# Patient Record
Sex: MALE | Race: WHITE | NOT HISPANIC OR LATINO | Employment: OTHER | URBAN - METROPOLITAN AREA
[De-identification: names, ages, dates, MRNs, and addresses within clinical notes are randomized per-mention and may not be internally consistent; named-entity substitution may affect disease eponyms.]

---

## 2017-01-06 DIAGNOSIS — R97.20 ELEVATED PROSTATE SPECIFIC ANTIGEN (PSA): ICD-10-CM

## 2017-01-06 LAB — PSA SERPL-ACNC: 4.01 UG/L (ref 0–4)

## 2017-01-06 PROCEDURE — 84153 ASSAY OF PSA TOTAL: CPT | Performed by: FAMILY MEDICINE

## 2017-01-17 ENCOUNTER — OFFICE VISIT (OUTPATIENT)
Dept: OPHTHALMOLOGY | Facility: CLINIC | Age: 72
End: 2017-01-17
Payer: COMMERCIAL

## 2017-01-17 DIAGNOSIS — H25.11 NUCLEAR SCLEROSIS OF RIGHT EYE: ICD-10-CM

## 2017-01-17 DIAGNOSIS — H40.053 BORDERLINE GLAUCOMA WITH OCULAR HYPERTENSION, BILATERAL: Primary | ICD-10-CM

## 2017-01-17 DIAGNOSIS — H43.813 PVD (POSTERIOR VITREOUS DETACHMENT), BILATERAL: ICD-10-CM

## 2017-01-17 DIAGNOSIS — H35.373 EPIRETINAL MEMBRANE, BILATERAL: ICD-10-CM

## 2017-01-17 DIAGNOSIS — Z86.69 H/O RD (RETINAL DETACHMENT): ICD-10-CM

## 2017-01-17 DIAGNOSIS — Z98.890 S/P BLEPHAROPLASTY: ICD-10-CM

## 2017-01-17 DIAGNOSIS — H04.123 DRY EYES, BILATERAL: ICD-10-CM

## 2017-01-17 PROCEDURE — 92133 CPTRZD OPH DX IMG PST SGM ON: CPT | Performed by: STUDENT IN AN ORGANIZED HEALTH CARE EDUCATION/TRAINING PROGRAM

## 2017-01-17 PROCEDURE — 92012 INTRM OPH EXAM EST PATIENT: CPT | Performed by: STUDENT IN AN ORGANIZED HEALTH CARE EDUCATION/TRAINING PROGRAM

## 2017-01-17 RX ORDER — DEXAMETHASONE ACETATE, MICRO 100 %
1 POWDER (GRAM) MISCELLANEOUS 4 TIMES DAILY
Qty: 1 BOTTLE | Refills: 0 | Status: ON HOLD | OUTPATIENT
Start: 2017-01-17 | End: 2017-02-20

## 2017-01-17 RX ORDER — MONOCHLOROACETIC ACID
1 CRYSTALS MISCELLANEOUS 4 TIMES DAILY
Qty: 1 BOTTLE | Refills: 0 | Status: SHIPPED | OUTPATIENT
Start: 2017-01-17 | End: 2017-09-27

## 2017-01-17 RX ORDER — BROMFENAC SODIUM 100 %
1 POWDER (GRAM) MISCELLANEOUS 4 TIMES DAILY
Qty: 1 BOTTLE | Refills: 0 | Status: ON HOLD | OUTPATIENT
Start: 2017-01-17 | End: 2017-02-20

## 2017-01-17 ASSESSMENT — VISUAL ACUITY
OS_CC: 20/20
OD_PH_CC: 20/70+2
OD_CC: 20/200
METHOD: SNELLEN - LINEAR

## 2017-01-17 ASSESSMENT — CUP TO DISC RATIO
OS_RATIO: 0.35 UD
OD_RATIO: 0.25 UD

## 2017-01-17 ASSESSMENT — TONOMETRY
IOP_METHOD: APPLANATION
OS_IOP_MMHG: 18
OD_IOP_MMHG: 22

## 2017-01-17 ASSESSMENT — SLIT LAMP EXAM - LIDS
COMMENTS: NORMAL
COMMENTS: NORMAL

## 2017-01-17 ASSESSMENT — EXTERNAL EXAM - LEFT EYE: OS_EXAM: NORMAL

## 2017-01-17 ASSESSMENT — EXTERNAL EXAM - RIGHT EYE: OD_EXAM: NORMAL

## 2017-01-17 NOTE — MR AVS SNAPSHOT
After Visit Summary   1/17/2017    Rakan Nolasco    MRN: [id number]           Patient Information     Date Of Birth          1945        Visit Information        Provider Department      1/17/2017 9:15 AM Mateo Gray MD [facility]        Today's Diagnoses     Borderline glaucoma with ocular hypertension, bilateral    -  1     H/O RD (retinal detachment) s/p repair with PPV (AB)         Nuclear sclerosis of right eye         Epiretinal membrane, bilateral         Dry eyes, bilateral         PVD (posterior vitreous detachment), bilateral         S/P blepharoplasty           Care Instructions    ContinueTimolol right eye each morning and Latanoprost every evening both eyes  Continue care with Dr. Brown  Visually significant cataract that is interfering with daily activities of living. Plan for cataract extraction and intraocular lens implant right eye.  Risks, benefits, complications, and alternatives discussed with patient including possibility of limitations from coexistent eye disease and loss of vision. Target refraction and lens options discussed.  Patient understands and wishes to proceed with surgery.      Mateo Gray MD  [phone]              Follow-ups after your visit        Follow-up notes from your care team     Return for as scheduled.      Who to contact     If you have questions or need follow up information about today's clinic visit or your schedule please contact [facility] directly at [phone].  Normal or non-critical lab and imaging results will be communicated to you by MyChart, letter or phone within 4 business days after the clinic has received the results. If you do not hear from us within 7 days, please contact the clinic through MyChart or phone. If you have a critical or abnormal lab result, we will notify you by phone as soon as possible.  Submit refill requests through [employer] or call your pharmacy and they will  "forward the refill request to us. Please allow 3 business days for your refill to be completed.          Additional Information About Your Visit        [employer] Information     [employer] lets you send messages to your doctor, view your test results, renew your prescriptions, schedule appointments and more. To sign up, go to [email or web address] . Click on \"Log in\" on the left side of the screen, which will take you to the Welcome page. Then click on \"Sign up Now\" on the right side of the page.     You will be asked to enter the access code listed below, as well as some personal information. Please follow the directions to create your username and password.     Your access code is: [email or web address]  Expires: 2017 10:15 AM     Your access code will  in 90 days. If you need help or a new code, please call your [place] clinic or [phone].        Care EveryWhere ID     This is your Care EveryWhere ID. This could be used by other organizations to access your [place] medical records  [id number]         Blood Pressure from Last 3 Encounters:   10/03/16 138/66   16 148/81   16 136/80    Weight from Last 3 Encounters:   10/03/16 99.338 kg (219 lb)   16 98.431 kg (217 lb)   16 101.152 kg (223 lb)              We Performed the Following     HC COMPUTERIZED OPHTHALMIC IMAGING OPTIC NERVE          Today's Medication Changes          These changes are accurate as of: 17 10:15 AM.  If you have any questions, ask your nurse or doctor.               These medicines have changed or have updated prescriptions.        Dose/Directions    pantoprazole 20 MG EC tablet   Commonly known as:  PROTONIX   This may have changed:  additional instructions   Used for:  Gastroesophageal reflux disease without esophagitis        Take by mouth 30-60 minutes before a meal.   Quantity:  90 tablet   Refills:  3                Primary Care Provider Office Phone # Fax #    Telly Lucio [phone] " [phone]       [facility] [address]  [place] [address]        Thank you!     Thank you for choosing [facility]  for your care. Our goal is always to provide you with excellent care. Hearing back from our patients is one way we can continue to improve our services. Please take a few minutes to complete the written survey that you may receive in the mail after your visit with us. Thank you!             Your Updated Medication List - Protect others around you: Learn how to safely use, store and throw away your medicines at www.disposemymeds.org.          This list is accurate as of: 1/17/17 10:15 AM.  Always use your most recent med list.                   Brand Name Dispense Instructions for use    aspirin 81 MG tablet      Take 1 tablet by mouth daily.       atorvastatin 20 MG tablet    LIPITOR    90 tablet    TAKE ONE TABLET BY MOUTH ONE TIME DAILY       CALCIUM 600/VITAMIN D PO      Take  by mouth. Take 1/2 tablet in the morning, 1/2 at night daily       clonazePAM 0.5 MG tablet    klonoPIN     Take 0.5 mg by mouth. Take 1 tablet by mouth daily at bedtime       latanoprost 0.005 % ophthalmic solution    XALATAN    7.5 mL    Place 1 drop into both eyes At Bedtime       lisinopril 10 MG tablet    PRINIVIL/ZESTRIL    90 tablet    TAKE ONE TABLET BY MOUTH ONE TIME DAILY       MULTI-DAY PO      daily       pantoprazole 20 MG EC tablet    PROTONIX    90 tablet    Take by mouth 30-60 minutes before a meal.       timolol 0.5 % ophthalmic solution    TIMOPTIC    1 Bottle    Place 1 drop into both eyes daily

## 2017-01-17 NOTE — PATIENT INSTRUCTIONS
ContinueTimolol right eye each morning and Latanoprost every evening both eyes  Continue care with Dr. Brown  Visually significant cataract that is interfering with daily activities of living. Plan for cataract extraction and intraocular lens implant right eye.  Risks, benefits, complications, and alternatives discussed with patient including possibility of limitations from coexistent eye disease and loss of vision. Target refraction and lens options discussed.  Patient understands and wishes to proceed with surgery.      Mateo Gray MD  [phone]

## 2017-01-17 NOTE — PROGRESS NOTES
Current Eye Medications Latanoprost every evening both eyes (10pm) and timolol right eye each morning ( 6am)     Subjective:  4 month borderline glaucoma  with oct.  Glare more bothersome right eye . Patient saw Doctor Glenny about 3 months ago has return appointment in 3 months.    Objective:  See Ophthalmology Exam.      Assessment:  Rakan Nolasco is a 71 year old male who presents with:     Borderline glaucoma with ocular hypertension, bilateral Intraocular pressure improved with addition of T5 to right eye.   OCT stable both eyes.      H/O RD (retinal detachment) s/p repair with PPV (AB)      Nuclear sclerosis of right eye Visually significant right eye. Was cleared for cataract surgery in right eye in October by Dr. Brown.  Discussed CA3 - will think about it.     Epiretinal membrane, bilateral      Dry eyes, bilateral      PVD (posterior vitreous detachment), bilateral      S/P blepharoplasty        Plan:  ContinueTimolol right eye each morning and Latanoprost every evening both eyes  Continue care with Dr. Brown  Visually significant cataract that is interfering with daily activities of living. Plan for cataract extraction and intraocular lens implant right eye.  Risks, benefits, complications, and alternatives discussed with patient including possibility of limitations from coexistent eye disease and loss of vision. Target refraction and lens options discussed.  Patient understands and wishes to proceed with surgery.      Mateo Gray MD  [phone]    Visually significant cataract, Right  eye. Risks, benefits, alternatives of cataract surgery discussed; patient wishes to proceed with surgery.    Eye: Right   Pupil: 8  Pseudoexfoliation: No  Flomax: No  Diabetes mellitus: No  Glaucoma: No  Guttae: No  S/p refractive surgery or other eye surgery: No  Refractive target:  emmetropia - discuss  Anticoagulation: aspirin  Anesthesia: MAC/topical  Able to lay flat:  Yes  Additional concerns: s/p  vitrectomy right eye, dense brunescent lens  Difficulty: 4-5

## 2017-01-25 ENCOUNTER — TRANSFERRED RECORDS (OUTPATIENT)
Dept: HEALTH INFORMATION MANAGEMENT | Facility: CLINIC | Age: 72
End: 2017-01-25

## 2017-02-06 ENCOUNTER — OFFICE VISIT (OUTPATIENT)
Dept: OPHTHALMOLOGY | Facility: CLINIC | Age: 72
End: 2017-02-06
Payer: COMMERCIAL

## 2017-02-06 ENCOUNTER — OFFICE VISIT (OUTPATIENT)
Dept: FAMILY MEDICINE | Facility: CLINIC | Age: 72
End: 2017-02-06
Payer: COMMERCIAL

## 2017-02-06 VITALS
HEIGHT: 70 IN | HEART RATE: 74 BPM | SYSTOLIC BLOOD PRESSURE: 124 MMHG | DIASTOLIC BLOOD PRESSURE: 76 MMHG | BODY MASS INDEX: 33.64 KG/M2 | TEMPERATURE: 97.4 F | WEIGHT: 235 LBS | OXYGEN SATURATION: 96 %

## 2017-02-06 DIAGNOSIS — Z86.69 H/O RD (RETINAL DETACHMENT): ICD-10-CM

## 2017-02-06 DIAGNOSIS — H26.9 CATARACT: ICD-10-CM

## 2017-02-06 DIAGNOSIS — Z01.818 PREOP GENERAL PHYSICAL EXAM: Primary | ICD-10-CM

## 2017-02-06 DIAGNOSIS — Z98.890 S/P BLEPHAROPLASTY: ICD-10-CM

## 2017-02-06 DIAGNOSIS — H25.11 NUCLEAR SCLEROSIS OF RIGHT EYE: Primary | ICD-10-CM

## 2017-02-06 DIAGNOSIS — H04.123 DRY EYES, BILATERAL: ICD-10-CM

## 2017-02-06 DIAGNOSIS — H35.373 EPIRETINAL MEMBRANE, BILATERAL: ICD-10-CM

## 2017-02-06 DIAGNOSIS — H43.813 PVD (POSTERIOR VITREOUS DETACHMENT), BILATERAL: ICD-10-CM

## 2017-02-06 DIAGNOSIS — H40.053 BORDERLINE GLAUCOMA WITH OCULAR HYPERTENSION, BILATERAL: ICD-10-CM

## 2017-02-06 PROCEDURE — 92136 OPHTHALMIC BIOMETRY: CPT | Mod: 26 | Performed by: STUDENT IN AN ORGANIZED HEALTH CARE EDUCATION/TRAINING PROGRAM

## 2017-02-06 PROCEDURE — 99214 OFFICE O/P EST MOD 30 MIN: CPT | Performed by: FAMILY MEDICINE

## 2017-02-06 PROCEDURE — 92014 COMPRE OPH EXAM EST PT 1/>: CPT | Performed by: STUDENT IN AN ORGANIZED HEALTH CARE EDUCATION/TRAINING PROGRAM

## 2017-02-06 RX ORDER — PREDNISOLONE ACETATE 10 MG/ML
1 SUSPENSION/ DROPS OPHTHALMIC 4 TIMES DAILY
Qty: 10 ML | Refills: 0 | Status: CANCELLED | OUTPATIENT
Start: 2017-02-21

## 2017-02-06 RX ORDER — MOXIFLOXACIN 5 MG/ML
1 SOLUTION/ DROPS OPHTHALMIC 4 TIMES DAILY
Qty: 1 BOTTLE | Refills: 0 | Status: CANCELLED | OUTPATIENT
Start: 2017-02-19

## 2017-02-06 RX ORDER — DICLOFENAC SODIUM 1 MG/ML
1 SOLUTION/ DROPS OPHTHALMIC 4 TIMES DAILY
Qty: 1 BOTTLE | Refills: 0 | Status: CANCELLED | OUTPATIENT
Start: 2017-02-19

## 2017-02-06 ASSESSMENT — VISUAL ACUITY
OS_CC: 20/20
METHOD: SNELLEN - LINEAR
OD_CC: 20/200

## 2017-02-06 ASSESSMENT — EXTERNAL EXAM - RIGHT EYE: OD_EXAM: NORMAL

## 2017-02-06 ASSESSMENT — EXTERNAL EXAM - LEFT EYE: OS_EXAM: NORMAL

## 2017-02-06 ASSESSMENT — CUP TO DISC RATIO
OD_RATIO: 0.25 UD
OS_RATIO: 0.35 UD

## 2017-02-06 ASSESSMENT — SLIT LAMP EXAM - LIDS
COMMENTS: NORMAL
COMMENTS: NORMAL

## 2017-02-06 ASSESSMENT — PAIN SCALES - GENERAL: PAINLEVEL: NO PAIN (0)

## 2017-02-06 NOTE — PROGRESS NOTES
Current Eye Medications:  T5 qday both eyes, Latanoprost every evening both eyes      Subjective:  Preop Kelman Phacoemulsification Intraocular lens right eye.  Patient wants to proceed with surgery.     Objective:  See Ophthalmology Exam.      Assessment:  Rakan Nolasco is a 71 year old male who presents with:     Nuclear sclerosis of right eye preop right eye. CA3. Target mild myopia/emm.     H/O RD (retinal detachment) s/p repair with PPV (AB)      Dry eyes, bilateral      PVD (posterior vitreous detachment), bilateral      S/P blepharoplasty     Borderline glaucoma with ocular hypertension, bilateral      Epiretinal membrane, bilateral        PRE-OP CATARACT INSTRUCTIONS    *Use the following drop in the right eye 4 times on the day before surgery and once the morning of surgery:                                CatarActive3 four times a day in the right eye until the bottle runs out.  *Please bring all your eyedrop to the surgery center.  *If taking more than one drop, wait five minutes between drops.  *No solid food after midnight.  *Clear liquids: coffee (no cream), tea, water, and jello are OK before 7 A.M.  You may take your regular pills with this.  *If you are taking glaucoma drops, continue as usual.    Mateo Gray MD  [phone]

## 2017-02-06 NOTE — MR AVS SNAPSHOT
After Visit Summary   2/6/2017    Rakan Nolasco    MRN: [id number]           Patient Information     Date Of Birth          1945        Visit Information        Provider Department      2/6/2017 8:45 AM Mateo Gray MD [facility] [place]        Today's Diagnoses     Nuclear sclerosis of right eye         H/O RD (retinal detachment) s/p repair with PPV (AB)         Dry eyes, bilateral         PVD (posterior vitreous detachment), bilateral         S/P blepharoplasty         Epiretinal membrane, bilateral           Care Instructions    PRE-OP CATARACT INSTRUCTIONS    *Use the following drop in the right eye 4 times on the day before surgery and once the morning of surgery:                                  CatarActive3 four times a day in the right eye until the bottle runs out.    *Please bring all your eyedrop to the surgery center.    *If taking more than one drop, wait five minutes between drops.    *No solid food after midnight.    *Clear liquids: coffee (no cream), tea, water, and jello are OK before 7 A.M.  You may take your regular pills with this.    *If you are taking glaucoma drops, continue as usual.    Mateo Gray MD  [phone]        Follow-ups after your visit        Follow-up notes from your care team     Return for as scheduled.      Your next 10 appointments already scheduled     Feb 20, 2017   [facility]    [address]  [facility] [address]   [phone]            Feb 21, 2017  1:45 PM   [facility] [place] ([facility]    [address]  [place] [address]   [phone]            Feb 28, 2017 10:45 AM   Return Visit with MD Ariadne [facility] [facility]    [address]  [place] [address]   [phone]            Mar 28, 2017  9:15 AM   Return Visit  "with Mateo Gray MD   [facility] ([facility])    [address]  [place] [address] [phone]              Who to contact     If you have questions or need follow up information about today's clinic visit or your schedule please contact [facility] directly at [phone].  Normal or non-critical lab and imaging results will be communicated to you by MyChart, letter or phone within 4 business days after the clinic has received the results. If you do not hear from us within 7 days, please contact the clinic through MyChart or phone. If you have a critical or abnormal lab result, we will notify you by phone as soon as possible.  Submit refill requests through [employer] or call your pharmacy and they will forward the refill request to us. Please allow 3 business days for your refill to be completed.          Additional Information About Your Visit        [employer] Information     [employer] lets you send messages to your doctor, view your test results, renew your prescriptions, schedule appointments and more. To sign up, go to [email or web address] . Click on \"Log in\" on the left side of the screen, which will take you to the Welcome page. Then click on \"Sign up Now\" on the right side of the page.     You will be asked to enter the access code listed below, as well as some personal information. Please follow the directions to create your username and password.     Your access code is: [email or web address]  Expires: 2017 10:15 AM     Your access code will  in 90 days. If you need help or a new code, please call your [place] clinic or [phone].        Care EveryWhere ID     This is your Care EveryWhere ID. This could be used by other organizations to access your [place] medical records  [id number]         Blood Pressure from Last 3 Encounters:   10/03/16 138/66   16 148/81   16 136/80    Weight from Last 3 Encounters:   10/03/16 99.338 kg " (219 lb)   05/06/16 98.431 kg (217 lb)   04/26/16 101.152 kg (223 lb)              We Performed the Following     IOL MASTER (1st eye)     IOL MASTER (both eyes)          Today's Medication Changes          These changes are accurate as of: 2/6/17  9:52 AM.  If you have any questions, ask your nurse or doctor.               These medicines have changed or have updated prescriptions.        Dose/Directions    pantoprazole 20 MG EC tablet   Commonly known as:  PROTONIX   This may have changed:  additional instructions   Used for:  Gastroesophageal reflux disease without esophagitis        Take by mouth 30-60 minutes before a meal.   Quantity:  90 tablet   Refills:  3                Primary Care Provider Office Phone # Fax #    Telly Lucio [phone]       [facility] [address] [place] [address]        Thank you!     Thank you for choosing [facility]  for your care. Our goal is always to provide you with excellent care. Hearing back from our patients is one way we can continue to improve our services. Please take a few minutes to complete the written survey that you may receive in the mail after your visit with us. Thank you!             Your Updated Medication List - Protect others around you: Learn how to safely use, store and throw away your medicines at www.disposemymeds.org.          This list is accurate as of: 2/6/17  9:52 AM.  Always use your most recent med list.                   Brand Name Dispense Instructions for use    aspirin 81 MG tablet      Take 1 tablet by mouth daily.       atorvastatin 20 MG tablet    LIPITOR    90 tablet    TAKE ONE TABLET BY MOUTH ONE TIME DAILY       Bromfenac Sodium Powd     1 Bottle    1 Bottle 4 times daily 1 drop four times a day into the operative eye starting 1 day before surgery. Use until bottle runs out.       CALCIUM 600/VITAMIN D PO      Take  by mouth. Take 1/2 tablet in the morning, 1/2 at night daily        clonazePAM 0.5 MG tablet    klonoPIN     Take 0.5 mg by mouth. Take 1 tablet by mouth daily at bedtime       Dexamethasone Acetate Powd     1 Bottle    1 Bottle 4 times daily 1 drop four times a day into the operative eye starting 1 day before surgery. Use until bottle runs out.       latanoprost 0.005 % ophthalmic solution    XALATAN    7.5 mL    Place 1 drop into both eyes At Bedtime       lisinopril 10 MG tablet    PRINIVIL/ZESTRIL    90 tablet    TAKE ONE TABLET BY MOUTH ONE TIME DAILY       Moxifloxacin HCl Powd     1 Bottle    1 Bottle 4 times daily 1 drop four times a day into the operative eye starting 1 day before surgery. Use until bottle runs out.       MULTI-DAY PO      daily       pantoprazole 20 MG EC tablet    PROTONIX    90 tablet    Take by mouth 30-60 minutes before a meal.       timolol 0.5 % ophthalmic solution    TIMOPTIC    1 Bottle    Place 1 drop into both eyes daily

## 2017-02-06 NOTE — MR AVS SNAPSHOT
After Visit Summary   2/6/2017    Rakan Nolasco    MRN: [id number]           Patient Information     Date Of Birth          1945        Visit Information        Provider Department      2/6/2017 9:40 AM Telly Lucio MD [facility]        Today's Diagnoses     Preop general physical exam    -  1       Care Instructions      Before Your Surgery      Call your surgeon if there is any change in your health. This includes signs of a cold or flu (such as a sore throat, runny nose, cough, rash or fever).    Do not smoke, drink alcohol or take over the counter medicine (unless your surgeon or primary care doctor tells you to) for the 24 hours before and after surgery.    If you take prescribed drugs: Follow your doctor s orders about which medicines to take and which to stop until after surgery.    Eating and drinking prior to surgery: follow the instructions from your surgeon    Take a shower or bath the night before surgery. Use the soap your surgeon gave you to gently clean your skin. If you do not have soap from your surgeon, use your regular soap. Do not shave or scrub the surgery site.  Wear clean pajamas and have clean sheets on your bed.         Follow-ups after your visit        Your next 10 appointments already scheduled     Feb 20, 2017   Procedure with Mateo Gray MD   [facility] (--)    [address]  [facility] [address]   [phone]            Feb 21, 2017  1:45 PM   Return Visit with Mateo Gray MD   [facility] [place] ([facility])    [address]  [place] [address]   [phone]            Feb 28, 2017 10:45 AM   Return Visit with MD Ariadne Greeneview [facility] Hyatt ([facility])    [address] [facility] [address] [place] [address]   [phone]            Mar 28, 2017  9:15 AM   [facility]   [facility]  [facility] [place] [address]  [place] [address] [phone]              Who to contact     If you have questions or need follow up information about today's clinic visit or your schedule please contact [facility] directly at [phone].  Normal or non-critical lab and imaging results will be communicated to you by MyChart, letter or phone within 4 business days after the clinic has received the results. If you do not hear from us within 7 days, please contact the clinic through MyChart or phone. If you have a critical or abnormal lab result, we will notify you by phone as soon as possible.  Submit refill requests through [employer] or call your pharmacy and they will forward the refill request to us. Please allow 3 business days for your refill to be completed.          Additional Information About Your Visit        [employer] Information     [employer] lets you send messages to your doctor, view your test results, renew your prescriptions, schedule appointments and more. To sign up, go to [email or web address] . Click on \"Log in\" on the left side of the screen, which will take you to the Welcome page. Then click on \"Sign up Now\" on the right side of the page.     You will be asked to enter the access code listed below, as well as some personal information. Please follow the directions to create your username and password.     Your access code is: [email or web address]  Expires: 2017 10:15 AM     Your access code will  in 90 days. If you need help or a new code, please call your [facility] or [phone].        Care EveryWhere ID     This is your Care EveryWhere ID. This could be used by other organizations to access your [place] medical records  [id number]        Your Vitals Were     Pulse Temperature Height BMI (Body Mass Index) Pulse Oximetry       74 97.4  F (36.3  C) (Oral) 5' 10\" (1.778 m) 33.72 kg/m2 96%        Blood Pressure from Last 3 Encounters:   17 124/76 "   10/03/16 138/66   05/06/16 148/81    Weight from Last 3 Encounters:   02/06/17 235 lb (106.595 kg)   10/03/16 219 lb (99.338 kg)   05/06/16 217 lb (98.431 kg)              Today, you had the following     No orders found for display         Today's Medication Changes          These changes are accurate as of: 2/6/17 10:34 AM.  If you have any questions, ask your nurse or doctor.               These medicines have changed or have updated prescriptions.        Dose/Directions    pantoprazole 20 MG EC tablet   Commonly known as:  PROTONIX   This may have changed:  additional instructions   Used for:  Gastroesophageal reflux disease without esophagitis        Take by mouth 30-60 minutes before a meal.   Quantity:  90 tablet   Refills:  3                Primary Care Provider Office Phone # Fax #    Telly Lucio [phone]       [address] [address]        Thank you!     Thank you for choosing [facility]  for your care. Our goal is always to provide you with excellent care. Hearing back from our patients is one way we can continue to improve our services. Please take a few minutes to complete the written survey that you may receive in the mail after your visit with us. Thank you!             Your Updated Medication List - Protect others around you: Learn how to safely use, store and throw away your medicines at www.disposemymeds.org.          This list is accurate as of: 2/6/17 10:34 AM.  Always use your most recent med list.                   Brand Name Dispense Instructions for use    aspirin 81 MG tablet      Take 1 tablet by mouth daily.       atorvastatin 20 MG tablet    LIPITOR    90 tablet    TAKE ONE TABLET BY MOUTH ONE TIME DAILY       Bromfenac Sodium Powd     1 Bottle    1 Bottle 4 times daily 1 drop four times a day into the operative eye starting 1 day before surgery. Use until bottle runs out.       CALCIUM 600/VITAMIN D PO       Take  by mouth. Take 1/2 tablet in the morning, 1/2 at night daily       clonazePAM 0.5 MG tablet    klonoPIN     Take 0.5 mg by mouth. Take 1 tablet by mouth daily at bedtime       Dexamethasone Acetate Powd     1 Bottle    1 Bottle 4 times daily 1 drop four times a day into the operative eye starting 1 day before surgery. Use until bottle runs out.       latanoprost 0.005 % ophthalmic solution    XALATAN    7.5 mL    Place 1 drop into both eyes At Bedtime       lisinopril 10 MG tablet    PRINIVIL/ZESTRIL    90 tablet    TAKE ONE TABLET BY MOUTH ONE TIME DAILY       Moxifloxacin HCl Powd     1 Bottle    1 Bottle 4 times daily 1 drop four times a day into the operative eye starting 1 day before surgery. Use until bottle runs out.       MULTI-DAY PO      daily       pantoprazole 20 MG EC tablet    PROTONIX    90 tablet    Take by mouth 30-60 minutes before a meal.       timolol 0.5 % ophthalmic solution    TIMOPTIC    1 Bottle    Place 1 drop into both eyes daily

## 2017-02-06 NOTE — PROGRESS NOTES
[facility]  [address] [place] [address]  [place] [address]  [phone]  Dept: [phone]    PRE-OP EVALUATION:  Today's date: 2017    Rakan Nolasco (: 1945) presents for pre-operative evaluation assessment as requested by Dr. Gray.  He requires evaluation and anesthesia risk assessment prior to undergoing surgery/procedure for treatment of cataract .  Proposed procedure: Surgical repair of cataract    Date of Surgery/ Procedure: 2017  Time of Surgery/ Procedure: 1:00pm  Hospital/Surgical Facility: [facility]  Primary Physician: Telly Lucio  Type of Anesthesia Anticipated: Local with MAC    Patient has a Health Care Directive or Living Will:  NO    1. NO - Do you have a history of heart attack, stroke, stent, bypass or surgery on an artery in the head, neck, heart or legs?  2. YES - DO YOU EVER HAVE ANY PAIN OR DISCOMFORT IN YOUR CHEST? Not heart related  3. NO - Do you have a history of  Heart Failure?  4. NO - Are you troubled by shortness of breath when: walking on the level, up a slight hill or at night?  5. NO - Do you currently have a cold, bronchitis or other respiratory infection?  6. NO - Do you have a cough, shortness of breath or wheezing?  7. NO - Do you sometimes get pains in the calves of your legs when you walk?  8. NO - Do you or anyone in your family have previous history of blood clots?  9. NO - Do you or does anyone in your family have a serious bleeding problem such as prolonged bleeding following surgeries or cuts?  10. NO - Have you ever had problems with anemia or been told to take iron pills?  11. NO - Have you had any abnormal blood loss such as black, tarry or bloody stools, or abnormal vaginal bleeding?  12. NO - Have you ever had a blood transfusion?  13. NO - Have you or any of your relatives ever had problems with anesthesia?  14. YES - DO YOU HAVE SLEEP APNEA, EXCESSIVE SNORING OR DAYTIME DROWSINESS? Sleep apnea  15. NO - Do you  have any prosthetic heart valves?  16. NO - Do you have prosthetic joints?  17. NO - Is there any chance that you may be pregnant?      HPI:                                                      Brief HPI related to upcoming procedure: Cataract Rt eye      HYPERTENSION - Patient has longstanding history of mod-severe HTN , currently denies any symptoms referable to elevated blood pressure. Specifically denies chest pain, palpitations, dyspnea, orthopnea, PND or peripheral edema. Blood pressure readings have been in normal range. Current medication regimen is as listed below. Patient denies any side effects of medication.                                                                                                                                                                                          .  HYPERLIPIDEMIA - Patient has a long history of significant Hyperlipidemia requiring medication for treatment with recent good control. Patient reports no problems or side effects with the medication.                                                                                                                                                       .    MEDICAL HISTORY:                                                      Patient Active Problem List    Diagnosis Date Noted     Nuclear sclerosis of right eye 05/26/2016     Priority: Medium     H/O RD (retinal detachment) s/p repair with PPV (AB) 05/26/2016     Priority: Medium     Epiretinal membrane, bilateral 05/26/2016     Priority: Medium     PVD (posterior vitreous detachment) OU 06/17/2014     Priority: Medium     Dry eyes 01/21/2014     Priority: Medium     Prediabetes      Priority: Medium     Arthritis      Priority: Medium     Health Care Home 07/20/2012     Priority: Medium     [facility]  Jurgen Messina RN, C-BC    [phone]     DX V65.8 REPLACED WITH [id number] CARE [facility] (04/08/2013)       Hypertension goal BP (blood pressure)  < 140/90 07/19/2012     Priority: Medium     bmi 31 07/14/2012     Priority: Medium     Trigeminal neuralgia pain 01/04/2012     Priority: Medium     Advanced directives, counseling/discussion 08/24/2011     Priority: Medium     Advance Directive Problem List Overview:   Name Relationship Phone    Primary Health Care Agent            Alternative Health Care Agent          Discussed advance care planning with patient; information given to patient to review. 8/24/2011          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     Priority: Medium     Target IOP: 21  Peak IOP: 31  GDX: abnormal both eyes  OCT: nl  HVF: nl  Pachymetry:  C/D:  0.2/0.35  SLT:           S/P blepharoplasty 12/06/2010     Priority: Medium     HYPERLIPIDEMIA LDL GOAL <130 10/31/2010     Priority: Medium     Snoring 10/27/2010     Priority: Medium     Fatigue 10/27/2010     Priority: Medium     GERD (gastroesophageal reflux disease)      Priority: Medium     Diverticulosis      Priority: Medium      Past Medical History   Diagnosis Date     GERD (gastroesophageal reflux disease)      Glaucoma (increased eye pressure)      Diverticulosis      Colonoscopy 8/2008     Irritable bowel      Hyperlipidemia LDL goal < 130      age, htn, fhx     ROSIE (obstructive sleep apnea) 1/2011     using CPAP;     Restless leg syndrome      Trigeminal neuralgia pain 1/4/2012     Fatty liver      see US  5/2012     BMI 31.0-31.9,adult      HTN (hypertension)      Arthritis           Prediabetes      Past Surgical History   Procedure Laterality Date     Tonsillectomy  as child     Surgical history of -   8/2009     Both Eyelids-.     Colonoscopy       Esophagoscopy, gastroscopy, duodenoscopy (egd), combined  1/15/2014     Procedure: COMBINED ESOPHAGOSCOPY, GASTROSCOPY, DUODENOSCOPY (EGD), BIOPSY SINGLE OR MULTIPLE;;  Surgeon: Winston Nixon MD;  Location: [facility] OR     Current Outpatient Prescriptions   Medication Sig Dispense  Refill     lisinopril (PRINIVIL/ZESTRIL) 10 MG tablet TAKE ONE TABLET BY MOUTH ONE TIME DAILY 90 tablet 3     atorvastatin (LIPITOR) 20 MG tablet TAKE ONE TABLET BY MOUTH ONE TIME DAILY 90 tablet 2     timolol (TIMOPTIC) 0.5 % ophthalmic solution Place 1 drop into both eyes daily 1 Bottle 11     latanoprost (XALATAN) 0.005 % ophthalmic solution Place 1 drop into both eyes At Bedtime 7.5 mL 4     pantoprazole (PROTONIX) 20 MG tablet Take by mouth 30-60 minutes before a meal. (Patient taking differently: Take by mouth 30-60 minutes before a meal. prn) 90 tablet 3     aspirin 81 MG tablet Take 1 tablet by mouth daily.       ClonAZEPAM (KLONOPIN) 0.5 MG tablet Take 0.5 mg by mouth. Take 1 tablet by mouth daily at bedtime       Calcium Carbonate-Vitamin D (CALCIUM 600/VITAMIN D PO) Take  by mouth. Take 1/2 tablet in the morning, 1/2 at night daily       MULTI-DAY OR daily       Bromfenac Sodium POWD 1 Bottle 4 times daily 1 drop four times a day into the operative eye starting 1 day before surgery. Use until bottle runs out. 1 Bottle 0     Dexamethasone Acetate POWD 1 Bottle 4 times daily 1 drop four times a day into the operative eye starting 1 day before surgery. Use until bottle runs out. 1 Bottle 0     Moxifloxacin HCl POWD 1 Bottle 4 times daily 1 drop four times a day into the operative eye starting 1 day before surgery. Use until bottle runs out. 1 Bottle 0     OTC products: None, except as noted above    Allergies   Allergen Reactions     Ropinirole Hcl GI Disturbance     Weakness;? syncope      Latex Allergy: NO    Social History   Substance Use Topics     Smoking status: Former Smoker -- 15 years     Types: Cigarettes     Quit date: 01/01/1983     Smokeless tobacco: Former User      Comment: smoke free household.     Alcohol Use: No      Comment: quit in 1986     History   Drug Use No       REVIEW OF SYSTEMS:                                                    C: NEGATIVE for fever, chills, change in  "weight  E/M: NEGATIVE for ear, mouth and throat problems  R: NEGATIVE for significant cough or SOB  CV: NEGATIVE for chest pain, palpitations or peripheral edema    EXAM:                                                    /76 mmHg  Pulse 74  Temp(Src) 97.4  F (36.3  C) (Oral)  Ht 5' 10\" (1.778 m)  Wt 235 lb (106.595 kg)  BMI 33.72 kg/m2  SpO2 96%  GENERAL APPEARANCE: healthy, alert and no distress  HENT: ear canals and TM's normal and nose and mouth without ulcers or lesions  RESP: lungs clear to auscultation - no rales, rhonchi or wheezes  CV: regular rate and rhythm, normal S1 S2, no S3 or S4 and no murmur, click or rub   ABDOMEN: soft, nontender, no HSM or masses and bowel sounds normal  NEURO: Normal strength and tone, sensory exam grossly normal, mentation intact and speech normal    DIAGNOSTICS:                                                    No labs or EKG required for low risk surgery (cataract, skin procedure, breast biopsy, etc)    Recent Labs   Lab Test  10/03/16   0855  08/01/16   0705   03/28/16   1008  09/30/15   0748   02/05/14   0925  05/06/13   1101   HGB   --    --    --    --    --    --   15.6  15.2   PLT   --    --    --    --    --    --   258  263   NA   --    --    --   140  140   < >  139  138   POTASSIUM   --    --    --   4.0  4.2   < >  4.6  4.2   CR   --    --    --   0.84  1.10   < >  0.91  0.94   A1C  6.0  5.7   < >   --   6.4*   --    --    --     < > = values in this interval not displayed.      IMPRESSION:                                                    Reason for surgery/procedure: Cataract/Catract Surgery  Diagnosis/reason for consult: Cataract/Pre OP    The proposed surgical procedure is considered LOW risk.    REVISED CARDIAC RISK INDEX  The patient has the following serious cardiovascular risks for perioperative complications such as (MI, PE, VFib and 3  AV Block):  No serious cardiac risks  INTERPRETATION: 0 risks: Class I (very low risk - 0.4% complication " rate)    The patient has the following additional risks for perioperative complications:  No identified additional risks      ICD-10-CM    1. Preop general physical exam Z01.818    2. Cataract H26.9        RECOMMENDATIONS:                                                      --Patient is to take all scheduled medications on the day of surgery EXCEPT for modifications listed below.     NONE:    APPROVAL GIVEN to proceed with proposed procedure, without further diagnostic evaluation     Signed Electronically by: Telly Lucio MD    Copy of this evaluation report is provided to requesting physician.    [facility] Preop Guidelines

## 2017-02-06 NOTE — NURSING NOTE
"Chief Complaint   Patient presents with     Pre-Op Exam       Initial /76 mmHg  Pulse 74  Temp(Src) 97.4  F (36.3  C) (Oral)  Ht 5' 10\" (1.778 m)  Wt 235 lb (106.595 kg)  BMI 33.72 kg/m2  SpO2 96% Estimated body mass index is 33.72 kg/(m^2) as calculated from the following:    Height as of this encounter: 5' 10\" (1.778 m).    Weight as of this encounter: 235 lb (106.595 kg).  Medication Reconciliation: complete     Poly Sheffield CMA   "

## 2017-02-06 NOTE — PATIENT INSTRUCTIONS
PRE-OP CATARACT INSTRUCTIONS    *Use the following drop in the right eye 4 times on the day before surgery and once the morning of surgery:                                  CatarActive3 four times a day in the right eye until the bottle runs out.    *Please bring all your eyedrop to the surgery center.    *If taking more than one drop, wait five minutes between drops.    *No solid food after midnight.    *Clear liquids: coffee (no cream), tea, water, and jello are OK before 7 A.M.  You may take your regular pills with this.    *If you are taking glaucoma drops, continue as usual.    Mateo Gray MD  [phone]

## 2017-02-17 NOTE — H&P (VIEW-ONLY)
[facility]  [address] [place] [address]  [place] [address]  [phone]  Dept: [phone]    PRE-OP EVALUATION:  Today's date: 2017    Rakan Nolasco (: 1945) presents for pre-operative evaluation assessment as requested by Dr. Gray.  He requires evaluation and anesthesia risk assessment prior to undergoing surgery/procedure for treatment of cataract .  Proposed procedure: Surgical repair of cataract    Date of Surgery/ Procedure: 2017  Time of Surgery/ Procedure: 1:00pm  Hospital/Surgical Facility: [facility]  Primary Physician: Telly Lucio  Type of Anesthesia Anticipated: Local with MAC    Patient has a Health Care Directive or Living Will:  NO    1. NO - Do you have a history of heart attack, stroke, stent, bypass or surgery on an artery in the head, neck, heart or legs?  2. YES - DO YOU EVER HAVE ANY PAIN OR DISCOMFORT IN YOUR CHEST? Not heart related  3. NO - Do you have a history of  Heart Failure?  4. NO - Are you troubled by shortness of breath when: walking on the level, up a slight hill or at night?  5. NO - Do you currently have a cold, bronchitis or other respiratory infection?  6. NO - Do you have a cough, shortness of breath or wheezing?  7. NO - Do you sometimes get pains in the calves of your legs when you walk?  8. NO - Do you or anyone in your family have previous history of blood clots?  9. NO - Do you or does anyone in your family have a serious bleeding problem such as prolonged bleeding following surgeries or cuts?  10. NO - Have you ever had problems with anemia or been told to take iron pills?  11. NO - Have you had any abnormal blood loss such as black, tarry or bloody stools, or abnormal vaginal bleeding?  12. NO - Have you ever had a blood transfusion?  13. NO - Have you or any of your relatives ever had problems with anesthesia?  14. YES - DO YOU HAVE SLEEP APNEA, EXCESSIVE SNORING OR DAYTIME DROWSINESS? Sleep apnea  15. NO - Do you  have any prosthetic heart valves?  16. NO - Do you have prosthetic joints?  17. NO - Is there any chance that you may be pregnant?      HPI:                                                      Brief HPI related to upcoming procedure: Cataract Rt eye      HYPERTENSION - Patient has longstanding history of mod-severe HTN , currently denies any symptoms referable to elevated blood pressure. Specifically denies chest pain, palpitations, dyspnea, orthopnea, PND or peripheral edema. Blood pressure readings have been in normal range. Current medication regimen is as listed below. Patient denies any side effects of medication.                                                                                                                                                                                          .  HYPERLIPIDEMIA - Patient has a long history of significant Hyperlipidemia requiring medication for treatment with recent good control. Patient reports no problems or side effects with the medication.                                                                                                                                                       .    MEDICAL HISTORY:                                                      Patient Active Problem List    Diagnosis Date Noted     Nuclear sclerosis of right eye 05/26/2016     Priority: Medium     H/O RD (retinal detachment) s/p repair with PPV (AB) 05/26/2016     Priority: Medium     Epiretinal membrane, bilateral 05/26/2016     Priority: Medium     PVD (posterior vitreous detachment) OU 06/17/2014     Priority: Medium     Dry eyes 01/21/2014     Priority: Medium     Prediabetes      Priority: Medium     Arthritis      Priority: Medium     Health Care Home 07/20/2012     Priority: Medium     [facility]  Jurgen Messina RN, C-BC    [phone]     DX V65.8 REPLACED WITH [id number] (04/08/2013)       Hypertension goal BP (blood pressure)  < 140/90 07/19/2012     Priority: Medium     bmi 31 07/14/2012     Priority: Medium     Trigeminal neuralgia pain 01/04/2012     Priority: Medium     Advanced directives, counseling/discussion 08/24/2011     Priority: Medium     Advance Directive Problem List Overview:   Name Relationship Phone    Primary Health Care Agent            Alternative Health Care Agent          Discussed advance care planning with patient; information given to patient to review. 8/24/2011          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     Priority: Medium     Target IOP: 21  Peak IOP: 31  GDX: abnormal both eyes  OCT: nl  HVF: nl  Pachymetry:  C/D:  0.2/0.35  SLT:           S/P blepharoplasty 12/06/2010     Priority: Medium     HYPERLIPIDEMIA LDL GOAL <130 10/31/2010     Priority: Medium     Snoring 10/27/2010     Priority: Medium     Fatigue 10/27/2010     Priority: Medium     GERD (gastroesophageal reflux disease)      Priority: Medium     Diverticulosis      Priority: Medium      Past Medical History   Diagnosis Date     GERD (gastroesophageal reflux disease)      Glaucoma (increased eye pressure)      Diverticulosis      Colonoscopy 8/2008     Irritable bowel      Hyperlipidemia LDL goal < 130      age, htn, fhx     ROSIE (obstructive sleep apnea) 1/2011     using CPAP;     Restless leg syndrome      Trigeminal neuralgia pain 1/4/2012     Fatty liver      see US  5/2012     BMI 31.0-31.9,adult      HTN (hypertension)      Arthritis           Prediabetes      Past Surgical History   Procedure Laterality Date     Tonsillectomy  as child     Surgical history of -   8/2009     Both Eyelids-.     Colonoscopy       Esophagoscopy, gastroscopy, duodenoscopy (egd), combined  1/15/2014     Procedure: COMBINED ESOPHAGOSCOPY, GASTROSCOPY, DUODENOSCOPY (EGD), BIOPSY SINGLE OR MULTIPLE;;  Surgeon: Winston Nixon MD;  Location: [facility] OR     Current Outpatient Prescriptions   Medication Sig Dispense  Refill     lisinopril (PRINIVIL/ZESTRIL) 10 MG tablet TAKE ONE TABLET BY MOUTH ONE TIME DAILY 90 tablet 3     atorvastatin (LIPITOR) 20 MG tablet TAKE ONE TABLET BY MOUTH ONE TIME DAILY 90 tablet 2     timolol (TIMOPTIC) 0.5 % ophthalmic solution Place 1 drop into both eyes daily 1 Bottle 11     latanoprost (XALATAN) 0.005 % ophthalmic solution Place 1 drop into both eyes At Bedtime 7.5 mL 4     pantoprazole (PROTONIX) 20 MG tablet Take by mouth 30-60 minutes before a meal. (Patient taking differently: Take by mouth 30-60 minutes before a meal. prn) 90 tablet 3     aspirin 81 MG tablet Take 1 tablet by mouth daily.       ClonAZEPAM (KLONOPIN) 0.5 MG tablet Take 0.5 mg by mouth. Take 1 tablet by mouth daily at bedtime       Calcium Carbonate-Vitamin D (CALCIUM 600/VITAMIN D PO) Take  by mouth. Take 1/2 tablet in the morning, 1/2 at night daily       MULTI-DAY OR daily       Bromfenac Sodium POWD 1 Bottle 4 times daily 1 drop four times a day into the operative eye starting 1 day before surgery. Use until bottle runs out. 1 Bottle 0     Dexamethasone Acetate POWD 1 Bottle 4 times daily 1 drop four times a day into the operative eye starting 1 day before surgery. Use until bottle runs out. 1 Bottle 0     Moxifloxacin HCl POWD 1 Bottle 4 times daily 1 drop four times a day into the operative eye starting 1 day before surgery. Use until bottle runs out. 1 Bottle 0     OTC products: None, except as noted above    Allergies   Allergen Reactions     Ropinirole Hcl GI Disturbance     Weakness;? syncope      Latex Allergy: NO    Social History   Substance Use Topics     Smoking status: Former Smoker -- 15 years     Types: Cigarettes     Quit date: 01/01/1983     Smokeless tobacco: Former User      Comment: smoke free household.     Alcohol Use: No      Comment: quit in 1986     History   Drug Use No       REVIEW OF SYSTEMS:                                                    C: NEGATIVE for fever, chills, change in  "weight  E/M: NEGATIVE for ear, mouth and throat problems  R: NEGATIVE for significant cough or SOB  CV: NEGATIVE for chest pain, palpitations or peripheral edema    EXAM:                                                    /76 mmHg  Pulse 74  Temp(Src) 97.4  F (36.3  C) (Oral)  Ht 5' 10\" (1.778 m)  Wt 235 lb (106.595 kg)  BMI 33.72 kg/m2  SpO2 96%  GENERAL APPEARANCE: healthy, alert and no distress  HENT: ear canals and TM's normal and nose and mouth without ulcers or lesions  RESP: lungs clear to auscultation - no rales, rhonchi or wheezes  CV: regular rate and rhythm, normal S1 S2, no S3 or S4 and no murmur, click or rub   ABDOMEN: soft, nontender, no HSM or masses and bowel sounds normal  NEURO: Normal strength and tone, sensory exam grossly normal, mentation intact and speech normal    DIAGNOSTICS:                                                    No labs or EKG required for low risk surgery (cataract, skin procedure, breast biopsy, etc)    Recent Labs   Lab Test  10/03/16   0855  08/01/16   0705   03/28/16   1008  09/30/15   0748   02/05/14   0925  05/06/13   1101   HGB   --    --    --    --    --    --   15.6  15.2   PLT   --    --    --    --    --    --   258  263   NA   --    --    --   140  140   < >  139  138   POTASSIUM   --    --    --   4.0  4.2   < >  4.6  4.2   CR   --    --    --   0.84  1.10   < >  0.91  0.94   A1C  6.0  5.7   < >   --   6.4*   --    --    --     < > = values in this interval not displayed.      IMPRESSION:                                                    Reason for surgery/procedure: Cataract/Catract Surgery  Diagnosis/reason for consult: Cataract/Pre OP    The proposed surgical procedure is considered LOW risk.    REVISED CARDIAC RISK INDEX  The patient has the following serious cardiovascular risks for perioperative complications such as (MI, PE, VFib and 3  AV Block):  No serious cardiac risks  INTERPRETATION: 0 risks: Class I (very low risk - 0.4% complication " rate)    The patient has the following additional risks for perioperative complications:  No identified additional risks      ICD-10-CM    1. Preop general physical exam Z01.818    2. Cataract H26.9        RECOMMENDATIONS:                                                      --Patient is to take all scheduled medications on the day of surgery EXCEPT for modifications listed below.     NONE:    APPROVAL GIVEN to proceed with proposed procedure, without further diagnostic evaluation     Signed Electronically by: Telly Lucio MD    Copy of this evaluation report is provided to requesting physician.    [facility] Preop Guidelines

## 2017-02-20 ENCOUNTER — HOSPITAL ENCOUNTER (OUTPATIENT)
Facility: CLINIC | Age: 72
Discharge: HOME | End: 2017-02-20
Attending: STUDENT IN AN ORGANIZED HEALTH CARE EDUCATION/TRAINING PROGRAM | Admitting: STUDENT IN AN ORGANIZED HEALTH CARE EDUCATION/TRAINING PROGRAM
Payer: COMMERCIAL

## 2017-02-20 ENCOUNTER — ANESTHESIA EVENT (OUTPATIENT)
Dept: SURGERY | Facility: CLINIC | Age: 72
End: 2017-02-20
Payer: COMMERCIAL

## 2017-02-20 ENCOUNTER — ANESTHESIA (OUTPATIENT)
Dept: SURGERY | Facility: CLINIC | Age: 72
End: 2017-02-20
Payer: COMMERCIAL

## 2017-02-20 VITALS
TEMPERATURE: 97.6 F | RESPIRATION RATE: 16 BRPM | HEART RATE: 58 BPM | OXYGEN SATURATION: 95 % | DIASTOLIC BLOOD PRESSURE: 70 MMHG | SYSTOLIC BLOOD PRESSURE: 113 MMHG

## 2017-02-20 PROCEDURE — 25000132 ZZH RX MED GY IP 250 OP 250 PS 637: Performed by: STUDENT IN AN ORGANIZED HEALTH CARE EDUCATION/TRAINING PROGRAM

## 2017-02-20 PROCEDURE — 25000125 ZZHC RX 250: Performed by: STUDENT IN AN ORGANIZED HEALTH CARE EDUCATION/TRAINING PROGRAM

## 2017-02-20 PROCEDURE — 25800025 ZZH RX 258: Performed by: ANESTHESIOLOGY

## 2017-02-20 PROCEDURE — 25000128 H RX IP 250 OP 636: Performed by: NURSE ANESTHETIST, CERTIFIED REGISTERED

## 2017-02-20 PROCEDURE — 25000125 ZZHC RX 250: Performed by: NURSE ANESTHETIST, CERTIFIED REGISTERED

## 2017-02-20 PROCEDURE — 37000009 ZZH ANESTHESIA TECHNICAL FEE, EACH ADDTL 15 MIN: Performed by: STUDENT IN AN ORGANIZED HEALTH CARE EDUCATION/TRAINING PROGRAM

## 2017-02-20 PROCEDURE — 36000101 ZZH EYE SURGERY LEVEL 3 1ST 30 MIN: Performed by: STUDENT IN AN ORGANIZED HEALTH CARE EDUCATION/TRAINING PROGRAM

## 2017-02-20 PROCEDURE — 36000102 ZZH EYE SURGERY LEVEL 3 EA 15 ADDTL MIN: Performed by: STUDENT IN AN ORGANIZED HEALTH CARE EDUCATION/TRAINING PROGRAM

## 2017-02-20 PROCEDURE — 25000125 ZZHC RX 250: Performed by: ANESTHESIOLOGY

## 2017-02-20 PROCEDURE — 40000170 ZZH STATISTIC PRE-PROCEDURE ASSESSMENT II: Performed by: STUDENT IN AN ORGANIZED HEALTH CARE EDUCATION/TRAINING PROGRAM

## 2017-02-20 PROCEDURE — V2632 POST CHMBR INTRAOCULAR LENS: HCPCS | Performed by: STUDENT IN AN ORGANIZED HEALTH CARE EDUCATION/TRAINING PROGRAM

## 2017-02-20 PROCEDURE — 66984 XCAPSL CTRC RMVL W/O ECP: CPT | Mod: RT | Performed by: STUDENT IN AN ORGANIZED HEALTH CARE EDUCATION/TRAINING PROGRAM

## 2017-02-20 PROCEDURE — 27210794 ZZH OR GENERAL SUPPLY STERILE: Performed by: STUDENT IN AN ORGANIZED HEALTH CARE EDUCATION/TRAINING PROGRAM

## 2017-02-20 PROCEDURE — 25000128 H RX IP 250 OP 636: Performed by: STUDENT IN AN ORGANIZED HEALTH CARE EDUCATION/TRAINING PROGRAM

## 2017-02-20 PROCEDURE — 37000008 ZZH ANESTHESIA TECHNICAL FEE, 1ST 30 MIN: Performed by: STUDENT IN AN ORGANIZED HEALTH CARE EDUCATION/TRAINING PROGRAM

## 2017-02-20 PROCEDURE — 71000028 ZZH EYE RECOVERY PHASE 2 EACH 15 MINS: Performed by: STUDENT IN AN ORGANIZED HEALTH CARE EDUCATION/TRAINING PROGRAM

## 2017-02-20 DEVICE — EYE IMP IOL AMO PCL TECNIS ZCB00 18.0: Type: IMPLANTABLE DEVICE | Site: EYE | Status: FUNCTIONAL

## 2017-02-20 RX ORDER — BALANCED SALT SOLUTION 6.4; .75; .48; .3; 3.9; 1.7 MG/ML; MG/ML; MG/ML; MG/ML; MG/ML; MG/ML
SOLUTION OPHTHALMIC PRN
Status: DISCONTINUED | OUTPATIENT
Start: 2017-02-20 | End: 2017-02-20 | Stop reason: HOSPADM

## 2017-02-20 RX ORDER — FLURBIPROFEN SODIUM 0.3 MG/ML
1 SOLUTION/ DROPS OPHTHALMIC
Status: DISCONTINUED | OUTPATIENT
Start: 2017-02-20 | End: 2017-02-20 | Stop reason: HOSPADM

## 2017-02-20 RX ORDER — PROPARACAINE HYDROCHLORIDE 5 MG/ML
SOLUTION/ DROPS OPHTHALMIC PRN
Status: DISCONTINUED | OUTPATIENT
Start: 2017-02-20 | End: 2017-02-20 | Stop reason: HOSPADM

## 2017-02-20 RX ORDER — SODIUM CHLORIDE, SODIUM LACTATE, POTASSIUM CHLORIDE, CALCIUM CHLORIDE 600; 310; 30; 20 MG/100ML; MG/100ML; MG/100ML; MG/100ML
500 INJECTION, SOLUTION INTRAVENOUS CONTINUOUS
Status: DISCONTINUED | OUTPATIENT
Start: 2017-02-20 | End: 2017-02-20 | Stop reason: HOSPADM

## 2017-02-20 RX ORDER — PHENYLEPHRINE HYDROCHLORIDE 25 MG/ML
1 SOLUTION/ DROPS OPHTHALMIC
Status: COMPLETED | OUTPATIENT
Start: 2017-02-20 | End: 2017-02-20

## 2017-02-20 RX ORDER — PROPARACAINE HYDROCHLORIDE 5 MG/ML
1 SOLUTION/ DROPS OPHTHALMIC ONCE
Status: COMPLETED | OUTPATIENT
Start: 2017-02-20 | End: 2017-02-20

## 2017-02-20 RX ORDER — ONDANSETRON 2 MG/ML
INJECTION INTRAMUSCULAR; INTRAVENOUS PRN
Status: DISCONTINUED | OUTPATIENT
Start: 2017-02-20 | End: 2017-02-20

## 2017-02-20 RX ORDER — CYCLOPENTOLATE HYDROCHLORIDE 10 MG/ML
1 SOLUTION/ DROPS OPHTHALMIC
Status: COMPLETED | OUTPATIENT
Start: 2017-02-20 | End: 2017-02-20

## 2017-02-20 RX ORDER — TROPICAMIDE 10 MG/ML
1 SOLUTION/ DROPS OPHTHALMIC
Status: COMPLETED | OUTPATIENT
Start: 2017-02-20 | End: 2017-02-20

## 2017-02-20 RX ADMIN — MIDAZOLAM HYDROCHLORIDE 1 MG: 1 INJECTION, SOLUTION INTRAMUSCULAR; INTRAVENOUS at 12:47

## 2017-02-20 RX ADMIN — TROPICAMIDE 1 DROP: 10 SOLUTION/ DROPS OPHTHALMIC at 11:39

## 2017-02-20 RX ADMIN — CYCLOPENTOLATE HYDROCHLORIDE 1 DROP: 10 SOLUTION/ DROPS OPHTHALMIC at 11:45

## 2017-02-20 RX ADMIN — PHENYLEPHRINE HYDROCHLORIDE 1 DROP: 2.5 SOLUTION/ DROPS OPHTHALMIC at 11:47

## 2017-02-20 RX ADMIN — TROPICAMIDE 1 DROP: 10 SOLUTION/ DROPS OPHTHALMIC at 11:47

## 2017-02-20 RX ADMIN — PHENYLEPHRINE HYDROCHLORIDE 1 DROP: 2.5 SOLUTION/ DROPS OPHTHALMIC at 11:39

## 2017-02-20 RX ADMIN — CYCLOPENTOLATE HYDROCHLORIDE 1 DROP: 10 SOLUTION/ DROPS OPHTHALMIC at 11:39

## 2017-02-20 RX ADMIN — LIDOCAINE HYDROCHLORIDE 1 ML: 10 INJECTION, SOLUTION EPIDURAL; INFILTRATION; INTRACAUDAL; PERINEURAL at 11:46

## 2017-02-20 RX ADMIN — PROPARACAINE HYDROCHLORIDE 1 DROP: 5 SOLUTION/ DROPS OPHTHALMIC at 11:39

## 2017-02-20 RX ADMIN — DEXMEDETOMIDINE 4 MCG: 100 INJECTION, SOLUTION, CONCENTRATE INTRAVENOUS at 12:48

## 2017-02-20 RX ADMIN — PHENYLEPHRINE HYDROCHLORIDE 1 DROP: 2.5 SOLUTION/ DROPS OPHTHALMIC at 11:44

## 2017-02-20 RX ADMIN — SODIUM CHLORIDE, POTASSIUM CHLORIDE, SODIUM LACTATE AND CALCIUM CHLORIDE: 600; 310; 30; 20 INJECTION, SOLUTION INTRAVENOUS at 11:46

## 2017-02-20 RX ADMIN — DEXMEDETOMIDINE 8 MCG: 100 INJECTION, SOLUTION, CONCENTRATE INTRAVENOUS at 12:53

## 2017-02-20 RX ADMIN — FLURBIPROFEN SODIUM 1 DROP: 0.3 SOLUTION/ DROPS OPHTHALMIC at 11:41

## 2017-02-20 RX ADMIN — MIDAZOLAM HYDROCHLORIDE 1 MG: 1 INJECTION, SOLUTION INTRAMUSCULAR; INTRAVENOUS at 12:44

## 2017-02-20 RX ADMIN — TROPICAMIDE 1 DROP: 10 SOLUTION/ DROPS OPHTHALMIC at 11:45

## 2017-02-20 RX ADMIN — CYCLOPENTOLATE HYDROCHLORIDE 1 DROP: 10 SOLUTION/ DROPS OPHTHALMIC at 11:47

## 2017-02-20 RX ADMIN — FLURBIPROFEN SODIUM 1 DROP: 0.3 SOLUTION/ DROPS OPHTHALMIC at 11:47

## 2017-02-20 RX ADMIN — ONDANSETRON 4 MG: 2 INJECTION INTRAMUSCULAR; INTRAVENOUS at 12:51

## 2017-02-20 RX ADMIN — FLURBIPROFEN SODIUM 1 DROP: 0.3 SOLUTION/ DROPS OPHTHALMIC at 11:44

## 2017-02-20 ASSESSMENT — LIFESTYLE VARIABLES: TOBACCO_USE: 1

## 2017-02-20 NOTE — IP AVS SNAPSHOT
[facility]    [address]    [place] [address]    Phone:  [phone]    Fax:  [phone]                                       After Visit Summary   2/20/2017    Rakan Nolasco    MRN: [id number]           After Visit Summary Signature Page     I have received my discharge instructions, and my questions have been answered. I have discussed any challenges I see with this plan with the nurse or doctor.    ..........................................................................................................................................  Patient/Patient Representative Signature      ..........................................................................................................................................  Patient Representative Print Name and Relationship to Patient    ..................................................               ................................................  Date                                            Time    ..........................................................................................................................................  Reviewed by Signature/Title    ...................................................              ..............................................  Date                                                            Time

## 2017-02-20 NOTE — ANESTHESIA PREPROCEDURE EVALUATION
Anesthesia Evaluation     . Pt has had prior anesthetic.     No history of anesthetic complications     ROS/MED HX    ENT/Pulmonary:     (+)sleep apnea, tobacco use, Past use uses CPAP , . .    Neurologic:      (-) CVA and TIA   Cardiovascular:     (+) Dyslipidemia, hypertension----. : . . . :. .       METS/Exercise Tolerance:     Hematologic:         Musculoskeletal:   (+) arthritis, , , -       GI/Hepatic:     (+) GERD Asymptomatic on medication,      (-) liver disease   Renal/Genitourinary:      (-) renal disease   Endo:     (+) Obesity, .   (-) Type II DM, thyroid disease and chronic steroid usage   Psychiatric:         Infectious Disease:        (-) Recent Fever   Malignancy:         Other:               Physical Exam  Normal systems: cardiovascular, pulmonary and dental    Airway   Mallampati: I  TM distance: >3 FB  Neck ROM: full    Dental     Cardiovascular       Pulmonary                     Anesthesia Plan      History & Physical Review  History and physical reviewed and following examination; no interval change.    ASA Status:  3 .    NPO Status:  > 8 hours    Plan for MAC Reason for MAC:  Procedure to face, neck, head or breast         Postoperative Care  Postoperative pain management:  Multi-modal analgesia.      Consents  Anesthetic plan, risks, benefits and alternatives discussed with:  Patient..                          .

## 2017-02-20 NOTE — IP AVS SNAPSHOT
MRN:[id number]                      After Visit Summary   2/20/2017    Rakan Nolasco    MRN: [id number]           Thank you!     Thank you for choosing [facility] for your care. Our goal is always to provide you with excellent care. Hearing back from our patients is one way we can continue to improve our services. Please take a few minutes to complete the written survey that you may receive in the mail after you visit with us. Thank you!        Patient Information     Date Of Birth          1945        About your hospital stay     You were admitted on:  February 20, 2017 You last received care in the:  [facility]    You were discharged on:  February 20, 2017       Who to Call     For medical emergencies, please call 911.  For non-urgent questions about your medical care, please call your primary care provider or clinic, [phone]  For questions related to your surgery, please call your surgery clinic        Attending Provider     Provider Specialty    Mateo Gray MD Ophthalmology       Primary Care Provider Office Phone # Fax #    Telly Lucio [phone]       [address] [address]        Your next 10 appointments already scheduled     Feb 21, 2017  1:45 PM CST   Return Visit with Mateo Gray MD   [facility] ([facility]    [address] [address]   [phone]            Feb 28, 2017 10:45 AM CST   Return Visit with Mateo Gray MD   [facility] ([facility]    [address] [address]   [phone]            Mar 28, 2017  9:15 AM CDT   Return Visit with Mateo Gray MD   [facility] ([facility]    [address] [address] [phone]              Further instructions from your care team       CATARACT SURGERY POST-OP INSTRUCTIONS    Mateo Gray  [phone]      *Continue using CatarActive3 four times a day in the operative eye.  *You should get 3 doses in today and 4 doses daily starting tomorrow.  *Wait 5 minutes between drops if putting in glaucoma drops around the same time.      Keep the eye shield taped in place unless putting drops in. We will remove it for you in the office tomorrow.      Light sensitivity may be noticed. Sunglasses may be worn for comfort.      Do not rub the operated eye.      Keep the operated eye dry. You may wash your hair, bathe or shower, but keep the operated eye closed while doing so.       No swimming, hot tub, or sauna for 2 weeks.      No make up around eye for 5 days.      No bending at the waist or lifting more than 10 pounds for one week.      May take Tylenol (per directions on bottle) for mild pain.      Call Dr. Gray's cell at [phone]  if any of the following should occur:    o Any sudden vision changes  o Nausea or severe headache  o Increase in pain not controlled  Or signs of infection (pus, increasing redness or tenderness)      [facility] Anesthesia Eye Care Center Discharge  Instructions  Anesthesia (Eye Care Center)   Adult Discharge Instructions    For 24 hours after surgery    1. Get plenty of rest.  Make arrangements to have a responsible adult stay with you for at least 6 hours after you leave the hospital.  2. Do not drive or use heavy equipment for 24 hours.    3. Do not drink alcohol for 24 hours.  4. Do not sign legal documents or make important decisions for 24 hours.  5. Avoid strenuous or risky activities. You may feel lightheaded.  If so, sit for a few minutes before standing.  Have someone help you get up.   6. Conscious sedation patients may resume a regular diet..  7. Any questions of medical nature, call your physician.      Pending Results     No orders found from 2/18/2017 to 2/21/2017.            Admission Information     Date & Time Provider Department  "Dept. Phone    2017 Mateo Gray MD [facility] [phone]      Your Vitals Were     Blood Pressure Pulse Temperature Respirations Pulse Oximetry       117/70 62 97.6  F (36.4  C) (Temporal) 16 94%       MyChart Information     [employer] lets you send messages to your doctor, view your test results, renew your prescriptions, schedule appointments and more. To sign up, go to [email or web address] . Click on \"Log in\" on the left side of the screen, which will take you to the Welcome page. Then click on \"Sign up Now\" on the right side of the page.     You will be asked to enter the access code listed below, as well as some personal information. Please follow the directions to create your username and password.     Your access code is: [email or web address]  Expires: 2017 10:15 AM     Your access code will  in 90 days. If you need help or a new code, please call your [place] clinic or [phone].        Care EveryWhere ID     This is your Care EveryWhere ID. This could be used by other organizations to access your [place] medical records  [id number]           Review of your medicines      CONTINUE these medicines which may have CHANGED, or have new prescriptions. If we are uncertain of the size of tablets/capsules you have at home, strength may be listed as something that might have changed.        Dose / Directions    pantoprazole 20 MG EC tablet   Commonly known as:  PROTONIX   This may have changed:  additional instructions   Used for:  Gastroesophageal reflux disease without esophagitis        Take by mouth 30-60 minutes before a meal.   Quantity:  90 tablet   Refills:  3         CONTINUE these medicines which have NOT CHANGED        Dose / Directions    aspirin 81 MG tablet        Dose:  1 tablet   Take 1 tablet by mouth daily.   Refills:  0       atorvastatin 20 MG tablet   Commonly known as:  LIPITOR   Used for:  Hyperlipidemia LDL goal <130        TAKE ONE TABLET BY MOUTH " ONE TIME DAILY   Quantity:  90 tablet   Refills:  2       CALCIUM 600/VITAMIN D PO        Take  by mouth. Take 1/2 tablet in the morning, 1/2 at night daily   Refills:  0       clonazePAM 0.5 MG tablet   Commonly known as:  klonoPIN        Dose:  0.5 mg   Take 0.5 mg by mouth. Take 1 tablet by mouth daily at bedtime   Refills:  0       latanoprost 0.005 % ophthalmic solution   Commonly known as:  XALATAN   Used for:  Borderline glaucoma with ocular hypertension, bilateral        Dose:  1 drop   Place 1 drop into both eyes At Bedtime   Quantity:  7.5 mL   Refills:  4       lisinopril 10 MG tablet   Commonly known as:  PRINIVIL/ZESTRIL   Used for:  Hypertension goal BP (blood pressure) < 140/90        TAKE ONE TABLET BY MOUTH ONE TIME DAILY   Quantity:  90 tablet   Refills:  3       Moxifloxacin HCl Powd   Used for:  Nuclear sclerosis of right eye        Dose:  1 Bottle   1 Bottle 4 times daily 1 drop four times a day into the operative eye starting 1 day before surgery. Use until bottle runs out.   Quantity:  1 Bottle   Refills:  0       MULTI-DAY PO        daily   Refills:  0       timolol 0.5 % ophthalmic solution   Commonly known as:  TIMOPTIC   Used for:  Borderline glaucoma with ocular hypertension, bilateral        Dose:  1 drop   Place 1 drop into both eyes daily   Quantity:  1 Bottle   Refills:  11                Protect others around you: Learn how to safely use, store and throw away your medicines at www.disposemymeds.org.             Medication List: This is a list of all your medications and when to take them. Check marks below indicate your daily home schedule. Keep this list as a reference.      Medications           Morning Afternoon Evening Bedtime As Needed    aspirin 81 MG tablet   Take 1 tablet by mouth daily.                                atorvastatin 20 MG tablet   Commonly known as:  LIPITOR   TAKE ONE TABLET BY MOUTH ONE TIME DAILY                                CALCIUM 600/VITAMIN D PO   Take   by mouth. Take 1/2 tablet in the morning, 1/2 at night daily                                clonazePAM 0.5 MG tablet   Commonly known as:  klonoPIN   Take 0.5 mg by mouth. Take 1 tablet by mouth daily at bedtime                                latanoprost 0.005 % ophthalmic solution   Commonly known as:  XALATAN   Place 1 drop into both eyes At Bedtime                                lisinopril 10 MG tablet   Commonly known as:  PRINIVIL/ZESTRIL   TAKE ONE TABLET BY MOUTH ONE TIME DAILY                                Moxifloxacin HCl Powd   1 Bottle 4 times daily 1 drop four times a day into the operative eye starting 1 day before surgery. Use until bottle runs out.                                MULTI-DAY PO   daily                                pantoprazole 20 MG EC tablet   Commonly known as:  PROTONIX   Take by mouth 30-60 minutes before a meal.                                timolol 0.5 % ophthalmic solution   Commonly known as:  TIMOPTIC   Place 1 drop into both eyes daily

## 2017-02-20 NOTE — OP NOTE
PreOp Diagnosis: Visually significant nuclear sclerotic cataract right eye  PostOp Diagnosis: Same  Surgeon: Mateo Gray MD  Implant: Technis [id number] 18.0D    Procedures:   1. Review of intraocular lens calculations, both eyes   2. Phacoemulsification and extraction of lens  right eye   3. Intraocular lens implantation right eye  Anesthesia: MAC/topical  Complications: None  EBL: <1cc    Rakan Nolasco suffers from a visually significant cataract of the right eye. This has caused problems with distance and reading vision, including glare. After discussing the risks, benefits, and alternatives, the patient wishes to proceed with cataract surgery.    The patient was identified in the pre-op area where the right eye was marked. The patient was then brought to the operating room where a time out was called, identifying the patient, the procedure, and the correct site. Tetracaine drops were applied to both eyes. The operative eye was then prepped and draped in the usual sterile ophthalmic fashion. An eyelid speculum was placed into the operative eye. Additional tetracaine drops were applied. A paracentesis was made superior with a supersharp blade. . Due to poor red reflex, trypan blue was irrigated into the anterior chamber to enhance capsular visualization.  This was irrigated from the anterior chamber after 30 seconds with 1% preservative-free lidocaine and 1.5% preservative-free phenyephrine was injected into the anterior chamber.   Viscoat was injected to deepen the anterior chamber. A 2.4mm clear corneal wound was created with a keratome blade temporally.  A continuous curvilinear capsulorrhexis was started with a bent cystitome and completed with Utrada forceps. Hydrodissection of the lens nucleus was performed with BSS on a cannula. The lens nucleus was rotated. Phacoemulsification of the lens nucleus was accomplished in a phacoemulsification stop and chop technique. Remaining cortex was removed with irrigation  and aspiration. The lens capsule was noted to be intact. Provisc was used to inflate the capsular bag.  The lens was injected into the capsular bag. Remaining viscoelastic was removed with irrigation and aspiration. The wounds were checked and found to be watertight after hydration. Due to significant chemosis, a small incision through the conjunctiva was made temporally to help drain the fluid. The eyelid speculum was removed and CatarActive3 drops were placed into the operative eye. The patient tolerated the procedure well and was in stable condition on the way to the recovery area.     Mateo Gray MD

## 2017-02-20 NOTE — ANESTHESIA POSTPROCEDURE EVALUATION
Patient: Rakan Nolasco    Procedure(s):  RIGHT EYE PHACOEMULSIFICATION CLEAR CORNEA WITH STANDARD INTRAOCULAR LENS IMPLANT - Wound Class: I-Clean    Diagnosis:RIGHT EYE CATARACT  Diagnosis Additional Information: No value filed.    Anesthesia Type:  MAC    Note:  Anesthesia Post Evaluation    Patient location during evaluation: PACU  Patient participation: Able to fully participate in evaluation  Level of consciousness: awake  Pain management: adequate  Airway patency: patent  Cardiovascular status: acceptable  Respiratory status: acceptable  Hydration status: acceptable  PONV: none     Anesthetic complications: None          Last vitals:  Vitals:    02/20/17 1143 02/20/17 1300 02/20/17 1348   BP: 139/86 117/70 113/70   Pulse:  62 58   Resp: 16 16 16   Temp: 36.4  C (97.6  F)     SpO2: 98% 94% 95%         Electronically Signed By: GISELLE FERGUSON MD  February 20, 2017  2:25 PM

## 2017-02-20 NOTE — DISCHARGE INSTRUCTIONS
CATARACT SURGERY POST-OP INSTRUCTIONS  Dr. Mateo Gray  [phone]      *Continue using CatarActive3 four times a day in the operative eye.  *You should get 3 doses in today and 4 doses daily starting tomorrow.  *Wait 5 minutes between drops if putting in glaucoma drops around the same time.      Keep the eye shield taped in place unless putting drops in. We will remove it for you in the office tomorrow.      Light sensitivity may be noticed. Sunglasses may be worn for comfort.      Do not rub the operated eye.      Keep the operated eye dry. You may wash your hair, bathe or shower, but keep the operated eye closed while doing so.       No swimming, hot tub, or sauna for 2 weeks.      No make up around eye for 5 days.      No bending at the waist or lifting more than 10 pounds for one week.      May take Tylenol (per directions on bottle) for mild pain.      Call Dr. Gray's cell at [phone]  if any of the following should occur:    o Any sudden vision changes  o Nausea or severe headache  o Increase in pain not controlled  Or signs of infection (pus, increasing redness or tenderness)      [facility] Anesthesia Eye Care Center Discharge  Instructions  Anesthesia (Eye Care Center)   Adult Discharge Instructions    For 24 hours after surgery    1. Get plenty of rest.  Make arrangements to have a responsible adult stay with you for at least 6 hours after you leave the hospital.  2. Do not drive or use heavy equipment for 24 hours.    3. Do not drink alcohol for 24 hours.  4. Do not sign legal documents or make important decisions for 24 hours.  5. Avoid strenuous or risky activities. You may feel lightheaded.  If so, sit for a few minutes before standing.  Have someone help you get up.   6. Conscious sedation patients may resume a regular diet..  7. Any questions of medical nature, call your physician.

## 2017-02-20 NOTE — ANESTHESIA CARE TRANSFER NOTE
Patient: Rakan Nolasco    Procedure(s):  RIGHT EYE PHACOEMULSIFICATION CLEAR CORNEA WITH STANDARD INTRAOCULAR LENS IMPLANT - Wound Class: I-Clean    Diagnosis: RIGHT EYE CATARACT  Diagnosis Additional Information: No value filed.    Anesthesia Type:   MAC     Note:  Airway :Room Air  Patient transferred to:PACU  Comments: Transferred to Eye Center recovery room in recliner with armrests up, spontaneous respirations, O2 saturation >92% on room air. All monitors and alarms on and functioning, clinically stable vital signs. Report given to recovery RN and questions answered. Patient alert and following verbal directions.      Vitals: (Last set prior to Anesthesia Care Transfer)    CRNA VITALS  2/20/2017 1247 - 2/20/2017 1320      2/20/2017             Pulse: 57    SpO2: 99 %    Resp Rate (set): 10                Electronically Signed By: JACK Sandoval CRNA  February 20, 2017  1:20 PM

## 2017-02-21 ENCOUNTER — OFFICE VISIT (OUTPATIENT)
Dept: OPHTHALMOLOGY | Facility: CLINIC | Age: 72
End: 2017-02-21
Payer: COMMERCIAL

## 2017-02-21 DIAGNOSIS — Z96.1 PSEUDOPHAKIA OF RIGHT EYE: Primary | ICD-10-CM

## 2017-02-21 DIAGNOSIS — H40.053 BORDERLINE GLAUCOMA WITH OCULAR HYPERTENSION, BILATERAL: ICD-10-CM

## 2017-02-21 DIAGNOSIS — H35.373 EPIRETINAL MEMBRANE, BILATERAL: ICD-10-CM

## 2017-02-21 DIAGNOSIS — Z98.890 S/P BLEPHAROPLASTY: ICD-10-CM

## 2017-02-21 DIAGNOSIS — H43.813 PVD (POSTERIOR VITREOUS DETACHMENT), BILATERAL: ICD-10-CM

## 2017-02-21 DIAGNOSIS — Z86.69 H/O RD (RETINAL DETACHMENT): ICD-10-CM

## 2017-02-21 DIAGNOSIS — H04.123 DRY EYES, BILATERAL: ICD-10-CM

## 2017-02-21 PROCEDURE — 99024 POSTOP FOLLOW-UP VISIT: CPT | Performed by: STUDENT IN AN ORGANIZED HEALTH CARE EDUCATION/TRAINING PROGRAM

## 2017-02-21 ASSESSMENT — VISUAL ACUITY
METHOD: SNELLEN - LINEAR
OD_SC: 20/100
OD_SC+: +1
OD_PH_SC: 20/50-1

## 2017-02-21 ASSESSMENT — SLIT LAMP EXAM - LIDS: COMMENTS: NORMAL

## 2017-02-21 ASSESSMENT — TONOMETRY: OD_IOP_MMHG: 22

## 2017-02-21 NOTE — PATIENT INSTRUCTIONS
POST-OP CATARACT INSTRUCTIONS    *   Use the following drop(s) in the RIGHT EYE four times a day:        CatarActive 3    *   If you are taking glaucoma drops, continue as usual.    *   Wear eye shield when sleeping for one week.    *   No eye rubbing or lifting more than 10 pounds for one week.    *   Keep water out of eye for two weeks.    *   OK to resume aspirin and/or other blood thinners.    *   Return as scheduled in about one week.    *   If your vision worsens, eye becomes increasingly red, or becomes painful, call [phone].     Mateo Gray M.D.

## 2017-02-21 NOTE — MR AVS SNAPSHOT
After Visit Summary   2/21/2017    Rakan Nolasco    MRN: [id number]           Patient Information     Date Of Birth          1945        Visit Information        Provider Department      2/21/2017 1:45 PM Mateo Gray MD [facility]        Today's Diagnoses     H/O RD (retinal detachment) s/p repair with PPV (AB)    -  1    Dry eyes, bilateral        PVD (posterior vitreous detachment), bilateral        S/P blepharoplasty        Epiretinal membrane, bilateral        Borderline glaucoma with ocular hypertension, bilateral          Care Instructions    POST-OP CATARACT INSTRUCTIONS    *   Use the following drop(s) in the RIGHT EYE four times a day:        CatarActive 3    *   If you are taking glaucoma drops, continue as usual.    *   Wear eye shield when sleeping for one week.    *   No eye rubbing or lifting more than 10 pounds for one week.    *   Keep water out of eye for two weeks.    *   OK to resume aspirin and/or other blood thinners.    *   Return as scheduled in about one week.    *   If your vision worsens, eye becomes increasingly red, or becomes painful, call [phone].     Mateo Gray M.D.        Follow-ups after your visit        Your next 10 appointments already scheduled     Feb 28, 2017 10:45 AM CST   [facility] ([address] [address] [phone]            Mar 28, 2017  9:15 AM CDT   [facility] ([address] [address] [phone]              Who to contact     If you have questions or need follow up information about today's clinic visit or your schedule please contact [facility] directly at [phone].  Normal or non-critical lab and imaging results will be communicated to you by MyChart, letter or phone within 4  "business days after the clinic has received the results. If you do not hear from us within 7 days, please contact the clinic through [employer] or phone. If you have a critical or abnormal lab result, we will notify you by phone as soon as possible.  Submit refill requests through [employer] or call your pharmacy and they will forward the refill request to us. Please allow 3 business days for your refill to be completed.          Additional Information About Your Visit        [employer] Information     [employer] lets you send messages to your doctor, view your test results, renew your prescriptions, schedule appointments and more. To sign up, go to [email or web address] . Click on \"Log in\" on the left side of the screen, which will take you to the Welcome page. Then click on \"Sign up Now\" on the right side of the page.     You will be asked to enter the access code listed below, as well as some personal information. Please follow the directions to create your username and password.     Your access code is: [email or web address]  Expires: 2017 10:15 AM     Your access code will  in 90 days. If you need help or a new code, please call your [place] clinic or [phone].        Care EveryWhere ID     This is your Care EveryWhere ID. This could be used by other organizations to access your [place] medical records  [id number]         Blood Pressure from Last 3 Encounters:   17 113/70   17 124/76   10/03/16 138/66    Weight from Last 3 Encounters:   17 106.6 kg (235 lb)   10/03/16 99.3 kg (219 lb)   16 98.4 kg (217 lb)              Today, you had the following     No orders found for display         Today's Medication Changes          These changes are accurate as of: 17  2:38 PM.  If you have any questions, ask your nurse or doctor.               These medicines have changed or have updated prescriptions.        Dose/Directions    pantoprazole 20 MG EC tablet   Commonly known as:  PROTONIX "   This may have changed:  additional instructions   Used for:  Gastroesophageal reflux disease without esophagitis        Take by mouth 30-60 minutes before a meal.   Quantity:  90 tablet   Refills:  3                Primary Care Provider Office Phone # Fax #    Telly Lucio [phone]       [facility] [address] [place] [address]  [place] [address]        Thank you!     Thank you for choosing [facility]  for your care. Our goal is always to provide you with excellent care. Hearing back from our patients is one way we can continue to improve our services. Please take a few minutes to complete the written survey that you may receive in the mail after your visit with us. Thank you!             Your Updated Medication List - Protect others around you: Learn how to safely use, store and throw away your medicines at www.disposemymeds.org.          This list is accurate as of: 2/21/17  2:38 PM.  Always use your most recent med list.                   Brand Name Dispense Instructions for use    aspirin 81 MG tablet      Take 1 tablet by mouth daily.       atorvastatin 20 MG tablet    LIPITOR    90 tablet    TAKE ONE TABLET BY MOUTH ONE TIME DAILY       CALCIUM 600/VITAMIN D PO      Take  by mouth. Take 1/2 tablet in the morning, 1/2 at night daily       clonazePAM 0.5 MG tablet    klonoPIN     Take 0.5 mg by mouth. Take 1 tablet by mouth daily at bedtime       latanoprost 0.005 % ophthalmic solution    XALATAN    7.5 mL    Place 1 drop into both eyes At Bedtime       lisinopril 10 MG tablet    PRINIVIL/ZESTRIL    90 tablet    TAKE ONE TABLET BY MOUTH ONE TIME DAILY       Moxifloxacin HCl Powd     1 Bottle    1 Bottle 4 times daily 1 drop four times a day into the operative eye starting 1 day before surgery. Use until bottle runs out.       MULTI-DAY PO      daily       pantoprazole 20 MG EC tablet    PROTONIX    90 tablet    Take by mouth 30-60 minutes before a meal.       timolol  0.5 % ophthalmic solution    TIMOPTIC    1 Bottle    Place 1 drop into both eyes daily

## 2017-02-21 NOTE — PROGRESS NOTES
Current Eye Medications: CatarActiv3 QID right eye,  Latanoprost qhs both eyes and Timolol qam both eyes, took this am and last night.     Subjective:  Here for PO1 right eye.  No pain.  Slept well.      Objective:  See Ophthalmology Exam.      Assessment:  Rakan Nolasco is a 71 year old male who presents with:     H/O RD (retinal detachment) s/p repair with PPV (AB)      Dry eyes, bilateral      PVD (posterior vitreous detachment), bilateral      S/P blepharoplasty      Epiretinal membrane, bilateral      Borderline glaucoma with ocular hypertension, bilateral      Pseudophakia of right eye Po1, doing well       POST-OP CATARACT INSTRUCTIONS    *   Use the following drop(s) in the RIGHT EYE four times a day:        CatarActive 3  *   If you are taking glaucoma drops, continue as usual.  *   Wear eye shield when sleeping for one week.  *   No eye rubbing or lifting more than 10 pounds for one week.  *   Keep water out of eye for two weeks.  *   OK to resume aspirin and/or other blood thinners.  *   Return as scheduled in about one week.  *   If your vision worsens, eye becomes increasingly red, or becomes painful, call [phone].     Mateo Gray M.D.

## 2017-02-28 ENCOUNTER — OFFICE VISIT (OUTPATIENT)
Dept: OPHTHALMOLOGY | Facility: CLINIC | Age: 72
End: 2017-02-28
Payer: COMMERCIAL

## 2017-02-28 DIAGNOSIS — H43.813 PVD (POSTERIOR VITREOUS DETACHMENT), BILATERAL: ICD-10-CM

## 2017-02-28 DIAGNOSIS — Z98.890 S/P BLEPHAROPLASTY: ICD-10-CM

## 2017-02-28 DIAGNOSIS — H40.053 BORDERLINE GLAUCOMA WITH OCULAR HYPERTENSION, BILATERAL: ICD-10-CM

## 2017-02-28 DIAGNOSIS — Z96.1 PSEUDOPHAKIA OF RIGHT EYE: Primary | ICD-10-CM

## 2017-02-28 DIAGNOSIS — Z86.69 H/O RD (RETINAL DETACHMENT): ICD-10-CM

## 2017-02-28 DIAGNOSIS — H04.123 DRY EYES, BILATERAL: ICD-10-CM

## 2017-02-28 DIAGNOSIS — H35.373 EPIRETINAL MEMBRANE, BILATERAL: ICD-10-CM

## 2017-02-28 PROCEDURE — 99024 POSTOP FOLLOW-UP VISIT: CPT | Performed by: STUDENT IN AN ORGANIZED HEALTH CARE EDUCATION/TRAINING PROGRAM

## 2017-02-28 ASSESSMENT — REFRACTION_MANIFEST
OD_SPHERE: -2.00
OD_AXIS: 133
OD_CYLINDER: +1.50

## 2017-02-28 ASSESSMENT — VISUAL ACUITY
OD_PH_SC: 20/40-1
METHOD: SNELLEN - LINEAR
OD_SC: 20/100

## 2017-02-28 ASSESSMENT — SLIT LAMP EXAM - LIDS: COMMENTS: NORMAL

## 2017-02-28 ASSESSMENT — TONOMETRY
OD_IOP_MMHG: 30
IOP_METHOD: APPLANATION

## 2017-02-28 NOTE — MR AVS SNAPSHOT
After Visit Summary   2/28/2017    Rakan Nolasco    MRN: [id number]           Patient Information     Date Of Birth          1945        Visit Information        Provider Department      2/28/2017 10:45 AM Mateo Gray MD [facility]        Today's Diagnoses     Pseudophakia of right eye    -  1    H/O RD (retinal detachment) s/p repair with PPV (AB)        Dry eyes, bilateral        PVD (posterior vitreous detachment), bilateral        S/P blepharoplasty        Epiretinal membrane, bilateral        Borderline glaucoma with ocular hypertension, bilateral          Care Instructions    *   For the right eye:   Use CatarActive 3, four times a day, until gone.      *   Stop wearing eye shield.    *   No eye rubbing. May resume heavy lifting.    *   Continue Latanoprost at bedtime both eyes and increase Timolol to twice a day right eye      continue just every morning left eye     *   Return one month for final exam with glasses check.    *   If your vision worsens, eye becomes increasingly red, or becomes painful, call [phone].    Mateo Gray M.D.        Follow-ups after your visit        Follow-up notes from your care team     Return for as scheduled.      Your next 10 appointments already scheduled     Mar 28, 2017  9:15 AM CDT   [facility]   [facility]    [address] [place] [id number] [phone]              Who to contact     If you have questions or need follow up information about today's clinic visit or your schedule please contact [facility] directly at [phone].  Normal or non-critical lab and imaging results will be communicated to you by MyChart, letter or phone within 4 business days after the clinic has received the results. If you do not hear from us within 7 days, please contact the clinic through MyChart or phone. If you have a critical or abnormal  "lab result, we will notify you by phone as soon as possible.  Submit refill requests through [employer] or call your pharmacy and they will forward the refill request to us. Please allow 3 business days for your refill to be completed.          Additional Information About Your Visit        [employer] Information     [employer] lets you send messages to your doctor, view your test results, renew your prescriptions, schedule appointments and more. To sign up, go to [email or web address] . Click on \"Log in\" on the left side of the screen, which will take you to the Welcome page. Then click on \"Sign up Now\" on the right side of the page.     You will be asked to enter the access code listed below, as well as some personal information. Please follow the directions to create your username and password.     Your access code is: [email or web address]  Expires: 2017 10:15 AM     Your access code will  in 90 days. If you need help or a new code, please call your [place] clinic or [phone].        Care EveryWhere ID     This is your Care EveryWhere ID. This could be used by other organizations to access your [place] medical records  [id number]         Blood Pressure from Last 3 Encounters:   17 113/70   17 124/76   10/03/16 138/66    Weight from Last 3 Encounters:   17 106.6 kg (235 lb)   10/03/16 99.3 kg (219 lb)   16 98.4 kg (217 lb)              Today, you had the following     No orders found for display         Today's Medication Changes          These changes are accurate as of: 17 11:24 AM.  If you have any questions, ask your nurse or doctor.               These medicines have changed or have updated prescriptions.        Dose/Directions    pantoprazole 20 MG EC tablet   Commonly known as:  PROTONIX   This may have changed:  additional instructions   Used for:  Gastroesophageal reflux disease without esophagitis        Take by mouth 30-60 minutes before a meal.   Quantity:  90 tablet "   Refills:  3                Primary Care Provider Office Phone # Fax #    Telly Lucio [phone]       [address] [address]        Thank you!     Thank you for choosing [facility]  for your care. Our goal is always to provide you with excellent care. Hearing back from our patients is one way we can continue to improve our services. Please take a few minutes to complete the written survey that you may receive in the mail after your visit with us. Thank you!             Your Updated Medication List - Protect others around you: Learn how to safely use, store and throw away your medicines at www.disposemymeds.org.          This list is accurate as of: 2/28/17 11:24 AM.  Always use your most recent med list.                   Brand Name Dispense Instructions for use    aspirin 81 MG tablet      Take 1 tablet by mouth daily.       atorvastatin 20 MG tablet    LIPITOR    90 tablet    TAKE ONE TABLET BY MOUTH ONE TIME DAILY       CALCIUM 600/VITAMIN D PO      Take  by mouth. Take 1/2 tablet in the morning, 1/2 at night daily       clonazePAM 0.5 MG tablet    klonoPIN     Take 0.5 mg by mouth. Take 1 tablet by mouth daily at bedtime       latanoprost 0.005 % ophthalmic solution    XALATAN    7.5 mL    Place 1 drop into both eyes At Bedtime       lisinopril 10 MG tablet    PRINIVIL/ZESTRIL    90 tablet    TAKE ONE TABLET BY MOUTH ONE TIME DAILY       Moxifloxacin HCl Powd     1 Bottle    1 Bottle 4 times daily 1 drop four times a day into the operative eye starting 1 day before surgery. Use until bottle runs out.       MULTI-DAY PO      daily       pantoprazole 20 MG EC tablet    PROTONIX    90 tablet    Take by mouth 30-60 minutes before a meal.       timolol 0.5 % ophthalmic solution    TIMOPTIC    1 Bottle    Place 1 drop into both eyes daily

## 2017-02-28 NOTE — PROGRESS NOTES
Current Eye Medications:  catarActive 3 right eye four times a day right eye, timolol both eyes each morning, Latanoprost both eyes every evening.  He is unsure if he used timolol this morning.       Subjective:  Status/Post Kelman Phacoemulsification with Posterior Chamber Lens, right eye:  2-20-17.  Vision is improving slightly.  Occasional discomfort, but overall feeling comfortable.       Objective:  See Ophthalmology Exam.      Assessment:  Rakan Nolasco is a 71 year old male who presents with:     Pseudophakia of right eye Po2, Intraocular pressure 30. Unsure if he took his timolol this morning. Continue latanoprost and increase timolol to twice a day for now.     H/O RD (retinal detachment) s/p repair with PPV (AB)      Dry eyes, bilateral      PVD (posterior vitreous detachment), bilateral      S/P blepharoplasty      Epiretinal membrane, bilateral      Borderline glaucoma with ocular hypertension, bilateral        Plan:  *   For the right eye:   Use CatarActive 3, four times a day, until gone.    *   Stop wearing eye shield.  *   No eye rubbing. May resume heavy lifting.  *   Continue Latanoprost at bedtime both eyes and increase Timolol to twice a day right      Eye,  continue just every morning left eye   *   Return one month for final exam with glasses check.  *   If your vision worsens, eye becomes increasingly red, or becomes painful, call [phone].    Mateo Gray M.D.

## 2017-02-28 NOTE — PATIENT INSTRUCTIONS
*   For the right eye:   Use CatarActive 3, four times a day, until gone.      *   Stop wearing eye shield.    *   No eye rubbing. May resume heavy lifting.    *   Continue Latanoprost at bedtime both eyes and increase Timolol to twice a day right eye      continue just every morning left eye     *   Return one month for final exam with glasses check.    *   If your vision worsens, eye becomes increasingly red, or becomes painful, call [phone].    Mateo Gray M.D.

## 2017-03-08 ENCOUNTER — TELEPHONE (OUTPATIENT)
Dept: FAMILY MEDICINE | Facility: CLINIC | Age: 72
End: 2017-03-08

## 2017-03-08 NOTE — TELEPHONE ENCOUNTER
Called to let him know that I checked the paper file for the colonoscopy and there were no concerns then; thus repeat was recommended in 10 years (that is around 8/25/2018)

## 2017-03-17 DIAGNOSIS — H40.053 BORDERLINE GLAUCOMA WITH OCULAR HYPERTENSION, BILATERAL: ICD-10-CM

## 2017-03-17 RX ORDER — LATANOPROST 50 UG/ML
1 SOLUTION/ DROPS OPHTHALMIC AT BEDTIME
Qty: 7.5 ML | Refills: 4 | Status: SHIPPED | OUTPATIENT
Start: 2017-03-17 | End: 2018-11-13

## 2017-03-28 ENCOUNTER — OFFICE VISIT (OUTPATIENT)
Dept: OPHTHALMOLOGY | Facility: CLINIC | Age: 72
End: 2017-03-28
Payer: COMMERCIAL

## 2017-03-28 DIAGNOSIS — H35.373 EPIRETINAL MEMBRANE, BILATERAL: ICD-10-CM

## 2017-03-28 DIAGNOSIS — H04.123 DRY EYES, BILATERAL: ICD-10-CM

## 2017-03-28 DIAGNOSIS — H43.813 PVD (POSTERIOR VITREOUS DETACHMENT), BILATERAL: ICD-10-CM

## 2017-03-28 DIAGNOSIS — H40.053 BORDERLINE GLAUCOMA WITH OCULAR HYPERTENSION, BILATERAL: Primary | ICD-10-CM

## 2017-03-28 DIAGNOSIS — Z98.890 S/P BLEPHAROPLASTY: ICD-10-CM

## 2017-03-28 DIAGNOSIS — Z96.1 PSEUDOPHAKIA OF RIGHT EYE: ICD-10-CM

## 2017-03-28 DIAGNOSIS — Z86.69 H/O RD (RETINAL DETACHMENT): ICD-10-CM

## 2017-03-28 PROCEDURE — 99024 POSTOP FOLLOW-UP VISIT: CPT | Performed by: STUDENT IN AN ORGANIZED HEALTH CARE EDUCATION/TRAINING PROGRAM

## 2017-03-28 ASSESSMENT — CUP TO DISC RATIO: OD_RATIO: 0.25

## 2017-03-28 ASSESSMENT — REFRACTION_MANIFEST
OD_SPHERE: -2.50
OD_AXIS: 133
OS_SPHERE: -2.00
OD_ADD: +2.75
OS_ADD: +2.75
OS_AXIS: 015
OS_CYLINDER: +1.75
OD_CYLINDER: +2.00

## 2017-03-28 ASSESSMENT — REFRACTION_WEARINGRX
OS_CYLINDER: +1.75
OD_SPHERE: -2.25
OD_ADD: +2.75
OD_CYLINDER: +1.00
OS_ADD: +2.75
OD_AXIS: 125
OS_AXIS: 006
OS_SPHERE: -2.00
SPECS_TYPE: PAL

## 2017-03-28 ASSESSMENT — TONOMETRY
OD_IOP_MMHG: 22
IOP_METHOD: APPLANATION
OS_IOP_MMHG: 18

## 2017-03-28 ASSESSMENT — VISUAL ACUITY
METHOD: SNELLEN - LINEAR
OD_SC: 20/80
OS_CC: 20/20
OD_PH_SC: 20/30

## 2017-03-28 ASSESSMENT — EXTERNAL EXAM - LEFT EYE: OS_EXAM: NORMAL

## 2017-03-28 ASSESSMENT — SLIT LAMP EXAM - LIDS
COMMENTS: NORMAL
COMMENTS: NORMAL

## 2017-03-28 ASSESSMENT — EXTERNAL EXAM - RIGHT EYE: OD_EXAM: NORMAL

## 2017-03-28 NOTE — MR AVS SNAPSHOT
After Visit Summary   3/28/2017    Rakan Nolasco    MRN: [id number]           Patient Information     Date Of Birth          1945        Visit Information        Provider Department      3/28/2017 9:15 AM Mateo Gray MD [facility]        Today's Diagnoses     Borderline glaucoma with ocular hypertension, bilateral    -  1    Pseudophakia of right eye        H/O RD (retinal detachment) s/p repair with PPV (AB)        PVD (posterior vitreous detachment), bilateral        Dry eyes, bilateral        Epiretinal membrane, bilateral        S/P blepharoplasty          Care Instructions    *  Discontinue all drops, unless used prior to cataract surgery.    *  Continue Timolol daily right eye and Latanoprost at bedtime both eyes     *  Fill prescription for new glasses or drugstore readers.    Mateo Gray M.D.  [phone]        Follow-ups after your visit        Follow-up notes from your care team     Return in about 6 months (around 9/28/2017) for IOP check, HVF.      Who to contact     If you have questions or need follow up information about today's clinic visit or your schedule please contact [facility] directly at [phone].  Normal or non-critical lab and imaging results will be communicated to you by MyChart, letter or phone within 4 business days after the clinic has received the results. If you do not hear from us within 7 days, please contact the clinic through MyChart or phone. If you have a critical or abnormal lab result, we will notify you by phone as soon as possible.  Submit refill requests through [employer] or call your pharmacy and they will forward the refill request to us. Please allow 3 business days for your refill to be completed.          Additional Information About Your Visit        MyChart Information     [employer] lets you send messages to your doctor, view your test results, renew your prescriptions, schedule appointments and more. To  "sign up, go to [email or web address] . Click on \"Log in\" on the left side of the screen, which will take you to the Welcome page. Then click on \"Sign up Now\" on the right side of the page.     You will be asked to enter the access code listed below, as well as some personal information. Please follow the directions to create your username and password.     Your access code is: [email or web address]  Expires: 2017 11:15 AM     Your access code will  in 90 days. If you need help or a new code, please call your [place] clinic or [phone].        Care EveryWhere ID     This is your Care EveryWhere ID. This could be used by other organizations to access your [place] medical records  [id number]         Blood Pressure from Last 3 Encounters:   17 113/70   17 124/76   10/03/16 138/66    Weight from Last 3 Encounters:   17 106.6 kg (235 lb)   10/03/16 99.3 kg (219 lb)   16 98.4 kg (217 lb)              Today, you had the following     No orders found for display         Today's Medication Changes          These changes are accurate as of: 3/28/17 10:24 AM.  If you have any questions, ask your nurse or doctor.               These medicines have changed or have updated prescriptions.        Dose/Directions    pantoprazole 20 MG EC tablet   Commonly known as:  PROTONIX   This may have changed:  additional instructions   Used for:  Gastroesophageal reflux disease without esophagitis        Take by mouth 30-60 minutes before a meal.   Quantity:  90 tablet   Refills:  3                Primary Care Provider Office Phone # Fax #    Telly Lucio [phone]       [facility] [address] [facility] [address]        Thank you!     Thank you for choosing [facility]  for your care. Our goal is always to provide you with excellent care. Hearing back from our patients is one way we can continue to improve our services. Please take a few minutes to " complete the written survey that you may receive in the mail after your visit with us. Thank you!             Your Updated Medication List - Protect others around you: Learn how to safely use, store and throw away your medicines at www.disposemymeds.org.          This list is accurate as of: 3/28/17 10:24 AM.  Always use your most recent med list.                   Brand Name Dispense Instructions for use    aspirin 81 MG tablet      Take 1 tablet by mouth daily.       atorvastatin 20 MG tablet    LIPITOR    90 tablet    TAKE ONE TABLET BY MOUTH ONE TIME DAILY       CALCIUM 600/VITAMIN D PO      Take  by mouth. Take 1/2 tablet in the morning, 1/2 at night daily       clonazePAM 0.5 MG tablet    klonoPIN     Take 0.5 mg by mouth. Take 1 tablet by mouth daily at bedtime       latanoprost 0.005 % ophthalmic solution    XALATAN    7.5 mL    Place 1 drop into both eyes At Bedtime       lisinopril 10 MG tablet    PRINIVIL/ZESTRIL    90 tablet    TAKE ONE TABLET BY MOUTH ONE TIME DAILY       Moxifloxacin HCl Powd     1 Bottle    1 Bottle 4 times daily 1 drop four times a day into the operative eye starting 1 day before surgery. Use until bottle runs out.       MULTI-DAY PO      daily       pantoprazole 20 MG EC tablet    PROTONIX    90 tablet    Take by mouth 30-60 minutes before a meal.       timolol 0.5 % ophthalmic solution    TIMOPTIC    1 Bottle    Place 1 drop into both eyes daily

## 2017-03-28 NOTE — PROGRESS NOTES
" Current Eye Medications: 0.5% Timolol ,6am, bid both eyes and Latanoprost nightly,10pm, both eyes        Subjective:  Final mr right eye   Pt reports his vision seems to be getting a little bit better in right eye  Pt reports this eye feel ok, however the \"blood spot\" on white of eye seems worse.   Objective:  See Ophthalmology Exam.      Assessment:  Rakan Nolasco is a 71 year old male who presents with:     Borderline glaucoma with ocular hypertension, bilateral Continue timolol at twice a day (increased from daily arthur-operatively).        Pseudophakia of right eye Final mr, doing well.      H/O RD (retinal detachment) s/p repair with PPV (AB)      PVD (posterior vitreous detachment), bilateral      Dry eyes, bilateral      Epiretinal membrane, bilateral      S/P blepharoplasty        Plan:  *  Discontinue all drops, unless used prior to cataract surgery.  *  Continue Timolol daily right eye and Latanoprost at bedtime both eyes   *  Fill prescription for new glasses or drugstore readers.    Mateo Gray M.D.  [phone]         "

## 2017-03-28 NOTE — PATIENT INSTRUCTIONS
*  Discontinue all drops, unless used prior to cataract surgery.    *  Continue Timolol daily right eye and Latanoprost at bedtime both eyes     *  Fill prescription for new glasses or drugstore readers.    Mateo Gray M.D.  [phone]

## 2017-04-14 ENCOUNTER — TRANSFERRED RECORDS (OUTPATIENT)
Dept: HEALTH INFORMATION MANAGEMENT | Facility: CLINIC | Age: 72
End: 2017-04-14

## 2017-07-24 ENCOUNTER — OFFICE VISIT (OUTPATIENT)
Dept: OPHTHALMOLOGY | Facility: CLINIC | Age: 72
End: 2017-07-24
Payer: COMMERCIAL

## 2017-07-24 DIAGNOSIS — H53.2 DIPLOPIA: ICD-10-CM

## 2017-07-24 DIAGNOSIS — R51.9 HEADACHE, UNSPECIFIED HEADACHE TYPE: Primary | ICD-10-CM

## 2017-07-24 DIAGNOSIS — Z96.1 PSEUDOPHAKIA OF RIGHT EYE: ICD-10-CM

## 2017-07-24 DIAGNOSIS — H43.813 PVD (POSTERIOR VITREOUS DETACHMENT), BILATERAL: ICD-10-CM

## 2017-07-24 DIAGNOSIS — H04.123 DRY EYES: ICD-10-CM

## 2017-07-24 DIAGNOSIS — H40.053 BORDERLINE GLAUCOMA WITH OCULAR HYPERTENSION, BILATERAL: ICD-10-CM

## 2017-07-24 DIAGNOSIS — H35.373 EPIRETINAL MEMBRANE, BILATERAL: ICD-10-CM

## 2017-07-24 DIAGNOSIS — Z86.69 H/O RD (RETINAL DETACHMENT): ICD-10-CM

## 2017-07-24 LAB
CRP SERPL-MCNC: 8.3 MG/L (ref 0–8)
ERYTHROCYTE [DISTWIDTH] IN BLOOD BY AUTOMATED COUNT: 12.7 % (ref 10–15)
ERYTHROCYTE [SEDIMENTATION RATE] IN BLOOD BY WESTERGREN METHOD: 37 MM/H (ref 0–20)
HCT VFR BLD AUTO: 45.3 % (ref 40–53)
HGB BLD-MCNC: 15.8 G/DL (ref 13.3–17.7)
MCH RBC QN AUTO: 31.9 PG (ref 26.5–33)
MCHC RBC AUTO-ENTMCNC: 34.9 G/DL (ref 31.5–36.5)
MCV RBC AUTO: 91 FL (ref 78–100)
PLATELET # BLD AUTO: 282 10E9/L (ref 150–450)
RBC # BLD AUTO: 4.96 10E12/L (ref 4.4–5.9)
WBC # BLD AUTO: 7.8 10E9/L (ref 4–11)

## 2017-07-24 PROCEDURE — 86255 FLUORESCENT ANTIBODY SCREEN: CPT | Mod: 90 | Performed by: STUDENT IN AN ORGANIZED HEALTH CARE EDUCATION/TRAINING PROGRAM

## 2017-07-24 PROCEDURE — 83516 IMMUNOASSAY NONANTIBODY: CPT | Mod: 90 | Performed by: STUDENT IN AN ORGANIZED HEALTH CARE EDUCATION/TRAINING PROGRAM

## 2017-07-24 PROCEDURE — 92012 INTRM OPH EXAM EST PATIENT: CPT | Performed by: STUDENT IN AN ORGANIZED HEALTH CARE EDUCATION/TRAINING PROGRAM

## 2017-07-24 PROCEDURE — 83519 RIA NONANTIBODY: CPT | Mod: 90 | Performed by: STUDENT IN AN ORGANIZED HEALTH CARE EDUCATION/TRAINING PROGRAM

## 2017-07-24 PROCEDURE — 86140 C-REACTIVE PROTEIN: CPT | Performed by: STUDENT IN AN ORGANIZED HEALTH CARE EDUCATION/TRAINING PROGRAM

## 2017-07-24 PROCEDURE — 85652 RBC SED RATE AUTOMATED: CPT | Performed by: STUDENT IN AN ORGANIZED HEALTH CARE EDUCATION/TRAINING PROGRAM

## 2017-07-24 PROCEDURE — 99000 SPECIMEN HANDLING OFFICE-LAB: CPT | Performed by: STUDENT IN AN ORGANIZED HEALTH CARE EDUCATION/TRAINING PROGRAM

## 2017-07-24 PROCEDURE — 36415 COLL VENOUS BLD VENIPUNCTURE: CPT | Performed by: STUDENT IN AN ORGANIZED HEALTH CARE EDUCATION/TRAINING PROGRAM

## 2017-07-24 PROCEDURE — 85027 COMPLETE CBC AUTOMATED: CPT | Performed by: STUDENT IN AN ORGANIZED HEALTH CARE EDUCATION/TRAINING PROGRAM

## 2017-07-24 ASSESSMENT — CUP TO DISC RATIO
OS_RATIO: 0.35 UD
OD_RATIO: 0.25 UD

## 2017-07-24 ASSESSMENT — TONOMETRY
OS_IOP_MMHG: 18
IOP_METHOD: APPLANATION
OD_IOP_MMHG: 19

## 2017-07-24 ASSESSMENT — VISUAL ACUITY
METHOD: SNELLEN - LINEAR
CORRECTION_TYPE: GLASSES
OS_CC+: -1
OD_CC: 20/20
OD_CC+: -1
OS_CC: 20/20

## 2017-07-24 ASSESSMENT — SLIT LAMP EXAM - LIDS: COMMENTS: NORMAL

## 2017-07-24 ASSESSMENT — REFRACTION_WEARINGRX
OS_ADD: +2.75
OD_SPHERE: -2.25
OS_CYLINDER: +1.75
OD_CYLINDER: +1.00
OD_ADD: +2.75
SPECS_TYPE: PAL
OD_AXIS: 125
OS_SPHERE: -2.00
OS_AXIS: 006

## 2017-07-24 ASSESSMENT — EXTERNAL EXAM - LEFT EYE: OS_EXAM: NORMAL

## 2017-07-24 ASSESSMENT — EXTERNAL EXAM - RIGHT EYE: OD_EXAM: NORMAL

## 2017-07-24 NOTE — Clinical Note
Seymour Lucio,  This is the patient about which I left a note. Please see my addendum to my note today for further details, and thank you for your help with Mr. Nolasco. I spoke with him just now, so he is aware of the plan.   Thanks again, Daniela Gray

## 2017-07-24 NOTE — MR AVS SNAPSHOT
After Visit Summary   7/24/2017    Rakan Nolasco    MRN: [id number]           Patient Information     Date Of Birth          1945        Visit Information        Provider Department      7/24/2017 1:45 PM Mateo Gray MD [facility]        Today's Diagnoses     Borderline glaucoma with ocular hypertension, bilateral    -  1    Dry eyes        PVD (posterior vitreous detachment), bilateral        Epiretinal membrane, bilateral        Pseudophakia of right eye        Headache, unspecified headache type        Diplopia          Care Instructions    Continue Timolol twice a day right eye and Xalatan at bedtime both eyes  Lab work ordered  Dr. Gray will call you with results    Mateo Gray MD  [phone]              Follow-ups after your visit        Your next 10 appointments already scheduled     Sep 26, 2017  9:15 AM CDT   Return Visit with Mateo Gray MD   [facility] ([facility])    [address] [place] [address] [phone]              Who to contact     If you have questions or need follow up information about today's clinic visit or your schedule please contact [facility] directly at [phone].  Normal or non-critical lab and imaging results will be communicated to you by MyChart, letter or phone within 4 business days after the clinic has received the results. If you do not hear from us within 7 days, please contact the clinic through MyChart or phone. If you have a critical or abnormal lab result, we will notify you by phone as soon as possible.  Submit refill requests through [employer] or call your pharmacy and they will forward the refill request to us. Please allow 3 business days for your refill to be completed.          Additional Information About Your Visit        [employer] Information     [employer] lets you send messages to your doctor, view your test results, renew your prescriptions,  "schedule appointments and more. To sign up, go to [email or web address] . Click on \"Log in\" on the left side of the screen, which will take you to the Welcome page. Then click on \"Sign up Now\" on the right side of the page.     You will be asked to enter the access code listed below, as well as some personal information. Please follow the directions to create your username and password.     Your access code is: [email or web address]  Expires: 10/22/2017  3:04 PM     Your access code will  in 90 days. If you need help or a new code, please call your [place] clinic or [phone].        Care EveryWhere ID     This is your Care EveryWhere ID. This could be used by other organizations to access your [place] medical records  [id number]         Blood Pressure from Last 3 Encounters:   17 113/70   17 124/76   10/03/16 138/66    Weight from Last 3 Encounters:   17 106.6 kg (235 lb)   10/03/16 99.3 kg (219 lb)   16 98.4 kg (217 lb)              We Performed the Following     ACETYLCHOLINE MODULATING ANTIBODY     ACETYLCHOLINE RECEPTOR BINDING     ACETYLCHOLINE RECEPTOR BLOCKING VINNY     CBC with platelets     CRP inflammation     Erythrocyte sedimentation rate auto     STRIATED MUSCLE ANTIBODY IGG        Primary Care Provider Office Phone # Fax #    Telly Lucio [phone]       [address] [address]        Equal Access to Services     SARAH YEE AH: Hadii aad ku hadasho Soomaali, waaxda luqadaha, qaybta kaalmada adeegyada, waxay silviain josey blount. So [facility] [phone].    ATENCIÓN: Si habla español, tiene a coates disposición servicios gratuitos de asistencia lingüística. Llame al [phone].    We comply with applicable federal civil rights laws and [place] laws. We do not discriminate on the basis of race, color, national origin, age, disability sex, sexual orientation or gender identity.            Thank you!  "    Thank you for choosing [facility]  for your care. Our goal is always to provide you with excellent care. Hearing back from our patients is one way we can continue to improve our services. Please take a few minutes to complete the written survey that you may receive in the mail after your visit with us. Thank you!             Your Updated Medication List - Protect others around you: Learn how to safely use, store and throw away your medicines at www.disposemymeds.org.          This list is accurate as of: 7/24/17  3:04 PM.  Always use your most recent med list.                   Brand Name Dispense Instructions for use Diagnosis    aspirin 81 MG tablet      Take 1 tablet by mouth daily.        atorvastatin 20 MG tablet    LIPITOR    90 tablet    TAKE ONE TABLET BY MOUTH ONE TIME DAILY    Hyperlipidemia LDL goal <130       CALCIUM 600/VITAMIN D PO      Take  by mouth. Take 1/2 tablet in the morning, 1/2 at night daily        clonazePAM 0.5 MG tablet    klonoPIN     Take 0.5 mg by mouth. Take 1 tablet by mouth daily at bedtime        latanoprost 0.005 % ophthalmic solution    XALATAN    7.5 mL    Place 1 drop into both eyes At Bedtime    Borderline glaucoma with ocular hypertension, bilateral       lisinopril 10 MG tablet    PRINIVIL/ZESTRIL    90 tablet    TAKE ONE TABLET BY MOUTH ONE TIME DAILY    Hypertension goal BP (blood pressure) < 140/90       Moxifloxacin HCl Powd     1 Bottle    1 Bottle 4 times daily 1 drop four times a day into the operative eye starting 1 day before surgery. Use until bottle runs out.    Nuclear sclerosis of right eye       MULTI-DAY PO      daily        pantoprazole 20 MG EC tablet    PROTONIX    90 tablet    TAKE ONE TABLET BY MOUTH ONE TIME DAILY 30-60 MINUTES BEFORE A MEAL    Gastroesophageal reflux disease without esophagitis       timolol 0.5 % ophthalmic solution    TIMOPTIC    1 Bottle    Place 1 drop into both eyes daily    Borderline glaucoma with ocular  hypertension, bilateral

## 2017-07-24 NOTE — PROGRESS NOTES
Current Eye Medications:  Timolol BID right eye Xalatan QHS both eyes     Subjective:  Vision comes and goes both eyes, started last week. noticed some double vision for just at few seconds. Stabbing pain left eye last a few seconds, every 3 weeks over last 2 years. Jaw pain for last month, has about once a year for last 6-7 years. Few floaters once in a while both eyes for very long time, unchanged. No flashes both eyes. No fever or chills, no unintended wt loss. When asked where the headache hurts, he points to both temples.    Also reports droopy right upper lid that is worse towards the end of the day. Denies improvement with rest. Says he has intermittent diplopia that resolves if he refocuses on an object at a different depth. Has only happened a few times and resolves if he covers one eye.     Objective:  See Ophthalmology Exam.      Assessment:  Rakan Nolasco is a 72 year old male who presents with:   Encounter Diagnoses   Name Primary?     Headache, unspecified headache type Given location of headache, jaw pain, diplopia and intermittent blurred vision, will obtain ESR, CRP, and platelets to r/o Giant cell arteritis.        Diplopia Right upper lid variable ptosis with episodes of diplopia -- will also obtain MG labs.      Borderline glaucoma with ocular hypertension, bilateral      H/O RD (retinal detachment) s/p repair with PPV (AB)      PVD (posterior vitreous detachment), bilateral      Epiretinal membrane, bilateral      Pseudophakia of right eye      Dry eyes        Plan:  Continue Timolol twice a day right eye and Xalatan at bedtime both eyes  Lab work ordered  Dr. Gray will call you with results    Mateo Gray MD  [phone]      Addendum: ESR 37 (elevated), CRP 8.3 (elevated), platelets 282 (normal). Spoke with patient 7/25/17 about starting prednisone and obtaining temporal artery biopsy within the next 7-10 days (with general surgery). Requested Dr. Lucio's help with  ordering/coordinating biopsy, rheumatology follow up, and primary care follow up as necessary. Called prednisone 60mg daily to [facility] in [place] [address].     Addendum 8/21/17: Bilateral temporal artery biopsy negative (0.8cm and 1.0cm in length). Myasthenia gravis labs came back positive. Spoke with patient today, still taking prednisone 60mg daily and his symptoms are much better. Recommended he decreased prednisone to 40mg and will make neurology referral for elevated MG labs.     Mateo Gray MD  [phone]

## 2017-07-24 NOTE — PATIENT INSTRUCTIONS
Continue Timolol twice a day right eye and Xalatan at bedtime both eyes  Lab work ordered  Dr. Gray will call you with results    Mateo Gray MD  [phone]

## 2017-07-25 DIAGNOSIS — M31.6 TEMPORAL ARTERITIS (H): Primary | ICD-10-CM

## 2017-07-25 DIAGNOSIS — R51.9 HEADACHE, UNSPECIFIED HEADACHE TYPE: Primary | ICD-10-CM

## 2017-07-25 RX ORDER — PREDNISONE 20 MG/1
60 TABLET ORAL DAILY
Qty: 60 TABLET | Refills: 1 | Status: SHIPPED | OUTPATIENT
Start: 2017-07-25 | End: 2017-08-18

## 2017-07-25 NOTE — PROGRESS NOTES
Seen by opthalmology and thought to have Temporal Arteritis and suggested temporal artery biopsy and referral to rheumatology.  Referrals put in please let patient know.    Rheumatology referral.  Your provider [place] [facility] [place] [facility] [place] [phone]   [email or web address]    Please be aware that coverage of these services is subject to the terms and limitations of your health insurance plan.  Call member services at your health plan with any benefit or coverage questions.      Please bring the following with you to your appointment:    (1) Any X-Rays, CTs or MRIs which have been performed.  Contact the facility where they were done to arrange for  prior to your scheduled appointment.    (2) List of current medications   (3) This referral request   (4) Any documents/labs given to you for this referral    General surgery Referral:  Your provider has referred you to[place] [facility] [place]: [facility] [place] [phone]   [email or web address]    Reason for referral:  Temporal artery biopsy.    Please be aware that coverage of these services is subject to the terms and limitations of your health insurance plan.  Call member services at your health plan with any benefit or coverage questions.      Please bring the following with you to your appointment:    (1) Any X-Rays, CTs or MRIs which have been performed.  Contact the facility where they were done to arrange for  prior to your scheduled appointment.   (2) List of current medications   (3) This referral request   (4) Any documents/labs given to you for this referral

## 2017-07-28 ENCOUNTER — OFFICE VISIT (OUTPATIENT)
Dept: SURGERY | Facility: CLINIC | Age: 72
End: 2017-07-28
Payer: COMMERCIAL

## 2017-07-28 VITALS
HEIGHT: 70 IN | BODY MASS INDEX: 33.93 KG/M2 | WEIGHT: 237 LBS | OXYGEN SATURATION: 96 % | TEMPERATURE: 97.1 F | SYSTOLIC BLOOD PRESSURE: 146 MMHG | HEART RATE: 75 BPM | DIASTOLIC BLOOD PRESSURE: 86 MMHG

## 2017-07-28 DIAGNOSIS — M31.6 TEMPORAL ARTERITIS (H): Primary | ICD-10-CM

## 2017-07-28 LAB
ACHR BLOCK AB/ACHR TOTAL SFR SER: 70 %
STRIA MUS IGG SER QL IF: ABNORMAL
STRIA MUS IGG TITR SER IF: ABNORMAL {TITER}

## 2017-07-28 PROCEDURE — 99203 OFFICE O/P NEW LOW 30 MIN: CPT | Performed by: SURGERY

## 2017-07-28 ASSESSMENT — PAIN SCALES - GENERAL: PAINLEVEL: NO PAIN (0)

## 2017-07-28 NOTE — PROGRESS NOTES
Patient seen in consultation for temporal artery biopsy by Dr Mayo and Dr Gray    HPI:  Patient is a 72 year old male  with complaints of some double vision that would come and go  The patient noticed the symptoms about 2 weeks ago.  Right now vision seems normal  Sometimes gets some slight headaches- usually frontal. Nothing at the time of these vision changes  Did get started on prednisone 2 days ago  He also has noticed the right eyelid drooping over the past week    Review Of Systems    Skin: negative  Ears/Nose/Throat: negative  Respiratory: No shortness of breath, dyspnea on exertion, cough, or hemoptysis  Cardiovascular: negative, no afib  Gastrointestinal: negative  Genitourinary: negative  Musculoskeletal: negative  Neurologic: negative  Hematologic/Lymphatic/Immunologic: negative  Endocrine: negative      Past Medical History:   Diagnosis Date     Arthritis          BMI 31.0-31.9,adult      Diverticulosis     Colonoscopy 8/2008     Fatty liver     see US  5/2012     GERD (gastroesophageal reflux disease)      Glaucoma (increased eye pressure)      HTN (hypertension)      Hyperlipidemia LDL goal < 130     age, htn, fhx     Irritable bowel      Nonsenile cataract      ROSIE (obstructive sleep apnea) 1/2011    using CPAP;     Prediabetes      Restless leg syndrome      Retinal detachment      Trigeminal neuralgia pain 1/4/2012       Past Surgical History:   Procedure Laterality Date     CATARACT IOL, RT/LT       COLONOSCOPY       ESOPHAGOSCOPY, GASTROSCOPY, DUODENOSCOPY (EGD), COMBINED  1/15/2014    Procedure: COMBINED ESOPHAGOSCOPY, GASTROSCOPY, DUODENOSCOPY (EGD), BIOPSY SINGLE OR MULTIPLE;;  Surgeon: Winston Nixon MD;  Location: [facility] OR     PHACOEMULSIFICATION CLEAR CORNEA WITH STANDARD INTRAOCULAR LENS IMPLANT Right 2/20/2017    Procedure: PHACOEMULSIFICATION CLEAR CORNEA WITH STANDARD INTRAOCULAR LENS IMPLANT;  Surgeon: Mateo Gray MD;  Location: [facility]     RETINAL  REATTACHMENT       SURGICAL HISTORY OF -   8/2009    Both Eyelids-.     TONSILLECTOMY  as child       Social History     Social History     Marital status: Single     Spouse name: N/A     Number of children: 0     Years of education: 12     Occupational History     [employer]     Social History Main Topics     Smoking status: Former Smoker     Years: 15.00     Types: Cigarettes     Quit date: 1/1/1983     Smokeless tobacco: Former User      Comment: smoke free household.     Alcohol use No      Comment: quit in 1986     Drug use: No     Sexual activity: Not Currently     Partners: Female      Comment: 4/2010  No girlfriend at this time.     Other Topics Concern     Not on file     Social History Narrative       Current Outpatient Prescriptions   Medication Sig Dispense Refill     predniSONE (DELTASONE) 20 MG tablet Take 3 tablets (60 mg) by mouth daily 60 tablet 1     pantoprazole (PROTONIX) 20 MG EC tablet TAKE ONE TABLET BY MOUTH ONE TIME DAILY 30-60 MINUTES BEFORE A MEAL 90 tablet 0     latanoprost (XALATAN) 0.005 % ophthalmic solution Place 1 drop into both eyes At Bedtime 7.5 mL 4     Moxifloxacin HCl POWD 1 Bottle 4 times daily 1 drop four times a day into the operative eye starting 1 day before surgery. Use until bottle runs out. 1 Bottle 0     lisinopril (PRINIVIL/ZESTRIL) 10 MG tablet TAKE ONE TABLET BY MOUTH ONE TIME DAILY 90 tablet 3     atorvastatin (LIPITOR) 20 MG tablet TAKE ONE TABLET BY MOUTH ONE TIME DAILY 90 tablet 2     timolol (TIMOPTIC) 0.5 % ophthalmic solution Place 1 drop into both eyes daily 1 Bottle 11     aspirin 81 MG tablet Take 1 tablet by mouth daily.       ClonAZEPAM (KLONOPIN) 0.5 MG tablet Take 0.5 mg by mouth. Take 1 tablet by mouth daily at bedtime       Calcium Carbonate-Vitamin D (CALCIUM 600/VITAMIN D PO) Take  by mouth. Take 1/2 tablet in the morning, 1/2 at night daily       MULTI-DAY OR daily         Medications and history  "reviewed    Physical exam:  Vitals: /86  Pulse 75  Temp 97.1  F (36.2  C) (Oral)  Ht 1.778 m (5' 10\")  Wt 107.5 kg (237 lb)  SpO2 96%  BMI 34.01 kg/m2  BMI= Body mass index is 34.01 kg/(m^2).    Constitutional: healthy, alert and no distress  Head: Normocephalic. No masses, lesions, tenderness or abnormalities. No tenderness with palpation over temples. Right eyelid does droop compared to left.  Cardiovascular: negative, PMI normal. No lifts, heaves, or thrills. RRR. No murmurs, clicks gallops or rub  Respiratory: negative, Percussion normal. Good diaphragmatic excursion. Lungs clear  Gastrointestinal: Abdomen soft, non-tender. BS normal. No masses, organomegaly, positive findings: obese  : Deferred  Musculoskeletal: extremities normal- no gross deformities noted, gait normal and normal muscle tone  Skin: no suspicious lesions or rashes  Psychiatric: mentation appears normal and affect normal/bright  Patient able to get up on table without difficulty.    Labs show:  Results for JR FRANCOIS (MRN [id number]) as of 7/28/2017 10:48   Ref. Range 7/24/2017 15:09   CRP Inflammation Latest Ref Range: 0.0 - 8.0 mg/L 8.3 (H)   WBC Latest Ref Range: 4.0 - 11.0 10e9/L 7.8   Hemoglobin Latest Ref Range: 13.3 - 17.7 g/dL 15.8   Hematocrit Latest Ref Range: 40.0 - 53.0 % 45.3   Platelet Count Latest Ref Range: 150 - 450 10e9/L 282   RBC Count Latest Ref Range: 4.4 - 5.9 10e12/L 4.96   MCV Latest Ref Range: 78 - 100 fl 91   MCH Latest Ref Range: 26.5 - 33.0 pg 31.9   MCHC Latest Ref Range: 31.5 - 36.5 g/dL 34.9   RDW Latest Ref Range: 10.0 - 15.0 % 12.7   Sed Rate Latest Ref Range: 0 - 20 mm/h 37 (H)         Assessment:     ICD-10-CM    1. Temporal arteritis (H) M31.6      Plan: Will plan for bilateral biopsies as he did not have much for localizing symptoms. Having started the prednisone treatment now we want to get done as soon as possible so looking at the beginning of next week. Will have him hold aspirin " now.    Jj Tello MD

## 2017-07-28 NOTE — MR AVS SNAPSHOT
"              After Visit Summary   7/28/2017    Rakan Nolasco    MRN: [id number]           Patient Information     Date Of Birth          1945        Visit Information        Provider Department      7/28/2017 10:30 AM Jj Tello MD [facility]        Today's Diagnoses     Temporal arteritis (H)    -  1       Follow-ups after your visit        Your next 10 appointments already scheduled     Sep 26, 2017  9:15 AM CDT   Return Visit with Mateo Gray MD   [facility] ([facility])    [address] [place] [id number] [phone]              Who to contact     If you have questions or need follow up information about today's clinic visit or your schedule please contact [facility] directly at [phone].  Normal or non-critical lab and imaging results will be communicated to you by MyChart, letter or phone within 4 business days after the clinic has received the results. If you do not hear from us within 7 days, please contact the clinic through MyChart or phone. If you have a critical or abnormal lab result, we will notify you by phone as soon as possible.  Submit refill requests through [employer] or call your pharmacy and they will forward the refill request to us. Please allow 3 business days for your refill to be completed.          Additional Information About Your Visit        MyChart Information     [employer] lets you send messages to your doctor, view your test results, renew your prescriptions, schedule appointments and more. To sign up, go to [email or web address] . Click on \"Log in\" on the left side of the screen, which will take you to the Welcome page. Then click on \"Sign up Now\" on the right side of the page.     You will be asked to enter the access code listed below, as well as some personal information. Please follow the directions to create your username and password.     Your access code is: " [email or web address]  Expires: 10/22/2017  3:04 PM     Your access code will  in 90 days. If you need help or a new code, please call your [place] clinic or [phone].        Care EveryWhere ID     This is your Care EveryWhere ID. This could be used by other organizations to access your [place] medical records  [id number]        Your Vitals Were     Pulse Temperature Height Pulse Oximetry BMI (Body Mass Index)       75 97.1  F (36.2  C) (Oral) 1.778 m (5' 10\") 96% 34.01 kg/m2        Blood Pressure from Last 3 Encounters:   17 146/86   17 113/70   17 124/76    Weight from Last 3 Encounters:   17 107.5 kg (237 lb)   17 106.6 kg (235 lb)   10/03/16 99.3 kg (219 lb)              Today, you had the following     No orders found for display       Primary Care Provider Office Phone # Fax #    Telly Lucio [phone]       [address] [address]        Equal Access to Services     SARAH YEE : Hadii aad ku hadasho Soomaali, waaxda luqadaha, qaybta kaalmada adeegyada, gino lion . So [facility] [phone].    ATENCIÓN: Si habla español, tiene a coates disposición servicios gratuitos de asistencia lingüística. Llame al [phone].    We comply with applicable federal civil rights laws and [place] laws. We do not discriminate on the basis of race, color, national origin, age, disability sex, sexual orientation or gender identity.            Thank you!     Thank you for choosing [facility]  for your care. Our goal is always to provide you with excellent care. Hearing back from our patients is one way we can continue to improve our services. Please take a few minutes to complete the written survey that you may receive in the mail after your visit with us. Thank you!             Your Updated Medication List - Protect others around you: Learn how to safely use, store and throw away your " medicines at www.disposemymeds.org.          This list is accurate as of: 7/28/17 11:22 AM.  Always use your most recent med list.                   Brand Name Dispense Instructions for use Diagnosis    aspirin 81 MG tablet      Take 1 tablet by mouth daily.        atorvastatin 20 MG tablet    LIPITOR    90 tablet    TAKE ONE TABLET BY MOUTH ONE TIME DAILY    Hyperlipidemia LDL goal <130       CALCIUM 600/VITAMIN D PO      Take  by mouth. Take 1/2 tablet in the morning, 1/2 at night daily        clonazePAM 0.5 MG tablet    klonoPIN     Take 0.5 mg by mouth. Take 1 tablet by mouth daily at bedtime        latanoprost 0.005 % ophthalmic solution    XALATAN    7.5 mL    Place 1 drop into both eyes At Bedtime    Borderline glaucoma with ocular hypertension, bilateral       lisinopril 10 MG tablet    PRINIVIL/ZESTRIL    90 tablet    TAKE ONE TABLET BY MOUTH ONE TIME DAILY    Hypertension goal BP (blood pressure) < 140/90       Moxifloxacin HCl Powd     1 Bottle    1 Bottle 4 times daily 1 drop four times a day into the operative eye starting 1 day before surgery. Use until bottle runs out.    Nuclear sclerosis of right eye       MULTI-DAY PO      daily        pantoprazole 20 MG EC tablet    PROTONIX    90 tablet    TAKE ONE TABLET BY MOUTH ONE TIME DAILY 30-60 MINUTES BEFORE A MEAL    Gastroesophageal reflux disease without esophagitis       predniSONE 20 MG tablet    DELTASONE    60 tablet    Take 3 tablets (60 mg) by mouth daily    Headache, unspecified headache type       timolol 0.5 % ophthalmic solution    TIMOPTIC    1 Bottle    Place 1 drop into both eyes daily    Borderline glaucoma with ocular hypertension, bilateral

## 2017-07-28 NOTE — NURSING NOTE
"Chief Complaint   Patient presents with     Consult     Temporal arteritis. Patient just started noticing vision issues about 2 weeks ago.        Initial /86  Pulse 75  Temp 97.1  F (36.2  C) (Oral)  Ht 1.778 m (5' 10\")  Wt 107.5 kg (237 lb)  SpO2 96%  BMI 34.01 kg/m2 Estimated body mass index is 34.01 kg/(m^2) as calculated from the following:    Height as of this encounter: 1.778 m (5' 10\").    Weight as of this encounter: 107.5 kg (237 lb).  Medication Reconciliation: complete   Poly Owen Certified Medical Assistant    "

## 2017-07-29 LAB
ACHR BIND AB SER-SCNC: 10.3 NMOL/L
ACHR MOD AB/ACHR TOTAL SFR SER: 65 %

## 2017-07-30 DIAGNOSIS — E78.5 HYPERLIPIDEMIA LDL GOAL <130: ICD-10-CM

## 2017-07-31 RX ORDER — ATORVASTATIN CALCIUM 20 MG/1
TABLET, FILM COATED ORAL
Qty: 30 TABLET | Refills: 0 | Status: SHIPPED | OUTPATIENT
Start: 2017-07-31 | End: 2017-08-26

## 2017-07-31 NOTE — TELEPHONE ENCOUNTER
Prescription approved per [facility] Refill Protocol.  Patient due for labs for further refills.  Isatu Ricci RN - BC

## 2017-07-31 NOTE — TELEPHONE ENCOUNTER
atorvastatin (LIPITOR) 20 MG tablet     Last Written Prescription Date: 11/1/2016  Last Fill Quantity: 90, # refills: 2  Last Office Visit with [facility], [facility] or [facility] prescribing provider: 2/6/2017  Next 5 appointments (look out 90 days)     Sep 26, 2017  9:15 AM CDT   Return Visit with Mateo Gray MD   [facility] ([address] [address]   [phone]                   Lab Results   Component Value Date    CHOL 130 08/01/2016     Lab Results   Component Value Date    HDL 45 08/01/2016     Lab Results   Component Value Date    LDL 70 08/01/2016     Lab Results   Component Value Date    TRIG 75 08/01/2016     Lab Results   Component Value Date    CHOLHDLRATIO 3.4 09/30/2015

## 2017-08-01 ENCOUNTER — HOSPITAL ENCOUNTER (OUTPATIENT)
Facility: AMBULATORY SURGERY CENTER | Age: 72
Discharge: HOME | End: 2017-08-01
Attending: SURGERY | Admitting: SURGERY
Payer: COMMERCIAL

## 2017-08-01 ENCOUNTER — SURGERY (OUTPATIENT)
Age: 72
End: 2017-08-01

## 2017-08-01 VITALS
TEMPERATURE: 97.1 F | RESPIRATION RATE: 18 BRPM | SYSTOLIC BLOOD PRESSURE: 155 MMHG | DIASTOLIC BLOOD PRESSURE: 80 MMHG | OXYGEN SATURATION: 94 %

## 2017-08-01 PROCEDURE — 88313 SPECIAL STAINS GROUP 2: CPT | Performed by: SURGERY

## 2017-08-01 PROCEDURE — 37609 LIGATION/BX TEMPORAL ARTERY: CPT | Mod: LT | Performed by: SURGERY

## 2017-08-01 PROCEDURE — 2894A VOIDCORRECT: CPT | Mod: RT | Performed by: SURGERY

## 2017-08-01 PROCEDURE — 88305 TISSUE EXAM BY PATHOLOGIST: CPT | Performed by: SURGERY

## 2017-08-01 PROCEDURE — 37609 LIGATION/BX TEMPORAL ARTERY: CPT | Mod: LT

## 2017-08-01 PROCEDURE — G8907 PT DOC NO EVENTS ON DISCHARG: HCPCS

## 2017-08-01 PROCEDURE — G8918 PT W/O PREOP ORDER IV AB PRO: HCPCS

## 2017-08-01 RX ORDER — IBUPROFEN 600 MG/1
600 TABLET, FILM COATED ORAL
Status: CANCELLED | OUTPATIENT
Start: 2017-08-01

## 2017-08-01 RX ORDER — BUPIVACAINE HYDROCHLORIDE AND EPINEPHRINE 2.5; 5 MG/ML; UG/ML
INJECTION, SOLUTION INFILTRATION; PERINEURAL PRN
Status: DISCONTINUED | OUTPATIENT
Start: 2017-08-01 | End: 2017-08-01 | Stop reason: HOSPADM

## 2017-08-01 RX ADMIN — BUPIVACAINE HYDROCHLORIDE AND EPINEPHRINE 2 ML: 2.5; 5 INJECTION, SOLUTION INFILTRATION; PERINEURAL at 08:31

## 2017-08-01 RX ADMIN — BUPIVACAINE HYDROCHLORIDE AND EPINEPHRINE 1 ML: 2.5; 5 INJECTION, SOLUTION INFILTRATION; PERINEURAL at 07:28

## 2017-08-01 NOTE — OP NOTE
Date of Service: 8/1/2017     STAFF SURGEON:  Jj Tello MD     ASSISTANT:  None.     PREOPERATIVE DIAGNOSIS:  temporal arteritis     POSTOPERATIVE DIAGNOSIS:  Same     NAME OF PROCEDURE(S):  bilateral temporal artery biopsy     INDICATIONS FOR PROCEDURE:  The patient is a  71yo male with some headache and vision changes who was suspected to have temporal arteritis with elevated ERS and CRP. He was started on prednisone and sent to me for temporal artery biopsies.      EBL: 20 cc    ANESTHESIA:  Local    COMPLICATIONS: None     DRAINS:  None.     SPECIMENS:  right and left temporal arteries     IMPLANTS:none     OPERATIVE FINDINGS:  right temporal artery appeared normal and small. The left also was small but it was more stuck and some of the nearby tissues were a bit woody.     PROCEDURE DETAIL:  Following consent, the patient was brought from the preoperative holding area to the operating suite and laid in supine position. The temporal areas were prepped and draped. Time out performed. I started on the right using dopplar to trace the course of the temporal artery. I then injected 1/4% marcaine with epinephrine 1:200,000 and made a 3 cm incision transveresly to try and keep the scar more cosmetic. I dissected down until the artery was visible then used tenotomy scissors to free this up superiorly and inferiorly as much as possible. The ends were tied off with 3-0 silk and a piece of the artery removed. This was sent off for pathology. I then moved over to the left and siilarly traced the artery with dopplar and injected local. I made a matching incision on this side. When the artery was encountered and I began to dissect with scissors this seemed to be more difficult with the surrounding tissues more woody and the artery more stuck to them. It did not appear acutely inflamed however or the artery itself thickened. Once I had freed up a length the ends were tied off and that portion removed for pathology. There  was some bleeding on this side from a scalp skin vessel which was controlled with cautery. Bother wounds were closed with running subcuticular 4-0 vicryl and covered with dermabond. Patient tolerated the procedure well.           Jj Tello MD

## 2017-08-01 NOTE — LETTER
[address] [place] [address]           Tel [phone]  Rakan Nolasco  [address]  [facility] [address]      August 4, 2017    Dear Rakan,  This letter is to inform you of the results of your pathology report on your artery biopsies.    Your pathology report was:  FINAL DIAGNOSIS:   A. Temporal artery, right, biopsy:   1. No evidence of giant cell arteritis.   2. Moderate arteriosclerosis.     B. Temporal artery, right, biopsy:   1. No evidence of giant cell arteritis.   2. Moderate arteriosclerosis.  The suspected artery inflammation was not seen. You did have some improvement in your vision symptoms with the prednisone so I would ask Dr Lucio if you should complete the course of the medicine or not.  If you do have further questions please don t hesitate to call my assistant at [phone].  We do not have someone answering the phone all the time at my assistants number so if leave a message may take a day or so to get back to you.  So if more urgent then call the below number.    To make an appointment call .   Sincerely,     Jj Tello M.D.

## 2017-08-01 NOTE — IP AVS SNAPSHOT
[facility] [place]    [address] [place] [address]    Phone:  [phone]                                       After Visit Summary   8/1/2017    Rakan Nolasco    MRN: [id number]           After Visit Summary Signature Page     I have received my discharge instructions, and my questions have been answered. I have discussed any challenges I see with this plan with the nurse or doctor.    ..........................................................................................................................................  Patient/Patient Representative Signature      ..........................................................................................................................................  Patient Representative Print Name and Relationship to Patient    ..................................................               ................................................  Date                                            Time    ..........................................................................................................................................  Reviewed by Signature/Title    ...................................................              ..............................................  Date                                                            Time

## 2017-08-01 NOTE — IP AVS SNAPSHOT
MRN:[id number]                      After Visit Summary   8/1/2017    Rakan Nolasco    MRN: [id number]           Thank you!     Thank you for choosing [facility] for your care. Our goal is always to provide you with excellent care. Hearing back from our patients is one way we can continue to improve our services. Please take a few minutes to complete the written survey that you may receive in the mail after you visit with us. Thank you!        Patient Information     Date Of Birth          1945        About your hospital stay     You were admitted on:  August 1, 2017 You last received care in the:  [facility]    You were discharged on:  August 1, 2017       Who to Call     For medical emergencies, please call 911.  For non-urgent questions about your medical care, please call your primary care provider or clinic, [phone]  For questions related to your surgery, please call your surgery clinic        Attending Provider     Provider Jj Mckee MD Surgery       Primary Care Provider Office Phone # Fax #    Telly Lucio [phone]      After Care Instructions     Diet Instructions       Resume pre-procedure diet            Discharge Instructions       Discharge Instructions    DISCHARGE INSTRUCTIONS  DR. JJ GARCIA  1. You may resume your regular diet when you feel you are ready to. DO NOT drink alcoholic beverages for  24 hours of while you are taking prescription medication.  2. Limit your activities for the first 48 hours. Gradually, increase them as tolerated. You may use stairs.  I encourage you to walk as tolerated.  3. You will have some discomfort at the incision sites. This is expected. This should improve over the next  2-3 days. Ice and pain medication will help with this pain. Use prescribed pain medication as instructed.  4. Bruising and mild swelling is normal after surgery. The area below and around the  incision(s) will be hard and  elevated. This is part of the normal healing process. This will resolve slowly over the next several months.  If you feel the pain is increasing and cannot explain it by increasing activity please call us at [phone]  5. Your wounds may be covered with glue. The glue is water tight and so you can shower the day following surgery. Wait at least 48 hours to allow the skin beneath to seal before submerging in a bath tub or pool. If glue was not used wait 48 hours before bathing.  6. Avoid Aspirin for the first 72 hours after the procedure. This medication may increase the tendency to bleed.  7. Use the following medications (in addition to your normal meds) as shown:  Name of Medicine Dose Frequency Reason  a. Percocet 5 mg 1-2 every 6 hours as needed for pain. This contains 325 mg of Tylenol (acetaminophen)  per tablet. For example, you may take 1 Percocet and 1 Tylenol, or 2 Percocet and no Tylenol,  or 2 Tylenol and no Percocet every 6 hours.  b. Tylenol (acetaminophen) 500 mg every 6 hours as needed for pain. Do not take more than 1000 mg  every 6 hours. (see above)  c. Motrin (ibuprophen) 200-800 mg every 6 hours as needed for pain. Take with food.  8. Notify [facility] at [phone] if:     Your discomfort is not relieved by your pain medication     You have signs of infection such as temperature above 100.5 degrees orally, chills, or  increasing daily discomfort.     Incision site is becoming more red and/or there is purulent drainage.     You have questions or concerns  9. Please call [phone] to schedule a follow up appointment in 2 weeks(s)  10. When taking narcotics (pain medication more than Tylenol (acetaminophen) and Motrin (ibuprophen) it is  important to keep your stools soft to avoid constipation and pain with straining. This is best done by drinking  fluids (nonalcoholic and non-caffeinated) and taking a stool softener i.e. Metamucil or milk of  "magnesia.  You may be able to use non-narcotics for pain relief especially by the 3rd post- operative day. [id number] mg  every 6 hours and/or Motrin (ibuprophen) 200-800 mg every 6 hours. Please do not take more than 4 grams  of Tylenol (acetaminophen) per day. Remember your Percocet does have Tylenol (acetaminophen) already  in it. If you have a history of stomach ulcers or stomach problems than do not take the Motrin (ibuprophen).  Please take Motrin (ibuprophen) with food to help protect the stomach.  11. Do not drive or operate heavy machinery for 24 hours after surgery or when taking narcotics. You may  resume driving when feel that you can safely avoid an accident and are not taking narcotics. This is usually  5 to 7 days after surgery. You should not be alone for 24 hours after surgery.  Discharge Owner: Dr Tello  Date of Origin: 11/06  Revised/Reviewed:8/15            Ice to affected area       Ice to operative site PRN                  Your next 10 appointments already scheduled     Sep 26, 2017  9:15 AM CDT   Return Visit with Mateo Gray MD   [facility] ([facility]    [address] [place] [address]   [phone]              Pending Results     No orders found from 7/30/2017 to 8/2/2017.            Admission Information     Date & Time Provider Department Dept. Phone    8/1/2017 Jj Tello MD [facility] [phone]      Your Vitals Were     Blood Pressure Temperature Respirations Pulse Oximetry          152/86 97.4  F (36.3  C) (Temporal) 18 97%        MyChart Information     [employer] lets you send messages to your doctor, view your test results, renew your prescriptions, schedule appointments and more. To sign up, go to [email or web address] . Click on \"Log in\" on the left side of the screen, which will take you to the Welcome page. Then click on \"Sign up Now\" on the right side of the page.     You will be asked to " enter the access code listed below, as well as some personal information. Please follow the directions to create your username and password.     Your access code is: [email or web address]  Expires: 10/22/2017  3:04 PM     Your access code will  in 90 days. If you need help or a new code, please call your [facility] clinic or [phone].        Care EveryWhere ID     This is your Care EveryWhere ID. This could be used by other organizations to access your [facility] medical records  [id number]        Equal Access to Services     [facility]: Hadii estefania michael hadaideo Soderrick, waaxda luqadaha, qaybelif kaalmatuan yeager, gino lion . So [facility] [phone].    ATENCIÓN: Si habla español, tiene a coates disposición servicios gratuitos de asistencia lingüística. Llame al [phone].    We comply with applicable federal civil rights laws and [place] laws. We do not discriminate on the basis of race, color, national origin, age, disability sex, sexual orientation or gender identity.               Review of your medicines      CONTINUE these medicines which have NOT CHANGED        Dose / Directions    aspirin 81 MG tablet        Dose:  1 tablet   Take 1 tablet by mouth daily.   Refills:  0       atorvastatin 20 MG tablet   Commonly known as:  LIPITOR   Used for:  Hyperlipidemia LDL goal <130        TAKE ONE TABLET BY MOUTH ONE TIME DAILY   Quantity:  30 tablet   Refills:  0       CALCIUM 600/VITAMIN D PO        Take  by mouth. Take 1/2 tablet in the morning, 1/2 at night daily   Refills:  0       clonazePAM 0.5 MG tablet   Commonly known as:  klonoPIN        Dose:  0.5 mg   Take 0.5 mg by mouth. Take 1 tablet by mouth daily at bedtime   Refills:  0       latanoprost 0.005 % ophthalmic solution   Commonly known as:  XALATAN   Used for:  Borderline glaucoma with ocular hypertension, bilateral        Dose:  1 drop   Place 1 drop into both eyes At Bedtime   Quantity:  7.5 mL   Refills:  4       lisinopril  10 MG tablet   Commonly known as:  PRINIVIL/ZESTRIL   Used for:  Hypertension goal BP (blood pressure) < 140/90        TAKE ONE TABLET BY MOUTH ONE TIME DAILY   Quantity:  90 tablet   Refills:  3       Moxifloxacin HCl Powd   Used for:  Nuclear sclerosis of right eye        Dose:  1 Bottle   1 Bottle 4 times daily 1 drop four times a day into the operative eye starting 1 day before surgery. Use until bottle runs out.   Quantity:  1 Bottle   Refills:  0       MULTI-DAY PO        daily   Refills:  0       pantoprazole 20 MG EC tablet   Commonly known as:  PROTONIX   Used for:  Gastroesophageal reflux disease without esophagitis        TAKE ONE TABLET BY MOUTH ONE TIME DAILY 30-60 MINUTES BEFORE A MEAL   Quantity:  90 tablet   Refills:  0       predniSONE 20 MG tablet   Commonly known as:  DELTASONE   Used for:  Headache, unspecified headache type        Dose:  60 mg   Take 3 tablets (60 mg) by mouth daily   Quantity:  60 tablet   Refills:  1       timolol 0.5 % ophthalmic solution   Commonly known as:  TIMOPTIC   Used for:  Borderline glaucoma with ocular hypertension, bilateral        Dose:  1 drop   Place 1 drop into both eyes daily   Quantity:  1 Bottle   Refills:  11                Protect others around you: Learn how to safely use, store and throw away your medicines at www.disposemymeds.org.             Medication List: This is a list of all your medications and when to take them. Check marks below indicate your daily home schedule. Keep this list as a reference.      Medications           Morning Afternoon Evening Bedtime As Needed    aspirin 81 MG tablet   Take 1 tablet by mouth daily.                                atorvastatin 20 MG tablet   Commonly known as:  LIPITOR   TAKE ONE TABLET BY MOUTH ONE TIME DAILY                                CALCIUM 600/VITAMIN D PO   Take  by mouth. Take 1/2 tablet in the morning, 1/2 at night daily                                clonazePAM 0.5 MG tablet   Commonly known as:   klonoPIN   Take 0.5 mg by mouth. Take 1 tablet by mouth daily at bedtime                                latanoprost 0.005 % ophthalmic solution   Commonly known as:  XALATAN   Place 1 drop into both eyes At Bedtime                                lisinopril 10 MG tablet   Commonly known as:  PRINIVIL/ZESTRIL   TAKE ONE TABLET BY MOUTH ONE TIME DAILY                                Moxifloxacin HCl Powd   1 Bottle 4 times daily 1 drop four times a day into the operative eye starting 1 day before surgery. Use until bottle runs out.                                MULTI-DAY PO   daily                                pantoprazole 20 MG EC tablet   Commonly known as:  PROTONIX   TAKE ONE TABLET BY MOUTH ONE TIME DAILY 30-60 MINUTES BEFORE A MEAL                                predniSONE 20 MG tablet   Commonly known as:  DELTASONE   Take 3 tablets (60 mg) by mouth daily                                timolol 0.5 % ophthalmic solution   Commonly known as:  TIMOPTIC   Place 1 drop into both eyes daily

## 2017-08-03 LAB — COPATH REPORT: NORMAL

## 2017-08-12 DIAGNOSIS — K21.9 GASTROESOPHAGEAL REFLUX DISEASE WITHOUT ESOPHAGITIS: ICD-10-CM

## 2017-08-14 RX ORDER — PANTOPRAZOLE SODIUM 20 MG/1
TABLET, DELAYED RELEASE ORAL
Qty: 90 TABLET | Refills: 0 | Status: SHIPPED | OUTPATIENT
Start: 2017-08-14 | End: 2017-10-20

## 2017-08-14 NOTE — TELEPHONE ENCOUNTER
Protonix       Last Written Prescription Date: 05/08/2017  Last Fill Quantity: 90,  # refills: 0   Last Office Visit with G, P or [facility] prescribing provider: 02/06/2017                                         Next 5 appointments (look out 90 days)     Sep 26, 2017  9:15 AM CDT   Return Visit with Mateo Gray MD   [facility] ([facility])    [address]  [place] [address] [phone]

## 2017-08-14 NOTE — TELEPHONE ENCOUNTER
Prescription approved per [facility] Refill Protocol.  Chelsey Rose RN

## 2017-08-18 DIAGNOSIS — R51.9 HEADACHE, UNSPECIFIED HEADACHE TYPE: ICD-10-CM

## 2017-08-18 RX ORDER — PREDNISONE 20 MG/1
60 TABLET ORAL DAILY
Qty: 60 TABLET | Refills: 0 | Status: SHIPPED | OUTPATIENT
Start: 2017-08-18 | End: 2017-09-06

## 2017-08-21 ENCOUNTER — TELEPHONE (OUTPATIENT)
Dept: OPHTHALMOLOGY | Facility: CLINIC | Age: 72
End: 2017-08-21

## 2017-08-21 NOTE — TELEPHONE ENCOUNTER
8/21/17    Rakan called to see if he should be taking the prednisone at the dosage prescribed or if you were going to lower the dosage, and he has broke out on his face and it has moved to his chest wants to know if that is from the medication and if you received the results of his biopsy yet.  Also if there are any follow up labs he needs to do.  The prednisone seems to be working since all his symptoms are gone.  Please give him a call and discuss.    Hali Wayne  Clinical

## 2017-08-26 DIAGNOSIS — E78.5 HYPERLIPIDEMIA LDL GOAL <130: ICD-10-CM

## 2017-08-28 RX ORDER — ATORVASTATIN CALCIUM 20 MG/1
TABLET, FILM COATED ORAL
Qty: 30 TABLET | Refills: 0 | Status: SHIPPED | OUTPATIENT
Start: 2017-08-28 | End: 2017-09-25

## 2017-08-28 NOTE — TELEPHONE ENCOUNTER
atorvastatin (LIPITOR) 20 MG tablet   Last Written Prescription Date: 07/31/2017  Last Fill Quantity: 30, # refills: 0  Last Office Visit with [facility], [facility] or [facility] prescribing provider: 02/06/2017  Next 5 appointments (look out 90 days)     Sep 26, 2017  9:15 AM CDT   Return Visit with Mateo Gray MD   [facility] ([address] [address]   [phone]                   Lab Results   Component Value Date    CHOL 130 08/01/2016     Lab Results   Component Value Date    HDL 45 08/01/2016     Lab Results   Component Value Date    LDL 70 08/01/2016     Lab Results   Component Value Date    TRIG 75 08/01/2016     Lab Results   Component Value Date    CHOLHDLRATIO 3.4 09/30/2015     Marlene Bustamante MA

## 2017-08-28 NOTE — TELEPHONE ENCOUNTER
Routing refill request to provider for review/approval because:  Marian given x1 and patient did not follow up, please advise  Labs not current:  MOUNA Ricci RN - [facility]

## 2017-09-01 NOTE — TELEPHONE ENCOUNTER
Patient called back and this writer scheduled a Lab only visit for 09/06/2017 at 7:45am. Marlene Bustamante MA

## 2017-09-06 ENCOUNTER — TELEPHONE (OUTPATIENT)
Dept: OPHTHALMOLOGY | Facility: CLINIC | Age: 72
End: 2017-09-06

## 2017-09-06 DIAGNOSIS — R51.9 HEADACHE, UNSPECIFIED HEADACHE TYPE: ICD-10-CM

## 2017-09-06 DIAGNOSIS — E78.5 HYPERLIPIDEMIA LDL GOAL <130: ICD-10-CM

## 2017-09-06 LAB
CHOLEST SERPL-MCNC: 155 MG/DL
HDLC SERPL-MCNC: 62 MG/DL
LDLC SERPL CALC-MCNC: 71 MG/DL
NONHDLC SERPL-MCNC: 93 MG/DL
TRIGL SERPL-MCNC: 111 MG/DL

## 2017-09-06 PROCEDURE — 36415 COLL VENOUS BLD VENIPUNCTURE: CPT | Performed by: FAMILY MEDICINE

## 2017-09-06 PROCEDURE — 80061 LIPID PANEL: CPT | Performed by: FAMILY MEDICINE

## 2017-09-06 RX ORDER — PREDNISONE 20 MG/1
40 TABLET ORAL DAILY
Qty: 40 TABLET | Refills: 3 | Status: SHIPPED | OUTPATIENT
Start: 2017-09-06 | End: 2017-10-18 | Stop reason: DRUGHIGH

## 2017-09-06 NOTE — TELEPHONE ENCOUNTER
Continued: I called pt back and told him Dr. Claire want him to continue the 40 mg prednisone a day, if vision or headache symptoms get worse should call us back. Pt has appt with a neurologist next week and he has appt to see us in about 2 wks.

## 2017-09-06 NOTE — TELEPHONE ENCOUNTER
9/6/17    Rakan called to say that things are changing and wanted to know if he should change anything with the pills you gave him.  Please give him a call and speak with him in regards to this.    Hali Wayne  Clinical

## 2017-09-06 NOTE — TELEPHONE ENCOUNTER
Spoke with pt states has slight decrease vision and slight intermittent headaches x 1 wk, told pt would talk with  And call

## 2017-09-19 NOTE — PROGRESS NOTES
SUBJECTIVE:   Rakan Nolasco is a 72 year old male who presents to clinic today for the following health issues:   Concerns  These started after starting prednisone for temporal arteritis and seem to be getting  better with reduction of dose now at 20 mg daily.    1. Profusely sweaty; in the morning when get up and after eating  2. Numbness of fingers  3. Cramps in the hands: could have been due to dehydration and improved with hydration.  4. Easily winded x since starting prednisone, no CP or sweatiness  5. Insomnia: wakes up a lot at night and not sleeping very well.  6. Gained a lot of weight.    Headaches      Duration: 2 weeks    Description  Location: frontal   Character: dull pain  Frequency:  Off and on  Duration:  Couple hours    Intensity:  mild    Accompanying signs and symptoms:    Precipitating or Alleviating factors:  Nausea/vomiting: no  Dizziness: no           Visual changes: none. Vision; had double vision at onset but this has resolved.  Fever: no   Sinus or URI symptoms shortness of breath    History  Head trauma: no  Family history of migraines: no  Previous tests for headaches: no  Neurologist evaluations: no  Able to do daily activities when headache present: YES  Wake with headaches: no  Daily pain medication use: YES- ibuprofen   Any changes in: none    Precipitating or Alleviating factors (light/sound/sleep/caffeine): none    Therapies tried and outcome: Ibuprofen (Advil, Motrin)    Outcome - effective  Frequent/daily pain medication use: YES- when needed    Patient thinks headaches are coming from prednisone x 1 month.  60:40:20: 10:5      Hyperlipidemia Follow-Up      Rate your low fat/cholesterol diet?: good    Taking statin?  Yes, no muscle aches from statin    Other lipid medications/supplements?:  none      PROBLEMS TO ADD ON...    Problem list and histories reviewed & adjusted, as indicated.  Additional history: as documented    Patient Active Problem List   Diagnosis     GERD  (gastroesophageal reflux disease)     Diverticulosis     Snoring     Fatigue     HYPERLIPIDEMIA LDL GOAL <130     Borderline glaucoma with ocular hypertension     S/P blepharoplasty     Advanced directives, counseling/discussion     Trigeminal neuralgia pain     bmi 31     Hypertension goal BP (blood pressure) < 140/90     Health Care Home     Arthritis     Prediabetes     Dry eyes     PVD (posterior vitreous detachment) OU     H/O RD (retinal detachment) s/p repair with PPV (AB)     Epiretinal membrane, bilateral     Pseudophakia of right eye     Past Surgical History:   Procedure Laterality Date     BIOPSY ARTERY TEMPORAL Bilateral 8/1/2017    Procedure: BIOPSY ARTERY TEMPORAL;  Bilateral temporal artery Biopsy;  Surgeon: Jj Tello MD;  Location: [facility] OR     CATARACT IOL, RT/LT       COLONOSCOPY       ESOPHAGOSCOPY, GASTROSCOPY, DUODENOSCOPY (EGD), COMBINED  1/15/2014    Procedure: COMBINED ESOPHAGOSCOPY, GASTROSCOPY, DUODENOSCOPY (EGD), BIOPSY SINGLE OR MULTIPLE;;  Surgeon: Winston Nixon MD;  Location: [facility] OR     PHACOEMULSIFICATION CLEAR CORNEA WITH STANDARD INTRAOCULAR LENS IMPLANT Right 2/20/2017    Procedure: PHACOEMULSIFICATION CLEAR CORNEA WITH STANDARD INTRAOCULAR LENS IMPLANT;  Surgeon: Mateo Gray MD;  Location:  [facility]     RETINAL REATTACHMENT       SURGICAL HISTORY OF -   8/2009    Both Eyelids-.     TONSILLECTOMY  as child       Social History   Substance Use Topics     Smoking status: Former Smoker     Years: 15.00     Types: Cigarettes     Quit date: 1/1/1983     Smokeless tobacco: Former User      Comment: smoke free household.     Alcohol use No      Comment: quit in 1986     Family History   Problem Relation Age of Onset     Respiratory Mother      copd     Allergies Brother      CANCER Maternal Grandmother      HEART DISEASE Paternal Grandmother      CEREBROVASCULAR DISEASE Father          Reviewed and updated as needed this visit by clinical staff          ROS:  Constitutional, HEENT, cardiovascular, pulmonary, gi and gu systems are negative, except as otherwise noted.      OBJECTIVE:   /72  Pulse 86  Temp 97.7  F (36.5  C) (Oral)  Wt 243 lb 8 oz (110.5 kg)  SpO2 94%  BMI 34.94 kg/m2  Body mass index is 34.94 kg/(m^2).  GENERAL: healthy, alert and no distress  NECK: no adenopathy and thyroid normal to palpation  RESP: lungs clear to auscultation - no rales, rhonchi or wheezes  CV: regular rate and rhythm, no murmur, click or rub, no peripheral edema  ABDOMEN: soft, obese, nontender, no masses and bowel sounds normal  MS: no gross musculoskeletal defects noted, no edema    Diagnostic Test Results:  Results for orders placed or performed in visit on 09/06/17   Lipid panel reflex to direct LDL   Result Value Ref Range    Cholesterol 155 <200 mg/dL    Triglycerides 111 <150 mg/dL    HDL Cholesterol 62 >39 mg/dL    LDL Cholesterol Calculated 71 <100 mg/dL    Non HDL Cholesterol 93 <130 mg/dL         ASSESSMENT/PLAN:     (R53.83) Fatigue, unspecified type  (primary encounter diagnosis)  Comment: EKG in unremarkable thus CAD unlikely in this setting. Symptoms appear to have started with Prednisone and seems to be improving as the dose is being tapered down. Will watch the symptoms as comes down on prednisone.  Plan: EKG 12-lead complete w/read - Clinics.     (R51) Nonintractable headache, unspecified chronicity pattern, unspecified headache type  Comment: No neurological deficits noted at this time. URI and medications are contributors  Plan: Tylenol as needed and stay well hydrated. If worsens should return or call clinic right away.    (T38.0X5D) Steroid side effects, subsequent encounter  Comment: Most of the symptoms he is experiencing are related to steroid use and should improve as comes off the prednisone  Plan: Expectant management    (E78.5) Hyperlipidemia LDL goal <130  Comment: He is taking Lipitor. Cholesterol levels in acceptable range  Plan:  Continue Atorvastatin.    (Z23) Need for prophylactic vaccination and inoculation against influenza  Plan: FLU VACCINE, INCREASED ANTIGEN, PRESV FREE, AGE        65+ [24785]          Follow up in 1 month or sooner with concerns    Telly Lucio MD  [facility]

## 2017-09-25 DIAGNOSIS — E78.5 HYPERLIPIDEMIA LDL GOAL <130: ICD-10-CM

## 2017-09-25 NOTE — TELEPHONE ENCOUNTER
Atorvastatin     Last Written Prescription Date: 8/28/2017  Last Fill Quantity: 30, # refills: 0  Last Office Visit with [facility], P or [facility] prescribing provider: 2/6/2017  Next 5 appointments (look out 90 days)     Sep 26, 2017  9:15 AM CDT   Return Visit with Mateo Gray MD   [facility]    [address] [address]   [phone]            Sep 26, 2017 11:20 AM CDT   Office Visit with Telly Lucio MD   [facility] ([facility])    [address] [address]   [phone]                   Lab Results   Component Value Date    CHOL 155 09/06/2017     Lab Results   Component Value Date    HDL 62 09/06/2017     Lab Results   Component Value Date    LDL 71 09/06/2017     Lab Results   Component Value Date    TRIG 111 09/06/2017     Lab Results   Component Value Date    CHOLHDLRATIO 3.4 09/30/2015

## 2017-09-26 ENCOUNTER — OFFICE VISIT (OUTPATIENT)
Dept: OPHTHALMOLOGY | Facility: CLINIC | Age: 72
End: 2017-09-26
Payer: COMMERCIAL

## 2017-09-26 ENCOUNTER — OFFICE VISIT (OUTPATIENT)
Dept: FAMILY MEDICINE | Facility: CLINIC | Age: 72
End: 2017-09-26
Payer: COMMERCIAL

## 2017-09-26 VITALS
DIASTOLIC BLOOD PRESSURE: 70 MMHG | TEMPERATURE: 97.7 F | HEART RATE: 86 BPM | WEIGHT: 243.5 LBS | OXYGEN SATURATION: 94 % | SYSTOLIC BLOOD PRESSURE: 135 MMHG | BODY MASS INDEX: 34.94 KG/M2

## 2017-09-26 DIAGNOSIS — H35.373 EPIRETINAL MEMBRANE, BILATERAL: ICD-10-CM

## 2017-09-26 DIAGNOSIS — H43.813 PVD (POSTERIOR VITREOUS DETACHMENT), BILATERAL: ICD-10-CM

## 2017-09-26 DIAGNOSIS — Z96.1 PSEUDOPHAKIA OF RIGHT EYE: ICD-10-CM

## 2017-09-26 DIAGNOSIS — H04.123 DRY EYES, BILATERAL: ICD-10-CM

## 2017-09-26 DIAGNOSIS — Z23 NEED FOR PROPHYLACTIC VACCINATION AND INOCULATION AGAINST INFLUENZA: ICD-10-CM

## 2017-09-26 DIAGNOSIS — H25.11 NUCLEAR SCLEROSIS OF RIGHT EYE: ICD-10-CM

## 2017-09-26 DIAGNOSIS — R51.9 NONINTRACTABLE HEADACHE, UNSPECIFIED CHRONICITY PATTERN, UNSPECIFIED HEADACHE TYPE: ICD-10-CM

## 2017-09-26 DIAGNOSIS — R51.9 HEADACHE, UNSPECIFIED HEADACHE TYPE: ICD-10-CM

## 2017-09-26 DIAGNOSIS — R53.83 FATIGUE, UNSPECIFIED TYPE: Primary | ICD-10-CM

## 2017-09-26 DIAGNOSIS — E78.5 HYPERLIPIDEMIA LDL GOAL <130: ICD-10-CM

## 2017-09-26 DIAGNOSIS — Z86.69 H/O RD (RETINAL DETACHMENT): ICD-10-CM

## 2017-09-26 DIAGNOSIS — T38.0X5D STEROID SIDE EFFECTS, SUBSEQUENT ENCOUNTER: ICD-10-CM

## 2017-09-26 DIAGNOSIS — Z98.890 S/P BLEPHAROPLASTY: ICD-10-CM

## 2017-09-26 DIAGNOSIS — H40.053 BORDERLINE GLAUCOMA WITH OCULAR HYPERTENSION, BILATERAL: Primary | ICD-10-CM

## 2017-09-26 PROCEDURE — 99214 OFFICE O/P EST MOD 30 MIN: CPT | Mod: 25 | Performed by: FAMILY MEDICINE

## 2017-09-26 PROCEDURE — 90662 IIV NO PRSV INCREASED AG IM: CPT | Performed by: FAMILY MEDICINE

## 2017-09-26 PROCEDURE — 92012 INTRM OPH EXAM EST PATIENT: CPT | Performed by: STUDENT IN AN ORGANIZED HEALTH CARE EDUCATION/TRAINING PROGRAM

## 2017-09-26 PROCEDURE — 93000 ELECTROCARDIOGRAM COMPLETE: CPT | Performed by: FAMILY MEDICINE

## 2017-09-26 PROCEDURE — G0008 ADMIN INFLUENZA VIRUS VAC: HCPCS | Performed by: FAMILY MEDICINE

## 2017-09-26 RX ORDER — ATORVASTATIN CALCIUM 20 MG/1
TABLET, FILM COATED ORAL
Qty: 90 TABLET | Refills: 1 | Status: SHIPPED | OUTPATIENT
Start: 2017-09-26 | End: 2018-03-17

## 2017-09-26 ASSESSMENT — TONOMETRY
OD_IOP_MMHG: 20
OS_IOP_MMHG: 21
IOP_METHOD: APPLANATION

## 2017-09-26 ASSESSMENT — CUP TO DISC RATIO
OD_RATIO: 0.25 UD
OS_RATIO: 0.35 UD

## 2017-09-26 ASSESSMENT — SLIT LAMP EXAM - LIDS
COMMENTS: TRACE BLEPHARITIS
COMMENTS: TRACE BLEPHARITIS

## 2017-09-26 ASSESSMENT — VISUAL ACUITY
OD_CC+: -1
CORRECTION_TYPE: GLASSES
OS_CC+: -2
OD_CC: 20/30
OS_CC: 20/20
METHOD: SNELLEN - LINEAR

## 2017-09-26 ASSESSMENT — EXTERNAL EXAM - LEFT EYE: OS_EXAM: NORMAL

## 2017-09-26 ASSESSMENT — EXTERNAL EXAM - RIGHT EYE: OD_EXAM: NORMAL

## 2017-09-26 NOTE — PROGRESS NOTES
Current Eye Medications:  Timolol BID right eye last took 6AM, Latanoprost QHS both eyes, last took 10PM.     Subjective:  6 month follow up for IOP check. Vision improved after starting prednisone, now down vision is down little 1 week after starting 40mg prednisone from 60mg. Zero pain both eyes, but sometimes eye sockets feel tight. Also little swollen under both eyes for last 1-2 weeks.  Side effects patient has noticed from prednisone- Sweating, cramps in hands, headaches, shortness of breath, sometimes trouble sleeping.     Objective:  See Ophthalmology Exam.      Assessment:  Rakan Nolasco is a 72 year old male who presents with:   Encounter Diagnoses   Name Primary?     Headache, unspecified headache type See 7/24/17 note with addendums.   Seeing Dr. Layne tomorrow. Defer prednisone taper to him if he feels it should be different.      Borderline glaucoma with ocular hypertension, bilateral      H/O RD (retinal detachment) s/p repair with PPV (AB)      Epiretinal membrane, bilateral      PVD (posterior vitreous detachment), bilateral      Pseudophakia of right eye      Nuclear sclerosis of right eye      Dry eyes, bilateral      S/P blepharoplasty        Plan:  Continue Timolol twice a day right eye and Latanoprost at bedtime both eyes  Decrease Prednisone to 20 mg for one week, then 10 mg for one week, then 5 mg for one week then stop.    Mateo Gray MD  [phone]

## 2017-09-26 NOTE — PATIENT INSTRUCTIONS
[facility]    If you have any questions regarding to your visit please contact your care team:       Team Purple:   Clinic Hours Telephone Number   Dr. Capri Steward     7am-7pm  Monday - Thursday   7am-5pm  Fridays  [phone]  (Appointment scheduling available 24/7)    Questions about your Visit?   Team Line:  [phone]   Urgent Care - [place] [facility] - 11am-9pm Monday-Friday Saturday-Sunday- 9am-5pm   [place] - 5pm-9pm Monday-Friday Saturday-Sunday- 9am-5pm  [phone] - [facility]  [phone] - [place]       What options do I have for visits at the clinic other than the traditional office visit?  To expand how we care for you, many of our providers are utilizing electronic visits (e-visits) and telephone visits, when medically appropriate, for interactions with their patients rather than a visit in the clinic.   We also offer nurse visits for many medical concerns. Just like any other service, we will bill your insurance company for this type of visit based on time spent on the phone with your provider. Not all insurance companies cover these visits. Please check with your medical insurance if this type of visit is covered. You will be responsible for any charges that are not paid by your insurance.      E-visits via [employer]:  generally incur a $35.00 fee.  Telephone visits:  Time spent on the phone: *charged based on time that is spent on the phone in increments of 10 minutes. Estimated cost:   5-10 mins $30.00   11-20 mins. $59.00   21-30 mins. $85.00     Use [employer] (secure email communication and access to your chart) to send your primary care provider a message or make an appointment. Ask someone on your Team how to sign up for [employer].  For a Price Quote for your services, please call our Consumer Price Line at [phone].  As always, Thank you for trusting us with your health care needs!    Tess Cooper MA

## 2017-09-26 NOTE — TELEPHONE ENCOUNTER
Prescription approved per [facility] [place] Refill Protocol.  Loan Mejia RN

## 2017-09-26 NOTE — PROGRESS NOTES
Injectable Influenza Immunization Documentation    1.  Is the person to be vaccinated sick today?   No    2. Does the person to be vaccinated have an allergy to a component   of the vaccine?   No    3. Has the person to be vaccinated ever had a serious reaction   to influenza vaccine in the past?   No    4. Has the person to be vaccinated ever had Guillain-Barré syndrome?   No    Form completed by Tess Cooper MA

## 2017-09-26 NOTE — MR AVS SNAPSHOT
After Visit Summary   9/26/2017    Rakan Nolasco    MRN: [id number]           Patient Information     Date Of Birth          1945        Visit Information        Provider Department      9/26/2017 11:20 AM Telly Lucio MD [facility]        Today's Diagnoses     Fatigue, unspecified type    -  1    Nonintractable headache, unspecified chronicity pattern, unspecified headache type        Need for prophylactic vaccination and inoculation against influenza          Care Instructions    [facility]    If you have any questions regarding to your visit please contact your care team:       Team Purple:   Clinic Hours Telephone Number   Dr. Capri Steward     7am-7pm  Monday - Thursday   7am-5pm  Fridays  [phone]  (Appointment scheduling available 24/7)    Questions about your Visit?   Team Line:  [phone]   [facility] [place] [facility] 11am-9pm Monday-Friday Saturday-Sunday- 9am-5pm   [place] - 5pm-9pm Monday-Friday Saturday-Sunday- 9am-5pm  [phone] - [facility]  [phone] - [place]       What options do I have for visits at the clinic other than the traditional office visit?  To expand how we care for you, many of our providers are utilizing electronic visits (e-visits) and telephone visits, when medically appropriate, for interactions with their patients rather than a visit in the clinic.   We also offer nurse visits for many medical concerns. Just like any other service, we will bill your insurance company for this type of visit based on time spent on the phone with your provider. Not all insurance companies cover these visits. Please check with your medical insurance if this type of visit is covered. You will be responsible for any charges that are not paid by your insurance.      E-visits via [employer]:  generally incur a $35.00 fee.  Telephone visits:  Time spent on the phone:  "*charged based on time that is spent on the phone in increments of 10 minutes. Estimated cost:   5-10 mins $30.00   11-20 mins. $59.00   21-30 mins. $85.00     Use [employer] (secure email communication and access to your chart) to send your primary care provider a message or make an appointment. Ask someone on your Team how to sign up for [employer].  For a Price Quote for your services, please call our [employer] Price Line at [phone].  As always, Thank you for trusting us with your health care needs!    Tess Cooper MA            Follow-ups after your visit        Your next 10 appointments already scheduled     Sep 27, 2017  8:00 AM CDT   [facility]   [facility]    [address] [place] [id number] [phone]              Who to contact     If you have questions or need follow up information about today's clinic visit or your schedule please contact [facility] directly at [phone].  Normal or non-critical lab and imaging results will be communicated to you by [employer], letter or phone within 4 business days after the clinic has received the results. If you do not hear from us within 7 days, please contact the clinic through [employer] or phone. If you have a critical or abnormal lab result, we will notify you by phone as soon as possible.  Submit refill requests through [employer] or call your pharmacy and they will forward the refill request to us. Please allow 3 business days for your refill to be completed.          Additional Information About Your Visit        [employer] Information     [employer] lets you send messages to your doctor, view your test results, renew your prescriptions, schedule appointments and more. To sign up, go to [email or web address] . Click on \"Log in\" on the left side of the screen, which will take you to the Welcome page. Then click on \"Sign up Now\" on the right side of the page.     You will be " asked to enter the access code listed below, as well as some personal information. Please follow the directions to create your username and password.     Your access code is: [email or web address]  Expires: 10/22/2017  3:04 PM     Your access code will  in 90 days. If you need help or a new code, please call your [facility] clinic or [phone].        Care EveryWhere ID     This is your Care EveryWhere ID. This could be used by other organizations to access your [facility] medical records  [id number]        Your Vitals Were     Pulse Temperature Pulse Oximetry BMI (Body Mass Index)          86 97.7  F (36.5  C) (Oral) 94% 34.94 kg/m2         Blood Pressure from Last 3 Encounters:   17 135/70   17 155/80   17 146/86    Weight from Last 3 Encounters:   17 243 lb 8 oz (110.5 kg)   17 237 lb (107.5 kg)   17 235 lb (106.6 kg)              We Performed the Following     EKG 12-lead complete w/read - Clinics     FLU VACCINE, INCREASED ANTIGEN, PRESV FREE, AGE 65+ [73248]        Primary Care Provider Office Phone # Fax #    Telly Lucio [phone] [address] [facility] [address]        Equal Access to Services     [facility] : Hadii estefania blankenshipo Soderrick, waaxda luqadaha, qaybta kaalmatuan yeager, gino lion . So [facility] [place] [phone].    ATENCIÓN: Si habla español, tiene a coates disposición servicios gratuitos de asistencia lingüística. Llame al [phone].    We comply with applicable federal civil rights laws and [place] laws. We do not discriminate on the basis of race, color, national origin, age, disability sex, sexual orientation or gender identity.            Thank you!     Thank you for choosing [facility]  for your care. Our goal is always to provide you with excellent care. Hearing back from our patients is one way we can continue to improve our services. Please take a few minutes to complete  the written survey that you may receive in the mail after your visit with us. Thank you!             Your Updated Medication List - Protect others around you: Learn how to safely use, store and throw away your medicines at www.disposemymeds.org.          This list is accurate as of: 9/26/17 12:11 PM.  Always use your most recent med list.                   Brand Name Dispense Instructions for use Diagnosis    aspirin 81 MG tablet      Take 1 tablet by mouth daily.        atorvastatin 20 MG tablet    LIPITOR    90 tablet    TAKE ONE TABLET BY MOUTH ONE TIME DAILY    Hyperlipidemia LDL goal <130       CALCIUM 600/VITAMIN D PO      Take  by mouth. Take 1/2 tablet in the morning, 1/2 at night daily        clonazePAM 0.5 MG tablet    klonoPIN     Take 0.5 mg by mouth. Take 1 tablet by mouth daily at bedtime        latanoprost 0.005 % ophthalmic solution    XALATAN    7.5 mL    Place 1 drop into both eyes At Bedtime    Borderline glaucoma with ocular hypertension, bilateral       lisinopril 10 MG tablet    PRINIVIL/ZESTRIL    90 tablet    TAKE ONE TABLET BY MOUTH ONE TIME DAILY    Hypertension goal BP (blood pressure) < 140/90       Moxifloxacin HCl Powd     1 Bottle    1 Bottle 4 times daily 1 drop four times a day into the operative eye starting 1 day before surgery. Use until bottle runs out.    Nuclear sclerosis of right eye       MULTI-DAY PO      daily        pantoprazole 20 MG EC tablet    PROTONIX    90 tablet    TAKE ONE TABLET BY MOUTH ONE TIME DAILY 30-60 MINUTES BEFORE A MEAL    Gastroesophageal reflux disease without esophagitis       predniSONE 20 MG tablet    DELTASONE    40 tablet    Take 2 tablets (40 mg) by mouth daily    Headache, unspecified headache type       timolol 0.5 % ophthalmic solution    TIMOPTIC    1 Bottle    Place 1 drop into both eyes daily    Borderline glaucoma with ocular hypertension, bilateral

## 2017-09-26 NOTE — MR AVS SNAPSHOT
After Visit Summary   9/26/2017    Rakan Nolasco    MRN: [id number]           Patient Information     Date Of Birth          1945        Visit Information        Provider Department      9/26/2017 9:15 AM Mateo Gray MD [facility]        Today's Diagnoses     Borderline glaucoma with ocular hypertension, bilateral    -  1    H/O RD (retinal detachment) s/p repair with PPV (AB)        PVD (posterior vitreous detachment), bilateral        Epiretinal membrane, bilateral        Pseudophakia of right eye        Dry eyes, bilateral        S/P blepharoplasty        Nuclear sclerosis of right eye        Headache, unspecified headache type          Care Instructions    Continue Timolol twice a day right eye and Latanoprost at bedtime both eyes  Decrease Prednisone to 20 mg for one week, then 10 mg for one week, then 5 mg for one week then stop.    Mateo Gray MD  [phone]            Follow-ups after your visit        Follow-up notes from your care team     Return in about 6 months (around 3/26/2018) for IOP check, HVF, OCT optic nerve.      Your next 10 appointments already scheduled     Sep 26, 2017 11:20 AM CDT   Office Visit with Telly Lucio MD   [facility] ([facility])    [address] [facility] [address] [phone]           Bring a current list of meds and any records pertaining to this visit. For Physicals, please bring immunization records and any forms needing to be filled out. Please arrive 10 minutes early to complete paperwork.            Sep 27, 2017  8:00 AM CDT   New Visit with Grant Layne MD   [facility] ([facility])    [address] [facility] [id number] [phone]              Who to contact     If you have questions or need follow up information about today's clinic visit or your schedule please contact [facility] directly at  "[phone].  Normal or non-critical lab and imaging results will be communicated to you by MyChart, letter or phone within 4 business days after the clinic has received the results. If you do not hear from us within 7 days, please contact the clinic through [employer] or phone. If you have a critical or abnormal lab result, we will notify you by phone as soon as possible.  Submit refill requests through [employer] or call your pharmacy and they will forward the refill request to us. Please allow 3 business days for your refill to be completed.          Additional Information About Your Visit        [employer] Information     [employer] lets you send messages to your doctor, view your test results, renew your prescriptions, schedule appointments and more. To sign up, go to [email or web address] . Click on \"Log in\" on the left side of the screen, which will take you to the Welcome page. Then click on \"Sign up Now\" on the right side of the page.     You will be asked to enter the access code listed below, as well as some personal information. Please follow the directions to create your username and password.     Your access code is: [email or web address]  Expires: 10/22/2017  3:04 PM     Your access code will  in 90 days. If you need help or a new code, please call your [facility] clinic or [phone].        Care EveryWhere ID     This is your Care EveryWhere ID. This could be used by other organizations to access your [facility] medical records  [id number]         Blood Pressure from Last 3 Encounters:   17 155/80   17 146/86   17 113/70    Weight from Last 3 Encounters:   17 107.5 kg (237 lb)   17 106.6 kg (235 lb)   10/03/16 99.3 kg (219 lb)              Today, you had the following     No orders found for display       Primary Care Provider Office Phone # Fax #    Telly Lucio [phone]       [address] [place] [address] EFREN BILLS [address]        Equal Access to Services     " CRIS YEE : Hadii aad ku kelsea Lara, waaxda luqadaha, qaybta kaalmada ademanjeet, gino silviain hayaamoo hernandez west ayad . So [facility] [phone].    ATENCIÓN: Si misaella isac, tiene a coates disposición servicios gratuitos de asistencia lingüística. Llame al [phone].    We comply with applicable federal civil rights laws and [place] laws. We do not discriminate on the basis of race, color, national origin, age, disability sex, sexual orientation or gender identity.            Thank you!     Thank you for choosing [facility]  for your care. Our goal is always to provide you with excellent care. Hearing back from our patients is one way we can continue to improve our services. Please take a few minutes to complete the written survey that you may receive in the mail after your visit with us. Thank you!             Your Updated Medication List - Protect others around you: Learn how to safely use, store and throw away your medicines at www.disposemymeds.org.          This list is accurate as of: 9/26/17 10:16 AM.  Always use your most recent med list.                   Brand Name Dispense Instructions for use Diagnosis    aspirin 81 MG tablet      Take 1 tablet by mouth daily.        atorvastatin 20 MG tablet    LIPITOR    30 tablet    TAKE ONE TABLET BY MOUTH ONE TIME DAILY    Hyperlipidemia LDL goal <130       CALCIUM 600/VITAMIN D PO      Take  by mouth. Take 1/2 tablet in the morning, 1/2 at night daily        clonazePAM 0.5 MG tablet    klonoPIN     Take 0.5 mg by mouth. Take 1 tablet by mouth daily at bedtime        latanoprost 0.005 % ophthalmic solution    XALATAN    7.5 mL    Place 1 drop into both eyes At Bedtime    Borderline glaucoma with ocular hypertension, bilateral       lisinopril 10 MG tablet    PRINIVIL/ZESTRIL    90 tablet    TAKE ONE TABLET BY MOUTH ONE TIME DAILY    Hypertension goal BP (blood pressure) < 140/90       Moxifloxacin HCl Powd     1 Bottle    1 Bottle 4 times  daily 1 drop four times a day into the operative eye starting 1 day before surgery. Use until bottle runs out.    Nuclear sclerosis of right eye       MULTI-DAY PO      daily        pantoprazole 20 MG EC tablet    PROTONIX    90 tablet    TAKE ONE TABLET BY MOUTH ONE TIME DAILY 30-60 MINUTES BEFORE A MEAL    Gastroesophageal reflux disease without esophagitis       predniSONE 20 MG tablet    DELTASONE    40 tablet    Take 2 tablets (40 mg) by mouth daily    Headache, unspecified headache type       timolol 0.5 % ophthalmic solution    TIMOPTIC    1 Bottle    Place 1 drop into both eyes daily    Borderline glaucoma with ocular hypertension, bilateral

## 2017-09-26 NOTE — PATIENT INSTRUCTIONS
Continue Timolol twice a day right eye and Latanoprost at bedtime both eyes  Decrease Prednisone to 20 mg for one week, then 10 mg for one week, then 5 mg for one week then stop.    Mateo Gray MD  [phone]

## 2017-09-26 NOTE — NURSING NOTE
"Chief Complaint   Patient presents with     Headache     Patient Request     having cramps and feelings winded more easly     Health Maintenance     FALL RISK       Initial /72  Pulse 86  Temp 97.7  F (36.5  C) (Oral)  Wt 243 lb 8 oz (110.5 kg)  SpO2 94%  BMI 34.94 kg/m2 Estimated body mass index is 34.94 kg/(m^2) as calculated from the following:    Height as of 7/28/17: 5' 10\" (1.778 m).    Weight as of this encounter: 243 lb 8 oz (110.5 kg).  Medication Reconciliation: complete     Marlene Bustamante MA    "

## 2017-09-27 ENCOUNTER — OFFICE VISIT (OUTPATIENT)
Dept: NEUROLOGY | Facility: CLINIC | Age: 72
End: 2017-09-27
Payer: COMMERCIAL

## 2017-09-27 VITALS
HEART RATE: 70 BPM | SYSTOLIC BLOOD PRESSURE: 117 MMHG | BODY MASS INDEX: 35.45 KG/M2 | HEIGHT: 70 IN | WEIGHT: 247.6 LBS | DIASTOLIC BLOOD PRESSURE: 72 MMHG

## 2017-09-27 DIAGNOSIS — G70.00 MYASTHENIA GRAVIS (H): Primary | ICD-10-CM

## 2017-09-27 PROCEDURE — 36415 COLL VENOUS BLD VENIPUNCTURE: CPT | Performed by: PSYCHIATRY & NEUROLOGY

## 2017-09-27 PROCEDURE — 86255 FLUORESCENT ANTIBODY SCREEN: CPT | Mod: 90 | Performed by: PSYCHIATRY & NEUROLOGY

## 2017-09-27 PROCEDURE — 83519 RIA NONANTIBODY: CPT | Mod: 90 | Performed by: PSYCHIATRY & NEUROLOGY

## 2017-09-27 PROCEDURE — 99205 OFFICE O/P NEW HI 60 MIN: CPT | Performed by: PSYCHIATRY & NEUROLOGY

## 2017-09-27 PROCEDURE — 99000 SPECIMEN HANDLING OFFICE-LAB: CPT | Performed by: PSYCHIATRY & NEUROLOGY

## 2017-09-27 PROCEDURE — 83519 RIA NONANTIBODY: CPT | Mod: 59 | Performed by: PSYCHIATRY & NEUROLOGY

## 2017-09-27 NOTE — MR AVS SNAPSHOT
After Visit Summary   9/27/2017    Rakan Nolasco    MRN: [id number]           Patient Information     Date Of Birth          1945        Visit Information        Provider Department      9/27/2017 8:00 AM Grant Layne MD [facility]        Today's Diagnoses     Myasthenia gravis (H)    -  1      Care Instructions    AFTER VISIT SUMMARY (AVS)  Orders Placed This Encounter   Procedures     CT Chest w/o Contrast     ACETYLCHOLINE RECEPTOR BINDING     STRIATED MUSCLE ANTIBODY IGG     ACETYLCHOLINE MODULATING ANTIBODY     ACETYLCHOLINE RECEPTOR BLOCKING VINNY     MuSK Antibody     Diagnostic possibilities reviewed and reasons for work-up explained    Preventive Neurology: Encouraged to keep physically and mentally active with particular emphasis on daily stretching exercises, walking, and healthy eating.    Additional  recommendations after the work-up    To call us for follow-up appointment in next 2 week(s) or earlier if needed.                  Follow-ups after your visit        Follow-up notes from your care team     Return in about 2 weeks (around 10/11/2017) for Follow-up.      Future tests that were ordered for you today     Open Future Orders        Priority Expected Expires Ordered    CT Chest w/o Contrast Routine  9/27/2018 9/27/2017            Who to contact     If you have questions or need follow up information about today's clinic visit or your schedule please contact [facility] directly at [phone].  Normal or non-critical lab and imaging results will be communicated to you by MyChart, letter or phone within 4 business days after the clinic has received the results. If you do not hear from us within 7 days, please contact the clinic through [employer] or phone. If you have a critical or abnormal lab result, we will notify you by phone as soon as possible.  Submit refill requests through [employer] or call your pharmacy and they will forward the refill request  "to us. Please allow 3 business days for your refill to be completed.          Additional Information About Your Visit        [employer] Information     Pop[employer] lets you send messages to your doctor, view your test results, renew your prescriptions, schedule appointments and more. To sign up, go to [email or web address] . Click on \"Log in\" on the left side of the screen, which will take you to the Welcome page. Then click on \"Sign up Now\" on the right side of the page.     You will be asked to enter the access code listed below, as well as some personal information. Please follow the directions to create your username and password.     Your access code is: [email or web address]  Expires: 10/22/2017  3:04 PM     Your access code will  in 90 days. If you need help or a new code, please call your [facility] clinic or [phone].        Care EveryWhere ID     This is your Care EveryWhere ID. This could be used by other organizations to access your [facility] medical records  [id number]        Your Vitals Were     Pulse Height BMI (Body Mass Index)             70 1.778 m (5' 10\") 35.53 kg/m2          Blood Pressure from Last 3 Encounters:   17 117/72   17 135/70   17 155/80    Weight from Last 3 Encounters:   17 112.3 kg (247 lb 9.6 oz)   17 110.5 kg (243 lb 8 oz)   17 107.5 kg (237 lb)              We Performed the Following     ACETYLCHOLINE MODULATING ANTIBODY     ACETYLCHOLINE RECEPTOR BINDING     ACETYLCHOLINE RECEPTOR BLOCKING VINNY     MuSK Antibody     STRIATED MUSCLE ANTIBODY IGG          Today's Medication Changes          These changes are accurate as of: 17  9:29 AM.  If you have any questions, ask your nurse or doctor.               These medicines have changed or have updated prescriptions.        Dose/Directions    predniSONE 20 MG tablet   Commonly known as:  DELTASONE   This may have changed:  how much to take   Used for:  Headache, unspecified headache type        Dose: "  40 mg   Take 2 tablets (40 mg) by mouth daily   Quantity:  40 tablet   Refills:  3                Primary Care Provider Office Phone # Fax #    Telly Lucio [phone] [address] [facility]  [place] [place] [address]        Equal Access to Services     [facility] NAKIA : Hadii aad ku hadasho Soomaali, waaxda luqadaha, qaybta kaalmada adeegyada, waxay silviain lakeshian adebabita timatravis blount. So [facility] [phone].    ATENCIÓN: Si habla español, tiene a coates disposición servicios gratuitos de asistencia lingüística. [facility] [phone].    We comply with applicable federal civil rights laws and [place] laws. We do not discriminate on the basis of race, color, national origin, age, disability sex, sexual orientation or gender identity.            Thank you!     Thank you for choosing [facility]  for your care. Our goal is always to provide you with excellent care. Hearing back from our patients is one way we can continue to improve our services. Please take a few minutes to complete the written survey that you may receive in the mail after your visit with us. Thank you!             Your Updated Medication List - Protect others around you: Learn how to safely use, store and throw away your medicines at www.disposemymeds.org.          This list is accurate as of: 9/27/17  9:29 AM.  Always use your most recent med list.                   Brand Name Dispense Instructions for use Diagnosis    aspirin 81 MG tablet      Take 1 tablet by mouth daily.        atorvastatin 20 MG tablet    LIPITOR    90 tablet    TAKE ONE TABLET BY MOUTH ONE TIME DAILY    Hyperlipidemia LDL goal <130       CALCIUM 600/VITAMIN D PO      Take  by mouth. Take 1/2 tablet in the morning, 1/2 at night daily        clonazePAM 0.5 MG tablet    klonoPIN     Take 0.5 mg by mouth. Take 1 tablet by mouth daily at bedtime        latanoprost 0.005 % ophthalmic solution    XALATAN    7.5 mL    Place 1 drop into both eyes At  Bedtime    Borderline glaucoma with ocular hypertension, bilateral       lisinopril 10 MG tablet    PRINIVIL/ZESTRIL    90 tablet    TAKE ONE TABLET BY MOUTH ONE TIME DAILY    Hypertension goal BP (blood pressure) < 140/90       MULTI-DAY PO      daily        pantoprazole 20 MG EC tablet    PROTONIX    90 tablet    TAKE ONE TABLET BY MOUTH ONE TIME DAILY 30-60 MINUTES BEFORE A MEAL    Gastroesophageal reflux disease without esophagitis       predniSONE 20 MG tablet    DELTASONE    40 tablet    Take 2 tablets (40 mg) by mouth daily    Headache, unspecified headache type       timolol 0.5 % ophthalmic solution    TIMOPTIC    1 Bottle    Place 1 drop into both eyes daily    Borderline glaucoma with ocular hypertension, bilateral

## 2017-09-27 NOTE — NURSING NOTE
"Chief Complaint   Patient presents with     Neurologic Problem     Having double vision and couldnt see well, seen at the eye doctor and was told it was temporia, possible headache       Initial /72 (BP Location: Right arm, Patient Position: Chair, Cuff Size: Adult Large)  Pulse 70  Ht 1.778 m (5' 10\")  Wt 112.3 kg (247 lb 9.6 oz)  BMI 35.53 kg/m2 Estimated body mass index is 35.53 kg/(m^2) as calculated from the following:    Height as of this encounter: 1.778 m (5' 10\").    Weight as of this encounter: 112.3 kg (247 lb 9.6 oz).  Medication Reconciliation: complete   Velia Lepe MA      "

## 2017-09-27 NOTE — PROGRESS NOTES
" INITIAL NEUROLOGY CONSULTATION    LOCATION: [facility]  DATE OF VISIT: 2017  MRN: [id number]  NAME: Mr. Rakan Nolasco  :  1945 (72 year old)    PRIMARY/REFERRING PROVIDER: Telly Lucio MD    REASON FOR CONSULTATION: Headache and Positive myasthenia panel antibodies    HISTORY OF PRESENT ILLNESS ([place]):      72-year-old man with status post temporal artery biopsy () and bilateral blepharoplasty () presented to ophthalmologist with symptoms of right droopy eyelid, double vision and headache in 2017.  He relates that he noticed droopy right eyelid in few days before he saw ophthalmologist. He describes that it appeared to him that his eye is almost closed but on detailed questioning he relates that it was more than half of the closure of the right . He does not think that towards the end of the day his right eye was droopy. On direct questioning denies any difficulty chewing food, swallowing solids or liquids or lifting head from the pillow. No difficulty opening door knobs and or family history of myasthenia gravis. He denies any [facility] ancestry because of concern for any possibility of Oculo-pharyngeal muscular dystrophy (OPMD).  Double vision starting around same time in 2017. It was for both near and distance vision. It cleared with introduction of steroids prednisone  60 mg by his ophthalmologist. He has multiple eye conditions as noted by his ophthalmologist in her detailed note.   He had temporal artery biopsy also and it was negative. In relation to the headache he relates that it was infrequent. He did not think that headache was a problem. It was located over the both temples. He relates that headache was more of a \"nuisance\" than anything else. His temples have not been tender to touch.    He relates that starting prednisone 60 mg daily. His droopy eyelid cleared within few days. His prednisone has been gradually reduced and currently he is taking 20 mg a " day from today. His ophthalmologist Dr. Macedo indicated that her neurologist could adjust his prednisone dosage.  His ESR and CRP were elevated but not significantly.       Previous Relevant Diagnostic Studies:    Imaging:   MR BRAIN W/O & W CONTRAST  2/10/2014 9:15 AM      HISTORY:  Headache, blurred vision.     TECHNIQUE: Multiplanar, multisequence MRI of the brain without and  with 10 mL IV contrast material.     COMPARISON: MR scan dated 1/9/2012.     FINDINGS:  The brain parenchyma, ventricles and subarachnoid spaces  appear normal for age.  There is no evidence of hemorrhage, mass,  acute infarct, or anomaly. There are no gadolinium enhancing lesions.   There is T2 hyperintense material within the left mastoid. Compatible  with fluid or inflammatory debris. The patient had similar findings on  the study of 1/9/2012.  The arteries at the base of the brain and the  dural venous sinuses appear patent.          IMPRESSION  IMPRESSION:   1. The brain parenchyma appears normal. No bleed or mass.  2. T2 hyperintense material is seen within the left mastoid. This  would be consistent with fluid or inflammatory debris. The patient had  similar findings on the study from 2012.      TOBY THOMAS MD   Blood tests:   Copath Report 08/01/2017  7:46 AM 88   Patient Name: JR FRANCOIS   MR#: [id number]   Specimen #: [id number]   Collected: 8/1/2017   Received: 8/1/2017   Reported: 8/3/2017 12:59   Ordering Phy(s): ZO GARCIA     For improved result formatting, select 'View Enhanced Report Format'   under Linked Documents section.     SPECIMEN(S):   A: Temporal artery biopsy, right   B: Temporal artery biopsy, left     FINAL DIAGNOSIS:   A. Temporal artery, right, biopsy:   1. No evidence of giant cell arteritis.   2. Moderate arteriosclerosis.     B. Temporal artery, right, biopsy:   1. No evidence of giant cell arteritis.   2. Moderate arteriosclerosis.     I have personally reviewed all specimens and or slides,  including the   listed special stains, and used them with my medical judgement to   determine the final diagnosis.     Electronically signed out by:     Brittney Lau M.D., [facility]        Component      Latest Ref Rng & Units 7/24/2017 9/6/2017   WBC      4.0 - 11.0 10e9/L 7.8    RBC Count      4.4 - 5.9 10e12/L 4.96    Hemoglobin      13.3 - 17.7 g/dL 15.8    Hematocrit      40.0 - 53.0 % 45.3    MCV      78 - 100 fl 91    MCH      26.5 - 33.0 pg 31.9    MCHC      31.5 - 36.5 g/dL 34.9    RDW      10.0 - 15.0 % 12.7    Platelet Count      150 - 450 10e9/L 282    Cholesterol      <200 mg/dL  155   Triglycerides      <150 mg/dL  111   HDL Cholesterol      >39 mg/dL  62   LDL Cholesterol Calculated      <100 mg/dL  71   Non HDL Cholesterol      <130 mg/dL  93   CRP Inflammation      0.0 - 8.0 mg/L 8.3 (H)    Sed Rate      0 - 20 mm/h 37 (H)    AcetChol Binding Leanne       10.3 (H)    Striated Muscle Antibody IgG       Detected (A) . . .    AcetChol Modul Leanne       65 (H)    AcetChol Block Leanne       70 (H)    Striated Muscle Antibody IgG Titer       1:160 (A) . . .        Review of Systems: All negative except as noted in the [place] and Identifying information.    Current Outpatient Prescriptions   Medication Sig     predniSONE (DELTASONE) 20 MG tablet Take 2 tablets (40 mg) by mouth daily (Patient taking differently: Take 20 mg by mouth daily )     pantoprazole (PROTONIX) 20 MG EC tablet TAKE ONE TABLET BY MOUTH ONE TIME DAILY 30-60 MINUTES BEFORE A MEAL     latanoprost (XALATAN) 0.005 % ophthalmic solution Place 1 drop into both eyes At Bedtime     lisinopril (PRINIVIL/ZESTRIL) 10 MG tablet TAKE ONE TABLET BY MOUTH ONE TIME DAILY     timolol (TIMOPTIC) 0.5 % ophthalmic solution Place 1 drop into both eyes daily     aspirin 81 MG tablet Take 1 tablet by mouth daily.     ClonAZEPAM (KLONOPIN) 0.5 MG tablet Take 0.5 mg by mouth. Take 1 tablet by mouth daily at bedtime     Calcium Carbonate-Vitamin D (CALCIUM  600/VITAMIN D PO) Take  by mouth. Take 1/2 tablet in the morning, 1/2 at night daily     MULTI-DAY OR daily     atorvastatin (LIPITOR) 20 MG tablet TAKE ONE TABLET BY MOUTH ONE TIME DAILY     No current facility-administered medications for this visit.      Allergies   Allergen Reactions     Ropinirole Hcl GI Disturbance     Weakness;? syncope     Past Medical History:   Diagnosis Date     Arthritis          BMI 31.0-31.9,adult      Diverticulosis     Colonoscopy 8/2008     Fatty liver     see US  5/2012     GERD (gastroesophageal reflux disease)      Glaucoma (increased eye pressure)      HTN (hypertension)      Hyperlipidemia LDL goal < 130     age, htn, fhx     Irritable bowel      Nonsenile cataract      ROSIE (obstructive sleep apnea) 01/2011    Using CPAP;      Prediabetes      Restless leg syndrome      Trigeminal neuralgia pain 1/4/2012     Past Surgical History:   Procedure Laterality Date     BIOPSY ARTERY TEMPORAL Bilateral 8/1/2017    Procedure: BIOPSY ARTERY TEMPORAL;  Bilateral temporal artery Biopsy;  Surgeon: Jj Tello MD;  Location: [facility] OR     CATARACT IOL, RT/LT Right      COLONOSCOPY       ESOPHAGOSCOPY, GASTROSCOPY, DUODENOSCOPY (EGD), COMBINED  1/15/2014    Procedure: COMBINED ESOPHAGOSCOPY, GASTROSCOPY, DUODENOSCOPY (EGD), BIOPSY SINGLE OR MULTIPLE;;  Surgeon: Winston Nixon MD;  Location: [facility] OR     PHACOEMULSIFICATION CLEAR CORNEA WITH STANDARD INTRAOCULAR LENS IMPLANT Right 2/20/2017    Procedure: PHACOEMULSIFICATION CLEAR CORNEA WITH STANDARD INTRAOCULAR LENS IMPLANT;  Surgeon: Mateo Gray MD;  Location:  [facility]     RETINAL REATTACHMENT Right      SURGICAL HISTORY OF -   8/2009    Both Eyelids-.     TONSILLECTOMY  as child       Current Outpatient Prescriptions on File Prior to Visit:  predniSONE (DELTASONE) 20 MG tablet Take 2 tablets (40 mg) by mouth daily (Patient taking differently: Take 20 mg by mouth daily )   pantoprazole (PROTONIX) 20  "MG EC tablet TAKE ONE TABLET BY MOUTH ONE TIME DAILY 30-60 MINUTES BEFORE A MEAL   latanoprost (XALATAN) 0.005 % ophthalmic solution Place 1 drop into both eyes At Bedtime   lisinopril (PRINIVIL/ZESTRIL) 10 MG tablet TAKE ONE TABLET BY MOUTH ONE TIME DAILY   timolol (TIMOPTIC) 0.5 % ophthalmic solution Place 1 drop into both eyes daily   aspirin 81 MG tablet Take 1 tablet by mouth daily.   ClonAZEPAM (KLONOPIN) 0.5 MG tablet Take 0.5 mg by mouth. Take 1 tablet by mouth daily at bedtime   Calcium Carbonate-Vitamin D (CALCIUM 600/VITAMIN D PO) Take  by mouth. Take 1/2 tablet in the morning, 1/2 at night daily   MULTI-DAY OR daily   atorvastatin (LIPITOR) 20 MG tablet TAKE ONE TABLET BY MOUTH ONE TIME DAILY     No current facility-administered medications on file prior to visit.   Social History   Substance Use Topics     Smoking status: Former Smoker     Years: 15.00     Types: Cigarettes     Quit date: 1/1/1983     Smokeless tobacco: Former User      Comment: smoke free household.     Alcohol use No      Comment: Quit in 1986       GENERAL EXAMINATION:    General appearance: Pleasant male sitting comfortably in a chair  /72 (BP Location: Right arm, Patient Position: Chair, Cuff Size: Adult Large)  Pulse 70  Ht 1.778 m (5' 10\")  Wt 112.3 kg (247 lb 9.6 oz)  BMI 35.53 kg/m2    NEUROLOGICAL EXAMINATION:    Head & Neck:  Neck supple  No carotid bruit  Mental Status:    Alert and oriented to time, place and person    Recent and remote memory intact    Attention span and concentration normal    Adequate fund of knowledge  Speech: Normal  Cranial Nerves:  Cranial Nerve 2:    Visual acuity normal to finger counting    Pupils equal and reacting to light    No field defect by confrontation    Fundus reveals normal disc margins  Cranial Nerves 3, 4 and 6:    Eye movements normal in all directions of gaze  Cranial Nerve 5:     Normal facial sensory and motor functions  Cranial Nerve 7:     Symmetrical face without " motor weakness   Cranial Nerve 8:    Normal hearing to whispered sounds  Cranial Nerves 9, 10:    Normal palate and uvula movements  Cranial Nerve 11:    Shoulder shrug symmetrical  Cranial Nerve 12:    Tongue midline with normal movements  Motor:    Tone and bulk: Normal in both upper and lower limbs. No weakness of neck muscles.     Power: No drift of the outstretched arms          Normal strength in all muscle groups of both upper and lower limbs   Coordination:    Finger nose test and rapid alternate movements normal bilaterally    Heel-shin test normal bilaterally  Deep Tendon Reflexes:    Upper limbs: Equal and symmetrical    Lower limbs: Equal and symmetrical with trace ankle jerks and down going plantars  Sensations:    Touch/Pin prick: Normal at both and upper and lower limbs    Vibration (128 Hz): Diminished at both ankles    Position sense: Normal at both big toes  Gait:    Walks with a normal stride length and a normal arm swing    Can stand on heels and toes    Can walk on heels and toes    Normal tandem walking  Romberg Sign: Negative    IMPRESSION:     ICD-10-CM    1. Myasthenia gravis (H) G70.00 CT Chest w/o Contrast     ACETYLCHOLINE RECEPTOR BINDING     STRIATED MUSCLE ANTIBODY IGG     ACETYLCHOLINE MODULATING ANTIBODY     ACETYLCHOLINE RECEPTOR BLOCKING VINNY     MuSK Antibody      COMMENTS: Agree with his ophthalmologists that it is better to treat his suspected temporal arteritis even if it is not certain. It do not think she has temporal arteritis. 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 Arranged additional work-up for myasthenia gravis. The consideration will be given for further evaluation by introducing Mestinon and or having further evaluation by neuromuscular specialist.   PLANS:  Patient Instructions     AFTER VISIT SUMMARY (AVS)  Orders Placed This Encounter   Procedures     CT Chest w/o Contrast     ACETYLCHOLINE RECEPTOR  BINDING     STRIATED MUSCLE ANTIBODY IGG     ACETYLCHOLINE MODULATING ANTIBODY     ACETYLCHOLINE RECEPTOR BLOCKING VINNY     MuSK Antibody     Diagnostic possibilities reviewed and reasons for work-up explained    Preventive Neurology: Encouraged to keep physically and mentally active with particular emphasis on daily stretching exercises, walking, and healthy eating.    Additional  recommendations after the work-up    To call us for follow-up appointment in next 2 week(s) or earlier if needed.      Thanks to Telly Lucio MD for allowing me to participate in Mr. Nolasco's care. Please feel free to call me with any questions or concerns.     Time with patient 60 minutes, greater than 50% of which was counseling and coordination of care.    *Chart documentation was completed in part with Dragon voice-recognition software. Even though reviewed, some grammatical, spelling, and word errors may remain.       Grant Layne MD, MRCPI  Neurologist    cc: Telly Lucio MD

## 2017-09-27 NOTE — LETTER
Rakan ASHLEY Nolasco  [address] [facility] [address]    October 13, 2017    Dear Mr. Nolasco,    The blood tests indicate presence of antibodies for myasthenia gravis. Will review the results in detail on your visit early next week.     Results for orders placed or performed in visit on 09/27/17   ACETYLCHOLINE RECEPTOR BINDING   Result Value Ref Range    AcetChol Binding Vinny 2.9 (H) 0.0 - 0.4 nmol/L   STRIATED MUSCLE ANTIBODY IGG   Result Value Ref Range    Striated Muscle Antibody IgG <1:40 <1:40   ACETYLCHOLINE RECEPTOR BLOCKING VINNY   Result Value Ref Range    AcetChol Block Vinny 43 (H) 0 - 26 %   MuSK Antibody   Result Value Ref Range    MuSK Antibody 0.00 0.00 - 0.02 nmol/L       Best wishes.    Thanks.    Sincerely,    Grant Layne MD, [facility]  Neurologist

## 2017-09-27 NOTE — LETTER
Rakan Nolasco  [address] [facility] [address]    October 4, 2017    Dear Mr. Nolasco,    Your following blood tests are normal except for one antibody as indicated below.  Please schedule a follow-up appointment ([phone])  to discuss the results further or have any other questions/concerns.     Results for orders placed or performed in visit on 09/27/17   ACETYLCHOLINE RECEPTOR BINDING   Result Value Ref Range    AcetChol Binding Leanne 2.9 (H) 0.0 - 0.4 nmol/L   STRIATED MUSCLE ANTIBODY IGG   Result Value Ref Range    Striated Muscle Antibody IgG <1:40 <1:40   MuSK Antibody   Result Value Ref Range    MuSK Antibody 0.00 0.00 - 0.02 nmol/L       Best wishes.    Thanks.    Sincerely,    Grant Layne MD, [facility]  Neurologist

## 2017-09-27 NOTE — PATIENT INSTRUCTIONS
AFTER VISIT SUMMARY (AVS)  Orders Placed This Encounter   Procedures     CT Chest w/o Contrast     ACETYLCHOLINE RECEPTOR BINDING     STRIATED MUSCLE ANTIBODY IGG     ACETYLCHOLINE MODULATING ANTIBODY     ACETYLCHOLINE RECEPTOR BLOCKING VINNY     MuSK Antibody     Diagnostic possibilities reviewed and reasons for work-up explained    Preventive Neurology: Encouraged to keep physically and mentally active with particular emphasis on daily stretching exercises, walking, and healthy eating.    Additional  recommendations after the work-up    To call us for follow-up appointment in next 2 week(s) or earlier if needed.

## 2017-09-29 LAB — STRIA MUS IGG SER QL IF: NORMAL

## 2017-10-01 LAB — ACHR BIND AB SER-SCNC: 2.9 NMOL/L (ref 0–0.4)

## 2017-10-02 ENCOUNTER — RADIANT APPOINTMENT (OUTPATIENT)
Dept: CT IMAGING | Facility: CLINIC | Age: 72
End: 2017-10-02
Attending: PSYCHIATRY & NEUROLOGY
Payer: COMMERCIAL

## 2017-10-02 DIAGNOSIS — G70.00 MYASTHENIA GRAVIS (H): ICD-10-CM

## 2017-10-02 LAB — MUSK AB SER-SCNC: 0 NMOL/L (ref 0–0.02)

## 2017-10-02 PROCEDURE — 71250 CT THORAX DX C-: CPT | Mod: TC

## 2017-10-02 NOTE — LETTER
Rakan Nolasco  [address] [facility] [address] [place] [address]    October 2, 2017      Dear Mr. Nolasco,    Your CT Chest results are normal. Please schedule a follow-up appointment ([phone])  to discuss the results further and or have any other questions/concerns.     Results for orders placed or performed in visit on 10/02/17   CT Chest w/o Contrast    Narrative    CT CHEST WITHOUT CONTRAST  10/2/2017 9:44 AM    HISTORY: Myasthenia gravis without (acute) exacerbation.     COMPARISON: None.    TECHNIQUE: Routine transverse CT imaging of the chest was performed  without contrast. Radiation dose for this scan was reduced using  automated exposure control, adjustment of the mA and/or kV according  to patient size, or iterative reconstruction technique.    FINDINGS: The heart size is normal. No enlarged lymph node or other  abnormal mediastinal mass is seen. There is mild calcification within  the arch of the thoracic aorta. The vascular structures are otherwise  unremarkable, allowing for the absence of contrast. The lungs are  clear. No pneumothorax is demonstrated. No pleural effusion is  identified. There are degenerative changes in the spine. No other  osseous abnormality is seen. No chest wall pathology is seen. Within  the visualized upper abdomen, there appear to be a few small  gallstones.      Impression    IMPRESSION:   1. Unremarkable unenhanced CT of the chest.   2. Cholelithiasis.    JESENIA BARTH MD         Thanks.    Sincerely,    Grant Layne MD, [facility]  Neurologist

## 2017-10-05 ENCOUNTER — TRANSFERRED RECORDS (OUTPATIENT)
Dept: HEALTH INFORMATION MANAGEMENT | Facility: CLINIC | Age: 72
End: 2017-10-05

## 2017-10-05 DIAGNOSIS — H40.053 BORDERLINE GLAUCOMA WITH OCULAR HYPERTENSION, BILATERAL: ICD-10-CM

## 2017-10-05 RX ORDER — TIMOLOL MALEATE 5 MG/ML
1 SOLUTION/ DROPS OPHTHALMIC DAILY
Qty: 1 BOTTLE | Refills: 11 | Status: SHIPPED | OUTPATIENT
Start: 2017-10-05 | End: 2018-11-13

## 2017-10-11 NOTE — PATIENT INSTRUCTIONS
Preventive Health Recommendations:       Male Ages 65 and over    Yearly exam:             See your health care provider every year in order to  o   Review health changes.   o   Discuss preventive care.    o   Review your medicines if your doctor has prescribed any.    Talk with your health care provider about whether you should have a test to screen for prostate cancer (PSA).    Every 3 years, have a diabetes test (fasting glucose). If you are at risk for diabetes, you should have this test more often.    Every 5 years, have a cholesterol test. Have this test more often if you are at risk for high cholesterol or heart disease.     Every 10 years, have a colonoscopy. Or, have a yearly FIT test (stool test). These exams will check for colon cancer.    Talk to with your health care provider about screening for Abdominal Aortic Aneurysm if you have a family history of AAA or have a history of smoking.  Shots:     Get a flu shot each year.     Get a tetanus shot every 10 years.     Talk to your doctor about your pneumonia vaccines. There are now two you should receive - Pneumovax (PPSV 23) and Prevnar (PCV 13).    Talk to your doctor about a shingles vaccine.     Talk to your doctor about the hepatitis B vaccine.  Nutrition:     Eat at least 5 servings of fruits and vegetables each day.     Eat whole-grain bread, whole-wheat pasta and brown rice instead of white grains and rice.     Talk to your doctor about Calcium and Vitamin D.   Lifestyle    Exercise for at least 150 minutes a week (30 minutes a day, 5 days a week). This will help you control your weight and prevent disease.     Limit alcohol to one drink per day.     No smoking.     Wear sunscreen to prevent skin cancer.     See your dentist every six months for an exam and cleaning.     See your eye doctor every 1 to 2 years to screen for conditions such as glaucoma, macular degeneration and cataracts.    [facility] (formerly [facility])    If you have any questions  regarding to your visit please contact your care team:       Team Purple:   Clinic Hours Telephone Number   Dr. Capri Porter   7am-7pm  Monday - Thursday   7am-5pm  Fridays  [phone]  (Appointment scheduling available 24/7)    Questions about your Visit?   Team Line:  [phone]   [facility] [place] [facility] 11[facility]-9pm Monday-Friday Saturday-Sunday- 9am-5pm   [place] - 5pm-9pm Monday-Friday Saturday-Sunday- 9am-5pm  [phone] - [place]   [phone] - [place]       What options do I have for visits at the clinic other than the traditional office visit?  To expand how we care for you, many of our providers are utilizing electronic visits (e-visits) and telephone visits, when medically appropriate, for interactions with their patients rather than a visit in the clinic.   We also offer nurse visits for many medical concerns. Just like any other service, we will bill your insurance company for this type of visit based on time spent on the phone with your provider. Not all insurance companies cover these visits. Please check with your medical insurance if this type of visit is covered. You will be responsible for any charges that are not paid by your insurance.      E-visits via mmCHANNEL:  generally incur a $35.00 fee.  Telephone visits:  Time spent on the phone: *charged based on time that is spent on the phone in increments of 10 minutes. Estimated cost:   5-10 mins $30.00   11-20 mins. $59.00   21-30 mins. $85.00     Use [employer] (secure email communication and access to your chart) to send your primary care provider a message or make an appointment. Ask someone on your Team how to sign up for mmCHANNEL.  For a Price Quote for your services, please call our Consumer Price Line at [phone].  As always, Thank you for trusting us with your health care needs!    Discharge by SOLOMON FENTON

## 2017-10-12 LAB — ACHR BLOCK AB/ACHR TOTAL SFR SER: 43 % (ref 0–26)

## 2017-10-18 ENCOUNTER — TELEPHONE (OUTPATIENT)
Dept: NEUROLOGY | Facility: CLINIC | Age: 72
End: 2017-10-18

## 2017-10-18 ENCOUNTER — OFFICE VISIT (OUTPATIENT)
Dept: NEUROLOGY | Facility: CLINIC | Age: 72
End: 2017-10-18
Payer: COMMERCIAL

## 2017-10-18 VITALS
HEIGHT: 70 IN | SYSTOLIC BLOOD PRESSURE: 119 MMHG | WEIGHT: 249.6 LBS | HEART RATE: 84 BPM | DIASTOLIC BLOOD PRESSURE: 77 MMHG | BODY MASS INDEX: 35.73 KG/M2

## 2017-10-18 DIAGNOSIS — G70.00 OCULAR MYASTHENIA (H): Primary | ICD-10-CM

## 2017-10-18 LAB
ANION GAP SERPL CALCULATED.3IONS-SCNC: 9 MMOL/L (ref 3–14)
BUN SERPL-MCNC: 17 MG/DL (ref 7–30)
CALCIUM SERPL-MCNC: 10.2 MG/DL (ref 8.5–10.1)
CHLORIDE SERPL-SCNC: 100 MMOL/L (ref 94–109)
CO2 SERPL-SCNC: 29 MMOL/L (ref 20–32)
CREAT SERPL-MCNC: 1.07 MG/DL (ref 0.66–1.25)
GFR SERPL CREATININE-BSD FRML MDRD: 68 ML/MIN/1.7M2
GLUCOSE SERPL-MCNC: 182 MG/DL (ref 70–99)
POTASSIUM SERPL-SCNC: 4.4 MMOL/L (ref 3.4–5.3)
SODIUM SERPL-SCNC: 138 MMOL/L (ref 133–144)

## 2017-10-18 PROCEDURE — 36415 COLL VENOUS BLD VENIPUNCTURE: CPT | Performed by: PSYCHIATRY & NEUROLOGY

## 2017-10-18 PROCEDURE — 99214 OFFICE O/P EST MOD 30 MIN: CPT | Performed by: PSYCHIATRY & NEUROLOGY

## 2017-10-18 PROCEDURE — 80048 BASIC METABOLIC PNL TOTAL CA: CPT | Performed by: PSYCHIATRY & NEUROLOGY

## 2017-10-18 RX ORDER — PREDNISONE 10 MG/1
TABLET ORAL
Qty: 63 TABLET | Refills: 0 | Status: SHIPPED | OUTPATIENT
Start: 2017-10-18 | End: 2018-05-11

## 2017-10-18 RX ORDER — PYRIDOSTIGMINE BROMIDE 60 MG/1
30 TABLET ORAL 3 TIMES DAILY
Qty: 90 TABLET | Refills: 1 | Status: SHIPPED | OUTPATIENT
Start: 2017-10-18 | End: 2017-11-27

## 2017-10-18 NOTE — NURSING NOTE
JO mailed to patient. I also called and explained neurology referral information. I also explained the direction for his prescription that was sent to pharmacy  Velia Lepe MA

## 2017-10-18 NOTE — MR AVS SNAPSHOT
After Visit Summary   10/18/2017    Rakan Nolasco    MRN: [id number]           Patient Information     Date Of Birth          1945        Visit Information        Provider Department      10/18/2017 11:40 AM [facility]        Today's Diagnoses     Ocular myasthenia (H)    -  1      Care Instructions    AFTER VISIT SUMMARY (AVS)  Orders Placed This Encounter   Procedures     Basic metabolic panel  (Ca, Cl, CO2, Creat, Gluc, K, Na, BUN)     NEUROLOGY ADULT REFERRAL       Signed Prescriptions:                        Disp   Refills    predniSONE (DELTASONE) 10 MG tablet        63 tab*0        Sig: Week one: 30 mg the morning, week 2 and afterwards:           20 mg the morning  Authorizing Provider: GRANT LAYNE    pyridostigmine (MESTINON) 60 MG tablet     90 tab*1        Sig: Take 0.5 tablets (30 mg) by mouth 3 times daily  Authorizing Provider: GRANT LAYNE    Diagnostic possibilities reviewed     Explained to him that for acute emergency he needed to go to [facility] ER    To call us for follow-up appointment in next 4 week(s) or earlier if needed.                    Follow-ups after your visit        Additional Services     NEUROLOGY ADULT REFERRAL       REASON: Myasthenia gravis with positive antibodies. Started him on prednisone and pyridostigmine.      Your provider has referred you for the following: Neuromuscular  [facility]: Neurology Clinic [facility] [phone]   [email or web address]  Neuromuscular    Please be aware that coverage of these services is subject to the terms and limitations of your health insurance plan.  Call member services at your health plan with any benefit or coverage questions.      Please bring the following with you to your appointment:    (1) Any X-Rays, CTs or MRIs which have been performed.  Contact the facility where they were done to arrange for  prior to  "your scheduled appointment.    (2) List of current medications  (3) This referral request   (4) Any documents/labs given to you for this referral                  Follow-up notes from your care team     Return in about 4 weeks (around 11/15/2017) for Follow-up.      Your next 10 appointments already scheduled     Oct 20, 2017  7:40 AM CDT   PHYSICAL with Telly Lucio MD   [facility]    [address] [id number] [phone]            Nov 08, 2017 10:40 AM CST   Return Visit with Grant Layne MD   [facility]    [address] [id number] [phone]              Who to contact     If you have questions or need follow up information about today's clinic visit or your schedule please contact [facility] directly at [phone].  Normal or non-critical lab and imaging results will be communicated to you by [employer], letter or phone within 4 business days after the clinic has received the results. If you do not hear from us within 7 days, please contact the clinic through MyCaret or phone. If you have a critical or abnormal lab result, we will notify you by phone as soon as possible.  Submit refill requests through [employer] or call your pharmacy and they will forward the refill request to us. Please allow 3 business days for your refill to be completed.          Additional Information About Your Visit        [employer] Information     [employer] lets you send messages to your doctor, view your test results, renew your prescriptions, schedule appointments and more. To sign up, go to [email or web address] . Click on \"Log in\" on the left side of the screen, which will take you to the Welcome page. Then click on \"Sign up Now\" on the right side of the page.     You will be asked to enter the access code listed below, as well as some personal information. Please follow the directions " "to create your username and password.     Your access code is: [email or web address]  Expires: 10/22/2017  3:04 PM     Your access code will  in 90 days. If you need help or a new code, please call your [place] clinic or [phone].        Care EveryWhere ID     This is your Care EveryWhere ID. This could be used by other organizations to access your [place] medical records  [id number]        Your Vitals Were     Pulse Height BMI (Body Mass Index)             84 1.778 m (5' 10\") 35.81 kg/m2          Blood Pressure from Last 3 Encounters:   10/18/17 119/77   17 117/72   17 135/70    Weight from Last 3 Encounters:   10/18/17 113.2 kg (249 lb 9.6 oz)   17 112.3 kg (247 lb 9.6 oz)   17 110.5 kg (243 lb 8 oz)              We Performed the Following     Basic metabolic panel  (Ca, Cl, CO2, Creat, Gluc, K, Na, BUN)     NEUROLOGY ADULT REFERRAL          Today's Medication Changes          These changes are accurate as of: 10/18/17  5:18 PM.  If you have any questions, ask your nurse or doctor.               Start taking these medicines.        Dose/Directions    pyridostigmine 60 MG tablet   Commonly known as:  MESTINON   Used for:  Ocular myasthenia (H)   Started by:  Grant Layne MD        Dose:  30 mg   Take 0.5 tablets (30 mg) by mouth 3 times daily   Quantity:  90 tablet   Refills:  1         These medicines have changed or have updated prescriptions.        Dose/Directions    predniSONE 10 MG tablet   Commonly known as:  DELTASONE   This may have changed:    - medication strength  - how much to take  - how to take this  - when to take this  - additional instructions   Used for:  Ocular myasthenia (H)   Changed by:  Grant Layne MD        Week one: 30 mg the morning, week 2 and afterwards: 20 mg the morning   Quantity:  63 tablet   Refills:  0            Where to get your medicines      These medications were sent to [place] [address] [place] [facility] [address]  [address], [place] [address]     Phone:  [phone]     predniSONE 10 MG tablet    pyridostigmine 60 MG tablet                Primary Care Provider Office Phone # Fax #    Telly Lucio [phone] [address] [facility] [place] [address]        Equal Access to Services     [facility]: Hadii aad ku hadasho Soomaali, waaxda luqadaha, qaybta kaalmada adeegyada, waxay idiin hayaan adeeg khnelliesh laDavidaan . So [facility] [phone].    ATENCIÓN: Si habla español, tiene a coates disposición servicios gratuitos de asistencia lingüística. [facility] [phone].    We comply with applicable federal civil rights laws and [place] laws. We do not discriminate on the basis of race, color, national origin, age, disability, sex, sexual orientation, or gender identity.            Thank you!     Thank you for choosing [facility]  for your care. Our goal is always to provide you with excellent care. Hearing back from our patients is one way we can continue to improve our services. Please take a few minutes to complete the written survey that you may receive in the mail after your visit with us. Thank you!             Your Updated Medication List - Protect others around you: Learn how to safely use, store and throw away your medicines at www.disposemymeds.org.          This list is accurate as of: 10/18/17  5:18 PM.  Always use your most recent med list.                   Brand Name Dispense Instructions for use Diagnosis    aspirin 81 MG tablet      Take 1 tablet by mouth daily.        atorvastatin 20 MG tablet    LIPITOR    90 tablet    TAKE ONE TABLET BY MOUTH ONE TIME DAILY    Hyperlipidemia LDL goal <130       CALCIUM 600/VITAMIN D PO      Take  by mouth. Take 1/2 tablet in the morning, 1/2 at night daily        clonazePAM 0.5 MG tablet    klonoPIN     Take 0.5 mg by mouth. Take 1 tablet by mouth daily at bedtime        latanoprost 0.005 % ophthalmic solution    XALATAN    7.5 mL    Place 1 drop  into both eyes At Bedtime    Borderline glaucoma with ocular hypertension, bilateral       lisinopril 10 MG tablet    PRINIVIL/ZESTRIL    90 tablet    TAKE ONE TABLET BY MOUTH ONE TIME DAILY    Hypertension goal BP (blood pressure) < 140/90       MULTI-DAY PO      daily        pantoprazole 20 MG EC tablet    PROTONIX    90 tablet    TAKE ONE TABLET BY MOUTH ONE TIME DAILY 30-60 MINUTES BEFORE A MEAL    Gastroesophageal reflux disease without esophagitis       predniSONE 10 MG tablet    DELTASONE    63 tablet    Week one: 30 mg the morning, week 2 and afterwards: 20 mg the morning    Ocular myasthenia (H)       pyridostigmine 60 MG tablet    MESTINON    90 tablet    Take 0.5 tablets (30 mg) by mouth 3 times daily    Ocular myasthenia (H)       timolol 0.5 % ophthalmic solution    TIMOPTIC    1 Bottle    Place 1 drop into both eyes daily    Borderline glaucoma with ocular hypertension, bilateral

## 2017-10-18 NOTE — PROGRESS NOTES
ESTABLISHED PATIENT NEUROLOGY NOTE    LOCATION: [facility]  DATE OF VISIT: 10/18/2017    NAME: Mr.Gus ASHLEY Nolasco  : 1945 (72 year old)  MR #: [id number]    PRIMARY/REFERRING PROVIDER: Telly Lucio MD    REASON FOR VISIT: Test results and plan of management    HISTORY OF PRESENT ILLNESS: 72-year-old man with the previous concern for temporal arteritis along with a headache.  He had symptoms of double vision also.  He was treated with prednisone because of the understandable concern for temporal arteritis by the ophthalmologist.  Currently he is on reduced dose of prednisone and he relates he is having eyestrain, right eyelid is droopy at times.  He is getting out of breath with no history of a small COPD.  Denies any double vision.  Previous history of taking Protonix 20 mg on infrequent basis.  CT chest did not show any thymus enlargement.  There was incidental finding of gallstones.  Blood tests showed positive myasthenia gravis panel antibodies with absent striation antibodies and musk antibodies.    FAMILY HISTORY: No family h/o myasthenia gravis  Recent Diagnostic Studies:   Imaging: CT CHEST WITHOUT CONTRAST  10/2/2017 9:44 AM     HISTORY: Myasthenia gravis without (acute) exacerbation.      COMPARISON: None.     TECHNIQUE: Routine transverse CT imaging of the chest was performed  without contrast. Radiation dose for this scan was reduced using  automated exposure control, adjustment of the mA and/or kV according  to patient size, or iterative reconstruction technique.     FINDINGS: The heart size is normal. No enlarged lymph node or other  abnormal mediastinal mass is seen. There is mild calcification within  the arch of the thoracic aorta. The vascular structures are otherwise  unremarkable, allowing for the absence of contrast. The lungs are  clear. No pneumothorax is demonstrated. No pleural effusion is  identified. There are degenerative changes in the  spine. No other  osseous abnormality is seen. No chest wall pathology is seen. Within  the visualized upper abdomen, there appear to be a few small  gallstones.         IMPRESSION:   1. Unremarkable unenhanced CT of the chest.   2. Cholelithiasis.     Blood tests:  Component      Latest Ref Rng & Units 9/27/2017   AcetChol Binding Leanne      0.0 - 0.4 nmol/L 2.9 (H)   Striated Muscle Antibody IgG      <1:40 <1:40   AcetChol Block Leanne      0 - 26 % 43 (H)   MuSK Antibody      0.00 - 0.02 nmol/L 0.00      Medications reviewed with him.    Allergies   Allergen Reactions     Ropinirole Hcl GI Disturbance     Weakness;? syncope     Current Outpatient Prescriptions   Medication Sig     predniSONE (DELTASONE) 10 MG tablet Week one: 30 mg the morning, week 2 and afterwards: 20 mg the morning     pyridostigmine (MESTINON) 60 MG tablet Take 0.5 tablets (30 mg) by mouth 3 times daily     pantoprazole (PROTONIX) 20 MG EC tablet TAKE ONE TABLET BY MOUTH ONE TIME DAILY 30-60 MINUTES BEFORE A MEAL     lisinopril (PRINIVIL/ZESTRIL) 10 MG tablet Take 1 tablet (10 mg) by mouth daily     timolol (TIMOPTIC) 0.5 % ophthalmic solution Place 1 drop into both eyes daily     atorvastatin (LIPITOR) 20 MG tablet TAKE ONE TABLET BY MOUTH ONE TIME DAILY     latanoprost (XALATAN) 0.005 % ophthalmic solution Place 1 drop into both eyes At Bedtime     aspirin 81 MG tablet Take 1 tablet by mouth daily.     ClonAZEPAM (KLONOPIN) 0.5 MG tablet Take 0.5 mg by mouth. Take 1 tablet by mouth daily at bedtime     Calcium Carbonate-Vitamin D (CALCIUM 600/VITAMIN D PO) Take  by mouth. Take 1/2 tablet in the morning, 1/2 at night daily     MULTI-DAY OR daily     No current facility-administered medications for this visit.        Current Outpatient Prescriptions on File Prior to Visit:  timolol (TIMOPTIC) 0.5 % ophthalmic solution Place 1 drop into both eyes daily   atorvastatin (LIPITOR) 20 MG tablet TAKE ONE TABLET BY MOUTH ONE TIME DAILY   latanoprost  (XALATAN) 0.005 % ophthalmic solution Place 1 drop into both eyes At Bedtime   aspirin 81 MG tablet Take 1 tablet by mouth daily.   ClonAZEPAM (KLONOPIN) 0.5 MG tablet Take 0.5 mg by mouth. Take 1 tablet by mouth daily at bedtime   Calcium Carbonate-Vitamin D (CALCIUM 600/VITAMIN D PO) Take  by mouth. Take 1/2 tablet in the morning, 1/2 at night daily   MULTI-DAY OR daily     No current facility-administered medications on file prior to visit.   Past Medical History:   Diagnosis Date     Arthritis          BMI 31.0-31.9,adult      Diverticulosis     Colonoscopy 8/2008     Fatty liver     see US  5/2012     GERD (gastroesophageal reflux disease)      Glaucoma (increased eye pressure)      HTN (hypertension)      Hyperlipidemia LDL goal < 130     age, htn, fhx     Irritable bowel      Nonsenile cataract      ROSIE (obstructive sleep apnea) 01/2011    Using CPAP;      Prediabetes      Restless leg syndrome      Trigeminal neuralgia pain 1/4/2012     Past Surgical History:   Procedure Laterality Date     BIOPSY ARTERY TEMPORAL Bilateral 8/1/2017    Procedure: BIOPSY ARTERY TEMPORAL;  Bilateral temporal artery Biopsy;  Surgeon: Jj Tello MD;  Location: [facility] OR     CATARACT IOL, RT/LT Right      COLONOSCOPY       ESOPHAGOSCOPY, GASTROSCOPY, DUODENOSCOPY (EGD), COMBINED  1/15/2014    Procedure: COMBINED ESOPHAGOSCOPY, GASTROSCOPY, DUODENOSCOPY (EGD), BIOPSY SINGLE OR MULTIPLE;;  Surgeon: Winston Nixon MD;  Location: [facility] OR     PHACOEMULSIFICATION CLEAR CORNEA WITH STANDARD INTRAOCULAR LENS IMPLANT Right 2/20/2017    Procedure: PHACOEMULSIFICATION CLEAR CORNEA WITH STANDARD INTRAOCULAR LENS IMPLANT;  Surgeon: Mateo Gray MD;  Location:  [facility]     RETINAL REATTACHMENT Right      SURGICAL HISTORY OF -   8/2009    Both Eyelids-.     TONSILLECTOMY  as child     PERSONAL & SOCIAL HISTORY Reviewed and documented in [facility]    GENERAL EXAMINATION: /77 (BP Location: Right arm,  "Patient Position: Chair, Cuff Size: Adult Large)  Pulse 84  Ht 1.778 m (5' 10\")  Wt 113.2 kg (249 lb 9.6 oz)  BMI 35.81 kg/m2      LIMITED NEUROLOGICAL EXAMINATION:  No diplopia  Right eye ptosis noted with 2 minutes. Upward and sustained gaze    IMPRESSION:   Encounter Diagnoses   Name Primary?     Ocular myasthenia (H) Yes     COMMENTS: Increased his prednisone and started him on pyridostigmine because of increasing symptoms of ocular myasthenia.  He understands that there are potential side effects and risks with starting medications for myasthenia especially in view of multiple co-morbidities as outlined in PMH including his previous eye conditions like glaucoma. Caution would be needed with use of both Steroids and Mestinon. He would let us know if there is any problem with these medications. Arranged for him to see in a neuromuscular specialist at [facility].  Glucose and electrolytes checked as a baseline before increasing prednisone.  He is reminded to take Protonix while taking prednisone.  He will discuss gallstones finding seen in CT chest with Telly Lucio MD.    PLANS:   Patient Instructions     AFTER VISIT SUMMARY (AVS)  Orders Placed This Encounter   Procedures     Basic metabolic panel  (Ca, Cl, CO2, Creat, Gluc, K, Na, BUN)     NEUROLOGY ADULT REFERRAL   Signed Prescriptions:                        Disp   Refills    predniSONE (DELTASONE) 10 MG tablet        63 tab*0        Sig: Week one: 30 mg the morning, week 2 and afterwards:           20 mg the morning  Authorizing Provider: NAIN LOZA    pyridostigmine (MESTINON) 60 MG tablet     90 tab*1        Sig: Take 0.5 tablets (30 mg) by mouth 3 times daily  Authorizing Provider: NAIN LOZA    Diagnostic possibilities reviewed     Explained to him that for acute emergency he needed to go to [facility] ER    To call us for follow-up appointment in next 4 week(s) or earlier if needed.      Thanks to " Telly Lucio MD for allowing me to participate in Mr. Nolasco's care. Please feel free to call me with any questions or concerns.       Time with patient 25 minutes, greater than 50% of which was counseling and coordination of care.    *Chart documentation was completed in part with Dragon voice-recognition software. Even though reviewed, some grammatical, spelling, and word errors may remain.     Addendum: It would be helpful if he could have regular follow-up with his ophthalmologist Dr. Flores while he is on pyridostigmine with previous history of borderline glaucoma.  our staff LAVERNE Lepe MA would remind him to do that.      Grant Layne MD, [facility]  Neurologist    Cc: MD Mateo Gaitan MD

## 2017-10-18 NOTE — PATIENT INSTRUCTIONS
AFTER VISIT SUMMARY (AVS)  Orders Placed This Encounter   Procedures     Basic metabolic panel  (Ca, Cl, CO2, Creat, Gluc, K, Na, BUN)     NEUROLOGY ADULT REFERRAL       Signed Prescriptions:                        Disp   Refills    predniSONE (DELTASONE) 10 MG tablet        63 tab*0        Sig: Week one: 30 mg the morning, week 2 and afterwards:           20 mg the morning  Authorizing Provider: NAIN LOZA    pyridostigmine (MESTINON) 60 MG tablet     90 tab*1        Sig: Take 0.5 tablets (30 mg) by mouth 3 times daily  Authorizing Provider: NAIN LOZA    Diagnostic possibilities reviewed     Explained to him that for acute emergency he needed to go to [facility] ER    To call us for follow-up appointment in next 4 week(s) or earlier if needed.

## 2017-10-18 NOTE — TELEPHONE ENCOUNTER
Spoke with patient and explained once provider is done with his chart, I will call him about his medication if there is any change  Velia Lepe MA

## 2017-10-18 NOTE — Clinical Note
Would request you to send your office to send him follow-up appointment to monitor his borderline glaucoma as I have started him on Pyridostigmine for ocular myasthenia.  Thanks for your help.

## 2017-10-18 NOTE — Clinical Note
Please call him to have eye follow-up regarding monitoring his glaucoma with Dr. Flores while on medications for myasthenia gravis

## 2017-10-18 NOTE — NURSING NOTE
"No chief complaint on file.      Initial /77 (BP Location: Right arm, Patient Position: Chair, Cuff Size: Adult Large)  Pulse 84  Ht 1.778 m (5' 10\")  Wt 113.2 kg (249 lb 9.6 oz)  BMI 35.81 kg/m2 Estimated body mass index is 35.81 kg/(m^2) as calculated from the following:    Height as of this encounter: 1.778 m (5' 10\").    Weight as of this encounter: 113.2 kg (249 lb 9.6 oz).  Medication Reconciliation: complete   Velia Lepe MA      "

## 2017-10-18 NOTE — TELEPHONE ENCOUNTER
Reason for Call:  Other call back    Detailed comments: Patient has question regarding his medication Dr. Layne noted  30 mg for prednisone but his after visit torri is noting 40 please call and verify. Thank you.    Phone Number Patient can be reached at: Home number on file [phone] (home)    Best Time: any    Can we leave a detailed message on this number? YES    Call taken on 10/18/2017 at 12:59 PM by Millie Rose

## 2017-10-20 ENCOUNTER — OFFICE VISIT (OUTPATIENT)
Dept: FAMILY MEDICINE | Facility: CLINIC | Age: 72
End: 2017-10-20
Payer: COMMERCIAL

## 2017-10-20 VITALS
HEIGHT: 69 IN | DIASTOLIC BLOOD PRESSURE: 80 MMHG | BODY MASS INDEX: 36.36 KG/M2 | OXYGEN SATURATION: 96 % | WEIGHT: 245.5 LBS | SYSTOLIC BLOOD PRESSURE: 136 MMHG | TEMPERATURE: 97.2 F | HEART RATE: 77 BPM

## 2017-10-20 DIAGNOSIS — R35.0 URINARY FREQUENCY: ICD-10-CM

## 2017-10-20 DIAGNOSIS — R97.20 ELEVATED PROSTATE SPECIFIC ANTIGEN (PSA): ICD-10-CM

## 2017-10-20 DIAGNOSIS — K21.9 GASTROESOPHAGEAL REFLUX DISEASE WITHOUT ESOPHAGITIS: ICD-10-CM

## 2017-10-20 DIAGNOSIS — Z00.00 WELLNESS EXAMINATION: Primary | ICD-10-CM

## 2017-10-20 DIAGNOSIS — E11.65 TYPE 2 DIABETES MELLITUS WITH HYPERGLYCEMIA, WITHOUT LONG-TERM CURRENT USE OF INSULIN (H): ICD-10-CM

## 2017-10-20 DIAGNOSIS — R73.03 PREDIABETES: ICD-10-CM

## 2017-10-20 DIAGNOSIS — I10 HYPERTENSION GOAL BP (BLOOD PRESSURE) < 140/90: ICD-10-CM

## 2017-10-20 DIAGNOSIS — Z87.891 FORMER SMOKER: ICD-10-CM

## 2017-10-20 DIAGNOSIS — K80.20 CALCULUS OF GALLBLADDER WITHOUT CHOLECYSTITIS WITHOUT OBSTRUCTION: ICD-10-CM

## 2017-10-20 LAB
ALBUMIN UR-MCNC: NEGATIVE MG/DL
APPEARANCE UR: CLEAR
BILIRUB UR QL STRIP: NEGATIVE
COLOR UR AUTO: YELLOW
GLUCOSE UR STRIP-MCNC: NEGATIVE MG/DL
HBA1C MFR BLD: 7.5 % (ref 4.3–6)
HGB UR QL STRIP: ABNORMAL
KETONES UR STRIP-MCNC: NEGATIVE MG/DL
LEUKOCYTE ESTERASE UR QL STRIP: NEGATIVE
MUCOUS THREADS #/AREA URNS LPF: PRESENT /LPF
NITRATE UR QL: NEGATIVE
NON-SQ EPI CELLS #/AREA URNS LPF: ABNORMAL /LPF
PH UR STRIP: 6 PH (ref 5–7)
PSA SERPL-ACNC: 8.05 UG/L (ref 0–4)
RBC #/AREA URNS AUTO: ABNORMAL /HPF
SOURCE: ABNORMAL
SP GR UR STRIP: 1.02 (ref 1–1.03)
UROBILINOGEN UR STRIP-ACNC: 0.2 EU/DL (ref 0.2–1)
WBC #/AREA URNS AUTO: ABNORMAL /HPF

## 2017-10-20 PROCEDURE — 81001 URINALYSIS AUTO W/SCOPE: CPT | Performed by: FAMILY MEDICINE

## 2017-10-20 PROCEDURE — 99397 PER PM REEVAL EST PAT 65+ YR: CPT | Performed by: FAMILY MEDICINE

## 2017-10-20 PROCEDURE — G0103 PSA SCREENING: HCPCS | Performed by: FAMILY MEDICINE

## 2017-10-20 PROCEDURE — 99207 C PAF COMPLETED  NO CHARGE: CPT | Mod: 25 | Performed by: FAMILY MEDICINE

## 2017-10-20 PROCEDURE — 36415 COLL VENOUS BLD VENIPUNCTURE: CPT | Performed by: FAMILY MEDICINE

## 2017-10-20 PROCEDURE — 83036 HEMOGLOBIN GLYCOSYLATED A1C: CPT | Performed by: FAMILY MEDICINE

## 2017-10-20 RX ORDER — PANTOPRAZOLE SODIUM 20 MG/1
TABLET, DELAYED RELEASE ORAL
Qty: 90 TABLET | Refills: 1 | Status: SHIPPED | OUTPATIENT
Start: 2017-10-20 | End: 2018-06-04

## 2017-10-20 RX ORDER — LISINOPRIL 10 MG/1
10 TABLET ORAL DAILY
Qty: 90 TABLET | Refills: 3 | Status: SHIPPED | OUTPATIENT
Start: 2017-10-20 | End: 2018-06-15

## 2017-10-20 ASSESSMENT — PAIN SCALES - GENERAL: PAINLEVEL: NO PAIN (0)

## 2017-10-20 NOTE — PROGRESS NOTES
SUBJECTIVE:   Rakan Nolasco is a 72 year old male who presents for Preventive Visit.  Are you in the first 12 months of your Medicare coverage?  No    Physical   Annual:     Getting at least 3 servings of Calcium per day::  Yes    Bi-annual eye exam::  Yes    Dental care twice a year::  Yes    Sleep apnea or symptoms of sleep apnea::  Sleep apnea    Diet::  Regular (no restrictions)    Frequency of exercise::  6-7 days/week    Duration of exercise::  15-30 minutes    Taking medications regularly::  Yes    Medication side effects::  Significant flushing      COGNITIVE SCREEN  1) Repeat 3 items (Banana, Sunrise, Chair)    2) Clock draw: NORMAL  3) 3 item recall: Recalls 3 objects  Results: 3 items recalled: COGNITIVE IMPAIRMENT LESS LIKELY    Mini-CogTM Copyright S Barry. Licensed by the author for use in [place] Echo it; reprinted with permission ([email or web address]@[email or web address]). All rights reserved.      Concerns:  1. Frequent urination: during day mainly. Goes small quantities. Wants PSA.  2. Cholelithiasis: Incidental on CT scan. Has no symptoms  3. Former smoker: quit in 1985; started smoking in 1965    Reviewed and updated as needed this visit by clinical staffTobacco  Allergies  Meds         Reviewed and updated as needed this visit by Provider        Social History   Substance Use Topics     Smoking status: Former Smoker     Years: 15.00     Types: Cigarettes     Quit date: 1/1/1983     Smokeless tobacco: Former User      Comment: smoke free household.     Alcohol use No      Comment: Quit in 1986       The patient does not drink >3 drinks per day nor >7 drinks per week.    Healthcare Directive: Brought in a copy today    PROBLEMS TO ADD ON...  Hypertension Follow-up      Outpatient blood pressures are not being checked.    Low Salt Diet: low salt  Prediabetes:  Elevated PSA  Frequency:  Cholelithiasis:   Former Smoker        Today's PHQ-2 Score: PHQ-2 ( 1999 Pfizer) 10/20/2017   Q1: Little interest or pleasure in  doing things 0   Q2: Feeling down, depressed or hopeless 0   PHQ-2 Score 0   Q1: Little interest or pleasure in doing things Not at all   Q2: Feeling down, depressed or hopeless Not at all   PHQ-2 Score 0       Do you feel safe in your environment - Yes    Do you have a Health Care Directive?: Yes: Patient states has Advance Directive and will bring in a copy to clinic.    Current providers sharing in care for this patient include:   Patient Care Team:  Telly Lucio MD as PCP - General (Family Practice)      Hearing impairment: No    Ability to successfully perform activities of daily living: Yes, no assistance needed     Fall risk:  Fallen 2 or more times in the past year?: No  Any fall with injury in the past year?: No      Home safety:  lack of grab bars in the bathroom    The following health maintenance items are reviewed in Epic and correct as of today:  Health Maintenance   Topic Date Due     AORTIC ANEURYSM SCREENING (SYSTEM ASSIGNED)  07/24/2012     ADVANCE DIRECTIVE PLANNING Q5 YRS  08/24/2016     EYE EXAM Q1 YEAR  03/28/2018     COLON CANCER SCREEN (SYSTEM ASSIGNED)  08/25/2018     LIPID MONITORING Q1 YEAR  09/06/2018     FALL RISK ASSESSMENT  09/26/2018     TETANUS IMMUNIZATION (SYSTEM ASSIGNED)  05/06/2023     INFLUENZA VACCINE (SYSTEM ASSIGNED)  Completed     PNEUMOCOCCAL  Completed     HEPATITIS C SCREENING  Completed     Patient Active Problem List   Diagnosis     GERD (gastroesophageal reflux disease)     Diverticulosis     Snoring     Fatigue     HYPERLIPIDEMIA LDL GOAL <130     Borderline glaucoma with ocular hypertension     S/P blepharoplasty     Advanced directives, counseling/discussion     Trigeminal neuralgia pain     bmi 31     Hypertension goal BP (blood pressure) < 140/90     Health Care Home     Arthritis     Prediabetes     Dry eyes     PVD (posterior vitreous detachment) OU     H/O RD (retinal detachment) s/p repair with PPV (AB)     Epiretinal membrane, bilateral      "Pseudophakia of right eye     Myasthenia gravis, AChR antibody positive (H)     Past Surgical History:   Procedure Laterality Date     BIOPSY ARTERY TEMPORAL Bilateral 8/1/2017    Procedure: BIOPSY ARTERY TEMPORAL;  Bilateral temporal artery Biopsy;  Surgeon: Jj Tello MD;  Location: [facility] OR     CATARACT IOL, RT/LT Right      COLONOSCOPY       ESOPHAGOSCOPY, GASTROSCOPY, DUODENOSCOPY (EGD), COMBINED  1/15/2014    Procedure: COMBINED ESOPHAGOSCOPY, GASTROSCOPY, DUODENOSCOPY (EGD), BIOPSY SINGLE OR MULTIPLE;;  Surgeon: Winston Nixon MD;  Location: [facility] OR     PHACOEMULSIFICATION CLEAR CORNEA WITH STANDARD INTRAOCULAR LENS IMPLANT Right 2/20/2017    Procedure: PHACOEMULSIFICATION CLEAR CORNEA WITH STANDARD INTRAOCULAR LENS IMPLANT;  Surgeon: Mateo Gray MD;  Location:  [facility]     RETINAL REATTACHMENT Right      SURGICAL HISTORY OF -   8/2009    Both Eyelids-.     TONSILLECTOMY  as child       Social History   Substance Use Topics     Smoking status: Former Smoker     Years: 15.00     Types: Cigarettes     Quit date: 1/1/1983     Smokeless tobacco: Former User      Comment: smoke free household.     Alcohol use No      Comment: Quit in 1986     Family History   Problem Relation Age of Onset     Respiratory Mother      copd     Allergies Brother      CANCER Maternal Grandmother      HEART DISEASE Paternal Grandmother      CEREBROVASCULAR DISEASE Father          Pneumonia Vaccine:Adults age 65+ who received Pneumovax (PPSV23) at 65 years or older: Should be given PCV13 > 1 year after their most recent PPSV23  Review of Systems  Constitutional, HEENT, cardiovascular, pulmonary and gi systems are negative, except as otherwise noted.      OBJECTIVE:   /80  Pulse 77  Temp 97.2  F (36.2  C) (Oral)  Ht 5' 8.98\" (1.752 m)  Wt 245 lb 8 oz (111.4 kg)  SpO2 96%  BMI 36.28 kg/m2 Estimated body mass index is 36.28 kg/(m^2) as calculated from the following:    Height as of this encounter: " "5' 8.98\" (1.752 m).    Weight as of this encounter: 245 lb 8 oz (111.4 kg).  Physical Exam  GENERAL: healthy, alert and no distress  EYES: Eyes grossly normal to inspection, PERRL and conjunctivae and sclerae normal  HENT: ear canals and TM's normal, nose and mouth without ulcers or lesions  NECK: no adenopathy, no asymmetry, masses, or scars and thyroid normal to palpation  RESP: lungs clear to auscultation - no rales, rhonchi or wheezes  CV: regular rate and rhythm, normal S1 S2, no S3 or S4, no murmur, click or rub  ABDOMEN: soft, nontender, no masses and bowel sounds normal  MS: no gross musculoskeletal defects noted, no edema  SKIN: no suspicious lesions or rashes  NEURO: Normal strength and tone, mentation intact and speech normal  PSYCH: mentation appears normal, affect normal/bright  Results for orders placed or performed in visit on 10/20/17   Prostate spec antigen screen   Result Value Ref Range    PSA 8.05 (H) 0 - 4 ug/L   Hemoglobin A1c   Result Value Ref Range    Hemoglobin A1C 7.5 (H) 4.3 - 6.0 %   UA reflex to Microscopic and Culture   Result Value Ref Range    Color Urine Yellow     Appearance Urine Clear     Glucose Urine Negative NEG^Negative mg/dL    Bilirubin Urine Negative NEG^Negative    Ketones Urine Negative NEG^Negative mg/dL    Specific Gravity Urine 1.025 1.003 - 1.035    Blood Urine Small (A) NEG^Negative    pH Urine 6.0 5.0 - 7.0 pH    Protein Albumin Urine Negative NEG^Negative mg/dL    Urobilinogen Urine 0.2 0.2 - 1.0 EU/dL    Nitrite Urine Negative NEG^Negative    Leukocyte Esterase Urine Negative NEG^Negative    Source Midstream Urine    Urine Microscopic   Result Value Ref Range    WBC Urine O - 2 OTO2^O - 2 /HPF    RBC Urine O - 2 OTO2^O - 2 /HPF    Squamous Epithelial /LPF Urine Few FEW^Few /LPF    Mucous Urine Present (A) NEG^Negative /LPF       ASSESSMENT / PLAN:   Rakan was seen today for physical.    Diagnoses and all orders for this visit:    Wellness " "examination    Hypertension goal BP (blood pressure) < 140/90       BP stable. Refill medication.  -     lisinopril (PRINIVIL/ZESTRIL) 10 MG tablet; Take 1 tablet (10 mg) by mouth daily    Elevated prostate specific antigen (PSA)          Has bumped up; will refer to urology for evaluation.  -     Prostate spec antigen screen    Prediabetes        A1c in the diabetes range. Taking Prednisone, could be cause, given was prediabetic Metformin recommended.  -     Hemoglobin A1c    Gastroesophageal reflux disease without esophagitis       Symptoms controlled, need refill.  -     pantoprazole (PROTONIX) 20 MG [facility] tablet; TAKE ONE TABLET BY MOUTH ONE TIME DAILY 30-60 MINUTES BEFORE A MEAL    Calculus of gallbladder without cholecystitis without obstruction; On CT       -    Incidental finding on CT. Discussed benign nature of gallstones but can cause obstruction or precipitate infection with severe pain in the RUQ. Should watch for any pain in the RUQ.    BMI 36.0-36.9,adult          -    Counseled to make better food choices, exercise as tolerated, and lose weight.     Urinary frequency          Etiology UTI, Glycosuria or BPH.  -     UA reflex to Microscopic and Culture    Former smoker  -     US abdominal aorta limited; Future    Other orders  -     PAF COMPLETED    End of Life Planning:  Patient currently has an advanced directive: Yes.  Practitioner is supportive of decision.    COUNSELING:  Reviewed preventive health counseling, as reflected in patient instructions       Consider AAA screening for ages 65-75 and smoking history       Regular exercise       Healthy diet/nutrition       Prostate cancer screening        Estimated body mass index is 36.28 kg/(m^2) as calculated from the following:    Height as of this encounter: 5' 8.98\" (1.752 m).    Weight as of this encounter: 245 lb 8 oz (111.4 kg).  Weight management plan: Discussed healthy diet and exercise guidelines and patient will follow up in 12 months in " clinic to re-evaluate.   reports that he quit smoking about 34 years ago. His smoking use included Cigarettes. He quit after 15.00 years of use. He has quit using smokeless tobacco.    Appropriate preventive services were discussed with this patient, including applicable screening as appropriate for cardiovascular disease, diabetes, osteopenia/osteoporosis, and glaucoma.  As appropriate for age/gender, discussed screening for colorectal cancer, prostate cancer, breast cancer, and cervical cancer. Checklist reviewing preventive services available has been given to the patient.    Reviewed patients plan of care and provided an AVS. The Basic Care Plan (routine screening as documented in Health Maintenance) for Rakan meets the Care Plan requirement. This Care Plan has been established and reviewed with the Patient.    Counseling Resources:  ATP IV Guidelines  Pooled Cohorts Equation Calculator  Breast Cancer Risk Calculator  FRAX Risk Assessment  ICSI Preventive Guidelines  Dietary Guidelines for Americans, 2010  USDA's MyPlate  ASA Prophylaxis  Lung CA Screening    Telly Lucio MD  [facility]

## 2017-10-20 NOTE — MR AVS SNAPSHOT
After Visit Summary   10/20/2017    Rakan Nolasco    MRN: [id number]           Patient Information     Date Of Birth          1945        Visit Information        Provider Department      10/20/2017 7:40 AM Telly Lucio MD [facility]        Today's Diagnoses     Wellness examination    -  1    Hypertension goal BP (blood pressure) < 140/90        Elevated prostate specific antigen (PSA)        Prediabetes        Gastroesophageal reflux disease without esophagitis        Calculus of gallbladder without cholecystitis without obstruction; On CT        BMI 36.0-36.9,adult        Urinary frequency        Former smoker          Care Instructions      Preventive Health Recommendations:       Male Ages 65 and over    Yearly exam:             See your health care provider every year in order to  o   Review health changes.   o   Discuss preventive care.    o   Review your medicines if your doctor has prescribed any.    Talk with your health care provider about whether you should have a test to screen for prostate cancer (PSA).    Every 3 years, have a diabetes test (fasting glucose). If you are at risk for diabetes, you should have this test more often.    Every 5 years, have a cholesterol test. Have this test more often if you are at risk for high cholesterol or heart disease.     Every 10 years, have a colonoscopy. Or, have a yearly FIT test (stool test). These exams will check for colon cancer.    Talk to with your health care provider about screening for Abdominal Aortic Aneurysm if you have a family history of AAA or have a history of smoking.  Shots:     Get a flu shot each year.     Get a tetanus shot every 10 years.     Talk to your doctor about your pneumonia vaccines. There are now two you should receive - Pneumovax (PPSV 23) and Prevnar (PCV 13).    Talk to your doctor about a shingles vaccine.     Talk to your doctor about the hepatitis B vaccine.  Nutrition:     Eat at  least 5 servings of fruits and vegetables each day.     Eat whole-grain bread, whole-wheat pasta and brown rice instead of white grains and rice.     Talk to your doctor about Calcium and Vitamin D.   Lifestyle    Exercise for at least 150 minutes a week (30 minutes a day, 5 days a week). This will help you control your weight and prevent disease.     Limit alcohol to one drink per day.     No smoking.     Wear sunscreen to prevent skin cancer.     See your dentist every six months for an exam and cleaning.     See your eye doctor every 1 to 2 years to screen for conditions such as glaucoma, macular degeneration and cataracts.    [facility] (formerly [facility])    If you have any questions regarding to your visit please contact your care team:       Team Purple:   Clinic Hours Telephone Number   Dr. Capri Porter   7am-7pm  Monday - Thursday   7am-5pm  Fridays  [phone]  (Appointment scheduling available 24/7)    Questions about your Visit?   Team Line:  [phone]   Urgent Care - [place] and [place] [facility] - 11am-9pm Monday-Friday Saturday-Sunday- 9am-5pm   [place] - 5pm-9pm Monday-Friday Saturday-Sunday- 9am-5pm  [phone] - [place]   [phone] [facility]       What options do I have for visits at the clinic other than the traditional office visit?  To expand how we care for you, many of our providers are utilizing electronic visits (e-visits) and telephone visits, when medically appropriate, for interactions with their patients rather than a visit in the clinic.   We also offer nurse visits for many medical concerns. Just like any other service, we will bill your insurance company for this type of visit based on time spent on the phone with your provider. Not all insurance companies cover these visits. Please check with your medical insurance if this type of visit is covered. You will be responsible for any charges that are not  paid by your insurance.      E-visits via ACTV8me:  generally incur a $35.00 fee.  Telephone visits:  Time spent on the phone: *charged based on time that is spent on the phone in increments of 10 minutes. Estimated cost:   5-10 mins $30.00   11-20 mins. $59.00   21-30 mins. $85.00     Use TwoChopt (secure email communication and access to your chart) to send your primary care provider a message or make an appointment. Ask someone on your Team how to sign up for ACTV8me.  For a Price Quote for your services, please call our TrendPo Line at [phone].  As always, Thank you for trusting us with your health care needs!    Discharge by SOLOMON FENTON             Follow-ups after your visit        Your next 10 appointments already scheduled     Nov 08, 2017 10:40 AM CST   Return Visit with Grant Layne MD   [facility]    [address] [place] [id number]   [phone]              Future tests that were ordered for you today     Open Future Orders        Priority Expected Expires Ordered    US abdominal aorta limited Routine  10/20/2018 10/20/2017            Who to contact     If you have questions or need follow up information about today's clinic visit or your schedule please contact [facility] directly at [phone].  Normal or non-critical lab and imaging results will be communicated to you by Advanced LEDshart, letter or phone within 4 business days after the clinic has received the results. If you do not hear from us within 7 days, please contact the clinic through MyChart or phone. If you have a critical or abnormal lab result, we will notify you by phone as soon as possible.  Submit refill requests through ACTV8me or call your pharmacy and they will forward the refill request to us. Please allow 3 business days for your refill to be completed.          Additional Information About Your Visit        TwoChopt Information     ACTV8me gives you secure  "access to your electronic health record. If you see a primary care provider, you can also send messages to your care team and make appointments. If you have questions, please call your primary care clinic.  If you do not have a primary care provider, please call [phone] and they will assist you.        Care EveryWhere ID     This is your Care EveryWhere ID. This could be used by other organizations to access your [place] medical records  [id number]        Your Vitals Were     Pulse Temperature Height Pulse Oximetry BMI (Body Mass Index)       77 97.2  F (36.2  C) (Oral) 5' 8.98\" (1.752 m) 96% 36.28 kg/m2        Blood Pressure from Last 3 Encounters:   10/20/17 136/80   10/18/17 119/77   09/27/17 117/72    Weight from Last 3 Encounters:   10/20/17 245 lb 8 oz (111.4 kg)   10/18/17 249 lb 9.6 oz (113.2 kg)   09/27/17 247 lb 9.6 oz (112.3 kg)              We Performed the Following     Hemoglobin A1c     PAF COMPLETED     Prostate spec antigen screen     UA reflex to Microscopic and Culture          Today's Medication Changes          These changes are accurate as of: 10/20/17  8:20 AM.  If you have any questions, ask your nurse or doctor.               These medicines have changed or have updated prescriptions.        Dose/Directions    lisinopril 10 MG tablet   Commonly known as:  PRINIVIL/ZESTRIL   This may have changed:  See the new instructions.   Used for:  Hypertension goal BP (blood pressure) < 140/90   Changed by:  Telly Lucio MD        Dose:  10 mg   Take 1 tablet (10 mg) by mouth daily   Quantity:  90 tablet   Refills:  3       pantoprazole 20 MG EC tablet   Commonly known as:  PROTONIX   This may have changed:  See the new instructions.   Used for:  Gastroesophageal reflux disease without esophagitis   Changed by:  Telly Lucio MD        TAKE ONE TABLET BY MOUTH ONE TIME DAILY 30-60 MINUTES BEFORE A MEAL   Quantity:  90 tablet   Refills:  1            Where to get your medicines "      These medications were sent to [facility] [address] [facility] - [place], [place] - [address]  [address], [place] [address]     Phone:  [phone]     lisinopril 10 MG tablet    pantoprazole 20 MG EC tablet                Primary Care Provider Office Phone # Fax #    Telly Lucio [phone] [address] [place] [address] [address]  [place] [address]        Equal Access to Services     [facility]: Hadii aad ku hadasho Soomaali, waaxda luqadaha, qaybta kaalmada adeegyada, waxay idiin hayaan adeeg kharash la'tavares . So [facility] [phone].    ATENCIÓN: Si habla español, tiene a coates disposición servicios gratuitos de asistencia lingüística. [facility] [phone].    We comply with applicable federal civil rights laws and [place] laws. We do not discriminate on the basis of race, color, national origin, age, disability, sex, sexual orientation, or gender identity.            Thank you!     Thank you for choosing [facility]  for your care. Our goal is always to provide you with excellent care. Hearing back from our patients is one way we can continue to improve our services. Please take a few minutes to complete the written survey that you may receive in the mail after your visit with us. Thank you!             Your Updated Medication List - Protect others around you: Learn how to safely use, store and throw away your medicines at www.disposemymeds.org.          This list is accurate as of: 10/20/17  8:20 AM.  Always use your most recent med list.                   Brand Name Dispense Instructions for use Diagnosis    aspirin 81 MG tablet      Take 1 tablet by mouth daily.        atorvastatin 20 MG tablet    LIPITOR    90 tablet    TAKE ONE TABLET BY MOUTH ONE TIME DAILY    Hyperlipidemia LDL goal <130       CALCIUM 600/VITAMIN D PO      Take  by mouth. Take 1/2 tablet in the morning, 1/2 at night daily        clonazePAM 0.5 MG tablet    klonoPIN     Take 0.5 mg by mouth. Take 1 tablet by  mouth daily at bedtime        latanoprost 0.005 % ophthalmic solution    XALATAN    7.5 mL    Place 1 drop into both eyes At Bedtime    Borderline glaucoma with ocular hypertension, bilateral       lisinopril 10 MG tablet    PRINIVIL/ZESTRIL    90 tablet    Take 1 tablet (10 mg) by mouth daily    Hypertension goal BP (blood pressure) < 140/90       MULTI-DAY PO      daily        pantoprazole 20 MG EC tablet    PROTONIX    90 tablet    TAKE ONE TABLET BY MOUTH ONE TIME DAILY 30-60 MINUTES BEFORE A MEAL    Gastroesophageal reflux disease without esophagitis       predniSONE 10 MG tablet    DELTASONE    63 tablet    Week one: 30 mg the morning, week 2 and afterwards: 20 mg the morning    Ocular myasthenia (H)       pyridostigmine 60 MG tablet    MESTINON    90 tablet    Take 0.5 tablets (30 mg) by mouth 3 times daily    Ocular myasthenia (H)       timolol 0.5 % ophthalmic solution    TIMOPTIC    1 Bottle    Place 1 drop into both eyes daily    Borderline glaucoma with ocular hypertension, bilateral

## 2017-10-20 NOTE — PROGRESS NOTES
Leidytyrese Mr. Nolasco,     Your blood results are good or accetable except for random blood glucose (sugar) being high. It need to be monitored while on Prednisone or even otherwise. I note that it has been minimally high even one year ago. It is best to see your Telly Lucio MD for monitoring it.     * Hope you are doing better on new treatment started a few days ago.   Follow-up as suggested during recent office visit.      Thanks,       Grant Layne MD, [facility]  Neurologist

## 2017-10-20 NOTE — NURSING NOTE
"Chief Complaint   Patient presents with     Physical       Initial /80  Pulse 77  Temp 97.2  F (36.2  C) (Oral)  Ht 5' 8.98\" (1.752 m)  Wt 245 lb 8 oz (111.4 kg)  SpO2 96%  BMI 36.28 kg/m2 Estimated body mass index is 36.28 kg/(m^2) as calculated from the following:    Height as of this encounter: 5' 8.98\" (1.752 m).    Weight as of this encounter: 245 lb 8 oz (111.4 kg).  Medication Reconciliation: complete    "

## 2017-10-23 PROBLEM — E11.65 TYPE 2 DIABETES MELLITUS WITH HYPERGLYCEMIA, WITHOUT LONG-TERM CURRENT USE OF INSULIN (H): Status: ACTIVE | Noted: 2017-10-23

## 2017-10-23 LAB — ACHR MOD AB/ACHR TOTAL SFR SER: NORMAL %

## 2017-10-23 RX ORDER — METFORMIN HCL 500 MG
500 TABLET, EXTENDED RELEASE 24 HR ORAL 2 TIMES DAILY WITH MEALS
Qty: 60 TABLET | Refills: 2 | Status: SHIPPED | OUTPATIENT
Start: 2017-10-23 | End: 2017-12-26

## 2017-10-24 NOTE — PROGRESS NOTES
Pranay Mr. Nolasco,     Pending result of one of the myasthenia antibodies is also positive. Hopefully her doing better with introduction of prednisone and Mestinon. Follow the instructions as discussed during recent office visit. Follow-up in next few weeks as suggested recently.   Reminded to follow up with the eye specialist also because of your history of borderline glaucoma.    Thanks,       Grant Layne MD, [facility]  Neurologist

## 2017-10-24 NOTE — PROGRESS NOTES
Called and spoke with patient and explained information to follow up with eye doctor so, glaucoma is monitored while on medication for Myasthenias Gravis. Patient says he will call the eye doctor and see if he can get an appointment soon  Velia Lepe MA

## 2017-10-30 ENCOUNTER — RADIANT APPOINTMENT (OUTPATIENT)
Dept: ULTRASOUND IMAGING | Facility: CLINIC | Age: 72
End: 2017-10-30
Attending: FAMILY MEDICINE
Payer: COMMERCIAL

## 2017-10-30 DIAGNOSIS — Z87.891 FORMER SMOKER: ICD-10-CM

## 2017-10-30 PROCEDURE — 76775 US EXAM ABDO BACK WALL LIM: CPT

## 2017-11-13 ENCOUNTER — OFFICE VISIT (OUTPATIENT)
Dept: UROLOGY | Facility: CLINIC | Age: 72
End: 2017-11-13
Payer: COMMERCIAL

## 2017-11-13 VITALS — SYSTOLIC BLOOD PRESSURE: 126 MMHG | RESPIRATION RATE: 14 BRPM | DIASTOLIC BLOOD PRESSURE: 84 MMHG | HEART RATE: 82 BPM

## 2017-11-13 DIAGNOSIS — R97.20 ELEVATED PROSTATE SPECIFIC ANTIGEN (PSA): ICD-10-CM

## 2017-11-13 DIAGNOSIS — R31.29 MICROSCOPIC HEMATURIA: ICD-10-CM

## 2017-11-13 DIAGNOSIS — N40.1 BENIGN PROSTATIC HYPERPLASIA WITH LOWER URINARY TRACT SYMPTOMS, SYMPTOM DETAILS UNSPECIFIED: Primary | ICD-10-CM

## 2017-11-13 LAB
ALBUMIN UR-MCNC: NEGATIVE MG/DL
APPEARANCE UR: CLEAR
BILIRUB UR QL STRIP: NEGATIVE
COLOR UR AUTO: YELLOW
GLUCOSE UR STRIP-MCNC: NEGATIVE MG/DL
HGB UR QL STRIP: ABNORMAL
KETONES UR STRIP-MCNC: NEGATIVE MG/DL
LEUKOCYTE ESTERASE UR QL STRIP: NEGATIVE
NITRATE UR QL: NEGATIVE
PH UR STRIP: 6.5 PH (ref 5–7)
RBC #/AREA URNS AUTO: ABNORMAL /HPF
SOURCE: ABNORMAL
SP GR UR STRIP: 1.01 (ref 1–1.03)
UROBILINOGEN UR STRIP-ACNC: 0.2 EU/DL (ref 0.2–1)
WBC #/AREA URNS AUTO: ABNORMAL /HPF

## 2017-11-13 PROCEDURE — 87077 CULTURE AEROBIC IDENTIFY: CPT | Performed by: UROLOGY

## 2017-11-13 PROCEDURE — 87186 SC STD MICRODIL/AGAR DIL: CPT | Performed by: UROLOGY

## 2017-11-13 PROCEDURE — 81001 URINALYSIS AUTO W/SCOPE: CPT | Performed by: UROLOGY

## 2017-11-13 PROCEDURE — 87070 CULTURE OTHR SPECIMN AEROBIC: CPT | Performed by: UROLOGY

## 2017-11-13 PROCEDURE — 88112 CYTOPATH CELL ENHANCE TECH: CPT | Performed by: UROLOGY

## 2017-11-13 PROCEDURE — 99204 OFFICE O/P NEW MOD 45 MIN: CPT | Mod: 25 | Performed by: UROLOGY

## 2017-11-13 PROCEDURE — 51798 US URINE CAPACITY MEASURE: CPT | Performed by: UROLOGY

## 2017-11-13 NOTE — NURSING NOTE
"Chief Complaint   Patient presents with     Elevated PSA       Initial /84 (BP Location: Right arm, Patient Position: Chair, Cuff Size: Adult Regular)  Pulse 82  Resp 14 Estimated body mass index is 36.28 kg/(m^2) as calculated from the following:    Height as of 10/20/17: 1.752 m (5' 8.98\").    Weight as of 10/20/17: 111.4 kg (245 lb 8 oz).  Medication Reconciliation: complete   Citlalli Flores RN       "

## 2017-11-13 NOTE — MR AVS SNAPSHOT
After Visit Summary   11/13/2017    Rakan Nolasco    MRN: [id number]           Patient Information     Date Of Birth          1945        Visit Information        Provider Department      11/13/2017 9:00 AM Josh Emery MD [facility]        Today's Diagnoses     Benign prostatic hyperplasia with lower urinary tract symptoms, symptom details unspecified    -  1    Elevated prostate specific antigen (PSA)        Microscopic hematuria          Care Instructions    Please call [facility] to schedule your CT scan at [facility]. [phone].  They are located near the intersection of [facility] and [address] [place] [address].      Your cystoscopy with Dr. Emery has been scheduled for 12/22/2017 at 0845.    If you have any questions or need to reschedule please call       Cystoscopy    Cystoscopy is a procedure that lets your doctor look directly inside your urethra and bladder. It can be used to:    Help diagnose a problem with your urethra, bladder, or kidneys.    Take a sample (biopsy) of bladder or urethral tissue.    Treat certain problems (such as removing kidney stones).    Place a stent to bypass an obstruction.    Take special X-rays of the kidneys.  Based on the findings, your doctor may recommend other tests or treatments.  What is a cystoscope?  A cystoscope is a telescope-like instrument that contains lenses and fiberoptics (small glass wires that make bright light). The cystoscope may be straight and rigid, or flexible to bend around curves in the urethra. The doctor may look directly into the cystoscope, or project the image onto a monitor.  Getting ready    Ask your doctor if you should stop taking any medicines before the procedure.    Ask whether you should avoid eating or drinking anything after midnight before the procedure.    Follow any other instructions your doctor gives you.  Tell your doctor before the exam if you:    Take any medicines, such as  aspirin or blood thinners    Have allergies to any medicines    Are pregnant   The procedure  Cystoscopy is done in the doctor s office, surgery center, or hospital. The doctor and a nurse are present during the procedure. It takes only a few minutes, longer if a biopsy, X-ray, or treatment needs to be done.  During the procedure:    You lie on an exam table on your back, knees bent and legs apart. You are covered with a drape.    Your urethra and the area around it are washed. Anesthetic jelly may be applied to numb the urethra. Other pain medicine is usually not needed. In some cases, you may be offered a mild sedative to help you relax. If a more extensive procedure is to be done, such as a biopsy or kidney stone removal, general anesthesia may be needed.    The cystoscope is inserted. A sterile fluid is put into the bladder to expand it. You may feel pressure from this fluid.    When the procedure is done, the cystoscope is removed.  After the procedure  If you had a sedative, general anesthesia, or spinal anesthesia, you must have someone drive you home. Once you re home:    Drink plenty of fluids.    You may have burning or light bleeding when you urinate--this is normal.    Medicines may be prescribed to ease any discomfort or prevent infection. Take these as directed.    Call your doctor if you have heavy bleeding or blood clots, burning that lasts more than a day, a fever over 100 F  (38  C), or trouble urinating.  Date Last Reviewed: 1/1/2017 2000-2017 [employer]. [address] [place] [address]. All rights reserved. This information is not intended as a substitute for professional medical care. Always follow your healthcare professional's instructions.        Prostate Needle Biopsy    Prostate needle biopsy is a test to look for prostate cancer. During the test, a thin, hollow needle is used to take small samples of tissue from the prostate. The samples are then tested in a  lab.  Getting ready for the procedure  Prepare as you have been told. In addition to the following:    Tell your healthcare provider about all medicines you take. This includes herbs and other supplements. It also includes any blood thinners, such as warfarin, clopidogrel, or daily aspirin. You may need to stop taking some or all of them before the procedure.    You may be told to use a laxative, enemas, or both before the biopsy. This is to empty the colon and rectum of stool. Follow the instructions you are given.    Your healthcare provider may prescribe antibiotics before the procedure. If so, take these as directed.  Risks and possible complications   All procedures have risks. The risks of this procedure include:    Discomfort in the prostate area    Infection in the urinary tract or prostate    Infection in the bloodstream    Rectal or urinary bleeding   The day of the procedure  The procedure is done in a healthcare provider s office or a hospital. It takes about 45 minutes. You will be able to go home the same day. Transrectal ultrasound is often used during the procedure. This test uses sound waves to make images on a computer screen. The images help the healthcare provider insert the needle in the correct place. During the biopsy:    If ultrasound will be used, you may be asked to drink water to fill your bladder.      You may lie on your side on an exam table.     The ultrasound transducer, which is about the size of a finger, is lubricated. It is then inserted into the rectum. This will feel like a prostate exam. The transducer is moved until the prostate can be seen in the ultrasound images.      To numb the biopsy area, local anesthetics may be injected. You might also be given medicine to make you sleepy.    Using the ultrasound images as a guide, the biopsy needle is inserted. It may be inserted through the rectum or through the skin between the scrotum and the anus (perineum).    The needle is used  to take tissue samples from the prostate. About 12 samples are taken from different areas of the prostate. These samples are sent to a lab to be tested for cancer.  After the biopsy  At first you may feel a little lightheaded, especially if you had medicine to make you sleepy. You can lie on the table until you feel able to stand. You can go home once you are feeling better. You can go back to your normal activities. You may have some blood in your urine or stool that day. This is normal. You may also notice blood in your semen for weeks after the biopsy. This is normal and not dangerous. Your healthcare provider can tell you more about what to expect.  Follow-up care  You will see your healthcare provider for a follow-up visit. Depending on the biopsy results, you may be scheduled for more tests. If signs of cancer are found, you and your healthcare provider can discuss options for further testing.  When to call your healthcare provider  Call your healthcare provider if you have any of the following:    Blood clots in your urine    Bloody diarrhea    Blood in the urine or stool that doesn t go away after 48 hours    Chest pain or trouble breathing (call 911)    Chills    Feeling of weakness    Fever of 100.4 F (38 C) or higher, or as directed by your healthcare provider    Inability to urinate   Date Last Reviewed: 5/1/2017 2000-2017 [employer]. [address] [place] [address]. All rights reserved. This information is not intended as a substitute for professional medical care. Always follow your healthcare professional's instructions.                Follow-ups after your visit        Your next 10 appointments already scheduled     Nov 27, 2017  8:50 AM CST   (Arrive by 8:35 AM)   New Myasthenia Gravis with Arpan Harrison MD   [facility] ([facility])    [address]  [facility] [address] [phone]            Nov  28, 2017  8:45 AM CST   Return Visit with Mateo Gray MD   [facility] ([facility])    [address] [facility] [address]   [phone]            Nov 29, 2017  8:00 AM CST   Return Visit with [place] Layne MD   [facility] ([facility])    [address] [place] [address] [phone]            Dec 05, 2017  8:30 AM CST   US PROSTATE WITH BIOPSY with FKUS1   [facility] ([facility])    [address] [place] [address] [phone]           Please bring a list of your medicines (including vitamins, minerals and over-the-counter drugs). Also, tell your doctor about any allergies you may have. Wear comfortable clothes and leave your valuables at home.  Take a Fleet enema two hours before your exam. Buy this at the drug store. Follow the instructions on the box.  Please bring or send the results of your most recent PSA test, along with the written report from your last rectal or prostate exam.  Please call the Imaging Department at your exam site with any questions.            Dec 05, 2017  8:45 AM CST   Return Visit with Josh Emery MD   [facility] ([facility])    [address] [place] [address]   [phone]            Dec 12, 2017  8:00 AM CST   Return Visit with Josh Emery MD   [facility] ([facility])    [address] [place] [place] [address] [address]   [phone]            Dec 22, 2017  8:45 AM CST   Return Visit with Josh Emery MD, FRISOPHIA CYSTO PROC ROOM   [facility] ([facility])    [address] [place] [address]   [phone]              Future tests that were ordered for you today     Open Future Orders        Priority Expected Expires Ordered    CT Abdomen Pelvis Hematuria w/wo IV Contrast Routine 11/14/2017 1/12/2018 11/13/2017            Who  to contact     If you have questions or need follow up information about today's clinic visit or your schedule please contact [facility] directly at [phone].  Normal or non-critical lab and imaging results will be communicated to you by MyChart, letter or phone within 4 business days after the clinic has received the results. If you do not hear from us within 7 days, please contact the clinic through [employer] or phone. If you have a critical or abnormal lab result, we will notify you by phone as soon as possible.  Submit refill requests through [employer] or call your pharmacy and they will forward the refill request to us. Please allow 3 business days for your refill to be completed.          Additional Information About Your Visit        Tellja Information     [employer] gives you secure access to your electronic health record. If you see a primary care provider, you can also send messages to your care team and make appointments. If you have questions, please call your primary care clinic.  If you do not have a primary care provider, please call [phone] and they will assist you.        Care EveryWhere ID     This is your Care EveryWhere ID. This could be used by other organizations to access your [place] medical records  [id number]        Your Vitals Were     Pulse Respirations                82 14           Blood Pressure from Last 3 Encounters:   11/13/17 126/84   10/20/17 136/80   10/18/17 119/77    Weight from Last 3 Encounters:   10/20/17 111.4 kg (245 lb 8 oz)   10/18/17 113.2 kg (249 lb 9.6 oz)   09/27/17 112.3 kg (247 lb 9.6 oz)              We Performed the Following     MEASURE POST-VOID RESIDUAL URINE/BLADDER CAPACITY, US NON-IMAGING ([address])     UA reflex to Microscopic and Culture     Urine Microscopic        Primary Care Provider Office Phone # Fax #    Telly Lucio [phone] [address] [place] [address]        Equal Access to Services      CRIS YEE : Hadii aad ku kelsea Lara, waaxda luqadaha, qaybta kaalmada ademanjeet, gino silviain hayaamoo hernandez timatravis lion . So [facility] [phone].    ATENCIÓN: Si habla isac, tiene a coates disposición servicios gratuitos de asistencia lingüística. Llame al [phone].    We comply with applicable federal civil rights laws and [place] laws. We do not discriminate on the basis of race, color, national origin, age, disability, sex, sexual orientation, or gender identity.            Thank you!     Thank you for choosing [facility]  for your care. Our goal is always to provide you with excellent care. Hearing back from our patients is one way we can continue to improve our services. Please take a few minutes to complete the written survey that you may receive in the mail after your visit with us. Thank you!             Your Updated Medication List - Protect others around you: Learn how to safely use, store and throw away your medicines at www.disposemymeds.org.          This list is accurate as of: 11/13/17  9:42 AM.  Always use your most recent med list.                   Brand Name Dispense Instructions for use Diagnosis    aspirin 81 MG tablet      Take 1 tablet by mouth daily.        atorvastatin 20 MG tablet    LIPITOR    90 tablet    TAKE ONE TABLET BY MOUTH ONE TIME DAILY    Hyperlipidemia LDL goal <130       CALCIUM 600/VITAMIN D PO      Take  by mouth. Take 1/2 tablet in the morning, 1/2 at night daily        clonazePAM 0.5 MG tablet    klonoPIN     Take 0.5 mg by mouth. Take 1 tablet by mouth daily at bedtime        latanoprost 0.005 % ophthalmic solution    XALATAN    7.5 mL    Place 1 drop into both eyes At Bedtime    Borderline glaucoma with ocular hypertension, bilateral       lisinopril 10 MG tablet    PRINIVIL/ZESTRIL    90 tablet    Take 1 tablet (10 mg) by mouth daily    Hypertension goal BP (blood pressure) < 140/90       metFORMIN 500 MG 24 hr tablet    GLUCOPHAGE-XR     60 tablet    Take 1 tablet (500 mg) by mouth 2 times daily (with meals)    Prediabetes       MULTI-DAY PO      daily        pantoprazole 20 MG EC tablet    PROTONIX    90 tablet    TAKE ONE TABLET BY MOUTH ONE TIME DAILY 30-60 MINUTES BEFORE A MEAL    Gastroesophageal reflux disease without esophagitis       predniSONE 10 MG tablet    DELTASONE    63 tablet    Week one: 30 mg the morning, week 2 and afterwards: 20 mg the morning    Ocular myasthenia (H)       pyridostigmine 60 MG tablet    MESTINON    90 tablet    Take 0.5 tablets (30 mg) by mouth 3 times daily    Ocular myasthenia (H)       timolol 0.5 % ophthalmic solution    TIMOPTIC    1 Bottle    Place 1 drop into both eyes daily    Borderline glaucoma with ocular hypertension, bilateral

## 2017-11-13 NOTE — PATIENT INSTRUCTIONS
Please call [facility] to schedule your CT scan at [facility]. [phone].  They are located near the intersection of [facility] and [address] [place] [address].      Your cystoscopy with Dr. Emery has been scheduled for 12/22/2017 at 0845.    If you have any questions or need to reschedule please call       Cystoscopy    Cystoscopy is a procedure that lets your doctor look directly inside your urethra and bladder. It can be used to:    Help diagnose a problem with your urethra, bladder, or kidneys.    Take a sample (biopsy) of bladder or urethral tissue.    Treat certain problems (such as removing kidney stones).    Place a stent to bypass an obstruction.    Take special X-rays of the kidneys.  Based on the findings, your doctor may recommend other tests or treatments.  What is a cystoscope?  A cystoscope is a telescope-like instrument that contains lenses and fiberoptics (small glass wires that make bright light). The cystoscope may be straight and rigid, or flexible to bend around curves in the urethra. The doctor may look directly into the cystoscope, or project the image onto a monitor.  Getting ready    Ask your doctor if you should stop taking any medicines before the procedure.    Ask whether you should avoid eating or drinking anything after midnight before the procedure.    Follow any other instructions your doctor gives you.  Tell your doctor before the exam if you:    Take any medicines, such as aspirin or blood thinners    Have allergies to any medicines    Are pregnant   The procedure  Cystoscopy is done in the doctor s office, surgery center, or hospital. The doctor and a nurse are present during the procedure. It takes only a few minutes, longer if a biopsy, X-ray, or treatment needs to be done.  During the procedure:    You lie on an exam table on your back, knees bent and legs apart. You are covered with a drape.    Your urethra and the area around it are washed. Anesthetic jelly may be  applied to numb the urethra. Other pain medicine is usually not needed. In some cases, you may be offered a mild sedative to help you relax. If a more extensive procedure is to be done, such as a biopsy or kidney stone removal, general anesthesia may be needed.    The cystoscope is inserted. A sterile fluid is put into the bladder to expand it. You may feel pressure from this fluid.    When the procedure is done, the cystoscope is removed.  After the procedure  If you had a sedative, general anesthesia, or spinal anesthesia, you must have someone drive you home. Once you re home:    Drink plenty of fluids.    You may have burning or light bleeding when you urinate--this is normal.    Medicines may be prescribed to ease any discomfort or prevent infection. Take these as directed.    Call your doctor if you have heavy bleeding or blood clots, burning that lasts more than a day, a fever over 100 F  (38  C), or trouble urinating.  Date Last Reviewed: 1/1/2017 2000-2017 [employer]. [address] [place] [address]. All rights reserved. This information is not intended as a substitute for professional medical care. Always follow your healthcare professional's instructions.        Prostate Needle Biopsy    Prostate needle biopsy is a test to look for prostate cancer. During the test, a thin, hollow needle is used to take small samples of tissue from the prostate. The samples are then tested in a lab.  Getting ready for the procedure  Prepare as you have been told. In addition to the following:    Tell your healthcare provider about all medicines you take. This includes herbs and other supplements. It also includes any blood thinners, such as warfarin, clopidogrel, or daily aspirin. You may need to stop taking some or all of them before the procedure.    You may be told to use a laxative, enemas, or both before the biopsy. This is to empty the colon and rectum of stool. Follow the instructions you  are given.    Your healthcare provider may prescribe antibiotics before the procedure. If so, take these as directed.  Risks and possible complications   All procedures have risks. The risks of this procedure include:    Discomfort in the prostate area    Infection in the urinary tract or prostate    Infection in the bloodstream    Rectal or urinary bleeding   The day of the procedure  The procedure is done in a healthcare provider s office or a hospital. It takes about 45 minutes. You will be able to go home the same day. Transrectal ultrasound is often used during the procedure. This test uses sound waves to make images on a computer screen. The images help the healthcare provider insert the needle in the correct place. During the biopsy:    If ultrasound will be used, you may be asked to drink water to fill your bladder.      You may lie on your side on an exam table.     The ultrasound transducer, which is about the size of a finger, is lubricated. It is then inserted into the rectum. This will feel like a prostate exam. The transducer is moved until the prostate can be seen in the ultrasound images.      To numb the biopsy area, local anesthetics may be injected. You might also be given medicine to make you sleepy.    Using the ultrasound images as a guide, the biopsy needle is inserted. It may be inserted through the rectum or through the skin between the scrotum and the anus (perineum).    The needle is used to take tissue samples from the prostate. About 12 samples are taken from different areas of the prostate. These samples are sent to a lab to be tested for cancer.  After the biopsy  At first you may feel a little lightheaded, especially if you had medicine to make you sleepy. You can lie on the table until you feel able to stand. You can go home once you are feeling better. You can go back to your normal activities. You may have some blood in your urine or stool that day. This is normal. You may also  notice blood in your semen for weeks after the biopsy. This is normal and not dangerous. Your healthcare provider can tell you more about what to expect.  Follow-up care  You will see your healthcare provider for a follow-up visit. Depending on the biopsy results, you may be scheduled for more tests. If signs of cancer are found, you and your healthcare provider can discuss options for further testing.  When to call your healthcare provider  Call your healthcare provider if you have any of the following:    Blood clots in your urine    Bloody diarrhea    Blood in the urine or stool that doesn t go away after 48 hours    Chest pain or trouble breathing (call 911)    Chills    Feeling of weakness    Fever of 100.4 F (38 C) or higher, or as directed by your healthcare provider    Inability to urinate   Date Last Reviewed: 5/1/2017 2000-2017 [employer]. [address] [place] [address], [place] [address]. All rights reserved. This information is not intended as a substitute for professional medical care. Always follow your healthcare professional's instructions.

## 2017-11-13 NOTE — PROGRESS NOTES
S: Rakan Nolasco is a pleasant  72 year old male who was requested to be seen by  Telly Lucio for a consult with regard to patient's urinary complaints and elevated psa.  Patient complains of Frequency and slower urinary stream.  He has history of elevated PSA.  Symptoms have been on going for   several years(s).  Seems to be worsened over time.  His recent PSA was found to be   PSA   Date Value Ref Range Status   10/20/2017 8.05 (H) 0 - 4 ug/L Final     Comment:     Assay Method:  Chemiluminescence using Siemens Vista analyzer   .  His AUA Symptom Score:  18.  His QOL score:  3.  He denies any dysuria or hematuria.    Current Outpatient Prescriptions   Medication Sig Dispense Refill     metFORMIN (GLUCOPHAGE-XR) 500 MG 24 hr tablet Take 1 tablet (500 mg) by mouth 2 times daily (with meals) 60 tablet 2     pantoprazole (PROTONIX) 20 MG EC tablet TAKE ONE TABLET BY MOUTH ONE TIME DAILY 30-60 MINUTES BEFORE A MEAL 90 tablet 1     lisinopril (PRINIVIL/ZESTRIL) 10 MG tablet Take 1 tablet (10 mg) by mouth daily 90 tablet 3     predniSONE (DELTASONE) 10 MG tablet Week one: 30 mg the morning, week 2 and afterwards: 20 mg the morning 63 tablet 0     pyridostigmine (MESTINON) 60 MG tablet Take 0.5 tablets (30 mg) by mouth 3 times daily 90 tablet 1     timolol (TIMOPTIC) 0.5 % ophthalmic solution Place 1 drop into both eyes daily 1 Bottle 11     atorvastatin (LIPITOR) 20 MG tablet TAKE ONE TABLET BY MOUTH ONE TIME DAILY 90 tablet 1     latanoprost (XALATAN) 0.005 % ophthalmic solution Place 1 drop into both eyes At Bedtime 7.5 mL 4     aspirin 81 MG tablet Take 1 tablet by mouth daily.       ClonAZEPAM (KLONOPIN) 0.5 MG tablet Take 0.5 mg by mouth. Take 1 tablet by mouth daily at bedtime       Calcium Carbonate-Vitamin D (CALCIUM 600/VITAMIN D PO) Take  by mouth. Take 1/2 tablet in the morning, 1/2 at night daily       MULTI-DAY OR daily       Allergies   Allergen Reactions     Ropinirole Hcl GI Disturbance      Weakness;? syncope     Past Medical History:   Diagnosis Date     Arthritis          BMI 31.0-31.9,adult      Diverticulosis     Colonoscopy 8/2008     Fatty liver     see US  5/2012     GERD (gastroesophageal reflux disease)      Glaucoma (increased eye pressure)      HTN (hypertension)      Hyperlipidemia LDL goal < 130     age, htn, fhx     Irritable bowel      Nonsenile cataract      ROSIE (obstructive sleep apnea) 01/2011    Using CPAP;      Prediabetes      Restless leg syndrome      Trigeminal neuralgia pain 1/4/2012     Past Surgical History:   Procedure Laterality Date     BIOPSY ARTERY TEMPORAL Bilateral 8/1/2017    Procedure: BIOPSY ARTERY TEMPORAL;  Bilateral temporal artery Biopsy;  Surgeon: Jj Tello MD;  Location: [facility] OR     CATARACT IOL, RT/LT Right      COLONOSCOPY       ESOPHAGOSCOPY, GASTROSCOPY, DUODENOSCOPY (EGD), COMBINED  1/15/2014    Procedure: COMBINED ESOPHAGOSCOPY, GASTROSCOPY, DUODENOSCOPY (EGD), BIOPSY SINGLE OR MULTIPLE;;  Surgeon: Winston Nixon MD;  Location: [facility] OR     PHACOEMULSIFICATION CLEAR CORNEA WITH STANDARD INTRAOCULAR LENS IMPLANT Right 2/20/2017    Procedure: PHACOEMULSIFICATION CLEAR CORNEA WITH STANDARD INTRAOCULAR LENS IMPLANT;  Surgeon: Mateo Gray MD;  Location:  [facility]     RETINAL REATTACHMENT Right      SURGICAL HISTORY OF -   8/2009    Both Eyelids-.     TONSILLECTOMY  as child      Family History   Problem Relation Age of Onset     Respiratory Mother      copd     Allergies Brother      CANCER Maternal Grandmother      HEART DISEASE Paternal Grandmother      CEREBROVASCULAR DISEASE Father      He does not have a family history of prostate cancer.  Social History     Social History     Marital status: Single     Spouse name: N/A     Number of children: 0     Years of education: 12     Occupational History     Retired 3/2012      [employer] ([employer])     Social History Main Topics     Smoking status:  Former Smoker     Years: 15.00     Types: Cigarettes     Quit date: 1/1/1983     Smokeless tobacco: Former User      Comment: smoke free household.     Alcohol use No      Comment: Quit in 1986     Drug use: No     Sexual activity: Not Currently     Partners: Female      Comment: 4/2010  No girlfriend at this time.     Other Topics Concern     None     Social History Narrative        REVIEW OF SYSTEMS  =================  C: NEGATIVE for fever, chills, change in weight  I: NEGATIVE for worrisome rashes, moles or lesions  E/M: NEGATIVE for ear, mouth and throat problems  R: NEGATIVE for significant cough or SHORTNESS OF BREATH  CV:  NEGATIVE for chest pain, palpitations or peripheral edema  GI: NEGATIVE for nausea, abdominal pain, heartburn, or change in bowel habits  NEURO: NEGATIVE numbness/weakness  : see HPI  PSYCH: NEGATIVE depression/anxiety  LYmph: no new enlarged lymph nodes  Ortho: no new trauma/movements           O: Exam:  Constitutional: healthy, alert and no distress  Head: Normocephalic. No masses, lesions, tenderness or abnormalities  ENT: ENT exam normal, no neck nodes or sinus tenderness  Cardiovascular: negative, PMI normal.   Respiratory: negative, no evidence of respiratory distress  Gastrointestinal: Abdomen soft, non-tender. BS normal. No masses, organomegaly  : penis no d/c. Testis no masses.  Prostate 40 gm smooth no nodule.  Musculoskeletal: extremities normal- no gross deformities noted, gait normal and normal muscle tone  Skin: no suspicious lesions or rashes  Neurologic: Alert and oriented  Psychiatric: mentation appears normal. and affect normal/bright  Hematologic/Lymphatic/Immunologic: normal ant/post cervical, axillary, supraclavicular and inguinal nodes    Assessment/Plan:   (N40.1) Benign prostatic hyperplasia with lower urinary tract symptoms, symptom details unspecified  (primary encounter diagnosis)  Comment: bladder scan 15 ml  Plan: discussed tx options           He does not  want anything done at this time.    (R97.20) Elevated prostate specific antigen (PSA)  Comment:  Discussed psa elevation/prostate cancer  Plan: schedule for trus and biopsy           Risks of bleeding/infection discussed.    (R31.29) Microscopic hematuria  Comment: UA showed 2-5 rbc/hpf.  Former smoker.  Plan: CT urogram/cysto next.

## 2017-11-14 ENCOUNTER — RADIANT APPOINTMENT (OUTPATIENT)
Dept: CT IMAGING | Facility: CLINIC | Age: 72
End: 2017-11-14
Attending: UROLOGY
Payer: COMMERCIAL

## 2017-11-14 DIAGNOSIS — R97.20 ELEVATED PROSTATE SPECIFIC ANTIGEN (PSA): ICD-10-CM

## 2017-11-14 DIAGNOSIS — N40.1 BENIGN PROSTATIC HYPERPLASIA WITH LOWER URINARY TRACT SYMPTOMS, SYMPTOM DETAILS UNSPECIFIED: ICD-10-CM

## 2017-11-14 DIAGNOSIS — R31.29 MICROSCOPIC HEMATURIA: ICD-10-CM

## 2017-11-14 PROCEDURE — 74178 CT ABD&PLV WO CNTR FLWD CNTR: CPT | Mod: TC

## 2017-11-14 RX ORDER — IOPAMIDOL 755 MG/ML
95 INJECTION, SOLUTION INTRAVASCULAR ONCE
Status: COMPLETED | OUTPATIENT
Start: 2017-11-14 | End: 2017-11-14

## 2017-11-14 RX ADMIN — IOPAMIDOL 95 ML: 755 INJECTION, SOLUTION INTRAVASCULAR at 09:46

## 2017-11-15 LAB — COPATH REPORT: NORMAL

## 2017-11-17 DIAGNOSIS — R97.20 ELEVATED PROSTATE SPECIFIC ANTIGEN (PSA): Primary | ICD-10-CM

## 2017-11-17 RX ORDER — CIPROFLOXACIN 500 MG/1
500 TABLET, FILM COATED ORAL 2 TIMES DAILY
Qty: 6 TABLET | Refills: 0 | Status: SHIPPED | OUTPATIENT
Start: 2017-11-17 | End: 2017-11-20

## 2017-11-17 RX ORDER — CEPHALEXIN 500 MG/1
500 CAPSULE ORAL 3 TIMES DAILY
Qty: 9 CAPSULE | Refills: 0 | Status: SHIPPED | OUTPATIENT
Start: 2017-11-17 | End: 2017-11-20

## 2017-11-18 LAB
BACTERIA SPEC CULT: ABNORMAL
Lab: ABNORMAL
SPECIMEN SOURCE: ABNORMAL

## 2017-11-20 NOTE — TELEPHONE ENCOUNTER
APPT INFO    Date /Time: 11/27/17, 8:50am    Reason for Appt: Myasthenia gravis    Ref Provider/Clinic: [facility]   Are there internal records? If yes, list: FKNEU   Patient Contact (Y/N) & Call Details: No, referred    Action:      OUTSIDE RECORDS CHECKLIST     CLINIC NAME COMMENTS REC (x) IMG (x)

## 2017-11-22 ASSESSMENT — ENCOUNTER SYMPTOMS
WEIGHT LOSS: 0
HEMATURIA: 0
HALLUCINATIONS: 0
SINUS PAIN: 0
DYSPNEA ON EXERTION: 0
TASTE DISTURBANCE: 0
DOUBLE VISION: 0
NECK MASS: 0
POLYDIPSIA: 0
EYE PAIN: 0
SMELL DISTURBANCE: 0
WHEEZING: 1
NIGHT SWEATS: 0
HEMOPTYSIS: 0
EYE WATERING: 0
SPUTUM PRODUCTION: 0
DIFFICULTY URINATING: 0
FLANK PAIN: 0
HOARSE VOICE: 0
FEVER: 0
POSTURAL DYSPNEA: 0
EYE REDNESS: 0
SHORTNESS OF BREATH: 1
DECREASED APPETITE: 0
FATIGUE: 1
POLYPHAGIA: 1
TROUBLE SWALLOWING: 1
COUGH: 0
WEIGHT GAIN: 1
EYE IRRITATION: 0
ALTERED TEMPERATURE REGULATION: 1
SINUS CONGESTION: 0
SORE THROAT: 0
SNORES LOUDLY: 0
COUGH DISTURBING SLEEP: 0
CHILLS: 0
INCREASED ENERGY: 0
DYSURIA: 0

## 2017-11-23 ENCOUNTER — TRANSFERRED RECORDS (OUTPATIENT)
Dept: HEALTH INFORMATION MANAGEMENT | Facility: CLINIC | Age: 72
End: 2017-11-23

## 2017-11-24 ENCOUNTER — OFFICE VISIT (OUTPATIENT)
Dept: FAMILY MEDICINE | Facility: CLINIC | Age: 72
End: 2017-11-24
Payer: COMMERCIAL

## 2017-11-24 VITALS
BODY MASS INDEX: 36.21 KG/M2 | OXYGEN SATURATION: 98 % | DIASTOLIC BLOOD PRESSURE: 74 MMHG | RESPIRATION RATE: 15 BRPM | SYSTOLIC BLOOD PRESSURE: 122 MMHG | TEMPERATURE: 97.6 F | HEART RATE: 77 BPM | WEIGHT: 245 LBS

## 2017-11-24 DIAGNOSIS — K80.20 MULTIPLE GALLSTONES: Primary | ICD-10-CM

## 2017-11-24 PROCEDURE — 99213 OFFICE O/P EST LOW 20 MIN: CPT | Performed by: FAMILY MEDICINE

## 2017-11-24 NOTE — PROGRESS NOTES
SUBJECTIVE:   Rakan Nolasco is a 72 year old male who presents to clinic today for the following health issues:      ED/UC Followup:    Facility:  [facility]  Date of visit: 11/22/17  Reason for visit: abdominal pain  Current Status: gland stone     Patient presents for ED follow-up visit.  Patient went to the ED for RUQ pain, CT was done which showed gallstones w/o cholecystitis  Pain is well controlled with NSAIDS and low fat diet, patient has already been referred to gen surg.  Warning signs discussed with patient.    Problem list and histories reviewed & adjusted, as indicated.  Additional history: as documented    BP Readings from Last 3 Encounters:   11/24/17 122/74   11/13/17 126/84   10/20/17 136/80    Wt Readings from Last 3 Encounters:   11/24/17 245 lb (111.1 kg)   10/20/17 245 lb 8 oz (111.4 kg)   10/18/17 249 lb 9.6 oz (113.2 kg)         Reviewed and updated as needed this visit by clinical staffAllergies  Meds  Problems       Reviewed and updated as needed this visit by Provider  Allergies  Meds  Problems         ROS:  Constitutional, HEENT, cardiovascular, pulmonary, gi and gu systems are negative, except as otherwise noted.      OBJECTIVE:   /74  Pulse 77  Temp 97.6  F (36.4  C)  Resp 15  Wt 245 lb (111.1 kg)  SpO2 98%  BMI 36.21 kg/m2  Body mass index is 36.21 kg/(m^2).  GENERAL: healthy, alert and no distress  NECK: no adenopathy, no asymmetry, masses, or scars and thyroid normal to palpation  RESP: lungs clear to auscultation - no rales, rhonchi or wheezes  CV: regular rate and rhythm, normal S1 S2, no S3 or S4, no murmur, click or rub, no peripheral edema and peripheral pulses strong  ABDOMEN: soft, mild RUQ tenderness, no hepatosplenomegaly, no masses and bowel sounds normal  SKIN: no suspicious lesions or rashes  NEURO: Normal strength and tone, mentation intact and speech normal  PSYCH: mentation appears normal, affect normal/bright    ASSESSMENT/PLAN:   1. Multiple  gallstones  Discussed necessary diet changes to minimize symptoms, patient will follow-up with general surgery  for definitive management.    Follow-up as needed    Josh Porter MD  [facility]

## 2017-11-24 NOTE — PATIENT INSTRUCTIONS
Gallstones with Biliary Colic    You have abdominal pain due to irritation and spasm of the gallbladder. This is called biliary colic. The gallbladder is a small sac under the liver, which stores and releases a fluid that aids in the digestion of fat. A collection of crystals may form stones inside the gallbladder (gallstones). Gallstones can cause the gallbladder to spasm. If they block the duct out of the gallbladder, they can cause pain and even an infection.   A number of factors increase the risk for having gallstones:    Being female    Being severely overweight (obese)    Older age    Losing or gaining weight quickly    Eating a high-calorie diet    Being pregnant    Taking hormone therapy    Having diabetes  Home care    Rest in bed.    Drink only clear liquids until you feel better.    You may have been prescribed medicine for pain or nausea. Take these as directed.    Fat in your diet makes the gallbladder contract and may cause increased pain. Avoid foods that are high in fat (such as full-fat dairy, fried foods, and fatty meats) for at least two days.    If you are overweight, talk to your healthcare provider about losing weight.  Follow-up care  Follow up with your healthcare provider or as advised. There is a chance that you will have another episode of pain from your gallstones at some point. Removal of the gallbladder is an option to prevent this. Talk with your healthcare provider about your treatment options.  When to seek medical advice  Call your healthcare provider if any of the following occur:    Worsening pain or pain lasting for longer than 6 hours    Pain moving to the right lower abdomen    Repeated vomiting    Swollen abdomen    Fever of 100.4 F (38 C) or higher, or as directed by your healthcare provider    Very dark urine, light colored stools, or yellow color of the skin or eyes    Chest, arm, back, neck or jaw pain  Date Last Reviewed: 6/18/2015 2000-2017 [employer],  [employer]. [address] [place] [address]. All rights reserved. This information is not intended as a substitute for professional medical care. Always follow your healthcare professional's instructions.        Treating Gallstones    The gallbladder is an organ that stores bile. This is a substance that helps with digestion. Deposits in bile can clump together, creating hard, pebble-like stones. These gallstones may not cause any symptoms. In most cases, gallstones have no symptoms. In some cases, though, they irritate the walls of the gallbladder. Or they can block flow of bile out of the gallbladder. If they fall into the common bile duct, stones can block the flow of bile into the small bowel. This can lead to jaundice, pain, or serious infection. Stones are treated only if you have symptoms. If treatment is needed, your healthcare provider will discuss your choices with you. The most common treatments are listed below.  Medicine  Medicines to dissolve gallstones don't really work.   ERCP  ERCP (endoscopic retrograde cholangiopancreatography) is an outpatient procedure that needs heavy sedation to remove stones. It uses a thin tube with video and X-rays to locate stones blocking the flow of bile. The stones are then removed. ERCP may be done alone. Or it may be used before surgery is done to remove the gallbladder.  Surgery  A cholecystectomy is an operation to remove the gallbladder and its contents (gallstones). Today, most cholecystectomies are done laparoscopically. This means using several very small abdominal incisions. Cholecystectomy may also be done with the traditional single, larger incision.   Prevent future symptoms  After treatment, you should follow a low-fat diet. This diet includes lean meats, lean poultry, and fish. Avoid full-fat dairy products. And limit vegetable oils. Read food labels to be sure they re low in fat.   Date Last Reviewed: 5/1/2016 2000-2017 [employer].  [address] [place] [address]. All rights reserved. This information is not intended as a substitute for professional medical care. Always follow your healthcare professional's instructions.      [facility] (formerly [facility])    If you have any questions regarding to your visit please contact your care team:       Team Purple:   Clinic Hours Telephone Number   Dr. Capri Porter   7am-7pm  Monday - Thursday   7am-5pm  Fridays  [phone]  (Appointment scheduling available 24/7)    Questions about your Visit?   Team Line:  [phone]   Urgent Care - [place] - 11am-9pm Monday-Friday Saturday-Sunday- 9am-5pm   [place] - 5pm-9pm Monday-Friday Saturday-Sunday- 9am-5pm  [phone] - [facility]  [phone] - [place]       What options do I have for visits at the clinic other than the traditional office visit?  To expand how we care for you, many of our providers are utilizing electronic visits (e-visits) and telephone visits, when medically appropriate, for interactions with their patients rather than a visit in the clinic.   We also offer nurse visits for many medical concerns. Just like any other service, we will bill your insurance company for this type of visit based on time spent on the phone with your provider. Not all insurance companies cover these visits. Please check with your medical insurance if this type of visit is covered. You will be responsible for any charges that are not paid by your insurance.      E-visits via [employer]:  generally incur a $35.00 fee.  Telephone visits:  Time spent on the phone: *charged based on time that is spent on the phone in increments of 10 minutes. Estimated cost:   5-10 mins $30.00   11-20 mins. $59.00   21-30 mins. $85.00     Use [employer] (secure email communication and access to your chart) to send your primary care provider a message or make an appointment. Ask  someone on your Team how to sign up for [employer].  For a Price Quote for your services, please call our Consumer Price Line at [phone].  As always, Thank you for trusting us with your health care needs!

## 2017-11-24 NOTE — MR AVS SNAPSHOT
After Visit Summary   11/24/2017    Rakan Nolasco    MRN: [id number]           Patient Information     Date Of Birth          1945        Visit Information        Provider Department      11/24/2017 3:40 PM Josh Diaz MD [facility]        Today's Diagnoses     Screening for diabetic peripheral neuropathy    -  1      Care Instructions      Gallstones with Biliary Colic    You have abdominal pain due to irritation and spasm of the gallbladder. This is called biliary colic. The gallbladder is a small sac under the liver, which stores and releases a fluid that aids in the digestion of fat. A collection of crystals may form stones inside the gallbladder (gallstones). Gallstones can cause the gallbladder to spasm. If they block the duct out of the gallbladder, they can cause pain and even an infection.   A number of factors increase the risk for having gallstones:    Being female    Being severely overweight (obese)    Older age    Losing or gaining weight quickly    Eating a high-calorie diet    Being pregnant    Taking hormone therapy    Having diabetes  Home care    Rest in bed.    Drink only clear liquids until you feel better.    You may have been prescribed medicine for pain or nausea. Take these as directed.    Fat in your diet makes the gallbladder contract and may cause increased pain. Avoid foods that are high in fat (such as full-fat dairy, fried foods, and fatty meats) for at least two days.    If you are overweight, talk to your healthcare provider about losing weight.  Follow-up care  Follow up with your healthcare provider or as advised. There is a chance that you will have another episode of pain from your gallstones at some point. Removal of the gallbladder is an option to prevent this. Talk with your healthcare provider about your treatment options.  When to seek medical advice  Call your healthcare provider if any of the following  occur:    Worsening pain or pain lasting for longer than 6 hours    Pain moving to the right lower abdomen    Repeated vomiting    Swollen abdomen    Fever of 100.4 F (38 C) or higher, or as directed by your healthcare provider    Very dark urine, light colored stools, or yellow color of the skin or eyes    Chest, arm, back, neck or jaw pain  Date Last Reviewed: 6/18/2015 2000-2017 [employer]. [address] [place] [address], [place] [address]. All rights reserved. This information is not intended as a substitute for professional medical care. Always follow your healthcare professional's instructions.        Treating Gallstones    The gallbladder is an organ that stores bile. This is a substance that helps with digestion. Deposits in bile can clump together, creating hard, pebble-like stones. These gallstones may not cause any symptoms. In most cases, gallstones have no symptoms. In some cases, though, they irritate the walls of the gallbladder. Or they can block flow of bile out of the gallbladder. If they fall into the common bile duct, stones can block the flow of bile into the small bowel. This can lead to jaundice, pain, or serious infection. Stones are treated only if you have symptoms. If treatment is needed, your healthcare provider will discuss your choices with you. The most common treatments are listed below.  Medicine  Medicines to dissolve gallstones don't really work.   ERCP  ERCP (endoscopic retrograde cholangiopancreatography) is an outpatient procedure that needs heavy sedation to remove stones. It uses a thin tube with video and X-rays to locate stones blocking the flow of bile. The stones are then removed. ERCP may be done alone. Or it may be used before surgery is done to remove the gallbladder.  Surgery  A cholecystectomy is an operation to remove the gallbladder and its contents (gallstones). Today, most cholecystectomies are done laparoscopically. This means using several very small  abdominal incisions. Cholecystectomy may also be done with the traditional single, larger incision.   Prevent future symptoms  After treatment, you should follow a low-fat diet. This diet includes lean meats, lean poultry, and fish. Avoid full-fat dairy products. And limit vegetable oils. Read food labels to be sure they re low in fat.   Date Last Reviewed: 5/1/2016 2000-2017 [employer]. [address] [place] [address]. All rights reserved. This information is not intended as a substitute for professional medical care. Always follow your healthcare professional's instructions.      [facility]    If you have any questions regarding to your visit please contact your care team:       Team Purple:   Clinic Hours Telephone Number   Dr. Capri Porter   7am-7pm  Monday - Thursday   7am-5pm  Fridays  [phone]  (Appointment scheduling available 24/7)    Questions about your Visit?   Team Line:  [phone]   Ur[facility] - [place] [facility] - 11am-9pm Monday-Friday Saturday-Sunday- 9am-5pm   [place] - 5pm-9pm Monday-Friday Saturday-Sunday- 9am-5pm  [phone] - [place]   [phone] [facility]       What options do I have for visits at the clinic other than the traditional office visit?  To expand how we care for you, many of our providers are utilizing electronic visits (e-visits) and telephone visits, when medically appropriate, for interactions with their patients rather than a visit in the clinic.   We also offer nurse visits for many medical concerns. Just like any other service, we will bill your insurance company for this type of visit based on time spent on the phone with your provider. Not all insurance companies cover these visits. Please check with your medical insurance if this type of visit is covered. You will be responsible for any charges that are not paid by your insurance.       E-visits via Instablog[employer]art:  generally incur a $35.00 fee.  Telephone visits:  Time spent on the phone: *charged based on time that is spent on the phone in increments of 10 minutes. Estimated cost:   5-10 mins $30.00   11-20 mins. $59.00   21-30 mins. $85.00     Use Instablogshart (secure email communication and access to your chart) to send your primary care provider a message or make an appointment. Ask someone on your Team how to sign up for So[employer].  For a Price Quote for your services, please call our Consumer Price Line at [phone].  As always, Thank you for trusting us with your health care needs!              Follow-ups after your visit        Follow-up notes from your care team     Return if symptoms worsen or fail to improve.      Your next 10 appointments already scheduled     Nov 27, 2017  8:50 AM CST   (Arrive by 8:35 AM)   New Myasthenia Gravis with Arpan Harrison MD   [facility] ([facility])    [address] [place] [address] [phone]            Nov 28, 2017  8:45 AM CST   [facility]    [address] [facility] [address]   [phone]            Nov 29, 2017  8:00 AM CST   Return Visit with Grant Layne MD   [facility]    [address] [place] [address]   [phone]            Dec 01, 2017  7:30 AM CST   New Visit with Jj Tello MD   [facility] [place] ([facility])    [address] [place] [address]   [phone]            Dec 05, 2017  8:30 AM CST   US PROSTATE WITH BIOPSY with FKUS1   [facility] ([facility])    [address] [place] [address]   [phone]           Please bring a list of your medicines (including vitamins, minerals and over-the-counter drugs). Also, tell your  doctor about any allergies you may have. Wear comfortable clothes and leave your valuables at home.  Take a Fleet enema two hours before your exam. Buy this at the drug store. Follow the instructions on the box.  Please bring or send the results of your most recent PSA test, along with the written report from your last rectal or prostate exam.  Please call the Imaging Department at your exam site with any questions.            Dec 05, 2017  8:45 AM CST   Return Visit with Josh Emery MD   [facility] ([facility])    [address] [place] [address]   [phone]            Dec 12, 2017  8:00 AM CST   Return Visit with Josh Emery MD   [facility] ([facility])    [address] [place] [place] [address] [address]   [phone]            Dec 22, 2017  8:45 AM CST   Return Visit with Josh Emery MD, [place] CYSTO PROC ROOM   [facility] ([facility])    [address] [place] [address]   [phone]              Who to contact     If you have questions or need follow up information about today's clinic visit or your schedule please contact [facility] directly at [phone].  Normal or non-critical lab and imaging results will be communicated to you by MyChart, letter or phone within 4 business days after the clinic has received the results. If you do not hear from us within 7 days, please contact the clinic through MyChart or phone. If you have a critical or abnormal lab result, we will notify you by phone as soon as possible.  Submit refill requests through Home[employer] or call your pharmacy and they will forward the refill request to us. Please allow 3 business days for your refill to be completed.          Additional Information About Your Visit        HomeViva Information     Home[employer]iva gives you secure access to your electronic health record. If you see a primary care provider, you can  also send messages to your care team and make appointments. If you have questions, please call your primary care clinic.  If you do not have a primary care provider, please call [phone] and they will assist you.        Care EveryWhere ID     This is your Care EveryWhere ID. This could be used by other organizations to access your [facility] medical records  [id number]        Your Vitals Were     Pulse Temperature Respirations Pulse Oximetry BMI (Body Mass Index)       77 97.6  F (36.4  C) 15 98% 36.21 kg/m2        Blood Pressure from Last 3 Encounters:   11/24/17 122/74   11/13/17 126/84   10/20/17 136/80    Weight from Last 3 Encounters:   11/24/17 245 lb (111.1 kg)   10/20/17 245 lb 8 oz (111.4 kg)   10/18/17 249 lb 9.6 oz (113.2 kg)              Today, you had the following     No orders found for display       Primary Care Provider Office Phone # Fax #    Telly Lucio [phone]       [address] [facility] [address]        Equal Access to Services     [facility]: Hadii aad ku hadasho Soomaali, waaxda luqadaha, qaybta kaalmada adeegyada, gino lion . So [facility] [phone].    ATENCIÓN: Si habla español, tiene a coates disposición servicios gratuitos de asistencia lingüística. [facility] [phone].    We comply with applicable federal civil rights laws and [place] laws. We do not discriminate on the basis of race, color, national origin, age, disability, sex, sexual orientation, or gender identity.            Thank you!     Thank you for choosing [facility]  for your care. Our goal is always to provide you with excellent care. Hearing back from our patients is one way we can continue to improve our services. Please take a few minutes to complete the written survey that you may receive in the mail after your visit with us. Thank you!             Your Updated Medication List - Protect others around you: Learn how to safely use,  store and throw away your medicines at www.disposemymeds.org.          This list is accurate as of: 11/24/17  4:31 PM.  Always use your most recent med list.                   Brand Name Dispense Instructions for use Diagnosis    aspirin 81 MG tablet      Take 1 tablet by mouth daily.        atorvastatin 20 MG tablet    LIPITOR    90 tablet    TAKE ONE TABLET BY MOUTH ONE TIME DAILY    Hyperlipidemia LDL goal <130       CALCIUM 600/VITAMIN D PO      Take  by mouth. Take 1/2 tablet in the morning, 1/2 at night daily        clonazePAM 0.5 MG tablet    klonoPIN     Take 0.5 mg by mouth. Take 1 tablet by mouth daily at bedtime        latanoprost 0.005 % ophthalmic solution    XALATAN    7.5 mL    Place 1 drop into both eyes At Bedtime    Borderline glaucoma with ocular hypertension, bilateral       lisinopril 10 MG tablet    PRINIVIL/ZESTRIL    90 tablet    Take 1 tablet (10 mg) by mouth daily    Hypertension goal BP (blood pressure) < 140/90       metFORMIN 500 MG 24 hr tablet    GLUCOPHAGE-XR    60 tablet    Take 1 tablet (500 mg) by mouth 2 times daily (with meals)    Prediabetes       MULTI-DAY PO      daily        pantoprazole 20 MG EC tablet    PROTONIX    90 tablet    TAKE ONE TABLET BY MOUTH ONE TIME DAILY 30-60 MINUTES BEFORE A MEAL    Gastroesophageal reflux disease without esophagitis       predniSONE 10 MG tablet    DELTASONE    63 tablet    Week one: 30 mg the morning, week 2 and afterwards: 20 mg the morning    Ocular myasthenia (H)       pyridostigmine 60 MG tablet    MESTINON    90 tablet    Take 0.5 tablets (30 mg) by mouth 3 times daily    Ocular myasthenia (H)       timolol 0.5 % ophthalmic solution    TIMOPTIC    1 Bottle    Place 1 drop into both eyes daily    Borderline glaucoma with ocular hypertension, bilateral

## 2017-11-27 ENCOUNTER — OFFICE VISIT (OUTPATIENT)
Dept: NEUROLOGY | Facility: CLINIC | Age: 72
End: 2017-11-27

## 2017-11-27 ENCOUNTER — OFFICE VISIT (OUTPATIENT)
Dept: FAMILY MEDICINE | Facility: CLINIC | Age: 72
End: 2017-11-27
Payer: COMMERCIAL

## 2017-11-27 ENCOUNTER — PRE VISIT (OUTPATIENT)
Dept: NEUROLOGY | Facility: CLINIC | Age: 72
End: 2017-11-27

## 2017-11-27 VITALS
SYSTOLIC BLOOD PRESSURE: 118 MMHG | OXYGEN SATURATION: 99 % | HEART RATE: 88 BPM | DIASTOLIC BLOOD PRESSURE: 68 MMHG | TEMPERATURE: 99.1 F | BODY MASS INDEX: 35.7 KG/M2 | WEIGHT: 241 LBS | HEIGHT: 69 IN

## 2017-11-27 VITALS
HEART RATE: 78 BPM | RESPIRATION RATE: 18 BRPM | HEIGHT: 69 IN | OXYGEN SATURATION: 94 % | WEIGHT: 238.3 LBS | DIASTOLIC BLOOD PRESSURE: 72 MMHG | BODY MASS INDEX: 35.29 KG/M2 | SYSTOLIC BLOOD PRESSURE: 124 MMHG

## 2017-11-27 DIAGNOSIS — B02.9 HERPES ZOSTER WITHOUT COMPLICATION: Primary | ICD-10-CM

## 2017-11-27 DIAGNOSIS — G70.00 MYASTHENIA GRAVIS, ACHR ANTIBODY POSITIVE (H): ICD-10-CM

## 2017-11-27 DIAGNOSIS — G70.00 OCULAR MYASTHENIA (H): ICD-10-CM

## 2017-11-27 DIAGNOSIS — Z79.60 LONG-TERM USE OF IMMUNOSUPPRESSANT MEDICATION: ICD-10-CM

## 2017-11-27 DIAGNOSIS — Z79.60 LONG-TERM USE OF IMMUNOSUPPRESSANT MEDICATION: Primary | ICD-10-CM

## 2017-11-27 LAB
ALT SERPL W P-5'-P-CCNC: 35 U/L (ref 0–70)
AST SERPL W P-5'-P-CCNC: 20 U/L (ref 0–45)
BASOPHILS # BLD AUTO: 0 10E9/L (ref 0–0.2)
BASOPHILS NFR BLD AUTO: 0.3 %
DIFFERENTIAL METHOD BLD: ABNORMAL
EOSINOPHIL # BLD AUTO: 0.1 10E9/L (ref 0–0.7)
EOSINOPHIL NFR BLD AUTO: 0.4 %
ERYTHROCYTE [DISTWIDTH] IN BLOOD BY AUTOMATED COUNT: 12.7 % (ref 10–15)
HCT VFR BLD AUTO: 46.8 % (ref 40–53)
HGB BLD-MCNC: 16 G/DL (ref 13.3–17.7)
IMM GRANULOCYTES # BLD: 0.1 10E9/L (ref 0–0.4)
IMM GRANULOCYTES NFR BLD: 0.7 %
LYMPHOCYTES # BLD AUTO: 1.2 10E9/L (ref 0.8–5.3)
LYMPHOCYTES NFR BLD AUTO: 8.2 %
MCH RBC QN AUTO: 32.3 PG (ref 26.5–33)
MCHC RBC AUTO-ENTMCNC: 34.2 G/DL (ref 31.5–36.5)
MCV RBC AUTO: 95 FL (ref 78–100)
MONOCYTES # BLD AUTO: 0.7 10E9/L (ref 0–1.3)
MONOCYTES NFR BLD AUTO: 5.1 %
NEUTROPHILS # BLD AUTO: 12 10E9/L (ref 1.6–8.3)
NEUTROPHILS NFR BLD AUTO: 85.3 %
NRBC # BLD AUTO: 0 10*3/UL
NRBC BLD AUTO-RTO: 0 /100
PLATELET # BLD AUTO: 260 10E9/L (ref 150–450)
RBC # BLD AUTO: 4.95 10E12/L (ref 4.4–5.9)
WBC # BLD AUTO: 14.1 10E9/L (ref 4–11)

## 2017-11-27 PROCEDURE — 99213 OFFICE O/P EST LOW 20 MIN: CPT | Performed by: FAMILY MEDICINE

## 2017-11-27 RX ORDER — PYRIDOSTIGMINE BROMIDE 60 MG/1
TABLET ORAL
Qty: 120 TABLET | Refills: 1 | Status: SHIPPED | OUTPATIENT
Start: 2017-11-27 | End: 2018-01-23

## 2017-11-27 ASSESSMENT — PAIN SCALES - GENERAL: PAINLEVEL: MILD PAIN (3)

## 2017-11-27 NOTE — MR AVS SNAPSHOT
After Visit Summary   11/27/2017    Rakan Nolasco    MRN: [id number]           Patient Information     Date Of Birth          1945        Visit Information        Provider Department      11/27/2017 11:30 AM Capri Pedroza MD [facility]        Today's Diagnoses     Herpes zoster without complication    -  1      Care Instructions    [facility]    If you have any questions regarding to your visit please contact your care team:       Team Purple:   Clinic Hours Telephone Number   Dr. Capri Porter   7am-7pm  Monday - Thursday   7am-5pm  Fridays  [phone]  (Appointment scheduling available 24/7)    Questions about your Visit?   Team Line:  [phone]   [facility] [place] [facility] - 11am-9pm Monday-Friday Saturday-Sunday- 9am-5pm   [place] - 5pm-9pm Monday-Friday Saturday-Sunday- 9am-5pm  [phone] - [facility]  [phone] - [place]       What options do I have for visits at the clinic other than the traditional office visit?  To expand how we care for you, many of our providers are utilizing electronic visits (e-visits) and telephone visits, when medically appropriate, for interactions with their patients rather than a visit in the clinic.   We also offer nurse visits for many medical concerns. Just like any other service, we will bill your insurance company for this type of visit based on time spent on the phone with your provider. Not all insurance companies cover these visits. Please check with your medical insurance if this type of visit is covered. You will be responsible for any charges that are not paid by your insurance.      E-visits via [employer]:  generally incur a $35.00 fee.  Telephone visits:  Time spent on the phone: *charged based on time that is spent on the phone in increments of 10 minutes. Estimated cost:   5-10 mins $30.00   11-20 mins. $59.00    21-30 mins. $85.00     Use [email or web address] (secure email communication and access to your chart) to send your primary care provider a message or make an appointment. Ask someone on your Team how to sign up for Ujogo.  For a Price Quote for your services, please call our Consumer Price Line at [phone].  As always, Thank you for trusting us with your health care needs!              Follow-ups after your visit        Your next 10 appointments already scheduled     Nov 28, 2017  8:45 AM CST   Return Visit with Mateo Gray MD   [facility]    [address] [facility] [address]   [phone]            Dec 01, 2017  7:30 AM CST   New Visit with Jj Tello MD   [facility] [place] ([facility])    [address] [place] [address]   [phone]            Dec 05, 2017  8:30 AM CST   US PROSTATE WITH BIOPSY with FKUS1   [facility] [place] ([facility])    [address] [place] [address]   [phone]           Please bring a list of your medicines (including vitamins, minerals and over-the-counter drugs). Also, tell your doctor about any allergies you may have. Wear comfortable clothes and leave your valuables at home.  Take a Fleet enema two hours before your exam. Buy this at the drug store. Follow the instructions on the box.  Please bring or send the results of your most recent PSA test, along with the written report from your last rectal or prostate exam.  Please call the Imaging Department at your exam site with any questions.            Dec 05, 2017  8:45 AM CST   Return Visit with Josh Emery MD   [facility] [place] ([facility])    [address] [place] [place] [address] [address]   [phone]            Dec 12, 2017  8:00 AM CST   Return Visit with Josh Emery MD   [facility] [place] ([facility])    [address] [place] [address]  [place]  "[place] [address]   [phone]            Dec 22, 2017  8:45 AM CST   Return Visit with Josh Emery MD, [place] CYSTO PROC ROOM   [facility] ([address]  [place] [address]   [phone]            Feb 05, 2018 11:20 AM CST   (Arrive by 11:05 AM)   Return Myasthenia Gravis with Arpan Harrison MD   [facility] ([facility] [place])    [address] [place] [address] [phone]              Who to contact     If you have questions or need follow up information about today's clinic visit or your schedule please contact [facility] directly at [phone].  Normal or non-critical lab and imaging results will be communicated to you by MyChart, letter or phone within 4 business days after the clinic has received the results. If you do not hear from us within 7 days, please contact the clinic through MyChart or phone. If you have a critical or abnormal lab result, we will notify you by phone as soon as possible.  Submit refill requests through [employer] or call your pharmacy and they will forward the refill request to us. Please allow 3 business days for your refill to be completed.          Additional Information About Your Visit        MyChart Information     [employer] gives you secure access to your electronic health record. If you see a primary care provider, you can also send messages to your care team and make appointments. If you have questions, please call your primary care clinic.  If you do not have a primary care provider, please call [phone] and they will assist you.        Care EveryWhere ID     This is your Care EveryWhere ID. This could be used by other organizations to access your [facility] medical records  [id number]        Your Vitals Were     Pulse Temperature Height Pulse Oximetry BMI (Body Mass Index)       88 99.1  F (37.3  C) 5' 8.5\" (1.74 m) 99% 36.11 " kg/m2        Blood Pressure from Last 3 Encounters:   11/27/17 118/68   11/27/17 124/72   11/24/17 122/74    Weight from Last 3 Encounters:   11/27/17 241 lb (109.3 kg)   11/27/17 238 lb 4.8 oz (108.1 kg)   11/24/17 245 lb (111.1 kg)              Today, you had the following     No orders found for display         Today's Medication Changes          These changes are accurate as of: 11/27/17 11:56 AM.  If you have any questions, ask your nurse or doctor.               These medicines have changed or have updated prescriptions.        Dose/Directions    pyridostigmine 60 MG tablet   Commonly known as:  MESTINON   This may have changed:    - how much to take  - how to take this  - when to take this  - additional instructions   Used for:  Ocular myasthenia (H), Long-term use of immunosuppressant medication   Changed by:  Arpan Harrison MD        Take 1 tablet three times a day for 1 week, then 1 tablet four times a day   Quantity:  120 tablet   Refills:  1            Where to get your medicines      These medications were sent to [place] [address] [place] [facility] [place] [id number]  [address] [place] [address]     Phone:  [phone]     pyridostigmine 60 MG tablet                Primary Care Provider Office Phone # Fax #    Telly Lucio [phone]       [address] [place] [address] [address]VE [address]  [place] [address]        Equal Access to Services     [facility]: Hadii estefania michael hadasho Soderrick, waaxda luqadaha, qaybta kaalmada toy, waxay fredy dowling adebabita lion . So [facility] [phone].    ATENCIÓN: Si habla español, tiene a coates disposición servicios gratuitos de asistencia lingüística. [facility] al [phone].    We comply with applicable federal civil rights laws and [place] laws. We do not discriminate on the basis of race, color, national origin, age, disability, sex, sexual orientation, or gender identity.            Thank you!     Thank you for choosing [facility]  [place]  for your care. Our goal is always to provide you with excellent care. Hearing back from our patients is one way we can continue to improve our services. Please take a few minutes to complete the written survey that you may receive in the mail after your visit with us. Thank you!             Your Updated Medication List - Protect others around you: Learn how to safely use, store and throw away your medicines at www.disposemymeds.org.          This list is accurate as of: 11/27/17 11:56 AM.  Always use your most recent med list.                   Brand Name Dispense Instructions for use Diagnosis    aspirin 81 MG tablet      Take 1 tablet by mouth daily.        atorvastatin 20 MG tablet    LIPITOR    90 tablet    TAKE ONE TABLET BY MOUTH ONE TIME DAILY    Hyperlipidemia LDL goal <130       CALCIUM 600/VITAMIN D PO      Take  by mouth. Take 1/2 tablet in the morning, 1/2 at night daily        clonazePAM 0.5 MG tablet    klonoPIN     Take 0.5 mg by mouth. Take 1 tablet by mouth daily at bedtime        latanoprost 0.005 % ophthalmic solution    XALATAN    7.5 mL    Place 1 drop into both eyes At Bedtime    Borderline glaucoma with ocular hypertension, bilateral       lisinopril 10 MG tablet    PRINIVIL/ZESTRIL    90 tablet    Take 1 tablet (10 mg) by mouth daily    Hypertension goal BP (blood pressure) < 140/90       metFORMIN 500 MG 24 hr tablet    GLUCOPHAGE-XR    60 tablet    Take 1 tablet (500 mg) by mouth 2 times daily (with meals)    Prediabetes       MULTI-DAY PO      daily        pantoprazole 20 MG EC tablet    PROTONIX    90 tablet    TAKE ONE TABLET BY MOUTH ONE TIME DAILY 30-60 MINUTES BEFORE A MEAL    Gastroesophageal reflux disease without esophagitis       predniSONE 10 MG tablet    DELTASONE    63 tablet    Week one: 30 mg the morning, week 2 and afterwards: 20 mg the morning    Ocular myasthenia (H)       pyridostigmine 60 MG tablet    MESTINON    120 tablet    Take 1 tablet three times a day for  1 week, then 1 tablet four times a day    Ocular myasthenia (H), Long-term use of immunosuppressant medication       timolol 0.5 % ophthalmic solution    TIMOPTIC    1 Bottle    Place 1 drop into both eyes daily    Borderline glaucoma with ocular hypertension, bilateral

## 2017-11-27 NOTE — NURSING NOTE
"Chief Complaint   Patient presents with     Derm Problem       Initial /68 (BP Location: Right arm, Cuff Size: Adult Large)  Pulse 88  Temp 99.1  F (37.3  C)  Ht 5' 8.5\" (1.74 m)  Wt 241 lb (109.3 kg)  SpO2 99%  BMI 36.11 kg/m2 Estimated body mass index is 36.11 kg/(m^2) as calculated from the following:    Height as of this encounter: 5' 8.5\" (1.74 m).    Weight as of this encounter: 241 lb (109.3 kg).  Medication Reconciliation: complete   Maritza Gresham MA      "

## 2017-11-27 NOTE — NURSING NOTE
Chief Complaint   Patient presents with     Consult     [facility] NEW - MYASTHENIA GRAVIS     Stacey Thomas MA

## 2017-11-27 NOTE — PATIENT INSTRUCTIONS
[facility]    If you have any questions regarding to your visit please contact your care team:       Team Purple:   Clinic Hours Telephone Number   Dr. Capri Porter   7am-7pm  Monday - Thursday   7am-5pm  Fridays  [phone]  (Appointment scheduling available 24/7)    Questions about your Visit?   Team Line:  [phone]   Urgent Care - [place] and [facility] - 11am-9pm Monday-Friday Saturday-Sunday- 9am-5pm   [place] - 5pm-9pm Monday-Friday Saturday-Sunday- 9am-5pm  [phone] - [facility]  [phone] - [place]       What options do I have for visits at the clinic other than the traditional office visit?  To expand how we care for you, many of our providers are utilizing electronic visits (e-visits) and telephone visits, when medically appropriate, for interactions with their patients rather than a visit in the clinic.   We also offer nurse visits for many medical concerns. Just like any other service, we will bill your insurance company for this type of visit based on time spent on the phone with your provider. Not all insurance companies cover these visits. Please check with your medical insurance if this type of visit is covered. You will be responsible for any charges that are not paid by your insurance.      E-visits via Phobious:  generally incur a $35.00 fee.  Telephone visits:  Time spent on the phone: *charged based on time that is spent on the phone in increments of 10 minutes. Estimated cost:   5-10 mins $30.00   11-20 mins. $59.00   21-30 mins. $85.00     Use Webcomhart (secure email communication and access to your chart) to send your primary care provider a message or make an appointment. Ask someone on your Team how to sign up for Phobious.  For a Price Quote for your services, please call our Consumer Price Line at [phone].  As always, Thank you for trusting us with your health care needs!

## 2017-11-27 NOTE — MR AVS SNAPSHOT
After Visit Summary   11/27/2017    Rakan Nolasco    MRN: [id number]           Patient Information     Date Of Birth          1945        Visit Information        Provider Department      11/27/2017 8:50 AM Arpan Harrison MD [facility]        Today's Diagnoses     Long-term use of immunosuppressant medication    -  1    Ocular myasthenia (H)          Care Instructions    You should increase your Mestinon to 60 mg (one full pill) three times a day now, and if you don't have enough relief of your symptoms, increase to 60 mg four times a day next week. Increasing the Mestinon (pyridostigmine) may lead to more stomach cramps, diarrhea, sweating, or leg cramps.  Please see your primary care doctor ASAP- if you rash is indeed shingles it must be treated the soonest.  Please stay on 20 mg of prednisone daily for now.   Once the rash has been treated (at least 2 weeks from now), I think we should start a drug called Imuran. This can help stabilize your myasthenia and improve your symptoms, and allow us to gradually reduce your prednisone dose without recurrence. However, it works tremendously slowly, and it can take 6-9 months to go into effect.  Imuran has several side effects, including risk of infection (that's why we shouldn't start it until at least 2-3 weeks after the shingles is treated), risk of liver damage, low blood counts, bleeding, etc.   You should have some blood tests today before starting Imuran. If they are normal, we will call you in 2-3 weeks and give you instructions on how to take it.   Please return to Clinic in 2 months. Call us immediately if you develop any reaction to the new drug, shortness of breath, major swallowing problems, drooling, head drop, difficulties with your voice or speech.          Follow-ups after your visit        Follow-up notes from your care team     Return in about 2 months (around 1/27/2018).      Your next 10 appointments already  scheduled     Nov 27, 2017 10:00 AM CST   LAB with  LAB   [facility] ([facility])    [address] [address] [phone]           Please do not eat 10-12 hours before your appointment if you are coming in fasting for labs on lipids, cholesterol, or glucose (sugar). This does not apply to pregnant women. Water, hot tea and black coffee (with nothing added) are okay. Do not drink other fluids, diet soda or chew gum.            Nov 28, 2017  8:45 AM CST   Return Visit with Mateo Gray MD   [facility] [place] ([facility])    [address] [facility] [address] [facility] [address]   [phone]            Nov 29, 2017  8:00 AM CST   Return Visit with Grant Layne MD   [facility] ([facility])    [address] [place] [address]   [phone]            Dec 01, 2017  7:30 AM CST   New Visit with Jj Tello MD   [facility] ([facility])    [address] [place] [address]   [phone]            Dec 05, 2017  8:30 AM CST   US PROSTATE WITH BIOPSY with FKUS1   [facility] [place] ([facility])    [address] [place] [address]   [phone]           Please bring a list of your medicines (including vitamins, minerals and over-the-counter drugs). Also, tell your doctor about any allergies you may have. Wear comfortable clothes and leave your valuables at home.  Take a Fleet enema two hours before your exam. Buy this at the drug store. Follow the instructions on the box.  Please bring or send the results of your most recent PSA test, along with the written report from your last rectal or prostate exam.  Please call the Imaging Department at your exam site with any questions.            Dec 05, 2017  8:45 AM CST   Return Visit with Josh Emery MD   [facility] ([facility])     [address] [facility]  [place] [address]   [phone]            Dec 12, 2017  8:00 AM CST   [facility] MD   [facility]    [address] [facility]  [place] [address]   [phone]            Dec 22, 2017  8:45 AM CST   Return Visit with Josh Emery MD, FRISOPHIA CYSTO PROC ROOM   [facility]    [address] [facility]  [place] [address]   [phone]            Feb 05, 2018 11:20 AM CST   (Arrive by 11:05 AM)   Return Myasthenia Gravis with Arpan Harrison MD   [facility] ([facility])    [address] [place] [address] [phone]              Future tests that were ordered for you today     Open Future Orders        Priority Expected Expires Ordered    CBC with platelets differential Routine  11/27/2018 11/27/2017    AST Routine  11/27/2018 11/27/2017    ALT Routine  11/27/2018 11/27/2017            Who to contact     Please call your clinic at [phone] to:    Ask questions about your health    Make or cancel appointments    Discuss your medicines    Learn about your test results    Speak to your doctor   If you have compliments or concerns about an experience at your clinic, or if you wish to file a complaint, please contact [facility] Patient Relations at [phone] or email us at [email or web address]         Additional Information About Your Visit        StreetÂ LibraryÂ Networkhart Information     [employer] gives you secure access to your electronic health record. If you see a primary care provider, you can also send messages to your care team and make appointments. If you have questions, please call your primary care clinic.  If you do not have a primary care provider, please call [phone] and they will assist you.      [employer] is an electronic gateway that provides easy, online access to  "your medical records. With [employer], you can request a clinic appointment, read your test results, renew a prescription or communicate with your care team.     To access your existing account, please contact your [facility] or call [phone] for assistance.        Care EveryWhere ID     This is your Care EveryWhere ID. This could be used by other organizations to access your [place] medical records  [id number]        Your Vitals Were     Pulse Respirations Height Pulse Oximetry BMI (Body Mass Index)       78 18 1.752 m (5' 8.98\") 94% 35.21 kg/m2        Blood Pressure from Last 3 Encounters:   11/27/17 124/72   11/24/17 122/74   11/13/17 126/84    Weight from Last 3 Encounters:   11/27/17 108.1 kg (238 lb 4.8 oz)   11/24/17 111.1 kg (245 lb)   10/20/17 111.4 kg (245 lb 8 oz)              We Performed the Following     Thiopurine Methyltransferase ([facility])          Today's Medication Changes          These changes are accurate as of: 11/27/17  9:27 AM.  If you have any questions, ask your nurse or doctor.               These medicines have changed or have updated prescriptions.        Dose/Directions    pyridostigmine 60 MG tablet   Commonly known as:  MESTINON   This may have changed:    - how much to take  - how to take this  - when to take this  - additional instructions   Used for:  Ocular myasthenia (H), Long-term use of immunosuppressant medication   Changed by:  Arpan Harrison MD        Take 1 tablet three times a day for 1 week, then 1 tablet four times a day   Quantity:  120 tablet   Refills:  1            Where to get your medicines      These medications were sent to [place] [address] [facility] [place] - [address]  [address] [place] [address]     Phone:  [phone]     pyridostigmine 60 MG tablet                Primary Care Provider Office Phone # Fax #    Telly Lucio [phone] [address] [facility]  [place] " [place] [address]        Equal Access to Services     [facility] : Hadii estefania michael kelsea Lara, waabdulazizda luqadaha, qaybta kaalmada oscarjuanitotuan, waxericka fredy orosconellietravis blount. So [facility] [phone].    ATENCIÓN: Si habla español, tiene a coates disposición servicios gratuitos de asistencia lingüística. Lindsayame al [phone].    We comply with applicable federal civil rights laws and [place] laws. We do not discriminate on the basis of race, color, national origin, age, disability, sex, sexual orientation, or gender identity.            Thank you!     Thank you for choosing [facility]  for your care. Our goal is always to provide you with excellent care. Hearing back from our patients is one way we can continue to improve our services. Please take a few minutes to complete the written survey that you may receive in the mail after your visit with us. Thank you!             Your Updated Medication List - Protect others around you: Learn how to safely use, store and throw away your medicines at www.disposemymeds.org.          This list is accurate as of: 11/27/17  9:27 AM.  Always use your most recent med list.                   Brand Name Dispense Instructions for use Diagnosis    aspirin 81 MG tablet      Take 1 tablet by mouth daily.        atorvastatin 20 MG tablet    LIPITOR    90 tablet    TAKE ONE TABLET BY MOUTH ONE TIME DAILY    Hyperlipidemia LDL goal <130       CALCIUM 600/VITAMIN D PO      Take  by mouth. Take 1/2 tablet in the morning, 1/2 at night daily        clonazePAM 0.5 MG tablet    klonoPIN     Take 0.5 mg by mouth. Take 1 tablet by mouth daily at bedtime        latanoprost 0.005 % ophthalmic solution    XALATAN    7.5 mL    Place 1 drop into both eyes At Bedtime    Borderline glaucoma with ocular hypertension, bilateral       lisinopril 10 MG tablet    PRINIVIL/ZESTRIL    90 tablet    Take 1 tablet (10 mg) by mouth daily    Hypertension goal BP (blood pressure) < 140/90       metFORMIN 500 MG  24 hr tablet    GLUCOPHAGE-XR    60 tablet    Take 1 tablet (500 mg) by mouth 2 times daily (with meals)    Prediabetes       MULTI-DAY PO      daily        pantoprazole 20 MG EC tablet    PROTONIX    90 tablet    TAKE ONE TABLET BY MOUTH ONE TIME DAILY 30-60 MINUTES BEFORE A MEAL    Gastroesophageal reflux disease without esophagitis       predniSONE 10 MG tablet    DELTASONE    63 tablet    Week one: 30 mg the morning, week 2 and afterwards: 20 mg the morning    Ocular myasthenia (H)       pyridostigmine 60 MG tablet    MESTINON    120 tablet    Take 1 tablet three times a day for 1 week, then 1 tablet four times a day    Ocular myasthenia (H), Long-term use of immunosuppressant medication       timolol 0.5 % ophthalmic solution    TIMOPTIC    1 Bottle    Place 1 drop into both eyes daily    Borderline glaucoma with ocular hypertension, bilateral

## 2017-11-27 NOTE — PROGRESS NOTES
SUBJECTIVE:   Rakan Nolasco is a 72 year old male who presents to clinic today for the following health issues:      Rash  Onset: 3 days ago    Description:   Location: right side  Character: blotchy, raised, painful  Itching (Pruritis): no     Progression of Symptoms:  worsening    Accompanying Signs & Symptoms:  Fever: no   Body aches or joint pain: YES- normal  Sore throat symptoms: no   Recent cold symptoms: no     History:   Previous similar rash: YES- years ago    Precipitating factors:   Exposure to similar rash: no   New exposures: None   Recent travel: no     Alleviating factors:  no    Therapies Tried and outcome: no           Problem list and histories reviewed & adjusted, as indicated.  Additional history: as documented    Patient Active Problem List   Diagnosis     GERD (gastroesophageal reflux disease)     Diverticulosis     Snoring     Fatigue     Hyperlipidemia LDL goal <130     Borderline glaucoma with ocular hypertension     S/P blepharoplasty     Advanced directives, counseling/discussion     Trigeminal neuralgia pain     bmi 31     Hypertension goal BP (blood pressure) < 140/90     Health Care Home     Arthritis     Prediabetes     Dry eyes     PVD (posterior vitreous detachment) OU     H/O RD (retinal detachment) s/p repair with PPV (AB)     Epiretinal membrane, bilateral     Pseudophakia of right eye     Myasthenia gravis, AChR antibody positive (H)     Type 2 diabetes mellitus with hyperglycemia, without long-term current use of insulin (H)     Past Surgical History:   Procedure Laterality Date     BIOPSY ARTERY TEMPORAL Bilateral 8/1/2017    Procedure: BIOPSY ARTERY TEMPORAL;  Bilateral temporal artery Biopsy;  Surgeon: Jj Tello MD;  Location: [facility] OR     CATARACT IOL, RT/LT Right      COLONOSCOPY       ESOPHAGOSCOPY, GASTROSCOPY, DUODENOSCOPY (EGD), COMBINED  1/15/2014    Procedure: COMBINED ESOPHAGOSCOPY, GASTROSCOPY, DUODENOSCOPY (EGD), BIOPSY SINGLE OR MULTIPLE;;  Surgeon:  Winston Nixon MD;  Location: [facility] OR     PHACOEMULSIFICATION CLEAR CORNEA WITH STANDARD INTRAOCULAR LENS IMPLANT Right 2/20/2017    Procedure: PHACOEMULSIFICATION CLEAR CORNEA WITH STANDARD INTRAOCULAR LENS IMPLANT;  Surgeon: Mateo Gray MD;  Location:  [facility]     RETINAL REATTACHMENT Right      SURGICAL HISTORY OF -   8/2009    Both Eyelids-.     TONSILLECTOMY  as child       Social History   Substance Use Topics     Smoking status: Former Smoker     Years: 15.00     Types: Cigarettes     Quit date: 1/1/1983     Smokeless tobacco: Former User      Comment: smoke free household.     Alcohol use No      Comment: Quit in 1986     Family History   Problem Relation Age of Onset     Respiratory Mother      copd     Allergies Brother      CANCER Maternal Grandmother      HEART DISEASE Paternal Grandmother      CEREBROVASCULAR DISEASE Father          Current Outpatient Prescriptions   Medication Sig Dispense Refill     pyridostigmine (MESTINON) 60 MG tablet Take 1 tablet three times a day for 1 week, then 1 tablet four times a day 120 tablet 1     metFORMIN (GLUCOPHAGE-XR) 500 MG 24 hr tablet Take 1 tablet (500 mg) by mouth 2 times daily (with meals) 60 tablet 2     pantoprazole (PROTONIX) 20 MG EC tablet TAKE ONE TABLET BY MOUTH ONE TIME DAILY 30-60 MINUTES BEFORE A MEAL 90 tablet 1     lisinopril (PRINIVIL/ZESTRIL) 10 MG tablet Take 1 tablet (10 mg) by mouth daily 90 tablet 3     predniSONE (DELTASONE) 10 MG tablet Week one: 30 mg the morning, week 2 and afterwards: 20 mg the morning 63 tablet 0     timolol (TIMOPTIC) 0.5 % ophthalmic solution Place 1 drop into both eyes daily 1 Bottle 11     atorvastatin (LIPITOR) 20 MG tablet TAKE ONE TABLET BY MOUTH ONE TIME DAILY 90 tablet 1     latanoprost (XALATAN) 0.005 % ophthalmic solution Place 1 drop into both eyes At Bedtime 7.5 mL 4     aspirin 81 MG tablet Take 1 tablet by mouth daily.       ClonAZEPAM (KLONOPIN) 0.5 MG tablet Take 0.5 mg by mouth.  "Take 1 tablet by mouth daily at bedtime       Calcium Carbonate-Vitamin D (CALCIUM 600/VITAMIN D PO) Take  by mouth. Take 1/2 tablet in the morning, 1/2 at night daily       MULTI-DAY OR daily       [DISCONTINUED] pyridostigmine (MESTINON) 60 MG tablet Take 0.5 tablets (30 mg) by mouth 3 times daily 90 tablet 1     Allergies   Allergen Reactions     Ropinirole Hcl GI Disturbance     Weakness;? syncope     BP Readings from Last 3 Encounters:   11/27/17 118/68   11/27/17 124/72   11/24/17 122/74    Wt Readings from Last 3 Encounters:   11/27/17 241 lb (109.3 kg)   11/27/17 238 lb 4.8 oz (108.1 kg)   11/24/17 245 lb (111.1 kg)                  Labs reviewed in EPIC        Reviewed and updated as needed this visit by clinical staffTobacco  Allergies  Meds  Med Hx  Surg Hx  Fam Hx  Soc Hx      Reviewed and updated as needed this visit by Provider         ROS:  This 72 year old male is here today because he developed severe pain in his right upper abdomen area on 11/22/17 and went to [place] [facility]. Ultrasound revealed that he had gallstones. He was seen here 11/24/17 and referred to general surgery. However, on 11/25/17 he developed a rash in that area which has now spread from his mid back to his mid abdomen and his pain is almost gone. He saw his neurologist today for his myasthenia gravis, who has him on prednisone. He would like to put him on other immune compromising meds as well. Neurologist believes that patient has shingles and sent him to the office to be seen. Patient feels that the pain is much improved. He was able to sleep last night. All other review of systems are negative  Personal, family, and social history reviewed with patient and revised.         OBJECTIVE:     /68 (BP Location: Right arm, Cuff Size: Adult Large)  Pulse 88  Temp 99.1  F (37.3  C)  Ht 5' 8.5\" (1.74 m)  Wt 241 lb (109.3 kg)  SpO2 99%  BMI 36.11 kg/m2  Body mass index is 36.11 kg/(m^2).  Patient has the classic tiny " vesicular rash of shingles from mid back to mid abdomen just under his ribs. No signs of any infection   Well hydrated  Well nourished  Well groomed      Diagnostic Test Results:  none     ASSESSMENT/PLAN:              1. Herpes zoster without complication  As above, discussed that valtrex won't be of much help since he has had shingles for at least 4 days now. He needs to just let it run its course. Once the rash resolves, he can used zostrix cream over the counter topically for any pain. If pain becomes severe again, he can call and get prescription for neurontin    2. Myasthenia gravis, AChR antibody positive (H)  As above       Return to clinic if no improvement     GREG CANNON MD  [facility]

## 2017-11-27 NOTE — LETTER
"2017       RE: Rakan Nolasco  [address] [place] [address]     Dear Colleague,    Thank you for referring your patient, Rakan Nolasco, to the [facility] at [facility]. Please see a copy of my visit note below.    2017        RE: Rakan Nolasco     MRN: [id number]     : 1945           Dear Lorenza Layne and Naveed:        I had the pleasure to see Mr. Nolasco at the [facility].  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  As you know, he is a pleasant 72-year-old man who in 2017 experienced double vision.  He is not sure if it was binocular or monocular as he never tried to cover 1 eye.  He saw an ophthalmologist, Dr Gray.  Initially temporal arteritis was suspected but bilateral temporal artery biopsy was negative.  When you saw him, you immediately suspected myasthenia, as the double vision was fluctuating and worse in the evening.  He noticed some tightness or heaviness around his eyes although no clearcut ptosis. The double vision was oblique, vertical, worse at far.  You then started him on treatment with 60 mg of prednisone daily, which helped and made the double vision go away. He stayed on that dose for 1 month, and then was tapered rather rapidly from 60 mg down to 10.  He got 40 mg for about a couple of weeks, then 20, then 10 mg.  He relapsed on 10 mg with blurry vision appearing again, although not double this time.  The blurry vision is also worse in the evening. Otherwise, he does not report symptoms suggestive of generalized myasthenia with the possible exception of \"once in a while\" difficulty swallowing, which happens with liquids more than solids.  This has been observed over the past 6 months and appears totally unpredictable - usually no more than a couple times a week though and resolves on its own.  He has not noticed any change in his voice or speech, " difficulty chewing,drooling, head drop, dyspnea or orthopnea.      He has had pain at the right angle of the jaw, which was previously diagnosed in the chart as trigeminal neuralgia, although it appears mostly when chewing, but not when touching the angle of the jaw or upon a blow of cold air.  This does not happen anymore.  It used to be a problem years ago and in fact his dentist gave him a TMJ device that helped.  He denies weakness in his arms or legs, numbness or tingling, incontinence of bowel or bladder, although he does describe bladder urgency in the past few months, and the past 2 nights he has had quite intense leg cramps.   He has developed a rash in his right lower and mid abdomen along the a\T9-T11 dermatomes.  This has the appearance of shingles.  He noticed this yesterday and has not notified his primary care doctor about this.  It is tender to touch, somewhat.  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  He is currently on 20 mg of prednisone daily and Mestinon 30 mg 3 times a day.  He has not taken a larger Mestinon dose ever.     His past medical history, allergies, family history, social history, review of symptoms, and medications are as outlined in Epic record.  He had positive acetylcholine receptor binding and modulating antibodies, mildly elevated striated antibodies, 1: 160, and a CT scan of the chest in 10/2017 that showed no thymoma.      His physical examination shows a blood pressure of 124/72, pulse 78 and regular, respiratory rate 18, O2 sat 94% on room air, weight 108.1 kg, height is 175 and he endorses mild pain 3 out of 10.  On neuro exam, he is awake, alert, oriented x3, able to provide a coherent history.  Cranial nerve examination shows a very mild right eyelid ptosis that is not  definitely fatigable.  I did not see it getting worse after 1 minute of sustained upward gaze.  There is no weakness of orbicularis oculi.  He does not have diplopia in any cardinal gaze position. Ductions and versions are for the most normal, although the adduction of the right eye was done a little slower when testing saccades, and he may have a very mild right eye esotropia on cover test Pupil reactions or equal to light and accommodation.  Visual fields are full to confrontation bilaterally.  He has no weakness of orbicularis oris, uvula, jaw, palate or tongue.  There is no dysphonia or dysarthria.  Tongue shows no atrophy or fasciculations.  Facial sensation is intact in all distributions of trigeminal nerve. Neck flexion and extension and shoulder shrug strength are normal.  Strength is 5/5 in upper and lower extremities proximally and distally.  Upper extremity reflexes are 1+ and symmetric.  Knee reflexes are 2 and ankle reflexes are right 1, left absent.  Plantar responses are bilaterally neutral or flexor.  Vibration is reduced in the toes at about 4-5 seconds.  Joint position sensation is normal.  Finger-to-nose is done without dysmetria.  There is no tremor.  He is able to rise from a chair with arms crossed on the chest without difficulty.  Casual and tandem gait are probably normal.  Romberg is negative.      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  It is not clear yet whether his intermittent difficulty swallowing, that he reports, represents a sign of bulbar/generalized myasthenia, but this will be followed.      He is not a candidate for thymectomy due to age. His treatment is problematic for a few reasons.  It appears that he may need more than 10 mg of prednisone daily to keep his symptoms into remission,  because the blurry vision reemerged when he was tapered down to 10; however, with 20 mg of prednisone daily, he has numerous side effects including weight gain, diabetes, glaucoma, and  he has now developed shingles.  I explained to him that prednisone is the cornerstone of treatment of predominantly ocular myasthenia and the medication with the highest likelihood of producing remission; however, in this situation we must carefully balance the risks versus the benefits and especially the risk of infection.  I told him to go to his primary care doctor today if possible, and get his rash treated, because if it is shingles, early treatment can reduce the risk of post-herpetic neuralgia, and the patient is immunocompromised so I am concerned about dissemination.  I then proposed for him to receive a steroid-sparing drug test such as azathioprine.  Because this also increases the risk of infection, it should not be introduced right now, but I would give him at least 2-3 weeks' time to treat the rash and start it later.  The purpose of azathioprine is to function as a steroid-sparing drug and allow us to gradually go down the prednisone without relapse.  The patient understands that azathioprine takes a very long time to work up to 6-9 months, and his symptoms may not be very well controlled for some time until this medication works.    I do not want to drop the prednisone dose right now.  He will get baseline CBC with diff, AST, ALT and TPMT activity before starting Imuran.  If everything is normal, I will notify him and start the medication at least 3 weeks from now and provided that the rash is healing.  I want him to increase the Mestinon because he is on a relatively low dose.  He should try 60 mg 3 times a day for 1 week and then 60 mg 4 times a day and we may go even higher depending on his response and tolerance. He understands that increasing the Mestinon will produce more cholinergic side effects including stomach cramps, leg cramps, diarrhea, excessive sweating or sometimes drooling.  He will return to follow up in our clinic in 2 months or earlier if necessary.  He understands that he needs  to exercise regularly, attempt to lose weight, control his diabetes, follow with his eye doctor regularly for the development of cataracts or glaucoma, take calcium and vitamin D and monitor his bone density.   TT spent for patient care 45 minutes; more than half was counseling.        ARPAN HDEZ MD             D: 2017 09:31   T: 2017 11:01   MT: tb      Name:     JR FRANCOIS   MRN:      [id number]        Account:      [id number]   :      1945           Service Date: 2017      Document: [id number]        Again, thank you for allowing me to participate in the care of your patient.      Sincerely,    Arpan Hdez MD    CC:  [place] [address]      [place] [address]

## 2017-11-27 NOTE — PATIENT INSTRUCTIONS
You should increase your Mestinon to 60 mg (one full pill) three times a day now, and if you don't have enough relief of your symptoms, increase to 60 mg four times a day next week. Increasing the Mestinon (pyridostigmine) may lead to more stomach cramps, diarrhea, sweating, or leg cramps.  Please see your primary care doctor ASAP- if you rash is indeed shingles it must be treated the soonest.  Please stay on 20 mg of prednisone daily for now.   Once the rash has been treated (at least 2 weeks from now), I think we should start a drug called Imuran. This can help stabilize your myasthenia and improve your symptoms, and allow us to gradually reduce your prednisone dose without recurrence. However, it works tremendously slowly, and it can take 6-9 months to go into effect.  Imuran has several side effects, including risk of infection (that's why we shouldn't start it until at least 2-3 weeks after the shingles is treated), risk of liver damage, low blood counts, bleeding, etc.   You should have some blood tests today before starting Imuran. If they are normal, we will call you in 2-3 weeks and give you instructions on how to take it.   Please return to Clinic in 2 months. Call us immediately if you develop any reaction to the new drug, shortness of breath, major swallowing problems, drooling, head drop, difficulties with your voice or speech.

## 2017-11-27 NOTE — PROGRESS NOTES
"2017        Grant Layne MD   [facility]    [address] [place] [address]      Telly Lucio MD   [address] [address]      RE: Rakan Nolasco     MRN: [id number]     : 1945           Dear Lorenza Layne and Naveed:        I had the pleasure to see Mr. Nolasco at the [facility].  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  As you know, he is a pleasant 72-year-old man who in 2017 experienced double vision.  He is not sure if it was binocular or monocular as he never tried to cover 1 eye.  He saw an ophthalmologist, Dr Gray.  Initially temporal arteritis was suspected but bilateral temporal artery biopsy was negative.  When you saw him, you immediately suspected myasthenia, as the double vision was fluctuating and worse in the evening.  He noticed some tightness or heaviness around his eyes although no clearcut ptosis. The double vision was oblique, vertical, worse at far.  You then started him on treatment with 60 mg of prednisone daily, which helped and made the double vision go away. He stayed on that dose for 1 month, and then was tapered rather rapidly from 60 mg down to 10.  He got 40 mg for about a couple of weeks, then 20, then 10 mg.  He relapsed on 10 mg with blurry vision appearing again, although not double this time.  The blurry vision is also worse in the evening. Otherwise, he does not report symptoms suggestive of generalized myasthenia with the possible exception of \"once in a while\" difficulty swallowing, which happens with liquids more than solids.  This has been observed over the past 6 months and appears totally unpredictable - usually no more than a couple times a week though and resolves on its own.  He has not noticed any change in his voice or speech, difficulty chewing,drooling, head drop, dyspnea or orthopnea.  "     He has had pain at the right angle of the jaw, which was previously diagnosed in the chart as trigeminal neuralgia, although it appears mostly when chewing, but not when touching the angle of the jaw or upon a blow of cold air.  This does not happen anymore.  It used to be a problem years ago and in fact his dentist gave him a TMJ device that helped.  He denies weakness in his arms or legs, numbness or tingling, incontinence of bowel or bladder, although he does describe bladder urgency in the past few months, and the past 2 nights he has had quite intense leg cramps.   He has developed a rash in his right lower and mid abdomen along the a\T9-T11 dermatomes.  This has the appearance of shingles.  He noticed this yesterday and has not notified his primary care doctor about this.  It is tender to touch, somewhat.  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  He is currently on 20 mg of prednisone daily and Mestinon 30 mg 3 times a day.  He has not taken a larger Mestinon dose ever.     His past medical history, allergies, family history, social history, review of symptoms, and medications are as outlined in Epic record.  He had positive acetylcholine receptor binding and modulating antibodies, mildly elevated striated antibodies, 1: 160, and a CT scan of the chest in 10/2017 that showed no thymoma.      His physical examination shows a blood pressure of 124/72, pulse 78 and regular, respiratory rate 18, O2 sat 94% on room air, weight 108.1 kg, height is 175 and he endorses mild pain 3 out of 10.  On neuro exam, he is awake, alert, oriented x3, able to provide a coherent history.  Cranial nerve examination shows a very mild right eyelid ptosis that is not definitely fatigable.  I did not see it getting worse after 1  minute of sustained upward gaze.  There is no weakness of orbicularis oculi.  He does not have diplopia in any cardinal gaze position. Ductions and versions are for the most normal, although the adduction of the right eye was done a little slower when testing saccades, and he may have a very mild right eye esotropia on cover test Pupil reactions or equal to light and accommodation.  Visual fields are full to confrontation bilaterally.  He has no weakness of orbicularis oris, uvula, jaw, palate or tongue.  There is no dysphonia or dysarthria.  Tongue shows no atrophy or fasciculations.  Facial sensation is intact in all distributions of trigeminal nerve. Neck flexion and extension and shoulder shrug strength are normal.  Strength is 5/5 in upper and lower extremities proximally and distally.  Upper extremity reflexes are 1+ and symmetric.  Knee reflexes are 2 and ankle reflexes are right 1, left absent.  Plantar responses are bilaterally neutral or flexor.  Vibration is reduced in the toes at about 4-5 seconds.  Joint position sensation is normal.  Finger-to-nose is done without dysmetria.  There is no tremor.  He is able to rise from a chair with arms crossed on the chest without difficulty.  Casual and tandem gait are probably normal.  Romberg is negative.      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  It is not clear yet whether his intermittent difficulty swallowing, that he reports, represents a sign of bulbar/generalized myasthenia, but this will be followed.      He is not a candidate for thymectomy due to age. His treatment is problematic for a few reasons.  It appears that he may need more than 10 mg of prednisone daily to keep his symptoms into remission,  because the blurry vision reemerged when he was tapered down to 10; however, with 20 mg of prednisone daily, he has numerous side effects including weight gain, diabetes, glaucoma, and he has now developed shingles.  I explained to him that  prednisone is the cornerstone of treatment of predominantly ocular myasthenia and the medication with the highest likelihood of producing remission; however, in this situation we must carefully balance the risks versus the benefits and especially the risk of infection.  I told him to go to his primary care doctor today if possible, and get his rash treated, because if it is shingles, early treatment can reduce the risk of post-herpetic neuralgia, and the patient is immunocompromised so I am concerned about dissemination.  I then proposed for him to receive a steroid-sparing drug test such as azathioprine.  Because this also increases the risk of infection, it should not be introduced right now, but I would give him at least 2-3 weeks' time to treat the rash and start it later.  The purpose of azathioprine is to function as a steroid-sparing drug and allow us to gradually go down the prednisone without relapse.  The patient understands that azathioprine takes a very long time to work up to 6-9 months, and his symptoms may not be very well controlled for some time until this medication works.    I do not want to drop the prednisone dose right now.  He will get baseline CBC with diff, AST, ALT and TPMT activity before starting Imuran.  If everything is normal, I will notify him and start the medication at least 3 weeks from now and provided that the rash is healing.  I want him to increase the Mestinon because he is on a relatively low dose.  He should try 60 mg 3 times a day for 1 week and then 60 mg 4 times a day and we may go even higher depending on his response and tolerance. He understands that increasing the Mestinon will produce more cholinergic side effects including stomach cramps, leg cramps, diarrhea, excessive sweating or sometimes drooling.  He will return to follow up in our clinic in 2 months or earlier if necessary.  He understands that he needs to exercise regularly, attempt to lose weight, control  his diabetes, follow with his eye doctor regularly for the development of cataracts or glaucoma, take calcium and vitamin D and monitor his bone density.   TT spent for patient care 45 minutes; more than half was counseling.        Sincerely,      MD BAMBI Zhou MD             D: 2017 09:31   T: 2017 11:01   MT: tb      Name:     JR FRANCOIS   MRN:      [id number]        Account:      [id number]   :      1945           Service Date: 2017      Document: [id number]      Answers for HPI/ROS submitted by the patient on 2017   General Symptoms: Yes  Skin Symptoms: No  HENT Symptoms: Yes  EYE SYMPTOMS: Yes  HEART SYMPTOMS: No  LUNG SYMPTOMS: Yes  INTESTINAL SYMPTOMS: No  URINARY SYMPTOMS: Yes  REPRODUCTIVE SYMPTOMS: Yes  SKELETAL SYMPTOMS: No  BLOOD SYMPTOMS: No  NERVOUS SYSTEM SYMPTOMS: No  MENTAL HEALTH SYMPTOMS: No  Fever: No  Loss of appetite: No  Weight loss: No  Weight gain: Yes  Fatigue: Yes  Night sweats: No  Chills: No  Increased stress: No  Excessive hunger: Yes  Excessive thirst: No  Feeling hot or cold when others believe the temperature is normal: Yes  Loss of height: No  Post-operative complications: No  Surgical site pain: No  Hallucinations: No  Change in or Loss of Energy: No  Hyperactivity: No  Confusion: No  Ear pain: No  Ear discharge: No  Hearing loss: No  Tinnitus: No  Nosebleeds: No  Congestion: No  Sinus pain: No  Trouble swallowing: Yes   Voice hoarseness: No  Mouth sores: No  Sore throat: No  Tooth pain: No  Gum tenderness: No  Bleeding gums: No  Change in taste: No  Change in sense of smell: No  Dry mouth: No  Hearing aid used: No  Neck lump: No  Eye pain: No  Vision loss: Yes  Dry eyes: No  Watery eyes: No  Eye bulging: No  Double vision: No  Spots: Yes  Floaters: Yes  Redness: No  Crossed eyes: No  Tunnel Vision: No  Yellowing of eyes: No  Eye irritation: No  Cough: No  Sputum or phlegm: No  Coughing up blood:  No  Difficulty breating or shortness of breath: Yes  Snoring: No  Wheezing: Yes  Difficulty breathing on exertion: No  Nighttime Cough: No  Difficulty breathing when lying flat: No  Trouble holding urine or incontinence: Yes  Pain or burning: No  Trouble starting or stopping: No  Increased frequency of urination: Yes  Blood in urine: No  Decreased frequency of urination: No  Frequent nighttime urination: No  Flank pain: No  Difficulty emptying bladder: No  Scrotal pain or swelling: No  Erectile dysfunction: Yes  Penile discharge: No  Genital ulcers: No  Reduced libido: Yes

## 2017-11-28 ENCOUNTER — OFFICE VISIT (OUTPATIENT)
Dept: OPHTHALMOLOGY | Facility: CLINIC | Age: 72
End: 2017-11-28
Payer: COMMERCIAL

## 2017-11-28 DIAGNOSIS — G70.00 MYASTHENIA GRAVIS, ACHR ANTIBODY POSITIVE (H): ICD-10-CM

## 2017-11-28 DIAGNOSIS — H04.123 DRY EYES: ICD-10-CM

## 2017-11-28 DIAGNOSIS — Z96.1 PSEUDOPHAKIA OF RIGHT EYE: ICD-10-CM

## 2017-11-28 DIAGNOSIS — H35.373 EPIRETINAL MEMBRANE, BILATERAL: ICD-10-CM

## 2017-11-28 DIAGNOSIS — H40.053 BORDERLINE GLAUCOMA OF BOTH EYES WITH OCULAR HYPERTENSION: Primary | ICD-10-CM

## 2017-11-28 DIAGNOSIS — H43.813 POSTERIOR VITREOUS DETACHMENT OF BOTH EYES: ICD-10-CM

## 2017-11-28 DIAGNOSIS — Z86.69 H/O RD (RETINAL DETACHMENT): ICD-10-CM

## 2017-11-28 PROCEDURE — 92083 EXTENDED VISUAL FIELD XM: CPT | Performed by: STUDENT IN AN ORGANIZED HEALTH CARE EDUCATION/TRAINING PROGRAM

## 2017-11-28 PROCEDURE — 92014 COMPRE OPH EXAM EST PT 1/>: CPT | Performed by: STUDENT IN AN ORGANIZED HEALTH CARE EDUCATION/TRAINING PROGRAM

## 2017-11-28 ASSESSMENT — TONOMETRY
OS_IOP_MMHG: 20
IOP_METHOD: APPLANATION
OD_IOP_MMHG: 18

## 2017-11-28 ASSESSMENT — VISUAL ACUITY
CORRECTION_TYPE: GLASSES
METHOD: SNELLEN - LINEAR
OD_CC: 20/30
OS_CC: 20/20

## 2017-11-28 ASSESSMENT — CUP TO DISC RATIO
OD_RATIO: 0.25 UD
OS_RATIO: 0.35 UD

## 2017-11-28 ASSESSMENT — EXTERNAL EXAM - LEFT EYE: OS_EXAM: NORMAL

## 2017-11-28 ASSESSMENT — EXTERNAL EXAM - RIGHT EYE: OD_EXAM: NORMAL

## 2017-11-28 ASSESSMENT — SLIT LAMP EXAM - LIDS
COMMENTS: TRACE BLEPHARITIS
COMMENTS: TRACE BLEPHARITIS

## 2017-11-28 NOTE — MR AVS SNAPSHOT
After Visit Summary   11/28/2017    Rakan Nolasco    MRN: [id number]           Patient Information     Date Of Birth          1945        Visit Information        Provider Department      11/28/2017 8:45 AM Mateo Gray MD [facility] [place]        Care Instructions    Continue same medications            Follow-ups after your visit        Follow-up notes from your care team     Return in about 6 months (around 5/28/2018) for Complete Exam.      Your next 10 appointments already scheduled     Dec 01, 2017  7:30 AM CST   New Visit with Jj Tello MD   [facility] [place] ([facility]    [address] [place] [address]   [phone]            Dec 05, 2017  8:30 AM CST   US PROSTATE WITH BIOPSY with FKUS1   [facility] ([facility])    [address] [place] [address]   [phone]           Please bring a list of your medicines (including vitamins, minerals and over-the-counter drugs). Also, tell your doctor about any allergies you may have. Wear comfortable clothes and leave your valuables at home.  Take a Fleet enema two hours before your exam. Buy this at the drug store. Follow the instructions on the box.  Please bring or send the results of your most recent PSA test, along with the written report from your last rectal or prostate exam.  Please call the Imaging Department at your exam site with any questions.            Dec 05, 2017  8:45 AM CST   Return Visit with MD [facility] ([facility]    [address] [place] [address]  [place] [address]   [phone]            Dec 12, 2017  8:00 AM CST   Return Visit with MD [facility] ([facility]    [address] [place] [place] [address] [address]   [phone]            Dec 22, 2017  8:45 AM CST   Return Visit with Josh Emery MD, [place] CYSTO PROC ROOM    [facility]    [address] [facility]  [place] [address]   [phone]            Feb 05, 2018 11:20 AM CST   (Arrive by 11:05 AM)   Return Myasthenia Gravis with Arpan Harrison MD   [facility] ([facility])    [address]  3rd Floor  [facility] [address] [phone]              Who to contact     If you have questions or need follow up information about today's clinic visit or your schedule please contact [facility] directly at [phone].  Normal or non-critical lab and imaging results will be communicated to you by MyChart, letter or phone within 4 business days after the clinic has received the results. If you do not hear from us within 7 days, please contact the clinic through Liquidations Enchere [employer] or phone. If you have a critical or abnormal lab result, we will notify you by phone as soon as possible.  Submit refill requests through SantoSolve or call your pharmacy and they will forward the refill request to us. Please allow 3 business days for your refill to be completed.          Additional Information About Your Visit        Liqu[employer]Review[employer]AP Information     [employer] gives you secure access to your electronic health record. If you see a primary care provider, you can also send messages to your care team and make appointments. If you have questions, please call your primary care clinic.  If you do not have a primary care provider, please call [phone] and they will assist you.        Care EveryWhere ID     This is your Care EveryWhere ID. This could be used by other organizations to access your [facility] medical records  [id number]         Blood Pressure from Last 3 Encounters:   11/27/17 118/68   11/27/17 124/72   11/24/17 122/74    Weight from Last 3 Encounters:   11/27/17 109.3 kg (241 lb)   11/27/17 108.1 kg (238 lb 4.8 oz)   11/24/17 111.1 kg (245 lb)              Today, you had the following     No  orders found for display       Primary Care Provider Office Phone # Fax #    Telly Lucio [phone] [address] [facility] [address]        Equal Access to Services     CRIS YEE : Norm estefania ku krzysztofo Somatthewali, waaxda luqadaha, qaybta kaalmada ademanjeet, gino dodson laDavidtavares blount. So [facility] [phone].    ATENCIÓN: Si habla español, tiene a coates disposición servicios gratuitos de asistencia lingüística. Llame al [phone].    We comply with applicable federal civil rights laws and [place] laws. We do not discriminate on the basis of race, color, national origin, age, disability, sex, sexual orientation, or gender identity.            Thank you!     Thank you for choosing [facility]  for your care. Our goal is always to provide you with excellent care. Hearing back from our patients is one way we can continue to improve our services. Please take a few minutes to complete the written survey that you may receive in the mail after your visit with us. Thank you!             Your Updated Medication List - Protect others around you: Learn how to safely use, store and throw away your medicines at www.disposemymeds.org.          This list is accurate as of: 11/28/17  9:51 AM.  Always use your most recent med list.                   Brand Name Dispense Instructions for use Diagnosis    aspirin 81 MG tablet      Take 1 tablet by mouth daily.        atorvastatin 20 MG tablet    LIPITOR    90 tablet    TAKE ONE TABLET BY MOUTH ONE TIME DAILY    Hyperlipidemia LDL goal <130       CALCIUM 600/VITAMIN D PO      Take  by mouth. Take 1/2 tablet in the morning, 1/2 at night daily        clonazePAM 0.5 MG tablet    klonoPIN     Take 0.5 mg by mouth. Take 1 tablet by mouth daily at bedtime        latanoprost 0.005 % ophthalmic solution    XALATAN    7.5 mL    Place 1 drop into both eyes At Bedtime    Borderline glaucoma with ocular hypertension, bilateral        lisinopril 10 MG tablet    PRINIVIL/ZESTRIL    90 tablet    Take 1 tablet (10 mg) by mouth daily    Hypertension goal BP (blood pressure) < 140/90       metFORMIN 500 MG 24 hr tablet    GLUCOPHAGE-XR    60 tablet    Take 1 tablet (500 mg) by mouth 2 times daily (with meals)    Prediabetes       MULTI-DAY PO      daily        pantoprazole 20 MG EC tablet    PROTONIX    90 tablet    TAKE ONE TABLET BY MOUTH ONE TIME DAILY 30-60 MINUTES BEFORE A MEAL    Gastroesophageal reflux disease without esophagitis       predniSONE 10 MG tablet    DELTASONE    63 tablet    Week one: 30 mg the morning, week 2 and afterwards: 20 mg the morning    Ocular myasthenia (H)       pyridostigmine 60 MG tablet    MESTINON    120 tablet    Take 1 tablet three times a day for 1 week, then 1 tablet four times a day    Ocular myasthenia (H), Long-term use of immunosuppressant medication       timolol 0.5 % ophthalmic solution    TIMOPTIC    1 Bottle    Place 1 drop into both eyes daily    Borderline glaucoma with ocular hypertension, bilateral

## 2017-11-28 NOTE — PROGRESS NOTES
Current Eye Medications:  Timolol bid right eye( 7am)  and Latanoprost nightly(9:30pm), 2nd timolol  at 3 pm. Pt is taking 20mg prednisone daily and 60 mg Pyridostigmine  tid      Subjective:  IOP with oct and hvf  Pt reports his vision seems to be getting worse, vision will fluctuate. Pt has recently been dxed with Myasthenia Gravis - saw neurologist yesterday who is adjusting doses of prednisolone/mestinon. Also developed shingles on the abdomen this week.      Objective:  See Ophthalmology Exam.      Assessment:  Rakan Nolasco is a 72 year old male who presents with:   Encounter Diagnoses   Name Primary?     Borderline glaucoma of both eyes with ocular hypertension Sifuentes visual field (HVF) within normal limits both eyes.   OCT: stable both eyes with better quality in right eye (no charge - ordered in error).        Myasthenia gravis, AChR antibody positive (H) Neurology is adjusting doses of prednisolone/mestinon to achieve asymptomatic lowest dose. (Considering azathroprine).      Pseudophakia of right eye      Posterior vitreous detachment of both eyes      Epiretinal membrane, bilateral      Dry eyes      H/O RD (retinal detachment) s/p repair with PPV (AB)        Plan:  Continue same medications    Mateo Gray MD  [phone]

## 2017-12-01 ENCOUNTER — OFFICE VISIT (OUTPATIENT)
Dept: SURGERY | Facility: CLINIC | Age: 72
End: 2017-12-01
Payer: COMMERCIAL

## 2017-12-01 VITALS
WEIGHT: 241.2 LBS | DIASTOLIC BLOOD PRESSURE: 75 MMHG | SYSTOLIC BLOOD PRESSURE: 148 MMHG | HEART RATE: 67 BPM | BODY MASS INDEX: 35.73 KG/M2 | HEIGHT: 69 IN

## 2017-12-01 DIAGNOSIS — B02.9 HERPES ZOSTER WITHOUT COMPLICATION: Primary | ICD-10-CM

## 2017-12-01 DIAGNOSIS — R10.11 RUQ ABDOMINAL PAIN: ICD-10-CM

## 2017-12-01 DIAGNOSIS — K80.20 CALCULUS OF GALLBLADDER WITHOUT CHOLECYSTITIS WITHOUT OBSTRUCTION: ICD-10-CM

## 2017-12-01 LAB — TPMT BLD-CCNC: 33.7 U/ML (ref 24–44)

## 2017-12-01 PROCEDURE — 99213 OFFICE O/P EST LOW 20 MIN: CPT | Performed by: SURGERY

## 2017-12-01 NOTE — NURSING NOTE
"Chief Complaint   Patient presents with     Consult     Pt states he had went to the ER on 11/22/17 and was Dx with shingles so that may have been the cause to the abdominal pain but may have stones as well       Initial /75  Pulse 67  Ht 1.74 m (5' 8.5\")  Wt 109.4 kg (241 lb 3.2 oz)  BMI 36.14 kg/m2 Estimated body mass index is 36.14 kg/(m^2) as calculated from the following:    Height as of this encounter: 1.74 m (5' 8.5\").    Weight as of this encounter: 109.4 kg (241 lb 3.2 oz).  Medication Reconciliation: complete   Silvia Watkins CMA 12/1/2017 7:30 AM      "

## 2017-12-01 NOTE — MR AVS SNAPSHOT
After Visit Summary   12/1/2017    Rakan Nolasco    MRN: [id number]           Patient Information     Date Of Birth          1945        Visit Information        Provider Department      12/1/2017 7:30 AM Jj Tello MD [facility]        Today's Diagnoses     Herpes zoster without complication    -  1    RUQ abdominal pain        Calculus of gallbladder without cholecystitis without obstruction           Follow-ups after your visit        Your next 10 appointments already scheduled     Dec 05, 2017  8:30 AM CST   US PROSTATE WITH BIOPSY with FKUS1   [facility] [place] ([facility])    [address] [place] [address]   [phone]           Please bring a list of your medicines (including vitamins, minerals and over-the-counter drugs). Also, tell your doctor about any allergies you may have. Wear comfortable clothes and leave your valuables at home.  Take a Fleet enema two hours before your exam. Buy this at the drug store. Follow the instructions on the box.  Please bring or send the results of your most recent PSA test, along with the written report from your last rectal or prostate exam.  Please call the Imaging Department at your exam site with any questions.            Dec 05, 2017  8:45 AM CST   Return Visit with Josh Emery MD   [facility] [place] [facility]    [address] [place] [address]  [place] [address]   [phone]            Dec 12, 2017  8:00 AM CST   Return Visit with Josh Emery MD   [facility] [place] [facility]    [address] [place] [address]  [place] [address]   [phone]            Dec 22, 2017  8:45 AM CST   Return Visit with Josh Emery MD, [place] CYST[place] PROC ROOM   [facility] [place] ([facility])    [address] [place] [place] [address] [address]   [phone]            Feb 05, 2018 11:20 AM CST   (Arrive by 11:05 AM)   Return  "Myasthenia Gravis with Arpan Harrison MD   [facility] ([facility])    [address]  3rd Floor  [facility] [id number] [phone]            May 29, 2018  8:30 AM CDT   New Visit with Mateo Gray MD   [facility] ([facility])    [address] [place] [address] [phone]              Who to contact     If you have questions or need follow up information about today's clinic visit or your schedule please contact [facility] directly at [phone].  Normal or non-critical lab and imaging results will be communicated to you by MyChart, letter or phone within 4 business days after the clinic has received the results. If you do not hear from us within 7 days, please contact the clinic through Litographshart or phone. If you have a critical or abnormal lab result, we will notify you by phone as soon as possible.  Submit refill requests through StemPath or call your pharmacy and they will forward the refill request to us. Please allow 3 business days for your refill to be completed.          Additional Information About Your Visit        LitographsharConvercent Information     Stem[employer] gives you secure access to your electronic health record. If you see a primary care provider, you can also send messages to your care team and make appointments. If you have questions, please call your primary care clinic.  If you do not have a primary care provider, please call [phone] and they will assist you.        Care EveryWhere ID     This is your Care EveryWhere ID. This could be used by other organizations to access your [facility] medical records  [id number]        Your Vitals Were     Pulse Height BMI (Body Mass Index)             67 1.74 m (5' 8.5\") 36.14 kg/m2          Blood Pressure from Last 3 Encounters:   12/01/17 148/75   11/27/17 118/68   11/27/17 124/72    Weight from Last 3 Encounters:   12/01/17 109.4 kg (241 lb " 3.2 oz)   11/27/17 109.3 kg (241 lb)   11/27/17 108.1 kg (238 lb 4.8 oz)              Today, you had the following     No orders found for display       Primary Care Provider Office Phone # Fax #    Telly Lucio [phone] [address] [facility] [address]        Equal Access to Services     [facility] Hospital[facility]ISHA : Hadii aad ku hadasho Soomaali, waaxda luqadaha, qaybta kaalmada adeegyada, waxay idiin hayaan adeeg west laDavidcadenn . So [facility] [phone].    ATENCIÓN: Si habla espbill, tiene a coates disposición servicios gratuitos de asistencia lingüística. Llame al [phone].    We comply with applicable federal civil rights laws and [place] laws. We do not discriminate on the basis of race, color, national origin, age, disability, sex, sexual orientation, or gender identity.            Thank you!     Thank you for choosing [facility]  for your care. Our goal is always to provide you with excellent care. Hearing back from our patients is one way we can continue to improve our services. Please take a few minutes to complete the written survey that you may receive in the mail after your visit with us. Thank you!             Your Updated Medication List - Protect others around you: Learn how to safely use, store and throw away your medicines at www.disposemymeds.org.          This list is accurate as of: 12/1/17  7:52 AM.  Always use your most recent med list.                   Brand Name Dispense Instructions for use Diagnosis    aspirin 81 MG tablet      Take 1 tablet by mouth daily.        atorvastatin 20 MG tablet    LIPITOR    90 tablet    TAKE ONE TABLET BY MOUTH ONE TIME DAILY    Hyperlipidemia LDL goal <130       CALCIUM 600/VITAMIN D PO      Take  by mouth. Take 1/2 tablet in the morning, 1/2 at night daily        clonazePAM 0.5 MG tablet    klonoPIN     Take 0.5 mg by mouth. Take 1 tablet by mouth daily at bedtime        latanoprost 0.005 % ophthalmic solution     XALATAN    7.5 mL    Place 1 drop into both eyes At Bedtime    Borderline glaucoma with ocular hypertension, bilateral       lisinopril 10 MG tablet    PRINIVIL/ZESTRIL    90 tablet    Take 1 tablet (10 mg) by mouth daily    Hypertension goal BP (blood pressure) < 140/90       metFORMIN 500 MG 24 hr tablet    GLUCOPHAGE-XR    60 tablet    Take 1 tablet (500 mg) by mouth 2 times daily (with meals)    Prediabetes       MULTI-DAY PO      daily        pantoprazole 20 MG EC tablet    PROTONIX    90 tablet    TAKE ONE TABLET BY MOUTH ONE TIME DAILY 30-60 MINUTES BEFORE A MEAL    Gastroesophageal reflux disease without esophagitis       predniSONE 10 MG tablet    DELTASONE    63 tablet    Week one: 30 mg the morning, week 2 and afterwards: 20 mg the morning    Ocular myasthenia (H)       pyridostigmine 60 MG tablet    MESTINON    120 tablet    Take 1 tablet three times a day for 1 week, then 1 tablet four times a day    Ocular myasthenia (H), Long-term use of immunosuppressant medication       timolol 0.5 % ophthalmic solution    TIMOPTIC    1 Bottle    Place 1 drop into both eyes daily    Borderline glaucoma with ocular hypertension, bilateral

## 2017-12-04 DIAGNOSIS — G70.00 OCULAR MYASTHENIA (H): ICD-10-CM

## 2017-12-04 NOTE — TELEPHONE ENCOUNTER
predniSONE (DELTASONE) 10 MG tablet      Last Written Prescription Date:  10/18/17  Last Fill Quantity: 63,   # refills: 0  Last Office Visit: 11/27/17  Future Office visit:    Next 5 appointments (look out 90 days)     Dec 05, 2017  8:45 AM CST   [facility] with [facility] Emery MD   [facility]    [address] [place] [address]  [place] [address]   [phone]            Dec 12, 2017  8:00 AM CST   Return Visit with Josh Emery MD   [facility]    [address] [place] [address]  [place] [address]   [phone]            Dec 22, 2017  8:45 AM CST   Return Visit with Josh Emery MD, [facility] CYSTO PROC ROOM   [facility] ([facility])    [address] [place] [address]  [place] [address]   [phone]                   Routing refill request to provider for review/approval because:  Drug not on the [facility], P or [facility] refill protocol or controlled substance

## 2017-12-05 ENCOUNTER — RADIANT APPOINTMENT (OUTPATIENT)
Dept: ULTRASOUND IMAGING | Facility: CLINIC | Age: 72
End: 2017-12-05
Payer: COMMERCIAL

## 2017-12-05 ENCOUNTER — OFFICE VISIT (OUTPATIENT)
Dept: UROLOGY | Facility: CLINIC | Age: 72
End: 2017-12-05
Payer: COMMERCIAL

## 2017-12-05 DIAGNOSIS — R31.29 MICROSCOPIC HEMATURIA: ICD-10-CM

## 2017-12-05 DIAGNOSIS — R97.20 ELEVATED PROSTATE SPECIFIC ANTIGEN (PSA): ICD-10-CM

## 2017-12-05 DIAGNOSIS — N40.1 BENIGN PROSTATIC HYPERPLASIA WITH LOWER URINARY TRACT SYMPTOMS, SYMPTOM DETAILS UNSPECIFIED: ICD-10-CM

## 2017-12-05 DIAGNOSIS — R97.20 ELEVATED PROSTATE SPECIFIC ANTIGEN (PSA): Primary | ICD-10-CM

## 2017-12-05 PROCEDURE — 88305 TISSUE EXAM BY PATHOLOGIST: CPT | Performed by: UROLOGY

## 2017-12-05 PROCEDURE — 88344 IMHCHEM/IMCYTCHM EA MLT ANTB: CPT | Mod: 59 | Performed by: UROLOGY

## 2017-12-05 PROCEDURE — 99207 ZZC DROP WITH A PROCEDURE: CPT | Mod: 25 | Performed by: UROLOGY

## 2017-12-05 PROCEDURE — 55700 HC BIOPSY PROSTATE NEEDLE/PUNCH: CPT | Performed by: UROLOGY

## 2017-12-05 PROCEDURE — 76942 ECHO GUIDE FOR BIOPSY: CPT | Performed by: UROLOGY

## 2017-12-05 PROCEDURE — 99000 SPECIMEN HANDLING OFFICE-LAB: CPT | Performed by: UROLOGY

## 2017-12-05 NOTE — MR AVS SNAPSHOT
After Visit Summary   12/5/2017    Rakan Nolasco    MRN: [id number]           Patient Information     Date Of Birth          1945        Visit Information        Provider Department      12/5/2017 8:45 AM Josh Emery MD [facility] [place]        Today's Diagnoses     Elevated prostate specific antigen (PSA)    -  1       Follow-ups after your visit        Your next 10 appointments already scheduled     Dec 12, 2017  8:00 AM CST   [facility] MD   [facility]    [address] [place] [address]   [phone]            Dec 22, 2017  8:45 AM CST   Return Visit with Josh Emery MD, [place] CYSTO PROC ROOM   [facility] [place] [facility]    [address] [place] [address]   [phone]            Feb 05, 2018 11:20 AM CST   (Arrive by 11:05 AM)   Return Myasthenia Gravis with Arpan Harrison MD   [facility] ([facility])    [address] [place] [id number] [phone]            May 29, 2018  8:30 AM CDT   New Visit with Mateo Gray MD   [facility]    [address] [place] [address]   [phone]              Who to contact     If you have questions or need follow up information about today's clinic visit or your schedule please contact [facility] directly at [phone].  Normal or non-critical lab and imaging results will be communicated to you by MyChart, letter or phone within 4 business days after the clinic has received the results. If you do not hear from us within 7 days, please contact the clinic through MyChart or phone. If you have a critical or abnormal lab result, we will notify you by phone as soon as possible.  Submit refill requests through PositiveID or call your pharmacy and they will forward the refill  request to us. Please allow 3 business days for your refill to be completed.          Additional Information About Your Visit        [employer] [place] [employer]art Information     [place] [employer] gives you secure access to your electronic health record. If you see a primary care provider, you can also send messages to your care team and make appointments. If you have questions, please call your primary care clinic.  If you do not have a primary care provider, please call [phone] and they will assist you.        Care EveryWhere ID     This is your Care EveryWhere ID. This could be used by other organizations to access your [facility] medical records  [id number]         Blood Pressure from Last 3 Encounters:   12/01/17 148/75   11/27/17 118/68   11/27/17 124/72    Weight from Last 3 Encounters:   12/01/17 109.4 kg (241 lb 3.2 oz)   11/27/17 109.3 kg (241 lb)   11/27/17 108.1 kg (238 lb 4.8 oz)              We Performed the Following     BIOPSY PROSTATE NEEDLE/PUNCH     CL SPECIMEN HANDLING,DR OFF->LAB     Surgical pathology exam        Primary Care Provider Office Phone # Fax #    Telly Lucio [phone]       [address] [facility] [address]        Equal Access to Services     CRIS YEE : Hadii estefania ku hadasho Soomaali, waaxda luqadaha, qaybta kaalmada adeegyada, gino blount. So [facility] [phone].    ATENCIÓN: Si habla español, tiene a coates disposición servicios gratuitos de asistencia lingüística. Llame al [phone].    We comply with applicable federal civil rights laws and [place] laws. We do not discriminate on the basis of race, color, national origin, age, disability, sex, sexual orientation, or gender identity.            Thank you!     Thank you for choosing [facility]  for your care. Our goal is always to provide you with excellent care. Hearing back from our patients is one way we can continue to improve our services. Please take a few minutes to  complete the written survey that you may receive in the mail after your visit with us. Thank you!             Your Updated Medication List - Protect others around you: Learn how to safely use, store and throw away your medicines at www.disposemymeds.org.          This list is accurate as of: 12/5/17  9:55 AM.  Always use your most recent med list.                   Brand Name Dispense Instructions for use Diagnosis    aspirin 81 MG tablet      Take 1 tablet by mouth daily.        atorvastatin 20 MG tablet    LIPITOR    90 tablet    TAKE ONE TABLET BY MOUTH ONE TIME DAILY    Hyperlipidemia LDL goal <130       CALCIUM 600/VITAMIN D PO      Take  by mouth. Take 1/2 tablet in the morning, 1/2 at night daily        clonazePAM 0.5 MG tablet    klonoPIN     Take 0.5 mg by mouth. Take 1 tablet by mouth daily at bedtime        latanoprost 0.005 % ophthalmic solution    XALATAN    7.5 mL    Place 1 drop into both eyes At Bedtime    Borderline glaucoma with ocular hypertension, bilateral       lisinopril 10 MG tablet    PRINIVIL/ZESTRIL    90 tablet    Take 1 tablet (10 mg) by mouth daily    Hypertension goal BP (blood pressure) < 140/90       metFORMIN 500 MG 24 hr tablet    GLUCOPHAGE-XR    60 tablet    Take 1 tablet (500 mg) by mouth 2 times daily (with meals)    Prediabetes       MULTI-DAY PO      daily        pantoprazole 20 MG EC tablet    PROTONIX    90 tablet    TAKE ONE TABLET BY MOUTH ONE TIME DAILY 30-60 MINUTES BEFORE A MEAL    Gastroesophageal reflux disease without esophagitis       predniSONE 10 MG tablet    DELTASONE    63 tablet    Week one: 30 mg the morning, week 2 and afterwards: 20 mg the morning    Ocular myasthenia (H)       pyridostigmine 60 MG tablet    MESTINON    120 tablet    Take 1 tablet three times a day for 1 week, then 1 tablet four times a day    Ocular myasthenia (H), Long-term use of immunosuppressant medication       timolol 0.5 % ophthalmic solution    TIMOPTIC    1 Bottle    Place 1 drop  into both eyes daily    Borderline glaucoma with ocular hypertension, bilateral

## 2017-12-05 NOTE — PROGRESS NOTES
Patient is here for prostate biopsy.  He was placed in the dorsal lithotomy position.  Prostate ultrasound placed transrectally.  The neurovascular bundle was anesthetized using 1% lidocaine.  Prostate measurements obtained.  Prostate size is 31 cc.  12 biopsies obtained under guidance of prostate ultrasound using biopsy needle.  Patient tolerated procedure.  Return to clinic in one week for biopsy result.

## 2017-12-06 LAB — COPATH REPORT: NORMAL

## 2017-12-06 RX ORDER — PREDNISONE 10 MG/1
TABLET ORAL
Qty: 63 TABLET | Refills: 0 | OUTPATIENT
Start: 2017-12-06

## 2017-12-06 NOTE — TELEPHONE ENCOUNTER
Please call him to seek Dr. Harrison's office regarding his Prednisone issues as very nicely reviewed by him and understandable concern because of complexity of the case. Moreover I am leaving [facility] and is on vacation/out of office. It is best if he is monitored by him and care coordinated with the help of Telly Lucio MD as before.

## 2017-12-06 NOTE — TELEPHONE ENCOUNTER
Routing refill request to provider for review/approval because:  Drug not on the [facility] refill protocol   Needs OV for further refills.     Christine Bales RN

## 2017-12-07 NOTE — TELEPHONE ENCOUNTER
Called and spoke to patient and gave him information below.   Verbalized understanding and will contact Dr. Harrison' office for further refills.     Christine Bales RN

## 2017-12-11 RX ORDER — AZATHIOPRINE 50 MG/1
TABLET ORAL
Qty: 120 TABLET | Refills: 1 | Status: SHIPPED | OUTPATIENT
Start: 2017-12-11 | End: 2018-02-05 | Stop reason: SINTOL

## 2017-12-12 ENCOUNTER — TELEPHONE (OUTPATIENT)
Dept: NEUROLOGY | Facility: CLINIC | Age: 72
End: 2017-12-12

## 2017-12-12 ENCOUNTER — OFFICE VISIT (OUTPATIENT)
Dept: UROLOGY | Facility: CLINIC | Age: 72
End: 2017-12-12
Payer: COMMERCIAL

## 2017-12-12 VITALS — DIASTOLIC BLOOD PRESSURE: 76 MMHG | SYSTOLIC BLOOD PRESSURE: 138 MMHG | HEART RATE: 72 BPM | RESPIRATION RATE: 14 BRPM

## 2017-12-12 DIAGNOSIS — C61 MALIGNANT NEOPLASM OF PROSTATE (H): Primary | ICD-10-CM

## 2017-12-12 PROCEDURE — 99214 OFFICE O/P EST MOD 30 MIN: CPT | Performed by: UROLOGY

## 2017-12-12 NOTE — PROGRESS NOTES
Chief Complaint   Patient presents with     RECHECK     biopsy results       Rakan Nolasco is a 72 year old male who presents today for follow up of   Chief Complaint   Patient presents with     RECHECK     biopsy results    f/u after recent biopsy for elevated psa.  He is without any complaints.    Current Outpatient Prescriptions   Medication Sig Dispense Refill     azaTHIOprine (IMURAN) 50 MG tablet Take 1 tablet twice daily for 2 weeks, then 1 morning, 2 evening for 2 weeks, then 2 tablets twice daily for 2 weeks, then 2 morning, 3 evening 120 tablet 1     pyridostigmine (MESTINON) 60 MG tablet Take 1 tablet three times a day for 1 week, then 1 tablet four times a day 120 tablet 1     metFORMIN (GLUCOPHAGE-XR) 500 MG 24 hr tablet Take 1 tablet (500 mg) by mouth 2 times daily (with meals) 60 tablet 2     pantoprazole (PROTONIX) 20 MG EC tablet TAKE ONE TABLET BY MOUTH ONE TIME DAILY 30-60 MINUTES BEFORE A MEAL 90 tablet 1     lisinopril (PRINIVIL/ZESTRIL) 10 MG tablet Take 1 tablet (10 mg) by mouth daily 90 tablet 3     predniSONE (DELTASONE) 10 MG tablet Week one: 30 mg the morning, week 2 and afterwards: 20 mg the morning 63 tablet 0     timolol (TIMOPTIC) 0.5 % ophthalmic solution Place 1 drop into both eyes daily 1 Bottle 11     atorvastatin (LIPITOR) 20 MG tablet TAKE ONE TABLET BY MOUTH ONE TIME DAILY 90 tablet 1     latanoprost (XALATAN) 0.005 % ophthalmic solution Place 1 drop into both eyes At Bedtime 7.5 mL 4     aspirin 81 MG tablet Take 1 tablet by mouth daily.       ClonAZEPAM (KLONOPIN) 0.5 MG tablet Take 0.5 mg by mouth. Take 1 tablet by mouth daily at bedtime       Calcium Carbonate-Vitamin D (CALCIUM 600/VITAMIN D PO) Take  by mouth. Take 1/2 tablet in the morning, 1/2 at night daily       MULTI-DAY OR daily       Allergies   Allergen Reactions     Ciprofloxacin Muscle Pain (Myalgia)     Ropinirole Hcl GI Disturbance     Weakness;? syncope      Past Medical History:   Diagnosis Date     Arthritis           BMI 31.0-31.9,adult      Diverticulosis     Colonoscopy 8/2008     Fatty liver     see US  5/2012     GERD (gastroesophageal reflux disease)      Glaucoma (increased eye pressure)      HTN (hypertension)      Hyperlipidemia LDL goal < 130     age, htn, fhx     Irritable bowel      Nonsenile cataract      ROSIE (obstructive sleep apnea) 01/2011    Using CPAP;      Prediabetes      Restless leg syndrome      Trigeminal neuralgia pain 1/4/2012     Past Surgical History:   Procedure Laterality Date     BIOPSY ARTERY TEMPORAL Bilateral 8/1/2017    Procedure: BIOPSY ARTERY TEMPORAL;  Bilateral temporal artery Biopsy;  Surgeon: Jj Tello MD;  Location: [facility]     CATARACT IOL, RT/LT Right      COLONOSCOPY       ESOPHAGOSCOPY, GASTROSCOPY, DUODENOSCOPY (EGD), COMBINED  1/15/2014    Procedure: COMBINED ESOPHAGOSCOPY, GASTROSCOPY, DUODENOSCOPY (EGD), BIOPSY SINGLE OR MULTIPLE;;  Surgeon: Winston Nixon MD;  Location: [facility]     PHACOEMULSIFICATION CLEAR CORNEA WITH STANDARD INTRAOCULAR LENS IMPLANT Right 2/20/2017    Procedure: PHACOEMULSIFICATION CLEAR CORNEA WITH STANDARD INTRAOCULAR LENS IMPLANT;  Surgeon: Mateo Gray MD;  [facility]     RETINAL REATTACHMENT Right      SURGICAL HISTORY OF -   8/2009    Both Eyelids-.     TONSILLECTOMY  as child     Family History   Problem Relation Age of Onset     Respiratory Mother      copd     Allergies Brother      CANCER Maternal Grandmother      HEART DISEASE Paternal Grandmother      CEREBROVASCULAR DISEASE Father      Social History     Social History     Marital status: Single     Spouse name: N/A     Number of children: 0     Years of education: 12     Occupational History     Retired 3/2012      [employer]     Social History Main Topics     Smoking status: Former Smoker     Years: 15.00     Types: Cigarettes     Quit date: 1/1/1983     Smokeless tobacco: Former User      Comment: smoke  free household.     Alcohol use No      Comment: Quit in 1986     Drug use: No     Sexual activity: Not Currently     Partners: Female      Comment: 4/2010  No girlfriend at this time.     Other Topics Concern     None     Social History Narrative       REVIEW OF SYSTEMS  =================  C: NEGATIVE for fever, chills, change in weight  I: NEGATIVE for worrisome rashes, moles or lesions  E/M: NEGATIVE for ear, mouth and throat problems  R: NEGATIVE for significant cough or SHORTNESS OF BREATH,   CV: NEGATIVE for chest pain, palpitations or peripheral edema  GI: NEGATIVE for nausea, abdominal pain, heartburn, or change in bowel habits  NEURO: NEGATIVE any motor/sensory changes  PSYCH: NEGATIVE for recent mood disorder    Physical Exam:  /76 (BP Location: Right arm, Patient Position: Chair, Cuff Size: Adult Regular)  Pulse 72  Resp 14   Patient is pleasant, in no acute distress, good general condition.  Lung: no evidence of respiratory distress    Abdomen: Soft, nondistended, non tender. No masses. No rebound or guarding.   Exam: no cva tenderness  Skin: Warm and dry.  No redness.  Psych: normal mood and affect  Neuro: alert and oriented  Patient Name: JR FRANCOIS   MR#: [id number]   Specimen #: [id number]   Collected: 12/5/2017   Received: 12/5/2017   Reported: 12/6/2017 14:56   Ordering Phy(s): SHARAN THURMAN     For improved result formatting, select 'View Enhanced Report Format'   under Linked Documents section.     SPECIMEN(S):   A: Prostate biopsy, left   B: Prostate biopsy, right     FINAL DIAGNOSIS:   A. Prostate, left, ultrasound-guided needle core biopsy:   - Prostatic adenocarcinoma        - Kasandra score: 6 (3+3); Grade group 1        - Number of cores involved: four of six        - Total surface area involved: 10%        - Intraductal carcinoma: Not identified.        - Perineural invasion: Not identified        - Angiolymphatic invasion: Not identified.     B. Prostate, right,  ultrasound-guided needle core biopsy:   - Prostatic adenocarcinoma        - Ivoryton score: 6 (3+3); Grade group 1        - Number of cores involved: four of six        - Total surface area involved: 15%        - Intraductal carcinoma: Not identified.        - Perineural invasion: Not identified        - Angiolymphatic invasion: Not identified.     Electronically signed out by:     Shin Campbell M.D., PhD     CLINICAL HISTORY:   72 year old male.  PSA 8.05 5g/L.   Assessment/Plan:   (C61) Malignant neoplasm of prostate (H)  (primary encounter diagnosis)  Comment: group 1 stage T1c prostate cancer.  4/6 positive core bilaterally.  High volume disease  Plan: discussed natural h/o prostate cancer           Longevity runs in his family           Discussed observation/surgery/XRT/cryotherapy and            Hormonal therapy.             He has MG and is seeing his neurologist next month.           He is seeing me next week for cystoscopy for microscopic hematuria also.   25 min spent face to face consultation about treatments for prostate cancer.

## 2017-12-12 NOTE — NURSING NOTE
"Chief Complaint   Patient presents with     RECHECK     biopsy results       Initial /76 (BP Location: Right arm, Patient Position: Chair, Cuff Size: Adult Regular)  Pulse 72  Resp 14 Estimated body mass index is 36.14 kg/(m^2) as calculated from the following:    Height as of 12/1/17: 1.74 m (5' 8.5\").    Weight as of 12/1/17: 109.4 kg (241 lb 3.2 oz).  Medication Reconciliation: complete   Josefa Winter, [place]      "

## 2017-12-12 NOTE — TELEPHONE ENCOUNTER
Called patient and discussed the below. He will not take medication unless further directed. Patient states that his urologist (Dr. Emery with ) told him today that there would be no treatment plan until his myasthenia was under control. Patient says MD didn't mention anything about an oncologist. I told him he should be referred to one however and we will work to coordinate a treatment plan.

## 2017-12-12 NOTE — TELEPHONE ENCOUNTER
If he has newly diagnosed prostate cancer he should not take the azathioprine until a treatment plan for the cancer is determined. I would like him to: 1) Send me ALL the notes from his urologist and oncologist from here on, 2) Have them contact us directly to discuss treatment plan. Please NO azathioprine right now-make it clear to patient. Thanks

## 2017-12-12 NOTE — TELEPHONE ENCOUNTER
Called patient to review Rx for azathioprine and that Rx will be sent to his home. After discussing this, patient informed me that he found out today that he has prostate cancer. I told him I will update Dr. Harrison and will notify him of any treatment changes.     Kristin Parham, RN Care Coordinator

## 2017-12-12 NOTE — MR AVS SNAPSHOT
After Visit Summary   12/12/2017    Rakan Nolasco    MRN: [id number]           Patient Information     Date Of Birth          1945        Visit Information        Provider Department      12/12/2017 8:00 AM Josh Emery MD [facility]        Today's Diagnoses     Malignant neoplasm of prostate (H)    -  1       Follow-ups after your visit        Your next 10 appointments already scheduled     Dec 22, 2017  8:45 AM CST   Return Visit with Josh Emery MD, [facility] CYSTO PROC ROOM   [facility] ([facility])    [address] [place] [id number]   [phone]            Feb 05, 2018 11:20 AM CST   (Arrive by 11:05 AM)   Return Myasthenia Gravis with Arpan Harrison MD   [facility] ([facility])    [address] [place] [id number] [phone]            May 29, 2018  8:30 AM CDT   New Visit with Mateo Gray MD   [facility] ([facility])    [address] [place] [id number]   [phone]              Who to contact     If you have questions or need follow up information about today's clinic visit or your schedule please contact [facility] directly at [phone].  Normal or non-critical lab and imaging results will be communicated to you by MyChart, letter or phone within 4 business days after the clinic has received the results. If you do not hear from us within 7 days, please contact the clinic through MyChart or phone. If you have a critical or abnormal lab result, we will notify you by phone as soon as possible.  Submit refill requests through [employer] or call your pharmacy and they will forward the refill request to us. Please allow 3 business days for your refill to be completed.          Additional Information About Your Visit        [facility] [employer] Information     [employer] gives you secure access to your  electronic health record. If you see a primary care provider, you can also send messages to your care team and make appointments. If you have questions, please call your primary care clinic.  If you do not have a primary care provider, please call [phone] and they will assist you.        Care EveryWhere ID     This is your Care EveryWhere ID. This could be used by other organizations to access your [facility] medical records  [id number]        Your Vitals Were     Pulse Respirations                72 14           Blood Pressure from Last 3 Encounters:   12/12/17 138/76   12/01/17 148/75   11/27/17 118/68    Weight from Last 3 Encounters:   12/01/17 109.4 kg (241 lb 3.2 oz)   11/27/17 109.3 kg (241 lb)   11/27/17 108.1 kg (238 lb 4.8 oz)              Today, you had the following     No orders found for display       Primary Care Provider Office Phone # Fax #    Telly Lucio [phone]       [address] [facility] [id number]        Equal Access to Services     [facility]: Hadii aad ku hadasho Soomaali, waaxda luqadaha, qaybta kaalmada ademanjeet, gino lion . So [facility] [phone].    ATENCIÓN: Si habla español, tiene a coates disposición servicios gratuitos de asistencia lingüística. [facility] [phone].    We comply with applicable federal civil rights laws and [place] laws. We do not discriminate on the basis of race, color, national origin, age, disability, sex, sexual orientation, or gender identity.            Thank you!     Thank you for choosing [facility]  for your care. Our goal is always to provide you with excellent care. Hearing back from our patients is one way we can continue to improve our services. Please take a few minutes to complete the written survey that you may receive in the mail after your visit with us. Thank you!             Your Updated Medication List - Protect others around you: Learn how to safely use,  store and throw away your medicines at www.disposemymeds.org.          This list is accurate as of: 12/12/17  8:51 AM.  Always use your most recent med list.                   Brand Name Dispense Instructions for use Diagnosis    aspirin 81 MG tablet      Take 1 tablet by mouth daily.        atorvastatin 20 MG tablet    LIPITOR    90 tablet    TAKE ONE TABLET BY MOUTH ONE TIME DAILY    Hyperlipidemia LDL goal <130       azaTHIOprine 50 MG tablet    IMURAN    120 tablet    Take 1 tablet twice daily for 2 weeks, then 1 morning, 2 evening for 2 weeks, then 2 tablets twice daily for 2 weeks, then 2 morning, 3 evening    Ocular myasthenia (H)       CALCIUM 600/VITAMIN D PO      Take  by mouth. Take 1/2 tablet in the morning, 1/2 at night daily        clonazePAM 0.5 MG tablet    klonoPIN     Take 0.5 mg by mouth. Take 1 tablet by mouth daily at bedtime        latanoprost 0.005 % ophthalmic solution    XALATAN    7.5 mL    Place 1 drop into both eyes At Bedtime    Borderline glaucoma with ocular hypertension, bilateral       lisinopril 10 MG tablet    PRINIVIL/ZESTRIL    90 tablet    Take 1 tablet (10 mg) by mouth daily    Hypertension goal BP (blood pressure) < 140/90       metFORMIN 500 MG 24 hr tablet    GLUCOPHAGE-XR    60 tablet    Take 1 tablet (500 mg) by mouth 2 times daily (with meals)    Prediabetes       MULTI-DAY PO      daily        pantoprazole 20 MG EC tablet    PROTONIX    90 tablet    TAKE ONE TABLET BY MOUTH ONE TIME DAILY 30-60 MINUTES BEFORE A MEAL    Gastroesophageal reflux disease without esophagitis       predniSONE 10 MG tablet    DELTASONE    63 tablet    Week one: 30 mg the morning, week 2 and afterwards: 20 mg the morning    Ocular myasthenia (H)       pyridostigmine 60 MG tablet    MESTINON    120 tablet    Take 1 tablet three times a day for 1 week, then 1 tablet four times a day    Ocular myasthenia (H), Long-term use of immunosuppressant medication       timolol 0.5 % ophthalmic solution     TIMOPTIC    1 Bottle    Place 1 drop into both eyes daily    Borderline glaucoma with ocular hypertension, bilateral

## 2017-12-18 NOTE — PROGRESS NOTES
SUBJECTIVE:   Rakan Nolasco is a 72 year old male who presents to clinic today for the following health issues:      Diabetes Follow-up    Taking prednisone at 20 mg.      Patient is checking blood sugars: not at all    Diabetic concerns: None     Symptoms of hypoglycemia (low blood sugar): dizzy, weak     Paresthesias (numbness or burning in feet) or sores: No     Date of last diabetic eye exam: Recently diagnosed    Hyperlipidemia Follow-Up      Rate your low fat/cholesterol diet?: fair    Taking statin?  Yes, no muscle aches from statin    Other lipid medications/supplements?:  none    Hypertension Follow-up      Outpatient blood pressures are not being checked.    Low Salt Diet: not monitoring salt    BP Readings from Last 2 Encounters:   12/12/17 138/76   12/01/17 148/75     Hemoglobin A1C (%)   Date Value   10/20/2017 7.5 (H)   10/03/2016 6.0     LDL Cholesterol Calculated (mg/dL)   Date Value   09/06/2017 71   08/01/2016 70     Prostate Cancer:   Malignant neoplasm of prostate (H)  (primary encounter diagnosis)  Comment: group 1 stage T1c prostate cancer.  4/6 positive core bilaterally.  High volume disease  Plan: discussed natural h/o prostate cancer           Longevity runs in his family           Discussed observation/surgery/XRT/cryotherapy and            Hormonal therapy.             He has MG and is seeing his neurologist next month.             He is seeing me next week for cystoscopy for microscopic hematuria also.        Amount of exercise or physical activity: None with recent diagnosis of shingles but prior was 7 days for 30 minutes    Problems taking medications regularly: No    Medication side effects: none    Diet: regular (no restrictions).     Myasthenia Gravis:    On pyridostigmine.    Problem list and histories reviewed & adjusted, as indicated.  Additional history: as documented    Patient Active Problem List   Diagnosis     GERD (gastroesophageal reflux disease)     Diverticulosis      Snoring     Fatigue     Hyperlipidemia LDL goal <130     Borderline glaucoma with ocular hypertension     S/P blepharoplasty     Advanced directives, counseling/discussion     Trigeminal neuralgia pain     bmi 31     Hypertension goal BP (blood pressure) < 140/90     Health Care Home     Arthritis     Prediabetes     Dry eyes     PVD (posterior vitreous detachment) OU     H/O RD (retinal detachment) s/p repair with PPV (AB)     Epiretinal membrane, bilateral     Pseudophakia of right eye     Myasthenia gravis, AChR antibody positive (H)     Type 2 diabetes mellitus with hyperglycemia, without long-term current use of insulin (H)     Past Surgical History:   Procedure Laterality Date     BIOPSY ARTERY TEMPORAL Bilateral 8/1/2017    Procedure: BIOPSY ARTERY TEMPORAL;  Bilateral temporal artery Biopsy;  Surgeon: Jj Tello MD;  Location: [facility]     CATARACT IOL, RT/LT Right      COLONOSCOPY       ESOPHAGOSCOPY, GASTROSCOPY, DUODENOSCOPY (EGD), COMBINED  1/15/2014    Procedure: COMBINED ESOPHAGOSCOPY, GASTROSCOPY, DUODENOSCOPY (EGD), BIOPSY SINGLE OR MULTIPLE;;  Surgeon: Winston Nixon MD;  Location: [facility]     PHACOEMULSIFICATION CLEAR CORNEA WITH STANDARD INTRAOCULAR LENS IMPLANT Right 2/20/2017    Procedure: PHACOEMULSIFICATION CLEAR CORNEA WITH STANDARD INTRAOCULAR LENS IMPLANT;  Surgeon: Mateo Gray MD;  Location:  [facility]     RETINAL REATTACHMENT Right      SURGICAL HISTORY OF -   8/2009    Both Eyelids-.     TONSILLECTOMY  as child       Social History   Substance Use Topics     Smoking status: Former Smoker     Years: 15.00     Types: Cigarettes     Quit date: 1/1/1983     Smokeless tobacco: Former User      Comment: smoke free household.     Alcohol use No      Comment: Quit in 1986     Family History   Problem Relation Age of Onset     Respiratory Mother      copd     Allergies Brother      CANCER Maternal Grandmother      HEART DISEASE Paternal Grandmother      CEREBROVASCULAR  DISEASE Father              Reviewed and updated as needed this visit by clinical staff     Reviewed and updated as needed this visit by Provider       ROS:  Constitutional, HEENT, cardiovascular, pulmonary and gi systems are negative, except as otherwise noted.      OBJECTIVE:   /66  Pulse 70  Temp 96.6  F (35.9  C) (Oral)  Wt 240 lb (108.9 kg)  SpO2 98%  BMI 35.96 kg/m2  Body mass index is 35.96 kg/(m^2).  GENERAL: healthy, alert and no distress  NECK: no adenopathy and thyroid normal to palpation  RESP: lungs clear to auscultation - no rales, rhonchi or wheezes  CV: regular rate and rhythm, normal S1 S2, no S3 or S4, no murmur, click or rub, no peripheral edema   ABDOMEN: soft, nontender, no masses and bowel sounds normal  MS: no gross musculoskeletal defects noted, no edema  NEURO: Normal strength and tone, mentation intact and speech normal  PSYCH: mentation appears normal, affect normal/bright  Diabetic foot exam: Reduced DP and PT pulses, no trophic changes or ulcerative lesions and patchy sensory loss.    Diagnostic Test Results:  none     ASSESSMENT/PLAN:   (E11.65) Type 2 diabetes mellitus with hyperglycemia, without long-term current use of insulin (H)  (primary encounter diagnosis)  Comment: A1c at goal  Plan: Albumin Random Urine Quantitative with Creat         Ratio, Hemoglobin A1c    (I10) Hypertension goal BP (blood pressure) < 140/90  Comment: BP at goal  Plan: Continue current treatment plan.    (E78.5) Hyperlipidemia LDL goal <130  Comment: Check TSh  Plan: TSH WITH FREE T4 REFLEX    (G70.00) MG (myasthenia gravis) (H)  Comment: Following with neurology  Plan: Pyridostigmine    (C61) Malignant neoplasm of prostate (H)  Comment: Recently diagnosed, treatment options being discussed with urology  Plan: Continue follow up with urology    (Z68.35) BMI 35.0-35.9,adult  Comment: Counseled to make better food choices, exercise as tolerated, and lose weight.  Plan weight recheck.     (Z13.89)  Screening for diabetic peripheral neuropathy  Plan: FOOT EXAM  NO CHARGE [73105.114]    Follow up in 2 months or sooner with concerns    Telly Lucio MD  [facility]

## 2017-12-19 NOTE — TELEPHONE ENCOUNTER
Per Dr. Harrison, I spoke to Rakan's urologist yesterday. He is fine with us starting Imuran. He does not believe this will be a problem for patient's prostate cancer.   The prescription is already sent to his pharmacy electronically, and we have provided him a written lab order for blood tests to be done at 2, 4, 8, and 12 weeks after starting the drug. Please ask patient to start it and remember to get the labs done as we requested.    Called in Rx for Imuran to [facility]. Called Rakan and went through the above. He is agreeable to this plan. He will have labs drasn at [facility], orders are in. He will call if there are any issues.     Kristin Parham, RN Care Coordinator

## 2017-12-20 ENCOUNTER — OFFICE VISIT (OUTPATIENT)
Dept: FAMILY MEDICINE | Facility: CLINIC | Age: 72
End: 2017-12-20
Payer: COMMERCIAL

## 2017-12-20 VITALS
TEMPERATURE: 96.6 F | SYSTOLIC BLOOD PRESSURE: 120 MMHG | OXYGEN SATURATION: 98 % | WEIGHT: 240 LBS | HEART RATE: 70 BPM | BODY MASS INDEX: 35.96 KG/M2 | DIASTOLIC BLOOD PRESSURE: 66 MMHG

## 2017-12-20 DIAGNOSIS — E11.65 TYPE 2 DIABETES MELLITUS WITH HYPERGLYCEMIA, WITHOUT LONG-TERM CURRENT USE OF INSULIN (H): Primary | ICD-10-CM

## 2017-12-20 DIAGNOSIS — G70.00 MG (MYASTHENIA GRAVIS) (H): ICD-10-CM

## 2017-12-20 DIAGNOSIS — E78.5 HYPERLIPIDEMIA LDL GOAL <130: ICD-10-CM

## 2017-12-20 DIAGNOSIS — K57.90 DIVERTICULOSIS OF INTESTINE WITHOUT BLEEDING, UNSPECIFIED INTESTINAL TRACT LOCATION: ICD-10-CM

## 2017-12-20 DIAGNOSIS — C61 MALIGNANT NEOPLASM OF PROSTATE (H): ICD-10-CM

## 2017-12-20 DIAGNOSIS — Z13.89 SCREENING FOR DIABETIC PERIPHERAL NEUROPATHY: ICD-10-CM

## 2017-12-20 DIAGNOSIS — I10 HYPERTENSION GOAL BP (BLOOD PRESSURE) < 140/90: ICD-10-CM

## 2017-12-20 LAB
CREAT UR-MCNC: 27 MG/DL
HBA1C MFR BLD: 6.7 % (ref 4.3–6)
MICROALBUMIN UR-MCNC: <5 MG/L
MICROALBUMIN/CREAT UR: NORMAL MG/G CR (ref 0–17)
TSH SERPL DL<=0.005 MIU/L-ACNC: 1.57 MU/L (ref 0.4–4)

## 2017-12-20 PROCEDURE — 99214 OFFICE O/P EST MOD 30 MIN: CPT | Performed by: FAMILY MEDICINE

## 2017-12-20 PROCEDURE — 83036 HEMOGLOBIN GLYCOSYLATED A1C: CPT | Performed by: FAMILY MEDICINE

## 2017-12-20 PROCEDURE — 99207 C FOOT EXAM  NO CHARGE: CPT | Performed by: FAMILY MEDICINE

## 2017-12-20 PROCEDURE — 84443 ASSAY THYROID STIM HORMONE: CPT | Performed by: FAMILY MEDICINE

## 2017-12-20 PROCEDURE — 82043 UR ALBUMIN QUANTITATIVE: CPT | Performed by: FAMILY MEDICINE

## 2017-12-20 PROCEDURE — 36415 COLL VENOUS BLD VENIPUNCTURE: CPT | Performed by: FAMILY MEDICINE

## 2017-12-20 NOTE — PATIENT INSTRUCTIONS
[facility], [place]    If you have any questions regarding to your visit please contact your care team:       Team Purple:   Clinic Hours Telephone Number   Dr. Capri Porter   7am-7pm  Monday - Thursday   7am-5pm  Fridays  [phone]  (Appointment scheduling available 24/7)    Questions about your Visit?   Team Line:  [phone]   Urgent Care - [facility] - 11am-9pm Monday-Friday Saturday-Sunday- 9am-5pm   [place] - 5pm-9pm Monday-Friday Saturday-Sunday- 9am-5pm  [phone] - [facility]  [phone] - [place]       What options do I have for visits at the clinic other than the traditional office visit?  To expand how we care for you, many of our providers are utilizing electronic visits (e-visits) and telephone visits, when medically appropriate, for interactions with their patients rather than a visit in the clinic.   We also offer nurse visits for many medical concerns. Just like any other service, we will bill your insurance company for this type of visit based on time spent on the phone with your provider. Not all insurance companies cover these visits. Please check with your medical insurance if this type of visit is covered. You will be responsible for any charges that are not paid by your insurance.      E-visits via Tylr Mobile:  generally incur a $35.00 fee.  Telephone visits:  Time spent on the phone: *charged based on time that is spent on the phone in increments of 10 minutes. Estimated cost:   5-10 mins $30.00   11-20 mins. $59.00   21-30 mins. $85.00     Use CELtrakhart (secure email communication and access to your chart) to send your primary care provider a message or make an appointment. Ask someone on your Team how to sign up for Tylr Mobile.  For a Price Quote for your services, please call our Consumer Price Line at [phone].  As always, Thank you for trusting us with your health care  needs!    Discharged By: An

## 2017-12-20 NOTE — NURSING NOTE
"Chief Complaint   Patient presents with     Diabetes     Hypertension     Lipids       Initial /66  Pulse 70  Temp 96.6  F (35.9  C) (Oral)  Wt 240 lb (108.9 kg)  SpO2 98%  BMI 35.96 kg/m2 Estimated body mass index is 35.96 kg/(m^2) as calculated from the following:    Height as of 12/1/17: 5' 8.5\" (1.74 m).    Weight as of this encounter: 240 lb (108.9 kg).  Medication Reconciliation: complete     Marlene Bustamante MA    "

## 2017-12-20 NOTE — MR AVS SNAPSHOT
After Visit Summary   12/20/2017    Rakan Nolasco    MRN: [id number]           Patient Information     Date Of Birth          1945        Visit Information        Provider Department      12/20/2017 8:40 AM Telly Lucio MD [facility]        Today's Diagnoses     Type 2 diabetes mellitus with hyperglycemia, without long-term current use of insulin (H)    -  1    Hypertension goal BP (blood pressure) < 140/90        Hyperlipidemia LDL goal <130        Diverticulosis of intestine without bleeding, unspecified intestinal tract location        MG (myasthenia gravis) (H)        Malignant neoplasm of prostate (H)        Screening for diabetic peripheral neuropathy          Care Instructions    [facility]    If you have any questions regarding to your visit please contact your care team:       Team Purple:   Clinic Hours Telephone Number   Dr. Capri Porter   7am-7pm  Monday - Thursday   7am-5pm  Fridays  [phone]  (Appointment scheduling available 24/7)    Questions about your Visit?   Team Line:  [phone]   Urgent Care - [facility] [facility] - 11am-9pm Monday-Friday Saturday-Sunday- 9am-5pm   [place] - 5pm-9pm Monday-Friday Saturday-Sunday- 9am-5pm  [phone] - [place]   [phone] - [place]       What options do I have for visits at the clinic other than the traditional office visit?  To expand how we care for you, many of our providers are utilizing electronic visits (e-visits) and telephone visits, when medically appropriate, for interactions with their patients rather than a visit in the clinic.   We also offer nurse visits for many medical concerns. Just like any other service, we will bill your insurance company for this type of visit based on time spent on the phone with your provider. Not all insurance companies cover these visits. Please check with your  medical insurance if this type of visit is covered. You will be responsible for any charges that are not paid by your insurance.      E-visits via Nanosyshart:  generally incur a $35.00 fee.  Telephone visits:  Time spent on the phone: *charged based on time that is spent on the phone in increments of 10 minutes. Estimated cost:   5-10 mins $30.00   11-20 mins. $59.00   21-30 mins. $85.00     Use Stereotaxist (secure email communication and access to your chart) to send your primary care provider a message or make an appointment. Ask someone on your Team how to sign up for Stereotaxist.  For a Price Quote for your services, please call our doo Line at [phone].  As always, Thank you for trusting us with your health care needs!    Discharged By: An            Follow-ups after your visit        Follow-up notes from your care team     Return in about 2 months (around 2/20/2018).      Your next 10 appointments already scheduled     Dec 22, 2017  8:45 AM CST   Return Visit with Josh Emery MD, [facility]ST[facility] PROC ROOM   [facility] ([facility])    [address] [place] [address]   [phone]            Feb 05, 2018 11:20 AM CST   (Arrive by 11:05 AM)   Return Myasthenia Gravis with Arpan Harrison MD   [facility] ([facility] [place])    [address] [place] [id number] [phone]            May 29, 2018  8:30 AM CDT   New Visit with MD [facility] [facility] ([facility])    [address] [facility] [address]   [phone]              Who to contact     If you have questions or need follow up information about today's clinic visit or your schedule please contact [facility] directly at [phone].  Normal or non-critical lab and imaging results will be communicated to you by MyChart, letter or phone within 4 business days after the clinic has  received the results. If you do not hear from us within 7 days, please contact the clinic through Aniboom or phone. If you have a critical or abnormal lab result, we will notify you by phone as soon as possible.  Submit refill requests through Aniboom or call your pharmacy and they will forward the refill request to us. Please allow 3 business days for your refill to be completed.          Additional Information About Your Visit        ScriptRxharWeeWorld Information     Aniboom gives you secure access to your electronic health record. If you see a primary care provider, you can also send messages to your care team and make appointments. If you have questions, please call your primary care clinic.  If you do not have a primary care provider, please call [phone] and they will assist you.        Care EveryWhere ID     This is your Care EveryWhere ID. This could be used by other organizations to access your [place] medical records  [id number]        Your Vitals Were     Pulse Temperature Pulse Oximetry BMI (Body Mass Index)          70 96.6  F (35.9  C) (Oral) 98% 35.96 kg/m2         Blood Pressure from Last 3 Encounters:   12/20/17 120/66   12/12/17 138/76   12/01/17 148/75    Weight from Last 3 Encounters:   12/20/17 240 lb (108.9 kg)   12/01/17 241 lb 3.2 oz (109.4 kg)   11/27/17 241 lb (109.3 kg)              We Performed the Following     Albumin Random Urine Quantitative with Creat Ratio     FOOT EXAM  NO CHARGE [[id number]]     Hemoglobin A1c     TSH WITH FREE T4 REFLEX        Primary Care Provider Office Phone # Fax #    Telly Lucio [phone]       [address]  [place] [address]        Equal Access to Services     [facility]: Hadii estefania Lara, waaxda luqadaha, qaybta kaalmada toy, gino lion . So [facility] [phone].    ATENCIÓN: Si habla español, tiene a coates disposición servicios gratuitos de asistencia lingüística. Llame al  [phone].    We comply with applicable federal civil rights laws and [place] laws. We do not discriminate on the basis of race, color, national origin, age, disability, sex, sexual orientation, or gender identity.            Thank you!     Thank you for choosing [facility]  for your care. Our goal is always to provide you with excellent care. Hearing back from our patients is one way we can continue to improve our services. Please take a few minutes to complete the written survey that you may receive in the mail after your visit with us. Thank you!             Your Updated Medication List - Protect others around you: Learn how to safely use, store and throw away your medicines at www.disposemymeds.org.          This list is accurate as of: 12/20/17  9:33 AM.  Always use your most recent med list.                   Brand Name Dispense Instructions for use Diagnosis    aspirin 81 MG tablet      Take 1 tablet by mouth daily.        atorvastatin 20 MG tablet    LIPITOR    90 tablet    TAKE ONE TABLET BY MOUTH ONE TIME DAILY    Hyperlipidemia LDL goal <130       azaTHIOprine 50 MG tablet    IMURAN    120 tablet    Take 1 tablet twice daily for 2 weeks, then 1 morning, 2 evening for 2 weeks, then 2 tablets twice daily for 2 weeks, then 2 morning, 3 evening    Ocular myasthenia (H)       CALCIUM 600/VITAMIN D PO      Take  by mouth. Take 1/2 tablet in the morning, 1/2 at night daily        clonazePAM 0.5 MG tablet    klonoPIN     Take 0.5 mg by mouth. Take 1 tablet by mouth daily at bedtime        latanoprost 0.005 % ophthalmic solution    XALATAN    7.5 mL    Place 1 drop into both eyes At Bedtime    Borderline glaucoma with ocular hypertension, bilateral       lisinopril 10 MG tablet    PRINIVIL/ZESTRIL    90 tablet    Take 1 tablet (10 mg) by mouth daily    Hypertension goal BP (blood pressure) < 140/90       metFORMIN 500 MG 24 hr tablet    GLUCOPHAGE-XR    60 tablet    Take 1 tablet (500 mg) by  mouth 2 times daily (with meals)    Prediabetes       MULTI-DAY PO      daily        pantoprazole 20 MG EC tablet    PROTONIX    90 tablet    TAKE ONE TABLET BY MOUTH ONE TIME DAILY 30-60 MINUTES BEFORE A MEAL    Gastroesophageal reflux disease without esophagitis       predniSONE 10 MG tablet    DELTASONE    63 tablet    Week one: 30 mg the morning, week 2 and afterwards: 20 mg the morning    Ocular myasthenia (H)       pyridostigmine 60 MG tablet    MESTINON    120 tablet    Take 1 tablet three times a day for 1 week, then 1 tablet four times a day    Ocular myasthenia (H), Long-term use of immunosuppressant medication       timolol 0.5 % ophthalmic solution    TIMOPTIC    1 Bottle    Place 1 drop into both eyes daily    Borderline glaucoma with ocular hypertension, bilateral

## 2017-12-22 ENCOUNTER — OFFICE VISIT (OUTPATIENT)
Dept: UROLOGY | Facility: CLINIC | Age: 72
End: 2017-12-22
Payer: COMMERCIAL

## 2017-12-22 DIAGNOSIS — C61 MALIGNANT NEOPLASM OF PROSTATE (H): ICD-10-CM

## 2017-12-22 DIAGNOSIS — R31.29 MICROSCOPIC HEMATURIA: Primary | ICD-10-CM

## 2017-12-22 PROCEDURE — 99213 OFFICE O/P EST LOW 20 MIN: CPT | Mod: 25 | Performed by: UROLOGY

## 2017-12-22 PROCEDURE — 52000 CYSTOURETHROSCOPY: CPT | Performed by: UROLOGY

## 2017-12-22 NOTE — MR AVS SNAPSHOT
After Visit Summary   12/22/2017    Rakan Nolasco    MRN: [id number]           Patient Information     Date Of Birth          1945        Visit Information        Provider Department      12/22/2017 8:45 AM Josh Emery MD; [facility] PROC [facility]        Care Instructions    Please call our office if you have questions or need to report any information or to schedule your surgery.   [phone].            Follow-ups after your visit        Your next 10 appointments already scheduled     Feb 05, 2018 11:20 AM CST   (Arrive by 11:05 AM)   Return Myasthenia Gravis with Arpan Harrison MD   [facility] ([facility] [place])    [address]  3rd Floor  [facility] [address] [phone]            Feb 20, 2018  9:00 AM CST   Office Visit with Telly Lucio MD   [facility] ([facility])    [address] [place] [address] [phone]           Bring a current list of meds and any records pertaining to this visit. For Physicals, please bring immunization records and any forms needing to be filled out. Please arrive 10 minutes early to complete paperwork.            May 29, 2018  8:30 AM CDT   New Visit with Mateo Gray MD   [facility] ([facility])    [address] [place] [place] [address] [address] [phone]              Who to contact     If you have questions or need follow up information about today's clinic visit or your schedule please contact [facility] directly at [phone].  Normal or non-critical lab and imaging results will be communicated to you by MyChart, letter or phone within 4 business days after the clinic has received the results. If you do not hear from us within 7 days, please contact the clinic through MyChart or phone. If you have a critical or abnormal lab result, we will notify you by phone as  soon as possible.  Submit refill requests through Surma Enterprise or call your pharmacy and they will forward the refill request to us. Please allow 3 business days for your refill to be completed.          Additional Information About Your Visit        Figaro Systemshart Information     Surma Enterprise gives you secure access to your electronic health record. If you see a primary care provider, you can also send messages to your care team and make appointments. If you have questions, please call your primary care clinic.  If you do not have a primary care provider, please call [phone] and they will assist you.        Care EveryWhere ID     This is your Care EveryWhere ID. This could be used by other organizations to access your [place] medical records  [id number]         Blood Pressure from Last 3 Encounters:   12/20/17 120/66   12/12/17 138/76   12/01/17 148/75    Weight from Last 3 Encounters:   12/20/17 108.9 kg (240 lb)   12/01/17 109.4 kg (241 lb 3.2 oz)   11/27/17 109.3 kg (241 lb)              Today, you had the following     No orders found for display       Primary Care Provider Office Phone # Fax #    Telly Lucio [phone]       [address] [place] [address]        Equal Access to Services     SARAH YEE : Hadii aad ku hadasho Soomaali, waaxda luqadaha, qaybta kaalmada adeegyada, waxay fredy haycadenn mary blount. So [facility] [phone].    ATENCIÓN: Si habla español, tiene a coates disposición servicios gratuitos de asistencia lingüística. Llame al [phone].    We comply with applicable federal civil rights laws and [place] laws. We do not discriminate on the basis of race, color, national origin, age, disability, sex, sexual orientation, or gender identity.            Thank you!     Thank you for choosing [facility]  for your care. Our goal is always to provide you with excellent care. Hearing back from our patients is one way we can continue to improve our  services. Please take a few minutes to complete the written survey that you may receive in the mail after your visit with us. Thank you!             Your Updated Medication List - Protect others around you: Learn how to safely use, store and throw away your medicines at www.disposemymeds.org.          This list is accurate as of: 12/22/17  9:24 AM.  Always use your most recent med list.                   Brand Name Dispense Instructions for use Diagnosis    aspirin 81 MG tablet      Take 1 tablet by mouth daily.        atorvastatin 20 MG tablet    LIPITOR    90 tablet    TAKE ONE TABLET BY MOUTH ONE TIME DAILY    Hyperlipidemia LDL goal <130       azaTHIOprine 50 MG tablet    IMURAN    120 tablet    Take 1 tablet twice daily for 2 weeks, then 1 morning, 2 evening for 2 weeks, then 2 tablets twice daily for 2 weeks, then 2 morning, 3 evening    Ocular myasthenia (H)       CALCIUM 600/VITAMIN D PO      Take  by mouth. Take 1/2 tablet in the morning, 1/2 at night daily        clonazePAM 0.5 MG tablet    klonoPIN     Take 0.5 mg by mouth. Take 1 tablet by mouth daily at bedtime        latanoprost 0.005 % ophthalmic solution    XALATAN    7.5 mL    Place 1 drop into both eyes At Bedtime    Borderline glaucoma with ocular hypertension, bilateral       lisinopril 10 MG tablet    PRINIVIL/ZESTRIL    90 tablet    Take 1 tablet (10 mg) by mouth daily    Hypertension goal BP (blood pressure) < 140/90       metFORMIN 500 MG 24 hr tablet    GLUCOPHAGE-XR    60 tablet    Take 1 tablet (500 mg) by mouth 2 times daily (with meals)    Prediabetes       MULTI-DAY PO      daily        pantoprazole 20 MG EC tablet    PROTONIX    90 tablet    TAKE ONE TABLET BY MOUTH ONE TIME DAILY 30-60 MINUTES BEFORE A MEAL    Gastroesophageal reflux disease without esophagitis       predniSONE 10 MG tablet    DELTASONE    63 tablet    Week one: 30 mg the morning, week 2 and afterwards: 20 mg the morning    Ocular myasthenia (H)       pyridostigmine  60 MG tablet    MESTINON    120 tablet    Take 1 tablet three times a day for 1 week, then 1 tablet four times a day    Ocular myasthenia (H), Long-term use of immunosuppressant medication       timolol 0.5 % ophthalmic solution    TIMOPTIC    1 Bottle    Place 1 drop into both eyes daily    Borderline glaucoma with ocular hypertension, bilateral

## 2017-12-22 NOTE — PROGRESS NOTES
S: Rakan Nolasco is a 72 year old male returns for hematuria.    Patient is draped and prepped.  Flexible cystoscopy placed under direct vision.      The anterior urethra is normal   The prostatic urethra showed bilateral lobe enlargement.     The length is 2cm,  the coaptation is 2 cm.     In the bladder there is trabeculation grade 1.    Assessment/Plan:  (R31.29) Microscopic hematuria  (primary encounter diagnosis)  Comment:    Plan: neg urological evaluation    (C61) Malignant neoplasm of prostate (H)  Comment:    Plan: natural progression of prostate cancer discussed            Treatment options discussed at length           He is interested in cryotherapy           Info on cryo given          Risks of ED/incontinence/rectal fistula discussed           Patient to call           15 min spent face to face consultation about tx options.

## 2017-12-22 NOTE — PATIENT INSTRUCTIONS
Please call our office if you have questions or need to report any information or to schedule your surgery.   [phone].

## 2017-12-26 DIAGNOSIS — R73.03 PREDIABETES: ICD-10-CM

## 2017-12-27 RX ORDER — METFORMIN HCL 500 MG
500 TABLET, EXTENDED RELEASE 24 HR ORAL 2 TIMES DAILY WITH MEALS
Qty: 180 TABLET | Refills: 1 | Status: SHIPPED | OUTPATIENT
Start: 2017-12-27 | End: 2018-07-01

## 2018-01-05 DIAGNOSIS — Z79.60 LONG-TERM USE OF IMMUNOSUPPRESSANT MEDICATION: ICD-10-CM

## 2018-01-05 DIAGNOSIS — G70.00 OCULAR MYASTHENIA (H): ICD-10-CM

## 2018-01-05 LAB
ALT SERPL W P-5'-P-CCNC: 38 U/L (ref 0–70)
AST SERPL W P-5'-P-CCNC: 20 U/L (ref 0–45)
BASOPHILS # BLD AUTO: 0 10E9/L (ref 0–0.2)
BASOPHILS NFR BLD AUTO: 0.3 %
DIFFERENTIAL METHOD BLD: ABNORMAL
EOSINOPHIL # BLD AUTO: 0.2 10E9/L (ref 0–0.7)
EOSINOPHIL NFR BLD AUTO: 1.5 %
ERYTHROCYTE [DISTWIDTH] IN BLOOD BY AUTOMATED COUNT: 13 % (ref 10–15)
HCT VFR BLD AUTO: 45.2 % (ref 40–53)
HGB BLD-MCNC: 15.2 G/DL (ref 13.3–17.7)
LYMPHOCYTES # BLD AUTO: 2.2 10E9/L (ref 0.8–5.3)
LYMPHOCYTES NFR BLD AUTO: 18.8 %
MCH RBC QN AUTO: 32.8 PG (ref 26.5–33)
MCHC RBC AUTO-ENTMCNC: 33.6 G/DL (ref 31.5–36.5)
MCV RBC AUTO: 97 FL (ref 78–100)
MONOCYTES # BLD AUTO: 1 10E9/L (ref 0–1.3)
MONOCYTES NFR BLD AUTO: 8.6 %
NEUTROPHILS # BLD AUTO: 8.4 10E9/L (ref 1.6–8.3)
NEUTROPHILS NFR BLD AUTO: 70.8 %
PLATELET # BLD AUTO: 270 10E9/L (ref 150–450)
RBC # BLD AUTO: 4.64 10E12/L (ref 4.4–5.9)
WBC # BLD AUTO: 11.8 10E9/L (ref 4–11)

## 2018-01-05 PROCEDURE — 84450 TRANSFERASE (AST) (SGOT): CPT | Performed by: PSYCHIATRY & NEUROLOGY

## 2018-01-05 PROCEDURE — 36415 COLL VENOUS BLD VENIPUNCTURE: CPT | Performed by: PSYCHIATRY & NEUROLOGY

## 2018-01-05 PROCEDURE — 85025 COMPLETE CBC W/AUTO DIFF WBC: CPT | Performed by: PSYCHIATRY & NEUROLOGY

## 2018-01-05 PROCEDURE — 84460 ALANINE AMINO (ALT) (SGPT): CPT | Performed by: PSYCHIATRY & NEUROLOGY

## 2018-01-12 ENCOUNTER — TRANSFERRED RECORDS (OUTPATIENT)
Dept: HEALTH INFORMATION MANAGEMENT | Facility: CLINIC | Age: 73
End: 2018-01-12

## 2018-01-12 LAB — EJECTION FRACTION: 63

## 2018-01-16 ENCOUNTER — TRANSFERRED RECORDS (OUTPATIENT)
Dept: HEALTH INFORMATION MANAGEMENT | Facility: CLINIC | Age: 73
End: 2018-01-16

## 2018-01-16 LAB
HBA1C MFR BLD: 6.7 % (ref 0–5.7)
TSH SERPL-ACNC: 3.18 UIU/ML (ref 0.35–4.94)

## 2018-01-17 LAB
CREAT SERPL-MCNC: 1.07 MG/DL (ref 0.72–1.25)
GFR SERPL CREATININE-BSD FRML MDRD: >60 ML/MIN/1.73M2
GLUCOSE SERPL-MCNC: 133 MG/DL (ref 65–100)
POTASSIUM SERPL-SCNC: 4 MMOL/L (ref 3.5–5)

## 2018-01-19 ENCOUNTER — TELEPHONE (OUTPATIENT)
Dept: CARE COORDINATION | Facility: CLINIC | Age: 73
End: 2018-01-19

## 2018-01-19 DIAGNOSIS — R50.2 DRUG INDUCED FEVER: Primary | ICD-10-CM

## 2018-01-19 NOTE — TELEPHONE ENCOUNTER
DC'd from [place] on 1/18/18 to Home self care   Primary Problem: Drug induced fever  LACE: 65 High

## 2018-01-22 ENCOUNTER — OFFICE VISIT (OUTPATIENT)
Dept: FAMILY MEDICINE | Facility: CLINIC | Age: 73
End: 2018-01-22
Payer: COMMERCIAL

## 2018-01-22 VITALS
OXYGEN SATURATION: 95 % | WEIGHT: 232 LBS | RESPIRATION RATE: 16 BRPM | TEMPERATURE: 97.4 F | HEART RATE: 84 BPM | SYSTOLIC BLOOD PRESSURE: 126 MMHG | HEIGHT: 69 IN | DIASTOLIC BLOOD PRESSURE: 70 MMHG | BODY MASS INDEX: 34.36 KG/M2

## 2018-01-22 DIAGNOSIS — I10 HYPERTENSION GOAL BP (BLOOD PRESSURE) < 140/90: ICD-10-CM

## 2018-01-22 DIAGNOSIS — R50.2 DRUG INDUCED FEVER: ICD-10-CM

## 2018-01-22 DIAGNOSIS — E11.65 TYPE 2 DIABETES MELLITUS WITH HYPERGLYCEMIA, WITHOUT LONG-TERM CURRENT USE OF INSULIN (H): ICD-10-CM

## 2018-01-22 DIAGNOSIS — Z09 HOSPITAL DISCHARGE FOLLOW-UP: Primary | ICD-10-CM

## 2018-01-22 DIAGNOSIS — G70.00 MYASTHENIA GRAVIS, ACHR ANTIBODY POSITIVE (H): ICD-10-CM

## 2018-01-22 PROCEDURE — 99495 TRANSJ CARE MGMT MOD F2F 14D: CPT | Performed by: FAMILY MEDICINE

## 2018-01-22 ASSESSMENT — ENCOUNTER SYMPTOMS
COUGH DISTURBING SLEEP: 0
SINUS PAIN: 0
SNORES LOUDLY: 0
DOUBLE VISION: 0
SHORTNESS OF BREATH: 1
BLOATING: 0
WHEEZING: 1
POLYPHAGIA: 0
ALTERED TEMPERATURE REGULATION: 0
MYALGIAS: 0
STIFFNESS: 0
EYE REDNESS: 0
VOMITING: 0
INCREASED ENERGY: 1
ARTHRALGIAS: 0
SPUTUM PRODUCTION: 0
CHILLS: 0
BACK PAIN: 0
DIFFICULTY URINATING: 0
CONSTIPATION: 0
DECREASED APPETITE: 0
JOINT SWELLING: 0
NECK PAIN: 0
ABDOMINAL PAIN: 0
BLOOD IN STOOL: 0
FLANK PAIN: 0
SINUS CONGESTION: 0
BOWEL INCONTINENCE: 0
COUGH: 0
EYE IRRITATION: 0
EYE WATERING: 1
FEVER: 0
WEIGHT LOSS: 0
MUSCLE CRAMPS: 1
HEMATURIA: 0
NECK MASS: 0
POLYDIPSIA: 0
HALLUCINATIONS: 0
TROUBLE SWALLOWING: 1
SMELL DISTURBANCE: 0
FATIGUE: 1
DIARRHEA: 0
EYE PAIN: 0
SORE THROAT: 0
TASTE DISTURBANCE: 0
DYSPNEA ON EXERTION: 0
HOARSE VOICE: 0
RECTAL PAIN: 0
NAUSEA: 0
NIGHT SWEATS: 0
POSTURAL DYSPNEA: 0
JAUNDICE: 0
HEARTBURN: 1
MUSCLE WEAKNESS: 0
HEMOPTYSIS: 0
WEIGHT GAIN: 0
DYSURIA: 0

## 2018-01-22 ASSESSMENT — PAIN SCALES - GENERAL: PAINLEVEL: NO PAIN (0)

## 2018-01-22 NOTE — PATIENT INSTRUCTIONS
Plan:   1. Will monitor blood sugar at home; goal keeping morning blood sugars in the 120 range     Recheck in 1 month    [facility]    If you have any questions regarding to your visit please contact your care team:       Team Purple:   Clinic Hours Telephone Number   Dr. Capri Porter   7am-7pm  Monday - Thursday   7am-5pm  Fridays  [phone]  (Appointment scheduling available 24/7)    Questions about your Visit?   Team Line:  [phone]   Urgent Care - [place] - 11am-9pm Monday-Friday Saturday-Sunday- 9am-5pm   [place] - 5pm-9pm Monday-Friday Saturday-Sunday- 9am-5pm  [phone] - [facility]  [phone] - [place]       What options do I have for visits at the clinic other than the traditional office visit?  To expand how we care for you, many of our providers are utilizing electronic visits (e-visits) and telephone visits, when medically appropriate, for interactions with their patients rather than a visit in the clinic.   We also offer nurse visits for many medical concerns. Just like any other service, we will bill your insurance company for this type of visit based on time spent on the phone with your provider. Not all insurance companies cover these visits. Please check with your medical insurance if this type of visit is covered. You will be responsible for any charges that are not paid by your insurance.      E-visits via Lifetable:  generally incur a $35.00 fee.  Telephone visits:  Time spent on the phone: *charged based on time that is spent on the phone in increments of 10 minutes. Estimated cost:   5-10 mins $30.00   11-20 mins. $59.00   21-30 mins. $85.00     Use Ravel Lawt (secure email communication and access to your chart) to send your primary care provider a message or make an appointment. Ask someone on your Team how to sign up for Lifetable.  For a Price Quote for your services,  please call our Consumer Price Line at [phone].  As always, Thank you for trusting us with your health care needs!    Discharged By: An

## 2018-01-22 NOTE — PROGRESS NOTES
SUBJECTIVE:   Rakan Nolasco is a 72 year old male who presents to clinic today for the following health issues:      Fatigue, side effects of medications;    Hospital Follow-up Visit:    Hospital/Nursing Home/IP Rehab Facility: [facility]  Date of Admission: 1/12/201/ and 1/16/2018  Date of Discharge: 1/18/2018  Reason(s) for Admission: Drug Fever            Problems taking medications regularly:  None       Medication changes since discharge: None       Problems adhering to non-medication therapy:  None    Summary of hospitalization:  [facility] discharge summary reviewed  CareEverywhere information obtained and reviewed       PRINCIPAL DISCHARGE DIAGNOSIS: Drug induced fever    BRIEF HOSPITAL COURSE: This 72 y.o. male with a history of myasthenia gravis who with Dr. Harrison at the [facility] and had recently been started on azathioprine as a steroid-sparing agent. He was initially hospitalized her from 1/12 until 1/15 with fever. There was some thought that the fever may be due to azathioprine at that time, but he was ultimately discharged back on this medication as well as cefdinir as his fevers had resolved after receiving this in the hospital.  Overnight 1/15, he again developed fevers with rigors. He returned to the ER. He was admitted. His cefdinir and azathioprine were held here and the patient was afebrile. Multiple studies including blood cultures have been negative.    Diagnostic Tests/Treatments reviewed.      BLood work was negative for ID    Follow up needed: Yes      Other Healthcare Providers Involved in Patient s Care:         Specialist appointment - Neurology  Update since discharge: improved.     Post Discharge Medication Reconciliation: discharge medications reconciled, continue medications without change.  Plan of care communicated with patient and spouse     Coding guidelines for this visit:  Type of Medical   Decision Making Face-to-Face Visit       within 7 Days of discharge Face-to-Face  Visit        within 14 days of discharge   Moderate Complexity 29018 36875   High Complexity 19130 48685          Problem list and histories reviewed & adjusted, as indicated.  Additional history: as documented    Patient Active Problem List   Diagnosis     GERD (gastroesophageal reflux disease)     Diverticulosis     Snoring     Fatigue     Hyperlipidemia LDL goal <130     Borderline glaucoma with ocular hypertension     S/P blepharoplasty     Advanced directives, counseling/discussion     Trigeminal neuralgia pain     bmi 31     Hypertension goal BP (blood pressure) < 140/90     Health Care Home     Arthritis     Prediabetes     Dry eyes     PVD (posterior vitreous detachment) OU     H/O RD (retinal detachment) s/p repair with PPV (AB)     Epiretinal membrane, bilateral     Pseudophakia of right eye     Myasthenia gravis, AChR antibody positive (H)     Type 2 diabetes mellitus with hyperglycemia, without long-term current use of insulin (H)     Past Surgical History:   Procedure Laterality Date     BIOPSY ARTERY TEMPORAL Bilateral 8/1/2017    Procedure: BIOPSY ARTERY TEMPORAL;  Bilateral temporal artery Biopsy;  Surgeon: Jj Tello MD;  Location: [facility] OR     CATARACT IOL, RT/LT Right      COLONOSCOPY       ESOPHAGOSCOPY, GASTROSCOPY, DUODENOSCOPY (EGD), COMBINED  1/15/2014    Procedure: COMBINED ESOPHAGOSCOPY, GASTROSCOPY, DUODENOSCOPY (EGD), BIOPSY SINGLE OR MULTIPLE;;  Surgeon: Winston Nixon MD;  Location: [facility] OR     PHACOEMULSIFICATION CLEAR CORNEA WITH STANDARD INTRAOCULAR LENS IMPLANT Right 2/20/2017    Procedure: PHACOEMULSIFICATION CLEAR CORNEA WITH STANDARD INTRAOCULAR LENS IMPLANT;  Surgeon: Mateo Gray MD;  Location:  [facility]     RETINAL REATTACHMENT Right      SURGICAL HISTORY OF -   8/2009    Both Eyelids-.     TONSILLECTOMY  as child       Social History   Substance Use Topics     Smoking status: Former Smoker     Years: 15.00     Types: Cigarettes     Quit date:  "1/1/1983     Smokeless tobacco: Former User      Comment: smoke free household.     Alcohol use No      Comment: Quit in 1986     Family History   Problem Relation Age of Onset     Respiratory Mother      copd     Allergies Brother      CANCER Maternal Grandmother      HEART DISEASE Paternal Grandmother      CEREBROVASCULAR DISEASE Father          Reviewed and updated as needed this visit by Provider    ROS:  Constitutional, HEENT, cardiovascular, pulmonary, gi and gu systems are negative, except as otherwise noted.      OBJECTIVE:   /70  Pulse 84  Temp 97.4  F (36.3  C) (Oral)  Resp 16  Ht 5' 9\" (1.753 m)  Wt 232 lb (105.2 kg)  SpO2 95%  BMI 34.26 kg/m2  Body mass index is 34.26 kg/(m^2).  GENERAL: healthy, alert and no distress  NECK: no adenopathy and thyroid normal to palpation  RESP: lungs clear to auscultation - no rales, rhonchi or wheezes  CV: regular rate and rhythm, normal S1 S2, no S3 or S4, no murmur, click or rub, no peripheral edema   ABDOMEN: soft, nontender,  no masses and bowel sounds normal  MS: no gross musculoskeletal defects noted, no edema  NEURO: Normal strength and tone, mentation intact and speech normal  PSYCH: mentation appears normal, affect normal/bright    Diagnostic Test Results:  none     ASSESSMENT/PLAN:     (Z09) Hospital discharge follow-up  (primary encounter diagnosis)  Comment: Admitted for fevers which were thought to be from drugs; Azathioprine, medication was stopped and fever resolved, thus hel  Plan: Follow up with neiurology    (R50.2) Drug induced fever  Comment: Azathuioprine  Plan: Medication discontinued    (G70.00) Myasthenia gravis, AChR antibody positive (H)  Comment: Following with neurology, on prednisone and pyridostigmine. Azathioprine discontinued.  Plan: Follow up with neurology    (E11.65) Type 2 diabetes mellitus with hyperglycemia, without long-term current use of insulin (H)  Comment: Well controlled, due to prednisone use discussed checking " blood sugars regularly.   Plan: blood glucose monitoring (NO BRAND SPECIFIED)         meter device kit, blood glucose monitoring (NO         BRAND SPECIFIED) test strip, blood glucose (NO         BRAND SPECIFIED) lancets standard    Follow up in 1 month or sooner with concerns    Telly Lucio MD  [facility]

## 2018-01-22 NOTE — NURSING NOTE
"Chief Complaint   Patient presents with     Hospital F/U       Initial /70  Pulse 84  Temp 97.4  F (36.3  C) (Oral)  Resp 16  Ht 5' 9\" (1.753 m)  Wt 232 lb (105.2 kg)  SpO2 95%  BMI 34.26 kg/m2 Estimated body mass index is 34.26 kg/(m^2) as calculated from the following:    Height as of this encounter: 5' 9\" (1.753 m).    Weight as of this encounter: 232 lb (105.2 kg).  Medication Reconciliation: complete     An Leung MA       "

## 2018-01-22 NOTE — MR AVS SNAPSHOT
After Visit Summary   1/22/2018    Rakan Nolasco    MRN: [id number]           Patient Information     Date Of Birth          1945        Visit Information        Provider Department      1/22/2018 10:40 AM Telly Lucio MD [facility]        Today's Diagnoses     Hospital discharge follow-up    -  1    Drug induced fever        Myasthenia gravis, AChR antibody positive (H)        Type 2 diabetes mellitus with hyperglycemia, without long-term current use of insulin (H)        Hypertension goal BP (blood pressure) < 140/90          Care Instructions    Plan:   1. Will monitor blood sugar at home; goal keeping morning blood sugars in the 120 range     Recheck in 1 month    [facility]    If you have any questions regarding to your visit please contact your care team:       Team Purple:   Clinic Hours Telephone Number   Dr. Capri Porter   7am-7pm  Monday - Thursday   7am-5pm  Fridays  [phone]  (Appointment scheduling available 24/7)    Questions about your Visit?   Team Line:  [phone]   Urgent Care - [place] and [facility] - 11am-9pm Monday-Friday Saturday-Sunday- 9am-5pm   [place] - 5pm-9pm Monday-Friday Saturday-Sunday- 9am-5pm  [phone] - [place]   [phone] - [place]       What options do I have for visits at the clinic other than the traditional office visit?  To expand how we care for you, many of our providers are utilizing electronic visits (e-visits) and telephone visits, when medically appropriate, for interactions with their patients rather than a visit in the clinic.   We also offer nurse visits for many medical concerns. Just like any other service, we will bill your insurance company for this type of visit based on time spent on the phone with your provider. Not all insurance companies cover these visits. Please check with your medical insurance if this  type of visit is covered. You will be responsible for any charges that are not paid by your insurance.      E-visits via Merchant Exchangehart:  generally incur a $35.00 fee.  Telephone visits:  Time spent on the phone: *charged based on time that is spent on the phone in increments of 10 minutes. Estimated cost:   5-10 mins $30.00   11-20 mins. $59.00   21-30 mins. $85.00     Use FreeMarketst (secure email communication and access to your chart) to send your primary care provider a message or make an appointment. Ask someone on your Team how to sign up for My True Fit.  For a Price Quote for your services, please call our [employer] Line at [phone].  As always, Thank you for trusting us with your health care needs!    Discharged By: An            Follow-ups after your visit        Your next 10 appointments already scheduled     Feb 05, 2018 11:20 AM CST   (Arrive by 11:05 AM)   Return Myasthenia Gravis with Arpan Harrison MD   [facility] ([facility] [place])    [address] [place] [id number] [phone]            Feb 20, 2018  9:00 AM CST   Office Visit with Telly Lucio MD   [facility] ([facility])    [address] [place] [id number] [phone]           Bring a current list of meds and any records pertaining to this visit. For Physicals, please bring immunization records and any forms needing to be filled out. Please arrive 10 minutes early to complete paperwork.            May 29, 2018  8:30 AM CDT   New Visit with MD Ariadne Greeneview Avinash Hyatt ([facility])    [address] [place] [phone]              Who to contact     If you have questions or need follow up information about today's clinic visit or your schedule please contact [facility] directly at [phone].  Normal or non-critical lab and imaging results will be  "communicated to you by Baihehart, letter or phone within 4 business days after the clinic has received the results. If you do not hear from us within 7 days, please contact the clinic through Wrike or phone. If you have a critical or abnormal lab result, we will notify you by phone as soon as possible.  Submit refill requests through Wrike or call your pharmacy and they will forward the refill request to us. Please allow 3 business days for your refill to be completed.          Additional Information About Your Visit        Wrike Information     Wrike gives you secure access to your electronic health record. If you see a primary care provider, you can also send messages to your care team and make appointments. If you have questions, please call your primary care clinic.  If you do not have a primary care provider, please call [phone] and they will assist you.        Care EveryWhere ID     This is your Care EveryWhere ID. This could be used by other organizations to access your [place] medical records  [id number]        Your Vitals Were     Pulse Temperature Respirations Height Pulse Oximetry BMI (Body Mass Index)    84 97.4  F (36.3  C) (Oral) 16 5' 9\" (1.753 m) 95% 34.26 kg/m2       Blood Pressure from Last 3 Encounters:   01/22/18 126/70   12/20/17 120/66   12/12/17 138/76    Weight from Last 3 Encounters:   01/22/18 232 lb (105.2 kg)   12/20/17 240 lb (108.9 kg)   12/01/17 241 lb 3.2 oz (109.4 kg)              Today, you had the following     No orders found for display         Today's Medication Changes          These changes are accurate as of: 1/22/18 11:29 AM.  If you have any questions, ask your nurse or doctor.               Start taking these medicines.        Dose/Directions    blood glucose lancets standard   Commonly known as:  no brand specified   Used for:  Type 2 diabetes mellitus with hyperglycemia, without long-term current use of insulin (H)   Started by:  Telly Lucio MD "        Check blood sugar every morning   Quantity:  50 each   Refills:  1       blood glucose monitoring meter device kit   Commonly known as:  no brand specified   Used for:  Type 2 diabetes mellitus with hyperglycemia, without long-term current use of insulin (H)   Started by:  Telly Lucio MD        Use to test blood sugar 1 times daily or as directed.   Quantity:  1 kit   Refills:  0       blood glucose monitoring test strip   Commonly known as:  no brand specified   Used for:  Type 2 diabetes mellitus with hyperglycemia, without long-term current use of insulin (H)   Started by:  Telly Lucio MD        laneUse to test blood sugars 1 times daily or as directed   Quantity:  50 strip   Refills:  0            Where to get your medicines      These medications were sent to [place] [address] [place] [facility] [place] - [address]  [address] [place] [address]     Phone:  [phone]     blood glucose lancets standard    blood glucose monitoring meter device kit    blood glucose monitoring test strip                Primary Care Provider Office Phone # Fax #    Telly Lucio [phone]       [address] [place] [address] [address]VE [address]  [place] [address]        Equal Access to Services     [facility] AH: Hadii aad ku hadasho Soomaali, waaxda luqadaha, qaybta kaalmada adeegyada, gino daniel haycadenn mary lion . So [facility] [phone].    ATENCIÓN: Si habla español, tiene a coates disposición servicios gratuitos de asistencia lingüística. Llame al [phone].    We comply with applicable federal civil rights laws and [place] laws. We do not discriminate on the basis of race, color, national origin, age, disability, sex, sexual orientation, or gender identity.            Thank you!     Thank you for choosing [facility]  for your care. Our goal is always to provide you with excellent care. Hearing back from our patients is one way we can continue to improve our services. Please  take a few minutes to complete the written survey that you may receive in the mail after your visit with us. Thank you!             Your Updated Medication List - Protect others around you: Learn how to safely use, store and throw away your medicines at www.disposemymeds.org.          This list is accurate as of: 1/22/18 11:29 AM.  Always use your most recent med list.                   Brand Name Dispense Instructions for use Diagnosis    aspirin 81 MG tablet      Take 1 tablet by mouth daily.        atorvastatin 20 MG tablet    LIPITOR    90 tablet    TAKE ONE TABLET BY MOUTH ONE TIME DAILY    Hyperlipidemia LDL goal <130       azaTHIOprine 50 MG tablet    IMURAN    120 tablet    Take 1 tablet twice daily for 2 weeks, then 1 morning, 2 evening for 2 weeks, then 2 tablets twice daily for 2 weeks, then 2 morning, 3 evening    Ocular myasthenia (H)       blood glucose lancets standard    no brand specified    50 each    Check blood sugar every morning    Type 2 diabetes mellitus with hyperglycemia, without long-term current use of insulin (H)       blood glucose monitoring meter device kit    no brand specified    1 kit    Use to test blood sugar 1 times daily or as directed.    Type 2 diabetes mellitus with hyperglycemia, without long-term current use of insulin (H)       blood glucose monitoring test strip    no brand specified    50 strip    laneUse to test blood sugars 1 times daily or as directed    Type 2 diabetes mellitus with hyperglycemia, without long-term current use of insulin (H)       CALCIUM 600/VITAMIN D PO      Take  by mouth. Take 1/2 tablet in the morning, 1/2 at night daily        clonazePAM 0.5 MG tablet    klonoPIN     Take 0.5 mg by mouth. Take 1 tablet by mouth daily at bedtime        latanoprost 0.005 % ophthalmic solution    XALATAN    7.5 mL    Place 1 drop into both eyes At Bedtime    Borderline glaucoma with ocular hypertension, bilateral       lisinopril 10 MG tablet    PRINIVIL/ZESTRIL     90 tablet    Take 1 tablet (10 mg) by mouth daily    Hypertension goal BP (blood pressure) < 140/90       metFORMIN 500 MG 24 hr tablet    GLUCOPHAGE-XR    180 tablet    Take 1 tablet (500 mg) by mouth 2 times daily (with meals)    Prediabetes       MULTI-DAY PO      daily        pantoprazole 20 MG [facility] tablet    PROTONIX    90 tablet    TAKE ONE TABLET BY MOUTH ONE TIME DAILY 30-60 MINUTES BEFORE A MEAL    Gastroesophageal reflux disease without esophagitis       predniSONE 10 MG tablet    DELTASONE    63 tablet    Week one: 30 mg the morning, week 2 and afterwards: 20 mg the morning    Ocular myasthenia (H)       pyridostigmine 60 MG tablet    MESTINON    120 tablet    Take 1 tablet three times a day for 1 week, then 1 tablet four times a day    Ocular myasthenia (H), Long-term use of immunosuppressant medication       timolol 0.5 % ophthalmic solution    TIMOPTIC    1 Bottle    Place 1 drop into both eyes daily    Borderline glaucoma with ocular hypertension, bilateral

## 2018-01-23 ENCOUNTER — TRANSFERRED RECORDS (OUTPATIENT)
Dept: HEALTH INFORMATION MANAGEMENT | Facility: CLINIC | Age: 73
End: 2018-01-23

## 2018-01-23 DIAGNOSIS — Z79.60 LONG-TERM USE OF IMMUNOSUPPRESSANT MEDICATION: ICD-10-CM

## 2018-01-23 DIAGNOSIS — G70.00 OCULAR MYASTHENIA (H): ICD-10-CM

## 2018-01-23 RX ORDER — PYRIDOSTIGMINE BROMIDE 60 MG/1
60 TABLET ORAL 4 TIMES DAILY
Qty: 120 TABLET | Refills: 1 | Status: SHIPPED | OUTPATIENT
Start: 2018-01-23 | End: 2018-03-13

## 2018-02-05 ENCOUNTER — OFFICE VISIT (OUTPATIENT)
Dept: NEUROLOGY | Facility: CLINIC | Age: 73
End: 2018-02-05

## 2018-02-05 VITALS
BODY MASS INDEX: 36.26 KG/M2 | HEIGHT: 69 IN | DIASTOLIC BLOOD PRESSURE: 79 MMHG | WEIGHT: 244.8 LBS | SYSTOLIC BLOOD PRESSURE: 146 MMHG | HEART RATE: 65 BPM

## 2018-02-05 DIAGNOSIS — G70.00 MG, OCULAR (MYASTHENIA GRAVIS) (H): Primary | ICD-10-CM

## 2018-02-05 RX ORDER — PANTOPRAZOLE SODIUM 20 MG/1
TABLET, DELAYED RELEASE ORAL
COMMUNITY
End: 2018-02-05

## 2018-02-05 RX ORDER — DIPHENOXYLATE HYDROCHLORIDE AND ATROPINE SULFATE 2.5; .025 MG/1; MG/1
TABLET ORAL
COMMUNITY
End: 2018-02-05

## 2018-02-05 RX ORDER — PREDNISONE 20 MG/1
TABLET ORAL
COMMUNITY
End: 2018-05-17

## 2018-02-05 RX ORDER — PYRIDOSTIGMINE BROMIDE 60 MG/1
TABLET ORAL
COMMUNITY
End: 2018-02-05

## 2018-02-05 RX ORDER — LATANOPROST 50 UG/ML
SOLUTION/ DROPS OPHTHALMIC
COMMUNITY
End: 2018-02-05

## 2018-02-05 RX ORDER — TIMOLOL MALEATE 5 MG/ML
SOLUTION/ DROPS OPHTHALMIC
COMMUNITY
End: 2018-02-05

## 2018-02-05 ASSESSMENT — PAIN SCALES - GENERAL: PAINLEVEL: NO PAIN (0)

## 2018-02-05 NOTE — LETTER
2018       RE: Rakan Nolasco  [address] [place] [address]     Dear Colleague,    Thank you for referring your patient, Rakan Nolasco, to the [facility] NEUROLOGY at [facility]. Please see a copy of my visit note below.    Service Date: 2018      RE: Rakan Nolasco   MRN: [id number]   :       Dear Dr. Lucio:      I had the pleasure to see Mr. 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  He is a 72-year-old man whom I first saw in 2017.  He was previously treated by Dr. Layne.  His chief symptom was initially double vision and later on he had some mild ptosis.  He is also complaining of occasional difficulty chewing or swallowing.  The latter happens with liquids or solids and he feels he has a lump in his throat.  He does not choke.  This usually happens in the evening.  He has no respiratory symptoms, weakness of arms or legs, dysphonia, dysarthria or head drop.  The blurry vision responded very well to higher prednisone dose initially.  He was put on 60 mg daily, but then tapered rather rapidly down to 10 mg by the day he first saw me in 2017 in our clinic.  During the taper his blurry vision recurred.  He still calls it blurry or funny and not double, and does not think this is worse in the evening compared to the morning.  He does not have any eye pain, but his eyes get watery at times.  He describes excessive leg cramps in the evening that are worse in the past 2 months and also a feeling of tremulousness hen he stands, which happens shortly after he takes his Mestinon dose.  I increased his Mestinon dose from 30 mg 3 times a day to 60 mg 3 times a day and ultimately 4.  He does not have any stomach cramps or diarrhea.  He had numerous side effects on prednisone even on the 20 mg dose, including shingles, weight gain, and  "diabetes, with A1c in the 7.5%.  Because of this, I wanted to put him on a steroid-sparing drug and I prescribed him Imuran.  Sadly the first week he took Imuran, he developed a high fever which is an uncommon but severe reaction to that drug and it had to be discontinued.  He feels that his symptoms are about the same since.     Medications were reviewed and are as per Epic record.      PHYSICAL EXAMINATION:  His blood pressure is 146/79, pulse 65 and regular.  He weighs 111 kg.  Height is 175.  He endorses no pain.  BMI 36.15.   NEUROLOGIC EXAMINATION:  He is awake, alert and oriented x3 and able to provide a coherent history.  On cranial nerve examination, I do not see a definite ptosis even with 30 minutes of sustained upward gaze.  There is practically no weakness of orbicularis oculi or oris.  He does get diplopia with gaze to the left, but this seems horizontal.  He does not have diplopia in other gaze positions.  Ductions and versions look more or less normal, but his horizontal alignment of the eyes is abnormal.  I am not sure if the right eye is esotropic or the left exotropic.  I could not tell reliably with cover test again.  Again, he has no weakness of lip seal, cheek puff, uvula, jaw, palate or tongue.  He has no dysphonia or dysarthria.  His neck extension and flexion strength are normal.  He has 5/5 strength for deltoids, biceps, triceps, , hip flexion, knee extension, knee flexion, foot dorsiflexion bilaterally.      In summary, Mr. 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  His treatment is truly challenging for several reasons, the biggest one being intolerance/side effects from drugs.  I do not know if his persistent blurry vision is purely due to myasthenia.  Certainly the lack of reported diplopia or fluctuations during the day raises some questions.  On the other hand, if his blurry- \"funny\" vision that he is " describing was much better on the higher dose of prednisone, then it is almost certainly myasthenia related.  I would like some input from Neuro-Ophthalmology with regard to this question (is there any other reason his vision is blurry?) and also whether prisms would have a role in his management.  I know that prisms are challenging in myasthenia due to the variable nature of the diplopia.  However, if those are not an option, I have no choice but to further escalate his immunosuppression, meaning more prednisone and trying another oral immunosuppressant like CellCept.  Given the degree of side effects and reactions he had to those drugs so far, I am frankly not excited to choose that pathway, but I understand that this will be necessary in the absence of another option.  IVIG is a consideration, which I usually reserve as the last resort.  I will see him in followup in 4 months. TT spent for patient care 25 minutes; more than half was counseling.       Sincerely,         ARPAN HDEZ MD             D: 2018   T: 2018   MT: ANGELA      Name:     JR FRANCOIS   MRN:      [id number]        Account:      [id number]   :      1945           Service Date: 2018      Document: [id number]        Again, thank you for allowing me to participate in the care of your patient.      Sincerely,    Arpan Hdez MD    CC:  [place] [address]

## 2018-02-05 NOTE — MR AVS SNAPSHOT
After Visit Summary   2/5/2018    Rakan Nolasco    MRN: [id number]           Patient Information     Date Of Birth          1945        Visit Information        Provider Department      2/5/2018 11:20 AM Arpan Harrison MD [facility] Neurology        Today's Diagnoses     MG, ocular (myasthenia gravis) (H)    -  1       Follow-ups after your visit        Additional Services     OPHTHALMOLOGY ADULT REFERRAL       Your provider has referred you to: [facility]: Eye Clinic [facility] [phone]   [email or web address]  Randolph Garrett or [place] only.  Please see my progress note for specific questions.    Mild generalized myasthenia. Chief ocular symptom is blurry vision right now. Not double per patient. Not fluctuating during the day per patient. Blurry vision was much better on 50-60 mg prednisone daily, relapsed when tapered to 20.  While on 20 mg of prednisone daily, has diabetes, weight gain, and had shingles a few months ago.  Did not tolerate Imuran- high fever the first week of administration.    Question: 1) Is blurry vision ONLY due to myasthenia? 2) If so, is there any role for prism? Because if not, I will have to increase his prednisone or put him on another immunosuppressant, which will inevitably lead to significant side effects in this case.      Please be aware that coverage of these services is subject to the terms and limitations of your health insurance plan.  Call member services at your health plan with any benefit or coverage questions.      Please bring the following with you to your appointment:    (1) Any X-Rays, CTs or MRIs which have been performed.  Contact the facility where they were done to arrange for  prior to your scheduled appointment.    (2) List of current medications  (3) This referral request   (4) Any documents/labs given to you for this referral                  Your next 10 appointments already scheduled     Feb 20,  2018  9:00 AM CST   [facility]    [address] [address]   [phone]           Bring a current list of meds and any records pertaining to this visit. For Physicals, please bring immunization records and any forms needing to be filled out. Please arrive 10 minutes early to complete paperwork.            May 29, 2018  8:30 AM CDT   New Visit with Mateo Gray MD   [facility]    [address] [address]   [phone]            Jun 04, 2018  9:50 AM CDT   (Arrive by 9:35 AM)   Return Myasthenia Gravis with Arpan Harrison MD   [facility] ([facility])    [address] [place] [address] [phone]              Who to contact     Please call your clinic at [phone] to:    Ask questions about your health    Make or cancel appointments    Discuss your medicines    Learn about your test results    Speak to your doctor   If you have compliments or concerns about an experience at your clinic, or if you wish to file a complaint, please contact [facility] Patient Relations at [phone] or email us at [email or web address]         Additional Information About Your Visit        [employer] Information     [employer] gives you secure access to your electronic health record. If you see a primary care provider, you can also send messages to your care team and make appointments. If you have questions, please call your primary care clinic.  If you do not have a primary care provider, please call [phone] and they will assist you.      [employer] is an electronic gateway that provides easy, online access to your medical records. With [employer], you can request a clinic appointment, read your test results, renew a prescription or communicate with  "your care team.     To access your existing account, please contact your [facility] or call [phone] for assistance.        Care EveryWhere ID     This is your Care EveryWhere ID. This could be used by other organizations to access your [place] medical records  [id number]        Your Vitals Were     Pulse Height BMI (Body Mass Index)             65 1.753 m (5' 9\") 36.15 kg/m2          Blood Pressure from Last 3 Encounters:   02/05/18 146/79   01/22/18 126/70   12/20/17 120/66    Weight from Last 3 Encounters:   02/05/18 111 kg (244 lb 12.8 oz)   01/22/18 105.2 kg (232 lb)   12/20/17 108.9 kg (240 lb)              We Performed the Following     OPHTHALMOLOGY ADULT REFERRAL          Today's Medication Changes          These changes are accurate as of 2/5/18 11:30 AM.  If you have any questions, ask your nurse or doctor.               Stop taking these medicines if you haven't already. Please contact your care team if you have questions.     azaTHIOprine 50 MG tablet   Commonly known as:  IMURAN   Stopped by:  Arpan Harrison MD                    Primary Care Provider Office Phone # Fax #    Telly Lucio [phone] [address] [address]        Equal Access to Services     [facility] AH: Hadii estefania ku hadasho Soomaali, waaxda luqadaha, qaybta kaalmada adejuanitoda, gino blount. So [facility] [phone].    ATENCIÓN: Si habla español, tiene a coates disposición servicios gratuitos de asistencia lingüística. Boni al [phone].    We comply with applicable federal civil rights laws and [place] laws. We do not discriminate on the basis of race, color, national origin, age, disability, sex, sexual orientation, or gender identity.            Thank you!     Thank you for choosing [facility]  for your care. Our goal is always to provide you with excellent care. Hearing back from our patients " is one way we can continue to improve our services. Please take a few minutes to complete the written survey that you may receive in the mail after your visit with us. Thank you!             Your Updated Medication List - Protect others around you: Learn how to safely use, store and throw away your medicines at www.disposemymeds.org.          This list is accurate as of 2/5/18 11:30 AM.  Always use your most recent med list.                   Brand Name Dispense Instructions for use Diagnosis    aspirin 81 MG tablet      Take 1 tablet by mouth daily.        atorvastatin 20 MG tablet    LIPITOR    90 tablet    TAKE ONE TABLET BY MOUTH ONE TIME DAILY    Hyperlipidemia LDL goal <130       blood glucose lancets standard    no brand specified    50 each    Check blood sugar every morning    Type 2 diabetes mellitus with hyperglycemia, without long-term current use of insulin (H)       blood glucose monitoring meter device kit    no brand specified    1 kit    Use to test blood sugar 1 times daily or as directed.    Type 2 diabetes mellitus with hyperglycemia, without long-term current use of insulin (H)       blood glucose monitoring test strip    no brand specified    50 strip    laneUse to test blood sugars 1 times daily or as directed    Type 2 diabetes mellitus with hyperglycemia, without long-term current use of insulin (H)       CALCIUM 600/VITAMIN D PO      Take  by mouth. Take 1/2 tablet in the morning, 1/2 at night daily        clonazePAM 0.5 MG tablet    klonoPIN     Take 0.5 mg by mouth. Take 1 tablet by mouth daily at bedtime        latanoprost 0.005 % ophthalmic solution    XALATAN    7.5 mL    Place 1 drop into both eyes At Bedtime    Borderline glaucoma with ocular hypertension, bilateral       lisinopril 10 MG tablet    PRINIVIL/ZESTRIL    90 tablet    Take 1 tablet (10 mg) by mouth daily    Hypertension goal BP (blood pressure) < 140/90       metFORMIN 500 MG 24 hr tablet    GLUCOPHAGE-XR    180 tablet     Take 1 tablet (500 mg) by mouth 2 times daily (with meals)    Prediabetes       MULTI-DAY PO      daily        pantoprazole 20 MG EC tablet    PROTONIX    90 tablet    TAKE ONE TABLET BY MOUTH ONE TIME DAILY 30-60 MINUTES BEFORE A MEAL    Gastroesophageal reflux disease without esophagitis       * predniSONE 20 MG tablet    DELTASONE          * predniSONE 10 MG tablet    DELTASONE    63 tablet    Week one: 30 mg the morning, week 2 and afterwards: 20 mg the morning    Ocular myasthenia (H)       pyridostigmine 60 MG tablet    MESTINON    120 tablet    Take 1 tablet (60 mg) by mouth 4 times daily    Ocular myasthenia (H), Long-term use of immunosuppressant medication       timolol 0.5 % ophthalmic solution    TIMOPTIC    1 Bottle    Place 1 drop into both eyes daily    Borderline glaucoma with ocular hypertension, bilateral       * Notice:  This list has 2 medication(s) that are the same as other medications prescribed for you. Read the directions carefully, and ask your doctor or other care provider to review them with you.

## 2018-02-05 NOTE — PROGRESS NOTES
Service Date: 2018      [place] [address]      RE: Rakan Nolasco   MRN: [id number]   :       Dear Dr. Lucio:      I had the pleasure to see Mr. Nolasco in followup at the [facility] for his mild generalized myasthenia with predominantly ocular symptoms.  He is a 72-year-old man whom I first saw in 2017.  He was previously treated by Dr. Layne.  His chief symptom was initially double vision and later on he had some mild ptosis.  He is also complaining of occasional difficulty chewing or swallowing.  The latter happens with liquids or solids and he feels he has a lump in his throat.  He does not choke.  This usually happens in the evening.  He has no respiratory symptoms, weakness of arms or legs, dysphonia, dysarthria or head drop.  The blurry vision responded very well to higher prednisone dose initially.  He was put on 60 mg daily, but then tapered rather rapidly down to 10 mg by the day he first saw me in 2017 in our clinic.  During the taper his blurry vision recurred.  He still calls it blurry or funny and not double, and does not think this is worse in the evening compared to the morning.  He does not have any eye pain, but his eyes get watery at times.  He describes excessive leg cramps in the evening that are worse in the past 2 months and also a feeling of tremulousness hen he stands, which happens shortly after he takes his Mestinon dose.  I increased his Mestinon dose from 30 mg 3 times a day to 60 mg 3 times a day and ultimately 4.  He does not have any stomach cramps or diarrhea.  He had numerous side effects on prednisone even on the 20 mg dose, including shingles, weight gain, and diabetes, with A1c in the 7.5%.  Because of this, I wanted to put him on a steroid-sparing drug and I prescribed him Imuran.  Sadly the first week he took Imuran,  "he developed a high fever which is an uncommon but severe reaction to that drug and it had to be discontinued.  He feels that his symptoms are about the same since.     Medications were reviewed and are as per Epic record.      PHYSICAL EXAMINATION:  His blood pressure is 146/79, pulse 65 and regular.  He weighs 111 kg.  Height is 175.  He endorses no pain.  BMI 36.15.   NEUROLOGIC EXAMINATION:  He is awake, alert and oriented x3 and able to provide a coherent history.  On cranial nerve examination, I do not see a definite ptosis even with 30 minutes of sustained upward gaze.  There is practically no weakness of orbicularis oculi or oris.  He does get diplopia with gaze to the left, but this seems horizontal.  He does not have diplopia in other gaze positions.  Ductions and versions look more or less normal, but his horizontal alignment of the eyes is abnormal.  I am not sure if the right eye is esotropic or the left exotropic.  I could not tell reliably with cover test again.  Again, he has no weakness of lip seal, cheek puff, uvula, jaw, palate or tongue.  He has no dysphonia or dysarthria.  His neck extension and flexion strength are normal.  He has 5/5 strength for deltoids, biceps, triceps, , hip flexion, knee extension, knee flexion, foot dorsiflexion bilaterally.      In summary, Mr. 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  His treatment is truly challenging for several reasons, the biggest one being intolerance/side effects from drugs.  I do not know if his persistent blurry vision is purely due to myasthenia.  Certainly the lack of reported diplopia or fluctuations during the day raises some questions.  On the other hand, if his blurry- \"funny\" vision that he is describing was much better on the higher dose of prednisone, then it is almost certainly myasthenia related.  I would like some input from Neuro-Ophthalmology " with regard to this question (is there any other reason his vision is blurry?) and also whether prisms would have a role in his management.  I know that prisms are challenging in myasthenia due to the variable nature of the diplopia.  However, if those are not an option, I have no choice but to further escalate his immunosuppression, meaning more prednisone and trying another oral immunosuppressant like CellCept.  Given the degree of side effects and reactions he had to those drugs so far, I am frankly not excited to choose that pathway, but I understand that this will be necessary in the absence of another option.  IVIG is a consideration, which I usually reserve as the last resort.  I will see him in followup in 4 months. TT spent for patient care 25 minutes; more than half was counseling.       Sincerely,         BAMBI HDEZ MD             D: 2018   T: 2018   MT: ANGELA      Name:     JR FRANCOIS   MRN:      [id number]        Account:      [id number]   :      1945           Service Date: 2018      Document: [id number]      Answers for HPI/ROS submitted by the patient on 2018   General Symptoms: Yes  Skin Symptoms: No  HENT Symptoms: Yes  EYE SYMPTOMS: Yes  HEART SYMPTOMS: No  LUNG SYMPTOMS: Yes  INTESTINAL SYMPTOMS: Yes  URINARY SYMPTOMS: Yes  REPRODUCTIVE SYMPTOMS: No  SKELETAL SYMPTOMS: Yes  BLOOD SYMPTOMS: No  NERVOUS SYSTEM SYMPTOMS: No  MENTAL HEALTH SYMPTOMS: No  Fever: No  Loss of appetite: No  Weight loss: No  Weight gain: No  Fatigue: Yes  Night sweats: No  Chills: No  Increased stress: No  Excessive hunger: No  Excessive thirst: No  Feeling hot or cold when others believe the temperature is normal: No  Loss of height: No  Post-operative complications: No  Surgical site pain: No  Hallucinations: No  Change in or Loss of Energy: Yes  Hyperactivity: No  Confusion: No  Ear pain: No  Ear discharge: No  Hearing loss: No  Tinnitus: No  Nosebleeds: No  Congestion:  No  Sinus pain: No  Trouble swallowing: Yes   Voice hoarseness: No  Mouth sores: No  Sore throat: No  Tooth pain: No  Gum tenderness: No  Bleeding gums: No  Change in taste: No  Change in sense of smell: No  Dry mouth: No  Hearing aid used: No  Neck lump: No  Eye pain: No  Vision loss: No  Dry eyes: No  Watery eyes: Yes  Eye bulging: No  Double vision: No  Flashing of lights: No  Spots: Yes  Floaters: Yes  Redness: No  Crossed eyes: No  Tunnel Vision: No  Yellowing of eyes: No  Eye irritation: No  Cough: No  Sputum or phlegm: No  Coughing up blood: No  Difficulty breating or shortness of breath: Yes  Snoring: No  Wheezing: Yes  Difficulty breathing on exertion: No  Nighttime Cough: No  Difficulty breathing when lying flat: No  Heart burn or indigestion: Yes  Nausea: No  Vomiting: No  Abdominal pain: No  Bloating: No  Constipation: No  Diarrhea: No  Blood in stool: No  Black stools: No  Rectal or Anal pain: No  Fecal incontinence: No  Yellowing of skin or eyes: No  Vomit with blood: No  Change in stools: No  Trouble holding urine or incontinence: Yes  Pain or burning: No  Trouble starting or stopping: No  Increased frequency of urination: Yes  Blood in urine: No  Decreased frequency of urination: No  Frequent nighttime urination: No  Flank pain: No  Difficulty emptying bladder: No  Back pain: No  Muscle aches: No  Neck pain: No  Swollen joints: No  Joint pain: No  Bone pain: No  Muscle cramps: Yes  Muscle weakness: No  Joint stiffness: No  Bone fracture: No

## 2018-02-07 ENCOUNTER — TELEPHONE (OUTPATIENT)
Dept: UROLOGY | Facility: CLINIC | Age: 73
End: 2018-02-07

## 2018-02-07 NOTE — TELEPHONE ENCOUNTER
Patient left office VM stating he wanted to get scheduled for cryo of prostate. Will route to MD to confirm.  Josefa Winter, CMA

## 2018-02-07 NOTE — TELEPHONE ENCOUNTER
Patient scheduled for surgery on 3/26/18 at 1:14 pm at [facility]. Surgery form and special cryo bowel prep mailed to patient. precert sent to managed care. Form prepped for faxing to [facility]. Patient advised and  Instructed.  Josefa Winter CMA

## 2018-02-12 ENCOUNTER — TELEPHONE (OUTPATIENT)
Dept: NEUROLOGY | Facility: CLINIC | Age: 73
End: 2018-02-12

## 2018-02-12 DIAGNOSIS — Z51.81 ENCOUNTER FOR THERAPEUTIC DRUG MONITORING: ICD-10-CM

## 2018-02-12 DIAGNOSIS — G50.0 TRIGEMINAL NEURALGIA: Primary | ICD-10-CM

## 2018-02-12 RX ORDER — OXCARBAZEPINE 150 MG/1
TABLET, FILM COATED ORAL
Qty: 60 TABLET | Refills: 1 | Status: SHIPPED | OUTPATIENT
Start: 2018-02-12 | End: 2018-03-30

## 2018-02-12 NOTE — TELEPHONE ENCOUNTER
"Talked to patient. His symptoms sound like trigeminal neuralgia- pain at the right periorbital region but also upper lip and cheek, triggered by brushing teeth, cold air, chewing, talking, etc. The pain is brief and lancinating, lasting seconds, and recurs. I saw that he had brain MRI in 2012 and 2014 under the diagnosis \"trigeminal neuralgia\" and he tells me that this symptom has been going on for several years, with waxing/waning periods. He tried TMJ treatment with a jaw orthosis a few years ago, but it did nothing.  I actually think this is TN, and not TMJ problem. CRP recently normal, so unlikely temporal arteritis. Would recommend a trial of Trileptal (150 mg bid for 2 weeks, then 150 morning, 300 evening for 2 weeks). If at 4 weeks patient has no relief he should call us. Trileptal can cause dizziness, sedation (drowsiness), some incoordination and unsteady walking, and, in rare cases it can cause more serious problems, like bone marrow suppression, liver damage, or hyponatremia (low sodium in blood). Because of those uncommon reactions, patients should have labs drawn 1 month after starting the drug, and those lab tests may need to be done periodically if patient decides to stay on the drug. Those labs are CBC, liver panel, and sodium levels.   I have ordered the labs and the drug to his pharmacy.  As for the droopy left eye this is likely MG related. I want him to talk to Dr Hennessy on 2/22/2018 before deciding on next steps on how to treat this.     Thank you      "

## 2018-02-12 NOTE — TELEPHONE ENCOUNTER
Called and spoke with Rakan re: Dr. Harrison' recommendations below. Patient is agreeable to starting Tripleptal and will have labs done in 1 month at a [facility] location.   He will call us if he has any questions or concerns otherwise he will update us in 1 month with how the medication trial went.

## 2018-02-12 NOTE — TELEPHONE ENCOUNTER
Per call center: Pt calling with worsening symptoms of left eye lid droop that has been bad in the morning. Flashes of pain right above the right eye that spider webs over his head that is causing the flashes of pain it is happening everyday, multiple times a day, per pt it can happen when he touches his head or chews food, Pt was asked the degree of pain when the flashes happen and pt stated it was a 10 on a scale from 1-10.  Pt noticed he eyes watering more. Pt vision is worsening in the evening. Pt is not sure if there is a medication change that can happen that will help with these symptoms. Pt is asking for a call back.   Pt can be reached at [phone]     Dr. Harrison, what do you recommend? I can call the patient if you need more information.     Thanks,  Kristin Parham, RN Care Coordinator

## 2018-02-16 NOTE — PROGRESS NOTES
SUBJECTIVE:   Rakan Nolasco is a 72 year old male who presents to clinic today for the following health issues:    Has other issues going on Myasthenia Gravis, Eye issues/Trigeminal Neuralgia.  Dysphagia: on and off and could be from Myasthenia Gravis.  Prostate: Having freezing on 3/26/18    Diabetes Follow-up    Average 112  Patient is checking blood sugars: once daily.  Results are as follows:         am -     Diabetic concerns: None     Symptoms of hypoglycemia (low blood sugar): none     Paresthesias (numbness or burning in feet) or sores: No     Date of last diabetic eye exam: 11/28/2017    BP Readings from Last 2 Encounters:   02/20/18 122/84   02/05/18 146/79     Hemoglobin A1C (%)   Date Value   01/16/2018 6.7 (H)   12/20/2017 6.7 (H)     LDL Cholesterol Calculated (mg/dL)   Date Value   09/06/2017 71   08/01/2016 70     Hypertension Follow-up      Outpatient blood pressures are not being checked.    Low Salt Diet: not monitoring salt    Obesity:    Weight bumped up a little.  Wt Readings from Last 4 Encounters:   02/20/18 246 lb (111.6 kg)   02/05/18 244 lb 12.8 oz (111 kg)   01/22/18 232 lb (105.2 kg)   12/20/17 240 lb (108.9 kg)     Other medical condition being treated for:  Myasthenia Gravis: Neurorology  Trigeminal Neuralgia; initially thought to be temperol arteritis  Prostate Cancer:  Following with by urology      Amount of exercise or physical activity: None    Problems taking medications regularly: No    Medication side effects: weakness with standing, weight gain    Diet: regular (no restrictions)    Problem list and histories reviewed & adjusted, as indicated.  Additional history: as documented    Patient Active Problem List   Diagnosis     GERD (gastroesophageal reflux disease)     Diverticulosis     Snoring     Fatigue     Hyperlipidemia LDL goal <130     Borderline glaucoma with ocular hypertension     S/P blepharoplasty     Advanced directives, counseling/discussion     Trigeminal  neuralgia pain     bmi 31     Hypertension goal BP (blood pressure) < 140/90     Health Care Home     Arthritis     Prediabetes     Dry eyes     PVD (posterior vitreous detachment) OU     H/O RD (retinal detachment) s/p repair with PPV (AB)     Epiretinal membrane, bilateral     Pseudophakia of right eye     Myasthenia gravis, AChR antibody positive (H)     Type 2 diabetes mellitus with hyperglycemia, without long-term current use of insulin (H)     Morbid obesity (H)     Past Surgical History:   Procedure Laterality Date     BIOPSY ARTERY TEMPORAL Bilateral 8/1/2017    Procedure: BIOPSY ARTERY TEMPORAL;  Bilateral temporal artery Biopsy;  Surgeon: Jj Tello MD;  Location: [facility] OR     CATARACT IOL, RT/LT Right      COLONOSCOPY       ESOPHAGOSCOPY, GASTROSCOPY, DUODENOSCOPY (EGD), COMBINED  1/15/2014    Procedure: COMBINED ESOPHAGOSCOPY, GASTROSCOPY, DUODENOSCOPY (EGD), BIOPSY SINGLE OR MULTIPLE;;  Surgeon: Winston Nixon MD;  Location: [facility] OR     PHACOEMULSIFICATION CLEAR CORNEA WITH STANDARD INTRAOCULAR LENS IMPLANT Right 2/20/2017    Procedure: PHACOEMULSIFICATION CLEAR CORNEA WITH STANDARD INTRAOCULAR LENS IMPLANT;  Surgeon: Mateo Gray MD;  Location:  [facility]     RETINAL REATTACHMENT Right      SURGICAL HISTORY OF -   8/2009    Both Eyelids-.     TONSILLECTOMY  as child       Social History   Substance Use Topics     Smoking status: Former Smoker     Years: 15.00     Types: Cigarettes     Quit date: 1/1/1983     Smokeless tobacco: Former User      Comment: smoke free household.     Alcohol use No      Comment: Quit in 1986     Family History   Problem Relation Age of Onset     Respiratory Mother      copd     Allergies Brother      CANCER Maternal Grandmother      HEART DISEASE Paternal Grandmother      CEREBROVASCULAR DISEASE Father          Reviewed and updated as needed this visit by clinical staff    ROS:  Constitutional, HEENT, cardiovascular, pulmonary, gi and gu systems are  negative, except as otherwise noted.    OBJECTIVE:     /84  Pulse 59  Wt 246 lb (111.6 kg)  SpO2 98%  BMI 36.33 kg/m2  Body mass index is 36.33 kg/(m^2).  GENERAL: healthy, alert and no distress  SKIN: Patch of linear dry red purpuric rash in Rt leg  NECK: no adenopathy and thyroid normal to palpation  RESP: lungs clear to auscultation - no rales, rhonchi or wheezes  CV: regular rate and rhythm, normal S1 S2, no S3 or S4, no murmur, click or rub, no peripheral edema   ABDOMEN: soft, obese, nontender, no masses and bowel sounds normal  MS: no gross musculoskeletal defects noted, no edema    Diagnostic Test Results:  none     ASSESSMENT/PLAN:     (E11.65) Type 2 diabetes mellitus with hyperglycemia, without long-term current use of insulin (H)  (primary encounter diagnosis)  Comment: A1c 1 month ago from [facility] 6.7 and blood sugars good. Continue monitoring due to chronic Prednisone use.  Plan: blood glucose monitoring (NO BRAND SPECIFIED)         test strip    (M31.6) Temporal arteritis (H)  Comment: Later evaluation was thought to be Trigeminal neuralgia     (I10) Hypertension goal BP (blood pressure) < 140/90  Comment: Well controlled    (C61) Malignant neoplasm of prostate (H)  Comment: Following with urology    (L30.9) Dermatitis  Comment: Non specific  Plan: triamcinolone (KENALOG) 0.1 % ointment    Follow up in 3 months or sooner with concerns.    Telly Lucio MD  [facility]

## 2018-02-18 DIAGNOSIS — H53.2 DOUBLE VISION: Primary | ICD-10-CM

## 2018-02-20 ENCOUNTER — OFFICE VISIT (OUTPATIENT)
Dept: FAMILY MEDICINE | Facility: CLINIC | Age: 73
End: 2018-02-20
Payer: COMMERCIAL

## 2018-02-20 VITALS
WEIGHT: 246 LBS | OXYGEN SATURATION: 98 % | DIASTOLIC BLOOD PRESSURE: 84 MMHG | HEART RATE: 59 BPM | BODY MASS INDEX: 36.33 KG/M2 | SYSTOLIC BLOOD PRESSURE: 122 MMHG

## 2018-02-20 DIAGNOSIS — I10 HYPERTENSION GOAL BP (BLOOD PRESSURE) < 140/90: ICD-10-CM

## 2018-02-20 DIAGNOSIS — E11.65 TYPE 2 DIABETES MELLITUS WITH HYPERGLYCEMIA, WITHOUT LONG-TERM CURRENT USE OF INSULIN (H): Primary | ICD-10-CM

## 2018-02-20 DIAGNOSIS — L30.9 DERMATITIS: ICD-10-CM

## 2018-02-20 DIAGNOSIS — M31.6 TEMPORAL ARTERITIS (H): ICD-10-CM

## 2018-02-20 DIAGNOSIS — C61 MALIGNANT NEOPLASM OF PROSTATE (H): ICD-10-CM

## 2018-02-20 PROBLEM — E66.01 MORBID OBESITY (H): Status: ACTIVE | Noted: 2018-02-20

## 2018-02-20 PROCEDURE — 99214 OFFICE O/P EST MOD 30 MIN: CPT | Performed by: FAMILY MEDICINE

## 2018-02-20 RX ORDER — TRIAMCINOLONE ACETONIDE 1 MG/G
OINTMENT TOPICAL
Qty: 15 G | Refills: 0 | Status: SHIPPED | OUTPATIENT
Start: 2018-02-20 | End: 2018-06-04

## 2018-02-20 NOTE — MR AVS SNAPSHOT
After Visit Summary   2/20/2018    Rakan Nolasco    MRN: [id number]           Patient Information     Date Of Birth          1945        Visit Information        Provider Department      2/20/2018 9:00 AM Telly Lucio MD [facility]        Today's Diagnoses     Type 2 diabetes mellitus with hyperglycemia, without long-term current use of insulin (H)    -  1    Temporal arteritis (H)        Hypertension goal BP (blood pressure) < 140/90        Malignant neoplasm of prostate (H)        Dermatitis          Care Instructions    [facility]    If you have any questions regarding to your visit please contact your care team:       Team Purple:   Clinic Hours Telephone Number   Dr. Capri MOONEY 7am-7pm  Monday - Thursday   7am-5pm  Fridays  [phone]    /RN  [phone]   Urgent Care - [place] and [place] Monday-Friday  [place] - 11am-9pm  [place] - 5pm-9pm   Saturday-Sunday  Both sites - 9am-5pm  [phone] - [facility]  [phone] - [place]       What options do I have for visits at the clinic other than the traditional office visit?  To expand how we care for you, many of our providers are utilizing electronic visits (e-visits) and telephone visits, when medically appropriate, for interactions with their patients rather than a visit in the clinic.   We also offer nurse visits for many medical concerns. Just like any other service, we will bill your insurance company for this type of visit based on time spent on the phone with your provider. Not all insurance companies cover these visits. Please check with your medical insurance if this type of visit is covered. You will be responsible for any charges that are not paid by your insurance.      E-visits via [employer]:  generally incur a $35.00 fee.  Telephone visits:  Time spent on the phone: *charged based on time that is spent on the phone in  increments of 10 minutes. Estimated cost:   5-10 mins $30.00   11-20 mins. $59.00   21-30 mins. $85.00     Use [employer] (secure email communication and access to your chart) to send your primary care provider a message or make an appointment. Ask someone on your Team how to sign up for [employer].  As always, Thank you for trusting us with your health care needs!      Discharge SOLOMON Montemayor  [facility]            Follow-ups after your visit        Your next 10 appointments already scheduled     Feb 22, 2018  9:00 AM CST   NEW NEURO with Dnaiele Hennessy MD   Eye Clinic ([facility])    [address]  [address] [address]  [facility] [id number] [phone]            May 29, 2018  8:30 AM CDT   New Visit with Mateo Gray MD   [facility] ([facility])    [address] [place] [id number] [phone]            Jun 04, 2018  9:50 AM CDT   (Arrive by 9:35 AM)   Return Myasthenia Gravis with Arpan Harrison MD   [facility] ([facility])    [address]  [address] [facility] [address] [phone]              Who to contact     If you have questions or need follow up information about today's clinic visit or your schedule please contact [facility] directly at [phone].  Normal or non-critical lab and imaging results will be communicated to you by MyChart, letter or phone within 4 business days after the clinic has received the results. If you do not hear from us within 7 days, please contact the clinic through MyChart or phone. If you have a critical or abnormal lab result, we will notify you by phone as soon as possible.  Submit refill requests through [employer] or call your pharmacy and they will forward the refill request to us. Please allow 3 business days for your refill to be completed.          Additional Information About Your Visit         Click With Me Now Information     [employer] Me Now gives you secure access to your electronic health record. If you see a primary care provider, you can also send messages to your care team and make appointments. If you have questions, please call your primary care clinic.  If you do not have a primary care provider, please call [phone] and they will assist you.        Care EveryWhere ID     This is your Care EveryWhere ID. This could be used by other organizations to access your [place] medical records  [id number]        Your Vitals Were     Pulse Pulse Oximetry BMI (Body Mass Index)             59 98% 36.33 kg/m2          Blood Pressure from Last 3 Encounters:   02/20/18 122/84   02/05/18 146/79   01/22/18 126/70    Weight from Last 3 Encounters:   02/20/18 246 lb (111.6 kg)   02/05/18 244 lb 12.8 oz (111 kg)   01/22/18 232 lb (105.2 kg)              Today, you had the following     No orders found for display         Today's Medication Changes          These changes are accurate as of 2/20/18  9:28 AM.  If you have any questions, ask your nurse or doctor.               Start taking these medicines.        Dose/Directions    triamcinolone 0.1 % ointment   Commonly known as:  KENALOG   Used for:  Dermatitis   Started by:  Telly Lucio MD        Apply sparingly to affected area three times daily for 14 days.   Quantity:  15 g   Refills:  0            Where to get your medicines      These medications were sent to [place] [address] [place] [facility] [place] - [address]  [address] [place] [address]     Phone:  [phone]     blood glucose monitoring test strip    triamcinolone 0.1 % ointment                Primary Care Provider Office Phone # Fax #    Telly Lucio [phone]       [address] [place] [address]  [place] [address]        Equal Access to Services     CRIS YEE AH: Norm blankenshipo Soomaali, waaxda luqadaha, qaybta kaalgino rolle  ah. So [facility] [phone].    ATENCIÓN: Si katlyn chau, tiene a coates disposición servicios gratuitos de asistencia lingüística. Boni al [phone].    We comply with applicable federal civil rights laws and [place] laws. We do not discriminate on the basis of race, color, national origin, age, disability, sex, sexual orientation, or gender identity.            Thank you!     Thank you for choosing [facility]  for your care. Our goal is always to provide you with excellent care. Hearing back from our patients is one way we can continue to improve our services. Please take a few minutes to complete the written survey that you may receive in the mail after your visit with us. Thank you!             Your Updated Medication List - Protect others around you: Learn how to safely use, store and throw away your medicines at www.disposemymeds.org.          This list is accurate as of 2/20/18  9:28 AM.  Always use your most recent med list.                   Brand Name Dispense Instructions for use Diagnosis    aspirin 81 MG tablet      Take 1 tablet by mouth daily.        atorvastatin 20 MG tablet    LIPITOR    90 tablet    TAKE ONE TABLET BY MOUTH ONE TIME DAILY    Hyperlipidemia LDL goal <130       blood glucose lancets standard    no brand specified    50 each    Check blood sugar every morning    Type 2 diabetes mellitus with hyperglycemia, without long-term current use of insulin (H)       blood glucose monitoring meter device kit    no brand specified    1 kit    Use to test blood sugar 1 times daily or as directed.    Type 2 diabetes mellitus with hyperglycemia, without long-term current use of insulin (H)       blood glucose monitoring test strip    no brand specified    50 strip    laneUse to test blood sugars 1 times daily or as directed    Type 2 diabetes mellitus with hyperglycemia, without long-term current use of insulin (H)       CALCIUM 600/VITAMIN D PO      Take  by mouth. Take 1/2 tablet in  the morning, 1/2 at night daily        clonazePAM 0.5 MG tablet    klonoPIN     Take 0.5 mg by mouth. Take 1 tablet by mouth daily at bedtime        latanoprost 0.005 % ophthalmic solution    XALATAN    7.5 mL    Place 1 drop into both eyes At Bedtime    Borderline glaucoma with ocular hypertension, bilateral       lisinopril 10 MG tablet    PRINIVIL/ZESTRIL    90 tablet    Take 1 tablet (10 mg) by mouth daily    Hypertension goal BP (blood pressure) < 140/90       metFORMIN 500 MG 24 hr tablet    GLUCOPHAGE-XR    180 tablet    Take 1 tablet (500 mg) by mouth 2 times daily (with meals)    Prediabetes       MULTI-DAY PO      daily        OXcarbazepine 150 MG tablet    TRILEPTAL    60 tablet    Take 1 tablet twice daily for 2 weeks. If pain persists, take 1 in the morning and 2 in the evening for 2 weeks, then call us.    Trigeminal neuralgia       pantoprazole 20 MG [facility] tablet    PROTONIX    90 tablet    TAKE ONE TABLET BY MOUTH ONE TIME DAILY 30-60 MINUTES BEFORE A MEAL    Gastroesophageal reflux disease without esophagitis       * predniSONE 20 MG tablet    DELTASONE          * predniSONE 10 MG tablet    DELTASONE    63 tablet    Week one: 30 mg the morning, week 2 and afterwards: 20 mg the morning    Ocular myasthenia (H)       pyridostigmine 60 MG tablet    MESTINON    120 tablet    Take 1 tablet (60 mg) by mouth 4 times daily    Ocular myasthenia (H), Long-term use of immunosuppressant medication       timolol 0.5 % ophthalmic solution    TIMOPTIC    1 Bottle    Place 1 drop into both eyes daily    Borderline glaucoma with ocular hypertension, bilateral       triamcinolone 0.1 % ointment    KENALOG    15 g    Apply sparingly to affected area three times daily for 14 days.    Dermatitis       * Notice:  This list has 2 medication(s) that are the same as other medications prescribed for you. Read the directions carefully, and ask your doctor or other care provider to review them with you.

## 2018-02-20 NOTE — PATIENT INSTRUCTIONS
[facility]    If you have any questions regarding to your visit please contact your care team:       Team Purple:   Clinic Hours Telephone Number   Dr. Capri MOONEY 7am-7pm  Monday - Thursday   7am-5pm  Fridays  [phone]    /RN  [phone]   Urgent Care - [place] and [place] Monday-Friday  [place] - 11am-9pm  [place] - 5pm-9pm   Saturday-Sunday  Both sites - 9am-5pm  [phone] - [facility]  [phone] - [place]       What options do I have for visits at the clinic other than the traditional office visit?  To expand how we care for you, many of our providers are utilizing electronic visits (e-visits) and telephone visits, when medically appropriate, for interactions with their patients rather than a visit in the clinic.   We also offer nurse visits for many medical concerns. Just like any other service, we will bill your insurance company for this type of visit based on time spent on the phone with your provider. Not all insurance companies cover these visits. Please check with your medical insurance if this type of visit is covered. You will be responsible for any charges that are not paid by your insurance.      E-visits via [employer]:  generally incur a $35.00 fee.  Telephone visits:  Time spent on the phone: *charged based on time that is spent on the phone in increments of 10 minutes. Estimated cost:   5-10 mins $30.00   11-20 mins. $59.00   21-30 mins. $85.00     Use "[employer]t (secure email communication and access to your chart) to send your primary care provider a message or make an appointment. Ask someone on your Team how to sign up for [employer].  As always, Thank you for trusting us with your health care needs!      Discharge SOLOMON Montemayor CMA

## 2018-02-22 ENCOUNTER — OFFICE VISIT (OUTPATIENT)
Dept: OPHTHALMOLOGY | Facility: CLINIC | Age: 73
End: 2018-02-22
Attending: PSYCHIATRY & NEUROLOGY
Payer: COMMERCIAL

## 2018-02-22 DIAGNOSIS — H26.491 RIGHT POSTERIOR CAPSULAR OPACIFICATION: Primary | ICD-10-CM

## 2018-02-22 DIAGNOSIS — H53.2 DOUBLE VISION: ICD-10-CM

## 2018-02-22 PROCEDURE — 92015 DETERMINE REFRACTIVE STATE: CPT | Mod: ZF | Performed by: TECHNICIAN/TECHNOLOGIST

## 2018-02-22 PROCEDURE — G0463 HOSPITAL OUTPT CLINIC VISIT: HCPCS | Mod: 25,ZF | Performed by: TECHNICIAN/TECHNOLOGIST

## 2018-02-22 ASSESSMENT — REFRACTION_MANIFEST
OS_CYLINDER: +1.75
OS_SPHERE: -2.00
OD_CYLINDER: +1.00
OD_AXIS: 115
OS_AXIS: 015
OD_SPHERE: -1.75

## 2018-02-22 ASSESSMENT — VISUAL ACUITY
METHOD: SNELLEN - LINEAR
OS_CC: 20/20
OD_PH_CC: 20/30
CORRECTION_TYPE: GLASSES
OD_CC+: -2
OD_CC: 20/40

## 2018-02-22 ASSESSMENT — REFRACTION_WEARINGRX
SPECS_TYPE: BIFOCAL
OD_AXIS: 129
OS_AXIS: 012
OS_SPHERE: -2.00
OD_CYLINDER: +2.25
OD_ADD: +2.75
OS_CYLINDER: +1.75
OD_SPHERE: -2.75
OS_ADD: +2.75

## 2018-02-22 ASSESSMENT — TONOMETRY
OD_IOP_MMHG: 18
OS_IOP_MMHG: 18
IOP_METHOD: ICARE

## 2018-02-22 ASSESSMENT — CUP TO DISC RATIO
OS_RATIO: 0.2
OD_RATIO: 0.1

## 2018-02-22 ASSESSMENT — EXTERNAL EXAM - RIGHT EYE: OD_EXAM: NORMAL

## 2018-02-22 ASSESSMENT — CONF VISUAL FIELD
METHOD: COUNTING FINGERS
OS_NORMAL: 1
OD_NORMAL: 1

## 2018-02-22 ASSESSMENT — EXTERNAL EXAM - LEFT EYE: OS_EXAM: NORMAL

## 2018-02-22 NOTE — MR AVS SNAPSHOT
After Visit Summary   2/22/2018    Rakan Nolasco    MRN: [id number]           Patient Information     Date Of Birth          1945        Visit Information        Provider Department      2/22/2018 9:00 AM Daniele [facility]y [facility]        Today's Diagnoses     Right posterior capsular opacification    -  1    Double vision           Follow-ups after your visit        Your next 10 appointments already scheduled     May 29, 2018  8:30 AM CDT   New Visit with Mateo Gray MD   [facility] ([facility]    [address] [facility] [address] [facility] [address]   [phone]            Jun 04, 2018  9:50 AM CDT   (Arrive by 9:35 AM)   Return Myasthenia Gravis with Arpan Harrison MD   [facility] ([facility])    [address] [place] [address] [phone]            Aug 14, 2018  9:20 AM CDT   Office Visit with Telly Lucio MD   [facility] ([facility])    [address] [facility] [address]   [phone]           Bring a current list of meds and any records pertaining to this visit. For Physicals, please bring immunization records and any forms needing to be filled out. Please arrive 10 minutes early to complete paperwork.              Who to contact     Please call your clinic at [phone] to:    Ask questions about your health    Make or cancel appointments    Discuss your medicines    Learn about your test results    Speak to your doctor            Additional Information About Your Visit        [employer] Information     [employer] gives you secure access to your electronic health record. If you see a primary care provider, you can also send messages to your care team and make appointments. If you have questions, please call your primary care clinic.  If you do not have a primary care provider, please call [phone] and they will assist  you.      "[employer]" is an electronic gateway that provides easy, online access to your medical records. With "[employer]", you can request a clinic appointment, read your test results, renew a prescription or communicate with your care team.     To access your existing account, please contact your [facility] or call [phone] for assistance.        Care EveryWhere ID     This is your Care EveryWhere ID. This could be used by other organizations to access your [place] medical records  [id number]         Blood Pressure from Last 3 Encounters:   03/14/18 124/64   02/20/18 122/84   02/05/18 146/79    Weight from Last 3 Encounters:   03/14/18 111.1 kg (245 lb)   02/20/18 111.6 kg (246 lb)   02/05/18 111 kg (244 lb 12.8 oz)              We Performed the Following     IOP Measurement        Primary Care Provider Office Phone # Fax #    Telly Lucio [phone] [address] [facility] [address]        Equal Access to Services     [facility]: Hadii aad ku hadasho Soomaali, waaxda luqadaha, qaybta kaalmada adeegyada, waxay silviain haytavares lion . So [facility] [phone].    ATENCIÓN: Si habla español, tiene a coates disposición servicios gratuitos de asistencia lingüística. Llame al [phone].    We comply with applicable federal civil rights laws and [place] laws. We do not discriminate on the basis of race, color, national origin, age, disability, sex, sexual orientation, or gender identity.            Thank you!     Thank you for choosing [facility]  for your care. Our goal is always to provide you with excellent care. Hearing back from our patients is one way we can continue to improve our services. Please take a few minutes to complete the written survey that you may receive in the mail after your visit with us. Thank you!             Your Updated Medication List - Protect others around you: Learn how to safely use, store and throw away  your medicines at www.disposemymeds.org.          This list is accurate as of 2/22/18 11:59 PM.  Always use your most recent med list.                   Brand Name Dispense Instructions for use Diagnosis    aspirin 81 MG tablet      Take 1 tablet by mouth daily.        blood glucose lancets standard    no brand specified    50 each    Check blood sugar every morning    Type 2 diabetes mellitus with hyperglycemia, without long-term current use of insulin (H)       blood glucose monitoring meter device kit    no brand specified    1 kit    Use to test blood sugar 1 times daily or as directed.    Type 2 diabetes mellitus with hyperglycemia, without long-term current use of insulin (H)       blood glucose monitoring test strip    no brand specified    50 strip    laneUse to test blood sugars 1 times daily or as directed    Type 2 diabetes mellitus with hyperglycemia, without long-term current use of insulin (H)       CALCIUM 600/VITAMIN D PO      Take  by mouth. Take 1/2 tablet in the morning, 1/2 at night daily        clonazePAM 0.5 MG tablet    klonoPIN     Take 0.5 mg by mouth. Take 1 tablet by mouth daily at bedtime        latanoprost 0.005 % ophthalmic solution    XALATAN    7.5 mL    Place 1 drop into both eyes At Bedtime    Borderline glaucoma with ocular hypertension, bilateral       lisinopril 10 MG tablet    PRINIVIL/ZESTRIL    90 tablet    Take 1 tablet (10 mg) by mouth daily    Hypertension goal BP (blood pressure) < 140/90       metFORMIN 500 MG 24 hr tablet    GLUCOPHAGE-XR    180 tablet    Take 1 tablet (500 mg) by mouth 2 times daily (with meals)    Prediabetes       MULTI-DAY PO      daily        OXcarbazepine 150 MG tablet    TRILEPTAL    60 tablet    Take 1 tablet twice daily for 2 weeks. If pain persists, take 1 in the morning and 2 in the evening for 2 weeks, then call us.    Trigeminal neuralgia       pantoprazole 20 MG EC tablet    PROTONIX    90 tablet    TAKE ONE TABLET BY MOUTH ONE TIME DAILY  30-60 MINUTES BEFORE A MEAL    Gastroesophageal reflux disease without esophagitis       * predniSONE 20 MG tablet    DELTASONE          * predniSONE 10 MG tablet    DELTASONE    63 tablet    Week one: 30 mg the morning, week 2 and afterwards: 20 mg the morning    Ocular myasthenia (H)       timolol 0.5 % ophthalmic solution    TIMOPTIC    1 Bottle    Place 1 drop into both eyes daily    Borderline glaucoma with ocular hypertension, bilateral       triamcinolone 0.1 % ointment    KENALOG    15 g    Apply sparingly to affected area three times daily for 14 days.    Dermatitis       * Notice:  This list has 2 medication(s) that are the same as other medications prescribed for you. Read the directions carefully, and ask your doctor or other care provider to review them with you.

## 2018-02-22 NOTE — LETTER
"2018    RE: Rakan Nolasco  : 1945  MRN: [id number]    Dear Dr. Harrison,    Thank you for referring your patient, Rakan Nolasco, to my neuro-ophthalmology clinic recently.  After a thorough neuro-ophthalmic history and examination, I came to the following conclusions:       1. 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 Intermittent monocular diplopia by history- may be post cataract surgery posterior capsular opacity related in the right eye and potentially early cataract if occurring in the left eye.    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  He also hast history of cataract extraction in the right eye following retinal detachment repair (in late ) and still has his native lens present in the left eye. His initial myasthenic symptoms were double vision and mild right ptosis. He was started on prednisone 60 mg, tapered quickly and then symptoms seemed to recur. At present he is on Mestinon 60 mg QID for MG and Prednisone 20 mg daily and Trileptal for trigeminal neuralgia.  Patient had Azathioprine related fever which required him to stop this.  He has had intermittent blurry vision the last two months: occurs on awakening, worse in the evenings when watching TV, lasts 60-90 minutes.  Patient has diabetes mellitus but denies that blurred vision correlates to blood sugars which have been decently controlled with last hgbA1c of 6.7 (18).    He describes blurring and \"ghosting\" around the image, \"like there's water in my eye\" but denies two completely  images.  Symptoms are worse when resting/sitting and don't occur when out and about. Does not note correlation with overall tiredness and blurry vision. There is no orbital pain but he does have right sided facial pain due to trigeminal neuralgia. The eyes " "sometimes feel tight.     Exam was notable for 20/40 best corrected visual acuity in the right eye and 20/20 in the left eye.  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  Prolonged up-gaze testing revealed no fatigue of superior rectus or levator palpebrae superioris bilaterally.  Orbicularis oculi testing showed possible mild orbicularis oculi weakness in the right eye more than left though this could have also been physiologic. Neck flexion and extension were normal for me.  Slit lamp exam was significant for a dense posterior capsular opacity in the right eye ( status post cataract extraction) that appeared to be visually significant. The left lens showed moderate nuclear sclerotic cataract that appeared borderline visually significant (and since he corrects to 20/20 in the left eye is likely not worthy of intervention).      In conclusion, I did not see convincing evidence by history or exam that his current complaints of ghosting or mild intermittent blurring are myasthenia gravis related.  His myasthenia gravis appears to be adequately controlled at this time.  I believe he may be having monocular diplopia and his posterior capsular opacity is a likely culprit.  I suggest he undergo YAG laser capsulotomy for this soon as this is a low risk / high reward procedure.    Addendum:  The patient called back after our office visit to inform us (upon testing at home) that his diplopia / subjective visual disturbance is monocular diplopia and not binocular diplopia.  This is further evidence to support the notion that his myasthenia gravis is NOT the cause of his current subjective visual disturbance.     I did not make a follow-up appointment, but I would be happy to see the patient back in the future should any new neuro-ophthalmic concern arise or should he discover " binocular diplopia.  Also if Dr. Harrison thinks a repeat neuro-ophthalmologic exam in the future would be worthwhile I'd always be happy to re-evaluate.      Again, thank you for trusting me with the care of your patient.  For further exam details, please feel free to contact our office for additional records.  If you wish to contact me regarding this patient please email me at [email or web address] or give my clinic a call to arrange a phone conversation.    Sincerely,    Daniele Hennessy MD  , Neuro-Ophthalmology and Adult Strabismus  Department of Ophthalmology and Visual Neurosciences  [facility]    DX: monocular diplopia, myasthenia gravis, posterior capsular opacity

## 2018-02-22 NOTE — NURSING NOTE
Chief Complaints and History of Present Illnesses   Patient presents with     Follow Up For     initial visit with Dr. Hennessy for diagnosis of MG     HPI    Symptoms:              Comments:  Rakan Nolasco is a 72 year old male, initial visit with Dr. Hennessy referred by Arpan Harrison MD for:    1. Myasthenia Gravis  Diagnosed 3-4 months ago. Symptoms started in July 2017:  -Double and poor vision in July.   -Droopy RIGHT upper eye lid. Was put on prednisone and everything resolved. As he got tapered off of prednisone, blurry vision occurred again, no double vision. Denies ptosis after tapered off of prednisone.   Patient states LEFT eye is starting to droop recently, about a week now.   Patient states detached retina RIGHT eye and had cataract surgery in RIGHT eye (Late 2015-early 2016). Vision has not been great since per patient.   Upper Right side trigeminal neuralgia , upper forehead on left side intermittently per patient.   Currently on Mestinon and prednisone.     Lab Results       Component                Value               Date                       A1C                      6.7                 01/16/2018                 A1C                      6.7                 12/20/2017                 A1C                      7.5                 10/20/2017                 A1C                      6.0                 10/03/2016                 A1C                      5.7                 08/01/2016              [place] 2/22/2018 9:15 AM

## 2018-02-22 NOTE — PROGRESS NOTES
"     1. 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 Intermittent monocular diplopia by history- may be post cataract surgery posterior capsular opacity related in the right eye and potentially early cataract if occurring in the left eye.    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  He also hast history of cataract extraction in the right eye following retinal detachment repair (in late 2015) and still has his native lens present in the left eye. His initial myasthenic symptoms were double vision and mild right ptosis. He was started on prednisone 60 mg, tapered quickly and then symptoms seemed to recur. At present he is on Mestinon 60 mg QID for MG and Prednisone 20 mg daily and Trileptal for trigeminal neuralgia.  Patient had Azathioprine related fever which required him to stop this.  He has had intermittent blurry vision the last two months: occurs on awakening, worse in the evenings when watching TV, lasts 60-90 minutes.  Patient has diabetes mellitus but denies that blurred vision correlates to blood sugars which have been decently controlled with last hgbA1c of 6.7 (1/16/18).    He describes blurring and \"ghosting\" around the image, \"like there's water in my eye\" but denies two completely  images.  Symptoms are worse when resting/sitting and don't occur when out and about. Does not note correlation with overall tiredness and blurry vision. There is no orbital pain but he does have right sided facial pain due to trigeminal neuralgia. The eyes sometimes feel tight.     Exam was notable for 20/40 best corrected visual acuity in the right eye and 20/20 in the left eye.  He had a 4 mm fixed surgical pupil in the right eye though there was no afferent pupillary defect when checked for \"by reverse.\"  Alternate cover testing in all gaze " positions showed orthophoria.  Prolonged up-gaze testing revealed no fatigue of superior rectus or levator palpebrae superioris bilaterally.  Orbicularis oculi testing showed possible mild orbicularis oculi weakness in the right eye more than left though this could have also been physiologic. Neck flexion and extension were normal for me.  Slit lamp exam was significant for a dense posterior capsular opacity in the right eye ( status post cataract extraction) that appeared to be visually significant. The left lens showed moderate nuclear sclerotic cataract that appeared borderline visually significant (and since he corrects to 20/20 in the left eye is likely not worthy of intervention).      In conclusion, I did not see convincing evidence by history or exam that his current complaints of ghosting or mild intermittent blurring are myasthenia gravis related.  His myasthenia gravis appears to be adequately controlled at this time.  I believe he may be having monocular diplopia and his posterior capsular opacity is a likely culprit.  I suggest he undergo YAG laser capsulotomy for this soon as this is a low risk / high reward procedure.    Addendum:  The patient called back after our office visit to inform us (upon testing at home) that his diplopia / subjective visual disturbance is monocular diplopia and not binocular diplopia.  This is further evidence to support the notion that his myasthenia gravis is NOT the cause of his current subjective visual disturbance.     I did not make a follow-up appointment, but I would be happy to see the patient back in the future should any new neuro-ophthalmic concern arise or should he discover binocular diplopia.  Also if Dr. Harrison thinks a repeat neuro-ophthalmologic exam in the future would be worthwhile I'd always be happy to re-evaluate.       Complete documentation of historical and exam elements from today's encounter can be found in the full encounter summary report  (not reduplicated in this progress note).  I personally obtained the chief complaint(s) and history of present illness.  I confirmed and edited as necessary the review of systems, past medical/surgical history, family history, social history, and examination findings as documented by others; and I examined the patient myself.  I personally reviewed the relevant tests, images, and reports as documented above.  I formulated and edited as necessary the assessment and plan and discussed the findings and management plan with the patient and family.  I personally reviewed the ophthalmic test(s) associated with this encounter, agree with the interpretation(s) as documented by the resident/fellow, and have edited the corresponding report(s) as necessary.     Daniele Hennessy MD

## 2018-03-09 NOTE — PROGRESS NOTES
[facility]  [address] [place] [id number]  [phone]  Dept: [phone]    PRE-OP EVALUATION:  Today's date: 3/14/2018    Rakan Nolasco (: 1945) presents for pre-operative evaluation assessment as requested by Dr. Josh Godinez.  He requires evaluation and anesthesia risk assessment prior to undergoing surgery/procedure for treatment of Malignant Neoplasm of Prostate .    Facility or Hospital Where Procedure/ Surgery will be performed: [facility]  Fax number for surgical facility:   Time of Surgery/ Procedue: 11:00 AM  Primary Physician: Telly Lucio  Type of Anesthesia Anticipated: to be determined    Patient has a Health Care Directive or Living Will:  NO    Preop Questions 3/14/2018   Who is doing your surgery? dr godinez   What are you having done? prostate freezing   Date of Surgery/Procedure: 18   1.  Do you have a history of Heart attack, stroke, stent, coronary bypass surgery, or other heart surgery? No   2.  Do you ever have any pain or discomfort in your chest? No   3.  Do you have a history of  Heart Failure? No   4.   Are you troubled by shortness of breath when:  walking on a level surface, or up a slight hill, or at night? YES - when exercises   5.  Do you currently have a cold, bronchitis or other respiratory infection? No   6.  Do you have a cough, shortness of breath, or wheezing? No   7.  Do you sometimes get pains in the calves of your legs when you walk? No   8. Do you or anyone in your family have previous history of blood clots? No   9.  Do you or does anyone in your family have a serious bleeding problem such as prolonged bleeding following surgeries or cuts? No   10. Have you ever had problems with anemia or been told to take iron pills? No   11. Have you had any abnormal blood loss such as black, tarry or bloody stools? No   12. Have you ever had a blood transfusion? No   13. Have you or any of your relatives ever had problems  with anesthesia? No   14. Do you have sleep apnea, excessive snoring or daytime drowsiness? YES - Sleep apnea on CPAP   15. Do you have any prosthetic heart valves? No   16. Do you have prosthetic joints? No     HPI:     HPI related to upcoming procedure: Treatment for Malignant Neoplasm of Prostate.    See problem list for active medical problems.  Problems all longstanding and stable, except as noted/documented.  See ROS for pertinent symptoms related to these conditions.                                                                         Well controlled Type 2 Diabetes on oral medications. Recent A1c 6.7     ROSIE: on CPAP  Myasthenia Gravis: On pyridostigmine                        .  MEDICAL HISTORY:     Patient Active Problem List    Diagnosis Date Noted     Morbid obesity (H) 02/20/2018     Priority: Medium     Type 2 diabetes mellitus with hyperglycemia, without long-term current use of insulin (H) 10/23/2017     Priority: Medium     Exacerbated by Prednisone       Myasthenia gravis, AChR antibody positive (H) 10/16/2017     Priority: Medium     Pseudophakia of right eye 02/21/2017     Priority: Medium     H/O RD (retinal detachment) s/p repair with PPV (AB) 05/26/2016     Priority: Medium     Epiretinal membrane, bilateral 05/26/2016     Priority: Medium     PVD (posterior vitreous detachment) OU 06/17/2014     Priority: Medium     Dry eyes 01/21/2014     Priority: Medium     Prediabetes      Priority: Medium     Arthritis      Priority: Medium     Health Care Home 07/20/2012     Priority: Medium     FPA [employer] for .  Jurgen Messina RN, C-BC    [phone]     DX V65.8 REPLACED WITH [id number] HEALTH CARE HOME (04/08/2013)       Hypertension goal BP (blood pressure) < 140/90 07/19/2012     Priority: Medium     bmi 31 07/14/2012     Priority: Medium     Trigeminal neuralgia pain 01/04/2012     Priority: Medium     Advanced directives, counseling/discussion 08/24/2011     Priority: Medium      Advance Directive Problem List Overview:   Name Relationship Phone    Primary Health Care Agent            Alternative Health Care Agent          Discussed advance care planning with patient; information given to patient to review. 8/24/2011          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     Priority: Medium     Target IOP: 21  Peak IOP: 31  GDX: abnormal both eyes  OCT: nl  HVF: nl  Pachymetry:  C/D:  0.2/0.35  SLT:           S/P blepharoplasty 12/06/2010     Priority: Medium     Hyperlipidemia LDL goal <130 10/31/2010     Priority: Medium     Snoring 10/27/2010     Priority: Medium     Fatigue 10/27/2010     Priority: Medium     GERD (gastroesophageal reflux disease)      Priority: Medium     Diverticulosis      Priority: Medium      Past Medical History:   Diagnosis Date     Arthritis          BMI 31.0-31.9,adult      Diverticulosis     Colonoscopy 8/2008     Fatty liver     see US  5/2012     GERD (gastroesophageal reflux disease)      Glaucoma (increased eye pressure)      HTN (hypertension)      Hyperlipidemia LDL goal < 130     age, htn, fhx     Irritable bowel      Nonsenile cataract      ROSIE (obstructive sleep apnea) 01/2011    Using CPAP;      Prediabetes      Restless leg syndrome      Trigeminal neuralgia pain 1/4/2012     Past Surgical History:   Procedure Laterality Date     BIOPSY ARTERY TEMPORAL Bilateral 8/1/2017    Procedure: BIOPSY ARTERY TEMPORAL;  Bilateral temporal artery Biopsy;  Surgeon: Jj Tello MD;  Location: [facility] OR     CATARACT IOL, RT/LT Right      COLONOSCOPY       ESOPHAGOSCOPY, GASTROSCOPY, DUODENOSCOPY (EGD), COMBINED  1/15/2014    Procedure: COMBINED ESOPHAGOSCOPY, GASTROSCOPY, DUODENOSCOPY (EGD), BIOPSY SINGLE OR MULTIPLE;;  Surgeon: Winston Nixon MD;  Location: [facility] OR     PHACOEMULSIFICATION CLEAR CORNEA WITH STANDARD INTRAOCULAR LENS IMPLANT Right 2/20/2017    Procedure: PHACOEMULSIFICATION CLEAR CORNEA WITH STANDARD  INTRAOCULAR LENS IMPLANT;  Surgeon: Mateo Gray MD;  Location:  [facility]     RETINAL REATTACHMENT Right      SURGICAL HISTORY OF -   8/2009    Both Eyelids-.     TONSILLECTOMY  as child     Current Outpatient Prescriptions   Medication Sig Dispense Refill     pyridostigmine (MESTINON) 60 MG tablet Take 1 tablet (60 mg) by mouth 4 times daily 120 tablet 2     blood glucose monitoring (NO BRAND SPECIFIED) test strip laneUse to test blood sugars 1 times daily or as directed 50 strip 5     triamcinolone (KENALOG) 0.1 % ointment Apply sparingly to affected area three times daily for 14 days. 15 g 0     OXcarbazepine (TRILEPTAL) 150 MG tablet Take 1 tablet twice daily for 2 weeks. If pain persists, take 1 in the morning and 2 in the evening for 2 weeks, then call us. 60 tablet 1     predniSONE (DELTASONE) 20 MG tablet        blood glucose monitoring (NO BRAND SPECIFIED) meter device kit Use to test blood sugar 1 times daily or as directed. 1 kit 0     blood glucose (NO BRAND SPECIFIED) lancets standard Check blood sugar every morning 50 each 1     metFORMIN (GLUCOPHAGE-XR) 500 MG 24 hr tablet Take 1 tablet (500 mg) by mouth 2 times daily (with meals) 180 tablet 1     pantoprazole (PROTONIX) 20 MG [facility] tablet TAKE ONE TABLET BY MOUTH ONE TIME DAILY 30-60 MINUTES BEFORE A MEAL 90 tablet 1     lisinopril (PRINIVIL/ZESTRIL) 10 MG tablet Take 1 tablet (10 mg) by mouth daily 90 tablet 3     timolol (TIMOPTIC) 0.5 % ophthalmic solution Place 1 drop into both eyes daily 1 Bottle 11     atorvastatin (LIPITOR) 20 MG tablet TAKE ONE TABLET BY MOUTH ONE TIME DAILY 90 tablet 1     latanoprost (XALATAN) 0.005 % ophthalmic solution Place 1 drop into both eyes At Bedtime 7.5 mL 4     aspirin 81 MG tablet Take 1 tablet by mouth daily.       ClonAZEPAM (KLONOPIN) 0.5 MG tablet Take 0.5 mg by mouth. Take 1 tablet by mouth daily at bedtime       Calcium Carbonate-Vitamin D (CALCIUM 600/VITAMIN D PO) Take  by mouth. Take 1/2 tablet  in the morning, 1/2 at night daily       MULTI-DAY OR daily       predniSONE (DELTASONE) 10 MG tablet Week one: 30 mg the morning, week 2 and afterwards: 20 mg the morning (Patient not taking: Reported on 3/14/2018) 63 tablet 0     OTC products: None, except as noted above    Allergies   Allergen Reactions     Ciprofloxacin Muscle Pain (Myalgia)     Other reaction(s): Myalgia  Other reaction(s): Myalgia     Ropinirole      Other reaction(s): Leg Pain     Ropinirole Hcl GI Disturbance     Weakness;? syncope      Latex Allergy: NO    Social History   Substance Use Topics     Smoking status: Former Smoker     Years: 15.00     Types: Cigarettes     Quit date: 1/1/1983     Smokeless tobacco: Former User      Comment: smoke free household.     Alcohol use No      Comment: Quit in 1986     History   Drug Use No       REVIEW OF SYSTEMS:   Constitutional, HEENT, cardiovascular, pulmonary, gi and gu systems are negative, except as otherwise noted.    EXAM:   Pulse 64  Temp 96.2  F (35.7  C) (Oral)  Resp 16  Wt 245 lb (111.1 kg)  SpO2 98%  BMI 36.18 kg/m2    GENERAL APPEARANCE: healthy, alert and no distress     EYES: EOMI,  PERRL     HENT: ear canals and TM's normal and nose and mouth without ulcers or lesions     NECK: no adenopathy and thyroid normal to palpation     RESP: lungs clear to auscultation - no rales, rhonchi or wheezes     CV: regular rates and rhythm and no murmur, click or rub     ABDOMEN:  soft, obese, nontender or masses and bowel sounds normal     SKIN: no suspicious lesions or rashes     NEURO: Normal strength and tone, mentation intact and speech normal     PSYCH: mentation appears normal. and affect normal/bright     LYMPHATICS: No cervical adenopathy    DIAGNOSTICS:     ECHO: 01/12/2018 ([facility])  Final Conclusion   Technically difficult study - contrast was used to enhance endocardial definition due to   suboptimal image quality.   Normal LV size and systolic function with estimated ejection  fraction of 60-65%.   Mild concentric LVH.   No significant valve abnormalities noted.   Trivial pericardial effusion.   Estimated EF: 60-65%   FINDINGS   Left Ventricle Mildly increased wall thickness. Normal left ventricular size and ejection   fraction. No obvious regional wall   motion/thickening abnormalities.   Diastolic Function Normal left ventricular diastolic filling pattern for age.   Right Ventricle The right ventricle is normal in size and function.   Left Atrium Left atrial size at the upper limits of normal.   Right Atrium The right atrium is normal in size.   Aortic Valve Aortic valve morphology not well visualized. No aortic stenosis. No significant   aortic regurgitation.   Mitral Valve Structurally normal mitral valve without significant stenosis or prolapse.  Trace   mitral regurgitation.   Tricuspid Valve Tricuspid valve not well visualized. No tricuspid stenosis. Trace tricuspid   regurgitation. Unable to estimate the   right ventricular systolic pressure.   Pulmonic Valve Pulmonic valve not well visualized. No pulmonic stenosis. Pulmonary artery   acceleration time of 130 ms suggests   normal PA pressure.   Pericardium trivial pericardial effusion. Prominent epicardial fat.   Aorta Aortic root diameter is at the upper limits of normal.   Inferior Vena Cava Normal inferior vena cava (IVC) size.   MEASUREMENTS  (Male / Female) Normal Values   2D MEASUREMENTS AND LV FUNCTION   IVS Diastolic Thickness           1.14 cm               < 1.1 cm / < 1.0 cm   LV Diastolic Diameter PLAX        3.93 cm               4.2 - 5.9 / 3.9 - 5.3 cm   LVPW Diastolic Thickness          1.14 cm               < 1.1 cm / < 1.0 cm   LV Systolic Diameter PLAX         2.75 cm   LVOT Diameter                     2.08 cm   LVOT Stroke Volume                60.1 ml   LA Systolic Diameter LX           3.38 cm               3.0 - 4.0 / 2.7 - 3.8 cm   Sinuses of Valsalva Diameter(d)   3.47 cm     DIASTOLOGY   Mitral E Point  Velocity           0.791 m/sec           0.70 - 1.02 m/sec   Mitral A Point Velocity           0.988 m/sec           0.06 - 1.06 m/sec   Mitral E to A Ratio               0.801                 1.1 - 2.1   MV Deceleration Time              171 msec              167 - 231 msec   LV E' Lateral Velocity            0.126 m/sec   Mitral E to LV E' Lateral Ratio   6.28   LV E' Septal Velocity             0.0984 m/sec   Mitral E to LV E' Septal Ratio    8.04     AORTIC VALVE   AV Peak Velocity                  1.49 m/sec            < 2.0 m/sec   AV Peak Gradient                  8.88 mmHg   AV Mean Gradient                  5.08 mmHg   AV Velocity Time Integral         27.3 cm   LVOT Peak Velocity                0.972 m/sec   LVOT Velocity Time Integral       17.7 cm   AV Area Cont Eq vti               2.2 cm    AV Area Cont Eq pk                2.22 cm    AV Dimensionless Index            0.648  Recent Labs   Lab Test 01/17/18 01/16/18 01/05/18   0740  12/20/17   0900  11/27/17   0953   10/18/17   1228   03/28/16   1008   HGB   --    --   15.2   --   16.0   --    --    < >   --    PLT   --    --   270   --   260   --    --    < >   --    NA   --    --    --    --    --    --   138   --   140   POTASSIUM  4.0   --    --    --    --    --   4.4   --   4.0   CR  1.07   --    --    --    --    --   1.07   --   0.84   A1C   --   6.7*   --   6.7*   --    < >   --    < >   --     < > = values in this interval not displayed.     IMPRESSION:   Reason for surgery/procedure: Malignant Neoplasm of Prostate/Cryotherapy  Diagnosis/reason for consult: Prostate Cancer/Pre OP    The proposed surgical procedure is considered LOW risk.    REVISED CARDIAC RISK INDEX  The patient has the following serious cardiovascular risks for perioperative complications such as (MI, PE, VFib and 3  AV Block):  No serious cardiac risks  INTERPRETATION: 0 risks: Class I (very low risk - 0.4% complication rate)    The patient has the following additional  risks for perioperative complications:  No identified additional risks      ICD-10-CM    1. Preop general physical exam Z01.818        RECOMMENDATIONS:     --Patient is to take all scheduled medications on the day of surgery EXCEPT for modifications listed below.    Diabetes Medication Use    -----Hold usual oral and non-insulin diabetic meds (e.g. Metformin, Actos, Glipizide) while NPO.     Anticoagulant or Antiplatelet Medication Use  ASPIRIN: Discontinue ASA 7-10 days prior to procedure to reduce bleeding risk.  It should be resumed post-operatively.      ACE Inhibitor or Angiotensin Receptor Blocker (ARB) Use (Lisinopril)  Ace inhibitor or Angiotensin Receptor Blocker (ARB) and should HOLD this medication for the 24 hours prior to surgery.      APPROVAL GIVEN to proceed with proposed procedure, without further diagnostic evaluation       Signed Electronically by: Telly Lucio MD    Copy of this evaluation report is provided to requesting physician.    [facility] Preop Guidelines

## 2018-03-09 NOTE — PATIENT INSTRUCTIONS
Before Your Surgery      Call your surgeon if there is any change in your health. This includes signs of a cold or flu (such as a sore throat, runny nose, cough, rash or fever).    Do not smoke, drink alcohol or take over the counter medicine (unless your surgeon or primary care doctor tells you to) for the 24 hours before and after surgery.    If you take prescribed drugs: Follow your doctor s orders about which medicines to take and which to stop until after surgery.    Eating and drinking prior to surgery: follow the instructions from your surgeon    Take a shower or bath the night before surgery. Use the soap your surgeon gave you to gently clean your skin. If you do not have soap from your surgeon, use your regular soap. Do not shave or scrub the surgery site.  Wear clean pajamas and have clean sheets on your bed.       --Patient is to take all scheduled medications on the day of surgery EXCEPT for modifications listed below.    Diabetes Medication Use    -----Hold usual oral and non-insulin diabetic meds (e.g. Metformin, Actos, Glipizide) while NPO.       Anticoagulant or Antiplatelet Medication Use  ASPIRIN: Discontinue ASA 7-10 days prior to procedure to reduce bleeding risk.  It should be resumed post-operatively.        ACE Inhibitor or Angiotensin Receptor Blocker (ARB) Use (Lisinopril)  Ace inhibitor or Angiotensin Receptor Blocker (ARB) and should HOLD this medication for the 24 hours prior to surgery.      APPROVAL GIVEN to proceed with proposed procedure, without further diagnostic evaluation       Signed Electronically by: Telly Lucio MD    [facility]    If you have any questions regarding to your visit please contact your care team:       Team Purple:   Clinic Hours Telephone Number   Dr. Capri Porter   7am-7pm  Monday - Thursday   7am-5pm  Fridays  [phone]  (Appointment scheduling available 24/7)    Questions  about your Visit?   Team Line:  [phone]   Urgent Care - [place] - 11am-9pm Monday-Friday Saturday-Sunday- 9am-5pm   [place] - 5pm-9pm Monday-Friday Saturday-Sunday- 9am-5pm  [phone] - [place]   [phone] - [place]       What options do I have for visits at the clinic other than the traditional office visit?  To expand how we care for you, many of our providers are utilizing electronic visits (e-visits) and telephone visits, when medically appropriate, for interactions with their patients rather than a visit in the clinic.   We also offer nurse visits for many medical concerns. Just like any other service, we will bill your insurance company for this type of visit based on time spent on the phone with your provider. Not all insurance companies cover these visits. Please check with your medical insurance if this type of visit is covered. You will be responsible for any charges that are not paid by your insurance.      E-visits via [employer]:  generally incur a $35.00 fee.  Telephone visits:  Time spent on the phone: *charged based on time that is spent on the phone in increments of 10 minutes. Estimated cost:   5-10 mins $30.00   11-20 mins. $59.00   21-30 mins. $85.00     Use [employer] (secure email communication and access to your chart) to send your primary care provider a message or make an appointment. Ask someone on your Team how to sign up for [employer].  For a Price Quote for your services, please call our Consumer Price Line at [phone].  As always, Thank you for trusting us with your health care needs!    Tess Cooper MA

## 2018-03-13 DIAGNOSIS — G70.00 OCULAR MYASTHENIA (H): ICD-10-CM

## 2018-03-13 DIAGNOSIS — Z79.60 LONG-TERM USE OF IMMUNOSUPPRESSANT MEDICATION: ICD-10-CM

## 2018-03-13 RX ORDER — PYRIDOSTIGMINE BROMIDE 60 MG/1
60 TABLET ORAL 4 TIMES DAILY
Qty: 120 TABLET | Refills: 2 | Status: SHIPPED | OUTPATIENT
Start: 2018-03-13 | End: 2018-04-16

## 2018-03-14 ENCOUNTER — OFFICE VISIT (OUTPATIENT)
Dept: FAMILY MEDICINE | Facility: CLINIC | Age: 73
End: 2018-03-14
Payer: COMMERCIAL

## 2018-03-14 ENCOUNTER — TELEPHONE (OUTPATIENT)
Dept: OPHTHALMOLOGY | Facility: CLINIC | Age: 73
End: 2018-03-14

## 2018-03-14 VITALS
DIASTOLIC BLOOD PRESSURE: 64 MMHG | OXYGEN SATURATION: 98 % | BODY MASS INDEX: 36.18 KG/M2 | TEMPERATURE: 96.2 F | SYSTOLIC BLOOD PRESSURE: 124 MMHG | WEIGHT: 245 LBS | RESPIRATION RATE: 16 BRPM | HEART RATE: 64 BPM

## 2018-03-14 DIAGNOSIS — G70.00 MYASTHENIA GRAVIS, ACHR ANTIBODY POSITIVE (H): ICD-10-CM

## 2018-03-14 DIAGNOSIS — C61 MALIGNANT NEOPLASM OF PROSTATE (H): ICD-10-CM

## 2018-03-14 DIAGNOSIS — I10 HYPERTENSION GOAL BP (BLOOD PRESSURE) < 140/90: ICD-10-CM

## 2018-03-14 DIAGNOSIS — Z51.81 ENCOUNTER FOR THERAPEUTIC DRUG MONITORING: ICD-10-CM

## 2018-03-14 DIAGNOSIS — Z01.818 PREOP GENERAL PHYSICAL EXAM: Primary | ICD-10-CM

## 2018-03-14 LAB — SODIUM SERPL-SCNC: 138 MMOL/L (ref 133–144)

## 2018-03-14 PROCEDURE — 36415 COLL VENOUS BLD VENIPUNCTURE: CPT | Performed by: PSYCHIATRY & NEUROLOGY

## 2018-03-14 PROCEDURE — 99214 OFFICE O/P EST MOD 30 MIN: CPT | Performed by: FAMILY MEDICINE

## 2018-03-14 PROCEDURE — 84295 ASSAY OF SERUM SODIUM: CPT | Performed by: PSYCHIATRY & NEUROLOGY

## 2018-03-14 ASSESSMENT — PAIN SCALES - GENERAL: PAINLEVEL: NO PAIN (0)

## 2018-03-14 NOTE — TELEPHONE ENCOUNTER
----- Message from Dg Alvarado sent at 3/5/2018  1:32 PM CST -----  Regarding: Pt still seeing 'ghosting' when closing eyes  Contact: [phone]  Pt stated Dr. Hennessy wanted pt to report to him about the ghosting he is seeing in both eyes. Pt wasn't sure at the appt if closing one eye or the other would help it improve, but called to say it does not help. Please give this message to Dr. Hennessy. If needed, call pt at [phone].    Thank you,    Dg Alvarado  Call Center    Please DO NOT send this message and/or reply back to sender. Call Center Representatives DO NOT respond to messages.

## 2018-03-23 ENCOUNTER — TRANSFERRED RECORDS (OUTPATIENT)
Dept: HEALTH INFORMATION MANAGEMENT | Facility: CLINIC | Age: 73
End: 2018-03-23

## 2018-03-28 ENCOUNTER — TRANSFERRED RECORDS (OUTPATIENT)
Dept: HEALTH INFORMATION MANAGEMENT | Facility: CLINIC | Age: 73
End: 2018-03-28

## 2018-03-30 DIAGNOSIS — G50.0 TRIGEMINAL NEURALGIA: ICD-10-CM

## 2018-03-30 RX ORDER — OXCARBAZEPINE 150 MG/1
TABLET, FILM COATED ORAL
Qty: 60 TABLET | Refills: 1 | Status: SHIPPED | OUTPATIENT
Start: 2018-03-30 | End: 2018-04-17

## 2018-03-30 NOTE — TELEPHONE ENCOUNTER
Not refilling until patient calls us (as he was instructed to do) and tells us if this really has helped his facial pain or not, and how much he is currently taking. Thank you

## 2018-04-05 ENCOUNTER — TRANSFERRED RECORDS (OUTPATIENT)
Dept: HEALTH INFORMATION MANAGEMENT | Facility: CLINIC | Age: 73
End: 2018-04-05

## 2018-04-06 ENCOUNTER — OFFICE VISIT (OUTPATIENT)
Dept: UROLOGY | Facility: CLINIC | Age: 73
End: 2018-04-06
Payer: COMMERCIAL

## 2018-04-06 VITALS — SYSTOLIC BLOOD PRESSURE: 135 MMHG | OXYGEN SATURATION: 99 % | DIASTOLIC BLOOD PRESSURE: 74 MMHG

## 2018-04-06 DIAGNOSIS — C61 MALIGNANT NEOPLASM OF PROSTATE (H): Primary | ICD-10-CM

## 2018-04-06 PROCEDURE — 51700 IRRIGATION OF BLADDER: CPT | Mod: 58 | Performed by: UROLOGY

## 2018-04-06 PROCEDURE — 99024 POSTOP FOLLOW-UP VISIT: CPT | Performed by: UROLOGY

## 2018-04-06 NOTE — PROGRESS NOTES
Chief Complaint   Patient presents with     RECHECK     Trial void       Rakan Nolasco is a 72 year old male who presents today for follow up of   Chief Complaint   Patient presents with     RECHECK     Trial void    f/u post cryotherapy for prostate cancer.    Current Outpatient Prescriptions   Medication Sig Dispense Refill     OXcarbazepine (TRILEPTAL) 150 MG tablet TAKE 1 TABLET BY MOUTH TWICE A DAY FOR 2WKS.CAN TAKE 1 IN AM AND 2 IN PM FOR 2WKS IF NEEDED. 60 tablet 1     atorvastatin (LIPITOR) 20 MG tablet TAKE ONE TABLET BY MOUTH ONE TIME DAILY 90 tablet 1     pyridostigmine (MESTINON) 60 MG tablet Take 1 tablet (60 mg) by mouth 4 times daily 120 tablet 2     blood glucose monitoring (NO BRAND SPECIFIED) test strip laneUse to test blood sugars 1 times daily or as directed 50 strip 5     triamcinolone (KENALOG) 0.1 % ointment Apply sparingly to affected area three times daily for 14 days. 15 g 0     predniSONE (DELTASONE) 20 MG tablet        blood glucose monitoring (NO BRAND SPECIFIED) meter device kit Use to test blood sugar 1 times daily or as directed. 1 kit 0     blood glucose (NO BRAND SPECIFIED) lancets standard Check blood sugar every morning 50 each 1     metFORMIN (GLUCOPHAGE-XR) 500 MG 24 hr tablet Take 1 tablet (500 mg) by mouth 2 times daily (with meals) 180 tablet 1     pantoprazole (PROTONIX) 20 MG EC tablet TAKE ONE TABLET BY MOUTH ONE TIME DAILY 30-60 MINUTES BEFORE A MEAL 90 tablet 1     lisinopril (PRINIVIL/ZESTRIL) 10 MG tablet Take 1 tablet (10 mg) by mouth daily 90 tablet 3     predniSONE (DELTASONE) 10 MG tablet Week one: 30 mg the morning, week 2 and afterwards: 20 mg the morning 63 tablet 0     timolol (TIMOPTIC) 0.5 % ophthalmic solution Place 1 drop into both eyes daily 1 Bottle 11     latanoprost (XALATAN) 0.005 % ophthalmic solution Place 1 drop into both eyes At Bedtime 7.5 mL 4     aspirin 81 MG tablet Take 1 tablet by mouth daily.       ClonAZEPAM (KLONOPIN) 0.5 MG tablet Take  0.5 mg by mouth. Take 1 tablet by mouth daily at bedtime       Calcium Carbonate-Vitamin D (CALCIUM 600/VITAMIN D PO) Take  by mouth. Take 1/2 tablet in the morning, 1/2 at night daily       MULTI-DAY OR daily       Allergies   Allergen Reactions     Ciprofloxacin Muscle Pain (Myalgia)     Other reaction(s): Myalgia  Other reaction(s): Myalgia     Ropinirole      Other reaction(s): Leg Pain     Ropinirole Hcl GI Disturbance     Weakness;? syncope      Past Medical History:   Diagnosis Date     Arthritis          BMI 31.0-31.9,adult      Diverticulosis     Colonoscopy 8/2008     Fatty liver     see US  5/2012     GERD (gastroesophageal reflux disease)      Glaucoma (increased eye pressure)      HTN (hypertension)      Hyperlipidemia LDL goal < 130     age, htn, fhx     Irritable bowel      Nonsenile cataract      ROSIE (obstructive sleep apnea) 01/2011    Using CPAP;      Prediabetes      Restless leg syndrome      Trigeminal neuralgia pain 1/4/2012     Past Surgical History:   Procedure Laterality Date     BIOPSY ARTERY TEMPORAL Bilateral 8/1/2017    Procedure: BIOPSY ARTERY TEMPORAL;  Bilateral temporal artery Biopsy;  Surgeon: Jj Tello MD;  Location: [facility] OR     CATARACT IOL, RT/LT Right      COLONOSCOPY       ESOPHAGOSCOPY, GASTROSCOPY, DUODENOSCOPY (EGD), COMBINED  1/15/2014    Procedure: COMBINED ESOPHAGOSCOPY, GASTROSCOPY, DUODENOSCOPY (EGD), BIOPSY SINGLE OR MULTIPLE;;  Surgeon: Winston Nixon MD;  Location: [facility] OR     PHACOEMULSIFICATION CLEAR CORNEA WITH STANDARD INTRAOCULAR LENS IMPLANT Right 2/20/2017    Procedure: PHACOEMULSIFICATION CLEAR CORNEA WITH STANDARD INTRAOCULAR LENS IMPLANT;  Surgeon: Mateo Gray MD;  Location:  [facility]     RETINAL REATTACHMENT Right      SURGICAL HISTORY OF -   8/2009    Both Eyelids-.     TONSILLECTOMY  as child     Family History   Problem Relation Age of Onset     Respiratory Mother      copd     Allergies Brother      CANCER  Maternal Grandmother      HEART DISEASE Paternal Grandmother      CEREBROVASCULAR DISEASE Father      Social History     Social History     Marital status: Single     Spouse name: N/A     Number of children: 0     Years of education: 12     Occupational History     Retired 3/2012      [employer], [place], operated by [employer] ([employer])     Social History Main Topics     Smoking status: Former Smoker     Years: 15.00     Types: Cigarettes     Quit date: 1/1/1983     Smokeless tobacco: Former User      Comment: smoke free household.     Alcohol use No      Comment: Quit in 1986     Drug use: No     Sexual activity: Not Currently     Partners: Female      Comment: 4/2010  No girlfriend at this time.     Other Topics Concern     None     Social History Narrative       REVIEW OF SYSTEMS  =================  C: NEGATIVE for fever, chills, change in weight  I: NEGATIVE for worrisome rashes, moles or lesions  E/M: NEGATIVE for ear, mouth and throat problems  R: NEGATIVE for significant cough or SHORTNESS OF BREATH,   CV: NEGATIVE for chest pain, palpitations or peripheral edema  GI: NEGATIVE for nausea, abdominal pain, heartburn, or change in bowel habits  NEURO: NEGATIVE any motor/sensory changes  PSYCH: NEGATIVE for recent mood disorder    Physical Exam:  /74 (BP Location: Right arm, Patient Position: Chair, Cuff Size: Adult Regular)  SpO2 99%   Patient is pleasant, in no acute distress, good general condition.  Lung: no evidence of respiratory distress    Abdomen: Soft, nondistended, non tender. No masses. No rebound or guarding.   Exam: trial of void done today.  350 ml in and 350 ml voided.  Skin: Warm and dry.  No redness.  Psych: normal mood and affect  Neuro: alert and oriented    Assessment/Plan:   (C61) Malignant neoplasm of prostate (H)  (primary encounter diagnosis)  Comment: doing well post cryotherapy   Plan: see in 3 months with psa.

## 2018-04-06 NOTE — MR AVS SNAPSHOT
After Visit Summary   4/6/2018    Rakan Nolasco    MRN: [id number]           Patient Information     Date Of Birth          1945        Visit Information        Provider Department      4/6/2018 8:00 AM Josh Emery MD [facility] [place]        Today's Diagnoses     Malignant neoplasm of prostate (H)    -  1       Follow-ups after your visit        Your next 10 appointments already scheduled     May 22, 2018  8:30 AM CDT   New Visit with Mateo Gray MD   [facility] [place] ([facility])    [address] [address]   [phone]            Jun 04, 2018  9:50 AM CDT   (Arrive by 9:35 AM)   Return Myasthenia Gravis with Arpan Harrison MD   [facility] ([facility])    [address] [place] [id number]   [phone]            Jul 06, 2018  8:00 AM CDT   Return Visit with Josh Eemry MD   [facility] [place] ([facility])    [address] [address]   [phone]            Aug 14, 2018  9:20 AM CDT   Office Visit with Telly Lucio MD   [facility] [place] ([facility])    [address] [address]   [phone]           Bring a current list of meds and any records pertaining to this visit. For Physicals, please bring immunization records and any forms needing to be filled out. Please arrive 10 minutes early to complete paperwork.              Future tests that were ordered for you today     Open Future Orders        Priority Expected Expires Ordered    PSA tumor marker Routine 7/6/2018 4/7/2019 4/6/2018            Who to contact     If you have questions or need follow up information about today's clinic visit or your schedule please contact [facility] directly at [phone].  Normal or non-critical lab and imaging results will be communicated to you by Br[employer]  letter or phone within 4 business days after the clinic has received the results. If you do not hear from us within 7 days, please contact the clinic through [employer] or phone. If you have a critical or abnormal lab result, we will notify you by phone as soon as possible.  Submit refill requests through [employer] or call your pharmacy and they will forward the refill request to us. Please allow 3 business days for your refill to be completed.          Additional Information About Your Visit        [employer] Information     [employer] gives you secure access to your electronic health record. If you see a primary care provider, you can also send messages to your care team and make appointments. If you have questions, please call your primary care clinic.  If you do not have a primary care provider, please call [phone] and they will assist you.        Care EveryWhere ID     This is your Care EveryWhere ID. This could be used by other organizations to access your [facility] medical records  [id number]        Your Vitals Were     Pulse Oximetry                   99%            Blood Pressure from Last 3 Encounters:   04/06/18 135/74   03/14/18 124/64   02/20/18 122/84    Weight from Last 3 Encounters:   03/14/18 245 lb (111.1 kg)   02/20/18 246 lb (111.6 kg)   02/05/18 244 lb 12.8 oz (111 kg)              We Performed the Following     IRRIGATION BLADDER SIMPLE LAVAGE/INSTILLATION        Primary Care Provider Office Phone # Fax #    Telly Lucio [phone] [address] [facility] [address]        Equal Access to Services     [facility]: Hadii aad ku hadasho Soomaali, waaxda luqadaha, qaybta kaalmada adeegyada, gino bloutn. So [facility] [phone].    ATENCIÓN: Si habla español, tiene a coates disposición servicios gratuitos de asistencia lingüística. Llame al [phone].    We comply with applicable federal civil rights laws and [place] laws. We do not  discriminate on the basis of race, color, national origin, age, disability, sex, sexual orientation, or gender identity.            Thank you!     Thank you for choosing [facility]  for your care. Our goal is always to provide you with excellent care. Hearing back from our patients is one way we can continue to improve our services. Please take a few minutes to complete the written survey that you may receive in the mail after your visit with us. Thank you!             Your Updated Medication List - Protect others around you: Learn how to safely use, store and throw away your medicines at www.disposemymeds.org.          This list is accurate as of 4/6/18  8:15 AM.  Always use your most recent med list.                   Brand Name Dispense Instructions for use Diagnosis    aspirin 81 MG tablet      Take 1 tablet by mouth daily.        atorvastatin 20 MG tablet    LIPITOR    90 tablet    TAKE ONE TABLET BY MOUTH ONE TIME DAILY    Hyperlipidemia LDL goal <130       blood glucose lancets standard    no brand specified    50 each    Check blood sugar every morning    Type 2 diabetes mellitus with hyperglycemia, without long-term current use of insulin (H)       blood glucose monitoring meter device kit    no brand specified    1 kit    Use to test blood sugar 1 times daily or as directed.    Type 2 diabetes mellitus with hyperglycemia, without long-term current use of insulin (H)       blood glucose monitoring test strip    no brand specified    50 strip    laneUse to test blood sugars 1 times daily or as directed    Type 2 diabetes mellitus with hyperglycemia, without long-term current use of insulin (H)       CALCIUM 600/VITAMIN D PO      Take  by mouth. Take 1/2 tablet in the morning, 1/2 at night daily        clonazePAM 0.5 MG tablet    klonoPIN     Take 0.5 mg by mouth. Take 1 tablet by mouth daily at bedtime        latanoprost 0.005 % ophthalmic solution    XALATAN    7.5 mL    Place 1 drop into both  eyes At Bedtime    Borderline glaucoma with ocular hypertension, bilateral       lisinopril 10 MG tablet    PRINIVIL/ZESTRIL    90 tablet    Take 1 tablet (10 mg) by mouth daily    Hypertension goal BP (blood pressure) < 140/90       metFORMIN 500 MG 24 hr tablet    GLUCOPHAGE-XR    180 tablet    Take 1 tablet (500 mg) by mouth 2 times daily (with meals)    Prediabetes       MULTI-DAY PO      daily        OXcarbazepine 150 MG tablet    TRILEPTAL    60 tablet    TAKE 1 TABLET BY MOUTH TWICE A DAY FOR 2WKS.CAN TAKE 1 IN AM AND 2 IN PM FOR 2WKS IF NEEDED.    Trigeminal neuralgia       pantoprazole 20 MG EC tablet    PROTONIX    90 tablet    TAKE ONE TABLET BY MOUTH ONE TIME DAILY 30-60 MINUTES BEFORE A MEAL    Gastroesophageal reflux disease without esophagitis       * predniSONE 20 MG tablet    DELTASONE          * predniSONE 10 MG tablet    DELTASONE    63 tablet    Week one: 30 mg the morning, week 2 and afterwards: 20 mg the morning    Ocular myasthenia (H)       pyridostigmine 60 MG tablet    MESTINON    120 tablet    Take 1 tablet (60 mg) by mouth 4 times daily    Ocular myasthenia (H), Long-term use of immunosuppressant medication       timolol 0.5 % ophthalmic solution    TIMOPTIC    1 Bottle    Place 1 drop into both eyes daily    Borderline glaucoma with ocular hypertension, bilateral       triamcinolone 0.1 % ointment    KENALOG    15 g    Apply sparingly to affected area three times daily for 14 days.    Dermatitis       * Notice:  This list has 2 medication(s) that are the same as other medications prescribed for you. Read the directions carefully, and ask your doctor or other care provider to review them with you.

## 2018-04-06 NOTE — PROGRESS NOTES
Catheter removal documentation on 4/6/2018:    Rakan Nolasco presents to the clinic for catheter removal.  Reason for removal: scheduled removal  350 cc of sterile water instilled into the bladder.   350 Urinated out.   Catheter successfully removed at 8:09 AM without immediate complication.  Urethral meatus is free of secretions and encrustation.  The patient is afebrile.  The patient tolerated the procedure and was instructed to monitor for pain or discomfort and if unable to urinate return to clinic.     Citlalli Flores

## 2018-04-09 ENCOUNTER — OFFICE VISIT (OUTPATIENT)
Dept: OPHTHALMOLOGY | Facility: CLINIC | Age: 73
End: 2018-04-09
Payer: COMMERCIAL

## 2018-04-09 DIAGNOSIS — Z98.890 S/P BLEPHAROPLASTY: ICD-10-CM

## 2018-04-09 DIAGNOSIS — G70.00 MYASTHENIA GRAVIS, ACHR ANTIBODY POSITIVE (H): ICD-10-CM

## 2018-04-09 DIAGNOSIS — Z96.1 PSEUDOPHAKIA OF RIGHT EYE: ICD-10-CM

## 2018-04-09 DIAGNOSIS — H26.491 RIGHT POSTERIOR CAPSULAR OPACIFICATION: Primary | ICD-10-CM

## 2018-04-09 DIAGNOSIS — H04.123 DRY EYES: ICD-10-CM

## 2018-04-09 DIAGNOSIS — H40.053 BORDERLINE GLAUCOMA OF BOTH EYES WITH OCULAR HYPERTENSION: ICD-10-CM

## 2018-04-09 DIAGNOSIS — H35.373 EPIRETINAL MEMBRANE, BILATERAL: ICD-10-CM

## 2018-04-09 DIAGNOSIS — Z86.69 H/O RD (RETINAL DETACHMENT): ICD-10-CM

## 2018-04-09 PROCEDURE — 66821 AFTER CATARACT LASER SURGERY: CPT | Mod: RT | Performed by: STUDENT IN AN ORGANIZED HEALTH CARE EDUCATION/TRAINING PROGRAM

## 2018-04-09 ASSESSMENT — VISUAL ACUITY
OS_CC: 20/20
CORRECTION_TYPE: GLASSES
OD_CC: 20/60
OD_PH_CC: 20/50
METHOD: SNELLEN - LINEAR
OS_CC+: -1
OD_CC+: -1

## 2018-04-09 ASSESSMENT — TONOMETRY
IOP_METHOD: APPLANATION
OD_IOP_MMHG: 23
OS_IOP_MMHG: 19
OD_IOP_MMHG: 19
IOP_METHOD: APPLANATION

## 2018-04-09 ASSESSMENT — EXTERNAL EXAM - RIGHT EYE: OD_EXAM: NORMAL

## 2018-04-09 ASSESSMENT — EXTERNAL EXAM - LEFT EYE: OS_EXAM: NORMAL

## 2018-04-09 ASSESSMENT — CUP TO DISC RATIO
OD_RATIO: 0.1
OS_RATIO: 0.2

## 2018-04-09 NOTE — MR AVS SNAPSHOT
After Visit Summary   4/9/2018    Rakan Nolasco    MRN: [id number]           Patient Information     Date Of Birth          1945        Visit Information        Provider Department      4/9/2018 2:15 PM Mateo Gray MD [facility]        Today's Diagnoses     Right posterior capsular opacification    -  1    Myasthenia gravis, AChR antibody positive (H)        Pseudophakia of right eye        Epiretinal membrane, bilateral        Dry eyes        Borderline glaucoma of both eyes with ocular hypertension        S/P blepharoplasty          Care Instructions    Continue same medications  YAG Capsulotomy  Right eye .Risks, benefits, complications, and alternatives discussed.  Patient complains of glare and/or decreased visual acuity.  All questions answered, patient understands.  Patient wishes to proceed with surgery.      You may notice more floaters for the next few days.  Also, your eye may be slightly red, sore, and blurry for a few days.  Return in 1 week for Intraocular pressure check and refraction.    Mateo Gray MD  [phone]             Follow-ups after your visit        Your next 10 appointments already scheduled     Apr 16, 2018  2:45 PM CDT   Return Visit with MD [facility] ([facility] [place])    [address] [address]   [phone]            May 22, 2018  8:30 AM CDT   New Visit with MD [facility] ([facility])    [address] [address]   [phone]            Jun 04, 2018  9:50 AM CDT   (Arrive by 9:35 AM)   Return Myasthenia Gravis with Arpan Harrison MD   [facility] ([facility])    [address] [place] [address]  3rd Floor  [facility] [address] [phone]            Jun 29, 2018  8:00 AM CDT   LAB with  LAB   [facility] ([facility])    [address]  [place] [address]   [phone]           Please do not eat 10-12 hours before your appointment if you are coming in fasting for labs on lipids, cholesterol, or glucose (sugar). This does not apply to pregnant women. Water, hot tea and black coffee (with nothing added) are okay. Do not drink other fluids, diet soda or chew gum.            Jul 06, 2018  8:00 AM CDT   Return Visit with Josh Emery MD   [facility]    [address]  [place] [address]   [phone]            Aug 14, 2018  9:20 AM CDT   Office Visit with Telly Lucio MD   [facility] ([facility])    [address]  [place] [address]   [phone]           Bring a current list of meds and any records pertaining to this visit. For Physicals, please bring immunization records and any forms needing to be filled out. Please arrive 10 minutes early to complete paperwork.              Who to contact     If you have questions or need follow up information about today's clinic visit or your schedule please contact [facility] directly at [phone].  Normal or non-critical lab and imaging results will be communicated to you by [employer], letter or phone within 4 business days after the clinic has received the results. If you do not hear from us within 7 days, please contact the clinic through [employer] or phone. If you have a critical or abnormal lab result, we will notify you by phone as soon as possible.  Submit refill requests through [employer] or call your pharmacy and they will forward the refill request to us. Please allow 3 business days for your refill to be completed.          Additional Information About Your Visit        [employer] Information     [employer] gives you secure access to your electronic health record. If you see a primary care provider, you can also send messages to your care team and make appointments.  If you have questions, please call your primary care clinic.  If you do not have a primary care provider, please call [phone] and they will assist you.        Care EveryWhere ID     This is your Care EveryWhere ID. This could be used by other organizations to access your [facility] medical records  [id number]         Blood Pressure from Last 3 Encounters:   04/06/18 135/74   03/14/18 124/64   02/20/18 122/84    Weight from Last 3 Encounters:   03/14/18 111.1 kg (245 lb)   02/20/18 111.6 kg (246 lb)   02/05/18 111 kg (244 lb 12.8 oz)              Today, you had the following     No orders found for display       Primary Care Provider Office Phone # Fax #    Telly Lucio [phone] [address] [address]        Equal Access to Services     [facility]: Hadii aad ku hadasho Soomaali, waaxda luqadaha, qaybta kaalmada adeegyada, waxay silviain haycadenn mary garcian . So [facility] [phone].    ATENCIÓN: Si habla español, tiene a coates disposición servicios gratuitos de asistencia lingüística. Boni al [phone].    We comply with applicable federal civil rights laws and [place] laws. We do not discriminate on the basis of race, color, national origin, age, disability, sex, sexual orientation, or gender identity.            Thank you!     Thank you for choosing [facility]  for your care. Our goal is always to provide you with excellent care. Hearing back from our patients is one way we can continue to improve our services. Please take a few minutes to complete the written survey that you may receive in the mail after your visit with us. Thank you!             Your Updated Medication List - Protect others around you: Learn how to safely use, store and throw away your medicines at www.disposemymeds.org.          This list is accurate as of 4/9/18  3:24 PM.  Always use your most recent med list.                   Brand Name Dispense Instructions for  use Diagnosis    aspirin 81 MG tablet      Take 1 tablet by mouth daily.        atorvastatin 20 MG tablet    LIPITOR    90 tablet    TAKE ONE TABLET BY MOUTH ONE TIME DAILY    Hyperlipidemia LDL goal <130       blood glucose lancets standard    no brand specified    50 each    Check blood sugar every morning    Type 2 diabetes mellitus with hyperglycemia, without long-term current use of insulin (H)       blood glucose monitoring meter device kit    no brand specified    1 kit    Use to test blood sugar 1 times daily or as directed.    Type 2 diabetes mellitus with hyperglycemia, without long-term current use of insulin (H)       blood glucose monitoring test strip    no brand specified    50 strip    laneUse to test blood sugars 1 times daily or as directed    Type 2 diabetes mellitus with hyperglycemia, without long-term current use of insulin (H)       CALCIUM 600/VITAMIN D PO      Take  by mouth. Take 1/2 tablet in the morning, 1/2 at night daily        clonazePAM 0.5 MG tablet    klonoPIN     Take 0.5 mg by mouth. Take 1 tablet by mouth daily at bedtime        latanoprost 0.005 % ophthalmic solution    XALATAN    7.5 mL    Place 1 drop into both eyes At Bedtime    Borderline glaucoma with ocular hypertension, bilateral       lisinopril 10 MG tablet    PRINIVIL/ZESTRIL    90 tablet    Take 1 tablet (10 mg) by mouth daily    Hypertension goal BP (blood pressure) < 140/90       metFORMIN 500 MG 24 hr tablet    GLUCOPHAGE-XR    180 tablet    Take 1 tablet (500 mg) by mouth 2 times daily (with meals)    Prediabetes       MULTI-DAY PO      daily        OXcarbazepine 150 MG tablet    TRILEPTAL    60 tablet    TAKE 1 TABLET BY MOUTH TWICE A DAY FOR 2WKS.CAN TAKE 1 IN AM AND 2 IN PM FOR 2WKS IF NEEDED.    Trigeminal neuralgia       pantoprazole 20 MG EC tablet    PROTONIX    90 tablet    TAKE ONE TABLET BY MOUTH ONE TIME DAILY 30-60 MINUTES BEFORE A MEAL    Gastroesophageal reflux disease without esophagitis       *  predniSONE 20 MG tablet    DELTASONE          * predniSONE 10 MG tablet    DELTASONE    63 tablet    Week one: 30 mg the morning, week 2 and afterwards: 20 mg the morning    Ocular myasthenia (H)       pyridostigmine 60 MG tablet    MESTINON    120 tablet    Take 1 tablet (60 mg) by mouth 4 times daily    Ocular myasthenia (H), Long-term use of immunosuppressant medication       timolol 0.5 % ophthalmic solution    TIMOPTIC    1 Bottle    Place 1 drop into both eyes daily    Borderline glaucoma with ocular hypertension, bilateral       triamcinolone 0.1 % ointment    KENALOG    15 g    Apply sparingly to affected area three times daily for 14 days.    Dermatitis       * Notice:  This list has 2 medication(s) that are the same as other medications prescribed for you. Read the directions carefully, and ask your doctor or other care provider to review them with you.

## 2018-04-09 NOTE — PROGRESS NOTES
Current Eye Medications:  Timolol right eye twice a day, Latanoprost both eyes every evening.       Subjective:  Dr. Brown recommended he return to see Dr. Gray for a Yag Capsulotomy evaluation, right eye.  Patient complains of blurry vision in his right eye, along with intermittent vision changes in each eye, secondary to Myasthenia.      Assessment:  Rakan Nolasco is a 72 year old male who presents with:   Encounter Diagnoses   Name Primary?     Right posterior capsular opacification YAG cap right eye performed without complication today.      H/O RD (retinal detachment) OD s/p repair with PPV (AB)      Myasthenia gravis, AChR antibody positive (H)      Pseudophakia of right eye      Borderline glaucoma of both eyes with ocular hypertension      Epiretinal membrane, bilateral      S/P blepharoplasty      Dry eyes        Plan:  Continue same medications  YAG Capsulotomy  Right eye .Risks, benefits, complications, and alternatives discussed.  Patient complains of glare and/or decreased visual acuity.  All questions answered, patient understands.  Patient wishes to proceed with surgery.      You may notice more floaters for the next few days.  Also, your eye may be slightly red, sore, and blurry for a few days.  Return in 1 week for Intraocular pressure check and refraction.    Mateo Gray MD  [phone]

## 2018-04-09 NOTE — PATIENT INSTRUCTIONS
Continue same medications  YAG Capsulotomy  Right eye .Risks, benefits, complications, and alternatives discussed.  Patient complains of glare and/or decreased visual acuity.  All questions answered, patient understands.  Patient wishes to proceed with surgery.      You may notice more floaters for the next few days.  Also, your eye may be slightly red, sore, and blurry for a few days.  Return in 1 week for Intraocular pressure check and refraction.    Mateo Gray MD  [phone]

## 2018-04-09 NOTE — LETTER
4/9/2018         RE: Rakan Nolasco  [address] [address]        Dear Colleague,    Thank you for referring your patient, Rakan Nolasco, to the [facility]. Please see a copy of my visit note below.     Current Eye Medications:  Timolol right eye twice a day, Latanoprost both eyes every evening.       Subjective:  Dr. Brown recommended he return to see Dr. Gray for a Yag Capsulotomy evaluation, right eye.  Patient complains of blurry vision in his right eye, along with intermittent vision changes in each eye, secondary to Myasthenia.      Assessment:  Rakan Nolasco is a 72 year old male who presents with:     Right posterior capsular opacification Visually significant right eye      Myasthenia gravis, AChR antibody positive (H)      Pseudophakia of right eye      Epiretinal membrane, bilateral      Dry eyes      Borderline glaucoma of both eyes with ocular hypertension      S/P blepharoplasty        Plan:  Continue same medications  YAG Capsulotomy  Right eye .Risks, benefits, complications, and alternatives discussed.  Patient complains of glare and/or decreased visual acuity.  All questions answered, patient understands.  Patient wishes to proceed with surgery.      You may notice more floaters for the next few days.  Also, your eye may be slightly red, sore, and blurry for a few days.  Return in 1 week for Intraocular pressure check and refraction.    Mateo Gray MD  [phone]              Again, thank you for allowing me to participate in the care of your patient.        Sincerely,        Mateo Gray MD

## 2018-04-16 ENCOUNTER — OFFICE VISIT (OUTPATIENT)
Dept: OPHTHALMOLOGY | Facility: CLINIC | Age: 73
End: 2018-04-16
Payer: COMMERCIAL

## 2018-04-16 DIAGNOSIS — G50.0 TRIGEMINAL NEURALGIA: ICD-10-CM

## 2018-04-16 DIAGNOSIS — Z79.60 LONG-TERM USE OF IMMUNOSUPPRESSANT MEDICATION: ICD-10-CM

## 2018-04-16 DIAGNOSIS — Z96.1 PSEUDOPHAKIA OF RIGHT EYE: Primary | ICD-10-CM

## 2018-04-16 DIAGNOSIS — G70.00 OCULAR MYASTHENIA (H): ICD-10-CM

## 2018-04-16 PROCEDURE — 99024 POSTOP FOLLOW-UP VISIT: CPT | Performed by: STUDENT IN AN ORGANIZED HEALTH CARE EDUCATION/TRAINING PROGRAM

## 2018-04-16 ASSESSMENT — VISUAL ACUITY
OS_CC+: -1
METHOD: SNELLEN - LINEAR
OD_CC: 20/20
CORRECTION_TYPE: GLASSES
OS_CC: 20/20

## 2018-04-16 ASSESSMENT — TONOMETRY
OS_IOP_MMHG: 20
OD_IOP_MMHG: 23
IOP_METHOD: APPLANATION

## 2018-04-16 ASSESSMENT — REFRACTION_WEARINGRX
OS_SPHERE: -2.00
OD_SPHERE: -2.75
OD_ADD: +2.75
OS_AXIS: 012
OD_CYLINDER: +2.25
OS_ADD: +2.75
SPECS_TYPE: BIFOCAL
OS_CYLINDER: +1.75
OD_AXIS: 129

## 2018-04-16 ASSESSMENT — EXTERNAL EXAM - RIGHT EYE: OD_EXAM: NORMAL

## 2018-04-16 ASSESSMENT — REFRACTION_MANIFEST
OD_SPHERE: -2.75
OD_AXIS: 125
OD_CYLINDER: +1.75
OD_ADD: +2.75

## 2018-04-16 ASSESSMENT — EXTERNAL EXAM - LEFT EYE: OS_EXAM: NORMAL

## 2018-04-16 NOTE — MR AVS SNAPSHOT
After Visit Summary   4/16/2018    Rakan Nolasco    MRN: [id number]           Patient Information     Date Of Birth          1945        Visit Information        Provider Department      4/16/2018 2:45 PM Mateo Gray MD [facility]        Care Instructions    Continue same medications    Mateo RAGSDALE. Marina RUIZ  [phone]            Follow-ups after your visit        Follow-up notes from your care team     Return in about 7 months (around 11/16/2018) for Complete Exam.      Your next 10 appointments already scheduled     May 22, 2018  8:30 AM CDT   New Visit with Mateo Gray MD   [facility] ([facility])    [address] [address] [phone]            Jun 04, 2018  9:50 AM CDT   (Arrive by 9:35 AM)   Return Myasthenia Gravis with Arpan Harrison MD   [facility] ([facility])    [address] [place] [address] [phone]            Jun 29, 2018  8:00 AM CDT   LAB with [facility]   [facility] ([facility])    [address] [address] [phone]           Please do not eat 10-12 hours before your appointment if you are coming in fasting for labs on lipids, cholesterol, or glucose (sugar). This does not apply to pregnant women. Water, hot tea and black coffee (with nothing added) are okay. Do not drink other fluids, diet soda or chew gum.            Jul 06, 2018  8:00 AM CDT   Return Visit with Josh Emery MD   [facility] ([facility])    [address] [address] [phone]            Aug 14, 2018  9:20 AM CDT   Office Visit with Telly Lucio MD   [facility] ([facility])    [address] [address] [phone]           Bring a current list of meds and any records pertaining to  this visit. For Physicals, please bring immunization records and any forms needing to be filled out. Please arrive 10 minutes early to complete paperwork.              Who to contact     If you have questions or need follow up information about today's clinic visit or your schedule please contact [facility] directly at [phone].  Normal or non-critical lab and imaging results will be communicated to you by MyChart, letter or phone within 4 business days after the clinic has received the results. If you do not hear from us within 7 days, please contact the clinic through [employer] or phone. If you have a critical or abnormal lab result, we will notify you by phone as soon as possible.  Submit refill requests through [email or web address] or call your pharmacy and they will forward the refill request to us. Please allow 3 business days for your refill to be completed.          Additional Information About Your Visit        MyChart Information     [email or web address] gives you secure access to your electronic health record. If you see a primary care provider, you can also send messages to your care team and make appointments. If you have questions, please call your primary care clinic.  If you do not have a primary care provider, please call [phone] and they will assist you.        Care EveryWhere ID     This is your Care EveryWhere ID. This could be used by other organizations to access your [facility] medical records  [id number]         Blood Pressure from Last 3 Encounters:   04/06/18 135/74   03/14/18 124/64   02/20/18 122/84    Weight from Last 3 Encounters:   03/14/18 111.1 kg (245 lb)   02/20/18 111.6 kg (246 lb)   02/05/18 111 kg (244 lb 12.8 oz)              Today, you had the following     No orders found for display         Today's Medication Changes          These changes are accurate as of 4/16/18  3:18 PM.  If you have any questions, ask your nurse or doctor.               These medicines have changed or have  updated prescriptions.        Dose/Directions    * pantoprazole 20 MG EC tablet   Commonly known as:  PROTONIX   This may have changed:  Another medication with the same name was added. Make sure you understand how and when to take each.   Used for:  Gastroesophageal reflux disease without esophagitis   Changed by:  Telly Lucio MD        TAKE ONE TABLET BY MOUTH ONE TIME DAILY 30-60 MINUTES BEFORE A MEAL   Quantity:  90 tablet   Refills:  1       * pantoprazole 20 MG EC tablet   Commonly known as:  PROTONIX   This may have changed:  You were already taking a medication with the same name, and this prescription was added. Make sure you understand how and when to take each.   Used for:  Gastroesophageal reflux disease without esophagitis   Changed by:  Telly Lucio MD        TAKE ONE TABLET BY MOUTH ONE TIME DAILY 30-60 MINUTES BEFORE A MEAL   Quantity:  90 tablet   Refills:  2       * Notice:  This list has 2 medication(s) that are the same as other medications prescribed for you. Read the directions carefully, and ask your doctor or other care provider to review them with you.         Where to get your medicines      These medications were sent to [place] [address] [facility] [address] [facility] [address]     Phone:  [phone]     pantoprazole 20 MG EC tablet                Primary Care Provider Office Phone # Fax #    Telly Lucio [phone]       [address] [place] [address] AVE [facility] [address]        Equal Access to Services     [facility] AH: Hadii estefania michael hadasho Somatthewali, waaxda luqadaha, qaybta kaalmada adeegjuanda, gino blount. So [facility] [phone].    ATENCIÓN: Si habla español, tiene a coates disposición servicios gratuitos de asistencia lingüística. Llame al [phone].    We comply with applicable federal civil rights laws and [place] laws. We do not discriminate on the basis of race, color, national origin,  age, disability, sex, sexual orientation, or gender identity.            Thank you!     Thank you for choosing [facility]  for your care. Our goal is always to provide you with excellent care. Hearing back from our patients is one way we can continue to improve our services. Please take a few minutes to complete the written survey that you may receive in the mail after your visit with us. Thank you!             Your Updated Medication List - Protect others around you: Learn how to safely use, store and throw away your medicines at www.disposemymeds.org.          This list is accurate as of 4/16/18  3:18 PM.  Always use your most recent med list.                   Brand Name Dispense Instructions for use Diagnosis    aspirin 81 MG tablet      Take 1 tablet by mouth daily.        atorvastatin 20 MG tablet    LIPITOR    90 tablet    TAKE ONE TABLET BY MOUTH ONE TIME DAILY    Hyperlipidemia LDL goal <130       blood glucose lancets standard    no brand specified    50 each    Check blood sugar every morning    Type 2 diabetes mellitus with hyperglycemia, without long-term current use of insulin (H)       blood glucose monitoring meter device kit    no brand specified    1 kit    Use to test blood sugar 1 times daily or as directed.    Type 2 diabetes mellitus with hyperglycemia, without long-term current use of insulin (H)       blood glucose monitoring test strip    no brand specified    50 strip    laneUse to test blood sugars 1 times daily or as directed    Type 2 diabetes mellitus with hyperglycemia, without long-term current use of insulin (H)       CALCIUM 600/VITAMIN D PO      Take  by mouth. Take 1/2 tablet in the morning, 1/2 at night daily        clonazePAM 0.5 MG tablet    klonoPIN     Take 0.5 mg by mouth. Take 1 tablet by mouth daily at bedtime        latanoprost 0.005 % ophthalmic solution    XALATAN    7.5 mL    Place 1 drop into both eyes At Bedtime    Borderline glaucoma with ocular  hypertension, bilateral       lisinopril 10 MG tablet    PRINIVIL/ZESTRIL    90 tablet    Take 1 tablet (10 mg) by mouth daily    Hypertension goal BP (blood pressure) < 140/90       metFORMIN 500 MG 24 hr tablet    GLUCOPHAGE-XR    180 tablet    Take 1 tablet (500 mg) by mouth 2 times daily (with meals)    Prediabetes       MULTI-DAY PO      daily        OXcarbazepine 150 MG tablet    TRILEPTAL    60 tablet    TAKE 1 TABLET BY MOUTH TWICE A DAY FOR 2WKS.CAN TAKE 1 IN AM AND 2 IN PM FOR 2WKS IF NEEDED.    Trigeminal neuralgia       * pantoprazole 20 MG EC tablet    PROTONIX    90 tablet    TAKE ONE TABLET BY MOUTH ONE TIME DAILY 30-60 MINUTES BEFORE A MEAL    Gastroesophageal reflux disease without esophagitis       * pantoprazole 20 MG EC tablet    PROTONIX    90 tablet    TAKE ONE TABLET BY MOUTH ONE TIME DAILY 30-60 MINUTES BEFORE A MEAL    Gastroesophageal reflux disease without esophagitis       * predniSONE 20 MG tablet    DELTASONE          * predniSONE 10 MG tablet    DELTASONE    63 tablet    Week one: 30 mg the morning, week 2 and afterwards: 20 mg the morning    Ocular myasthenia (H)       pyridostigmine 60 MG tablet    MESTINON    120 tablet    Take 1 tablet (60 mg) by mouth 4 times daily    Ocular myasthenia (H), Long-term use of immunosuppressant medication       timolol 0.5 % ophthalmic solution    TIMOPTIC    1 Bottle    Place 1 drop into both eyes daily    Borderline glaucoma with ocular hypertension, bilateral       triamcinolone 0.1 % ointment    KENALOG    15 g    Apply sparingly to affected area three times daily for 14 days.    Dermatitis       * Notice:  This list has 4 medication(s) that are the same as other medications prescribed for you. Read the directions carefully, and ask your doctor or other care provider to review them with you.

## 2018-04-16 NOTE — PROGRESS NOTES
Current Eye Medications:  Timolol twice a day right eye, last took at 7am both eyes. Latanoprost at bedtime both eyes, last took at 10pm.     Subjective:  Post Yag cap right eye 4/9/18. Vision is much improved right eye. No eye pain or discomfort in either eye.      Objective:  See Ophthalmology Exam.       Assessment:  Rakan Nolasco is a 72 year old male who presents with:   Encounter Diagnosis   Name Primary?     Pseudophakia of right eye s/p YAG OD 1 week post-YAG cap right eye, doing great.       Plan:  Continue same medications    Mateo Gray MD  [phone]

## 2018-04-16 NOTE — LETTER
4/16/2018         RE: Rakan Nolasco  [address] [place] [address]        Dear Colleague,    Thank you for referring your patient, Rakan Nolasco, to the [facility]. His eye exam is stable.  Please see a copy of my visit note below.     Current Eye Medications:  Timolol twice a day right eye, last took at 7am both eyes. Latanoprost at bedtime both eyes, last took at 10pm.     Subjective:  Post Yag cap right eye 4/9/18. Vision is much improved right eye. No eye pain or discomfort in either eye.      Objective:  See Ophthalmology Exam.       Assessment:  Rakan Nolasco is a 72 year old male who presents with:   Encounter Diagnosis   Name Primary?     Pseudophakia of right eye s/p YAG OD 1 week post-YAG cap right eye, doing great.       Plan:  Continue same medications    Mateo Gray MD  [phone]        Again, thank you for allowing me to participate in the care of your patient.        Sincerely,        Mateo Gray MD

## 2018-04-17 RX ORDER — OXCARBAZEPINE 150 MG/1
TABLET, FILM COATED ORAL
Qty: 120 TABLET | Refills: 1 | Status: SHIPPED | OUTPATIENT
Start: 2018-04-17 | End: 2018-06-04

## 2018-04-17 RX ORDER — PYRIDOSTIGMINE BROMIDE 60 MG/1
TABLET ORAL
Qty: 120 TABLET | Refills: 0 | Status: SHIPPED | OUTPATIENT
Start: 2018-04-17 | End: 2018-07-03

## 2018-04-17 NOTE — TELEPHONE ENCOUNTER
Health Call Center    Phone Message    May a detailed message be left on voicemail: yes    Reason for Call: Medication Refill Request    Has the patient contacted the pharmacy for the refill? Yes   Name of medication being requested: OXcarbazepine (TRILEPTAL) 150 MG tablet  Provider who prescribed the medication: Dr Harrison  Pharmacy: [facility] [employer], [address], [place] [address], Ph # [phone]  Date medication is needed: ASAP, Pt is out - Pt said Dr Harrison changed his dosage from 1 in AM and 1 in PM, to 2 in AM and 2 in PM, so he ran out of medication, Pharmacy said cannot refill, Needs new Rx with new dosage sent over to them in order to fill, too soon to fill old Rx, and that one is wrong now anyway since dosage changed. Per Pt. Wants expedited. He already spoke with Pharmacy.    Action Taken: Message routed to:  [facility] ([facility] Neurology

## 2018-05-09 DIAGNOSIS — Z51.81 ENCOUNTER FOR THERAPEUTIC DRUG MONITORING: ICD-10-CM

## 2018-05-09 LAB
ALT SERPL W P-5'-P-CCNC: 26 U/L (ref 0–70)
AST SERPL W P-5'-P-CCNC: 16 U/L (ref 0–45)
SODIUM SERPL-SCNC: 137 MMOL/L (ref 133–144)

## 2018-05-09 PROCEDURE — 36415 COLL VENOUS BLD VENIPUNCTURE: CPT | Performed by: PSYCHIATRY & NEUROLOGY

## 2018-05-09 PROCEDURE — 84450 TRANSFERASE (AST) (SGOT): CPT | Performed by: PSYCHIATRY & NEUROLOGY

## 2018-05-09 PROCEDURE — 84295 ASSAY OF SERUM SODIUM: CPT | Performed by: PSYCHIATRY & NEUROLOGY

## 2018-05-09 PROCEDURE — 84460 ALANINE AMINO (ALT) (SGPT): CPT | Performed by: PSYCHIATRY & NEUROLOGY

## 2018-05-11 ENCOUNTER — OFFICE VISIT (OUTPATIENT)
Dept: FAMILY MEDICINE | Facility: CLINIC | Age: 73
End: 2018-05-11
Payer: COMMERCIAL

## 2018-05-11 VITALS
OXYGEN SATURATION: 99 % | TEMPERATURE: 97.5 F | WEIGHT: 246.5 LBS | RESPIRATION RATE: 17 BRPM | BODY MASS INDEX: 36.51 KG/M2 | HEIGHT: 69 IN | SYSTOLIC BLOOD PRESSURE: 128 MMHG | DIASTOLIC BLOOD PRESSURE: 72 MMHG | HEART RATE: 70 BPM

## 2018-05-11 DIAGNOSIS — M79.651 PAIN OF RIGHT THIGH: Primary | ICD-10-CM

## 2018-05-11 PROCEDURE — 99213 OFFICE O/P EST LOW 20 MIN: CPT | Performed by: FAMILY MEDICINE

## 2018-05-11 NOTE — PROGRESS NOTES
SUBJECTIVE:   Rakan Nolasco is a 72 year old male who presents to clinic today for the following health issues:    Thigh Pain    Onset: Patient states about a week ago     Description:   Location: Right thigh muscle, buttocks   Character: Dull ache    Intensity: severe    Progression of Symptoms: worse walking.    Accompanying Signs & Symptoms:  Other symptoms: none    History:   Previous similar pain: no       Precipitating factors:   Trauma or overuse: no.    Alleviating factors:  Improved by: standing     Therapies Tried and outcome: Ibuprofen     Patient reports a pain in the back of the thigh second not the muscle    Problem list and histories reviewed & adjusted, as indicated.  Additional history: as documented    Patient Active Problem List   Diagnosis     GERD (gastroesophageal reflux disease)     Diverticulosis     Snoring     Fatigue     Hyperlipidemia LDL goal <130     Borderline glaucoma with ocular hypertension     S/P blepharoplasty     Advanced directives, counseling/discussion     Trigeminal neuralgia pain     bmi 31     Hypertension goal BP (blood pressure) < 140/90     Health Care Home     Arthritis     Prediabetes     Dry eyes     PVD (posterior vitreous detachment) OU     H/O RD (retinal detachment) s/p repair with PPV (AB)     Epiretinal membrane, bilateral     Pseudophakia of right eye     Myasthenia gravis, AChR antibody positive (H)     Type 2 diabetes mellitus with hyperglycemia, without long-term current use of insulin (H)     Morbid obesity (H)     Right posterior capsular opacification     Past Surgical History:   Procedure Laterality Date     BIOPSY ARTERY TEMPORAL Bilateral 8/1/2017    Procedure: BIOPSY ARTERY TEMPORAL;  Bilateral temporal artery Biopsy;  Surgeon: Jj Tello MD;  Location: [facility] OR     CATARACT IOL, RT/LT Right      COLONOSCOPY       ESOPHAGOSCOPY, GASTROSCOPY, DUODENOSCOPY (EGD), COMBINED  1/15/2014    Procedure: COMBINED ESOPHAGOSCOPY, GASTROSCOPY,  "DUODENOSCOPY (EGD), BIOPSY SINGLE OR MULTIPLE;;  Surgeon: Winston Nixon MD;  Location: [facility] OR     LASER YAG CAPSULOTOMY Right 04/09/2018    right eye     PHACOEMULSIFICATION CLEAR CORNEA WITH STANDARD INTRAOCULAR LENS IMPLANT Right 2/20/2017    Procedure: PHACOEMULSIFICATION CLEAR CORNEA WITH STANDARD INTRAOCULAR LENS IMPLANT;  Surgeon: Mateo Gray MD;  Location:  [facility]     RETINAL REATTACHMENT Right      SURGICAL HISTORY OF -   8/2009    Both Eyelids-.     TONSILLECTOMY  as child       Social History   Substance Use Topics     Smoking status: Former Smoker     Years: 15.00     Types: Cigarettes     Quit date: 1/1/1983     Smokeless tobacco: Former User      Comment: smoke free household.     Alcohol use No      Comment: Quit in 1986     Family History   Problem Relation Age of Onset     Respiratory Mother      copd     Allergies Brother      CANCER Maternal Grandmother      HEART DISEASE Paternal Grandmother      CEREBROVASCULAR DISEASE Father            Reviewed and updated as needed this visit by clinical staff       Reviewed and updated as needed this visit by Provider         ROS:  Constitutional, HEENT, cardiovascular, pulmonary, gi and gu systems are negative, except as otherwise noted.    OBJECTIVE:     /72  Pulse 70  Temp 97.5  F (36.4  C) (Oral)  Resp 17  Ht 5' 9\" (1.753 m)  Wt 246 lb 8 oz (111.8 kg)  SpO2 99%  BMI 36.4 kg/m2  Body mass index is 36.4 kg/(m^2).  GENERAL: healthy, alert and no distress  NECK: no adenopathy and thyroid normal to palpation  RESP: lungs clear to auscultation - no rales, rhonchi or wheezes  CV: regular rate and rhythm, normal S1 S2, no S3 or S4, no murmur, click or rub.  ABDOMEN: soft, obese, nontender, no masses and bowel sounds normal  THIGH:   Vague tenderness deep upper thigh.     Diagnostic Test Results:  none     ASSESSMENT/PLAN:     (M79.579) Pain of right thigh: thigh  (primary encounter diagnosis)  Comment: Patient appears to be " muscular in origin discussed doing an anti-inflammatory patch and recheck in 1 week.  Plan: diclofenac (FLECTOR) 1.3 % Patch    (Z68.36) BMI 36.0-36.9,adult  Comment: Counseled to make better food choices, exercise as tolerated, and lose weight. Patient staying active    Call or return to clinic prn if these symptoms worsen or fail to improve as anticipated 1 week.    Telly Lucio MD  [facility]

## 2018-05-11 NOTE — MR AVS SNAPSHOT
After Visit Summary   5/11/2018    Rakan Nolasco    MRN: [id number]           Patient Information     Date Of Birth          1945        Visit Information        Provider Department      5/11/2018 8:40 AM Telly Lucio MD [facility]        Today's Diagnoses     Pain of right thigh: thigh    -  1      Care Instructions    [facility]    If you have any questions regarding to your visit please contact your care team:       Team Purple:   Clinic Hours Telephone Number   Dr. Capri Porter   7am-7pm  Monday - Thursday   7am-5pm  Fridays  [phone]  (Appointment scheduling available 24/7)    Questions about your recent visit?   Team Line:  [phone]   Urgent Care - [place] and [facility] - 11am-9pm Monday-Friday Saturday-Sunday- 9am-5pm   [place] - 5pm-9pm Monday-Friday Saturday-Sunday- 9am-5pm  [phone] - [place]  [phone] - [place]       What options do I have for a visit other than an office visit? We offer electronic visits (e-visits) and telephone visits, when medically appropriate.  Please check with your medical insurance to see if these types of visits are covered, as you will be responsible for any charges that are not paid by your insurance.      You can use [employer] (secure electronic communication) to access to your chart, send your primary care provider a message, or make an appointment. Ask a team member how to get started.     For a price quote for your services, please call our Consumer Price Line at [phone] or our Imaging Cost estimation line at [phone] (for imaging tests).    Winston Jamison            Follow-ups after your visit        Your next 10 appointments already scheduled     Jun 04, 2018  9:50 AM CDT   (Arrive by 9:35 AM)   Return Myasthenia Gravis with Arpan Harrison MD   [facility] Neurology ([facility] [place])     [address] [place] [address]   [phone]            Jun 29, 2018  8:00 AM CDT   LAB with [facility]   [facility] ([facility])    [address]  [place] [address]   [phone]           Please do not eat 10-12 hours before your appointment if you are coming in fasting for labs on lipids, cholesterol, or glucose (sugar). This does not apply to pregnant women. Water, hot tea and black coffee (with nothing added) are okay. Do not drink other fluids, diet soda or chew gum.            Jul 06, 2018  8:00 AM CDT   Return Visit with Josh Emery MD   [facility] ([facility])    [address] [facility] [address]   [phone]            Aug 14, 2018  9:20 AM CDT   Office Visit with Telly Lucio MD   [facility] ([facility])    [address] [facility] [address]   [phone]           Bring a current list of meds and any records pertaining to this visit. For Physicals, please bring immunization records and any forms needing to be filled out. Please arrive 10 minutes early to complete paperwork.            Nov 13, 2018  8:00 AM CST   New Visit with Mateo Gray MD   [facility] [place] ([facility])    [address] [facility] [address]   [phone]              Who to contact     If you have questions or need follow up information about today's clinic visit or your schedule please contact [facility] directly at [phone].  Normal or non-critical lab and imaging results will be communicated to you by MyChart, letter or phone within 4 business days after the clinic has received the results. If you do not hear from us within 7 days, please contact the clinic through MyChart or phone. If you have a critical or abnormal lab result, we will notify you by phone as soon as possible.  Submit refill requests through  "MyChart or call your pharmacy and they will forward the refill request to us. Please allow 3 business days for your refill to be completed.          Additional Information About Your Visit        MyChart Information     [employer] gives you secure access to your electronic health record. If you see a primary care provider, you can also send messages to your care team and make appointments. If you have questions, please call your primary care clinic.  If you do not have a primary care provider, please call [phone] and they will assist you.        Care EveryWhere ID     This is your Care EveryWhere ID. This could be used by other organizations to access your [facility] medical records  [id number]        Your Vitals Were     Pulse Temperature Respirations Height Pulse Oximetry BMI (Body Mass Index)    70 97.5  F (36.4  C) (Oral) 17 5' 9\" (1.753 m) 99% 36.4 kg/m2       Blood Pressure from Last 3 Encounters:   05/11/18 128/72   04/06/18 135/74   03/14/18 124/64    Weight from Last 3 Encounters:   05/11/18 246 lb 8 oz (111.8 kg)   03/14/18 245 lb (111.1 kg)   02/20/18 246 lb (111.6 kg)              Today, you had the following     No orders found for display         Today's Medication Changes          These changes are accurate as of 5/11/18  9:09 AM.  If you have any questions, ask your nurse or doctor.               Start taking these medicines.        Dose/Directions    diclofenac 1.3 % Patch   Commonly known as:  FLECTOR   Used for:  Pain of right thigh   Started by:  Telly Lucio MD        Dose:  1 patch   Place 1 patch onto the skin 2 times daily   Quantity:  30 patch   Refills:  0            Where to get your medicines      These medications were sent to [place] [address] [facility] - [address]  [address] [place] [address]     Phone:  [phone]     diclofenac 1.3 % Patch                Primary Care Provider Office Phone # Fax #    Telly Lucio [phone]    "    [address] [facility] [address]        Equal Access to Services     CARMENSARAH NAKIA : Hadii estefania michael hadaidealexander Somatthewali, waaxda luqadaha, qaybta kaalmada maryjuantuan, gino daniel lakeshiamoo orosconellietravis blount. So [facility] [phone].    ATENCIÓN: Si habla español, tiene a coates disposición servicios gratuitos de asistencia lingüística. Llame al [phone].    We comply with applicable federal civil rights laws and [place] laws. We do not discriminate on the basis of race, color, national origin, age, disability, sex, sexual orientation, or gender identity.            Thank you!     Thank you for choosing [facility]  for your care. Our goal is always to provide you with excellent care. Hearing back from our patients is one way we can continue to improve our services. Please take a few minutes to complete the written survey that you may receive in the mail after your visit with us. Thank you!             Your Updated Medication List - Protect others around you: Learn how to safely use, store and throw away your medicines at www.disposemymeds.org.          This list is accurate as of 5/11/18  9:09 AM.  Always use your most recent med list.                   Brand Name Dispense Instructions for use Diagnosis    aspirin 81 MG tablet      Take 1 tablet by mouth daily.        atorvastatin 20 MG tablet    LIPITOR    90 tablet    TAKE ONE TABLET BY MOUTH ONE TIME DAILY    Hyperlipidemia LDL goal <130       blood glucose lancets standard    no brand specified    50 each    Check blood sugar every morning    Type 2 diabetes mellitus with hyperglycemia, without long-term current use of insulin (H)       blood glucose monitoring meter device kit    no brand specified    1 kit    Use to test blood sugar 1 times daily or as directed.    Type 2 diabetes mellitus with hyperglycemia, without long-term current use of insulin (H)       blood glucose monitoring test strip    no brand specified    50 strip    laneUse to  test blood sugars 1 times daily or as directed    Type 2 diabetes mellitus with hyperglycemia, without long-term current use of insulin (H)       CALCIUM 600/VITAMIN D PO      Take  by mouth. Take 1/2 tablet in the morning, 1/2 at night daily        clonazePAM 0.5 MG tablet    klonoPIN     Take 0.5 mg by mouth. Take 1 tablet by mouth daily at bedtime        diclofenac 1.3 % Patch    FLECTOR    30 patch    Place 1 patch onto the skin 2 times daily    Pain of right thigh       latanoprost 0.005 % ophthalmic solution    XALATAN    7.5 mL    Place 1 drop into both eyes At Bedtime    Borderline glaucoma with ocular hypertension, bilateral       lisinopril 10 MG tablet    PRINIVIL/ZESTRIL    90 tablet    Take 1 tablet (10 mg) by mouth daily    Hypertension goal BP (blood pressure) < 140/90       metFORMIN 500 MG 24 hr tablet    GLUCOPHAGE-XR    180 tablet    Take 1 tablet (500 mg) by mouth 2 times daily (with meals)    Prediabetes       MULTI-DAY PO      daily        OXcarbazepine 150 MG tablet    TRILEPTAL    120 tablet    TAKE 2 tabs IN AM AND 2 IN PM FOR 2WKS IF NEEDED.    Trigeminal neuralgia       * pantoprazole 20 MG EC tablet    PROTONIX    90 tablet    TAKE ONE TABLET BY MOUTH ONE TIME DAILY 30-60 MINUTES BEFORE A MEAL    Gastroesophageal reflux disease without esophagitis       * pantoprazole 20 MG EC tablet    PROTONIX    90 tablet    TAKE ONE TABLET BY MOUTH ONE TIME DAILY 30-60 MINUTES BEFORE A MEAL    Gastroesophageal reflux disease without esophagitis       predniSONE 20 MG tablet    DELTASONE          pyridostigmine 60 MG tablet    MESTINON    120 tablet    TAKE 1 TABLET (60 MG) BY MOUTH 4 TIMES DAILY    Ocular myasthenia (H), Long-term use of immunosuppressant medication       timolol 0.5 % ophthalmic solution    TIMOPTIC    1 Bottle    Place 1 drop into both eyes daily    Borderline glaucoma with ocular hypertension, bilateral       triamcinolone 0.1 % ointment    KENALOG    15 g    Apply sparingly to  affected area three times daily for 14 days.    Dermatitis       * Notice:  This list has 2 medication(s) that are the same as other medications prescribed for you. Read the directions carefully, and ask your doctor or other care provider to review them with you.

## 2018-05-11 NOTE — PATIENT INSTRUCTIONS
[facility]    If you have any questions regarding to your visit please contact your care team:       Team Purple:   Clinic Hours Telephone Number   Dr. Capri Porter   7am-7pm  Monday - Thursday   7am-5pm  Fridays  [phone]  (Appointment scheduling available 24/7)    Questions about your recent visit?   Team Line:  [phone]   Urgent Care - [place] and [facility] - 11am-9pm Monday-Friday Saturday-Sunday- 9am-5pm   [place] - 5pm-9pm Monday-Friday Saturday-Sunday- 9am-5pm  [phone] - [place]  [phone] - [place]       What options do I have for a visit other than an office visit? We offer electronic visits (e-visits) and telephone visits, when medically appropriate.  Please check with your medical insurance to see if these types of visits are covered, as you will be responsible for any charges that are not paid by your insurance.      You can use [employer] (secure electronic communication) to access to your chart, send your primary care provider a message, or make an appointment. Ask a team member how to get started.     For a price quote for your services, please call our Consumer Price Line at [phone] or our Imaging Cost estimation line at [phone] (for imaging tests).    Winston Jamison

## 2018-05-14 ENCOUNTER — TELEPHONE (OUTPATIENT)
Dept: FAMILY MEDICINE | Facility: CLINIC | Age: 73
End: 2018-05-14

## 2018-05-14 DIAGNOSIS — M79.651 PAIN OF RIGHT THIGH: Primary | ICD-10-CM

## 2018-05-14 NOTE — TELEPHONE ENCOUNTER
Received fax informing insurance is not going to cover diclofenac (FLECTOR) 1.3 % Patch. Would you like to start PA? Please advise. Marlene Bustaamnte MA

## 2018-05-15 DIAGNOSIS — G70.00 MYASTHENIA GRAVIS, ACHR ANTIBODY POSITIVE (H): Primary | ICD-10-CM

## 2018-05-15 NOTE — TELEPHONE ENCOUNTER
Called and spoke to patient. Informed patient of message from Dr. Lucio. Patient stated that he will try Tylenol #3 but patient is wondering if he needs to see a specialist or come back in to recheck because the pain have become worse since Friday.    Please advise,    Mary Jane Street MA

## 2018-05-15 NOTE — TELEPHONE ENCOUNTER
Reason for Call:  Other call back    Detailed comments: Patient would like to know next steps in handling the pain.    Phone Number Patient can be reached at: Home number on file [phone] (home)    Best Time: ASAP    Can we leave a detailed message on this number? YES    Call taken on 5/15/2018 at 8:02 AM by Jody Madera

## 2018-05-15 NOTE — TELEPHONE ENCOUNTER
Faxed prescription to pharmacy. Please call patient and inform of message below.  Raisa Olivo  Team Valentina,

## 2018-05-16 ENCOUNTER — TELEPHONE (OUTPATIENT)
Dept: NEUROLOGY | Facility: CLINIC | Age: 73
End: 2018-05-16

## 2018-05-16 RX ORDER — PREDNISONE 20 MG/1
20 TABLET ORAL 2 TIMES DAILY
Qty: 40 TABLET | Refills: 3 | OUTPATIENT
Start: 2018-05-16

## 2018-05-16 NOTE — TELEPHONE ENCOUNTER
Dr. Harrison, please see the message below. Dr. Gray is advising that you fill his prednisone going forward. Are you OK with this? If so, please send an Rx [facility] [place].

## 2018-05-16 NOTE — TELEPHONE ENCOUNTER
Spoke to patient and informed him of below message. Patient stated he will give it a few days and see how it goes.  Josefa GALLOWAY CMA ([facility])

## 2018-05-16 NOTE — TELEPHONE ENCOUNTER
Health Call Center    Phone Message    May a detailed message be left on voicemail: yes    Reason for Call: Medication Refill Request    Has the patient contacted the pharmacy for the refill? Yes   Name of medication being requested: predniSONE (DELTASONE) 20 MG tablet  Provider who prescribed the medication: Pts eye provider. Dr Gray. She suggests that she now has Dr Harrison prescribe the medication now.  Pharmacy: [facility] in [place] [address]  Date medication is needed: As soon as possible. Pt is out of medication. Please call back to discuss or inform when sent         Action Taken: Message routed to:  [facility] Neuro

## 2018-05-16 NOTE — TELEPHONE ENCOUNTER
Patient called our clinic requesting oral prednisone refill, however this should be at the discretion of his neurologist.

## 2018-05-16 NOTE — TELEPHONE ENCOUNTER
Patient called again to see the status of the refill. I relayed Dr. Gray's message below and he will be contacting his neurologist for this refill. Closing encounter.

## 2018-05-17 RX ORDER — PREDNISONE 20 MG/1
40 TABLET ORAL DAILY
Qty: 30 TABLET | Refills: 0 | Status: SHIPPED | OUTPATIENT
Start: 2018-05-17 | End: 2018-06-04

## 2018-05-17 NOTE — TELEPHONE ENCOUNTER
Dr. Harrison, see message below. Patient is still looking for a refill of his prednisone. Please send Rx to [facility] in [place].

## 2018-05-17 NOTE — TELEPHONE ENCOUNTER
[facility] Call Center    Phone Message    May a detailed message be left on voicemail: yes    Reason for Call: Other: Pt calling back looking for an update. He is currently out of the medication.      Action Taken: Message routed to:  [facility] Neurology

## 2018-06-04 ENCOUNTER — OFFICE VISIT (OUTPATIENT)
Dept: NEUROLOGY | Facility: CLINIC | Age: 73
End: 2018-06-04
Payer: COMMERCIAL

## 2018-06-04 ENCOUNTER — OFFICE VISIT (OUTPATIENT)
Dept: PHYSICAL MEDICINE AND REHAB | Facility: CLINIC | Age: 73
End: 2018-06-04
Payer: COMMERCIAL

## 2018-06-04 ENCOUNTER — RADIANT APPOINTMENT (OUTPATIENT)
Dept: GENERAL RADIOLOGY | Facility: CLINIC | Age: 73
End: 2018-06-04
Attending: PHYSICAL MEDICINE & REHABILITATION
Payer: COMMERCIAL

## 2018-06-04 VITALS
BODY MASS INDEX: 37.09 KG/M2 | RESPIRATION RATE: 25 BRPM | HEIGHT: 69 IN | DIASTOLIC BLOOD PRESSURE: 80 MMHG | SYSTOLIC BLOOD PRESSURE: 171 MMHG | TEMPERATURE: 97.8 F | HEART RATE: 69 BPM | WEIGHT: 250.44 LBS | OXYGEN SATURATION: 98 %

## 2018-06-04 VITALS
SYSTOLIC BLOOD PRESSURE: 172 MMHG | HEART RATE: 69 BPM | RESPIRATION RATE: 25 BRPM | DIASTOLIC BLOOD PRESSURE: 69 MMHG | BODY MASS INDEX: 37.1 KG/M2 | WEIGHT: 250.5 LBS | OXYGEN SATURATION: 98 % | HEIGHT: 69 IN | TEMPERATURE: 97.8 F

## 2018-06-04 DIAGNOSIS — Z79.52 CURRENT CHRONIC USE OF SYSTEMIC STEROIDS: Primary | ICD-10-CM

## 2018-06-04 DIAGNOSIS — G50.0 TRIGEMINAL NEURALGIA: ICD-10-CM

## 2018-06-04 DIAGNOSIS — M79.651 ACUTE THIGH PAIN, RIGHT: ICD-10-CM

## 2018-06-04 DIAGNOSIS — Z79.52 CURRENT CHRONIC USE OF SYSTEMIC STEROIDS: ICD-10-CM

## 2018-06-04 DIAGNOSIS — G70.00 MYASTHENIA GRAVIS, ACHR ANTIBODY POSITIVE (H): ICD-10-CM

## 2018-06-04 DIAGNOSIS — G70.00 MYASTHENIA GRAVIS IN REMISSION (H): ICD-10-CM

## 2018-06-04 DIAGNOSIS — M79.18 RIGHT BUTTOCK PAIN: ICD-10-CM

## 2018-06-04 DIAGNOSIS — M79.651 ACUTE THIGH PAIN, RIGHT: Primary | ICD-10-CM

## 2018-06-04 PROBLEM — G47.33 OBSTRUCTIVE SLEEP APNEA SYNDROME: Status: ACTIVE | Noted: 2017-01-25

## 2018-06-04 PROBLEM — I24.89 DEMAND ISCHEMIA (H): Status: ACTIVE | Noted: 2018-01-12

## 2018-06-04 PROBLEM — E11.9 TYPE 2 DIABETES MELLITUS (H): Status: ACTIVE | Noted: 2017-10-23

## 2018-06-04 PROBLEM — A41.9 SEPSIS (H): Status: ACTIVE | Noted: 2018-01-12

## 2018-06-04 PROBLEM — K52.9 GASTROENTERITIS: Status: ACTIVE | Noted: 2018-03-23

## 2018-06-04 PROBLEM — G47.30 SLEEP APNEA: Status: ACTIVE | Noted: 2018-03-23

## 2018-06-04 PROBLEM — R50.2 DRUG INDUCED FEVER: Status: ACTIVE | Noted: 2018-01-18

## 2018-06-04 PROBLEM — R50.9 FEVER: Status: ACTIVE | Noted: 2018-01-13

## 2018-06-04 LAB — HBA1C MFR BLD: 7.4 % (ref 0–5.6)

## 2018-06-04 RX ORDER — PREDNISONE 20 MG/1
20 TABLET ORAL SEE ADMIN INSTRUCTIONS
Qty: 45 TABLET | Refills: 1 | Status: SHIPPED | OUTPATIENT
Start: 2018-06-04 | End: 2018-09-11

## 2018-06-04 ASSESSMENT — ENCOUNTER SYMPTOMS
SINUS PAIN: 0
NUMBNESS: 0
DIZZINESS: 0
POSTURAL DYSPNEA: 0
SPEECH CHANGE: 0
TASTE DISTURBANCE: 0
SHORTNESS OF BREATH: 1
DISTURBANCES IN COORDINATION: 0
HEMOPTYSIS: 0
SINUS CONGESTION: 0
TREMORS: 1
WHEEZING: 1
WEAKNESS: 1
FATIGUE: 1
MEMORY LOSS: 0
LOSS OF CONSCIOUSNESS: 0
COUGH DISTURBING SLEEP: 0
TINGLING: 0
NECK MASS: 0
SNORES LOUDLY: 0
HEADACHES: 0
WEIGHT GAIN: 1
PARALYSIS: 0
SMELL DISTURBANCE: 0
SEIZURES: 0
DYSPNEA ON EXERTION: 1
HOARSE VOICE: 0
SORE THROAT: 0
SPUTUM PRODUCTION: 0
COUGH: 0
TROUBLE SWALLOWING: 1

## 2018-06-04 ASSESSMENT — PAIN SCALES - GENERAL
PAINLEVEL: NO PAIN (0)
PAINLEVEL: MODERATE PAIN (4)

## 2018-06-04 NOTE — LETTER
"Service Date: 2018      [place] [address]      Daniele Hennessy MD   [email or web address]    [address] [place] [id number]      [place] [address]      RE: Rakan Nolasco   MRN: [id number]   : 1945       Dear Doctors:      I had the pleasure to see Mr. 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  I am following him after Dr. Layne's [facility] and I last saw him in 2018.  At that time, he had no complaints of ptosis; he had some blurry vision but not true diplopia.  I referred him to Ophthalmology and he was found to have a right posterior capsular opacification for which he underwent YAG laser capsulotomy.  His visual symptoms are stable.  He continues to have no ptosis or double vision.  He has an occasional difficulty swallowing later in the evening affecting liquids or solids and occasional difficulty chewing but those are infrequent, happening once a week or less.  There is no dysarthria, sialorrhea, head drop, respiratory symptoms or focal weakness of his shoulder muscles.  He has a generalized feeling of muscle weakness which does not vary during the day, and he gets winded with exercise but has no orthopnea.    He has a couple of other problems for which I am following him.  One is head pain.  This feels like a \"spider web\", as well as \"dull, achy pain\" on the right forehead, predominantly the V1 distribution on the scalp.  However, a few months ago the patient told me he had sharp pains affecting the same region, which were triggered by cold air or touching the face. I then felt he could have trigeminal neuralgia.  A review of the medical record indicated that he had complained of " "similar pains in the past and Dr. Layne had ordered brain MRI and so did Dr. Lucio in 2012 and 2014 respectively.  The brain parenchyma was normal without bleed or mass and there was some hyperintense material in the left mastoid, which of course cannot explain this problem.  There was no evidence of aneurysm or brainstem tumor.  I prescribed him Trileptal empirically and he feels this has helped.  Despite the \"dull\" or \"spider web\" discomfort on the right forehead, the sharp/shooting pains have largely dissipated.  He is on 600 mg of Trileptal in the morning and 900 in the evening.  He has a little insomnia on the drug, but no other side effects.  Recently checked sodium, AST and ALT as of 05/09/2018 were normal.  CBC checked in 01/2018 was normal too.     A third issue is a new (started 4 weeks ago) pain at the right buttock which sometimes radiates to the posterior thigh.  The pain occurs only when sitting and makes sitting very uncomfortable.  It never occurs with standing or supine position.  There is very rare to no radiation of the pain below the knee and no paresthesias at the foot.  It is not triggered by cough or sneezing.  I had ordered a lumbar spine MRI for him, but he did not believe it is coming from his back so he did not do it.        CURRENT MEDICATIONS:   Reviewed and are as per Epic record.      PHYSICAL EXAMINATION:     VITAL SIGNS:   His blood pressure is 117/69.  Pulse 69 and regular.  Respiratory rate 25.  Temperature 97.8 Fahrenheit.  O2 sat 98% on room air.  Weight 113.6 kilos.  Height is 175.  He endorsed no pain.     NEUROLOGIC:   He is awake, alert and oriented x3.  Cranial nerve examination shows no ptosis even after 1 minute of sustained upward gaze.  There is no weakness of orbicularis oculi.  There is no diplopia in any cardinal gaze position at rest.  After sustained upward gaze for about 25-30 seconds he develops very mild diplopia.  Ductions and versions are normal.  There is " "no weakness of lip seal, uvula, jaw, palate or tongue.  There is no dysphonia or dysarthria.  Cough reflex is strong.  Neck flexion and extension are 5.  Strength is 5/5 for deltoid, biceps, triceps, hip flexors, knee extensors, knee flexors, hip abductors/adductors and foot dorsiflexors bilaterally.  Tone is normal.  Ankle reflexes are 2+ and symmetric.  There is no distinct sensory deficit in the S1 distribution at the right versus the left.  He is able to rise from a chair 2 times with arms crossed on the chest without difficulties.  He has a negative straight leg raising sign and negative Bragard sign.  He has a negative DESTINEE maneuver.  He is a little tender over the posterior buttock.      IMPRESSION:   1.  Myasthenia gravis in remission.   2.  Possible right trigeminal neuralgia.   3.  Right buttock pain, etiology uncertain, possible piriformis syndrome versus sacroiliac joint dysfunction, less likely S1 radiculopathy.        I spent about 25 minutes with Mr. Nolasco, of which more than half was counseling.  His myasthenia is in good control. I think he should taper the prednisone.  He should start by taking 20 mg alternating with 10 mg every day for 1 month, then 20 mg alternating with 5 for 1 month, then 20 mg every other day for 1 month, then 15 mg every other day.  Side effects were explained.  He should have a bone density scan ordered by his primary care doctor, who should act on the results.  He should continue taking calcium and vitamin D and have an annual ophthalmologic evaluation for surveillance for cataracts and glaucoma.      He was complaining of some dizziness when standing.  This could have to do with low blood pressure.  This needs to be discussed with his PCP.    His facial pain is now predominantly in the V1 distribution and feels like a \"dull or spider web\" sensation; however, a few months ago the pain was sharp, shooting, episodic and triggered by touch, so the story he gave me at least " initially was consistent with trigeminal neuralgia and the sharp component of the pain has dissipated after taking Trileptal. Therefore, I will continue this treatment at the same dose.     I am not sure of the etiology of his right buttock pain.  It is aggravated by sitting.  This could be piriformis syndrome or SI joint.  With pain not radiating below the knee and negative straight leg raising sign, I doubt this is S1 radiculopathy.  Therefore, I will put the lumbar spine MRI on hold.  I would like an opinion from PM&R on the most likely diagnosis and treatment of this.  I will see Mr. Nolasco in followup in clinic in 4 months.      Sincerely,       MD BAMBI Amin MD             D: 2018   T: 2018   MT: AKA      Name:     JR NOLASCO   MRN:      [id number]        Account:      [id number]   :      1945           Service Date: 2018      Document: [id number]      Answers for HPI/ROS submitted by the patient on 2018   General Symptoms: Yes  Skin Symptoms: No  HENT Symptoms: Yes  EYE SYMPTOMS: No  HEART SYMPTOMS: No  LUNG SYMPTOMS: Yes  INTESTINAL SYMPTOMS: No  URINARY SYMPTOMS: Yes  REPRODUCTIVE SYMPTOMS: No  SKELETAL SYMPTOMS: No  BLOOD SYMPTOMS: No  NERVOUS SYSTEM SYMPTOMS: Yes  MENTAL HEALTH SYMPTOMS: No  Weight gain: Yes  Fatigue: Yes  Ear pain: No  Ear discharge: No  Hearing loss: Yes  Tinnitus: Yes  Nosebleeds: No  Congestion: No  Sinus pain: No  Trouble swallowing: Yes   Voice hoarseness: No  Mouth sores: No  Sore throat: No  Tooth pain: No  Gum tenderness: No  Bleeding gums: No  Change in taste: No  Change in sense of smell: No  Dry mouth: No  Hearing aid used: No  Neck lump: No  Cough: No  Sputum or phlegm: No  Coughing up blood: No  Difficulty breating or shortness of breath: Yes  Snoring: No  Wheezing: Yes  Difficulty breathing on exertion: Yes  Nighttime Cough: No  Difficulty breathing when lying flat: No  Trouble with coordination:  No  Dizziness or trouble with balance: No  Fainting or black-out spells: No  Memory loss: No  Headache: No  Seizures: No  Speech problems: No  Tingling: No  Tremor: Yes  Weakness: Yes  Difficulty walking: No  Paralysis: No  Numbness: No      Again, thank you for allowing me to participate in the care of your patient.      Sincerely,    Arpan Harrison MD

## 2018-06-04 NOTE — PATIENT INSTRUCTIONS
Labs today (A1c)  Please ask your primary care doctor to do a bone density scan on you. He should react on the test results.    Please reduce your prednisone as follows:    20 mg on one day, alternating with 10 mg on the other day for 1 month,  Then 20 mg on one day, alternating with 5 mg on the other day, for 1 month,  Then 20 mg every other day, for 1 month,  Then 15 mg every other day.    Follow up in Clinic in 4 months.  Continue the Trileptal for the trigeminal neuralgia    I will ask our Physical Medicine & Rehabilitation Colleagues to see you for your pain at the right buttock.

## 2018-06-04 NOTE — MR AVS SNAPSHOT
After Visit Summary   6/4/2018    Rakan Nolasco    MRN: [id number]           Patient Information     Date Of Birth          1945        Visit Information        Provider Department      6/4/2018 9:50 AM Arpan Harrison MD [facility]        Today's Diagnoses     Current chronic use of systemic steroids    -  1    Myasthenia gravis in remission (H)        Right buttock pain          Care Instructions    Labs today (A1c)  Please ask your primary care doctor to do a bone density scan on you. He should react on the test results.    Please reduce your prednisone as follows:    20 mg on one day, alternating with 10 mg on the other day for 1 month,  Then 20 mg on one day, alternating with 5 mg on the other day, for 1 month,  Then 20 mg every other day, for 1 month,  Then 15 mg every other day.    Follow up in Clinic in 4 months.  Continue the Trileptal for the trigeminal neuralgia    I will ask our Physical Medicine & Rehabilitation Colleagues to see you for your pain at the right buttock.                 Follow-ups after your visit        Additional Services     PHYSIATRY REFERRAL       Your provider has referred you to: [facility]: Physical Medicine and Rehabilitation Clinic [facility] [phone]   [email or web address]     Please note these locations do not prescribe narcotics or manage chronic pain.    Please be aware that coverage of these services is subject to the terms and limitations of your health insurance plan.  Call member services at your health plan with any benefit or coverage questions.      Please bring the following to your appointment:  >>   Any x-rays, CTs or MRIs which have been performed.  Contact the facility where they were done to arrange for  prior to your scheduled appointment.    >>   List of current medications   >>   This referral request   >>   Any documents/labs given to you for this referral                  Follow-up notes from your care  team     Return in about 4 months (around 10/4/2018).      Your next 10 appointments already scheduled     Jun 08, 2018  7:00 AM CDT   MR LUMBAR SPINE W/O & W CONTRAST with [id number]   [facility] ([facility])    [address] [id number] [phone]           Take your medicines as usual, unless your doctor tells you not to. Bring a list of your current medicines to your exam (including vitamins, minerals and over-the-counter drugs).  You may or may not receive intravenous (IV) contrast for this exam pending the discretion of the Radiologist.  You do not need to do anything special to prepare.  The MRI machine uses a strong magnet. Please wear clothes without metal (snaps, zippers). A sweatsuit works well, or we may give you a hospital gown.  Please remove any body piercings and hair extensions before you arrive. You will also remove watches, jewelry, hairpins, wallets, dentures, partial dental plates and hearing aids. You may wear contact lenses, and you may be able to wear your rings. We have a safe place to keep your personal items, but it is safer to leave them at home.  **IMPORTANT** THE INSTRUCTIONS BELOW ARE ONLY FOR THOSE PATIENTS WHO HAVE BEEN PRESCRIBED SEDATION OR GENERAL ANESTHESIA DURING THEIR MRI PROCEDURE:  IF YOUR DOCTOR PRESCRIBED ORAL SEDATION (take medicine to help you relax during your exam):   You must get the medicine from your doctor (oral medication) before you arrive. Bring the medicine to the exam. Do not take it at home. You ll be told when to take it upon arriving for your exam.   Arrive one hour early. Bring someone who can take you home after the test. Your medicine will make you sleepy. After the exam, you may not drive, take a bus or take a taxi by yourself.  IF YOUR DOCTOR PRESCRIBED IV SEDATION:   Arrive one hour early. Bring someone who can take you home after the test. Your medicine will make you sleepy.  After the exam, you may not drive, take a bus or take a taxi by yourself.   No eating 6 hours before your exam. You may have clear liquids up until 4 hours before your exam. (Clear liquids include water, clear tea, black coffee and fruit juice without pulp.)  IF YOUR DOCTOR PRESCRIBED ANESTHESIA (be asleep for your exam):   Arrive 1 1/2 hours early. Bring someone who can take you home after the test. You may not drive, take a bus or take a taxi by yourself.   No eating 8 hours before your exam. You may have clear liquids up until 4 hours before your exam. (Clear liquids include water, clear tea, black coffee and fruit juice without pulp.)   You will spend four to five hours in the recovery room.  Please call the Imaging Department at your exam site with any questions.            Jun 29, 2018  8:00 AM CDT   LAB with  LAB   [facility] ([facility])    [address] [address]   [phone]           Please do not eat 10-12 hours before your appointment if you are coming in fasting for labs on lipids, cholesterol, or glucose (sugar). This does not apply to pregnant women. Water, hot tea and black coffee (with nothing added) are okay. Do not drink other fluids, diet soda or chew gum.            Jul 06, 2018  8:00 AM CDT   Return Visit with Josh Emery MD   [facility] ([facility])    [address] [address]   [phone]            Aug 14, 2018  9:20 AM CDT   Office Visit with Telly Lucio MD   [facility] ([facility])    [address] [address]   [phone]           Bring a current list of meds and any records pertaining to this visit. For Physicals, please bring immunization records and any forms needing to be filled out. Please arrive 10 minutes early to complete paperwork.            Oct 01, 2018  9:50 AM CDT   (Arrive by 9:35 AM)   Return Myasthenia  "Gravis with Arpan Harrison MD   [facility] ([facility])    [address]  3rd [address]  [facility] [address] [phone]            Nov 13, 2018  8:00 AM CST   New Visit with Mateo Gray MD   [facility] ([facility])    [address] [address] [phone]              Who to contact     Please call your clinic at [phone] to:    Ask questions about your health    Make or cancel appointments    Discuss your medicines    Learn about your test results    Speak to your doctor            Additional Information About Your Visit        [employer] Information     [employer] gives you secure access to your electronic health record. If you see a primary care provider, you can also send messages to your care team and make appointments. If you have questions, please call your primary care clinic.  If you do not have a primary care provider, please call [phone] and they will assist you.      [employer] is an electronic gateway that provides easy, online access to your medical records. With [employer], you can request a clinic appointment, read your test results, renew a prescription or communicate with your care team.     To access your existing account, please contact your [facility] or call [phone] for assistance.        Care EveryWhere ID     This is your Care EveryWhere ID. This could be used by other organizations to access your [facility] medical records  [id number]        Your Vitals Were     Pulse Temperature Respirations Height Pulse Oximetry BMI (Body Mass Index)    69 97.8  F (36.6  C) 25 1.753 m (5' 9\") 98% 36.99 kg/m2       Blood Pressure from Last 3 Encounters:   06/04/18 171/80   06/04/18 172/69   05/11/18 128/72    Weight from Last 3 Encounters:   06/04/18 113.6 kg (250 lb 7.1 oz)   06/04/18 113.6 kg (250 lb 8 oz)   05/11/18 111.8 kg (246 lb 8 oz)              We " Performed the Following     PHYSIATRY REFERRAL        Primary Care Provider Office Phone # Fax #    Telly Lucio [phone] [address]  [facility] [address]        Equal Access to Services     CRIS YEE : Hadii aad ku hadaideo Soomaali, waaxda luqadaha, qaybta kaalmada adejuanitoda, gino ashern mary dodson laradhamoo blount. So [facility] [phone].    ATENCIÓN: Si habla español, tiene a coates disposición servicios gratuitos de asistencia lingüística. Llame al [phone].    We comply with applicable federal civil rights laws and [place] laws. We do not discriminate on the basis of race, color, national origin, age, disability, sex, sexual orientation, or gender identity.            Thank you!     Thank you for choosing [facility]  for your care. Our goal is always to provide you with excellent care. Hearing back from our patients is one way we can continue to improve our services. Please take a few minutes to complete the written survey that you may receive in the mail after your visit with us. Thank you!             Your Updated Medication List - Protect others around you: Learn how to safely use, store and throw away your medicines at www.disposemymeds.org.          This list is accurate as of 6/4/18 11:33 AM.  Always use your most recent med list.                   Brand Name Dispense Instructions for use Diagnosis    aspirin 81 MG tablet      Take 1 tablet by mouth daily.        atorvastatin 20 MG tablet    LIPITOR    90 tablet    TAKE ONE TABLET BY MOUTH ONE TIME DAILY    Hyperlipidemia LDL goal <130       blood glucose lancets standard    no brand specified    50 each    Check blood sugar every morning    Type 2 diabetes mellitus with hyperglycemia, without long-term current use of insulin (H)       blood glucose monitoring meter device kit    no brand specified    1 kit    Use to test blood sugar 1 times daily or as directed.    Type 2 diabetes mellitus with  hyperglycemia, without long-term current use of insulin (H)       blood glucose monitoring test strip    no brand specified    50 strip    laneUse to test blood sugars 1 times daily or as directed    Type 2 diabetes mellitus with hyperglycemia, without long-term current use of insulin (H)       clonazePAM 0.5 MG tablet    klonoPIN     Take 0.5 mg by mouth. Take 1 tablet by mouth daily at bedtime        latanoprost 0.005 % ophthalmic solution    XALATAN    7.5 mL    Place 1 drop into both eyes At Bedtime    Borderline glaucoma with ocular hypertension, bilateral       lisinopril 10 MG tablet    PRINIVIL/ZESTRIL    90 tablet    Take 1 tablet (10 mg) by mouth daily    Hypertension goal BP (blood pressure) < 140/90       metFORMIN 500 MG 24 hr tablet    GLUCOPHAGE-XR    180 tablet    Take 1 tablet (500 mg) by mouth 2 times daily (with meals)    Prediabetes       OXcarbazepine 150 MG tablet    TRILEPTAL    120 tablet    TAKE 2 tabs IN AM AND 2 IN PM FOR 2WKS IF NEEDED.    Trigeminal neuralgia       pantoprazole 20 MG EC tablet    PROTONIX    90 tablet    TAKE ONE TABLET BY MOUTH ONE TIME DAILY 30-60 MINUTES BEFORE A MEAL    Gastroesophageal reflux disease without esophagitis       predniSONE 20 MG tablet    DELTASONE    30 tablet    Take 2 tablets (40 mg) by mouth daily    Myasthenia gravis, AChR antibody positive (H)       pyridostigmine 60 MG tablet    MESTINON    120 tablet    TAKE 1 TABLET (60 MG) BY MOUTH 4 TIMES DAILY    Ocular myasthenia (H), Long-term use of immunosuppressant medication       timolol 0.5 % ophthalmic solution    TIMOPTIC    1 Bottle    Place 1 drop into both eyes daily    Borderline glaucoma with ocular hypertension, bilateral

## 2018-06-04 NOTE — NURSING NOTE
Chief Complaint   Patient presents with     Consult     UMP- PAIN IN RIGHT BUTTOX     VITALS TRANSFERRED FORM PREVIOUS VISIT TODAY (LESS THAN 2-3 HOURS AGO)     Favian Wright, CMA

## 2018-06-04 NOTE — PROGRESS NOTES
"[facility] Physical Medicine & Rehabilitation Clinic Note  Clinic Visit s Jun 4, 2018    Subjective:  Rakan Nolasco is a 72 year old male who is seen in consultation at the request of Dr. Harrison for evaluation of posterior right hip/thigh pain.    Symptoms began 1 month ago.  Reports insidious onset without acute precipitating event.  Reports right hip/thigh pain that is located posterior with radiation present (stops at the right posterior knee region).  Symptoms are generally worse with sitting activities.  Treatment has consisted of stretching activities.  Denies any numbness/tingling/weakness of the right lower extremity.  Denies any bruising or swelling of the right posterior thigh.  Denies any redness or warmth of the right posterior thigh.  Denies any back pain.  Denies any previous right hip/low back injuries/surgeries.    Patient's past medical, surgical, social, and family histories were reviewed today.  There are no significant contributory medical issues  Past Medical History:   Diagnosis Date     Arthritis          BMI 31.0-31.9,adult      Diverticulosis     Colonoscopy 8/2008     Fatty liver     see US  5/2012     GERD (gastroesophageal reflux disease)      Glaucoma (increased eye pressure)      HTN (hypertension)      Hyperlipidemia LDL goal < 130     age, htn, fhx     Irritable bowel      Nonsenile cataract      ROSIE (obstructive sleep apnea) 01/2011    Using CPAP;      Prediabetes      Restless leg syndrome      Trigeminal neuralgia pain 1/4/2012       Review of Systems:  Constitutional: NEGATIVE for fever, chills, or change in weight  Skin: POSITIVE for skin rash of the left leg  Neuro: NEGATIVE for weakness of the right lower extremity  MSK: see HPI    Objective:  /80  Pulse 69  Temp 97.8  F (36.6  C) (Oral)  Resp 25  Ht 1.753 m (5' 9\")  Wt 113.6 kg (250 lb 7.1 oz)  SpO2 98%  BMI 36.98 kg/m2  General: healthy, alert, obese, and in no distress  Skin: no " suspicious lesions or rashes  Psych: mentation appears normal and affect normal/bright  HEENT: no scleral icterus  CV: no pedal edema  Resp: normal respiratory effort without conversational dyspnea   Neuro: sensory exam is within normal limits.  Motor strength as noted below  Lymph: no palpable lymphadenopathy    MSK:    RIGHT HIP  Inspection:    No swelling, bruising, discoloration, or obvious deformity or asymmetry  Palpation:    Crepitus is absent  Passive Range of Motion:    Flexion within normal limits / eIR within normal limits / ER within normal limits  Strength:    Flexion 5/5 / extension 5/5 with pain / abduction 5/5  Special Tests:    Positive: None    Negative: Logroll, DESTINEE, anterior impingement (FADIR), and Stinchfield's    THORACIC/LUMBAR SPINE  Inspection:    No redness, swelling, overlying skin change, or gross deformity/asymmetry  Palpation:    Tender about the right SI joint and right sciatic notch    No tenderness over the lumbar spinous processes, left para lumbar muscles, right para lumbar muscles, left SI joint, or left sciatic notch  Range of Motion:     Lumbar flexion within normal limits    Lumbar extension within normal limits  Strength:    5/5 - quadriceps, hamstrings, tibialis anterior, gastrocsoleus, and extensor hallicus longus  Special Tests:    Positive: None    Negative: Straight leg raise (right), SI joint compression (bilateral), DESTINEE (right), and Gaenslen's test (right)    Imaging:  None    ASSESSMENT:  1.  Right posterior hip/thigh pain    PLAN:  1.  Plain radiographs of the right femur ordered to evaluate for mass/tumor.  2.  Activity modification as discussed, including limitation of activities that cause pain/discomfort.  3.  Acetaminophen/ice/heat as needed for improved pain control.  4.  Cancel MRI of the lumbar spine without contrast as ordered as current symptom state is unlikely to related to the lumbar spine presently.  5.  MyChart 1-2 business days after completion of  diagnostic testing for discussion of results/further medical care.  Consider a CT/MRI of the right femur with and without contrast, formal physical therapy referral, diagnostic musculoskeletal ultrasound of the right posterior hip/thigh region, trigger point injections, etc. as deemed appropriate moving forward.    I spent a total of 15 minutes face-to-face with the patient during today's office visit.  Over 50% of the time was spent counseling the patient and/or coordinating care.  See office note for details.    Spencer Garrett DO, CAQSM    Department of Rehabilitation Medicine

## 2018-06-04 NOTE — LETTER
6/4/2018     RE: Rakan Nolasco  [address] [place] [address]     Dear Colleague,    Thank you for referring your patient, Rakan Nolasco, to the [facility] PHYSICAL MEDICINE AND REHABILITATION at [facility]. Please see a copy of my visit note below.    [facility] Physical Medicine & Rehabilitation Clinic Note  Clinic Visit s Jun 4, 2018    Subjective:  Rakan Nolasco is a 72 year old male who is seen in consultation at the request of Dr. Harrison for evaluation of posterior right hip/thigh pain.    Symptoms began 1 month ago.  Reports insidious onset without acute precipitating event.  Reports right hip/thigh pain that is located posterior with radiation present (stops at the right posterior knee region).  Symptoms are generally worse with sitting activities.  Treatment has consisted of stretching activities.  Denies any numbness/tingling/weakness of the right lower extremity.  Denies any bruising or swelling of the right posterior thigh.  Denies any redness or warmth of the right posterior thigh.  Denies any back pain.  Denies any previous right hip/low back injuries/surgeries.    Patient's past medical, surgical, social, and family histories were reviewed today.  There are no significant contributory medical issues  Past Medical History:   Diagnosis Date     Arthritis          BMI 31.0-31.9,adult      Diverticulosis     Colonoscopy 8/2008     Fatty liver     see US  5/2012     GERD (gastroesophageal reflux disease)      Glaucoma (increased eye pressure)      HTN (hypertension)      Hyperlipidemia LDL goal < 130     age, htn, fhx     Irritable bowel      Nonsenile cataract      ROSIE (obstructive sleep apnea) 01/2011    Using CPAP;      Prediabetes      Restless leg syndrome      Trigeminal neuralgia pain 1/4/2012       Review of Systems:  Constitutional: NEGATIVE for fever, chills, or change in weight  Skin: POSITIVE for skin rash of the left  "leg  Neuro: NEGATIVE for weakness of the right lower extremity  MSK: see HPI    Objective:  /80  Pulse 69  Temp 97.8  F (36.6  C) (Oral)  Resp 25  Ht 1.753 m (5' 9\")  Wt 113.6 kg (250 lb 7.1 oz)  SpO2 98%  BMI 36.98 kg/m2  General: healthy, alert, obese, and in no distress  Skin: no suspicious lesions or rashes  Psych: mentation appears normal and affect normal/bright  HEENT: no scleral icterus  CV: no pedal edema  Resp: normal respiratory effort without conversational dyspnea   Neuro: sensory exam is within normal limits.  Motor strength as noted below  Lymph: no palpable lymphadenopathy    MSK:    RIGHT HIP  Inspection:    No swelling, bruising, discoloration, or obvious deformity or asymmetry  Palpation:    Crepitus is absent  Passive Range of Motion:    Flexion within normal limits / eIR within normal limits / ER within normal limits  Strength:    Flexion 5/5 / extension 5/5 with pain / abduction 5/5  Special Tests:    Positive: None    Negative: Logroll, DESTINEE, anterior impingement (FADIR), and Stinchfield's    THORACIC/LUMBAR SPINE  Inspection:    No redness, swelling, overlying skin change, or gross deformity/asymmetry  Palpation:    Tender about the right SI joint and right sciatic notch    No tenderness over the lumbar spinous processes, left para lumbar muscles, right para lumbar muscles, left SI joint, or left sciatic notch  Range of Motion:     Lumbar flexion within normal limits    Lumbar extension within normal limits  Strength:    5/5 - quadriceps, hamstrings, tibialis anterior, gastrocsoleus, and extensor hallicus longus  Special Tests:    Positive: None    Negative: Straight leg raise (right), SI joint compression (bilateral), DESTINEE (right), and Gaenslen's test (right)    Imaging:  None    ASSESSMENT:  1.  Right posterior hip/thigh pain    PLAN:  1.  Plain radiographs of the right femur ordered to evaluate for mass/tumor.  2.  Activity modification as discussed, including limitation of " activities that cause pain/discomfort.  3.  Acetaminophen/ice/heat as needed for improved pain control.  4.  Cancel MRI of the lumbar spine without contrast as ordered as current symptom state is unlikely to related to the lumbar spine presently.  5.  MyChart 1-2 business days after completion of diagnostic testing for discussion of results/further medical care.  Consider a CT/MRI of the right femur with and without contrast, formal physical therapy referral, diagnostic musculoskeletal ultrasound of the right posterior hip/thigh region, trigger point injections, etc. as deemed appropriate moving forward.    I spent a total of 15 minutes face-to-face with the patient during today's office visit.  Over 50% of the time was spent counseling the patient and/or coordinating care.  See office note for details.    Spencer Garrett DO, NATALYAM    Department of Rehabilitation Medicine

## 2018-06-04 NOTE — MR AVS SNAPSHOT
After Visit Summary   6/4/2018    Rakan Nolasco    MRN: [id number]           Patient Information     Date Of Birth          1945        Visit Information        Provider Department      6/4/2018 11:30 AM Spencer Garrett [facility] [address] Physical Medicine and Rehabilitation        Today's Diagnoses     Acute thigh pain, right    -  1      Care Instructions    We addressed the following today:    1. Right posterior thigh pain    Activity modification as discussed  Topical Treatments: Ice or Heat  Over the counter medication: Acetaminophen (Tylenol) 1000mg every 6 hours with food (Maximum of 3000mg/day)  Plain radiographs of the right femur in radiology  MyChart 1-2 business days after completion of diagnostic testing for discussion of results/further medical care (sooner if needed; call direct clinic number [[phone]] at any time with questions/concerns)            Follow-ups after your visit        Your next 10 appointments already scheduled     Jun 08, 2018  7:00 AM CDT   MR LUMBAR SPINE W/O & W CONTRAST with [id number]   [facility] ([facility])    [address] [place] [address] [phone]           Take your medicines as usual, unless your doctor tells you not to. Bring a list of your current medicines to your exam (including vitamins, minerals and over-the-counter drugs).  You may or may not receive intravenous (IV) contrast for this exam pending the discretion of the Radiologist.  You do not need to do anything special to prepare.  The MRI machine uses a strong magnet. Please wear clothes without metal (snaps, zippers). A sweatsuit works well, or we may give you a hospital gown.  Please remove any body piercings and hair extensions before you arrive. You will also remove watches, jewelry, hairpins, wallets, dentures, partial dental plates and hearing aids. You may wear contact lenses, and you may be able to wear your rings.  We have a safe place to keep your personal items, but it is safer to leave them at home.  **IMPORTANT** THE INSTRUCTIONS BELOW ARE ONLY FOR THOSE PATIENTS WHO HAVE BEEN PRESCRIBED SEDATION OR GENERAL ANESTHESIA DURING THEIR MRI PROCEDURE:  IF YOUR DOCTOR PRESCRIBED ORAL SEDATION (take medicine to help you relax during your exam):   You must get the medicine from your doctor (oral medication) before you arrive. Bring the medicine to the exam. Do not take it at home. You ll be told when to take it upon arriving for your exam.   Arrive one hour early. Bring someone who can take you home after the test. Your medicine will make you sleepy. After the exam, you may not drive, take a bus or take a taxi by yourself.  IF YOUR DOCTOR PRESCRIBED IV SEDATION:   Arrive one hour early. Bring someone who can take you home after the test. Your medicine will make you sleepy. After the exam, you may not drive, take a bus or take a taxi by yourself.   No eating 6 hours before your exam. You may have clear liquids up until 4 hours before your exam. (Clear liquids include water, clear tea, black coffee and fruit juice without pulp.)  IF YOUR DOCTOR PRESCRIBED ANESTHESIA (be asleep for your exam):   Arrive 1 1/2 hours early. Bring someone who can take you home after the test. You may not drive, take a bus or take a taxi by yourself.   No eating 8 hours before your exam. You may have clear liquids up until 4 hours before your exam. (Clear liquids include water, clear tea, black coffee and fruit juice without pulp.)   You will spend four to five hours in the recovery room.  Please call the Imaging Department at your exam site with any questions.            Jun 29, 2018  8:00 AM CDT   LAB with FK LAB   [facility])    [address] [facility] [facility] [id number] [phone]           Please do not eat 10-12 hours before your appointment if you are coming in fasting for labs on lipids, cholesterol, or  glucose (sugar). This does not apply to pregnant women. Water, hot tea and black coffee (with nothing added) are okay. Do not drink other fluids, diet soda or chew gum.            Jul 06, 2018  8:00 AM CDT   [facility] Visit with Josh Emery MD   [facility]    [address] [address]   [phone]            Aug 14, 2018  9:20 AM CDT   Office Visit with Telly Lucio MD   [facility]    [address] [address]   [phone]           Bring a current list of meds and any records pertaining to this visit. For Physicals, please bring immunization records and any forms needing to be filled out. Please arrive 10 minutes early to complete paperwork.            Oct 01, 2018  9:50 AM CDT   (Arrive by 9:35 AM)   Return Myasthenia Gravis with Arpan Harrison MD   [facility] ([facility])    [address] [place] [address] [phone]            Nov 13, 2018  8:00 AM CST   New Visit with Mateo Gray MD   [facility]    [address] [address]   [phone]              Future tests that were ordered for you today     Open Future Orders        Priority Expected Expires Ordered    XR Femur Right 2 Views Routine 6/4/2018 6/4/2019 6/4/2018            Who to contact     Please call your clinic at [phone] to:    Ask questions about your health    Make or cancel appointments    Discuss your medicines    Learn about your test results    Speak to your doctor            Additional Information About Your Visit        [employer] Information     [email or web address] gives you secure access to your electronic health record. If you see a primary care provider, you can also send messages to your care team and make appointments. If you have questions, please call your primary  "care clinic.  If you do not have a primary care provider, please call [phone] and they will assist you.      [employer] is an electronic gateway that provides easy, online access to your medical records. With [employer], you can request a clinic appointment, read your test results, renew a prescription or communicate with your care team.     To access your existing account, please contact your [facility] or call [phone] for assistance.        Care EveryWhere ID     This is your Care EveryWhere ID. This could be used by other organizations to access your [facility] medical records  [id number]        Your Vitals Were     Pulse Temperature Respirations Height Pulse Oximetry BMI (Body Mass Index)    69 97.8  F (36.6  C) (Oral) 25 1.753 m (5' 9\") 98% 36.98 kg/m2       Blood Pressure from Last 3 Encounters:   06/04/18 171/80   06/04/18 172/69   05/11/18 128/72    Weight from Last 3 Encounters:   06/04/18 113.6 kg (250 lb 7.1 oz)   06/04/18 113.6 kg (250 lb 8 oz)   05/11/18 111.8 kg (246 lb 8 oz)               Primary Care Provider Office Phone # Fax #    Telly Lucio [phone] [address] [facility] [address]        Equal Access to Services     CRIS YEE AH: Hadii estefania enamorado Soderrick, waaxda luqadaha, qaybta kaalgino rolle . So [facility] [phone].    ATENCIÓN: Si habla español, tiene a coates disposición servicios gratuitos de asistencia lingüística. Llame al [phone].    We comply with applicable federal civil rights laws and [place] laws. We do not discriminate on the basis of race, color, national origin, age, disability, sex, sexual orientation, or gender identity.            Thank you!     Thank you for choosing [facility] PHYSICAL [facility]ICINE AND REHABILITATION  for your care. Our goal is always to provide you with excellent care. Hearing back from our patients is one way we can continue to " improve our services. Please take a few minutes to complete the written survey that you may receive in the mail after your visit with us. Thank you!             Your Updated Medication List - Protect others around you: Learn how to safely use, store and throw away your medicines at www.disposemymeds.org.          This list is accurate as of 6/4/18 12:13 PM.  Always use your most recent med list.                   Brand Name Dispense Instructions for use Diagnosis    aspirin 81 MG tablet      Take 1 tablet by mouth daily.        atorvastatin 20 MG tablet    LIPITOR    90 tablet    TAKE ONE TABLET BY MOUTH ONE TIME DAILY    Hyperlipidemia LDL goal <130       blood glucose lancets standard    no brand specified    50 each    Check blood sugar every morning    Type 2 diabetes mellitus with hyperglycemia, without long-term current use of insulin (H)       blood glucose monitoring meter device kit    no brand specified    1 kit    Use to test blood sugar 1 times daily or as directed.    Type 2 diabetes mellitus with hyperglycemia, without long-term current use of insulin (H)       blood glucose monitoring test strip    no brand specified    50 strip    laneUse to test blood sugars 1 times daily or as directed    Type 2 diabetes mellitus with hyperglycemia, without long-term current use of insulin (H)       clonazePAM 0.5 MG tablet    klonoPIN     Take 0.5 mg by mouth. Take 1 tablet by mouth daily at bedtime        latanoprost 0.005 % ophthalmic solution    XALATAN    7.5 mL    Place 1 drop into both eyes At Bedtime    Borderline glaucoma with ocular hypertension, bilateral       lisinopril 10 MG tablet    PRINIVIL/ZESTRIL    90 tablet    Take 1 tablet (10 mg) by mouth daily    Hypertension goal BP (blood pressure) < 140/90       metFORMIN 500 MG 24 hr tablet    GLUCOPHAGE-XR    180 tablet    Take 1 tablet (500 mg) by mouth 2 times daily (with meals)    Prediabetes       OXcarbazepine 150 MG tablet    TRILEPTAL    120  tablet    TAKE 2 tabs IN AM AND 2 IN PM FOR 2WKS IF NEEDED.    Trigeminal neuralgia       pantoprazole 20 MG EC tablet    PROTONIX    90 tablet    TAKE ONE TABLET BY MOUTH ONE TIME DAILY 30-60 MINUTES BEFORE A MEAL    Gastroesophageal reflux disease without esophagitis       predniSONE 20 MG tablet    DELTASONE    30 tablet    Take 2 tablets (40 mg) by mouth daily    Myasthenia gravis, AChR antibody positive (H)       pyridostigmine 60 MG tablet    MESTINON    120 tablet    TAKE 1 TABLET (60 MG) BY MOUTH 4 TIMES DAILY    Ocular myasthenia (H), Long-term use of immunosuppressant medication       timolol 0.5 % ophthalmic solution    TIMOPTIC    1 Bottle    Place 1 drop into both eyes daily    Borderline glaucoma with ocular hypertension, bilateral

## 2018-06-04 NOTE — PROGRESS NOTES
"Service Date: 2018      [place] [address]      Daniele Hennessy MD   [facility]    [address] [place] [address]      [place] [address]      RE: Rakan Nolasco   MRN: [id number]   : 1945       Dear Doctors:      I had the pleasure to see Mr. 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  I am following him after Dr. Layne's alf and I last saw him in 2018.  At that time, he had no complaints of ptosis; he had some blurry vision but not true diplopia.  I referred him to Ophthalmology and he was found to have a right posterior capsular opacification for which he underwent YAG laser capsulotomy.  His visual symptoms are stable.  He continues to have no ptosis or double vision.  He has an occasional difficulty swallowing later in the evening affecting liquids or solids and occasional difficulty chewing but those are infrequent, happening once a week or less.  There is no dysarthria, sialorrhea, head drop, respiratory symptoms or focal weakness of his shoulder muscles.  He has a generalized feeling of muscle weakness which does not vary during the day, and he gets winded with exercise but has no orthopnea.    He has a couple of other problems for which I am following him.  One is head pain.  This feels like a \"spider web\", as well as \"dull, achy pain\" on the right forehead, predominantly the V1 distribution on the scalp.  However, a few months ago the patient told me he had sharp pains affecting the same region, which were triggered by cold air or touching the face. I then felt he could have trigeminal neuralgia.  A review of the medical record indicated that he had complained of " "similar pains in the past and Dr. Layne had ordered brain MRI and so did Dr. Lucio in 2012 and 2014 respectively.  The brain parenchyma was normal without bleed or mass and there was some hyperintense material in the left mastoid, which of course cannot explain this problem.  There was no evidence of aneurysm or brainstem tumor.  I prescribed him Trileptal empirically and he feels this has helped.  Despite the \"dull\" or \"spider web\" discomfort on the right forehead, the sharp/shooting pains have largely dissipated.  He is on 600 mg of Trileptal in the morning and 900 in the evening.  He has a little insomnia on the drug, but no other side effects.  Recently checked sodium, AST and ALT as of 05/09/2018 were normal.  CBC checked in 01/2018 was normal too.     A third issue is a new (started 4 weeks ago) pain at the right buttock which sometimes radiates to the posterior thigh.  The pain occurs only when sitting and makes sitting very uncomfortable.  It never occurs with standing or supine position.  There is very rare to no radiation of the pain below the knee and no paresthesias at the foot.  It is not triggered by cough or sneezing.  I had ordered a lumbar spine MRI for him, but he did not believe it is coming from his back so he did not do it.        CURRENT MEDICATIONS:   Reviewed and are as per Epic record.      PHYSICAL EXAMINATION:     VITAL SIGNS:   His blood pressure is 117/69.  Pulse 69 and regular.  Respiratory rate 25.  Temperature 97.8 Fahrenheit.  O2 sat 98% on room air.  Weight 113.6 kilos.  Height is 175.  He endorsed no pain.     NEUROLOGIC:   He is awake, alert and oriented x3.  Cranial nerve examination shows no ptosis even after 1 minute of sustained upward gaze.  There is no weakness of orbicularis oculi.  There is no diplopia in any cardinal gaze position at rest.  After sustained upward gaze for about 25-30 seconds he develops very mild diplopia.  Ductions and versions are normal.  There is " "no weakness of lip seal, uvula, jaw, palate or tongue.  There is no dysphonia or dysarthria.  Cough reflex is strong.  Neck flexion and extension are 5.  Strength is 5/5 for deltoid, biceps, triceps, hip flexors, knee extensors, knee flexors, hip abductors/adductors and foot dorsiflexors bilaterally.  Tone is normal.  Ankle reflexes are 2+ and symmetric.  There is no distinct sensory deficit in the S1 distribution at the right versus the left.  He is able to rise from a chair 2 times with arms crossed on the chest without difficulties.  He has a negative straight leg raising sign and negative Bragard sign.  He has a negative DESTINEE maneuver.  He is a little tender over the posterior buttock.      IMPRESSION:   1.  Myasthenia gravis in remission.   2.  Possible right trigeminal neuralgia.   3.  Right buttock pain, etiology uncertain, possible piriformis syndrome versus sacroiliac joint dysfunction, less likely S1 radiculopathy.        I spent about 25 minutes with Mr. Nolasco, of which more than half was counseling.  His myasthenia is in good control. I think he should taper the prednisone.  He should start by taking 20 mg alternating with 10 mg every day for 1 month, then 20 mg alternating with 5 for 1 month, then 20 mg every other day for 1 month, then 15 mg every other day.  Side effects were explained.  He should have a bone density scan ordered by his primary care doctor, who should act on the results.  He should continue taking calcium and vitamin D and have an annual ophthalmologic evaluation for surveillance for cataracts and glaucoma.      He was complaining of some dizziness when standing.  This could have to do with low blood pressure.  This needs to be discussed with his PCP.    His facial pain is now predominantly in the V1 distribution and feels like a \"dull or spider web\" sensation; however, a few months ago the pain was sharp, shooting, episodic and triggered by touch, so the story he gave me at least " initially was consistent with trigeminal neuralgia and the sharp component of the pain has dissipated after taking Trileptal. Therefore, I will continue this treatment at the same dose.     I am not sure of the etiology of his right buttock pain.  It is aggravated by sitting.  This could be piriformis syndrome or SI joint.  With pain not radiating below the knee and negative straight leg raising sign, I doubt this is S1 radiculopathy.  Therefore, I will put the lumbar spine MRI on hold.  I would like an opinion from PM&R on the most likely diagnosis and treatment of this.  I will see Mr. Nolasco in followup in clinic in 4 months.      Sincerely,       MD BAMBI Amin MD             D: 2018   T: 2018   MT: AKA      Name:     JR NOLASCO   MRN:      [id number]        Account:      [id number]   :      1945           Service Date: 2018      Document: [id number]      Answers for HPI/ROS submitted by the patient on 2018   General Symptoms: Yes  Skin Symptoms: No  HENT Symptoms: Yes  EYE SYMPTOMS: No  HEART SYMPTOMS: No  LUNG SYMPTOMS: Yes  INTESTINAL SYMPTOMS: No  URINARY SYMPTOMS: Yes  REPRODUCTIVE SYMPTOMS: No  SKELETAL SYMPTOMS: No  BLOOD SYMPTOMS: No  NERVOUS SYSTEM SYMPTOMS: Yes  MENTAL HEALTH SYMPTOMS: No  Weight gain: Yes  Fatigue: Yes  Ear pain: No  Ear discharge: No  Hearing loss: Yes  Tinnitus: Yes  Nosebleeds: No  Congestion: No  Sinus pain: No  Trouble swallowing: Yes   Voice hoarseness: No  Mouth sores: No  Sore throat: No  Tooth pain: No  Gum tenderness: No  Bleeding gums: No  Change in taste: No  Change in sense of smell: No  Dry mouth: No  Hearing aid used: No  Neck lump: No  Cough: No  Sputum or phlegm: No  Coughing up blood: No  Difficulty breating or shortness of breath: Yes  Snoring: No  Wheezing: Yes  Difficulty breathing on exertion: Yes  Nighttime Cough: No  Difficulty breathing when lying flat: No  Trouble with coordination:  No  Dizziness or trouble with balance: No  Fainting or black-out spells: No  Memory loss: No  Headache: No  Seizures: No  Speech problems: No  Tingling: No  Tremor: Yes  Weakness: Yes  Difficulty walking: No  Paralysis: No  Numbness: No

## 2018-06-04 NOTE — TELEPHONE ENCOUNTER
Patient was seen in clinic today and per Dr. Harrison, should taper his prednisone as follows:    Please reduce your prednisone as follows:     20 mg on one day, alternating with 10 mg on the other day for 1 month,  Then 20 mg on one day, alternating with 5 mg on the other day, for 1 month,  Then 20 mg every other day, for 1 month,  Then 15 mg every other day.

## 2018-06-04 NOTE — NURSING NOTE
Chief Complaint   Patient presents with     RECHECK     [facility]- MYASTHENIA GRAVIS F/U     Favian Wright, CMA

## 2018-06-04 NOTE — PATIENT INSTRUCTIONS
We addressed the following today:    1. Right posterior thigh pain    Activity modification as discussed  Topical Treatments: Ice or Heat  Over the counter medication: Acetaminophen (Tylenol) 1000mg every 6 hours with food (Maximum of 3000mg/day)  Plain radiographs of the right femur in radiology  [facility] completion of diagnostic testing for discussion of results/further medical care (sooner if needed; call direct clinic number [[phone]] at any time with questions/concerns)

## 2018-06-05 RX ORDER — OXCARBAZEPINE 150 MG/1
TABLET, FILM COATED ORAL
Qty: 120 TABLET | Refills: 1 | Status: SHIPPED | OUTPATIENT
Start: 2018-06-05 | End: 2018-07-27

## 2018-06-05 NOTE — TELEPHONE ENCOUNTER
Please verify correct dose with the patient. I believe he is taking 600 mg in the morning and 900 mg in the evening; if so, we should write a new prescription for 300 mg tablets rather than 150 mg tablets. Thanks

## 2018-06-11 ENCOUNTER — TRANSFERRED RECORDS (OUTPATIENT)
Dept: HEALTH INFORMATION MANAGEMENT | Facility: CLINIC | Age: 73
End: 2018-06-11

## 2018-06-13 NOTE — PROGRESS NOTES
"  SUBJECTIVE:   Rakan Nolasco is a 72 year old male who presents to clinic today for the following health issues:    ED/UC Followup:  Facility:  [facility] [address]   Date of visit: 06/11/2018  Reason for visit: intermitted chest pain, and S.O.B  Current Status: Patient feels fine now, patient stated that hes had S.O.B after starting a new script      Has been having SOB in the last 6-8 months; but had chest pain with SOB.  Feels better though tired a lot.    Rakan presented to the ER with concerns of intermittent chest pain and intermittent shortness of breath over the past year. He'was concerned that his chest pain could possibly be his heart so he wanted to be \"checked out\". Patient has myasthenia gravis and is on chronic steroids for this and has had shortness of breath since they started medications for the myasthenia gravis. On exam, patient was not hypoxic or tachycardic. He was afebrile. EKG showed no signs of ischemic changes or fatal arrhythmias and troponin was negative making ACS unlikely given the fact that his chest pain has been intermittent for the past year. D-dimer was also negative making PE unlikely.     Hypertension Follow-up      Outpatient blood pressures are being checked at home.  Results are borderline high.    Low Salt Diet: no added salt    Hearing loss:   Worse in the left ear    Problem list and histories reviewed & adjusted, as indicated.  Additional history: as documented    Patient Active Problem List   Diagnosis     GERD (gastroesophageal reflux disease)     Diverticulosis     Snoring     Fatigue     HLD (hyperlipidemia)     Borderline glaucoma with ocular hypertension     S/P blepharoplasty     Advanced directives, counseling/discussion     Trigeminal neuralgia pain     bmi 31     HTN (hypertension)     Health Care Home     Arthritis     Prediabetes     Dry eyes     PVD (posterior vitreous detachment) OU     H/O RD (retinal detachment) s/p repair with PPV (AB)     Epiretinal membrane, " bilateral     Pseudophakia of right eye     Myasthenia gravis (H)     Type 2 diabetes mellitus (H)     Morbid obesity (H)     Right posterior capsular opacification     Gastroenteritis     Demand ischemia (H)     Drug induced fever     Fever     Obstructive sleep apnea     Obstructive sleep apnea syndrome     Prostate cancer (H)     Restless legs syndrome     Sepsis (H)     Sleep apnea     Past Surgical History:   Procedure Laterality Date     BIOPSY ARTERY TEMPORAL Bilateral 8/1/2017    Procedure: BIOPSY ARTERY TEMPORAL;  Bilateral temporal artery Biopsy;  Surgeon: Jj Tello MD;  Location: [facility] OR     CATARACT IOL, RT/LT Right      COLONOSCOPY       ESOPHAGOSCOPY, GASTROSCOPY, DUODENOSCOPY (EGD), COMBINED  1/15/2014    Procedure: COMBINED ESOPHAGOSCOPY, GASTROSCOPY, DUODENOSCOPY (EGD), BIOPSY SINGLE OR MULTIPLE;;  Surgeon: Winston Nixon MD;  Location: [facility] OR     LASER YAG CAPSULOTOMY Right 04/09/2018    right eye     PHACOEMULSIFICATION CLEAR CORNEA WITH STANDARD INTRAOCULAR LENS IMPLANT Right 2/20/2017    Procedure: PHACOEMULSIFICATION CLEAR CORNEA WITH STANDARD INTRAOCULAR LENS IMPLANT;  Surgeon: Mateo Gray MD;  Location:  [facility]     RETINAL REATTACHMENT Right      SURGICAL HISTORY OF -   8/2009    Both Eyelids-.     TONSILLECTOMY  as child       Social History   Substance Use Topics     Smoking status: Former Smoker     Packs/day: 2.00     Years: 15.00     Types: Cigarettes     Start date: 1/1/1968     Quit date: 1/1/1983     Smokeless tobacco: Former User      Comment: smoke free household.     Alcohol use No      Comment: Quit in 1986     Family History   Problem Relation Age of Onset     Respiratory Mother      copd     LUNG DISEASE Mother      Allergies Brother      CANCER Maternal Grandmother      HEART DISEASE Paternal Grandmother      CEREBROVASCULAR DISEASE Father      CANCER Father      Other Cancer Father          Reviewed and updated as needed this visit by clinical  "staff  ROS:  Constitutional, HEENT, cardiovascular, pulmonary, gi and gu systems are negative, except as otherwise noted.    OBJECTIVE:     /86 (BP Location: Left arm, Patient Position: Chair, Cuff Size: Adult Regular)  Pulse 70  Temp 96.7  F (35.9  C) (Oral)  Resp 13  Ht 5' 9\" (1.753 m)  Wt 245 lb (111.1 kg)  SpO2 100%  BMI 36.18 kg/m2  Body mass index is 36.18 kg/(m^2).  GENERAL: healthy, alert and no distress  NECK: no adenopathy and thyroid normal to palpation  RESP: lungs clear to auscultation - no rales, rhonchi or wheezes  CV: regular rate and rhythm, normal S1 S2, no S3 or S4, no murmur, click or rub, no peripheral edema  ABDOMEN: soft, nontender, no masses and bowel sounds normal  MS: no gross musculoskeletal defects noted, no edema  NEURO: Normal strength and tone, mentation intact and speech normal    Diagnostic Test Results:  none     ASSESSMENT/PLAN:     (Z09) Follow-up exam after treatment  (primary encounter diagnosis)  Comment: Seen for SOB and chest pain cardiac work up was negative. Reviewed findings from the ER. Symptoms possibly from deconditioning especially SOB. Has past history of smoking though quit long ago. Might consider a stress test if symptoms worsen.    (R07.9) Intermittent chest pain  (R06.02) SOB (shortness of breath)  Comment: Non Specific  Plan: Consider Stress test    (I10) Hypertension goal BP (blood pressure) < 140/90  Comment: BP borderline, increase dose of Lisinopril to 20 mg  Plan: lisinopril (PRINIVIL/ZESTRIL) 10 MG tablet    (H61.23) Bilateral impacted cerumen  Comment: Wax in ear canals. Cleaning  Plan: REMOVE IMPACTED CERUMEN    (Z79.899) High risk medication use  Comment: In view of chronic steroid use will order a dexa scan  Plan: DX Hip/Pelvis/Spine    Follow up in 1 month or sooner with concerns    Telly Lucio MD  [facility]  "

## 2018-06-15 ENCOUNTER — OFFICE VISIT (OUTPATIENT)
Dept: FAMILY MEDICINE | Facility: CLINIC | Age: 73
End: 2018-06-15
Payer: COMMERCIAL

## 2018-06-15 VITALS
OXYGEN SATURATION: 100 % | BODY MASS INDEX: 36.29 KG/M2 | TEMPERATURE: 96.7 F | WEIGHT: 245 LBS | SYSTOLIC BLOOD PRESSURE: 136 MMHG | HEART RATE: 70 BPM | HEIGHT: 69 IN | DIASTOLIC BLOOD PRESSURE: 86 MMHG | RESPIRATION RATE: 13 BRPM

## 2018-06-15 DIAGNOSIS — Z79.899 HIGH RISK MEDICATION USE: ICD-10-CM

## 2018-06-15 DIAGNOSIS — I10 HYPERTENSION GOAL BP (BLOOD PRESSURE) < 140/90: ICD-10-CM

## 2018-06-15 DIAGNOSIS — H61.23 BILATERAL IMPACTED CERUMEN: ICD-10-CM

## 2018-06-15 DIAGNOSIS — R06.02 SOB (SHORTNESS OF BREATH): ICD-10-CM

## 2018-06-15 DIAGNOSIS — Z09 FOLLOW-UP EXAM AFTER TREATMENT: Primary | ICD-10-CM

## 2018-06-15 DIAGNOSIS — R07.9 INTERMITTENT CHEST PAIN: ICD-10-CM

## 2018-06-15 DIAGNOSIS — I10 HTN, GOAL BELOW 130/80: ICD-10-CM

## 2018-06-15 PROCEDURE — 99214 OFFICE O/P EST MOD 30 MIN: CPT | Mod: 25 | Performed by: FAMILY MEDICINE

## 2018-06-15 PROCEDURE — 69209 REMOVE IMPACTED EAR WAX UNI: CPT | Performed by: FAMILY MEDICINE

## 2018-06-15 RX ORDER — LISINOPRIL 10 MG/1
20 TABLET ORAL DAILY
Qty: 90 TABLET | Refills: 0
Start: 2018-06-15 | End: 2018-09-25

## 2018-06-15 NOTE — NURSING NOTE
"Chief Complaint   Patient presents with     Hospital F/U     SOB, Chest Pain      Spot on Ear     Left Side, and also the ear feels plugged and is having difficulties hearing     Health Maintenance     ADP      Initial /86 (BP Location: Left arm, Patient Position: Chair, Cuff Size: Adult Regular)  Pulse 70  Temp 96.7  F (35.9  C) (Oral)  Resp 13  Ht 5' 9\" (1.753 m)  Wt 245 lb (111.1 kg)  SpO2 100%  BMI 36.18 kg/m2 Estimated body mass index is 36.18 kg/(m^2) as calculated from the following:    Height as of this encounter: 5' 9\" (1.753 m).    Weight as of this encounter: 245 lb (111.1 kg).  BP completed using cuff size: katia Jamison  "

## 2018-06-15 NOTE — MR AVS SNAPSHOT
After Visit Summary   6/15/2018    Rakan Nolasco    MRN: [id number]           Patient Information     Date Of Birth          1945        Visit Information        Provider Department      6/15/2018 11:00 AM Telly Lucio MD [facility]        Today's Diagnoses     Follow-up exam after treatment    -  1    Intermittent chest pain        SOB (shortness of breath)        HTN, goal below 130/80        Hypertension goal BP (blood pressure) < 140/90        Bilateral impacted cerumen        High risk medication use          Care Instructions    [facility]    If you have any questions regarding to your visit please contact your care team:       Team Purple:   Clinic Hours Telephone Number   Dr. Capri Porter   7am-7pm  Monday - Thursday   7am-5pm  Fridays  [phone]  (Appointment scheduling available 24/7)    Questions about your recent visit?   Team Line:  [phone]   Urgent Care - [place] and [facility] - 11am-9pm Monday-Friday Saturday-Sunday- 9am-5pm   [place] - 5pm-9pm Monday-Friday Saturday-Sunday- 9am-5pm  [phone] - [place]  [phone] [facility]       What options do I have for a visit other than an office visit? We offer electronic visits (e-visits) and telephone visits, when medically appropriate.  Please check with your medical insurance to see if these types of visits are covered, as you will be responsible for any charges that are not paid by your insurance.      You can use Intransa (secure electronic communication) to access to your chart, send your primary care provider a message, or make an appointment. Ask a team member how to get started.     For a price quote for your services, please call our Consumer Price Line at [phone] or our Imaging Cost estimation line at [phone] (for imaging tests).    Ruth Daniel CMA               Follow-ups after your visit         Your next 10 appointments already scheduled     Jun 29, 2018  8:00 AM CDT   LAB with [facility]   [facility] [place] ([facility])    [address]  [place] [address]   [phone]           Please do not eat 10-12 hours before your appointment if you are coming in fasting for labs on lipids, cholesterol, or glucose (sugar). This does not apply to pregnant women. Water, hot tea and black coffee (with nothing added) are okay. Do not drink other fluids, diet soda or chew gum.            Jul 09, 2018  8:15 AM CDT   Return Visit with Josh Emery MD   [facility] ([facility])    [address]  [place] [address]   [phone]            Aug 14, 2018  9:20 AM CDT   Office Visit with Telly Lucio MD   [facility] [place] ([facility])    [address]  [place] [address]   [phone]           Bring a current list of meds and any records pertaining to this visit. For Physicals, please bring immunization records and any forms needing to be filled out. Please arrive 10 minutes early to complete paperwork.            Oct 01, 2018  9:50 AM CDT   (Arrive by 9:35 AM)   Return Myasthenia Gravis with Arpan Harrison MD   [facility] ([facility])    [address] [place] [address]   [phone]            Nov 13, 2018  8:00 AM CST   New Visit with Mateo Gray MD   [facility] [place] ([facility])    [address]  [place] [address]   [phone]              Future tests that were ordered for you today     Open Future Orders        Priority Expected Expires Ordered    DX Hip/Pelvis/Spine Routine  6/15/2019 6/15/2018            Who to contact     If you have questions or need follow up information about today's clinic visit or your schedule please contact [facility] directly at  "[phone].  Normal or non-critical lab and imaging results will be communicated to you by uTrack TVhart, letter or phone within 4 business days after the clinic has received the results. If you do not hear from us within 7 days, please contact the clinic through Nurien Softwaret or phone. If you have a critical or abnormal lab result, we will notify you by phone as soon as possible.  Submit refill requests through Photofy or call your pharmacy and they will forward the refill request to us. Please allow 3 business days for your refill to be completed.          Additional Information About Your Visit        uTrack TVharAscender Software Information     Photofy gives you secure access to your electronic health record. If you see a primary care provider, you can also send messages to your care team and make appointments. If you have questions, please call your primary care clinic.  If you do not have a primary care provider, please call [phone] and they will assist you.        Care EveryWhere ID     This is your Care EveryWhere ID. This could be used by other organizations to access your [place] medical records  [id number]        Your Vitals Were     Pulse Temperature Respirations Height Pulse Oximetry BMI (Body Mass Index)    70 96.7  F (35.9  C) (Oral) 13 5' 9\" (1.753 m) 100% 36.18 kg/m2       Blood Pressure from Last 3 Encounters:   06/15/18 136/86   06/04/18 171/80   06/04/18 172/69    Weight from Last 3 Encounters:   06/15/18 245 lb (111.1 kg)   06/04/18 250 lb 7.1 oz (113.6 kg)   06/04/18 250 lb 8 oz (113.6 kg)              We Performed the Following     PAF COMPLETED     REMOVE IMPACTED CERUMEN          Today's Medication Changes          These changes are accurate as of 6/15/18 11:36 AM.  If you have any questions, ask your nurse or doctor.               These medicines have changed or have updated prescriptions.        Dose/Directions    lisinopril 10 MG tablet   Commonly known as:  PRINIVIL/ZESTRIL   This may have changed:  how much " to take   Used for:  Hypertension goal BP (blood pressure) < 140/90   Changed by:  Telly Lucio MD        Dose:  20 mg   Take 2 tablets (20 mg) by mouth daily   Quantity:  90 tablet   Refills:  0            Where to get your medicines      Some of these will need a paper prescription and others can be bought over the counter.  Ask your nurse if you have questions.     You don't need a prescription for these medications     lisinopril 10 MG tablet                Primary Care Provider Office Phone # Fax #    Telly Lucio [phone] [address] [address]        Equal Access to Services     [facility] AH: Hadii estefania michael hadasho Soderrick, waaxda luqadaha, qaybta kaalmada toy, gino lion . So [facility] [phone].    ATENCIÓN: Si habla español, tiene a coates disposición servicios gratuitos de asistencia lingüística. [facility] [phone].    We comply with applicable federal civil rights laws and [place] laws. We do not discriminate on the basis of race, color, national origin, age, disability, sex, sexual orientation, or gender identity.            Thank you!     Thank you for choosing [facility]  for your care. Our goal is always to provide you with excellent care. Hearing back from our patients is one way we can continue to improve our services. Please take a few minutes to complete the written survey that you may receive in the mail after your visit with us. Thank you!             Your Updated Medication List - Protect others around you: Learn how to safely use, store and throw away your medicines at www.disposemymeds.org.          This list is accurate as of 6/15/18 11:36 AM.  Always use your most recent med list.                   Brand Name Dispense Instructions for use Diagnosis    aspirin 81 MG tablet      Take 1 tablet by mouth daily.        atorvastatin 20 MG tablet    LIPITOR    90 tablet    TAKE ONE  TABLET BY MOUTH ONE TIME DAILY    Hyperlipidemia LDL goal <130       blood glucose lancets standard    no brand specified    50 each    Check blood sugar every morning    Type 2 diabetes mellitus with hyperglycemia, without long-term current use of insulin (H)       blood glucose monitoring meter device kit    no brand specified    1 kit    Use to test blood sugar 1 times daily or as directed.    Type 2 diabetes mellitus with hyperglycemia, without long-term current use of insulin (H)       blood glucose monitoring test strip    no brand specified    50 strip    laneUse to test blood sugars 1 times daily or as directed    Type 2 diabetes mellitus with hyperglycemia, without long-term current use of insulin (H)       clonazePAM 0.5 MG tablet    klonoPIN     Take 0.5 mg by mouth. Take 1 tablet by mouth daily at bedtime        latanoprost 0.005 % ophthalmic solution    XALATAN    7.5 mL    Place 1 drop into both eyes At Bedtime    Borderline glaucoma with ocular hypertension, bilateral       lisinopril 10 MG tablet    PRINIVIL/ZESTRIL    90 tablet    Take 2 tablets (20 mg) by mouth daily    Hypertension goal BP (blood pressure) < 140/90       metFORMIN 500 MG 24 hr tablet    GLUCOPHAGE-XR    180 tablet    Take 1 tablet (500 mg) by mouth 2 times daily (with meals)    Prediabetes       OXcarbazepine 150 MG tablet    TRILEPTAL    120 tablet    TAKE 2 TABS BY MOUTH IN AM AND 2 TABS IN PM FOR 2WEEKS IF NEEDED.    Trigeminal neuralgia       pantoprazole 20 MG [facility] tablet    PROTONIX    90 tablet    TAKE ONE TABLET BY MOUTH ONE TIME DAILY 30-60 MINUTES BEFORE A MEAL    Gastroesophageal reflux disease without esophagitis       predniSONE 20 MG tablet    DELTASONE    45 tablet    Take 1 tablet (20 mg) by mouth See Admin Instructions    Myasthenia gravis, AChR antibody positive (H)       pyridostigmine 60 MG tablet    MESTINON    120 tablet    TAKE 1 TABLET (60 MG) BY MOUTH 4 TIMES DAILY    Ocular myasthenia (H), Long-term use of  immunosuppressant medication       timolol 0.5 % ophthalmic solution    TIMOPTIC    1 Bottle    Place 1 drop into both eyes daily    Borderline glaucoma with ocular hypertension, bilateral

## 2018-06-15 NOTE — PATIENT INSTRUCTIONS
[facility]    If you have any questions regarding to your visit please contact your care team:       Team Purple:   Clinic Hours Telephone Number   Dr. Capri Porter   7am-7pm  Monday - Thursday   7am-5pm  Fridays  [phone]  (Appointment scheduling available 24/7)    Questions about your recent visit?   Team Line:  [phone]   Urgent Care - [place] and [facility] - 11am-9pm Monday-Friday Saturday-Sunday- 9am-5pm   [place] - 5pm-9pm Monday-Friday Saturday-Sunday- 9am-5pm  [phone] - [place]  [phone] - [place]       What options do I have for a visit other than an office visit? We offer electronic visits (e-visits) and telephone visits, when medically appropriate.  Please check with your medical insurance to see if these types of visits are covered, as you will be responsible for any charges that are not paid by your insurance.      You can use Dragon Tail (secure electronic communication) to access to your chart, send your primary care provider a message, or make an appointment. Ask a team member how to get started.     For a price quote for your services, please call our Consumer Price Line at [phone] or our Imaging Cost estimation line at [phone] (for imaging tests).    Ruth Daniel CMA

## 2018-06-25 ENCOUNTER — RADIANT APPOINTMENT (OUTPATIENT)
Dept: BONE DENSITY | Facility: CLINIC | Age: 73
End: 2018-06-25
Attending: FAMILY MEDICINE
Payer: COMMERCIAL

## 2018-06-25 DIAGNOSIS — Z79.899 HIGH RISK MEDICATION USE: ICD-10-CM

## 2018-06-25 PROBLEM — R50.9 FEVER: Status: RESOLVED | Noted: 2018-01-13 | Resolved: 2018-06-25

## 2018-06-25 PROBLEM — K52.9 GASTROENTERITIS: Status: RESOLVED | Noted: 2018-03-23 | Resolved: 2018-06-25

## 2018-06-25 PROBLEM — R50.2 DRUG INDUCED FEVER: Status: RESOLVED | Noted: 2018-01-18 | Resolved: 2018-06-25

## 2018-06-25 PROBLEM — A41.9 SEPSIS (H): Status: RESOLVED | Noted: 2018-01-12 | Resolved: 2018-06-25

## 2018-06-25 PROCEDURE — 77080 DXA BONE DENSITY AXIAL: CPT | Performed by: INTERNAL MEDICINE

## 2018-06-29 DIAGNOSIS — C61 MALIGNANT NEOPLASM OF PROSTATE (H): ICD-10-CM

## 2018-06-29 LAB — PSA SERPL-MCNC: 0.34 UG/L (ref 0–4)

## 2018-06-29 PROCEDURE — 36415 COLL VENOUS BLD VENIPUNCTURE: CPT | Performed by: UROLOGY

## 2018-06-29 PROCEDURE — 84153 ASSAY OF PSA TOTAL: CPT | Performed by: UROLOGY

## 2018-07-03 ENCOUNTER — TELEPHONE (OUTPATIENT)
Dept: NEUROLOGY | Facility: CLINIC | Age: 73
End: 2018-07-03

## 2018-07-03 DIAGNOSIS — Z79.60 LONG-TERM USE OF IMMUNOSUPPRESSANT MEDICATION: ICD-10-CM

## 2018-07-03 DIAGNOSIS — G70.00 OCULAR MYASTHENIA (H): ICD-10-CM

## 2018-07-03 DIAGNOSIS — G70.00 MYASTHENIA GRAVIS, ACHR ANTIBODY POSITIVE (H): Primary | ICD-10-CM

## 2018-07-03 RX ORDER — PYRIDOSTIGMINE BROMIDE 60 MG/1
TABLET ORAL
Qty: 120 TABLET | Refills: 0 | OUTPATIENT
Start: 2018-07-03

## 2018-07-03 RX ORDER — PREDNISONE 5 MG/1
TABLET ORAL
Qty: 30 TABLET | Refills: 1 | Status: SHIPPED | OUTPATIENT
Start: 2018-07-03 | End: 2018-09-11

## 2018-07-03 RX ORDER — PYRIDOSTIGMINE BROMIDE 60 MG/1
TABLET ORAL
Qty: 120 TABLET | Refills: 0 | Status: SHIPPED | OUTPATIENT
Start: 2018-07-03 | End: 2018-08-04

## 2018-07-03 NOTE — TELEPHONE ENCOUNTER
Received request for prednisone 5 mg tabs from [facility]. Per Dr. Harrison' note on 6/4: He should start by taking 20 mg alternating with 10 mg every day for 1 month, then 20 mg alternating with 5 for 1 month, then 20 mg every other day for 1 month, then 15 mg every other day.

## 2018-07-09 ENCOUNTER — OFFICE VISIT (OUTPATIENT)
Dept: UROLOGY | Facility: CLINIC | Age: 73
End: 2018-07-09
Payer: COMMERCIAL

## 2018-07-09 VITALS — RESPIRATION RATE: 14 BRPM | HEART RATE: 72 BPM | DIASTOLIC BLOOD PRESSURE: 84 MMHG | SYSTOLIC BLOOD PRESSURE: 138 MMHG

## 2018-07-09 DIAGNOSIS — Z79.60 LONG-TERM USE OF IMMUNOSUPPRESSANT MEDICATION: ICD-10-CM

## 2018-07-09 DIAGNOSIS — G70.00 OCULAR MYASTHENIA (H): ICD-10-CM

## 2018-07-09 DIAGNOSIS — N39.41 URGENCY INCONTINENCE: ICD-10-CM

## 2018-07-09 DIAGNOSIS — C61 MALIGNANT NEOPLASM OF PROSTATE (H): Primary | ICD-10-CM

## 2018-07-09 LAB
ALBUMIN UR-MCNC: NEGATIVE MG/DL
APPEARANCE UR: CLEAR
BILIRUB UR QL STRIP: NEGATIVE
COLOR UR AUTO: YELLOW
GLUCOSE UR STRIP-MCNC: NEGATIVE MG/DL
HGB UR QL STRIP: ABNORMAL
KETONES UR STRIP-MCNC: NEGATIVE MG/DL
LEUKOCYTE ESTERASE UR QL STRIP: NEGATIVE
NITRATE UR QL: NEGATIVE
PH UR STRIP: 7 PH (ref 5–7)
RBC #/AREA URNS AUTO: ABNORMAL /HPF
SOURCE: ABNORMAL
SP GR UR STRIP: 1.01 (ref 1–1.03)
UROBILINOGEN UR STRIP-ACNC: 0.2 EU/DL (ref 0.2–1)
WBC #/AREA URNS AUTO: ABNORMAL /HPF

## 2018-07-09 PROCEDURE — 81001 URINALYSIS AUTO W/SCOPE: CPT | Performed by: UROLOGY

## 2018-07-09 PROCEDURE — 99213 OFFICE O/P EST LOW 20 MIN: CPT | Mod: 25 | Performed by: UROLOGY

## 2018-07-09 PROCEDURE — 51798 US URINE CAPACITY MEASURE: CPT | Performed by: UROLOGY

## 2018-07-09 RX ORDER — OXYBUTYNIN CHLORIDE 5 MG/1
5 TABLET, EXTENDED RELEASE ORAL DAILY
Qty: 30 TABLET | Refills: 1 | Status: SHIPPED | OUTPATIENT
Start: 2018-07-09 | End: 2018-08-31

## 2018-07-09 RX ORDER — PYRIDOSTIGMINE BROMIDE 60 MG/1
TABLET ORAL
Qty: 120 TABLET | Refills: 0 | Status: SHIPPED | OUTPATIENT
Start: 2018-07-09 | End: 2018-09-25

## 2018-07-09 NOTE — MR AVS SNAPSHOT
After Visit Summary   7/9/2018    Rakan Nolasco    MRN: [id number]           Patient Information     Date Of Birth          1945        Visit Information        Provider Department      7/9/2018 8:15 AM Josh Emery MD [facility]        Today's Diagnoses     Malignant neoplasm of prostate (H)    -  1    Urgency incontinence          Care Instructions    Kegel Exercises  What are Kegel exercises?  Kegels are exercises that tighten and release the pelvic muscles. These muscles wrap around both the anus and urethra (the tube that carries urine out of the body).  To find these muscles, try to stop and start the flow of urine while using the toilet.  Kegel exercises may:    Give you greater bladder control (stop or prevent urine from leaking).    Give you greater bowel control.    Increase pleasure during sex.    Ease childbirth for pregnant women.    Help men regain bladder control after prostate cancer treatment.  How can I test my muscle strength?  As you urinate (pass water), try to stop your stream of urine: Tighten the muscle around your urethra. If you can stop the stream, then you have good muscle control.  To maintain good control, you need to exercise these muscles regularly.  If you cannot stop your stream, Kegels can help you improve your muscle control.  How do I do Kegel exercises?  1. Squeeze and lift the muscles around the urethra. (You should feel the muscles lift near the urethra or tighten in your rectum.)  2. Hold them tight as you count to 5 or 10.  3. Then, slowly relax these muscles as you count to 5 or 10.  4. Repeat five times.  Do these exercises three times a day: Five times in the morning, five times in the afternoon and five times at night.  Where to exercise  You may do the exercises anywhere and anytime. For instance:    At red traffic lights.    During TV commercials.    During coffee breaks.    While waiting for the bus.    At the grocery  store.    When brushing your teeth.    During sex (for women).  Common mistakes  Don't use the muscles in your stomach, legs or buttocks. Your leg and buttock muscles should not move during these exercises.  To check your stomach muscles, put your hand on your stomach when you do your Kegels. If you feel your stomach move, then you are using the wrong muscles.  Can these exercises hurt me?  No, these exercises cannot harm you in any way. You should find them relaxing and easy.  Back or stomach pain may mean you are using your stomach muscles.  If you get headaches and your chest muscles are tense, you are likely holding your breath. Try to breathe normally during your Kegels.  When will I notice a change?  If you have weak bladder control, you will notice a change after four to six weeks of daily exercise. After three months, you will see an even bigger difference.  Make these exercises a habit: Tighten the muscles when you walk, before you cough, as you stand up and on the way to the bathroom.  For informational purposes only. Not to replace the advice of your health care provider. Copyright   1981, 2009 [facility]. All rights reserved. [employer] 623702 - REV 06/16.            Follow-ups after your visit        Your next 10 appointments already scheduled     Aug 14, 2018  9:20 AM CDT   Office Visit with Telly Lucio MD   [facility]    [address] [place] [id number]   [phone]           Bring a current list of meds and any records pertaining to this visit. For Physicals, please bring immunization records and any forms needing to be filled out. Please arrive 10 minutes early to complete paperwork.            Oct 01, 2018  9:50 AM CDT   (Arrive by 9:35 AM)   Return Myasthenia Gravis with Arpan Harrison MD   [facility] ([facility])    [address]  [address]   [phone]            Nov 13, 2018  8:00 AM CST   New Visit with Mateo Gray MD   [facility] ([facility])    [address]  [place] [id number] [phone]              Future tests that were ordered for you today     Open Future Orders        Priority Expected Expires Ordered    PSA tumor marker Routine 1/8/2019 7/10/2019 7/9/2018            Who to contact     If you have questions or need follow up information about today's clinic visit or your schedule please contact [facility] directly at [phone].  Normal or non-critical lab and imaging results will be communicated to you by MyChart, letter or phone within 4 business days after the clinic has received the results. If you do not hear from us within 7 days, please contact the clinic through [employer] or phone. If you have a critical or abnormal lab result, we will notify you by phone as soon as possible.  Submit refill requests through [employer] or call your pharmacy and they will forward the refill request to us. Please allow 3 business days for your refill to be completed.          Additional Information About Your Visit        MyChart Information     [employer] gives you secure access to your electronic health record. If you see a primary care provider, you can also send messages to your care team and make appointments. If you have questions, please call your primary care clinic.  If you do not have a primary care provider, please call [phone] and they will assist you.        Care EveryWhere ID     This is your Care EveryWhere ID. This could be used by other organizations to access your [place] medical records  [id number]        Your Vitals Were     Pulse Respirations                72 14           Blood Pressure from Last 3 Encounters:   07/09/18 138/84   06/15/18 136/86   06/04/18 171/80    Weight from Last 3 Encounters:   06/15/18 111.1 kg (245 lb)   06/04/18 113.6 kg (250 lb  7.1 oz)   06/04/18 113.6 kg (250 lb 8 oz)              We Performed the Following     MEASURE POST-VOID RESIDUAL URINE/BLADDER CAPACITY, US NON-IMAGING (60227)     UA reflex to Microscopic and Culture     Urine Microscopic          Today's Medication Changes          These changes are accurate as of 7/9/18  8:47 AM.  If you have any questions, ask your nurse or doctor.               Start taking these medicines.        Dose/Directions    oxybutynin 5 MG 24 hr tablet   Commonly known as:  DITROPAN-XL   Used for:  Urgency incontinence   Started by:  Josh Emery MD        Dose:  5 mg   Take 1 tablet (5 mg) by mouth daily   Quantity:  30 tablet   Refills:  1            Where to get your medicines      These medications were sent to [place] [address] [facility] - [address]  [address] [place] [address]     Phone:  [phone]     oxybutynin 5 MG 24 hr tablet                Primary Care Provider Office Phone # Fax #    Telly Lucio [phone]       [address] [place] [address] [address]  [place] [address]        Equal Access to Services     [facility]: Hadii estefania ku hadasho Soomaali, waaxda luqadaha, qaybta kaalmada adeegyada, gino lion . So [facility] [phone].    ATENCIÓN: Si habla español, tiene a coates disposición servicios gratuitos de asistencia lingüística. Llame al [phone].    We comply with applicable federal civil rights laws and [place] laws. We do not discriminate on the basis of race, color, national origin, age, disability, sex, sexual orientation, or gender identity.            Thank you!     Thank you for choosing [facility]  for your care. Our goal is always to provide you with excellent care. Hearing back from our patients is one way we can continue to improve our services. Please take a few minutes to complete the written survey that you may receive in the mail after your visit with us. Thank you!             Your  Updated Medication List - Protect others around you: Learn how to safely use, store and throw away your medicines at www.disposemymeds.org.          This list is accurate as of 7/9/18  8:47 AM.  Always use your most recent med list.                   Brand Name Dispense Instructions for use Diagnosis    aspirin 81 MG tablet      Take 1 tablet by mouth daily.        atorvastatin 20 MG tablet    LIPITOR    90 tablet    TAKE ONE TABLET BY MOUTH ONE TIME DAILY    Hyperlipidemia LDL goal <130       blood glucose lancets standard    no brand specified    50 each    Check blood sugar every morning    Type 2 diabetes mellitus with hyperglycemia, without long-term current use of insulin (H)       blood glucose monitoring meter device kit    no brand specified    1 kit    Use to test blood sugar 1 times daily or as directed.    Type 2 diabetes mellitus with hyperglycemia, without long-term current use of insulin (H)       blood glucose monitoring test strip    no brand specified    50 strip    laneUse to test blood sugars 1 times daily or as directed    Type 2 diabetes mellitus with hyperglycemia, without long-term current use of insulin (H)       clonazePAM 0.5 MG tablet    klonoPIN     Take 0.5 mg by mouth. Take 1 tablet by mouth daily at bedtime        latanoprost 0.005 % ophthalmic solution    XALATAN    7.5 mL    Place 1 drop into both eyes At Bedtime    Borderline glaucoma with ocular hypertension, bilateral       lisinopril 10 MG tablet    PRINIVIL/ZESTRIL    90 tablet    Take 2 tablets (20 mg) by mouth daily    Hypertension goal BP (blood pressure) < 140/90       metFORMIN 500 MG 24 hr tablet    GLUCOPHAGE-XR    180 tablet    TAKE 1 TABLET BY MOUTH 2 TIMES DAILY WITH MEALS.    Prediabetes       OXcarbazepine 150 MG tablet    TRILEPTAL    120 tablet    TAKE 2 TABS BY MOUTH IN AM AND 2 TABS IN PM FOR 2WEEKS IF NEEDED.    Trigeminal neuralgia       oxybutynin 5 MG 24 hr tablet    DITROPAN-XL    30 tablet    Take 1 tablet  (5 mg) by mouth daily    Urgency incontinence       pantoprazole 20 MG EC tablet    PROTONIX    90 tablet    TAKE ONE TABLET BY MOUTH ONE TIME DAILY 30-60 MINUTES BEFORE A MEAL    Gastroesophageal reflux disease without esophagitis       * predniSONE 20 MG tablet    DELTASONE    45 tablet    Take 1 tablet (20 mg) by mouth See Admin Instructions    Myasthenia gravis, AChR antibody positive (H)       * predniSONE 5 MG tablet    DELTASONE    30 tablet    Take as directed as part of tapering schedule outlined in June 4 clinic visit.    Myasthenia gravis, AChR antibody positive (H)       pyridostigmine 60 MG tablet    MESTINON    120 tablet    TAKE 1 TABLET (60 MG) BY MOUTH 4 TIMES DAILY    Ocular myasthenia (H), Long-term use of immunosuppressant medication       timolol 0.5 % ophthalmic solution    TIMOPTIC    1 Bottle    Place 1 drop into both eyes daily    Borderline glaucoma with ocular hypertension, bilateral       * Notice:  This list has 2 medication(s) that are the same as other medications prescribed for you. Read the directions carefully, and ask your doctor or other care provider to review them with you.

## 2018-07-09 NOTE — PATIENT INSTRUCTIONS
Kegel Exercises  What are Kegel exercises?  Kegels are exercises that tighten and release the pelvic muscles. These muscles wrap around both the anus and urethra (the tube that carries urine out of the body).  To find these muscles, try to stop and start the flow of urine while using the toilet.  Kegel exercises may:    Give you greater bladder control (stop or prevent urine from leaking).    Give you greater bowel control.    Increase pleasure during sex.    Ease childbirth for pregnant women.    Help men regain bladder control after prostate cancer treatment.  How can I test my muscle strength?  As you urinate (pass water), try to stop your stream of urine: Tighten the muscle around your urethra. If you can stop the stream, then you have good muscle control.  To maintain good control, you need to exercise these muscles regularly.  If you cannot stop your stream, Kegels can help you improve your muscle control.  How do I do Kegel exercises?  1. Squeeze and lift the muscles around the urethra. (You should feel the muscles lift near the urethra or tighten in your rectum.)  2. Hold them tight as you count to 5 or 10.  3. Then, slowly relax these muscles as you count to 5 or 10.  4. Repeat five times.  Do these exercises three times a day: Five times in the morning, five times in the afternoon and five times at night.  Where to exercise  You may do the exercises anywhere and anytime. For instance:    At red traffic lights.    During TV commercials.    During coffee breaks.    While waiting for the bus.    At the grocery store.    When brushing your teeth.    During sex (for women).  Common mistakes  Don't use the muscles in your stomach, legs or buttocks. Your leg and buttock muscles should not move during these exercises.  To check your stomach muscles, put your hand on your stomach when you do your Kegels. If you feel your stomach move, then you are using the wrong muscles.  Can these exercises hurt me?  No, these  exercises cannot harm you in any way. You should find them relaxing and easy.  Back or stomach pain may mean you are using your stomach muscles.  If you get headaches and your chest muscles are tense, you are likely holding your breath. Try to breathe normally during your Kegels.  When will I notice a change?  If you have weak bladder control, you will notice a change after four to six weeks of daily exercise. After three months, you will see an even bigger difference.  Make these exercises a habit: Tighten the muscles when you walk, before you cough, as you stand up and on the way to the bathroom.  For informational purposes only. Not to replace the advice of your health care provider. Copyright   1981, 2009 [facility]. All rights reserved. [employer] 780437 - REV 06/16.

## 2018-07-09 NOTE — PROGRESS NOTES
Chief Complaint   Patient presents with     RECHECK       Rakan ASHLEY Nolasco is a 72 year old male who presents today for follow up of   Chief Complaint   Patient presents with     RECHECK    f/u for prostate cancer s/p cryotherapy several months ago.  His psa has gone down from 8.05 to 0.34.  He has some urinary urgency with incontinence.  His urinary flow is good.    Current Outpatient Prescriptions   Medication Sig Dispense Refill     aspirin 81 MG tablet Take 1 tablet by mouth daily.       atorvastatin (LIPITOR) 20 MG tablet TAKE ONE TABLET BY MOUTH ONE TIME DAILY 90 tablet 1     blood glucose (NO BRAND SPECIFIED) lancets standard Check blood sugar every morning 50 each 1     blood glucose monitoring (NO BRAND SPECIFIED) meter device kit Use to test blood sugar 1 times daily or as directed. 1 kit 0     blood glucose monitoring (NO BRAND SPECIFIED) test strip laneUse to test blood sugars 1 times daily or as directed 50 strip 5     ClonAZEPAM (KLONOPIN) 0.5 MG tablet Take 0.5 mg by mouth. Take 1 tablet by mouth daily at bedtime       latanoprost (XALATAN) 0.005 % ophthalmic solution Place 1 drop into both eyes At Bedtime 7.5 mL 4     lisinopril (PRINIVIL/ZESTRIL) 10 MG tablet Take 2 tablets (20 mg) by mouth daily 90 tablet 0     metFORMIN (GLUCOPHAGE-XR) 500 MG 24 hr tablet TAKE 1 TABLET BY MOUTH 2 TIMES DAILY WITH MEALS. 180 tablet 0     OXcarbazepine (TRILEPTAL) 150 MG tablet TAKE 2 TABS BY MOUTH IN AM AND 2 TABS IN PM FOR 2WEEKS IF NEEDED. 120 tablet 1     oxybutynin (DITROPAN-XL) 5 MG 24 hr tablet Take 1 tablet (5 mg) by mouth daily 30 tablet 1     pantoprazole (PROTONIX) 20 MG EC tablet TAKE ONE TABLET BY MOUTH ONE TIME DAILY 30-60 MINUTES BEFORE A MEAL 90 tablet 2     predniSONE (DELTASONE) 20 MG tablet Take 1 tablet (20 mg) by mouth See Admin Instructions 45 tablet 1     predniSONE (DELTASONE) 5 MG tablet Take as directed as part of tapering schedule outlined in June 4 clinic visit. 30 tablet 1      pyridostigmine (MESTINON) 60 MG tablet TAKE 1 TABLET (60 MG) BY MOUTH 4 TIMES DAILY 120 tablet 0     timolol (TIMOPTIC) 0.5 % ophthalmic solution Place 1 drop into both eyes daily 1 Bottle 11     Allergies   Allergen Reactions     Azathioprine Shortness Of Breath     Was hospitalized from it. Also had high fever, chills, and shortness of breath.     Ciprofloxacin Muscle Pain (Myalgia)     Muscle pain  Other reaction(s): Myalgia  Other reaction(s): Myalgia     Ropinirole      Leg pan  GI  Weakness; ? sycope  Other reaction(s): Leg Pain     Ropinirole Hcl GI Disturbance     Weakness;? syncope      Past Medical History:   Diagnosis Date     Arthritis          BMI 31.0-31.9,adult      Cancer (H) 01/10/18    Prostate     Diabetes (H) 01/10/18     Diverticulosis     Colonoscopy 8/2008     Fatty liver     see US  5/2012     GERD (gastroesophageal reflux disease)      Glaucoma (increased eye pressure)      HTN (hypertension)      Hyperlipidemia LDL goal < 130     age, htn, fhx     Irritable bowel      Nonsenile cataract      ROSIE (obstructive sleep apnea) 01/2011    Using CPAP;      Prediabetes      Restless leg syndrome      Trigeminal neuralgia pain 1/4/2012     Past Surgical History:   Procedure Laterality Date     BIOPSY ARTERY TEMPORAL Bilateral 8/1/2017    Procedure: BIOPSY ARTERY TEMPORAL;  Bilateral temporal artery Biopsy;  Surgeon: Jj Tello MD;  Location: [facility] OR     CATARACT IOL, RT/LT Right      COLONOSCOPY       ESOPHAGOSCOPY, GASTROSCOPY, DUODENOSCOPY (EGD), COMBINED  1/15/2014    Procedure: COMBINED ESOPHAGOSCOPY, GASTROSCOPY, DUODENOSCOPY (EGD), BIOPSY SINGLE OR MULTIPLE;;  Surgeon: Winston Nixon MD;  Location: [facility] OR     LASER YAG CAPSULOTOMY Right 04/09/2018    right eye     PHACOEMULSIFICATION CLEAR CORNEA WITH STANDARD INTRAOCULAR LENS IMPLANT Right 2/20/2017    Procedure: PHACOEMULSIFICATION CLEAR CORNEA WITH STANDARD INTRAOCULAR LENS IMPLANT;  Surgeon: Marina  Mateo RAGSDALE MD;  Location:  [facility]     RETINAL REATTACHMENT Right      SURGICAL HISTORY OF -   8/2009    Both Eyelids-.     TONSILLECTOMY  as child     Family History   Problem Relation Age of Onset     Respiratory Mother      copd     LUNG DISEASE Mother      Allergies Brother      Cancer Maternal Grandmother      HEART DISEASE Paternal Grandmother      Cerebrovascular Disease Father      Cancer Father      Other Cancer Father      Social History     Social History     Marital status: Single     Spouse name: N/A     Number of children: 0     Years of education: 12     Occupational History     Retired 3/2012      [employer]     Social History Main Topics     Smoking status: Former Smoker     Packs/day: 2.00     Years: 15.00     Types: Cigarettes     Start date: 1/1/1968     Quit date: 1/1/1983     Smokeless tobacco: Former User      Comment: smoke free household.     Alcohol use No      Comment: Quit in 1986     Drug use: No     Sexual activity: Not Currently     Partners: Female      Comment: 4/2010  No girlfriend at this time.     Other Topics Concern     Parent/Sibling W/ Cabg, Mi Or Angioplasty Before 65f 55m? No     Social History Narrative       REVIEW OF SYSTEMS  =================  C: NEGATIVE for fever, chills, change in weight  I: NEGATIVE for worrisome rashes, moles or lesions  E/M: NEGATIVE for ear, mouth and throat problems  R: NEGATIVE for significant cough or SHORTNESS OF BREATH,   CV: NEGATIVE for chest pain, palpitations or peripheral edema  GI: NEGATIVE for nausea, abdominal pain, heartburn, or change in bowel habits  NEURO: NEGATIVE any motor/sensory changes  PSYCH: NEGATIVE for recent mood disorder    Physical Exam:  /84 (BP Location: Right arm, Patient Position: Chair, Cuff Size: Adult Regular)  Pulse 72  Resp 14   Patient is pleasant, in no acute distress, good general condition.  Lung: no evidence of respiratory distress    Abdomen: Soft, nondistended, non  tender. No masses. No rebound or guarding.   Exam: bladder scan < 50 ml.  No cva tenderness  Skin: Warm and dry.  No redness.  Psych: normal mood and affect  Neuro: alert and oriented    Assessment/Plan:   (C61) Malignant neoplasm of prostate (H)  (primary encounter diagnosis)  Comment: s/p cryotherapy.  Doing well.   Plan: recheck psa in six months    (N39.41) Urgency incontinence  Comment:    Plan: bladder scan < 50 ml           Trial of ditropan - side effects discussed            kegel exercises

## 2018-07-27 DIAGNOSIS — G50.0 TRIGEMINAL NEURALGIA: ICD-10-CM

## 2018-07-27 RX ORDER — OXCARBAZEPINE 150 MG/1
300 TABLET, FILM COATED ORAL SEE ADMIN INSTRUCTIONS
Qty: 120 TABLET | Refills: 1 | Status: SHIPPED | OUTPATIENT
Start: 2018-07-27 | End: 2018-09-11

## 2018-08-04 DIAGNOSIS — Z79.60 LONG-TERM USE OF IMMUNOSUPPRESSANT MEDICATION: ICD-10-CM

## 2018-08-04 DIAGNOSIS — G70.00 OCULAR MYASTHENIA (H): ICD-10-CM

## 2018-08-06 RX ORDER — PYRIDOSTIGMINE BROMIDE 60 MG/1
TABLET ORAL
Qty: 120 TABLET | Refills: 1 | Status: SHIPPED | OUTPATIENT
Start: 2018-08-06 | End: 2018-10-23

## 2018-08-14 ENCOUNTER — OFFICE VISIT (OUTPATIENT)
Dept: FAMILY MEDICINE | Facility: CLINIC | Age: 73
End: 2018-08-14
Payer: COMMERCIAL

## 2018-08-14 DIAGNOSIS — I10 HYPERTENSION GOAL BP (BLOOD PRESSURE) < 140/90: ICD-10-CM

## 2018-08-14 DIAGNOSIS — R60.9 EDEMA, UNSPECIFIED TYPE: ICD-10-CM

## 2018-08-14 DIAGNOSIS — E11.9 TYPE 2 DIABETES MELLITUS WITHOUT COMPLICATION, WITHOUT LONG-TERM CURRENT USE OF INSULIN (H): Primary | ICD-10-CM

## 2018-08-14 DIAGNOSIS — G70.00 MYASTHENIA GRAVIS (H): ICD-10-CM

## 2018-08-14 DIAGNOSIS — R20.9 BILATERAL COLD FEET: ICD-10-CM

## 2018-08-14 DIAGNOSIS — E78.5 HYPERLIPIDEMIA, UNSPECIFIED HYPERLIPIDEMIA TYPE: ICD-10-CM

## 2018-08-14 DIAGNOSIS — Z12.11 COLON CANCER SCREENING: ICD-10-CM

## 2018-08-14 LAB
ALBUMIN SERPL-MCNC: 3.6 G/DL (ref 3.4–5)
ALP SERPL-CCNC: 46 U/L (ref 40–150)
ALT SERPL W P-5'-P-CCNC: 29 U/L (ref 0–70)
ANION GAP SERPL CALCULATED.3IONS-SCNC: 7 MMOL/L (ref 3–14)
AST SERPL W P-5'-P-CCNC: 17 U/L (ref 0–45)
BILIRUB SERPL-MCNC: 0.4 MG/DL (ref 0.2–1.3)
BUN SERPL-MCNC: 14 MG/DL (ref 7–30)
CALCIUM SERPL-MCNC: 8.8 MG/DL (ref 8.5–10.1)
CHLORIDE SERPL-SCNC: 98 MMOL/L (ref 94–109)
CHOLEST SERPL-MCNC: 115 MG/DL
CO2 SERPL-SCNC: 30 MMOL/L (ref 20–32)
CREAT SERPL-MCNC: 1.03 MG/DL (ref 0.66–1.25)
GFR SERPL CREATININE-BSD FRML MDRD: 71 ML/MIN/1.7M2
GLUCOSE SERPL-MCNC: 103 MG/DL (ref 70–99)
HBA1C MFR BLD: 6.8 % (ref 0–5.6)
HDLC SERPL-MCNC: 48 MG/DL
LDLC SERPL CALC-MCNC: 44 MG/DL
NONHDLC SERPL-MCNC: 67 MG/DL
POTASSIUM SERPL-SCNC: 3.9 MMOL/L (ref 3.4–5.3)
PROT SERPL-MCNC: 6.8 G/DL (ref 6.8–8.8)
SODIUM SERPL-SCNC: 135 MMOL/L (ref 133–144)
TRIGL SERPL-MCNC: 113 MG/DL

## 2018-08-14 PROCEDURE — 36415 COLL VENOUS BLD VENIPUNCTURE: CPT | Performed by: FAMILY MEDICINE

## 2018-08-14 PROCEDURE — 83036 HEMOGLOBIN GLYCOSYLATED A1C: CPT | Performed by: FAMILY MEDICINE

## 2018-08-14 PROCEDURE — 99214 OFFICE O/P EST MOD 30 MIN: CPT | Performed by: FAMILY MEDICINE

## 2018-08-14 PROCEDURE — 80053 COMPREHEN METABOLIC PANEL: CPT | Performed by: FAMILY MEDICINE

## 2018-08-14 PROCEDURE — 80061 LIPID PANEL: CPT | Performed by: FAMILY MEDICINE

## 2018-08-14 NOTE — PROGRESS NOTES
SUBJECTIVE:   Rakan Nolasco is a 73 year old male who presents to clinic today for the following health issues:    Diabetes Follow-up    Patient is checking blood sugars: rarely.  Results range from 90 to 125    Diabetic concerns: None     Symptoms of hypoglycemia (low blood sugar): none     Paresthesias (numbness or burning in feet) or sores: No     Date of last diabetic eye exam: Within the last year   Tapering prednisone.1    BP Readings from Last 2 Encounters:   08/14/18 140/72   07/09/18 138/84     Hemoglobin A1C (%)   Date Value   08/14/2018 6.8 (H)   06/04/2018 7.4 (H)     LDL Cholesterol Calculated (mg/dL)   Date Value   08/14/2018 44   09/06/2017 71   Diabetes Management Resources    Hyperlipidemia Follow-Up    Rate your low fat/cholesterol diet?: fair    Taking statin?  Yes, no muscle aches from statin    Other lipid medications/supplements?:  none    Hypertension Follow-up    Outpatient blood pressures are being checked at home.  Results are 150-120/80-65.    Low Salt Diet: low salt    Colonoscopy:    Due 8/25/2018    Mysthenia Gravis:   Stable on 2 medications; Pyridostigmine   Find out about Shingrix.in this setting      Amount of exercise or physical activity: None    Problems taking medications regularly: No    Medication side effects: none    Diet: regular (no restrictions), low salt, low fat/cholesterol and diabetic    Problem list and histories reviewed & adjusted, as indicated.  Additional history: as documented    Patient Active Problem List   Diagnosis     GERD (gastroesophageal reflux disease)     Snoring     Fatigue     HLD (hyperlipidemia)     Borderline glaucoma with ocular hypertension     Advanced directives, counseling/discussion     Trigeminal neuralgia pain     bmi 31     HTN (hypertension)     Health Care Home     Arthritis     Prediabetes     Dry eyes     PVD (posterior vitreous detachment) OU     H/O RD (retinal detachment) s/p repair with PPV (AB)     Epiretinal membrane,  bilateral     Pseudophakia of right eye     Myasthenia gravis (H)     Type 2 diabetes mellitus (H)     Morbid obesity (H)     Right posterior capsular opacification     Demand ischemia (H)     Obstructive sleep apnea syndrome     Prostate cancer (H)     Restless legs syndrome     Sleep apnea     Past Surgical History:   Procedure Laterality Date     BIOPSY ARTERY TEMPORAL Bilateral 8/1/2017    Procedure: BIOPSY ARTERY TEMPORAL;  Bilateral temporal artery Biopsy;  Surgeon: Jj Tello MD;  Location: [facility] OR     CATARACT IOL, RT/LT Right      COLONOSCOPY       ESOPHAGOSCOPY, GASTROSCOPY, DUODENOSCOPY (EGD), COMBINED  1/15/2014    Procedure: COMBINED ESOPHAGOSCOPY, GASTROSCOPY, DUODENOSCOPY (EGD), BIOPSY SINGLE OR MULTIPLE;;  Surgeon: Winston Nixon MD;  Location: [facility] OR     LASER YAG CAPSULOTOMY Right 04/09/2018    right eye     PHACOEMULSIFICATION CLEAR CORNEA WITH STANDARD INTRAOCULAR LENS IMPLANT Right 2/20/2017    Procedure: PHACOEMULSIFICATION CLEAR CORNEA WITH STANDARD INTRAOCULAR LENS IMPLANT;  Surgeon: Mateo Gray MD;  Location:  [facility]     RETINAL REATTACHMENT Right      SURGICAL HISTORY OF -   8/2009    Both Eyelids-.     TONSILLECTOMY  as child       Social History   Substance Use Topics     Smoking status: Former Smoker     Packs/day: 2.00     Years: 15.00     Types: Cigarettes     Start date: 1/1/1968     Quit date: 1/1/1983     Smokeless tobacco: Former User      Comment: smoke free household.     Alcohol use No      Comment: Quit in 1986     Family History   Problem Relation Age of Onset     Respiratory Mother      copd     LUNG DISEASE Mother      Allergies Brother      Cancer Maternal Grandmother      HEART DISEASE Paternal Grandmother      Cerebrovascular Disease Father      Cancer Father      Other Cancer Father          Reviewed and updated as needed this visit by clinical staff  Tobacco  Allergies  Meds  Med Hx  Surg Hx  Fam Hx  Soc Hx     "  ROS:  Constitutional, HEENT, pulmonary, gi and gu systems are negative, except as otherwise noted.    OBJECTIVE:     /72 (BP Location: Right arm, Patient Position: Chair, Cuff Size: Adult Regular)  Pulse 65  Temp 97.4  F (36.3  C) (Oral)  Resp 12  Ht 5' 9\" (1.753 m)  Wt 251 lb (113.9 kg)  SpO2 100%  BMI 37.07 kg/m2  Body mass index is 37.07 kg/(m^2).  GENERAL: healthy, alert and no distress  NECK: no adenopathy and thyroid normal to palpation  RESP: lungs clear to auscultation - no rales, rhonchi or wheezes  CV: regular rate and rhythm, normal S1 S2, no S3 or S4, no murmur, click or rub, mild peripheral edema  ABDOMEN: soft, obese, nontender, no masses and bowel sounds normal  MS: no gross musculoskeletal defects noted, no edema    Diagnostic Test Results:        ASSESSMENT/PLAN:     (E11.9) Type 2 diabetes mellitus without complication, without long-term current use of insulin (H)  (primary encounter diagnosis)  Comment: A1c is 6.8  Plan: On prednisone taper, continue Metformin.    (I10) Hypertension goal BP (blood pressure) < 140/90  Comment: BP well controlled. Has readings from home and his BP cuff and the readings are comparable.  Plan: Comprehensive metabolic panel,     (E78.5) Hyperlipidemia, unspecified hyperlipidemia type  Comment: ChecK LDL. Reviewed healthy lifestyle as an intervention as well as statin  Plan: Comprehensive metabolic panel, Lipid panel         reflex to direct LDL Fasting    (R20.9) Bilateral cold feet  Comment: Pulses feel adequate, skin hair sparse in the lower feet. Has follow up with cardiology and will discuss, MARCO A may be required    (G70.00) Myasthenia gravis (H)  Comment: Following with neurlogy  Plan: Pyridostigmine and prednisone    (R60.9) Edema, unspecified type  Comment: Will check, liver and renal function  Plan: Comprehensive metabolic panel    (Z12.11) Colon cancer screening  Plan: GASTROENTEROLOGY ADULT REF PROCEDURE ONLY         Other; [facility] [phone] " [phone]    Follow up in 3 months or sooner with concerns    Telly Lucio MD  [facility]

## 2018-08-14 NOTE — NURSING NOTE
"Chief Complaint   Patient presents with     Recheck Medication     Hypertension     Diabetes     Lipids     Anxiety     Initial Pulse 65  Temp 97.4  F (36.3  C) (Oral)  Resp 12  Ht 5' 9\" (1.753 m)  Wt 251 lb (113.9 kg)  SpO2 100%  BMI 37.07 kg/m2 Estimated body mass index is 37.07 kg/(m^2) as calculated from the following:    Height as of this encounter: 5' 9\" (1.753 m).    Weight as of this encounter: 251 lb (113.9 kg).  BP completed using cuff size: small katia Jamison  "

## 2018-08-14 NOTE — MR AVS SNAPSHOT
After Visit Summary   8/14/2018    Rakan Nolasco    MRN: [id number]           Patient Information     Date Of Birth          1945        Visit Information        Provider Department      8/14/2018 9:20 AM Telly Lucio MD [facility]        Today's Diagnoses     Type 2 diabetes mellitus without complication, without long-term current use of insulin (H)    -  1    Hypertension goal BP (blood pressure) < 140/90        Hyperlipidemia, unspecified hyperlipidemia type        Bilateral cold feet        Myasthenia gravis (H)        Edema, unspecified type        Colon cancer screening          Care Instructions    [facility]    If you have any questions regarding to your visit please contact your care team:       Team Purple:   Clinic Hours Telephone Number   Dr. Capri Porter   7am-7pm  Monday - Thursday   7am-5pm  Fridays  [phone]  (Appointment scheduling available 24/7)    Questions about your recent visit?   Team Line:  [phone]   Urgent Care - [place] and [facility] - 11am-9pm Monday-Friday Saturday-Sunday- 9am-5pm   [place] - 5pm-9pm Monday-Friday Saturday-Sunday- 9am-5pm  [phone] - [place]  [phone] - [place]       What options do I have for a visit other than an office visit? We offer electronic visits (e-visits) and telephone visits, when medically appropriate.  Please check with your medical insurance to see if these types of visits are covered, as you will be responsible for any charges that are not paid by your insurance.      You can use [employer] (secure electronic communication) to access to your chart, send your primary care provider a message, or make an appointment. Ask a team member how to get started.     For a price quote for your services, please call our Consumer Price Line at [phone] or our Imaging Cost estimation line at [phone] (for imaging  tests).    Winston Jamison            Follow-ups after your visit        Additional Services     GASTROENTEROLOGY ADULT REF PROCEDURE ONLY Other; [place] [facility] [phone]       Last Lab Result: Creatinine (mg/dL)       Date                     Value                 01/17/2018               1.07             ----------  Body mass index is 37.07 kg/(m^2).      Patient will be contacted to schedule procedure.     Please be aware that coverage of these services is subject to the terms and limitations of your health insurance plan.  Call member services at your health plan with any benefit or coverage questions.  Any procedures must be performed at a [facility] facility OR coordinated by your clinic's referral office.    Please bring the following with you to your appointment:    (1) Any X-Rays, CTs or MRIs which have been performed.  Contact the facility where they were done to arrange for  prior to your scheduled appointment.    (2) List of current medications   (3) This referral request   (4) Any documents/labs given to you for this referral                  Your next 10 appointments already scheduled     Oct 01, 2018  9:50 AM CDT   (Arrive by 9:35 AM)   Return Myasthenia Gravis with Arpan Harrison MD   [facility] ([facility])    [address] [place] [address] [phone]            Nov 13, 2018  8:00 AM CST   [facility]    [address] [address]   [phone]            Dec 31, 2018  8:00 AM CST   LAB with [facility]   [facility]    [address] [facility] [address]   [phone]           Please do not eat 10-12 hours before your appointment if you are coming in fasting for labs on lipids, cholesterol, or glucose (sugar). This does not apply to pregnant women. Water, hot tea and black  "coffee (with nothing added) are okay. Do not drink other fluids, diet soda or chew gum.            Jan 07, 2019  8:15 AM CST   Return Visit with Josh Emery MD   [facility] [place] ([facility] [place]  [place] [address] [phone]              Who to contact     If you have questions or need follow up information about today's clinic visit or your schedule please contact [facility] [place] directly at [phone].  Normal or non-critical lab and imaging results will be communicated to you by [employer].tvhart, letter or phone within 4 business days after the clinic has received the results. If you do not hear from us within 7 days, please contact the clinic through [employer] or phone. If you have a critical or abnormal lab result, we will notify you by phone as soon as possible.  Submit refill requests through [employer] or call your pharmacy and they will forward the refill request to us. Please allow 3 business days for your refill to be completed.          Additional Information About Your Visit        Place[employer]ine Information     [employer] gives you secure access to your electronic health record. If you see a primary care provider, you can also send messages to your care team and make appointments. If you have questions, please call your primary care clinic.  If you do not have a primary care provider, please call [phone] and they will assist you.        Care EveryWhere ID     This is your Care EveryWhere ID. This could be used by other organizations to access your [facility] medical records  [id number]        Your Vitals Were     Pulse Temperature Respirations Height Pulse Oximetry BMI (Body Mass Index)    65 97.4  F (36.3  C) (Oral) 12 5' 9\" (1.753 m) 100% 37.07 kg/m2       Blood Pressure from Last 3 Encounters:   08/14/18 140/72   07/09/18 138/84   06/15/18 136/86    Weight from Last 3 Encounters:   08/14/18 251 lb (113.9 kg)   06/15/18 245 lb (111.1 kg)   06/04/18 " 250 lb 7.1 oz (113.6 kg)              We Performed the Following     Comprehensive metabolic panel     GASTROENTEROLOGY ADULT REF PROCEDURE ONLY Other; [facility] [phone]     Hemoglobin A1c     Lipid panel reflex to direct LDL Fasting        Primary Care Provider Office Phone # Fax #    Telly Lucio [phone] [address] [facility] [address]  [place] [address]        Equal Access to Services     [facility]: Hadii aad ku hadasho Soomaali, waaxda luqadaha, qaybta kaalmada adeegyada, waxay idiin hayaan adeeg kharash la'aan . So [facility] [phone].    ATENCIÓN: Si habla español, tiene a coates disposición servicios gratuitos de asistencia lingüística. Llame al [phone].    We comply with applicable federal civil rights laws and [place] laws. We do not discriminate on the basis of race, color, national origin, age, disability, sex, sexual orientation, or gender identity.            Thank you!     Thank you for choosing [facility]  for your care. Our goal is always to provide you with excellent care. Hearing back from our patients is one way we can continue to improve our services. Please take a few minutes to complete the written survey that you may receive in the mail after your visit with us. Thank you!             Your Updated Medication List - Protect others around you: Learn how to safely use, store and throw away your medicines at www.disposemymeds.org.          This list is accurate as of 8/14/18  9:59 AM.  Always use your most recent med list.                   Brand Name Dispense Instructions for use Diagnosis    aspirin 81 MG tablet      Take 1 tablet by mouth daily.        atorvastatin 20 MG tablet    LIPITOR    90 tablet    TAKE ONE TABLET BY MOUTH ONE TIME DAILY    Hyperlipidemia LDL goal <130       blood glucose lancets standard    no brand specified    50 each    Check blood sugar every morning    Type 2 diabetes mellitus with hyperglycemia, without long-term  current use of insulin (H)       blood glucose monitoring meter device kit    no brand specified    1 kit    Use to test blood sugar 1 times daily or as directed.    Type 2 diabetes mellitus with hyperglycemia, without long-term current use of insulin (H)       blood glucose monitoring test strip    no brand specified    50 strip    laneUse to test blood sugars 1 times daily or as directed    Type 2 diabetes mellitus with hyperglycemia, without long-term current use of insulin (H)       clonazePAM 0.5 MG tablet    klonoPIN     Take 0.5 mg by mouth. Take 1 tablet by mouth daily at bedtime        latanoprost 0.005 % ophthalmic solution    XALATAN    7.5 mL    Place 1 drop into both eyes At Bedtime    Borderline glaucoma with ocular hypertension, bilateral       lisinopril 10 MG tablet    PRINIVIL/ZESTRIL    90 tablet    Take 2 tablets (20 mg) by mouth daily    Hypertension goal BP (blood pressure) < 140/90       metFORMIN 500 MG 24 hr tablet    GLUCOPHAGE-XR    180 tablet    TAKE 1 TABLET BY MOUTH 2 TIMES DAILY WITH MEALS.    Prediabetes       OXcarbazepine 150 MG tablet    TRILEPTAL    120 tablet    Take 2 tablets (300 mg) by mouth See Admin Instructions    Trigeminal neuralgia       oxybutynin 5 MG 24 hr tablet    DITROPAN-XL    30 tablet    Take 1 tablet (5 mg) by mouth daily    Urgency incontinence       pantoprazole 20 MG [facility] tablet    PROTONIX    90 tablet    TAKE ONE TABLET BY MOUTH ONE TIME DAILY 30-60 MINUTES BEFORE A MEAL    Gastroesophageal reflux disease without esophagitis       * predniSONE 20 MG tablet    DELTASONE    45 tablet    Take 1 tablet (20 mg) by mouth See Admin Instructions    Myasthenia gravis, AChR antibody positive (H)       * predniSONE 5 MG tablet    DELTASONE    30 tablet    Take as directed as part of tapering schedule outlined in June 4 clinic visit.    Myasthenia gravis, AChR antibody positive (H)       * pyridostigmine 60 MG tablet    MESTINON    120 tablet    TAKE 1 TABLET (60 MG) BY  MOUTH 4 TIMES DAILY    Ocular myasthenia (H), Long-term use of immunosuppressant medication       * pyridostigmine 60 MG tablet    MESTINON    120 tablet    TAKE 1 TABLET BY MOUTH 4 TIMES DAILY    Ocular myasthenia (H), Long-term use of immunosuppressant medication       timolol 0.5 % ophthalmic solution    TIMOPTIC    1 Bottle    Place 1 drop into both eyes daily    Borderline glaucoma with ocular hypertension, bilateral       * Notice:  This list has 4 medication(s) that are the same as other medications prescribed for you. Read the directions carefully, and ask your doctor or other care provider to review them with you.

## 2018-08-14 NOTE — PATIENT INSTRUCTIONS
[facility]    If you have any questions regarding to your visit please contact your care team:       Team Purple:   Clinic Hours Telephone Number   Dr. Capri Porter   7am-7pm  Monday - Thursday   7am-5pm  Fridays  [phone]  (Appointment scheduling available 24/7)    Questions about your recent visit?   Team Line:  [phone]   Urgent Care - [place] and [facility] - 11am-9pm Monday-Friday Saturday-Sunday- 9am-5pm   [place] - 5pm-9pm Monday-Friday Saturday-Sunday- 9am-5pm  [phone] - [place]  [phone] - [place]       What options do I have for a visit other than an office visit? We offer electronic visits (e-visits) and telephone visits, when medically appropriate.  Please check with your medical insurance to see if these types of visits are covered, as you will be responsible for any charges that are not paid by your insurance.      You can use [employer] (secure electronic communication) to access to your chart, send your primary care provider a message, or make an appointment. Ask a team member how to get started.     For a price quote for your services, please call our Consumer Price Line at [phone] or our Imaging Cost estimation line at [phone] (for imaging tests).    Winston Jamison

## 2018-08-15 ENCOUNTER — TRANSFERRED RECORDS (OUTPATIENT)
Dept: HEALTH INFORMATION MANAGEMENT | Facility: CLINIC | Age: 73
End: 2018-08-15

## 2018-08-16 VITALS
RESPIRATION RATE: 12 BRPM | DIASTOLIC BLOOD PRESSURE: 74 MMHG | SYSTOLIC BLOOD PRESSURE: 136 MMHG | OXYGEN SATURATION: 100 % | HEIGHT: 69 IN | WEIGHT: 251 LBS | BODY MASS INDEX: 37.18 KG/M2 | HEART RATE: 65 BPM | TEMPERATURE: 97.4 F

## 2018-08-22 DIAGNOSIS — E11.65 TYPE 2 DIABETES MELLITUS WITH HYPERGLYCEMIA, WITHOUT LONG-TERM CURRENT USE OF INSULIN (H): ICD-10-CM

## 2018-08-28 DIAGNOSIS — I10 HYPERTENSION GOAL BP (BLOOD PRESSURE) < 140/90: ICD-10-CM

## 2018-08-29 RX ORDER — LISINOPRIL 10 MG/1
TABLET ORAL
Qty: 90 TABLET | Refills: 1 | Status: SHIPPED | OUTPATIENT
Start: 2018-08-29 | End: 2018-09-25 | Stop reason: DRUGHIGH

## 2018-08-31 DIAGNOSIS — N39.41 URGENCY INCONTINENCE: ICD-10-CM

## 2018-08-31 RX ORDER — OXYBUTYNIN CHLORIDE 5 MG/1
5 TABLET, EXTENDED RELEASE ORAL DAILY
Qty: 90 TABLET | Refills: 1 | Status: SHIPPED | OUTPATIENT
Start: 2018-08-31 | End: 2019-01-07

## 2018-08-31 NOTE — TELEPHONE ENCOUNTER
Signed Prescriptions:                        Disp   Refills    oxybutynin (DITROPAN-XL) 5 MG 24 hr tablet 90 tab*1        Sig: Take 1 tablet (5 mg) by mouth daily  Authorizing Provider: SHARAN THURMAN  Ordering User: JAEL CHAUHAN RN

## 2018-09-08 DIAGNOSIS — E78.5 HYPERLIPIDEMIA LDL GOAL <130: ICD-10-CM

## 2018-09-10 RX ORDER — ATORVASTATIN CALCIUM 20 MG/1
TABLET, FILM COATED ORAL
Qty: 90 TABLET | Refills: 3 | Status: SHIPPED | OUTPATIENT
Start: 2018-09-10 | End: 2019-08-29

## 2018-09-10 NOTE — TELEPHONE ENCOUNTER
Signed Prescriptions:                        Disp   Refills    atorvastatin (LIPITOR) 20 MG tablet        90 tab*3        Sig: TAKE ONE TABLET BY MOUTH ONE TIME DAILY  Authorizing Provider: JILLIAN KNOTT  Ordering User: SILVIA ACOSTA RN refilled medication per [facility] Refill Protocol.     Silvia Acosta RN

## 2018-09-11 DIAGNOSIS — G50.0 TRIGEMINAL NEURALGIA: ICD-10-CM

## 2018-09-11 DIAGNOSIS — G70.00 MYASTHENIA GRAVIS, ACHR ANTIBODY POSITIVE (H): ICD-10-CM

## 2018-09-11 RX ORDER — PREDNISONE 20 MG/1
20 TABLET ORAL SEE ADMIN INSTRUCTIONS
Qty: 45 TABLET | Refills: 1 | Status: SHIPPED | OUTPATIENT
Start: 2018-09-11 | End: 2020-02-21

## 2018-09-11 RX ORDER — OXCARBAZEPINE 150 MG/1
TABLET, FILM COATED ORAL
Qty: 120 TABLET | Refills: 2 | Status: SHIPPED | OUTPATIENT
Start: 2018-09-11 | End: 2018-10-01

## 2018-09-11 RX ORDER — PREDNISONE 5 MG/1
TABLET ORAL
Qty: 30 TABLET | Refills: 1 | Status: SHIPPED | OUTPATIENT
Start: 2018-09-11 | End: 2018-10-12

## 2018-09-11 NOTE — TELEPHONE ENCOUNTER
[facility] Call Center    Phone Message    May a detailed message be left on voicemail: yes    Reason for Call: Medication Refill Request    Has the patient contacted the pharmacy for the refill? Yes   Name of medication being requested: predniSONE (DELTASONE) 20 MG tablet  Provider who prescribed the medication: Dr. Harrison  Pharmacy: [facility] [address] [place] [address]  Date medication is needed: As soon as possible    Action Taken: Message routed to:  [facility] ([facility] Neurology

## 2018-09-18 ENCOUNTER — TRANSFERRED RECORDS (OUTPATIENT)
Dept: HEALTH INFORMATION MANAGEMENT | Facility: CLINIC | Age: 73
End: 2018-09-18

## 2018-09-21 DIAGNOSIS — I10 HYPERTENSION GOAL BP (BLOOD PRESSURE) < 140/90: ICD-10-CM

## 2018-09-21 RX ORDER — LISINOPRIL 10 MG/1
20 TABLET ORAL DAILY
Qty: 90 TABLET | Refills: 0 | Status: CANCELLED | OUTPATIENT
Start: 2018-09-21

## 2018-09-21 NOTE — TELEPHONE ENCOUNTER
Requested Prescriptions   Pending Prescriptions Disp Refills     lisinopril (PRINIVIL/ZESTRIL) 10 MG tablet  Last Written Prescription Date:  8/29/18  Last Fill Quantity: 90,  # refills: 1   Last office visit: 8/14/2018 with prescribing provider:  Naveed   Future Office Visit:   Next 5 appointments (look out 90 days)     Oct 24, 2018  7:00 AM CDT   Pre-Op physical with Telly Lucio MD   [facility]    [address] [address]   [phone]                  90 tablet 0     Sig: Take 2 tablets (20 mg) by mouth daily    There is no refill protocol information for this order

## 2018-09-24 DIAGNOSIS — I10 HYPERTENSION GOAL BP (BLOOD PRESSURE) < 140/90: ICD-10-CM

## 2018-09-24 RX ORDER — LISINOPRIL 10 MG/1
20 TABLET ORAL DAILY
Qty: 90 TABLET | Refills: 0 | Status: CANCELLED | OUTPATIENT
Start: 2018-09-24

## 2018-09-24 NOTE — TELEPHONE ENCOUNTER
Patient is calling back to check the status of med refill. He is out and said he could  the RX tomorrow if needed. VM OK  Thank-you,  .Nadja Hyatt

## 2018-09-24 NOTE — TELEPHONE ENCOUNTER
Reason for Call:  Medication or medication refill:    Do you use a [facility]?  Name of the pharmacy and phone number for the current request:  [facility] [id number] [place] [facility], [place] - [address]    Name of the medication requested: lisinopril (PRINIVIL/ZESTRIL) 10 MG tablet    Other request: Patient has enough for tonight. There is some confusion in regards to dosage.     Can we leave a detailed message on this number? YES    Phone number patient can be reached at: Home number on file [phone] (home)    Best Time: ASAP    Call taken on 9/24/2018 at 10:03 AM by Jody Madera

## 2018-09-25 RX ORDER — LISINOPRIL 20 MG/1
20 TABLET ORAL DAILY
Qty: 90 TABLET | Refills: 3 | Status: SHIPPED | OUTPATIENT
Start: 2018-09-25 | End: 2020-02-21 | Stop reason: DRUGHIGH

## 2018-09-25 NOTE — TELEPHONE ENCOUNTER
This was sent 8/29/18 to [facility].  Please check with pt if taking 1 or 2 tabs daily?

## 2018-09-25 NOTE — TELEPHONE ENCOUNTER
Contacted patient informed him of message below patient states the last time he was in to see PCP, PCP told him to start taking 2 tablets daily of the lisinopril 10 MG then he stated that when patient was out of this medication we would refill it as lisinopril 20MG 1 times daily.   So patient has been taking 2 tablets daily that is why he is out, so according to patient he is needing a new RX of lisinopril 20MG 1 time daily as PCP upped his dose.  Thank you  Rachell

## 2018-09-25 NOTE — TELEPHONE ENCOUNTER
Signed Prescriptions:                        Disp   Refills    lisinopril (PRINIVIL/ZESTRIL) 20 MG tablet 90 tab*3        Sig: Take 1 tablet (20 mg) by mouth daily  Authorizing Provider: FRANCO BARBOUR

## 2018-09-26 DIAGNOSIS — R73.03 PREDIABETES: ICD-10-CM

## 2018-09-26 RX ORDER — METFORMIN HCL 500 MG
TABLET, EXTENDED RELEASE 24 HR ORAL
Qty: 180 TABLET | Refills: 0 | Status: SHIPPED | OUTPATIENT
Start: 2018-09-26 | End: 2018-12-19

## 2018-09-29 ASSESSMENT — ENCOUNTER SYMPTOMS
COUGH DISTURBING SLEEP: 0
STIFFNESS: 1
PARALYSIS: 0
EYE WATERING: 0
DIZZINESS: 1
EXERCISE INTOLERANCE: 1
SEIZURES: 0
SNORES LOUDLY: 1
DISTURBANCES IN COORDINATION: 0
PALPITATIONS: 0
TASTE DISTURBANCE: 0
DYSPNEA ON EXERTION: 1
EYE REDNESS: 0
ORTHOPNEA: 0
HEADACHES: 0
TROUBLE SWALLOWING: 1
SMELL DISTURBANCE: 0
SPUTUM PRODUCTION: 0
LIGHT-HEADEDNESS: 1
SYNCOPE: 0
JOINT SWELLING: 0
HYPOTENSION: 0
NECK PAIN: 0
DOUBLE VISION: 0
TREMORS: 1
FLANK PAIN: 0
COUGH: 0
MUSCLE CRAMPS: 1
TINGLING: 1
SORE THROAT: 0
EYE PAIN: 0
WEAKNESS: 1
HEMOPTYSIS: 0
HEMATURIA: 0
MEMORY LOSS: 0
SLEEP DISTURBANCES DUE TO BREATHING: 0
NECK MASS: 0
SINUS CONGESTION: 0
LEG PAIN: 0
DYSURIA: 0
SINUS PAIN: 0
BACK PAIN: 0
NUMBNESS: 0
HYPERTENSION: 1
LOSS OF CONSCIOUSNESS: 0
MUSCLE WEAKNESS: 1
POSTURAL DYSPNEA: 0
SHORTNESS OF BREATH: 1
EYE IRRITATION: 0
DIFFICULTY URINATING: 0
ARTHRALGIAS: 1
HOARSE VOICE: 0
SPEECH CHANGE: 0
MYALGIAS: 1
WHEEZING: 1

## 2018-10-01 ENCOUNTER — RESEARCH ENCOUNTER (OUTPATIENT)
Dept: NEUROLOGY | Facility: CLINIC | Age: 73
End: 2018-10-01

## 2018-10-01 ENCOUNTER — OFFICE VISIT (OUTPATIENT)
Dept: NEUROLOGY | Facility: CLINIC | Age: 73
End: 2018-10-01
Payer: COMMERCIAL

## 2018-10-01 VITALS
OXYGEN SATURATION: 99 % | BODY MASS INDEX: 37.33 KG/M2 | SYSTOLIC BLOOD PRESSURE: 135 MMHG | WEIGHT: 252 LBS | DIASTOLIC BLOOD PRESSURE: 73 MMHG | HEART RATE: 73 BPM | HEIGHT: 69 IN

## 2018-10-01 DIAGNOSIS — G50.0 TRIGEMINAL NEURALGIA: ICD-10-CM

## 2018-10-01 DIAGNOSIS — G70.00 MG, IN REMISSION (MYASTHENIA GRAVIS) (H): Primary | ICD-10-CM

## 2018-10-01 RX ORDER — OXCARBAZEPINE 150 MG/1
300 TABLET, FILM COATED ORAL 2 TIMES DAILY
Qty: 360 TABLET | Refills: 1 | Status: SHIPPED | OUTPATIENT
Start: 2018-10-01 | End: 2020-02-19

## 2018-10-01 ASSESSMENT — PAIN SCALES - GENERAL: PAINLEVEL: NO PAIN (0)

## 2018-10-01 NOTE — PROGRESS NOTES
[facility] Neuromuscular Registry    IRB # [id number]  PI: Kuldip Mei MD, PhD  : Kristi Mason    Patient was approached for possible participation for the above study. The current approved IRB consent form was discussed and explained to the patient.  It was discussed that involvement with the study is voluntary and refusal to participate would not involve penalty or decrease benefits at which the patient is entitled, and the subject may discontinue his/her involvement at any time without penalty or loss in benefits. We also discussed that this study does not have follow up visits or procedures. Patient was informed that an additional contact might occur if data needed was not found in patient s medical record. The patient was given time to review and ask any questions about the consent. Patient was shown contact information for PI and study staff in consent for future questions. Patient verbalized understanding of consent and study by restating the purpose, procedures, duration, risk, confidentiality of PHI, and voluntarily participation. Patient printed, signed and dated the consent and HIPAA form prior to study involvement. A copy was given to the patient for their records.     Subject Consent/HIPAA : SIGNED ON 10.1.2018

## 2018-10-01 NOTE — PATIENT INSTRUCTIONS
Please take prednisone 15 mg every other day for 1 more month, then 10 mg every other day.  Reduce your Mestinon to 60 mg twice a day. You can take a third pill at noon only if necessary. Do not take a late evening pill- you don't need it.    Reduce your Trileptal to 2 pills twice a day.   Will check your blood pressure when standing today.    Return to Clinic in 6 months.

## 2018-10-01 NOTE — LETTER
10/1/2018       RE: Rakan Nolasco  [address] [facility] [address]     Dear Colleague,    Thank you for referring your patient, Rakan Nolasco, to the [facility] NEUROLOGY at [facility]. Please see a copy of my visit note below.    Office note dictated.  Recommendations discussed.    Cruz Mello MD  Neurology resident, PGY-3  (P) [phone]      Answers for HPI/ROS submitted by the patient on 9/29/2018   General Symptoms: No  Skin Symptoms: No  HENT Symptoms: Yes  EYE SYMPTOMS: Yes  HEART SYMPTOMS: Yes  LUNG SYMPTOMS: Yes  INTESTINAL SYMPTOMS: No  URINARY SYMPTOMS: Yes  REPRODUCTIVE SYMPTOMS: Yes  SKELETAL SYMPTOMS: Yes  BLOOD SYMPTOMS: No  NERVOUS SYSTEM SYMPTOMS: Yes  MENTAL HEALTH SYMPTOMS: No  Ear pain: No  Ear discharge: No  Hearing loss: Yes  Tinnitus: Yes  Nosebleeds: No  Congestion: No  Sinus pain: No  Trouble swallowing: Yes   Voice hoarseness: No  Mouth sores: No  Sore throat: No  Tooth pain: Yes  Gum tenderness: No  Bleeding gums: No  Change in taste: No  Change in sense of smell: No  Dry mouth: No  Hearing aid used: No  Neck lump: No  Eye pain: No  Vision loss: No  Dry eyes: No  Watery eyes: No  Eye bulging: No  Double vision: No  Flashing of lights: No  Spots: Yes  Floaters: Yes  Redness: No  Crossed eyes: No  Tunnel Vision: No  Yellowing of eyes: No  Eye irritation: No  Cough: No  Sputum or phlegm: No  Coughing up blood: No  Difficulty breating or shortness of breath: Yes  Snoring: Yes  Wheezing: Yes  Difficulty breathing on exertion: Yes  Nighttime Cough: No  Difficulty breathing when lying flat: No  Chest pain or pressure: No  Fast or irregular heartbeat: No  Pain in legs with walking: No  Trouble breathing while lying down: No  Fingers or toes appear blue: No  High blood pressure: Yes  Low blood pressure: No  Fainting: No  Murmurs: No  Pacemaker: No  Varicose veins: No  Edema or swelling: Yes  Wake up at night with shortness of breath:  No  Light-headedness: Yes  Exercise intolerance: Yes  Trouble holding urine or incontinence: Yes  Pain or burning: No  Trouble starting or stopping: No  Increased frequency of urination: Yes  Blood in urine: No  Decreased frequency of urination: No  Frequent nighttime urination: No  Flank pain: No  Difficulty emptying bladder: No  Back pain: No  Muscle aches: Yes  Neck pain: No  Swollen joints: No  Joint pain: Yes  Bone pain: No  Muscle cramps: Yes  Muscle weakness: Yes  Joint stiffness: Yes  Bone fracture: No  Trouble with coordination: No  Dizziness or trouble with balance: Yes  Fainting or black-out spells: No  Memory loss: No  Headache: No  Seizures: No  Speech problems: No  Tingling: Yes  Tremor: Yes  Weakness: Yes  Difficulty walking: No  Paralysis: No  Numbness: No  Scrotal pain or swelling: No  Erectile dysfunction: Yes  Penile discharge: No  Genital ulcers: No  Reduced libido: Yes      [facility]    Department of Neurology   Neuromuscular Followup     Service Date: 10/01/2018      SUBJECTIVE:  I had the pleasure of seeing Mr. 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  Patient was last seen in the Neurology Clinic on 06/04/2018.  At this time, several issues were addressed including his myasthenia gravis, his trigeminal neuralgia and right hip/buttock pain.  During this visit on 06/04/2018 his myasthenia was noted to be in quite good control on prednisone.  He was instructed to taper his prednisone down to 15 mg every other day.  He is currently on prednisone 15 mg every other day and has been for approximately 1 month.  He notes no significant change in his myasthenia symptoms while on this lower dose of prednisone.  He continues to have many of the same complaints as his last clinic visit.  He does state that his vision is blurry.  This does not seem to  be better or worse with activity, though he does feel like he notices it while he is watching TV.  This does not seem to have changed since reducing his prednisone dose.  He does endorse dysphagia that is intermittent in nature.  Liquids seem to be worse than solids.  This has been an ongoing issue for the last year.  No significant changes were noticed in his dysphagia since tapering the prednisone.  He denies dysarthria.  He denies sialorrhea.  He denies difficulty with head drop.  He does endorse mild respiratory symptoms upon initiation of exercise that does not change with progression of exercise.  He notes that when goes on a walk for exercise he starts to get short of breath almost immediately and is limited to 4-5 blocks of exercise.  When he walks at a slow pace he is not limited by shortness of breath.     He does endorse a generalized weakness associated with lightheadedness upon going from a sitting to standing position.  This happens periodically throughout the day and the week.  There does not seem to be a correlation with the time of the day of these symptoms.  He describes most recently after a 2-hour drive from [place] getting up out of his car.  Immediately upon standing, he felt lightheaded and generally weak throughout his entire body.  He had to stand and hold onto the end of the car for approximately 1-2 minutes before it completely resolved.  This has been an ongoing symptom over the last year.  He states it does seem to correlate with the initiation of several medications that occurred approximately 1 year ago.  He did recently have a change in his lisinopril from 10 mg daily increased to 20 mg daily.  No other changes were noted.      Regarding his trigeminal neuralgia symptoms, he states that they improved significantly upon the increase to 300 mg q.a.m. and 450 mg each day at bedtime.  Then, over the last 3 weeks he has started to notice his trigeminal neuralgia symptoms come back  briefly in the evening.  He states that they mainly occur when he scrubs his face diligently or touches his face above the eyebrow.  He denies any difficulty going outside with wind or with anything else, touching his face.  He states that this pain only lasts for seconds when he is washing his face and is not bothersome to him.      Regarding his right buttock and hip pain, this pain remarkably improved upon presenting to his chiropractor.  It is not bothering him anymore and is not currently an issue.      CURRENT MEDICATIONS:       Reviewed and updated in Epic.      PHYSICAL EXAMINATION:   VITAL SIGNS:  Blood pressure is 135/73, pulse 73, satting at 99% on room air.   GENERAL:  The patient is sitting in a chair in no acute distress.   NEUROLOGIC:   MENTAL STATUS:  The patient is alert and oriented to person, place, time and situation.  He has intact comprehension and repetition.   CRANIAL NERVES:  Pupils are equal, round and reactive to light.  Extraocular motions are intact.  He does not have any evidence of ptosis on greater than 1 minute of sustained upgaze.  He does not have double or blurry vision upon greater than 1 minute of sustained upgaze.  There is no weakness of orbicularis oculi.  There is no double vision on extraocular motion testing.  Orbicularis oris is full strength.  His tongue protrudes at the midline.   MOTOR:  The patient has 5 out of 5 strength throughout bilateral upper and lower extremities.  His tone is normal.   REFLEXES:  The patient has 2+ and symmetric reflex at the brachioradialis, biceps, patella and ankle bilaterally.   SENSORY:  Intact to light touch throughout.   COORDINATION:  Intact finger-nose-finger.   GAIT:  Patient has a normal width stride length and arm swing.      ASSESSMENT AND PLAN:   1. Myasthenia gravis in remission.   2. Likely trigeminal neuralgia.      The patient is a very pleasant 73-year-old gentleman with mild generalized acetylcholine receptor antibody  positive myasthenia with predominantly ocular symptoms.  This myasthenia does appear to be in continued remission with the taper of his prednisone.  We will continue to taper the prednisone very slowly.  The patient should go down to 10 mg every other day in about 1 month.  The patient then should stay on 10 mg every other day until his return to our clinic in 6 months. He is taking his pyridostigmine for myasthenia every 6 hours including 1 dose at 2:30 in the morning which wakes him up from sleep.  He was instructed to reduce his pyridostigmine to t.i.d. p.r.n.  He was instructed on the proper use of this and the need for this medication.  The patient endorses symptoms of shortness of breath which do not change upon extended exertion.  Given that this symptom does not change with extended or prolonged exercise I do believe it is unlikely to be related to his myasthenia gravis.  The patient also endorses feelings of lightheadedness upon standing that seems to be consistent with orthostatic hypotension.  We will complete orthostatic vital signs here in the clinic today and the patient should follow up regarding the symptoms with his primary care physician.  -Reduce prednisone to 10mg every other day in approximately 1 month. Continue this dose until your return to clinic.  -Reduce pyridostigmine from QID to TID PRN    -Return to clinic in 6 months     Regarding Mr. Nolasco's likely trigeminal neuralgia, it has been well controlled on 300 mg of Trileptal in the a.m. and 450 mg in the p.m.  The patient now only has symptoms when he is vigorously scrubbing or washing his face and they last for only seconds.  We would like to reduce his Trileptal to 300 mg b.i.d. and he should continue this until his followup in our clinic in approximately 6 months.  He is instructed to call the clinic if symptoms were to worsen upon the taper of Trileptal.   -Reduce trileptal to 300mg BID       The patient was seen and discussed with  Dr. Arpan Harrison.         Dictated by:  Cruz Mello MD  Neurology Resident, PGY-3     ATTENDING ADDENDUM: Patient seen and examined with resident Dr Mello at the [facility]. Agree with his assessment and recommendations as above. TT spent for patient care 15 minutes; more than half was counseling.      Arpan Harrison MD       D: 10/01/2018   T: 10/03/2018   MT: tb      Name:     JR FRANCOIS   MRN:      [id number]        Account:      [id number]   :      1945           Service Date: 10/01/2018      Document: [id number]

## 2018-10-01 NOTE — PROGRESS NOTES
Office note dictated.  Recommendations discussed.    Cruz Mello MD  Neurology resident, PGY-3  (P) [phone]      Answers for HPI/ROS submitted by the patient on 9/29/2018   General Symptoms: No  Skin Symptoms: No  HENT Symptoms: Yes  EYE SYMPTOMS: Yes  HEART SYMPTOMS: Yes  LUNG SYMPTOMS: Yes  INTESTINAL SYMPTOMS: No  URINARY SYMPTOMS: Yes  REPRODUCTIVE SYMPTOMS: Yes  SKELETAL SYMPTOMS: Yes  BLOOD SYMPTOMS: No  NERVOUS SYSTEM SYMPTOMS: Yes  MENTAL HEALTH SYMPTOMS: No  Ear pain: No  Ear discharge: No  Hearing loss: Yes  Tinnitus: Yes  Nosebleeds: No  Congestion: No  Sinus pain: No  Trouble swallowing: Yes   Voice hoarseness: No  Mouth sores: No  Sore throat: No  Tooth pain: Yes  Gum tenderness: No  Bleeding gums: No  Change in taste: No  Change in sense of smell: No  Dry mouth: No  Hearing aid used: No  Neck lump: No  Eye pain: No  Vision loss: No  Dry eyes: No  Watery eyes: No  Eye bulging: No  Double vision: No  Flashing of lights: No  Spots: Yes  Floaters: Yes  Redness: No  Crossed eyes: No  Tunnel Vision: No  Yellowing of eyes: No  Eye irritation: No  Cough: No  Sputum or phlegm: No  Coughing up blood: No  Difficulty breating or shortness of breath: Yes  Snoring: Yes  Wheezing: Yes  Difficulty breathing on exertion: Yes  Nighttime Cough: No  Difficulty breathing when lying flat: No  Chest pain or pressure: No  Fast or irregular heartbeat: No  Pain in legs with walking: No  Trouble breathing while lying down: No  Fingers or toes appear blue: No  High blood pressure: Yes  Low blood pressure: No  Fainting: No  Murmurs: No  Pacemaker: No  Varicose veins: No  Edema or swelling: Yes  Wake up at night with shortness of breath: No  Light-headedness: Yes  Exercise intolerance: Yes  Trouble holding urine or incontinence: Yes  Pain or burning: No  Trouble starting or stopping: No  Increased frequency of urination: Yes  Blood in urine: No  Decreased frequency of urination: No  Frequent nighttime urination: No  Flank  pain: No  Difficulty emptying bladder: No  Back pain: No  Muscle aches: Yes  Neck pain: No  Swollen joints: No  Joint pain: Yes  Bone pain: No  Muscle cramps: Yes  Muscle weakness: Yes  Joint stiffness: Yes  Bone fracture: No  Trouble with coordination: No  Dizziness or trouble with balance: Yes  Fainting or black-out spells: No  Memory loss: No  Headache: No  Seizures: No  Speech problems: No  Tingling: Yes  Tremor: Yes  Weakness: Yes  Difficulty walking: No  Paralysis: No  Numbness: No  Scrotal pain or swelling: No  Erectile dysfunction: Yes  Penile discharge: No  Genital ulcers: No  Reduced libido: Yes

## 2018-10-01 NOTE — MR AVS SNAPSHOT
After Visit Summary   10/1/2018    Rakan Nolasco    MRN: [id number]           Patient Information     Date Of Birth          1945        Visit Information        Provider Department      10/1/2018 9:50 AM Arpan Harrison MD [facility] Neurology        Care Instructions    Please take prednisone 15 mg every other day for 1 more month, then 10 mg every other day.  Reduce your Mestinon to 60 mg twice a day. You can take a third pill at noon only if necessary. Do not take a late evening pill- you don't need it.    Reduce your Trileptal to 2 pills twice a day.   Will check your blood pressure when standing today.    Return to Clinic in 6 months.            Follow-ups after your visit        Your next 10 appointments already scheduled     Oct 24, 2018  7:00 AM CDT   [facility]    [address] [place] [id number]   [phone]            Nov 13, 2018  8:00 AM CST   [facility] Gray [facility]    [address] [place] [id number]   [phone]            Dec 31, 2018  8:00 AM CST   LAB with [facility]   [facility]    [address] [place] [id number]   [phone]           Please do not eat 10-12 hours before your appointment if you are coming in fasting for labs on lipids, cholesterol, or glucose (sugar). This does not apply to pregnant women. Water, hot tea and black coffee (with nothing added) are okay. Do not drink other fluids, diet soda or chew gum.            Jan 07, 2019  8:15 AM CST   [facility]    [address] [place] [address]   [phone]            Apr 01, 2019 10:20 AM CDT   (Arrive by 10:05 AM)   Return Myasthenia Gravis with Arpan Harrison MD  "  [facility] Neurology ([facility])    [address] [facility]  3rd Floor  [facility] [id number] [phone]              Who to contact     Please call your clinic at [phone] to:    Ask questions about your health    Make or cancel appointments    Discuss your medicines    Learn about your test results    Speak to your doctor            Additional Information About Your Visit        K[employer] Information     [employer] gives you secure access to your electronic health record. If you see a primary care provider, you can also send messages to your care team and make appointments. If you have questions, please call your primary care clinic.  If you do not have a primary care provider, please call [phone] and they will assist you.      [employer] is an electronic gateway that provides easy, online access to your medical records. With [employer], you can request a clinic appointment, read your test results, renew a prescription or communicate with your care team.     To access your existing account, please contact your [facility] or call [phone] for assistance.        Care EveryWhere ID     This is your Care EveryWhere ID. This could be used by other organizations to access your [place] medical records  [id number]        Your Vitals Were     Pulse Height Pulse Oximetry BMI (Body Mass Index)          73 1.753 m (5' 9\") 99% 37.21 kg/m2         Blood Pressure from Last 3 Encounters:   10/01/18 135/73   08/14/18 136/74   07/09/18 138/84    Weight from Last 3 Encounters:   10/01/18 114.3 kg (252 lb)   08/14/18 113.9 kg (251 lb)   06/15/18 111.1 kg (245 lb)              Today, you had the following     No orders found for display       Primary Care Provider Office Phone # Fax #    Telly Lucio [phone] [address] [facility] [place] [address]        Equal Access to Services     CRIS EYE AH: Norm Lara, " shashank eid, qaybta kaayesha yeager, gino silviain hayaan oscarbabita kwesijessica garciamoo ah. So [facility] [phone].    ATENCIÓN: Si misaella isac, tiene a coates disposición servicios gratuitos de asistencia lingüística. Lindsayame al [phone].    We comply with applicable federal civil rights laws and [place] laws. We do not discriminate on the basis of race, color, national origin, age, disability, sex, sexual orientation, or gender identity.            Thank you!     Thank you for choosing [facility] NEUROLOGY  for your care. Our goal is always to provide you with excellent care. Hearing back from our patients is one way we can continue to improve our services. Please take a few minutes to complete the written survey that you may receive in the mail after your visit with us. Thank you!             Your Updated Medication List - Protect others around you: Learn how to safely use, store and throw away your medicines at www.disposemymeds.org.          This list is accurate as of 10/1/18 10:44 AM.  Always use your most recent med list.                   Brand Name Dispense Instructions for use Diagnosis    aspirin 81 MG tablet      Take 1 tablet by mouth daily.        atorvastatin 20 MG tablet    LIPITOR    90 tablet    TAKE ONE TABLET BY MOUTH ONE TIME DAILY    Hyperlipidemia LDL goal <130       blood glucose monitoring lancets     100 each    CHECK BLOOD SUGAR EVERY MORNING    Type 2 diabetes mellitus with hyperglycemia, without long-term current use of insulin (H)       blood glucose monitoring meter device kit    no brand specified    1 kit    Use to test blood sugar 1 times daily or as directed.    Type 2 diabetes mellitus with hyperglycemia, without long-term current use of insulin (H)       blood glucose monitoring test strip    no brand specified    50 strip    laneUse to test blood sugars 1 times daily or as directed    Type 2 diabetes mellitus with hyperglycemia, without long-term current use of insulin (H)        clonazePAM 0.5 MG tablet    klonoPIN     Take 0.5 mg by mouth. Take 1 tablet by mouth daily at bedtime        latanoprost 0.005 % ophthalmic solution    XALATAN    7.5 mL    Place 1 drop into both eyes At Bedtime    Borderline glaucoma with ocular hypertension, bilateral       lisinopril 20 MG tablet    PRINIVIL/ZESTRIL    90 tablet    Take 1 tablet (20 mg) by mouth daily    Hypertension goal BP (blood pressure) < 140/90       metFORMIN 500 MG 24 hr tablet    GLUCOPHAGE-XR    180 tablet    TAKE 1 TABLET BY MOUTH 2 TIMES DAILY WITH MEALS.    Prediabetes       OXcarbazepine 150 MG tablet    TRILEPTAL    120 tablet    TAKE 2 TABLETS BY MOUTH IN THE MORNING AND 3 TABLETS IN THE EVENING    Trigeminal neuralgia       oxybutynin 5 MG 24 hr tablet    DITROPAN-XL    90 tablet    Take 1 tablet (5 mg) by mouth daily    Urgency incontinence       pantoprazole 20 MG EC tablet    PROTONIX    90 tablet    TAKE ONE TABLET BY MOUTH ONE TIME DAILY 30-60 MINUTES BEFORE A MEAL    Gastroesophageal reflux disease without esophagitis       * predniSONE 5 MG tablet    DELTASONE    30 tablet    Take as directed as part of tapering schedule outlined in June 4 clinic visit.    Myasthenia gravis, AChR antibody positive (H)       * predniSONE 20 MG tablet    DELTASONE    45 tablet    Take 1 tablet (20 mg) by mouth See Admin Instructions    Myasthenia gravis, AChR antibody positive (H)       pyridostigmine 60 MG tablet    MESTINON    120 tablet    TAKE 1 TABLET BY MOUTH 4 TIMES DAILY    Ocular myasthenia (H), Long-term use of immunosuppressant medication       timolol 0.5 % ophthalmic solution    TIMOPTIC    1 Bottle    Place 1 drop into both eyes daily    Borderline glaucoma with ocular hypertension, bilateral       * Notice:  This list has 2 medication(s) that are the same as other medications prescribed for you. Read the directions carefully, and ask your doctor or other care provider to review them with you.

## 2018-10-03 NOTE — PROGRESS NOTES
[facility]    Department of Neurology   Neuromuscular Followup     Service Date: 10/01/2018      SUBJECTIVE:  I had the pleasure of seeing Mr. 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  Patient was last seen in the Neurology Clinic on 06/04/2018.  At this time, several issues were addressed including his myasthenia gravis, his trigeminal neuralgia and right hip/buttock pain.  During this visit on 06/04/2018 his myasthenia was noted to be in quite good control on prednisone.  He was instructed to taper his prednisone down to 15 mg every other day.  He is currently on prednisone 15 mg every other day and has been for approximately 1 month.  He notes no significant change in his myasthenia symptoms while on this lower dose of prednisone.  He continues to have many of the same complaints as his last clinic visit.  He does state that his vision is blurry.  This does not seem to be better or worse with activity, though he does feel like he notices it while he is watching TV.  This does not seem to have changed since reducing his prednisone dose.  He does endorse dysphagia that is intermittent in nature.  Liquids seem to be worse than solids.  This has been an ongoing issue for the last year.  No significant changes were noticed in his dysphagia since tapering the prednisone.  He denies dysarthria.  He denies sialorrhea.  He denies difficulty with head drop.  He does endorse mild respiratory symptoms upon initiation of exercise that does not change with progression of exercise.  He notes that when goes on a walk for exercise he starts to get short of breath almost immediately and is limited to 4-5 blocks of exercise.  When he walks at a slow pace he is not limited by shortness of breath.     He does endorse a generalized weakness associated with lightheadedness upon  going from a sitting to standing position.  This happens periodically throughout the day and the week.  There does not seem to be a correlation with the time of the day of these symptoms.  He describes [place].  Immediately upon standing, he felt lightheaded and generally weak throughout his entire body.  He had to stand and hold onto the end of the car for approximately 1-2 minutes before it completely resolved.  This has been an ongoing symptom over the last year.  He states it does seem to correlate with the initiation of several medications that occurred approximately 1 year ago.  He did recently have a change in his lisinopril from 10 mg daily increased to 20 mg daily.  No other changes were noted.      Regarding his trigeminal neuralgia symptoms, he states that they improved significantly upon the increase to 300 mg q.a.m. and 450 mg each day at bedtime.  Then, over the last 3 weeks he has started to notice his trigeminal neuralgia symptoms come back briefly in the evening.  He states that they mainly occur when he scrubs his face diligently or touches his face above the eyebrow.  He denies any difficulty going outside with wind or with anything else, touching his face.  He states that this pain only lasts for seconds when he is washing his face and is not bothersome to him.      Regarding his right buttock and hip pain, this pain remarkably improved upon presenting to his chiropractor.  It is not bothering him anymore and is not currently an issue.      CURRENT MEDICATIONS:       Reviewed and updated in Epic.      PHYSICAL EXAMINATION:   VITAL SIGNS:  Blood pressure is 135/73, pulse 73, satting at 99% on room air.   GENERAL:  The patient is sitting in a chair in no acute distress.   NEUROLOGIC:   MENTAL STATUS:  The patient is alert and oriented to person, place, time and situation.  He has intact comprehension and repetition.   CRANIAL  NERVES:  Pupils are equal, round and reactive to light.  Extraocular motions are intact.  He does not have any evidence of ptosis on greater than 1 minute of sustained upgaze.  He does not have double or blurry vision upon greater than 1 minute of sustained upgaze.  There is no weakness of orbicularis oculi.  There is no double vision on extraocular motion testing.  Orbicularis oris is full strength.  His tongue protrudes at the midline.   MOTOR:  The patient has 5 out of 5 strength throughout bilateral upper and lower extremities.  His tone is normal.   REFLEXES:  The patient has 2+ and symmetric reflex at the brachioradialis, biceps, patella and ankle bilaterally.   SENSORY:  Intact to light touch throughout.   COORDINATION:  Intact finger-nose-finger.   GAIT:  Patient has a normal width stride length and arm swing.      ASSESSMENT AND PLAN:   1. Myasthenia gravis in remission.   2. Likely trigeminal neuralgia.      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  This myasthenia does appear to be in continued remission with the taper of his prednisone.  We will continue to taper the prednisone very slowly.  The patient should go down to 10 mg every other day in about 1 month.  The patient then should stay on 10 mg every other day until his return to our clinic in 6 months. He is taking his pyridostigmine for myasthenia every 6 hours including 1 dose at 2:30 in the morning which wakes him up from sleep.  He was instructed to reduce his pyridostigmine to t.i.d. p.r.n.  He was instructed on the proper use of this and the need for this medication.  The patient endorses symptoms of shortness of breath which do not change upon extended exertion.  Given that this symptom does not change with extended or prolonged exercise I do believe it is unlikely to be related to his myasthenia gravis.  The patient also  endorses feelings of lightheadedness upon standing that seems to be consistent with orthostatic hypotension.  We will complete orthostatic vital signs here in the clinic today and the patient should follow up regarding the symptoms with his primary care physician.  -Reduce prednisone to 10mg every other day in approximately 1 month. Continue this dose until your return to clinic.  -Reduce pyridostigmine from QID to TID PRN    -Return to clinic in 6 months     Regarding Mr. Nolasco's likely trigeminal neuralgia, it has been well controlled on 300 mg of Trileptal in the a.m. and 450 mg in the p.m.  The patient now only has symptoms when he is vigorously scrubbing or washing his face and they last for only seconds.  We would like to reduce his Trileptal to 300 mg b.i.d. and he should continue this until his followup in our clinic in approximately 6 months.  He is instructed to call the clinic if symptoms were to worsen upon the taper of Trileptal.   -Reduce trileptal to 300mg BID       The patient was seen and discussed with Dr. Arpan Harrison.         Dictated by:  Cruz Mello MD  Neurology Resident, PGY-3     ATTENDING ADDENDUM: Patient seen and examined with resident Dr Mello at the [facility]. Agree with his assessment and recommendations as above. TT spent for patient care 15 minutes; more than half was counseling.  Arpan Harrison MD       D: 10/01/2018   T: 10/03/2018   MT: harlan      Name:     JR NOLASCO   MRN:      [id number]        Account:      [id number]   :      1945           Service Date: 10/01/2018      Document: [id number]

## 2018-10-04 ENCOUNTER — TRANSFERRED RECORDS (OUTPATIENT)
Dept: HEALTH INFORMATION MANAGEMENT | Facility: CLINIC | Age: 73
End: 2018-10-04

## 2018-10-12 ENCOUNTER — DOCUMENTATION ONLY (OUTPATIENT)
Dept: OPHTHALMOLOGY | Facility: CLINIC | Age: 73
End: 2018-10-12

## 2018-10-12 DIAGNOSIS — G70.00 MYASTHENIA GRAVIS, ACHR ANTIBODY POSITIVE (H): ICD-10-CM

## 2018-10-12 RX ORDER — PREDNISONE 5 MG/1
TABLET ORAL
Qty: 90 TABLET | Refills: 0 | Status: SHIPPED | OUTPATIENT
Start: 2018-10-12 | End: 2019-05-20

## 2018-10-23 DIAGNOSIS — G70.00 OCULAR MYASTHENIA (H): ICD-10-CM

## 2018-10-23 DIAGNOSIS — Z79.60 LONG-TERM USE OF IMMUNOSUPPRESSANT MEDICATION: ICD-10-CM

## 2018-10-23 RX ORDER — PYRIDOSTIGMINE BROMIDE 60 MG/1
TABLET ORAL
Qty: 120 TABLET | Refills: 3 | Status: SHIPPED | OUTPATIENT
Start: 2018-10-23 | End: 2018-10-25

## 2018-10-23 ASSESSMENT — ENCOUNTER SYMPTOMS
DIARRHEA: 0
CHILLS: 0
DYSURIA: 0
HEADACHES: 0
FEVER: 0
PALPITATIONS: 0
SORE THROAT: 0
FREQUENCY: 1
JOINT SWELLING: 0
HEMATOCHEZIA: 0
SHORTNESS OF BREATH: 1
CONSTIPATION: 0
ABDOMINAL PAIN: 0
WEAKNESS: 1
DIZZINESS: 1
PARESTHESIAS: 0
COUGH: 0
ARTHRALGIAS: 1
HEMATURIA: 0
NERVOUS/ANXIOUS: 0
EYE PAIN: 0
MYALGIAS: 1
NAUSEA: 0
HEARTBURN: 0

## 2018-10-23 ASSESSMENT — ACTIVITIES OF DAILY LIVING (ADL)
I_NEED_ASSISTANCE_FOR_THE_FOLLOWING_DAILY_ACTIVITIES:: NO ASSISTANCE IS NEEDED
CURRENT_FUNCTION: NO ASSISTANCE NEEDED

## 2018-10-24 ENCOUNTER — OFFICE VISIT (OUTPATIENT)
Dept: FAMILY MEDICINE | Facility: CLINIC | Age: 73
End: 2018-10-24
Payer: COMMERCIAL

## 2018-10-24 VITALS
OXYGEN SATURATION: 100 % | SYSTOLIC BLOOD PRESSURE: 122 MMHG | RESPIRATION RATE: 13 BRPM | DIASTOLIC BLOOD PRESSURE: 74 MMHG | HEART RATE: 75 BPM | WEIGHT: 247 LBS | HEIGHT: 69 IN | TEMPERATURE: 97.6 F | BODY MASS INDEX: 36.58 KG/M2

## 2018-10-24 DIAGNOSIS — L98.9 NODULAR LESION ON SURFACE OF SKIN: ICD-10-CM

## 2018-10-24 DIAGNOSIS — Z00.00 MEDICARE ANNUAL WELLNESS VISIT, SUBSEQUENT: Primary | ICD-10-CM

## 2018-10-24 PROCEDURE — G0438 PPPS, INITIAL VISIT: HCPCS | Performed by: FAMILY MEDICINE

## 2018-10-24 ASSESSMENT — ENCOUNTER SYMPTOMS
NAUSEA: 0
PARESTHESIAS: 0
HEADACHES: 0
DIZZINESS: 1
PALPITATIONS: 0
SHORTNESS OF BREATH: 1
HEMATOCHEZIA: 0
MYALGIAS: 1
CONSTIPATION: 0
EYE PAIN: 0
HEARTBURN: 0
WEAKNESS: 1
DYSURIA: 0
COUGH: 0
ARTHRALGIAS: 1
JOINT SWELLING: 0
FEVER: 0
NERVOUS/ANXIOUS: 0
FREQUENCY: 1
ABDOMINAL PAIN: 0
CHILLS: 0
SORE THROAT: 0
DIARRHEA: 0
HEMATURIA: 0

## 2018-10-24 ASSESSMENT — ACTIVITIES OF DAILY LIVING (ADL): CURRENT_FUNCTION: NO ASSISTANCE NEEDED

## 2018-10-24 NOTE — NURSING NOTE
"Chief Complaint   Patient presents with     Physical     Flu Shot     Initial /74 (BP Location: Left arm, Patient Position: Chair, Cuff Size: Adult Regular)  Pulse 75  Temp 97.6  F (36.4  C) (Oral)  Resp 13  Ht 5' 9\" (1.753 m)  Wt 247 lb (112 kg)  SpO2 100%  BMI 36.48 kg/m2 Estimated body mass index is 36.48 kg/(m^2) as calculated from the following:    Height as of this encounter: 5' 9\" (1.753 m).    Weight as of this encounter: 247 lb (112 kg).  BP completed using cuff size: regular    Winston Jamison  "

## 2018-10-24 NOTE — MR AVS SNAPSHOT
After Visit Summary   10/24/2018    Rakan Nolasco    MRN: [id number]           Patient Information     Date Of Birth          1945        Visit Information        Provider Department      10/24/2018 7:00 AM Telly Lucio MD [facility]        Today's Diagnoses     Medicare annual wellness visit, subsequent    -  1    Nodular lesion on surface of skin: Rt leg          Care Instructions    [facility]    If you have any questions regarding to your visit please contact your care team:       Team Purple:   Clinic Hours Telephone Number   Dr. Capri Porter   7am-7pm  Monday - Thursday   7am-5pm  Fridays  [phone]  (Appointment scheduling available 24/7)   Urgent Care - [place] and [facility] - 11am-9pm Monday-Friday Saturday-Sunday- 9am-5pm   [place] - 5pm-9pm Monday-Friday Saturday-Sunday- 9am-5pm  [phone] - [place]  [phone] - [place]       What options do I have for a visit other than an office visit? We offer electronic visits (e-visits) and telephone visits, when medically appropriate.  Please check with your medical insurance to see if these types of visits are covered, as you will be responsible for any charges that are not paid by your insurance.      You can use iMedX (secure electronic communication) to access to your chart, send your primary care provider a message, or make an appointment. Ask a team member how to get started.     For a price quote for your services, please call our Consumer Price Line at [phone] or our Imaging Cost estimation line at [phone] (for imaging tests).    Winston Jamison            Follow-ups after your visit        Additional Services     DERMATOLOGY REFERRAL       Your provider has referred you to: [facility] (2 locations) [phone]   [email or web address]    Please be aware that  coverage of these services is subject to the terms and limitations of your health insurance plan.  Call member services at your health plan with any benefit or coverage questions.      Please bring the following with you to your appointment:    (1) Any X-Rays, CTs or MRIs which have been performed.  Contact the facility where they were done to arrange for  prior to your scheduled appointment.    (2) List of current medications  (3) This referral request   (4) Any documents/labs given to you for this referral                  Your next 10 appointments already scheduled     Nov 13, 2018  8:00 AM CST   New Visit with Mateo Gray MD   [place] [facility] ([facility])    [address] [address]   [phone]            Dec 31, 2018  8:00 AM CST   LAB with [facility]   [facility] ([facility])    [address] [address]   [phone]           Please do not eat 10-12 hours before your appointment if you are coming in fasting for labs on lipids, cholesterol, or glucose (sugar). This does not apply to pregnant women. Water, hot tea and black coffee (with nothing added) are okay. Do not drink other fluids, diet soda or chew gum.            Jan 07, 2019  8:15 AM CST   Return Visit with MD [place] [facility] ([facility])    [address] [address]   [phone]            Apr 01, 2019 10:20 AM CDT   (Arrive by 10:05 AM)   Return Myasthenia Gravis with Arpan Harrison MD   [facility] ([facility])    [address]  3rd Floor  [place] [place] [address] [phone]              Who to contact     If you have questions or need follow up information about today's clinic visit or your schedule please contact [facility] directly at [phone].  Normal or non-critical lab and imaging results will be  "communicated to you by [employer]ethart, letter or phone within 4 business days after the clinic has received the results. If you do not hear from us within 7 days, please contact the clinic through ITao or phone. If you have a critical or abnormal lab result, we will notify you by phone as soon as possible.  Submit refill requests through ITao or call your pharmacy and they will forward the refill request to us. Please allow 3 business days for your refill to be completed.          Additional Information About Your Visit        ITao Information     ITao gives you secure access to your electronic health record. If you see a primary care provider, you can also send messages to your care team and make appointments. If you have questions, please call your primary care clinic.  If you do not have a primary care provider, please call [phone] and they will assist you.        Care EveryWhere ID     This is your Care EveryWhere ID. This could be used by other organizations to access your [place] medical records  [id number]        Your Vitals Were     Pulse Temperature Respirations Height Pulse Oximetry BMI (Body Mass Index)    75 97.6  F (36.4  C) (Oral) 13 5' 9\" (1.753 m) 100% 36.48 kg/m2       Blood Pressure from Last 3 Encounters:   10/24/18 122/74   10/01/18 135/73   08/14/18 136/74    Weight from Last 3 Encounters:   10/24/18 247 lb (112 kg)   10/01/18 252 lb (114.3 kg)   08/14/18 251 lb (113.9 kg)              We Performed the Following     DERMATOLOGY REFERRAL          Today's Medication Changes          These changes are accurate as of 10/24/18  7:50 AM.  If you have any questions, ask your nurse or doctor.               These medicines have changed or have updated prescriptions.        Dose/Directions    * predniSONE 20 MG tablet   Commonly known as:  DELTASONE   This may have changed:  how much to take   Used for:  Myasthenia gravis, AChR antibody positive (H)        Dose:  20 mg   Take 1 tablet (20 mg) " by mouth See Admin Instructions   Quantity:  45 tablet   Refills:  1       * predniSONE 5 MG tablet   Commonly known as:  DELTASONE   This may have changed:  Another medication with the same name was changed. Make sure you understand how and when to take each.   Used for:  Myasthenia gravis, AChR antibody positive (H)        Take 2 tablets (10 mg) every other day.   Quantity:  90 tablet   Refills:  0       * Notice:  This list has 2 medication(s) that are the same as other medications prescribed for you. Read the directions carefully, and ask your doctor or other care provider to review them with you.             Primary Care Provider Office Phone # Fax #    Telly Lucio [phone] [address] [address]        Equal Access to Services     SARAH YEE : Norm blankenshipo Soderrick, waaxda luqadaha, qaybta kaalmada ademanjeet, gino lion . So [facility] [phone].    ATENCIÓN: Si habla español, tiene a coates disposición servicios gratuitos de asistencia lingüística. [facility] [phone].    We comply with applicable federal civil rights laws and [place] laws. We do not discriminate on the basis of race, color, national origin, age, disability, sex, sexual orientation, or gender identity.            Thank you!     Thank you for choosing [facility]  for your care. Our goal is always to provide you with excellent care. Hearing back from our patients is one way we can continue to improve our services. Please take a few minutes to complete the written survey that you may receive in the mail after your visit with us. Thank you!             Your Updated Medication List - Protect others around you: Learn how to safely use, store and throw away your medicines at www.disposemymeds.org.          This list is accurate as of 10/24/18  7:50 AM.  Always use your most recent med list.                   Brand Name Dispense Instructions for  use Diagnosis    aspirin 81 MG tablet      Take 1 tablet by mouth daily.        atorvastatin 20 MG tablet    LIPITOR    90 tablet    TAKE ONE TABLET BY MOUTH ONE TIME DAILY    Hyperlipidemia LDL goal <130       blood glucose monitoring lancets     100 each    CHECK BLOOD SUGAR EVERY MORNING    Type 2 diabetes mellitus with hyperglycemia, without long-term current use of insulin (H)       blood glucose monitoring meter device kit    no brand specified    1 kit    Use to test blood sugar 1 times daily or as directed.    Type 2 diabetes mellitus with hyperglycemia, without long-term current use of insulin (H)       blood glucose monitoring test strip    no brand specified    50 strip    laneUse to test blood sugars 1 times daily or as directed    Type 2 diabetes mellitus with hyperglycemia, without long-term current use of insulin (H)       clonazePAM 0.5 MG tablet    klonoPIN     Take 0.5 mg by mouth. Take 1 tablet by mouth daily at bedtime        latanoprost 0.005 % ophthalmic solution    XALATAN    7.5 mL    Place 1 drop into both eyes At Bedtime    Borderline glaucoma with ocular hypertension, bilateral       lisinopril 20 MG tablet    PRINIVIL/ZESTRIL    90 tablet    Take 1 tablet (20 mg) by mouth daily    Hypertension goal BP (blood pressure) < 140/90       metFORMIN 500 MG 24 hr tablet    GLUCOPHAGE-XR    180 tablet    TAKE 1 TABLET BY MOUTH 2 TIMES DAILY WITH MEALS.    Prediabetes       OXcarbazepine 150 MG tablet    TRILEPTAL    360 tablet    Take 2 tablets (300 mg) by mouth 2 times daily    Trigeminal neuralgia       oxybutynin 5 MG 24 hr tablet    DITROPAN-XL    90 tablet    Take 1 tablet (5 mg) by mouth daily    Urgency incontinence       pantoprazole 20 MG EC tablet    PROTONIX    90 tablet    TAKE ONE TABLET BY MOUTH ONE TIME DAILY 30-60 MINUTES BEFORE A MEAL    Gastroesophageal reflux disease without esophagitis       * predniSONE 20 MG tablet    DELTASONE    45 tablet    Take 1 tablet (20 mg) by mouth  See Admin Instructions    Myasthenia gravis, AChR antibody positive (H)       * predniSONE 5 MG tablet    DELTASONE    90 tablet    Take 2 tablets (10 mg) every other day.    Myasthenia gravis, AChR antibody positive (H)       pyridostigmine 60 MG tablet    MESTINON    120 tablet    TAKE 1 TABLET BY MOUTH 4 TIMES DAILY    Ocular myasthenia (H), Long-term use of immunosuppressant medication       timolol 0.5 % ophthalmic solution    TIMOPTIC    1 Bottle    Place 1 drop into both eyes daily    Borderline glaucoma with ocular hypertension, bilateral       * Notice:  This list has 2 medication(s) that are the same as other medications prescribed for you. Read the directions carefully, and ask your doctor or other care provider to review them with you.

## 2018-10-24 NOTE — PROGRESS NOTES
SUBJECTIVE:   Rakan Nolasco is a 73 year old male who presents for Preventive Visit.  click delete button to remove this line now  click delete button to remove this line now  Are you in the first 12 months of your Medicare coverage?  No    Physical   Annual:     Getting at least 3 servings of Calcium per day:  Yes    Bi-annual eye exam:  Yes    Dental care twice a year:  Yes    Sleep apnea or symptoms of sleep apnea:  Sleep apnea    Diet:  Carbohydrate counting    Frequency of exercise:  None    Taking medications regularly:  Yes    Medication side effects:  Muscle aches and Lightheadedness    Additional concerns today:  YES    Ability to successfully perform activities of daily living: no assistance needed    Home Safety:  No safety concerns identified    Hearing Impairment: difficulty following a conversation in a noisy restaurant or crowded room    Concerns:   1. Swelling in the legs/SOB, bump on lower right leg. Been going on for a while is on and off. SOB when walks or walking upstair, short walks. Stress test was normal No chest pain. No orthopnea. Skin discolorations.     2. Hearing: Feels like has congestion, can pop ears then can hear better.     3. Impotence: Had prostate frozen and not able to get erections.    4. Arthralgia: Joints pain henry knees and hands especially as going off prednisone.    5. Dizziness/Weakness: When gets up gets weak and dizzy.    6. ASA: safety for heart; worried dangers heart    Ability to successfully perform activities of daily living: Yes, no assistance needed  Home safety:  none identified   Hearing impairment: Yes, Congestion in the ears currentl    Fall risk:     click delete button to remove this line now  COGNITIVE SCREEN  1) Repeat 3 items (Leader, Season, Table)    2) Clock draw: NORMAL  3) 3 item recall: Recalls 3 objects  Results: 3 items recalled: COGNITIVE IMPAIRMENT LESS LIKELY    Mini-CogTM Copyright S Barry. Licensed by the author for use in [employer]; reprinted with permission (soob@.[facility]). All rights reserved.       [facility]   [address], [address], [place] [address]   Main: [phone]  [email or web address]                                                  Myocardial Perfusion Report                    Stress/Rest 2 Day Single Isotope Gated SPECT imaging with Ace exercise   stress       JR FRANCOIS ID: [id number] Age: 73 : 1945 Nuclear Tech: RLD   Exam Date: 2018 09:15 Gender: M RN/Ex. Physiologist: ZHOU   Accession #: [id number] Height: 69 in BSA: 2.27 m  Monitoring Provider: LEÓN MOBLEY   Weight: 249 lbs BMI: 36.8 kg/m  Ordering Provider: JESU CARMEN   Site[facility]   Location: Outpatient   Indication: Exertional dyspnea; Chest pain, unspecified type     FINAL CONCLUSIONS   NORMAL exercise stress perfusion imaging study.   The rest and stress perfusion images are normal.   The perfusion images were NORMAL without   evidence of ischemia or infarction.   Normal left ventricular size and systolic function with a calculated LVEF of >75%.   Normal left ventricular wall motion.       PATIENT HISTORY No known CAD   Risk Factors: Hypertension, Diabetes Mellitus, Hyperlipidemia, Obesity   Cardiac History: None   Presenting  Symptoms: Dyspnea on exertion   Cardiac Meds: ASA, Lisinopril, Lipitor   Meds past 24 hrs: NONE     STRESS TEST SUMMARY Exercise   Protocol: Ace Duration (m:s): 06:01 METs: % Predicted Exercise Capacity   Resting HR (bpm): 67 Resting BP(mmHg): 126 / 66 Position: Sitting   Peak HR(bpm): 149 Peak BP(mmHg): 202 / 82 % MPHR: 100 Double Product: 08041   MPHR: 149 Target HR(bpm): 127   Recovery HR(bpm): 76 Recovery BP(mmHg): 142 / 64   BP Response: Normal HR Response: Normal   Stress Termination: Dyspnea, General fatigue   Stress Symptoms: Exercise produced shortness of breath.   Meds Given: None     ECG   Resting ECG:  Sinus rhythm. Right bundle branch block.   Resting Arrhythmia: No clinically significant dysrhythmias identified.   Stress ECG: No diagnostic ECG evidence of ischemia.   Stress Arrhythmia: No clinically significant dysrhythmias identified.     IMAGE PROTOCOL Stress/Rest 2 Day   Radiopharmaceutical Dose (mCi) Route Injection Time Injection Date Inj to Img Time (min)   Administered By   Rest: 99mTc Tetrofosmin 31.6 IV 0850 28-Aug-2018 95 RLD   Stress: 99mTc Sestamibi 32.8 IV 0940 27-Aug-2018 30 ALZ   Post-Injection Exercise: An additional 1.5 minutes of exercise followed the intravenous   injection   Minutes to Injection(m:s): 4:30     Post Stress LV EF: 80 %   EDV: 97 ml   EDVI: 41 ml/m    ESV: 19 ml   ESVI: 8 ml/m      LV Size and Function: Normal left ventricular size and systolic function with a calculated   LVEF of >75%.      ICD-10 Codes: R06.09; R07.9   CC Providers: DUTCH Lucio         Status     Reviewed and updated as needed this visit by clinical staff  Tobacco  Allergies  Meds  Med Hx  Surg Hx  Fam Hx  Soc Hx      Reviewed and updated as needed this visit by Provider    Social History   Substance Use Topics     Smoking status: Former Smoker     Packs/day: 2.00     Years: 15.00     Types: Cigarettes     Start date: 1/1/1968     Quit date: 1/1/1983     Smokeless tobacco: Former User      Comment: smoke free household.     Alcohol use No      Comment: Quit in 1986       Alcohol Use 10/23/2018   If you drink alcohol do you typically have greater than 3 drinks per day OR greater than 7 drinks per week? Not Applicable   No flowsheet data found.    Concerns:   PROBLEMS TO ADD ON...    Today's PHQ-2 Score:   PHQ-2 ( 1999 Pfizer) 10/23/2018   Q1: Little interest or pleasure in doing things 0   Q2: Feeling down, depressed or hopeless 0   PHQ-2 Score 0   Q1: Little interest or pleasure in doing things Not at all   Q2: Feeling down, depressed or hopeless Not at all   PHQ-2 Score 0     Do you feel safe in your  environment - Yes    Do you have a Health Care Directive?: Yes: Advance Directive has been received and scanned.    Current providers sharing in care for this patient include:   Patient Care Team:  Telly Lucio MD as PCP - General (Family Practice)    The following health maintenance items are reviewed in Epic and correct as of today:  Health Maintenance   Topic Date Due     ADVANCE DIRECTIVE PLANNING Q5 YRS  08/24/2016     INFLUENZA VACCINE (1) 09/01/2018     FOOT EXAM Q1 YEAR  12/20/2018     MICROALBUMIN Q1 YEAR  12/20/2018     A1C Q6 MO  02/14/2019     EYE EXAM Q1 YEAR  04/09/2019     FALL RISK ASSESSMENT  06/04/2019     BMP Q1 YR  08/14/2019     LIPID MONITORING Q1 YEAR  08/14/2019     PHQ-2 Q1 YR  08/14/2019     TSH W/ FREE T4 REFLEX Q2 YEAR  01/16/2020     TETANUS IMMUNIZATION (SYSTEM ASSIGNED)  05/06/2023     COLON CANCER SCREEN (SYSTEM ASSIGNED)  09/18/2028     PNEUMOCOCCAL  Completed     AORTIC ANEURYSM SCREENING (SYSTEM ASSIGNED)  Completed     HEPATITIS C SCREENING  Completed     Patient Active Problem List   Diagnosis     GERD (gastroesophageal reflux disease)     Snoring     Fatigue     HLD (hyperlipidemia)     Borderline glaucoma with ocular hypertension     Advanced directives, counseling/discussion     Trigeminal neuralgia pain     bmi 31     HTN (hypertension)     Health Care Home     Arthritis     Prediabetes     Dry eyes     PVD (posterior vitreous detachment) OU     H/O RD (retinal detachment) s/p repair with PPV (AB)     Epiretinal membrane, bilateral     Pseudophakia of right eye     Myasthenia gravis (H)     Type 2 diabetes mellitus (H)     Morbid obesity (H)     Right posterior capsular opacification     Demand ischemia (H)     Obstructive sleep apnea syndrome     Prostate cancer (H)     Restless legs syndrome     Sleep apnea     Past Surgical History:   Procedure Laterality Date     BIOPSY ARTERY TEMPORAL Bilateral 8/1/2017    Procedure: BIOPSY ARTERY TEMPORAL;  Bilateral temporal  artery Biopsy;  Surgeon: Jj Tello MD;  Location: [facility] OR     CATARACT IOL, RT/LT Right      COLONOSCOPY       ESOPHAGOSCOPY, GASTROSCOPY, DUODENOSCOPY (EGD), COMBINED  1/15/2014    Procedure: COMBINED ESOPHAGOSCOPY, GASTROSCOPY, DUODENOSCOPY (EGD), BIOPSY SINGLE OR MULTIPLE;;  Surgeon: Winston Nixon MD;  Location: [facility] OR     LASER YAG CAPSULOTOMY Right 04/09/2018    right eye     PHACOEMULSIFICATION CLEAR CORNEA WITH STANDARD INTRAOCULAR LENS IMPLANT Right 2/20/2017    Procedure: PHACOEMULSIFICATION CLEAR CORNEA WITH STANDARD INTRAOCULAR LENS IMPLANT;  Surgeon: Mateo Gray MD;  Location:  [facility]     RETINAL REATTACHMENT Right      SURGICAL HISTORY OF -   8/2009    Both Eyelids-.     TONSILLECTOMY  as child       Social History   Substance Use Topics     Smoking status: Former Smoker     Packs/day: 2.00     Years: 15.00     Types: Cigarettes     Start date: 1/1/1968     Quit date: 1/1/1983     Smokeless tobacco: Former User      Comment: smoke free household.     Alcohol use No      Comment: Quit in 1986     Family History   Problem Relation Age of Onset     Respiratory Mother      copd     LUNG DISEASE Mother      Allergies Brother      Cancer Maternal Grandmother      HEART DISEASE Paternal Grandmother      Cerebrovascular Disease Father      Cancer Father      Other Cancer Father            Pneumonia Vaccine:Adults age 65+ who received their first dose of Pneumovax (PPSV23) prior to age 65 years: Should be given PCV 13 > 1 year after their most recent PPSV23 AND should be given a another dose of PPSV23 > 5 years after their most recent dose of PPSV23    Review of Systems   Constitutional: Negative for chills and fever.   HENT: Positive for hearing loss. Negative for congestion, ear pain and sore throat.    Eyes: Positive for visual disturbance. Negative for pain.   Respiratory: Positive for shortness of breath. Negative for cough.    Cardiovascular: Positive for peripheral edema.  "Negative for chest pain and palpitations.   Gastrointestinal: Negative for abdominal pain, constipation, diarrhea, heartburn, hematochezia and nausea.   Genitourinary: Positive for frequency, impotence and urgency. Negative for discharge, dysuria, genital sores and hematuria.   Musculoskeletal: Positive for arthralgias and myalgias. Negative for joint swelling.   Skin: Negative for rash.   Neurological: Positive for dizziness and weakness. Negative for headaches and paresthesias.   Psychiatric/Behavioral: Negative for mood changes. The patient is not nervous/anxious.      OBJECTIVE:   /74 (BP Location: Left arm, Patient Position: Chair, Cuff Size: Adult Regular)  Pulse 75  Temp 97.6  F (36.4  C) (Oral)  Resp 13  Ht 5' 9\" (1.753 m)  Wt 247 lb (112 kg)  SpO2 100%  BMI 36.48 kg/m2 Estimated body mass index is 36.48 kg/(m^2) as calculated from the following:    Height as of this encounter: 5' 9\" (1.753 m).    Weight as of this encounter: 247 lb (112 kg).  Physical Exam  GENERAL: healthy, alert and no distress  EYES: Eyes grossly normal to inspection, PERRL and conjunctivae and sclerae normal  HENT: ear canals and TM's normal, nose and mouth without ulcers or lesions  NECK: no adenopathy and thyroid normal to palpation  RESP: lungs clear to auscultation - no rales, rhonchi or wheezes  CV: regular rate and rhythm, normal S1 S2, no S3 or S4, no murmur, click or rub, no peripheral edema and peripheral pulses strong  ABDOMEN: soft, obese, nontender, no masses and bowel sounds normal  MS: no gross musculoskeletal defects noted, no edema  SKIN: no suspicious lesions or rashes  NEURO: Normal strength and tone, mentation intact and speech normal  PSYCH: mentation appears normal, affect normal/bright    ASSESSMENT / PLAN:   Rakan was seen today for physical and flu shot.    Diagnoses and all orders for this visit:    Medicare annual wellness visit, subsequent    Nodular lesion on surface of skin: Rt leg  -     " "DERMATOLOGY REFERRAL      End of Life Planning:  Patient currently has an advanced directive: Yes.  Practitioner is supportive of decision.    COUNSELING:  Reviewed preventive health counseling, as reflected in patient instructions       Regular exercise       Healthy diet/nutrition    BP Readings from Last 1 Encounters:   10/24/18 122/74     Estimated body mass index is 36.48 kg/(m^2) as calculated from the following:    Height as of this encounter: 5' 9\" (1.753 m).    Weight as of this encounter: 247 lb (112 kg).      Weight management plan: Discussed healthy diet and exercise guidelines and patient will follow up in 12 months in clinic to re-evaluate.     reports that he quit smoking about 35 years ago. His smoking use included Cigarettes. He started smoking about 50 years ago. He has a 30.00 pack-year smoking history. He has quit using smokeless tobacco.      Appropriate preventive services were discussed with this patient, including applicable screening as appropriate for cardiovascular disease, diabetes, osteopenia/osteoporosis, and glaucoma.  As appropriate for age/gender, discussed screening for colorectal cancer, prostate cancer, breast cancer, and cervical cancer. Checklist reviewing preventive services available has been given to the patient.    Reviewed patients plan of care and provided an AVS. The Basic Care Plan (routine screening as documented in Health Maintenance) for Rakan meets the Care Plan requirement. This Care Plan has been established and reviewed with the Patient.    Counseling Resources:  ATP IV Guidelines  Pooled Cohorts Equation Calculator  Breast Cancer Risk Calculator  FRAX Risk Assessment  ICSI Preventive Guidelines  Dietary Guidelines for Americans, 2010  USDA's MyPlate  ASA Prophylaxis  Lung CA Screening    Telly Lucio MD  [facility] [place]  Answers for HPI/ROS submitted by the patient on 10/23/2018   PHQ-2 Score: 0    "

## 2018-10-24 NOTE — PATIENT INSTRUCTIONS
[facility]    If you have any questions regarding to your visit please contact your care team:       Team Purple:   Clinic Hours Telephone Number   Dr. Capri Porter   7am-7pm  Monday - Thursday   7am-5pm  Fridays  [phone]  (Appointment scheduling available 24/7)   Urgent Care - [place] and [facility] - 11am-9pm Monday-Friday Saturday-Sunday- 9am-5pm   [place] - 5pm-9pm Monday-Friday Saturday-Sunday- 9am-5pm  [phone] - [place]  [phone] - [place]       What options do I have for a visit other than an office visit? We offer electronic visits (e-visits) and telephone visits, when medically appropriate.  Please check with your medical insurance to see if these types of visits are covered, as you will be responsible for any charges that are not paid by your insurance.      You can use Captify (secure electronic communication) to access to your chart, send your primary care provider a message, or make an appointment. Ask a team member how to get started.     For a price quote for your services, please call our Consumer Price Line at [phone] or our Imaging Cost estimation line at [phone] (for imaging tests).    Winston Jamison

## 2018-10-25 RX ORDER — PYRIDOSTIGMINE BROMIDE 60 MG/1
TABLET ORAL
Qty: 480 TABLET | Refills: 0 | Status: SHIPPED | OUTPATIENT
Start: 2018-10-25 | End: 2019-03-16

## 2018-10-30 ENCOUNTER — TELEPHONE (OUTPATIENT)
Dept: NEUROLOGY | Facility: CLINIC | Age: 73
End: 2018-10-30

## 2018-10-30 NOTE — TELEPHONE ENCOUNTER
[facility] Call Center    Phone Message    May a detailed message be left on voicemail: yes    Reason for Call: Medication Refill Request    Has the patient contacted the pharmacy for the refill? Yes   Name of medication being requested: pyridostigmine (MESTINON) 60 MG tablet  Provider who prescribed the medication: Dr. Harrison   Pharmacy: [facility] [address] [address]  Date medication is needed: 2 weeks    Pt is taking 3 a day.     Action Taken: Message routed to:  [facility] ([facility] Neurology

## 2018-11-12 DIAGNOSIS — K21.9 GASTROESOPHAGEAL REFLUX DISEASE WITHOUT ESOPHAGITIS: ICD-10-CM

## 2018-11-12 DIAGNOSIS — G50.0 TRIGEMINAL NEURALGIA: ICD-10-CM

## 2018-11-12 RX ORDER — PANTOPRAZOLE SODIUM 20 MG/1
20 TABLET, DELAYED RELEASE ORAL DAILY
Qty: 90 TABLET | Refills: 3 | Status: SHIPPED | OUTPATIENT
Start: 2018-11-12 | End: 2019-12-02

## 2018-11-12 NOTE — TELEPHONE ENCOUNTER
Signed Prescriptions:                        Disp   Refills    pantoprazole (PROTONIX) 20 MG [facility] tablet    90 tab*3        Sig: Take 1 tablet (20 mg) by mouth daily  Authorizing Provider: JILLIAN KNOTT  Ordering User: SILVIA ACOSTA RN refilled medication per [facility] Refill Protocol.     Silvia Acosta RN

## 2018-11-12 NOTE — TELEPHONE ENCOUNTER
Requested Prescriptions   Pending Prescriptions Disp Refills     pantoprazole (PROTONIX) 20 MG [facility] tablet 90 tablet 2    There is no refill protocol information for this order        Last Written Prescription Date:  4/16/2018  Last Fill Quantity: 90,  # refills: 2   Last office visit: 10/24/2018 with prescribing provider:  Naveed   Future Office Visit:   Next 5 appointments (look out 90 days)     Jan 07, 2019  8:15 AM CST   Return Visit with [facility]y MD   [facility] ([address]  [place] [address]   [phone]

## 2018-11-13 ENCOUNTER — OFFICE VISIT (OUTPATIENT)
Dept: OPHTHALMOLOGY | Facility: CLINIC | Age: 73
End: 2018-11-13
Payer: COMMERCIAL

## 2018-11-13 DIAGNOSIS — G70.00 MYASTHENIA GRAVIS, ACHR ANTIBODY POSITIVE (H): ICD-10-CM

## 2018-11-13 DIAGNOSIS — H52.13 MYOPIA OF BOTH EYES: ICD-10-CM

## 2018-11-13 DIAGNOSIS — Z96.1 PSEUDOPHAKIA OF RIGHT EYE: ICD-10-CM

## 2018-11-13 DIAGNOSIS — Z01.01 ENCOUNTER FOR EXAMINATION OF EYES AND VISION WITH ABNORMAL FINDINGS: Primary | ICD-10-CM

## 2018-11-13 DIAGNOSIS — E11.9 TYPE 2 DIABETES MELLITUS WITHOUT COMPLICATION, WITHOUT LONG-TERM CURRENT USE OF INSULIN (H): ICD-10-CM

## 2018-11-13 DIAGNOSIS — Z98.890 S/P BLEPHAROPLASTY: ICD-10-CM

## 2018-11-13 DIAGNOSIS — H04.123 DRY EYES: ICD-10-CM

## 2018-11-13 DIAGNOSIS — H52.203 ASTIGMATISM OF BOTH EYES, UNSPECIFIED TYPE: ICD-10-CM

## 2018-11-13 DIAGNOSIS — H40.053 BORDERLINE GLAUCOMA WITH OCULAR HYPERTENSION, BILATERAL: ICD-10-CM

## 2018-11-13 DIAGNOSIS — H52.4 PRESBYOPIA: ICD-10-CM

## 2018-11-13 DIAGNOSIS — Z86.69 H/O RD (RETINAL DETACHMENT): ICD-10-CM

## 2018-11-13 PROCEDURE — 92014 COMPRE OPH EXAM EST PT 1/>: CPT | Performed by: STUDENT IN AN ORGANIZED HEALTH CARE EDUCATION/TRAINING PROGRAM

## 2018-11-13 PROCEDURE — 92015 DETERMINE REFRACTIVE STATE: CPT | Performed by: STUDENT IN AN ORGANIZED HEALTH CARE EDUCATION/TRAINING PROGRAM

## 2018-11-13 RX ORDER — OXCARBAZEPINE 150 MG/1
TABLET, FILM COATED ORAL
Qty: 120 TABLET | Refills: 2 | OUTPATIENT
Start: 2018-11-13

## 2018-11-13 RX ORDER — TIMOLOL MALEATE 5 MG/ML
1 SOLUTION/ DROPS OPHTHALMIC 2 TIMES DAILY
Qty: 3 BOTTLE | Refills: 3 | Status: SHIPPED | OUTPATIENT
Start: 2018-11-13 | End: 2019-11-20

## 2018-11-13 RX ORDER — LATANOPROST 50 UG/ML
1 SOLUTION/ DROPS OPHTHALMIC AT BEDTIME
Qty: 7.5 ML | Refills: 3 | Status: SHIPPED | OUTPATIENT
Start: 2018-11-13 | End: 2019-11-20

## 2018-11-13 ASSESSMENT — TONOMETRY
OS_IOP_MMHG: 19
OD_IOP_MMHG: 20
IOP_METHOD: APPLANATION

## 2018-11-13 ASSESSMENT — REFRACTION_MANIFEST
OD_AXIS: 132
OD_ADD: +2.50
OD_CYLINDER: +1.50
OS_CYLINDER: +1.25
OS_ADD: +2.50
OS_SPHERE: -1.75
OD_SPHERE: -2.25
OS_AXIS: 018

## 2018-11-13 ASSESSMENT — REFRACTION_WEARINGRX
OD_SPHERE: -2.75
OS_SPHERE: -2.00
OS_CYLINDER: +1.75
OD_ADD: +3.25
OD_AXIS: 132
SPECS_TYPE: BIFOCAL
OS_AXIS: 019
OD_CYLINDER: +2.00
OS_ADD: +3.00

## 2018-11-13 ASSESSMENT — VISUAL ACUITY
OS_CC: 20/25
CORRECTION_TYPE: GLASSES
OD_CC: 20/20
OS_CC: 2-
OS_CC+: -1
METHOD: SNELLEN - LINEAR
OD_CC: 1

## 2018-11-13 ASSESSMENT — CUP TO DISC RATIO
OS_RATIO: 0.25
OD_RATIO: 0.1

## 2018-11-13 ASSESSMENT — EXTERNAL EXAM - RIGHT EYE: OD_EXAM: NORMAL

## 2018-11-13 ASSESSMENT — EXTERNAL EXAM - LEFT EYE: OS_EXAM: NORMAL

## 2018-11-13 ASSESSMENT — CONF VISUAL FIELD
OS_NORMAL: 1
OD_NORMAL: 1

## 2018-11-13 NOTE — MR AVS SNAPSHOT
"              After Visit Summary   11/13/2018    Rakan Nolasco    MRN: [id number]           Patient Information     Date Of Birth          1945        Visit Information        Provider Department      11/13/2018 8:00 AM Mateo Gray MD [facility]        Today's Diagnoses     Encounter for examination of eyes and vision with abnormal findings    -  1    Myopia of both eyes        Astigmatism of both eyes, unspecified type        Presbyopia        Type 2 diabetes mellitus without complication, without long-term current use of insulin (H)        Borderline glaucoma with ocular hypertension, bilateral        H/O RD (retinal detachment) OD s/p repair with PPV (AB)        Pseudophakia of right eye s/p YAG OD        Myasthenia gravis, AChR antibody positive (H)        S/P blepharoplasty        Dry eyes          Care Instructions    Continue Latanoprost (teal top) at bedtime both eyes    Continue timolol twice a day both eyes   Use artificial tears up to 4 times per day (Refresh Plus, Systane Balance, or generic artificial tears are ok. Avoid \"get the red out\" drops).  Glasses prescription given - optional     Mateo Gray MD  [phone]    Diabetes weakens the blood vessels all over the body, including the eyes. Damage to the blood vessels in the eyes can cause swelling or bleeding into part of the eye (called the retina). This is called diabetic retinopathy ([facility]-tin--[facility]-thee). If not treated, this disease can cause vision loss or blindness.   Symptoms may include blurred or distorted vision, but many people have no symptoms. It's important to see your eye doctor regularly to check for problems.   Early treatment and good control can help protect your vision. Here are the things you can do to help prevent vision loss:      1. Keep your blood sugar levels under tight control.      2. Bring high blood pressure under control.      3. No smoking.      4. Have yearly dilated eye " exams.            Follow-ups after your visit        Follow-up notes from your care team     Return in about 6 months (around 5/13/2019) for IOP check, HVF, OCT optic nerve.      Your next 10 appointments already scheduled     Dec 31, 2018  8:00 AM CST   LAB with [facility]   [facility] ([facility])    [address] [place] [id number]   [phone]           Please do not eat 10-12 hours before your appointment if you are coming in fasting for labs on lipids, cholesterol, or glucose (sugar). This does not apply to pregnant women. Water, hot tea and black coffee (with nothing added) are okay. Do not drink other fluids, diet soda or chew gum.            Jan 07, 2019  8:15 AM CST   Return Visit with MD Ariadne [place] [facility]vinash Hyatt ([facility])    [address] [place] [address]  [place] [address]   [phone]            Apr 01, 2019 10:20 AM CDT   (Arrive by 10:05 AM)   Return Myasthenia Gravis with Arpan Harrison MD   [facility] ([facility])    [address] [place] [id number] [phone]              Who to contact     If you have questions or need follow up information about today's clinic visit or your schedule please contact [facility] directly at [phone].  Normal or non-critical lab and imaging results will be communicated to you by MyChart, letter or phone within 4 business days after the clinic has received the results. If you do not hear from us within 7 days, please contact the clinic through MyChart or phone. If you have a critical or abnormal lab result, we will notify you by phone as soon as possible.  Submit refill requests through [employer] or call your pharmacy and they will forward the refill request to us. Please allow 3 business days for your refill to be completed.          Additional Information About Your Visit        MyChart Information      [employer] gives you secure access to your electronic health record. If you see a primary care provider, you can also send messages to your care team and make appointments. If you have questions, please call your primary care clinic.  If you do not have a primary care provider, please call [phone] and they will assist you.        Care EveryWhere ID     This is your Care EveryWhere ID. This could be used by other organizations to access your [facility] medical records  [id number]         Blood Pressure from Last 3 Encounters:   10/24/18 122/74   10/01/18 135/73   08/14/18 136/74    Weight from Last 3 Encounters:   10/24/18 112 kg (247 lb)   10/01/18 114.3 kg (252 lb)   08/14/18 113.9 kg (251 lb)              We Performed the Following     EYE EXAM (SIMPLE-NONBILLABLE)     REFRACTIVE STATUS          Today's Medication Changes          These changes are accurate as of 11/13/18  8:48 AM.  If you have any questions, ask your nurse or doctor.               These medicines have changed or have updated prescriptions.        Dose/Directions    * predniSONE 20 MG tablet   Commonly known as:  DELTASONE   This may have changed:  how much to take   Used for:  Myasthenia gravis, AChR antibody positive (H)        Dose:  20 mg   Take 1 tablet (20 mg) by mouth See Admin Instructions   Quantity:  45 tablet   Refills:  1       * predniSONE 5 MG tablet   Commonly known as:  DELTASONE   This may have changed:  Another medication with the same name was changed. Make sure you understand how and when to take each.   Used for:  Myasthenia gravis, AChR antibody positive (H)        Take 2 tablets (10 mg) every other day.   Quantity:  90 tablet   Refills:  0       timolol 0.5 % ophthalmic solution   Commonly known as:  TIMOPTIC   This may have changed:    - how to take this  - when to take this   Used for:  Borderline glaucoma with ocular hypertension, bilateral   Changed by:  Mateo Gray MD        Dose:  1 drop   Place 1 drop into  the right eye 2 times daily   Quantity:  3 Bottle   Refills:  3       * Notice:  This list has 2 medication(s) that are the same as other medications prescribed for you. Read the directions carefully, and ask your doctor or other care provider to review them with you.         Where to get your medicines      These medications were sent to [facility] - [place], [place] - [address]  [address], [place] [address]     Phone:  [phone]     latanoprost 0.005 % ophthalmic solution    timolol 0.5 % ophthalmic solution                Primary Care Provider Office Phone # Fax #    Telly Lucio [phone]       [address]  [place] [address]        Equal Access to Services     [facility] : Hadii estefania blankenshipo Soderrick, waaxda luqadaha, qaybta kaalmada adeegyada, gino lion . So [facility] [phone].    ATENCIÓN: Si habla español, tiene a coates disposición servicios gratuitos de asistencia lingüística. Llame al [phone].    We comply with applicable federal civil rights laws and [place] laws. We do not discriminate on the basis of race, color, national origin, age, disability, sex, sexual orientation, or gender identity.            Thank you!     Thank you for choosing [facility]  for your care. Our goal is always to provide you with excellent care. Hearing back from our patients is one way we can continue to improve our services. Please take a few minutes to complete the written survey that you may receive in the mail after your visit with us. Thank you!             Your Updated Medication List - Protect others around you: Learn how to safely use, store and throw away your medicines at www.disposemymeds.org.          This list is accurate as of 11/13/18  8:48 AM.  Always use your most recent med list.                   Brand Name Dispense Instructions for use Diagnosis    aspirin 81 MG tablet      Take 1 tablet by mouth daily.         atorvastatin 20 MG tablet    LIPITOR    90 tablet    TAKE ONE TABLET BY MOUTH ONE TIME DAILY    Hyperlipidemia LDL goal <130       blood glucose monitoring lancets     100 each    CHECK BLOOD SUGAR EVERY MORNING    Type 2 diabetes mellitus with hyperglycemia, without long-term current use of insulin (H)       blood glucose monitoring meter device kit    no brand specified    1 kit    Use to test blood sugar 1 times daily or as directed.    Type 2 diabetes mellitus with hyperglycemia, without long-term current use of insulin (H)       blood glucose monitoring test strip    no brand specified    50 strip    laneUse to test blood sugars 1 times daily or as directed    Type 2 diabetes mellitus with hyperglycemia, without long-term current use of insulin (H)       clonazePAM 0.5 MG tablet    klonoPIN     Take 0.5 mg by mouth. Take 1 tablet by mouth daily at bedtime        latanoprost 0.005 % ophthalmic solution    XALATAN    7.5 mL    Place 1 drop into both eyes At Bedtime    Borderline glaucoma with ocular hypertension, bilateral       lisinopril 20 MG tablet    PRINIVIL/ZESTRIL    90 tablet    Take 1 tablet (20 mg) by mouth daily    Hypertension goal BP (blood pressure) < 140/90       metFORMIN 500 MG 24 hr tablet    GLUCOPHAGE-XR    180 tablet    TAKE 1 TABLET BY MOUTH 2 TIMES DAILY WITH MEALS.    Prediabetes       OXcarbazepine 150 MG tablet    TRILEPTAL    360 tablet    Take 2 tablets (300 mg) by mouth 2 times daily    Trigeminal neuralgia       oxybutynin 5 MG 24 hr tablet    DITROPAN-XL    90 tablet    Take 1 tablet (5 mg) by mouth daily    Urgency incontinence       pantoprazole 20 MG EC tablet    PROTONIX    90 tablet    Take 1 tablet (20 mg) by mouth daily    Gastroesophageal reflux disease without esophagitis       * predniSONE 20 MG tablet    DELTASONE    45 tablet    Take 1 tablet (20 mg) by mouth See Admin Instructions    Myasthenia gravis, AChR antibody positive (H)       * predniSONE 5 MG tablet     DELTASONE    90 tablet    Take 2 tablets (10 mg) every other day.    Myasthenia gravis, AChR antibody positive (H)       pyridostigmine 60 MG tablet    MESTINON    480 tablet    TAKE 1 TABLET BY MOUTH 4 TIMES DAILY    Ocular myasthenia (H), Long-term use of immunosuppressant medication       timolol 0.5 % ophthalmic solution    TIMOPTIC    3 Bottle    Place 1 drop into the right eye 2 times daily    Borderline glaucoma with ocular hypertension, bilateral       * Notice:  This list has 2 medication(s) that are the same as other medications prescribed for you. Read the directions carefully, and ask your doctor or other care provider to review them with you.

## 2018-11-13 NOTE — PROGRESS NOTES
" Current Eye Medications:  Timolol right eye twice a day, Latanoprost both eyes every evening.   Last drops:  5:15am, 9pm.     Subjective:  Comprehensive Eye Exam.  Patient is here for a Diabetic Eye Exam.  Patient complains of intermittent blurry vision, especially in his left eye when attempting to see something up close.  Occasionally he is unable to see the prices on the shelves, when he is  shopping.  Distance vision appears to be about the same, with correction, in each eye.    Lab Results   Component Value Date    A1C 6.8 08/14/2018    A1C 7.4 06/04/2018    A1C 6.7 01/16/2018    A1C 6.7 12/20/2017    A1C 7.5 10/20/2017      Objective:  See Ophthalmology Exam.       Assessment:  Rakan Nolasco is a 73 year old male who presents with:     Type 2 diabetes mellitus without complication, without long-term current use of insulin (H) Negative diabetic retinopathy      Borderline glaucoma with ocular hypertension, bilateral Intraocular pressure 20/19 today.       H/O RD (retinal detachment) OD s/p repair with PPV (AB) Stable     Pseudophakia of right eye s/p YAG OD      Myasthenia gravis, AChR antibody positive (H)      S/P blepharoplasty      Dry eyes        Plan:  Continue Latanoprost (teal top) at bedtime both eyes    Continue timolol twice a day both eyes   Use artificial tears up to 4 times per day (Refresh Plus, Systane Balance, or generic artificial tears are ok. Avoid \"get the red out\" drops).  Glasses prescription given - optional     Mateo Gray MD  [phone]      "

## 2018-11-13 NOTE — LETTER
"    11/13/2018         RE: Rakan Nolasco  [address] [address]        Dear Colleague,    Thank you for referring your patient, Rakan Nolasco, to the [facility].     Her eye exam is stable.  Please see a copy of my visit note below.     Current Eye Medications:  Timolol right eye twice a day, Latanoprost both eyes every evening.   Last drops:  5:15am, 9pm.     Subjective:  Comprehensive Eye Exam.  Patient is here for a Diabetic Eye Exam.  Patient complains of intermittent blurry vision, especially in his left eye when attempting to see something up close.  Occasionally he is unable to see the prices on the shelves, when he is  shopping.  Distance vision appears to be about the same, with correction, in each eye.    Lab Results   Component Value Date    A1C 6.8 08/14/2018    A1C 7.4 06/04/2018    A1C 6.7 01/16/2018    A1C 6.7 12/20/2017    A1C 7.5 10/20/2017      Objective:  See Ophthalmology Exam.       Assessment:  Rakan Nolasco is a 73 year old male who presents with:     Type 2 diabetes mellitus without complication, without long-term current use of insulin (H) Negative diabetic retinopathy      Borderline glaucoma with ocular hypertension, bilateral Intraocular pressure 20/19 today.       H/O RD (retinal detachment) OD s/p repair with PPV (AB) Stable     Pseudophakia of right eye s/p YAG OD      Myasthenia gravis, AChR antibody positive (H)      S/P blepharoplasty      Dry eyes        Plan:  Continue Latanoprost (teal top) at bedtime both eyes    Continue timolol twice a day both eyes   Use artificial tears up to 4 times per day (Refresh Plus, Systane Balance, or generic artificial tears are ok. Avoid \"get the red out\" drops).  Glasses prescription given - optional     Mateo Gray MD  [phone]        Again, thank you for allowing me to participate in the care of your patient.        Sincerely,        Mateo Gray MD    "

## 2018-12-04 DIAGNOSIS — G50.0 TRIGEMINAL NEURALGIA: ICD-10-CM

## 2018-12-04 RX ORDER — OXCARBAZEPINE 150 MG/1
TABLET, FILM COATED ORAL
Qty: 120 TABLET | Refills: 2 | Status: SHIPPED | OUTPATIENT
Start: 2018-12-04 | End: 2019-02-07

## 2018-12-18 DIAGNOSIS — C61 MALIGNANT NEOPLASM OF PROSTATE (H): ICD-10-CM

## 2018-12-18 DIAGNOSIS — R73.03 PREDIABETES: ICD-10-CM

## 2018-12-18 LAB — PSA SERPL-MCNC: 0.78 UG/L (ref 0–4)

## 2018-12-18 PROCEDURE — 36415 COLL VENOUS BLD VENIPUNCTURE: CPT | Performed by: UROLOGY

## 2018-12-18 PROCEDURE — 84153 ASSAY OF PSA TOTAL: CPT | Performed by: UROLOGY

## 2018-12-19 RX ORDER — METFORMIN HCL 500 MG
500 TABLET, EXTENDED RELEASE 24 HR ORAL 2 TIMES DAILY WITH MEALS
Qty: 180 TABLET | Refills: 0 | Status: SHIPPED | OUTPATIENT
Start: 2018-12-19 | End: 2019-03-20

## 2018-12-19 NOTE — TELEPHONE ENCOUNTER
Signed Prescriptions:                        Disp   Refills    metFORMIN (GLUCOPHAGE-XR) 500 MG 24 hr tab*180 ta*0        Sig: Take 1 tablet (500 mg) by mouth 2 times daily (with           meals)  Authorizing Provider: JILLIAN KNOTT  Ordering User: RAMONA MCDANIEL RN refilled medication per [facility] Refill Protocol.     Ramona Mcdaniel RN

## 2018-12-31 DIAGNOSIS — E11.65 TYPE 2 DIABETES MELLITUS WITH HYPERGLYCEMIA, WITHOUT LONG-TERM CURRENT USE OF INSULIN (H): ICD-10-CM

## 2019-01-07 ENCOUNTER — OFFICE VISIT (OUTPATIENT)
Dept: UROLOGY | Facility: CLINIC | Age: 74
End: 2019-01-07
Payer: COMMERCIAL

## 2019-01-07 VITALS — SYSTOLIC BLOOD PRESSURE: 130 MMHG | RESPIRATION RATE: 14 BRPM | DIASTOLIC BLOOD PRESSURE: 66 MMHG | HEART RATE: 68 BPM

## 2019-01-07 DIAGNOSIS — C61 MALIGNANT NEOPLASM OF PROSTATE (H): Primary | ICD-10-CM

## 2019-01-07 DIAGNOSIS — N39.41 URGENCY INCONTINENCE: ICD-10-CM

## 2019-01-07 PROCEDURE — 99213 OFFICE O/P EST LOW 20 MIN: CPT | Performed by: UROLOGY

## 2019-01-07 RX ORDER — OXYBUTYNIN CHLORIDE 5 MG/1
5 TABLET, EXTENDED RELEASE ORAL DAILY
Qty: 90 TABLET | Refills: 3 | Status: SHIPPED | OUTPATIENT
Start: 2019-01-07 | End: 2020-02-14

## 2019-01-07 NOTE — PROGRESS NOTES
Chief Complaint   Patient presents with     RECHECK     psa       Rakan Nolasco is a 73 year old male who presents today for follow up of   Chief Complaint   Patient presents with     RECHECK     psa    f/u for prostate cancer s/p cryotherapy last year.  His psa has gone down from 8.05 to 0.34 and up to 0.78.  He has some urinary urgency with incontinence.  His urinary flow is good.    Current Outpatient Medications   Medication Sig Dispense Refill     aspirin 81 MG tablet Take 1 tablet by mouth daily.       atorvastatin (LIPITOR) 20 MG tablet TAKE ONE TABLET BY MOUTH ONE TIME DAILY 90 tablet 3     blood glucose (ONETOUCH VERIO IQ) test strip USE TO TEST BLOOD SUGARS 1 TIMES DAILY OR AS DIRECTED 50 strip 5     blood glucose monitoring (NO BRAND SPECIFIED) meter device kit Use to test blood sugar 1 times daily or as directed. 1 kit 0     blood glucose monitoring (ONE TOUCH DELICA) lancets CHECK BLOOD SUGAR EVERY MORNING 100 each 1     ClonAZEPAM (KLONOPIN) 0.5 MG tablet Take 0.5 mg by mouth. Take 1 tablet by mouth daily at bedtime       latanoprost (XALATAN) 0.005 % ophthalmic solution Place 1 drop into both eyes At Bedtime 7.5 mL 3     lisinopril (PRINIVIL/ZESTRIL) 20 MG tablet Take 1 tablet (20 mg) by mouth daily 90 tablet 3     metFORMIN (GLUCOPHAGE-XR) 500 MG 24 hr tablet Take 1 tablet (500 mg) by mouth 2 times daily (with meals) 180 tablet 0     OXcarbazepine (TRILEPTAL) 150 MG tablet TAKE 2 TABLETS BY MOUTH IN THE MORNING AND 3 TABLETS IN THE EVENING 120 tablet 2     OXcarbazepine (TRILEPTAL) 150 MG tablet Take 2 tablets (300 mg) by mouth 2 times daily 360 tablet 1     oxybutynin (DITROPAN-XL) 5 MG 24 hr tablet Take 1 tablet (5 mg) by mouth daily 90 tablet 1     pantoprazole (PROTONIX) 20 MG EC tablet Take 1 tablet (20 mg) by mouth daily 90 tablet 3     predniSONE (DELTASONE) 20 MG tablet Take 1 tablet (20 mg) by mouth See Admin Instructions (Patient taking differently: Take 15 mg by mouth See Admin  Instructions ) 45 tablet 1     predniSONE (DELTASONE) 5 MG tablet Take 2 tablets (10 mg) every other day. 90 tablet 0     pyridostigmine (MESTINON) 60 MG tablet TAKE 1 TABLET BY MOUTH 4 TIMES DAILY 480 tablet 0     timolol (TIMOPTIC) 0.5 % ophthalmic solution Place 1 drop into the right eye 2 times daily 3 Bottle 3     Allergies   Allergen Reactions     Azathioprine Shortness Of Breath     Was hospitalized from it. Also had high fever, chills, and shortness of breath.     Ciprofloxacin Muscle Pain (Myalgia)     Muscle pain  Other reaction(s): Myalgia  Other reaction(s): Myalgia     Ropinirole      Leg pan  GI  Weakness; ? sycope  Other reaction(s): Leg Pain     Ropinirole Hcl GI Disturbance     Weakness;? syncope      Past Medical History:   Diagnosis Date     Arthritis          BMI 31.0-31.9,adult      Cancer (H) 01/10/18    Prostate     Diabetes (H) 01/10/18     Diverticulosis     Colonoscopy 8/2008     Fatty liver     see US  5/2012     GERD (gastroesophageal reflux disease)      Glaucoma (increased eye pressure)      HTN (hypertension)      Hyperlipidemia LDL goal < 130     age, htn, fhx     Irritable bowel      Nonsenile cataract      ROSIE (obstructive sleep apnea) 01/2011    Using CPAP;      Prediabetes      Restless leg syndrome      Trigeminal neuralgia pain 1/4/2012     Past Surgical History:   Procedure Laterality Date     BIOPSY ARTERY TEMPORAL Bilateral 8/1/2017    Procedure: BIOPSY ARTERY TEMPORAL;  Bilateral temporal artery Biopsy;  Surgeon: Jj Tello MD;  Location: [facility] OR     CATARACT IOL, RT/LT Right      COLONOSCOPY       ESOPHAGOSCOPY, GASTROSCOPY, DUODENOSCOPY (EGD), COMBINED  1/15/2014    Procedure: COMBINED ESOPHAGOSCOPY, GASTROSCOPY, DUODENOSCOPY (EGD), BIOPSY SINGLE OR MULTIPLE;;  Surgeon: Winston Nixon MD;  Location: [facility] OR     LASER YAG CAPSULOTOMY Right 04/09/2018    right eye     PHACOEMULSIFICATION CLEAR CORNEA WITH STANDARD INTRAOCULAR LENS IMPLANT  Right 2/20/2017    Procedure: PHACOEMULSIFICATION CLEAR CORNEA WITH STANDARD INTRAOCULAR LENS IMPLANT;  Surgeon: Mateo Gray MD;  Location:  [facility]     RETINAL REATTACHMENT Right      SURGICAL HISTORY OF -   8/2009    Both Eyelids-.     TONSILLECTOMY  as child     Family History   Problem Relation Age of Onset     Respiratory Mother         copd     LUNG DISEASE Mother      Allergies Brother      Cancer Maternal Grandmother      Heart Disease Paternal Grandmother      Cerebrovascular Disease Father      Cancer Father      Other Cancer Father      Social History     Social History     Marital status: Single     Spouse name: N/A     Number of children: 0     Years of education: 12     Occupational History     Retired 3/2012      [employer] ([employer])     Social History Main Topics     Smoking status: Former Smoker     Packs/day: 2.00     Years: 15.00     Types: Cigarettes     Start date: 1/1/1968     Quit date: 1/1/1983     Smokeless tobacco: Former User      Comment: smoke free household.     Alcohol use No      Comment: Quit in 1986     Drug use: No     Sexual activity: Not Currently     Partners: Female      Comment: 4/2010  No girlfriend at this time.     Other Topics Concern     Parent/Sibling W/ Cabg, Mi Or Angioplasty Before 65f 55m? No     Social History Narrative       REVIEW OF SYSTEMS  =================  C: NEGATIVE for fever, chills, change in weight  I: NEGATIVE for worrisome rashes, moles or lesions  E/M: NEGATIVE for ear, mouth and throat problems  R: NEGATIVE for significant cough or SHORTNESS OF BREATH,   CV: NEGATIVE for chest pain, palpitations or peripheral edema  GI: NEGATIVE for nausea, abdominal pain, heartburn, or change in bowel habits  NEURO: NEGATIVE any motor/sensory changes  PSYCH: NEGATIVE for recent mood disorder    Physical Exam:  /66 (BP Location: Right arm, Patient Position: Chair, Cuff Size: Adult Large)   Pulse 68   Resp 14    Patient is pleasant,  in no acute distress, good general condition.  Lung: no evidence of respiratory distress    Abdomen: Soft, nondistended, non tender. No masses. No rebound or guarding.   Exam: empty rectal fosssa  Skin: Warm and dry.  No redness.  Psych: normal mood and affect  Neuro: alert and oriented    Assessment/Plan:   (C61) Malignant neoplasm of prostate (H)  (primary encounter diagnosis)  Comment: s/p cryotherapy.  psa up slightly  Plan: recheck psa in six months    (N39.41) Urgency incontinence  Comment:    Plan: cont ditropan

## 2019-02-06 ENCOUNTER — PATIENT OUTREACH (OUTPATIENT)
Dept: CARE COORDINATION | Facility: CLINIC | Age: 74
End: 2019-02-06

## 2019-02-06 NOTE — PATIENT INSTRUCTIONS
DC'd  from [facility] on 2/5/19 to home self care   Primary Problem: None  LACE Score: 56

## 2019-02-07 DIAGNOSIS — G50.0 TRIGEMINAL NEURALGIA: ICD-10-CM

## 2019-02-07 ASSESSMENT — ACTIVITIES OF DAILY LIVING (ADL): DEPENDENT_IADLS:: INDEPENDENT

## 2019-02-07 NOTE — PROGRESS NOTES
Clinic Care Coordination Contact    Clinic Care Coordination Contact  OUTREACH    Referral Information:  Referral Source: ED Follow-Up    Primary Diagnosis: Injury/Fall  Chief Complaint   Patient presents with     Clinic Care Coordination - Post Hospital     Dc'd from [facility] on 2/5/19 to home self care    [place] Utilization: ED visit on 2/5/19 at [facility] ED  Clinic Utilization  Difficulty keeping appointments:: No  Compliance Concerns: No  No-Show Concerns: No  No PCP office visit in Past Year: No  Utilization    Last refreshed: 2/6/2019  3:50 PM:  Hospital Admissions 0           Last refreshed: 2/6/2019  3:50 PM:  ED Visits 1           Last refreshed: 2/6/2019  3:50 PM:  No Show Count (past year) 0              Current as of: 2/6/2019  3:50 PM          Clinical Concerns:    Current Medical Concerns:  Patient fell on ice, hitting his head and left side of body.    Patient states he does not remember the incident or for at least 15 minutes following.  He states he apparently moved his truck out of the alley, but he does not recall.     Patient denies headache, dizziness, blurred vision or nausea.  He states his head is sore to the touch but otherwise not noticeable. He states his ribs are the most painful, but even that is only when he is active. He denies the need to take anything to relive pain.        Current Behavioral Concerns: NA      Education Provided to patient: Use ice and heat on ribs. If any change in status (headache, nausea, vision changes, dizziness, etc.) patient is to be seen.       Pain  Pain (GOAL):: No    Health Maintenance Reviewed: Not assessed    Clinical Pathway: None    Medication Management:  Manages his own medications     Functional Status:  Dependent ADLs:: Independent  Dependent IADLs:: Independent  Bed or wheelchair confined:: No  Mobility Status: Independent  Fallen 2 or more times in the past year?: No  Any fall with injury in the past year?: Yes    Living  Situation:  Current living arrangement:: I live alone, I live in a private home  Type of residence:: Private home - no stairs    Diet/Exercise/Sleep:  Diet:: Regular  Inadequate nutrition (GOAL):: No  Food Insecurity: No  Tube Feeding: No  Exercise:: Currently not exercising  Inadequate activity/exercise (GOAL):: No  Significant changes in sleep pattern (GOAL): No    Transportation:  Transportation concerns (GOAL):: No  Transportation means:: Accessible car, Regular car     Psychosocial:  Protestant or spiritual beliefs that impact treatment:: No  Mental health DX:: No  Mental health management concern (GOAL):: No  Informal Support system:: Friends     Financial/Insurance:   Financial/Insurance concerns (GOAL):: No    Resources and Interventions:  Current Resources:    ;   Community Resources: None  Supplies used at home:: None  Equipment Currently Used at Home: none    Advance Care Plan/Directive  Advanced Care Plans/Directives on file:: Yes  Type Advanced Care Plans/Directives: Advanced Directive - On File    Referrals Placed: None     Patient/Caregiver understanding: Expresses understanding   Future Appointments              In 1 month Arpan Harrison MD [facility] Neurology, [facility]    In 3 months Mateo Gray MD [facility]    In 4 months [facility]    In 5 months Josh Emery MD [facility]      Plan: Patient agrees to be seen if any changes in status occur.     Clinic care coordination is not indicated at this time.     Lolly Rosario RN, CCM - Care Coordinator     2/7/2019    10:05 AM  [phone]

## 2019-02-08 RX ORDER — OXCARBAZEPINE 150 MG/1
TABLET, FILM COATED ORAL
Qty: 150 TABLET | Refills: 2 | Status: SHIPPED | OUTPATIENT
Start: 2019-02-08 | End: 2019-05-07

## 2019-02-14 DIAGNOSIS — E11.65 TYPE 2 DIABETES MELLITUS WITH HYPERGLYCEMIA, WITHOUT LONG-TERM CURRENT USE OF INSULIN (H): ICD-10-CM

## 2019-02-20 ENCOUNTER — OFFICE VISIT (OUTPATIENT)
Dept: FAMILY MEDICINE | Facility: CLINIC | Age: 74
End: 2019-02-20
Payer: COMMERCIAL

## 2019-02-20 VITALS
OXYGEN SATURATION: 99 % | WEIGHT: 241 LBS | RESPIRATION RATE: 20 BRPM | DIASTOLIC BLOOD PRESSURE: 60 MMHG | HEART RATE: 69 BPM | TEMPERATURE: 96.8 F | BODY MASS INDEX: 35.7 KG/M2 | SYSTOLIC BLOOD PRESSURE: 122 MMHG | HEIGHT: 69 IN

## 2019-02-20 DIAGNOSIS — E78.5 HYPERLIPIDEMIA LDL GOAL <100: ICD-10-CM
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
## 2019-02-20 DIAGNOSIS — M31.6 TEMPORAL ARTERITIS (H): ICD-10-CM

## 2019-02-20 DIAGNOSIS — K21.9 GASTROESOPHAGEAL REFLUX DISEASE, ESOPHAGITIS PRESENCE NOT SPECIFIED: ICD-10-CM

## 2019-02-20 DIAGNOSIS — R42 DIZZINESS ON STANDING: Primary | ICD-10-CM

## 2019-02-20 DIAGNOSIS — G70.00 MYASTHENIA GRAVIS, ACHR ANTIBODY POSITIVE (H): ICD-10-CM

## 2019-02-20 DIAGNOSIS — E66.01 MORBID OBESITY (H): ICD-10-CM

## 2019-02-20 DIAGNOSIS — E11.65 TYPE 2 DIABETES MELLITUS WITH HYPERGLYCEMIA, WITHOUT LONG-TERM CURRENT USE OF INSULIN (H): ICD-10-CM
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
## 2019-02-20 LAB
CREAT UR-MCNC: 98 MG/DL
HBA1C MFR BLD: 6.7 % (ref 0–5.6)
MICROALBUMIN UR-MCNC: 7 MG/L
MICROALBUMIN/CREAT UR: 6.88 MG/G CR (ref 0–17)

## 2019-02-20 PROCEDURE — 99207 C FOOT EXAM  NO CHARGE: CPT | Performed by: FAMILY MEDICINE

## 2019-02-20 PROCEDURE — 82043 UR ALBUMIN QUANTITATIVE: CPT | Performed by: FAMILY MEDICINE

## 2019-02-20 PROCEDURE — 83036 HEMOGLOBIN GLYCOSYLATED A1C: CPT | Performed by: FAMILY MEDICINE

## 2019-02-20 PROCEDURE — 99214 OFFICE O/P EST MOD 30 MIN: CPT | Performed by: FAMILY MEDICINE

## 2019-02-20 PROCEDURE — 36415 COLL VENOUS BLD VENIPUNCTURE: CPT | Performed by: FAMILY MEDICINE

## 2019-02-20 RX ORDER — METFORMIN HCL 500 MG
500 TABLET, EXTENDED RELEASE 24 HR ORAL 2 TIMES DAILY WITH MEALS
Qty: 180 TABLET | Refills: 0 | Status: CANCELLED | OUTPATIENT
Start: 2019-02-20

## 2019-02-20 ASSESSMENT — MIFFLIN-ST. JEOR: SCORE: 1843.16

## 2019-02-20 NOTE — PATIENT INSTRUCTIONS
Orthostatics:   Older Vitals  2/20/19 9:09 AM  2/20/19 9:10 AM  2/20/19 9:12 AM     Orthostatic BP  105/64   121/60   123/74     BP Location  Left arm   Left arm   Left arm     Patient Position  Sitting   Standing   Supine     Cuff Size  Adult Large   Adult Large   Adult Large     Orthostatic Pulse  63   64   72         Problem related to social environment/Housing problems/Problems with primary support

## 2019-02-20 NOTE — PROGRESS NOTES
"  SUBJECTIVE:   Rakan Nolasco is a 73 year old male who presents to clinic today for the following health issues:    Dizziness and Shakiness x started after diagnosis of Myasthenia Gravis   This occurs usually when standing; gets shaky all over the body lasts 1-2 minutes.  Occurs ones   Has a funny feeling tat goes through the whole body; shakiness and dizziness mainly   feels a little light headed, no palpitations or diaphoresis.   Also when looking up gets light headed     Older Vitals  2/20/19 9:09 AM  2/20/19 9:10 AM  2/20/19 9:12 AM     Orthostatic BP  105/64   121/60   123/74     BP Location  Left arm   Left arm   Left arm     Patient Position  Sitting   Standing   Supine     Cuff Size  Adult Large   Adult Large   Adult Large     Orthostatic Pulse  63   64   72          ED/UC Followup:    Facility:  [facility]   Date of visit: 02/05/2019  Reason for visit: Head and ribs injury  Current Status: feels \"ok\" now   Fall: chest wall pain   Doing better except for pain in the left costal area especially at night.      Diabetes Follow-up    Patient is checking blood sugars: once daily.  Results are as follows:         Before Breakfast - 114 to 117    Diabetic concerns: None     Symptoms of hypoglycemia (low blood sugar): none     Paresthesias (numbness or burning in feet) or sores: Yes numbness in feet at night     Date of last diabetic eye exam: 11/13/2018 with Dr. Gary    Diabetes Management Resources    Hyperlipidemia Follow-Up      Rate your low fat/cholesterol diet?: fair    Taking statin?  Yes, possible muscle aches from statin    Other lipid medications/supplements?:  none    Hypertension Follow-up      Outpatient blood pressures are being checked at home.  Results are 132/70 to 143/68.    Low Salt Diet: no added salt    BP Readings from Last 2 Encounters:   02/20/19 122/60   01/07/19 130/66     Hemoglobin A1C (%)   Date Value   08/14/2018 6.8 (H)   06/04/2018 7.4 (H)     LDL Cholesterol Calculated (mg/dL) "   Date Value   08/14/2018 44   09/06/2017 71     GERD:   Patient wants to hold PPI.    Obesity:    Trending down  Wt Readings from Last 4 Encounters:   02/20/19 109.3 kg (241 lb)   10/24/18 112 kg (247 lb)   10/01/18 114.3 kg (252 lb)   08/14/18 113.9 kg (251 lb)         Amount of exercise or physical activity: 7 days/week for an average of 30 minutes    Problems taking medications regularly: No    Medication side effects: none    Diet: low salt    Problem list and histories reviewed & adjusted, as indicated.  Additional history: as documented    Current Outpatient Medications   Medication Sig Dispense Refill     aspirin 81 MG tablet Take 1 tablet by mouth daily.       atorvastatin (LIPITOR) 20 MG tablet TAKE ONE TABLET BY MOUTH ONE TIME DAILY 90 tablet 3     blood glucose (ONETOUCH VERIO IQ) test strip USE TO TEST BLOOD SUGARS 1 TIMES DAILY OR AS DIRECTED 50 strip 5     blood glucose monitoring (NO BRAND SPECIFIED) meter device kit Use to test blood sugar 1 times daily or as directed. 1 kit 0     blood glucose monitoring (ONE TOUCH DELICA) lancets CHECK BLOOD SUGAR EVERY MORNING 100 each 1     ClonAZEPAM (KLONOPIN) 0.5 MG tablet Take 0.5 mg by mouth. Take 1 tablet by mouth daily at bedtime       latanoprost (XALATAN) 0.005 % ophthalmic solution Place 1 drop into both eyes At Bedtime 7.5 mL 3     lisinopril (PRINIVIL/ZESTRIL) 20 MG tablet Take 1 tablet (20 mg) by mouth daily 90 tablet 3     metFORMIN (GLUCOPHAGE-XR) 500 MG 24 hr tablet Take 1 tablet (500 mg) by mouth 2 times daily (with meals) 180 tablet 0     OXcarbazepine (TRILEPTAL) 150 MG tablet Take 2 tablets (300 mg) by mouth 2 times daily 360 tablet 1     oxybutynin ER (DITROPAN-XL) 5 MG 24 hr tablet Take 1 tablet (5 mg) by mouth daily 90 tablet 3     pantoprazole (PROTONIX) 20 MG EC tablet Take 1 tablet (20 mg) by mouth daily 90 tablet 3     predniSONE (DELTASONE) 5 MG tablet Take 2 tablets (10 mg) every other day. (Patient taking differently: Take 10 mg by  "mouth every other day Take 2 tablets (10 mg) every other day..) 90 tablet 0     pyridostigmine (MESTINON) 60 MG tablet TAKE 1 TABLET BY MOUTH 4 TIMES DAILY (Patient taking differently: TAKE 1 TABLET BY MOUTH 3 TIMES DAILY) 480 tablet 0     timolol (TIMOPTIC) 0.5 % ophthalmic solution Place 1 drop into the right eye 2 times daily 3 Bottle 3     OXcarbazepine (TRILEPTAL) 150 MG tablet TAKE 2 TABLETS BY MOUTH IN THE MORNING AND 3 TABLETS IN THE EVENING (Patient not taking: Reported on 2/20/2019) 150 tablet 2     predniSONE (DELTASONE) 20 MG tablet Take 1 tablet (20 mg) by mouth See Admin Instructions (Patient taking differently: Take 15 mg by mouth See Admin Instructions ) 45 tablet 1       Reviewed and updated as needed this visit by clinical staff  Tobacco  Allergies  Meds  Med Hx  Surg Hx  Fam Hx  Soc Hx      Reviewed and updated as needed this visit by Provider      ROS:  Constitutional, HEENT, cardiovascular, pulmonary, gi and gu systems are negative, except as otherwise noted.    OBJECTIVE:     /60   Pulse 69   Temp 96.8  F (36  C) (Oral)   Resp 20   Ht 1.76 m (5' 9.29\")   Wt 109.3 kg (241 lb)   SpO2 99%   BMI 35.29 kg/m     Body mass index is 35.29 kg/m .  GENERAL: healthy, alert and no distress  NECK: no adenopathy and thyroid normal to palpation  RESP: lungs clear to auscultation - no rales, rhonchi or wheezes  CV: regular rate and rhythm, normal S1 S2, no S3 or S4, no murmur, click or rub, no peripheral edema.  ABDOMEN: soft, nontender, no masses and bowel sounds normal  MS: no gross musculoskeletal defects noted, no edema  Diabetic foot exam: reduced DP and PT pulses, no trophic changes or ulcerative lesions and normal sensory exam    ASSESSMENT/PLAN:     (R42) Dizziness on standing  (primary encounter diagnosis)  Comment: Orthostatics normal.   Plan: Follow up with neurology with orthostatic results    (W19.XXXD,  Y92.009) Fall at home, subsequent encounter  Comment: Had head and chest " wall contusion. Getting better, though has some residual chest pains on left side  Plan: Tylenol as needed    (E11.65) Type 2 diabetes mellitus with hyperglycemia, without long-term current use of insulin (H)  Comment: A1c at goal. Normal foot exam. Continue with current treatment plan  Plan: FOOT EXAM  NO CHARGE [00474.114], Albumin         Random Urine Quantitative with Creat Ratio    (E66.01) Morbid obesity (H)  Comment: Making progress on weight loss  Plan: Keep it up    (M31.6) Temporal arteritis (H)  Comment: Symptoms may have been from MG    (G70.00) Myasthenia gravis, AChR antibody positive (H)  Comment: Following with neurology    (K21.9) Gastroesophageal reflux disease, esophagitis presence not specified  Comment: Wants to hold PPI.  Is okay as long has no symptoms    Follow up in 3 months or sooner with concerns    Telly Lucio MD  [facility]

## 2019-02-27 ASSESSMENT — ENCOUNTER SYMPTOMS
SORE THROAT: 0
TINGLING: 1
SLEEP DISTURBANCES DUE TO BREATHING: 0
EYE REDNESS: 0
TREMORS: 1
SINUS PAIN: 0
HYPOTENSION: 0
LEG PAIN: 1
WEAKNESS: 1
DOUBLE VISION: 0
HOARSE VOICE: 0
DISTURBANCES IN COORDINATION: 0
DIZZINESS: 1
EYE WATERING: 0
ORTHOPNEA: 0
SPEECH CHANGE: 0
TASTE DISTURBANCE: 0
BACK PAIN: 0
EYE IRRITATION: 0
HEADACHES: 1
SYNCOPE: 0
SEIZURES: 0
EXERCISE INTOLERANCE: 1
PARALYSIS: 0
STIFFNESS: 1
NECK PAIN: 0
MYALGIAS: 1
SMELL DISTURBANCE: 0
NUMBNESS: 1
MUSCLE CRAMPS: 1
LOSS OF CONSCIOUSNESS: 0
NECK MASS: 0
MEMORY LOSS: 0
MUSCLE WEAKNESS: 1
TROUBLE SWALLOWING: 0
SINUS CONGESTION: 0
ARTHRALGIAS: 1
PALPITATIONS: 0
JOINT SWELLING: 0
LIGHT-HEADEDNESS: 1
HYPERTENSION: 1
EYE PAIN: 1

## 2019-03-04 ENCOUNTER — OFFICE VISIT (OUTPATIENT)
Dept: NEUROLOGY | Facility: CLINIC | Age: 74
End: 2019-03-04
Payer: COMMERCIAL

## 2019-03-04 VITALS
BODY MASS INDEX: 35.7 KG/M2 | OXYGEN SATURATION: 95 % | DIASTOLIC BLOOD PRESSURE: 63 MMHG | HEART RATE: 66 BPM | WEIGHT: 241 LBS | TEMPERATURE: 97.7 F | SYSTOLIC BLOOD PRESSURE: 140 MMHG | HEIGHT: 69 IN

## 2019-03-04 DIAGNOSIS — Z79.60 LONG-TERM USE OF IMMUNOSUPPRESSANT MEDICATION: ICD-10-CM

## 2019-03-04 DIAGNOSIS — G70.00 OCULAR MYASTHENIA (H): ICD-10-CM

## 2019-03-04 DIAGNOSIS — G50.0 TRIGEMINAL NEURALGIA: Primary | ICD-10-CM

## 2019-03-04 DIAGNOSIS — R42 ORTHOSTATIC DIZZINESS: ICD-10-CM

## 2019-03-04 ASSESSMENT — PAIN SCALES - GENERAL: PAINLEVEL: NO PAIN (0)

## 2019-03-04 ASSESSMENT — MIFFLIN-ST. JEOR: SCORE: 1838.55

## 2019-03-04 NOTE — PROGRESS NOTES
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    He says he does not notice any eyelid drooping, double vision, difficulty chewing or swallowing, slurred speech, or muscle weakness. He has been taking the prednisone as prescribed, 10 mg every other day, and thinks he is tolerating this okay. He does report some diminished visual acuity, late in the day, specifically when there is small text scrolling across a TV screen. He says it improves if he squints, but he would not describe it as two images overlaid or double vision.     The facial pain has improved greatly. He has still been taking the oxcarbazepine 300 mg BID.     The feelings of imbalance that were described last visit improved gradually and nearly resolved for a few months, but have recurred somewhat in the last month. He has not had any changes to his blood pressure medications (lisinopril 20 mg daily). He says when he stands from sitting he will have a feeling of all over tingling and mild faintness that occurs within about 30 seconds of standing and resolves if he stands still, braces himself against the chair or table, and waits to walk for about 2 minutes. Once, it did not start until he had gotten out of his truck, walked across the grocery store parking lot, and gotten inside, but again, after bracing on the shopping cart, it resolved after about 2 minutes. He had orthostatic vital signs obtained by his PCP which were negative. He has also tested his BP at home during and after these events and no hypotensive readings have been recorded. He does not report dizziness or imbalance in the midst of walking or exercising. He wonders if perhaps any of his medications are contributing. When he decreased the oxcarbazepine from  "300/450 to 300 BID he did not notice linked improvement.    O:/63 (BP Location: Left arm, Patient Position: Sitting, Cuff Size: Adult Regular)   Pulse 66   Temp 97.7  F (36.5  C) (Oral)   Ht 1.753 m (5' 9\")   Wt 109.3 kg (241 lb)   SpO2 95%   BMI 35.59 kg/m    Mental status: Awake, alert, attentive, oriented to self, time, place, and circumstance. Language is fluent and coherent.  CN: VFF, PERRL, EOMI with gaze evoked horizontal nystagmus, L>R, intact sustained upgaze, face symmetric without ptosis, cheek puff is strong, sensation intact bilateral V1-3, tongue bulk is normal without fasciculations, and strength and lateral movements are intact. Speech is without dysarthria. Shoulder shrug 5/5.  Motor: 5/5 strength bilateral SA, EF, EE, FF, FE, HF, KF, KE, ADF, APF. Normal bulk and tone, no abnormal movements.  Reflexes: 2+ and symmetric biceps, patellae, and achilles  Coordination: FNF and HS intact without ataxia or dysmetria  Gait: able to stand from chair without using arms, normal station and gait with normal step height, length, turn, and arm swing. Romberg-, able to tandem walk.    A/P: Rakan Nolasco is a 73 year old male with well controlled ocular myasthenia gravis and probable right V1+2 trigeminal neuralgia, also well, controlled as well as subjective orthostatic dizziness and imbalance. It is possible that the oxcarbazepine is causing some of these symptoms, but if tapering the med fails to afford him resolution of symptoms, he may require further evaluation for postural hypotension.    -Decrease oxcarb to 150 BID for two weeks then 150 at bedtime for two weeks then stop. If feeling of imbalance does not improve, then he is instructed to call and we will consider tilt table test. If facial pain recurs, call and we can try an alternative med.    -Continue prednisone 10 mg every other day and pyridostigmine 60 mg TID PRN for MG. Last year's bone density scan was excellent and he takes " calcium/vitamin D; would not repeat one this year. Follow up for diabetes with his PCP.     -RTC 1 year    The patient was seen and discussed with Dr. Harrison.    Christine Garcia MD  Neurology PGY-3  [phone]    ATTENDING ADDENDUM: Patient seen and examined with resident Dr Garcia at the [facility] today. Agree with her assessment and plan as above. TT spent for patient care 25 minutes; more than half was counseling. Arpan Harrison MD       Answers for HPI/ROS submitted by the patient on 2/27/2019   General Symptoms: No  Skin Symptoms: No  HENT Symptoms: Yes  EYE SYMPTOMS: Yes  HEART SYMPTOMS: Yes  LUNG SYMPTOMS: No  INTESTINAL SYMPTOMS: No  URINARY SYMPTOMS: No  REPRODUCTIVE SYMPTOMS: No  SKELETAL SYMPTOMS: Yes  BLOOD SYMPTOMS: No  NERVOUS SYSTEM SYMPTOMS: Yes  MENTAL HEALTH SYMPTOMS: No  Ear pain: No  Ear discharge: No  Hearing loss: No  Tinnitus: Yes  Nosebleeds: No  Congestion: No  Sinus pain: No  Trouble swallowing: No   Voice hoarseness: No  Mouth sores: No  Sore throat: No  Tooth pain: No  Gum tenderness: No  Bleeding gums: No  Change in taste: No  Change in sense of smell: No  Dry mouth: No  Hearing aid used: No  Neck lump: No  Eye pain: Yes  Vision loss: No  Dry eyes: No  Watery eyes: No  Eye bulging: No  Double vision: No  Flashing of lights: No  Spots: Yes  Floaters: Yes  Redness: No  Crossed eyes: No  Tunnel Vision: No  Yellowing of eyes: No  Eye irritation: No  Chest pain or pressure: No  Fast or irregular heartbeat: No  Pain in legs with walking: Yes  Trouble breathing while lying down: No  Fingers or toes appear blue: No  High blood pressure: Yes  Low blood pressure: No  Fainting: No  Murmurs: No  Pacemaker: No  Varicose veins: No  Edema or swelling: No  Wake up at night with shortness of breath: No  Light-headedness: Yes  Exercise intolerance: Yes  Back pain: No  Muscle aches: Yes  Neck pain: No  Swollen joints: No  Joint pain: Yes  Bone pain: No  Muscle cramps: Yes  Muscle weakness:  Yes  Joint stiffness: Yes  Bone fracture: No  Trouble with coordination: No  Dizziness or trouble with balance: Yes  Fainting or black-out spells: No  Memory loss: No  Headache: Yes  Seizures: No  Speech problems: No  Tingling: Yes  Tremor: Yes  Weakness: Yes  Difficulty walking: No  Paralysis: No  Numbness: Yes

## 2019-03-04 NOTE — LETTER
3/4/2019       RE: Rakan Nolasco  [address] [id number]     Dear Colleague,    Thank you for referring your patient, Rakan Nolasco, to the [facility] NEUROLOGY at [facility]. Please see a copy of my visit note below.    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    He says he does not notice any eyelid drooping, double vision, difficulty chewing or swallowing, slurred speech, or muscle weakness. He has been taking the prednisone as prescribed, 10 mg every other day, and thinks he is tolerating this okay. He does report some diminished visual acuity, late in the day, specifically when there is small text scrolling across a TV screen. He says it improves if he squints, but he would not describe it as two images overlaid or double vision.     The facial pain has improved greatly. He has still been taking the oxcarbazepine 300 mg BID.     The feelings of imbalance that were described last visit improved gradually and nearly resolved for a few months, but have recurred somewhat in the last month. He has not had any changes to his blood pressure medications (lisinopril 20 mg daily). He says when he stands from sitting he will have a feeling of all over tingling and mild faintness that occurs within about 30 seconds of standing and resolves if he stands still, braces himself against the chair or table, and waits to walk for about 2 minutes. Once, it did not start until he had gotten out of his truck, walked across the grocery store parking lot, and gotten inside, but again, after bracing on the shopping cart, it resolved after about 2 minutes. He had orthostatic vital signs obtained by his PCP which were negative. He has also tested  "his BP at home during and after these events and no hypotensive readings have been recorded. He does not report dizziness or imbalance in the midst of walking or exercising. He wonders if perhaps any of his medications are contributing. When he decreased the oxcarbazepine from 300/450 to 300 BID he did not notice linked improvement.    O:/63 (BP Location: Left arm, Patient Position: Sitting, Cuff Size: Adult Regular)   Pulse 66   Temp 97.7  F (36.5  C) (Oral)   Ht 1.753 m (5' 9\")   Wt 109.3 kg (241 lb)   SpO2 95%   BMI 35.59 kg/m     Mental status: Awake, alert, attentive, oriented to self, time, place, and circumstance. Language is fluent and coherent.  CN: VFF, PERRL, EOMI with gaze evoked horizontal nystagmus, L>R, intact sustained upgaze, face symmetric without ptosis, cheek puff is strong, sensation intact bilateral V1-3, tongue bulk is normal without fasciculations, and strength and lateral movements are intact. Speech is without dysarthria. Shoulder shrug 5/5.  Motor: 5/5 strength bilateral SA, EF, EE, FF, FE, HF, KF, KE, ADF, APF. Normal bulk and tone, no abnormal movements.  Reflexes: 2+ and symmetric biceps, patellae, and achilles  Coordination: FNF and HS intact without ataxia or dysmetria  Gait: able to stand from chair without using arms, normal station and gait with normal step height, length, turn, and arm swing. Romberg-, able to tandem walk.    A/P: Rakan Nolasco is a 73 year old male with well controlled ocular myasthenia gravis and probable right V1+2 trigeminal neuralgia, also well, controlled as well as subjective orthostatic dizziness and imbalance. It is possible that the oxcarbazepine is causing some of these symptoms, but if tapering the med fails to afford him resolution of symptoms, he may require further evaluation for postural hypotension.    -Decrease oxcarb to 150 BID for two weeks then 150 at bedtime for two weeks then stop. If feeling of imbalance does not improve, then he " is instructed to call and we will consider tilt table test. If facial pain recurs, call and we can try an alternative med.    -Continue prednisone 10 mg every other day and pyridostigmine 60 mg TID PRN for MG. Last year's bone density scan was excellent and he takes calcium/vitamin D; would not repeat one this year. Follow up for diabetes with his PCP.     -RTC 1 year    The patient was seen and discussed with Dr. Harrison.    Christine Garcia MD  Neurology PGY-3  [phone]    ATTENDING ADDENDUM: Patient seen and examined with resident Dr Garcia at the [facility] today. Agree with her assessment and plan as above. TT spent for patient care 25 minutes; more than half was counseling.       Again, thank you for allowing me to participate in the care of your patient.      Sincerely,    Arpan Harrison MD

## 2019-03-04 NOTE — NURSING NOTE
Chief Complaint   Patient presents with     RECHECK     [facility] RETURN 4 MONTH F/U       Gaviota Fountain LPN

## 2019-03-16 DIAGNOSIS — G70.00 OCULAR MYASTHENIA (H): ICD-10-CM

## 2019-03-16 DIAGNOSIS — Z79.60 LONG-TERM USE OF IMMUNOSUPPRESSANT MEDICATION: ICD-10-CM

## 2019-03-18 DIAGNOSIS — R73.03 PREDIABETES: ICD-10-CM

## 2019-03-18 NOTE — TELEPHONE ENCOUNTER
Requested Prescriptions   Pending Prescriptions Disp Refills     metFORMIN (GLUCOPHAGE-XR) 500 MG 24 hr tablet 180 tablet 0     Sig: Take 1 tablet (500 mg) by mouth 2 times daily (with meals)    There is no refill protocol information for this order

## 2019-03-20 RX ORDER — PYRIDOSTIGMINE BROMIDE 60 MG/1
TABLET ORAL
Qty: 480 TABLET | Refills: 0 | Status: SHIPPED | OUTPATIENT
Start: 2019-03-20 | End: 2020-01-20

## 2019-03-20 RX ORDER — METFORMIN HCL 500 MG
500 TABLET, EXTENDED RELEASE 24 HR ORAL 2 TIMES DAILY WITH MEALS
Qty: 180 TABLET | Refills: 1 | Status: SHIPPED | OUTPATIENT
Start: 2019-03-20 | End: 2019-10-01

## 2019-03-20 NOTE — TELEPHONE ENCOUNTER
"Prescription approved per [facility] Refill Protocol.    Requested Prescriptions   Signed Prescriptions Disp Refills     metFORMIN (GLUCOPHAGE-XR) 500 MG 24 hr tablet 180 tablet 1     Sig: Take 1 tablet (500 mg) by mouth 2 times daily (with meals)    Biguanide Agents Failed - 3/18/2019  3:21 PM       Failed - Blood pressure less than 140/90 in past 6 months    BP Readings from Last 3 Encounters:   03/04/19 140/63   02/20/19 122/60   01/07/19 130/66                Passed - Patient has documented LDL within the past 12 mos.    Recent Labs   Lab Test 08/14/18  0927   LDL 44            Passed - Patient has had a Microalbumin in the past 15 mos.    Recent Labs   Lab Test 02/20/19  0950   MICROL 7   UMALCR 6.88            Passed - Patient is age 10 or older       Passed - Patient has documented A1c within the specified period of time.    If HgbA1C is 8 or greater, it needs to be on file within the past 3 months.  If less than 8, must be on file within the past 6 months.     Recent Labs   Lab Test 02/20/19  0838   A1C 6.7*            Passed - Patient's CR is NOT>1.4 OR Patient's EGFR is NOT<45 within past 12 mos.    Recent Labs   Lab Test 08/14/18 0927   GFRESTIMATED 71   GFRESTBLACK 86       Recent Labs   Lab Test 08/14/18 0927   CR 1.03            Passed - Patient does NOT have a diagnosis of CHF.       Passed - Medication is active on med list       Passed - Recent (6 mo) or future (30 days) visit within the authorizing provider's specialty    Patient had office visit in the last 6 months or has a visit in the next 30 days with authorizing provider or within the authorizing provider's specialty.  See \"Patient Info\" tab in inbasket, or \"Choose Columns\" in Meds & Orders section of the refill encounter.              Josefa Payne, RN  [facility]    "

## 2019-04-04 ENCOUNTER — TRANSFERRED RECORDS (OUTPATIENT)
Dept: HEALTH INFORMATION MANAGEMENT | Facility: CLINIC | Age: 74
End: 2019-04-04

## 2019-04-11 ENCOUNTER — DOCUMENTATION ONLY (OUTPATIENT)
Dept: OPHTHALMOLOGY | Facility: CLINIC | Age: 74
End: 2019-04-11

## 2019-05-04 ENCOUNTER — TRANSFERRED RECORDS (OUTPATIENT)
Dept: HEALTH INFORMATION MANAGEMENT | Facility: CLINIC | Age: 74
End: 2019-05-04

## 2019-05-07 DIAGNOSIS — G50.0 TRIGEMINAL NEURALGIA: ICD-10-CM

## 2019-05-07 RX ORDER — OXCARBAZEPINE 150 MG/1
TABLET, FILM COATED ORAL
Qty: 150 TABLET | Refills: 3 | Status: SHIPPED | OUTPATIENT
Start: 2019-05-07 | End: 2019-08-04

## 2019-05-14 ENCOUNTER — OFFICE VISIT (OUTPATIENT)
Dept: FAMILY MEDICINE | Facility: CLINIC | Age: 74
End: 2019-05-14
Payer: COMMERCIAL

## 2019-05-14 ENCOUNTER — OFFICE VISIT (OUTPATIENT)
Dept: OPHTHALMOLOGY | Facility: CLINIC | Age: 74
End: 2019-05-14
Payer: COMMERCIAL

## 2019-05-14 VITALS
DIASTOLIC BLOOD PRESSURE: 68 MMHG | BODY MASS INDEX: 35.78 KG/M2 | HEART RATE: 82 BPM | WEIGHT: 241.6 LBS | SYSTOLIC BLOOD PRESSURE: 136 MMHG | RESPIRATION RATE: 14 BRPM | OXYGEN SATURATION: 98 % | HEIGHT: 69 IN | TEMPERATURE: 97.5 F

## 2019-05-14 DIAGNOSIS — I10 HTN, GOAL BELOW 140/90: ICD-10-CM

## 2019-05-14 DIAGNOSIS — G70.00 MYASTHENIA GRAVIS (H): ICD-10-CM

## 2019-05-14 DIAGNOSIS — Z86.69 H/O RD (RETINAL DETACHMENT): ICD-10-CM

## 2019-05-14 DIAGNOSIS — H25.11 NUCLEAR SCLEROSIS OF RIGHT EYE: ICD-10-CM

## 2019-05-14 DIAGNOSIS — E11.65 TYPE 2 DIABETES MELLITUS WITH HYPERGLYCEMIA, WITHOUT LONG-TERM CURRENT USE OF INSULIN (H): Primary | ICD-10-CM

## 2019-05-14 DIAGNOSIS — E78.5 HYPERLIPIDEMIA LDL GOAL <100: ICD-10-CM

## 2019-05-14 DIAGNOSIS — Z96.1 PSEUDOPHAKIA OF RIGHT EYE: ICD-10-CM

## 2019-05-14 DIAGNOSIS — H04.123 DRY EYES, BILATERAL: ICD-10-CM

## 2019-05-14 DIAGNOSIS — G70.00 MYASTHENIA GRAVIS, ACHR ANTIBODY POSITIVE (H): ICD-10-CM

## 2019-05-14 DIAGNOSIS — H40.053 BORDERLINE GLAUCOMA WITH OCULAR HYPERTENSION, BILATERAL: Primary | ICD-10-CM

## 2019-05-14 LAB — HBA1C MFR BLD: 6.4 % (ref 0–5.6)

## 2019-05-14 PROCEDURE — 92012 INTRM OPH EXAM EST PATIENT: CPT | Performed by: STUDENT IN AN ORGANIZED HEALTH CARE EDUCATION/TRAINING PROGRAM

## 2019-05-14 PROCEDURE — 76514 ECHO EXAM OF EYE THICKNESS: CPT | Performed by: STUDENT IN AN ORGANIZED HEALTH CARE EDUCATION/TRAINING PROGRAM

## 2019-05-14 PROCEDURE — 83036 HEMOGLOBIN GLYCOSYLATED A1C: CPT | Performed by: FAMILY MEDICINE

## 2019-05-14 PROCEDURE — 36415 COLL VENOUS BLD VENIPUNCTURE: CPT | Performed by: FAMILY MEDICINE

## 2019-05-14 PROCEDURE — 92083 EXTENDED VISUAL FIELD XM: CPT | Performed by: STUDENT IN AN ORGANIZED HEALTH CARE EDUCATION/TRAINING PROGRAM

## 2019-05-14 PROCEDURE — 99214 OFFICE O/P EST MOD 30 MIN: CPT | Performed by: FAMILY MEDICINE

## 2019-05-14 PROCEDURE — 92133 CPTRZD OPH DX IMG PST SGM ON: CPT | Performed by: STUDENT IN AN ORGANIZED HEALTH CARE EDUCATION/TRAINING PROGRAM

## 2019-05-14 ASSESSMENT — PACHYMETRY
OS_CT(UM): 531
OD_CT(UM): 535

## 2019-05-14 ASSESSMENT — SLIT LAMP EXAM - LIDS
COMMENTS: 1+ DERMATOCHALASIS
COMMENTS: 1+ DERMATOCHALASIS

## 2019-05-14 ASSESSMENT — VISUAL ACUITY
CORRECTION_TYPE: GLASSES
OD_CC: 20/20
OS_CC: 20/25
METHOD: SNELLEN - LINEAR

## 2019-05-14 ASSESSMENT — TONOMETRY
OS_IOP_MMHG: 19
OD_IOP_MMHG: 19
IOP_METHOD: APPLANATION

## 2019-05-14 ASSESSMENT — EXTERNAL EXAM - RIGHT EYE: OD_EXAM: NORMAL

## 2019-05-14 ASSESSMENT — CUP TO DISC RATIO
OD_RATIO: 0.1 UD
OS_RATIO: 0.25 UD

## 2019-05-14 ASSESSMENT — MIFFLIN-ST. JEOR: SCORE: 1841.27

## 2019-05-14 ASSESSMENT — PAIN SCALES - GENERAL: PAINLEVEL: NO PAIN (0)

## 2019-05-14 ASSESSMENT — EXTERNAL EXAM - LEFT EYE: OS_EXAM: NORMAL

## 2019-05-14 NOTE — PATIENT INSTRUCTIONS
Continue Latanoprost (teal top) at bedtime both eyes     Continue timolol twice a day right eye     Mateo Gray MD  [phone]

## 2019-05-14 NOTE — PROGRESS NOTES
SUBJECTIVE:   Rakan Nolasco is a 73 year old male who presents to clinic today for the following   health issues:    Diabetes Follow-up   Taking prednisone now at 10 mg every other day, for Myasthenia Gravis which is under control    Patient is checking blood sugars:  Results range from 120 to 117    Diabetic concerns: None     Symptoms of hypoglycemia (low blood sugar): dizzy     Paresthesias (numbness or burning in feet) or sores: Yes once in a while      Date of last diabetic eye exam: this morning     Diabetes Management Resources    Hyperlipidemia Follow-Up    Rate your low fat/cholesterol diet?: good    Taking statin?  Yes, no muscle aches from statin    Other lipid medications/supplements?:  none    Hypertension Follow-up    BP borderline high    Outpatient blood pressures are being checked at home.  Results are 140/70.    Low Salt Diet: low salt    BP Readings from Last 2 Encounters:   05/14/19 136/68   03/04/19 140/63     Hemoglobin A1C (%)   Date Value   05/14/2019 6.4 (H)   02/20/2019 6.7 (H)     LDL Cholesterol Calculated (mg/dL)   Date Value   08/14/2018 44   09/06/2017 71     Myasthenia Gravis:   Doing prednisone and Mestinon   Stable   Cleared for Shingrix vaccine.      Amount of exercise or physical activity: everyday for 30 mins    Problems taking medications regularly: No    Medication side effects: none    Diet: low salt and low fat/cholesterol    Tobacco  Allergies  Meds  Med Hx  Surg Hx  Fam Hx  Soc Hx      Reviewed and updated as needed this visit by Provider    Patient Active Problem List   Diagnosis     GERD (gastroesophageal reflux disease)     Snoring     Fatigue     HLD (hyperlipidemia)     Borderline glaucoma with ocular hypertension     Advanced directives, counseling/discussion     Trigeminal neuralgia pain     bmi 31     HTN (hypertension)     Health Care Home     Arthritis     Prediabetes     Dry eyes     PVD (posterior vitreous detachment) OU     H/O RD (retinal detachment)  s/p repair with PPV (AB)     Epiretinal membrane, bilateral     Pseudophakia of right eye     Myasthenia gravis (H)     Type 2 diabetes mellitus (H)     Morbid obesity (H)     Right posterior capsular opacification     Demand ischemia (H)     Obstructive sleep apnea syndrome     Prostate cancer (H)     Restless legs syndrome     Sleep apnea     Temporal arteritis (H)     Type 2 diabetes mellitus with hyperglycemia, without long-term current use of insulin (H)     Past Surgical History:   Procedure Laterality Date     BIOPSY ARTERY TEMPORAL Bilateral 2017    Procedure: BIOPSY ARTERY TEMPORAL;  Bilateral temporal artery Biopsy;  Surgeon: Jj Tello MD;  Location: [facility]     CATARACT IOL, RT/LT Right      COLONOSCOPY       ESOPHAGOSCOPY, GASTROSCOPY, DUODENOSCOPY (EGD), COMBINED  1/15/2014    Procedure: COMBINED ESOPHAGOSCOPY, GASTROSCOPY, DUODENOSCOPY (EGD), BIOPSY SINGLE OR MULTIPLE;;  Surgeon: Winston Nixon MD;  Location: [facility] OR     LASER YAG CAPSULOTOMY Right 2018    right eye     PHACOEMULSIFICATION CLEAR CORNEA WITH STANDARD INTRAOCULAR LENS IMPLANT Right 2017    Procedure: PHACOEMULSIFICATION CLEAR CORNEA WITH STANDARD INTRAOCULAR LENS IMPLANT;  Surgeon: Mateo Gray MD;  Location:  [facility]     RETINAL REATTACHMENT Right      SURGICAL HISTORY OF -   2009    Both Eyelids-.     TONSILLECTOMY  as child       Social History     Tobacco Use     Smoking status: Former Smoker     Packs/day: 2.00     Years: 15.00     Pack years: 30.00     Types: Cigarettes     Start date: 1968     Last attempt to quit: 1983     Years since quittin.3     Smokeless tobacco: Former User     Tobacco comment: smoke free household.   Substance Use Topics     Alcohol use: No     Comment: Quit in      Family History   Problem Relation Age of Onset     Respiratory Mother         copd     LUNG DISEASE Mother      Allergies Brother      Cancer Maternal Grandmother      Heart Disease  "Paternal Grandmother      Cerebrovascular Disease Father      Cancer Father      Other Cancer Father          ROS:  Constitutional, HEENT, cardiovascular, pulmonary, GI, , musculoskeletal, neuro, skin, endocrine and psych systems are negative, except as otherwise noted.    OBJECTIVE:     /68   Pulse 82   Temp 97.5  F (36.4  C) (Oral)   Resp 14   Ht 1.753 m (5' 9\")   Wt 109.6 kg (241 lb 9.6 oz)   SpO2 98%   BMI 35.68 kg/m    Body mass index is 35.68 kg/m .  GENERAL: healthy, alert and no distress  EYES: Eyes grossly normal to inspection, PERRL and conjunctivae and sclerae normal  NECK: no adenopathy and thyroid normal to palpation  RESP: lungs clear to auscultation - no rales, rhonchi or wheezes  CV: regular rate and rhythm, no murmur, click or rub.  ABDOMEN: soft, obese, nontender, no masses and bowel sounds normal  MS: no gross musculoskeletal defects noted, no edema  NEURO: Normal strength and tone, mentation intact and speech normal  PSYCH: mentation appears normal, affect normal/bright  Diabetic foot exam: slightly reuced DP and PT pulses, no trophic changes or ulcerative lesions and essentially normal sensory exam    Diagnostic Test Results:  Results for orders placed or performed in visit on 05/14/19   Hemoglobin A1c   Result Value Ref Range    Hemoglobin A1C 6.4 (H) 0 - 5.6 %     ASSESSMENT/PLAN:     (E11.65) Type 2 diabetes mellitus with hyperglycemia, without long-term current use of insulin (H)  (primary encounter diagnosis)  Comment: A1c at goal.   Plan: Hemoglobin A1c    (I10) HTN, goal below 140/90  Comment: BP at goal  Plan: Lisinopril    (E78.5) Hyperlipidemia LDL goal <100  Comment: LDL well controlled.  Plan: Atorvastatin.    (G70.00) Myasthenia gravis (H)  Comment: Symptoms well controlled and following with neurology  Plan: Mestinon and Prednisone.    Follow up in 2 months  or sooner with concerns    Telly Lucio MD  [facility]      "

## 2019-05-14 NOTE — PROGRESS NOTES
Current Eye Medications:  Timolol twice a day right eye, last took at 6:15 am. Latanoprost at bedtime both eyes, last took at 9 pm.       Subjective:  6 months for IOP check, HVF, OCT optic nerve.   Vision is OK right eye, fuzzy left eye for long time. Has transparent spot that moves with left eye sometimes for couple of years. No eye pain or discomfort in either eye.       Objective:  See Ophthalmology Exam.       Assessment:  Rakan Nolasco is a 73 year old male who presents with:   Encounter Diagnoses   Name Primary?     Borderline glaucoma with ocular hypertension, bilateral Sifuentes visual field (HVF): borderline with mild sup scattered loss in both eyes.    OCT optic nerve: stable in both eyes     Intraocular pressure 19/19 with avg central corneal thickness (535/531).       H/O RD (retinal detachment) OD s/p repair with PPV (AB)      Myasthenia gravis, AChR antibody positive (H)      Pseudophakia of right eye s/p YAG OD      Nuclear sclerosis of right eye      Dry eyes, bilateral        Plan:  Continue Latanoprost (teal top) at bedtime both eyes   Continue timolol twice a day right eye     Mateo Gray MD  [phone]

## 2019-05-14 NOTE — LETTER
5/14/2019         RE: Rakan Nolasco  [address] [address]      Dear Dr. Lucio,    Thank you for referring your patient, Rakan Nolsaco, to the [facility].    His eye exam is stable.  Please see a copy of my visit note below.     Current Eye Medications:  Timolol twice a day right eye, last took at 6:15 am. Latanoprost at bedtime both eyes, last took at 9 pm.       Subjective:  6 months for IOP check, HVF, OCT optic nerve.   Vision is OK right eye, fuzzy left eye for long time. Has transparent spot that moves with left eye sometimes for couple of years. No eye pain or discomfort in either eye.       Objective:  See Ophthalmology Exam.       Assessment:  Rakan Nolasco is a 73 year old male who presents with:   Encounter Diagnoses   Name Primary?     Borderline glaucoma with ocular hypertension, bilateral Sifuentes visual field (HVF): borderline with mild sup scattered loss in both eyes.    OCT optic nerve: stable in both eyes     Intraocular pressure 19/19 with avg central corneal thickness (535/531).       H/O RD (retinal detachment) OD s/p repair with PPV (AB)      Myasthenia gravis, AChR antibody positive (H)      Pseudophakia of right eye s/p YAG OD      Nuclear sclerosis of right eye      Dry eyes, bilateral        Plan:  Continue Latanoprost (teal top) at bedtime both eyes   Continue timolol twice a day right eye     aMteo Gray MD  [phone]        Again, thank you for allowing me to participate in the care of your patient.        Sincerely,        Mateo Gray MD

## 2019-05-20 DIAGNOSIS — G70.00 MYASTHENIA GRAVIS, ACHR ANTIBODY POSITIVE (H): ICD-10-CM

## 2019-05-20 RX ORDER — PREDNISONE 5 MG/1
TABLET ORAL
Qty: 90 TABLET | Refills: 0 | Status: SHIPPED | OUTPATIENT
Start: 2019-05-20 | End: 2019-12-04

## 2019-05-20 RX ORDER — PREDNISONE 20 MG/1
TABLET ORAL
Qty: 45 TABLET | Refills: 1 | OUTPATIENT
Start: 2019-05-20

## 2019-07-01 DIAGNOSIS — C61 MALIGNANT NEOPLASM OF PROSTATE (H): ICD-10-CM

## 2019-07-01 LAB — PSA SERPL-MCNC: 0.95 UG/L (ref 0–4)

## 2019-07-01 PROCEDURE — 36415 COLL VENOUS BLD VENIPUNCTURE: CPT | Performed by: UROLOGY

## 2019-07-01 PROCEDURE — 84153 ASSAY OF PSA TOTAL: CPT | Performed by: UROLOGY

## 2019-07-08 ENCOUNTER — OFFICE VISIT (OUTPATIENT)
Dept: UROLOGY | Facility: CLINIC | Age: 74
End: 2019-07-08
Payer: COMMERCIAL

## 2019-07-08 VITALS — DIASTOLIC BLOOD PRESSURE: 70 MMHG | RESPIRATION RATE: 14 BRPM | SYSTOLIC BLOOD PRESSURE: 152 MMHG | HEART RATE: 64 BPM

## 2019-07-08 DIAGNOSIS — C61 MALIGNANT NEOPLASM OF PROSTATE (H): Primary | ICD-10-CM

## 2019-07-08 DIAGNOSIS — I10 HYPERTENSION, UNSPECIFIED TYPE: ICD-10-CM

## 2019-07-08 PROCEDURE — 99213 OFFICE O/P EST LOW 20 MIN: CPT | Performed by: UROLOGY

## 2019-07-08 NOTE — PROGRESS NOTES
Chief Complaint   Patient presents with     RECHECK       Rakan ASHLEY Nolasco is a 73 year old male who presents today for follow up of   Chief Complaint   Patient presents with     RECHECK    f/u for prostate cancer s/p cryotherapy on 3/18.  His psa has gone down from 8.05 to 0.34 and up to 0.78 to 0.95.  He has some urinary urgency with incontinence.  His urinary flow is good.    Current Outpatient Medications   Medication Sig Dispense Refill     aspirin 81 MG tablet Take 1 tablet by mouth daily.       atorvastatin (LIPITOR) 20 MG tablet TAKE ONE TABLET BY MOUTH ONE TIME DAILY 90 tablet 3     blood glucose (ONETOUCH VERIO IQ) test strip USE TO TEST BLOOD SUGARS 1 TIMES DAILY OR AS DIRECTED 50 strip 5     blood glucose monitoring (NO BRAND SPECIFIED) meter device kit Use to test blood sugar 1 times daily or as directed. 1 kit 0     blood glucose monitoring (ONE TOUCH DELICA) lancets CHECK BLOOD SUGAR EVERY MORNING 100 each 1     ClonAZEPAM (KLONOPIN) 0.5 MG tablet Take 0.5 mg by mouth. Take 1 tablet by mouth daily at bedtime       latanoprost (XALATAN) 0.005 % ophthalmic solution Place 1 drop into both eyes At Bedtime 7.5 mL 3     lisinopril (PRINIVIL/ZESTRIL) 20 MG tablet Take 1 tablet (20 mg) by mouth daily 90 tablet 3     metFORMIN (GLUCOPHAGE-XR) 500 MG 24 hr tablet Take 1 tablet (500 mg) by mouth 2 times daily (with meals) 180 tablet 1     OXcarbazepine (TRILEPTAL) 150 MG tablet TAKE 2 TABLETS BY MOUTH IN THE MORNING AND 3 TABLETS IN THE EVENING 150 tablet 3     OXcarbazepine (TRILEPTAL) 150 MG tablet Take 2 tablets (300 mg) by mouth 2 times daily 360 tablet 1     oxybutynin ER (DITROPAN-XL) 5 MG 24 hr tablet Take 1 tablet (5 mg) by mouth daily 90 tablet 3     pantoprazole (PROTONIX) 20 MG [facility] tablet Take 1 tablet (20 mg) by mouth daily 90 tablet 3     predniSONE (DELTASONE) 20 MG tablet Take 1 tablet (20 mg) by mouth See Admin Instructions (Patient taking differently: Take 15 mg by mouth See Admin Instructions ) 45  tablet 1     predniSONE (DELTASONE) 5 MG tablet Take 2 tablets (10 mg) every other day.. 90 tablet 0     pyridostigmine (MESTINON) 60 MG tablet TAKE 1 TABLET BY MOUTH FOUR TIMES A  tablet 0     timolol (TIMOPTIC) 0.5 % ophthalmic solution Place 1 drop into the right eye 2 times daily 3 Bottle 3     Allergies   Allergen Reactions     Azathioprine Shortness Of Breath     Was hospitalized from it. Also had high fever, chills, and shortness of breath.     Ciprofloxacin Muscle Pain (Myalgia)     Muscle pain  Other reaction(s): Myalgia  Other reaction(s): Myalgia     Ropinirole      Leg pan  GI  Weakness; ? sycope  Other reaction(s): Leg Pain     Ropinirole Hcl GI Disturbance     Weakness;? syncope      Past Medical History:   Diagnosis Date     Arthritis          BMI 31.0-31.9,adult      Cancer (H) 01/10/18    Prostate     Demand ischemia (H)      Diabetes (H) 01/10/18     Diverticulosis     Colonoscopy 8/2008     Fatty liver     see US  5/2012     GERD (gastroesophageal reflux disease)      Glaucoma (increased eye pressure)      HTN (hypertension)      Hyperlipidemia LDL goal < 130     age, htn, fhx     Irritable bowel      Nonsenile cataract      ROSIE (obstructive sleep apnea) 01/2011    Using CPAP;      Prediabetes      Restless leg syndrome      Trigeminal neuralgia pain 1/4/2012     Past Surgical History:   Procedure Laterality Date     BIOPSY ARTERY TEMPORAL Bilateral 8/1/2017    Procedure: BIOPSY ARTERY TEMPORAL;  Bilateral temporal artery Biopsy;  Surgeon: Jj Tello MD;  Location: [facility]     CATARACT IOL, RT/LT Right      COLONOSCOPY       ESOPHAGOSCOPY, GASTROSCOPY, DUODENOSCOPY (EGD), COMBINED  1/15/2014    Procedure: COMBINED ESOPHAGOSCOPY, GASTROSCOPY, DUODENOSCOPY (EGD), BIOPSY SINGLE OR MULTIPLE;;  Surgeon: Winston Nixon MD;  Location: [facility]     LASER YAG CAPSULOTOMY Right 04/09/2018    right eye     PHACOEMULSIFICATION CLEAR CORNEA WITH STANDARD INTRAOCULAR  LENS IMPLANT Right 2/20/2017    Procedure: PHACOEMULSIFICATION CLEAR CORNEA WITH STANDARD INTRAOCULAR LENS IMPLANT;  Surgeon: Mateo Gray MD;  Location:  [facility]     RETINAL REATTACHMENT Right      SURGICAL HISTORY OF -   8/2009    Both Eyelids-.     TONSILLECTOMY  as child     Family History   Problem Relation Age of Onset     Respiratory Mother         copd     LUNG DISEASE Mother      Allergies Brother      Cancer Maternal Grandmother      Heart Disease Paternal Grandmother      Cerebrovascular Disease Father      Cancer Father      Other Cancer Father      Social History     Social History     Marital status: Single     Spouse name: N/A     Number of children: 0     Years of education: 12     Occupational History     Retired 3/2012      [employer] ([employer])     Social History Main Topics     Smoking status: Former Smoker     Packs/day: 2.00     Years: 15.00     Types: Cigarettes     Start date: 1/1/1968     Quit date: 1/1/1983     Smokeless tobacco: Former User      Comment: smoke free household.     Alcohol use No      Comment: Quit in 1986     Drug use: No     Sexual activity: Not Currently     Partners: Female      Comment: 4/2010  No girlfriend at this time.     Other Topics Concern     Parent/Sibling W/ Cabg, Mi Or Angioplasty Before 65f 55m? No     Social History Narrative       REVIEW OF SYSTEMS  =================  C: NEGATIVE for fever, chills, change in weight  I: NEGATIVE for worrisome rashes, moles or lesions  E/M: NEGATIVE for ear, mouth and throat problems  R: NEGATIVE for significant cough or SHORTNESS OF BREATH,   CV: NEGATIVE for chest pain, palpitations or peripheral edema  GI: NEGATIVE for nausea, abdominal pain, heartburn, or change in bowel habits  NEURO: NEGATIVE any motor/sensory changes  PSYCH: NEGATIVE for recent mood disorder    Physical Exam:  /70 (BP Location: Right arm, Patient Position: Chair, Cuff Size: Adult Large)   Pulse 64   Resp 14    Patient  is pleasant, in no acute distress, good general condition.  Lung: no evidence of respiratory distress    Abdomen: Soft, nondistended, non tender. No masses. No rebound or guarding.   Exam: empty rectal fosssa  Skin: Warm and dry.  No redness.  Psych: normal mood and affect  Neuro: alert and oriented    Assessment/Plan:   (C61) Malignant neoplasm of prostate (H)  (primary encounter diagnosis)  Comment: s/p cryotherapy.  psa up slightly  Plan: recheck psa in six months    (N39.41) Urgency incontinence  Comment:    Plan: cont ditropan              HTN: Rakan to follow up with Primary Care provider regarding elevated blood pressure.

## 2019-07-19 DIAGNOSIS — E11.65 TYPE 2 DIABETES MELLITUS WITH HYPERGLYCEMIA, WITHOUT LONG-TERM CURRENT USE OF INSULIN (H): ICD-10-CM

## 2019-07-23 ENCOUNTER — DOCUMENTATION ONLY (OUTPATIENT)
Dept: OPHTHALMOLOGY | Facility: CLINIC | Age: 74
End: 2019-07-23

## 2019-08-04 DIAGNOSIS — G50.0 TRIGEMINAL NEURALGIA: ICD-10-CM

## 2019-08-05 RX ORDER — OXCARBAZEPINE 150 MG/1
TABLET, FILM COATED ORAL
Qty: 450 TABLET | Refills: 1 | Status: SHIPPED | OUTPATIENT
Start: 2019-08-05 | End: 2020-02-03

## 2019-08-05 NOTE — TELEPHONE ENCOUNTER
Last Written Prescription Date:  05/17/19  Last Fill Quantity: 150,  # refills: 3   Last office visit: No previous visit found with prescribing provider:     Future Office Visit:  11/20/19

## 2019-08-29 DIAGNOSIS — E78.5 HYPERLIPIDEMIA LDL GOAL <130: ICD-10-CM

## 2019-09-01 RX ORDER — ATORVASTATIN CALCIUM 20 MG/1
TABLET, FILM COATED ORAL
Qty: 90 TABLET | Refills: 0 | Status: SHIPPED | OUTPATIENT
Start: 2019-09-01 | End: 2019-11-29

## 2019-09-02 NOTE — TELEPHONE ENCOUNTER
Medication is being filled for 1 time refill only due to:  Patient needs labs FASTING.   Loan Mejia RN

## 2019-09-07 DIAGNOSIS — Z79.60 LONG-TERM USE OF IMMUNOSUPPRESSANT MEDICATION: ICD-10-CM

## 2019-09-07 DIAGNOSIS — G70.00 OCULAR MYASTHENIA (H): ICD-10-CM

## 2019-09-09 RX ORDER — PYRIDOSTIGMINE BROMIDE 60 MG/1
TABLET ORAL
Qty: 360 TABLET | Refills: 1 | Status: SHIPPED | OUTPATIENT
Start: 2019-09-09 | End: 2020-02-19

## 2019-09-09 NOTE — TELEPHONE ENCOUNTER
Rx Authorization:    Requested Medication/ Dose:60MG    Date last refill ordered: 3/20/19    Quantity ordered: 480 TABS    # refills: 0    Date of last clinic visit with ordering provider: 3/4/19    Date of next clinic visit with ordering provider: 03/02/20    All pertinent protocol data (lab date/result):     Include pertinent information from patients message:

## 2019-10-01 DIAGNOSIS — R73.03 PREDIABETES: ICD-10-CM

## 2019-10-02 RX ORDER — METFORMIN HCL 500 MG
500 TABLET, EXTENDED RELEASE 24 HR ORAL 2 TIMES DAILY WITH MEALS
Qty: 180 TABLET | Refills: 0 | Status: SHIPPED | OUTPATIENT
Start: 2019-10-02 | End: 2020-01-02

## 2019-10-03 ENCOUNTER — TRANSFERRED RECORDS (OUTPATIENT)
Dept: HEALTH INFORMATION MANAGEMENT | Facility: CLINIC | Age: 74
End: 2019-10-03

## 2019-10-03 NOTE — TELEPHONE ENCOUNTER
Medication is being filled for 1 time refill only due to:  Patient needs to be seen because needs recheck.   Loan Mejia RN

## 2019-10-11 ENCOUNTER — MYC MEDICAL ADVICE (OUTPATIENT)
Dept: FAMILY MEDICINE | Facility: CLINIC | Age: 74
End: 2019-10-11

## 2019-10-11 NOTE — TELEPHONE ENCOUNTER
Panel Management Review      Patient has the following on his problem list:     Diabetes    ASA: Passed    Last A1C  Lab Results   Component Value Date    A1C 6.4 05/14/2019    A1C 6.7 02/20/2019    A1C 6.8 08/14/2018    A1C 7.4 06/04/2018    A1C 6.7 01/16/2018     A1C tested: Passed    Last LDL:    Lab Results   Component Value Date    CHOL 115 08/14/2018     Lab Results   Component Value Date    HDL 48 08/14/2018     Lab Results   Component Value Date    LDL 44 08/14/2018     Lab Results   Component Value Date    TRIG 113 08/14/2018     Lab Results   Component Value Date    CHOLHDLRATIO 3.4 09/30/2015     Lab Results   Component Value Date    NHDL 67 08/14/2018       Is the patient on a Statin? YES             Is the patient on Aspirin? YES    Medications     [facility] CoA Reductase Inhibitors     atorvastatin (LIPITOR) 20 MG tablet       Salicylates     aspirin 81 MG tablet             Last three blood pressure readings:  BP Readings from Last 3 Encounters:   07/08/19 152/70   05/14/19 136/68   03/04/19 140/63       Date of last diabetes office visit: 5/14/19     Tobacco History:     History   Smoking Status     Former Smoker     Packs/day: 2.00     Years: 15.00     Types: Cigarettes     Start date: 1/1/1968     Quit date: 1/1/1983   Smokeless Tobacco     Former User     Comment: smoke free household.           Composite cancer screening  Chart review shows that this patient is due/due soon for the following None  Summary:    Patient is due/failing the following:   BP CHECK    Action needed:   Patient needs office visit for ancillary only bp check diabetic follow up visit already scheduled.    Type of outreach:    Sent [employer] message.    Questions for provider review:    None                                                                                                                                    Tess Cooper MA       Chart routed to none .

## 2019-11-18 ENCOUNTER — OFFICE VISIT (OUTPATIENT)
Dept: FAMILY MEDICINE | Facility: CLINIC | Age: 74
End: 2019-11-18
Payer: COMMERCIAL

## 2019-11-18 VITALS
SYSTOLIC BLOOD PRESSURE: 150 MMHG | OXYGEN SATURATION: 100 % | HEART RATE: 86 BPM | WEIGHT: 245.8 LBS | HEIGHT: 69 IN | BODY MASS INDEX: 36.4 KG/M2 | RESPIRATION RATE: 18 BRPM | TEMPERATURE: 96.4 F | DIASTOLIC BLOOD PRESSURE: 68 MMHG

## 2019-11-18 DIAGNOSIS — H61.23 BILATERAL IMPACTED CERUMEN: Primary | ICD-10-CM

## 2019-11-18 PROCEDURE — 69209 REMOVE IMPACTED EAR WAX UNI: CPT | Mod: 50 | Performed by: FAMILY MEDICINE

## 2019-11-18 ASSESSMENT — MIFFLIN-ST. JEOR: SCORE: 1855.32

## 2019-11-18 NOTE — PROGRESS NOTES
"Subjective     Rakan ASHLEY Nolasco is a 74 year old male who presents to clinic today for the following health issues:    HPI   Chief Complaint   Patient presents with     Ear Problem     Plugged ears ; feels like it is full of wax ; declines pain     Patient presents for ear cleaning, feels ears are plugged  He feels that left is worse than right  Patient had URI a few weeks ago - still with congestion and is recovering      BP Readings from Last 3 Encounters:   12/03/19 136/64   11/18/19 (!) 150/68   07/08/19 152/70    Wt Readings from Last 3 Encounters:   12/03/19 107.3 kg (236 lb 8 oz)   11/18/19 111.5 kg (245 lb 12.8 oz)   05/14/19 109.6 kg (241 lb 9.6 oz)        Reviewed and updated as needed this visit by Provider  Tobacco  Allergies  Meds  Problems  Med Hx  Surg Hx  Fam Hx         Review of Systems   ROS COMP: Constitutional, HEENT, cardiovascular, pulmonary, gi and gu systems are negative, except as otherwise noted.      Objective    BP (!) 150/68   Pulse 86   Temp 96.4  F (35.8  C) (Oral)   Resp 18   Ht 1.753 m (5' 9\")   Wt 111.5 kg (245 lb 12.8 oz)   SpO2 100%   BMI 36.30 kg/m    Body mass index is 36.3 kg/m .  Physical Exam   GENERAL: healthy, alert and no distress  EYES: Eyes grossly normal to inspection, PERRL and conjunctivae and sclerae normal  HENT: Bilateral cerumen impcation  NECK: no adenopathy, no asymmetry, masses, or scars and thyroid normal to palpation  SKIN: no suspicious lesions or rashes  PSYCH: mentation appears normal, affect normal/bright          Assessment & Plan       ICD-10-CM    1. Bilateral impacted cerumen H61.23 REMOVE IMPACTED CERUMEN      Cerumen removed with ear flush by MA without complication.      Follow up if symptoms worsen or fail to improve.   Josh Porter MD  [facility]      "

## 2019-11-20 ENCOUNTER — OFFICE VISIT (OUTPATIENT)
Dept: OPHTHALMOLOGY | Facility: CLINIC | Age: 74
End: 2019-11-20
Payer: COMMERCIAL

## 2019-11-20 DIAGNOSIS — Z96.1 PSEUDOPHAKIA OF RIGHT EYE: ICD-10-CM

## 2019-11-20 DIAGNOSIS — Z86.69 H/O RD (RETINAL DETACHMENT): ICD-10-CM

## 2019-11-20 DIAGNOSIS — Z01.01 ENCOUNTER FOR EXAMINATION OF EYES AND VISION WITH ABNORMAL FINDINGS: Primary | ICD-10-CM

## 2019-11-20 DIAGNOSIS — H52.13 MYOPIA OF BOTH EYES: ICD-10-CM

## 2019-11-20 DIAGNOSIS — G70.00 MYASTHENIA GRAVIS, ACHR ANTIBODY POSITIVE (H): ICD-10-CM

## 2019-11-20 DIAGNOSIS — H40.053 BORDERLINE GLAUCOMA WITH OCULAR HYPERTENSION, BILATERAL: ICD-10-CM

## 2019-11-20 DIAGNOSIS — H04.123 DRY EYES, BILATERAL: ICD-10-CM

## 2019-11-20 DIAGNOSIS — E11.9 TYPE 2 DIABETES MELLITUS WITHOUT COMPLICATION, WITHOUT LONG-TERM CURRENT USE OF INSULIN (H): ICD-10-CM

## 2019-11-20 DIAGNOSIS — H52.4 PRESBYOPIA: ICD-10-CM

## 2019-11-20 DIAGNOSIS — H25.11 NUCLEAR SCLEROSIS OF RIGHT EYE: ICD-10-CM

## 2019-11-20 DIAGNOSIS — Z98.890 S/P BLEPHAROPLASTY: ICD-10-CM

## 2019-11-20 DIAGNOSIS — H52.223 REGULAR ASTIGMATISM OF BOTH EYES: ICD-10-CM

## 2019-11-20 PROCEDURE — 92015 DETERMINE REFRACTIVE STATE: CPT | Performed by: STUDENT IN AN ORGANIZED HEALTH CARE EDUCATION/TRAINING PROGRAM

## 2019-11-20 PROCEDURE — 92014 COMPRE OPH EXAM EST PT 1/>: CPT | Performed by: STUDENT IN AN ORGANIZED HEALTH CARE EDUCATION/TRAINING PROGRAM

## 2019-11-20 RX ORDER — TIMOLOL MALEATE 5 MG/ML
1 SOLUTION/ DROPS OPHTHALMIC 2 TIMES DAILY
Qty: 3 BOTTLE | Refills: 3 | Status: SHIPPED | OUTPATIENT
Start: 2019-11-20 | End: 2020-06-25

## 2019-11-20 RX ORDER — LATANOPROST 50 UG/ML
1 SOLUTION/ DROPS OPHTHALMIC AT BEDTIME
Qty: 7.5 ML | Refills: 3 | Status: SHIPPED | OUTPATIENT
Start: 2019-11-20 | End: 2020-06-25

## 2019-11-20 ASSESSMENT — REFRACTION_MANIFEST
OS_AXIS: 010
OD_SPHERE: -2.50
OS_SPHERE: -1.75
OS_CYLINDER: +1.50
OD_ADD: +2.75
OS_ADD: +2.75
OD_CYLINDER: +1.75
OD_AXIS: 131

## 2019-11-20 ASSESSMENT — VISUAL ACUITY
CORRECTION_TYPE: GLASSES
OS_CC+: -2
OS_CC: 20/25
OD_CC: 20/20
METHOD: SNELLEN - LINEAR

## 2019-11-20 ASSESSMENT — TONOMETRY
IOP_METHOD: APPLANATION
OS_IOP_MMHG: 24
OD_IOP_MMHG: 25

## 2019-11-20 ASSESSMENT — REFRACTION_WEARINGRX
OS_SPHERE: -2.00
SPECS_TYPE: BIFOCAL
OD_CYLINDER: +2.00
OS_ADD: +3.00
OS_CYLINDER: +1.75
OD_AXIS: 132
OS_AXIS: 019
OD_ADD: +3.25
OD_SPHERE: -2.75

## 2019-11-20 ASSESSMENT — CONF VISUAL FIELD
OD_NORMAL: 1
METHOD: COUNTING FINGERS
OS_NORMAL: 1

## 2019-11-20 ASSESSMENT — CUP TO DISC RATIO
OS_RATIO: 0.25
OD_RATIO: 0.1

## 2019-11-20 ASSESSMENT — EXTERNAL EXAM - LEFT EYE: OS_EXAM: NORMAL

## 2019-11-20 ASSESSMENT — SLIT LAMP EXAM - LIDS
COMMENTS: 1+ DERMATOCHALASIS
COMMENTS: 1+ DERMATOCHALASIS

## 2019-11-20 ASSESSMENT — EXTERNAL EXAM - RIGHT EYE: OD_EXAM: NORMAL

## 2019-11-20 NOTE — LETTER
11/20/2019         RE: Rakan Nolasco  [address] [facility] [address]        Dear Colleague,    Thank you for referring your patient, Rakan Nolasco, to the [facility]. Please see a copy of my visit note below.     Current Eye Medications:  Timolol twice a day right eye, last took at 6 am. Latanoprost at bedtime both eyes, last took at 9:30 pm.      Subjective:  Complete eye exam. Vision is doing OK in distance both eyes, but dim in left eye. For several years has what looks like a water spot that moves in left eye, like looking through film.  Sometimes has trouble seeing at near and on computer. Vision comes and goes both eyes. Has aching for about 1 minute, could be either eye for several years.   Diabetic for last 2 years. Blood sugar is doing well.   Lab Results   Component Value Date    A1C 6.4 05/14/2019    A1C 6.7 02/20/2019    A1C 6.8 08/14/2018    A1C 7.4 06/04/2018    A1C 6.7 01/16/2018      Objective:  See Ophthalmology Exam.       Assessment:  Rakan Nolasco is a 74 year old male who presents with:   Encounter Diagnoses   Name Primary?     Encounter for examination of eyes and vision with abnormal findings      Myopia of both eyes      Regular astigmatism of both eyes      Presbyopia        Type 2 diabetes mellitus without complication, without long-term current use of insulin (H) Negative diabetic retinopathy      H/O RD (retinal detachment) OD s/p repair with PPV (AB)      Myasthenia gravis, AChR antibody positive (H)      Borderline glaucoma with ocular hypertension, bilateral Intraocular pressure 25/24 today. Increase timolol to twice a day in both eyes.      Nuclear sclerosis of right eye      Pseudophakia of right eye s/p YAG OD      S/P blepharoplasty      Dry eyes, bilateral        Plan:  Continue Latanoprost (teal top) at bedtime both eyes     Continue Timolol (yellow top) twice a day in BOTH EYES    Glasses prescription given - optional to update    Use  "artificial tears up to four times a day (like Refresh Optive, Systane Balance, TheraTears, or generic artificial tears are ok. Avoid \"get the red out\" drops).     Keep blood sugars and blood pressure under good control.    Mateo Gray MD  [phone]          Again, thank you for allowing me to participate in the care of your patient.        Sincerely,        Mateo Gray MD    "

## 2019-11-20 NOTE — PROGRESS NOTES
" Current Eye Medications:  Timolol twice a day right eye, last took at 6 am. Latanoprost at bedtime both eyes, last took at 9:30 pm.      Subjective:  Complete eye exam. Vision is doing OK in distance both eyes, but dim in left eye. For several years has what looks like a water spot that moves in left eye, like looking through film.  Sometimes has trouble seeing at near and on computer. Vision comes and goes both eyes. Has aching for about 1 minute, could be either eye for several years.   Diabetic for last 2 years. Blood sugar is doing well.   Lab Results   Component Value Date    A1C 6.4 05/14/2019    A1C 6.7 02/20/2019    A1C 6.8 08/14/2018    A1C 7.4 06/04/2018    A1C 6.7 01/16/2018      Objective:  See Ophthalmology Exam.       Assessment:  Rakan Nolasco is a 74 year old male who presents with:   Encounter Diagnoses   Name Primary?     Encounter for examination of eyes and vision with abnormal findings      Myopia of both eyes      Regular astigmatism of both eyes      Presbyopia        Type 2 diabetes mellitus without complication, without long-term current use of insulin (H) Negative diabetic retinopathy      H/O RD (retinal detachment) OD s/p repair with PPV (AB)      Myasthenia gravis, AChR antibody positive (H)      Borderline glaucoma with ocular hypertension, bilateral Intraocular pressure 25/24 today. Increase timolol to twice a day in both eyes.      Nuclear sclerosis of right eye      Pseudophakia of right eye s/p YAG OD      S/P blepharoplasty      Dry eyes, bilateral        Plan:  Continue Latanoprost (teal top) at bedtime both eyes     Continue Timolol (yellow top) twice a day in BOTH EYES    Glasses prescription given - optional to update    Use artificial tears up to four times a day (like Refresh Optive, Systane Balance, TheraTears, or generic artificial tears are ok. Avoid \"get the red out\" drops).     Keep blood sugars and blood pressure under good control.    Mateo Gray MD  [phone]

## 2019-11-20 NOTE — PATIENT INSTRUCTIONS
"Continue Latanoprost (teal top) at bedtime both eyes     Continue Timolol (yellow top) twice a day in BOTH EYES    Glasses prescription given - optional to update    Use artificial tears up to four times a day (like Refresh Optive, Systane Balance, TheraTears, or generic artificial tears are ok. Avoid \"get the red out\" drops).     Keep blood sugars and blood pressure under good control.    Mateo Gray MD  [phone]    Patient Education   Diabetes weakens the blood vessels all over the body, including the eyes. Damage to the blood vessels in the eyes can cause swelling or bleeding into part of the eye (called the retina). This is called diabetic retinopathy ([facility]-tin--puh-thee). If not treated, this disease can cause vision loss or blindness.   Symptoms may include blurred or distorted vision, but many people have no symptoms. It's important to see your eye doctor regularly to check for problems.   Early treatment and good control can help protect your vision. Here are the things you can do to help prevent vision loss:      1. Keep your blood sugar levels under tight control.      2. Bring high blood pressure under control.      3. No smoking.      4. Have yearly dilated eye exams.       "

## 2019-11-29 DIAGNOSIS — E78.5 HYPERLIPIDEMIA LDL GOAL <130: ICD-10-CM

## 2019-11-30 RX ORDER — ATORVASTATIN CALCIUM 20 MG/1
TABLET, FILM COATED ORAL
Qty: 30 TABLET | Refills: 0 | Status: SHIPPED | OUTPATIENT
Start: 2019-11-30 | End: 2020-01-29

## 2019-11-30 ASSESSMENT — ENCOUNTER SYMPTOMS
ARTHRALGIAS: 1
HEARTBURN: 0
EYE PAIN: 0
CONSTIPATION: 0
PARESTHESIAS: 0
ABDOMINAL PAIN: 0
DIARRHEA: 0
CHILLS: 0
HEMATOCHEZIA: 0
COUGH: 1
NAUSEA: 0
NERVOUS/ANXIOUS: 0
HEMATURIA: 0
DYSURIA: 0
DIZZINESS: 0
HEADACHES: 0
PALPITATIONS: 0
SORE THROAT: 1
FREQUENCY: 1
SHORTNESS OF BREATH: 1
MYALGIAS: 1
WEAKNESS: 1
JOINT SWELLING: 1
FEVER: 0

## 2019-11-30 ASSESSMENT — ACTIVITIES OF DAILY LIVING (ADL): CURRENT_FUNCTION: NO ASSISTANCE NEEDED

## 2019-12-02 DIAGNOSIS — K21.9 GASTROESOPHAGEAL REFLUX DISEASE WITHOUT ESOPHAGITIS: ICD-10-CM

## 2019-12-03 ENCOUNTER — OFFICE VISIT (OUTPATIENT)
Dept: FAMILY MEDICINE | Facility: CLINIC | Age: 74
End: 2019-12-03
Payer: COMMERCIAL

## 2019-12-03 VITALS
HEIGHT: 69 IN | OXYGEN SATURATION: 99 % | HEART RATE: 81 BPM | BODY MASS INDEX: 35.03 KG/M2 | WEIGHT: 236.5 LBS | SYSTOLIC BLOOD PRESSURE: 136 MMHG | DIASTOLIC BLOOD PRESSURE: 64 MMHG | RESPIRATION RATE: 14 BRPM | TEMPERATURE: 96.4 F

## 2019-12-03 DIAGNOSIS — Z00.00 ENCOUNTER FOR MEDICARE ANNUAL WELLNESS EXAM: Primary | ICD-10-CM

## 2019-12-03 DIAGNOSIS — H91.93 BILATERAL HEARING LOSS, UNSPECIFIED HEARING LOSS TYPE: ICD-10-CM

## 2019-12-03 PROCEDURE — 99397 PER PM REEVAL EST PAT 65+ YR: CPT | Performed by: FAMILY MEDICINE

## 2019-12-03 RX ORDER — LISINOPRIL 20 MG/1
20 TABLET ORAL DAILY
Qty: 90 TABLET | Refills: 3 | Status: CANCELLED | OUTPATIENT
Start: 2019-12-03

## 2019-12-03 RX ORDER — PANTOPRAZOLE SODIUM 20 MG/1
20 TABLET, DELAYED RELEASE ORAL DAILY
Qty: 90 TABLET | Refills: 3 | Status: CANCELLED | OUTPATIENT
Start: 2019-12-03

## 2019-12-03 RX ORDER — METFORMIN HCL 500 MG
500 TABLET, EXTENDED RELEASE 24 HR ORAL 2 TIMES DAILY WITH MEALS
Qty: 180 TABLET | Refills: 0 | Status: CANCELLED | OUTPATIENT
Start: 2019-12-03

## 2019-12-03 RX ORDER — ATORVASTATIN CALCIUM 20 MG/1
20 TABLET, FILM COATED ORAL DAILY
Qty: 30 TABLET | Refills: 0 | Status: CANCELLED | OUTPATIENT
Start: 2019-12-03

## 2019-12-03 ASSESSMENT — ENCOUNTER SYMPTOMS
EYE PAIN: 0
NERVOUS/ANXIOUS: 0
COUGH: 1
HEARTBURN: 0
JOINT SWELLING: 1
SORE THROAT: 1
FREQUENCY: 1
ABDOMINAL PAIN: 0
CHILLS: 0
PARESTHESIAS: 0
DIARRHEA: 0
MYALGIAS: 1
DYSURIA: 0
PALPITATIONS: 0
WEAKNESS: 1
HEADACHES: 0
HEMATOCHEZIA: 0
CONSTIPATION: 0
HEMATURIA: 0
FEVER: 0
SHORTNESS OF BREATH: 1
NAUSEA: 0
ARTHRALGIAS: 1
DIZZINESS: 0

## 2019-12-03 ASSESSMENT — ACTIVITIES OF DAILY LIVING (ADL): CURRENT_FUNCTION: NO ASSISTANCE NEEDED

## 2019-12-03 ASSESSMENT — PAIN SCALES - GENERAL: PAINLEVEL: MILD PAIN (2)

## 2019-12-03 ASSESSMENT — MIFFLIN-ST. JEOR: SCORE: 1807.75

## 2019-12-03 NOTE — PROGRESS NOTES
"SUBJECTIVE:   Rakan Nolasco is a 74 year old male who presents for Preventive Visit.  Are you in the first 12 months of your Medicare coverage?  No    Healthy Habits:     In general, how would you rate your overall health?  Fair    Frequency of exercise:  None    Do you usually eat at least 4 servings of fruit and vegetables a day, include whole grains    & fiber and avoid regularly eating high fat or \"junk\" foods?  No    Taking medications regularly:  Yes    Medication side effects:  Muscle aches and Lightheadedness    Ability to successfully perform activities of daily living:  No assistance needed    Home Safety:  No safety concerns identified    Hearing Impairment:  Difficulty following a conversation in a noisy restaurant or crowded room and need to ask people to speak up or repeat themselves    In the past 6 months, have you been bothered by leaking of urine? Yes    In general, how would you rate your overall mental or emotional health?  Excellent      PHQ-2 Total Score: 0    Additional concerns today:  Yes    Exercise: Limited by pains in the legs; possibly orthotcs.  Hearing loss: Needs hearing test  Do you feel safe in your environment? Yes    Have you ever done Advance Care Planning? (For example, a Health Directive, POLST, or a discussion with a medical provider or your loved ones about your wishes): Yes, advance care planning is on file.    Fall risk: None  Fallen 2 or more times in the past year?: No  Any fall with injury in the past year?: No    Cognitive Screening   1) Repeat 3 items (Leader, Season, Table)    2) Clock draw: NORMAL  3) 3 item recall: Recalls 3 objects  Results: 3 items recalled: COGNITIVE IMPAIRMENT LESS LIKELY    Mini-CogTM Copyright KORY Reddy. Licensed by the author for use in [facility]; reprinted with permission (miroslava@.[facility]). All rights reserved.      Do you have sleep apnea, excessive snoring or daytime drowsiness?: yes using a cpap machine nightly.     Reviewed and " updated as needed this visit by clinical staff  Tobacco  Allergies  Meds         Reviewed and updated as needed this visit by Provider        Social History     Tobacco Use     Smoking status: Former Smoker     Packs/day: 2.00     Years: 15.00     Pack years: 30.00     Types: Cigarettes     Start date: 1968     Last attempt to quit: 1983     Years since quittin.9     Smokeless tobacco: Former User     Tobacco comment: smoke free household.   Substance Use Topics     Alcohol use: No     Comment: Quit in          Alcohol Use 2019   Prescreen: >3 drinks/day or >7 drinks/week? Not Applicable   Prescreen: >3 drinks/day or >7 drinks/week? -           PROBLEMS TO ADD ON...    Current providers sharing in care for this patient include:   Patient Care Team:  Telly Lucio MD as PCP - General (Family Practice)  Telly Lucio MD as Assigned PCP    The following health maintenance items are reviewed in Epic and correct as of today:  Health Maintenance   Topic Date Due     ADVANCE CARE PLANNING  2016     BMP  2019     LIPID  2019     MEDICARE ANNUAL WELLNESS VISIT  10/24/2019     A1C  2019     TSH W/FREE T4 REFLEX  2020     FALL RISK ASSESSMENT  2020     MICROALBUMIN  2020     DIABETIC FOOT EXAM  2020     EYE EXAM  2020     DTAP/TDAP/TD IMMUNIZATION (2 - Td) 2023     COLONOSCOPY  2028     HEPATITIS C SCREENING  Completed     PHQ-2  Completed     INFLUENZA VACCINE  Completed     PNEUMOCOCCAL IMMUNIZATION 65+ LOW/MEDIUM RISK  Completed     ZOSTER IMMUNIZATION  Completed     AORTIC ANEURYSM SCREENING (SYSTEM ASSIGNED)  Completed     IPV IMMUNIZATION  Aged Out     MENINGITIS IMMUNIZATION  Aged Out     Patient Active Problem List   Diagnosis     GERD (gastroesophageal reflux disease)     Snoring     Fatigue     HLD (hyperlipidemia)     Borderline glaucoma with ocular hypertension     Advanced directives, counseling/discussion      Trigeminal neuralgia pain     bmi 31     HTN (hypertension)     Health Care Home     Arthritis     Prediabetes     Dry eyes     PVD (posterior vitreous detachment) OU     H/O RD (retinal detachment) s/p repair with PPV (AB)     Epiretinal membrane, bilateral     Pseudophakia of right eye     Myasthenia gravis (H)     Type 2 diabetes mellitus (H)     Morbid obesity (H)     Right posterior capsular opacification     Demand ischemia (H)     Obstructive sleep apnea syndrome     Prostate cancer (H)     Restless legs syndrome     Sleep apnea     Temporal arteritis (H)     Type 2 diabetes mellitus with hyperglycemia, without long-term current use of insulin (H)     Past Surgical History:   Procedure Laterality Date     BIOPSY ARTERY TEMPORAL Bilateral 2017    Procedure: BIOPSY ARTERY TEMPORAL;  Bilateral temporal artery Biopsy;  Surgeon: Jj Tello MD;  Location: [facility] OR     CATARACT IOL, RT/LT Right      COLONOSCOPY       ESOPHAGOSCOPY, GASTROSCOPY, DUODENOSCOPY (EGD), COMBINED  1/15/2014    Procedure: COMBINED ESOPHAGOSCOPY, GASTROSCOPY, DUODENOSCOPY (EGD), BIOPSY SINGLE OR MULTIPLE;;  Surgeon: Winston Nixon MD;  Location: [facility] OR     LASER YAG CAPSULOTOMY Right 2018    right eye     PHACOEMULSIFICATION CLEAR CORNEA WITH STANDARD INTRAOCULAR LENS IMPLANT Right 2017    Procedure: PHACOEMULSIFICATION CLEAR CORNEA WITH STANDARD INTRAOCULAR LENS IMPLANT;  Surgeon: Mateo Gray MD;  Location:  [facility]     RETINAL REATTACHMENT Right      SURGICAL HISTORY OF -   2009    Both Eyelids-.     TONSILLECTOMY  as child       Social History     Tobacco Use     Smoking status: Former Smoker     Packs/day: 2.00     Years: 15.00     Pack years: 30.00     Types: Cigarettes     Start date: 1968     Last attempt to quit: 1983     Years since quittin.9     Smokeless tobacco: Former User     Tobacco comment: smoke free household.   Substance Use Topics     Alcohol use: No      "Comment: Quit in 1986     Family History   Problem Relation Age of Onset     Respiratory Mother         copd     LUNG DISEASE Mother      Allergies Brother      Cancer Maternal Grandmother      Heart Disease Paternal Grandmother      Cerebrovascular Disease Father      Cancer Father      Other Cancer Father      Glaucoma Maternal Aunt      Macular Degeneration No family hx of          Pneumonia Vaccine:Adults age 65+ who received Pneumovax (PPSV23) at 65 years or older: Should be given PCV13 > 1 year after their most recent PPSV23    Review of Systems   Constitutional: Negative for chills and fever.   HENT: Positive for congestion, hearing loss and sore throat. Negative for ear pain.    Eyes: Negative for pain and visual disturbance.   Respiratory: Positive for cough and shortness of breath.    Cardiovascular: Positive for peripheral edema. Negative for chest pain and palpitations.   Gastrointestinal: Negative for abdominal pain, constipation, diarrhea, heartburn, hematochezia and nausea.   Genitourinary: Positive for frequency, impotence and urgency. Negative for discharge, dysuria, genital sores and hematuria.   Musculoskeletal: Positive for arthralgias, joint swelling and myalgias.   Skin: Negative for rash.   Neurological: Positive for weakness. Negative for dizziness, headaches and paresthesias.   Psychiatric/Behavioral: Negative for mood changes. The patient is not nervous/anxious.        OBJECTIVE:   /64   Pulse 81   Temp 96.4  F (35.8  C) (Oral)   Resp 14   Ht 1.744 m (5' 8.66\")   Wt 107.3 kg (236 lb 8 oz)   SpO2 99%   BMI 35.27 kg/m   Estimated body mass index is 35.27 kg/m  as calculated from the following:    Height as of this encounter: 1.744 m (5' 8.66\").    Weight as of this encounter: 107.3 kg (236 lb 8 oz).  Physical Exam  GENERAL: healthy, alert and no distress  EYES: Eyes grossly normal to inspection, PERRL and conjunctivae and sclerae normal  HENT: ear canals and TM's normal, nasal " "congestion and mouth without ulcers or lesions  NECK: no adenopathy and thyroid normal to palpation  RESP: lungs clear to auscultation - no rales, rhonchi or wheezes  CV: regular rate and rhythm, normal S1 S2, no S3 or S4, no murmur, click or rub, no peripheral edema   ABDOMEN: soft, obese, nontender, no masses and bowel sounds normal  MS: no gross musculoskeletal defects noted, no edema  SKIN: no suspicious lesions or rashes  NEURO: Normal strength and tone, mentation intact and speech normal  PSYCH: mentation appears normal, affect normal/bright    Diagnostic Test Results:  Labs reviewed in Epic    ASSESSMENT / PLAN:   Rakan was seen today for wellness visit.    Diagnoses and all orders for this visit:    Encounter for Medicare annual wellness exam    Bilateral hearing loss, unspecified hearing loss type    -  Will wait until current URI symptoms/congestion resolve before deciding way forward      COUNSELING:  Reviewed preventive health counseling, as reflected in patient instructions       Regular exercise       Healthy diet/nutrition       Hearing screening       Fall risk prevention    Estimated body mass index is 35.27 kg/m  as calculated from the following:    Height as of this encounter: 1.744 m (5' 8.66\").    Weight as of this encounter: 107.3 kg (236 lb 8 oz).    Weight management plan: Discussed healthy diet and exercise guidelines     reports that he quit smoking about 36 years ago. His smoking use included cigarettes. He started smoking about 51 years ago. He has a 30.00 pack-year smoking history. He has quit using smokeless tobacco.      Appropriate preventive services were discussed with this patient, including applicable screening as appropriate for cardiovascular disease, diabetes, osteopenia/osteoporosis, and glaucoma.  As appropriate for age/gender, discussed screening for colorectal cancer, prostate cancer, breast cancer, and cervical cancer. Checklist reviewing preventive services available has " been given to the patient.    Reviewed patients plan of care and provided an AVS. The Basic Care Plan (routine screening as documented in Health Maintenance) for Rakan meets the Care Plan requirement. This Care Plan has been established and reviewed with the Patient.    Counseling Resources:  ATP IV Guidelines  Pooled Cohorts Equation Calculator  Breast Cancer Risk Calculator  FRAX Risk Assessment  ICSI Preventive Guidelines  Dietary Guidelines for Americans, 2010  [employer]'s MyPlate  ASA Prophylaxis  Lung CA Screening    Telly Lucio MD  [facility]    Identified Health Risks:

## 2019-12-04 DIAGNOSIS — G70.00 MYASTHENIA GRAVIS, ACHR ANTIBODY POSITIVE (H): ICD-10-CM

## 2019-12-04 RX ORDER — PREDNISONE 5 MG/1
TABLET ORAL
Qty: 90 TABLET | Refills: 1 | Status: SHIPPED | OUTPATIENT
Start: 2019-12-04 | End: 2020-04-21

## 2019-12-04 RX ORDER — PANTOPRAZOLE SODIUM 20 MG/1
TABLET, DELAYED RELEASE ORAL
Qty: 90 TABLET | Refills: 1 | Status: SHIPPED | OUTPATIENT
Start: 2019-12-04 | End: 2020-05-28

## 2019-12-04 NOTE — TELEPHONE ENCOUNTER
Rx Authorization:    Requested Medication/ Dose predniSONE (DELTASONE) 5 MG tablet    Date last refill ordered: 05/20/19    Quantity ordered: 90    # refills: 0    Date of last clinic visit with ordering provider: 03/04/19    Date of next clinic visit with ordering provider: 03/04/20    All pertinent protocol data (lab date/result):     Include pertinent information from patients message:

## 2019-12-10 ENCOUNTER — TRANSFERRED RECORDS (OUTPATIENT)
Dept: HEALTH INFORMATION MANAGEMENT | Facility: CLINIC | Age: 74
End: 2019-12-10

## 2019-12-10 LAB
ALT SERPL-CCNC: 20 IU/L (ref 8–45)
AST SERPL-CCNC: 24 IU/L (ref 2–40)
CREAT SERPL-MCNC: 0.93 MG/DL (ref 0.72–1.25)
GFR SERPL CREATININE-BSD FRML MDRD: >60 ML/MIN/1.73M2
GLUCOSE SERPL-MCNC: 118 MG/DL (ref 65–100)
POTASSIUM SERPL-SCNC: 4.7 MMOL/L (ref 3.5–5)

## 2019-12-11 DIAGNOSIS — Z76.89 HEALTH CARE HOME: Primary | ICD-10-CM

## 2019-12-11 DIAGNOSIS — E11.65 TYPE 2 DIABETES MELLITUS WITH HYPERGLYCEMIA, WITHOUT LONG-TERM CURRENT USE OF INSULIN (H): ICD-10-CM

## 2019-12-12 ENCOUNTER — PATIENT OUTREACH (OUTPATIENT)
Dept: CARE COORDINATION | Facility: CLINIC | Age: 74
End: 2019-12-12

## 2019-12-12 NOTE — PROGRESS NOTES
The clinic Community Health Worker spoke with the patient today due to ED/Hospital Discharge to discuss possible Clinic Care Coordination enrollment.  The service was described to the patient and immediate needs were discussed.  The patient declined enrollement at this time.  The PCP is encouraged to refer in the future if the patient's needs change.      Reason for Referral: Care Transition: ED to outpatient

## 2019-12-18 NOTE — PROGRESS NOTES
Subjective     Rakan Nolasco is a 74 year old male who presents to clinic today for the following health issues:    HPI   Diabetes Follow-up    How often are you checking your blood sugar? One time daily  What time of day are you checking your blood sugars (select all that apply)?  Before meals  Have you had any blood sugars above 200?  No  Have you had any blood sugars below 70?  No    What symptoms do you notice when your blood sugar is low?  None    What concerns do you have today about your diabetes? None     Do you have any of these symptoms? (Select all that apply)  Numbness in feet     Have you had a diabetic eye exam in the last 12 months? Yes- Date of last eye exam: UTD    Diabetes Management Resources    Hyperlipidemia Follow-Up      Are you regularly taking any medication or supplement to lower your cholesterol?   No    Are you having muscle aches or other side effects that you think could be caused by your cholesterol lowering medication?  No    Hypertension Follow-up      Do you check your blood pressure regularly outside of the clinic? Yes at times    Are you following a low salt diet? Yes    Are your blood pressures ever more than 140 on the top number (systolic) OR more   than 90 on the bottom number (diastolic), for example 140/90? Yes    BP Readings from Last 2 Encounters:   12/20/19 132/74   12/03/19 136/64     Hemoglobin A1C (%)   Date Value   12/20/2019 6.4 (H)   05/14/2019 6.4 (H)     LDL Cholesterol Calculated (mg/dL)   Date Value   08/14/2018 44   09/06/2017 71     Knee Pain:   Leg knee and foot pain bilateral with foot   Was bad a few days ago and legs swelled up, now improved. Had been standing for long.  Once in a while gets calf pain    SOB  Since starting medication for MG  Has follow up with   Worried about COPD.  No history of allergies    HTN:     BP Readings from Last 6 Encounters:   12/20/19 132/74   12/03/19 136/64   11/18/19 (!) 150/68   07/08/19 152/70   05/14/19 136/68    03/04/19 140/63     Anemia   CBC and Differential: HGB 9.6 (Low), HCT 34.1 (Low), MCV 68 (Low), MCH 19.0 (Low), MCHC 28.2 (Low), RDW 19.9 (High) WNL (WBC 8.7, )      How many servings of fruits and vegetables do you eat daily?  0-1    On average, how many sweetened beverages do you drink each day (Examples: soda, juice, sweet tea, etc.  Do NOT count diet or artificially sweetened beverages)?   0    How many days per week do you miss taking your medication? 0    Numbness in the hands and bilateral leg pain    Patient Active Problem List   Diagnosis     GERD (gastroesophageal reflux disease)     Snoring     Fatigue     HLD (hyperlipidemia)     Borderline glaucoma with ocular hypertension     Advanced directives, counseling/discussion     Trigeminal neuralgia pain     bmi 31     HTN (hypertension)     Health Care Home     Arthritis     Prediabetes     Dry eyes     PVD (posterior vitreous detachment) OU     H/O RD (retinal detachment) s/p repair with PPV (AB)     Epiretinal membrane, bilateral     Pseudophakia of right eye     Myasthenia gravis (H)     Type 2 diabetes mellitus (H)     Morbid obesity (H)     Right posterior capsular opacification     Demand ischemia (H)     Obstructive sleep apnea syndrome     Prostate cancer (H)     Restless legs syndrome     Sleep apnea     Temporal arteritis (H)     Type 2 diabetes mellitus with hyperglycemia, without long-term current use of insulin (H)     Past Surgical History:   Procedure Laterality Date     BIOPSY ARTERY TEMPORAL Bilateral 8/1/2017    Procedure: BIOPSY ARTERY TEMPORAL;  Bilateral temporal artery Biopsy;  Surgeon: Jj Tello MD;  Location: [facility] OR     CATARACT IOL, RT/LT Right      COLONOSCOPY       ESOPHAGOSCOPY, GASTROSCOPY, DUODENOSCOPY (EGD), COMBINED  1/15/2014    Procedure: COMBINED ESOPHAGOSCOPY, GASTROSCOPY, DUODENOSCOPY (EGD), BIOPSY SINGLE OR MULTIPLE;;  Surgeon: Winston Nixon MD;  Location: [facility] OR     LASER YAG CAPSULOTOMY  Right 2018    right eye     PHACOEMULSIFICATION CLEAR CORNEA WITH STANDARD INTRAOCULAR LENS IMPLANT Right 2017    Procedure: PHACOEMULSIFICATION CLEAR CORNEA WITH STANDARD INTRAOCULAR LENS IMPLANT;  Surgeon: Mateo Gray MD;  Location: [facility]     RETINAL REATTACHMENT Right      SURGICAL HISTORY OF -   2009    Both Eyelids-.     TONSILLECTOMY  as child       Social History     Tobacco Use     Smoking status: Former Smoker     Packs/day: 2.00     Years: 15.00     Pack years: 30.00     Types: Cigarettes     Start date: 1968     Last attempt to quit: 1983     Years since quittin.9     Smokeless tobacco: Former User     Tobacco comment: smoke free household.   Substance Use Topics     Alcohol use: No     Comment: Quit in      Family History   Problem Relation Age of Onset     Respiratory Mother         copd     LUNG DISEASE Mother      Allergies Brother      Cancer Maternal Grandmother      Heart Disease Paternal Grandmother      Cerebrovascular Disease Father      Cancer Father      Other Cancer Father      Glaucoma Maternal Aunt      Macular Degeneration No family hx of          Reviewed and updated as needed this visit by Provider    Review of Systems    Constitutional, HEENT, cardiovascular, gi and gu systems are negative, except as otherwise noted.      Objective    /74   Pulse 55   Temp 96  F (35.6  C) (Oral)   Wt 108.9 kg (240 lb)   SpO2 99%   BMI 35.79 kg/m    Body mass index is 35.79 kg/m .  Physical Exam   GENERAL: healthy, alert and no distress  NECK: no adenopathy, no asymmetry, masses, or scars and thyroid normal to palpation  RESP: lungs clear to auscultation - no rales, rhonchi or wheezes  CV: regular rate and rhythm, normal S1 S2, no S3 or S4, no murmur, click or rub, no peripheral edema  ABDOMEN: soft, obese, nontender, no masses and bowel sounds normal  MS: no gross musculoskeletal defects noted, no calf swelling or tenderness  NEURO: Normal strength  and tone, mentation intact and speech normal  PSYCH: mentation appears normal, affect normal/bright  Diabetic foot exam: no trophic changes or ulcerative lesions, normal sensory exam and DP reduced bilateral    Diagnostic Test Results:  Labs reviewed in Epic        Assessment & Plan     Rakan was seen today for diabetes.    Diagnoses and all orders for this visit:    Type 2 diabetes mellitus with diabetic neuropathy, without long-term current use of insulin (H)  -     Lipid panel reflex to direct LDL Fasting  -     HEMOGLOBIN A1C  -     TSH WITH FREE T4 REFLEX    SOB (shortness of breath)       -   Recent cardiac work in ER was normal, weight loss recommended. Wondering if medication could be cause; will discuss with neurology at follow up.    Myasthenia gravis (H)     -   On Pyridostigmine and following with neurology    BMI 35.0-35.9,adult     -    Counseled to make better food choices, exercise as tolerated, and lose weight.    Other iron deficiency anemia    Checked in the ER and was low. Colonoscopy 9/2018 recommended repeat in 5 yrs. Will check iron levels might need supplement  -     Iron and iron binding capacity    HTN, goal below 140/90      Has occasional high readings  -     BASIC METABOLIC PANEL    Other orders  -     PAF COMPLETED    Return in about 3 months (around 3/20/2020) for Routine Visit.    Telly Lucio MD  [facility]

## 2019-12-20 ENCOUNTER — OFFICE VISIT (OUTPATIENT)
Dept: FAMILY MEDICINE | Facility: CLINIC | Age: 74
End: 2019-12-20
Payer: COMMERCIAL

## 2019-12-20 VITALS
BODY MASS INDEX: 35.79 KG/M2 | TEMPERATURE: 96 F | DIASTOLIC BLOOD PRESSURE: 74 MMHG | HEART RATE: 55 BPM | WEIGHT: 240 LBS | OXYGEN SATURATION: 99 % | SYSTOLIC BLOOD PRESSURE: 132 MMHG

## 2019-12-20 DIAGNOSIS — R06.02 SOB (SHORTNESS OF BREATH): ICD-10-CM

## 2019-12-20 DIAGNOSIS — D50.8 OTHER IRON DEFICIENCY ANEMIA: ICD-10-CM

## 2019-12-20 DIAGNOSIS — G70.00 MYASTHENIA GRAVIS (H): ICD-10-CM

## 2019-12-20 DIAGNOSIS — E11.40 TYPE 2 DIABETES MELLITUS WITH DIABETIC NEUROPATHY, WITHOUT LONG-TERM CURRENT USE OF INSULIN (H): Primary | ICD-10-CM

## 2019-12-20 DIAGNOSIS — I10 HTN, GOAL BELOW 140/90: ICD-10-CM

## 2019-12-20 LAB
ANION GAP SERPL CALCULATED.3IONS-SCNC: 5 MMOL/L (ref 3–14)
BUN SERPL-MCNC: 15 MG/DL (ref 7–30)
CALCIUM SERPL-MCNC: 9.3 MG/DL (ref 8.5–10.1)
CHLORIDE SERPL-SCNC: 100 MMOL/L (ref 94–109)
CHOLEST SERPL-MCNC: 136 MG/DL
CO2 SERPL-SCNC: 30 MMOL/L (ref 20–32)
CREAT SERPL-MCNC: 0.91 MG/DL (ref 0.66–1.25)
GFR SERPL CREATININE-BSD FRML MDRD: 83 ML/MIN/{1.73_M2}
GLUCOSE SERPL-MCNC: 116 MG/DL (ref 70–99)
HBA1C MFR BLD: 6.4 % (ref 0–5.6)
HDLC SERPL-MCNC: 40 MG/DL
IRON SATN MFR SERPL: 6 % (ref 15–46)
IRON SERPL-MCNC: 22 UG/DL (ref 35–180)
LDLC SERPL CALC-MCNC: 61 MG/DL
NONHDLC SERPL-MCNC: 96 MG/DL
POTASSIUM SERPL-SCNC: 4.5 MMOL/L (ref 3.4–5.3)
SODIUM SERPL-SCNC: 135 MMOL/L (ref 133–144)
TIBC SERPL-MCNC: 386 UG/DL (ref 240–430)
TRIGL SERPL-MCNC: 176 MG/DL
TSH SERPL DL<=0.005 MIU/L-ACNC: 3.93 MU/L (ref 0.4–4)

## 2019-12-20 PROCEDURE — 83036 HEMOGLOBIN GLYCOSYLATED A1C: CPT | Performed by: FAMILY MEDICINE

## 2019-12-20 PROCEDURE — 80061 LIPID PANEL: CPT | Performed by: FAMILY MEDICINE

## 2019-12-20 PROCEDURE — 83550 IRON BINDING TEST: CPT | Performed by: FAMILY MEDICINE

## 2019-12-20 PROCEDURE — 84443 ASSAY THYROID STIM HORMONE: CPT | Performed by: FAMILY MEDICINE

## 2019-12-20 PROCEDURE — 99214 OFFICE O/P EST MOD 30 MIN: CPT | Performed by: FAMILY MEDICINE

## 2019-12-20 PROCEDURE — 80048 BASIC METABOLIC PNL TOTAL CA: CPT | Performed by: FAMILY MEDICINE

## 2019-12-20 PROCEDURE — 99207 C FOOT EXAM  NO CHARGE: CPT | Performed by: FAMILY MEDICINE

## 2019-12-20 PROCEDURE — 36415 COLL VENOUS BLD VENIPUNCTURE: CPT | Performed by: FAMILY MEDICINE

## 2019-12-20 PROCEDURE — 99207 C PAF COMPLETED  NO CHARGE: CPT | Performed by: FAMILY MEDICINE

## 2019-12-20 PROCEDURE — 83540 ASSAY OF IRON: CPT | Performed by: FAMILY MEDICINE

## 2019-12-24 RX ORDER — FERROUS SULFATE 325(65) MG
325 TABLET ORAL 2 TIMES DAILY
Qty: 60 TABLET | Refills: 1 | Status: SHIPPED | OUTPATIENT
Start: 2019-12-24 | End: 2020-02-14

## 2019-12-29 DIAGNOSIS — R73.03 PREDIABETES: ICD-10-CM

## 2020-01-02 RX ORDER — METFORMIN HCL 500 MG
TABLET, EXTENDED RELEASE 24 HR ORAL
Qty: 180 TABLET | Refills: 0 | Status: SHIPPED | OUTPATIENT
Start: 2020-01-02 | End: 2020-03-30

## 2020-01-02 NOTE — TELEPHONE ENCOUNTER
"Prescription approved per [facility] Refill Protocol.    Requested Prescriptions   Signed Prescriptions Disp Refills    metFORMIN (GLUCOPHAGE-XR) 500 MG 24 hr tablet 180 tablet 0     Sig: TAKE 1 TABLET BY MOUTH TWICE A DAY WITH MEALS       Biguanide Agents Passed - 12/30/2019  8:12 AM        Passed - Blood pressure less than 140/90 in past 6 months     BP Readings from Last 3 Encounters:   12/20/19 132/74   12/03/19 136/64   11/18/19 (!) 150/68                 Passed - Patient has documented LDL within the past 12 mos.     Recent Labs   Lab Test 12/20/19  0726   LDL 61             Passed - Patient has had a Microalbumin in the past 15 mos.     Recent Labs   Lab Test 02/20/19  0950   MICROL 7   UMALCR 6.88             Passed - Patient is age 10 or older        Passed - Patient has documented A1c within the specified period of time.     If HgbA1C is 8 or greater, it needs to be on file within the past 3 months.  If less than 8, must be on file within the past 6 months.     Recent Labs   Lab Test 12/20/19  0726   A1C 6.4*             Passed - Patient's CR is NOT>1.4 OR Patient's EGFR is NOT<45 within past 12 mos.     Recent Labs   Lab Test 12/20/19  0726   GFRESTIMATED 83   GFRESTBLACK >90       Recent Labs   Lab Test 12/20/19  0726   CR 0.91             Passed - Patient does NOT have a diagnosis of CHF.        Passed - Medication is active on med list        Passed - Recent (6 mo) or future (30 days) visit within the authorizing provider's specialty     Patient had office visit in the last 6 months or has a visit in the next 30 days with authorizing provider or within the authorizing provider's specialty.  See \"Patient Info\" tab in inbasket, or \"Choose Columns\" in Meds & Orders section of the refill encounter.            Josefa Payne RN  [facility] [place]    "

## 2020-01-03 DIAGNOSIS — C61 MALIGNANT NEOPLASM OF PROSTATE (H): ICD-10-CM

## 2020-01-03 LAB — PSA SERPL-MCNC: 0.91 UG/L (ref 0–4)

## 2020-01-03 PROCEDURE — 36415 COLL VENOUS BLD VENIPUNCTURE: CPT | Performed by: UROLOGY

## 2020-01-03 PROCEDURE — 84153 ASSAY OF PSA TOTAL: CPT | Performed by: UROLOGY

## 2020-01-10 ENCOUNTER — OFFICE VISIT (OUTPATIENT)
Dept: UROLOGY | Facility: CLINIC | Age: 75
End: 2020-01-10
Payer: COMMERCIAL

## 2020-01-10 VITALS — DIASTOLIC BLOOD PRESSURE: 72 MMHG | SYSTOLIC BLOOD PRESSURE: 161 MMHG | OXYGEN SATURATION: 96 % | HEART RATE: 71 BPM

## 2020-01-10 DIAGNOSIS — E11.65 TYPE 2 DIABETES MELLITUS WITH HYPERGLYCEMIA, WITHOUT LONG-TERM CURRENT USE OF INSULIN (H): ICD-10-CM

## 2020-01-10 DIAGNOSIS — C61 MALIGNANT NEOPLASM OF PROSTATE (H): Primary | ICD-10-CM

## 2020-01-10 DIAGNOSIS — I10 HYPERTENSION, UNSPECIFIED TYPE: ICD-10-CM

## 2020-01-10 PROCEDURE — 99213 OFFICE O/P EST LOW 20 MIN: CPT | Performed by: UROLOGY

## 2020-01-10 NOTE — PROGRESS NOTES
Chief Complaint   Patient presents with     RECHECK       Rakan ASHLEY Nolasco is a 74 year old male who presents today for follow up of   Chief Complaint   Patient presents with     RECHECK    f/u for prostate cancer s/p cryotherapy on 3/18.  His psa has gone down from 8.05 to 0.34 and up to 0.78 to 0.95 to 0.91.    His urinary flow is a little weaker but no straining.    Current Outpatient Medications   Medication Sig Dispense Refill     aspirin 81 MG tablet Take 1 tablet by mouth daily.       atorvastatin (LIPITOR) 20 MG tablet TAKE 1 TABLET BY MOUTH EVERY DAY 30 tablet 0     blood glucose (ONETOUCH VERIO IQ) test strip USE TO TEST BLOOD SUGARS 1 TIMES DAILY OR AS DIRECTED 150 strip 1     blood glucose monitoring (NO BRAND SPECIFIED) meter device kit Use to test blood sugar 1 times daily or as directed. 1 kit 0     blood glucose monitoring (ONE TOUCH DELICA) lancets CHECK BLOOD SUGAR EVERY MORNING 100 each 1     ClonAZEPAM (KLONOPIN) 0.5 MG tablet Take 0.5 mg by mouth. Take 1 tablet by mouth daily at bedtime       ferrous sulfate (FEROSUL) 325 (65 Fe) MG tablet Take 1 tablet (325 mg) by mouth 2 times daily 60 tablet 1     latanoprost (XALATAN) 0.005 % ophthalmic solution Place 1 drop into both eyes At Bedtime 7.5 mL 3     lisinopril (PRINIVIL/ZESTRIL) 20 MG tablet Take 1 tablet (20 mg) by mouth daily 90 tablet 3     metFORMIN (GLUCOPHAGE-XR) 500 MG 24 hr tablet TAKE 1 TABLET BY MOUTH TWICE A DAY WITH MEALS 180 tablet 0     OXcarbazepine (TRILEPTAL) 150 MG tablet TAKE 2 TABLETS BY MOUTH IN THE MORNING AND 3 TABLETS IN THE EVENING 450 tablet 1     OXcarbazepine (TRILEPTAL) 150 MG tablet Take 2 tablets (300 mg) by mouth 2 times daily 360 tablet 1     oxybutynin ER (DITROPAN-XL) 5 MG 24 hr tablet Take 1 tablet (5 mg) by mouth daily 90 tablet 3     pantoprazole (PROTONIX) 20 MG EC tablet TAKE 1 TABLET BY MOUTH EVERY DAY 90 tablet 1     predniSONE (DELTASONE) 20 MG tablet Take 1 tablet (20 mg) by mouth See Admin Instructions  (Patient taking differently: Take 15 mg by mouth See Admin Instructions ) 45 tablet 1     predniSONE (DELTASONE) 5 MG tablet TAKE 2 TABLETS (10 MG) EVERY OTHER DAY. 90 tablet 1     pyridostigmine (MESTINON) 60 MG tablet TAKE 1 TABLET BY MOUTH FOUR TIMES A  tablet 1     pyridostigmine (MESTINON) 60 MG tablet TAKE 1 TABLET BY MOUTH FOUR TIMES A  tablet 0     timolol maleate (TIMOPTIC) 0.5 % ophthalmic solution Place 1 drop into the right eye 2 times daily 3 Bottle 3     Allergies   Allergen Reactions     Azathioprine Shortness Of Breath     Was hospitalized from it. Also had high fever, chills, and shortness of breath.     Ciprofloxacin Muscle Pain (Myalgia)     Muscle pain  Other reaction(s): Myalgia  Other reaction(s): Myalgia     Ropinirole      Leg pan  GI  Weakness; ? sycope  Other reaction(s): Leg Pain     Ropinirole Hcl GI Disturbance     Weakness;? syncope      Past Medical History:   Diagnosis Date     Arthritis          BMI 31.0-31.9,adult      Cancer (H) 01/10/18    Prostate     Demand ischemia (H)      Diabetes (H) 01/10/18     Diverticulosis     Colonoscopy 8/2008     Fatty liver     see US  5/2012     GERD (gastroesophageal reflux disease)      Glaucoma (increased eye pressure)      HTN (hypertension)      Hyperlipidemia LDL goal < 130     age, htn, fhx     Irritable bowel      Nonsenile cataract      ROSIE (obstructive sleep apnea) 01/2011    Using CPAP;      Prediabetes      Restless leg syndrome      Trigeminal neuralgia pain 1/4/2012     Past Surgical History:   Procedure Laterality Date     BIOPSY ARTERY TEMPORAL Bilateral 8/1/2017    Procedure: BIOPSY ARTERY TEMPORAL;  Bilateral temporal artery Biopsy;  Surgeon: Jj Tello MD;  Location: [facility] OR     CATARACT IOL, RT/LT Right      COLONOSCOPY       ESOPHAGOSCOPY, GASTROSCOPY, DUODENOSCOPY (EGD), COMBINED  1/15/2014    Procedure: COMBINED ESOPHAGOSCOPY, GASTROSCOPY, DUODENOSCOPY (EGD), BIOPSY SINGLE OR  MULTIPLE;;  Surgeon: Winston Nixon MD;  Location: [facility] OR     LASER YAG CAPSULOTOMY Right 04/09/2018    right eye     PHACOEMULSIFICATION CLEAR CORNEA WITH STANDARD INTRAOCULAR LENS IMPLANT Right 2/20/2017    Procedure: PHACOEMULSIFICATION CLEAR CORNEA WITH STANDARD INTRAOCULAR LENS IMPLANT;  Surgeon: Mateo Gray MD;  [facility]     RETINAL REATTACHMENT Right      SURGICAL HISTORY OF -   8/2009    Both Eyelids-.     TONSILLECTOMY  as child     Family History   Problem Relation Age of Onset     Respiratory Mother         copd     LUNG DISEASE Mother      Allergies Brother      Cancer Maternal Grandmother      Heart Disease Paternal Grandmother      Cerebrovascular Disease Father      Cancer Father      Other Cancer Father      Glaucoma Maternal Aunt      Macular Degeneration No family hx of      Social History     Social History     Marital status: Single     Spouse name: N/A     Number of children: 0     Years of education: 12     Occupational History     Retired 3/2012      [employer] ([employer])     Social History Main Topics     Smoking status: Former Smoker     Packs/day: 2.00     Years: 15.00     Types: Cigarettes     Start date: 1/1/1968     Quit date: 1/1/1983     Smokeless tobacco: Former User      Comment: smoke free household.     Alcohol use No      Comment: Quit in 1986     Drug use: No     Sexual activity: Not Currently     Partners: Female      Comment: 4/2010  No girlfriend at this time.     Other Topics Concern     Parent/Sibling W/ Cabg, Mi Or Angioplasty Before 65f 55m? No     Social History Narrative       REVIEW OF SYSTEMS  =================  C: NEGATIVE for fever, chills, change in weight  I: NEGATIVE for worrisome rashes, moles or lesions  E/M: NEGATIVE for ear, mouth and throat problems  R: NEGATIVE for significant cough or SHORTNESS OF BREATH,   CV: NEGATIVE for chest pain, palpitations or peripheral edema  GI: NEGATIVE for nausea, abdominal pain,  heartburn, or change in bowel habits  NEURO: NEGATIVE any motor/sensory changes  PSYCH: NEGATIVE for recent mood disorder    Physical Exam:  BP (!) 161/72 (BP Location: Right arm, Patient Position: Chair, Cuff Size: Adult Large)   Pulse 71   SpO2 96%    Patient is pleasant, in no acute distress, good general condition.  Lung: no evidence of respiratory distress    Abdomen: Soft, nondistended, non tender. No masses. No rebound or guarding.   Exam: empty rectal fosssa  Skin: Warm and dry.  No redness.  Psych: normal mood and affect  Neuro: alert and oriented    Assessment/Plan:   (C61) Malignant neoplasm of prostate (H)  (primary encounter diagnosis)  Comment: s/p cryotherapy.  psa stable s/p cryotherapy  Plan: recheck psa in six months            HTN: Rakan to follow up with Primary Care provider regarding elevated blood pressure.

## 2020-01-13 RX ORDER — LANCETS 30 GAUGE
EACH MISCELLANEOUS
Qty: 100 EACH | Refills: 1 | Status: SHIPPED | OUTPATIENT
Start: 2020-01-13 | End: 2021-01-18

## 2020-01-16 DIAGNOSIS — R19.5 ABNORMAL FECES: ICD-10-CM

## 2020-01-16 PROCEDURE — 87209 SMEAR COMPLEX STAIN: CPT | Performed by: FAMILY MEDICINE

## 2020-01-16 PROCEDURE — 87177 OVA AND PARASITES SMEARS: CPT | Performed by: FAMILY MEDICINE

## 2020-01-17 LAB
O+P STL MICRO: NORMAL
O+P STL MICRO: NORMAL
SPECIMEN SOURCE: NORMAL

## 2020-01-20 DIAGNOSIS — Z79.60 LONG-TERM USE OF IMMUNOSUPPRESSANT MEDICATION: ICD-10-CM

## 2020-01-20 DIAGNOSIS — G70.00 OCULAR MYASTHENIA (H): ICD-10-CM

## 2020-01-20 RX ORDER — PYRIDOSTIGMINE BROMIDE 60 MG/1
TABLET ORAL
Qty: 360 TABLET | Refills: 1 | Status: SHIPPED | OUTPATIENT
Start: 2020-01-20 | End: 2020-09-03

## 2020-01-20 NOTE — TELEPHONE ENCOUNTER
Rx Authorization:    Requested Medication/ Dose PYRIDOSTIGMINE BR 60 MG TABLET    Date last refill ordered: 3/20/19    Quantity ordered: 480    # refills: 0    Date of last clinic visit with ordering provider: 3/4/19    Date of next clinic visit with ordering provider: 3/2/20    All pertinent protocol data (lab date/result):     Include pertinent information from patients message:

## 2020-01-28 DIAGNOSIS — E78.5 HYPERLIPIDEMIA LDL GOAL <130: ICD-10-CM

## 2020-01-29 RX ORDER — ATORVASTATIN CALCIUM 20 MG/1
TABLET, FILM COATED ORAL
Qty: 90 TABLET | Refills: 1 | Status: SHIPPED | OUTPATIENT
Start: 2020-01-29 | End: 2020-06-10

## 2020-01-29 NOTE — TELEPHONE ENCOUNTER
Prescription approved per [facility] Refill Protocol.  Loan Mejia RN

## 2020-01-30 ENCOUNTER — TRANSFERRED RECORDS (OUTPATIENT)
Dept: HEALTH INFORMATION MANAGEMENT | Facility: CLINIC | Age: 75
End: 2020-01-30

## 2020-02-03 DIAGNOSIS — G50.0 TRIGEMINAL NEURALGIA: ICD-10-CM

## 2020-02-03 RX ORDER — OXCARBAZEPINE 150 MG/1
TABLET, FILM COATED ORAL
Qty: 360 TABLET | Refills: 0 | Status: SHIPPED | OUTPATIENT
Start: 2020-02-03 | End: 2020-04-30

## 2020-02-03 NOTE — TELEPHONE ENCOUNTER
Rx Authorization:    Requested Medication/ Dose: Oxcarbazepine 150mg tablets    Date last refill ordered: 8/5/2019    Quantity ordered: 450    # refills: 1    Date of last clinic visit with ordering provider: 3/4/2019    Date of next clinic visit with ordering provider: 3/2/20    All pertinent protocol data (lab date/result):     Include pertinent information from patients message:

## 2020-02-14 ENCOUNTER — OFFICE VISIT (OUTPATIENT)
Dept: FAMILY MEDICINE | Facility: CLINIC | Age: 75
End: 2020-02-14
Payer: COMMERCIAL

## 2020-02-14 VITALS
WEIGHT: 242 LBS | HEIGHT: 68 IN | HEART RATE: 62 BPM | BODY MASS INDEX: 36.68 KG/M2 | OXYGEN SATURATION: 98 % | SYSTOLIC BLOOD PRESSURE: 140 MMHG | TEMPERATURE: 96.4 F | DIASTOLIC BLOOD PRESSURE: 72 MMHG | RESPIRATION RATE: 15 BRPM

## 2020-02-14 DIAGNOSIS — E66.01 MORBID OBESITY (H): ICD-10-CM

## 2020-02-14 DIAGNOSIS — D50.8 OTHER IRON DEFICIENCY ANEMIA: ICD-10-CM

## 2020-02-14 DIAGNOSIS — G70.00 MYASTHENIA GRAVIS (H): ICD-10-CM

## 2020-02-14 DIAGNOSIS — I10 HYPERTENSION GOAL BP (BLOOD PRESSURE) < 140/90: ICD-10-CM

## 2020-02-14 DIAGNOSIS — M31.6 TEMPORAL ARTERITIS (H): ICD-10-CM

## 2020-02-14 DIAGNOSIS — G50.0 TRIGEMINAL NEURALGIA: ICD-10-CM

## 2020-02-14 DIAGNOSIS — E11.40 TYPE 2 DIABETES MELLITUS WITH DIABETIC NEUROPATHY, WITHOUT LONG-TERM CURRENT USE OF INSULIN (H): Primary | ICD-10-CM

## 2020-02-14 LAB
HBA1C MFR BLD: 5.6 % (ref 0–5.6)
SODIUM SERPL-SCNC: 134 MMOL/L (ref 133–144)

## 2020-02-14 PROCEDURE — 83036 HEMOGLOBIN GLYCOSYLATED A1C: CPT | Performed by: FAMILY MEDICINE

## 2020-02-14 PROCEDURE — 84295 ASSAY OF SERUM SODIUM: CPT | Performed by: FAMILY MEDICINE

## 2020-02-14 PROCEDURE — 99214 OFFICE O/P EST MOD 30 MIN: CPT | Performed by: FAMILY MEDICINE

## 2020-02-14 PROCEDURE — 36415 COLL VENOUS BLD VENIPUNCTURE: CPT | Performed by: FAMILY MEDICINE

## 2020-02-14 RX ORDER — FERROUS SULFATE 325(65) MG
325 TABLET ORAL 2 TIMES DAILY
Qty: 60 TABLET | Refills: 1 | Status: CANCELLED | OUTPATIENT
Start: 2020-02-14

## 2020-02-14 RX ORDER — LISINOPRIL 20 MG/1
20 TABLET ORAL DAILY
Qty: 90 TABLET | Refills: 3 | Status: CANCELLED | OUTPATIENT
Start: 2020-02-14

## 2020-02-14 RX ORDER — FERROUS SULFATE 325(65) MG
325 TABLET ORAL 2 TIMES DAILY
Qty: 60 TABLET | Refills: 1 | OUTPATIENT
Start: 2020-02-14

## 2020-02-14 ASSESSMENT — MIFFLIN-ST. JEOR: SCORE: 1822.2

## 2020-02-14 NOTE — PROGRESS NOTES
Patient declined to set up appointment at this time. He will check his schedule and call to make appointments.  Josefa GALLOWAY CMA ([facility])

## 2020-02-14 NOTE — PROGRESS NOTES
Subjective     Rakan Nolasco is a 74 year old male who presents to clinic today for the following health issues:    HPI   Diabetes Follow-up   Om Metformin 500 mg twice daily  How often are you checking your blood sugar? One time daily  What time of day are you checking your blood sugars (select all that apply)?  Before meals  Have you had any blood sugars above 200?  No  Have you had any blood sugars below 70?  No    What symptoms do you notice when your blood sugar is low?  None    What concerns do you have today about your diabetes? None     Do you have any of these symptoms? (Select all that apply)  Numbness in feet and Burning in feet      BP Readings from Last 2 Encounters:   02/14/20 (!) 140/72   01/10/20 (!) 161/72     Hemoglobin A1C (%)   Date Value   02/14/2020 5.6   12/20/2019 6.4 (H)     LDL Cholesterol Calculated (mg/dL)   Date Value   12/20/2019 61   08/14/2018 44     Hypertension Follow-up  Seen in  2/10/20; after experiencing a prolonged episode of dizzine and BP was 171/82. Slight headaches.   Pt reported his BP readings are still somewhat high on new 5 mg Amlodipine daily since the 01/07/20 start.   Range of BPs: 128-152 over 64-89, HRs are 52-61/min. Pt states most of his systolics are in the 140s.    Last few [id number]    On Lisinopril 20 mg  Started on Amlodipine 5 mg (started on 2/10/20)      Do you check your blood pressure regularly outside of the clinic? Yes     Are you following a low salt diet? Yes    Are your blood pressures ever more than 140 on the top number (systolic) OR more   than 90 on the bottom number (diastolic), for example 140/90? Yes    Anemia:   Taking iron supplement.    Colonoscopy 9/18/18 was normal; save for diverticulosis.    Obesity:    Myasthenia Gravis:   Following with speciality. Pyridostigmine.    Temporal Arteritis/Trigeminal Neuralgia:   On Trileptal   On low dose prednisone 10 mg every other.    Dizziness:   Gets short episodes, goes away in 30  seconds.; started after all these  Can occasionally feel dizzy as gets up.    Patient Active Problem List   Diagnosis     GERD (gastroesophageal reflux disease)     Snoring     Fatigue     HLD (hyperlipidemia)     Borderline glaucoma with ocular hypertension     Advanced directives, counseling/discussion     Trigeminal neuralgia pain     bmi 31     HTN (hypertension)     Health Care Home     Arthritis     Prediabetes     Dry eyes     PVD (posterior vitreous detachment) OU     H/O RD (retinal detachment) s/p repair with PPV (AB)     Epiretinal membrane, bilateral     Pseudophakia of right eye     Myasthenia gravis (H)     Type 2 diabetes mellitus (H)     Morbid obesity (H)     Right posterior capsular opacification     Demand ischemia (H)     Obstructive sleep apnea syndrome     Prostate cancer (H)     Restless legs syndrome     Sleep apnea     Temporal arteritis (H)     Type 2 diabetes mellitus with hyperglycemia, without long-term current use of insulin (H)     Past Surgical History:   Procedure Laterality Date     BIOPSY ARTERY TEMPORAL Bilateral 8/1/2017    Procedure: BIOPSY ARTERY TEMPORAL;  Bilateral temporal artery Biopsy;  Surgeon: Jj Tello MD;  Location: [facility]     CATARACT IOL, RT/LT Right      COLONOSCOPY       ESOPHAGOSCOPY, GASTROSCOPY, DUODENOSCOPY (EGD), COMBINED  1/15/2014    Procedure: COMBINED ESOPHAGOSCOPY, GASTROSCOPY, DUODENOSCOPY (EGD), BIOPSY SINGLE OR MULTIPLE;;  Surgeon: Winston Nixon MD;  Location: [facility] OR     LASER YAG CAPSULOTOMY Right 04/09/2018    right eye     PHACOEMULSIFICATION CLEAR CORNEA WITH STANDARD INTRAOCULAR LENS IMPLANT Right 2/20/2017    Procedure: PHACOEMULSIFICATION CLEAR CORNEA WITH STANDARD INTRAOCULAR LENS IMPLANT;  Surgeon: Mateo Gray MD;  [facility]     RETINAL REATTACHMENT Right      SURGICAL HISTORY OF -   8/2009    Both Eyelids-.     TONSILLECTOMY  as child       Social History     Tobacco Use     Smoking status: Former  "Smoker     Packs/day: 2.00     Years: 15.00     Pack years: 30.00     Types: Cigarettes     Start date: 1968     Last attempt to quit: 1983     Years since quittin.1     Smokeless tobacco: Former User     Tobacco comment: smoke free household.   Substance Use Topics     Alcohol use: No     Comment: Quit in      Family History   Problem Relation Age of Onset     Respiratory Mother         copd     LUNG DISEASE Mother      Allergies Brother      Cancer Maternal Grandmother      Heart Disease Paternal Grandmother      Cerebrovascular Disease Father      Cancer Father      Other Cancer Father      Glaucoma Maternal Aunt      Macular Degeneration No family hx of            Review of Systems    Constitutional, HEENT, cardiovascular, pulmonary, gi and gu systems are negative, except as otherwise noted.      Objective    BP (!) 140/72   Pulse 62   Temp 96.4  F (35.8  C) (Oral)   Resp 15   Ht 1.727 m (5' 8\")   Wt 109.8 kg (242 lb)   SpO2 98%   BMI 36.80 kg/m    Body mass index is 36.8 kg/m .  Physical Exam   GENERAL: healthy, alert and no distress  RESP: lungs clear to auscultation - no rales, rhonchi or wheezes  CV: regular rate and rhythm, normal S1 S2, no S3 or S4, no murmur, click or rub, no peripheral edema   MS: no gross musculoskeletal defects noted, no edema  NEURO: Normal strength and tone, mentation intact and speech normal  PSYCH: mentation appears normal, affect normal/bright    Assessment & Plan     Rakan was seen today for hypertension and diabetes.    Diagnoses and all orders for this visit:    Type 2 diabetes mellitus with diabetic neuropathy, without long-term current use of insulin (H)  A1c at goal  -     Hemoglobin A1c    Hypertension goal BP (blood pressure) < 140/90  Blood pressure is borderline high, discussed increasing dose of lisinopril to 30 mg and continue amlodipine 5 mg, recheck blood pressure in 2 weeks.  We will also check sodium today  -     Sodium    Other iron " deficiency anemia       Hemoglobin improved from 9.6, to above 12 at this time.  However MCH and MCV still slightly low.  We will continue iron follow-up 3 months.    Myasthenia gravis (H)      On pyridostigmine, and prednisone 10 mg every other day    Temporal arteritis (H)/Trigeminal neuralgia      On Trileptal    Morbid obesity (H)      Counseled to make better food choices, exercise as tolerated, and lose weight.    Return in about 2 weeks (around 2/28/2020) for Follow up for BP recheck with ancillary.    Telly Lucio MD  [facility]

## 2020-02-14 NOTE — PATIENT INSTRUCTIONS
HTN:   Lisinopril 30 mg daily plus Amlodipine 5 mg   Recheck BP in 2 weeks with Nurse    Iron Deficiency:   No longer anemic; but low in iron: continue Ferrous sulphate for a tleast 1 month

## 2020-02-14 NOTE — TELEPHONE ENCOUNTER
FERROUS SULFATE 325 MG TABLET      Last Written Prescription Date:  1/22/2020  Last Fill Quantity: 0,   # refills: 0  Last Office Visit: today 2-  Future Office visit:    Next 5 appointments (look out 90 days)    Mar 25, 2020  7:00 AM CDT  Office Visit with Telly Lucio MD  [facility] [address]  [place] [address]  [phone]           Routing refill request to provider for review/approval because:  Drug not active on patient's medication list

## 2020-02-19 RX ORDER — AMLODIPINE BESYLATE 5 MG/1
5 TABLET ORAL
COMMUNITY
Start: 2020-02-11 | End: 2020-05-13 | Stop reason: DRUGHIGH

## 2020-02-19 RX ORDER — FERROUS SULFATE 325(65) MG
TABLET ORAL
COMMUNITY
Start: 2020-01-22 | End: 2020-03-31

## 2020-02-19 NOTE — PROGRESS NOTES
"Subjective     Rakan ASHLEY Nolasco is a 74 year old male who presents to clinic today for the following health issues:    HPI   Diabetes Follow-up      How often are you checking your blood sugar? { :312023}    What concerns do you have today about your diabetes? { :323448::\"None\"}     Do you have any of these symptoms? (Select all that apply)  { :767397}      {Reference  Diabetes Management Resources :988095}    {Reference  Diabetes Log - 7 days :546613}    Hyperlipidemia Follow-Up      Are you regularly taking any medication or supplement to lower your cholesterol?   { :612926}    Are you having muscle aches or other side effects that you think could be caused by your cholesterol lowering medication?  { :691178}    Hypertension Follow-up      Do you check your blood pressure regularly outside of the clinic? { :814717}     Are you following a low salt diet? { :039539}    Are your blood pressures ever more than 140 on the top number (systolic) OR more   than 90 on the bottom number (diastolic), for example 140/90? { :247578}    BP Readings from Last 2 Encounters:   02/14/20 (!) 140/72   01/10/20 (!) 161/72     Hemoglobin A1C (%)   Date Value   02/14/2020 5.6   12/20/2019 6.4 (H)     LDL Cholesterol Calculated (mg/dL)   Date Value   12/20/2019 61   08/14/2018 44         How many servings of fruits and vegetables do you eat daily?  { :162971}    On average, how many sweetened beverages do you drink each day (Examples: soda, juice, sweet tea, etc.  Do NOT count diet or artificially sweetened beverages)?   { 1-11:084541}    How many days per week do you exercise enough to make your heart beat faster? { :233119}    How many minutes a day do you exercise enough to make your heart beat faster? { :397270}    How many days per week do you miss taking your medication? {0-7 :681116}    {additonal problems for provider to add (Optional):296456}    {HIST REVIEW/ LINKS 2 (Optional):295822}    {Additional problems for the provider to " "add (optional):239871}  Reviewed and updated as needed this visit by Provider         Review of Systems   {ROS COMP (Optional):111015}      Objective    There were no vitals taken for this visit.  There is no height or weight on file to calculate BMI.  Physical Exam   {Exam List (Optional):746383}    {Diagnostic Test Results (Optional):067563::\"Diagnostic Test Results:\",\"Labs reviewed in Epic\"}        {PROVIDER CHARTING PREFERENCE:537118}        "

## 2020-02-21 ENCOUNTER — OFFICE VISIT (OUTPATIENT)
Dept: FAMILY MEDICINE | Facility: CLINIC | Age: 75
End: 2020-02-21
Payer: COMMERCIAL

## 2020-02-21 VITALS
SYSTOLIC BLOOD PRESSURE: 136 MMHG | OXYGEN SATURATION: 98 % | TEMPERATURE: 98 F | DIASTOLIC BLOOD PRESSURE: 65 MMHG | WEIGHT: 240 LBS | BODY MASS INDEX: 36.49 KG/M2 | HEART RATE: 68 BPM

## 2020-02-21 DIAGNOSIS — R07.2 RETROSTERNAL CHEST PAIN: ICD-10-CM

## 2020-02-21 DIAGNOSIS — R13.10 DYSPHAGIA, UNSPECIFIED TYPE: ICD-10-CM

## 2020-02-21 DIAGNOSIS — R06.02 SOB (SHORTNESS OF BREATH): ICD-10-CM

## 2020-02-21 DIAGNOSIS — R14.2 BURPING: ICD-10-CM

## 2020-02-21 DIAGNOSIS — R19.5 STOOL DISCOLORATION: ICD-10-CM

## 2020-02-21 DIAGNOSIS — I10 HTN, GOAL BELOW 140/90: Primary | ICD-10-CM

## 2020-02-21 PROCEDURE — 99214 OFFICE O/P EST MOD 30 MIN: CPT | Performed by: FAMILY MEDICINE

## 2020-02-21 RX ORDER — LISINOPRIL 30 MG/1
30 TABLET ORAL DAILY
Qty: 90 TABLET | Refills: 1 | Status: SHIPPED | OUTPATIENT
Start: 2020-02-21 | End: 2020-06-10

## 2020-02-21 RX ORDER — AMLODIPINE BESYLATE 5 MG/1
5 TABLET ORAL DAILY
Qty: 90 TABLET | Refills: 1 | Status: SHIPPED | OUTPATIENT
Start: 2020-02-21 | End: 2020-05-13

## 2020-02-21 NOTE — PROGRESS NOTES
Subjective     Rakan Nolasco is a 74 year old male who presents to clinic today for the following health issues:    HPI   Chief Complaint   Patient presents with     Hypertension     2 week f/u     Abdominal Pain     stomach/esophagous ache, Hard to swallow at times, burping-ongoing     Bowel Problems     hard vs flaky, never normal     1. HTN  2. Stomach issues: has belly pain but get up to the retrosternal area.  When eats something had to go down especially when starts and liquids tend to be harder than liquid. Weaning of Pantoprazole (taken at least 10 yrs) quit for a couple of weeks now.    3. Dysphagia sometimes  4. Stool are different colors and texture (hard, loose and flaky). Taking iron.For last couple of months  5. Lots of burping   6. SOB; better but not as good as should be.  Patient says this started after we will start her medication for myasthenia gravis.        Cardiac evaluation       Echocardiogram:  Date: 1/12/18  Result:   Technically difficult study - contrast was used to enhance endocardial definition due to s`1uboptimal image quality.  Normal LV size and systolic function with estimated ejection fraction of 60-65%.  Mild concentric LVH.  No significant valve abnormalities noted.  Trivial pericardial effusion.  Estimated EF: 60-65%    Stress test:   Date: 8/28/18  Result:   NORMAL exercise stress perfusion imaging study.  The rest and stress perfusion images are normal. The perfusion images were NORMAL without evidence of ischemia or infarction.  Normal left ventricular size and systolic function with a calculated LVEF of >75%.  Normal left ventricular wall motion    Coronary calcium scan:   Date: 5/2019  Result:   1. Calcium score of 8.6 places the patient at a low risk of a cardiovascular event in the next 10 years.  2. Other cardiac findings include, None  3. Non cardiac findings will be reported by the radiologist.   Patient Active Problem List   Diagnosis     GERD (gastroesophageal reflux  disease)     Snoring     Fatigue     HLD (hyperlipidemia)     Borderline glaucoma with ocular hypertension     Advanced directives, counseling/discussion     Trigeminal neuralgia pain     bmi 31     HTN (hypertension)     Health Care Home     Arthritis     Prediabetes     Dry eyes     PVD (posterior vitreous detachment) OU     H/O RD (retinal detachment) s/p repair with PPV (AB)     Epiretinal membrane, bilateral     Pseudophakia of right eye     Myasthenia gravis (H)     Type 2 diabetes mellitus (H)     Morbid obesity (H)     Right posterior capsular opacification     Demand ischemia (H)     Obstructive sleep apnea syndrome     Prostate cancer (H)     Restless legs syndrome     Sleep apnea     Temporal arteritis (H)     Type 2 diabetes mellitus with hyperglycemia, without long-term current use of insulin (H)     Past Surgical History:   Procedure Laterality Date     BIOPSY ARTERY TEMPORAL Bilateral 8/1/2017    Procedure: BIOPSY ARTERY TEMPORAL;  Bilateral temporal artery Biopsy;  Surgeon: Jj Tello MD;  Location: [facility]     CATARACT IOL, RT/LT Right      COLONOSCOPY       ESOPHAGOSCOPY, GASTROSCOPY, DUODENOSCOPY (EGD), COMBINED  1/15/2014    Procedure: COMBINED ESOPHAGOSCOPY, GASTROSCOPY, DUODENOSCOPY (EGD), BIOPSY SINGLE OR MULTIPLE;;  Surgeon: Winston Nixon MD;  Location: [facility] OR     LASER YAG CAPSULOTOMY Right 04/09/2018    right eye     PHACOEMULSIFICATION CLEAR CORNEA WITH STANDARD INTRAOCULAR LENS IMPLANT Right 2/20/2017    Procedure: PHACOEMULSIFICATION CLEAR CORNEA WITH STANDARD INTRAOCULAR LENS IMPLANT;  Surgeon: Mateo Gray MD;  Location:  [facility]     RETINAL REATTACHMENT Right      SURGICAL HISTORY OF -   8/2009    Both Eyelids-.     TONSILLECTOMY  as child       Social History     Tobacco Use     Smoking status: Former Smoker     Packs/day: 2.00     Years: 15.00     Pack years: 30.00     Types: Cigarettes     Start date: 1/1/1968     Last attempt to quit: 1/1/1983      Years since quittin.1     Smokeless tobacco: Former User     Tobacco comment: smoke free household.   Substance Use Topics     Alcohol use: No     Comment: Quit in      Family History   Problem Relation Age of Onset     Respiratory Mother         copd     LUNG DISEASE Mother      Allergies Brother      Cancer Maternal Grandmother      Heart Disease Paternal Grandmother      Cerebrovascular Disease Father      Cancer Father      Other Cancer Father      Glaucoma Maternal Aunt      Macular Degeneration No family hx of          Review of Systems   HEENT, cardiovascular, pulmonary, gi and gu systems are negative, except as otherwise noted.      Objective    /72   Pulse 68   Temp 98  F (36.7  C) (Oral)   Wt 108.9 kg (240 lb)   SpO2 98%   BMI 36.49 kg/m    Body mass index is 36.49 kg/m .  Physical Exam   GENERAL: healthy, alert and no distress  NECK: no adenopathy and thyroid normal to palpation  RESP: lungs clear to auscultation - no rales, rhonchi or wheezes  CV: regular rate and rhythm, normal S1 S2, no S3 or S4, no murmur, click or rub, no peripheral edema   ABDOMEN: soft, nontender, no masses and bowel sounds normal  MS: no gross musculoskeletal defects noted, no edema  NEURO: Normal strength and tone, mentation intact and speech normal    Diagnostic Test Results:  Labs reviewed in Epic        Assessment & Plan     Rakan was seen today for hypertension, abdominal pain and bowel problems.    Diagnoses and all orders for this visit:    HTN, goal below 140/90  -     Cancel: Albumin Random Urine Quantitative with Creat Ratio  -     amLODIPine 5 MG PO tablet; Take 1 tablet (5 mg) by mouth daily  -     lisinopril 30 MG PO tablet; Take 1 tablet (30 mg) by mouth daily  -     Albumin Random Urine Quantitative with Creat Ratio; Future    Retrosternal chest pain  -     GASTROENTEROLOGY ADULT REF PROCEDURE ONLY Other; [facility] [phone]    Dysphagia, unspecified type  -     GASTROENTEROLOGY ADULT REF  PROCEDURE ONLY Other; [facility] [phone]    Burping        -  Cholesterol related may be from acid reflux     Stool discoloration        -     Medications metal effect on bowel movements, will await results from the EGD    SOB (shortness of breath)       -     Plan cardiac evaluation was normal, has questionable COPD.  The patient feels this is medication related because it started after treatment for myasthenia gravis.  We will follow-up with neurology      Return in about 1 month (around 3/21/2020) for Routine Visit.    Telly Lucio MD  [facility]

## 2020-02-28 ENCOUNTER — TRANSFERRED RECORDS (OUTPATIENT)
Dept: HEALTH INFORMATION MANAGEMENT | Facility: CLINIC | Age: 75
End: 2020-02-28

## 2020-02-29 ASSESSMENT — ENCOUNTER SYMPTOMS
NECK PAIN: 0
PARALYSIS: 0
CONSTIPATION: 0
MUSCLE WEAKNESS: 0
HEADACHES: 0
NUMBNESS: 1
BLOOD IN STOOL: 1
JOINT SWELLING: 0
STIFFNESS: 1
DIARRHEA: 1
BLOATING: 0
SNORES LOUDLY: 0
LOSS OF CONSCIOUSNESS: 0
JAUNDICE: 0
BACK PAIN: 0
NECK MASS: 0
TASTE DISTURBANCE: 0
WHEEZING: 1
SINUS CONGESTION: 1
POSTURAL DYSPNEA: 0
TINGLING: 0
DIZZINESS: 1
MEMORY LOSS: 0
BOWEL INCONTINENCE: 0
HEARTBURN: 1
TROUBLE SWALLOWING: 1
SPEECH CHANGE: 0
SMELL DISTURBANCE: 0
TREMORS: 0
HOARSE VOICE: 0
NAUSEA: 1
SORE THROAT: 1
SINUS PAIN: 0
COUGH DISTURBING SLEEP: 0
HEMOPTYSIS: 0
DYSPNEA ON EXERTION: 0
ABDOMINAL PAIN: 1
SHORTNESS OF BREATH: 0
SEIZURES: 0
MYALGIAS: 0
ARTHRALGIAS: 1
COUGH: 1
DISTURBANCES IN COORDINATION: 0
MUSCLE CRAMPS: 0
SPUTUM PRODUCTION: 1
WEAKNESS: 0
VOMITING: 0
RECTAL PAIN: 0

## 2020-03-02 ENCOUNTER — OFFICE VISIT (OUTPATIENT)
Dept: NEUROLOGY | Facility: CLINIC | Age: 75
End: 2020-03-02
Payer: COMMERCIAL

## 2020-03-02 VITALS
OXYGEN SATURATION: 97 % | WEIGHT: 240 LBS | HEART RATE: 74 BPM | SYSTOLIC BLOOD PRESSURE: 159 MMHG | BODY MASS INDEX: 36.49 KG/M2 | DIASTOLIC BLOOD PRESSURE: 79 MMHG

## 2020-03-02 DIAGNOSIS — R20.2 NUMBNESS AND TINGLING OF RIGHT THUMB: ICD-10-CM

## 2020-03-02 DIAGNOSIS — G70.00 MYASTHENIA GRAVIS WITHOUT EXACERBATION (H): Primary | ICD-10-CM

## 2020-03-02 DIAGNOSIS — R42 DIZZINESS ON STANDING: ICD-10-CM

## 2020-03-02 DIAGNOSIS — R20.0 NUMBNESS AND TINGLING OF RIGHT THUMB: ICD-10-CM

## 2020-03-02 DIAGNOSIS — G50.0 TRIGEMINAL NEURALGIA: ICD-10-CM

## 2020-03-02 ASSESSMENT — PAIN SCALES - GENERAL: PAINLEVEL: NO PAIN (0)

## 2020-03-02 NOTE — LETTER
3/2/2020       RE: Rakan Nolasco  [address] [facility] [address]     Dear Colleague,    Thank you for referring your patient, Rakan Nolasco, to the [facility]UROLOGY at [facility]. Please see a copy of my visit note below.    MG Activities of Daily Living (MG-ADL) profile    Grade 0 1 2 3   Talking Normal Intermittent slurring/nasal speech Constant slurring/nasal speech, can be understood Difficult to understand   Chewing Normal Fatigue with solid food Fatigue with soft food G tube   Swallowing Normal Rare choking Frequent choking necessitating diet changes G tube   Breathing Normal SOB with exertion SOB at rest Ventilator dependent   Ability to brush teeth/comb hair Normal Extra effort, no rest periods needed Rest periods needed Cannot do one of these   Ability to rise from chair Normal Mild impairment, uses arms sometimes Moderate impairment, always uses arms Severe impairment, requires assistance   Double vision None Present, not daily Daily, not constant Constant   Ptosis None Present, but not daily Daily, not constant Constant         Service Date: 03/02/2020      HISTORY OF PRESENT ILLNESS:   I had the pleasure to see Rakan Nolasco in followup at the [facility]. 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  I last saw him in 03/2019.  Since then he feels that his symptoms are overall stable.  Ptosis does not bother him, nor does diplopia.  However, he does note a little more dysphagia to liquids than he did the previous years.  This is not a new symptom, but it is slightly worse lately to the point he underwent an upper GI endoscopy a month ago, which was largely unrevealing.  It tends to vary during the day, although he does not get always  predictably worse in the evening. He notes rare choking episodes.  He feels his talking is normal and he denies nasal speech.  He denies sialorrhea. As for chewing, sometimes his jaw feels stiff after the first few bites of food, but then it improves.  He does not experience jaw fatigue with prolonged chewing of solid or soft food. He does not have significant dyspnea on exertion or orthopnea.  He denies difficulty raising arms overhead, brushing teeth or, combing hair.  He sometimes has to use his arms to get up from a chair. Otherwise, no MG symptoms reported.        He is a type 2 diabetic and so we are trying to avoid too high dose of prednisone for him.  He also has atypical facial pain, possible trigeminal neuralgia, which seems well controlled on Trileptal 300 mg b.i.d.  He tried to taper the Trileptal to a lower dose last year per my instruction, but the pain returned and he would rather stay on 4 pills a day. Dr. Lucio recently checked his hemoglobin A1c and it was very satisfactory and at 5.6 on 02/14/2020.  The same day, sodium was normal at 134.  A few days earlier, on 02/10/2020, he had mild hyponatremia with sodium 131.  This returned to normal without specific intervention.  Liver function tests checked on 02/10 were also normal and complete blood count showed a mild anemia, but normal white cell count of 10.6 and normal platelet count of 292.      MEDICATIONS:  Reviewed and are as per Epic record.      PHYSICAL EXAMINATION:     VITAL SIGNS:  Blood pressure 159/79, pulse 74 and regular.  O2 sat 97% on room air in room air.  Weight is 18.9 kg.  He endorses no pain.  MG-ADL score is 2; see additional note for details.     NEUROLOGIC:  He is awake, alert, oriented x3.  He has a very mild right eyelid ptosis at rest which gets aggravated after about 30-40 seconds of sustained upward gaze.  I do not see any ptosis on the left.  There is no obvious weakness of orbicularis oculi.  There is no diplopia in  any cardinal gaze position.  Ductions and versions are normal. He does get gaze-evoked nystagmus bilaterally. There is no weakness of orbicularis oris, cheek puff, uvula, jaw, palate or tongue.  There is no obvious dysphonia or dysarthria.  His cough reflex is very strong.  His neck flexion and extension strength are full.  Strength is 5/5 for deltoids and hip flexors.      IMPRESSION:  In summary, Mr. Nolasco has overall satisfactory control of his myasthenia symptoms except for the dysphagia to liquids.  I think the latter is related to the myasthenia.  While it is not particularly severe, it seems to be bothering him a bit more over the past 12 months.  Therefore, I feel inclined to take some action for it.  I would rather we first increase his pyridostigmine gradually by 1 pill every week until he reaches a dose of 120 mg t.i.d.  I asked the patient to call me back in 3-4 weeks.  If the dysphagia has substantially improved, we will not take any additional action.  If it has not, however, we will have to push his prednisone dose higher to 20 mg every other day for maybe 1-2 weeks and then 30 mg every other day and reassess.  If even that is not enough to resolve dysphagia, he may need nonsteroidal immunosuppression.        At some point this year we need to repeat his bone density scan.  He had one 2 years ago that was satisfactory and his A1c in 2/2020 was very good.        Mr. Nolasco is also complaining of intermittent right thumb numbness that happens at night or when he wakes up.  I think this likely is due to carpal tunnel syndrome.  I told him that he should let me know if the symptom gets any worse.  In the latter case, we should get an EMG and prescribe him a wrist splint to wear at night and when he drives.        Lastly, Mr. Nolasco again brought up his transient dizziness when standing.  We previously attempted orthostatic vitals and there was no obvious orthostatic hypotension.  This may be a side  effect of Trileptal or due to inner ear problem. Again, should it get worse, I will refer patient to Physical Therapy.  Tapering the Trileptal to lower dose is not an option because the facial pain seems to recur.  Followup in 4 months.      TT spent for patient care 25 minutes; more than half was counseling.      BAMBI HDEZ MD       cc:      Telly Lucio MD    [address] [address]      D: 2020   T: 2020   MT: VENTURA      Name:     JR FRANCOIS   MRN:      [id number]        Account:      [id number]   :      1945           Service Date: 2020      Document: [id number]

## 2020-03-02 NOTE — PROGRESS NOTES
Service Date: 03/02/2020      HISTORY OF PRESENT ILLNESS:   I had the pleasure to see Rakan Nolasco in followup at the [facility]. 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  I last saw him in 03/2019.  Since then he feels that his symptoms are overall stable.  Ptosis does not bother him, nor does diplopia.  However, he does note a little more dysphagia to liquids than he did the previous years.  This is not a new symptom, but it is slightly worse lately to the point he underwent an upper GI endoscopy a month ago, which was largely unrevealing.  It tends to vary during the day, although he does not get always predictably worse in the evening. He notes rare choking episodes.  He feels his talking is normal and he denies nasal speech.  He denies sialorrhea. As for chewing, sometimes his jaw feels stiff after the first few bites of food, but then it improves.  He does not experience jaw fatigue with prolonged chewing of solid or soft food. He does not have significant dyspnea on exertion or orthopnea.  He denies difficulty raising arms overhead, brushing teeth or, combing hair.  He sometimes has to use his arms to get up from a chair. Otherwise, no MG symptoms reported.        He is a type 2 diabetic and so we are trying to avoid too high dose of prednisone for him.  He also has atypical facial pain, possible trigeminal neuralgia, which seems well controlled on Trileptal 300 mg b.i.d.  He tried to taper the Trileptal to a lower dose last year per my instruction, but the pain returned and he would rather stay on 4 pills a day. Dr. Lucio recently checked his hemoglobin A1c and it was very satisfactory and at 5.6 on 02/14/2020.  The same day, sodium was normal at 134.  A few days earlier, on 02/10/2020, he had  mild hyponatremia with sodium 131.  This returned to normal without specific intervention.  Liver function tests checked on 02/10 were also normal and complete blood count showed a mild anemia, but normal white cell count of 10.6 and normal platelet count of 292.      MEDICATIONS:  Reviewed and are as per Epic record.      PHYSICAL EXAMINATION:     VITAL SIGNS:  Blood pressure 159/79, pulse 74 and regular.  O2 sat 97% on room air in room air.  Weight is 18.9 kg.  He endorses no pain.  MG-ADL score is 2; see additional note for details.     NEUROLOGIC:  He is awake, alert, oriented x3.  He has a very mild right eyelid ptosis at rest which gets aggravated after about 30-40 seconds of sustained upward gaze.  I do not see any ptosis on the left.  There is no obvious weakness of orbicularis oculi.  There is no diplopia in any cardinal gaze position.  Ductions and versions are normal. He does get gaze-evoked nystagmus bilaterally. There is no weakness of orbicularis oris, cheek puff, uvula, jaw, palate or tongue.  There is no obvious dysphonia or dysarthria.  His cough reflex is very strong.  His neck flexion and extension strength are full.  Strength is 5/5 for deltoids and hip flexors.      IMPRESSION:  In summary, Mr. Nolasco has overall satisfactory control of his myasthenia symptoms except for the dysphagia to liquids.  I think the latter is related to the myasthenia.  While it is not particularly severe, it seems to be bothering him a bit more over the past 12 months.  Therefore, I feel inclined to take some action for it.  I would rather we first increase his pyridostigmine gradually by 1 pill every week until he reaches a dose of 120 mg t.i.d.  I asked the patient to call me back in 3-4 weeks.  If the dysphagia has substantially improved, we will not take any additional action.  If it has not, however, we will have to push his prednisone dose higher to 20 mg every other day for maybe 1-2 weeks and then 30 mg every  other day and reassess.  If even that is not enough to resolve dysphagia, he may need nonsteroidal immunosuppression.        At some point this year we need to repeat his bone density scan.  He had one 2 years ago that was satisfactory and his A1c in 2020 was very good.        Mr. Nolasco is also complaining of intermittent right thumb numbness that happens at night or when he wakes up.  I think this likely is due to carpal tunnel syndrome.  I told him that he should let me know if the symptom gets any worse.  In the latter case, we should get an EMG and prescribe him a wrist splint to wear at night and when he drives.        Lastly, Mr. Nolasco again brought up his transient dizziness when standing.  We previously attempted orthostatic vitals and there was no obvious orthostatic hypotension.  This may be a side effect of Trileptal or due to inner ear problem. Again, should it get worse, I will refer patient to Physical Therapy.  Tapering the Trileptal to lower dose is not an option because the facial pain seems to recur.  Followup in 4 months.      TT spent for patient care 25 minutes; more than half was counseling.      cc:      Telly Lucio MD    [address] [address]         BAMBI HDEZ MD             D: 2020   T: 2020   MT: VENTURA      Name:     JR NOLASCO   MRN:      [id number]        Account:      [id number]   :      1945           Service Date: 2020      Document: [id number]

## 2020-03-02 NOTE — PATIENT INSTRUCTIONS
1. Please increase your Mestinon dose as follows:    2 pills in the morning, 1 noon, 1 evening for 1 week, then  2 pills morning, 2 noon,  Evening for 1 week, then  2 pills three times a day    2. Please call me in 3-4 weeks. If your trouble swallowing liquids is improving, then we won't have to do anything else. If not improving I think we will have to slowly increase your prednisone.    3. Thumb numbness- I think this is due to carpal tunnel syndrome (pinched nerve at the wrist). If this bothers you more let me know- in that case we should do an EMG test and get you a wrist splint to wear at night and when you drive.    4. Dizziness- this may be a side effect of the Trileptal, or may have to do with inner ear problem. Again let me know if this becomes more frequent or worsens; in that case I would send you to physical therapy to train your balance.     Follow up in 4 months please. Thank you!

## 2020-03-02 NOTE — PROGRESS NOTES
MG Activities of Daily Living (MG-ADL) profile    Grade 0 1 2 3   Talking Normal Intermittent slurring/nasal speech Constant slurring/nasal speech, can be understood Difficult to understand   Chewing Normal Fatigue with solid food Fatigue with soft food G tube   Swallowing Normal Rare choking Frequent choking necessitating diet changes G tube   Breathing Normal SOB with exertion SOB at rest Ventilator dependent   Ability to brush teeth/comb hair Normal Extra effort, no rest periods needed Rest periods needed Cannot do one of these   Ability to rise from chair Normal Mild impairment, uses arms sometimes Moderate impairment, always uses arms Severe impairment, requires assistance   Double vision None Present, not daily Daily, not constant Constant   Ptosis None Present, but not daily Daily, not constant Constant

## 2020-03-23 ENCOUNTER — TELEPHONE (OUTPATIENT)
Dept: FAMILY MEDICINE | Facility: CLINIC | Age: 75
End: 2020-03-23

## 2020-03-23 NOTE — TELEPHONE ENCOUNTER
Reason for Call:  Other FYI    Detailed comments: Patient's  Bp is under 140 and diabetes level is between . Patient feels that he doesn't need to come in due to the covid-19.     Phone Number Patient can be reached at: Home number on file [phone] (home)    Best Time: any    Can we leave a detailed message on this number? YES    Call taken on 3/23/2020 at 9:40 AM by Milagro Molina

## 2020-03-23 NOTE — TELEPHONE ENCOUNTER
Sure if he is feeling fine that is okay; it is only that his BP with neurology was high just needed a recheck BP

## 2020-03-29 DIAGNOSIS — R73.03 PREDIABETES: ICD-10-CM

## 2020-03-30 RX ORDER — METFORMIN HCL 500 MG
TABLET, EXTENDED RELEASE 24 HR ORAL
Qty: 180 TABLET | Refills: 0 | Status: SHIPPED | OUTPATIENT
Start: 2020-03-30 | End: 2020-06-10

## 2020-03-30 NOTE — TELEPHONE ENCOUNTER
"Requested Prescriptions   Pending Prescriptions Disp Refills     metFORMIN (GLUCOPHAGE-XR) 500 MG 24 hr tablet [Pharmacy Med Name: METFORMIN HCL  MG TABLET] 180 tablet 0     Sig: TAKE 1 TABLET BY MOUTH TWICE A DAY WITH MEALS       Biguanide Agents Passed - 3/30/2020  7:44 AM        Passed - Patient is age 10 or older        Passed - Patient has documented A1c within the specified period of time.     If HgbA1C is 8 or greater, it needs to be on file within the past 3 months.  If less than 8, must be on file within the past 6 months.     Recent Labs   Lab Test 02/14/20  0748   A1C 5.6             Passed - Patient's CR is NOT>1.4 OR Patient's EGFR is NOT<45 within past 12 mos.     Recent Labs   Lab Test 12/20/19  0726   GFRESTIMATED 83   GFRESTBLACK >90       Recent Labs   Lab Test 12/20/19  0726   CR 0.91             Passed - Patient does NOT have a diagnosis of CHF.        Passed - Medication is active on med list        Passed - Recent (6 mo) or future (30 days) visit within the authorizing provider's specialty     Patient had office visit in the last 6 months or has a visit in the next 30 days with authorizing provider or within the authorizing provider's specialty.  See \"Patient Info\" tab in inbasket, or \"Choose Columns\" in Meds & Orders section of the refill encounter.               LOV: 02-    Elenita Motta RN    "

## 2020-04-21 DIAGNOSIS — G70.00 MYASTHENIA GRAVIS, ACHR ANTIBODY POSITIVE (H): ICD-10-CM

## 2020-04-21 RX ORDER — PREDNISONE 5 MG/1
TABLET ORAL
Qty: 180 TABLET | Refills: 0 | Status: SHIPPED | OUTPATIENT
Start: 2020-04-21 | End: 2020-09-09

## 2020-04-21 NOTE — TELEPHONE ENCOUNTER
Rx Authorization:    Requested Medication/ Dose: Prednisone 5mg     Date last refill ordered: 12/4/2019    Quantity ordered: 90    # refills: 1    Date of last clinic visit with ordering provider: 3/2/20    Date of next clinic visit with ordering provider: 7/6/20    All pertinent protocol data (lab date/result):     Include pertinent information from patients message:

## 2020-04-30 DIAGNOSIS — G50.0 TRIGEMINAL NEURALGIA: ICD-10-CM

## 2020-04-30 RX ORDER — OXCARBAZEPINE 150 MG/1
TABLET, FILM COATED ORAL
Qty: 360 TABLET | Refills: 0 | Status: SHIPPED | OUTPATIENT
Start: 2020-04-30 | End: 2020-07-14 | Stop reason: SINTOL

## 2020-05-13 ENCOUNTER — NURSE TRIAGE (OUTPATIENT)
Dept: FAMILY MEDICINE | Facility: CLINIC | Age: 75
End: 2020-05-13

## 2020-05-13 ENCOUNTER — OFFICE VISIT (OUTPATIENT)
Dept: FAMILY MEDICINE | Facility: CLINIC | Age: 75
End: 2020-05-13
Payer: COMMERCIAL

## 2020-05-13 VITALS
BODY MASS INDEX: 36.04 KG/M2 | SYSTOLIC BLOOD PRESSURE: 170 MMHG | HEART RATE: 87 BPM | HEIGHT: 68 IN | RESPIRATION RATE: 14 BRPM | DIASTOLIC BLOOD PRESSURE: 78 MMHG | WEIGHT: 237.8 LBS | OXYGEN SATURATION: 97 % | TEMPERATURE: 98.7 F

## 2020-05-13 DIAGNOSIS — I10 MALIGNANT ESSENTIAL HYPERTENSION: Primary | ICD-10-CM

## 2020-05-13 DIAGNOSIS — I10 HTN, GOAL BELOW 140/90: ICD-10-CM

## 2020-05-13 PROCEDURE — 99214 OFFICE O/P EST MOD 30 MIN: CPT | Performed by: PHYSICIAN ASSISTANT

## 2020-05-13 RX ORDER — CHLORTHALIDONE 25 MG/1
25 TABLET ORAL DAILY
Qty: 30 TABLET | Refills: 1 | Status: SHIPPED | OUTPATIENT
Start: 2020-05-13 | End: 2020-06-05

## 2020-05-13 RX ORDER — AMLODIPINE BESYLATE 5 MG/1
10 TABLET ORAL DAILY
Qty: 90 TABLET | Refills: 1 | COMMUNITY
Start: 2020-05-13 | End: 2020-06-26

## 2020-05-13 ASSESSMENT — PAIN SCALES - GENERAL: PAINLEVEL: NO PAIN (0)

## 2020-05-13 ASSESSMENT — MIFFLIN-ST. JEOR: SCORE: 1803.15

## 2020-05-13 NOTE — TELEPHONE ENCOUNTER
Reason for call:  Other   Patient called regarding (reason for call): call back  Additional comments:  Patient calling. His blood pressure is increasing. 183/110 this morning.  He took it a little bit ago 174/83.     Phone number to reach patient:  Cell number on file:    Telephone Information:   Mobile [phone]       Best Time:   Any     Can we leave a detailed message on this number?  YES    Travel screening: Not Applicable

## 2020-05-13 NOTE — TELEPHONE ENCOUNTER
Spoke to patient and made appointment for today at 2:00.  Josefa GALLOWAY CMA ([facility])

## 2020-05-13 NOTE — TELEPHONE ENCOUNTER
Patient returning the call with readings.     178/85 8:00 am     181/84  8:20 am     Please call to review.

## 2020-05-13 NOTE — PATIENT INSTRUCTIONS
Patient Education     Discharge Instructions for Malignant Hypertension  Malignant hypertension is a medical emergency. It means you have dangerously high blood pressure. This means a top number usually higher than 180 or a bottom number higher than 120. This elevated blood pressure could result in damage to your heart, kidneys, brain, eyes, blood vessels, and other organs. Here s what you can do to help manage this condition.  Taking your blood pressure    Learn to take your own blood pressure.    Keep a record of your blood pressure results. Ask your doctor which readings mean that you need medical attention.    Have your blood pressure checked by your healthcare provider regularly.    If you have a blood pressure measure at home that is higher than 180/110 wait 2 minutes and take it again. If the second reading shows either number at or above the first reading, OR if you have any symptoms shown at the end of this page, get emergency medical care right away.  Taking medicines    Take your blood pressure medicine exactly as your doctor directed.     Learn the possible side effects of any prescribed medicines.    Tell your doctor about any medicine you are taking. Some medicines can cause malignant hypertension.    Don't take medicines that contain heart stimulants, including over-the-counter medicines. Check for warnings about high blood pressure on the label.    Check with your doctor before taking a decongestant. Some can worsen high blood pressure.  Lifestyle changes    Limit your activity until your blood pressure is controlled.    Cut back on salt.  ? Limit canned, dried, packaged, and fast foods.  ? Don t add salt to your food at the table.  ? Season foods with herbs instead of salt when you cook.  ? Request foods at restaurants with no added salt.    Maintain a healthy weight. Get help to lose any extra pounds.    Begin an exercise program. Ask your doctor how to get started. You can benefit from simple  activities like walking, gardening, swimming, or dancing.    Don t drink more than 1 alcoholic drink a day for women and 2 a day for men.    Limit drinks that contain caffeine (coffee, black or green tea, cola) to 2 per day.    Never take stimulants such as amphetamines or cocaine; these drugs can be deadly for someone with hypertension.    Control your stress. Learn stress-management techniques.  Follow-up care    Make a follow-up appointment with your doctor regularly.    Visit your doctor for blood pressure checks, dietary advice, and medicine adjustment.  Call 911  Call 911 if you have any of these:    Chest pain or shortness of breath    Seizure (with no history of seizure disorder)  When to call your healthcare provider  Call your healthcare provider right away if you have any of the following:    Moderate to severe headache    Weakness in the muscles of your face, arms, or legs    Trouble speaking    Extreme drowsiness or confusion    Restlessness, anxiety    Fainting or dizziness    Pulsating or rushing sound in your ears    Unexplained nosebleed    Weakness, tingling, or numbness of your face, arms, or legs    Change in vision (including blurred vision)    Unusual tiredness    Nausea or vomiting    Decreased urine output    Blood pressure reading measured at home that is higher than 180/110   Date Last Reviewed: 4/27/2016 2000-2019 [employer]. [address] [place] [address], [place] [address]. All rights reserved. This information is not intended as a substitute for professional medical care. Always follow your healthcare professional's instructions.

## 2020-05-13 NOTE — TELEPHONE ENCOUNTER
Please see call below.   Recommend in-person visit or telephone/virtual visit?    Christine Lynn RN

## 2020-05-13 NOTE — PROGRESS NOTES
"Subjective     Rakan ASHLEY Nolasco is a 74 year old male who presents to clinic today for the following health issues:    HPI   Hypertension Follow-up      Do you check your blood pressure regularly outside of the clinic? Yes     Are you following a low salt diet? No    Are your blood pressures ever more than 140 on the top number (systolic) OR more   than 90 on the bottom number (diastolic), for example 140/90? Yes      How many servings of fruits and vegetables do you eat daily?  1-2    On average, how many sweetened beverages do you drink each day (Examples: soda, juice, sweet tea, etc.  Do NOT count diet or artificially sweetened beverages)?   0    How many days per week do you exercise enough to make your heart beat faster? none    How many minutes a day do you exercise enough to make your heart beat faster? none    How many days per week do you miss taking your medication? 0    Patient doing well.  Medication review performed.  Lab review performed. Patient has taken several SBP readings over 180 mmHg the last couple days.     Review of Systems   Constitutional, HEENT, cardiovascular, pulmonary, gi and gu systems are negative, except as otherwise noted.      Objective    BP (!) 170/78   Pulse 87   Temp 98.7  F (37.1  C) (Oral)   Resp 14   Ht 1.727 m (5' 8\")   Wt 107.9 kg (237 lb 12.8 oz)   SpO2 97%   BMI 36.16 kg/m    Body mass index is 36.16 kg/m .  Physical Exam   GENERAL: healthy, alert and no distress  RESP: lungs clear to auscultation - no rales, rhonchi or wheezes  CV: regular rate and rhythm, normal S1 S2, no S3 or S4, no murmur, click or rub, no peripheral edema and peripheral pulses strong  MS: no gross musculoskeletal defects noted, no edema  SKIN: no suspicious lesions or rashes    Diagnostic Test Results:  Labs reviewed in Epic        Assessment & Plan     1. Malignant essential hypertension  -Patient education materials dispensed and reviewed.      2. HTN, goal below 140/90  - amLODIPine " (NORVASC) 5 MG tablet; Take 2 tablets (10 mg) by mouth daily  Dispense: 90 tablet; Refill: 1  - chlorthalidone (HYGROTON) 25 MG tablet; Take 1 tablet (25 mg) by mouth daily  Dispense: 30 tablet; Refill: 1       Return in about 4 weeks (around 6/10/2020) for BP Recheck.    Poonam Rocha PA-C  [facility]

## 2020-05-13 NOTE — TELEPHONE ENCOUNTER
Received red flag triage call from reception for high blood pressure.     Pt states that he takes BP medication  Amlodipine 5 mg in the am  Evening- lisinopril 30 mg.  Pt reports that his medications were adjusted at [facility] in February. He was monitoring, but has not taken BP for a month or more because it has been stable. Last night it was 160 (SP). Pt took BP med about 20 minutes ago before taking his BP medication. Pt drinks de-caf coffee, he has not had any caffeine this AM, no smoking. Advised pt to re-check BP and wait 30-60 min after taking medication. Call if BP >140/90, so that we consult with PCP. Pt verbalized understanding and was in agreement with plan.    Pt declines having any chest pain, SOB, new weakness, vision changes, new numbness or tingling, headache. Advised pt to go to the ER if any of the before mentioned symptoms develop.  BP Readings from Last 3 Encounters:   03/02/20 (!) 159/79   02/21/20 136/65   02/14/20 (!) 140/72     Gaviota Dave RN

## 2020-05-20 ENCOUNTER — OFFICE VISIT (OUTPATIENT)
Dept: OPHTHALMOLOGY | Facility: CLINIC | Age: 75
End: 2020-05-20
Payer: COMMERCIAL

## 2020-05-20 DIAGNOSIS — Z96.1 PSEUDOPHAKIA OF RIGHT EYE: ICD-10-CM

## 2020-05-20 DIAGNOSIS — H25.11 NUCLEAR SCLEROSIS OF RIGHT EYE: ICD-10-CM

## 2020-05-20 DIAGNOSIS — G70.00 MYASTHENIA GRAVIS, ACHR ANTIBODY POSITIVE (H): ICD-10-CM

## 2020-05-20 DIAGNOSIS — H40.053 BORDERLINE GLAUCOMA WITH OCULAR HYPERTENSION, BILATERAL: Primary | ICD-10-CM

## 2020-05-20 DIAGNOSIS — Z86.69 H/O RD (RETINAL DETACHMENT): ICD-10-CM

## 2020-05-20 PROCEDURE — 92133 CPTRZD OPH DX IMG PST SGM ON: CPT | Performed by: STUDENT IN AN ORGANIZED HEALTH CARE EDUCATION/TRAINING PROGRAM

## 2020-05-20 PROCEDURE — 92012 INTRM OPH EXAM EST PATIENT: CPT | Performed by: STUDENT IN AN ORGANIZED HEALTH CARE EDUCATION/TRAINING PROGRAM

## 2020-05-20 PROCEDURE — 92083 EXTENDED VISUAL FIELD XM: CPT | Performed by: STUDENT IN AN ORGANIZED HEALTH CARE EDUCATION/TRAINING PROGRAM

## 2020-05-20 ASSESSMENT — EXTERNAL EXAM - RIGHT EYE: OD_EXAM: NORMAL

## 2020-05-20 ASSESSMENT — VISUAL ACUITY
CORRECTION_TYPE: GLASSES
OD_CC: 1
OD_CC: 20/20
OS_CC: 2+
METHOD: SNELLEN - LINEAR
OS_CC: 20/30
OS_CC+: +1

## 2020-05-20 ASSESSMENT — TONOMETRY
IOP_METHOD: APPLANATION
OD_IOP_MMHG: 17
OS_IOP_MMHG: 18

## 2020-05-20 ASSESSMENT — EXTERNAL EXAM - LEFT EYE: OS_EXAM: NORMAL

## 2020-05-20 ASSESSMENT — SLIT LAMP EXAM - LIDS
COMMENTS: 1+ DERMATOCHALASIS
COMMENTS: 1+ DERMATOCHALASIS

## 2020-05-20 ASSESSMENT — CUP TO DISC RATIO
OS_RATIO: 0.25
OD_RATIO: 0.1

## 2020-05-20 NOTE — LETTER
5/20/2020         RE: Rakan Nolasco  [address] [facility] [address]        Dear Colleague,    [facility] Please see a copy of my visit note below.     Current Eye Medications:  Timolol both eyes twice a day, Latanoprost both eyes every evening.  Last drops  6am.       Subjective:  Follow up borderline glaucoma with Ocular Hypertension.  Patient is here for a pressure check, OCT, and visual field.  Patient complains of decreasing near vision in each eye, especially later in the day.  He had a period of time where his eyes were hurting, but this resolved.       Objective:  See Ophthalmology Exam.       Assessment:  Rakan Nolasco is a 74 year old male who presents with:   Encounter Diagnoses   Name Primary?     Borderline glaucoma with ocular hypertension, bilateral OCT optic nerve and visual field stable today with intraocular pressure improved from last visit (where we increased timolol from once daily to twice a day).     OCT optic nerve: avg retinal nerve fiber layer 85/78, stable in both eyes.    Sifuentes visual field (HVF): stable borderline superior arcuate defect right eye, within normal limits left eye.     Continue same medications.     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        Sincerely,        Mateo Gray MD

## 2020-05-20 NOTE — PATIENT INSTRUCTIONS
Continue Latanoprost (green top) at bedtime both eyes     Continue Timolol (yellow top) twice a day in both eyes     Mateo Gray MD  [phone]

## 2020-05-20 NOTE — PROGRESS NOTES
Current Eye Medications:  Timolol both eyes twice a day, Latanoprost both eyes every evening.  Last drops  6am.       Subjective:  Follow up borderline glaucoma with Ocular Hypertension.  Patient is here for a pressure check, OCT, and visual field.  Patient complains of decreasing near vision in each eye, especially later in the day.  He had a period of time where his eyes were hurting, but this resolved.       Objective:  See Ophthalmology Exam.       Assessment:  Rakan Nolasco is a 74 year old male who presents with:   Encounter Diagnoses   Name Primary?     Borderline glaucoma with ocular hypertension, bilateral OCT optic nerve and visual field stable today with intraocular pressure improved from last visit (where we increased timolol from once daily to twice a day).     OCT optic nerve: avg retinal nerve fiber layer 85/78, stable in both eyes.    Sifuentes visual field (HVF): stable borderline superior arcuate defect right eye, within normal limits left eye.     Continue same medications.     H/O RD (retinal detachment) OD s/p repair with PPV (AB)      Myasthenia gravis, AChR antibody positive (H)      Nuclear sclerosis of right eye      Pseudophakia of right eye s/p YAG OD        Plan:  Continue Latanoprost (green top) at bedtime both eyes     Continue Timolol (yellow top) twice a day in both eyes     Mateo Gray MD  [phone]

## 2020-05-27 DIAGNOSIS — K21.9 GASTROESOPHAGEAL REFLUX DISEASE WITHOUT ESOPHAGITIS: ICD-10-CM

## 2020-05-28 RX ORDER — PANTOPRAZOLE SODIUM 20 MG/1
TABLET, DELAYED RELEASE ORAL
Qty: 90 TABLET | Refills: 2 | Status: SHIPPED | OUTPATIENT
Start: 2020-05-28 | End: 2021-02-23

## 2020-06-04 DIAGNOSIS — I10 HTN, GOAL BELOW 140/90: ICD-10-CM

## 2020-06-05 RX ORDER — CHLORTHALIDONE 25 MG/1
TABLET ORAL
Qty: 90 TABLET | Refills: 0 | Status: SHIPPED | OUTPATIENT
Start: 2020-06-05 | End: 2020-06-10

## 2020-06-05 NOTE — TELEPHONE ENCOUNTER
"Routing refill request to provider for review/approval because:  Labs out of range:  BP  Insurance is requesting 90 day supply    Requested Prescriptions   Pending Prescriptions Disp Refills     chlorthalidone (HYGROTON) 25 MG tablet [Pharmacy Med Name: CHLORTHALIDONE 25 MG TABLET] 30 tablet 1     Sig: TAKE 1 TABLET BY MOUTH EVERY DAY       Diuretics (Including Combos) Protocol Failed - 6/5/2020 12:49 PM        Failed - Blood pressure under 140/90 in past 12 months     BP Readings from Last 3 Encounters:   05/13/20 (!) 170/78   03/02/20 (!) 159/79   02/21/20 136/65                 Passed - Recent (12 mo) or future (30 days) visit within the authorizing provider's specialty     Patient has had an office visit with the authorizing provider or a provider within the authorizing providers department within the previous 12 mos or has a future within next 30 days. See \"Patient Info\" tab in inbasket, or \"Choose Columns\" in Meds & Orders section of the refill encounter.              Passed - Medication is active on med list        Passed - Patient is age 18 or older        Passed - Normal serum creatinine on file in past 12 months     Recent Labs   Lab Test 12/20/19  0726   CR 0.91              Passed - Normal serum potassium on file in past 12 months     Recent Labs   Lab Test 12/20/19  0726   POTASSIUM 4.5                    Passed - Normal serum sodium on file in past 12 months     Recent Labs   Lab Test 02/14/20  0748                    Christine Lynn RN  "

## 2020-06-05 NOTE — TELEPHONE ENCOUNTER
Reason for call:  Other   Patient called regarding (reason for call): prescription  Additional comments:  Patient states that insurance requires a 90 day supply, please call to advise.     Phone number to reach patient:  Cell number on file:    Telephone Information:   Mobile [phone]       Best Time:  Any     Can we leave a detailed message on this number?  YES    Travel screening: Not Applicable

## 2020-06-10 ENCOUNTER — OFFICE VISIT (OUTPATIENT)
Dept: FAMILY MEDICINE | Facility: CLINIC | Age: 75
End: 2020-06-10
Payer: COMMERCIAL

## 2020-06-10 VITALS
HEIGHT: 68 IN | TEMPERATURE: 96.7 F | HEART RATE: 62 BPM | RESPIRATION RATE: 18 BRPM | OXYGEN SATURATION: 97 % | BODY MASS INDEX: 36.59 KG/M2 | SYSTOLIC BLOOD PRESSURE: 120 MMHG | WEIGHT: 241.4 LBS | DIASTOLIC BLOOD PRESSURE: 60 MMHG

## 2020-06-10 DIAGNOSIS — E78.5 HYPERLIPIDEMIA LDL GOAL <130: ICD-10-CM

## 2020-06-10 DIAGNOSIS — R73.03 PREDIABETES: ICD-10-CM

## 2020-06-10 DIAGNOSIS — I10 HTN, GOAL BELOW 140/90: ICD-10-CM

## 2020-06-10 PROCEDURE — 99214 OFFICE O/P EST MOD 30 MIN: CPT | Performed by: PHYSICIAN ASSISTANT

## 2020-06-10 RX ORDER — METFORMIN HCL 500 MG
TABLET, EXTENDED RELEASE 24 HR ORAL
Qty: 180 TABLET | Refills: 0 | Status: SHIPPED | OUTPATIENT
Start: 2020-06-10 | End: 2020-09-01

## 2020-06-10 RX ORDER — CHLORTHALIDONE 25 MG/1
25 TABLET ORAL DAILY
Qty: 90 TABLET | Refills: 0 | Status: SHIPPED | OUTPATIENT
Start: 2020-06-10 | End: 2020-08-25

## 2020-06-10 RX ORDER — LISINOPRIL 30 MG/1
30 TABLET ORAL DAILY
Qty: 90 TABLET | Refills: 1 | Status: SHIPPED | OUTPATIENT
Start: 2020-06-10 | End: 2020-11-06

## 2020-06-10 RX ORDER — ATORVASTATIN CALCIUM 20 MG/1
20 TABLET, FILM COATED ORAL DAILY
Qty: 90 TABLET | Refills: 1 | Status: SHIPPED | OUTPATIENT
Start: 2020-06-10 | End: 2021-01-25

## 2020-06-10 ASSESSMENT — MIFFLIN-ST. JEOR: SCORE: 1819.48

## 2020-06-10 NOTE — PROGRESS NOTES
"Subjective     Rakan ASHLEY Nolasco is a 74 year old male who presents to clinic today for the following health issues:    HPI   Hyperlipidemia Follow-Up      Are you regularly taking any medication or supplement to lower your cholesterol?   Yes- taking daily    Are you having muscle aches or other side effects that you think could be caused by your cholesterol lowering medication?  Yes- but it is not new     Hypertension Follow-up      Do you check your blood pressure regularly outside of the clinic? Yes     Are you following a low salt diet? Yes    Are your blood pressures ever more than 140 on the top number (systolic) OR more   than 90 on the bottom number (diastolic), for example 140/90? Yes      How many servings of fruits and vegetables do you eat daily?  2-3    On average, how many sweetened beverages do you drink each day (Examples: soda, juice, sweet tea, etc.  Do NOT count diet or artificially sweetened beverages)?   0    How many days per week do you exercise enough to make your heart beat faster? none    How many minutes a day do you exercise enough to make your heart beat faster? none    How many days per week do you miss taking your medication? 0    Patient doing well.  Reviewed diet and exercise.  BP readings < 150 mmHg SBP.    Review of Systems   Constitutional, HEENT, cardiovascular, pulmonary, gi and gu systems are negative, except as otherwise noted.      Objective    /60   Pulse 62   Temp 96.7  F (35.9  C) (Temporal)   Resp 18   Ht 1.727 m (5' 8\")   Wt 109.5 kg (241 lb 6.4 oz)   SpO2 97%   BMI 36.70 kg/m    Body mass index is 36.7 kg/m .  Physical Exam   GENERAL: healthy, alert and no distress  NECK: no adenopathy, no asymmetry, masses, or scars and thyroid normal to palpation  RESP: lungs clear to auscultation - no rales, rhonchi or wheezes  CV: regular rate and rhythm, normal S1 S2, no S3 or S4, no murmur, click or rub, no peripheral edema and peripheral pulses strong  MS: no gross " "musculoskeletal defects noted, no edema    Diagnostic Test Results:  Labs reviewed in Epic        Assessment & Plan     1. Prediabetes  - metFORMIN (GLUCOPHAGE-XR) 500 MG 24 hr tablet; TAKE 1 TABLET BY MOUTH TWICE A DAY WITH MEALS  Dispense: 180 tablet; Refill: 0  - NUTRITION REFERRAL  - **A1C FUTURE anytime; Future    2. HTN, goal below 140/90  - Albumin Random Urine Quantitative with Creat Ratio  - lisinopril (ZESTRIL) 30 MG tablet; Take 1 tablet (30 mg) by mouth daily  Dispense: 90 tablet; Refill: 1  - chlorthalidone (HYGROTON) 25 MG tablet; Take 1 tablet (25 mg) by mouth daily  Dispense: 90 tablet; Refill: 0  - NUTRITION REFERRAL  - **Basic metabolic panel FUTURE anytime; Future    3. Hyperlipidemia LDL goal <130  - atorvastatin (LIPITOR) 20 MG tablet; Take 1 tablet (20 mg) by mouth daily  Dispense: 90 tablet; Refill: 1  - NUTRITION REFERRAL  - Lipid panel reflex to direct LDL Fasting; Future     BMI:   Estimated body mass index is 36.7 kg/m  as calculated from the following:    Height as of this encounter: 1.727 m (5' 8\").    Weight as of this encounter: 109.5 kg (241 lb 6.4 oz).   Weight management plan: Patient referred to endocrine and/or weight management specialty        Return in about 3 months (around 9/10/2020) for In-Clinic Visit.   Patient amenable to this follow up plan.     Poonam Rocha PA-C  [facility]      "

## 2020-06-11 DIAGNOSIS — E78.5 HYPERLIPIDEMIA LDL GOAL <130: ICD-10-CM

## 2020-06-11 DIAGNOSIS — R73.03 PREDIABETES: ICD-10-CM

## 2020-06-11 DIAGNOSIS — I10 HTN, GOAL BELOW 140/90: ICD-10-CM

## 2020-06-11 LAB
ANION GAP SERPL CALCULATED.3IONS-SCNC: 6 MMOL/L (ref 3–14)
BUN SERPL-MCNC: 14 MG/DL (ref 7–30)
CALCIUM SERPL-MCNC: 8.8 MG/DL (ref 8.5–10.1)
CHLORIDE SERPL-SCNC: 90 MMOL/L (ref 94–109)
CHOLEST SERPL-MCNC: 108 MG/DL
CO2 SERPL-SCNC: 29 MMOL/L (ref 20–32)
CREAT SERPL-MCNC: 0.78 MG/DL (ref 0.66–1.25)
CREAT UR-MCNC: 145 MG/DL
GFR SERPL CREATININE-BSD FRML MDRD: 90 ML/MIN/{1.73_M2}
GLUCOSE SERPL-MCNC: 98 MG/DL (ref 70–99)
HBA1C MFR BLD: 6.8 % (ref 0–5.6)
HDLC SERPL-MCNC: 54 MG/DL
LDLC SERPL CALC-MCNC: 35 MG/DL
MICROALBUMIN UR-MCNC: 12 MG/L
MICROALBUMIN/CREAT UR: 8.62 MG/G CR (ref 0–17)
NONHDLC SERPL-MCNC: 54 MG/DL
POTASSIUM SERPL-SCNC: 3.9 MMOL/L (ref 3.4–5.3)
SODIUM SERPL-SCNC: 125 MMOL/L (ref 133–144)
TRIGL SERPL-MCNC: 93 MG/DL

## 2020-06-11 PROCEDURE — 82043 UR ALBUMIN QUANTITATIVE: CPT | Performed by: PHYSICIAN ASSISTANT

## 2020-06-11 PROCEDURE — 36415 COLL VENOUS BLD VENIPUNCTURE: CPT | Performed by: PHYSICIAN ASSISTANT

## 2020-06-11 PROCEDURE — 83036 HEMOGLOBIN GLYCOSYLATED A1C: CPT | Performed by: PHYSICIAN ASSISTANT

## 2020-06-11 PROCEDURE — 80061 LIPID PANEL: CPT | Performed by: PHYSICIAN ASSISTANT

## 2020-06-11 PROCEDURE — 80048 BASIC METABOLIC PNL TOTAL CA: CPT | Performed by: PHYSICIAN ASSISTANT

## 2020-06-25 DIAGNOSIS — H40.053 BORDERLINE GLAUCOMA WITH OCULAR HYPERTENSION, BILATERAL: ICD-10-CM

## 2020-06-25 RX ORDER — LATANOPROST 50 UG/ML
1 SOLUTION/ DROPS OPHTHALMIC AT BEDTIME
Qty: 7.5 ML | Refills: 3 | Status: SHIPPED | OUTPATIENT
Start: 2020-06-25 | End: 2021-05-11

## 2020-06-25 RX ORDER — TIMOLOL MALEATE 5 MG/ML
1 SOLUTION/ DROPS OPHTHALMIC 2 TIMES DAILY
Qty: 3 BOTTLE | Refills: 3 | Status: SHIPPED | OUTPATIENT
Start: 2020-06-25 | End: 2020-11-23

## 2020-06-25 NOTE — TELEPHONE ENCOUNTER
Recommend refilling drops per Dr. Gray's last visit note on 5/20/20: Continue Latanoprost (green top) at bedtime both eyes      Continue Timolol (yellow top) twice a day in both eyes

## 2020-06-26 DIAGNOSIS — I10 HTN, GOAL BELOW 140/90: ICD-10-CM

## 2020-06-26 RX ORDER — AMLODIPINE BESYLATE 10 MG/1
10 TABLET ORAL DAILY
Qty: 90 TABLET | Refills: 1 | Status: SHIPPED | OUTPATIENT
Start: 2020-06-26 | End: 2020-09-30

## 2020-06-26 NOTE — TELEPHONE ENCOUNTER
Reason for call:  Medication   If this is a refill request, has the caller requested the refill from the pharmacy already? Yes  Will the patient be using a [facility]? No  Name of the pharmacy and phone number for the current request:   [id number] - [place], [place] - [address] [facility] [phone] (Phone)  [phone] (Fax)         Name of the medication requested:   amLODIPine (NORVASC) 5 MG tablet    Other request: Patient needs two tablets a day. Please call to discuss.    Phone number to reach patient:  Home number on file [phone] (home)    Best Time:  any    Can we leave a detailed message on this number?  YES    Travel screening: Negative

## 2020-06-26 NOTE — TELEPHONE ENCOUNTER
Pharmacy calling patient has prescription for Amlodipine 5mg take 2 tablets daily.  Pharmacy is wondering if this can be changed to 10mg tablet take 1 daily.   Soila Smith RN

## 2020-06-30 DIAGNOSIS — C61 MALIGNANT NEOPLASM OF PROSTATE (H): ICD-10-CM

## 2020-06-30 LAB — PSA SERPL-MCNC: 1.79 UG/L (ref 0–4)

## 2020-06-30 PROCEDURE — 84153 ASSAY OF PSA TOTAL: CPT | Performed by: UROLOGY

## 2020-06-30 PROCEDURE — 36415 COLL VENOUS BLD VENIPUNCTURE: CPT | Performed by: UROLOGY

## 2020-07-02 ASSESSMENT — ENCOUNTER SYMPTOMS
PARALYSIS: 0
BACK PAIN: 0
NECK MASS: 0
SINUS CONGESTION: 0
VOMITING: 0
EYE IRRITATION: 0
SEIZURES: 0
ALTERED TEMPERATURE REGULATION: 0
WEIGHT GAIN: 0
DISTURBANCES IN COORDINATION: 0
HALLUCINATIONS: 0
TINGLING: 0
COUGH DISTURBING SLEEP: 0
TROUBLE SWALLOWING: 1
SPEECH CHANGE: 1
POSTURAL DYSPNEA: 0
BLOATING: 0
DIARRHEA: 0
EYE REDNESS: 0
MUSCLE CRAMPS: 1
HEADACHES: 0
WHEEZING: 0
COUGH: 0
ABDOMINAL PAIN: 0
POLYPHAGIA: 0
FEVER: 0
WEAKNESS: 1
HOARSE VOICE: 0
HEMOPTYSIS: 0
ARTHRALGIAS: 0
WEIGHT LOSS: 0
BLOOD IN STOOL: 0
JOINT SWELLING: 0
DECREASED APPETITE: 0
LOSS OF CONSCIOUSNESS: 0
EYE WATERING: 0
DYSPNEA ON EXERTION: 0
BOWEL INCONTINENCE: 0
FATIGUE: 1
DIZZINESS: 0
SMELL DISTURBANCE: 0
SORE THROAT: 0
NECK PAIN: 1
RECTAL PAIN: 0
SPUTUM PRODUCTION: 0
SINUS PAIN: 0
JAUNDICE: 0
EYE PAIN: 0
NIGHT SWEATS: 0
INCREASED ENERGY: 1
MUSCLE WEAKNESS: 1
DOUBLE VISION: 0
NUMBNESS: 0
SNORES LOUDLY: 0
MEMORY LOSS: 0
MYALGIAS: 0
CONSTIPATION: 0
CHILLS: 0
STIFFNESS: 0
TASTE DISTURBANCE: 0
HEARTBURN: 0
POLYDIPSIA: 0
SHORTNESS OF BREATH: 1
TREMORS: 1
NAUSEA: 1

## 2020-07-06 ENCOUNTER — VIRTUAL VISIT (OUTPATIENT)
Dept: NEUROLOGY | Facility: CLINIC | Age: 75
End: 2020-07-06
Payer: COMMERCIAL

## 2020-07-06 DIAGNOSIS — R26.81 UNSTEADY GAIT: ICD-10-CM

## 2020-07-06 DIAGNOSIS — M79.662 BILATERAL CALF PAIN: ICD-10-CM

## 2020-07-06 DIAGNOSIS — R51.9 HEADACHE, UNSPECIFIED HEADACHE TYPE: ICD-10-CM

## 2020-07-06 DIAGNOSIS — G70.00 MYASTHENIA GRAVIS WITHOUT EXACERBATION (H): ICD-10-CM

## 2020-07-06 DIAGNOSIS — M79.661 BILATERAL CALF PAIN: ICD-10-CM

## 2020-07-06 DIAGNOSIS — E87.1 HYPONATREMIA: Primary | ICD-10-CM

## 2020-07-06 NOTE — PATIENT INSTRUCTIONS
Get your sodium changed as soon as possible!  Please stop your Trileptal NOW.  Please cover one eye in the evening. If the blurry vision goes away it's the MG causing it- and I need to figure out what to do next.  Not sure what's causing your unsteady walking and headaches. I need to examine you ASAP. Can you come Thursday at 1 pm ?    Thank you!

## 2020-07-06 NOTE — LETTER
2020       RE: Rakan Nolasco  [address] [facility] [address]     Dear Colleague,    Thank you for referring your patient, Rakan Nolasco, to the [facility] NEUROLOGY at [facility]. Please see a copy of my visit note below.    Rakan Nolasco is a 74 year old male who is being evaluated via a billable video visit.          Video-Visit Details    Type of service:  Video Visit    Video Start Time: 10.27 am  Video End Time: 10.53 am    Originating Location (pt. Location): Home    Distant Location (provider location):  [facility] NEUROLOGY     Platform used for Video Visit: Daniela Sterling, EMT    x    Service Date: 2020      Telly Lucio MD   [address] [address]      RE: Rakan Nolasco    MRN: [id number]   : 1945       Dear Dr. Lucio:      I had the pleasure to evaluate Mr. Nolasco via a billable video visit today. In-person visit was not initially recommended due to the COVID-19 pandemic and the patient felt to be high risk due to age of 74, diabetes and immunosuppression due to myasthenia, but following the visit, I realized there were a lot of issues that cannot be resolved without an in person examination, therefore I am going to call him in clinic in the next few days (see below).     Mr. 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  I then increased his prednisone dose to 10 mg daily from 10 mg every other day and his Mestinon dose to 120 mg t.i.d. from 60 mg t.i.d.  This did help the dysphagia, although liquids still occasionally give him some trouble, but solid food is not hard to swallow, and there are very rare, if any, choking episodes.  His talking is for the most clear, and chewing is not a problem.  Ptosis  does not seem to bother him.  The diplopia is gone, but he does complain of persistent blurry vision, especially in the evening hours.  He is not sure whether covering one eye makes the blurry vision disappear or not.     In addition, Mr. Nolasco presents numerous other complaints today.  He has a new onset calf pain in the last 2 weeks that affects the bilateral gastrocnemius muscles and occurs only at night.  While he adamantly denies cramps, he does describe it in such terms as deep muscle pain waking him up.  It does not occur when walking or standing; it is only at night.  The second issue is ongoing headaches.  I used to treat him for possible trigeminal neuralgia manifesting with sharp, very short-lasting pain in the periorbital region over the left V1, and this seemed to respond to Trileptal.  However, this new pain that he describes in his head in the last 2 months is different.  It is not a sharp sensation.  It is more dull, and it is not so short lasting.  It lasts 10 minutes at a time and tends to recur several times a day.  There is very mild, if any, nausea. He denies vomiting, photophobia or phonophobia.  It is not associated with eye tearing.  He also complains of unsteady walking.  He has not fallen, but he feels less confident, especially in dark places.  There is no numbness or tingling in his feet.      MEDICATIONS:  Reviewed and are as per Epic record.        Importantly, on 06/11/2020, Dr. Lucio ordered labs showing significant hyponatremia with a sodium value of 125.  The previous sodium value was 131 several months ago, and last year it was 135.  He is taking Trileptal, but also lisinopril and diuretics (chlorthalidone) that can all cause hyponatremia.  This has not been rechecked after 6/11. He does not complain of any lethargy, change in consciousness or seizures.      In summary, Mr. Nolasco has a number of new issues that I do not have a good answer to their causes and which require a  face-to-face examination.  The calf pain could be muscle cramps, and increasing doses of Mestinon can cause the side effects, so I am thinking to request him to cut down on the Mestinon and potentially give him some symptomatic relief.I am not concerned about a more serious etiology of this complaint.  It does not sound like restless legs.    I also do not know the etiology of his headaches, which now seem different than his baseline headaches.  I do not know why he is unsteady when walking.  He could have neuropathy, gait ataxia, or side effects of medication.      The etiology of his blurry vision in the evening needs to be clarified.  I asked him to cover one eye when this happens.  If his vision normalizes with monocular coverage, then this is most likely due to the myasthenia.  If he continues to see blurry with one eye covered, this is definitely not myasthenia, and he should discuss this with his ophthalmologist.      Most important of all, he should recheck sodium and osmolality levels ASAP. I ordered the lab. Until this is done he should stop the Trileptal.    I will ask him to come for an in-person examination in a few days to clarify the above uncertainties.      Thank you for allowing me to participate in his care. Start of video visit was 10:27 a.m. and end 10:53 a.m.  Twenty-six minutes were spent on the video call; more than half was counseling     Sincerely,      Arpan Harrison MD                  D: 2020   T: 2020   MT: marcus      Name:     JR FRANCOIS   MRN:      [id number]        Account:      [id number]   :      1945           Service Date: 2020      Document: [id number]

## 2020-07-06 NOTE — PROGRESS NOTES
"Rakan Nolasco is a 74 year old male who is being evaluated via a billable video visit.      The patient has been notified of following:     \"This video visit will be conducted via a call between you and your physician/provider. We have found that certain health care needs can be provided without the need for an in-person physical exam.  This service lets us provide the care you need with a video conversation.  If a prescription is necessary we can send it directly to your pharmacy.  If lab work is needed we can place an order for that and you can then stop by our lab to have the test done at a later time.    Video visits are billed at different rates depending on your insurance coverage.  Please reach out to your insurance provider with any questions.    If during the course of the call the physician/provider feels a video visit is not appropriate, you will not be charged for this service.\"    Patient has given verbal consent for Video visit? YES  How would you like to obtain your AVS? [employer]  Patient would like the video invitation sent by: Send to e-mail at: [email or web address]  Will anyone else be joining your video visit? No        Video-Visit Details    Type of service:  Video Visit    Video Start Time: 10.27 am  Video End Time: 10.53 am    Originating Location (pt. Location): Home    Distant Location (provider location):  [facility] NEUROLOGY     Platform used for Video Visit: Daniela Sterling, EMT  "

## 2020-07-07 ENCOUNTER — VIRTUAL VISIT (OUTPATIENT)
Dept: UROLOGY | Facility: CLINIC | Age: 75
End: 2020-07-07
Payer: COMMERCIAL

## 2020-07-07 DIAGNOSIS — C61 MALIGNANT NEOPLASM OF PROSTATE (H): Primary | ICD-10-CM

## 2020-07-07 DIAGNOSIS — E87.1 HYPONATREMIA: ICD-10-CM

## 2020-07-07 LAB
OSMOLALITY SERPL: 274 MMOL/KG (ref 280–301)
SODIUM SERPL-SCNC: 125 MMOL/L (ref 133–144)

## 2020-07-07 PROCEDURE — 84295 ASSAY OF SERUM SODIUM: CPT | Performed by: PSYCHIATRY & NEUROLOGY

## 2020-07-07 PROCEDURE — 99213 OFFICE O/P EST LOW 20 MIN: CPT | Mod: 95 | Performed by: UROLOGY

## 2020-07-07 PROCEDURE — 36415 COLL VENOUS BLD VENIPUNCTURE: CPT | Performed by: PSYCHIATRY & NEUROLOGY

## 2020-07-07 PROCEDURE — 83930 ASSAY OF BLOOD OSMOLALITY: CPT | Performed by: PSYCHIATRY & NEUROLOGY

## 2020-07-07 ASSESSMENT — ENCOUNTER SYMPTOMS
EYE REDNESS: 0
MUSCLE CRAMPS: 1
DIZZINESS: 1
WEAKNESS: 1
STIFFNESS: 0
NECK PAIN: 1
NUMBNESS: 0
SEIZURES: 0
PARALYSIS: 0
EYE PAIN: 1
MEMORY LOSS: 0
EYE IRRITATION: 0
HEADACHES: 1
JOINT SWELLING: 0
BACK PAIN: 0
WEIGHT LOSS: 0
DOUBLE VISION: 0
WEIGHT GAIN: 0
POLYDIPSIA: 0
FEVER: 0
MYALGIAS: 1
CHILLS: 0
LOSS OF CONSCIOUSNESS: 0
NIGHT SWEATS: 0
TINGLING: 0
SPEECH CHANGE: 0
POLYPHAGIA: 0
FATIGUE: 1
DISTURBANCES IN COORDINATION: 0
INCREASED ENERGY: 1
ARTHRALGIAS: 0
HALLUCINATIONS: 0
DECREASED APPETITE: 0
TREMORS: 0
MUSCLE WEAKNESS: 0
ALTERED TEMPERATURE REGULATION: 0
EYE WATERING: 0

## 2020-07-07 NOTE — PATIENT INSTRUCTIONS
Please call the [facility] radiology to schedule an MRI of the prostate. [phone].  Please call our office to schedule your follow up once you have the test scheduled, [phone].  Please contact your insurance company to make sure the MRI scan is covered under your insurance plan.

## 2020-07-07 NOTE — PROGRESS NOTES
Service Date: 2020      [place] [address]      RE: Rakan Nolasco    MRN: [id number]   : 1945       Dear Dr. Lucio:      I had the pleasure to evaluate Mr. Nolasco via a billable video visit today. In-person visit was not initially recommended due to the COVID-19 pandemic and the patient felt to be high risk due to age of 74, diabetes and immunosuppression due to myasthenia, but following the visit, I realized there were a lot of issues that cannot be resolved without an in person examination, therefore I am going to call him in clinic in the next few days (see below).     Mr. 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  I then increased his prednisone dose to 10 mg daily from 10 mg every other day and his Mestinon dose to 120 mg t.i.d. from 60 mg t.i.d.  This did help the dysphagia, although liquids still occasionally give him some trouble, but solid food is not hard to swallow, and there are very rare, if any, choking episodes.  His talking is for the most clear, and chewing is not a problem.  Ptosis does not seem to bother him.  The diplopia is gone, but he does complain of persistent blurry vision, especially in the evening hours.  He is not sure whether covering one eye makes the blurry vision disappear or not.     In addition, Mr. Nolasco presents numerous other complaints today.  He has a new onset calf pain in the last 2 weeks that affects the bilateral gastrocnemius muscles and occurs only at night.  While he adamantly denies cramps, he does describe it in such terms as deep muscle pain waking him up.  It does not occur when walking or standing; it is only at night.  The second issue is ongoing headaches.  I used to treat him for possible  trigeminal neuralgia manifesting with sharp, very short-lasting pain in the periorbital region over the left V1, and this seemed to respond to Trileptal.  However, this new pain that he describes in his head in the last 2 months is different.  It is not a sharp sensation.  It is more dull, and it is not so short lasting.  It lasts 10 minutes at a time and tends to recur several times a day.  There is very mild, if any, nausea. He denies vomiting, photophobia or phonophobia.  It is not associated with eye tearing.  He also complains of unsteady walking.  He has not fallen, but he feels less confident, especially in dark places.  There is no numbness or tingling in his feet.      MEDICATIONS:  Reviewed and are as per Epic record.        Importantly, on 06/11/2020, Dr. Lucio ordered labs showing significant hyponatremia with a sodium value of 125.  The previous sodium value was 131 several months ago, and last year it was 135.  He is taking Trileptal, but also lisinopril and diuretics (chlorthalidone) that can all cause hyponatremia.  This has not been rechecked after 6/11. He does not complain of any lethargy, change in consciousness or seizures.      In summary, Mr. Nolasco has a number of new issues that I do not have a good answer to their causes and which require a face-to-face examination.  The calf pain could be muscle cramps, and increasing doses of Mestinon can cause the side effects, so I am thinking to request him to cut down on the Mestinon and potentially give him some symptomatic relief.I am not concerned about a more serious etiology of this complaint.  It does not sound like restless legs.    I also do not know the etiology of his headaches, which now seem different than his baseline headaches.  I do not know why he is unsteady when walking.  He could have neuropathy, gait ataxia, or side effects of medication.      The etiology of his blurry vision in the evening needs to be clarified.  I asked him  to cover one eye when this happens.  If his vision normalizes with monocular coverage, then this is most likely due to the myasthenia.  If he continues to see blurry with one eye covered, this is definitely not myasthenia, and he should discuss this with his ophthalmologist.      Most important of all, he should recheck sodium and osmolality levels ASAP. I ordered the lab. Until this is done he should stop the Trileptal.    I will ask him to come for an in-person examination in a few days to clarify the above uncertainties.      Thank you for allowing me to participate in his care. Start of video visit was 10:27 a.m. and end 10:53 a.m.  Twenty-six minutes were spent on the video call; more than half was counseling     Sincerely,      MD BAMBI Amin MD             D: 2020   T: 2020   MT: marcus      Name:     JR FRANCOIS   MRN:      [id number]        Account:      [id number]   :      1945           Service Date: 2020      Document: [id number]

## 2020-07-07 NOTE — PROGRESS NOTES
Chief Complaint   Patient presents with     Video Visit       Rakan ASHLEY Nolasco is a 74 year old male who presents today for follow up of   Chief Complaint   Patient presents with     Video Visit    f/u for prostate cancer s/p cryotherapy on 3/18.  His psa has gone down from 8.05 to 0.34 and up to 0.78 to 0.95 to 0.91. now it is 1.79.   His urinary flow is a little weaker but no straining.    Current Outpatient Medications   Medication Sig Dispense Refill     amLODIPine (NORVASC) 10 MG tablet Take 1 tablet (10 mg) by mouth daily 90 tablet 1     aspirin 81 MG tablet Take 1 tablet by mouth daily.       atorvastatin (LIPITOR) 20 MG tablet Take 1 tablet (20 mg) by mouth daily 90 tablet 1     blood glucose (ONETOUCH VERIO IQ) test strip USE TO TEST BLOOD SUGARS 1 TIMES DAILY OR AS DIRECTED 150 strip 1     blood glucose monitoring (NO BRAND SPECIFIED) meter device kit Use to test blood sugar 1 times daily or as directed. 1 kit 0     chlorthalidone (HYGROTON) 25 MG tablet Take 1 tablet (25 mg) by mouth daily 90 tablet 0     ClonAZEPAM (KLONOPIN) 0.5 MG tablet Take 0.5 mg by mouth. Take 1 tablet by mouth daily at bedtime       ferrous sulfate (FEROSUL) 325 (65 Fe) MG tablet TAKE 1 TABLET (325 MG) BY MOUTH 2 TIMES DAILY 60 tablet 1     latanoprost (XALATAN) 0.005 % ophthalmic solution Place 1 drop into both eyes At Bedtime 7.5 mL 3     lisinopril (ZESTRIL) 30 MG tablet Take 1 tablet (30 mg) by mouth daily 90 tablet 1     metFORMIN (GLUCOPHAGE-XR) 500 MG 24 hr tablet TAKE 1 TABLET BY MOUTH TWICE A DAY WITH MEALS 180 tablet 0     OneTouch Delica Lancets 30G MISC CHECK BLOOD SUGAR EVERY MORNING 100 each 1     OXcarbazepine (TRILEPTAL) 150 MG tablet Take 2 tablets in the morning and 2 tablets in the evening 360 tablet 0     pantoprazole (PROTONIX) 20 MG [facility] tablet TAKE 1 TABLET BY MOUTH EVERY DAY 90 tablet 2     predniSONE (DELTASONE) 5 MG tablet Take 2 tablets (10 mg) daily. 180 tablet 0     pyridostigmine (MESTINON) 60 MG tablet  TAKE 1 TABLET BY MOUTH FOUR TIMES A  tablet 1     timolol maleate (TIMOPTIC) 0.5 % ophthalmic solution Place 1 drop into both eyes 2 times daily 3 Bottle 3     Allergies   Allergen Reactions     Azathioprine Shortness Of Breath     Was hospitalized from it. Also had high fever, chills, and shortness of breath.     Ciprofloxacin Muscle Pain (Myalgia)     Muscle pain  Other reaction(s): Myalgia  Other reaction(s): Myalgia     Ropinirole      Leg pan  GI  Weakness; ? sycope  Other reaction(s): Leg Pain      Past Medical History:   Diagnosis Date     Arthritis          BMI 31.0-31.9,adult      Cancer (H) 01/10/18    Prostate     Demand ischemia (H)      Diabetes (H) 01/10/18     Diverticulosis     Colonoscopy 8/2008     Fatty liver     see US  5/2012     GERD (gastroesophageal reflux disease)      Glaucoma (increased eye pressure)      HTN (hypertension)      Hyperlipidemia LDL goal < 130     age, htn, fhx     Irritable bowel      Nonsenile cataract      ROSIE (obstructive sleep apnea) 01/2011    Using CPAP;      Prediabetes      Restless leg syndrome      Trigeminal neuralgia pain 1/4/2012     Past Surgical History:   Procedure Laterality Date     BIOPSY ARTERY TEMPORAL Bilateral 8/1/2017    Procedure: BIOPSY ARTERY TEMPORAL;  Bilateral temporal artery Biopsy;  Surgeon: Jj Tello MD;  Location: [facility] OR     CATARACT IOL, RT/LT Right      COLONOSCOPY       ESOPHAGOSCOPY, GASTROSCOPY, DUODENOSCOPY (EGD), COMBINED  1/15/2014    Procedure: COMBINED ESOPHAGOSCOPY, GASTROSCOPY, DUODENOSCOPY (EGD), BIOPSY SINGLE OR MULTIPLE;;  Surgeon: Winston Nixon MD;  Location: [facility] OR     LASER YAG CAPSULOTOMY Right 04/09/2018    right eye     PHACOEMULSIFICATION CLEAR CORNEA WITH STANDARD INTRAOCULAR LENS IMPLANT Right 2/20/2017    Procedure: PHACOEMULSIFICATION CLEAR CORNEA WITH STANDARD INTRAOCULAR LENS IMPLANT;  Surgeon: Mateo Gray MD;  Location:  [facility]     RETINAL REATTACHMENT Right       SURGICAL HISTORY OF -   8/2009    Both Eyelids-.     TONSILLECTOMY  as child     Family History   Problem Relation Age of Onset     Respiratory Mother         copd     LUNG DISEASE Mother      Allergies Brother      Cancer Maternal Grandmother      Heart Disease Paternal Grandmother      Cerebrovascular Disease Father      Cancer Father      Other Cancer Father      Glaucoma Maternal Aunt      Macular Degeneration No family hx of      Social History     Social History     Marital status: Single     Spouse name: N/A     Number of children: 0     Years of education: 12     Occupational History     Retired 3/2012      [employer]     Social History Main Topics     Smoking status: Former Smoker     Packs/day: 2.00     Years: 15.00     Types: Cigarettes     Start date: 1/1/1968     Quit date: 1/1/1983     Smokeless tobacco: Former User      Comment: smoke free household.     Alcohol use No      Comment: Quit in 1986     Drug use: No     Sexual activity: Not Currently     Partners: Female      Comment: 4/2010  No girlfriend at this time.     Other Topics Concern     Parent/Sibling W/ Cabg, Mi Or Angioplasty Before 65f 55m? No     Social History Narrative       REVIEW OF SYSTEMS  =================  C: NEGATIVE for fever, chills, change in weight  I: NEGATIVE for worrisome rashes, moles or lesions  E/M: NEGATIVE for ear, mouth and throat problems  R: NEGATIVE for significant cough or SHORTNESS OF BREATH,   CV: NEGATIVE for chest pain, palpitations or peripheral edema  GI: NEGATIVE for nausea, abdominal pain, heartburn, or change in bowel habits  NEURO: NEGATIVE any motor/sensory changes  PSYCH: NEGATIVE for recent mood disorder    Physical Exam:  GENERAL: Healthy, alert and no distress  EYES: Eyes grossly normal to inspection.  No discharge or erythema, or obvious scleral/conjunctival abnormalities.  RESP: No audible wheeze, cough, or visible cyanosis.  No visible retractions or increased  work of breathing.    SKIN: Visible skin clear. No significant rash, abnormal pigmentation or lesions.  NEURO: Cranial nerves grossly intact.  Mentation and speech appropriate for age.  PSYCH: Mentation appears normal, affect normal/bright, judgement and insight intact, normal speech and appearance well-groomed.  Copath Report  Patient Name: JR FRANCOIS   MR#: [id number]   Specimen #: [id number]   Collected: 12/5/2017   Received: 12/5/2017   Reported: 12/6/2017 14:56   Ordering Phy(s): SHARAN THURMAN     For improved result formatting, select 'View Enhanced Report Format'   under Linked Documents section.     SPECIMEN(S):   A: Prostate biopsy, left   B: Prostate biopsy, right     FINAL DIAGNOSIS:   A. Prostate, left, ultrasound-guided needle core biopsy:   - Prostatic adenocarcinoma        - Dinwiddie score: 6 (3+3); Grade group 1        - Number of cores involved: four of six        - Total surface area involved: 10%        - Intraductal carcinoma: Not identified.        - Perineural invasion: Not identified        - Angiolymphatic invasion: Not identified.     B. Prostate, right, ultrasound-guided needle core biopsy:   - Prostatic adenocarcinoma        - Kasandra score: 6 (3+3); Grade group 1        - Number of cores involved: four of six        - Total surface area involved: 15%        - Intraductal carcinoma: Not identified.        - Perineural invasion: Not identified        - Angiolymphatic invasion: Not identified.     Electronically signed out by:     Shin Campbell M.D., PhD     CLINICAL HISTORY:   72 year old male.  PSA 8.05 5g/L       Assessment/Plan:   (C61) Malignant neoplasm of prostate (H)  (primary encounter diagnosis)  Comment: s/p cryotherapy.  psa is rising  Plan: schedule for prostate MRI next.

## 2020-07-07 NOTE — PROGRESS NOTES
"Rakan Nolasco is a 74 year old male who is being evaluated via a billable video visit.      The patient has been notified of following:     \"This video visit will be conducted via a call between you and your physician/provider. We have found that certain health care needs can be provided without the need for an in-person physical exam.  This service lets us provide the care you need with a video conversation.  If a prescription is necessary we can send it directly to your pharmacy.  If lab work is needed we can place an order for that and you can then stop by our lab to have the test done at a later time.    Video visits are billed at different rates depending on your insurance coverage.  Please reach out to your insurance provider with any questions.    If during the course of the call the physician/provider feels a video visit is not appropriate, you will not be charged for this service.\"    Patient has given verbal consent for Video visit? Yes  How would you like to obtain your AVS? [facility]  Patient would like the video invitation sent by:   [phone]  Will anyone else be joining your video visit? No        Video-Visit Details    Type of service:  Video Visit    Video Start Time: 8:55 AM  Video End Time: 8:59 AM    Originating Location (pt. Location): Home    Distant Location (provider location):  [facility]     Platform used for Video Visit: Daniela Emery MD          "

## 2020-07-09 ENCOUNTER — TRANSFERRED RECORDS (OUTPATIENT)
Dept: HEALTH INFORMATION MANAGEMENT | Facility: CLINIC | Age: 75
End: 2020-07-09

## 2020-07-09 ENCOUNTER — OFFICE VISIT (OUTPATIENT)
Dept: NEUROLOGY | Facility: CLINIC | Age: 75
End: 2020-07-09
Payer: COMMERCIAL

## 2020-07-09 VITALS — DIASTOLIC BLOOD PRESSURE: 74 MMHG | OXYGEN SATURATION: 96 % | SYSTOLIC BLOOD PRESSURE: 132 MMHG | HEART RATE: 76 BPM

## 2020-07-09 DIAGNOSIS — I10 HTN, GOAL BELOW 140/90: ICD-10-CM

## 2020-07-09 DIAGNOSIS — R25.2 CRAMPS, MUSCLE, GENERAL: ICD-10-CM

## 2020-07-09 DIAGNOSIS — R51.9 BILATERAL HEADACHE: ICD-10-CM

## 2020-07-09 DIAGNOSIS — M83.9 ADULT OSTEOMALACIA, UNSPECIFIED: ICD-10-CM

## 2020-07-09 DIAGNOSIS — E87.1 HYPONATREMIA: ICD-10-CM

## 2020-07-09 DIAGNOSIS — G70.00 MYASTHENIA GRAVIS WITHOUT EXACERBATION (H): ICD-10-CM

## 2020-07-09 DIAGNOSIS — G62.9 NEUROPATHY: ICD-10-CM

## 2020-07-09 DIAGNOSIS — R25.2 CRAMPS, MUSCLE, GENERAL: Primary | ICD-10-CM

## 2020-07-09 LAB
ANION GAP SERPL CALCULATED.3IONS-SCNC: 6 MMOL/L (ref 3–14)
BUN SERPL-MCNC: 21 MG/DL (ref 7–30)
CALCIUM SERPL-MCNC: 9.3 MG/DL (ref 8.5–10.1)
CHLORIDE SERPL-SCNC: 98 MMOL/L (ref 94–109)
CK SERPL-CCNC: 235 U/L (ref 30–300)
CO2 SERPL-SCNC: 28 MMOL/L (ref 20–32)
CREAT SERPL-MCNC: 0.9 MG/DL (ref 0.66–1.25)
CREAT UR-MCNC: 158 MG/DL
FOLATE SERPL-MCNC: 27 NG/ML
GFR SERPL CREATININE-BSD FRML MDRD: 84 ML/MIN/{1.73_M2}
GLUCOSE SERPL-MCNC: 169 MG/DL (ref 70–99)
MAGNESIUM SERPL-MCNC: 2.1 MG/DL (ref 1.6–2.3)
MICROALBUMIN UR-MCNC: 17 MG/L
MICROALBUMIN/CREAT UR: 10.63 MG/G CR (ref 0–17)
PHOSPHATE SERPL-MCNC: 3 MG/DL (ref 2.5–4.5)
POTASSIUM SERPL-SCNC: 4.4 MMOL/L (ref 3.4–5.3)
SODIUM SERPL-SCNC: 131 MMOL/L (ref 133–144)
VIT B12 SERPL-MCNC: 738 PG/ML (ref 193–986)

## 2020-07-09 ASSESSMENT — PAIN SCALES - GENERAL: PAINLEVEL: NO PAIN (0)

## 2020-07-09 NOTE — PATIENT INSTRUCTIONS
1. Blurry vision in the evening and occasional swallowing problems: This is from the myasthenia. I think we should leave things as they are right now with your medications. If we increase your steroids we will make your diabetes worse, and if I put him on a new immunosuppressant drug this will make your immune system weaker and increase the risk of of infection including coronavirus.    2. Calf pain- this may be a side effect of the diuretics. Please STOP chlorthalidone now. I will ask you to have a blood draw for electrolytes today.    3. Unsteady walking-bumping: I think this is due to neuropathy in your feet. The most likely explanation is diabetes, but other causes are possible. We will do additional blood tests today.  Please use a cane in uneven/slippery surfaces, and look at your feet when walking to prevent falling.    4. Headaches do not worry me too much. Let's observe for now.    5. If your trigeminal nerve pain returns, please call me. I would like to avoid Trileptal if possible because it can cause low sodium.    6. Your sodium must be checked again in 1-2 weeks. I will again try to reach your primary care doctor.    Thank you!

## 2020-07-09 NOTE — LETTER
2020       RE: Rakan Nolasco  [address] [facility] [address]     Dear Colleague,    Thank you for referring your patient, Rakan Nolasco, to the [facility] NEUROLOGY at [facility]. Please see a copy of my visit note below.    Service Date: 2020       Telly Lucio MD   [address] [address]      RE: Rakan Nolasco   MRN: [id number]    : 1945              Dear Dr. Lucio:        I had to the pleasure to see Mr. Nolasco in person today at the [facility].  Please see my note from 2020 for details.  I asked him to come in Clinic for a neurological for examination.      PHYSICAL EXAMINATION:  Vital signs; /74 (BP Location: Left arm, Patient Position: Sitting, Cuff Size: Adult Large)   Pulse 76   SpO2 96%       He has no ptosis even after 45 seconds to a minute of sustained upward gaze.  There is very mild weakness of orbicularis oculi bilaterally.  Ductions and versions of the eyes are normal.  There is no disconjugate gaze.  He does develop diplopia after about 40 seconds of sustained upward gaze and after about 10 seconds of lateral gaze to the left or right.  He has no weakness of lip seal, cheek puff, uvula, jaw, palate or tongue.  There is no dysphonia or dysarthria.  Neck flexion and extension strength are full.  Cough reflex is strong.  His strength is 5/5 in upper and lower extremities proximally and distally.  Muscle tone is normal in arms and legs.  Reflexes are 2+ in biceps, triceps, brachioradialis, 1+ at the knees and absent at the ankles.  Toes are bilaterally neutral or downgoing.  Vibration is absent to markedly reduced at the toes.  It is reduced to about 6 seconds at the medial malleoli and normal at the index fingers.  Position sensation is intact and pinprick sensation is probably normal.  He has some hesitancy  doing tandem gait, needing a break after about 3-4 steps.  Romberg is probably negative.  He walks on his heels, but somewhat unsteadily.  He has no problems walking on his toes.      IMPRESSION:     1. Myasthenia gravis in partial remission, with continued blurry vision in the evening, and dysphagia.   2. Calf pain could be cramps related to electrolyte abnormalities from diuretics or Mestinon side effects, etc.   3. Blurry vision in the evening, likely due to myasthenia.   4. Headaches, benign in nature.   5. Unsteady gait, likely due to neuropathy.   6. Ongoing hyponatremia.      I spent about 20 minutes with Mr. Nolasco of which more than half was counseling.  The most pressing issue is the hyponatremia.  Sodium was again 125 a couple of days ago.  I made the unilateral decision to stop his chlorthalidone because the new onset calf pain and cramps could be related to the medication and so is the hyponatremia.  Trileptal may be another offender and we already stopped it.  You need to follow on the patient's sodium and I am going to call you again to make sure this is done.  I will check a complete basic metabolic panel today to also rule out hypokalemia and I will check magnesium, phosphorus, vitamin D levels and CK.  I believe that stopping the diuretic will lead to some improvement.  If not, we can prescribe a low dose gabapentin unless another sinister cause of calf pain is discovered.      His myasthenia is not in complete control.  The patient told me that the blurry vision that he experiences during the evening, improves when he covers one eye, so this is binocular blurry vision and could be related to MG.  MG also likely explains his dysphagia.  We mutually agreed not to increase his immunosuppression right now because this approach would have risks including increased risk of infection and the symptoms are not terribly bothersome or altering the patient's daytime routine.     His headaches last 10 minutes  at a time, occur in bilateral forehead, and do not include any alarming features (no vomiting, photophobia, phonophobia, eye tearing, sudden onset, nocturnal awakenings, etc.) They are probably benign in nature or could be related to the hyponatremia.  We will observe them right now and see what happens when the sodium corrects.      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  This is most likely from the diabetes.  I will rule out B12 or folate deficiency and look for monoclonal protein with immunofixation.  If nothing else is found, I would advocate for good glycemic control.  The patient should use a cane on uneven or wet surfaces or wet and he should look at his feet on the ground, which may improve his balance by visual input. Physical therapy would be very helpful.     I will see Mr. Nolasco in followup in our clinic in 3 months or sooner if needed.        Sincerely,        Arpan Harrison MD            D: 2020   T: 2020   MT: harlan      Name:     JR NOLASCO   MRN:      [id number]        Account:      [id number]   :      1945           Service Date: 2020      Document: [id number]

## 2020-07-09 NOTE — PROGRESS NOTES
Service Date: 2020       [place] [address]      RE: Rakan Nolasco   MRN: [id number]    : 1945              Dear Dr. Lucio:        I had to the pleasure to see Mr. Nolasco in person today at the [facility].  Please see my note from 2020 for details.  I asked him to come in Clinic for a neurological for examination.      PHYSICAL EXAMINATION:  Vital signs; /74 (BP Location: Left arm, Patient Position: Sitting, Cuff Size: Adult Large)   Pulse 76   SpO2 96%       He has no ptosis even after 45 seconds to a minute of sustained upward gaze.  There is very mild weakness of orbicularis oculi bilaterally.  Ductions and versions of the eyes are normal.  There is no disconjugate gaze.  He does develop diplopia after about 40 seconds of sustained upward gaze and after about 10 seconds of lateral gaze to the left or right.  He has no weakness of lip seal, cheek puff, uvula, jaw, palate or tongue.  There is no dysphonia or dysarthria.  Neck flexion and extension strength are full.  Cough reflex is strong.  His strength is 5/5 in upper and lower extremities proximally and distally.  Muscle tone is normal in arms and legs.  Reflexes are 2+ in biceps, triceps, brachioradialis, 1+ at the knees and absent at the ankles.  Toes are bilaterally neutral or downgoing.  Vibration is absent to markedly reduced at the toes.  It is reduced to about 6 seconds at the medial malleoli and normal at the index fingers.  Position sensation is intact and pinprick sensation is probably normal.  He has some hesitancy doing tandem gait, needing a break after about 3-4 steps.  Romberg is probably negative.  He walks on his heels, but somewhat unsteadily.  He has no problems walking on his toes.      IMPRESSION:     1. Myasthenia gravis in partial remission, with continued blurry vision in the  evening, and dysphagia.   2. Calf pain could be cramps related to electrolyte abnormalities from diuretics or Mestinon side effects, etc.   3. Blurry vision in the evening, likely due to myasthenia.   4. Headaches, benign in nature.   5. Unsteady gait, likely due to neuropathy.   6. Ongoing hyponatremia.      I spent about 20 minutes with Mr. Nolasco of which more than half was counseling.  The most pressing issue is the hyponatremia.  Sodium was again 125 a couple of days ago.  I made the unilateral decision to stop his chlorthalidone because the new onset calf pain and cramps could be related to the medication and so is the hyponatremia.  Trileptal may be another offender and we already stopped it.  You need to follow on the patient's sodium and I am going to call you again to make sure this is done.  I will check a complete basic metabolic panel today to also rule out hypokalemia and I will check magnesium, phosphorus, vitamin D levels and CK.  I believe that stopping the diuretic will lead to some improvement.  If not, we can prescribe a low dose gabapentin unless another sinister cause of calf pain is discovered.      His myasthenia is not in complete control.  The patient told me that the blurry vision that he experiences during the evening, improves when he covers one eye, so this is binocular blurry vision and could be related to MG.  MG also likely explains his dysphagia.  We mutually agreed not to increase his immunosuppression right now because this approach would have risks including increased risk of infection and the symptoms are not terribly bothersome or altering the patient's daytime routine.     His headaches last 10 minutes at a time, occur in bilateral forehead, and do not include any alarming features (no vomiting, photophobia, phonophobia, eye tearing, sudden onset, nocturnal awakenings, etc.) They are probably benign in nature or could be related to the hyponatremia.  We will observe them  right now and see what happens when the sodium corrects.      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  This is most likely from the diabetes.  I will rule out B12 or folate deficiency and look for monoclonal protein with immunofixation.  If nothing else is found, I would advocate for good glycemic control.  The patient should use a cane on uneven or wet surfaces or wet and he should look at his feet on the ground, which may improve his balance by visual input. Physical therapy would be very helpful.     I will see Mr. Nolasco in followup in our clinic in 3 months or sooner if needed.        Sincerely,        MD BAMBI Zhou MD             D: 2020   T: 2020   MT: harlan      Name:     JR NOLASCO   MRN:      [id number]        Account:      [id number]   :      1945           Service Date: 2020      Document: [id number]

## 2020-07-10 ENCOUNTER — DOCUMENTATION ONLY (OUTPATIENT)
Dept: OPHTHALMOLOGY | Facility: CLINIC | Age: 75
End: 2020-07-10

## 2020-07-10 LAB
DEPRECATED CALCIDIOL+CALCIFEROL SERPL-MC: 35 UG/L (ref 20–75)
IGA SERPL-MCNC: 137 MG/DL (ref 84–499)
IGG SERPL-MCNC: 741 MG/DL (ref 610–1616)
IGM SERPL-MCNC: 328 MG/DL (ref 35–242)
PROT PATTERN SERPL IFE-IMP: ABNORMAL

## 2020-07-15 ENCOUNTER — TELEPHONE (OUTPATIENT)
Dept: FAMILY MEDICINE | Facility: CLINIC | Age: 75
End: 2020-07-15

## 2020-07-15 DIAGNOSIS — E87.1 HYPONATREMIA: Primary | ICD-10-CM

## 2020-07-15 NOTE — TELEPHONE ENCOUNTER
Spoke to patient and made appointment for 7/24/2020.  Josefa GALLOWAY CMA ([facility])

## 2020-07-15 NOTE — TELEPHONE ENCOUNTER
Reason for Call: Request for an order or referral:    Order or referral being requested: labs for sodium     Date needed: as soon as possible    Has the patient been seen by the PCP for this problem? NO    Additional comments: Patient Dr Harrison wants patient to schedule labs for sodium levels and wants primary provider to put in orders.    Phone number Patient can be reached at:  Home number on file [phone] (home)    Best Time:  any    Can we leave a detailed message on this number?  YES    Call taken on 7/15/2020 at 10:06 AM by Milagro Molina

## 2020-07-24 DIAGNOSIS — E87.1 HYPONATREMIA: ICD-10-CM

## 2020-07-24 LAB — SODIUM SERPL-SCNC: 140 MMOL/L (ref 133–144)

## 2020-07-24 PROCEDURE — 84295 ASSAY OF SERUM SODIUM: CPT | Performed by: FAMILY MEDICINE

## 2020-07-24 PROCEDURE — 36415 COLL VENOUS BLD VENIPUNCTURE: CPT | Performed by: FAMILY MEDICINE

## 2020-08-03 DIAGNOSIS — I10 HTN, GOAL BELOW 140/90: ICD-10-CM

## 2020-08-03 RX ORDER — AMLODIPINE BESYLATE 5 MG/1
TABLET ORAL
Qty: 90 TABLET | Refills: 1 | OUTPATIENT
Start: 2020-08-03

## 2020-08-05 ENCOUNTER — ANCILLARY PROCEDURE (OUTPATIENT)
Dept: MRI IMAGING | Facility: CLINIC | Age: 75
End: 2020-08-05
Attending: UROLOGY
Payer: COMMERCIAL

## 2020-08-05 DIAGNOSIS — C61 MALIGNANT NEOPLASM OF PROSTATE (H): ICD-10-CM

## 2020-08-05 RX ORDER — GADOBUTROL 604.72 MG/ML
10 INJECTION INTRAVENOUS ONCE
Status: COMPLETED | OUTPATIENT
Start: 2020-08-05 | End: 2020-08-05

## 2020-08-05 RX ADMIN — GADOBUTROL 10 ML: 604.72 INJECTION INTRAVENOUS at 07:50

## 2020-08-05 NOTE — DISCHARGE INSTRUCTIONS
MRI Contrast Discharge Instructions    The IV contrast you received today will pass out of your body in your  urine. This will happen in the next 24 hours. You will not feel this process.  Your urine will not change color.    Drink at least 4 extra glasses of water or juice today (unless your doctor  has restricted your fluids). This reduces the stress on your kidneys.  You may take your regular medicines.    If you are on dialysis: It is best to have dialysis today.    If you have a reaction: Most reactions happen right away. If you have  any new symptoms after leaving the hospital (such as hives or swelling),  call your hospital at the correct number below. Or call your family doctor.  If you have breathing distress or wheezing, call 911.    Special instructions: ***    I have read and understand the above information.    Signature:______________________________________ Date:___________    Staff:__________________________________________ Date:___________     Time:__________    [facility] Departments:    ___[place]: [phone]  ___[facility]: [phone]  ___[place]: [phone] ___[facility]: [phone]  ___[facility]: [phone]  ___[facility]: [phone]  ___[phone]  ___[facility]: [phone]  ___[phone]

## 2020-08-06 ENCOUNTER — VIRTUAL VISIT (OUTPATIENT)
Dept: UROLOGY | Facility: CLINIC | Age: 75
End: 2020-08-06
Payer: COMMERCIAL

## 2020-08-06 DIAGNOSIS — C61 MALIGNANT NEOPLASM OF PROSTATE (H): Primary | ICD-10-CM

## 2020-08-06 DIAGNOSIS — N39.41 URGENCY INCONTINENCE: ICD-10-CM

## 2020-08-06 PROCEDURE — 99213 OFFICE O/P EST LOW 20 MIN: CPT | Mod: 95 | Performed by: UROLOGY

## 2020-08-06 NOTE — PROGRESS NOTES
"Rakan Nolasco is a 75 year old male who is being evaluated via a billable video visit.      The patient has been notified of following:     \"This video visit will be conducted via a call between you and your physician/provider. We have found that certain health care needs can be provided without the need for an in-person physical exam.  This service lets us provide the care you need with a video conversation.  If a prescription is necessary we can send it directly to your pharmacy.  If lab work is needed we can place an order for that and you can then stop by our lab to have the test done at a later time.    Video visits are billed at different rates depending on your insurance coverage.  Please reach out to your insurance provider with any questions.    If during the course of the call the physician/provider feels a video visit is not appropriate, you will not be charged for this service.\"    Patient has given verbal consent for Video visit? Yes  How would you like to obtain your AVS? MyChart  If you are dropped from the video visit, the video invite should be resent to: Text to cell phone:    Will anyone else be joining your video visit? No        Video-Visit Details    Type of service:  Video Visit    Video Start Time: 8:40 AM  Video End Time: 855    Originating Location (pt. Location): Home    Distant Location (provider location):  [facility]     Platform used for Video Visit: Daniela Emery MD        "

## 2020-08-06 NOTE — PROGRESS NOTES
Chief Complaint   Patient presents with     Video Visit       Rakan ASHLEY Nolasco is a 74 year old male who presents today for follow up of   Chief Complaint   Patient presents with     Video Visit    f/u for prostate cancer s/p cryotherapy on 3/18.  His psa has gone down from 8.05 to 0.34 and up to 0.78 to 0.95 to 0.91. now it is 1.79.   His urinary flow is a little weaker but no straining.  C/o occasional urgency.    Current Outpatient Medications   Medication Sig Dispense Refill     amLODIPine (NORVASC) 10 MG tablet Take 1 tablet (10 mg) by mouth daily 90 tablet 1     aspirin 81 MG tablet Take 1 tablet by mouth daily.       atorvastatin (LIPITOR) 20 MG tablet Take 1 tablet (20 mg) by mouth daily 90 tablet 1     blood glucose (ONETOUCH VERIO IQ) test strip USE TO TEST BLOOD SUGARS 1 TIMES DAILY OR AS DIRECTED 150 strip 1     blood glucose monitoring (NO BRAND SPECIFIED) meter device kit Use to test blood sugar 1 times daily or as directed. 1 kit 0     chlorthalidone (HYGROTON) 25 MG tablet Take 1 tablet (25 mg) by mouth daily 90 tablet 0     ClonAZEPAM (KLONOPIN) 0.5 MG tablet Take 0.5 mg by mouth. Take 1 tablet by mouth daily at bedtime       ferrous sulfate (FEROSUL) 325 (65 Fe) MG tablet TAKE 1 TABLET (325 MG) BY MOUTH 2 TIMES DAILY 60 tablet 1     gabapentin (NEURONTIN) 300 MG capsule 1 capsule twice a day for 1 week, then 1 capsule three times a day 90 capsule 0     latanoprost (XALATAN) 0.005 % ophthalmic solution Place 1 drop into both eyes At Bedtime 7.5 mL 3     lisinopril (ZESTRIL) 30 MG tablet Take 1 tablet (30 mg) by mouth daily 90 tablet 1     metFORMIN (GLUCOPHAGE-XR) 500 MG 24 hr tablet TAKE 1 TABLET BY MOUTH TWICE A DAY WITH MEALS 180 tablet 0     OneTouch Delica Lancets 30G MISC CHECK BLOOD SUGAR EVERY MORNING 100 each 1     pantoprazole (PROTONIX) 20 MG EC tablet TAKE 1 TABLET BY MOUTH EVERY DAY 90 tablet 2     predniSONE (DELTASONE) 5 MG tablet Take 2 tablets (10 mg) daily. 180 tablet 0      pyridostigmine (MESTINON) 60 MG tablet TAKE 1 TABLET BY MOUTH FOUR TIMES A  tablet 1     timolol maleate (TIMOPTIC) 0.5 % ophthalmic solution Place 1 drop into both eyes 2 times daily 3 Bottle 3     Allergies   Allergen Reactions     Azathioprine Shortness Of Breath     Was hospitalized from it. Also had high fever, chills, and shortness of breath.     Ciprofloxacin Muscle Pain (Myalgia)     Muscle pain  Other reaction(s): Myalgia  Other reaction(s): Myalgia     Ropinirole      Leg pan  GI  Weakness; ? sycope  Other reaction(s): Leg Pain      Past Medical History:   Diagnosis Date     Arthritis          BMI 31.0-31.9,adult      Cancer (H) 01/10/18    Prostate     Demand ischemia (H)      Diabetes (H) 01/10/18     Diverticulosis     Colonoscopy 8/2008     Fatty liver     see US  5/2012     GERD (gastroesophageal reflux disease)      Glaucoma (increased eye pressure)      HTN (hypertension)      Hyperlipidemia LDL goal < 130     age, htn, fhx     Irritable bowel      Nonsenile cataract      ROSIE (obstructive sleep apnea) 01/2011    Using CPAP;      Prediabetes      Restless leg syndrome      Trigeminal neuralgia pain 1/4/2012     Past Surgical History:   Procedure Laterality Date     BIOPSY ARTERY TEMPORAL Bilateral 8/1/2017    Procedure: BIOPSY ARTERY TEMPORAL;  Bilateral temporal artery Biopsy;  Surgeon: Jj Tlelo MD;  Location: [facility] OR     CATARACT IOL, RT/LT Right      COLONOSCOPY       ESOPHAGOSCOPY, GASTROSCOPY, DUODENOSCOPY (EGD), COMBINED  1/15/2014    Procedure: COMBINED ESOPHAGOSCOPY, GASTROSCOPY, DUODENOSCOPY (EGD), BIOPSY SINGLE OR MULTIPLE;;  Surgeon: Winston Nixon MD;  Location: [facility] OR     LASER YAG CAPSULOTOMY Right 04/09/2018    right eye     PHACOEMULSIFICATION CLEAR CORNEA WITH STANDARD INTRAOCULAR LENS IMPLANT Right 2/20/2017    Procedure: PHACOEMULSIFICATION CLEAR CORNEA WITH STANDARD INTRAOCULAR LENS IMPLANT;  Surgeon: Mateo Gray MD;  Location:  [facility]     RETINAL REATTACHMENT Right      SURGICAL HISTORY OF -   8/2009    Both Eyelids-.     TONSILLECTOMY  as child     Family History   Problem Relation Age of Onset     Respiratory Mother         copd     LUNG DISEASE Mother      Allergies Brother      Cancer Maternal Grandmother      Heart Disease Paternal Grandmother      Cerebrovascular Disease Father      Cancer Father      Other Cancer Father      Glaucoma Maternal Aunt      Macular Degeneration No family hx of      Social History     Social History     Marital status: Single     Spouse name: N/A     Number of children: 0     Years of education: 12     Occupational History     Retired 3/2012      [employer] ([employer])     Social History Main Topics     Smoking status: Former Smoker     Packs/day: 2.00     Years: 15.00     Types: Cigarettes     Start date: 1/1/1968     Quit date: 1/1/1983     Smokeless tobacco: Former User      Comment: smoke free household.     Alcohol use No      Comment: Quit in 1986     Drug use: No     Sexual activity: Not Currently     Partners: Female      Comment: 4/2010  No girlfriend at this time.     Other Topics Concern     Parent/Sibling W/ Cabg, Mi Or Angioplasty Before 65f 55m? No     Social History Narrative       REVIEW OF SYSTEMS  =================  C: NEGATIVE for fever, chills, change in weight  I: NEGATIVE for worrisome rashes, moles or lesions  E/M: NEGATIVE for ear, mouth and throat problems  R: NEGATIVE for significant cough or SHORTNESS OF BREATH,   CV: NEGATIVE for chest pain, palpitations or peripheral edema  GI: NEGATIVE for nausea, abdominal pain, heartburn, or change in bowel habits  NEURO: NEGATIVE any motor/sensory changes  PSYCH: NEGATIVE for recent mood disorder    Physical Exam:  GENERAL: Healthy, alert and no distress  EYES: Eyes grossly normal to inspection.  No discharge or erythema, or obvious scleral/conjunctival abnormalities.  RESP: No audible wheeze, cough, or visible cyanosis.   No visible retractions or increased work of breathing.    SKIN: Visible skin clear. No significant rash, abnormal pigmentation or lesions.  NEURO: Cranial nerves grossly intact.  Mentation and speech appropriate for age.  PSYCH: Mentation appears normal, affect normal/bright, judgement and insight intact, normal speech and appearance well-groomed.    Copath Report  Patient Name: JR FRANCOIS   MR#: [id number]   Specimen #: [id number]   Collected: 12/5/2017   Received: 12/5/2017   Reported: 12/6/2017 14:56   Ordering Phy(s): SHARAN THURMAN     For improved result formatting, select 'View Enhanced Report Format'   under Linked Documents section.     SPECIMEN(S):   A: Prostate biopsy, left   B: Prostate biopsy, right     FINAL DIAGNOSIS:   A. Prostate, left, ultrasound-guided needle core biopsy:   - Prostatic adenocarcinoma        - [place] score: 6 (3+3); Grade group 1        - Number of cores involved: four of six        - Total surface area involved: 10%        - Intraductal carcinoma: Not identified.        - Perineural invasion: Not identified        - Angiolymphatic invasion: Not identified.     B. Prostate, right, ultrasound-guided needle core biopsy:   - Prostatic adenocarcinoma        - [place] score: 6 (3+3); Grade group 1        - Number of cores involved: four of six        - Total surface area involved: 15%        - Intraductal carcinoma: Not identified.        - Perineural invasion: Not identified        - Angiolymphatic invasion: Not identified.     Electronically signed out by:     Shin Campbell M.D., PhD     CLINICAL HISTORY:   72 year old male.  PSA 8.05 5g/L     MRI PROSTATE: 8/5/2020 9:16 AM     CLINICAL HISTORY: Malignant neoplasm of prostate (H) 74-year-old male  with history of prostate cancer post cryotherapy 3/18. Prostate biopsy  (12/5/2017): Prostate adenocarcinoma Kasandra score 6 (3+3)     Most Recent PSA: 1.79 ug/L     Comparison: CT abdominal and pelvis 11/14/2017.     TECHNIQUE:  The  following sequences were obtained: High-resolution axial  T2-weighted, coronal T2-weighted, 3D volumetric T2-weighted, axial  pre-contrast T1, axial diffusion-weighted, axial apparent diffusion  coefficient and axial dynamic contrast-enhanced T1. Postcontrast  images were evaluated on a separate workstation to evaluate dynamic  contrast enhancement. The technique of this exam is PI-RADS v2.1  compliant. Contrast dose: 10mL Gadavist     FINDINGS:  Size: 3.2 x 3.6 x 3 cm; 18 grams  Hemorrhage: Mild  Peripheral zone: Heterogeneous on T2-weighted images. Regions of  mildly decreased signal on ADC or DWI, likely representing fibrotic  change. Focal area in the right mid gland peripheral zone at 7:00  position (series 7, image 63), measuring 8mm by 4 mm with  hypointensity on ADC images and circumscribed T2 hyperintensity,  likely representing post treatment change.  Transition zone: Nonenlarged. Transition zone nodules which are  circumscribed or mostly encapsulated without diffusion restriction. No  highly suspicious nodules.     Neurovascular bundles: No neurovascular bundle involvement by  malignancy.    Seminal vesicles: No seminal vesicle involvement by malignancy.   Lymph nodes: No lymph node involvement   Bones: No suspicious lesions   Other pelvic organs: Left-sided fat-containing inguinal hernia.  Colonic diverticulosis. Multiple inguinal lymph nodes with fatty  hilum.                                                                         IMPRESSION:     1. In this patient with prostate cancer, post cryotherapy, current  scan shows posttreatment changes in the right mid gland peripheral  zone, with additional areas of fibrosis in the peripheral zone and  benign prostatic hyperplasia nodules in the transitional zone, with no  evidence for residual or recurrent significant malignancy.  2. No suspicious adenopathy or evidence of pelvic metastases.                 I have personally reviewed the examination and  initial interpretation  and I agree with the findings.     MANUELITO LUZ MD    Assessment/Plan:   (C61) Malignant neoplasm of prostate (H)  (primary encounter diagnosis)  Comment: s/p cryotherapy.  psa is rising with MRI showing fibrosis tissues with no suspicious adenopathy.  Plan: discussed observation/cryotherapy/XRT           Patient to call in the Fall to set up repeat cryotherapy.    Urgency:  Occasionally.  Ok to try oxybutynin again.

## 2020-08-07 DIAGNOSIS — G50.0 TRIGEMINAL NEURALGIA: ICD-10-CM

## 2020-08-07 RX ORDER — GABAPENTIN 300 MG/1
CAPSULE ORAL
Qty: 90 CAPSULE | Refills: 0 | Status: SHIPPED | OUTPATIENT
Start: 2020-08-07 | End: 2020-09-03

## 2020-08-10 DIAGNOSIS — I10 HTN, GOAL BELOW 140/90: ICD-10-CM

## 2020-08-10 NOTE — TELEPHONE ENCOUNTER
Reason for call:  Medication   If this is a refill request, has the caller requested the refill from the pharmacy already? Yes  Will the patient be using a [facility]? No  Name of the pharmacy and phone number for the current request: [facility] [phone]  Name of the medication requested: Anlodipine    Other request: Amount of pills per day changed, so he needs a refill sooner than expected.    Phone number to reach patient:  Cell number on file:    Telephone Information:   Mobile [phone]       Best Time:  Anytime    Can we leave a detailed message on this number?  YES    Travel screening: Not Applicable

## 2020-08-11 RX ORDER — AMLODIPINE BESYLATE 10 MG/1
10 TABLET ORAL DAILY
Qty: 90 TABLET | Refills: 1 | Status: CANCELLED | OUTPATIENT
Start: 2020-08-11

## 2020-08-11 NOTE — TELEPHONE ENCOUNTER
Please call patient to clarify request. Appears that he should have a refill available at pharmacy ([facility]).    Per chart review (06/26/2020 refill encounter), pt did have 5 mg tablet and was taking BID and requested new Rx. One was sent 06/26 for 10 mg daily.

## 2020-08-12 NOTE — TELEPHONE ENCOUNTER
Spoke to patient and informed him rx was sent to pharmacy. He stated he might have been taking 20 mg daily. He will  check when he gets home. Please disregard at this time.  Josefa GALLOWAY CMA ([facility])

## 2020-08-17 ENCOUNTER — TELEPHONE (OUTPATIENT)
Dept: UROLOGY | Facility: CLINIC | Age: 75
End: 2020-08-17

## 2020-08-17 DIAGNOSIS — C61 MALIGNANT NEOPLASM OF PROSTATE (H): Primary | ICD-10-CM

## 2020-08-17 NOTE — TELEPHONE ENCOUNTER
Reason for Call:  Other call back    Detailed comments: Patient has been scheduled for surgery 9-2-20. Patient has additional questions regarding surgery. Also will need a post op scheduled, no notes on orders as to when patient needs to follow up. Please call patient to discuss.    Phone Number Patient can be reached at: Home number on file [phone] (home)    Best Time: any    Can we leave a detailed message on this number? YES    Call taken on 8/17/2020 at 4:04 PM by Tina Bundy

## 2020-08-17 NOTE — TELEPHONE ENCOUNTER
Type of surgery: cyrothereapy of prostate  CPT 53557    Location of surgery: [facility]  Date and time of surgery: 9-2-20   12:30pm   Surgeon: Dr Emery  Pre-Op Appt Date: 8-25-20  Post-Op Appt Date: tbd   Packet sent out: Yes  Pre-cert/Authorization completed:  No prior auth needed, per [employer] online list.   Pre Medicare guidelines:  Salvage Cryosurgery of Prostate After Radiation Failure. Salvage cryosurgery of the prostate for recurrent cancer is medically necessary and appropriate only for those patients with localized disease who:  Have failed a trial of radiation therapy as their primary treatment; and  Meet one of the following conditions: Stage T2B or below, [place] score <9, PSA <8 ng/mL.  I sent message to care team to see if patient fits within these guidelines.   Date: 08/17/2020    Thank you,   Daija Holly   Prior Authorization [facility]  [phone]

## 2020-08-17 NOTE — TELEPHONE ENCOUNTER
Patient has decided he wants to go ahead with cryotherapy of prostate? Will route to MD to address.   Josefa Winter, CMA

## 2020-08-21 NOTE — PROGRESS NOTES
[address]  [place] [address]  [phone]  Dept: [phone]    PRE-OP EVALUATION:  Today's date: 2020    Rakan Nolasco (: 1945) presents for pre-operative evaluation assessment as requested by Dr. Emery.  He requires evaluation and anesthesia risk assessment prior to undergoing surgery/procedure for treatment of Prostate .    Proposed Surgery/ Procedure: Prostate ablation  Date of Surgery/ Procedure: 20  Time of Surgery/ Procedure: 1230  Hospital/Surgical Facility:   Surgery Fax Number: Note does not need to be faxed, will be available electronically in Epic.  Primary Physician: Telly Lucio  Type of Anesthesia Anticipated: to be determined    Preoperative Questionnaire:   No - Have you ever had a heart attack or stroke?  No - Have you ever had surgery on your heart or blood vessels, such as a stent, coronary (heart) bypass, or surgery on an artery in the head, neck, heart, or legs?  No - Do you have chest pain when you are physically active?  No - Do you have a history of heart failure?  No - Do you currently have a cold, bronchitis, or symptoms of other respiratory (head and chest) infections?  No - Do you have a cough, shortness of breath, or wheezing?  No - Do you or anyone in your family have a history of blood clots?  No - Do you or anyone in your family have a serious bleeding problem, such as long-lasting bleeding after surgeries or cuts?  No - Have you ever had anemia or been told to take iron pills?  No - Have you had any abnormal blood loss such as black, tarry or bloody stools, or abnormal vaginal bleeding?  No - Have you ever had a blood transfusion?  Yes - Are you willing to have a blood transfusion if it is medically needed before, during, or after your surgery?  No - Have you or anyone in your family ever had problems with anesthesia (sedation for surgery)?  No - Do you have sleep apnea, excessive snoring, or daytime  drowsiness?   No - Do you have any artifical heart valves or other implanted medical devices, such as a pacemaker, defibrillator, or continuous glucose monitor?  No - Do you have any artifical joints?  No - Are you allergic to latex?  No - Is there any chance that you may be pregnant?    Patient has a Health Care Directive or Living Will:  YES    HPI:     HPI related to upcoming procedure: Prostate Cancer     DIABETES - Patient has a longstanding history of DiabetesType Type II . Patient is being treated with oral agents and denies significant side effects. Control has been good. Complicating factors include but are not limited to: hypertension and hyperlipidemia.     HYPERLIPIDEMIA - Patient has a long history of significant Hyperlipidemia requiring medication for treatment with recent good control. Patient reports no problems or side effects with the medication.     HYPERTENSION - Patient has longstanding history of HTN , currently denies any symptoms referable to elevated blood pressure. Specifically denies chest pain, palpitations, dyspnea, orthopnea, PND or peripheral edema. Blood pressure readings have been in normal range. Current medication regimen is as listed below. Patient denies any side effects of medication.     MEDICAL HISTORY:     Patient Active Problem List    Diagnosis Date Noted     Type 2 diabetes mellitus (H) 10/23/2017     Priority: High     Exacerbated by Prednisone       Myasthenia gravis (H) 10/16/2017     Priority: High     HTN (hypertension) 07/19/2012     Priority: High     HLD (hyperlipidemia) 10/31/2010     Priority: High     Temporal arteritis (H) 02/20/2019     Priority: Medium     Type 2 diabetes mellitus with hyperglycemia, without long-term current use of insulin (H) 02/20/2019     Priority: Medium     Right posterior capsular opacification 04/09/2018     Priority: Medium     Sleep apnea 03/23/2018     Priority: Medium     Overview:   per patient (wears cpap at night)       Morbid  obesity (H) 02/20/2018     Priority: Medium     Demand ischemia (H) 01/12/2018     Priority: Medium     Prostate cancer (H) 01/12/2018     Priority: Medium     Pseudophakia of right eye 02/21/2017     Priority: Medium     Obstructive sleep apnea syndrome 01/25/2017     Priority: Medium     Restless legs syndrome 10/12/2016     Priority: Medium     H/O RD (retinal detachment) s/p repair with PPV (AB) 05/26/2016     Priority: Medium     Epiretinal membrane, bilateral 05/26/2016     Priority: Medium     PVD (posterior vitreous detachment) OU 06/17/2014     Priority: Medium     Dry eyes 01/21/2014     Priority: Medium     Prediabetes      Priority: Medium     Arthritis      Priority: Medium     Health Care Home 07/20/2012     Priority: Medium     [facility] for .  Jurgen Messina RN, C-BC    [phone]     DX V65.8 REPLACED WITH [facility] (04/08/2013)       bmi 31 07/14/2012     Priority: Medium     Trigeminal neuralgia pain 01/04/2012     Priority: Medium     Advanced directives, counseling/discussion 08/24/2011     Priority: Medium     Advance Directive Problem List Overview:   Name Relationship Phone    Primary Health Care Agent            Alternative Health Care Agent          Discussed advance care planning with patient; information given to patient to review. 8/24/2011          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     Priority: Medium     Target IOP: 21  Peak IOP: 31  GDX: abnormal both eyes  OCT: nl  HVF: nl  Pachymetry:  C/D:  0.2/0.35  SLT:           Snoring 10/27/2010     Priority: Medium     Fatigue 10/27/2010     Priority: Medium     GERD (gastroesophageal reflux disease)      Priority: Medium      Past Medical History:   Diagnosis Date     Arthritis          BMI 31.0-31.9,adult      Cancer (H) 01/10/18    Prostate     Demand ischemia (H)      Diabetes (H) 01/10/18     Diverticulosis     Colonoscopy 8/2008     Fatty liver     see US  5/2012     GERD  (gastroesophageal reflux disease)      Glaucoma (increased eye pressure)      HTN (hypertension)      Hyperlipidemia LDL goal < 130     age, htn, fhx     Irritable bowel      Nonsenile cataract      ROSIE (obstructive sleep apnea) 01/2011    Using CPAP;      Prediabetes      Restless leg syndrome      Trigeminal neuralgia pain 1/4/2012     Past Surgical History:   Procedure Laterality Date     BIOPSY ARTERY TEMPORAL Bilateral 8/1/2017    Procedure: BIOPSY ARTERY TEMPORAL;  Bilateral temporal artery Biopsy;  Surgeon: Jj Tello MD;  Location: [facility]     CATARACT IOL, RT/LT Right      COLONOSCOPY       ESOPHAGOSCOPY, GASTROSCOPY, DUODENOSCOPY (EGD), COMBINED  1/15/2014    Procedure: COMBINED ESOPHAGOSCOPY, GASTROSCOPY, DUODENOSCOPY (EGD), BIOPSY SINGLE OR MULTIPLE;;  Surgeon: Winston Nixon MD;  Location: [facility] OR     LASER YAG CAPSULOTOMY Right 04/09/2018    right eye     PHACOEMULSIFICATION CLEAR CORNEA WITH STANDARD INTRAOCULAR LENS IMPLANT Right 2/20/2017    Procedure: PHACOEMULSIFICATION CLEAR CORNEA WITH STANDARD INTRAOCULAR LENS IMPLANT;  Surgeon: Mateo Gray MD;  Location:  [facility]     RETINAL REATTACHMENT Right      SURGICAL HISTORY OF -   8/2009    Both Eyelids-.     TONSILLECTOMY  as child     Current Outpatient Medications   Medication Sig Dispense Refill     amLODIPine (NORVASC) 10 MG tablet Take 1 tablet (10 mg) by mouth daily 90 tablet 1     aspirin 81 MG tablet Take 1 tablet by mouth daily.       atorvastatin (LIPITOR) 20 MG tablet Take 1 tablet (20 mg) by mouth daily 90 tablet 1     blood glucose (ONETOUCH VERIO IQ) test strip USE TO TEST BLOOD SUGARS 1 TIMES DAILY OR AS DIRECTED 150 strip 1     blood glucose monitoring (NO BRAND SPECIFIED) meter device kit Use to test blood sugar 1 times daily or as directed. 1 kit 0     ClonAZEPAM (KLONOPIN) 0.5 MG tablet Take 0.5 mg by mouth. Take 1 tablet by mouth daily at bedtime       ferrous sulfate (FEROSUL) 325 (65 Fe) MG  tablet TAKE 1 TABLET (325 MG) BY MOUTH 2 TIMES DAILY 60 tablet 1     gabapentin (NEURONTIN) 300 MG capsule TAKE 1 CAPSULE TWICE A DAY FOR 1 WEEK, THEN 1 CAPSULE THREE TIMES A DAY 90 capsule 0     latanoprost (XALATAN) 0.005 % ophthalmic solution Place 1 drop into both eyes At Bedtime 7.5 mL 3     lisinopril (ZESTRIL) 30 MG tablet Take 1 tablet (30 mg) by mouth daily 90 tablet 1     metFORMIN (GLUCOPHAGE-XR) 500 MG 24 hr tablet TAKE 1 TABLET BY MOUTH TWICE A DAY WITH MEALS 180 tablet 0     OneTouch Delica Lancets 30G MISC CHECK BLOOD SUGAR EVERY MORNING 100 each 1     pantoprazole (PROTONIX) 20 MG [facility] tablet TAKE 1 TABLET BY MOUTH EVERY DAY 90 tablet 2     predniSONE (DELTASONE) 5 MG tablet Take 2 tablets (10 mg) daily. 180 tablet 0     pyridostigmine (MESTINON) 60 MG tablet TAKE 1 TABLET BY MOUTH FOUR TIMES A  tablet 1     timolol maleate (TIMOPTIC) 0.5 % ophthalmic solution Place 1 drop into both eyes 2 times daily 3 Bottle 3     OTC products: None, except as noted above    Allergies   Allergen Reactions     Azathioprine Shortness Of Breath     Was hospitalized from it. Also had high fever, chills, and shortness of breath.     Ciprofloxacin Muscle Pain (Myalgia)     Muscle pain  Other reaction(s): Myalgia  Other reaction(s): Myalgia     Ropinirole      Leg pan  GI  Weakness; ? sycope  Other reaction(s): Leg Pain      Latex Allergy: NO    Social History     Tobacco Use     Smoking status: Former Smoker     Packs/day: 2.00     Years: 15.00     Pack years: 30.00     Types: Cigarettes     Start date: 1968     Last attempt to quit: 1983     Years since quittin.6     Smokeless tobacco: Former User     Tobacco comment: smoke free household.   Substance Use Topics     Alcohol use: No     Comment: Quit in      History   Drug Use No     REVIEW OF SYSTEMS:   Constitutional, neuro, ENT, endocrine, pulmonary, cardiac, gastrointestinal, genitourinary, musculoskeletal, integument and psychiatric systems are  negative, except as otherwise noted.    EXAM:   /66   Pulse 69   Temp 97.9  F (36.6  C) (Oral)   Wt 109.8 kg (242 lb)   SpO2 96%   BMI 36.80 kg/m      GENERAL APPEARANCE: healthy, alert and no distress     EYES: EOMI,  PERRL     HENT: ear canals and TM's normal and nose and mouth without ulcers or lesions     NECK: no adenopathy, no asymmetry, masses, or scars and thyroid normal to palpation     RESP: lungs clear to auscultation - no rales, rhonchi or wheezes     CV: regular rates and rhythm, normal S1 S2, no S3 or S4 and no murmur, click or rub     ABDOMEN:  soft, nontender, no HSM or masses and bowel sounds normal     MS: extremities normal- no gross deformities noted, no evidence of inflammation in joints, FROM in all extremities.     SKIN: no suspicious lesions or rashes     NEURO: Normal strength and tone, sensory exam grossly normal, mentation intact and speech normal     PSYCH: mentation appears normal. and affect normal/bright     LYMPHATICS: No cervical adenopathy    DIAGNOSTICS:   EKG: appears normal, NSR, normal axis, normal intervals, no acute ST/T changes c/w ischemia, no LVH by voltage criteria    Recent Labs   Lab Test 07/24/20  1401 07/09/20  1402  06/11/20  0944 02/14/20  0748  01/05/18  0740  11/27/17  0953   HGB  --   --   --   --   --   --  15.2  --  16.0   PLT  --   --   --   --   --   --  270  --  260    131*   < > 125* 134   < >  --   --   --    POTASSIUM  --  4.4  --  3.9  --    < >  --   --   --    CR  --  0.90  --  0.78  --    < >  --   --   --    A1C  --   --   --  6.8* 5.6   < >  --    < >  --     < > = values in this interval not displayed.        IMPRESSION:   Reason for surgery/procedure: Prostate Cancer/Prostate Ablation  Diagnosis/reason for consult: Prostate Cancer/Pre OP    The proposed surgical procedure is considered INTERMEDIATE risk.    REVISED CARDIAC RISK INDEX  The patient has the following serious cardiovascular risks for perioperative complications such as  (MI, PE, VFib and 3  AV Block):  No serious cardiac risks  INTERPRETATION: 1 risks: Class II (low risk - 0.9% complication rate)    The patient has the following additional risks for perioperative complications:  Myasthenia Gravis      ICD-10-CM    1. Preop general physical exam  Z01.818    2. Prostate cancer (H)  C61    3. Myasthenia gravis without exacerbation (H)  G70.00    4. Type 2 diabetes mellitus with other specified complication, without long-term current use of insulin (H)  E11.69    5. HTN, goal below 140/90  I10    6. Hyperlipidemia, unspecified hyperlipidemia type  E78.5        RECOMMENDATIONS:     --Patient is to take all scheduled medications on the day of surgery EXCEPT for modifications listed below.    Diabetes Medication Use    -----Hold usual oral and non-insulin diabetic meds (e.g. Metformin, Actos, Glipizide) while NPO. Metformin do not take the morning of surgery    Anticoagulant or Antiplatelet Medication Use  ASPIRIN: Discontinue ASA 7-10 days prior to procedure to reduce bleeding risk.  It should be resumed post-operatively.      Chronic Corticosteroid Use   Stress dose steroids are indicated due to chronic steroid use in last 3 months (e.g. >3 weeks of predisone 20 mg or daily prednisone 5 mg)  Moderate procedure:   Hydrocortisone 50-75 mg IV on day of surgery.  Taper to baseline preoperative dose over the subsequent 1-2 days.    ACE Inhibitor or Angiotensin Receptor Blocker (ARB) Use  Ace inhibitor or Angiotensin Receptor Blocker (ARB) and should HOLD this medication for the 24 hours prior to surgery.   Lisinopril: Do not take on the evening before surgery    Note: Myasthenia Gravis  Neuromuscular blocking agents may be necessary for anesthesia and their use delays emergence from anesthesia, recovery of muscle strength, and weaning from mechanical ventilation. These drugs should be judiciously titrated if used.    APPROVAL GIVEN to proceed with proposed procedure, without further  diagnostic evaluation       Signed Electronically by: Telly Lucio MD    Copy of this evaluation report is provided to requesting physician.    [facility] Preop Guidelines    Revised Cardiac Risk Index

## 2020-08-21 NOTE — PATIENT INSTRUCTIONS
"  Preparing for Your Surgery  Getting started  A surgery nurse will call you to review your health history and instructions. They will give you an arrival time based on your scheduled surgery time.  Please be ready to share the following:    Your doctor's clinic name and phone number    Your medical, surgical and anesthesia history    A list of allergies and sensitivities    A list of medicines, including herbal treatments and over-the-counter drugs    Whether the patient has a legal guardian (ask how to send us the papers in advance)  If your child is having surgery, please ask for a copy of Preparing for Your Child's Surgery.    Preparing for surgery    Within 30 days of surgery: Have an exam at your family clinic (primary care clinic), or go to a pre-operative clinic. This exam is called a \"History and Physical,\" or H&P.    At your H&P exam, talk to your care team about all medicines you take. If you need to stop any medicines before surgery, ask when to start taking them again.  ? We do this for your safety. Many medicines can make you bleed too much during surgery. Some change how well surgery (anesthesia) drugs work.    Call your insurance company to see what it will and won't pay for. Ask if they need to pre-approve the surgery. (If no insurance, call [phone].)    Call your surgeon's clinic if there's any change in your health. This includes signs of a cold or flu (sore throat, runny nose, cough, rash, fever). It also includes a scrape or scratch near the surgery site.    If you have questions on the day of surgery, call your surgery center.  Eating and drinking guidelines  For your safety: Unless your surgeon tells you otherwise, follow the guidelines below.    Eat and drink as usual until 8 hours before surgery. After that, no food or milk.    Drink clear liquids until 2 hours before surgery. These are liquids you can see through, like water, Gatorade and Propel Water. You may also have black coffee " and tea (no cream or milk).    Nothing by mouth within 2 hours of surgery. This includes gum, candy and breath mints.    Stop alcohol the midnight before surgery.    If your family clinic tells you to take medicine on the morning of surgery, it's okay to take it with a sip of water.  Preventing infection    Shower or bathe the night before and morning of your surgery. Follow the instructions your clinic gave you. (If no instructions, use regular soap.)    Don't shave or clip hair near your surgery site. This can lead to skin infection.    Don't smoke the morning of surgery. Smoking increases the risk of infection. You may chew nicotine gum up to 2 hours before surgery. A nicotine patch is okay.  ? Note: Some surgeries require you to completely quit smoking and nicotine. Check with your surgeon.    Your care team will make every effort to keep you safe from infection. We will:  ? Clean our hands often with soap and water (or an alcohol-based hand rub).  ? Clean the skin at your surgery site with a special soap that kills germs. We'll also remove hair from the site as needed.  ? Wear special hair covers, masks, gowns and gloves during surgery.  ? Give antibiotic medicine, if prescribed. Not all surgeries need antibiotics.  What to bring on the day of surgery    Photo ID and insurance card    Copy of your health care directive, if you have one    Glasses and hearing aides (bring cases)  ? You can't wear contacts during surgery    Inhaler and eye drops, if you use them (tell us about these when you arrive)    CPAP machine or breathing device, if you use them    A few personal items, if spending the night    If you have . . .  ? A pacemaker or ICD (cardiac defibrillator): Bring the ID card.  ? An implanted stimulator: Bring the remote control.  ? A legal guardian: Bring a copy of the certified (court-stamped) guardianship papers.  Please remove any jewelry, including body piercings. Leave jewelry and other valuables at  "home.  If you're going home the day of surgery  Important: If you don't follow the rules below, we must cancel your surgery.     Arrange for someone to drive you home after surgery. You may not drive, take a taxi or take public transportation by yourself (unless you'll have local anesthesia only).    Arrange for a responsible adult to stay with you overnight. If you don't, we may keep you in the hospital overnight, and you may need to pay the costs yourself.  Questions?   If you have any questions for your care team, list them here: _________________________________________________________________________________________________________________________________________________________________________________________________________________________________________________________________________________________________________________________  For informational purposes only. Not to replace the advice of your health care provider. Copyright   9631-0817 [employer]. All rights reserved. Clinically reviewed by Rachele Correa MD. [employer] [id number] - REV 07/19.    Preparing for Your Surgery  Getting started  A surgery nurse will call you to review your health history and instructions. They will give you an arrival time based on your scheduled surgery time.  Please be ready to share the following:    Your doctor's clinic name and phone number    Your medical, surgical and anesthesia history    A list of allergies and sensitivities    A list of medicines, including herbal treatments and over-the-counter drugs    Whether the patient has a legal guardian (ask how to send us the papers in advance)  If your child is having surgery, please ask for a copy of Preparing for Your Child's Surgery.    Preparing for surgery    Within 30 days of surgery: Have an exam at your family clinic (primary care clinic), or go to a pre-operative clinic. This exam is called a \"History and Physical,\" or H&P.    At your H&P exam, talk to " your care team about all medicines you take. If you need to stop any medicines before surgery, ask when to start taking them again.  ? We do this for your safety. Many medicines can make you bleed too much during surgery. Some change how well surgery (anesthesia) drugs work.    Call your insurance company to see what it will and won't pay for. Ask if they need to pre-approve the surgery. (If no insurance, call [phone].)    Call your surgeon's clinic if there's any change in your health. This includes signs of a cold or flu (sore throat, runny nose, cough, rash, fever). It also includes a scrape or scratch near the surgery site.    If you have questions on the day of surgery, call your surgery center.  Eating and drinking guidelines  For your safety: Unless your surgeon tells you otherwise, follow the guidelines below.    Eat and drink as usual until 8 hours before surgery. After that, no food or milk.    Drink clear liquids until 2 hours before surgery. These are liquids you can see through, like water, Gatorade and Propel Water. You may also have black coffee and tea (no cream or milk).    Nothing by mouth within 2 hours of surgery. This includes gum, candy and breath mints.    Stop alcohol the midnight before surgery.    If your family clinic tells you to take medicine on the morning of surgery, it's okay to take it with a sip of water.  Preventing infection    Shower or bathe the night before and morning of your surgery. Follow the instructions your clinic gave you. (If no instructions, use regular soap.)    Don't shave or clip hair near your surgery site. This can lead to skin infection.    Don't smoke the morning of surgery. Smoking increases the risk of infection. You may chew nicotine gum up to 2 hours before surgery. A nicotine patch is okay.  ? Note: Some surgeries require you to completely quit smoking and nicotine. Check with your surgeon.    Your care team will make every effort to keep you safe  from infection. We will:  ? Clean our hands often with soap and water (or an alcohol-based hand rub).  ? Clean the skin at your surgery site with a special soap that kills germs. We'll also remove hair from the site as needed.  ? Wear special hair covers, masks, gowns and gloves during surgery.  ? Give antibiotic medicine, if prescribed. Not all surgeries need antibiotics.  What to bring on the day of surgery    Photo ID and insurance card    Copy of your health care directive, if you have one    Glasses and hearing aides (bring cases)  ? You can't wear contacts during surgery    Inhaler and eye drops, if you use them (tell us about these when you arrive)    CPAP machine or breathing device, if you use them    A few personal items, if spending the night    If you have . . .  ? A pacemaker or ICD (cardiac defibrillator): Bring the ID card.  ? An implanted stimulator: Bring the remote control.  ? A legal guardian: Bring a copy of the certified (court-stamped) guardianship papers.  Please remove any jewelry, including body piercings. Leave jewelry and other valuables at home.  If you're going home the day of surgery  Important: If you don't follow the rules below, we must cancel your surgery.     Arrange for someone to drive you home after surgery. You may not drive, take a taxi or take public transportation by yourself (unless you'll have local anesthesia only).    Arrange for a responsible adult to stay with you overnight. If you don't, we may keep you in the hospital overnight, and you may need to pay the costs yourself.  Questions?   If you have any questions for your care team, list them here: _________________________________________________________________________________________________________________________________________________________________________________________________________________________________________________________________________________________________________________________  For  "informational purposes only. Not to replace the advice of your health care provider. Copyright   4224-7871 [employer] [facility]. All rights reserved. Clinically reviewed by Rcahele Correa MD. [employer] [id number] - REV 07/19.    Preparing for Your Surgery  Getting started  A surgery nurse will call you to review your health history and instructions. They will give you an arrival time based on your scheduled surgery time.  Please be ready to share the following:    Your doctor's clinic name and phone number    Your medical, surgical and anesthesia history    A list of allergies and sensitivities    A list of medicines, including herbal treatments and over-the-counter drugs    Whether the patient has a legal guardian (ask how to send us the papers in advance)  If your child is having surgery, please ask for a copy of Preparing for Your Child's Surgery.    Preparing for surgery    Within 30 days of surgery: Have an exam at your family clinic (primary care clinic), or go to a pre-operative clinic. This exam is called a \"History and Physical,\" or H&P.    At your H&P exam, talk to your care team about all medicines you take. If you need to stop any medicines before surgery, ask when to start taking them again.  ? We do this for your safety. Many medicines can make you bleed too much during surgery. Some change how well surgery (anesthesia) drugs work.    Call your insurance company to see what it will and won't pay for. Ask if they need to pre-approve the surgery. (If no insurance, call [phone].)    Call your surgeon's clinic if there's any change in your health. This includes signs of a cold or flu (sore throat, runny nose, cough, rash, fever). It also includes a scrape or scratch near the surgery site.    If you have questions on the day of surgery, call your surgery center.  Eating and drinking guidelines  For your safety: Unless your surgeon tells you otherwise, follow the guidelines below.    Eat and drink as " usual until 8 hours before surgery. After that, no food or milk.    Drink clear liquids until 2 hours before surgery. These are liquids you can see through, like water, Gatorade and Propel Water. You may also have black coffee and tea (no cream or milk).    Nothing by mouth within 2 hours of surgery. This includes gum, candy and breath mints.    Stop alcohol the midnight before surgery.    If your family clinic tells you to take medicine on the morning of surgery, it's okay to take it with a sip of water.  Preventing infection    Shower or bathe the night before and morning of your surgery. Follow the instructions your clinic gave you. (If no instructions, use regular soap.)    Don't shave or clip hair near your surgery site. This can lead to skin infection.    Don't smoke the morning of surgery. Smoking increases the risk of infection. You may chew nicotine gum up to 2 hours before surgery. A nicotine patch is okay.  ? Note: Some surgeries require you to completely quit smoking and nicotine. Check with your surgeon.    Your care team will make every effort to keep you safe from infection. We will:  ? Clean our hands often with soap and water (or an alcohol-based hand rub).  ? Clean the skin at your surgery site with a special soap that kills germs. We'll also remove hair from the site as needed.  ? Wear special hair covers, masks, gowns and gloves during surgery.  ? Give antibiotic medicine, if prescribed. Not all surgeries need antibiotics.  What to bring on the day of surgery    Photo ID and insurance card    Copy of your health care directive, if you have one    Glasses and hearing aides (bring cases)  ? You can't wear contacts during surgery    Inhaler and eye drops, if you use them (tell us about these when you arrive)    CPAP machine or breathing device, if you use them    A few personal items, if spending the night    If you have . . .  ? A pacemaker or ICD (cardiac defibrillator): Bring the ID card.  ? An  implanted stimulator: Bring the remote control.  ? A legal guardian: Bring a copy of the certified (court-stamped) guardianship papers.  Please remove any jewelry, including body piercings. Leave jewelry and other valuables at home.  If you're going home the day of surgery  Important: If you don't follow the rules below, we must cancel your surgery.     Arrange for someone to drive you home after surgery. You may not drive, take a taxi or take public transportation by yourself (unless you'll have local anesthesia only).    Arrange for a responsible adult to stay with you overnight. If you don't, we may keep you in the hospital overnight, and you may need to pay the costs yourself.  Questions?   If you have any questions for your care team, list them here: _________________________________________________________________________________________________________________________________________________________________________________________________________________________________________________________________________________________________________________________  For informational purposes only. Not to replace the advice of your health care provider. Copyright   5799-8240 [facility]. All rights reserved. Clinically reviewed by Rachele Correa MD. [employer] [id number] - REV 07/19.    Recommendation:    --Patient is to take all scheduled medications on the day of surgery EXCEPT for modifications listed below.    Diabetes Medication Use    -----Hold usual oral and non-insulin diabetic meds (e.g. Metformin, Actos, Glipizide) while NPO. Metformin do not take the morning of surgery    Anticoagulant or Antiplatelet Medication Use  ASPIRIN: Discontinue ASA 7-10 days prior to procedure to reduce bleeding risk.  It should be resumed post-operatively.      Chronic Corticosteroid Use   Stress dose steroids are indicated due to chronic steroid use in last 3 months (e.g. >3 weeks of predisone 20 mg or daily prednisone 5  mg)  Moderate procedure:   Hydrocortisone 50-75 mg IV on day of surgery.  Taper to baseline preoperative dose over the subsequent 1-2 days.    ACE Inhibitor or Angiotensin Receptor Blocker (ARB) Use  Ace inhibitor or Angiotensin Receptor Blocker (ARB) and should HOLD this medication for the 24 hours prior to surgery.   Lisinopril: Do not take on the evening before surgery    Note: Myasthenia Gravis  Neuromuscular blocking agents may be necessary for anesthesia and their use delays emergence from anesthesia, recovery of muscle strength, and weaning from mechanical ventilation. These drugs should be judiciously titrated if used.    APPROVAL GIVEN to proceed with proposed procedure, without further diagnostic evaluation       Signed Electronically by: Telly Lucio MD    Copy of this evaluation report is provided to requesting physician.

## 2020-08-25 ENCOUNTER — OFFICE VISIT (OUTPATIENT)
Dept: FAMILY MEDICINE | Facility: CLINIC | Age: 75
End: 2020-08-25
Payer: COMMERCIAL

## 2020-08-25 VITALS
BODY MASS INDEX: 36.8 KG/M2 | OXYGEN SATURATION: 96 % | WEIGHT: 242 LBS | SYSTOLIC BLOOD PRESSURE: 138 MMHG | DIASTOLIC BLOOD PRESSURE: 66 MMHG | HEART RATE: 69 BPM | TEMPERATURE: 97.9 F

## 2020-08-25 DIAGNOSIS — E11.69 TYPE 2 DIABETES MELLITUS WITH OTHER SPECIFIED COMPLICATION, WITHOUT LONG-TERM CURRENT USE OF INSULIN (H): ICD-10-CM

## 2020-08-25 DIAGNOSIS — C61 PROSTATE CANCER (H): ICD-10-CM

## 2020-08-25 DIAGNOSIS — G70.00 MYASTHENIA GRAVIS WITHOUT EXACERBATION (H): ICD-10-CM

## 2020-08-25 DIAGNOSIS — I10 HTN, GOAL BELOW 140/90: ICD-10-CM

## 2020-08-25 DIAGNOSIS — E78.5 HYPERLIPIDEMIA, UNSPECIFIED HYPERLIPIDEMIA TYPE: ICD-10-CM

## 2020-08-25 DIAGNOSIS — Z01.818 PREOP GENERAL PHYSICAL EXAM: Primary | ICD-10-CM

## 2020-08-25 LAB
ANION GAP SERPL CALCULATED.3IONS-SCNC: 5 MMOL/L (ref 3–14)
BUN SERPL-MCNC: 14 MG/DL (ref 7–30)
CALCIUM SERPL-MCNC: 9.3 MG/DL (ref 8.5–10.1)
CHLORIDE SERPL-SCNC: 108 MMOL/L (ref 94–109)
CO2 SERPL-SCNC: 27 MMOL/L (ref 20–32)
CREAT SERPL-MCNC: 0.9 MG/DL (ref 0.66–1.25)
GFR SERPL CREATININE-BSD FRML MDRD: 84 ML/MIN/{1.73_M2}
GLUCOSE SERPL-MCNC: 135 MG/DL (ref 70–99)
HGB BLD-MCNC: 14.9 G/DL (ref 13.3–17.7)
POTASSIUM SERPL-SCNC: 4.4 MMOL/L (ref 3.4–5.3)
SODIUM SERPL-SCNC: 140 MMOL/L (ref 133–144)

## 2020-08-25 PROCEDURE — 93000 ELECTROCARDIOGRAM COMPLETE: CPT | Performed by: FAMILY MEDICINE

## 2020-08-25 PROCEDURE — 99215 OFFICE O/P EST HI 40 MIN: CPT | Performed by: FAMILY MEDICINE

## 2020-08-25 PROCEDURE — 85018 HEMOGLOBIN: CPT | Performed by: FAMILY MEDICINE

## 2020-08-25 PROCEDURE — 36415 COLL VENOUS BLD VENIPUNCTURE: CPT | Performed by: FAMILY MEDICINE

## 2020-08-25 PROCEDURE — 80048 BASIC METABOLIC PNL TOTAL CA: CPT | Performed by: FAMILY MEDICINE

## 2020-08-31 DIAGNOSIS — C61 MALIGNANT NEOPLASM OF PROSTATE (H): ICD-10-CM

## 2020-08-31 PROCEDURE — U0003 INFECTIOUS AGENT DETECTION BY NUCLEIC ACID (DNA OR RNA); SEVERE ACUTE RESPIRATORY SYNDROME CORONAVIRUS 2 (SARS-COV-2) (CORONAVIRUS DISEASE [COVID-19]), AMPLIFIED PROBE TECHNIQUE, MAKING USE OF HIGH THROUGHPUT TECHNOLOGIES AS DESCRIBED BY CMS-2020-01-R: HCPCS | Performed by: FAMILY MEDICINE

## 2020-09-01 DIAGNOSIS — R73.03 PREDIABETES: ICD-10-CM

## 2020-09-01 LAB
LABORATORY COMMENT REPORT: NORMAL
SARS-COV-2 RNA SPEC QL NAA+PROBE: NEGATIVE
SARS-COV-2 RNA SPEC QL NAA+PROBE: NORMAL
SPECIMEN SOURCE: NORMAL
SPECIMEN SOURCE: NORMAL

## 2020-09-01 RX ORDER — METFORMIN HCL 500 MG
TABLET, EXTENDED RELEASE 24 HR ORAL
Qty: 180 TABLET | Refills: 0 | Status: SHIPPED | OUTPATIENT
Start: 2020-09-01 | End: 2020-11-04

## 2020-09-02 DIAGNOSIS — Z79.60 LONG-TERM USE OF IMMUNOSUPPRESSANT MEDICATION: ICD-10-CM

## 2020-09-02 DIAGNOSIS — G50.0 TRIGEMINAL NEURALGIA: ICD-10-CM

## 2020-09-02 DIAGNOSIS — G70.00 OCULAR MYASTHENIA (H): ICD-10-CM

## 2020-09-03 RX ORDER — PYRIDOSTIGMINE BROMIDE 60 MG/1
TABLET ORAL
Qty: 540 TABLET | Refills: 1 | Status: SHIPPED | OUTPATIENT
Start: 2020-09-03 | End: 2021-02-03

## 2020-09-03 RX ORDER — GABAPENTIN 300 MG/1
CAPSULE ORAL
Qty: 180 CAPSULE | Refills: 3 | Status: SHIPPED | OUTPATIENT
Start: 2020-09-03 | End: 2020-12-15

## 2020-09-08 ENCOUNTER — OFFICE VISIT (OUTPATIENT)
Dept: UROLOGY | Facility: CLINIC | Age: 75
End: 2020-09-08
Payer: COMMERCIAL

## 2020-09-08 DIAGNOSIS — C61 MALIGNANT NEOPLASM OF PROSTATE (H): Primary | ICD-10-CM

## 2020-09-08 DIAGNOSIS — E11.65 TYPE 2 DIABETES MELLITUS WITH HYPERGLYCEMIA, WITHOUT LONG-TERM CURRENT USE OF INSULIN (H): ICD-10-CM

## 2020-09-08 PROCEDURE — 51700 IRRIGATION OF BLADDER: CPT | Mod: 58

## 2020-09-08 NOTE — PROGRESS NOTES
Catheter removal documentation on 9/8/2020:    Rakan Nolasco presents to the clinic for catheter removal.  Reason for removal: Post-op  450 ml sterile water instilled into bladder.   Catheter successfully removed at 10:22 AM without immediate complication.  Urethral meatus is free of secretions and encrustation.  The patient is afebrile.  Patient able to void 400 ml.   The patient tolerated the procedure and was instructed to return or call for pain, fever, leakage or decreased urine flow    Citlalli Flores RN

## 2020-09-09 DIAGNOSIS — G70.00 MYASTHENIA GRAVIS, ACHR ANTIBODY POSITIVE (H): ICD-10-CM

## 2020-09-09 RX ORDER — PREDNISONE 5 MG/1
TABLET ORAL
Qty: 180 TABLET | Refills: 0 | Status: SHIPPED | OUTPATIENT
Start: 2020-09-09 | End: 2020-11-30

## 2020-09-10 RX ORDER — BLOOD SUGAR DIAGNOSTIC
STRIP MISCELLANEOUS
Qty: 100 STRIP | Refills: 2 | Status: SHIPPED | OUTPATIENT
Start: 2020-09-10 | End: 2021-08-24

## 2020-09-15 ENCOUNTER — OFFICE VISIT (OUTPATIENT)
Dept: UROLOGY | Facility: CLINIC | Age: 75
End: 2020-09-15
Payer: COMMERCIAL

## 2020-09-15 VITALS — DIASTOLIC BLOOD PRESSURE: 84 MMHG | HEART RATE: 65 BPM | SYSTOLIC BLOOD PRESSURE: 131 MMHG | OXYGEN SATURATION: 97 %

## 2020-09-15 DIAGNOSIS — C61 MALIGNANT NEOPLASM OF PROSTATE (H): Primary | ICD-10-CM

## 2020-09-15 DIAGNOSIS — R30.0 DYSURIA: ICD-10-CM

## 2020-09-15 PROCEDURE — 99024 POSTOP FOLLOW-UP VISIT: CPT | Performed by: UROLOGY

## 2020-09-15 PROCEDURE — 51798 US URINE CAPACITY MEASURE: CPT | Performed by: UROLOGY

## 2020-09-15 NOTE — PROGRESS NOTES
Chief Complaint   Patient presents with     RECHECK       Rakan Nolasco is a 75 year old male who presents today for follow up of   Chief Complaint   Patient presents with     RECHECK   Patient is status post repeat cryotherapy for prostate cancer.  He is without any major urinary complaints except for occasional dysuria.    Current Outpatient Medications   Medication Sig Dispense Refill     amLODIPine (NORVASC) 10 MG tablet Take 1 tablet (10 mg) by mouth daily 90 tablet 1     aspirin 81 MG tablet Take 1 tablet by mouth daily.       atorvastatin (LIPITOR) 20 MG tablet Take 1 tablet (20 mg) by mouth daily 90 tablet 1     blood glucose (ONETOUCH VERIO IQ) test strip USE TO TEST BLOOD SUGARS 1 TIMES DAILY OR AS DIRECTED 100 strip 2     blood glucose monitoring (NO BRAND SPECIFIED) meter device kit Use to test blood sugar 1 times daily or as directed. 1 kit 0     ClonAZEPAM (KLONOPIN) 0.5 MG tablet Take 0.5 mg by mouth. Take 1 tablet by mouth daily at bedtime       gabapentin (NEURONTIN) 300 MG capsule TAKE 2 CAPSULES THREE TIMES A  capsule 3     latanoprost (XALATAN) 0.005 % ophthalmic solution Place 1 drop into both eyes At Bedtime 7.5 mL 3     lisinopril (ZESTRIL) 30 MG tablet Take 1 tablet (30 mg) by mouth daily 90 tablet 1     metFORMIN (GLUCOPHAGE-XR) 500 MG 24 hr tablet TAKE 1 TABLET BY MOUTH TWICE A DAY WITH MEALS 180 tablet 0     OneTouch Delica Lancets 30G MISC CHECK BLOOD SUGAR EVERY MORNING 100 each 1     pantoprazole (PROTONIX) 20 MG EC tablet TAKE 1 TABLET BY MOUTH EVERY DAY 90 tablet 2     predniSONE (DELTASONE) 5 MG tablet TAKE 2 TABLETS (10 MG) BY MOUTH DAILY. 180 tablet 0     pyridostigmine (MESTINON) 60 MG tablet Take 2 tablets three times a day. 540 tablet 1     timolol maleate (TIMOPTIC) 0.5 % ophthalmic solution Place 1 drop into both eyes 2 times daily 3 Bottle 3     Allergies   Allergen Reactions     Azathioprine Shortness Of Breath     Was hospitalized from it. Also had high fever,  chills, and shortness of breath.     Ciprofloxacin Muscle Pain (Myalgia)     Muscle pain  Other reaction(s): Myalgia  Other reaction(s): Myalgia     Ropinirole      Leg pan  GI  Weakness; ? sycope  Other reaction(s): Leg Pain      Past Medical History:   Diagnosis Date     Arthritis          BMI 31.0-31.9,adult      Cancer (H) 01/10/18    Prostate     Demand ischemia (H)      Diabetes (H) 01/10/18     Diverticulosis     Colonoscopy 8/2008     Fatty liver     see US  5/2012     GERD (gastroesophageal reflux disease)      Glaucoma (increased eye pressure)      HTN (hypertension)      Hyperlipidemia LDL goal < 130     age, htn, fhx     Irritable bowel      Nonsenile cataract      ROSIE (obstructive sleep apnea) 01/2011    Using CPAP;      Prediabetes      Restless leg syndrome      Trigeminal neuralgia pain 1/4/2012     Past Surgical History:   Procedure Laterality Date     BIOPSY ARTERY TEMPORAL Bilateral 8/1/2017    Procedure: BIOPSY ARTERY TEMPORAL;  Bilateral temporal artery Biopsy;  Surgeon: Jj Tello MD;  Location: [facility]     CATARACT IOL, RT/LT Right      COLONOSCOPY       ESOPHAGOSCOPY, GASTROSCOPY, DUODENOSCOPY (EGD), COMBINED  1/15/2014    Procedure: COMBINED ESOPHAGOSCOPY, GASTROSCOPY, DUODENOSCOPY (EGD), BIOPSY SINGLE OR MULTIPLE;;  Surgeon: Winston Nixon MD;  Location: [facility]     LASER YAG CAPSULOTOMY Right 04/09/2018    right eye     PHACOEMULSIFICATION CLEAR CORNEA WITH STANDARD INTRAOCULAR LENS IMPLANT Right 2/20/2017    Procedure: PHACOEMULSIFICATION CLEAR CORNEA WITH STANDARD INTRAOCULAR LENS IMPLANT;  Surgeon: Mateo Gray MD;  [facility]     RETINAL REATTACHMENT Right      SURGICAL HISTORY OF -   8/2009    Both Eyelids-.     TONSILLECTOMY  as child     Family History   Problem Relation Age of Onset     Respiratory Mother         copd     LUNG DISEASE Mother      Allergies Brother      Cancer Maternal Grandmother      Heart Disease Paternal  Grandmother      Cerebrovascular Disease Father      Cancer Father      Other Cancer Father      Glaucoma Maternal Aunt      Macular Degeneration No family hx of      Social History     Socioeconomic History     Marital status: Single     Spouse name: None     Number of children: 0     Years of education: 12     Highest education level: None   Occupational History     Occupation: Retired 3/2012     Comment: [employer] ([employer])   Social Needs     Financial resource strain: None     Food insecurity     Worry: None     Inability: None     Transportation needs     Medical: None     Non-medical: None   Tobacco Use     Smoking status: Former Smoker     Packs/day: 2.00     Years: 15.00     Pack years: 30.00     Types: Cigarettes     Start date: 1968     Last attempt to quit: 1983     Years since quittin.7     Smokeless tobacco: Former User     Tobacco comment: smoke free household.   Substance and Sexual Activity     Alcohol use: No     Comment: Quit in      Drug use: No     Sexual activity: Not Currently     Partners: Female     Comment: 2010  No girlfriend at this time.   Lifestyle     Physical activity     Days per week: None     Minutes per session: None     Stress: None   Relationships     Social connections     Talks on phone: None     Gets together: None     Attends Amish service: None     Active member of club or organization: None     Attends meetings of clubs or organizations: None     Relationship status: None     Intimate partner violence     Fear of current or ex partner: None     Emotionally abused: None     Physically abused: None     Forced sexual activity: None   Other Topics Concern     Parent/sibling w/ CABG, MI or angioplasty before 65F 55M? No   Social History Narrative     None       REVIEW OF SYSTEMS  =================  C: NEGATIVE for fever, chills, change in weight  I: NEGATIVE for worrisome rashes, moles or lesions  E/M: NEGATIVE for ear, mouth and throat  problems  R: NEGATIVE for significant cough or SHORTNESS OF BREATH  CV:  NEGATIVE for chest pain, palpitations or peripheral edema  GI: NEGATIVE for nausea, abdominal pain, heartburn, or change in bowel habits  NEURO: NEGATIVE numbness/weakness  : see HPI  PSYCH: NEGATIVE depression/anxiety  LYmph: no new enlarged lymph nodes  Ortho: no new trauma/movements    Physical Exam:  /84 (BP Location: Right arm, Patient Position: Chair, Cuff Size: Adult Large)   Pulse 65   SpO2 97%    Patient is pleasant, in no acute distress, good general condition.  Lung: no evidence of respiratory distress    Abdomen: Soft, nondistended, non tender. No masses. No rebound or guarding.   Exam: Bladder scan with residual urine of only 66 mL.  Skin: Warm and dry.  No redness.  Psych: normal mood and affect  Neuro: alert and oriented  Musculaskeletal: moving all extremities    Assessment/Plan:   (C61) Malignant neoplasm of prostate (H)  (primary encounter diagnosis)  Comment:    Plan: MEASURE POST-VOID RESIDUAL URINE/BLADDER         CAPACITY, US NON-IMAGING (16183), PSA tumor         marker        PSA in 3 months.    Dysuria: UA as needed

## 2020-09-16 DIAGNOSIS — G70.00 OCULAR MYASTHENIA (H): ICD-10-CM

## 2020-09-16 RX ORDER — PYRIDOSTIGMINE BROMIDE 60 MG/1
TABLET ORAL
Qty: 360 TABLET | Refills: 1 | OUTPATIENT
Start: 2020-09-16

## 2020-09-17 DIAGNOSIS — R30.0 DYSURIA: ICD-10-CM

## 2020-09-17 DIAGNOSIS — R30.0 DYSURIA: Primary | ICD-10-CM

## 2020-09-17 LAB
ALBUMIN UR-MCNC: NEGATIVE MG/DL
APPEARANCE UR: ABNORMAL
BILIRUB UR QL STRIP: NEGATIVE
COLOR UR AUTO: YELLOW
GLUCOSE UR STRIP-MCNC: NEGATIVE MG/DL
HGB UR QL STRIP: ABNORMAL
KETONES UR STRIP-MCNC: NEGATIVE MG/DL
LEUKOCYTE ESTERASE UR QL STRIP: ABNORMAL
NITRATE UR QL: NEGATIVE
NON-SQ EPI CELLS #/AREA URNS LPF: ABNORMAL /LPF
PH UR STRIP: 6 PH (ref 5–7)
RBC #/AREA URNS AUTO: ABNORMAL /HPF
SOURCE: ABNORMAL
SP GR UR STRIP: 1.01 (ref 1–1.03)
UROBILINOGEN UR STRIP-ACNC: 0.2 EU/DL (ref 0.2–1)
WBC #/AREA URNS AUTO: ABNORMAL /HPF

## 2020-09-17 PROCEDURE — 87186 SC STD MICRODIL/AGAR DIL: CPT | Performed by: UROLOGY

## 2020-09-17 PROCEDURE — 87088 URINE BACTERIA CULTURE: CPT | Performed by: UROLOGY

## 2020-09-17 PROCEDURE — 81001 URINALYSIS AUTO W/SCOPE: CPT | Performed by: UROLOGY

## 2020-09-17 PROCEDURE — 87086 URINE CULTURE/COLONY COUNT: CPT | Performed by: UROLOGY

## 2020-09-19 LAB
BACTERIA SPEC CULT: ABNORMAL
Lab: ABNORMAL
SPECIMEN SOURCE: ABNORMAL

## 2020-09-21 DIAGNOSIS — R30.0 DYSURIA: Primary | ICD-10-CM

## 2020-09-21 RX ORDER — SULFAMETHOXAZOLE/TRIMETHOPRIM 800-160 MG
1 TABLET ORAL 2 TIMES DAILY
Qty: 10 TABLET | Refills: 0 | Status: SHIPPED | OUTPATIENT
Start: 2020-09-21 | End: 2020-10-06

## 2020-09-21 RX ORDER — SULFAMETHOXAZOLE/TRIMETHOPRIM 800-160 MG
1 TABLET ORAL 2 TIMES DAILY
Qty: 10 TABLET | Refills: 0 | Status: SHIPPED | OUTPATIENT
Start: 2020-09-21 | End: 2020-09-21

## 2020-10-01 DIAGNOSIS — E87.1 HYPONATREMIA: Primary | ICD-10-CM

## 2020-10-01 PROBLEM — E11.9 TYPE 2 DIABETES MELLITUS (H): Status: RESOLVED | Noted: 2017-10-23 | Resolved: 2020-10-01

## 2020-10-01 PROBLEM — G47.30 SLEEP APNEA: Status: RESOLVED | Noted: 2018-03-23 | Resolved: 2020-10-01

## 2020-10-01 NOTE — RESULT ENCOUNTER NOTE
Please call patient to schedule follow-up appointment and sodium check. Plan to discuss chlorthalidone medication.    Genoveva Oshea MD

## 2020-10-06 ENCOUNTER — OFFICE VISIT (OUTPATIENT)
Dept: UROLOGY | Facility: CLINIC | Age: 75
End: 2020-10-06
Payer: COMMERCIAL

## 2020-10-06 DIAGNOSIS — N39.41 URGENCY INCONTINENCE: Primary | ICD-10-CM

## 2020-10-06 DIAGNOSIS — R30.0 DYSURIA: ICD-10-CM

## 2020-10-06 DIAGNOSIS — R39.198 SLOWING OF URINARY STREAM: ICD-10-CM

## 2020-10-06 LAB
ALBUMIN UR-MCNC: NEGATIVE MG/DL
APPEARANCE UR: CLEAR
BILIRUB UR QL STRIP: NEGATIVE
COLOR UR AUTO: YELLOW
GLUCOSE UR STRIP-MCNC: NEGATIVE MG/DL
HGB UR QL STRIP: ABNORMAL
KETONES UR STRIP-MCNC: NEGATIVE MG/DL
LEUKOCYTE ESTERASE UR QL STRIP: ABNORMAL
NITRATE UR QL: NEGATIVE
PH UR STRIP: 7.5 PH (ref 5–7)
RBC #/AREA URNS AUTO: ABNORMAL /HPF
SOURCE: ABNORMAL
SP GR UR STRIP: 1.02 (ref 1–1.03)
UROBILINOGEN UR STRIP-ACNC: 0.2 EU/DL (ref 0.2–1)
WBC #/AREA URNS AUTO: ABNORMAL /HPF

## 2020-10-06 PROCEDURE — 99024 POSTOP FOLLOW-UP VISIT: CPT | Performed by: UROLOGY

## 2020-10-06 PROCEDURE — 87088 URINE BACTERIA CULTURE: CPT | Performed by: UROLOGY

## 2020-10-06 PROCEDURE — 87086 URINE CULTURE/COLONY COUNT: CPT | Performed by: UROLOGY

## 2020-10-06 PROCEDURE — 81001 URINALYSIS AUTO W/SCOPE: CPT | Performed by: UROLOGY

## 2020-10-06 PROCEDURE — 87186 SC STD MICRODIL/AGAR DIL: CPT | Performed by: UROLOGY

## 2020-10-06 PROCEDURE — 51798 US URINE CAPACITY MEASURE: CPT | Mod: 58 | Performed by: UROLOGY

## 2020-10-06 RX ORDER — TAMSULOSIN HYDROCHLORIDE 0.4 MG/1
0.4 CAPSULE ORAL AT BEDTIME
Qty: 30 CAPSULE | Refills: 1 | Status: SHIPPED | OUTPATIENT
Start: 2020-10-06 | End: 2020-11-04

## 2020-10-06 NOTE — PROGRESS NOTES
Chief Complaint   Patient presents with     RECHECK       Rakan Nolasco is a 75 year old male who presents today for follow up of   Chief Complaint   Patient presents with     RECHECK    f/u post cryotherapy for prostate cancer.  He c/o narrow urinary stream with urgency incontinence.  He has no dysuria or hematuria.    Current Outpatient Medications   Medication Sig Dispense Refill     amLODIPine (NORVASC) 10 MG tablet TAKE 1 TABLET BY MOUTH EVERY DAY 90 tablet 0     aspirin 81 MG tablet Take 1 tablet by mouth daily.       atorvastatin (LIPITOR) 20 MG tablet Take 1 tablet (20 mg) by mouth daily 90 tablet 1     blood glucose (ONETOUCH VERIO IQ) test strip USE TO TEST BLOOD SUGARS 1 TIMES DAILY OR AS DIRECTED 100 strip 2     blood glucose monitoring (NO BRAND SPECIFIED) meter device kit Use to test blood sugar 1 times daily or as directed. 1 kit 0     ClonAZEPAM (KLONOPIN) 0.5 MG tablet Take 0.5 mg by mouth. Take 1 tablet by mouth daily at bedtime       gabapentin (NEURONTIN) 300 MG capsule TAKE 2 CAPSULES THREE TIMES A  capsule 3     latanoprost (XALATAN) 0.005 % ophthalmic solution Place 1 drop into both eyes At Bedtime 7.5 mL 3     lisinopril (ZESTRIL) 30 MG tablet Take 1 tablet (30 mg) by mouth daily 90 tablet 1     metFORMIN (GLUCOPHAGE-XR) 500 MG 24 hr tablet TAKE 1 TABLET BY MOUTH TWICE A DAY WITH MEALS 180 tablet 0     OneTouch Delica Lancets 30G MISC CHECK BLOOD SUGAR EVERY MORNING 100 each 1     pantoprazole (PROTONIX) 20 MG EC tablet TAKE 1 TABLET BY MOUTH EVERY DAY 90 tablet 2     predniSONE (DELTASONE) 5 MG tablet TAKE 2 TABLETS (10 MG) BY MOUTH DAILY. 180 tablet 0     pyridostigmine (MESTINON) 60 MG tablet Take 2 tablets three times a day. 540 tablet 1     tamsulosin (FLOMAX) 0.4 MG capsule Take 1 capsule (0.4 mg) by mouth At Bedtime 30 capsule 1     timolol maleate (TIMOPTIC) 0.5 % ophthalmic solution Place 1 drop into both eyes 2 times daily 3 Bottle 3     Allergies   Allergen Reactions      Azathioprine Shortness Of Breath     Was hospitalized from it. Also had high fever, chills, and shortness of breath.     Ciprofloxacin Muscle Pain (Myalgia)     Muscle pain  Other reaction(s): Myalgia  Other reaction(s): Myalgia     Ropinirole      Leg pan  GI  Weakness; ? sycope  Other reaction(s): Leg Pain      Past Medical History:   Diagnosis Date     Arthritis          BMI 31.0-31.9,adult      Cancer (H) 01/10/2018    Prostate     Demand ischemia (H)      Diabetes (H) 01/10/2018     Diverticulosis     Colonoscopy 8/2008     Fatty liver     see US  5/2012     GERD (gastroesophageal reflux disease)      Glaucoma (increased eye pressure)      HTN (hypertension)      Hyperlipidemia LDL goal < 130     age, htn, fhx     Irritable bowel      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      Nonsenile cataract      ROSIE (obstructive sleep apnea) 01/2011    Using CPAP;      Prediabetes      Restless leg syndrome      Trigeminal neuralgia pain 01/04/2012     Past Surgical History:   Procedure Laterality Date     BIOPSY ARTERY TEMPORAL Bilateral 8/1/2017    Procedure: BIOPSY ARTERY TEMPORAL;  Bilateral temporal artery Biopsy;  Surgeon: Jj Tello MD;  Location: [facility]     CATARACT IOL, RT/LT Right      COLONOSCOPY       ESOPHAGOSCOPY, GASTROSCOPY, DUODENOSCOPY (EGD), COMBINED  1/15/2014    Procedure: COMBINED ESOPHAGOSCOPY, GASTROSCOPY, DUODENOSCOPY (EGD), BIOPSY SINGLE OR MULTIPLE;;  Surgeon: Winston Nixon MD;  Location: [facility]     LASER YAG CAPSULOTOMY Right 04/09/2018    right eye     PHACOEMULSIFICATION CLEAR CORNEA WITH STANDARD INTRAOCULAR LENS IMPLANT Right 2/20/2017    Procedure: PHACOEMULSIFICATION CLEAR CORNEA WITH STANDARD INTRAOCULAR LENS IMPLANT;  Surgeon: Mateo Gray MD;  [facility]     RETINAL REATTACHMENT Right      SURGICAL HISTORY OF -   8/2009    Both Eyelids-.     TONSILLECTOMY  as child     Family History   Problem Relation Age  of Onset     Respiratory Mother         copd     LUNG DISEASE Mother      Allergies Brother      Cancer Maternal Grandmother      Heart Disease Paternal Grandmother      Cerebrovascular Disease Father      Cancer Father      Other Cancer Father      Glaucoma Maternal Aunt      Macular Degeneration No family hx of      Social History     Socioeconomic History     Marital status: Single     Spouse name: Not on file     Number of children: 0     Years of education: 12     Highest education level: Not on file   Occupational History     Occupation: Retired 3/2012     Comment: [employer] ([employer])   Social Needs     Financial resource strain: Not on file     Food insecurity     Worry: Not on file     Inability: Not on file     Transportation needs     Medical: Not on file     Non-medical: Not on file   Tobacco Use     Smoking status: Former Smoker     Packs/day: 2.00     Years: 15.00     Pack years: 30.00     Types: Cigarettes     Start date: 1968     Quit date: 1983     Years since quittin.7     Smokeless tobacco: Former User     Tobacco comment: smoke free household.   Substance and Sexual Activity     Alcohol use: No     Comment: Quit in      Drug use: No     Sexual activity: Not Currently     Partners: Female     Comment: 2010  No girlfriend at this time.   Lifestyle     Physical activity     Days per week: Not on file     Minutes per session: Not on file     Stress: Not on file   Relationships     Social connections     Talks on phone: Not on file     Gets together: Not on file     Attends Sikh service: Not on file     Active member of club or organization: Not on file     Attends meetings of clubs or organizations: Not on file     Relationship status: Not on file     Intimate partner violence     Fear of current or ex partner: Not on file     Emotionally abused: Not on file     Physically abused: Not on file     Forced sexual activity: Not on file   Other Topics Concern      Parent/sibling w/ CABG, MI or angioplasty before 65F 55M? No   Social History Narrative     Not on file       REVIEW OF SYSTEMS  =================  C: NEGATIVE for fever, chills, change in weight  I: NEGATIVE for worrisome rashes, moles or lesions  E/M: NEGATIVE for ear, mouth and throat problems  R: NEGATIVE for significant cough or SHORTNESS OF BREATH  CV:  NEGATIVE for chest pain, palpitations or peripheral edema  GI: NEGATIVE for nausea, abdominal pain, heartburn, or change in bowel habits  NEURO: NEGATIVE numbness/weakness  : see HPI  PSYCH: NEGATIVE depression/anxiety  LYmph: no new enlarged lymph nodes  Ortho: no new trauma/movements    Physical Exam:  There were no vitals taken for this visit.   Patient is pleasant, in no acute distress, good general condition.  Lung: no evidence of respiratory distress    Abdomen: Soft, nondistended, non tender. No masses. No rebound or guarding.   Exam: normal male  Skin: Warm and dry.  No redness.  Psych: normal mood and affect  Neuro: alert and oriented  Musculaskeletal: moving all extremities    Assessment/Plan:   (N39.41) Urgency incontinence  (primary encounter diagnosis)  Comment:  ua neg  Plan: urine culture             (R30.0) Dysuria  Comment:    Plan:  UA neg    (R39.198) Slowing of urinary stream  Comment:  Bladder scan < 50 ml  Plan: tamsulosin (FLOMAX) 0.4 MG capsule, Urine         Culture Aerobic Bacterial         See in one month for recheck with possible cysto

## 2020-10-07 DIAGNOSIS — R30.0 DYSURIA: ICD-10-CM

## 2020-10-07 RX ORDER — SULFAMETHOXAZOLE/TRIMETHOPRIM 800-160 MG
1 TABLET ORAL 2 TIMES DAILY
Qty: 60 TABLET | Refills: 0 | Status: SHIPPED | OUTPATIENT
Start: 2020-10-07 | End: 2021-01-15

## 2020-10-07 RX ORDER — SULFAMETHOXAZOLE/TRIMETHOPRIM 800-160 MG
1 TABLET ORAL 2 TIMES DAILY
Qty: 60 TABLET | Refills: 0 | Status: SHIPPED | OUTPATIENT
Start: 2020-10-07 | End: 2020-10-07

## 2020-10-08 LAB
BACTERIA SPEC CULT: ABNORMAL
Lab: ABNORMAL
SPECIMEN SOURCE: ABNORMAL

## 2020-10-26 ENCOUNTER — OFFICE VISIT (OUTPATIENT)
Dept: FAMILY MEDICINE | Facility: CLINIC | Age: 75
End: 2020-10-26
Payer: COMMERCIAL

## 2020-10-26 ENCOUNTER — MYC MEDICAL ADVICE (OUTPATIENT)
Dept: UROLOGY | Facility: CLINIC | Age: 75
End: 2020-10-26

## 2020-10-26 VITALS
BODY MASS INDEX: 37.04 KG/M2 | SYSTOLIC BLOOD PRESSURE: 122 MMHG | RESPIRATION RATE: 17 BRPM | HEART RATE: 76 BPM | WEIGHT: 243.6 LBS | OXYGEN SATURATION: 97 % | TEMPERATURE: 98.6 F | DIASTOLIC BLOOD PRESSURE: 76 MMHG

## 2020-10-26 DIAGNOSIS — E87.1 HYPONATREMIA: ICD-10-CM

## 2020-10-26 DIAGNOSIS — E87.6 HYPOKALEMIA: ICD-10-CM

## 2020-10-26 DIAGNOSIS — J44.9 CHRONIC OBSTRUCTIVE PULMONARY DISEASE, UNSPECIFIED COPD TYPE (H): ICD-10-CM

## 2020-10-26 DIAGNOSIS — R05.9 COUGH: Primary | ICD-10-CM

## 2020-10-26 DIAGNOSIS — R06.02 SHORTNESS OF BREATH: ICD-10-CM

## 2020-10-26 DIAGNOSIS — G50.0 LEFT-SIDED TRIGEMINAL NEURALGIA: Primary | ICD-10-CM

## 2020-10-26 DIAGNOSIS — R60.0 LEG EDEMA: ICD-10-CM

## 2020-10-26 PROCEDURE — 85025 COMPLETE CBC W/AUTO DIFF WBC: CPT | Performed by: FAMILY MEDICINE

## 2020-10-26 PROCEDURE — 36415 COLL VENOUS BLD VENIPUNCTURE: CPT | Performed by: FAMILY MEDICINE

## 2020-10-26 PROCEDURE — 83880 ASSAY OF NATRIURETIC PEPTIDE: CPT | Performed by: FAMILY MEDICINE

## 2020-10-26 PROCEDURE — 84295 ASSAY OF SERUM SODIUM: CPT | Performed by: FAMILY MEDICINE

## 2020-10-26 PROCEDURE — 84132 ASSAY OF SERUM POTASSIUM: CPT | Performed by: FAMILY MEDICINE

## 2020-10-26 PROCEDURE — 99214 OFFICE O/P EST MOD 30 MIN: CPT | Performed by: FAMILY MEDICINE

## 2020-10-26 RX ORDER — CEFDINIR 300 MG/1
300 CAPSULE ORAL
COMMUNITY
Start: 2020-10-25 | End: 2020-11-04

## 2020-10-26 RX ORDER — AZITHROMYCIN 250 MG/1
TABLET, FILM COATED ORAL
COMMUNITY
Start: 2020-10-25 | End: 2020-10-30

## 2020-10-26 NOTE — PROGRESS NOTES
Subjective     Rakan Nolasco is a 75 year old male who presents to clinic today for the following health issues:    HPI         ED/UC Followup:    Facility:  [facility]   Date of visit: 10/25/2020  Reason for visit: shortness of breath  Current Status: better today     10/16/20  10/19/20    Rakan Nolasco Jr. was evaluated during a global COVID-19 pandemic, which necessitated consideration that the patient might be at risk for infection with the SARS-CoV-2 virus that causes COVID-19. Applicable protocols for evaluation were followed during the patient's care.   COVID-19 was considered as part of the patient's evaluation. The plan for testing is:  a test was obtained at a previous visit and reviewed & considered today.   Troponin and BNP are also within normal limits. I did obtain a D-dimer given the duration of his symptoms and this was elevated therefore CT scan of the chest was obtained. There is no evidence of pulmonary emboli. There was some mild consolidation and volume loss in the lung bases, he also had some hilar lymphadenopathy which was noted previously. Given patient's ongoing fevers, white count and cough, I will cover him for pneumonia with prescriptions for cefdinir and azithromycin.      ENCOUNTER DIAGNOSES   ICD-10-CM   1. Shortness of breath R06.02 cefdinir (OMNICEF) 300 mg capsule   azithromycin (ZITHROMAX) 250 mg tablet   2. Fatigue, unspecified type R53.83   3. Weakness R53.1     Cough, SOB with exertion  {additonal problems for provider to add (Optional):093670}    Review of Systems   {ROS COMP (Optional):504774}      Objective    /76   Pulse 76   Temp 98.6  F (37  C) (Oral)   Resp 17   Wt 110.5 kg (243 lb 9.6 oz)   SpO2 97%   BMI 37.04 kg/m    Body mass index is 37.04 kg/m .  Physical Exam   {Exam List (Optional):008941}    {Diagnostic Test Results (Optional):714859}        {PROVIDER CHARTING PREFERENCE:860235}

## 2020-10-27 ENCOUNTER — TELEPHONE (OUTPATIENT)
Dept: NEUROSURGERY | Facility: CLINIC | Age: 75
End: 2020-10-27

## 2020-10-27 LAB
DIFFERENTIAL METHOD BLD: ABNORMAL
ERYTHROCYTE [DISTWIDTH] IN BLOOD BY AUTOMATED COUNT: 13.4 % (ref 10–15)
HCT VFR BLD AUTO: 38.5 % (ref 40–53)
HGB BLD-MCNC: 12.6 G/DL (ref 13.3–17.7)
LYMPHOCYTES # BLD AUTO: 0.9 10E9/L (ref 0.8–5.3)
LYMPHOCYTES NFR BLD AUTO: 7 %
MCH RBC QN AUTO: 29.8 PG (ref 26.5–33)
MCHC RBC AUTO-ENTMCNC: 32.7 G/DL (ref 31.5–36.5)
MCV RBC AUTO: 91 FL (ref 78–100)
MONOCYTES # BLD AUTO: 1 10E9/L (ref 0–1.3)
MONOCYTES NFR BLD AUTO: 8 %
NEUTROPHILS # BLD AUTO: 10.3 10E9/L (ref 1.6–8.3)
NEUTROPHILS NFR BLD AUTO: 85 %
NT-PROBNP SERPL-MCNC: 894 PG/ML (ref 0–125)
PLATELET # BLD AUTO: 491 10E9/L (ref 150–450)
PLATELET # BLD EST: ABNORMAL 10*3/UL
POTASSIUM SERPL-SCNC: 5.5 MMOL/L (ref 3.4–5.3)
RBC # BLD AUTO: 4.23 10E12/L (ref 4.4–5.9)
SODIUM SERPL-SCNC: 133 MMOL/L (ref 133–144)
WBC # BLD AUTO: 12.2 10E9/L (ref 4–11)

## 2020-10-28 ENCOUNTER — DOCUMENTATION ONLY (OUTPATIENT)
Dept: CARE COORDINATION | Facility: CLINIC | Age: 75
End: 2020-10-28

## 2020-10-28 PROBLEM — J44.9 COPD (CHRONIC OBSTRUCTIVE PULMONARY DISEASE) (H): Status: ACTIVE | Noted: 2020-10-28

## 2020-10-28 NOTE — PROGRESS NOTES
Subjective     Rakan Nolasco is a 75 year old male who presents to clinic today for the following health issues:    HPI         ED/UC Followup:    Facility:  [facility] Emergency Room   Date of visit: 10/25/2020  Reason for visit: shortness of breath  Current Status: better today     10/16/20  10/19/20    Rakan Nolasco Jr. was evaluated during a global COVID-19 pandemic, which necessitated consideration that the patient might be at risk for infection with the SARS-CoV-2 virus that causes COVID-19. Applicable protocols for evaluation were followed during the patient's care.   COVID-19 was considered as part of the patient's evaluation. The plan for testing is:  a test was obtained at a previous visit and reviewed & considered today.   Troponin and BNP are also within normal limits. I did obtain a D-dimer given the duration of his symptoms and this was elevated therefore CT scan of the chest was obtained. There is no evidence of pulmonary emboli. There was some mild consolidation and volume loss in the lung bases, he also had some hilar lymphadenopathy which was noted previously. Given patient's ongoing fevers, white count and cough, I will cover him for pneumonia with prescriptions for cefdinir and azithromycin.    ENCOUNTER DIAGNOSES   ICD-10-CM   1. Shortness of breath R06.02 cefdinir (OMNICEF) 300 mg capsule   azithromycin (ZITHROMAX) 250 mg tablet   2. Fatigue, unspecified type R53.83   3. Weakness R53.1     HPI:   Patient been treated twice for cough and SOB; and given Omnicef and Azithromycin. He still has a cough and SOB with exertion, denies any fever, chills, orthopnea but has some leg swelling.  His sense of taste and smell are okay.        Review of Systems   HEENT, cardiovascular, gi and gu systems are negative, except as otherwise noted.           Objective       /76   Pulse 76   Temp 98.6  F (37  C) (Oral)   Resp 17   Wt 110.5 kg (243 lb 9.6 oz)   SpO2 97%   BMI 37.04 kg/m     Body mass index is 37.04 kg/m .  Physical Exam   GENERAL: healthy, alert and no distress  NECK: no adenopathy and thyroid normal to palpation  RESP: lungs clear to auscultation - no rales, rhonchi or wheezes  CV: regular rate and rhythm, no murmur, click or rub, no peripheral edema   MS: no gross musculoskeletal defects noted, mild edema  PSYCH: mentation appears normal, affect normal/bright            Results for orders placed or performed in visit on 10/26/20   CBC with platelets differential     Status: Abnormal   Result Value Ref Range     WBC 12.2 (H) 4.0 - 11.0 10e9/L     RBC Count 4.23 (L) 4.4 - 5.9 10e12/L     Hemoglobin 12.6 (L) 13.3 - 17.7 g/dL     Hematocrit 38.5 (L) 40.0 - 53.0 %     MCV 91 78 - 100 fl     MCH 29.8 26.5 - 33.0 pg     MCHC 32.7 31.5 - 36.5 g/dL     RDW 13.4 10.0 - 15.0 %     Platelet Count 491 (H) 150 - 450 10e9/L     % Neutrophils 85.0 %     % Lymphocytes 7.0 %     % Monocytes 8.0 %     Absolute Neutrophil 10.3 (H) 1.6 - 8.3 10e9/L     Absolute Lymphocytes 0.9 0.8 - 5.3 10e9/L     Absolute Monocytes 1.0 0.0 - 1.3 10e9/L     Platelet Estimate           Automated count confirmed.  Platelet morphology is normal.     Diff Method Manual Differential     BNP-N terminal pro     Status: Abnormal   Result Value Ref Range     N-Terminal Pro Bnp 894 (H) 0 - 125 pg/mL   Sodium     Status: None   Result Value Ref Range     Sodium 133 133 - 144 mmol/L   Potassium     Status: Abnormal   Result Value Ref Range     Potassium 5.5 (H) 3.4 - 5.3 mmol/L         Assessment & Plan      Rakan was seen today for hospital f/u.     Diagnoses and all orders for this visit:     Cough     -   Could be viral, already on antibiotic.     Shortness of breath     Differentials: COPD exacerbation, viral URI, PNA. Seen recently and on double antibiotics. Labs not yielding, will observe on current treatment  -     CBC with platelets differential  -     BNP-N terminal pro     Hyponatremia      Had diuretic stopped due to  severe hyponatremia; will still avoid diuretic   -     Sodium     Hypokalemia    Discussed cutting down banana intake, recheck in 1 month  -     Potassium     Leg edema    Potassium above goal    Differentials: acute CHF, Anemia, nephrosis; labs not yielding.       -    Elevation and exercise    Chronic obstructive pulmonary disease, unspecified COPD type (H)      -   Stable at this time     Return in about 1 month (around 11/27/2020) for follow up with results.     Telly Lucio MD  [facility]

## 2020-11-02 ENCOUNTER — MYC REFILL (OUTPATIENT)
Dept: FAMILY MEDICINE | Facility: CLINIC | Age: 75
End: 2020-11-02

## 2020-11-02 ENCOUNTER — PRE VISIT (OUTPATIENT)
Dept: NEUROSURGERY | Facility: CLINIC | Age: 75
End: 2020-11-02

## 2020-11-02 DIAGNOSIS — R73.03 PREDIABETES: ICD-10-CM

## 2020-11-02 RX ORDER — METFORMIN HCL 500 MG
TABLET, EXTENDED RELEASE 24 HR ORAL
Qty: 180 TABLET | Refills: 0 | Status: CANCELLED | OUTPATIENT
Start: 2020-11-02

## 2020-11-02 NOTE — TELEPHONE ENCOUNTER
FUTURE VISIT INFORMATION      FUTURE VISIT INFORMATION:    Date: 11/4/2020    Time: 220pm    [place] [facility] [place] INFORMATION:    Referring provider:  Dr. Harrison    Referring providers clinic:  [facility] Neuromuscular     Reason for visit/diagnosis  Left Side Trigeminal Neuralgia    RECORDS REQUESTED FROM:       Clinic name Comments Records Status Imaging Status   Internal Dr. Harrison-7/9/2020, 7/6/2020, 3/2/2020    MR Brain 11/4/2020 [facility] PACS          [facility] CT Head 2/5/2019 Care Everywhere Requested to PACS                       11/4/2020-[facility] Images now in PACS-MR @ 526am

## 2020-11-04 ENCOUNTER — VIRTUAL VISIT (OUTPATIENT)
Dept: NEUROSURGERY | Facility: CLINIC | Age: 75
End: 2020-11-04
Payer: COMMERCIAL

## 2020-11-04 ENCOUNTER — ANCILLARY PROCEDURE (OUTPATIENT)
Dept: MRI IMAGING | Facility: CLINIC | Age: 75
End: 2020-11-04
Attending: PSYCHIATRY & NEUROLOGY
Payer: COMMERCIAL

## 2020-11-04 DIAGNOSIS — G50.0 TRIGEMINAL NEURALGIA PAIN: Primary | ICD-10-CM

## 2020-11-04 DIAGNOSIS — R73.03 PREDIABETES: ICD-10-CM

## 2020-11-04 DIAGNOSIS — G50.0 LEFT-SIDED TRIGEMINAL NEURALGIA: ICD-10-CM

## 2020-11-04 DIAGNOSIS — R39.198 SLOWING OF URINARY STREAM: ICD-10-CM

## 2020-11-04 LAB
CREAT BLD-MCNC: 1.2 MG/DL (ref 0.5–1.2)
GFR SERPL CREATININE-BSD FRML MDRD: 60 ML/MIN/{1.73_M2}
GFRB: 59

## 2020-11-04 PROCEDURE — 99204 OFFICE O/P NEW MOD 45 MIN: CPT | Mod: 95 | Performed by: NEUROLOGICAL SURGERY

## 2020-11-04 PROCEDURE — 70553 MRI BRAIN STEM W/O & W/DYE: CPT | Mod: TC

## 2020-11-04 PROCEDURE — A9585 GADOBUTROL INJECTION: HCPCS | Performed by: PSYCHIATRY & NEUROLOGY

## 2020-11-04 RX ORDER — TAMSULOSIN HYDROCHLORIDE 0.4 MG/1
0.4 CAPSULE ORAL AT BEDTIME
Qty: 90 CAPSULE | Refills: 0 | Status: SHIPPED | OUTPATIENT
Start: 2020-11-04 | End: 2021-01-15

## 2020-11-04 RX ORDER — GADOBUTROL 604.72 MG/ML
10 INJECTION INTRAVENOUS ONCE
Status: COMPLETED | OUTPATIENT
Start: 2020-11-04 | End: 2020-11-04

## 2020-11-04 RX ORDER — METFORMIN HCL 500 MG
TABLET, EXTENDED RELEASE 24 HR ORAL
Qty: 180 TABLET | Refills: 0 | Status: SHIPPED | OUTPATIENT
Start: 2020-11-04 | End: 2021-03-09

## 2020-11-04 RX ADMIN — GADOBUTROL 10 ML: 604.72 INJECTION INTRAVENOUS at 10:19

## 2020-11-04 NOTE — LETTER
RE: Rakan Nolasco  [address] [facility] [address]     Dear Colleague,    Thank you for referring your patient, Rakan Nolasco, to the [facility] NEUROSURGERY CLINIC [place] at [facility]. Please see a copy of my visit note below.    Service Date: 2020      Telly Lucio MD   [address]  [address]      RE: Rakan Nolasco   MRN: [id number]   : 1945      Dear Dr. Lucio:      I had the pleasure to meet Rakan today in my Neurosurgical Clinic for evaluation of I believe right-sided facial pain which he describes and what I would consider the V2 distribution.      Briefly, Rakan is a 75-year-old gentleman who has a longstanding history of trigeminal neuralgia that has been medicated over the years with Trileptal.  This has been extremely effective in mitigating his pain.  Unfortunately, more recently, he had a very low sodium and was taken off the Trileptal and switched over to gabapentin.  Gabapentin has not been nearly as effective in treating his pain.  At this point in time, he is referred for evaluation and potential treatment with surgical options.      I do agree that he has got trigeminal neuralgia.  I did review his MRI and do see compression of the trigeminal nerve on the right.      I talked to him about different treatment options including trial of Trileptal again but paying very close attention to his sodium, which I do think is reasonable.  He had also been on other medications at the time and now is off of those and so there is a chance that he could be treated with Trileptal and not have as severe hyponatremia.  Alternatively, I talked to him about surgical treatments including destructive and nondestructive.  This included microvascular decompression, radiosurgery, percutaneous balloon rhizotomy and percutaneous radiofrequency rhizotomy.  I went over the risks and  "benefits, pros and cons of all of these.  I also spent time directing him to the [facility] website where he can read about trigeminal neuralgia and also the different treatment options.  I would note that the way the procedures are described on the [facility] website is exactly how I do the microvascular decompression and percutaneous radiofrequency rhizotomy.  At this point in time, I think he is leaning more towards another trial of Trileptal but is going to think things over and then either discuss with myself or Dr. Harrison.        Sincerely,      Osman Luque MD      Overall, I spent approximately 45 minutes on the phone with Rakan with the majority of this time spent in consultation and developing a treatment plan.      cc:   Arpan Harrison MD   [facility]  [address]      D: 2020   T: 2020   MT: juan      Name:     RAKAN NOLASCO   MRN:      [id number]        Account:      [id number]   :      1945           Service Date: 2020      Document: [id number]        Rakan Nolasco is a 75 year old male who is being evaluated via a billable telephone visit.      The patient has been notified of following:     \"This telephone visit will be conducted via a call between you and your physician/provider. We have found that certain health care needs can be provided without the need for a physical exam.  This service lets us provide the care you need with a short phone conversation.  If a prescription is necessary we can send it directly to your pharmacy.  If lab work is needed we can place an order for that and you can then stop by our lab to have the test done at a later time.    Telephone visits are billed at different rates depending on your insurance coverage. During this emergency period, for some insurers they may be billed the same as an in-person visit.  Please reach out to your insurance provider with any questions.    If " "during the course of the call the physician/provider feels a telephone visit is not appropriate, you will not be charged for this service.\"    Patient has given verbal consent for Telephone visit?  Yes    What phone number would you like to be contacted at?     How would you like to obtain your AVS? MyChart        "

## 2020-11-04 NOTE — PROGRESS NOTES
Service Date: 2020      Telly Lucio MD   [address] [place]  [address]      RE: Rakan Nolasco   MRN: [id number]   : 1945      Dear Dr. Lucio:      I had the pleasure to meet Rakan today in my Neurosurgical Clinic for evaluation of I believe right-sided facial pain which he describes and what I would consider the V2 distribution.      Briefly, Rakan is a 75-year-old gentleman who has a longstanding history of trigeminal neuralgia that has been medicated over the years with Trileptal.  This has been extremely effective in mitigating his pain.  Unfortunately, more recently, he had a very low sodium and was taken off the Trileptal and switched over to gabapentin.  Gabapentin has not been nearly as effective in treating his pain.  At this point in time, he is referred for evaluation and potential treatment with surgical options.      I do agree that he has got trigeminal neuralgia.  I did review his MRI and do see compression of the trigeminal nerve on the right.      I talked to him about different treatment options including trial of Trileptal again but paying very close attention to his sodium, which I do think is reasonable.  He had also been on other medications at the time and now is off of those and so there is a chance that he could be treated with Trileptal and not have as severe hyponatremia.  Alternatively, I talked to him about surgical treatments including destructive and nondestructive.  This included microvascular decompression, radiosurgery, percutaneous balloon rhizotomy and percutaneous radiofrequency rhizotomy.  I went over the risks and benefits, pros and cons of all of these.  I also spent time directing him to the [facility] website where he can read about trigeminal neuralgia and also the different treatment options.  I would note that the way the procedures are described on the [facility] website is exactly how I do the  microvascular decompression and percutaneous radiofrequency rhizotomy.  At this point in time, I think he is leaning more towards another trial of Trileptal but is going to think things over and then either discuss with myself or Dr. Harrison.        Sincerely,      Marv Hernandez MD      Overall, I spent approximately 45 minutes on the phone with Rakan with the majority of this time spent in consultation and developing a treatment plan.      cc:   Arpan Harrison MD   [facility]   [address] [facility], [address]  [address]         MARV HERNANDEZ MD             D: 2020   T: 2020   MT: juan      Name:     RAKAN FRANCOIS   MRN:      [id number]        Account:      [id number]   :      1945           Service Date: 2020      Document: [id number]

## 2020-11-04 NOTE — TELEPHONE ENCOUNTER
Signed Prescriptions:                        Disp   Refills    tamsulosin (FLOMAX) 0.4 MG capsule         90 cap*0        Sig: Take 1 capsule (0.4 mg) by mouth At Bedtime  Authorizing Provider: SHARAN THURMAN  Ordering User: JAEL CHAUHAN RN

## 2020-11-05 NOTE — PATIENT INSTRUCTIONS
Follow up with Dr Luque as needed for Trigeminal Neuralgia.    Call Francoise RN for questions/concerns     Thank you for using [facility]

## 2020-11-06 ENCOUNTER — TELEPHONE (OUTPATIENT)
Dept: FAMILY MEDICINE | Facility: CLINIC | Age: 75
End: 2020-11-06

## 2020-11-06 DIAGNOSIS — I10 HTN, GOAL BELOW 140/90: ICD-10-CM

## 2020-11-06 RX ORDER — AMLODIPINE BESYLATE 5 MG/1
TABLET ORAL
Qty: 90 TABLET | Refills: 1 | OUTPATIENT
Start: 2020-11-06

## 2020-11-06 RX ORDER — LISINOPRIL 30 MG/1
TABLET ORAL
Qty: 90 TABLET | Refills: 1 | Status: SHIPPED | OUTPATIENT
Start: 2020-11-06 | End: 2021-06-01

## 2020-11-06 NOTE — TELEPHONE ENCOUNTER
Denial sent for Amlodipine.  Alternate dose.     Routing refill request to provider for review/approval because:  Labs out of range:    Potassium   Date Value Ref Range Status   10/26/2020 5.5 (H) 3.4 - 5.3 mmol/L Final     Eilna Louis RN

## 2020-11-09 NOTE — TELEPHONE ENCOUNTER
Did pt fill the Amlodipine 10mg from 9/30/20? If so, should not be out of medication yet.     Josefa Payne RN  [facility]

## 2020-11-09 NOTE — TELEPHONE ENCOUNTER
Refill request was for amlodipine 5 mg daily but we have 10 mg daily listed    Please verify with patient that he is taking amlodipine 10 mg which was last sent to pharmacy on 9/30/2020 for 3 month supply and should not be out yet    Chelsey Tai RN

## 2020-11-13 ENCOUNTER — OFFICE VISIT (OUTPATIENT)
Dept: UROLOGY | Facility: CLINIC | Age: 75
End: 2020-11-13
Payer: COMMERCIAL

## 2020-11-13 DIAGNOSIS — N32.81 OAB (OVERACTIVE BLADDER): Primary | ICD-10-CM

## 2020-11-13 DIAGNOSIS — R39.198 SLOWING OF URINARY STREAM: ICD-10-CM

## 2020-11-13 DIAGNOSIS — N99.114 POSTPROCEDURAL STRICTURE OF ANTERIOR URETHRA: ICD-10-CM

## 2020-11-13 PROCEDURE — 52281 CYSTOSCOPY AND TREATMENT: CPT | Mod: 79 | Performed by: UROLOGY

## 2020-11-13 RX ORDER — MIRABEGRON 50 MG/1
50 TABLET, EXTENDED RELEASE ORAL DAILY
Qty: 30 TABLET | Refills: 1 | Status: SHIPPED | OUTPATIENT
Start: 2020-11-13 | End: 2021-01-15

## 2020-11-13 NOTE — PROGRESS NOTES
S: Rakan Nolasco is a 75 year old male returns for weak stream.    Patient is draped and prepped.  Flexible cystoscopy placed under direct vision.      The anterior urethra showed stricture at anterior urethra (14 F) and was dilated using sounds up to 26 F without any difficulty.   The prostatic urethra is normal.     The length is 1cm,  the coaptation is 0 cm.     In the bladder there is normal mucosa.    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

## 2020-11-16 ENCOUNTER — MYC MEDICAL ADVICE (OUTPATIENT)
Dept: UROLOGY | Facility: CLINIC | Age: 75
End: 2020-11-16

## 2020-11-16 DIAGNOSIS — E87.5 HYPERKALEMIA: ICD-10-CM

## 2020-11-16 DIAGNOSIS — E87.1 HYPONATREMIA: ICD-10-CM

## 2020-11-16 LAB
POTASSIUM SERPL-SCNC: 4 MMOL/L (ref 3.4–5.3)
SODIUM SERPL-SCNC: 139 MMOL/L (ref 133–144)

## 2020-11-16 PROCEDURE — 84132 ASSAY OF SERUM POTASSIUM: CPT | Performed by: FAMILY MEDICINE

## 2020-11-16 PROCEDURE — 84295 ASSAY OF SERUM SODIUM: CPT | Performed by: FAMILY MEDICINE

## 2020-11-16 NOTE — TELEPHONE ENCOUNTER
Prior Authorization Retail Medication Request    Medication/Dose: Myrbetriq 50 mg  ICD code (if different than what is on RX):  NA  Previously Tried and Failed:  Oxybutynin, Flomax   Rationale:  Post cryotherapy of prostate, nocturia x4    Insurance Name:  [facility]   Insurance ID:  [id number]      Pharmacy Information (if different than what is on RX)  Name:  [facility]  Phone:  j[facility]tt

## 2020-11-23 ENCOUNTER — OFFICE VISIT (OUTPATIENT)
Dept: OPHTHALMOLOGY | Facility: CLINIC | Age: 75
End: 2020-11-23
Payer: COMMERCIAL

## 2020-11-23 DIAGNOSIS — H52.4 MYOPIA OF BOTH EYES WITH REGULAR ASTIGMATISM AND PRESBYOPIA: ICD-10-CM

## 2020-11-23 DIAGNOSIS — G70.00 MYASTHENIA GRAVIS, ACHR ANTIBODY POSITIVE (H): ICD-10-CM

## 2020-11-23 DIAGNOSIS — H25.12 NUCLEAR SCLEROSIS OF LEFT EYE: ICD-10-CM

## 2020-11-23 DIAGNOSIS — H52.13 MYOPIA OF BOTH EYES WITH REGULAR ASTIGMATISM AND PRESBYOPIA: ICD-10-CM

## 2020-11-23 DIAGNOSIS — H40.053 BORDERLINE GLAUCOMA WITH OCULAR HYPERTENSION, BILATERAL: ICD-10-CM

## 2020-11-23 DIAGNOSIS — H52.223 MYOPIA OF BOTH EYES WITH REGULAR ASTIGMATISM AND PRESBYOPIA: ICD-10-CM

## 2020-11-23 DIAGNOSIS — Z86.69 H/O RD (RETINAL DETACHMENT): ICD-10-CM

## 2020-11-23 DIAGNOSIS — Z98.890 S/P BLEPHAROPLASTY: ICD-10-CM

## 2020-11-23 DIAGNOSIS — Z96.1 PSEUDOPHAKIA OF RIGHT EYE: ICD-10-CM

## 2020-11-23 DIAGNOSIS — Z01.00 EXAMINATION OF EYES AND VISION: Primary | ICD-10-CM

## 2020-11-23 DIAGNOSIS — E11.9 TYPE 2 DIABETES MELLITUS WITHOUT COMPLICATION, WITHOUT LONG-TERM CURRENT USE OF INSULIN (H): ICD-10-CM

## 2020-11-23 DIAGNOSIS — H04.123 DRY EYES, BILATERAL: ICD-10-CM

## 2020-11-23 PROCEDURE — 92015 DETERMINE REFRACTIVE STATE: CPT | Performed by: STUDENT IN AN ORGANIZED HEALTH CARE EDUCATION/TRAINING PROGRAM

## 2020-11-23 PROCEDURE — 92014 COMPRE OPH EXAM EST PT 1/>: CPT | Performed by: STUDENT IN AN ORGANIZED HEALTH CARE EDUCATION/TRAINING PROGRAM

## 2020-11-23 RX ORDER — TIMOLOL MALEATE 5 MG/ML
1 SOLUTION/ DROPS OPHTHALMIC 2 TIMES DAILY
Qty: 5 ML | Refills: 11 | Status: SHIPPED | OUTPATIENT
Start: 2020-11-23 | End: 2021-11-30

## 2020-11-23 ASSESSMENT — REFRACTION_WEARINGRX
OD_AXIS: 133
OD_CYLINDER: +2.25
OD_SPHERE: -2.75
OD_ADD: +3.25
SPECS_TYPE: BIFOCAL
OS_ADD: +3.00
OS_SPHERE: -2.00
OS_AXIS: 017
OS_CYLINDER: +1.75

## 2020-11-23 ASSESSMENT — CONF VISUAL FIELD
OD_NORMAL: 1
OS_NORMAL: 1

## 2020-11-23 ASSESSMENT — SLIT LAMP EXAM - LIDS
COMMENTS: 1+ DERMATOCHALASIS
COMMENTS: 1+ DERMATOCHALASIS

## 2020-11-23 ASSESSMENT — CUP TO DISC RATIO
OD_RATIO: 0.1
OS_RATIO: 0.25

## 2020-11-23 ASSESSMENT — VISUAL ACUITY
METHOD: SNELLEN - LINEAR
CORRECTION_TYPE: GLASSES
OD_CC: 20/20
OS_CC+: -2
OS_CC: 20/25

## 2020-11-23 ASSESSMENT — TONOMETRY
OD_IOP_MMHG: 19
IOP_METHOD: APPLANATION
OS_IOP_MMHG: 18

## 2020-11-23 ASSESSMENT — EXTERNAL EXAM - LEFT EYE: OS_EXAM: NORMAL

## 2020-11-23 ASSESSMENT — REFRACTION_MANIFEST
OS_SPHERE: -2.25
OD_CYLINDER: +1.75
OS_CYLINDER: +2.00
OS_AXIS: 014
OD_SPHERE: -2.50
OS_ADD: +2.75
OD_ADD: +2.75
OD_AXIS: 135

## 2020-11-23 ASSESSMENT — EXTERNAL EXAM - RIGHT EYE: OD_EXAM: NORMAL

## 2020-11-23 NOTE — PATIENT INSTRUCTIONS
"Continue Latanoprost (green top) at bedtime both eyes     Continue Timolol (yellow top) twice a day in both eyes     Use artificial tears up to four times a day (like Refresh Optive, Systane Balance, TheraTears, or generic artificial tears are ok. Avoid \"get the red out\" drops).     Glasses prescription given - optional to update    Keep blood sugars and blood pressure under good control.     Mateo Gray MD  [phone]    Patient Education   Diabetes weakens the blood vessels all over the body, including the eyes. Damage to the blood vessels in the eyes can cause swelling or bleeding into part of the eye (called the retina). This is called diabetic retinopathy ([facility]-tin--puh-thee). If not treated, this disease can cause vision loss or blindness.   Symptoms may include blurred or distorted vision, but many people have no symptoms. It's important to see your eye doctor regularly to check for problems.   Early treatment and good control can help protect your vision. Here are the things you can do to help prevent vision loss:      1. Keep your blood sugar levels under tight control.      2. Bring high blood pressure under control.      3. No smoking.      4. Have yearly dilated eye exams.        "

## 2020-11-23 NOTE — PROGRESS NOTES
" Current Eye Medications:  Timolol both eyes twice a day, Latanoprost both eyes every evening.  Last drops:  1pm.       Subjective:  Patient is here for a Diabetic Eye Exam.  Patient complains of decreased vision in each eye for about one month while he was sick (in October), but has now improved slightly.  Also, when watching TV in the evenings, his vision is blurry in each eye.    Lab Results   Component Value Date    A1C 6.8 06/11/2020    A1C 5.6 02/14/2020    A1C 6.4 12/20/2019    A1C 6.4 05/14/2019    A1C 6.7 02/20/2019      Objective:  See Ophthalmology Exam.      Assessment:  Rakan Nolasco is a 75 year old male who presents with:   Encounter Diagnoses   Name Primary?     Examination of eyes and vision      Myopia of both eyes with regular astigmatism and presbyopia        Type 2 diabetes mellitus without complication, without long-term current use of insulin (H) Negative diabetic retinopathy      Nuclear sclerosis of left eye Not visually significant      Pseudophakia of right eye s/p YAG OD      Myasthenia gravis, AChR antibody positive (H)      H/O RD (retinal detachment) OD s/p repair with PPV (AB)      Borderline glaucoma with ocular hypertension, bilateral Intraocular pressure 19/18 today. Discs stable both eyes.      S/P blepharoplasty      Dry eyes, bilateral      Stable eye exam.    Plan:  Continue Latanoprost (green top) at bedtime both eyes     Continue Timolol (yellow top) twice a day in both eyes     Use artificial tears up to four times a day (like Refresh Optive, Systane Balance, TheraTears, or generic artificial tears are ok. Avoid \"get the red out\" drops).     Glasses prescription given - optional to update    Keep blood sugars and blood pressure under good control.     Mateo Gray MD  [phone]    Patient Education   Diabetes weakens the blood vessels all over the body, including the eyes. Damage to the blood vessels in the eyes can cause swelling or bleeding into part of the eye " (called the retina). This is called diabetic retinopathy (KARY-tin-AH-puh-thee). If not treated, this disease can cause vision loss or blindness.   Symptoms may include blurred or distorted vision, but many people have no symptoms. It's important to see your eye doctor regularly to check for problems.   Early treatment and good control can help protect your vision. Here are the things you can do to help prevent vision loss:      1. Keep your blood sugar levels under tight control.      2. Bring high blood pressure under control.      3. No smoking.      4. Have yearly dilated eye exams.

## 2020-11-23 NOTE — LETTER
"    11/23/2020         RE: Rakan Nolasco  [address] [facility] [address]        Dear Colleague,    Thank you for referring your patient, Rakan Nolasco, to the [facility]". Please see a copy of my visit note below.     Current Eye Medications:  Timolol both eyes twice a day, Latanoprost both eyes every evening.  Last drops:  1pm.       Subjective:  Patient is here for a Diabetic Eye Exam.  Patient complains of decreased vision in each eye for about one month while he was sick (in October), but has now improved slightly.  Also, when watching TV in the evenings, his vision is blurry in each eye.    Lab Results   Component Value Date    A1C 6.8 06/11/2020    A1C 5.6 02/14/2020    A1C 6.4 12/20/2019    A1C 6.4 05/14/2019    A1C 6.7 02/20/2019      Objective:  See Ophthalmology Exam.      Assessment:  Rakan Nolasco is a 75 year old male who presents with:   Encounter Diagnoses   Name Primary?     Examination of eyes and vision      Myopia of both eyes with regular astigmatism and presbyopia        Type 2 diabetes mellitus without complication, without long-term current use of insulin (H) Negative diabetic retinopathy      Nuclear sclerosis of left eye Not visually significant      Pseudophakia of right eye s/p YAG OD      Myasthenia gravis, AChR antibody positive (H)      H/O RD (retinal detachment) OD s/p repair with PPV (AB)      Borderline glaucoma with ocular hypertension, bilateral Intraocular pressure 19/18 today. Discs stable both eyes.      S/P blepharoplasty      Dry eyes, bilateral      Stable eye exam.    Plan:  Continue Latanoprost (green top) at bedtime both eyes     Continue Timolol (yellow top) twice a day in both eyes     Use artificial tears up to four times a day (like Refresh Optive, Systane Balance, TheraTears, or generic artificial tears are ok. Avoid \"get the red out\" drops).     Glasses prescription given - optional to update    Keep blood sugars and blood " pressure under good control.     Mateo Gray MD  [phone]    Patient Education   Diabetes weakens the blood vessels all over the body, including the eyes. Damage to the blood vessels in the eyes can cause swelling or bleeding into part of the eye (called the retina). This is called diabetic retinopathy (KARY-tin-AH-puh-thee). If not treated, this disease can cause vision loss or blindness.   Symptoms may include blurred or distorted vision, but many people have no symptoms. It's important to see your eye doctor regularly to check for problems.   Early treatment and good control can help protect your vision. Here are the things you can do to help prevent vision loss:      1. Keep your blood sugar levels under tight control.      2. Bring high blood pressure under control.      3. No smoking.      4. Have yearly dilated eye exams.              Again, thank you for allowing me to participate in the care of your patient.        Sincerely,        Mateo Gray MD

## 2020-11-30 DIAGNOSIS — G70.00 MYASTHENIA GRAVIS, ACHR ANTIBODY POSITIVE (H): ICD-10-CM

## 2020-11-30 RX ORDER — PREDNISONE 5 MG/1
TABLET ORAL
Qty: 180 TABLET | Refills: 0 | Status: SHIPPED | OUTPATIENT
Start: 2020-11-30 | End: 2021-03-03

## 2020-12-09 ENCOUNTER — TRANSFERRED RECORDS (OUTPATIENT)
Dept: HEALTH INFORMATION MANAGEMENT | Facility: CLINIC | Age: 75
End: 2020-12-09

## 2020-12-11 ENCOUNTER — MYC MEDICAL ADVICE (OUTPATIENT)
Dept: FAMILY MEDICINE | Facility: CLINIC | Age: 75
End: 2020-12-11

## 2020-12-11 DIAGNOSIS — Z20.828 CONTACT WITH OR EXPOSURE TO VIRAL DISEASE: Primary | ICD-10-CM

## 2020-12-12 ENCOUNTER — HEALTH MAINTENANCE LETTER (OUTPATIENT)
Age: 75
End: 2020-12-12

## 2020-12-14 DIAGNOSIS — Z20.828 CONTACT WITH OR EXPOSURE TO VIRAL DISEASE: ICD-10-CM

## 2020-12-14 PROCEDURE — 86769 SARS-COV-2 COVID-19 ANTIBODY: CPT | Performed by: FAMILY MEDICINE

## 2020-12-14 PROCEDURE — 36415 COLL VENOUS BLD VENIPUNCTURE: CPT | Performed by: FAMILY MEDICINE

## 2020-12-15 DIAGNOSIS — G50.0 TRIGEMINAL NEURALGIA: ICD-10-CM

## 2020-12-15 LAB
COVID-19 SPIKE RBD ABY TITER: NORMAL
COVID-19 SPIKE RBD ABY: NEGATIVE

## 2020-12-15 RX ORDER — GABAPENTIN 300 MG/1
CAPSULE ORAL
Qty: 180 CAPSULE | Refills: 0 | Status: SHIPPED | OUTPATIENT
Start: 2020-12-15 | End: 2021-01-14

## 2020-12-15 NOTE — PROGRESS NOTES
"Rakan Nolasco is a 75 year old male who is being evaluated via a billable telephone visit.      The patient has been notified of following:     \"This telephone visit will be conducted via a call between you and your physician/provider. We have found that certain health care needs can be provided without the need for a physical exam.  This service lets us provide the care you need with a short phone conversation.  If a prescription is necessary we can send it directly to your pharmacy.  If lab work is needed we can place an order for that and you can then stop by our lab to have the test done at a later time.    Telephone visits are billed at different rates depending on your insurance coverage. During this emergency period, for some insurers they may be billed the same as an in-person visit.  Please reach out to your insurance provider with any questions.    If during the course of the call the physician/provider feels a telephone visit is not appropriate, you will not be charged for this service.\"    Patient has given verbal consent for Telephone visit?  Yes    What phone number would you like to be contacted at?     How would you like to obtain your AVS? MyChart      "

## 2020-12-16 NOTE — RESULT ENCOUNTER NOTE
You have tested NEGATIVE for COVID-19 antibodies. This suggests you have not had or been exposed to COVID-19. But it does not mean that for sure.     The test finds antibodies in most people 10 days after they get sick. For some people, it takes longer than 10 days for antibodies to show up. Others may never show antibodies against COVID-19, especially if they have weak immune systems.    If you have COVID-19 symptoms now, please stay home and away from others.    What is antibody testing?     This is a kind of blood test. We take a small sample of your blood, and then test it for something called  antibodies.  Your body makes antibodies to fight infection. If your blood has antibodies for a certain germ, it means you ve been infected with that germ in the past. Sometimes, antibodies stay in your body for years after you ve had the infection. They can be there even if the germ didn t make you sick. They are a sign that your body fought off the infection.     Will this test find antibodies in everyone who s had COVID-19?     No. The test finds antibodies in most people 10 days after they get sick. For some people, it takes longer than 10 days for antibodies to show up. Others may never show antibodies against COVID-19, especially if they have weak immune systems.     What does it mean if the test finds COVID-19 antibodies?     If we find these antibodies, it means:     This person has had the virus.     Their body s immune system fought the virus.     We don t know if this will help protect someone from getting COVID-19 again. Scientists are still learning about this.     Where can I get more information?     To learn the [facility] guidelines for staying home, please visit the [facility] Department of Health website at [email or web address]     To learn more about COVID-19 and how to care for yourself at home, please visit the [facility] website at  https://www.cdc.gov/coronavirus/2019-ncov/about/steps-when-sick.html     For more options for care at [facility], please visit our website at [email or web address]     [place] Department of [facility] ([facility]) COVID-19 Hotline: [phone]

## 2020-12-23 ASSESSMENT — ENCOUNTER SYMPTOMS
SORE THROAT: 0
DYSURIA: 0
EYE PAIN: 0
MYALGIAS: 0
DIARRHEA: 0
JOINT SWELLING: 0
ARTHRALGIAS: 0
HEMATOCHEZIA: 0
COUGH: 0
PARESTHESIAS: 0
HEARTBURN: 0
HEMATURIA: 0
PALPITATIONS: 0
CHILLS: 0
CONSTIPATION: 0
NAUSEA: 0
FEVER: 0
DIZZINESS: 1
NERVOUS/ANXIOUS: 0
FREQUENCY: 1
ABDOMINAL PAIN: 0
SHORTNESS OF BREATH: 1

## 2020-12-23 ASSESSMENT — ACTIVITIES OF DAILY LIVING (ADL): CURRENT_FUNCTION: NO ASSISTANCE NEEDED

## 2020-12-30 ENCOUNTER — OFFICE VISIT (OUTPATIENT)
Dept: FAMILY MEDICINE | Facility: CLINIC | Age: 75
End: 2020-12-30
Payer: COMMERCIAL

## 2020-12-30 VITALS
DIASTOLIC BLOOD PRESSURE: 75 MMHG | HEART RATE: 64 BPM | HEIGHT: 69 IN | WEIGHT: 248.6 LBS | TEMPERATURE: 97.9 F | SYSTOLIC BLOOD PRESSURE: 135 MMHG | BODY MASS INDEX: 36.82 KG/M2 | OXYGEN SATURATION: 97 %

## 2020-12-30 DIAGNOSIS — Z00.00 ENCOUNTER FOR ANNUAL WELLNESS EXAM IN MEDICARE PATIENT: Primary | ICD-10-CM

## 2020-12-30 LAB — HBA1C MFR BLD: 7.5 % (ref 0–5.6)

## 2020-12-30 PROCEDURE — 83036 HEMOGLOBIN GLYCOSYLATED A1C: CPT | Performed by: FAMILY MEDICINE

## 2020-12-30 PROCEDURE — 36415 COLL VENOUS BLD VENIPUNCTURE: CPT | Performed by: FAMILY MEDICINE

## 2020-12-30 PROCEDURE — 99397 PER PM REEVAL EST PAT 65+ YR: CPT | Performed by: FAMILY MEDICINE

## 2020-12-30 RX ORDER — ALBUTEROL SULFATE 90 UG/1
AEROSOL, METERED RESPIRATORY (INHALATION)
COMMUNITY
Start: 2020-10-16 | End: 2021-02-25

## 2020-12-30 ASSESSMENT — ENCOUNTER SYMPTOMS
MYALGIAS: 0
PARESTHESIAS: 0
NERVOUS/ANXIOUS: 0
FREQUENCY: 1
PALPITATIONS: 0
EYE PAIN: 0
DIARRHEA: 0
JOINT SWELLING: 0
HEMATURIA: 0
DIZZINESS: 1
NAUSEA: 0
SHORTNESS OF BREATH: 1
HEMATOCHEZIA: 0
CHILLS: 0
FEVER: 0
HEARTBURN: 0
COUGH: 0
ARTHRALGIAS: 0
CONSTIPATION: 0
SORE THROAT: 0
ABDOMINAL PAIN: 0
DYSURIA: 0

## 2020-12-30 ASSESSMENT — MIFFLIN-ST. JEOR: SCORE: 1857.63

## 2020-12-30 ASSESSMENT — ACTIVITIES OF DAILY LIVING (ADL): CURRENT_FUNCTION: NO ASSISTANCE NEEDED

## 2020-12-30 ASSESSMENT — PAIN SCALES - GENERAL: PAINLEVEL: NO PAIN (0)

## 2021-01-07 ENCOUNTER — TRANSFERRED RECORDS (OUTPATIENT)
Dept: HEALTH INFORMATION MANAGEMENT | Facility: CLINIC | Age: 76
End: 2021-01-07

## 2021-01-08 DIAGNOSIS — C61 MALIGNANT NEOPLASM OF PROSTATE (H): ICD-10-CM

## 2021-01-08 LAB — PSA SERPL-MCNC: 0.26 UG/L (ref 0–4)

## 2021-01-08 PROCEDURE — 36415 COLL VENOUS BLD VENIPUNCTURE: CPT | Performed by: UROLOGY

## 2021-01-08 PROCEDURE — 84153 ASSAY OF PSA TOTAL: CPT | Performed by: UROLOGY

## 2021-01-14 ENCOUNTER — DOCUMENTATION ONLY (OUTPATIENT)
Dept: OPHTHALMOLOGY | Facility: CLINIC | Age: 76
End: 2021-01-14

## 2021-01-14 DIAGNOSIS — G50.0 TRIGEMINAL NEURALGIA: ICD-10-CM

## 2021-01-14 RX ORDER — GABAPENTIN 300 MG/1
CAPSULE ORAL
Qty: 180 CAPSULE | Refills: 1 | Status: SHIPPED | OUTPATIENT
Start: 2021-01-14 | End: 2021-03-03

## 2021-01-15 ENCOUNTER — OFFICE VISIT (OUTPATIENT)
Dept: UROLOGY | Facility: CLINIC | Age: 76
End: 2021-01-15
Payer: COMMERCIAL

## 2021-01-15 VITALS — HEART RATE: 60 BPM | DIASTOLIC BLOOD PRESSURE: 71 MMHG | SYSTOLIC BLOOD PRESSURE: 131 MMHG | OXYGEN SATURATION: 97 %

## 2021-01-15 DIAGNOSIS — N32.81 OAB (OVERACTIVE BLADDER): Primary | ICD-10-CM

## 2021-01-15 DIAGNOSIS — N99.114 POSTPROCEDURAL STRICTURE OF ANTERIOR URETHRA: ICD-10-CM

## 2021-01-15 DIAGNOSIS — C61 MALIGNANT NEOPLASM OF PROSTATE (H): ICD-10-CM

## 2021-01-15 DIAGNOSIS — E66.01 MORBID OBESITY (H): ICD-10-CM

## 2021-01-15 PROCEDURE — 99214 OFFICE O/P EST MOD 30 MIN: CPT | Performed by: UROLOGY

## 2021-01-15 ASSESSMENT — PAIN SCALES - GENERAL: PAINLEVEL: NO PAIN (0)

## 2021-01-15 NOTE — PROGRESS NOTES
Chief Complaint   Patient presents with     RECHECK     follow up       Rakan Nolasco is a 75 year old male who presents today for follow up of   Chief Complaint   Patient presents with     RECHECK     follow up   Patient is a pleasant 75-year-old male with history of prostate cancer status post cryotherapy x2.  His recent PSA was 0.26 which is down from 1.79.  He has mild urethral stricture status post dilation recently which has improved his urinary flow.  His main complaints are urinary frequency in the daytime.  However his recent glycosylated hemoglobin was 7.5.  He has morbid obesity also.  Current Outpatient Medications   Medication Sig Dispense Refill     albuterol (PROAIR HFA/PROVENTIL HFA/VENTOLIN HFA) 108 (90 Base) MCG/ACT inhaler 2 PUFFS INHALATION ROUTE EVERY 4 HOURS FOR FOR SHORTNESS OF BREATH, COUGH, OR WITH EXERCISE       amLODIPine (NORVASC) 10 MG tablet TAKE 1 TABLET BY MOUTH EVERY DAY 90 tablet 0     aspirin 81 MG tablet Take 1 tablet by mouth daily.       atorvastatin (LIPITOR) 20 MG tablet Take 1 tablet (20 mg) by mouth daily 90 tablet 1     blood glucose (ONETOUCH VERIO IQ) test strip USE TO TEST BLOOD SUGARS 1 TIMES DAILY OR AS DIRECTED 100 strip 2     blood glucose monitoring (NO BRAND SPECIFIED) meter device kit Use to test blood sugar 1 times daily or as directed. 1 kit 0     ClonAZEPAM (KLONOPIN) 0.5 MG tablet Take 0.5 mg by mouth. Take 1 tablet by mouth daily at bedtime       gabapentin (NEURONTIN) 300 MG capsule TAKE 2 CAPSULES BY MOUTH THREE TIMES A  capsule 1     latanoprost (XALATAN) 0.005 % ophthalmic solution Place 1 drop into both eyes At Bedtime 7.5 mL 3     lisinopril (ZESTRIL) 30 MG tablet TAKE 1 TABLET BY MOUTH EVERY DAY 90 tablet 1     metFORMIN (GLUCOPHAGE-XR) 500 MG 24 hr tablet TAKE 1 TABLET BY MOUTH TWICE A DAY WITH MEALS 180 tablet 0     OneTouch Delica Lancets 30G MISC CHECK BLOOD SUGAR EVERY MORNING 100 each 1     pantoprazole (PROTONIX) 20 MG [facility] tablet TAKE 1  TABLET BY MOUTH EVERY DAY 90 tablet 2     predniSONE (DELTASONE) 5 MG tablet TAKE 2 TABLETS (10 MG) BY MOUTH DAILY. 180 tablet 0     pyridostigmine (MESTINON) 60 MG tablet Take 2 tablets three times a day. 540 tablet 1     timolol maleate (TIMOPTIC) 0.5 % ophthalmic solution Place 1 drop into both eyes 2 times daily 5 mL 11     Allergies   Allergen Reactions     Azathioprine Shortness Of Breath     Was hospitalized from it. Also had high fever, chills, and shortness of breath.     Ciprofloxacin Muscle Pain (Myalgia)     Muscle pain  Other reaction(s): Myalgia  Other reaction(s): Myalgia     Ropinirole      Leg pan  GI  Weakness; ? sycope  Other reaction(s): Leg Pain      Past Medical History:   Diagnosis Date     Arthritis          BMI 31.0-31.9,adult      Cancer (H) 01/10/2018    Prostate     COPD (chronic obstructive pulmonary disease) (H) 10/28/2020     Demand ischemia (H)      Diabetes (H) 01/10/2018     Diverticulosis     Colonoscopy 8/2008     Fatty liver     see US  5/2012     GERD (gastroesophageal reflux disease)      Glaucoma (increased eye pressure)      HTN (hypertension)      Hyperlipidemia LDL goal < 130     age, htn, fhx     Irritable bowel      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      Nonsenile cataract      ROSIE (obstructive sleep apnea) 01/2011    Using CPAP;      Prediabetes      Restless leg syndrome      Trigeminal neuralgia pain 01/04/2012     Past Surgical History:   Procedure Laterality Date     BIOPSY ARTERY TEMPORAL Bilateral 8/1/2017    Procedure: BIOPSY ARTERY TEMPORAL;  Bilateral temporal artery Biopsy;  Surgeon: Jj Tello MD;  Location: [facility]     CATARACT IOL, RT/LT Right      COLONOSCOPY       ESOPHAGOSCOPY, GASTROSCOPY, DUODENOSCOPY (EGD), COMBINED  1/15/2014    Procedure: COMBINED ESOPHAGOSCOPY, GASTROSCOPY, DUODENOSCOPY (EGD), BIOPSY SINGLE OR MULTIPLE;;  Surgeon: Winston Nixon MD;  Location: [facility] OR     LASER YAG  CAPSULOTOMY Right 2018    right eye     PHACOEMULSIFICATION CLEAR CORNEA WITH STANDARD INTRAOCULAR LENS IMPLANT Right 2017    Procedure: PHACOEMULSIFICATION CLEAR CORNEA WITH STANDARD INTRAOCULAR LENS IMPLANT;  Surgeon: Mateo Gray MD;  Location:  [facility]     RETINAL REATTACHMENT Right      SURGICAL HISTORY OF -   2009    Both Eyelids-.     TONSILLECTOMY  as child     Family History   Problem Relation Age of Onset     Respiratory Mother         copd     LUNG DISEASE Mother      Allergies Brother      Cancer Maternal Grandmother      Heart Disease Paternal Grandmother      Cerebrovascular Disease Father      Cancer Father      Other Cancer Father      Glaucoma Maternal Aunt      Macular Degeneration No family hx of      Social History     Socioeconomic History     Marital status: Single     Spouse name: None     Number of children: 0     Years of education: 12     Highest education level: None   Occupational History     Occupation: Retired 3/2012     Comment: [employer]   Social Needs     Financial resource strain: None     Food insecurity     Worry: None     Inability: None     Transportation needs     Medical: None     Non-medical: None   Tobacco Use     Smoking status: Former Smoker     Packs/day: 2.00     Years: 15.00     Pack years: 30.00     Types: Cigarettes     Start date: 1968     Quit date: 1983     Years since quittin.0     Smokeless tobacco: Former User     Quit date: 1970     Tobacco comment: smoke free household.   Substance and Sexual Activity     Alcohol use: Not Currently     Comment: Quit in      Drug use: No     Sexual activity: Not Currently     Partners: Female     Comment: 2010  No girlfriend at this time.   Lifestyle     Physical activity     Days per week: None     Minutes per session: None     Stress: None   Relationships     Social connections     Talks on phone: None     Gets together: None     Attends Taoism  service: None     Active member of club or organization: None     Attends meetings of clubs or organizations: None     Relationship status: None     Intimate partner violence     Fear of current or ex partner: None     Emotionally abused: None     Physically abused: None     Forced sexual activity: None   Other Topics Concern     Parent/sibling w/ CABG, MI or angioplasty before 65F 55M? No   Social History Narrative     None       REVIEW OF SYSTEMS  =================  C: NEGATIVE for fever, chills, change in weight  I: NEGATIVE for worrisome rashes, moles or lesions  E/M: NEGATIVE for ear, mouth and throat problems  R: NEGATIVE for significant cough or SHORTNESS OF BREATH  CV:  NEGATIVE for chest pain, palpitations or peripheral edema  GI: NEGATIVE for nausea, abdominal pain, heartburn, or change in bowel habits  NEURO: NEGATIVE numbness/weakness  : see HPI  PSYCH: NEGATIVE depression/anxiety  LYmph: no new enlarged lymph nodes  Ortho: no new trauma/movements    Physical Exam:  /71 (BP Location: Right arm, Patient Position: Sitting, Cuff Size: Adult Large)   Pulse 60   SpO2 97%    Patient is pleasant, in no acute distress, good general condition.  Lung: no evidence of respiratory distress    Abdomen: Soft, nondistended, non tender. No masses. No rebound or guarding.   Exam: No CVA tenderness  Skin: Warm and dry.  No redness.  Psych: normal mood and affect  Neuro: alert and oriented  Musculaskeletal: moving all extremities    Assessment/Plan:   (N32.81) OAB (overactive bladder)  (primary encounter diagnosis)  Comment: Previously prescribed Myrbetriq however patient never started on it  Plan: Urine frequency may be related to his diabetes mellitus due to obesity.    (C61) Malignant neoplasm of prostate (H)  Comment: Status post cryotherapy x2  Plan: PSA tumor marker        In 6 months    (E66.01) Morbid obesity (H)  Comment: Follow-up with primary care MD  Plan: See above    (N99.114) Postprocedural  stricture of anterior urethra  Comment: Status post dilation.  No symptoms  Plan: As needed.

## 2021-01-18 DIAGNOSIS — E11.65 TYPE 2 DIABETES MELLITUS WITH HYPERGLYCEMIA, WITHOUT LONG-TERM CURRENT USE OF INSULIN (H): ICD-10-CM

## 2021-01-19 RX ORDER — LANCETS 30 GAUGE
1 EACH MISCELLANEOUS DAILY
Qty: 100 EACH | Refills: 1 | Status: SHIPPED | OUTPATIENT
Start: 2021-01-19 | End: 2021-09-15

## 2021-01-25 DIAGNOSIS — E78.5 HYPERLIPIDEMIA LDL GOAL <130: ICD-10-CM

## 2021-01-25 RX ORDER — ATORVASTATIN CALCIUM 20 MG/1
20 TABLET, FILM COATED ORAL DAILY
Qty: 90 TABLET | Refills: 1 | Status: SHIPPED | OUTPATIENT
Start: 2021-01-25 | End: 2021-07-26

## 2021-01-29 DIAGNOSIS — I10 HTN, GOAL BELOW 140/90: ICD-10-CM

## 2021-02-01 ENCOUNTER — DOCUMENTATION ONLY (OUTPATIENT)
Dept: OPHTHALMOLOGY | Facility: CLINIC | Age: 76
End: 2021-02-01

## 2021-02-01 RX ORDER — AMLODIPINE BESYLATE 10 MG/1
TABLET ORAL
Qty: 90 TABLET | Refills: 1 | Status: SHIPPED | OUTPATIENT
Start: 2021-02-01 | End: 2021-07-26

## 2021-02-02 DIAGNOSIS — I10 HTN, GOAL BELOW 140/90: ICD-10-CM

## 2021-02-03 DIAGNOSIS — G70.00 OCULAR MYASTHENIA (H): ICD-10-CM

## 2021-02-03 RX ORDER — AMLODIPINE BESYLATE 5 MG/1
TABLET ORAL
Qty: 90 TABLET | Refills: 1 | Status: SHIPPED | OUTPATIENT
Start: 2021-02-03 | End: 2021-05-04 | Stop reason: DRUGHIGH

## 2021-02-03 RX ORDER — PYRIDOSTIGMINE BROMIDE 60 MG/1
TABLET ORAL
Qty: 540 TABLET | Refills: 0 | Status: SHIPPED | OUTPATIENT
Start: 2021-02-03 | End: 2021-04-23

## 2021-02-21 DIAGNOSIS — K21.9 GASTROESOPHAGEAL REFLUX DISEASE WITHOUT ESOPHAGITIS: ICD-10-CM

## 2021-02-23 RX ORDER — PANTOPRAZOLE SODIUM 20 MG/1
TABLET, DELAYED RELEASE ORAL
Qty: 90 TABLET | Refills: 2 | Status: SHIPPED | OUTPATIENT
Start: 2021-02-23 | End: 2021-11-17

## 2021-02-25 ENCOUNTER — MYC MEDICAL ADVICE (OUTPATIENT)
Dept: FAMILY MEDICINE | Facility: CLINIC | Age: 76
End: 2021-02-25

## 2021-02-25 DIAGNOSIS — J44.9 CHRONIC OBSTRUCTIVE PULMONARY DISEASE, UNSPECIFIED COPD TYPE (H): Primary | ICD-10-CM

## 2021-02-25 NOTE — TELEPHONE ENCOUNTER
Requested Prescriptions   Pending Prescriptions Disp Refills     albuterol (PROAIR HFA/PROVENTIL HFA/VENTOLIN HFA) 108 (90 Base) MCG/ACT inhaler          There is no refill protocol information for this order        Last Written Prescription Date:  10/16/2020  Last Fill Quantity:   # refills:    Last office visit: 12/30/2020 with prescribing provider:     Future Office Visit:        Routing to PCP.  Jody Castillo MA

## 2021-02-26 RX ORDER — ALBUTEROL SULFATE 90 UG/1
AEROSOL, METERED RESPIRATORY (INHALATION)
Qty: 1 INHALER | Refills: 2 | Status: SHIPPED | OUTPATIENT
Start: 2021-02-26 | End: 2022-02-20

## 2021-03-03 DIAGNOSIS — G50.0 TRIGEMINAL NEURALGIA: ICD-10-CM

## 2021-03-03 DIAGNOSIS — G70.00 MYASTHENIA GRAVIS, ACHR ANTIBODY POSITIVE (H): ICD-10-CM

## 2021-03-03 RX ORDER — PREDNISONE 5 MG/1
TABLET ORAL
Qty: 180 TABLET | Refills: 0 | Status: SHIPPED | OUTPATIENT
Start: 2021-03-03 | End: 2021-05-27

## 2021-03-03 RX ORDER — GABAPENTIN 300 MG/1
CAPSULE ORAL
Qty: 180 CAPSULE | Refills: 1 | Status: SHIPPED | OUTPATIENT
Start: 2021-03-03 | End: 2021-05-10

## 2021-03-08 DIAGNOSIS — R73.03 PREDIABETES: ICD-10-CM

## 2021-03-09 RX ORDER — METFORMIN HCL 500 MG
TABLET, EXTENDED RELEASE 24 HR ORAL
Qty: 180 TABLET | Refills: 0 | Status: SHIPPED | OUTPATIENT
Start: 2021-03-09 | End: 2021-06-11

## 2021-04-22 DIAGNOSIS — G70.00 OCULAR MYASTHENIA (H): ICD-10-CM

## 2021-04-23 RX ORDER — PYRIDOSTIGMINE BROMIDE 60 MG/1
TABLET ORAL
Qty: 540 TABLET | Refills: 0 | Status: SHIPPED | OUTPATIENT
Start: 2021-04-23 | End: 2021-06-11

## 2021-05-03 NOTE — PROGRESS NOTES
Assessment & Plan    Rakan is a 75-year-old male with myasthenia gravis, trigeminal neuralgia obstructive sleep apnea COPD,Type 2 diabetes, hypertension, Hyperlipidemia, restless leg syndrome, history of prostate cancer and hyponatremia with diuretics.  He presents for evaluation of swelling of the legs and nocturnal leg pains.    Edema, unspecified type  Mainly due to fluid retention, did not tolerate diuretics as developed hyponatremia.  Due to persistent nature will judiciously use a diuretic and check sodium and potassium levels regularly and do a supplement if need be.  - Basic metabolic panel    Bilateral calf pain     Likely from restless leg syndrome.  We will check electrolytes.    Hyperlipidemia LDL goal <130  Stable  - Basic metabolic panel    Myasthenia gravis, AChR antibody positive (H)   Temporal arteritis (H)      Following with the neurology    Chronic obstructive pulmonary disease, unspecified COPD type (H)     Stable at this time    Type 2 diabetes mellitus with other specified complication, without long-term current use of insulin (H)     Well-controlled    Return in about 1 day (around 5/5/2021) for follow up with results.    Telly Lucio MD  [facility]    Subjective   Rakan is a 75 year old who presents for the following health issues:    HPI     Chief Complaint   Patient presents with     Leg Swelling     bilateral-lower x 3 months and calf/ankle pain at nocs     Leg swelling  Worse in last few weeks  Associated with leg pain at night; not ross horses; in the calf area.  The calf muscles feel tight.   Gets cramps once in a while in hamstrings as well.    Diuretics  Caused severe hyponatremia; has withheld    Chronic conditions:  Stable at this time    Review of Systems   HEENT, pulmonary, gi and gu systems are negative, except as otherwise noted.      Objective    /72   Pulse 90   Temp 97.4  F (36.3  C) (Oral)   Wt 113.4 kg (250 lb)   SpO2 95%   BMI  37.28 kg/m    Body mass index is 37.28 kg/m .  Physical Exam   GENERAL: healthy, alert and no distress  RESP: lungs clear to auscultation - no rales, rhonchi or wheezes  CV: regular rate and rhythm, no murmur, click or rub  MS: Moderate pitting leg edema.  No calf tenderness or swelling.

## 2021-05-04 ENCOUNTER — OFFICE VISIT (OUTPATIENT)
Dept: FAMILY MEDICINE | Facility: CLINIC | Age: 76
End: 2021-05-04
Payer: COMMERCIAL

## 2021-05-04 VITALS
BODY MASS INDEX: 37.28 KG/M2 | OXYGEN SATURATION: 95 % | DIASTOLIC BLOOD PRESSURE: 72 MMHG | WEIGHT: 250 LBS | TEMPERATURE: 97.4 F | HEART RATE: 90 BPM | SYSTOLIC BLOOD PRESSURE: 120 MMHG

## 2021-05-04 DIAGNOSIS — M79.662 BILATERAL CALF PAIN: ICD-10-CM

## 2021-05-04 DIAGNOSIS — J44.9 CHRONIC OBSTRUCTIVE PULMONARY DISEASE, UNSPECIFIED COPD TYPE (H): ICD-10-CM

## 2021-05-04 DIAGNOSIS — E11.69 TYPE 2 DIABETES MELLITUS WITH OTHER SPECIFIED COMPLICATION, WITHOUT LONG-TERM CURRENT USE OF INSULIN (H): ICD-10-CM

## 2021-05-04 DIAGNOSIS — M31.6 TEMPORAL ARTERITIS (H): ICD-10-CM

## 2021-05-04 DIAGNOSIS — R60.9 EDEMA, UNSPECIFIED TYPE: Primary | ICD-10-CM

## 2021-05-04 DIAGNOSIS — M79.661 BILATERAL CALF PAIN: ICD-10-CM

## 2021-05-04 DIAGNOSIS — G70.00 MYASTHENIA GRAVIS, ACHR ANTIBODY POSITIVE (H): ICD-10-CM

## 2021-05-04 DIAGNOSIS — E78.5 HYPERLIPIDEMIA LDL GOAL <130: ICD-10-CM

## 2021-05-04 LAB
ANION GAP SERPL CALCULATED.3IONS-SCNC: 3 MMOL/L (ref 3–14)
BUN SERPL-MCNC: 17 MG/DL (ref 7–30)
CALCIUM SERPL-MCNC: 8.9 MG/DL (ref 8.5–10.1)
CHLORIDE SERPL-SCNC: 106 MMOL/L (ref 94–109)
CO2 SERPL-SCNC: 29 MMOL/L (ref 20–32)
CREAT SERPL-MCNC: 1.02 MG/DL (ref 0.66–1.25)
GFR SERPL CREATININE-BSD FRML MDRD: 72 ML/MIN/{1.73_M2}
GLUCOSE SERPL-MCNC: 175 MG/DL (ref 70–99)
POTASSIUM SERPL-SCNC: 4.5 MMOL/L (ref 3.4–5.3)
SODIUM SERPL-SCNC: 138 MMOL/L (ref 133–144)

## 2021-05-04 PROCEDURE — 36415 COLL VENOUS BLD VENIPUNCTURE: CPT | Performed by: FAMILY MEDICINE

## 2021-05-04 PROCEDURE — 99214 OFFICE O/P EST MOD 30 MIN: CPT | Performed by: FAMILY MEDICINE

## 2021-05-04 PROCEDURE — 80048 BASIC METABOLIC PNL TOTAL CA: CPT | Performed by: FAMILY MEDICINE

## 2021-05-04 RX ORDER — ATORVASTATIN CALCIUM 20 MG/1
20 TABLET, FILM COATED ORAL DAILY
Qty: 90 TABLET | Refills: 3 | Status: CANCELLED | OUTPATIENT
Start: 2021-05-04

## 2021-05-05 RX ORDER — FUROSEMIDE 20 MG
20 TABLET ORAL DAILY
Qty: 15 TABLET | Refills: 0 | Status: SHIPPED | OUTPATIENT
Start: 2021-05-05 | End: 2021-05-21

## 2021-05-05 RX ORDER — SODIUM BICARBONATE 325 MG/1
325 TABLET ORAL 2 TIMES DAILY
Qty: 30 TABLET | Refills: 0 | Status: SHIPPED | OUTPATIENT
Start: 2021-05-05 | End: 2021-05-21

## 2021-05-10 DIAGNOSIS — H40.053 BORDERLINE GLAUCOMA WITH OCULAR HYPERTENSION, BILATERAL: ICD-10-CM

## 2021-05-10 DIAGNOSIS — G50.0 TRIGEMINAL NEURALGIA: ICD-10-CM

## 2021-05-10 RX ORDER — GABAPENTIN 300 MG/1
CAPSULE ORAL
Qty: 180 CAPSULE | Refills: 1 | Status: SHIPPED | OUTPATIENT
Start: 2021-05-10 | End: 2021-06-15

## 2021-05-11 RX ORDER — LATANOPROST 50 UG/ML
1 SOLUTION/ DROPS OPHTHALMIC AT BEDTIME
Qty: 7.5 ML | Refills: 3 | Status: SHIPPED | OUTPATIENT
Start: 2021-05-11 | End: 2021-12-16

## 2021-05-12 DIAGNOSIS — R60.9 EDEMA, UNSPECIFIED TYPE: ICD-10-CM

## 2021-05-20 DIAGNOSIS — R60.9 EDEMA, UNSPECIFIED TYPE: ICD-10-CM

## 2021-05-20 LAB
POTASSIUM SERPL-SCNC: 4.5 MMOL/L (ref 3.4–5.3)
SODIUM SERPL-SCNC: 139 MMOL/L (ref 133–144)

## 2021-05-20 PROCEDURE — 84295 ASSAY OF SERUM SODIUM: CPT | Performed by: FAMILY MEDICINE

## 2021-05-20 PROCEDURE — 36415 COLL VENOUS BLD VENIPUNCTURE: CPT | Performed by: FAMILY MEDICINE

## 2021-05-20 PROCEDURE — 84132 ASSAY OF SERUM POTASSIUM: CPT | Performed by: FAMILY MEDICINE

## 2021-05-21 RX ORDER — SODIUM BICARBONATE 325 MG/1
325 TABLET ORAL 2 TIMES DAILY
Qty: 180 TABLET | Refills: 1 | OUTPATIENT
Start: 2021-05-21

## 2021-05-21 RX ORDER — FUROSEMIDE 20 MG
TABLET ORAL
Qty: 90 TABLET | Refills: 1 | OUTPATIENT
Start: 2021-05-21

## 2021-05-27 DIAGNOSIS — G70.00 MYASTHENIA GRAVIS, ACHR ANTIBODY POSITIVE (H): ICD-10-CM

## 2021-05-27 RX ORDER — PREDNISONE 5 MG/1
TABLET ORAL
Qty: 180 TABLET | Refills: 0 | Status: SHIPPED | OUTPATIENT
Start: 2021-05-27 | End: 2021-06-11

## 2021-05-28 ENCOUNTER — OFFICE VISIT (OUTPATIENT)
Dept: OPHTHALMOLOGY | Facility: CLINIC | Age: 76
End: 2021-05-28
Payer: COMMERCIAL

## 2021-05-28 DIAGNOSIS — H25.12 NUCLEAR SCLEROSIS OF LEFT EYE: ICD-10-CM

## 2021-05-28 DIAGNOSIS — Z96.1 PSEUDOPHAKIA OF RIGHT EYE: ICD-10-CM

## 2021-05-28 DIAGNOSIS — G70.00 MYASTHENIA GRAVIS, ACHR ANTIBODY POSITIVE (H): ICD-10-CM

## 2021-05-28 DIAGNOSIS — Z98.890 S/P BLEPHAROPLASTY: ICD-10-CM

## 2021-05-28 DIAGNOSIS — H40.053 BORDERLINE GLAUCOMA WITH OCULAR HYPERTENSION, BILATERAL: Primary | ICD-10-CM

## 2021-05-28 DIAGNOSIS — H04.123 DRY EYES, BILATERAL: ICD-10-CM

## 2021-05-28 DIAGNOSIS — Z86.69 H/O RD (RETINAL DETACHMENT): ICD-10-CM

## 2021-05-28 PROCEDURE — 92133 CPTRZD OPH DX IMG PST SGM ON: CPT | Performed by: STUDENT IN AN ORGANIZED HEALTH CARE EDUCATION/TRAINING PROGRAM

## 2021-05-28 PROCEDURE — 92012 INTRM OPH EXAM EST PATIENT: CPT | Performed by: STUDENT IN AN ORGANIZED HEALTH CARE EDUCATION/TRAINING PROGRAM

## 2021-05-28 PROCEDURE — 92083 EXTENDED VISUAL FIELD XM: CPT | Performed by: STUDENT IN AN ORGANIZED HEALTH CARE EDUCATION/TRAINING PROGRAM

## 2021-05-28 ASSESSMENT — TONOMETRY
OS_IOP_MMHG: 21
OD_IOP_MMHG: 21
IOP_METHOD: APPLANATION

## 2021-05-28 ASSESSMENT — VISUAL ACUITY
OS_CC: 20/40
METHOD: SNELLEN - LINEAR
CORRECTION_TYPE: GLASSES
OD_CC: 20/20
OS_CC+: -1

## 2021-05-28 ASSESSMENT — SLIT LAMP EXAM - LIDS
COMMENTS: 1+ DERMATOCHALASIS
COMMENTS: 1+ DERMATOCHALASIS

## 2021-05-28 ASSESSMENT — EXTERNAL EXAM - RIGHT EYE: OD_EXAM: NORMAL

## 2021-05-28 ASSESSMENT — CUP TO DISC RATIO
OS_RATIO: 0.25
OD_RATIO: 0.1

## 2021-05-28 ASSESSMENT — EXTERNAL EXAM - LEFT EYE: OS_EXAM: NORMAL

## 2021-05-28 NOTE — PROGRESS NOTES
" Current Eye Medications:  Timolol both eyes twice a day, Latanoprost both eyes every evening.  Last drops: 6:30 am     Subjective:  6 month follow up for IOP check, HVF, OCT optic nerve. Vision is down little in distance for last 6-8 weeks left eye > right eye. Reading at near is fine both eyes. Once in a great while has short sharp pain, happens in both eyes.      Objective:  See Ophthalmology Exam.       Assessment:  Rakan Nolasco is a 75 year old male who presents with:   Encounter Diagnoses   Name Primary?     Borderline glaucoma with ocular hypertension, bilateral Intraocular pressure 21/21 today. Stable OCT and visual field. Continue same medications.     OCT optic nerve: avg retinal nerve fiber layer 86/76; stable both eyes.     Sifuentes visual field (HVF) 24-2: superior arcuate defect right eye; within normal limits left eye.        Nuclear sclerosis of left eye Suspect cataract may be starting to interfere with vision; will plan to check at his next CE. Asked him to let us know if it becomes too bothersome before then.     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 Avoid \"get the red out\" drops).     Mateo Gray MD  [phone]      "

## 2021-05-28 NOTE — LETTER
"    5/28/2021         RE: Rakan Nolasco  [address] [facility] [address]        Dear Colleague,    Thank you for referring your patient, Rakan Nolasco, to the [facility]. Please see a copy of my visit note below.     Current Eye Medications:  Timolol both eyes twice a day, Latanoprost both eyes every evening.  Last drops: 6:30 am     Subjective:  6 month follow up for IOP check, HVF, OCT optic nerve. Vision is down little in distance for last 6-8 weeks left eye > right eye. Reading at near is fine both eyes. Once in a great while has short sharp pain, happens in both eyes.      Objective:  See Ophthalmology Exam.       Assessment:  Rakan Nolasco is a 75 year old male who presents with:   Encounter Diagnoses   Name Primary?     Borderline glaucoma with ocular hypertension, bilateral Intraocular pressure 21/21 today. Stable OCT and visual field. Continue same medications.     OCT optic nerve: avg retinal nerve fiber layer 86/76; stable both eyes.     Sifuentes visual field (HVF) 24-2: superior arcuate defect right eye; within normal limits left eye.        Nuclear sclerosis of left eye Suspect cataract may be starting to interfere with vision; will plan to check at his next CE. Asked him to let us know if it becomes too bothersome before then.     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 Avoid \"get the red out\" drops).     Mateo Gray MD  [phone]          Again, thank you for allowing me to participate in the care of your patient.  "       Sincerely,        Mateo Gray MD

## 2021-05-28 NOTE — PATIENT INSTRUCTIONS
"Continue Latanoprost (green top) at bedtime both eyes     Continue Timolol (yellow top) twice a day in both eyes     Use artificial tears up to four times a day (like Refresh Optive, Systane Balance, TheraTears, or generic artificial tears are ok. Avoid \"get the red out\" drops).     Mateo Gray MD  [phone]    "

## 2021-06-09 ENCOUNTER — OFFICE VISIT (OUTPATIENT)
Dept: FAMILY MEDICINE | Facility: CLINIC | Age: 76
End: 2021-06-09
Payer: COMMERCIAL

## 2021-06-09 VITALS
SYSTOLIC BLOOD PRESSURE: 130 MMHG | TEMPERATURE: 97.5 F | HEART RATE: 70 BPM | OXYGEN SATURATION: 98 % | BODY MASS INDEX: 36.54 KG/M2 | DIASTOLIC BLOOD PRESSURE: 78 MMHG | WEIGHT: 245 LBS

## 2021-06-09 DIAGNOSIS — E78.5 HYPERLIPIDEMIA LDL GOAL <100: ICD-10-CM

## 2021-06-09 DIAGNOSIS — R60.0 LOWER LEG EDEMA: ICD-10-CM

## 2021-06-09 DIAGNOSIS — E87.1 HYPONATREMIA: ICD-10-CM

## 2021-06-09 DIAGNOSIS — E11.69 TYPE 2 DIABETES MELLITUS WITH OTHER SPECIFIED COMPLICATION, WITHOUT LONG-TERM CURRENT USE OF INSULIN (H): Primary | ICD-10-CM

## 2021-06-09 LAB
CHOLEST SERPL-MCNC: 157 MG/DL
CREAT UR-MCNC: 126 MG/DL
HBA1C MFR BLD: 7.1 % (ref 0–5.6)
HDLC SERPL-MCNC: 51 MG/DL
LDLC SERPL CALC-MCNC: 69 MG/DL
MICROALBUMIN UR-MCNC: 12 MG/L
MICROALBUMIN/CREAT UR: 9.92 MG/G CR (ref 0–17)
NONHDLC SERPL-MCNC: 106 MG/DL
POTASSIUM SERPL-SCNC: 4.6 MMOL/L (ref 3.4–5.3)
SODIUM SERPL-SCNC: 137 MMOL/L (ref 133–144)
TRIGL SERPL-MCNC: 187 MG/DL

## 2021-06-09 PROCEDURE — 83036 HEMOGLOBIN GLYCOSYLATED A1C: CPT | Performed by: FAMILY MEDICINE

## 2021-06-09 PROCEDURE — 99214 OFFICE O/P EST MOD 30 MIN: CPT | Performed by: FAMILY MEDICINE

## 2021-06-09 PROCEDURE — 36415 COLL VENOUS BLD VENIPUNCTURE: CPT | Performed by: FAMILY MEDICINE

## 2021-06-09 PROCEDURE — 80061 LIPID PANEL: CPT | Performed by: FAMILY MEDICINE

## 2021-06-09 PROCEDURE — 84132 ASSAY OF SERUM POTASSIUM: CPT | Performed by: FAMILY MEDICINE

## 2021-06-09 PROCEDURE — 84295 ASSAY OF SERUM SODIUM: CPT | Performed by: FAMILY MEDICINE

## 2021-06-09 PROCEDURE — 99207 PR FOOT EXAM NO CHARGE: CPT | Performed by: FAMILY MEDICINE

## 2021-06-09 PROCEDURE — 82043 UR ALBUMIN QUANTITATIVE: CPT | Performed by: FAMILY MEDICINE

## 2021-06-09 NOTE — PROGRESS NOTES
Assessment & Plan     Type 2 diabetes mellitus with other specified complication, without long-term current use of insulin (H)   A1c is at goal. Continue current treatment plan.  - Albumin Random Urine Quantitative with Creat Ratio  - FOOT EXAM    Lower Leg edema:    -  Has had on going leg edema; with normal renal function, no proteinuria, had a slightly elevated BNP in 10/2020; will consider echo/follows with [facility].    Hyponatremia   Rechecking sodium, due to diuretic use for leg swelling  - Sodium  - Potassium    Hyperlipidemia LDL goal <100    Due for LD check  - Lipid Profile (Chol, Trig, HDL, LDL calc)    Return in about 3 months (around 9/9/2021) for Routine Visit.    Telly Lucio MD  [facility] THIAGO Bledsoe is a 75 year old who presents for the following health issues:      HPI     Diabetes Follow-up    How often are you checking your blood sugar? One time daily  What time of day are you checking your blood sugars (select all that apply)?  Before meals  Have you had any blood sugars above 200?  No  Have you had any blood sugars below 70?  Yes     What symptoms do you notice when your blood sugar is low?  None    What concerns do you have today about your diabetes? None and Other: foot exam     Do you have any of these symptoms? (Select all that apply)  No numbness or tingling in feet.  No redness, sores or blisters on feet.  No complaints of excessive thirst.  No reports of blurry vision.  No significant changes to weight.      BP Readings from Last 2 Encounters:   06/09/21 130/78   05/04/21 120/72     Hemoglobin A1C (%)   Date Value   06/09/2021 7.1 (H)   12/30/2020 7.5 (H)     LDL Cholesterol Calculated (mg/dL)   Date Value   06/11/2020 35   12/20/2019 61       How many servings of fruits and vegetables do you eat daily?  2-3    On average, how many sweetened beverages do you drink each day (Examples: soda, juice, sweet tea, etc.   Do NOT count diet or artificially sweetened beverages)?   0    How many days per week do you exercise enough to make your heart beat faster? 3 or less    How many minutes a day do you exercise enough to make your heart beat faster? 9 or less    How many days per week do you miss taking your medication? 0      Review of Systems   Constitutional, HEENT, cardiovascular, pulmonary, gi and gu systems are negative, except as otherwise noted.      Objective    /78   Pulse 70   Temp 97.5  F (36.4  C) (Oral)   Wt 111.1 kg (245 lb)   SpO2 98%   BMI 36.54 kg/m    Body mass index is 36.54 kg/m .  Physical Exam   GENERAL: healthy, alert and no distress  NECK: no adenopathy and thyroid normal to palpation  RESP: lungs clear to auscultation - no rales, rhonchi or wheezes  CV: regular rate and rhythm, no murmur, click or rub, no peripheral edema   MS: no gross musculoskeletal defects noted, no edema  Diabetic foot exam: normal DP and PT pulses, no trophic changes or ulcerative lesions and normal sensory exam    Results for orders placed or performed in visit on 06/09/21   Hemoglobin A1c     Status: Abnormal   Result Value Ref Range    Hemoglobin A1C 7.1 (H) 0 - 5.6 %

## 2021-06-10 DIAGNOSIS — G70.00 OCULAR MYASTHENIA (H): ICD-10-CM

## 2021-06-10 DIAGNOSIS — G70.00 MYASTHENIA GRAVIS, ACHR ANTIBODY POSITIVE (H): ICD-10-CM

## 2021-06-10 DIAGNOSIS — R73.03 PREDIABETES: ICD-10-CM

## 2021-06-11 ENCOUNTER — TELEPHONE (OUTPATIENT)
Dept: FAMILY MEDICINE | Facility: CLINIC | Age: 76
End: 2021-06-11

## 2021-06-11 RX ORDER — PYRIDOSTIGMINE BROMIDE 60 MG/1
TABLET ORAL
Qty: 540 TABLET | Refills: 0 | Status: SHIPPED | OUTPATIENT
Start: 2021-06-11 | End: 2021-08-12

## 2021-06-11 RX ORDER — METFORMIN HCL 500 MG
TABLET, EXTENDED RELEASE 24 HR ORAL
Qty: 180 TABLET | Refills: 0 | Status: SHIPPED | OUTPATIENT
Start: 2021-06-11 | End: 2021-06-15

## 2021-06-11 RX ORDER — PREDNISONE 5 MG/1
TABLET ORAL
Qty: 60 TABLET | Refills: 0 | Status: SHIPPED | OUTPATIENT
Start: 2021-06-11 | End: 2021-08-12

## 2021-06-11 NOTE — TELEPHONE ENCOUNTER
Reason for call:  Form   Our goal is to have forms completed within 72 hours, however some forms may require a visit or additional information.     Who is the form from? Patient  Where did the form come from? Patient or family brought in     What clinic location was the form placed at? [facility]  Where was the form placed? [facility]  What number is listed as a contact on the form? [phone]    Phone call message - patient request for a letter, form or note:     Date needed: as soon as possible  Please mail to  [address] [place] [id number]  Has the patient signed a consent form for release of information? NO    Additional comments:     Type of letter, form or note: medical    Phone number to reach patient:  Home number on file [phone] (home)    Best Time:  ANY    Can we leave a detailed message on this number?  NO    Travel screening: Negative

## 2021-06-11 NOTE — TELEPHONE ENCOUNTER
Hasn't been seen in almost a year. I will give a refill for 30 days, but he needs to make appointment. Please let pt know. Thanks

## 2021-06-14 DIAGNOSIS — G50.0 TRIGEMINAL NEURALGIA: ICD-10-CM

## 2021-06-14 DIAGNOSIS — R73.03 PREDIABETES: ICD-10-CM

## 2021-06-15 RX ORDER — GABAPENTIN 300 MG/1
CAPSULE ORAL
Qty: 180 CAPSULE | Refills: 1 | Status: SHIPPED | OUTPATIENT
Start: 2021-06-15 | End: 2021-08-12

## 2021-06-15 RX ORDER — METFORMIN HCL 500 MG
TABLET, EXTENDED RELEASE 24 HR ORAL
Qty: 180 TABLET | Refills: 0 | Status: SHIPPED | OUTPATIENT
Start: 2021-06-15 | End: 2021-11-16

## 2021-06-23 NOTE — TELEPHONE ENCOUNTER
The Form has been completed by the provider, confirmed faxed to the fax number on the form and listed below and a copy has been sent to be added to the chart. Annette Varela,

## 2021-06-29 ENCOUNTER — TRANSFERRED RECORDS (OUTPATIENT)
Dept: HEALTH INFORMATION MANAGEMENT | Facility: CLINIC | Age: 76
End: 2021-06-29

## 2021-06-29 DIAGNOSIS — E87.1 HYPONATREMIA: ICD-10-CM

## 2021-06-29 DIAGNOSIS — C61 MALIGNANT NEOPLASM OF PROSTATE (H): ICD-10-CM

## 2021-06-29 LAB
PSA SERPL-MCNC: 0.14 UG/L (ref 0–4)
SODIUM SERPL-SCNC: 141 MMOL/L (ref 133–144)

## 2021-06-29 PROCEDURE — 36415 COLL VENOUS BLD VENIPUNCTURE: CPT | Performed by: UROLOGY

## 2021-06-29 PROCEDURE — 84295 ASSAY OF SERUM SODIUM: CPT | Performed by: UROLOGY

## 2021-06-29 PROCEDURE — 84153 ASSAY OF PSA TOTAL: CPT | Performed by: UROLOGY

## 2021-06-29 NOTE — RESULT ENCOUNTER NOTE
Your results are normal.  Your final test results are pending.  Please check your chart again within 2 - 3 days. You will receive further instruction when your full test result panel is complete.     Genoveva Oshea MD

## 2021-07-01 ENCOUNTER — DOCUMENTATION ONLY (OUTPATIENT)
Dept: OPHTHALMOLOGY | Facility: CLINIC | Age: 76
End: 2021-07-01

## 2021-07-06 ENCOUNTER — OFFICE VISIT (OUTPATIENT)
Dept: UROLOGY | Facility: CLINIC | Age: 76
End: 2021-07-06
Payer: COMMERCIAL

## 2021-07-06 VITALS — DIASTOLIC BLOOD PRESSURE: 79 MMHG | HEART RATE: 78 BPM | OXYGEN SATURATION: 98 % | SYSTOLIC BLOOD PRESSURE: 150 MMHG

## 2021-07-06 DIAGNOSIS — E66.01 MORBID OBESITY (H): ICD-10-CM

## 2021-07-06 DIAGNOSIS — C61 MALIGNANT NEOPLASM OF PROSTATE (H): Primary | ICD-10-CM

## 2021-07-06 DIAGNOSIS — I10 HYPERTENSION, UNSPECIFIED TYPE: ICD-10-CM

## 2021-07-06 PROCEDURE — 51798 US URINE CAPACITY MEASURE: CPT | Performed by: UROLOGY

## 2021-07-06 PROCEDURE — 99213 OFFICE O/P EST LOW 20 MIN: CPT | Mod: 25 | Performed by: UROLOGY

## 2021-07-06 NOTE — PROGRESS NOTES
Chief Complaint   Patient presents with     RECHECK       Rakan Nolasco is a 75 year old male who presents today for follow up of   Chief Complaint   Patient presents with     RECHECK   Patient is a pleasant 75-year-old male with history of prostate cancer status post cryotherapy x2.  His recent PSA was 0.14 which is down from 1.79.  He has mild urethral stricture status post dilation recently which has improved his urinary flow.  His main complaints are urinary frequency in the daytime.  However his recent glycosylated hemoglobin was 7.1.  He has morbid obesity also.  Current Outpatient Medications   Medication Sig Dispense Refill     albuterol (PROAIR HFA/PROVENTIL HFA/VENTOLIN HFA) 108 (90 Base) MCG/ACT inhaler 2 PUFFS INHALATION ROUTE EVERY 4 HOURS FOR FOR SHORTNESS OF BREATH, COUGH, OR WITH EXERCISE 1 Inhaler 2     amLODIPine (NORVASC) 10 MG tablet TAKE 1 TABLET BY MOUTH EVERY DAY 90 tablet 1     aspirin 81 MG tablet Take 1 tablet by mouth daily.       atorvastatin (LIPITOR) 20 MG tablet Take 1 tablet (20 mg) by mouth daily 90 tablet 1     blood glucose (ONETOUCH VERIO IQ) test strip USE TO TEST BLOOD SUGARS 1 TIMES DAILY OR AS DIRECTED 100 strip 2     blood glucose monitoring (NO BRAND SPECIFIED) meter device kit Use to test blood sugar 1 times daily or as directed. 1 kit 0     ClonAZEPAM (KLONOPIN) 0.5 MG tablet Take 0.5 mg by mouth. Take 1 tablet by mouth daily at bedtime       furosemide (LASIX) 20 MG tablet Take 1 tablet (20 mg) by mouth daily 30 tablet 1     gabapentin (NEURONTIN) 300 MG capsule TAKE 2 CAPSULES BY MOUTH THREE TIMES A  capsule 1     latanoprost (XALATAN) 0.005 % ophthalmic solution Place 1 drop into both eyes At Bedtime 7.5 mL 3     lisinopril (ZESTRIL) 30 MG tablet TAKE 1 TABLET BY MOUTH EVERY DAY 90 tablet 1     metFORMIN (GLUCOPHAGE-XR) 500 MG 24 hr tablet TAKE 1 TABLET BY MOUTH TWICE A DAY WITH MEALS 180 tablet 0     OneTouch Delica Lancets 30G MISC 1 each by In Vitro route  daily 100 each 1     pantoprazole (PROTONIX) 20 MG [facility] tablet TAKE 1 TABLET BY MOUTH EVERY DAY 90 tablet 2     predniSONE (DELTASONE) 5 MG tablet TAKE 2 TABLETS (10 MG) BY MOUTH DAILY. 60 tablet 0     pyridostigmine (MESTINON) 60 MG tablet TAKE 2 TABLETS BY MOUTH 3 TIMES A  tablet 0     sodium bicarbonate 325 MG tablet Take 1 tablet (325 mg) by mouth 2 times daily 60 tablet 1     timolol maleate (TIMOPTIC) 0.5 % ophthalmic solution Place 1 drop into both eyes 2 times daily 5 mL 11     Allergies   Allergen Reactions     Azathioprine Shortness Of Breath     Was hospitalized from it. Also had high fever, chills, and shortness of breath.     Ciprofloxacin Muscle Pain (Myalgia)     Muscle pain  Other reaction(s): Myalgia  Other reaction(s): Myalgia     Ropinirole      Leg pan  GI  Weakness; ? sycope  Other reaction(s): Leg Pain      Past Medical History:   Diagnosis Date     Arthritis          BMI 31.0-31.9,adult      Cancer (H) 01/10/2018    Prostate     COPD (chronic obstructive pulmonary disease) (H) 10/28/2020     Demand ischemia (H)      Diabetes (H) 01/10/2018     Diverticulosis     Colonoscopy 8/2008     Fatty liver     see US  5/2012     GERD (gastroesophageal reflux disease)      Glaucoma (increased eye pressure)      HTN (hypertension)      Hyperlipidemia LDL goal < 130     age, htn, fhx     Irritable bowel      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      Nonsenile cataract      ROSIE (obstructive sleep apnea) 01/2011    Using CPAP;      Prediabetes      Restless leg syndrome      Trigeminal neuralgia pain 01/04/2012     Past Surgical History:   Procedure Laterality Date     BIOPSY ARTERY TEMPORAL Bilateral 8/1/2017    Procedure: BIOPSY ARTERY TEMPORAL;  Bilateral temporal artery Biopsy;  Surgeon: Jj Tello MD;  Location: [facility] OR     CATARACT IOL, RT/LT Right      COLONOSCOPY       ESOPHAGOSCOPY, GASTROSCOPY, DUODENOSCOPY (EGD), COMBINED  1/15/2014     Procedure: COMBINED ESOPHAGOSCOPY, GASTROSCOPY, DUODENOSCOPY (EGD), BIOPSY SINGLE OR MULTIPLE;;  Surgeon: Winston Nixon MD;  Location: [facility] OR     LASER YAG CAPSULOTOMY Right 2018    right eye     PHACOEMULSIFICATION CLEAR CORNEA WITH STANDARD INTRAOCULAR LENS IMPLANT Right 2017    Procedure: PHACOEMULSIFICATION CLEAR CORNEA WITH STANDARD INTRAOCULAR LENS IMPLANT;  Surgeon: Mateo Gray MD;  Location:  [facility]     RETINAL REATTACHMENT Right      SURGICAL HISTORY OF -   2009    Both Eyelids-.     TONSILLECTOMY  as child     Family History   Problem Relation Age of Onset     Respiratory Mother         copd     LUNG DISEASE Mother      Allergies Brother      Cancer Maternal Grandmother      Heart Disease Paternal Grandmother      Cerebrovascular Disease Father      Cancer Father      Other Cancer Father      Glaucoma Maternal Aunt      Macular Degeneration No family hx of      Social History     Socioeconomic History     Marital status: Single     Spouse name: None     Number of children: 0     Years of education: 12     Highest education level: None   Occupational History     Occupation: Retired 3/2012     Comment: [employer]   Social Needs     Financial resource strain: None     Food insecurity     Worry: None     Inability: None     Transportation needs     Medical: None     Non-medical: None   Tobacco Use     Smoking status: Former Smoker     Packs/day: 2.00     Years: 15.00     Pack years: 30.00     Types: Cigarettes     Start date: 1968     Quit date: 1983     Years since quittin.0     Smokeless tobacco: Former User     Quit date: 1970     Tobacco comment: smoke free household.   Substance and Sexual Activity     Alcohol use: Not Currently     Comment: Quit in      Drug use: No     Sexual activity: Not Currently     Partners: Female     Comment: 2010  No girlfriend at this time.   Lifestyle     Physical activity     Days per week:  None     Minutes per session: None     Stress: None   Relationships     Social connections     Talks on phone: None     Gets together: None     Attends Spiritism service: None     Active member of club or organization: None     Attends meetings of clubs or organizations: None     Relationship status: None     Intimate partner violence     Fear of current or ex partner: None     Emotionally abused: None     Physically abused: None     Forced sexual activity: None   Other Topics Concern     Parent/sibling w/ CABG, MI or angioplasty before 65F 55M? No   Social History Narrative     None       REVIEW OF SYSTEMS  =================  C: NEGATIVE for fever, chills, change in weight  I: NEGATIVE for worrisome rashes, moles or lesions  E/M: NEGATIVE for ear, mouth and throat problems  R: NEGATIVE for significant cough or SHORTNESS OF BREATH  CV:  NEGATIVE for chest pain, palpitations or peripheral edema  GI: NEGATIVE for nausea, abdominal pain, heartburn, or change in bowel habits  NEURO: NEGATIVE numbness/weakness  : see HPI  PSYCH: NEGATIVE depression/anxiety  LYmph: no new enlarged lymph nodes  Ortho: no new trauma/movements    Physical Exam:  BP (!) 150/79 (BP Location: Right arm, Patient Position: Chair, Cuff Size: Adult Large)   Pulse 78   SpO2 98%    Patient is pleasant, in no acute distress, good general condition.  Lung: no evidence of respiratory distress    Abdomen: Soft, nondistended, non tender. No masses. No rebound or guarding.   Exam: No CVA tenderness  Skin: Warm and dry.  No redness.  Psych: normal mood and affect  Neuro: alert and oriented  Musculaskeletal: moving all extremities    Assessment/Plan:   (N32.81) OAB (overactive bladder)  (primary encounter diagnosis)  Comment: Previously prescribed Myrbetriq however patient never started on it  Plan: Urine frequency may be related to his diabetes mellitus due to obesity.    (C61) Malignant neoplasm of prostate (H)  Comment: Status post cryotherapy  x2  Plan: PSA tumor marker        In 6 months    (E66.01) Morbid obesity (H)  Comment: Follow-up with primary care MD  Plan: See above    HTN: Rakan to follow up with Primary Care provider regarding elevated blood pressure.

## 2021-07-06 NOTE — PATIENT INSTRUCTIONS
Please follow up with Dr. Emery in 6 months with a PSA test.  Call [phone] to schedule a lab appointment and in person exam with Dr. Emery.  Please call our office if you have questions or need to report any information, [phone].

## 2021-07-09 ENCOUNTER — OFFICE VISIT (OUTPATIENT)
Dept: FAMILY MEDICINE | Facility: CLINIC | Age: 76
End: 2021-07-09
Payer: COMMERCIAL

## 2021-07-09 VITALS
HEIGHT: 70 IN | BODY MASS INDEX: 35.07 KG/M2 | TEMPERATURE: 97.6 F | HEART RATE: 87 BPM | DIASTOLIC BLOOD PRESSURE: 54 MMHG | RESPIRATION RATE: 20 BRPM | SYSTOLIC BLOOD PRESSURE: 124 MMHG | WEIGHT: 245 LBS | OXYGEN SATURATION: 98 %

## 2021-07-09 DIAGNOSIS — R60.0 PEDAL EDEMA: ICD-10-CM

## 2021-07-09 DIAGNOSIS — J44.9 CHRONIC OBSTRUCTIVE PULMONARY DISEASE, UNSPECIFIED COPD TYPE (H): ICD-10-CM

## 2021-07-09 DIAGNOSIS — I10 BENIGN ESSENTIAL HYPERTENSION: Primary | ICD-10-CM

## 2021-07-09 DIAGNOSIS — R06.00 DYSPNEA, UNSPECIFIED TYPE: ICD-10-CM

## 2021-07-09 PROCEDURE — 99214 OFFICE O/P EST MOD 30 MIN: CPT | Performed by: NURSE PRACTITIONER

## 2021-07-09 RX ORDER — FUROSEMIDE 20 MG
20 TABLET ORAL DAILY
Status: CANCELLED | OUTPATIENT
Start: 2021-07-09

## 2021-07-09 ASSESSMENT — MIFFLIN-ST. JEOR: SCORE: 1854.62

## 2021-07-09 ASSESSMENT — PAIN SCALES - GENERAL: PAINLEVEL: NO PAIN (0)

## 2021-07-09 NOTE — PATIENT INSTRUCTIONS
Take lasix 2 tabs (40 mg total) daily in the morning for the next 4 days. Recheck blood work on Monday.     Follow up in 2-4 weeks for blood pressure and COPD check (after your breathing test).

## 2021-07-09 NOTE — PROGRESS NOTES
Assessment & Plan     (I10) Benign essential hypertension  (primary encounter diagnosis)  Comment: Recently elevated BP's. Was seen in the ED for HTN, blurry vision and epistaxis. An ECG was done results indicate NSR with an incomplete RBBB, his troponin was WNL and an head CT reveiled no evidence of acute intracranial hemorrhage, mass or recent infarct.  His WBC was elevated at 11.1, Mr. Nolasco has a chronic history of this. Echo from 2018 EF 60-65%. Also has increased pedal edema so will have him take an 40 mg of lasix daily in the morning for the next 4 days and recheck labs on Monday.    Plan: Comprehensive metabolic panel (BMP + Alb, Alk         Phos, ALT, AST, Total. Bili, TP), BNP-N         terminal pro            (J44.9) Chronic obstructive pulmonary disease, unspecified COPD type (H)  Comment: Pt has never had PFTs. Was told that he possibly had COVID even though his COVID PCR and antibody results both have come back negative. Will order PFT. He has been using a CPAP machine that is on the recall list by [employer] and wonders if this could be contributing to his symptoms, will follow up with sleep medicine. Denies PND, sleeps flat on back without dyspnea.        (R60.0) Pedal edema  Comment: 2+ pedal edema. Work up to date includes unremarkable echocardiogram, stress echo, BNP, troponins, chest CT PE.  Will have him take an 40 mg of lasix daily in the morning for the next 4 days and recheck labs on Monday.   Plan: Comprehensive metabolic panel (BMP + Alb, Alk         Phos, ALT, AST, Total. Bili, TP), BNP-N         terminal pro            (R06.00) Dyspnea, unspecified type  Comment: see above  Plan: BNP-N terminal pro, General PFT Lab (Please         always keep checked), Pulmonary Function Test              Review of prior external note(s) from - [facility]where information from [facility] reviewed  Reviewed prior echo, stress echo, labs, EKG, CT head/brain, holter, chest CT    See Patient  Instructions    Return in about 3 days (around 2021) for lab only check. 2-4 weeks for blood pressure and COPD check..       GAYLE Woodruff Student  Student Nurse Practitioner   [facility]     I very much appreciated the opportunity to see and assess this patient in the clinic with NP student.  I agree with the above note which summarizes my findings and current recommendations. I have reviewed all diagnostics noted and performed physical exam. Changes were made in the body of the note to achieve one comprehensive document.    Jenn Keane, JACK CNP  [facility] THIAGO Bledsoe is a 75 year old who presents for the following health issues     HPI     BP yesterday 169/85, 176/81, Today: 186/87  Yesterday had blurry vision and epistaxis. Has had epistaxis in the past during [place] with poor humidity. Dizzy all the time. Global headache yesterday (hx of trigeminal neuralgia).   Intermittent lightheadedness. not with these episodes.   Has this intermittently history of myasthenia  and detached retina.    Positive for SOB, intermittent runny nose (clear), watery eyes (clear).  Denies palpitations, flutter     Albuterol twice a day. His CPAP machine is a recall machine and wonders if this might be a contributor. History of COVID and breathing more affected. COPD diagnosed 2020.    Covid vaccinated.     Home BP machine is fairly old.    ED/UC Followup:    Facility:  [facility]   Date of visit: 2021  Reason for visit: Elevated blood pressure   Current Status: Patient reported that he is checking his BP often at home. He is taking BP medications as directed, never missed.        COPD Follow-Up    Overall, how are your COPD symptoms since your last clinic visit?  Much worse    How much fatigue or shortness of breath do you have when you are walking?  A lot more than usual    How much shortness of breath do you have when  you are resting?  None    How often do you cough? Rarely    Have you noticed any change in your sputum/phlegm?  No    Have you experienced a recent fever? No    Please describe how far you can walk without stopping to rest:  2-5 blocks if walking slowly     How many flights of stairs are you able to walk up without stopping?  2    Have you had any Emergency Room Visits, Urgent Care Visits, or Hospital Admissions because of your COPD since your last office visit?  No    History   Smoking Status     Former Smoker     Packs/day: 2.00     Years: 15.00     Types: Cigarettes     Start date: 1/1/1968     Quit date: 1/1/1983   Smokeless Tobacco     Former User     Quit date: 1/1/1970     Comment: smoke free household.     No results found for: FEV1, WIF0XID      Review of Systems   Constitutional, HEENT, cardiovascular, pulmonary, gi and gu systems are negative, except as otherwise noted.      Objective    There were no vitals taken for this visit.  There is no height or weight on file to calculate BMI.  Physical Exam   GENERAL: healthy, alert and no distress  EYES: Eyes grossly normal to inspection, PERRL and conjunctivae and sclerae normal  HENT: ear canals and TM's normal, nose and mouth without ulcers or lesions  NECK: unable to assess JVD due to body habitus  RESP: lungs clear to auscultation - no rales, rhonchi or wheezes  CV: regular rate and rhythm, normal S1 S2, no S3 or S4, no murmur, click or rub, no peripheral edema and peripheral pulses strong  MS: normal muscle tone and 2+ edema to bilateral lower legs  PSYCH: mentation appears normal, affect normal/bright

## 2021-07-12 ENCOUNTER — LAB (OUTPATIENT)
Dept: LAB | Facility: CLINIC | Age: 76
End: 2021-07-12
Payer: COMMERCIAL

## 2021-07-12 DIAGNOSIS — R06.00 DYSPNEA, UNSPECIFIED TYPE: ICD-10-CM

## 2021-07-12 DIAGNOSIS — C61 MALIGNANT NEOPLASM OF PROSTATE (H): ICD-10-CM

## 2021-07-12 DIAGNOSIS — R60.0 PEDAL EDEMA: ICD-10-CM

## 2021-07-12 DIAGNOSIS — I10 BENIGN ESSENTIAL HYPERTENSION: ICD-10-CM

## 2021-07-12 LAB
ALBUMIN SERPL-MCNC: 4.1 G/DL (ref 3.4–5)
ALP SERPL-CCNC: 50 U/L (ref 40–150)
ALT SERPL W P-5'-P-CCNC: 34 U/L (ref 0–70)
ANION GAP SERPL CALCULATED.3IONS-SCNC: 5 MMOL/L (ref 3–14)
AST SERPL W P-5'-P-CCNC: 17 U/L (ref 0–45)
BILIRUB SERPL-MCNC: 0.5 MG/DL (ref 0.2–1.3)
BUN SERPL-MCNC: 19 MG/DL (ref 7–30)
CALCIUM SERPL-MCNC: 9 MG/DL (ref 8.5–10.1)
CHLORIDE BLD-SCNC: 103 MMOL/L (ref 94–109)
CO2 SERPL-SCNC: 30 MMOL/L (ref 20–32)
CREAT SERPL-MCNC: 1.02 MG/DL (ref 0.66–1.25)
GFR SERPL CREATININE-BSD FRML MDRD: 73 ML/MIN/1.73M2
GLUCOSE BLD-MCNC: 156 MG/DL (ref 70–99)
NT-PROBNP SERPL-MCNC: 17 PG/ML (ref 0–125)
POTASSIUM BLD-SCNC: 4 MMOL/L (ref 3.4–5.3)
PROT SERPL-MCNC: 7.5 G/DL (ref 6.8–8.8)
PSA SERPL-MCNC: 0.18 UG/L (ref 0–4)
SODIUM SERPL-SCNC: 138 MMOL/L (ref 133–144)

## 2021-07-12 PROCEDURE — 80053 COMPREHEN METABOLIC PANEL: CPT

## 2021-07-12 PROCEDURE — 36415 COLL VENOUS BLD VENIPUNCTURE: CPT

## 2021-07-12 PROCEDURE — 84153 ASSAY OF PSA TOTAL: CPT

## 2021-07-12 PROCEDURE — 83880 ASSAY OF NATRIURETIC PEPTIDE: CPT

## 2021-07-13 ENCOUNTER — NURSE TRIAGE (OUTPATIENT)
Dept: FAMILY MEDICINE | Facility: CLINIC | Age: 76
End: 2021-07-13

## 2021-07-13 DIAGNOSIS — I10 BENIGN ESSENTIAL HYPERTENSION: Primary | ICD-10-CM

## 2021-07-13 DIAGNOSIS — R60.9 EDEMA, UNSPECIFIED TYPE: ICD-10-CM

## 2021-07-13 DIAGNOSIS — R06.00 DYSPNEA, UNSPECIFIED TYPE: ICD-10-CM

## 2021-07-13 LAB
DLCOUNC-%PRED-PRE: 98 %
DLCOUNC-PRE: 24.29 ML/MIN/MMHG
DLCOUNC-PRED: 24.67 ML/MIN/MMHG
ERV-%PRED-PRE: 59 %
ERV-PRE: 0.3 L
ERV-PRED: 0.51 L
EXPTIME-PRE: 9.87 SEC
FEF2575-%PRED-POST: 75 %
FEF2575-%PRED-PRE: 41 %
FEF2575-POST: 1.71 L/SEC
FEF2575-PRE: 0.94 L/SEC
FEF2575-PRED: 2.26 L/SEC
FEFMAX-%PRED-PRE: 71 %
FEFMAX-PRE: 5.56 L/SEC
FEFMAX-PRED: 7.83 L/SEC
FEV1-%PRED-PRE: 61 %
FEV1-PRE: 1.84 L
FEV1FEV6-PRE: 66 %
FEV1FEV6-PRED: 77 %
FEV1FVC-PRE: 64 %
FEV1FVC-PRED: 76 %
FEV1SVC-PRE: 60 %
FEV1SVC-PRED: 67 %
FIFMAX-PRE: 5.68 L/SEC
FRCPLETH-%PRED-PRE: 107 %
FRCPLETH-PRE: 3.94 L
FRCPLETH-PRED: 3.67 L
FVC-%PRED-PRE: 72 %
FVC-PRE: 2.89 L
FVC-PRED: 4 L
IC-%PRED-PRE: 69 %
IC-PRE: 2.79 L
IC-PRED: 4.01 L
RVPLETH-%PRED-PRE: 136 %
RVPLETH-PRE: 3.64 L
RVPLETH-PRED: 2.67 L
TLCPLETH-%PRED-PRE: 97 %
TLCPLETH-PRE: 6.74 L
TLCPLETH-PRED: 6.92 L
VA-%PRED-PRE: 84 %
VA-PRE: 5.19 L
VC-%PRED-PRE: 68 %
VC-PRE: 3.1 L
VC-PRED: 4.52 L

## 2021-07-13 PROCEDURE — 94060 EVALUATION OF WHEEZING: CPT | Performed by: INTERNAL MEDICINE

## 2021-07-13 PROCEDURE — 94729 DIFFUSING CAPACITY: CPT | Performed by: INTERNAL MEDICINE

## 2021-07-13 PROCEDURE — 94726 PLETHYSMOGRAPHY LUNG VOLUMES: CPT | Performed by: INTERNAL MEDICINE

## 2021-07-13 RX ORDER — FUROSEMIDE 20 MG
40 TABLET ORAL DAILY
Qty: 180 TABLET | Refills: 0 | Status: SHIPPED | OUTPATIENT
Start: 2021-07-13 | End: 2021-10-05

## 2021-07-13 RX ORDER — LISINOPRIL 40 MG/1
40 TABLET ORAL DAILY
Qty: 30 TABLET | Refills: 0 | Status: SHIPPED | OUTPATIENT
Start: 2021-07-13 | End: 2021-08-04

## 2021-07-13 NOTE — TELEPHONE ENCOUNTER
Called patient. Notified him of message from provider as written. Pt verbalized understanding.   Jenn-- can I clarify #1 with you, I was assuming that you meant lisinopril, correct? I instructed pt per your message to take   Lisinopril 40 mg daily  Lasix 40 mg daily ( 2 20 mg tabs)   And continue amlodipine 10 mg.  Are you okay if we send an update Rx for Lasix dosing?     Lisinopril 30 mg discontinued. Pt will take 2nd tab of Lasix prior to PFT's today. Pt states that he already did labs yesterday. Pt scheduled appt for Friday for lab/BP check.

## 2021-07-13 NOTE — TELEPHONE ENCOUNTER
1. Have him increase to lasix 40 mg PO daily. I will send a new Rx to his pharmacy.  45 mg is over the max daily dose.     2. Continue amlodipine 10 mg PO once daily.    3. Increase back to lasix 40 mg (two tabs) PO once daily. Take the second tablet prior to his appointment if he already took one this morning.    4. He should go to PFTs today.    5. Have him schedule follow up with us on Friday to recheck BP and labs.    Jenn Keane, APRN, CNP

## 2021-07-13 NOTE — TELEPHONE ENCOUNTER
Clarification:  Lisinopril 40 mg PO daily  Lasix 40 mg (two 20 mg tabs) PO daily  Amlodipine 10 mg PO daily    Follow up Friday.    Jenn Keane, APRN, CNP

## 2021-07-13 NOTE — TELEPHONE ENCOUNTER
"NATHALY Keane CNP-Please review and advise.  Please route encounter to appropriate care team pool.  Call received by [facility].  1. Patient does not have Lisinopril 10 mg tablets so has split Lisinopril 30 mg tablets in half and taken a total of Lisinopril 45 mg if systolic BP elevated (>150)   A. Patient wants to know if this is okay?  2. Are there other medication changes recommended?  3. Patient asked if it is okay for him to go to PFT appointment today?      Call warm transferred to writer.  Patient reporting high blood pressure.    Per patient:   1. Went into ER Thursday night due to high BP, which \"went down a little bit\"   A. ER doctor told to take an extra 10 mg Lisinopril if systolic BP >150   B. On Saturday systolic BP was 158 and took extra Lisinopril    I. Does not have Lisinopril 10 mg tabs so split Lisinopril 30 mg tabs in half and took a total of Lisinopril 45 mg   C. On Sunday was high again and did not take any extra medication   D. Yesterday BP high and took extra 15 mg Lisinopril   E. This morning BP was 192/87, took extra 15 mg Lisinopril at 0420 , then BP went down to 161/80 at 0655  2. Took Lasix 40 mg daily for four days, this AM took Lasix 20 mg  3. Took Amlodipine 10 mg this AM  4. Gets a slight headache/pressure in eyes when BP high   A. A little bit of blurred vision when BP elevated. No visual disturbances currently  5. No increased SOB from baseline  6. No chest pain currently, sometimes has a \"feeling\" in chest   A. Might have had this feeling when first woke up this AM  7. In ER, cardiac workup negative  8. Home BP cuff is automatic on upper arm  9. Was evaluated on 7/9/21 in office visit with NATHALY Keane CNP         BP Readings from Last 3 Encounters:   07/09/21 124/54   07/06/21 (!) 150/79   06/09/21 130/78       Reason for Disposition    BP Systolic BP >= 140 OR Diastolic >= 90 and postpartum (from 0 to 6 weeks after delivery)    Additional Information    Negative: " Sounds like a life-threatening emergency to the triager    Negative: Pregnant > 20 weeks or postpartum (< 6 weeks after delivery) and new hand or face swelling    Negative: Pregnant > 20 weeks and BP > 140/90    Negative: Systolic BP >= 160 OR Diastolic >= 100, and any cardiac or neurologic symptoms (e.g., chest pain, difficulty breathing, unsteady gait, blurred vision)    Negative: Patient sounds very sick or weak to the triager    Protocols used: HIGH BLOOD PRESSURE-A-DIANA ToureN, RN  [facility]

## 2021-07-16 ENCOUNTER — OFFICE VISIT (OUTPATIENT)
Dept: FAMILY MEDICINE | Facility: CLINIC | Age: 76
End: 2021-07-16
Payer: COMMERCIAL

## 2021-07-16 VITALS
HEIGHT: 70 IN | BODY MASS INDEX: 35.29 KG/M2 | TEMPERATURE: 97.6 F | OXYGEN SATURATION: 96 % | RESPIRATION RATE: 18 BRPM | DIASTOLIC BLOOD PRESSURE: 70 MMHG | SYSTOLIC BLOOD PRESSURE: 135 MMHG | HEART RATE: 72 BPM | WEIGHT: 246.5 LBS

## 2021-07-16 DIAGNOSIS — R60.0 PEDAL EDEMA: ICD-10-CM

## 2021-07-16 DIAGNOSIS — J44.9 CHRONIC OBSTRUCTIVE PULMONARY DISEASE, UNSPECIFIED COPD TYPE (H): Primary | ICD-10-CM

## 2021-07-16 DIAGNOSIS — D50.9 IRON DEFICIENCY ANEMIA, UNSPECIFIED IRON DEFICIENCY ANEMIA TYPE: ICD-10-CM

## 2021-07-16 DIAGNOSIS — G25.81 RESTLESS LEGS SYNDROME (RLS): ICD-10-CM

## 2021-07-16 DIAGNOSIS — R82.998 FOAMY URINE: ICD-10-CM

## 2021-07-16 DIAGNOSIS — G47.62 NOCTURNAL LEG CRAMPS: ICD-10-CM

## 2021-07-16 LAB
ALBUMIN UR-MCNC: NEGATIVE MG/DL
ANION GAP SERPL CALCULATED.3IONS-SCNC: 5 MMOL/L (ref 3–14)
APPEARANCE UR: CLEAR
BILIRUB UR QL STRIP: NEGATIVE
BUN SERPL-MCNC: 19 MG/DL (ref 7–30)
CALCIUM SERPL-MCNC: 8.8 MG/DL (ref 8.5–10.1)
CHLORIDE BLD-SCNC: 103 MMOL/L (ref 94–109)
CO2 SERPL-SCNC: 29 MMOL/L (ref 20–32)
COLOR UR AUTO: YELLOW
CREAT SERPL-MCNC: 1.03 MG/DL (ref 0.66–1.25)
CREAT UR-MCNC: 30 MG/DL
FERRITIN SERPL-MCNC: 10 NG/ML
GFR SERPL CREATININE-BSD FRML MDRD: 72 ML/MIN/1.73M2
GLUCOSE BLD-MCNC: 116 MG/DL (ref 70–99)
GLUCOSE UR STRIP-MCNC: NEGATIVE MG/DL
HGB BLD-MCNC: 10.9 G/DL (ref 13.3–17.7)
HGB UR QL STRIP: ABNORMAL
KETONES UR STRIP-MCNC: NEGATIVE MG/DL
LEUKOCYTE ESTERASE UR QL STRIP: NEGATIVE
MAGNESIUM SERPL-MCNC: 1.9 MG/DL (ref 1.6–2.3)
MICROALBUMIN UR-MCNC: <5 MG/DL
MICROALBUMIN/CREAT UR: NORMAL MG/G{CREAT}
NITRATE UR QL: NEGATIVE
PH UR STRIP: 5.5 [PH] (ref 5–7)
POTASSIUM BLD-SCNC: 4.2 MMOL/L (ref 3.4–5.3)
RBC #/AREA URNS AUTO: NORMAL /HPF
SODIUM SERPL-SCNC: 137 MMOL/L (ref 133–144)
SP GR UR STRIP: 1.01 (ref 1–1.03)
UROBILINOGEN UR STRIP-ACNC: 0.2 E.U./DL
VIT B12 SERPL-MCNC: 422 PG/ML (ref 193–986)
WBC #/AREA URNS AUTO: NORMAL /HPF

## 2021-07-16 PROCEDURE — 99215 OFFICE O/P EST HI 40 MIN: CPT | Performed by: NURSE PRACTITIONER

## 2021-07-16 PROCEDURE — 80048 BASIC METABOLIC PNL TOTAL CA: CPT | Performed by: NURSE PRACTITIONER

## 2021-07-16 PROCEDURE — 81001 URINALYSIS AUTO W/SCOPE: CPT | Performed by: NURSE PRACTITIONER

## 2021-07-16 PROCEDURE — 82043 UR ALBUMIN QUANTITATIVE: CPT | Performed by: NURSE PRACTITIONER

## 2021-07-16 PROCEDURE — 83735 ASSAY OF MAGNESIUM: CPT | Performed by: NURSE PRACTITIONER

## 2021-07-16 PROCEDURE — 82607 VITAMIN B-12: CPT | Performed by: NURSE PRACTITIONER

## 2021-07-16 PROCEDURE — 85018 HEMOGLOBIN: CPT | Performed by: NURSE PRACTITIONER

## 2021-07-16 PROCEDURE — 36415 COLL VENOUS BLD VENIPUNCTURE: CPT | Performed by: NURSE PRACTITIONER

## 2021-07-16 PROCEDURE — 82728 ASSAY OF FERRITIN: CPT | Performed by: NURSE PRACTITIONER

## 2021-07-16 ASSESSMENT — MIFFLIN-ST. JEOR: SCORE: 1861.43

## 2021-07-16 ASSESSMENT — PAIN SCALES - GENERAL: PAINLEVEL: NO PAIN (0)

## 2021-07-16 NOTE — PROGRESS NOTES
"    Assessment & Plan     Chronic obstructive pulmonary disease, unspecified COPD type (H)  Recent BNP and troponin unremarkable. PFT's show moderate obstruction with significant response to bronchodilators. FEV1/FVC 64. His worsening dyspnea is likely 2/2 to his COPD. Counseled on risks/benefits of daily medication. Will start Advair. Counseled to rinse out mouth after Advair use to help prevent thrush. Continue albuterol inhaler prn.   - fluticasone-salmeterol (ADVAIR) 250-50 MCG/DOSE inhaler; Inhale 1 puff into the lungs 2 times daily    Pedal edema  Improved with use of lasix 40 mg once daily. Will check kidney function and electrolyte levels for medication surveillance and new nocturnal leg pains.   - Basic metabolic panel  (Ca, Cl, CO2, Creat, Gluc, K, Na, BUN); Future  - Magnesium; Future  - Vitamin B12; Future    Nocturnal leg cramps  See above  - Basic metabolic panel  (Ca, Cl, CO2, Creat, Gluc, K, Na, BUN); Future  - Magnesium; Future  - Vitamin B12; Future    Restless legs syndrome (RLS)  Will check ferritin level as he has RLS and recent anemia.   - Ferritin; Future    Iron deficiency anemia, unspecified iron deficiency anemia type  Recent anemia, will recheck today  - Ferritin; Future  - Hemoglobin; Future    Foamy urine  - UA macro with reflex to Microscopic and Culture - Clinc Collect  - Albumin Random Urine Quantitative with Creat Ratio  - Urine Microscopic    Review of external notes as documented elsewhere in note  Review of the result(s) of each unique test - as noted below  Ordering of each unique test  Prescription drug management  40 minutes spent on the date of the encounter doing chart review, history and exam, documentation and further activities per the note       BMI:   Estimated body mass index is 35.88 kg/m  as calculated from the following:    Height as of this encounter: 1.765 m (5' 9.5\").    Weight as of this encounter: 111.8 kg (246 lb 8 oz).   Weight management plan: Discussed " healthy diet and exercise guidelines    Return in about 3 months (around 10/16/2021) for Follow up clinic visit COPD and hypertension.    JACK Allen CNP  [facility] THIAGO Bledsoe is a 75 year old who presents for the following health issues     HPI     Hypertension Follow-up      Do you check your blood pressure regularly outside of the clinic? Yes, gets high readings with his own machine 120-150s/60-80s. On his home machine in clinic today 128/76.    Are you following a low salt diet? Yes    Are your blood pressures ever more than 140 on the top number (systolic) OR more   than 90 on the bottom number (diastolic), for example 140/90? Yes    How many servings of fruits and vegetables do you eat daily?  2-3  Or none     On average, how many sweetened beverages do you drink each day (Examples: soda, juice, sweet tea, etc.  Do NOT count diet or artificially sweetened beverages)?   0    How many days per week do you exercise enough to make your heart beat faster? 0    How many minutes a day do you exercise enough to make your heart beat faster? 0    How many days per week do you miss taking your medication? 0    COPD Follow-Up    Overall, how are your COPD symptoms since your last clinic visit?  No change    How much fatigue or shortness of breath do you have when you are walking?  Same as usual    How much shortness of breath do you have when you are resting?  None    How often do you cough? Rarely    Have you noticed any change in your sputum/phlegm?  No    Have you experienced a recent fever? No    Please describe how far you can walk without stopping to rest:  2-5 blocks if he goes slowly     How many flights of stairs are you able to walk up without stopping?  2    Have you had any Emergency Room Visits, Urgent Care Visits, or Hospital Admissions because of your COPD since your last office visit?  No    History   Smoking Status     Former Smoker     Packs/day: 2.00      "Years: 15.00     Types: Cigarettes     Start date: 1/1/1968     Quit date: 1/1/1983   Smokeless Tobacco     Former User     Quit date: 1/1/1970     Comment: smoke free household.     States that he get leg pains at night in both calves for the past 3-4 months.  Does not hurt when he is up and walking. Does not occur every night. States that he has a history of RLS that he takes clonazepan 0.5 mg at bedtime for and it controls the RLS.    States that he has noticed that his urine is more foamy than normal the past couple of months. Denies other urinary symptoms.     Review of Systems   Constitutional, HEENT, cardiovascular, pulmonary, gi and gu systems are negative, except as otherwise noted.      Objective    /70 (BP Location: Right arm, Patient Position: Sitting, Cuff Size: Adult Large)   Pulse 72   Temp 97.6  F (36.4  C) (Oral)   Resp 18   Ht 1.765 m (5' 9.5\")   Wt 111.8 kg (246 lb 8 oz)   SpO2 96%   BMI 35.88 kg/m    Body mass index is 35.88 kg/m .  Physical Exam   GENERAL: healthy, alert and no distress  EYES: Eyes grossly normal to inspection, PERRL and conjunctivae and sclerae normal  RESP: lungs clear to auscultation - no rales, rhonchi or wheezes and decreased breath sounds throughout  CV: regular rate and rhythm, normal S1 S2, no S3 or S4, no murmur, click or rub, no peripheral edema and peripheral pulses strong  MS: no gross musculoskeletal defects noted, no edema  SKIN: no suspicious lesions or rashes  NEURO: Normal strength and tone, mentation intact and speech normal  PSYCH: mentation appears normal, affect normal/bright    Diagnostics reviewed today  Orders Only on 07/13/2021   Component Date Value Ref Range Status     FVC-Pred 07/13/2021 4.00  L Final     FVC-Pre 07/13/2021 2.89  L Final     FVC-%Pred-Pre 07/13/2021 72  % Final     FEV1-Pre 07/13/2021 1.84  L Final     FEV1-%Pred-Pre 07/13/2021 61  % Final     FEV1FVC-Pred 07/13/2021 76  % Final     FEV1FVC-Pre 07/13/2021 64  % Final     " FEFMax-Pred 07/13/2021 7.83  L/sec Final     FEFMax-Pre 07/13/2021 5.56  L/sec Final     FEFMax-%Pred-Pre 07/13/2021 71  % Final     FEF2575-Pred 07/13/2021 2.26  L/sec Final     FEF2575-Pre 07/13/2021 0.94  L/sec Final     WZL8739-%Pred-Pre 07/13/2021 41  % Final     FEF2575-Post 07/13/2021 1.71  L/sec Final     PJJ5910-%Pred-Post 07/13/2021 75  % Final     ExpTime-Pre 07/13/2021 9.87  sec Final     FIFMax-Pre 07/13/2021 5.68  L/sec Final     VC-Pred 07/13/2021 4.52  L Final     VC-Pre 07/13/2021 3.10  L Final     VC-%Pred-Pre 07/13/2021 68  % Final     IC-Pred 07/13/2021 4.01  L Final     IC-Pre 07/13/2021 2.79  L Final     IC-%Pred-Pre 07/13/2021 69  % Final     ERV-Pred 07/13/2021 0.51  L Final     ERV-Pre 07/13/2021 0.30  L Final     ERV-%Pred-Pre 07/13/2021 59  % Final     FEV1FEV6-Pred 07/13/2021 77  % Final     FEV1FEV6-Pre 07/13/2021 66  % Final     FRCPleth-Pred 07/13/2021 3.67  L Final     FRCPleth-Pre 07/13/2021 3.94  L Final     FRCPleth-%Pred-Pre 07/13/2021 107  % Final     RVPleth-Pred 07/13/2021 2.67  L Final     RVPleth-Pre 07/13/2021 3.64  L Final     RVPleth-%Pred-Pre 07/13/2021 136  % Final     TLCPleth-Pred 07/13/2021 6.92  L Final     TLCPleth-Pre 07/13/2021 6.74  L Final     TLCPleth-%Pred-Pre 07/13/2021 97  % Final     DLCOunc-Pred 07/13/2021 24.67  ml/min/mmHg Final     DLCOunc-Pre 07/13/2021 24.29  ml/min/mmHg Final     DLCOunc-%Pred-Pre 07/13/2021 98  % Final     VA-Pre 07/13/2021 5.19  L Final     VA-%Pred-Pre 07/13/2021 84  % Final     FEV1SVC-Pred 07/13/2021 67  % Final     FEV1SVC-Pre 07/13/2021 60  % Final

## 2021-07-19 DIAGNOSIS — D50.9 IRON DEFICIENCY ANEMIA, UNSPECIFIED IRON DEFICIENCY ANEMIA TYPE: Primary | ICD-10-CM

## 2021-07-19 RX ORDER — FERROUS SULFATE 325(65) MG
325 TABLET ORAL
Qty: 30 TABLET | Refills: 0 | Status: SHIPPED | OUTPATIENT
Start: 2021-07-19 | End: 2021-08-11

## 2021-07-19 NOTE — TELEPHONE ENCOUNTER
Spoke with pt. Gave him provider's message as written. Lab only appt scheduled for 8/3.    Josefa Payne RN  [facility]

## 2021-07-19 NOTE — TELEPHONE ENCOUNTER
His hemoglobin is low. This may be contributing to his symptoms. Start an iron supplement and recheck levels in 2 weeks. Other labs are in the acceptable range.    Jenn Keane, JACK, CNP

## 2021-07-24 DIAGNOSIS — E78.5 HYPERLIPIDEMIA LDL GOAL <130: ICD-10-CM

## 2021-07-25 DIAGNOSIS — I10 HTN, GOAL BELOW 140/90: ICD-10-CM

## 2021-07-26 RX ORDER — ATORVASTATIN CALCIUM 20 MG/1
TABLET, FILM COATED ORAL
Qty: 90 TABLET | Refills: 1 | Status: SHIPPED | OUTPATIENT
Start: 2021-07-26 | End: 2022-01-17

## 2021-07-26 RX ORDER — AMLODIPINE BESYLATE 10 MG/1
TABLET ORAL
Qty: 90 TABLET | Refills: 1 | Status: SHIPPED | OUTPATIENT
Start: 2021-07-26 | End: 2022-01-16

## 2021-08-03 ENCOUNTER — LAB (OUTPATIENT)
Dept: LAB | Facility: CLINIC | Age: 76
End: 2021-08-03
Payer: COMMERCIAL

## 2021-08-03 DIAGNOSIS — R60.9 EDEMA, UNSPECIFIED TYPE: ICD-10-CM

## 2021-08-03 DIAGNOSIS — D50.9 IRON DEFICIENCY ANEMIA, UNSPECIFIED IRON DEFICIENCY ANEMIA TYPE: ICD-10-CM

## 2021-08-03 LAB
ERYTHROCYTE [DISTWIDTH] IN BLOOD BY AUTOMATED COUNT: 20 % (ref 10–15)
HCT VFR BLD AUTO: 37.2 % (ref 40–53)
HGB BLD-MCNC: 11.4 G/DL (ref 13.3–17.7)
HOLD SPECIMEN: NORMAL
MCH RBC QN AUTO: 23.8 PG (ref 26.5–33)
MCHC RBC AUTO-ENTMCNC: 30.6 G/DL (ref 31.5–36.5)
MCV RBC AUTO: 78 FL (ref 78–100)
PLATELET # BLD AUTO: 316 10E3/UL (ref 150–450)
RBC # BLD AUTO: 4.8 10E6/UL (ref 4.4–5.9)
WBC # BLD AUTO: 10.6 10E3/UL (ref 4–11)

## 2021-08-03 PROCEDURE — 36415 COLL VENOUS BLD VENIPUNCTURE: CPT

## 2021-08-03 PROCEDURE — 83550 IRON BINDING TEST: CPT

## 2021-08-03 PROCEDURE — 85027 COMPLETE CBC AUTOMATED: CPT

## 2021-08-03 PROCEDURE — 82728 ASSAY OF FERRITIN: CPT

## 2021-08-03 NOTE — TELEPHONE ENCOUNTER
Routing refill request to provider for review/approval because:  Drug not on the [facility] refill protocol           Pending Prescriptions:                       Disp   Refills    sodium bicarbonate 650 MG tablet [Pharmacy*30 tab*         Sig: TAKE 1/2 TABLET (325 MG) BY CLAUDIAUTH 2 TIMES DAILY        Krzysztof Rivas RN

## 2021-08-04 DIAGNOSIS — I10 BENIGN ESSENTIAL HYPERTENSION: ICD-10-CM

## 2021-08-04 LAB
FERRITIN SERPL-MCNC: 15 NG/ML (ref 26–388)
IRON SATN MFR SERPL: 14 % (ref 15–46)
IRON SERPL-MCNC: 52 UG/DL (ref 35–180)
TIBC SERPL-MCNC: 359 UG/DL (ref 240–430)

## 2021-08-04 RX ORDER — LISINOPRIL 40 MG/1
TABLET ORAL
Qty: 30 TABLET | Refills: 0 | Status: SHIPPED | OUTPATIENT
Start: 2021-08-04 | End: 2021-08-14

## 2021-08-04 RX ORDER — SODIUM BICARBONATE 650 MG/1
TABLET ORAL
Qty: 60 TABLET | Refills: 0 | Status: SHIPPED | OUTPATIENT
Start: 2021-08-04 | End: 2021-09-03

## 2021-08-11 DIAGNOSIS — I10 BENIGN ESSENTIAL HYPERTENSION: ICD-10-CM

## 2021-08-11 DIAGNOSIS — D50.9 IRON DEFICIENCY ANEMIA, UNSPECIFIED IRON DEFICIENCY ANEMIA TYPE: ICD-10-CM

## 2021-08-11 DIAGNOSIS — G50.0 TRIGEMINAL NEURALGIA: ICD-10-CM

## 2021-08-11 DIAGNOSIS — G70.00 MYASTHENIA GRAVIS, ACHR ANTIBODY POSITIVE (H): ICD-10-CM

## 2021-08-11 DIAGNOSIS — G70.00 OCULAR MYASTHENIA (H): ICD-10-CM

## 2021-08-11 RX ORDER — FERROUS SULFATE 325(65) MG
325 TABLET ORAL
Qty: 30 TABLET | Refills: 0 | Status: SHIPPED | OUTPATIENT
Start: 2021-08-11 | End: 2021-09-04

## 2021-08-11 NOTE — TELEPHONE ENCOUNTER
Patient needs his ferrous sulfate (FEROSUL) 325 (65 Fe) MG tablet sent to [facility] [address] [place].  Raisa Olivo  Team Valentina,

## 2021-08-12 RX ORDER — PREDNISONE 5 MG/1
TABLET ORAL
Qty: 60 TABLET | Refills: 0 | Status: SHIPPED | OUTPATIENT
Start: 2021-08-12 | End: 2021-09-13

## 2021-08-12 RX ORDER — PYRIDOSTIGMINE BROMIDE 60 MG/1
TABLET ORAL
Qty: 540 TABLET | Refills: 0 | Status: SHIPPED | OUTPATIENT
Start: 2021-08-12 | End: 2021-12-14

## 2021-08-12 RX ORDER — GABAPENTIN 300 MG/1
CAPSULE ORAL
Qty: 180 CAPSULE | Refills: 1 | Status: SHIPPED | OUTPATIENT
Start: 2021-08-12 | End: 2022-01-04

## 2021-08-14 RX ORDER — LISINOPRIL 40 MG/1
TABLET ORAL
Qty: 30 TABLET | Refills: 0 | Status: SHIPPED | OUTPATIENT
Start: 2021-08-14 | End: 2021-09-19

## 2021-08-23 DIAGNOSIS — E11.65 TYPE 2 DIABETES MELLITUS WITH HYPERGLYCEMIA, WITHOUT LONG-TERM CURRENT USE OF INSULIN (H): ICD-10-CM

## 2021-08-24 RX ORDER — BLOOD SUGAR DIAGNOSTIC
STRIP MISCELLANEOUS
Qty: 100 STRIP | Refills: 2 | Status: SHIPPED | OUTPATIENT
Start: 2021-08-24 | End: 2022-02-25

## 2021-08-24 NOTE — TELEPHONE ENCOUNTER
"Requested Prescriptions   Pending Prescriptions Disp Refills     ONETOUCH VERIO IQ test strip [Pharmacy Med Name: ONE TOUCH VERIO TEST STRIP] 100 strip 2     Sig: USE TO TEST BLOOD SUGARS 1 TIMES DAILY OR AS DIRECTED       Diabetic Supplies Protocol Passed - 8/23/2021 12:39 AM        Passed - Medication is active on med list        Passed - Patient is 18 years of age or older        Passed - Recent (6 mo) or future (30 days) visit within the authorizing provider's specialty     Patient had office visit in the last 6 months or has a visit in the next 30 days with authorizing provider.  See \"Patient Info\" tab in inbasket, or \"Choose Columns\" in Meds & Orders section of the refill encounter.               Prescription approved per [facility] Refill Protocol.    "

## 2021-09-02 DIAGNOSIS — D50.9 IRON DEFICIENCY ANEMIA, UNSPECIFIED IRON DEFICIENCY ANEMIA TYPE: ICD-10-CM

## 2021-09-04 RX ORDER — FERROUS SULFATE 325(65) MG
TABLET ORAL
Qty: 30 TABLET | Refills: 0 | Status: SHIPPED | OUTPATIENT
Start: 2021-09-04 | End: 2021-10-04

## 2021-09-13 DIAGNOSIS — G70.00 MYASTHENIA GRAVIS, ACHR ANTIBODY POSITIVE (H): ICD-10-CM

## 2021-09-13 DIAGNOSIS — E11.65 TYPE 2 DIABETES MELLITUS WITH HYPERGLYCEMIA, WITHOUT LONG-TERM CURRENT USE OF INSULIN (H): ICD-10-CM

## 2021-09-13 RX ORDER — PREDNISONE 5 MG/1
TABLET ORAL
Qty: 60 TABLET | Refills: 0 | Status: SHIPPED | OUTPATIENT
Start: 2021-09-13 | End: 2021-10-08

## 2021-09-15 RX ORDER — LANCETS 30 GAUGE
EACH MISCELLANEOUS
Qty: 100 EACH | Refills: 1 | Status: SHIPPED | OUTPATIENT
Start: 2021-09-15 | End: 2022-02-25

## 2021-09-15 NOTE — TELEPHONE ENCOUNTER
Prescription approved per [facility] Refill Protocol.    Soila Moreau RN

## 2021-09-16 DIAGNOSIS — I10 BENIGN ESSENTIAL HYPERTENSION: ICD-10-CM

## 2021-09-19 RX ORDER — LISINOPRIL 40 MG/1
TABLET ORAL
Qty: 30 TABLET | Refills: 0 | Status: SHIPPED | OUTPATIENT
Start: 2021-09-19 | End: 2021-10-15

## 2021-09-24 ENCOUNTER — OFFICE VISIT (OUTPATIENT)
Dept: FAMILY MEDICINE | Facility: CLINIC | Age: 76
End: 2021-09-24
Payer: COMMERCIAL

## 2021-09-24 VITALS
OXYGEN SATURATION: 95 % | SYSTOLIC BLOOD PRESSURE: 144 MMHG | TEMPERATURE: 97.1 F | DIASTOLIC BLOOD PRESSURE: 75 MMHG | HEART RATE: 76 BPM | RESPIRATION RATE: 14 BRPM | BODY MASS INDEX: 36.74 KG/M2 | WEIGHT: 252.4 LBS

## 2021-09-24 DIAGNOSIS — R09.89 CHEST CONGESTION: ICD-10-CM

## 2021-09-24 DIAGNOSIS — J02.9 SORE THROAT: Primary | ICD-10-CM

## 2021-09-24 DIAGNOSIS — Z11.52 ENCOUNTER FOR SCREENING LABORATORY TESTING FOR COVID-19 VIRUS: ICD-10-CM

## 2021-09-24 DIAGNOSIS — G70.00 MYASTHENIA GRAVIS (H): ICD-10-CM

## 2021-09-24 DIAGNOSIS — J44.9 CHRONIC OBSTRUCTIVE PULMONARY DISEASE, UNSPECIFIED COPD TYPE (H): ICD-10-CM

## 2021-09-24 LAB
DEPRECATED S PYO AG THROAT QL EIA: NEGATIVE
GROUP A STREP BY PCR: NOT DETECTED

## 2021-09-24 PROCEDURE — 99214 OFFICE O/P EST MOD 30 MIN: CPT | Performed by: PHYSICIAN ASSISTANT

## 2021-09-24 PROCEDURE — U0005 INFEC AGEN DETEC AMPLI PROBE: HCPCS | Mod: 90 | Performed by: PHYSICIAN ASSISTANT

## 2021-09-24 PROCEDURE — 99000 SPECIMEN HANDLING OFFICE-LAB: CPT | Performed by: PHYSICIAN ASSISTANT

## 2021-09-24 PROCEDURE — 87651 STREP A DNA AMP PROBE: CPT | Performed by: PHYSICIAN ASSISTANT

## 2021-09-24 PROCEDURE — U0003 INFECTIOUS AGENT DETECTION BY NUCLEIC ACID (DNA OR RNA); SEVERE ACUTE RESPIRATORY SYNDROME CORONAVIRUS 2 (SARS-COV-2) (CORONAVIRUS DISEASE [COVID-19]), AMPLIFIED PROBE TECHNIQUE, MAKING USE OF HIGH THROUGHPUT TECHNOLOGIES AS DESCRIBED BY CMS-2020-01-R: HCPCS | Mod: 90 | Performed by: PHYSICIAN ASSISTANT

## 2021-09-24 RX ORDER — PREDNISONE 20 MG/1
20 TABLET ORAL DAILY
Qty: 5 TABLET | Refills: 0 | Status: SHIPPED | OUTPATIENT
Start: 2021-09-24 | End: 2021-09-29

## 2021-09-24 NOTE — PATIENT INSTRUCTIONS
Your symptoms are concerning for a viral respiratory illness.  Your strep test is negative.  We are completing COVID-19 test today.  Due to your underlying COPD and myasthenia gravis, I have sent a prescription for prednisone as well as an antibiotic, Augmentin to your pharmacy.  If your symptoms are mild, you do not need to  or start these medications.  If your symptoms worsen and you develop shortness of breath, wheezing, or cough, I would recommend starting both medications.  Please continue all of your home medications as prescribed.    If you develop any severe symptoms such as difficulty breathing, chest pain, inability to speak in full sentences, or any other severe symptoms, please go to the emergency department.    Please reach out with any questions or concerns.  Return to clinic for new or worsening symptoms.  I have added COVID-19 guidelines as well as upper respiratory infection guidelines below.      Patient Education     Viral Upper Respiratory Illness (Adult)    You have a viral upper respiratory illness (URI), which is another term for the common cold. This illness is contagious during the first few days. It is spread through the air by coughing and sneezing. It may also be spread by direct contact (touching the sick person and then touching your own eyes, nose, or mouth). Frequent handwashing will decrease risk of spread. Most viral illnesses go away within 7 to 10 days with rest and simple home remedies. Sometimes the illness may last for several weeks. Antibiotics will not kill a virus, and they are generally not prescribed for this condition.  Home care    If symptoms are severe, rest at home for the first 2 to 3 days. When you resume activity, don't let yourself get too tired.    Don't smoke. If you need help stopping, talk with your healthcare provider.    Avoid being exposed to cigarette smoke (yours or others ).    You may use acetaminophen or ibuprofen to control pain and fever,  unless another medicine was prescribed. If you have chronic liver or kidney disease, have ever had a stomach ulcer or gastrointestinal bleeding, or are taking blood-thinning medicines, talk with your healthcare provider before using these medicines. Aspirin should never be given to anyone under 18 years of age who is ill with a viral infection or fever. It may cause severe liver or brain damage.    Your appetite may be poor, so a light diet is fine. Stay well hydrated by drinking 6 to 8 glasses of fluids per day (water, soft drinks, juices, tea, or soup). Extra fluids will help loosen secretions in the nose and lungs.    Over-the-counter cold medicines will not shorten the length of time you re sick, but they may be helpful for the following symptoms: cough, sore throat, and nasal and sinus congestion. If you take prescription medicines, ask your healthcare provider or pharmacist which over-the-counter medicines are safe to use. (Note: Don't use decongestants if you have high blood pressure.)  Follow-up care  Follow up with your healthcare provider, or as advised.  When to seek medical advice  Call your healthcare provider right away if any of these occur:    Cough with lots of colored sputum (mucus)    Severe headache; face, neck, or ear pain    Difficulty swallowing due to throat pain    Fever of 100.4 F (38 C) or higher, or as directed by your healthcare provider  Call 911  Call 911 if any of these occur:    Chest pain, shortness of breath, wheezing, or difficulty breathing    Coughing up blood    Very severe pain with swallowing, especially if it goes along with a muffled voice   [employer] last reviewed this educational content on 6/1/2018 2000-2021 [employer]. All rights reserved. This information is not intended as a substitute for professional medical care. Always follow your healthcare professional's instructions.         Instructions for Patients  Your symptoms show that you may have  coronavirus (COVID-19). This illness can cause fever, cough and trouble breathing. Many people get a mild case and get better on their own. Some people can get very sick.     Not all patients are tested for COVID-19. If you need to be tested, your care team will let you know.    How can I protect others?    Without a test, we can t know for sure that you have COVID-19. For safety, it s very important to follow these rules.    Stay home and away from others (self-isolate) until:    You ve had no fever--and no medicine that reduces fever--for 1 full day (24 hours), And     Your other symptoms have resolved (gotten better). For example, your cough or breathing has improved, And     At least 10 days have passed since your symptoms started.    During this time:    Stay in your own room (and use your own bathroom), if you can.    Stay away from others in your home. No hugging, kissing or shaking hands.    No visitors.    Don t go to work, school or anywhere else.     Clean  high touch  surfaces often (doorknobs, counters, handles, etc.). Use a household cleaning spray or wipes.    Cover your mouth and nose with a mask, tissue or washcloth to avoid spreading germs.    Wash your hands and face often. Use soap and water.    For more tips, go to https://www.cdc.gov/coronavirus/2019-ncov/downloads/10Things.pdf.    How can I take care of myself?    1. Get lots of rest. Drink extra fluids (unless a doctor has told you not to).     2. Take Tylenol (acetaminophen) for fever or pain. If you have liver or kidney problems, ask your family doctor if it's okay to take Tylenol.     Adults can take either:     650 mg (two 325 mg pills) every 4 to 6 hours, or     1,000 mg (two 500 mg pills) every 8 hours as needed.     Note: Don't take more than 3,000 mg in one day.   Acetaminophen is found in many medicines (both prescribed and over-the-counter medicines). Read all labels to be sure you don't take too much.   For children, check the  Tylenol bottle for the right dose. The dose is based on  the child's age or weight.    3. If you have other health problems (like cancer, heart failure, an organ transplant or severe kidney disease): Call your specialty clinic if you don't feel better in the next 2 days.    4. Know when to call 911: If your breathing is so bad that it keeps you from doing normal activities, call 911 or go to the emergency room. Tell them that you've been staying home and may have COVID-19.      Thank you for taking steps to prevent the spread of this virus.  o Limit your contact with others.  o Wear a simple mask to cover your cough.  o Wash your hands well and often.  o If you need medical care, go to [email or web address] or contact your health care provider.     For more about COVID-19 and caring for yourself at home, visit the CDC website at https://www.cdc.gov/coronavirus/2019-ncov/about/steps-when-sick.html.     To learn about care at [facility], please go to [email or web address]     [facility] clinical trials (COVID-19 research studies): [email or web address].    Below are the COVID-19 hotlines at the [facility] ([facility]). Interpreters are available.     For health questions: Call 070-094-2488 or 1-310.940.7289 (7 a.m. to 7 p.m.)    For questions about schools and childcare: Call 932-275-7014 or 1-229.109.9445 (7 a.m. to 7 p.m.)

## 2021-09-24 NOTE — PROGRESS NOTES
Assessment & Plan     Chest congestion  Sore throat  Encounter for screening laboratory testing for COVID-19 virus  Chronic obstructive pulmonary disease, unspecified COPD type (H)  Myasthenia gravis (H)  Patient is a 76-year-old male with past medical history significant for COPD and myasthenia gravis who presents to clinic due to 2 days of worsening chest congestion and sore throat.  Vital signs without fever or tachycardia.  Oxygen saturation 95% which appears at low baseline for patient.  Physical exam without acute abnormalities.      Shared decision making completed.  Concerns for early COPD exacerbation due to underlying URI.  Rapid strep negative.  Given normal vital signs and lungs clear to auscultation, low suspicion for pneumonia.  COVID-19 PCR in process.    Given concerns for early COPD exacerbation/URI, and upcoming weekend, patient prescribed short burst of prednisone as well as Augmentin.  If symptoms are mild and do not progress, patient will not fill antibiotic or steroid.  If symptoms do worsen over the weekend, patient will fill prescriptions and follow-up next week.  Patient will present to ED for any severe symptoms.  COVID-19 quarantine guidelines and return precautions provided.    - Streptococcus A Rapid Screen w/Reflex to PCR - Clinic Collect  - Group A Streptococcus PCR Throat Swab  - Symptomatic COVID-19 Virus (Coronavirus) by PCR Nasopharyngeal; Future  - predniSONE (DELTASONE) 20 MG tablet; Take 1 tablet (20 mg) by mouth daily for 5 days  - amoxicillin-clavulanate (AUGMENTIN) 875-125 MG tablet; Take 1 tablet by mouth 2 times daily for 7 days         See Patient Instructions    Return in about 1 week (around 10/1/2021), or if symptoms worsen or fail to improve.    Yuliya Duque PA-C  [facility] FLOR Bledsoe is a 76 year old who presents for the following health issues     HPI     Acute Illness  Acute illness concerns: runny nose, congestion, sore throat,  cough, wheezing, diarrhea. Patient is vaccinated against COVID19.   Onset/Duration: 2 days  Symptoms:  Fever: no  Chills/Sweats: no  Headache (location?): no  Sinus Pressure: no  Conjunctivitis:  no  Ear Pain: no  Rhinorrhea: YES- seasonal allergies  Congestion: YES  Sore Throat: YES  Cough: NO  Wheeze: YES- has COPD  Decreased Appetite: no  Nausea: no  Vomiting: no  Diarrhea: YES- due to medications. baseline  Dysuria/Freq.: no  Dysuria or Hematuria: no  Fatigue/Achiness: no  Sick/Strep Exposure: no  Therapies tried and outcome: None            Objective    BP (!) 144/75   Pulse 76   Temp 97.1  F (36.2  C) (Tympanic)   Resp 14   Wt 114.5 kg (252 lb 6.4 oz)   SpO2 95%   BMI 36.74 kg/m    Body mass index is 36.74 kg/m .  Physical Exam  Vitals and nursing note reviewed.   Constitutional:       General: He is not in acute distress.     Appearance: Normal appearance.   HENT:      Head: Normocephalic and atraumatic.      Nose: Congestion present.      Mouth/Throat:      Mouth: Mucous membranes are moist.      Pharynx: Oropharynx is clear. No oropharyngeal exudate or posterior oropharyngeal erythema.   Eyes:      Extraocular Movements: Extraocular movements intact.      Pupils: Pupils are equal, round, and reactive to light.   Cardiovascular:      Rate and Rhythm: Normal rate and regular rhythm.      Heart sounds: Normal heart sounds.   Pulmonary:      Effort: Pulmonary effort is normal. No respiratory distress.      Breath sounds: Normal breath sounds. No wheezing, rhonchi or rales.   Musculoskeletal:         General: Normal range of motion.      Cervical back: Normal range of motion.   Lymphadenopathy:      Cervical: No cervical adenopathy.   Skin:     General: Skin is warm and dry.   Neurological:      General: No focal deficit present.      Mental Status: He is alert.   Psychiatric:         Mood and Affect: Mood normal.         Behavior: Behavior normal.            Results for orders placed or performed in visit  on 09/24/21   Streptococcus A Rapid Screen w/Reflex to PCR - Clinic Collect     Status: Normal    Specimen: Throat; Swab   Result Value Ref Range    Group A Strep antigen Negative Negative

## 2021-09-26 ENCOUNTER — HEALTH MAINTENANCE LETTER (OUTPATIENT)
Age: 76
End: 2021-09-26

## 2021-09-26 LAB — SARS-COV-2 RNA RESP QL NAA+PROBE: NOT DETECTED

## 2021-10-01 DIAGNOSIS — R60.9 EDEMA, UNSPECIFIED TYPE: ICD-10-CM

## 2021-10-01 DIAGNOSIS — D50.9 IRON DEFICIENCY ANEMIA, UNSPECIFIED IRON DEFICIENCY ANEMIA TYPE: ICD-10-CM

## 2021-10-04 DIAGNOSIS — R60.9 EDEMA, UNSPECIFIED TYPE: ICD-10-CM

## 2021-10-04 RX ORDER — FERROUS SULFATE 325(65) MG
TABLET ORAL
Qty: 30 TABLET | Refills: 0 | Status: SHIPPED | OUTPATIENT
Start: 2021-10-04 | End: 2022-01-04

## 2021-10-04 RX ORDER — SODIUM BICARBONATE 325 MG/1
TABLET ORAL
Qty: 60 TABLET | Refills: 1 | OUTPATIENT
Start: 2021-10-04

## 2021-10-04 NOTE — TELEPHONE ENCOUNTER
Prescription approved per [facility] Refill Protocol.    Josefa Payne RN  [facility]

## 2021-10-05 RX ORDER — FUROSEMIDE 20 MG
40 TABLET ORAL DAILY
Qty: 60 TABLET | Refills: 0 | Status: SHIPPED | OUTPATIENT
Start: 2021-10-05 | End: 2021-11-02

## 2021-10-05 NOTE — TELEPHONE ENCOUNTER
Routing refill request to provider for review/approval because:  BP    BP Readings from Last 3 Encounters:   09/24/21 (!) 144/75   07/16/21 135/70   07/09/21 124/54              Pending Prescriptions:                       Disp   Refills    furosemide (LASIX) 20 MG tablet [Pharmacy *180 ta*0        Sig: Take 2 tablets (40 mg) by mouth daily        Krzysztof Rivas RN

## 2021-10-08 DIAGNOSIS — G70.00 MYASTHENIA GRAVIS, ACHR ANTIBODY POSITIVE (H): ICD-10-CM

## 2021-10-08 RX ORDER — PREDNISONE 5 MG/1
TABLET ORAL
Qty: 60 TABLET | Refills: 0 | Status: SHIPPED | OUTPATIENT
Start: 2021-10-08 | End: 2022-01-03

## 2021-10-12 ENCOUNTER — OFFICE VISIT (OUTPATIENT)
Dept: FAMILY MEDICINE | Facility: CLINIC | Age: 76
End: 2021-10-12
Payer: COMMERCIAL

## 2021-10-12 VITALS
SYSTOLIC BLOOD PRESSURE: 112 MMHG | OXYGEN SATURATION: 93 % | WEIGHT: 251.5 LBS | TEMPERATURE: 97.9 F | BODY MASS INDEX: 36.01 KG/M2 | HEIGHT: 70 IN | DIASTOLIC BLOOD PRESSURE: 60 MMHG | HEART RATE: 79 BPM | RESPIRATION RATE: 20 BRPM

## 2021-10-12 DIAGNOSIS — I10 BENIGN ESSENTIAL HYPERTENSION: ICD-10-CM

## 2021-10-12 DIAGNOSIS — D50.9 IRON DEFICIENCY ANEMIA, UNSPECIFIED IRON DEFICIENCY ANEMIA TYPE: ICD-10-CM

## 2021-10-12 DIAGNOSIS — G47.62 NOCTURNAL LEG CRAMPS: ICD-10-CM

## 2021-10-12 DIAGNOSIS — R60.9 EDEMA, UNSPECIFIED TYPE: ICD-10-CM

## 2021-10-12 DIAGNOSIS — J44.9 CHRONIC OBSTRUCTIVE PULMONARY DISEASE, UNSPECIFIED COPD TYPE (H): Primary | ICD-10-CM

## 2021-10-12 DIAGNOSIS — E11.65 TYPE 2 DIABETES MELLITUS WITH HYPERGLYCEMIA, WITHOUT LONG-TERM CURRENT USE OF INSULIN (H): ICD-10-CM

## 2021-10-12 LAB
ALBUMIN SERPL-MCNC: 3.8 G/DL (ref 3.4–5)
ALP SERPL-CCNC: 47 U/L (ref 40–150)
ALT SERPL W P-5'-P-CCNC: 42 U/L (ref 0–70)
ANION GAP SERPL CALCULATED.3IONS-SCNC: 4 MMOL/L (ref 3–14)
AST SERPL W P-5'-P-CCNC: 27 U/L (ref 0–45)
BASOPHILS # BLD AUTO: 0 10E3/UL (ref 0–0.2)
BASOPHILS NFR BLD AUTO: 0 %
BILIRUB SERPL-MCNC: 0.4 MG/DL (ref 0.2–1.3)
BUN SERPL-MCNC: 22 MG/DL (ref 7–30)
CALCIUM SERPL-MCNC: 9.1 MG/DL (ref 8.5–10.1)
CHLORIDE BLD-SCNC: 105 MMOL/L (ref 94–109)
CO2 SERPL-SCNC: 29 MMOL/L (ref 20–32)
CREAT SERPL-MCNC: 1.06 MG/DL (ref 0.66–1.25)
EOSINOPHIL # BLD AUTO: 0 10E3/UL (ref 0–0.7)
EOSINOPHIL NFR BLD AUTO: 0 %
ERYTHROCYTE [DISTWIDTH] IN BLOOD BY AUTOMATED COUNT: 20.2 % (ref 10–15)
FERRITIN SERPL-MCNC: 26 NG/ML (ref 26–388)
GFR SERPL CREATININE-BSD FRML MDRD: 69 ML/MIN/1.73M2
GLUCOSE BLD-MCNC: 177 MG/DL (ref 70–99)
HBA1C MFR BLD: 6.8 % (ref 0–5.6)
HCT VFR BLD AUTO: 41.6 % (ref 40–53)
HGB BLD-MCNC: 13.8 G/DL (ref 13.3–17.7)
IRON SATN MFR SERPL: 22 % (ref 15–46)
IRON SERPL-MCNC: 64 UG/DL (ref 35–180)
LYMPHOCYTES # BLD AUTO: 1.1 10E3/UL (ref 0.8–5.3)
LYMPHOCYTES NFR BLD AUTO: 9 %
MAGNESIUM SERPL-MCNC: 2.1 MG/DL (ref 1.6–2.3)
MCH RBC QN AUTO: 28.8 PG (ref 26.5–33)
MCHC RBC AUTO-ENTMCNC: 33.2 G/DL (ref 31.5–36.5)
MCV RBC AUTO: 87 FL (ref 78–100)
MONOCYTES # BLD AUTO: 0.7 10E3/UL (ref 0–1.3)
MONOCYTES NFR BLD AUTO: 6 %
NEUTROPHILS # BLD AUTO: 10.2 10E3/UL (ref 1.6–8.3)
NEUTROPHILS NFR BLD AUTO: 85 %
PLATELET # BLD AUTO: 256 10E3/UL (ref 150–450)
POTASSIUM BLD-SCNC: 4.4 MMOL/L (ref 3.4–5.3)
PROT SERPL-MCNC: 6.8 G/DL (ref 6.8–8.8)
RBC # BLD AUTO: 4.8 10E6/UL (ref 4.4–5.9)
SODIUM SERPL-SCNC: 138 MMOL/L (ref 133–144)
TIBC SERPL-MCNC: 285 UG/DL (ref 240–430)
WBC # BLD AUTO: 12.1 10E3/UL (ref 4–11)

## 2021-10-12 PROCEDURE — 82728 ASSAY OF FERRITIN: CPT | Performed by: NURSE PRACTITIONER

## 2021-10-12 PROCEDURE — 83735 ASSAY OF MAGNESIUM: CPT | Performed by: NURSE PRACTITIONER

## 2021-10-12 PROCEDURE — 90662 IIV NO PRSV INCREASED AG IM: CPT | Performed by: NURSE PRACTITIONER

## 2021-10-12 PROCEDURE — 36415 COLL VENOUS BLD VENIPUNCTURE: CPT | Performed by: NURSE PRACTITIONER

## 2021-10-12 PROCEDURE — 83036 HEMOGLOBIN GLYCOSYLATED A1C: CPT | Performed by: NURSE PRACTITIONER

## 2021-10-12 PROCEDURE — 99215 OFFICE O/P EST HI 40 MIN: CPT | Mod: 25 | Performed by: NURSE PRACTITIONER

## 2021-10-12 PROCEDURE — 83550 IRON BINDING TEST: CPT | Performed by: NURSE PRACTITIONER

## 2021-10-12 PROCEDURE — 85025 COMPLETE CBC W/AUTO DIFF WBC: CPT | Performed by: NURSE PRACTITIONER

## 2021-10-12 PROCEDURE — G0008 ADMIN INFLUENZA VIRUS VAC: HCPCS | Performed by: NURSE PRACTITIONER

## 2021-10-12 PROCEDURE — 80053 COMPREHEN METABOLIC PANEL: CPT | Performed by: NURSE PRACTITIONER

## 2021-10-12 RX ORDER — SODIUM BICARBONATE 325 MG/1
TABLET ORAL
COMMUNITY
Start: 2021-09-09 | End: 2022-11-25 | Stop reason: DRUGHIGH

## 2021-10-12 ASSESSMENT — MIFFLIN-ST. JEOR: SCORE: 1879.11

## 2021-10-12 ASSESSMENT — PAIN SCALES - GENERAL: PAINLEVEL: NO PAIN (0)

## 2021-10-12 NOTE — PROGRESS NOTES
Assessment & Plan     Chronic obstructive pulmonary disease, unspecified COPD type (H)  COPD Severity: Moderate = FeV1 < 79% -50%.  Patient reports that dyspnea has improved since starting daily controlled medication Advair 3 months ago. Continue current treatment plan without change.    Iron deficiency anemia, unspecified iron deficiency anemia type  Patient reports GI side effects with iron supplement. Will recheck levels and will send MyChart to stop supplement if able. Counseled on iron rich foods.   - CBC with platelets and differential  - Iron & Iron Binding Capacity  - Ferritin    Edema, unspecified type  Lower leg edema has resolved with morning lasix. Continue current treatment plan without change. Will check kidney function and electrolytes.  - Comprehensive metabolic panel (BMP + Alb, Alk Phos, ALT, AST, Total. Bili, TP)  - Magnesium    Benign essential hypertension  The current medical regimen is effective;  continue present plan and medications.  - Comprehensive metabolic panel (BMP + Alb, Alk Phos, ALT, AST, Total. Bili, TP)    Nocturnal leg cramps  This has been a chronic issue for him. He is on gabapentin from nuerology for his myasthenia  and clonazepam for RLS which help those symptoms but not the leg cramps. Counseled on self-care measures including: pedal bike briefly before bed, calf stretches before bed, keeping cover untucked from legs, staying hydrated.  - Comprehensive metabolic panel (BMP + Alb, Alk Phos, ALT, AST, Total. Bili, TP)    Type 2 diabetes mellitus with hyperglycemia, without long-term current use of insulin (H)  The current medical regimen is effective;  continue present plan and medications.  - HEMOGLOBIN A1C    Review of the result(s) of each unique test - as noted below  Ordering of each unique test  Prescription drug management  41 minutes spent on the date of the encounter doing chart review, history and exam, documentation and further activities per the note      See Patient Instructions    Return in about 6 months (around 4/12/2022) for Follow up clinic visit COPD, hypertension, diabetes..    JACK Allen CNP  [facility] THIAGO Bledsoe is a 76 year old who presents for the following health issues     HPI     Patient wanted to inform provider that he had a sore throat, which resolved.   Negative COVID and Strep tests.     Hypertension Follow-up      Do you check your blood pressure regularly outside of the clinic? Yes     Are you following a low salt diet? Yes    Are your blood pressures ever more than 140 on the top number (systolic) OR more   than 90 on the bottom number (diastolic), for example 140/90? Yes, a few times. Home BPs 110s/60s-130/80s with two reading 140s/80s.     COPD Follow-Up    Overall, how are your COPD symptoms since your last clinic visit?  Better since starting Advair 3 months ago.     How much fatigue or shortness of breath do you have when you are walking?  Less than usual    How much shortness of breath do you have when you are resting?  None    How often do you cough? Rarely    Have you noticed any change in your sputum/phlegm?  No    Have you experienced a recent fever? No    Please describe how far you can walk without stopping to rest:  2-5 blocks    How many flights of stairs are you able to walk up without stopping?  2    Have you had any Emergency Room Visits, Urgent Care Visits, or Hospital Admissions because of your COPD since your last office visit?  No    History   Smoking Status     Former Smoker     Packs/day: 2.00     Years: 15.00     Types: Cigarettes     Start date: 1/1/1968     Quit date: 1/1/1983   Smokeless Tobacco     Former User     Quit date: 1/1/1970     Comment: smoke free household.     No results found for: FEV1, GYA0IEZ    Musculoskeletal problem/pain  Onset/Duration: Months   Description  Location: Bilateral calves  Joint Swelling: Feet and ankles   Redness: no  Pain: YES  Warmth:  "no  Intensity:  Moderate only at night   Progression of Symptoms:  intermittent  History  Trauma to the area: no  Recent illness:  YES  Previous similar problem: no  Previous evaluation:  no  Precipitating or alleviating factors:  Aggravating factors include: When he is on his feet all day long, he notices pain in calves at night.   Therapies tried and outcome: rest/inactivity      Review of Systems   Constitutional, HEENT, cardiovascular, pulmonary, gi and gu systems are negative, except as otherwise noted.      Objective    /60   Pulse 79   Temp 97.9  F (36.6  C) (Oral)   Resp 20   Ht 1.765 m (5' 9.5\")   Wt 114.1 kg (251 lb 8 oz)   SpO2 93%   BMI 36.61 kg/m    Body mass index is 36.61 kg/m .  Physical Exam   GENERAL: healthy, alert and no distress  EYES: Eyes grossly normal to inspection, PERRL and conjunctivae and sclerae normal  PSYCH: mentation appears normal, affect normal/bright    Diagnostics Reviewed today:   Ref Range & Units 2 mo ago     Iron 35 - 180 ug/dL 52     Iron Binding Capacity 240 - 430 ug/dL 359     Iron Sat Index 15 - 46 % 14Low     Resulting [facility]         Specimen Collected: 08/03/21  6:29 PM Last Resulted: 08/04/21  2:15 PM         Ref Range & Units 2 mo ago     Ferritin 26 - 388 ng/mL 15Low     Resulting [facility]         Specimen Collected: 08/03/21  6:29 PM Last Resulted: 08/04/21  2:19 PM         Ref Range & Units 2 mo ago     WBC Count 4.0 - 11.0 10e3/uL 10.6     RBC Count 4.40 - 5.90 10e6/uL 4.80     Hemoglobin 13.3 - 17.7 g/dL 11.4Low      Hematocrit 40.0 - 53.0 % 37.2Low      MCV 78 - 100 fL 78     MCH 26.5 - 33.0 pg 23.8Low      MCHC 31.5 - 36.5 g/dL 30.6Low      RDW 10.0 - 15.0 % 20.0High      Platelet Count 150 - 450 10e3/uL 316    Resulting [facility]         Specimen Collected: 08/03/21 10:58 AM Last Resulted: 08/03/21  4:16 PM         Ref Range & Units 2 mo ago     Sodium 133 - 144 mmol/L 137     Potassium 3.4 - 5.3 mmol/L 4.2     Chloride " 94 - 109 mmol/L 103     Carbon Dioxide (CO2) 20 - 32 mmol/L 29     Anion Gap 3 - 14 mmol/L 5     Urea Nitrogen 7 - 30 mg/dL 19     Creatinine 0.66 - 1.25 mg/dL 1.03     Calcium 8.5 - 10.1 mg/dL 8.8     Glucose 70 - 99 mg/dL 116High      GFR Estimate >60 mL/min/1.73m2 72    Comment: As of July 11, 2021, eGFR is calculated by the CKD-EPI creatinine equation, without race adjustment. eGFR can be influenced by muscle mass, exercise, and diet. The reported eGFR is an estimation only and is only applicable if the renal function is stable.   Resulting Agency  [facility]         Specimen Collected: 07/16/21  9:55 AM Last Resulted: 07/16/21  2:51 PM         Ref Range & Units 2 mo ago     Magnesium 1.6 - 2.3 mg/dL 1.9    Resulting [facility]         Specimen Collected: 07/16/21  9:55 AM Last Resulted: 07/16/21  2:46 PM         Ref Range & Units 2 mo ago     Vitamin B12 193 - 986 pg/mL 422    Resulting [facility]         Specimen Collected: 07/16/21  9:55 AM Last Resulted: 07/16/21  6:57 PM         Ref Range & Units 4 mo ago     Hemoglobin A1C 0 - 5.6 % 7.1High     Comment: Normal <5.7% Prediabetes 5.7-6.4%  Diabetes 6.5% or higher - adopted from ADA   consensus guidelines.    Resulting [facility]         Specimen Collected: 06/09/21  2:09 PM Last Resulted: 06/09/21  2:22 PM

## 2021-10-12 NOTE — PATIENT INSTRUCTIONS
Stretching exercises - Calf stretching exercises.  Hold stretch for for 10 to 20 seconds and repeat 3-5 time before bed.      Patient Education     Lower Body Exercises: Calf Stretch    This exercise both stretches and strengthens your lower body to help your back. Do the exercise as often as suggested by your healthcare provider. As you work out, don t rush or strain. Use an exercise mat, pillow, or folded towel to protect your knees and other sensitive areas.    Face a wall 2 feet away. Step toward the wall with one foot.    Place both palms on the wall and bend your front knee.    Lean forward, keeping the back leg straight and the heel on the floor.    Hold for 10 to 30 seconds. Switch legs.  [employer] last reviewed this educational content on 3/1/2018    2036-5522 [employer]. All rights reserved. This information is not intended as a substitute for professional medical care. Always follow your healthcare professional's instructions.             Food sources of iron  Iron is found in a few types of foods. Good sources include:    Red meat, poultry, fish, eggs    Dried fruits (especially raisins, prunes, figs)    Legumes such as dried beans and lentils    Breads and cereals with iron added    Blackstrap molasses    Spinach    Foods cooked in cast-iron pans. This is especially true of acidic foods, such as tomatoes and edyta

## 2021-10-12 NOTE — LETTER
My COPD Action Plan     Name: Rakan Nolasco    YOB: 1945   Date: 10/12/2021    My doctor: JACK Allen CNP   My clinic: [address] [address]  [phone]  My Controller Medicine: Advair       My Rescue Medicine: Albuterol             My COPD Severity: Moderate = FeV1 < 79% -50%      Use of Oxygen: Oxygen Not Prescribed      Make sure you've had your pneumonia   vaccines.          GREEN ZONE       Doing well today      Usual level of activity and exercise    Usual amount of cough and mucus    No shortness of breath    Usual level of health (thinking clearly, sleeping well, feel like eating) Actions:      Take daily medicines    Use oxygen as prescribed    Follow regular exercise and diet plan    Avoid cigarette smoke and other irritants that harm the lungs           YELLOW ZONE          Having a bad day or flare up      Short of breath more than usual    A lot more sputum (mucus) than usual    Sputum looks yellow, green, tan, brown or bloody    More coughing or wheezing    Fever or chills    Less energy; trouble completing activities    Trouble thinking or focusing    Using quick relief inhaler or nebulizer more often    Poor sleep; symptoms wake me up    Do not feel like eating Actions:      Get plenty of rest    Take daily medicines    Use quick relief inhaler every 6 hours    If you use oxygen, call you doctor to see if you should adjust your oxygen    Do breathing exercises or other things to help you relax    Let a loved one, friend or neighbor know you are feeling worse    Call your care team if you have 2 or more symptoms.  Start taking steroids or antibiotics if directed by your care team           RED ZONE       Need medical care now      Severe shortness of breath (feel you can't breathe)    Fever, chills    Not enough breath to do any activity    Trouble coughing up mucus, walking or talking    Blood in  Informed pt of positive UTI results and that Macrobid was ordered per Dr. Aj Valentino. Pt voices understanding. Medication ordered in Epic. mucus    Frequent coughing   Rescue medicines are not working    Not able to sleep because of breathing    Feel confused or drowsy    Chest pain    Actions:      Call your health care team.  If you cannot reach your care team, call 911 or go to the emergency room.        Annual Reminders:  Meet with Care Team, Flu Shot every Fall  Pharmacy:    [facility]  [facility] [address] [place] [address]

## 2021-10-14 DIAGNOSIS — I10 BENIGN ESSENTIAL HYPERTENSION: ICD-10-CM

## 2021-10-15 DIAGNOSIS — D72.829 LEUKOCYTOSIS, UNSPECIFIED TYPE: Primary | ICD-10-CM

## 2021-10-15 RX ORDER — LISINOPRIL 40 MG/1
TABLET ORAL
Qty: 90 TABLET | Refills: 1 | Status: SHIPPED | OUTPATIENT
Start: 2021-10-15 | End: 2022-02-20

## 2021-10-15 NOTE — TELEPHONE ENCOUNTER
"Prescription approved per [facility] Refill Protocol.      Requested Prescriptions   Pending Prescriptions Disp Refills     lisinopril (ZESTRIL) 40 MG tablet [Pharmacy Med Name: LISINOPRIL 40 MG TABLET] 30 tablet 0     Sig: TAKE 1 TABLET BY MOUTH EVERY DAY       ACE Inhibitors (Including Combos) Protocol Passed - 10/14/2021  7:35 AM        Passed - Blood pressure under 140/90 in past 12 months     BP Readings from Last 3 Encounters:   10/12/21 112/60   09/24/21 (!) 144/75   07/16/21 135/70                 Passed - Recent (12 mo) or future (30 days) visit within the authorizing provider's specialty     Patient has had an office visit with the authorizing provider or a provider within the authorizing providers department within the previous 12 mos or has a future within next 30 days. See \"Patient Info\" tab in inbasket, or \"Choose Columns\" in Meds & Orders section of the refill encounter.              Passed - Medication is active on med list        Passed - Patient is age 18 or older        Passed - Normal serum creatinine on file in past 12 months     Recent Labs   Lab Test 10/12/21  0936 05/04/21  1543 11/04/20  0000   CR 1.06   < >  --    CRPOC  --   --  1.2    < > = values in this interval not displayed.       Ok to refill medication if creatinine is low          Passed - Normal serum potassium on file in past 12 months     Recent Labs   Lab Test 10/12/21  0936   POTASSIUM 4.4                Radha Avendaño RN on 10/15/2021 at 4:32 PM          "

## 2021-10-17 ASSESSMENT — ENCOUNTER SYMPTOMS
EXERCISE INTOLERANCE: 1
WEAKNESS: 1
BLOOD IN STOOL: 0
LEG PAIN: 0
HEMOPTYSIS: 0
HYPOTENSION: 0
SPEECH CHANGE: 0
BOWEL INCONTINENCE: 0
CONSTIPATION: 0
POSTURAL DYSPNEA: 0
COUGH DISTURBING SLEEP: 0
MEMORY LOSS: 0
HEADACHES: 1
ORTHOPNEA: 0
SINUS PAIN: 0
SHORTNESS OF BREATH: 1
ABDOMINAL PAIN: 0
HOARSE VOICE: 1
BRUISES/BLEEDS EASILY: 1
SMELL DISTURBANCE: 0
EYE WATERING: 1
LIGHT-HEADEDNESS: 1
STIFFNESS: 1
BACK PAIN: 1
JOINT SWELLING: 0
SPUTUM PRODUCTION: 0
HEARTBURN: 0
WHEEZING: 1
ARTHRALGIAS: 0
SINUS CONGESTION: 1
TASTE DISTURBANCE: 0
JAUNDICE: 0
PALPITATIONS: 0
NUMBNESS: 1
EYE IRRITATION: 1
LOSS OF CONSCIOUSNESS: 0
TREMORS: 0
VOMITING: 0
EYE REDNESS: 1
MUSCLE CRAMPS: 1
MYALGIAS: 1
SORE THROAT: 1
DISTURBANCES IN COORDINATION: 0
EYE PAIN: 1
DIZZINESS: 1
SYNCOPE: 0
RECTAL PAIN: 0
SWOLLEN GLANDS: 0
NAUSEA: 0
SNORES LOUDLY: 1
NECK MASS: 0
COUGH: 0
MUSCLE WEAKNESS: 0
SLEEP DISTURBANCES DUE TO BREATHING: 0
DIARRHEA: 1
PARALYSIS: 0
SEIZURES: 0
TROUBLE SWALLOWING: 1
DYSPNEA ON EXERTION: 1
DOUBLE VISION: 0
TINGLING: 0
BLOATING: 0
NECK PAIN: 0
HYPERTENSION: 1

## 2021-10-20 ENCOUNTER — OFFICE VISIT (OUTPATIENT)
Dept: NEUROLOGY | Facility: CLINIC | Age: 76
End: 2021-10-20
Payer: COMMERCIAL

## 2021-10-20 VITALS
SYSTOLIC BLOOD PRESSURE: 136 MMHG | HEART RATE: 68 BPM | DIASTOLIC BLOOD PRESSURE: 78 MMHG | OXYGEN SATURATION: 97 % | WEIGHT: 252 LBS | BODY MASS INDEX: 36.68 KG/M2

## 2021-10-20 DIAGNOSIS — Z51.81 ENCOUNTER FOR THERAPEUTIC DRUG MONITORING: Primary | ICD-10-CM

## 2021-10-20 DIAGNOSIS — G50.0 TRIGEMINAL NEURALGIA: ICD-10-CM

## 2021-10-20 DIAGNOSIS — T38.0X5A STEROID-INDUCED OSTEOPENIA: ICD-10-CM

## 2021-10-20 DIAGNOSIS — G70.00 MYASTHENIA GRAVIS WITHOUT EXACERBATION (H): ICD-10-CM

## 2021-10-20 DIAGNOSIS — M85.80 STEROID-INDUCED OSTEOPENIA: ICD-10-CM

## 2021-10-20 DIAGNOSIS — M81.8 OTHER OSTEOPOROSIS WITHOUT CURRENT PATHOLOGICAL FRACTURE: ICD-10-CM

## 2021-10-20 PROCEDURE — 99214 OFFICE O/P EST MOD 30 MIN: CPT | Performed by: PSYCHIATRY & NEUROLOGY

## 2021-10-20 RX ORDER — OXCARBAZEPINE 300 MG/1
TABLET, FILM COATED ORAL
Qty: 60 TABLET | Refills: 1 | Status: SHIPPED | OUTPATIENT
Start: 2021-10-20 | End: 2021-11-12

## 2021-10-20 NOTE — PROGRESS NOTES
Service Date: 10/20/2021    [place] [address]    RE:      Rakan Nolasco   MRN:  [id number]  :   1945    Dear Dr. Lucio:    I had the pleasure to see Rakan Nolasco in followup at the [facility].  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  He is on prednisone 10 mg daily and Mestinon 120 mg t.i.d.  He seems to be tolerating the treatment well.  He has type 2 diabetes, which is generally acceptably well controlled with last A1c at 6.8% on 10/12/2021.  At the same day, CBC showed leukocytosis with a white cell count of 12.1, but was otherwise unremarkable. Last comprehensive metabolic panel was 10/12 and normal except for a glucose of 177.  Liver function tests were intact and so were sodium and other electrolytes. Magnesium was normal as well.    Regarding the myasthenia, Rakan tells me that he occasionally has blurry vision in the evening; it may be a little worse  than the previous years, but overall the change is not major.  He does not complain of ptosis, difficulty chewing or talking or sialorrhea.  Regarding swallowing, he reports some dysphagia later during the day.  Choking episodes are infrequent and occur 1 time a week or less, both with liquids and solids and actually more often with liquids. He reports some shortness of breath on exertion, which is associated with chronic COPD.  He does not have orthopnea.  He denies arm fatigue when brushing teeth or combing hair. When he gets up from a chair, he needs to use his arms sometimes.    His MG-ADL score is 4 (1 ptosis for diplopia, 1 for swallowing, 1 for SOB exertion and 1 for leg muscle weakness).   He also has right-sided trigeminal neuralgia.  In the past, he was on Trileptal that worked  well, but it had to be stopped because at some point he developed hyponatremia.  It was not clear, however, whether the hyponatremia was a side effect of oxcarbazepine or chlorthalidone diuretic, which he was taking at the same time. He now takes sodium tablets, and he was switched from chlorthalidone to Lasix.  His sodium levels have been normal lately. He is wondering whether he can retry Trileptal.  He is on gabapentin 600 mg t.i.d. This works, but less well than the Trileptal.  I referred him to Neurosurgery in 2018, but he did not want to proceed to any aggressive intervention, and in fact, the trigeminal neuralgia subsided for a couple of years, but has come back in the last year. It occasionally wakes him up at night. Symptoms can be triggered by brushing teeth and touching the right cheek. No other neurological symptoms were reported.    His last bone density scan was in 06/2018 and was normal.  He gets his eye examination annually, and he has both cataracts and glaucoma.  He already had right cataract surgery.      MEDICATIONS:  Reviewed and are as per Epic record.    /78   Pulse 68   Wt 114.3 kg (252 lb)   SpO2 97%   BMI 36.68 kg/m      NEUROLOGIC EXAMINATION:  Rakan is in good spirits.  He has no ptosis even after 1 minute of sustained upward gaze.  Orbicularis oculi strength is normal.  He does get diplopia after about 5 seconds of sustained upward gaze with images horizontally .  Upon lateral gaze to the right or the left, he does not develop diplopia nor with down gaze.  Ductions and versions of the eyes appear normal. Lip seal, cheek puff, uvula, jaw, palate and tongue strength are completely normal.  There is no dysphonia or dysarthria.  Neck flexion and extension strength is full.  Strength is 5/5 in the limbs proximally and distally.     In summary, Mr. 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 I offered him a slight escalation of his prednisone dose to more effectively deal with those residual symptoms, but he is not interested, and he feels that his current control is acceptable.  A higher dose of steroids may have disadvantages, including weight gain and loss of diabetic control, so I will avoid it.  We will make no changes today.  I will order a repeat bone density scan.  He should continue with annual eye exams and periodic monitoring of A1c and metabolic panel.  Regarding his trigeminal neuralgia, I offered to restart the Trileptal.  It is possible that he will not develop hyponatremia at this time since he was taken off the chlorthalidone.  Lasix less commonly causes hyponatremia than chlorthalidone does.  I will start at 150 b.i.d. for 2 weeks and then 300 mg b.i.d., and Rakan should check a repeat comprehensive metabolic panel in a month.  He should also give me an update by Brandy in a month.  If he is doing much better with the facial pain, then we will taper off the gabapentin gradually.    I will see him in followup in clinic in 6 months, sooner if needed.Total time spent on this encounter today 37 minutes, including 17 face to face with the patient, 10 on post visit note dictation, editing and orders, and 10 on pre-visit chart review.    Sincerely,    Arpan Harrison MD        D: 10/20/2021   T: 10/20/2021   MT: marcus    Name:     RAKAN FRANCOIS  MRN:      [id number]        Account:      [id number]   :      1945           Service Date: 10/20/2021       Document: [id number]

## 2021-10-20 NOTE — LETTER
10/20/2021       RE: Rakan Nolasco  [address] [facility] [address]     Dear Colleague,    Thank you for referring your patient, Rakan Nolasco, to the [facility] [place] [facility]. Please see a copy of my visit note below.    Service Date: 10/20/2021    Telly Lucio MD   [facility]  [address] [place] [address]    RE:      Rakan Nolasco   MRN:  [id number]  :   1945    Dear Dr. Lucio:    I had the pleasure to see Rakan Nolasco in followup at the [facility].  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  He is on prednisone 10 mg daily and Mestinon 120 mg t.i.d.  He seems to be tolerating the treatment well.  He has type 2 diabetes, which is generally acceptably well controlled with last A1c at 6.8% on 10/12/2021.  At the same day, CBC showed leukocytosis with a white cell count of 12.1, but was otherwise unremarkable. Last comprehensive metabolic panel was 10/12 and normal except for a glucose of 177.  Liver function tests were intact and so were sodium and other electrolytes. Magnesium was normal as well.    Regarding the myasthenia, Rakan tells me that he occasionally has blurry vision in the evening; it may be a little worse  than the previous years, but overall the change is not major.  He does not complain of ptosis, difficulty chewing or talking or sialorrhea.  Regarding swallowing, he reports some dysphagia later during the day.  Choking episodes are infrequent and occur 1 time a week or less, both with liquids and solids and actually more often with liquids. He reports some shortness of breath on exertion, which is associated with chronic COPD.  He does not have orthopnea.  He denies arm  fatigue when brushing teeth or combing hair. When he gets up from a chair, he needs to use his arms sometimes.    His MG-ADL score is 4 (1 ptosis for diplopia, 1 for swallowing, 1 for SOB exertion and 1 for leg muscle weakness).   He also has right-sided trigeminal neuralgia.  In the past, he was on Trileptal that worked well, but it had to be stopped because at some point he developed hyponatremia.  It was not clear, however, whether the hyponatremia was a side effect of oxcarbazepine or chlorthalidone diuretic, which he was taking at the same time. He now takes sodium tablets, and he was switched from chlorthalidone to Lasix.  His sodium levels have been normal lately. He is wondering whether he can retry Trileptal.  He is on gabapentin 600 mg t.i.d. This works, but less well than the Trileptal.  I referred him to Neurosurgery in 2018, but he did not want to proceed to any aggressive intervention, and in fact, the trigeminal neuralgia subsided for a couple of years, but has come back in the last year. It occasionally wakes him up at night. Symptoms can be triggered by brushing teeth and touching the right cheek. No other neurological symptoms were reported.    His last bone density scan was in 06/2018 and was normal.  He gets his eye examination annually, and he has both cataracts and glaucoma.  He already had right cataract surgery.      MEDICATIONS:  Reviewed and are as per Epic record.    /78   Pulse 68   Wt 114.3 kg (252 lb)   SpO2 97%   BMI 36.68 kg/m      NEUROLOGIC EXAMINATION:  Rakan is in good spirits.  He has no ptosis even after 1 minute of sustained upward gaze.  Orbicularis oculi strength is normal.  He does get diplopia after about 5 seconds of sustained upward gaze with images horizontally .  Upon lateral gaze to the right or the left, he does not develop diplopia nor with down gaze.  Ductions and versions of the eyes appear normal. Lip seal, cheek puff, uvula, jaw, palate and  tongue strength are completely normal.  There is no dysphonia or dysarthria.  Neck flexion and extension strength is full.  Strength is 5/5 in the limbs proximally and distally.     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 I offered him a slight escalation of his prednisone dose to more effectively deal with those residual symptoms, but he is not interested, and he feels that his current control is acceptable.  A higher dose of steroids may have disadvantages, including weight gain and loss of diabetic control, so I will avoid it.  We will make no changes today.  I will order a repeat bone density scan.  He should continue with annual eye exams and periodic monitoring of A1c and metabolic panel.  Regarding his trigeminal neuralgia, I offered to restart the Trileptal.  It is possible that he will not develop hyponatremia at this time since he was taken off the chlorthalidone.  Lasix less commonly causes hyponatremia than chlorthalidone does.  I will start at 150 b.i.d. for 2 weeks and then 300 mg b.i.d., and Rakan should check a repeat comprehensive metabolic panel in a month.  He should also give me an update by [facility] in a month.  If he is doing much better with the facial pain, then we will taper off the gabapentin gradually.    I will see him in followup in clinic in 6 months, sooner if needed.Total time spent on this encounter today 37 minutes, including 17 face to face with the patient, 10 on post visit note dictation, editing and orders, and 10 on pre-visit chart review.    Sincerely,    Arpan Harrison MD

## 2021-10-20 NOTE — PATIENT INSTRUCTIONS
No changes to your myasthenia treatment- continue 10 mg of prednisone daily and Mestinon 2 tablets three times a day  Get a bone density scan for osteoporosis this year please  For trigeminal neuralgia: Try Trileptal again (prescription sent to your pharmacy)  Please check labs in 1 month at your primary care clinic, to make sure the sodium does not drop too low this time  Please send me a [employer] message in about a month. If trigeminal neuralgia is much better then I will give you written instructions to gradually stop the gabapentin  Follow up 6 months

## 2021-10-25 ENCOUNTER — MYC MEDICAL ADVICE (OUTPATIENT)
Dept: FAMILY MEDICINE | Facility: CLINIC | Age: 76
End: 2021-10-25

## 2021-10-25 DIAGNOSIS — J44.9 CHRONIC OBSTRUCTIVE PULMONARY DISEASE, UNSPECIFIED COPD TYPE (H): ICD-10-CM

## 2021-10-27 NOTE — TELEPHONE ENCOUNTER
Prescription approved per [facility] Refill Protocol.      Soila Moreau RN

## 2021-10-31 DIAGNOSIS — R60.9 EDEMA, UNSPECIFIED TYPE: ICD-10-CM

## 2021-10-31 DIAGNOSIS — D50.9 IRON DEFICIENCY ANEMIA, UNSPECIFIED IRON DEFICIENCY ANEMIA TYPE: ICD-10-CM

## 2021-11-02 RX ORDER — FUROSEMIDE 20 MG
40 TABLET ORAL DAILY
Qty: 180 TABLET | Refills: 1 | Status: SHIPPED | OUTPATIENT
Start: 2021-11-02 | End: 2022-02-20

## 2021-11-03 ENCOUNTER — LAB (OUTPATIENT)
Dept: LAB | Facility: CLINIC | Age: 76
End: 2021-11-03
Payer: COMMERCIAL

## 2021-11-03 DIAGNOSIS — Z51.81 ENCOUNTER FOR THERAPEUTIC DRUG MONITORING: ICD-10-CM

## 2021-11-03 DIAGNOSIS — D72.829 LEUKOCYTOSIS, UNSPECIFIED TYPE: ICD-10-CM

## 2021-11-03 LAB
ALBUMIN SERPL-MCNC: 4 G/DL (ref 3.4–5)
ALP SERPL-CCNC: 52 U/L (ref 40–150)
ALT SERPL W P-5'-P-CCNC: 35 U/L (ref 0–70)
ANION GAP SERPL CALCULATED.3IONS-SCNC: 5 MMOL/L (ref 3–14)
AST SERPL W P-5'-P-CCNC: 18 U/L (ref 0–45)
BASOPHILS # BLD AUTO: 0 10E3/UL (ref 0–0.2)
BASOPHILS NFR BLD AUTO: 0 %
BILIRUB SERPL-MCNC: 0.4 MG/DL (ref 0.2–1.3)
BUN SERPL-MCNC: 18 MG/DL (ref 7–30)
CALCIUM SERPL-MCNC: 9.6 MG/DL (ref 8.5–10.1)
CHLORIDE BLD-SCNC: 100 MMOL/L (ref 94–109)
CO2 SERPL-SCNC: 31 MMOL/L (ref 20–32)
CREAT SERPL-MCNC: 1.08 MG/DL (ref 0.66–1.25)
EOSINOPHIL # BLD AUTO: 0 10E3/UL (ref 0–0.7)
EOSINOPHIL NFR BLD AUTO: 0 %
ERYTHROCYTE [DISTWIDTH] IN BLOOD BY AUTOMATED COUNT: 17.3 % (ref 10–15)
GFR SERPL CREATININE-BSD FRML MDRD: 67 ML/MIN/1.73M2
GLUCOSE BLD-MCNC: 152 MG/DL (ref 70–99)
HCT VFR BLD AUTO: 43.1 % (ref 40–53)
HGB BLD-MCNC: 14.2 G/DL (ref 13.3–17.7)
LYMPHOCYTES # BLD AUTO: 1.2 10E3/UL (ref 0.8–5.3)
LYMPHOCYTES NFR BLD AUTO: 10 %
MCH RBC QN AUTO: 29.2 PG (ref 26.5–33)
MCHC RBC AUTO-ENTMCNC: 32.9 G/DL (ref 31.5–36.5)
MCV RBC AUTO: 89 FL (ref 78–100)
MONOCYTES # BLD AUTO: 0.6 10E3/UL (ref 0–1.3)
MONOCYTES NFR BLD AUTO: 5 %
NEUTROPHILS # BLD AUTO: 10.1 10E3/UL (ref 1.6–8.3)
NEUTROPHILS NFR BLD AUTO: 85 %
PLATELET # BLD AUTO: 259 10E3/UL (ref 150–450)
POTASSIUM BLD-SCNC: 4.5 MMOL/L (ref 3.4–5.3)
PROT SERPL-MCNC: 7.4 G/DL (ref 6.8–8.8)
RBC # BLD AUTO: 4.87 10E6/UL (ref 4.4–5.9)
SODIUM SERPL-SCNC: 136 MMOL/L (ref 133–144)
WBC # BLD AUTO: 11.9 10E3/UL (ref 4–11)

## 2021-11-03 PROCEDURE — 85025 COMPLETE CBC W/AUTO DIFF WBC: CPT

## 2021-11-03 PROCEDURE — 36415 COLL VENOUS BLD VENIPUNCTURE: CPT

## 2021-11-03 PROCEDURE — 80053 COMPREHEN METABOLIC PANEL: CPT

## 2021-11-03 RX ORDER — FERROUS SULFATE 325(65) MG
TABLET ORAL
Qty: 30 TABLET | Refills: 0 | OUTPATIENT
Start: 2021-11-03

## 2021-11-11 DIAGNOSIS — G50.0 TRIGEMINAL NEURALGIA: ICD-10-CM

## 2021-11-12 ENCOUNTER — ANCILLARY PROCEDURE (OUTPATIENT)
Dept: GENERAL RADIOLOGY | Facility: CLINIC | Age: 76
End: 2021-11-12
Attending: NURSE PRACTITIONER
Payer: COMMERCIAL

## 2021-11-12 ENCOUNTER — OFFICE VISIT (OUTPATIENT)
Dept: FAMILY MEDICINE | Facility: CLINIC | Age: 76
End: 2021-11-12
Payer: COMMERCIAL

## 2021-11-12 VITALS
SYSTOLIC BLOOD PRESSURE: 136 MMHG | WEIGHT: 251.5 LBS | DIASTOLIC BLOOD PRESSURE: 68 MMHG | HEART RATE: 68 BPM | RESPIRATION RATE: 18 BRPM | BODY MASS INDEX: 36.01 KG/M2 | HEIGHT: 70 IN | OXYGEN SATURATION: 97 % | TEMPERATURE: 97.5 F

## 2021-11-12 DIAGNOSIS — E11.65 TYPE 2 DIABETES MELLITUS WITH HYPERGLYCEMIA, WITHOUT LONG-TERM CURRENT USE OF INSULIN (H): ICD-10-CM

## 2021-11-12 DIAGNOSIS — J44.9 CHRONIC OBSTRUCTIVE PULMONARY DISEASE, UNSPECIFIED COPD TYPE (H): ICD-10-CM

## 2021-11-12 DIAGNOSIS — D72.829 LEUKOCYTOSIS, UNSPECIFIED TYPE: Primary | ICD-10-CM

## 2021-11-12 DIAGNOSIS — Z23 HIGH PRIORITY FOR 2019-NCOV VACCINE: ICD-10-CM

## 2021-11-12 DIAGNOSIS — I10 BENIGN ESSENTIAL HYPERTENSION: ICD-10-CM

## 2021-11-12 DIAGNOSIS — R31.29 MICROSCOPIC HEMATURIA: ICD-10-CM

## 2021-11-12 LAB
ALBUMIN UR-MCNC: NEGATIVE MG/DL
APPEARANCE UR: CLEAR
BILIRUB UR QL STRIP: NEGATIVE
COLOR UR AUTO: YELLOW
GLUCOSE UR STRIP-MCNC: NEGATIVE MG/DL
HGB UR QL STRIP: ABNORMAL
KETONES UR STRIP-MCNC: NEGATIVE MG/DL
LEUKOCYTE ESTERASE UR QL STRIP: NEGATIVE
NITRATE UR QL: NEGATIVE
PH UR STRIP: 6 [PH] (ref 5–7)
RBC #/AREA URNS AUTO: ABNORMAL /HPF
SP GR UR STRIP: 1.02 (ref 1–1.03)
SQUAMOUS #/AREA URNS AUTO: ABNORMAL /LPF
UROBILINOGEN UR STRIP-ACNC: 0.2 E.U./DL
WBC #/AREA URNS AUTO: ABNORMAL /HPF

## 2021-11-12 PROCEDURE — 99214 OFFICE O/P EST MOD 30 MIN: CPT | Mod: 25 | Performed by: NURSE PRACTITIONER

## 2021-11-12 PROCEDURE — 81001 URINALYSIS AUTO W/SCOPE: CPT | Performed by: NURSE PRACTITIONER

## 2021-11-12 PROCEDURE — 0013A COVID-19,PF,MODERNA (18+ YRS PRIMARY SERIES .5ML): CPT | Performed by: NURSE PRACTITIONER

## 2021-11-12 PROCEDURE — 91301 COVID-19,PF,MODERNA (18+ YRS PRIMARY SERIES .5ML): CPT | Performed by: NURSE PRACTITIONER

## 2021-11-12 PROCEDURE — 71046 X-RAY EXAM CHEST 2 VIEWS: CPT | Performed by: RADIOLOGY

## 2021-11-12 RX ORDER — OXCARBAZEPINE 300 MG/1
TABLET, FILM COATED ORAL
Qty: 60 TABLET | Refills: 1 | Status: SHIPPED | OUTPATIENT
Start: 2021-11-12 | End: 2021-12-07

## 2021-11-12 RX ORDER — AZITHROMYCIN 250 MG/1
TABLET, FILM COATED ORAL
Qty: 6 TABLET | Refills: 0 | Status: SHIPPED | OUTPATIENT
Start: 2021-11-12 | End: 2022-01-04

## 2021-11-12 ASSESSMENT — PAIN SCALES - GENERAL: PAINLEVEL: NO PAIN (0)

## 2021-11-12 ASSESSMENT — MIFFLIN-ST. JEOR: SCORE: 1879.11

## 2021-11-12 NOTE — PROGRESS NOTES
Assessment & Plan     Leukocytosis, unspecified type  Likely 2/2 bronchitis with increased wheezing in throat/upper chest, will treat with antibiotics. Urine does have hematuria, recommended that he follow up with his urologist. Will recheck CBC after completion of antibiotic therapy.  - UA macro with reflex to Microscopic and Culture - Clinc Collect  - XR Chest 2 Views  - Urine Microscopic  - azithromycin (ZITHROMAX) 250 MG tablet; Take 500 mg (2 tabs) by mouth on day 1. Then take 250 mg (1 tab) by mouth on days 2-5.  - CBC with platelets and differential; Future    Chronic obstructive pulmonary disease, unspecified COPD type (H)  - XR Chest 2 Views  - azithromycin (ZITHROMAX) 250 MG tablet; Take 500 mg (2 tabs) by mouth on day 1. Then take 250 mg (1 tab) by mouth on days 2-5.    Benign essential hypertension  Well controlled with medications without side effects.  - OPTOMETRY REFERRAL; Future    Type 2 diabetes mellitus with hyperglycemia, without long-term current use of insulin (H)  Well controlled with medications without side effects.  - OPTOMETRY REFERRAL; Future    Microscopic hematuria  Urine does have hematuria, recommended that he follow up with his urologist.    High priority for 2019-nCoV vaccine    Return in about 11 days (around 11/23/2021) for lab only recheck as already scheduled on 11/23/2021.    JACK [facility] THIAGO Quinones   Rakan is a 76 year old who presents for the following health issues     HPI     Recheck WBC for infections and have lungs checked per provider.   Discuss lab results. Patient states that he has noted increased wheezing in his throat and upper chest. Denies increased dyspnea, fever, chills, cough, sputum.     Review of Systems   Constitutional, HEENT, cardiovascular, pulmonary, gi and gu systems are negative, except as otherwise noted.      Objective    /68   Pulse 68   Temp 97.5  F (36.4  C) (Oral)   Resp 18   Ht  "1.765 m (5' 9.5\")   Wt 114.1 kg (251 lb 8 oz)   SpO2 97%   BMI 36.61 kg/m    Body mass index is 36.61 kg/m .  Physical Exam   GENERAL: healthy, alert and no distress  EYES: Eyes grossly normal to inspection, PERRL and conjunctivae and sclerae normal  RESP: bronchial breath sounds clear with cough and decreased breath sounds throughout  CV: regular rate and rhythm, normal S1 S2, no S3 or S4, no murmur, click or rub, no peripheral edema and peripheral pulses strong  ABDOMEN: soft, nontender, no hepatosplenomegaly, no masses and bowel sounds normal  SKIN: no suspicious lesions or rashes  PSYCH: mentation appears normal, affect normal/bright    Results for orders placed or performed in visit on 11/12/21 (from the past 24 hour(s))   UA macro with reflex to Microscopic and Culture - Clinc Collect    Specimen: Urine, Midstream   Result Value Ref Range    Color Urine Yellow Colorless, Straw, Light Yellow, Yellow    Appearance Urine Clear Clear    Glucose Urine Negative Negative mg/dL    Bilirubin Urine Negative Negative    Ketones Urine Negative Negative mg/dL    Specific Gravity Urine 1.025 1.003 - 1.035    Blood Urine Trace (A) Negative    pH Urine 6.0 5.0 - 7.0    Protein Albumin Urine Negative Negative mg/dL    Urobilinogen Urine 0.2 0.2, 1.0 E.U./dL    Nitrite Urine Negative Negative    Leukocyte Esterase Urine Negative Negative   Urine Microscopic   Result Value Ref Range    RBC Urine 0-2 0-2 /HPF /HPF    WBC Urine 0-5 0-5 /HPF /HPF    Squamous Epithelials Urine Few (A) None Seen /LPF    Narrative    Urine Culture not indicated             "

## 2021-11-13 DIAGNOSIS — R73.03 PREDIABETES: ICD-10-CM

## 2021-11-16 DIAGNOSIS — K21.9 GASTROESOPHAGEAL REFLUX DISEASE WITHOUT ESOPHAGITIS: ICD-10-CM

## 2021-11-16 RX ORDER — METFORMIN HCL 500 MG
TABLET, EXTENDED RELEASE 24 HR ORAL
Qty: 180 TABLET | Refills: 0 | Status: SHIPPED | OUTPATIENT
Start: 2021-11-16 | End: 2022-02-11

## 2021-11-17 RX ORDER — PANTOPRAZOLE SODIUM 20 MG/1
TABLET, DELAYED RELEASE ORAL
Qty: 90 TABLET | Refills: 2 | Status: SHIPPED | OUTPATIENT
Start: 2021-11-17 | End: 2022-08-15

## 2021-11-22 ENCOUNTER — DOCUMENTATION ONLY (OUTPATIENT)
Dept: LAB | Facility: CLINIC | Age: 76
End: 2021-11-22
Payer: COMMERCIAL

## 2021-11-23 ENCOUNTER — LAB (OUTPATIENT)
Dept: LAB | Facility: CLINIC | Age: 76
End: 2021-11-23
Payer: COMMERCIAL

## 2021-11-23 DIAGNOSIS — D72.829 LEUKOCYTOSIS, UNSPECIFIED TYPE: ICD-10-CM

## 2021-11-23 DIAGNOSIS — Z51.81 ENCOUNTER FOR THERAPEUTIC DRUG MONITORING: ICD-10-CM

## 2021-11-23 LAB
ALBUMIN SERPL-MCNC: 3.6 G/DL (ref 3.4–5)
ALP SERPL-CCNC: 50 U/L (ref 40–150)
ALT SERPL W P-5'-P-CCNC: 34 U/L (ref 0–70)
ANION GAP SERPL CALCULATED.3IONS-SCNC: 5 MMOL/L (ref 3–14)
AST SERPL W P-5'-P-CCNC: 18 U/L (ref 0–45)
BASOPHILS # BLD AUTO: 0 10E3/UL (ref 0–0.2)
BASOPHILS NFR BLD AUTO: 1 %
BILIRUB SERPL-MCNC: 0.3 MG/DL (ref 0.2–1.3)
BUN SERPL-MCNC: 17 MG/DL (ref 7–30)
CALCIUM SERPL-MCNC: 9.7 MG/DL (ref 8.5–10.1)
CHLORIDE BLD-SCNC: 103 MMOL/L (ref 94–109)
CO2 SERPL-SCNC: 30 MMOL/L (ref 20–32)
CREAT SERPL-MCNC: 1.06 MG/DL (ref 0.66–1.25)
EOSINOPHIL # BLD AUTO: 0.1 10E3/UL (ref 0–0.7)
EOSINOPHIL NFR BLD AUTO: 1 %
ERYTHROCYTE [DISTWIDTH] IN BLOOD BY AUTOMATED COUNT: 15 % (ref 10–15)
GFR SERPL CREATININE-BSD FRML MDRD: 69 ML/MIN/1.73M2
GLUCOSE BLD-MCNC: 152 MG/DL (ref 70–99)
HCT VFR BLD AUTO: 40.6 % (ref 40–53)
HGB BLD-MCNC: 13.3 G/DL (ref 13.3–17.7)
LYMPHOCYTES # BLD AUTO: 1.8 10E3/UL (ref 0.8–5.3)
LYMPHOCYTES NFR BLD AUTO: 22 %
MCH RBC QN AUTO: 29.4 PG (ref 26.5–33)
MCHC RBC AUTO-ENTMCNC: 32.8 G/DL (ref 31.5–36.5)
MCV RBC AUTO: 90 FL (ref 78–100)
MONOCYTES # BLD AUTO: 1 10E3/UL (ref 0–1.3)
MONOCYTES NFR BLD AUTO: 12 %
NEUTROPHILS # BLD AUTO: 5.5 10E3/UL (ref 1.6–8.3)
NEUTROPHILS NFR BLD AUTO: 65 %
PLATELET # BLD AUTO: 298 10E3/UL (ref 150–450)
POTASSIUM BLD-SCNC: 3.9 MMOL/L (ref 3.4–5.3)
PROT SERPL-MCNC: 6.9 G/DL (ref 6.8–8.8)
RBC # BLD AUTO: 4.52 10E6/UL (ref 4.4–5.9)
SODIUM SERPL-SCNC: 138 MMOL/L (ref 133–144)
WBC # BLD AUTO: 8.5 10E3/UL (ref 4–11)

## 2021-11-23 PROCEDURE — 85025 COMPLETE CBC W/AUTO DIFF WBC: CPT

## 2021-11-23 PROCEDURE — 80053 COMPREHEN METABOLIC PANEL: CPT

## 2021-11-23 PROCEDURE — 36415 COLL VENOUS BLD VENIPUNCTURE: CPT

## 2021-11-24 ENCOUNTER — OFFICE VISIT (OUTPATIENT)
Dept: FAMILY MEDICINE | Facility: CLINIC | Age: 76
End: 2021-11-24
Payer: COMMERCIAL

## 2021-11-24 VITALS
DIASTOLIC BLOOD PRESSURE: 66 MMHG | WEIGHT: 249 LBS | BODY MASS INDEX: 36.24 KG/M2 | HEART RATE: 80 BPM | SYSTOLIC BLOOD PRESSURE: 128 MMHG | TEMPERATURE: 99.3 F | OXYGEN SATURATION: 95 %

## 2021-11-24 DIAGNOSIS — S89.91XA KNEE INJURY, RIGHT, INITIAL ENCOUNTER: Primary | ICD-10-CM

## 2021-11-24 PROCEDURE — 99213 OFFICE O/P EST LOW 20 MIN: CPT | Performed by: NURSE PRACTITIONER

## 2021-11-24 NOTE — PATIENT INSTRUCTIONS
Patient Education     Self-Care for Strains and Sprains  Most minor strains and sprains can be treated with self-care. Recovering from a strain or sprain may take 6 to 8 weeks. Your self-care goal is to reduce pain and immobilize the injury to speed healing.  Support the injured area  Wrapping the injured area provides support for short, necessary activities. Be careful not to wrap the area too tightly. This could cut off the blood supply.    Support a wrist, elbow, or shoulder with a sling.    Wrap an ankle or knee with an elastic bandage.    Tape a finger or toe to the one next to it.  Use cold and heat  Cold reduces swelling. Both cold and heat reduce pain. Heat should not be used in the initial treatment of the injury. When using cold or heat, always place a thin towel between the pack and your skin.    Apply ice or a cold pack 10 to 15 minutes every hour you re awake for the first 2 days.    After the swelling goes down, use cold or heat to control pain. Don t use heat late in the day, since it can cause swelling when you re not active.  Rest and elevate  Rest and elevation help your injury heal faster.    Raise the injured area above your heart level.    Keep the injured area from moving.    Limit the use of the joint or limb.  Use medicine    Aspirin reduces pain and swelling. (Note: Don t give aspirin to a child 18 or younger unless prescribed by the doctor.)    Non-steroidal anti-inflammatory medicines, such as ibuprofen, may reduce pain and swelling, as well. Ask your healthcare provider for advice.    When to call your healthcare provider  Call your healthcare provider if:    The injured joint won t move, or bones make a grating sound when they move    You can t put weight on the injured area, even after 24 hours    The injured body part is cold, blue, tingling, or numb    The joint or limb appears bent or crooked.    Pain increases or doesn t improve in 4 days    When pressing along the injured area,  you notice a spot that is especially painful  Martínez last reviewed this educational content on 5/1/2018 2000-2021 [employer]. All rights reserved. This information is not intended as a substitute for professional medical care. Always follow your healthcare professional's instructions.

## 2021-11-24 NOTE — PROGRESS NOTES
Assessment & Plan     Knee injury, right, initial encounter  Suspect strain of MCL and recommend ACE wrap, ice, Tylenol as needed, and follow up with Physical Therapy department. Imaging deferred at this time- low suspicion for tear or fracture.  - [facility] Referral; Future    Ordering of each unique test         See Patient Instructions    Return in about 1 month (around 12/24/2021) for if not improving.    JACK Rebolledo CNP  [facility] THIAGO Bledsoe is a 76 year old who presents for the following health issues  accompanied by his self.    Knee Pain    History of Present Illness       He eats 2-3 servings of fruits and vegetables daily.He consumes 1 sweetened beverage(s) daily.   He is taking medications regularly.       Concern - Knee Pain  Onset: was trying to move a box on Sunday and injured right knee  Description: right knee pain  Intensity: moderate  Progression of Symptoms:  intermittent  Accompanying Signs & Symptoms: none  Previous history of similar problem: none  Precipitating factors:        Worsened by: twisting and putting weight on knee  Alleviating factors:        Improved by: putting no weight on knee  Therapies tried and outcome:  none     Tried kicking box and caused medial pain in knee. Using walking stick.  Tried knee sleeve and this hurt, but ACE wrap seemed to help.    Review of Systems   Constitutional, HEENT, cardiovascular, pulmonary, gi and gu systems are negative, except as otherwise noted.      Objective    /66 (BP Location: Left arm, Patient Position: Sitting, Cuff Size: Adult Large)   Pulse 80   Temp 99.3  F (37.4  C) (Oral)   Wt 112.9 kg (249 lb)   SpO2 95%   BMI 36.24 kg/m    Body mass index is 36.24 kg/m .  Physical Exam   GENERAL: healthy, alert and no distress  MS: Right knee atraumatic without effusion, tenderness medial joint line. Mildly positive Bucth, mildly positive to varus stress. Negative anterior  drawer  NEURO: Normal strength and tone, mentation intact and speech normal    No results found for this or any previous visit (from the past 24 hour(s)).

## 2021-11-30 ENCOUNTER — OFFICE VISIT (OUTPATIENT)
Dept: OPHTHALMOLOGY | Facility: CLINIC | Age: 76
End: 2021-11-30
Payer: COMMERCIAL

## 2021-11-30 DIAGNOSIS — Z98.890 S/P BLEPHAROPLASTY: ICD-10-CM

## 2021-11-30 DIAGNOSIS — H52.13 MYOPIA OF BOTH EYES WITH REGULAR ASTIGMATISM AND PRESBYOPIA: ICD-10-CM

## 2021-11-30 DIAGNOSIS — Z01.00 EXAMINATION OF EYES AND VISION: ICD-10-CM

## 2021-11-30 DIAGNOSIS — H52.4 MYOPIA OF BOTH EYES WITH REGULAR ASTIGMATISM AND PRESBYOPIA: ICD-10-CM

## 2021-11-30 DIAGNOSIS — H25.12 NUCLEAR SCLEROSIS OF LEFT EYE: ICD-10-CM

## 2021-11-30 DIAGNOSIS — H04.123 DRY EYES, BILATERAL: ICD-10-CM

## 2021-11-30 DIAGNOSIS — E11.9 TYPE 2 DIABETES MELLITUS WITHOUT COMPLICATION, WITHOUT LONG-TERM CURRENT USE OF INSULIN (H): Primary | ICD-10-CM

## 2021-11-30 DIAGNOSIS — H40.053 BORDERLINE GLAUCOMA WITH OCULAR HYPERTENSION, BILATERAL: ICD-10-CM

## 2021-11-30 DIAGNOSIS — Z96.1 PSEUDOPHAKIA OF RIGHT EYE: ICD-10-CM

## 2021-11-30 DIAGNOSIS — H52.223 MYOPIA OF BOTH EYES WITH REGULAR ASTIGMATISM AND PRESBYOPIA: ICD-10-CM

## 2021-11-30 DIAGNOSIS — G70.00 MYASTHENIA GRAVIS, ACHR ANTIBODY POSITIVE (H): ICD-10-CM

## 2021-11-30 DIAGNOSIS — Z86.69 H/O RD (RETINAL DETACHMENT): ICD-10-CM

## 2021-11-30 PROCEDURE — 92015 DETERMINE REFRACTIVE STATE: CPT | Performed by: STUDENT IN AN ORGANIZED HEALTH CARE EDUCATION/TRAINING PROGRAM

## 2021-11-30 PROCEDURE — 92014 COMPRE OPH EXAM EST PT 1/>: CPT | Performed by: STUDENT IN AN ORGANIZED HEALTH CARE EDUCATION/TRAINING PROGRAM

## 2021-11-30 RX ORDER — TIMOLOL MALEATE 5 MG/ML
1 SOLUTION/ DROPS OPHTHALMIC 2 TIMES DAILY
Qty: 5 ML | Refills: 11 | Status: SHIPPED | OUTPATIENT
Start: 2021-11-30 | End: 2022-12-01

## 2021-11-30 ASSESSMENT — VISUAL ACUITY
OS_CC+: -1
OD_BAT_MED: 20/20
OD_BAT_HIGH: 20/25-1
OD_CC: 20/20
OS_CC: 20/30SLOW
METHOD: SNELLEN - LINEAR
CORRECTION_TYPE: GLASSES
OS_BAT_LOW: 20/40
OD_BAT_LOW: 20/20
OS_BAT_HIGH: 20/40-2
OS_BAT_MED: 20/40-1

## 2021-11-30 ASSESSMENT — EXTERNAL EXAM - LEFT EYE: OS_EXAM: NORMAL

## 2021-11-30 ASSESSMENT — REFRACTION_MANIFEST
OS_CYLINDER: +1.75
OS_ADD: +2.50
OD_AXIS: 133
OD_SPHERE: -2.50
OD_ADD: +2.50
OD_CYLINDER: +2.00
OS_AXIS: 018
OS_SPHERE: -2.00

## 2021-11-30 ASSESSMENT — TONOMETRY
OD_IOP_MMHG: 21
OS_IOP_MMHG: 21
IOP_METHOD: APPLANATION

## 2021-11-30 ASSESSMENT — REFRACTION_WEARINGRX
OS_SPHERE: -2.00
OS_AXIS: 017
SPECS_TYPE: BIFOCAL
OS_CYLINDER: +1.75
OD_SPHERE: -2.75
OD_ADD: +3.25
OS_ADD: +3.00
OD_CYLINDER: +2.25
OD_AXIS: 133

## 2021-11-30 ASSESSMENT — CONF VISUAL FIELD
OS_NORMAL: 1
METHOD: COUNTING FINGERS
OD_NORMAL: 1

## 2021-11-30 ASSESSMENT — PACHYMETRY
OS_CT(UM): 531
OD_CT(UM): 535

## 2021-11-30 ASSESSMENT — CUP TO DISC RATIO
OD_RATIO: 0.1
OS_RATIO: 0.25

## 2021-11-30 ASSESSMENT — SLIT LAMP EXAM - LIDS
COMMENTS: 1+ DERMATOCHALASIS
COMMENTS: 1+ DERMATOCHALASIS

## 2021-11-30 ASSESSMENT — EXTERNAL EXAM - RIGHT EYE: OD_EXAM: NORMAL

## 2021-11-30 NOTE — PROGRESS NOTES
Current Eye Medications:  Latanoprost (green top) at bedtime both eyes, last took at 9:30 am. Timolol (yellow top) twice a day in both eyes, last took at 6 am.     Subjective:  Complete eye exam. Vision is doing fairly well right eye. Vision is decreased distance and near left eye for last two months. Vision comes and goes left eye > right eye during the day sometimes. Having trouble with glare during the day for last year left eye > right eye for last year. Also has glare when driving at night, has yellow glasses that help. Once in a while eyes have been itching later in the day for last month. No eye pain in either eye.     Diabetic for about 5 years. Blood sugar has been decent, around 110 in the mornings.   Lab Results   Component Value Date    A1C 6.8 10/12/2021    A1C 7.1 06/09/2021    A1C 7.5 12/30/2020    A1C 6.8 06/11/2020    A1C 5.6 02/14/2020    A1C 6.4 12/20/2019      Objective:  See Ophthalmology Exam.      Assessment:  Rakan Nolasco is a 76 year old male who presents with:   Encounter Diagnoses   Name Primary?     Examination of eyes and vision      Type 2 diabetes mellitus without complication, without long-term current use of insulin (H) Negative diabetic retinopathy      Borderline glaucoma with ocular hypertension, bilateral Intraocular pressure 21/21 today with stable C:D both eyes. Continue same medications.       Nuclear sclerosis of left eye Visually significant left eye. Dil 6.5-7. Ok for MG. Cataract surgery right eye was at [facility] in 2017. Plan for Jan-Feb 2022. Anticipate targeting mild myopia to match right eye.      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 Plan for cataract extraction  and intraocular lens implant left eye.  Risks, benefits, complications, and alternatives discussed with patient including possibility of limitations from coexistent eye disease and loss of vision. Target refraction and lens options discussed.  Patient understands and wishes to proceed with surgery.     Plan:  Continue Latanoprost (green top) at bedtime both eyes     Continue Timolol (yellow top) twice a day in both eyes      Offered cataract surgery of the left eye at the [facility]  We will call you to schedule if you wish to proceed     Mateo Gray MD  [phone]

## 2021-11-30 NOTE — PATIENT INSTRUCTIONS
Continue Latanoprost (green top) at bedtime both eyes     Continue Timolol (yellow top) twice a day in both eyes     Offered cataract surgery of the left eye at the [facility]  We will call you to schedule if you wish to proceed     Mateo Gray MD  [phone]

## 2021-11-30 NOTE — LETTER
11/30/2021         RE: Rakan Nolasco  [address] [address]        Dear Colleague,    Thank you for referring your patient, Rakan Nolasco, to the [facility]. Please see a copy of my visit note below.     Current Eye Medications:  Latanoprost (green top) at bedtime both eyes, last took at 9:30 am. Timolol (yellow top) twice a day in both eyes, last took at 6 am.     Subjective:  Complete eye exam. Vision is doing fairly well right eye. Vision is decreased distance and near left eye for last two months. Vision comes and goes left eye > right eye during the day sometimes. Having trouble with glare during the day for last year left eye > right eye for last year. Also has glare when driving at night, has yellow glasses that help. Once in a while eyes have been itching later in the day for last month. No eye pain in either eye.     Diabetic for about 5 years. Blood sugar has been decent, around 110 in the mornings.   Lab Results   Component Value Date    A1C 6.8 10/12/2021    A1C 7.1 06/09/2021    A1C 7.5 12/30/2020    A1C 6.8 06/11/2020    A1C 5.6 02/14/2020    A1C 6.4 12/20/2019      Objective:  See Ophthalmology Exam.      Assessment:  Rakan Nolasco is a 76 year old male who presents with:   Encounter Diagnoses   Name Primary?     Examination of eyes and vision      Type 2 diabetes mellitus without complication, without long-term current use of insulin (H) Negative diabetic retinopathy      Borderline glaucoma with ocular hypertension, bilateral Intraocular pressure 21/21 today with stable C:D both eyes. Continue same medications.       Nuclear sclerosis of left eye Visually significant left eye. Dil 6.5-7. Ok for MG. Cataract surgery right eye was at [facility] in 2017. Plan for Jan-Feb 2022. Anticipate targeting mild myopia to match right eye.      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 Plan for cataract extraction and intraocular lens implant left eye.  Risks, benefits, complications, and alternatives discussed with patient including possibility of limitations from coexistent eye disease and loss of vision. Target refraction and lens options discussed.  Patient understands and wishes to proceed with surgery.     Plan:  Continue Latanoprost (green top) at bedtime both eyes     Continue Timolol (yellow top) twice a day in both eyes      Offered cataract surgery of the left eye at the [facility] [place]  We will call you to schedule if you wish to proceed     Mateo Gray MD  [phone]                 Again, thank you for allowing me to participate in the care of your patient.        Sincerely,        Mateo Gray MD

## 2021-12-05 DIAGNOSIS — G50.0 TRIGEMINAL NEURALGIA: ICD-10-CM

## 2021-12-06 ENCOUNTER — ANCILLARY PROCEDURE (OUTPATIENT)
Dept: BONE DENSITY | Facility: CLINIC | Age: 76
End: 2021-12-06
Attending: PSYCHIATRY & NEUROLOGY
Payer: COMMERCIAL

## 2021-12-06 DIAGNOSIS — M81.8 OTHER OSTEOPOROSIS WITHOUT CURRENT PATHOLOGICAL FRACTURE: ICD-10-CM

## 2021-12-06 DIAGNOSIS — T38.0X5A STEROID-INDUCED OSTEOPENIA: ICD-10-CM

## 2021-12-06 DIAGNOSIS — M85.80 STEROID-INDUCED OSTEOPENIA: ICD-10-CM

## 2021-12-06 PROCEDURE — 77080 DXA BONE DENSITY AXIAL: CPT | Performed by: INTERNAL MEDICINE

## 2021-12-07 RX ORDER — OXCARBAZEPINE 300 MG/1
TABLET, FILM COATED ORAL
Qty: 60 TABLET | Refills: 1 | Status: SHIPPED | OUTPATIENT
Start: 2021-12-07 | End: 2021-12-31

## 2021-12-13 ENCOUNTER — PREP FOR PROCEDURE (OUTPATIENT)
Dept: OPHTHALMOLOGY | Facility: CLINIC | Age: 76
End: 2021-12-13
Payer: COMMERCIAL

## 2021-12-13 DIAGNOSIS — H25.812 COMBINED FORM OF AGE-RELATED CATARACT, LEFT EYE: Primary | ICD-10-CM

## 2021-12-14 ENCOUNTER — TELEPHONE (OUTPATIENT)
Dept: OPHTHALMOLOGY | Facility: CLINIC | Age: 76
End: 2021-12-14

## 2021-12-14 DIAGNOSIS — G70.00 OCULAR MYASTHENIA (H): ICD-10-CM

## 2021-12-14 RX ORDER — PYRIDOSTIGMINE BROMIDE 60 MG/1
TABLET ORAL
Qty: 540 TABLET | Refills: 0 | Status: SHIPPED | OUTPATIENT
Start: 2021-12-14 | End: 2022-02-21

## 2021-12-15 DIAGNOSIS — Z11.59 ENCOUNTER FOR SCREENING FOR OTHER VIRAL DISEASES: ICD-10-CM

## 2021-12-15 DIAGNOSIS — H40.053 BORDERLINE GLAUCOMA WITH OCULAR HYPERTENSION, BILATERAL: ICD-10-CM

## 2021-12-15 PROBLEM — H25.812 COMBINED FORM OF AGE-RELATED CATARACT, LEFT EYE: Status: ACTIVE | Noted: 2021-12-15

## 2021-12-15 RX ORDER — LATANOPROST 50 UG/ML
SOLUTION/ DROPS OPHTHALMIC
Qty: 7.5 ML | Refills: 3 | OUTPATIENT
Start: 2021-12-15

## 2021-12-15 NOTE — TELEPHONE ENCOUNTER
Type of surgery: Left phacoemulsification cataract with intraocular lens implant   CPT 99693     Borderline glaucoma with ocular hypertension, bilateral H40.053     Nuclear sclerosis of left eye H25.12    Location of surgery: [facility]  Date and time of surgery: 01/10/2022  Surgeon: Marina  Pre-Op Appt Date: 01/03/2022  Post-Op Appt Date: 01/11/2022   Packet sent out: Yes  Pre-cert/Authorization completed:    [facility]  Date: 12/15/21    Thank you,   Daija Holly   Prior Authorization [facility]  [phone]

## 2021-12-16 DIAGNOSIS — H40.053 BORDERLINE GLAUCOMA WITH OCULAR HYPERTENSION, BILATERAL: ICD-10-CM

## 2021-12-16 RX ORDER — LATANOPROST 50 UG/ML
1 SOLUTION/ DROPS OPHTHALMIC AT BEDTIME
Qty: 7.5 ML | Refills: 3 | Status: SHIPPED | OUTPATIENT
Start: 2021-12-16 | End: 2022-12-01

## 2021-12-17 DIAGNOSIS — J44.9 CHRONIC OBSTRUCTIVE PULMONARY DISEASE, UNSPECIFIED COPD TYPE (H): ICD-10-CM

## 2021-12-20 NOTE — TELEPHONE ENCOUNTER
"Routing refill request to provider for review/approval because:  Failed - Order for Serevent, Striverdi, or Foradil and pt has steroid inhaler      Requested Prescriptions   Pending Prescriptions Disp Refills     fluticasone-salmeterol (ADVAIR) 250-50 MCG/DOSE inhaler [Pharmacy Med Name: FLUTICA-SALMET 250-50 AEPB]  0     Sig: INHALE 1 PUFF TWO TIMES A DAY       Long-Acting Beta Agonist Inhalers Protocol  Failed - 12/17/2021  1:38 PM        Failed - Order for Serevent, Striverdi, or Foradil and pt has steroid inhaler        Passed - Patient is age 12 or older        Passed - Recent (12 mo) or future (30 days) visit within the authorizing provider's specialty     Patient has had an office visit with the authorizing provider or a provider within the authorizing providers department within the previous 12 mos or has a future within next 30 days. See \"Patient Info\" tab in inbasket, or \"Choose Columns\" in Meds & Orders section of the refill encounter.              Passed - Medication is active on med list       Inhaled Steroids Protocol Passed - 12/17/2021  1:38 PM        Passed - Patient is age 12 or older        Passed - Recent (12 mo) or future (30 days) visit within the authorizing provider's specialty     Patient has had an office visit with the authorizing provider or a provider within the authorizing providers department within the previous 12 mos or has a future within next 30 days. See \"Patient Info\" tab in inbasket, or \"Choose Columns\" in Meds & Orders section of the refill encounter.              Passed - Medication is active on med list           Fanny AVELAR RN, BSN  [facility]    "

## 2021-12-27 ENCOUNTER — OFFICE VISIT (OUTPATIENT)
Dept: OPHTHALMOLOGY | Facility: CLINIC | Age: 76
End: 2021-12-27
Payer: COMMERCIAL

## 2021-12-27 DIAGNOSIS — G70.00 MYASTHENIA GRAVIS, ACHR ANTIBODY POSITIVE (H): ICD-10-CM

## 2021-12-27 DIAGNOSIS — H25.812 COMBINED FORM OF AGE-RELATED CATARACT, LEFT EYE: Primary | ICD-10-CM

## 2021-12-27 DIAGNOSIS — Z96.1 PSEUDOPHAKIA OF RIGHT EYE: ICD-10-CM

## 2021-12-27 DIAGNOSIS — Z98.890 S/P BLEPHAROPLASTY: ICD-10-CM

## 2021-12-27 DIAGNOSIS — H04.123 DRY EYES, BILATERAL: ICD-10-CM

## 2021-12-27 DIAGNOSIS — Z86.69 H/O RD (RETINAL DETACHMENT): ICD-10-CM

## 2021-12-27 PROCEDURE — 92012 INTRM OPH EXAM EST PATIENT: CPT | Performed by: STUDENT IN AN ORGANIZED HEALTH CARE EDUCATION/TRAINING PROGRAM

## 2021-12-27 PROCEDURE — 92136 OPHTHALMIC BIOMETRY: CPT | Mod: 26 | Performed by: STUDENT IN AN ORGANIZED HEALTH CARE EDUCATION/TRAINING PROGRAM

## 2021-12-27 RX ORDER — DEXAMETHASONE ACETATE, MICRO 100 %
1 POWDER (GRAM) MISCELLANEOUS 4 TIMES DAILY
Qty: 1 G | Refills: 0 | Status: SHIPPED | OUTPATIENT
Start: 2021-12-27 | End: 2022-02-04

## 2021-12-27 RX ORDER — PREDNISOLONE ACETATE 10 MG/ML
1 SUSPENSION/ DROPS OPHTHALMIC 4 TIMES DAILY
Qty: 10 ML | Refills: 0 | Status: CANCELLED | OUTPATIENT
Start: 2022-01-09

## 2021-12-27 RX ORDER — BROMFENAC SODIUM 100 %
1 POWDER (GRAM) MISCELLANEOUS 4 TIMES DAILY
Qty: 1 G | Refills: 0 | Status: SHIPPED | OUTPATIENT
Start: 2021-12-27 | End: 2022-02-04

## 2021-12-27 RX ORDER — MONOCHLOROACETIC ACID
1 CRYSTALS MISCELLANEOUS 4 TIMES DAILY
Qty: 1 G | Refills: 0 | Status: SHIPPED | OUTPATIENT
Start: 2021-12-27 | End: 2022-02-04

## 2021-12-27 RX ORDER — MOXIFLOXACIN 5 MG/ML
1 SOLUTION/ DROPS OPHTHALMIC 4 TIMES DAILY
Qty: 2.5 ML | Refills: 0 | Status: CANCELLED | OUTPATIENT
Start: 2022-01-09

## 2021-12-27 RX ORDER — KETOROLAC TROMETHAMINE 5 MG/ML
1 SOLUTION OPHTHALMIC 4 TIMES DAILY
Qty: 5 ML | Refills: 0 | Status: CANCELLED | OUTPATIENT
Start: 2022-01-09

## 2021-12-27 ASSESSMENT — SLIT LAMP EXAM - LIDS
COMMENTS: 1+ DERMATOCHALASIS
COMMENTS: 1+ DERMATOCHALASIS

## 2021-12-27 ASSESSMENT — CUP TO DISC RATIO
OD_RATIO: 0.1
OS_RATIO: 0.25

## 2021-12-27 ASSESSMENT — VISUAL ACUITY
OS_CC+: -2
CORRECTION_TYPE: GLASSES
OD_CC+: -1
OD_CC: 20/20
OS_CC: 20/40
OS_PH_CC+: -2
METHOD: SNELLEN - LINEAR
OS_PH_CC: 20/30

## 2021-12-27 ASSESSMENT — TONOMETRY
IOP_METHOD: APPLANATION
OS_IOP_MMHG: 21
OD_IOP_MMHG: 21

## 2021-12-27 ASSESSMENT — EXTERNAL EXAM - RIGHT EYE: OD_EXAM: NORMAL

## 2021-12-27 ASSESSMENT — EXTERNAL EXAM - LEFT EYE: OS_EXAM: NORMAL

## 2021-12-27 NOTE — LETTER
12/27/2021         RE: Rakan Nolasco  [address] [address]        Dear Colleague,    Thank you for referring your patient, Rakan Nolasco, to the [facility]. Please see a copy of my visit note below.     Current Eye Medications:  Latanoprost at night both eyes at night   Timolol both eyes twice a day     Subjective:  Here for Pre Op left eye cataract surgery. Pt states that vision continues to gradually worsen in left eye.      Objective:  See Ophthalmology Exam.       Assessment:  Rakan Nolasco is a 76 year old male who presents with:   Encounter Diagnoses   Name Primary?     Combined form of age-related cataract, left eye Cataract surgery pre-op left eye. Dil 6.5-7. Ok for MG. Cataract surgery right eye was at [facility] in 2017. CA3. Anticipate targeting mild myopia to match right eye.      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    *Use the following drops in the left eye 4 times on the day before surgery and once the morning of surgery:                                [email or web address]     *Please bring all your eyedrops to the surgery center.    *No solid food or drink after midnight. Please take the starred medications (see attached sheets) with a small sip of water the morning of surgery.    *If you are diabetic, please do not take any diabetic medications the morning of surgery.    *If you are taking glaucoma drops, continue as usual.    Mateo Gray MD  [phone]        Again, thank you for allowing me to participate in the care of your patient.        Sincerely,        Mateo Gray MD

## 2021-12-27 NOTE — PROGRESS NOTES
Current Eye Medications:  Latanoprost at night both eyes at night   Timolol both eyes twice a day     Subjective:  Here for Pre Op left eye cataract surgery. Pt states that vision continues to gradually worsen in left eye.      Objective:  See Ophthalmology Exam.       Assessment:  Rakan Nolasco is a 76 year old male who presents with:   Encounter Diagnoses   Name Primary?     Combined form of age-related cataract, left eye Cataract surgery pre-op left eye. Dil 6.5-7. Ok for MG. Cataract surgery right eye was at [facility] in 2017. CA3. Anticipate targeting mild myopia to match right eye.      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    *Use the following drops in the left eye 4 times on the day before surgery and once the morning of surgery:                                [email or web address]     *Please bring all your eyedrops to the surgery center.    *No solid food or drink after midnight. Please take the starred medications (see attached sheets) with a small sip of water the morning of surgery.    *If you are diabetic, please do not take any diabetic medications the morning of surgery.    *If you are taking glaucoma drops, continue as usual.    Mateo Gray MD  [phone]

## 2021-12-27 NOTE — PATIENT INSTRUCTIONS
PRE-OP CATARACT INSTRUCTIONS    ** [facility] will call you to collect your payment info and shipping address.    *Use the following drops in the left eye 4 times on the day before surgery and once the morning of surgery:                                [email or web address]     *Please bring all your eyedrops to the surgery center.    *No solid food or drink after midnight. Please take the starred medications (see attached sheets) with a small sip of water the morning of surgery.    *If you are diabetic, please do not take any diabetic medications the morning of surgery.    *If you are taking glaucoma drops, continue as usual.    Mateo Gray MD  [phone]

## 2021-12-28 ENCOUNTER — DOCUMENTATION ONLY (OUTPATIENT)
Dept: LAB | Facility: CLINIC | Age: 76
End: 2021-12-28
Payer: COMMERCIAL

## 2021-12-28 NOTE — PROGRESS NOTES
Please place orders for upcoming lab appointment on 12/29/2021. Patient is requesting PSA testing.   Thank You, [facility] lab

## 2021-12-30 ENCOUNTER — LAB (OUTPATIENT)
Dept: LAB | Facility: CLINIC | Age: 76
End: 2021-12-30
Payer: COMMERCIAL

## 2021-12-30 ENCOUNTER — TELEPHONE (OUTPATIENT)
Dept: FAMILY MEDICINE | Facility: CLINIC | Age: 76
End: 2021-12-30

## 2021-12-30 DIAGNOSIS — G50.0 TRIGEMINAL NEURALGIA: ICD-10-CM

## 2021-12-30 DIAGNOSIS — C61 MALIGNANT NEOPLASM OF PROSTATE (H): ICD-10-CM

## 2021-12-30 DIAGNOSIS — E11.65 TYPE 2 DIABETES MELLITUS WITH HYPERGLYCEMIA, WITHOUT LONG-TERM CURRENT USE OF INSULIN (H): Primary | ICD-10-CM

## 2021-12-30 LAB
HBA1C MFR BLD: 7 % (ref 0–5.6)
PSA SERPL-MCNC: 0.17 UG/L (ref 0–4)

## 2021-12-30 PROCEDURE — 84153 ASSAY OF PSA TOTAL: CPT

## 2021-12-30 PROCEDURE — 36415 COLL VENOUS BLD VENIPUNCTURE: CPT

## 2021-12-30 PROCEDURE — 83036 HEMOGLOBIN GLYCOSYLATED A1C: CPT

## 2021-12-30 NOTE — TELEPHONE ENCOUNTER
Patient calling wondering if his PSA lab was done for today.  Informed him it was drawn but lab result was not resulted yet.  Pt understands and will wait for lab results.      Brittney Hernandez RN

## 2021-12-31 RX ORDER — OXCARBAZEPINE 300 MG/1
TABLET, FILM COATED ORAL
Qty: 60 TABLET | Refills: 1 | Status: SHIPPED | OUTPATIENT
Start: 2021-12-31 | End: 2022-01-25

## 2022-01-02 ASSESSMENT — ENCOUNTER SYMPTOMS
EYE PAIN: 0
SORE THROAT: 0
FREQUENCY: 1
NAUSEA: 0
HEMATOCHEZIA: 0
DIZZINESS: 0
CHILLS: 0
PALPITATIONS: 0
ARTHRALGIAS: 0
FEVER: 0
HEMATURIA: 0
COUGH: 0
SHORTNESS OF BREATH: 1
CONSTIPATION: 0
HEADACHES: 0
NERVOUS/ANXIOUS: 0
JOINT SWELLING: 0
MYALGIAS: 0
WEAKNESS: 1
ABDOMINAL PAIN: 0
DIARRHEA: 0
HEARTBURN: 0
PARESTHESIAS: 0
DYSURIA: 0

## 2022-01-02 ASSESSMENT — ACTIVITIES OF DAILY LIVING (ADL): CURRENT_FUNCTION: NO ASSISTANCE NEEDED

## 2022-01-03 ENCOUNTER — OFFICE VISIT (OUTPATIENT)
Dept: FAMILY MEDICINE | Facility: CLINIC | Age: 77
End: 2022-01-03
Payer: COMMERCIAL

## 2022-01-03 VITALS
OXYGEN SATURATION: 97 % | WEIGHT: 244.2 LBS | HEART RATE: 76 BPM | HEIGHT: 69 IN | SYSTOLIC BLOOD PRESSURE: 135 MMHG | DIASTOLIC BLOOD PRESSURE: 76 MMHG | TEMPERATURE: 97.6 F | RESPIRATION RATE: 18 BRPM | BODY MASS INDEX: 36.17 KG/M2

## 2022-01-03 DIAGNOSIS — E66.01 MORBID OBESITY (H): ICD-10-CM

## 2022-01-03 DIAGNOSIS — G70.00 MYASTHENIA GRAVIS, ACHR ANTIBODY POSITIVE (H): ICD-10-CM

## 2022-01-03 DIAGNOSIS — E11.65 TYPE 2 DIABETES MELLITUS WITH HYPERGLYCEMIA, WITHOUT LONG-TERM CURRENT USE OF INSULIN (H): ICD-10-CM

## 2022-01-03 DIAGNOSIS — Z01.818 PREOPERATIVE EXAMINATION: Primary | ICD-10-CM

## 2022-01-03 DIAGNOSIS — H25.9 SENILE CATARACT OF LEFT EYE, UNSPECIFIED AGE-RELATED CATARACT TYPE: ICD-10-CM

## 2022-01-03 DIAGNOSIS — G47.33 OSA (OBSTRUCTIVE SLEEP APNEA): ICD-10-CM

## 2022-01-03 DIAGNOSIS — J44.9 CHRONIC OBSTRUCTIVE PULMONARY DISEASE, UNSPECIFIED COPD TYPE (H): ICD-10-CM

## 2022-01-03 DIAGNOSIS — C61 MALIGNANT NEOPLASM OF PROSTATE (H): ICD-10-CM

## 2022-01-03 DIAGNOSIS — M31.6 TEMPORAL ARTERITIS (H): ICD-10-CM

## 2022-01-03 PROCEDURE — 99214 OFFICE O/P EST MOD 30 MIN: CPT | Performed by: FAMILY MEDICINE

## 2022-01-03 RX ORDER — PREDNISONE 5 MG/1
TABLET ORAL
Qty: 60 TABLET | Refills: 1 | Status: SHIPPED | OUTPATIENT
Start: 2022-01-03 | End: 2022-02-21

## 2022-01-03 ASSESSMENT — MIFFLIN-ST. JEOR: SCORE: 1845.8

## 2022-01-03 ASSESSMENT — PAIN SCALES - GENERAL: PAINLEVEL: NO PAIN (0)

## 2022-01-03 NOTE — PROGRESS NOTES
[facility]  [address] [place] [address]  [place] [address]  Phone: [phone]  Primary Provider: Jillian Knott  Pre-op Performing Provider: JILLIAN KNOTT    PREOPERATIVE EVALUATION:  Today's date: 1/3/2022    Rakan Nolasco is a 76 year old male who presents for a preoperative evaluation.    Surgical Information:  Surgery/Procedure: LEFT PHACOEMULSIFICATION, CATARACT, WITH INTRAOCULAR LENS IMPLANT  Surgery Location: [facility]   Surgeon: JN Gray   Surgery Date: 1/10/2022  Time of Surgery: 8:00am   Where patient plans to recover: At home with family  Fax number for surgical facility: Note does not need to be faxed, will be available electronically in Epic.    Type of Anesthesia Anticipated: Topical    Assessment & Plan     The proposed surgical procedure is considered LOW risk.    Preoperative examination    Asked not to take any medication on morning of surgery other than Amlodipine.    Senile cataract of left eye, unspecified age-related cataract type    Due for cataract surgery    Myasthenia gravis, AChR antibody positive (H)   Occasionally get a droopy eye and some difficulties with swallowing    Temporal arteritis (H)     -  In remission.    Chronic obstructive pulmonary disease, unspecified COPD type (H)    -  On albuterol as needed    Type 2 diabetes mellitus with hyperglycemia, without long-term current use of insulin (H)    - well controlled    Morbid obesity (H)     BMI 35.56    Malignant neoplasm of prostate (H)    -   status post cryotherapy x2    Risks and Recommendations:  The patient has the following additional risks and recommendations for perioperative complications:   - Recurrent use of steroids    Medication Instructions:  Patient is to take all scheduled medications on the day of surgery      Subjective     HPI related to upcoming procedure: Cataract surgery left eye    Preop Questions 1/1/2022   1. Have you ever had a heart attack or stroke? No    2. Have you ever had surgery on your heart or blood vessels, such as a stent placement, a coronary artery bypass, or surgery on an artery in your head, neck, heart, or legs? No   3. Do you have chest pain with activity? No   4. Do you have a history of  heart failure? No   5. Do you currently have a cold, bronchitis or symptoms of other infection?  No   6. Do you have a cough, shortness of breath, or wheezing? YES - due to COPD   7. Do you or anyone in your family have previous history of blood clots? No   8. Do you or does anyone in your family have a serious bleeding problem such as prolonged bleeding following surgeries or cuts? No   9. Have you ever had problems with anemia or been told to take iron pills? YES - in past   10. Have you had any abnormal blood loss such as black, tarry or bloody stools? No   11. Have you ever had a blood transfusion? No   12. Are you willing to have a blood transfusion if it is medically needed before, during, or after your surgery? Yes   13. Have you or any of your relatives ever had problems with anesthesia? No   14. Do you have sleep apnea, excessive snoring or daytime drowsiness? YES - uses CPAP, works good   14a. Do you have a CPAP machine? Yes   15. Do you have any artifical heart valves or other implanted medical devices like a pacemaker, defibrillator, or continuous glucose monitor? No   16. Do you have artificial joints? No   17. Are you allergic to latex? No     Health Care Directive:  Patient has a Health Care Directive on file      Preoperative Review of :   reviewed - no record of controlled substances prescribed.    Status of Chronic Conditions:  See problem list for active medical problems. See ROS for pertinent symptoms related to these conditions.    Review of Systems  Constitutional, neuro, pulmonary, cardiac, gastrointestinal,  musculoskeletal, integument and psychiatric systems are negative, except as otherwise noted.    Patient Active Problem List     Diagnosis Date Noted     Combined form of age-related cataract, left eye 12/15/2021     Priority: Medium     Added automatically from request for surgery [id number]       COPD (chronic obstructive pulmonary disease) (H) 10/28/2020     Priority: Medium     Hyponatremia 10/01/2020     Priority: Medium     Temporal arteritis (H) 02/20/2019     Priority: Medium     Type 2 diabetes mellitus with hyperglycemia, without long-term current use of insulin (H) 02/20/2019     Priority: Medium     Right posterior capsular opacification 04/09/2018     Priority: Medium     Morbid obesity (H) 02/20/2018     Priority: Medium     Demand ischemia (H) 01/12/2018     Priority: Medium     Prostate cancer (H) 01/12/2018     Priority: Medium     Myasthenia gravis (H) 10/16/2017     Priority: Medium     Pseudophakia of right eye 02/21/2017     Priority: Medium     Obstructive sleep apnea syndrome 01/25/2017     Priority: Medium     Restless legs syndrome 10/12/2016     Priority: Medium     H/O RD (retinal detachment) s/p repair with PPV (AB) 05/26/2016     Priority: Medium     Epiretinal membrane, bilateral 05/26/2016     Priority: Medium     PVD (posterior vitreous detachment) OU 06/17/2014     Priority: Medium     Dry eyes 01/21/2014     Priority: Medium     Arthritis      Priority: Medium     Health Care Home 07/20/2012     Priority: Medium     [facility] for .  Jurgen Messina RN, C-BC    [phone]     DX V65.8 REPLACED WITH [facility] (04/08/2013)       HTN (hypertension) 07/19/2012     Priority: Medium     Trigeminal neuralgia pain 01/04/2012     Priority: Medium     Advanced directives, counseling/discussion 08/24/2011     Priority: Medium     Advance Directive Problem List Overview:   Name Relationship Phone    Primary Health Care Agent            Alternative Health Care Agent          Discussed advance care planning with patient; information given to patient to review. 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     Priority: Medium     Target IOP: 21  Peak IOP: 31  GDX: abnormal both eyes  OCT: nl  HVF: nl  Pachymetry:  C/D:  0.2/0.35  SLT:           HLD (hyperlipidemia) 10/31/2010     Priority: Medium     Fatigue 10/27/2010     Priority: Medium     GERD (gastroesophageal reflux disease)      Priority: Medium      Past Medical History:   Diagnosis Date     Arthritis          BMI 31.0-31.9,adult      Cancer (H) 01/10/2018    Prostate     COPD (chronic obstructive pulmonary disease) (H) 10/28/2020     Demand ischemia (H)      Diabetes (H) 01/10/2018     Diverticulosis     Colonoscopy 8/2008     Fatty liver     see US  5/2012     GERD (gastroesophageal reflux disease)      Glaucoma (increased eye pressure)      HTN (hypertension)      Hyperlipidemia LDL goal < 130     age, htn, fhx     Irritable bowel      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      Nonsenile cataract      Obstructive sleep apnea      Obstructive sleep apnea syndrome      ROSIE (obstructive sleep apnea) 01/2011    Using CPAP;      Prediabetes      Restless leg syndrome      Sleep apnea      Trigeminal neuralgia pain 01/04/2012     Past Surgical History:   Procedure Laterality Date     BIOPSY ARTERY TEMPORAL Bilateral 8/1/2017    Procedure: BIOPSY ARTERY TEMPORAL;  Bilateral temporal artery Biopsy;  Surgeon: Jj Tello MD;  Location: [facility] OR     CATARACT IOL, RT/LT Right      COLONOSCOPY       ESOPHAGOSCOPY, GASTROSCOPY, DUODENOSCOPY (EGD), COMBINED  1/15/2014    Procedure: COMBINED ESOPHAGOSCOPY, GASTROSCOPY, DUODENOSCOPY (EGD), BIOPSY SINGLE OR MULTIPLE;;  Surgeon: Winston Nixon MD;  Location: [facility] OR     LASER YAG CAPSULOTOMY Right 04/09/2018    right eye     PHACOEMULSIFICATION CLEAR CORNEA WITH STANDARD INTRAOCULAR LENS IMPLANT Right 2/20/2017    Procedure: PHACOEMULSIFICATION CLEAR CORNEA WITH STANDARD INTRAOCULAR LENS IMPLANT;  Surgeon: Marina  Mateo RAGSDALE MD;  Location:  [facility]     RETINAL REATTACHMENT Right      SURGICAL HISTORY OF -   8/2009    Both Eyelids-.     TONSILLECTOMY  as child     Current Outpatient Medications   Medication Sig Dispense Refill     albuterol (PROAIR HFA/PROVENTIL HFA/VENTOLIN HFA) 108 (90 Base) MCG/ACT inhaler 2 PUFFS INHALATION ROUTE EVERY 4 HOURS FOR FOR SHORTNESS OF BREATH, COUGH, OR WITH EXERCISE 1 Inhaler 2     amLODIPine (NORVASC) 10 MG tablet TAKE 1 TABLET BY MOUTH EVERY DAY 90 tablet 1     aspirin 81 MG tablet Take 1 tablet by mouth daily.       atorvastatin (LIPITOR) 20 MG tablet TAKE 1 TABLET BY MOUTH EVERY DAY 90 tablet 1     blood glucose monitoring (NO BRAND SPECIFIED) meter device kit Use to test blood sugar 1 times daily or as directed. 1 kit 0     Bromfenac Sodium POWD 1 Bottle 4 times daily 1 drop four times a day in the operative eye starting 1 day before surgery. Use until the bottle runs out. 1 g 0     ClonAZEPAM (KLONOPIN) 0.5 MG tablet Take 0.5 mg by mouth. Take 1 tablet by mouth daily at bedtime       Dexamethasone Acetate POWD 1 Bottle 4 times daily 1 drop four times a day in the operative eye starting 1 day before surgery. Use until the bottle runs out. 1 g 0     fluticasone-salmeterol (ADVAIR) 250-50 MCG/DOSE inhaler INHALE 1 PUFF TWO TIMES A DAY 60 each 1     furosemide (LASIX) 20 MG tablet TAKE 2 TABLETS (40 MG) BY MOUTH DAILY 180 tablet 1     latanoprost (XALATAN) 0.005 % ophthalmic solution Place 1 drop into both eyes At Bedtime 7.5 mL 3     lisinopril (ZESTRIL) 40 MG tablet TAKE 1 TABLET BY MOUTH EVERY DAY 90 tablet 1     metFORMIN (GLUCOPHAGE-XR) 500 MG 24 hr tablet TAKE 1 TABLET BY MOUTH TWICE A DAY WITH MEALS 180 tablet 0     Moxifloxacin HCl POWD 1 Bottle 4 times daily 1 drop four times a day in the operative eye starting 1 day before surgery. Use until the bottle runs out. 1 g 0     OneTouch Delica Lancets 30G MISC USE ONE DAILY AS DIRECTED 100 each 1     ONETOUCH VERIO IQ test strip USE  TO TEST BLOOD SUGARS 1 TIMES DAILY OR AS DIRECTED 100 strip 2     OXcarbazepine (TRILEPTAL) 300 MG tablet TAKE 1 TABLET BY MOUTH TWICE A DAY 60 tablet 1     pantoprazole (PROTONIX) 20 MG [facility] tablet TAKE 1 TABLET BY MOUTH EVERY DAY 90 tablet 2     predniSONE (DELTASONE) 5 MG tablet TAKE 2 TABLETS BY MOUTH EVERY DAY 60 tablet 1     pyridostigmine (MESTINON) 60 MG tablet TAKE 2 TABLETS BY MOUTH 3 TIMES A  tablet 0     sodium bicarbonate 325 MG tablet TAKE 1 TABLET BY MOUTH 2 TIMES DAILY. 60 tablet 0     sodium bicarbonate 325 MG tablet TAKE 1 TABLET (325 MG) BY MOUTH 2 TIMES DAILY       timolol maleate (TIMOPTIC) 0.5 % ophthalmic solution Place 1 drop into both eyes 2 times daily 5 mL 11     azithromycin (ZITHROMAX) 250 MG tablet Take 500 mg (2 tabs) by mouth on day 1. Then take 250 mg (1 tab) by mouth on days 2-5. 6 tablet 0     ferrous sulfate (FEROSUL) 325 (65 Fe) MG tablet TAKE 1 TABLET BY MOUTH EVERY DAY WITH BREAKFAST 30 tablet 0     gabapentin (NEURONTIN) 300 MG capsule TAKE 2 CAPSULES BY MOUTH THREE TIMES A  capsule 1       Allergies   Allergen Reactions     Azathioprine Shortness Of Breath and Other (See Comments)     Was hospitalized from it. Also had high fever, chills, and shortness of breath.     Ciprofloxacin Muscle Pain (Myalgia)     Muscle pain  Other reaction(s): Myalgia  Other reaction(s): Myalgia     Ropinirole      Leg pan  GI  Weakness; ? sycope  Other reaction(s): Leg Pain        Social History     Tobacco Use     Smoking status: Former Smoker     Packs/day: 2.00     Years: 15.00     Pack years: 30.00     Types: Cigarettes     Start date: 1968     Quit date: 1983     Years since quittin.0     Smokeless tobacco: Former User     Quit date: 1970     Tobacco comment: smoke free household.   Substance Use Topics     Alcohol use: Not Currently     Comment: Quit in      Family History   Problem Relation Age of Onset     Respiratory Mother         copd     LUNG DISEASE  "Mother      Allergies Brother      Cancer Maternal Grandmother      Heart Disease Paternal Grandmother      Cerebrovascular Disease Father      Cancer Father      Other Cancer Father      Glaucoma Maternal Aunt      Macular Degeneration No family hx of      History   Drug Use No         Objective     /76 (BP Location: Right arm, Cuff Size: Adult Regular)   Pulse 76   Temp 97.6  F (36.4  C) (Oral)   Resp 18   Ht 1.765 m (5' 9.49\")   Wt 110.8 kg (244 lb 3.2 oz)   SpO2 97%   BMI 35.56 kg/m      Physical Exam    GENERAL APPEARANCE: healthy, alert and no distress     HENT: ear canals and TM's normal and nose and mouth without ulcers or lesions     NECK: no adenopathy and thyroid normal to palpation     RESP: lungs clear to auscultation - no rales, rhonchi or wheezes     CV: regular rates and rhythm and no murmur, click or rub     ABDOMEN:  soft, nontender, no HSM or masses and bowel sounds normal     MS: extremities normal- no gross deformities noted     SKIN: no suspicious lesions or rashes     NEURO: Normal strength and tone, mentation intact and speech normal     PSYCH: mentation appears normal. and affect normal/bright    Recent Labs   Lab Test 12/30/21  0705 11/23/21  0815 11/03/21  1132 10/12/21  0936   HGB  --  13.3 14.2 13.8   PLT  --  298 259 256   NA  --  138 136 138   POTASSIUM  --  3.9 4.5 4.4   CR  --  1.06 1.08 1.06   A1C 7.0*  --   --  6.8*        Diagnostics:  No labs were ordered during this visit.   No EKG required for low risk surgery (cataract, skin procedure, breast biopsy, etc).    Revised Cardiac Risk Index (RCRI):  The patient has the following serious cardiovascular risks for perioperative complications:   - No serious cardiac risks = 0 points     RCRI Interpretation: 0 points: Class I (very low risk - 0.4% complication rate)      Signed Electronically by: Telly Lucio MD  Copy of this evaluation report is provided to requesting physician.      "

## 2022-01-03 NOTE — H&P (VIEW-ONLY)
[facility]  [address] [place] [address]  [place] [address]  Phone: [phone]  Primary Provider: Jillian Knott  Pre-op Performing Provider: JILLIAN KNOTT    PREOPERATIVE EVALUATION:  Today's date: 1/3/2022    Rakan Nolasco is a 76 year old male who presents for a preoperative evaluation.    Surgical Information:  Surgery/Procedure: LEFT PHACOEMULSIFICATION, CATARACT, WITH INTRAOCULAR LENS IMPLANT  Surgery Location: [facility]   Surgeon: JN Gray   Surgery Date: 1/10/2022  Time of Surgery: 8:00am   Where patient plans to recover: At home with family  Fax number for surgical facility: Note does not need to be faxed, will be available electronically in Epic.    Type of Anesthesia Anticipated: Topical    Assessment & Plan     The proposed surgical procedure is considered LOW risk.    Preoperative examination    Asked not to take any medication on morning of surgery other than Amlodipine.    Senile cataract of left eye, unspecified age-related cataract type    Due for cataract surgery    Myasthenia gravis, AChR antibody positive (H)   Occasionally get a droopy eye and some difficulties with swallowing    Temporal arteritis (H)     -  In remission.    Chronic obstructive pulmonary disease, unspecified COPD type (H)    -  On albuterol as needed    Type 2 diabetes mellitus with hyperglycemia, without long-term current use of insulin (H)    - well controlled    Morbid obesity (H)     BMI 35.56    Malignant neoplasm of prostate (H)    -   status post cryotherapy x2    Risks and Recommendations:  The patient has the following additional risks and recommendations for perioperative complications:   - Recurrent use of steroids    Medication Instructions:  Patient is to take all scheduled medications on the day of surgery      Subjective     HPI related to upcoming procedure: Cataract surgery left eye    Preop Questions 1/1/2022   1. Have you ever had a heart attack or stroke? No    2. Have you ever had surgery on your heart or blood vessels, such as a stent placement, a coronary artery bypass, or surgery on an artery in your head, neck, heart, or legs? No   3. Do you have chest pain with activity? No   4. Do you have a history of  heart failure? No   5. Do you currently have a cold, bronchitis or symptoms of other infection?  No   6. Do you have a cough, shortness of breath, or wheezing? YES - due to COPD   7. Do you or anyone in your family have previous history of blood clots? No   8. Do you or does anyone in your family have a serious bleeding problem such as prolonged bleeding following surgeries or cuts? No   9. Have you ever had problems with anemia or been told to take iron pills? YES - in past   10. Have you had any abnormal blood loss such as black, tarry or bloody stools? No   11. Have you ever had a blood transfusion? No   12. Are you willing to have a blood transfusion if it is medically needed before, during, or after your surgery? Yes   13. Have you or any of your relatives ever had problems with anesthesia? No   14. Do you have sleep apnea, excessive snoring or daytime drowsiness? YES - uses CPAP, works good   14a. Do you have a CPAP machine? Yes   15. Do you have any artifical heart valves or other implanted medical devices like a pacemaker, defibrillator, or continuous glucose monitor? No   16. Do you have artificial joints? No   17. Are you allergic to latex? No     Health Care Directive:  Patient has a Health Care Directive on file      Preoperative Review of :   reviewed - no record of controlled substances prescribed.    Status of Chronic Conditions:  See problem list for active medical problems. See ROS for pertinent symptoms related to these conditions.    Review of Systems  Constitutional, neuro, pulmonary, cardiac, gastrointestinal,  musculoskeletal, integument and psychiatric systems are negative, except as otherwise noted.    Patient Active Problem List     Diagnosis Date Noted     Combined form of age-related cataract, left eye 12/15/2021     Priority: Medium     Added automatically from request for surgery [id number]       COPD (chronic obstructive pulmonary disease) (H) 10/28/2020     Priority: Medium     Hyponatremia 10/01/2020     Priority: Medium     Temporal arteritis (H) 02/20/2019     Priority: Medium     Type 2 diabetes mellitus with hyperglycemia, without long-term current use of insulin (H) 02/20/2019     Priority: Medium     Right posterior capsular opacification 04/09/2018     Priority: Medium     Morbid obesity (H) 02/20/2018     Priority: Medium     Demand ischemia (H) 01/12/2018     Priority: Medium     Prostate cancer (H) 01/12/2018     Priority: Medium     Myasthenia gravis (H) 10/16/2017     Priority: Medium     Pseudophakia of right eye 02/21/2017     Priority: Medium     Obstructive sleep apnea syndrome 01/25/2017     Priority: Medium     Restless legs syndrome 10/12/2016     Priority: Medium     H/O RD (retinal detachment) s/p repair with PPV (AB) 05/26/2016     Priority: Medium     Epiretinal membrane, bilateral 05/26/2016     Priority: Medium     PVD (posterior vitreous detachment) OU 06/17/2014     Priority: Medium     Dry eyes 01/21/2014     Priority: Medium     Arthritis      Priority: Medium     Health Care Home 07/20/2012     Priority: Medium     [facility] for .  Jurgen Messina RN, C-BC    [phone]     DX V65.8 REPLACED WITH [facility]ALTH CARE HOME (04/08/2013)       HTN (hypertension) 07/19/2012     Priority: Medium     Trigeminal neuralgia pain 01/04/2012     Priority: Medium     Advanced directives, counseling/discussion 08/24/2011     Priority: Medium     Advance Directive Problem List Overview:   Name Relationship Phone    Primary Health Care Agent            Alternative Health Care Agent          Discussed advance care planning with patient; information given to patient to review. 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     Priority: Medium     Target IOP: 21  Peak IOP: 31  GDX: abnormal both eyes  OCT: nl  HVF: nl  Pachymetry:  C/D:  0.2/0.35  SLT:           HLD (hyperlipidemia) 10/31/2010     Priority: Medium     Fatigue 10/27/2010     Priority: Medium     GERD (gastroesophageal reflux disease)      Priority: Medium      Past Medical History:   Diagnosis Date     Arthritis          BMI 31.0-31.9,adult      Cancer (H) 01/10/2018    Prostate     COPD (chronic obstructive pulmonary disease) (H) 10/28/2020     Demand ischemia (H)      Diabetes (H) 01/10/2018     Diverticulosis     Colonoscopy 8/2008     Fatty liver     see US  5/2012     GERD (gastroesophageal reflux disease)      Glaucoma (increased eye pressure)      HTN (hypertension)      Hyperlipidemia LDL goal < 130     age, htn, fhx     Irritable bowel      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      Nonsenile cataract      Obstructive sleep apnea      Obstructive sleep apnea syndrome      ROSIE (obstructive sleep apnea) 01/2011    Using CPAP;      Prediabetes      Restless leg syndrome      Sleep apnea      Trigeminal neuralgia pain 01/04/2012     Past Surgical History:   Procedure Laterality Date     BIOPSY ARTERY TEMPORAL Bilateral 8/1/2017    Procedure: BIOPSY ARTERY TEMPORAL;  Bilateral temporal artery Biopsy;  Surgeon: Jj Tello MD;  Location: [facility] OR     CATARACT IOL, RT/LT Right      COLONOSCOPY       ESOPHAGOSCOPY, GASTROSCOPY, DUODENOSCOPY (EGD), COMBINED  1/15/2014    Procedure: COMBINED ESOPHAGOSCOPY, GASTROSCOPY, DUODENOSCOPY (EGD), BIOPSY SINGLE OR MULTIPLE;;  Surgeon: Winston Nixon MD;  Location: [facility] OR     LASER YAG CAPSULOTOMY Right 04/09/2018    right eye     PHACOEMULSIFICATION CLEAR CORNEA WITH STANDARD INTRAOCULAR LENS IMPLANT Right 2/20/2017    Procedure: PHACOEMULSIFICATION CLEAR CORNEA WITH STANDARD INTRAOCULAR LENS IMPLANT;  Surgeon: Marina  Mateo RAGSDALE MD;  Location:  [facility]     RETINAL REATTACHMENT Right      SURGICAL HISTORY OF -   8/2009    Both Eyelids-.     TONSILLECTOMY  as child     Current Outpatient Medications   Medication Sig Dispense Refill     albuterol (PROAIR HFA/PROVENTIL HFA/VENTOLIN HFA) 108 (90 Base) MCG/ACT inhaler 2 PUFFS INHALATION ROUTE EVERY 4 HOURS FOR FOR SHORTNESS OF BREATH, COUGH, OR WITH EXERCISE 1 Inhaler 2     amLODIPine (NORVASC) 10 MG tablet TAKE 1 TABLET BY MOUTH EVERY DAY 90 tablet 1     aspirin 81 MG tablet Take 1 tablet by mouth daily.       atorvastatin (LIPITOR) 20 MG tablet TAKE 1 TABLET BY MOUTH EVERY DAY 90 tablet 1     blood glucose monitoring (NO BRAND SPECIFIED) meter device kit Use to test blood sugar 1 times daily or as directed. 1 kit 0     Bromfenac Sodium POWD 1 Bottle 4 times daily 1 drop four times a day in the operative eye starting 1 day before surgery. Use until the bottle runs out. 1 g 0     ClonAZEPAM (KLONOPIN) 0.5 MG tablet Take 0.5 mg by mouth. Take 1 tablet by mouth daily at bedtime       Dexamethasone Acetate POWD 1 Bottle 4 times daily 1 drop four times a day in the operative eye starting 1 day before surgery. Use until the bottle runs out. 1 g 0     fluticasone-salmeterol (ADVAIR) 250-50 MCG/DOSE inhaler INHALE 1 PUFF TWO TIMES A DAY 60 each 1     furosemide (LASIX) 20 MG tablet TAKE 2 TABLETS (40 MG) BY MOUTH DAILY 180 tablet 1     latanoprost (XALATAN) 0.005 % ophthalmic solution Place 1 drop into both eyes At Bedtime 7.5 mL 3     lisinopril (ZESTRIL) 40 MG tablet TAKE 1 TABLET BY MOUTH EVERY DAY 90 tablet 1     metFORMIN (GLUCOPHAGE-XR) 500 MG 24 hr tablet TAKE 1 TABLET BY MOUTH TWICE A DAY WITH MEALS 180 tablet 0     Moxifloxacin HCl POWD 1 Bottle 4 times daily 1 drop four times a day in the operative eye starting 1 day before surgery. Use until the bottle runs out. 1 g 0     OneTouch Delica Lancets 30G MISC USE ONE DAILY AS DIRECTED 100 each 1     ONETOUCH VERIO IQ test strip USE  TO TEST BLOOD SUGARS 1 TIMES DAILY OR AS DIRECTED 100 strip 2     OXcarbazepine (TRILEPTAL) 300 MG tablet TAKE 1 TABLET BY MOUTH TWICE A DAY 60 tablet 1     pantoprazole (PROTONIX) 20 MG [facility] tablet TAKE 1 TABLET BY MOUTH EVERY DAY 90 tablet 2     predniSONE (DELTASONE) 5 MG tablet TAKE 2 TABLETS BY MOUTH EVERY DAY 60 tablet 1     pyridostigmine (MESTINON) 60 MG tablet TAKE 2 TABLETS BY MOUTH 3 TIMES A  tablet 0     sodium bicarbonate 325 MG tablet TAKE 1 TABLET BY MOUTH 2 TIMES DAILY. 60 tablet 0     sodium bicarbonate 325 MG tablet TAKE 1 TABLET (325 MG) BY MOUTH 2 TIMES DAILY       timolol maleate (TIMOPTIC) 0.5 % ophthalmic solution Place 1 drop into both eyes 2 times daily 5 mL 11     azithromycin (ZITHROMAX) 250 MG tablet Take 500 mg (2 tabs) by mouth on day 1. Then take 250 mg (1 tab) by mouth on days 2-5. 6 tablet 0     ferrous sulfate (FEROSUL) 325 (65 Fe) MG tablet TAKE 1 TABLET BY MOUTH EVERY DAY WITH BREAKFAST 30 tablet 0     gabapentin (NEURONTIN) 300 MG capsule TAKE 2 CAPSULES BY MOUTH THREE TIMES A  capsule 1       Allergies   Allergen Reactions     Azathioprine Shortness Of Breath and Other (See Comments)     Was hospitalized from it. Also had high fever, chills, and shortness of breath.     Ciprofloxacin Muscle Pain (Myalgia)     Muscle pain  Other reaction(s): Myalgia  Other reaction(s): Myalgia     Ropinirole      Leg pan  GI  Weakness; ? sycope  Other reaction(s): Leg Pain        Social History     Tobacco Use     Smoking status: Former Smoker     Packs/day: 2.00     Years: 15.00     Pack years: 30.00     Types: Cigarettes     Start date: 1968     Quit date: 1983     Years since quittin.0     Smokeless tobacco: Former User     Quit date: 1970     Tobacco comment: smoke free household.   Substance Use Topics     Alcohol use: Not Currently     Comment: Quit in      Family History   Problem Relation Age of Onset     Respiratory Mother         copd     LUNG DISEASE  "Mother      Allergies Brother      Cancer Maternal Grandmother      Heart Disease Paternal Grandmother      Cerebrovascular Disease Father      Cancer Father      Other Cancer Father      Glaucoma Maternal Aunt      Macular Degeneration No family hx of      History   Drug Use No         Objective     /76 (BP Location: Right arm, Cuff Size: Adult Regular)   Pulse 76   Temp 97.6  F (36.4  C) (Oral)   Resp 18   Ht 1.765 m (5' 9.49\")   Wt 110.8 kg (244 lb 3.2 oz)   SpO2 97%   BMI 35.56 kg/m      Physical Exam    GENERAL APPEARANCE: healthy, alert and no distress     HENT: ear canals and TM's normal and nose and mouth without ulcers or lesions     NECK: no adenopathy and thyroid normal to palpation     RESP: lungs clear to auscultation - no rales, rhonchi or wheezes     CV: regular rates and rhythm and no murmur, click or rub     ABDOMEN:  soft, nontender, no HSM or masses and bowel sounds normal     MS: extremities normal- no gross deformities noted     SKIN: no suspicious lesions or rashes     NEURO: Normal strength and tone, mentation intact and speech normal     PSYCH: mentation appears normal. and affect normal/bright    Recent Labs   Lab Test 12/30/21  0705 11/23/21  0815 11/03/21  1132 10/12/21  0936   HGB  --  13.3 14.2 13.8   PLT  --  298 259 256   NA  --  138 136 138   POTASSIUM  --  3.9 4.5 4.4   CR  --  1.06 1.08 1.06   A1C 7.0*  --   --  6.8*        Diagnostics:  No labs were ordered during this visit.   No EKG required for low risk surgery (cataract, skin procedure, breast biopsy, etc).    Revised Cardiac Risk Index (RCRI):  The patient has the following serious cardiovascular risks for perioperative complications:   - No serious cardiac risks = 0 points     RCRI Interpretation: 0 points: Class I (very low risk - 0.4% complication rate)      Signed Electronically by: Telly Lucio MD  Copy of this evaluation report is provided to requesting physician.      "

## 2022-01-04 ENCOUNTER — OFFICE VISIT (OUTPATIENT)
Dept: FAMILY MEDICINE | Facility: CLINIC | Age: 77
End: 2022-01-04
Payer: COMMERCIAL

## 2022-01-04 VITALS
WEIGHT: 249 LBS | RESPIRATION RATE: 18 BRPM | HEIGHT: 69 IN | HEART RATE: 66 BPM | TEMPERATURE: 98 F | DIASTOLIC BLOOD PRESSURE: 71 MMHG | BODY MASS INDEX: 36.88 KG/M2 | OXYGEN SATURATION: 98 % | SYSTOLIC BLOOD PRESSURE: 123 MMHG

## 2022-01-04 DIAGNOSIS — Z00.00 ENCOUNTER FOR MEDICARE ANNUAL WELLNESS EXAM: Primary | ICD-10-CM

## 2022-01-04 PROCEDURE — 99397 PER PM REEVAL EST PAT 65+ YR: CPT | Performed by: NURSE PRACTITIONER

## 2022-01-04 ASSESSMENT — ENCOUNTER SYMPTOMS
JOINT SWELLING: 0
CONSTIPATION: 0
PARESTHESIAS: 0
DYSURIA: 0
MYALGIAS: 0
HEADACHES: 0
FREQUENCY: 1
FEVER: 0
WEAKNESS: 1
NAUSEA: 0
SHORTNESS OF BREATH: 1
DIARRHEA: 0
DIZZINESS: 0
HEMATOCHEZIA: 0
CHILLS: 0
ARTHRALGIAS: 0
HEARTBURN: 0
EYE PAIN: 0
NERVOUS/ANXIOUS: 0
HEMATURIA: 0
COUGH: 0
PALPITATIONS: 0
SORE THROAT: 0
ABDOMINAL PAIN: 0

## 2022-01-04 ASSESSMENT — PAIN SCALES - GENERAL: PAINLEVEL: NO PAIN (0)

## 2022-01-04 ASSESSMENT — MIFFLIN-ST. JEOR: SCORE: 1859.84

## 2022-01-04 NOTE — PROGRESS NOTES
SUBJECTIVE:   Rakan Nolasco is a 76 year old male who presents for Preventive Visit.    Patient has been advised of split billing requirements and indicates understanding: Yes   Are you in the first 12 months of your Medicare coverage?  No    HPI  Do you feel safe in your environment? Yes    Have you ever done Advance Care Planning? (For example, a Health Directive, POLST, or a discussion with a medical provider or your loved ones about your wishes): Yes, advance care planning is on file.       Fall risk  Fallen 2 or more times in the past year?: No  Any fall with injury in the past year?: No    Cognitive Screening   1) Repeat 3 items (Leader, Season, Table)    2) Clock draw: NORMAL  3) 3 item recall: Recalls 3 objects  Results: 3 items recalled: COGNITIVE IMPAIRMENT LESS LIKELY    Mini-CogTM Copyright S Barry. Licensed by the author for use in [facility] [employer]; reprinted with permission (miroslava@[facility]). All rights reserved.      Do you have sleep apnea, excessive snoring or daytime drowsiness?: yes    Reviewed and updated as needed this visit by clinical staff  Tobacco  Allergies  Meds   Med Hx  Surg Hx  Fam Hx  Soc Hx       Reviewed and updated as needed this visit by Provider  Tobacco  Allergies  Meds  Problems  Med Hx  Surg Hx  Fam Hx        Social History     Tobacco Use     Smoking status: Former Smoker     Packs/day: 2.00     Years: 15.00     Pack years: 30.00     Types: Cigarettes     Start date: 1968     Quit date: 1983     Years since quittin.0     Smokeless tobacco: Former User     Quit date: 1970     Tobacco comment: smoke free household.   Substance Use Topics     Alcohol use: Not Currently     Comment: Quit in      If you drink alcohol do you typically have >3 drinks per day or >7 drinks per week? No    Alcohol Use 2022   Prescreen: >3 drinks/day or >7 drinks/week? No   Prescreen: >3 drinks/day or >7 drinks/week? -           Current providers sharing in care  "for this patient include:   Patient Care Team:  Telly Lucio MD as PCP - General (Family Practice)  Arpan Harrison MD as Assigned Neuroscience Provider  Josh Emeyr MD as Assigned Surgical Provider  Jenn Keane APRN CNP as Nurse Practitioner (Family Medicine)  Jenn Keane APRN CNP as Assigned PCP    The following health maintenance items are reviewed in Epic and correct as of today:  Health Maintenance Due   Topic Date Due     FALL RISK ASSESSMENT  12/30/2021     MEDICARE ANNUAL WELLNESS VISIT  12/30/2021     Labs reviewed in EPIC    Review of Systems   Constitutional: Negative for chills and fever.   HENT: Positive for congestion and hearing loss. Negative for ear pain and sore throat.    Eyes: Positive for visual disturbance. Negative for pain.   Respiratory: Positive for shortness of breath. Negative for cough.    Cardiovascular: Positive for chest pain. Negative for palpitations and peripheral edema.   Gastrointestinal: Negative for abdominal pain, constipation, diarrhea, heartburn, hematochezia and nausea.   Genitourinary: Positive for frequency, impotence and urgency. Negative for dysuria, genital sores, hematuria and penile discharge.   Musculoskeletal: Negative for arthralgias, joint swelling and myalgias.   Skin: Negative for rash.   Neurological: Positive for weakness. Negative for dizziness, headaches and paresthesias.   Psychiatric/Behavioral: Negative for mood changes. The patient is not nervous/anxious.      OBJECTIVE:   /71   Pulse 66   Temp 98  F (36.7  C) (Oral)   Resp 18   Ht 1.753 m (5' 9\")   Wt 112.9 kg (249 lb)   SpO2 98%   BMI 36.77 kg/m   Estimated body mass index is 36.77 kg/m  as calculated from the following:    Height as of this encounter: 1.753 m (5' 9\").    Weight as of this encounter: 112.9 kg (249 lb).  Physical Exam  GENERAL: healthy, alert and no distress  EYES: Eyes grossly normal to inspection, PERRL and " "conjunctivae and sclerae normal  HENT: ear canals and TM's normal, nose and mouth without ulcers or lesions  NECK: no adenopathy, no asymmetry, masses, or scars and thyroid normal to palpation  RESP: lungs clear to auscultation - no rales, rhonchi or wheezes  CV: regular rate and rhythm, normal S1 S2, no S3 or S4, no murmur, click or rub, no peripheral edema and peripheral pulses strong  ABDOMEN: soft, nontender, no hepatosplenomegaly, no masses and bowel sounds normal  MS: no gross musculoskeletal defects noted, no edema  SKIN: no suspicious lesions or rashes  NEURO: Normal strength and tone, mentation intact and speech normal  PSYCH: mentation appears normal, affect normal/bright    Diagnostic Test Results:  Labs reviewed in Epic    ASSESSMENT / PLAN:       ICD-10-CM    1. Encounter for Medicare annual wellness exam  Z00.00        Patient has been advised of split billing requirements and indicates understanding: Yes  COUNSELING:  Reviewed preventive health counseling, as reflected in patient instructions       Regular exercise       Healthy diet/nutrition       Vision screening       Hearing screening       Dental care       Fall risk prevention       Alcohol Use        Prostate cancer screening    Estimated body mass index is 35.56 kg/m  as calculated from the following:    Height as of 1/3/22: 1.765 m (5' 9.49\").    Weight as of 1/3/22: 110.8 kg (244 lb 3.2 oz).    Weight management plan: Discussed healthy diet and exercise guidelines    He reports that he quit smoking about 39 years ago. His smoking use included cigarettes. He started smoking about 54 years ago. He has a 30.00 pack-year smoking history. He quit smokeless tobacco use about 52 years ago.      Appropriate preventive services were discussed with this patient, including applicable screening as appropriate for cardiovascular disease, diabetes, osteopenia/osteoporosis, and glaucoma.  As appropriate for age/gender, discussed screening for colorectal " cancer, prostate cancer, breast cancer, and cervical cancer. Checklist reviewing preventive services available has been given to the patient.    Reviewed patients plan of care and provided an AVS. The Basic Care Plan (routine screening as documented in Health Maintenance) for Rakan meets the Care Plan requirement. This Care Plan has been established and reviewed with the Patient.    Counseling Resources:  ATP IV Guidelines  Pooled Cohorts Equation Calculator  Breast Cancer Risk Calculator  Breast Cancer: Medication to Reduce Risk  FRAX Risk Assessment  ICSI Preventive Guidelines  Dietary Guidelines for Americans, 2010  USDA's MyPlate  ASA Prophylaxis  Lung CA Screening    JACK Allen CNP  [facility]    Identified Health Risks:

## 2022-01-04 NOTE — PATIENT INSTRUCTIONS
Patient Education   Personalized Prevention Plan  You are due for the preventive services outlined below.  Your care team is available to assist you in scheduling these services.  If you have already completed any of these items, please share that information with your care team to update in your medical record.  There are no preventive care reminders to display for this patient.  Preventive Health Recommendations  See your health care provider every year to    Review health changes.     Discuss preventive care.      Review your medicines if your doctor has prescribed any.    Talk with your health care provider about whether you should have a test to screen for prostate cancer (PSA).    Every 3 years, have a diabetes test (fasting glucose). If you are at risk for diabetes, you should have this test more often.    Every 5 years, have a cholesterol test. Have this test more often if you are at risk for high cholesterol or heart disease.     Every 10 years, have a colonoscopy. Or, have a yearly FIT test (stool test). These exams will check for colon cancer.    Talk to with your health care provider about screening for Abdominal Aortic Aneurysm if you have a family history of AAA or have a history of smoking.    Shots:     Get a flu shot each year.     Get a tetanus shot every 10 years.     Talk to your doctor about your pneumonia vaccines. There are now two you should receive - Pneumovax (PPSV 23) and Prevnar (PCV 13).    Talk to your pharmacist about a shingles vaccine.     Talk to your doctor about the hepatitis B vaccine.    Nutrition:     Eat at least 5 servings of fruits and vegetables each day.     Eat whole-grain bread, whole-wheat pasta and brown rice instead of white grains and rice.     Get adequate Calcium and Vitamin D.     Lifestyle    Exercise for at least 150 minutes a week (30 minutes a day, 5 days a week). This will help you control your weight and prevent disease.     Limit alcohol to one drink per  day.     No smoking.     Wear sunscreen to prevent skin cancer.     See your dentist every six months for an exam and cleaning.     See your eye doctor every 1 to 2 years to screen for conditions such as glaucoma, macular degeneration and cataracts.    Personalized Prevention Plan  You are due for the preventive services outlined below.  Your care team is available to assist you in scheduling these services.  If you have already completed any of these items, please share that information with your care team to update in your medical record.  Health Maintenance   Topic Date Due     A1C  03/30/2022     BMP  05/23/2022     LIPID  06/09/2022     DIABETIC FOOT EXAM  06/09/2022     ANNUAL REVIEW OF HM ORDERS  07/09/2022     MICROALBUMIN  07/16/2022     EYE EXAM  12/27/2022     MEDICARE ANNUAL WELLNESS VISIT  01/04/2023     FALL RISK ASSESSMENT  01/04/2023     DTAP/TDAP/TD IMMUNIZATION (2 - Td or Tdap) 05/06/2023     ADVANCE CARE PLANNING  01/04/2027     COLORECTAL CANCER SCREENING  09/18/2028     SPIROMETRY  Completed     HEPATITIS C SCREENING  Completed     COPD ACTION PLAN  Completed     PHQ-2  Completed     INFLUENZA VACCINE  Completed     Pneumococcal Vaccine: 65+ Years  Completed     ZOSTER IMMUNIZATION  Completed     AORTIC ANEURYSM SCREENING (SYSTEM ASSIGNED)  Completed     COVID-19 Vaccine  Completed     IPV IMMUNIZATION  Aged Out     MENINGITIS IMMUNIZATION  Aged Out

## 2022-01-06 ENCOUNTER — LAB (OUTPATIENT)
Dept: LAB | Facility: CLINIC | Age: 77
End: 2022-01-06
Payer: COMMERCIAL

## 2022-01-06 ENCOUNTER — ANESTHESIA EVENT (OUTPATIENT)
Dept: SURGERY | Facility: AMBULATORY SURGERY CENTER | Age: 77
End: 2022-01-06
Payer: COMMERCIAL

## 2022-01-06 DIAGNOSIS — Z11.59 ENCOUNTER FOR SCREENING FOR OTHER VIRAL DISEASES: ICD-10-CM

## 2022-01-06 PROCEDURE — U0003 INFECTIOUS AGENT DETECTION BY NUCLEIC ACID (DNA OR RNA); SEVERE ACUTE RESPIRATORY SYNDROME CORONAVIRUS 2 (SARS-COV-2) (CORONAVIRUS DISEASE [COVID-19]), AMPLIFIED PROBE TECHNIQUE, MAKING USE OF HIGH THROUGHPUT TECHNOLOGIES AS DESCRIBED BY CMS-2020-01-R: HCPCS

## 2022-01-06 PROCEDURE — U0005 INFEC AGEN DETEC AMPLI PROBE: HCPCS

## 2022-01-07 ENCOUNTER — OFFICE VISIT (OUTPATIENT)
Dept: UROLOGY | Facility: CLINIC | Age: 77
End: 2022-01-07
Payer: COMMERCIAL

## 2022-01-07 ENCOUNTER — TRANSFERRED RECORDS (OUTPATIENT)
Dept: HEALTH INFORMATION MANAGEMENT | Facility: CLINIC | Age: 77
End: 2022-01-07

## 2022-01-07 VITALS — DIASTOLIC BLOOD PRESSURE: 78 MMHG | OXYGEN SATURATION: 97 % | HEART RATE: 58 BPM | SYSTOLIC BLOOD PRESSURE: 131 MMHG

## 2022-01-07 DIAGNOSIS — C61 MALIGNANT NEOPLASM OF PROSTATE (H): Primary | ICD-10-CM

## 2022-01-07 LAB — SARS-COV-2 RNA RESP QL NAA+PROBE: NEGATIVE

## 2022-01-07 PROCEDURE — 99213 OFFICE O/P EST LOW 20 MIN: CPT | Performed by: UROLOGY

## 2022-01-07 NOTE — PROGRESS NOTES
Chief Complaint   Patient presents with     RECHECK     6 month follow up       Rakan Nolasco is a 76 year old male who presents today for follow up of   Chief Complaint   Patient presents with     RECHECK     6 month follow up   Patient is a pleasant 76-year-old male with history of prostate cancer status post cryotherapy x2.  His recent PSA was 0.17.  He had mild urethral stricture status post dilation in the past which has improved his urinary flow.    Current Outpatient Medications   Medication Sig Dispense Refill     albuterol (PROAIR HFA/PROVENTIL HFA/VENTOLIN HFA) 108 (90 Base) MCG/ACT inhaler 2 PUFFS INHALATION ROUTE EVERY 4 HOURS FOR FOR SHORTNESS OF BREATH, COUGH, OR WITH EXERCISE 1 Inhaler 2     amLODIPine (NORVASC) 10 MG tablet TAKE 1 TABLET BY MOUTH EVERY DAY 90 tablet 1     aspirin 81 MG tablet Take 1 tablet by mouth daily.       atorvastatin (LIPITOR) 20 MG tablet TAKE 1 TABLET BY MOUTH EVERY DAY 90 tablet 1     blood glucose monitoring (NO BRAND SPECIFIED) meter device kit Use to test blood sugar 1 times daily or as directed. 1 kit 0     Bromfenac Sodium POWD 1 Bottle 4 times daily 1 drop four times a day in the operative eye starting 1 day before surgery. Use until the bottle runs out. 1 g 0     ClonAZEPAM (KLONOPIN) 0.5 MG tablet Take 0.5 mg by mouth. Take 1 tablet by mouth daily at bedtime       Dexamethasone Acetate POWD 1 Bottle 4 times daily 1 drop four times a day in the operative eye starting 1 day before surgery. Use until the bottle runs out. 1 g 0     fluticasone-salmeterol (ADVAIR) 250-50 MCG/DOSE inhaler INHALE 1 PUFF TWO TIMES A DAY 60 each 1     furosemide (LASIX) 20 MG tablet TAKE 2 TABLETS (40 MG) BY MOUTH DAILY 180 tablet 1     latanoprost (XALATAN) 0.005 % ophthalmic solution Place 1 drop into both eyes At Bedtime 7.5 mL 3     lisinopril (ZESTRIL) 40 MG tablet TAKE 1 TABLET BY MOUTH EVERY DAY 90 tablet 1     metFORMIN (GLUCOPHAGE-XR) 500 MG 24 hr tablet TAKE 1 TABLET BY MOUTH TWICE  A DAY WITH MEALS 180 tablet 0     Moxifloxacin HCl POWD 1 Bottle 4 times daily 1 drop four times a day in the operative eye starting 1 day before surgery. Use until the bottle runs out. 1 g 0     OneTouch Delica Lancets 30G MISC USE ONE DAILY AS DIRECTED 100 each 1     ONETOUCH VERIO IQ test strip USE TO TEST BLOOD SUGARS 1 TIMES DAILY OR AS DIRECTED 100 strip 2     OXcarbazepine (TRILEPTAL) 300 MG tablet TAKE 1 TABLET BY MOUTH TWICE A DAY 60 tablet 1     pantoprazole (PROTONIX) 20 MG EC tablet TAKE 1 TABLET BY MOUTH EVERY DAY 90 tablet 2     predniSONE (DELTASONE) 5 MG tablet TAKE 2 TABLETS BY MOUTH EVERY DAY 60 tablet 1     pyridostigmine (MESTINON) 60 MG tablet TAKE 2 TABLETS BY MOUTH 3 TIMES A  tablet 0     sodium bicarbonate 325 MG tablet TAKE 1 TABLET BY MOUTH 2 TIMES DAILY. 60 tablet 0     sodium bicarbonate 325 MG tablet TAKE 1 TABLET (325 MG) BY MOUTH 2 TIMES DAILY       timolol maleate (TIMOPTIC) 0.5 % ophthalmic solution Place 1 drop into both eyes 2 times daily 5 mL 11     Allergies   Allergen Reactions     Azathioprine Shortness Of Breath and Other (See Comments)     Was hospitalized from it. Also had high fever, chills, and shortness of breath.     Ciprofloxacin Muscle Pain (Myalgia)     Muscle pain  Other reaction(s): Myalgia  Other reaction(s): Myalgia     Ropinirole      Leg pan  GI  Weakness; ? sycope  Other reaction(s): Leg Pain      Past Medical History:   Diagnosis Date     Arthritis          BMI 31.0-31.9,adult      Cancer (H) 01/10/2018    Prostate     COPD (chronic obstructive pulmonary disease) (H) 10/28/2020     Demand ischemia (H)      Diabetes (H) 01/10/2018     Diverticulosis     Colonoscopy 8/2008     Fatty liver     see US  5/2012     GERD (gastroesophageal reflux disease)      Glaucoma (increased eye pressure)      HTN (hypertension)      Hyperlipidemia LDL goal < 130     age, htn, fhx     Irritable bowel      Monoclonal gammopathy present on serum protein  jean claude      Nonsenile cataract      Obstructive sleep apnea      Obstructive sleep apnea syndrome      ROSIE (obstructive sleep apnea) 01/2011    Using CPAP;      Prediabetes      Restless leg syndrome      Sleep apnea      Trigeminal neuralgia pain 01/04/2012     Past Surgical History:   Procedure Laterality Date     BIOPSY ARTERY TEMPORAL Bilateral 8/1/2017    Procedure: BIOPSY ARTERY TEMPORAL;  Bilateral temporal artery Biopsy;  Surgeon: Jj Tello MD;  Location: [facility]     CATARACT IOL, RT/LT Right      COLONOSCOPY       ESOPHAGOSCOPY, GASTROSCOPY, DUODENOSCOPY (EGD), COMBINED  1/15/2014    Procedure: COMBINED ESOPHAGOSCOPY, GASTROSCOPY, DUODENOSCOPY (EGD), BIOPSY SINGLE OR MULTIPLE;;  Surgeon: Winston Nixon MD;  Location: [facility]     LASER YAG CAPSULOTOMY Right 04/09/2018    right eye     PHACOEMULSIFICATION CLEAR CORNEA WITH STANDARD INTRAOCULAR LENS IMPLANT Right 2/20/2017    Procedure: PHACOEMULSIFICATION CLEAR CORNEA WITH STANDARD INTRAOCULAR LENS IMPLANT;  Surgeon: Mateo Gray MD;  Location:  [facility]     RETINAL REATTACHMENT Right      SURGICAL HISTORY OF -   8/2009    Both Eyelids-.     TONSILLECTOMY  as child     Family History   Problem Relation Age of Onset     Respiratory Mother         copd     LUNG DISEASE Mother      Allergies Brother      Cancer Maternal Grandmother      Heart Disease Paternal Grandmother      Cerebrovascular Disease Father      Cancer Father      Other Cancer Father      Glaucoma Maternal Aunt      Macular Degeneration No family hx of      Social History     Socioeconomic History     Marital status: Single     Spouse name: None     Number of children: 0     Years of education: 12     Highest education level: None   Occupational History     Occupation: Retired 3/2012     Comment: [employer]   Social Needs     Financial resource strain: None     Food insecurity     Worry: None     Inability: None      Transportation needs     Medical: None     Non-medical: None   Tobacco Use     Smoking status: Former Smoker     Packs/day: 2.00     Years: 15.00     Pack years: 30.00     Types: Cigarettes     Start date: 1968     Quit date: 1983     Years since quittin.0     Smokeless tobacco: Former User     Quit date: 1970     Tobacco comment: smoke free household.   Substance and Sexual Activity     Alcohol use: Not Currently     Comment: Quit in      Drug use: No     Sexual activity: Not Currently     Partners: Female     Comment: 2010  No girlfriend at this time.   Lifestyle     Physical activity     Days per week: None     Minutes per session: None     Stress: None   Relationships     Social connections     Talks on phone: None     Gets together: None     Attends Christianity service: None     Active member of club or organization: None     Attends meetings of clubs or organizations: None     Relationship status: None     Intimate partner violence     Fear of current or ex partner: None     Emotionally abused: None     Physically abused: None     Forced sexual activity: None   Other Topics Concern     Parent/sibling w/ CABG, MI or angioplasty before 65F 55M? No   Social History Narrative     None       REVIEW OF SYSTEMS  =================  C: NEGATIVE for fever, chills, change in weight  I: NEGATIVE for worrisome rashes, moles or lesions  E/M: NEGATIVE for ear, mouth and throat problems  R: NEGATIVE for significant cough or SHORTNESS OF BREATH  CV:  NEGATIVE for chest pain, palpitations or peripheral edema  GI: NEGATIVE for nausea, abdominal pain, heartburn, or change in bowel habits  NEURO: NEGATIVE numbness/weakness  : see HPI  PSYCH: NEGATIVE depression/anxiety  LYmph: no new enlarged lymph nodes  Ortho: no new trauma/movements    Physical Exam:  /78 (BP Location: Right arm, Patient Position: Chair, Cuff Size: Adult Large)   Pulse 58   SpO2 97%    Patient is pleasant, in no acute distress,  good general condition.  Lung: no evidence of respiratory distress    Abdomen: Soft, nondistended, non tender. No masses. No rebound or guarding.   Exam: No CVA tenderness  Skin: Warm and dry.  No redness.  Psych: normal mood and affect  Neuro: alert and oriented  Musculaskeletal: moving all extremities    Assessment/Plan:       (C61) Malignant neoplasm of prostate (H)  Comment: Status post cryotherapy x2  Plan: PSA tumor marker        In 6 months

## 2022-01-07 NOTE — ANESTHESIA PREPROCEDURE EVALUATION
Anesthesia Pre-Procedure Evaluation    Patient: Rakan Nolasco   MRN: [id number] : 1945        Preoperative Diagnosis: Combined form of age-related cataract, left eye [H25.812]    Procedure : Procedure(s):  LEFT PHACOEMULSIFICATION, CATARACT, WITH INTRAOCULAR LENS IMPLANT          Past Medical History:   Diagnosis Date     Arthritis          BMI 31.0-31.9,adult      Cancer (H) 01/10/2018    Prostate     COPD (chronic obstructive pulmonary disease) (H) 10/28/2020     Demand ischemia (H)      Diabetes (H) 01/10/2018     Diverticulosis     Colonoscopy 2008     Fatty liver     see US  2012     GERD (gastroesophageal reflux disease)      Glaucoma (increased eye pressure)      HTN (hypertension)      Hyperlipidemia LDL goal < 130     age, htn, fhx     Irritable bowel      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      Nonsenile cataract      Obstructive sleep apnea      Obstructive sleep apnea syndrome      ROSIE (obstructive sleep apnea) 2011    Using CPAP;      Prediabetes      Restless leg syndrome      Sleep apnea      Trigeminal neuralgia pain 2012      Past Surgical History:   Procedure Laterality Date     BIOPSY ARTERY TEMPORAL Bilateral 2017    Procedure: BIOPSY ARTERY TEMPORAL;  Bilateral temporal artery Biopsy;  Surgeon: Jj Tello MD;  Location: [facility]     CATARACT IOL, RT/LT Right      COLONOSCOPY       ESOPHAGOSCOPY, GASTROSCOPY, DUODENOSCOPY (EGD), COMBINED  1/15/2014    Procedure: COMBINED ESOPHAGOSCOPY, GASTROSCOPY, DUODENOSCOPY (EGD), BIOPSY SINGLE OR MULTIPLE;;  Surgeon: Winston Nixon MD;  Location: [facility]     LASER YAG CAPSULOTOMY Right 2018    right eye     PHACOEMULSIFICATION CLEAR CORNEA WITH STANDARD INTRAOCULAR LENS IMPLANT Right 2017    Procedure: PHACOEMULSIFICATION CLEAR CORNEA WITH STANDARD INTRAOCULAR LENS IMPLANT;  Surgeon: Mateo Gray MD;  Location:  [facility]     RETINAL REATTACHMENT Right       SURGICAL HISTORY OF -   2009    Both Eyelids-.     TONSILLECTOMY  as child      Allergies   Allergen Reactions     Azathioprine Shortness Of Breath and Other (See Comments)     Was hospitalized from it. Also had high fever, chills, and shortness of breath.     Ciprofloxacin Muscle Pain (Myalgia)     Muscle pain  Other reaction(s): Myalgia  Other reaction(s): Myalgia     Ropinirole      Leg pan  GI  Weakness; ? sycope  Other reaction(s): Leg Pain      Social History     Tobacco Use     Smoking status: Former Smoker     Packs/day: 2.00     Years: 15.00     Pack years: 30.00     Types: Cigarettes     Start date: 1968     Quit date: 1983     Years since quittin.0     Smokeless tobacco: Former User     Quit date: 1970     Tobacco comment: smoke free household.   Substance Use Topics     Alcohol use: Not Currently     Comment: Quit in       Wt Readings from Last 1 Encounters:   22 112.9 kg (249 lb)           Physical Exam    Airway        Mallampati: II   TM distance: > 3 FB   Neck ROM: full   Mouth opening: > 3 cm    Respiratory Devices and Support         Dental  no notable dental history         Cardiovascular   cardiovascular exam normal          Pulmonary   pulmonary exam normal                OUTSIDE LABS:  CBC:   Lab Results   Component Value Date    WBC 8.5 2021    WBC 11.9 (H) 2021    HGB 13.3 2021    HGB 14.2 2021    HCT 40.6 2021    HCT 43.1 2021     2021     2021     BMP:   Lab Results   Component Value Date     2021     2021    POTASSIUM 3.9 2021    POTASSIUM 4.5 2021    CHLORIDE 103 2021    CHLORIDE 100 2021    CO2 30 2021    CO2 31 2021    BUN 17 2021    BUN 18 2021    CR 1.06 2021    CR 1.08 2021     (H) 2021     (H) 2021     COAGS: No results found for: PTT, INR, FIBR  POC: No results found for: BGM,  HCG, HCGS  HEPATIC:   Lab Results   Component Value Date    ALBUMIN 3.6 11/23/2021    PROTTOTAL 6.9 11/23/2021    ALT 34 11/23/2021    AST 18 11/23/2021    ALKPHOS 50 11/23/2021    BILITOTAL 0.3 11/23/2021     OTHER:   Lab Results   Component Value Date    A1C 7.0 (H) 12/30/2021    ROSY 9.7 11/23/2021    PHOS 3.0 07/09/2020    MAG 2.1 10/12/2021    TSH 3.93 12/20/2019    CRP 8.3 (H) 07/24/2017    SED 37 (H) 07/24/2017       Anesthesia Plan    ASA Status:  3   NPO Status:  NPO Appropriate    Anesthesia Type: MAC.     - Reason for MAC: straight local not clinically adequate   Induction: Intravenous.   Maintenance: TIVA.        Consents    Anesthesia Plan(s) and associated risks, benefits, and realistic alternatives discussed. Questions answered and patient/representative(s) expressed understanding.    - Discussed:     - Discussed with:  Patient      - Extended Intubation/Ventilatory Support Discussed: No.      - Patient is DNR/DNI Status: No    Use of blood products discussed: No .     Postoperative Care    Pain management: Multi-modal analgesia.   PONV prophylaxis: Ondansetron (or other 5HT-3)     Comments:                Srinivasa Ramos MD

## 2022-01-08 DIAGNOSIS — R60.9 EDEMA, UNSPECIFIED TYPE: ICD-10-CM

## 2022-01-10 ENCOUNTER — HOSPITAL ENCOUNTER (OUTPATIENT)
Facility: AMBULATORY SURGERY CENTER | Age: 77
End: 2022-01-10
Attending: STUDENT IN AN ORGANIZED HEALTH CARE EDUCATION/TRAINING PROGRAM | Admitting: STUDENT IN AN ORGANIZED HEALTH CARE EDUCATION/TRAINING PROGRAM
Payer: COMMERCIAL

## 2022-01-10 ENCOUNTER — ANESTHESIA (OUTPATIENT)
Dept: SURGERY | Facility: AMBULATORY SURGERY CENTER | Age: 77
End: 2022-01-10
Payer: COMMERCIAL

## 2022-01-10 VITALS
DIASTOLIC BLOOD PRESSURE: 54 MMHG | TEMPERATURE: 98.4 F | HEART RATE: 58 BPM | RESPIRATION RATE: 16 BRPM | OXYGEN SATURATION: 94 % | SYSTOLIC BLOOD PRESSURE: 105 MMHG

## 2022-01-10 DIAGNOSIS — H25.812 COMBINED FORM OF AGE-RELATED CATARACT, LEFT EYE: ICD-10-CM

## 2022-01-10 LAB — GLUCOSE BLDC GLUCOMTR-MCNC: 111 MG/DL (ref 70–99)

## 2022-01-10 PROCEDURE — 82962 GLUCOSE BLOOD TEST: CPT | Performed by: STUDENT IN AN ORGANIZED HEALTH CARE EDUCATION/TRAINING PROGRAM

## 2022-01-10 PROCEDURE — 66984 XCAPSL CTRC RMVL W/O ECP: CPT | Mod: LT | Performed by: STUDENT IN AN ORGANIZED HEALTH CARE EDUCATION/TRAINING PROGRAM

## 2022-01-10 PROCEDURE — 66984 XCAPSL CTRC RMVL W/O ECP: CPT | Mod: LT

## 2022-01-10 PROCEDURE — G8918 PT W/O PREOP ORDER IV AB PRO: HCPCS

## 2022-01-10 PROCEDURE — G8907 PT DOC NO EVENTS ON DISCHARG: HCPCS

## 2022-01-10 DEVICE — EYE IMP IOL AMO PCL TECNIS ZCB00 17.5: Type: IMPLANTABLE DEVICE | Site: EYE | Status: FUNCTIONAL

## 2022-01-10 RX ORDER — TROPICAMIDE 10 MG/ML
1 SOLUTION/ DROPS OPHTHALMIC
Status: COMPLETED | OUTPATIENT
Start: 2022-01-10 | End: 2022-01-10

## 2022-01-10 RX ORDER — LIDOCAINE 40 MG/G
CREAM TOPICAL
Status: DISCONTINUED | OUTPATIENT
Start: 2022-01-10 | End: 2022-01-11 | Stop reason: HOSPADM

## 2022-01-10 RX ORDER — ONDANSETRON 4 MG/1
4 TABLET, ORALLY DISINTEGRATING ORAL EVERY 30 MIN PRN
Status: DISCONTINUED | OUTPATIENT
Start: 2022-01-10 | End: 2022-01-11 | Stop reason: HOSPADM

## 2022-01-10 RX ORDER — MOXIFLOXACIN 5 MG/ML
SOLUTION/ DROPS OPHTHALMIC PRN
Status: DISCONTINUED | OUTPATIENT
Start: 2022-01-10 | End: 2022-01-10 | Stop reason: HOSPADM

## 2022-01-10 RX ORDER — SODIUM CHLORIDE, SODIUM LACTATE, POTASSIUM CHLORIDE, CALCIUM CHLORIDE 600; 310; 30; 20 MG/100ML; MG/100ML; MG/100ML; MG/100ML
INJECTION, SOLUTION INTRAVENOUS CONTINUOUS
Status: DISCONTINUED | OUTPATIENT
Start: 2022-01-10 | End: 2022-01-11 | Stop reason: HOSPADM

## 2022-01-10 RX ORDER — PHENYLEPHRINE HYDROCHLORIDE 25 MG/ML
1 SOLUTION/ DROPS OPHTHALMIC
Status: COMPLETED | OUTPATIENT
Start: 2022-01-10 | End: 2022-01-10

## 2022-01-10 RX ORDER — OXYCODONE HYDROCHLORIDE 5 MG/1
5 TABLET ORAL EVERY 4 HOURS PRN
Status: DISCONTINUED | OUTPATIENT
Start: 2022-01-10 | End: 2022-01-11 | Stop reason: HOSPADM

## 2022-01-10 RX ORDER — ONDANSETRON 2 MG/ML
4 INJECTION INTRAMUSCULAR; INTRAVENOUS EVERY 30 MIN PRN
Status: DISCONTINUED | OUTPATIENT
Start: 2022-01-10 | End: 2022-01-11 | Stop reason: HOSPADM

## 2022-01-10 RX ORDER — SODIUM BICARBONATE 325 MG/1
TABLET ORAL
Qty: 60 TABLET | Refills: 0 | Status: SHIPPED | OUTPATIENT
Start: 2022-01-10 | End: 2022-02-07

## 2022-01-10 RX ORDER — FENTANYL CITRATE 50 UG/ML
25 INJECTION, SOLUTION INTRAMUSCULAR; INTRAVENOUS
Status: DISCONTINUED | OUTPATIENT
Start: 2022-01-10 | End: 2022-01-11 | Stop reason: HOSPADM

## 2022-01-10 RX ORDER — ACETAMINOPHEN 325 MG/1
975 TABLET ORAL ONCE
Status: COMPLETED | OUTPATIENT
Start: 2022-01-10 | End: 2022-01-10

## 2022-01-10 RX ORDER — FENTANYL CITRATE 50 UG/ML
25 INJECTION, SOLUTION INTRAMUSCULAR; INTRAVENOUS EVERY 5 MIN PRN
Status: DISCONTINUED | OUTPATIENT
Start: 2022-01-10 | End: 2022-01-11 | Stop reason: HOSPADM

## 2022-01-10 RX ORDER — CYCLOPENTOLATE HYDROCHLORIDE 10 MG/ML
1 SOLUTION/ DROPS OPHTHALMIC
Status: COMPLETED | OUTPATIENT
Start: 2022-01-10 | End: 2022-01-10

## 2022-01-10 RX ORDER — MEPERIDINE HYDROCHLORIDE 25 MG/ML
12.5 INJECTION INTRAMUSCULAR; INTRAVENOUS; SUBCUTANEOUS
Status: DISCONTINUED | OUTPATIENT
Start: 2022-01-10 | End: 2022-01-11 | Stop reason: HOSPADM

## 2022-01-10 RX ORDER — TETRACAINE HYDROCHLORIDE 5 MG/ML
1-2 SOLUTION OPHTHALMIC ONCE
Status: COMPLETED | OUTPATIENT
Start: 2022-01-10 | End: 2022-01-10

## 2022-01-10 RX ORDER — TETRACAINE HYDROCHLORIDE 5 MG/ML
SOLUTION OPHTHALMIC PRN
Status: DISCONTINUED | OUTPATIENT
Start: 2022-01-10 | End: 2022-01-10 | Stop reason: HOSPADM

## 2022-01-10 RX ORDER — FENTANYL CITRATE 50 UG/ML
INJECTION, SOLUTION INTRAMUSCULAR; INTRAVENOUS PRN
Status: DISCONTINUED | OUTPATIENT
Start: 2022-01-10 | End: 2022-01-10

## 2022-01-10 RX ADMIN — CYCLOPENTOLATE HYDROCHLORIDE 1 DROP: 10 SOLUTION/ DROPS OPHTHALMIC at 07:46

## 2022-01-10 RX ADMIN — TROPICAMIDE 1 DROP: 10 SOLUTION/ DROPS OPHTHALMIC at 07:46

## 2022-01-10 RX ADMIN — PHENYLEPHRINE HYDROCHLORIDE 1 DROP: 25 SOLUTION/ DROPS OPHTHALMIC at 07:51

## 2022-01-10 RX ADMIN — ACETAMINOPHEN 975 MG: 325 TABLET ORAL at 07:52

## 2022-01-10 RX ADMIN — PHENYLEPHRINE HYDROCHLORIDE 1 DROP: 25 SOLUTION/ DROPS OPHTHALMIC at 07:46

## 2022-01-10 RX ADMIN — CYCLOPENTOLATE HYDROCHLORIDE 1 DROP: 10 SOLUTION/ DROPS OPHTHALMIC at 07:55

## 2022-01-10 RX ADMIN — TETRACAINE HYDROCHLORIDE 1 DROP: 5 SOLUTION OPHTHALMIC at 07:44

## 2022-01-10 RX ADMIN — SODIUM CHLORIDE, SODIUM LACTATE, POTASSIUM CHLORIDE, CALCIUM CHLORIDE: 600; 310; 30; 20 INJECTION, SOLUTION INTRAVENOUS at 07:43

## 2022-01-10 RX ADMIN — TROPICAMIDE 1 DROP: 10 SOLUTION/ DROPS OPHTHALMIC at 07:51

## 2022-01-10 RX ADMIN — CYCLOPENTOLATE HYDROCHLORIDE 1 DROP: 10 SOLUTION/ DROPS OPHTHALMIC at 07:51

## 2022-01-10 RX ADMIN — PHENYLEPHRINE HYDROCHLORIDE 1 DROP: 25 SOLUTION/ DROPS OPHTHALMIC at 07:55

## 2022-01-10 RX ADMIN — FENTANYL CITRATE 50 MCG: 50 INJECTION, SOLUTION INTRAMUSCULAR; INTRAVENOUS at 08:11

## 2022-01-10 RX ADMIN — TROPICAMIDE 1 DROP: 10 SOLUTION/ DROPS OPHTHALMIC at 07:55

## 2022-01-10 NOTE — ANESTHESIA POSTPROCEDURE EVALUATION
Patient: Rakan Nolasco    Procedure: Procedure(s):  LEFT PHACOEMULSIFICATION, CATARACT, WITH INTRAOCULAR LENS IMPLANT       Diagnosis:Combined form of age-related cataract, left eye [H25.812]  Diagnosis Additional Information: No value filed.    Anesthesia Type:  MAC    Note:  Disposition: Outpatient   Postop Pain Control: Uneventful            Sign Out: Well controlled pain   PONV:    Neuro/Psych: Uneventful            Sign Out: Acceptable/Baseline neuro status   Airway/Respiratory: Uneventful            Sign Out: Acceptable/Baseline resp. status   CV/Hemodynamics: Uneventful            Sign Out: Acceptable CV status; No obvious hypovolemia; No obvious fluid overload   Other NRE:    DID A NON-ROUTINE EVENT OCCUR?            Last vitals:  Vitals Value Taken Time   /54 01/10/22 0848   Temp     Pulse 58 01/10/22 0848   Resp 16 01/10/22 0848   SpO2 94 % 01/10/22 0848       Electronically Signed By: Srinivasa Ramos MD  January 10, 2022  2:20 PM

## 2022-01-10 NOTE — OP NOTE
PreOp Diagnosis: Visually significant nuclear sclerotic cataract left eye  PostOp Diagnosis: Same  Surgeon: Mateo Gray MD  Implant: Tecnis [id number] +17.5D    Procedures:   1. Review of intraocular lens calculations, both eyes   2. Phacoemulsification and extraction of lens  left eye   3. Intraocular lens implantation left eye  Anesthesia: MAC/topical  Complications: None  EBL: <1cc    Rakan Nolasco suffers from a visually significant cataract of the left eye. This has caused problems with distance and reading vision, including glare. After discussing the risks, benefits, and alternatives, the patient wishes to proceed with cataract surgery.    The patient was identified in the pre-op area where the left eye was marked. The patient was then brought to the operating room where a time out was called, identifying the patient, the procedure, and the correct site. Tetracaine drops were applied to the operative eye. The operative eye was then prepped and draped in the usual sterile ophthalmic fashion. An eyelid speculum was placed into the operative eye. Additional tetracaine drops were applied. A paracentesis was made inferior with a side port blade. 1% preservative-free lidocaine and epinephrine was injected into the anterior chamber.  Endocoat was injected to deepen the anterior chamber. A 2.4mm clear corneal wound was created with a keratome blade temporally.  A continuous curvilinear capsulorrhexis was started with a bent cystitome and completed with Utrada forceps. Hydrodissection of the lens nucleus was performed with BSS on a cannula. The lens nucleus was rotated. Phacoemulsification of the lens nucleus was accomplished in a phacoemulsification stop and chop technique. Remaining cortex was removed with irrigation and aspiration. The lens capsule was noted to be intact. Healon was used to inflate the capsular bag.  The lens was injected into the capsular bag. Remaining viscoelastic was removed with irrigation and  aspiration. The wounds were checked and found to be watertight after hydration. The eyelid speculum was removed and Vigamox drops were placed into the operative eye. The patient tolerated the procedure well and was in stable condition on the way to the recovery area.     Mateo Gray MD

## 2022-01-10 NOTE — ANESTHESIA CARE TRANSFER NOTE
Patient: Rakan Nolasco    Procedure: Procedure(s):  LEFT PHACOEMULSIFICATION, CATARACT, WITH INTRAOCULAR LENS IMPLANT       Diagnosis: Combined form of age-related cataract, left eye [H25.812]  Diagnosis Additional Information: No value filed.    Anesthesia Type:   MAC     Note:    Oropharynx: oropharynx clear of all foreign objects and spontaneously breathing  Level of Consciousness: awake  Oxygen Supplementation: room air    Independent Airway: airway patency satisfactory and stable  Dentition: dentition unchanged  Vital Signs Stable: post-procedure vital signs reviewed and stable  Report to RN Given: handoff report given  Patient transferred to: Phase II    Handoff Report: Identifed the Patient, Identified the Reponsible Provider, Reviewed the pertinent medical history, Discussed the surgical course, Reviewed Intra-OP anesthesia mangement and issues during anesthesia, Set expectations for post-procedure period and Allowed opportunity for questions and acknowledgement of understanding      Vitals:  Vitals Value Taken Time   BP     Temp     Pulse     Resp     SpO2         Electronically Signed By: JACK Rico CRNA  January 10, 2022  8:37 AM

## 2022-01-10 NOTE — DISCHARGE INSTRUCTIONS
CATARACT SURGERY POST-OP INSTRUCTIONS  Dr. Mateo Gray  [phone]        Continue using CatarActive3 four times a day in the operative eye.    You should get 3 doses in today and 4 doses daily starting tomorrow.       Keep the eye shield taped in place unless putting drops in. We will remove it for you in the office tomorrow.      Light sensitivity may be noticed. Sunglasses may be worn for comfort.      Do not rub the operated eye.      Keep the operated eye dry. You may wash your hair, bathe or shower, but keep the operated eye closed while doing so.       No swimming, hot tub, or sauna for 2 weeks.      No make up around eye for 5 days.      No bending at the waist or lifting more than 10 pounds for one week.      May take Tylenol (per directions on bottle) for mild pain.      Call the office at [phone] and ask to speak to the on-call ophthalmologist  if any of the following should occur:  o Any sudden vision changes  o Nausea or severe headache  o Increase in pain not controlled  o Or signs of infection (pus, increasing redness or tenderness)        [facility]  Same-Day Surgery   Adult Discharge Orders & Instructions   For 24 hours after surgery  1. Get plenty of rest.  A responsible adult must stay with you for at least 24 hours after you leave the hospital.   2. Do not drive or use heavy equipment.  If you have weakness or tingling, don't drive or use heavy equipment until this feeling goes away.  3. Do not drink alcohol.  4. Avoid strenuous or risky activities.  Ask for help when climbing stairs.   5. You may feel lightheaded.  IF so, sit for a few minutes before standing.  Have someone help you get up.   6. If you have nausea (feel sick to your stomach): Drink only clear liquids such as apple juice, ginger ale, broth or 7-Up.  Rest may also help.  Be sure to drink enough fluids.  Move to a regular diet as you feel able.  7. You may have a slight fever. Call the doctor if  your fever is over 100 F (37.7 C) (taken under the tongue) or lasts longer than 24 hours.  8. You may have a dry mouth, a sore throat, muscle aches or trouble sleeping.  These should go away after 24 hours.  9. Do not make important or legal decisions.   Call your doctor for any of the followin.  Signs of infection (fever, growing tenderness at the surgery site, a large amount of drainage or bleeding, severe pain, foul-smelling drainage, redness, swelling).    2. It has been over 8 to 10 hours since surgery and you are still not able to urinate (pass water).    3.  Headache for over 24 hours.

## 2022-01-11 ENCOUNTER — OFFICE VISIT (OUTPATIENT)
Dept: OPHTHALMOLOGY | Facility: CLINIC | Age: 77
End: 2022-01-11
Payer: COMMERCIAL

## 2022-01-11 DIAGNOSIS — H04.123 DRY EYES, BILATERAL: ICD-10-CM

## 2022-01-11 DIAGNOSIS — H40.053 BORDERLINE GLAUCOMA WITH OCULAR HYPERTENSION, BILATERAL: ICD-10-CM

## 2022-01-11 DIAGNOSIS — Z96.1 PSEUDOPHAKIA OF BOTH EYES: Primary | ICD-10-CM

## 2022-01-11 DIAGNOSIS — Z86.69 H/O RD (RETINAL DETACHMENT): ICD-10-CM

## 2022-01-11 DIAGNOSIS — G70.00 MYASTHENIA GRAVIS, ACHR ANTIBODY POSITIVE (H): ICD-10-CM

## 2022-01-11 DIAGNOSIS — Z98.890 S/P BLEPHAROPLASTY: ICD-10-CM

## 2022-01-11 PROCEDURE — 99024 POSTOP FOLLOW-UP VISIT: CPT | Performed by: STUDENT IN AN ORGANIZED HEALTH CARE EDUCATION/TRAINING PROGRAM

## 2022-01-11 ASSESSMENT — SLIT LAMP EXAM - LIDS
COMMENTS: 1+ DERMATOCHALASIS
COMMENTS: 1+ DERMATOCHALASIS

## 2022-01-11 ASSESSMENT — EXTERNAL EXAM - RIGHT EYE: OD_EXAM: NORMAL

## 2022-01-11 ASSESSMENT — TONOMETRY
OS_IOP_MMHG: 25
IOP_METHOD: APPLANATION

## 2022-01-11 ASSESSMENT — EXTERNAL EXAM - LEFT EYE: OS_EXAM: NORMAL

## 2022-01-11 ASSESSMENT — VISUAL ACUITY
OS_SC+: -1
OS_PH_SC: 20/40
OS_PH_SC+: +1
OS_SC: 20/50
METHOD: SNELLEN - LINEAR

## 2022-01-11 NOTE — PATIENT INSTRUCTIONS
POST-OP CATARACT INSTRUCTIONS    *   Use the following drop(s) in the LEFT EYE four times a day:        CatarActive 3    *  Continue Latanoprost (green top) and Timolol as usual    *   Wear eye shield when sleeping for one week. Do not rub the operated eye.     *   No bending or lifting more than 10 pounds for one week.    *   Keep water out of eye for two weeks.    *   OK to resume aspirin and/or other blood thinners if you stopped.     *   Return as scheduled in about one week.    *   If your vision worsens, eye becomes increasingly red, or becomes painful, call [phone].     Mateo Gray M.D.

## 2022-01-11 NOTE — LETTER
1/11/2022         RE: Rakan Nolasco  [address] [address]        Dear Colleague,    Thank you for referring your patient, Rakan Nolasco[facility]. Please see a copy of my visit note below.     Current Eye Medications:  CatarActive 3 three times yesterday after getting home, and once this morning.    Timolol both eyes twice a day, Latanoprost both eyes every evening.       Subjective:  Status/Post Kelman Phacoemulsification with Posterior Chamber Lens left eye: 1-10-22.  Slept well. Some scratchy sensation occasionally in his left eye.       Objective:  See Ophthalmology Exam.       Assessment:  Rakan Nolasco is a 76 year old male who presents with:   Encounter Diagnoses   Name Primary?     Pseudophakia of both eyes s/p YAG OD POD1 s/p CE/IOL left eye - doing well.      Borderline glaucoma with ocular hypertension, bilateral      Myasthenia gravis, AChR antibody positive (H)      H/O RD (retinal detachment) OD s/p repair with PPV (AB)      S/P blepharoplasty      Dry eyes, bilateral        Plan:  POST-OP CATARACT INSTRUCTIONS    *   Use the following drop(s) in the LEFT EYE four times a day:        CatarActive 3    *  Continue Latanoprost (green top) and Timolol as usual    *   Wear eye shield when sleeping for one week. Do not rub the operated eye.     *   No bending or lifting more than 10 pounds for one week.    *   Keep water out of eye for two weeks.    *   OK to resume aspirin and/or other blood thinners if you stopped.     *   Return as scheduled in about one week.    *   If your vision worsens, eye becomes increasingly red, or becomes painful, call [phone].     Mateo Gray M.D.            Again, thank you for allowing me to participate in the care of your patient.        Sincerely,        Mateo Gray MD

## 2022-01-14 DIAGNOSIS — I10 HTN, GOAL BELOW 140/90: ICD-10-CM

## 2022-01-16 RX ORDER — AMLODIPINE BESYLATE 10 MG/1
TABLET ORAL
Qty: 90 TABLET | Refills: 1 | Status: SHIPPED | OUTPATIENT
Start: 2022-01-16 | End: 2022-05-20

## 2022-01-17 DIAGNOSIS — E78.5 HYPERLIPIDEMIA LDL GOAL <130: ICD-10-CM

## 2022-01-18 RX ORDER — ATORVASTATIN CALCIUM 20 MG/1
20 TABLET, FILM COATED ORAL DAILY
Qty: 90 TABLET | Refills: 1 | Status: SHIPPED | OUTPATIENT
Start: 2022-01-18 | End: 2022-05-20

## 2022-01-18 NOTE — TELEPHONE ENCOUNTER
"Requested Prescriptions   Pending Prescriptions Disp Refills     atorvastatin (LIPITOR) 20 MG tablet 90 tablet 1     Sig: Take 1 tablet (20 mg) by mouth daily       Statins Protocol Passed - 1/17/2022  9:56 AM        Passed - LDL on file in past 12 months     Recent Labs   Lab Test 06/09/21  1409   LDL 69           Passed - No abnormal creatine kinase in past 12 months     Recent Labs   Lab Test 07/09/20  1402               Passed - Recent (12 mo) or future (30 days) visit within the authorizing provider's specialty     Patient has had an office visit with the authorizing provider or a provider within the authorizing providers department within the previous 12 mos or has a future within next 30 days. See \"Patient Info\" tab in inbasket, or \"Choose Columns\" in Meds & Orders section of the refill encounter.              Passed - Medication is active on med list        Passed - Patient is age 18 or older             "

## 2022-01-19 ENCOUNTER — OFFICE VISIT (OUTPATIENT)
Dept: OPHTHALMOLOGY | Facility: CLINIC | Age: 77
End: 2022-01-19
Payer: COMMERCIAL

## 2022-01-19 DIAGNOSIS — Z96.1 PSEUDOPHAKIA OF BOTH EYES: Primary | ICD-10-CM

## 2022-01-19 DIAGNOSIS — H40.053 BORDERLINE GLAUCOMA WITH OCULAR HYPERTENSION, BILATERAL: ICD-10-CM

## 2022-01-19 PROCEDURE — 99024 POSTOP FOLLOW-UP VISIT: CPT | Performed by: STUDENT IN AN ORGANIZED HEALTH CARE EDUCATION/TRAINING PROGRAM

## 2022-01-19 RX ORDER — PREDNISOLONE ACETATE 10 MG/ML
1 SUSPENSION/ DROPS OPHTHALMIC 4 TIMES DAILY
Qty: 5 ML | Refills: 0 | Status: SHIPPED | OUTPATIENT
Start: 2022-01-19 | End: 2022-06-15

## 2022-01-19 RX ORDER — KETOROLAC TROMETHAMINE 5 MG/ML
1 SOLUTION OPHTHALMIC 4 TIMES DAILY
Qty: 5 ML | Refills: 0 | Status: SHIPPED | OUTPATIENT
Start: 2022-01-19 | End: 2022-06-15

## 2022-01-19 ASSESSMENT — REFRACTION_WEARINGRX
OS_CYLINDER: +1.75
OD_AXIS: 133
OD_CYLINDER: +2.25
OD_ADD: +3.25
OS_SPHERE: -2.00
OS_AXIS: 017
OS_ADD: +3.00
OD_SPHERE: -2.75
SPECS_TYPE: BIFOCAL

## 2022-01-19 ASSESSMENT — REFRACTION_MANIFEST
OS_CYLINDER: +2.00
OS_SPHERE: -1.75
OS_AXIS: 180

## 2022-01-19 ASSESSMENT — EXTERNAL EXAM - RIGHT EYE: OD_EXAM: NORMAL

## 2022-01-19 ASSESSMENT — VISUAL ACUITY
OD_CC: 20/20
CORRECTION_TYPE: GLASSES
OS_PH_SC+: -2
OD_CC+: -1
OS_PH_SC: 20/30
OS_SC+: -1
OS_SC: 20/70
METHOD: SNELLEN - LINEAR

## 2022-01-19 ASSESSMENT — TONOMETRY
IOP_METHOD: APPLANATION
OS_IOP_MMHG: 22

## 2022-01-19 ASSESSMENT — SLIT LAMP EXAM - LIDS
COMMENTS: 1+ DERMATOCHALASIS
COMMENTS: 1+ DERMATOCHALASIS

## 2022-01-19 ASSESSMENT — EXTERNAL EXAM - LEFT EYE: OS_EXAM: NORMAL

## 2022-01-19 NOTE — PROGRESS NOTES
Current Eye Medications: CatarActive 3 QID left eye   Timolol twice a day both eyes, Latanoprost  every evening both eyes.       Subjective:  Here for po2 left eye. Doing very well, no pain     Objective:  See Ophthalmology Exam.       Assessment:  Rakan Nolasco is a 76 year old male who presents with:   Encounter Diagnoses   Name Primary?     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 Will extend his prednisolone (white or pink top) eyedrops and ketorolac (hobbs top) as Dr. Brown recommended.     Borderline glaucoma with ocular hypertension, bilateral        Plan:  *   For the left eye, continue CatarActive 3 four times a day in the left eye until the bottle runs out. Then start prednisolone (white or pink top) eyedrops and ketorolac (grey top) four times a day in the right eye until 2/21/22.    *  Continue Latanoprost (green top) at bedtime both eyes and Timolol (yellow top) twice a day in both eyes     *   Stop wearing eye shield.    *   No eye rubbing. May resume heavy lifting.    *   Return in about one month for final exam with glasses check (as scheduled).    *   If your vision worsens, eye becomes increasingly red, or becomes painful, call [phone].    Mateo Gray M.D.

## 2022-01-19 NOTE — LETTER
1/19/2022         RE: Rakan Nolasco  [address] [facility] [address]        Dear Colleague,    Thank you for referring your patient, Rakan Nolasco, to the [facility]. Please see a copy of my visit note below.     Current Eye Medications: CatarActive 3 QID left eye   Timolol twice a day both eyes, Latanoprost  every evening both eyes.       Subjective:  Here for po2 left eye. Doing very well, no pain     Objective:  See Ophthalmology Exam.       Assessment:  Rakan Nolasco is a 76 year old male who presents with:   Encounter Diagnoses   Name Primary?     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      Borderline glaucoma with ocular hypertension, bilateral        Plan:  *   For the left eye, continue CatarActive 3 four times a day in the left eye until the bottle runs out. Then start prednisolone (white or pink top) eyedrops and ketorolac (grey top) four times a day in the right eye until 2/21/22.    *  Continue Latanoprost (green top) at bedtime both eyes and Timolol (yellow top) twice a day in both eyes     *   Stop wearing eye shield.    *   No eye rubbing. May resume heavy lifting.    *   Return in about one month for final exam with glasses check (as scheduled).    *   If your vision worsens, eye becomes increasingly red, or becomes painful, call [phone].    Mateo Gray M.D.        Again, thank you for allowing me to participate in the care of your patient.        Sincerely,        Mateo Gray MD

## 2022-01-19 NOTE — PATIENT INSTRUCTIONS
*   For the left eye, continue CatarActive 3 four times a day in the left eye until the bottle runs out. Then start prednisolone (white or pink top) eyedrops and ketorolac (grey top) four times a day in the right eye until 2/21/22.    *  Continue Latanoprost (green top) at bedtime both eyes and Timolol (yellow top) twice a day in both eyes     *   Stop wearing eye shield.    *   No eye rubbing. May resume heavy lifting.    *   Return in about one month for final exam with glasses check (as scheduled).    *   If your vision worsens, eye becomes increasingly red, or becomes painful, call [phone].    Mateo Gray M.D.   ASP informed all information requested is in office note form 2/17/2020 and to let IAR's know if this jones not work for resubmission of authorization to [facility]. Also informed them that patient will come in for TB tests today.

## 2022-01-25 DIAGNOSIS — G50.0 TRIGEMINAL NEURALGIA: ICD-10-CM

## 2022-01-25 RX ORDER — OXCARBAZEPINE 300 MG/1
TABLET, FILM COATED ORAL
Qty: 60 TABLET | Refills: 1 | Status: SHIPPED | OUTPATIENT
Start: 2022-01-25 | End: 2022-06-30

## 2022-02-04 ENCOUNTER — OFFICE VISIT (OUTPATIENT)
Dept: OPHTHALMOLOGY | Facility: CLINIC | Age: 77
End: 2022-02-04
Payer: COMMERCIAL

## 2022-02-04 DIAGNOSIS — Z86.69 H/O RD (RETINAL DETACHMENT): ICD-10-CM

## 2022-02-04 DIAGNOSIS — H04.123 DRY EYES, BILATERAL: ICD-10-CM

## 2022-02-04 DIAGNOSIS — Z98.890 S/P BLEPHAROPLASTY: ICD-10-CM

## 2022-02-04 DIAGNOSIS — Z96.1 PSEUDOPHAKIA OF BOTH EYES: Primary | ICD-10-CM

## 2022-02-04 DIAGNOSIS — H40.053 BORDERLINE GLAUCOMA WITH OCULAR HYPERTENSION, BILATERAL: ICD-10-CM

## 2022-02-04 DIAGNOSIS — G70.00 MYASTHENIA GRAVIS, ACHR ANTIBODY POSITIVE (H): ICD-10-CM

## 2022-02-04 PROCEDURE — 99024 POSTOP FOLLOW-UP VISIT: CPT | Performed by: STUDENT IN AN ORGANIZED HEALTH CARE EDUCATION/TRAINING PROGRAM

## 2022-02-04 ASSESSMENT — VISUAL ACUITY
OS_CC: 20/70
OD_CC: 20/20
OS_PH_CC: 20/30
CORRECTION_TYPE: GLASSES
OD_CC+: -1
METHOD: SNELLEN - LINEAR

## 2022-02-04 ASSESSMENT — TONOMETRY
IOP_METHOD: APPLANATION
OS_IOP_MMHG: 19
OD_IOP_MMHG: 21

## 2022-02-04 ASSESSMENT — EXTERNAL EXAM - LEFT EYE: OS_EXAM: NORMAL

## 2022-02-04 ASSESSMENT — EXTERNAL EXAM - RIGHT EYE: OD_EXAM: NORMAL

## 2022-02-04 ASSESSMENT — CUP TO DISC RATIO
OS_RATIO: 0.25
OD_RATIO: 0.1

## 2022-02-04 ASSESSMENT — REFRACTION_MANIFEST
OD_ADD: +2.75
OD_SPHERE: -2.50
OS_AXIS: 180
OS_SPHERE: -2.00
OD_AXIS: 133
OD_CYLINDER: +2.00
OS_ADD: +2.75
OS_CYLINDER: +2.00

## 2022-02-04 ASSESSMENT — SLIT LAMP EXAM - LIDS
COMMENTS: 1+ DERMATOCHALASIS
COMMENTS: 1+ DERMATOCHALASIS

## 2022-02-04 NOTE — PATIENT INSTRUCTIONS
"Continue prednisolone (white or pink top) eyedrops and ketorolac (grey top) four times a day in the right eye until 2/21/22.     Continue Latanoprost (green top) at bedtime both eyes and Timolol (yellow top) twice a day in both eyes     Use artificial tears up to four times a day (like Refresh Optive, Systane Balance, TheraTears, or generic artificial tears are ok. Avoid \"get the red out\" drops).     Glasses prescription given     Continue care with Dr. Garsia as he recommends    Mateo Gray MD  [phone]      "

## 2022-02-04 NOTE — PROGRESS NOTES
" Current Eye Medications:  Prednisolone and ketorlac starting this afternoon four times a day left eye. Ran out of Cataractive 3 this morning. Timolol twice a day both eyes, last took at 6 am. Latanoprost at bedtime both eyes, last took at 10 pm.     Subjective:  Final MR, KPE/IOL left eye 1/10/22. Still has transparent water spot that moves in and out of vision left eye. Vision is OK now right eye, but can get blurry at night while watching TV. Vision is little blurry left eye. Short sharp aching pain, can happen in either eye had off and on for years. No eye pain in either eye currently.     Diabetic for 3-4 years. Blood sugar has been good, usually less then 110 in the morning.  Lab Results    Component Value Date    A1C 7.0 12/30/2021    A1C 6.8 10/12/2021    A1C 7.1 06/09/2021    A1C 7.5 12/30/2020    A1C 6.8 06/11/2020    A1C 5.6 02/14/2020    A1C 6.4 12/20/2019      Objective:  See Ophthalmology Exam.       Assessment:  Rakan Nolasco is a 76 year old male who presents with:   Encounter Diagnoses   Name Primary?     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 Extended prednisolone and ketorolac use per Dr. Brown.      Borderline glaucoma with ocular hypertension, bilateral      Myasthenia gravis, AChR antibody positive (H)      H/O RD (retinal detachment) OD s/p repair with PPV (AB)      S/P blepharoplasty      Dry eyes, bilateral        Plan:  Continue prednisolone (white or pink top) eyedrops and ketorolac (grey top) four times a day in the left eye until 2/21/22.     Continue Latanoprost (green top) at bedtime both eyes and Timolol (yellow top) twice a day in both eyes     Use artificial tears up to four times a day (like Refresh Optive, Systane Balance, TheraTears, or generic artificial tears are ok. Avoid \"get the red out\" drops).     Glasses prescription given     Continue care with Dr. Garsia as he recommends    Mateo Gray MD  [phone]        "

## 2022-02-04 NOTE — LETTER
2/4/2022         RE: Rakan Nolasco  [address] [facility] [address]        Dear Colleague,    Thank you for referring your patient, Rakan Nolasco[facility] Please see a copy of my visit note below.     Current Eye Medications:  Prednisolone and ketorlac starting this afternoon four times a day left eye. Ran out of Cataractive 3 this morning. Timolol twice a day both eyes, last took at 6 am. Latanoprost at bedtime both eyes, last took at 10 pm.     Subjective:  Final MR, KPE/IOL left eye 1/10/22. Still has transparent water spot that moves in and out of vision left eye. Vision is OK now right eye, but can get blurry at night while watching TV. Vision is little blurry left eye. Short sharp aching pain, can happen in either eye had off and on for years. No eye pain in either eye currently.     Diabetic for 3-4 years. Blood sugar has been good, usually less then 110 in the morning.  Lab Results    Component Value Date    A1C 7.0 12/30/2021    A1C 6.8 10/12/2021    A1C 7.1 06/09/2021    A1C 7.5 12/30/2020    A1C 6.8 06/11/2020    A1C 5.6 02/14/2020    A1C 6.4 12/20/2019      Objective:  See Ophthalmology Exam.       Assessment:  Rakan Nolasco is a 76 year old male who presents with:   Encounter Diagnoses   Name Primary?     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 Extended prednisolone and ketorolac use per Dr. Brown.      Borderline glaucoma with ocular hypertension, bilateral      Myasthenia gravis, AChR antibody positive (H)      H/O RD (retinal detachment) OD s/p repair with PPV (AB)      S/P blepharoplasty      Dry eyes, bilateral        Plan:  Continue prednisolone (white or pink top) eyedrops and ketorolac (grey top) four times a day in the left eye until 2/21/22.     Continue Latanoprost (green top) at bedtime both eyes and Timolol (yellow top) twice a day in both eyes     Use artificial tears up to four times a day (like Refresh  "Optive, Systane Balance, TheraTears, or generic artificial tears are ok. Avoid \"get the red out\" drops).     Glasses prescription given     Continue care with Dr. Garsia as he recommends    Mateo Gray MD  [phone]            Again, thank you for allowing me to participate in the care of your patient.        Sincerely,        Mateo Gray MD    "

## 2022-02-07 DIAGNOSIS — R60.9 EDEMA, UNSPECIFIED TYPE: ICD-10-CM

## 2022-02-07 RX ORDER — SODIUM BICARBONATE 325 MG/1
TABLET ORAL
Qty: 60 TABLET | Refills: 1 | Status: SHIPPED | OUTPATIENT
Start: 2022-02-07 | End: 2022-03-10

## 2022-02-07 NOTE — TELEPHONE ENCOUNTER
Routing refill request to provider for review/approval because:  Drug not on the [facility] refill protocol           Pending Prescriptions:                       Disp   Refills    sodium bicarbonate 325 MG tablet [Pharmacy*60 tab*0        Sig: TAKE 1 TABLET BY MOUTH TWICE A DAY        Krzysztof Rivas RN

## 2022-02-10 DIAGNOSIS — R73.03 PREDIABETES: ICD-10-CM

## 2022-02-11 RX ORDER — METFORMIN HCL 500 MG
TABLET, EXTENDED RELEASE 24 HR ORAL
Qty: 180 TABLET | Refills: 1 | Status: SHIPPED | OUTPATIENT
Start: 2022-02-11 | End: 2022-07-28

## 2022-02-11 NOTE — TELEPHONE ENCOUNTER
"Prescription approved per [facility] Refill Protocol.    Requested Prescriptions   Pending Prescriptions Disp Refills     metFORMIN (GLUCOPHAGE-XR) 500 MG 24 hr tablet [Pharmacy Med Name: METFORMIN HCL  MG TABLET] 180 tablet 0     Sig: TAKE 1 TABLET BY MOUTH TWICE A DAY WITH MEALS       Biguanide Agents Passed - 2/10/2022  1:29 AM        Passed - Patient is age 10 or older        Passed - Patient has documented A1c within the specified period of time.     If HgbA1C is 8 or greater, it needs to be on file within the past 3 months.  If less than 8, must be on file within the past 6 months.     Recent Labs   Lab Test 12/30/21  0705   A1C 7.0*             Passed - Patient's CR is NOT>1.4 OR Patient's EGFR is NOT<45 within past 12 mos.     Recent Labs   Lab Test 11/23/21  0815 07/12/21  1259 05/04/21  1543 11/04/20  0000   GFR  --   --   --  60   GFRB  --   --   --  59   GFRESTIMATED 69   < > 72  --    GFRESTBLACK  --   --  84  --     < > = values in this interval not displayed.       Recent Labs   Lab Test 11/23/21  0815 05/04/21  1543 11/04/20  0000   CR 1.06   < >  --    CRPOC  --   --  1.2    < > = values in this interval not displayed.             Passed - Patient does NOT have a diagnosis of CHF.        Passed - Medication is active on med list        Passed - Recent (6 mo) or future (30 days) visit within the authorizing provider's specialty     Patient had office visit in the last 6 months or has a visit in the next 30 days with authorizing provider or within the authorizing provider's specialty.  See \"Patient Info\" tab in inbasket, or \"Choose Columns\" in Meds & Orders section of the refill encounter.                   Radha Avendaño MSN, BSN, RN on 2/11/2022 at 11:39 AM  [facility]  "

## 2022-02-18 ENCOUNTER — TRANSFERRED RECORDS (OUTPATIENT)
Dept: HEALTH INFORMATION MANAGEMENT | Facility: CLINIC | Age: 77
End: 2022-02-18
Payer: COMMERCIAL

## 2022-02-19 DIAGNOSIS — G70.00 OCULAR MYASTHENIA (H): ICD-10-CM

## 2022-02-19 DIAGNOSIS — I10 BENIGN ESSENTIAL HYPERTENSION: ICD-10-CM

## 2022-02-19 DIAGNOSIS — R60.9 EDEMA, UNSPECIFIED TYPE: ICD-10-CM

## 2022-02-19 DIAGNOSIS — G70.00 MYASTHENIA GRAVIS, ACHR ANTIBODY POSITIVE (H): ICD-10-CM

## 2022-02-19 DIAGNOSIS — J44.9 CHRONIC OBSTRUCTIVE PULMONARY DISEASE, UNSPECIFIED COPD TYPE (H): ICD-10-CM

## 2022-02-20 RX ORDER — ALBUTEROL SULFATE 90 UG/1
AEROSOL, METERED RESPIRATORY (INHALATION)
Qty: 6.7 G | Refills: 1 | Status: SHIPPED | OUTPATIENT
Start: 2022-02-20 | End: 2022-07-28

## 2022-02-20 RX ORDER — LISINOPRIL 40 MG/1
TABLET ORAL
Qty: 90 TABLET | Refills: 2 | Status: SHIPPED | OUTPATIENT
Start: 2022-02-20 | End: 2022-11-04

## 2022-02-20 RX ORDER — FUROSEMIDE 20 MG
40 TABLET ORAL DAILY
Qty: 180 TABLET | Refills: 1 | Status: SHIPPED | OUTPATIENT
Start: 2022-02-20 | End: 2022-04-01

## 2022-02-21 RX ORDER — PYRIDOSTIGMINE BROMIDE 60 MG/1
TABLET ORAL
Qty: 540 TABLET | Refills: 0 | Status: SHIPPED | OUTPATIENT
Start: 2022-02-21 | End: 2022-05-20

## 2022-02-21 RX ORDER — PREDNISONE 5 MG/1
TABLET ORAL
Qty: 60 TABLET | Refills: 1 | Status: SHIPPED | OUTPATIENT
Start: 2022-02-21 | End: 2022-04-18

## 2022-02-21 NOTE — TELEPHONE ENCOUNTER
Prescription approved per [facility] Refill Protocol.    Soila Moreau RN

## 2022-02-21 NOTE — TELEPHONE ENCOUNTER
Prescription approved per [facility] [place].    Soila Moreau RN

## 2022-02-23 DIAGNOSIS — E11.65 TYPE 2 DIABETES MELLITUS WITH HYPERGLYCEMIA, WITHOUT LONG-TERM CURRENT USE OF INSULIN (H): ICD-10-CM

## 2022-02-24 ENCOUNTER — DOCUMENTATION ONLY (OUTPATIENT)
Dept: OPHTHALMOLOGY | Facility: CLINIC | Age: 77
End: 2022-02-24
Payer: COMMERCIAL

## 2022-02-25 RX ORDER — BLOOD SUGAR DIAGNOSTIC
STRIP MISCELLANEOUS
Qty: 100 STRIP | Refills: 2 | Status: SHIPPED | OUTPATIENT
Start: 2022-02-25 | End: 2022-07-08

## 2022-02-25 RX ORDER — LANCETS 30 GAUGE
EACH MISCELLANEOUS
Qty: 100 EACH | Refills: 2 | Status: SHIPPED | OUTPATIENT
Start: 2022-02-25 | End: 2022-07-08

## 2022-02-25 NOTE — TELEPHONE ENCOUNTER
Prescription approved per [facility] Refill Protocol.    Soila Moreau RN

## 2022-04-01 ENCOUNTER — OFFICE VISIT (OUTPATIENT)
Dept: FAMILY MEDICINE | Facility: CLINIC | Age: 77
End: 2022-04-01
Payer: COMMERCIAL

## 2022-04-01 VITALS
HEART RATE: 71 BPM | HEIGHT: 69 IN | TEMPERATURE: 97.8 F | OXYGEN SATURATION: 97 % | DIASTOLIC BLOOD PRESSURE: 71 MMHG | BODY MASS INDEX: 36.53 KG/M2 | SYSTOLIC BLOOD PRESSURE: 129 MMHG | RESPIRATION RATE: 18 BRPM | WEIGHT: 246.6 LBS

## 2022-04-01 DIAGNOSIS — J44.9 CHRONIC OBSTRUCTIVE PULMONARY DISEASE, UNSPECIFIED COPD TYPE (H): ICD-10-CM

## 2022-04-01 DIAGNOSIS — R06.09 DYSPNEA ON EXERTION: ICD-10-CM

## 2022-04-01 DIAGNOSIS — R60.0 PEDAL EDEMA: ICD-10-CM

## 2022-04-01 DIAGNOSIS — E11.65 TYPE 2 DIABETES MELLITUS WITH HYPERGLYCEMIA, WITHOUT LONG-TERM CURRENT USE OF INSULIN (H): Primary | ICD-10-CM

## 2022-04-01 DIAGNOSIS — I73.9 CLAUDICATION OF BOTH LOWER EXTREMITIES (H): ICD-10-CM

## 2022-04-01 LAB
HBA1C MFR BLD: 7.1 % (ref 0–5.6)
HOLD SPECIMEN: NORMAL
HOLD SPECIMEN: NORMAL

## 2022-04-01 PROCEDURE — 83036 HEMOGLOBIN GLYCOSYLATED A1C: CPT | Performed by: NURSE PRACTITIONER

## 2022-04-01 PROCEDURE — 36415 COLL VENOUS BLD VENIPUNCTURE: CPT | Performed by: NURSE PRACTITIONER

## 2022-04-01 PROCEDURE — 99214 OFFICE O/P EST MOD 30 MIN: CPT | Performed by: NURSE PRACTITIONER

## 2022-04-01 RX ORDER — FUROSEMIDE 20 MG
TABLET ORAL
Qty: 180 TABLET | Refills: 1 | Status: SHIPPED | OUTPATIENT
Start: 2022-04-01 | End: 2022-05-23

## 2022-04-01 ASSESSMENT — PAIN SCALES - GENERAL: PAINLEVEL: NO PAIN (0)

## 2022-04-01 NOTE — PROGRESS NOTES
Assessment & Plan     Type 2 diabetes mellitus with hyperglycemia, without long-term current use of insulin (H)  A1c (7.1) is slightly increased over last check, counseled on diet guidelines and getting regular exercise with close follow up in 3 months. Continue present plan and medications.  - HEMOGLOBIN A1C    Chronic obstructive pulmonary disease, unspecified COPD type (H)  He has noted increased dyspnea at night and with exertion over the past several months. Had an episode of increased sputum that resolved on its own. His pedal edema has worsened so I will check BNP to evaluate for HF and will increase dose furosemide to see if swelling and dyspnea improve. Close follow up in one week to reevaluate.    Dyspnea on exertion  See above.   - Comprehensive metabolic panel; Future  - N terminal pro BNP outpatient; Future  - Magnesium; Future    Pedal edema  See above. Patient has been unable to tolerate compression stockings as it increases his calf pain while wearing them so will get MARCO A to evaluate for claudication. Counseled patient to get regular exercise and elevate feet when resting.   - Comprehensive metabolic panel; Future  - N terminal pro BNP outpatient; Future  - Magnesium; Future  - furosemide (LASIX) 20 MG tablet; By mouth, take 2 tablets (40 mg) in the morning and 1 tablet (20 mg) at least 6 hours later in the afternoon.    Claudication of both lower extremities (H)  See above.  - US MARCO A Doppler No Exercise; Future    Review of the result(s) of each unique test - EKG, echo, stress echo, labs  Ordering of each unique test  Prescription drug management  34 minutes spent on the date of the encounter doing chart review, history and exam, documentation and further activities per the note     Return in about 1 week (around 4/8/2022) for Follow up clinic visit dyspnea, swelling, and labs, and 3 months for diabetes check.    Jenn Keane, JACK CNP  [facility]    Subjective   Rakan  "is a 76 year old who presents for the following health issues     History of Present Illness       COPD:  He presents for follow up of COPD.  Overall, COPD symptoms are slightly worse since last visit. He has more than usual fatigue or shortness of breath with exertion and no shortness of breath at rest.He sometimes coughs and does have change in sputum. No recent fever. He can walk 2-5 blocks without stopping to rest. He can walk 2 flights of stairs without resting.The patient has had no ED, urgent care, or hospital admissions because of COPD since the last visit.     Diabetes:   He presents for follow up of diabetes.  He is checking home blood glucose one time daily. He checks blood glucose before meals.  Blood glucose is never over 200 and never under 70. He is aware of hypoglycemia symptoms including shakiness, dizziness and weakness. He has no concerns regarding his diabetes at this time.  He is having numbness in feet and blurry vision.         Hypertension: He presents for follow up of hypertension.  He does check blood pressure  regularly outside of the clinic. Outside blood pressures have been over 140/90. He does not follow a low salt diet.     He eats 2-3 servings of fruits and vegetables daily.He consumes 1 sweetened beverage(s) daily.He exercises with enough effort to increase his heart rate 9 or less minutes per day.  He exercises with enough effort to increase his heart rate 3 or less days per week.   He is taking medications regularly.     Has noted palpitations that occur 3-4 times a week at night for \"a long time\". Will last for seconds to several minutes. Has noted a occasional waking feeling likely he can't breath. Wears his CPAP every night and even during naps. Weight is stable. Has pedal edema.      Pain History:  When did you first notice your pain? At leas 6 months   Have you seen anyone else for your pain? No  Where in your body do you have pain? Musculoskeletal " "problem/pain  Description  Location: Calves   Joint Swelling: YES  Redness: no  Pain: YES  Warmth: no  Intensity:  moderate  Progression of Symptoms:  intermittent  Accompanying signs and symptoms:   Fevers: no  Numbness/tingling/weakness: YES- in legs   History  Trauma to the area: no  Recent illness:  no  Previous similar problem: no  Previous evaluation:  no  Precipitating or alleviating factors:  Aggravating factors include: N/A - \"nothing really sets it off.\" Is worse when he is standing for a while.  Therapies tried and outcome: stretching and lots of fluid intake     Review of Systems   Constitutional, HEENT, cardiovascular, pulmonary, gi and gu systems are negative, except as otherwise noted.      Objective    /71   Pulse 71   Temp 97.8  F (36.6  C) (Oral)   Resp 18   Ht 1.753 m (5' 9\")   Wt 111.9 kg (246 lb 9.6 oz)   SpO2 97%   BMI 36.42 kg/m    Body mass index is 36.42 kg/m .  Physical Exam   GENERAL: healthy, alert and no distress  EYES: Eyes grossly normal to inspection, PERRL and conjunctivae and sclerae normal  HENT: ear canals and TM's normal, nose and mouth without ulcers or lesions  NECK: no adenopathy, no asymmetry, masses, or scars and thyroid normal to palpation  RESP: lungs clear to auscultation - no rales, rhonchi or wheezes  CV: regular rates and rhythm, normal S1 S2, no S3 or S4, no murmur, click or rub, decreased pulse bilateral LE and 2+ bilateral lower extremity pitting edema to knees    ABDOMEN: soft, nontender, no hepatosplenomegaly, no masses and bowel sounds normal  MS: normal muscle tone  SKIN: no suspicious lesions or rashes  NEURO: Normal strength and tone, mentation intact and speech normal  PSYCH: mentation appears normal, affect normal/bright      Results for orders placed or performed in visit on 04/01/22 (from the past 24 hour(s))   HEMOGLOBIN A1C   Result Value Ref Range    Hemoglobin A1C 7.1 (H) 0.0 - 5.6 %   Extra Tube    Narrative    The following orders were " created for panel order Extra Tube.  Procedure                               Abnormality         Status                     ---------                               -----------         ------                     Extra Serum Separator Tu...[[id number]]                      In process                 Extra Green Top (Lithium...[[id number]]                      In process                   Please view results for these tests on the individual orders.

## 2022-04-03 DIAGNOSIS — R60.9 EDEMA, UNSPECIFIED TYPE: ICD-10-CM

## 2022-04-04 RX ORDER — SODIUM BICARBONATE 325 MG/1
TABLET ORAL
Qty: 60 TABLET | Refills: 1 | Status: SHIPPED | OUTPATIENT
Start: 2022-04-04 | End: 2022-05-06

## 2022-04-04 NOTE — TELEPHONE ENCOUNTER
Routing refill request to provider for review/approval because:  Drug not on the [facility] refill protocol       Chelsey Tai RN  [facility]

## 2022-04-05 ENCOUNTER — ANCILLARY PROCEDURE (OUTPATIENT)
Dept: ULTRASOUND IMAGING | Facility: CLINIC | Age: 77
End: 2022-04-05
Attending: NURSE PRACTITIONER
Payer: COMMERCIAL

## 2022-04-05 DIAGNOSIS — I73.9 CLAUDICATION OF BOTH LOWER EXTREMITIES (H): ICD-10-CM

## 2022-04-05 PROCEDURE — 93922 UPR/L XTREMITY ART 2 LEVELS: CPT | Mod: GC | Performed by: RADIOLOGY

## 2022-04-06 ENCOUNTER — MYC MEDICAL ADVICE (OUTPATIENT)
Dept: FAMILY MEDICINE | Facility: CLINIC | Age: 77
End: 2022-04-06
Payer: COMMERCIAL

## 2022-04-06 DIAGNOSIS — I73.9 CLAUDICATION OF BOTH LOWER EXTREMITIES (H): Primary | ICD-10-CM

## 2022-04-10 DIAGNOSIS — J44.9 CHRONIC OBSTRUCTIVE PULMONARY DISEASE, UNSPECIFIED COPD TYPE (H): ICD-10-CM

## 2022-04-12 NOTE — TELEPHONE ENCOUNTER
"Routing refill request to provider for review/approval because:  long-Acting Beta Agonist Inhalers Protocol  Failed       Requested Prescriptions   Pending Prescriptions Disp Refills     WIXELA INHUB 250-50 MCG/DOSE inhaler [Pharmacy Med Name: WIXELA 250-50 INHUB]  2     Sig: TAKE 1 PUFF BY MOUTH TWICE A DAY       Long-Acting Beta Agonist Inhalers Protocol  Failed - 4/10/2022  7:45 AM        Failed - Order for Serevent, Striverdi, or Foradil and pt has steroid inhaler        Passed - Patient is age 12 or older        Passed - Recent (12 mo) or future (30 days) visit within the authorizing provider's specialty     Patient has had an office visit with the authorizing provider or a provider within the authorizing providers department within the previous 12 mos or has a future within next 30 days. See \"Patient Info\" tab in inbasket, or \"Choose Columns\" in Meds & Orders section of the refill encounter.              Passed - Medication is active on med list       Inhaled Steroids Protocol Passed - 4/10/2022  7:45 AM        Passed - Patient is age 12 or older        Passed - Recent (12 mo) or future (30 days) visit within the authorizing provider's specialty     Patient has had an office visit with the authorizing provider or a provider within the authorizing providers department within the previous 12 mos or has a future within next 30 days. See \"Patient Info\" tab in inbasket, or \"Choose Columns\" in Meds & Orders section of the refill encounter.              Passed - Medication is active on med list           Soila Moreau RN    "

## 2022-04-13 ENCOUNTER — HOSPITAL ENCOUNTER (OUTPATIENT)
Dept: ULTRASOUND IMAGING | Facility: CLINIC | Age: 77
Discharge: HOME | End: 2022-04-13
Attending: NURSE PRACTITIONER | Admitting: NURSE PRACTITIONER
Payer: COMMERCIAL

## 2022-04-13 ENCOUNTER — OFFICE VISIT (OUTPATIENT)
Dept: FAMILY MEDICINE | Facility: CLINIC | Age: 77
End: 2022-04-13
Payer: COMMERCIAL

## 2022-04-13 VITALS
RESPIRATION RATE: 22 BRPM | HEART RATE: 62 BPM | HEIGHT: 69 IN | SYSTOLIC BLOOD PRESSURE: 124 MMHG | TEMPERATURE: 97.8 F | WEIGHT: 245.5 LBS | BODY MASS INDEX: 36.36 KG/M2 | DIASTOLIC BLOOD PRESSURE: 72 MMHG | OXYGEN SATURATION: 97 %

## 2022-04-13 DIAGNOSIS — R60.0 PEDAL EDEMA: ICD-10-CM

## 2022-04-13 DIAGNOSIS — G70.00 MYASTHENIA GRAVIS, ACHR ANTIBODY POSITIVE (H): ICD-10-CM

## 2022-04-13 DIAGNOSIS — M75.22 BICEPS TENDONITIS, LEFT: ICD-10-CM

## 2022-04-13 DIAGNOSIS — J44.1 COPD EXACERBATION (H): ICD-10-CM

## 2022-04-13 DIAGNOSIS — J44.9 CHRONIC OBSTRUCTIVE PULMONARY DISEASE, UNSPECIFIED COPD TYPE (H): Primary | ICD-10-CM

## 2022-04-13 DIAGNOSIS — R06.09 DYSPNEA ON EXERTION: ICD-10-CM

## 2022-04-13 DIAGNOSIS — I73.9 CLAUDICATION OF BOTH LOWER EXTREMITIES (H): ICD-10-CM

## 2022-04-13 LAB
ALBUMIN SERPL-MCNC: 3.9 G/DL (ref 3.4–5)
ALP SERPL-CCNC: 43 U/L (ref 40–150)
ALT SERPL W P-5'-P-CCNC: 34 U/L (ref 0–70)
ANION GAP SERPL CALCULATED.3IONS-SCNC: 8 MMOL/L (ref 3–14)
AST SERPL W P-5'-P-CCNC: 18 U/L (ref 0–45)
BILIRUB SERPL-MCNC: 0.5 MG/DL (ref 0.2–1.3)
BUN SERPL-MCNC: 19 MG/DL (ref 7–30)
CALCIUM SERPL-MCNC: 9.3 MG/DL (ref 8.5–10.1)
CHLORIDE BLD-SCNC: 105 MMOL/L (ref 94–109)
CO2 SERPL-SCNC: 27 MMOL/L (ref 20–32)
CREAT SERPL-MCNC: 1.09 MG/DL (ref 0.66–1.25)
GFR SERPL CREATININE-BSD FRML MDRD: 71 ML/MIN/1.73M2
GLUCOSE BLD-MCNC: 138 MG/DL (ref 70–99)
MAGNESIUM SERPL-MCNC: 1.9 MG/DL (ref 1.6–2.3)
NT-PROBNP SERPL-MCNC: 25 PG/ML (ref 0–125)
POTASSIUM BLD-SCNC: 4.1 MMOL/L (ref 3.4–5.3)
PROT SERPL-MCNC: 7.1 G/DL (ref 6.8–8.8)
SODIUM SERPL-SCNC: 140 MMOL/L (ref 133–144)

## 2022-04-13 PROCEDURE — 36415 COLL VENOUS BLD VENIPUNCTURE: CPT | Performed by: NURSE PRACTITIONER

## 2022-04-13 PROCEDURE — 83735 ASSAY OF MAGNESIUM: CPT | Performed by: NURSE PRACTITIONER

## 2022-04-13 PROCEDURE — 93924 LWR XTR VASC STDY BILAT: CPT

## 2022-04-13 PROCEDURE — 83880 ASSAY OF NATRIURETIC PEPTIDE: CPT | Performed by: NURSE PRACTITIONER

## 2022-04-13 PROCEDURE — 99214 OFFICE O/P EST MOD 30 MIN: CPT | Performed by: NURSE PRACTITIONER

## 2022-04-13 PROCEDURE — 80053 COMPREHEN METABOLIC PANEL: CPT | Performed by: NURSE PRACTITIONER

## 2022-04-13 RX ORDER — PREDNISONE 10 MG/1
10 TABLET ORAL DAILY
Qty: 5 TABLET | Refills: 0 | Status: SHIPPED | OUTPATIENT
Start: 2022-04-13 | End: 2022-04-18

## 2022-04-13 ASSESSMENT — PAIN SCALES - GENERAL: PAINLEVEL: NO PAIN (0)

## 2022-04-13 NOTE — PROGRESS NOTES
Assessment & Plan     Chronic obstructive pulmonary disease, unspecified COPD type (H)  Continue wixela inhub inhaler BID and albuterol inhaler PRN. Will refer to pulmonary for recommendations.    - Adult Pulmonary Medicine Referral; Future    COPD exacerbation (H)  Will treat with short burst increase in his daily prednisone.   - predniSONE (DELTASONE) 10 MG tablet; Take 1 tablet (10 mg) by mouth daily for 5 days Add this to your 10 mg that you take in the morning for a total of 20 mg for 5 days.  - Adult Pulmonary Medicine Referral; Future    Dyspnea on exertion  Likely 2/2 to COPD exacerbation. Will check labs today.   - Comprehensive metabolic panel  - N terminal pro BNP outpatient  - Magnesium    Biceps tendonitis, left  Started after his COVID vaccine booster. Waxes and wanes. Counseled on self-care measures including: ice/heat, OTC acetaminophen or Voltaren gel, rest; and warning signs of when to seek urgent medical care including: new numbness/tingling/weakness, worsening symptoms. Offered PT referral, patient states that it is not that bad and declines at this time.     Pedal edema  Improved with increase in furosemide last week. Will check labs and electrolytes today.   - Comprehensive metabolic panel  - N terminal pro BNP outpatient  - Magnesium    Myasthenia gravis, AChR antibody positive (H)  Patient has noted cramping in arms and legs at night, I question if this could be r/t his myasthenia gravis. I will also check labs as noted above. Follow up with neurology as scheduled next week.     See Patient Instructions    Return in about 6 weeks (around 5/25/2022), or if symptoms worsen or fail to improve.    JACK Allen [facility] THIAGO Bledsoe is a 76 year old who presents for the following health issues     History of Present Illness       COPD:  He presents for follow up of COPD.  Overall, COPD symptoms are slightly worse since last visit. He has more than  usual fatigue or shortness of breath with exertion and no shortness of breath at rest.He sometimes coughs and does have change in sputum. No recent fever. He can walk 2-5 blocks without stopping to rest. He can walk 2 flights of stairs without resting.The patient has had no ED, urgent care, or hospital admissions because of COPD since the last visit.     Diabetes:   He presents for follow up of diabetes.  He is checking home blood glucose one time daily. He checks blood glucose before meals.  Blood glucose is never over 200 and never under 70. He is aware of hypoglycemia symptoms including shakiness, dizziness and weakness. He has no concerns regarding his diabetes at this time.  He is having numbness in feet and blurry vision.         Hypertension: He presents for follow up of hypertension.  He does check blood pressure  regularly outside of the clinic. Outside blood pressures have been over 140/90. He does not follow a low salt diet.     He eats 2-3 servings of fruits and vegetables daily.He consumes 1 sweetened beverage(s) daily.He exercises with enough effort to increase his heart rate 9 or less minutes per day.  He exercises with enough effort to increase his heart rate 3 or less days per week.   He is taking medications regularly.     States that his increased sputum has resolved that past couple of days. Does use his controlled inhaler daily and his rescue albuterol inhaler about 1-2 times daily which helps his breathing temporarily. Swelling in legs is improved. Still has pains in legs and in arms intermittently, cramping pain, can be worse at night, and sometimes with standing for a long time. Resting MARCO A normal, exercise MARCO A scheduled today. Was able to go for a walk around the block twice yesterday without having to stop.     Review of Systems   Constitutional, HEENT, cardiovascular, pulmonary, GI, , musculoskeletal, neuro, skin, endocrine and psych systems are negative, except as otherwise noted.     "  Objective    /72   Pulse 62   Temp 97.8  F (36.6  C) (Temporal)   Resp 22   Ht 1.753 m (5' 9\")   Wt 111.4 kg (245 lb 8 oz)   SpO2 97%   BMI 36.25 kg/m    Body mass index is 36.25 kg/m .  Physical Exam   GENERAL: healthy, alert and no distress  EYES: Eyes grossly normal to inspection, PERRL and conjunctivae and sclerae normal  RESP: lungs clear to auscultation - no rales, rhonchi or wheezes  CV: regular rate and rhythm, normal S1 S2, no S3 or S4, no murmur, click or rub, trace bilateral peripheral edema and peripheral pulses strong  NEURO: Normal strength and tone, mentation intact and speech normal    Office Visit on 04/01/2022   Component Date Value Ref Range Status     Hemoglobin A1C 04/01/2022 7.1 (A) 0.0 - 5.6 % Final    Normal <5.7%   Prediabetes 5.7-6.4%    Diabetes 6.5% or higher     Note: Adopted from ADA consensus guidelines.     Hold Specimen 04/01/2022 [facility]   Final     Hold Specimen 04/01/2022 [facility]   Final           "

## 2022-04-13 NOTE — PATIENT INSTRUCTIONS
May use Voltaren Gel and ice on biceps tendon to help with pain and inflammation.   Talk to your neurologist about leg and arm pains at night.   Patient Education

## 2022-04-18 DIAGNOSIS — G70.00 MYASTHENIA GRAVIS, ACHR ANTIBODY POSITIVE (H): ICD-10-CM

## 2022-04-18 RX ORDER — PREDNISONE 5 MG/1
TABLET ORAL
Qty: 60 TABLET | Refills: 1 | Status: SHIPPED | OUTPATIENT
Start: 2022-04-18 | End: 2022-06-16

## 2022-04-18 ASSESSMENT — ENCOUNTER SYMPTOMS
VOMITING: 0
POSTURAL DYSPNEA: 0
SEIZURES: 0
MEMORY LOSS: 0
TASTE DISTURBANCE: 0
ARTHRALGIAS: 1
SPUTUM PRODUCTION: 0
NECK PAIN: 0
DIZZINESS: 1
BOWEL INCONTINENCE: 0
LIGHT-HEADEDNESS: 1
COUGH: 0
ORTHOPNEA: 0
TREMORS: 0
NECK MASS: 0
HEMOPTYSIS: 0
PARALYSIS: 0
RECTAL PAIN: 0
COUGH DISTURBING SLEEP: 0
NAUSEA: 0
HEARTBURN: 0
SWOLLEN GLANDS: 0
BRUISES/BLEEDS EASILY: 0
HEADACHES: 0
SORE THROAT: 0
DIARRHEA: 0
BLOATING: 0
SPEECH CHANGE: 0
MYALGIAS: 1
WHEEZING: 1
SYNCOPE: 0
HOARSE VOICE: 0
LOSS OF CONSCIOUSNESS: 0
BACK PAIN: 0
SNORES LOUDLY: 0
SHORTNESS OF BREATH: 1
TROUBLE SWALLOWING: 1
CONSTIPATION: 0
BLOOD IN STOOL: 0
PALPITATIONS: 0
EXERCISE INTOLERANCE: 1
LEG PAIN: 1
STIFFNESS: 1
HYPERTENSION: 1
JAUNDICE: 0
SINUS PAIN: 0
WEAKNESS: 1
DYSPNEA ON EXERTION: 1
MUSCLE WEAKNESS: 0
HYPOTENSION: 0
MUSCLE CRAMPS: 1
JOINT SWELLING: 0
SLEEP DISTURBANCES DUE TO BREATHING: 1
SMELL DISTURBANCE: 0
DISTURBANCES IN COORDINATION: 0
NUMBNESS: 1
TINGLING: 1
SINUS CONGESTION: 0
ABDOMINAL PAIN: 1

## 2022-04-20 ENCOUNTER — OFFICE VISIT (OUTPATIENT)
Dept: NEUROLOGY | Facility: CLINIC | Age: 77
End: 2022-04-20
Payer: COMMERCIAL

## 2022-04-20 VITALS
DIASTOLIC BLOOD PRESSURE: 70 MMHG | HEIGHT: 69 IN | RESPIRATION RATE: 19 BRPM | WEIGHT: 251 LBS | HEART RATE: 63 BPM | SYSTOLIC BLOOD PRESSURE: 121 MMHG | BODY MASS INDEX: 37.18 KG/M2 | OXYGEN SATURATION: 97 %

## 2022-04-20 DIAGNOSIS — G50.0 TRIGEMINAL NEURALGIA: ICD-10-CM

## 2022-04-20 DIAGNOSIS — R25.2 CRAMP OF LIMB: ICD-10-CM

## 2022-04-20 DIAGNOSIS — G70.00 MYASTHENIA GRAVIS WITHOUT EXACERBATION (H): ICD-10-CM

## 2022-04-20 DIAGNOSIS — R13.19 OTHER DYSPHAGIA: Primary | ICD-10-CM

## 2022-04-20 PROBLEM — R07.89 ATYPICAL CHEST PAIN: Status: ACTIVE | Noted: 2020-01-07

## 2022-04-20 PROCEDURE — 99214 OFFICE O/P EST MOD 30 MIN: CPT | Performed by: PSYCHIATRY & NEUROLOGY

## 2022-04-20 ASSESSMENT — PAIN SCALES - GENERAL: PAINLEVEL: NO PAIN (0)

## 2022-04-20 NOTE — NURSING NOTE
Chief Complaint   Patient presents with     Myasthenia Gravis     RECHECK       Srinivasa Montenegro, EMT

## 2022-04-20 NOTE — PATIENT INSTRUCTIONS
Your cramps could be due to the Mestinon or the diuretic. Please reduce your Mestinon dose to 1 pill three times a day; this can help.    Please do not treat your cramps with magnesium because this can make myasthenia worse    You can stop the Trileptal. If the trigeminal neuralgia flares in the future you can always restart it as needed.    The swallowing problem is likely myasthenia related. However, I think we should do a video swallow X ray to see if there is another problem causing it which will require different management    Follow up 6 months

## 2022-04-20 NOTE — LETTER
2022       RE: Rakan Nolasco  [address] [facility] [address]     Dear Colleague,    Thank you for referring your patient, Rakan Nolasco, to the [facility] [place] [facility]. Please see a copy of my visit note below.    Service Date: 2022    Telly Lucio MD  [address] [address]    RE:      Rakan Nolasco  MRN:  [id number]  :   1945    Dear Dr. 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 In the past, he had predominantly ocular symptoms, but also dysphagia.  He is on prednisone 10 mg daily and Mestinon 120 mg t.i.d.  He seems to be tolerating the treatment well.  He has type 2 diabetes, generally acceptably controlled.  His last hemoglobin A1c was 2022 at 7.1%.  Last comprehensive metabolic panel showed a sugar of 138 mg/dL, but otherwise all tests, including calcium, BUN, creatinine, electrolytes and liver function tests, were normal.  N-terminal proBNP was normal.  Magnesium levels were normal on the same day.      At this point, he denies ptosis.  He may get occasionally blurry vision in the evening.  He is not sure it is really double. He denies changes in his voice or speech or difficulty chewing.  He has mild shortness of breath with exertion due to underlying COPD.  He generally does not need to use the arms to get out of a chair. His arms do not fatigue when he brushes his teeth or cuevas his hair.  He still has dysphagia. It is a bit worse than his last visit, and it sometimes is uncomfortable.  He rarely if ever chokes, but he is getting close to that point.  This occurs with both liquids and solids, and there is no clear diurnal variation; it can occur in the morning or the evening.   "    His MG-ADL score is 3 with plus/minus 1 point for diplopia, 1 for dysphagia and 1 for shortness of breath on exertion.      Mr. Nolasco also has trigeminal neuralgia, but it seems to be in remission now. He is only taking 1/2 pill of Trileptal (150 mg ) only in the morning now, which is lower than his previous dose.  He is tolerating the Trileptal well, but he does not like taking many medications, so he would like to stop it.  He has an upcoming tooth implant planned, and he wonders if this could exacerbate his TN.      He also has cramps in the legs that are worse lately.  He is on a diuretic as well as Mestinon 6 pills a day.    MEDICATIONS:  Reviewed and are as per Epic record.    /70   Pulse 63   Resp 19   Ht 1.753 m (5' 9\")   Wt 113.9 kg (251 lb)   SpO2 97%   BMI 37.07 kg/m      NEUROLOGIC EXAMINATION:  He is awake, alert and oriented x3.  I do not see any ptosis after 1 minute of sustained upward gaze.  He does get diplopia after 10 seconds of upward gaze, but not with horizontal gaze.  Ductions and versions of the eyes are normal. No weakness of orbicularis oculi, oris, uvula, jaw, palate or tongue is detected.  No dysphonia or dysarthria.  His neck flexion and extension strength are full.  Strength is 5/5 in upper and lower extremities proximally and distally.    In summary, Mr. Nolasco is doing acceptably with his myasthenia gravis, although his dysphagia continues to bother him. At this point, I would like him to get a Speech Therapy evaluation, x-ray and video swallow with esophagram to rule out any structural pathology that would account for the dysphagia.  If that is the case, he needs a different approach and possible GI evaluation. If that is not the case, however, this could be myasthenia related, and in this case, management is a bit challenging because it will require escalation of his immunotherapy, which can come with numerous side effects.  I will discuss it with him if " necessary.    The cramps could be related to the pyridostigmine or the diuretic.  I will not interfere with his diuretic dose. As per the pyridostigmine, he can reduce it to 1 pill t.i.d. from currently 2 t.i.d.  He should not treat his cramps with magnesium since this can make myasthenia worse.      I am okay if he stops his Trileptal for trigeminal neuralgia since it is in remission right now.  Of course, if it flares in the future, he still has pills at home that he can use.  Follow up in clinic in 6 months or sooner if needed.    Billing is [facility] level 4, moderate, based on 1) Amount or complexity: Reviewing 3 or more unique blood tests today (see above) and 2) Risk:  prescription drug management (see above).    Sincerely,    Arpan Harrison MD        D: 2022   T: 2022   MT: marcus    Name:     JR FRANCOIS  MRN:      [id number]        Account:      [id number]   :      1945           Service Date: 2022       Document: [id number]

## 2022-04-21 NOTE — PROGRESS NOTES
Service Date: 2022    [place] [address]    RE:      Rakan Nolasco  MRN:  [id number]  :   1945    Dear Dr. 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 In the past, he had predominantly ocular symptoms, but also dysphagia.  He is on prednisone 10 mg daily and Mestinon 120 mg t.i.d.  He seems to be tolerating the treatment well.  He has type 2 diabetes, generally acceptably controlled.  His last hemoglobin A1c was 2022 at 7.1%.  Last comprehensive metabolic panel showed a sugar of 138 mg/dL, but otherwise all tests, including calcium, BUN, creatinine, electrolytes and liver function tests, were normal.  N-terminal proBNP was normal.  Magnesium levels were normal on the same day.      At this point, he denies ptosis.  He may get occasionally blurry vision in the evening.  He is not sure it is really double. He denies changes in his voice or speech or difficulty chewing.  He has mild shortness of breath with exertion due to underlying COPD.  He generally does not need to use the arms to get out of a chair. His arms do not fatigue when he brushes his teeth or cuevas his hair.  He still has dysphagia. It is a bit worse than his last visit, and it sometimes is uncomfortable.  He rarely if ever chokes, but he is getting close to that point.  This occurs with both liquids and solids, and there is no clear diurnal variation; it can occur in the morning or the evening.      His MG-ADL score is 3 with plus/minus 1 point for diplopia, 1 for dysphagia and 1 for shortness of breath on exertion.      Mr. Nolasco also has trigeminal neuralgia, but it seems to be in remission now. He is only taking 1/2 pill of Trileptal (150 mg ) only in the morning now, which is lower than his previous  "dose.  He is tolerating the Trileptal well, but he does not like taking many medications, so he would like to stop it.  He has an upcoming tooth implant planned, and he wonders if this could exacerbate his TN.      He also has cramps in the legs that are worse lately.  He is on a diuretic as well as Mestinon 6 pills a day.    MEDICATIONS:  Reviewed and are as per Epic record.    /70   Pulse 63   Resp 19   Ht 1.753 m (5' 9\")   Wt 113.9 kg (251 lb)   SpO2 97%   BMI 37.07 kg/m      NEUROLOGIC EXAMINATION:  He is awake, alert and oriented x3.  I do not see any ptosis after 1 minute of sustained upward gaze.  He does get diplopia after 10 seconds of upward gaze, but not with horizontal gaze.  Ductions and versions of the eyes are normal. No weakness of orbicularis oculi, oris, uvula, jaw, palate or tongue is detected.  No dysphonia or dysarthria.  His neck flexion and extension strength are full.  Strength is 5/5 in upper and lower extremities proximally and distally.    In summary, Mr. Nolasco is doing acceptably with his myasthenia gravis, although his dysphagia continues to bother him. At this point, I would like him to get a Speech Therapy evaluation, x-ray and video swallow with esophagram to rule out any structural pathology that would account for the dysphagia.  If that is the case, he needs a different approach and possible GI evaluation. If that is not the case, however, this could be myasthenia related, and in this case, management is a bit challenging because it will require escalation of his immunotherapy, which can come with numerous side effects.  I will discuss it with him if necessary.    The cramps could be related to the pyridostigmine or the diuretic.  I will not interfere with his diuretic dose. As per the pyridostigmine, he can reduce it to 1 pill t.i.d. from currently 2 t.i.d.  He should not treat his cramps with magnesium since this can make myasthenia worse.      I am okay if he stops " his Trileptal for trigeminal neuralgia since it is in remission right now.  Of course, if it flares in the future, he still has pills at home that he can use.  Follow up in clinic in 6 months or sooner if needed.    Billing is [facility] level 4, moderate, based on 1) Amount or complexity: Reviewing 3 or more unique blood tests today (see above) and 2) Risk:  prescription drug management (see above).    Sincerely,    Arpan Harrison MD        D: 2022   T: 2022   MT: marcus    Name:     JR FRANCOIS  MRN:      [id number]        Account:      [id number]   :      1945           Service Date: 2022       Document: [id number]

## 2022-04-28 DIAGNOSIS — J44.1 COPD EXACERBATION (H): ICD-10-CM

## 2022-04-28 DIAGNOSIS — J44.9 CHRONIC OBSTRUCTIVE PULMONARY DISEASE, UNSPECIFIED COPD TYPE (H): Primary | ICD-10-CM

## 2022-05-03 ENCOUNTER — HOSPITAL ENCOUNTER (OUTPATIENT)
Dept: SPEECH THERAPY | Facility: HOSPITAL | Age: 77
Discharge: HOME | End: 2022-05-03
Attending: PSYCHIATRY & NEUROLOGY
Payer: COMMERCIAL

## 2022-05-03 ENCOUNTER — HOSPITAL ENCOUNTER (OUTPATIENT)
Dept: RADIOLOGY | Facility: HOSPITAL | Age: 77
Discharge: HOME | End: 2022-05-03
Attending: PSYCHIATRY & NEUROLOGY
Payer: COMMERCIAL

## 2022-05-03 DIAGNOSIS — R13.19 OTHER DYSPHAGIA: ICD-10-CM

## 2022-05-03 DIAGNOSIS — G70.00 MYASTHENIA GRAVIS WITHOUT EXACERBATION (H): ICD-10-CM

## 2022-05-03 PROCEDURE — 92611 MOTION FLUOROSCOPY/SWALLOW: CPT | Mod: GN

## 2022-05-03 PROCEDURE — 74230 X-RAY XM SWLNG FUNCJ C+: CPT

## 2022-05-03 RX ORDER — BARIUM SULFATE 400 MG/ML
SUSPENSION ORAL ONCE
Status: COMPLETED | OUTPATIENT
Start: 2022-05-03 | End: 2022-05-03

## 2022-05-03 RX ADMIN — BARIUM SULFATE 50 ML: 400 SUSPENSION ORAL at 09:55

## 2022-05-03 NOTE — PROGRESS NOTES
"VIDEOFLUOROSCOPIC SWALLOW STUDY WITH SPEECH THERAPY       05/03/22 1000   General Information   Type Of Visit Initial   Start Of Care Date 05/03/22   Referring Physician Dr. Harrison   Medical Diagnosis Other dysphagia; Myasthenia gravis without exacerbation   Onset Of Illness/injury Or Date Of Surgery 04/20/22  (Order date)   Pertinent History of Current Problem/OT: Additional Occupational Profile Info Per Dr. Harrison's report dated 4/20/22, \"Mr. Nolasco is doing acceptably with his myasthenia gravis, although his dysphagia continues to bother him. At this point, I would like him to get a Speech Therapy evaluation, x-ray and video swallow with esophagram to rule out any structural pathology that would account for the dysphagia.  If that is the case, he needs a different approach and possible GI evaluation. If that is not the case, however, this could be myasthenia related, and in this case, management is a bit challenging because it will require escalation of his immunotherapy, which can come with numerous side effects.\"   General Observations Patient was pleasant and cooperative. He arrived to appointment unaccompanied.   General Information Comments Patient reports that liquids, and to a lesser degree, foods, \"get stuck\" in his throat. He points just below his suprasternal notch to indicate where this occurs. He states that this has been an issue for several years, but has gotten worse over the past 6-12 months. Patient has noticed an improvement in his symptoms over the past couple of weeks. He believes that this coincides with the recent decrease in his Mestinon dosage. Notably, patient also mentions that liquids sometimes \"come back up\". He does have a history of gastroesophageal reflux and takes Protonix daily for this.   VFSS Evaluation   VFSS Additional Documentation Yes   VFSS Eval: Radiology   Radiologist Dr. Martínez   Views Taken left lateral   Physical Location of Procedure [facility]   VFSS Eval: Thin Liquid Texture Trial   Mode of Presentation, Thin Liquid cup;straw   Order of Presentation Trials 1, 2, 3, 4, and 8  (Trial 8 consisted of consecutive sips taken by straw. Trial 4 was not saved.)   Preparatory Phase WFL  (Mildly reduced bolus control)   Oral Phase, Thin Liquid WFL  (Minimal premature spillage to the valleculae (inconsistent))   Pharyngeal Phase, Thin Liquid Delayed swallow reflex   Rosenbek's Penetration Aspiration Scale: Thin Liquid Trial Results 2 - contrast enters airway, remains above the vocal cords, no residue remains (penetration)  (With consecutive sips only)   Diagnostic Statement Swallow response was mildly delayed with consecutive sips taken by straw. This resulted in pourover past the epiglottis and minimal transient laryngeal penetration. No penetration noted with single sips taken by cup.   VFSS Eval: Puree Solid Texture Trial   Mode of Presentation, Puree spoon;self-fed   Order of Presentation Trial 5   Preparatory Phase WFL;Other (see comments)  (Mildly reduced bolus control)   Oral Phase, Puree WFL  (Minimal premature spillage to the valleculae)   Pharyngeal Phase, Puree WFL   Rosenbek's Penetration Aspiration Scale: Puree Food Trial Results 1 - no aspiration, contrast does not enter airway   Diagnostic Statement Swallow function was WNL with puree consistency.   VFSS Eval: Regular Texture Trial (Solid)   Mode of Presentation self-fed   Order of Presentation Trials 6 and 7   Preparatory Phase WFL   Oral Phase WFL   Pharyngeal Phase WFL   Rosenbek's Penetration Aspiration Scale 1 - no aspiration, contrast does not enter airway   Diagnostic Statement Swallow function was WNL with a regular texture solid (barium-coated azul cracker).   Esophageal Phase of Swallow   Patient reports or presents with symptoms of esophageal dysphagia Yes   Esophageal sweep performed during today s vidofluoroscopic exam  No   Esophageal comments Patient's symptoms of  "liquids and foods \"sticking\" and occasionally \"coming back up\" are suggestive of possible esophageal dysmotility issue(s). Additionally, patient was noted to have a mildly prominent cricopharyngeus, which can be common in patients with a history of GERD. Bolus material readily passed through the cricopharyngeus.   General Therapy Interventions   Intervention Comments Evaluation only. Ongoing Speech Therapy is not indicated at this time.   Swallow Eval: Clinical Impressions   Skilled Criteria for Therapy Intervention No problems identified which require skilled intervention   Dysphagia Outcome Severity Scale (JEFF) Level 7 - JEFF   Diet texture recommendations Regular diet;Thin liquids (level 0)   Recommended Feeding/Eating Techniques maintain upright posture during/after eating for 30 mins   Demonstrates Need for Referral to Another Service other (see comments)  (Consider GI consult)   Clinical Impression Comments Video swallow study completed per MD order. No significant oral or pharyngeal dysphagia noted. Patient's swallow function is WNL for his age group. His aspiration risk is low. He appears safe to continue current diet of Regular textures and thin liquids with use of standard aspiration precautions. Patient's symptoms are suggestive of possible esophageal dysmotility issue(s). He may benefit from an esophagram and/or GI consult.   Total Session Time   SLP Eval: VideoFluoroscopic Swallow function Minutes (47579) 20   Total Evaluation Time 20 minutes     "

## 2022-05-04 DIAGNOSIS — R60.9 EDEMA, UNSPECIFIED TYPE: ICD-10-CM

## 2022-05-05 NOTE — TELEPHONE ENCOUNTER
Requested Prescriptions   Pending Prescriptions Disp Refills     sodium bicarbonate 325 MG tablet [Pharmacy Med Name: SODIUM BICARB 325 MG TABLET] 60 tablet 1     Sig: TAKE 1 TABLET BY MOUTH TWICE A DAY       There is no refill protocol information for this order

## 2022-05-06 RX ORDER — SODIUM BICARBONATE 325 MG/1
TABLET ORAL
Qty: 60 TABLET | Refills: 1 | Status: SHIPPED | OUTPATIENT
Start: 2022-05-06 | End: 2022-06-14

## 2022-05-19 DIAGNOSIS — G70.00 OCULAR MYASTHENIA (H): ICD-10-CM

## 2022-05-20 RX ORDER — PYRIDOSTIGMINE BROMIDE 60 MG/1
TABLET ORAL
Qty: 180 TABLET | Refills: 0 | Status: SHIPPED | OUTPATIENT
Start: 2022-05-20 | End: 2022-07-13

## 2022-06-12 DIAGNOSIS — R60.9 EDEMA, UNSPECIFIED TYPE: ICD-10-CM

## 2022-06-14 RX ORDER — SODIUM BICARBONATE 650 MG/1
TABLET ORAL
Qty: 30 TABLET | Refills: 1 | Status: SHIPPED | OUTPATIENT
Start: 2022-06-14 | End: 2022-07-08

## 2022-06-14 NOTE — TELEPHONE ENCOUNTER
Routing refill request to provider for review/approval because:  Drug sodium bicarbonate not on the [facility] [place]       Requested Prescriptions   Pending Prescriptions Disp Refills     sodium bicarbonate 650 MG tablet [Pharmacy Med Name: SODIUM BICARB 650 MG TABLET] 30 tablet 1     Sig: TAKE 1/2 TABLET BY MOUTH TWICE A DAY       There is no refill protocol information for this order        Soila Moreau RN

## 2022-06-15 ENCOUNTER — OFFICE VISIT (OUTPATIENT)
Dept: OPHTHALMOLOGY | Facility: CLINIC | Age: 77
End: 2022-06-15
Payer: COMMERCIAL

## 2022-06-15 DIAGNOSIS — H40.053 BORDERLINE GLAUCOMA WITH OCULAR HYPERTENSION, BILATERAL: ICD-10-CM

## 2022-06-15 DIAGNOSIS — H04.123 DRY EYES, BILATERAL: ICD-10-CM

## 2022-06-15 DIAGNOSIS — G70.00 MYASTHENIA GRAVIS, ACHR ANTIBODY POSITIVE (H): ICD-10-CM

## 2022-06-15 DIAGNOSIS — Z98.890 S/P BLEPHAROPLASTY: ICD-10-CM

## 2022-06-15 DIAGNOSIS — Z96.1 PSEUDOPHAKIA OF BOTH EYES: Primary | ICD-10-CM

## 2022-06-15 DIAGNOSIS — Z86.69 H/O RD (RETINAL DETACHMENT): ICD-10-CM

## 2022-06-15 PROCEDURE — 92133 CPTRZD OPH DX IMG PST SGM ON: CPT | Performed by: STUDENT IN AN ORGANIZED HEALTH CARE EDUCATION/TRAINING PROGRAM

## 2022-06-15 PROCEDURE — 92083 EXTENDED VISUAL FIELD XM: CPT | Performed by: STUDENT IN AN ORGANIZED HEALTH CARE EDUCATION/TRAINING PROGRAM

## 2022-06-15 PROCEDURE — 92012 INTRM OPH EXAM EST PATIENT: CPT | Performed by: STUDENT IN AN ORGANIZED HEALTH CARE EDUCATION/TRAINING PROGRAM

## 2022-06-15 ASSESSMENT — VISUAL ACUITY
OS_CC: 20/25
METHOD: SNELLEN - LINEAR
OD_CC: 20/20
CORRECTION_TYPE: GLASSES

## 2022-06-15 ASSESSMENT — TONOMETRY
OD_IOP_MMHG: 19
OS_IOP_MMHG: 17
IOP_METHOD: APPLANATION

## 2022-06-15 ASSESSMENT — SLIT LAMP EXAM - LIDS
COMMENTS: 1+ DERMATOCHALASIS
COMMENTS: 1+ DERMATOCHALASIS

## 2022-06-15 ASSESSMENT — EXTERNAL EXAM - RIGHT EYE: OD_EXAM: NORMAL

## 2022-06-15 ASSESSMENT — EXTERNAL EXAM - LEFT EYE: OS_EXAM: NORMAL

## 2022-06-15 NOTE — PATIENT INSTRUCTIONS
Continue Latanoprost (green top) at bedtime both eyes     Continue Timolol (yellow top) twice a day in both eyes     Mateo Gray MD  [phone]

## 2022-06-15 NOTE — LETTER
6/15/2022         RE: Rakan Nolasco  [address] [facility] [address]        Dear Colleague,    Thank you for referring your patient, Rakan Nolasco, to the [facility]. Please see a copy of my visit note below.     Current Eye Medications:  Latanoprost - both eyes at bedtime - last dose: 9pm last night  Timolol - both eyes BID - last dose: 1:30pm today.     Subjective:  Glaucoma follow-up with HVF, IOP and OCT. Compliant with daily use of drops.  Vision right eye stable, left eye continues to fluctuate from day to day - today is a good day.  Also has intermittent discomfort left eye - lasting a less than one minute.      Objective:  See Ophthalmology Exam.       Assessment:  Rakan Nolasco is a 76 year old male who presents with:   Encounter Diagnoses   Name Primary?     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 Stable OCT and visual field today. Continue same medications.    OCT optic nerve: avg retinal nerve fiber layer 85/84; within normal limits right eye and borderline S left eye; stable both eyes.     Sifuentes visual field (HVF) 24-2: mild superior arcuate defect right eye and within normal limits left eye.      Borderline glaucoma with ocular hypertension, bilateral      Myasthenia gravis, AChR antibody positive (H)      H/O RD (retinal detachment) OD s/p repair with PPV (AB)      S/P blepharoplasty      Dry eyes, bilateral        Plan:  Continue Latanoprost (green top) at bedtime both eyes     Continue Timolol (yellow top) twice a day in both eyes     Mateo Gray MD  [phone]          Again, thank you for allowing me to participate in the care of your patient.        Sincerely,        Mateo Gray MD

## 2022-06-15 NOTE — PROGRESS NOTES
Current Eye Medications:  Latanoprost - both eyes at bedtime - last dose: 9pm last night  Timolol - both eyes BID - last dose: 1:30pm today.     Subjective:  Glaucoma follow-up with HVF, IOP and OCT. Compliant with daily use of drops.  Vision right eye stable, left eye continues to fluctuate from day to day - today is a good day.  Also has intermittent discomfort left eye - lasting a less than one minute.      Objective:  See Ophthalmology Exam.       Assessment:  Rakan Nolasco is a 76 year old male who presents with:   Encounter Diagnoses   Name Primary?     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 Stable OCT and visual field today. Continue same medications.    OCT optic nerve: avg retinal nerve fiber layer 85/84; within normal limits right eye and borderline S left eye; stable both eyes.     Sifuentes visual field (HVF) 24-2: mild superior arcuate defect right eye and within normal limits left eye.      Borderline glaucoma with ocular hypertension, bilateral      Myasthenia gravis, AChR antibody positive (H)      H/O RD (retinal detachment) OD s/p repair with PPV (AB)      S/P blepharoplasty      Dry eyes, bilateral        Plan:  Continue Latanoprost (green top) at bedtime both eyes     Continue Timolol (yellow top) twice a day in both eyes     Mateo Gray MD  [phone]

## 2022-06-16 ENCOUNTER — OFFICE VISIT (OUTPATIENT)
Dept: FAMILY MEDICINE | Facility: CLINIC | Age: 77
End: 2022-06-16

## 2022-06-16 ENCOUNTER — NURSE TRIAGE (OUTPATIENT)
Dept: NURSING | Facility: CLINIC | Age: 77
End: 2022-06-16
Payer: COMMERCIAL

## 2022-06-16 ENCOUNTER — ANCILLARY PROCEDURE (OUTPATIENT)
Dept: GENERAL RADIOLOGY | Facility: CLINIC | Age: 77
End: 2022-06-16
Attending: NURSE PRACTITIONER
Payer: COMMERCIAL

## 2022-06-16 VITALS
SYSTOLIC BLOOD PRESSURE: 108 MMHG | WEIGHT: 239.5 LBS | TEMPERATURE: 97.8 F | HEART RATE: 78 BPM | OXYGEN SATURATION: 91 % | DIASTOLIC BLOOD PRESSURE: 58 MMHG | BODY MASS INDEX: 35.47 KG/M2 | HEIGHT: 69 IN | RESPIRATION RATE: 22 BRPM

## 2022-06-16 DIAGNOSIS — G70.00 MYASTHENIA GRAVIS, ACHR ANTIBODY POSITIVE (H): ICD-10-CM

## 2022-06-16 DIAGNOSIS — R05.9 COUGH: ICD-10-CM

## 2022-06-16 DIAGNOSIS — J44.1 COPD EXACERBATION (H): Primary | ICD-10-CM

## 2022-06-16 DIAGNOSIS — G50.0 TRIGEMINAL NEURALGIA: ICD-10-CM

## 2022-06-16 PROCEDURE — U0003 INFECTIOUS AGENT DETECTION BY NUCLEIC ACID (DNA OR RNA); SEVERE ACUTE RESPIRATORY SYNDROME CORONAVIRUS 2 (SARS-COV-2) (CORONAVIRUS DISEASE [COVID-19]), AMPLIFIED PROBE TECHNIQUE, MAKING USE OF HIGH THROUGHPUT TECHNOLOGIES AS DESCRIBED BY CMS-2020-01-R: HCPCS | Performed by: NURSE PRACTITIONER

## 2022-06-16 PROCEDURE — 99214 OFFICE O/P EST MOD 30 MIN: CPT | Mod: CS | Performed by: NURSE PRACTITIONER

## 2022-06-16 PROCEDURE — 71046 X-RAY EXAM CHEST 2 VIEWS: CPT | Mod: TC | Performed by: RADIOLOGY

## 2022-06-16 PROCEDURE — U0005 INFEC AGEN DETEC AMPLI PROBE: HCPCS | Performed by: NURSE PRACTITIONER

## 2022-06-16 RX ORDER — AZITHROMYCIN 250 MG/1
TABLET, FILM COATED ORAL
Qty: 6 TABLET | Refills: 0 | Status: SHIPPED | OUTPATIENT
Start: 2022-06-16 | End: 2022-06-21

## 2022-06-16 RX ORDER — PREDNISONE 20 MG/1
40 TABLET ORAL DAILY
Qty: 10 TABLET | Refills: 0 | Status: SHIPPED | OUTPATIENT
Start: 2022-06-16 | End: 2022-06-21

## 2022-06-16 RX ORDER — PREDNISONE 5 MG/1
TABLET ORAL
Qty: 60 TABLET | Refills: 1 | Status: SHIPPED | OUTPATIENT
Start: 2022-06-16 | End: 2022-08-08

## 2022-06-16 ASSESSMENT — ENCOUNTER SYMPTOMS
SHORTNESS OF BREATH: 1
CHEST TIGHTNESS: 1
WHEEZING: 1

## 2022-06-16 NOTE — TELEPHONE ENCOUNTER
Provider Response to 2nd Level Triage Request    I have reviewed the RN documentation. My recommendation is:  Refer to Urgent Care - no available appt times at our clinic today.     Urszula Caballero PA-C

## 2022-06-16 NOTE — TELEPHONE ENCOUNTER
Pt calling in to nurse triage today after having a lasting non-productive cough that has settled in his chest. Pt is experiencing some wheezing PMH of COPD. Pt. began with a what he though was attributed to allergies 1 week ago prior. No feel that issues have become worse. Sputum is thick and yellow. SpO2 is 92% but but O2% rises with deep breaths and movement.  Disposition to be seen within 4 hours or PCP Triage. Pt is amenable to this plan and verbalizes this. Reached out to scheduling, no appointments available within time frame. Routing message to provider and team for further advisement.    Luna Mcdonough, RN, [place], PHN on 6/16/2022 at 8:56 AM  [facility]  RN utilized sound nursing judgement based on facility triage protocols during this encounter.            Reason for Disposition    Wheezing is present    Additional Information    Negative: Difficulty breathing, severe    Negative: [1] Wheezing (high pitched whistling sound) AND [2] previous asthma attacks or use of asthma medicines    Negative: Earache    Negative: Sinus pain or pressure    Negative: Sore throat    Negative: Cough, productive of sputum (phlegm)    Cough, not productive (dry cough)    Negative: Bluish (or gray) lips or face now    Negative: [1] Rapid onset of cough AND [2] has hives    Negative: Severe difficulty breathing (e.g., struggling for each breath, speaks in single words)    Negative: Coughing started suddenly after medicine, an allergic food or bee sting    Negative: [1] Difficulty breathing AND [2] exposure to flames, smoke, or fumes    Negative: [1] Stridor AND [2] difficulty breathing    Negative: Sounds like a life-threatening emergency to the triager    Negative: Choked on object of food that could be caught in the throat    Negative: Chest pain is main symptom    Negative: [1] Previous asthma attacks AND [2] this feels like asthma attack    Negative: Cough lasts > 3 weeks    Negative: Wet (productive) cough  "(i.e., white-yellow, yellow, green, or terrence colored sputum)    Negative: [1] Dry (non-productive) cough AND [2] within 14 days of COVID-19 Exposure    Negative: Chest pain  (Exception: MILD central chest pain, present only when coughing)    Negative: Difficulty breathing    Negative: Patient sounds very sick or weak to the triager    Negative: [1] Coughed up blood AND [2] > 1 tablespoon (15 ml) (Exception: blood-tinged sputum)    Negative: Fever > 103 F (39.4 C)    Negative: [1] Fever > 101 F (38.3 C) AND [2] age > 60    Negative: [1] Fever > 100.0 F (37.8 C) AND [2] bedridden (e.g., nursing home patient, CVA, chronic illness, recovering from surgery)    Negative: [1] Fever > 100.0 F (37.8 C) AND [2] diabetes mellitus or weak immune system (e.g., HIV positive, cancer chemo, splenectomy, organ transplant, chronic steroids)    Answer Assessment - Initial Assessment Questions  1. ONSET: \"When did the cough begin?\"       Started about 1 week ago , started with sore throat and head cold s/s now it seems to be settling in chest.     2. SEVERITY: \"How bad is the cough today?\"       Worse this morning than it was yesterday.     3. RESPIRATORY DISTRESS: \"Describe your breathing.\"       SOB, hx COPD- worsened SOB     4. FEVER: \"Do you have a fever?\" If so, ask: \"What is your temperature, how was it measured, and when did it start?\"      Feverish feeling - normally run 97 and this morning it was 98    5. HEMOPTYSIS: \"Are you coughing up any blood?\" If so ask: \"How much?\" (flecks, streaks, tablespoons, etc.)      No, not getting much up - but when he is producing sputum has yellow tinge    6. TREATMENT: \"What have you done so far to treat the cough?\" (e.g., meds, fluids, humidifier)      Cough drops, drinking tea, coffee,        7. CARDIAC HISTORY: \"Do you have any history of heart disease?\" (e.g., heart attack, congestive heart failure)       No but is on water pills    8. LUNG HISTORY: \"Do you have any history of lung " "disease?\"  (e.g., pulmonary embolus, asthma, emphysema)      COPD hx  9. PE RISK FACTORS: \"Do you have a history of blood clots?\" (or: recent major surgery, recent prolonged travel, bedridden)      NO     10. OTHER SYMPTOMS: \"Do you have any other symptoms? (e.g., runny nose, wheezing, chest pain)        Wheezing, chest heaviness, SOB    Protocols used: COUGH - ACUTE NON-PRODUCTIVE-A-AH, RESPIRATORY MULTIPLE SYMPTOMS - GUIDELINE [id number]    COVID 19 Nurse Triage Plan/Patient Instructions    Please be aware that novel coronavirus (COVID-19) may be circulating in the community. If you develop symptoms such as fever, cough, or SOB or if you have concerns about the presence of another infection including coronavirus (COVID-19), please contact your health care provider or visit [email or web address].     Disposition/Instructions    In-Person Visit with provider recommended. Reference Visit Selection Guide.    Thank you for taking steps to prevent the spread of this virus.  o Limit your contact with others.  o Wear a simple mask to cover your cough.  o Wash your hands well and often.    Resources    [facility]: About COVID-19: [email or web address]    CDC: What to Do If You're Sick: www.cdc.gov/coronavirus/2019-ncov/about/steps-when-sick.html    CDC: Ending Home Isolation: www.cdc.gov/coronavirus/2019-ncov/hcp/disposition-in-home-patients.html     CDC: Caring for Someone: www.cdc.gov/coronavirus/2019-ncov/if-you-are-sick/care-for-someone.html     [facility]: Interim Guidance for Hospital Discharge to Home: www.health.[facility].[place].us/diseases/coronavirus/hcp/hospdischarge.pdf    [facility] clinical trials (COVID-19 research studies): clinicalaffairs.[facility]/um-clinical-trials     Below are the COVID-19 hotlines at the [place] Department of Health ([facility]). Interpreters are available.   o For health questions: Call 575-173-9376 or 1-869.722.2628 (7 a.m. to 7 p.m.)  o For questions about " schools and childcare: Call 538-267-8985 or 1-643.161.2701 (7 a.m. to 7 p.m.)

## 2022-06-16 NOTE — TELEPHONE ENCOUNTER
Nurse Triage SBAR    Is this a 2nd Level Triage? YES, LICENSED PRACTITIONER REVIEW IS REQUIRED    Situation:   Pt calling in to nurse triage today after having a lasting non-productive cough that has settled in his chest.      Background:     Pt is experiencing some wheezing PMH of COPD. Pt. began with a what he though was attributed to allergies 1 week ago prior. No feel that issues have become worse. Sputum is thick and yellow. SpO2 is 92% but but O2% rises with deep breaths and movement.    Assessment:   Disposition to be seen within 4 hours or PCP Triage.  Reached out to scheduling, no appointments available within time frame. Routing message to provider and team for further advisement.    Protocol Recommended Disposition:   See HCP Within 4 Hours (Or PCP Triage)    Recommendation:   Can pt be seen today per protocol disposition - please advise.     Routed to provider    Does the patient meet one of the following criteria for ADS visit consideration? 16+ years old, with an [facility] PCP     TIP  Providers, please consider if this condition is appropriate for management at one of our Acute and Diagnostic Services sites.     If patient is a good candidate, please use dotphrase <dot>triageresponse and select Refer to ADS to document.

## 2022-06-16 NOTE — TELEPHONE ENCOUNTER
Huddled with Radha Medeiros CNP who advised that patient could be worked into her schedule at 1300.     Called and confirmed with patient and advised that he arrive by 1240. Appointment added to provider's schedule.     Paradise Gibson RN   [facility]

## 2022-06-16 NOTE — PROGRESS NOTES
Assessment & Plan     COPD exacerbation (H)  Prednisone burst for COPD exacerbation. Because CXR was indeterminate, start azithromycin for 5 days. Could consider adding doxycycline if suspicious for pneumonia after radiologist read.  - XR Chest 2 Views  - Symptomatic; Yes; 6/7/2022 COVID-19 Virus (Coronavirus) by PCR Nose  - predniSONE (DELTASONE) 20 MG tablet  Dispense: 10 tablet; Refill: 0  - azithromycin (ZITHROMAX) 250 MG tablet  Dispense: 6 tablet; Refill: 0      Review of the result(s) of each unique test - CXR  Ordering of each unique test  Prescription drug management      Return in about 2 days (around 6/18/2022) for if not improving.    JACK Rebolledo CNP  [facility] [place] B[place] is a 76 year old presenting for the following health issues:  COPD and Cough    See 6/16/22 [facility] triage note.     Pt c/o wheezing, chest heaviness/congestion, worsened SOB x1 week - hx COPD. Currently has intermittent productive cough; throat lozenges and OTC cold medication have been somewhat helpful.     History of Present Illness       Reason for visit:  Chest congestion  Symptom onset:  3-7 days ago  Symptoms include:  Wheezing cough  Symptom intensity:  Moderate  Symptom progression:  Worsening  Had these symptoms before:  Yes  Has tried/received treatment for these symptoms:  Yes  Previous treatment was successful:  Yes  Prior treatment description:  Antibiotics  What makes it worse:  No  What makes it better:  No    He eats 2-3 servings of fruits and vegetables daily.He consumes 1 sweetened beverage(s) daily.He exercises with enough effort to increase his heart rate 9 or less minutes per day.  He exercises with enough effort to increase his heart rate 3 or less days per week.   He is taking medications regularly.         Had a cold about 7-10 days ago. His cold symptoms resolved but his breathing has become more difficult. Feels like his chest is heavy, wheezing, rattling chest,  "shortness of breath. Endorses a productive cough in which he is coughing up thick, clear fluid. Has used his rescue inhaler but doesn't think it is doing much for his symptoms. Also on his controlled inhaler and prednisone 10 mg daily. Denies fevers, chills, chest pain.     Review of Systems   Respiratory: Positive for chest tightness, shortness of breath and wheezing.       Constitutional, HEENT, cardiovascular, pulmonary, gi and gu systems are negative, except as otherwise noted.      Objective    /58 (Patient Position: Sitting, Cuff Size: Adult Large)   Pulse 78   Temp 97.8  F (36.6  C) (Temporal)   Resp 22   Ht 1.753 m (5' 9\")   Wt 108.6 kg (239 lb 8 oz)   SpO2 91%   BMI 35.37 kg/m    Body mass index is 35.37 kg/m .  Physical Exam   GENERAL: healthy, alert and no distress  HENT: ear canals and TM's normal, nose and mouth without ulcers or lesions  NECK: no adenopathy, no asymmetry, masses, or scars and thyroid normal to palpation  RESP: inspiratory wheezes R upper posterior, R mid posterior, L upper posterior and L mid posterior  CV: regular rate and rhythm, normal S1 S2, no S3 or S4, no murmur, click or rub, no peripheral edema and peripheral pulses strong  MS: no gross musculoskeletal defects noted, no edema    CXR - Reviewed and interpreted by me Hazy LLL and heart border not well seen     Emily Christine RN, APRN student    Resident/Fellow Attestation   I, JACK Rebolledo CNP, was present with the medical/WALLACE student who participated in the service and in the documentation of the note.  I have verified the history and personally performed the physical exam and medical decision making.  I agree with the assessment and plan of care as documented in the note.      JACK Rebolledo CNP              .  ..  "

## 2022-06-17 LAB — SARS-COV-2 RNA RESP QL NAA+PROBE: NEGATIVE

## 2022-06-17 RX ORDER — OXCARBAZEPINE 300 MG/1
TABLET, FILM COATED ORAL
Qty: 60 TABLET | Refills: 1 | OUTPATIENT
Start: 2022-06-17

## 2022-06-29 ENCOUNTER — OFFICE VISIT (OUTPATIENT)
Dept: NURSING | Facility: CLINIC | Age: 77
End: 2022-06-29
Payer: COMMERCIAL

## 2022-06-29 ENCOUNTER — OFFICE VISIT (OUTPATIENT)
Dept: PULMONOLOGY | Facility: CLINIC | Age: 77
End: 2022-06-29

## 2022-06-29 VITALS
OXYGEN SATURATION: 100 % | RESPIRATION RATE: 20 BRPM | SYSTOLIC BLOOD PRESSURE: 108 MMHG | DIASTOLIC BLOOD PRESSURE: 63 MMHG | HEART RATE: 59 BPM

## 2022-06-29 VITALS — OXYGEN SATURATION: 97 % | HEART RATE: 63 BPM | BODY MASS INDEX: 34.84 KG/M2 | WEIGHT: 235.9 LBS

## 2022-06-29 DIAGNOSIS — J44.1 COPD EXACERBATION (H): ICD-10-CM

## 2022-06-29 DIAGNOSIS — J44.9 CHRONIC OBSTRUCTIVE PULMONARY DISEASE, UNSPECIFIED COPD TYPE (H): ICD-10-CM

## 2022-06-29 PROCEDURE — 99205 OFFICE O/P NEW HI 60 MIN: CPT | Mod: 25 | Performed by: INTERNAL MEDICINE

## 2022-06-29 PROCEDURE — 94729 DIFFUSING CAPACITY: CPT | Performed by: INTERNAL MEDICINE

## 2022-06-29 PROCEDURE — 94375 RESPIRATORY FLOW VOLUME LOOP: CPT | Performed by: INTERNAL MEDICINE

## 2022-06-29 PROCEDURE — 94726 PLETHYSMOGRAPHY LUNG VOLUMES: CPT | Performed by: INTERNAL MEDICINE

## 2022-06-29 ASSESSMENT — PAIN SCALES - GENERAL: PAINLEVEL: NO PAIN (0)

## 2022-06-29 NOTE — NURSING NOTE
Rakan Nolasco's goals for this visit include: NONE  He requests these members of his care team be copied on today's visit information: YES    PCP: Telly Lucio    Referring Provider:  Jenn Keane, JACK CNP  [address] [place] [address]    /63   Pulse 59   Resp 20   SpO2 100%     Do you need any medication refills at today's visit? NONE    Edgardo Cristina, EMT

## 2022-06-29 NOTE — PROGRESS NOTES
Pulmonary Clinic New Patient Consult  Reason for Consult: COPD  History of Present Illness  I had the pleasure of seeing Rakan Nolasco, who is a 76-year-old male with a history of COPD who presents for management and evaluation of same.  To briefly review, Rakan was diagnosed with COPD several years ago based on spirometric evaluation and symptoms.  His COPD has been well controlled on an intermediate dose fluticasone/salmeterol.  He also uses albuterol very sparingly.  He had a flare of COPD which was triggered by a viral flulike illness for which she was seen at the ER and was treated with a course of antibiotics and steroids.  He is back to his baseline and has minimal shortness of breath mostly with exertion, infrequent wheezing and no evidence of chest tightness.  He does have baseline pedal swelling which has not worsened for which he takes Lasix.  He denies any fevers, no chest pain, no orthopnea and no PND.  He has a history of ROSIE and is well controlled on CPAP.  With exception of his recent COPD flare, he has not had any flares in the last several years.  He is trying to lose weight by healthy living and has lost close to 10 pounds.   Former smoker, quit 1983, 30 pk-yrs. No family hx of lung cancer.  He was exposed to lots of fumes and he is currently retired now.  Review of Systems:  10 of 14 systems reviewed and are negative unless otherwise stated in HPI.    Past Medical History:   Diagnosis Date     Arthritis          BMI 31.0-31.9,adult      Cancer (H) 01/10/2018    Prostate     COPD (chronic obstructive pulmonary disease) (H) 10/28/2020     Demand ischemia (H)      Diabetes (H) 01/10/2018     Diverticulosis     Colonoscopy 8/2008     Fatty liver     see US  5/2012     GERD (gastroesophageal reflux disease)      Glaucoma (increased eye pressure)      HTN (hypertension)      Hyperlipidemia LDL goal < 130     age, htn, fhx     Irritable bowel      Monoclonal gammopathy present on serum protein  electrophoresis      Nonsenile cataract      Obstructive sleep apnea      Obstructive sleep apnea syndrome      ROSIE (obstructive sleep apnea) 01/2011    Using CPAP;      Prediabetes      Restless leg syndrome      Sleep apnea      Trigeminal neuralgia pain 01/04/2012       Past Surgical History:   Procedure Laterality Date     BIOPSY ARTERY TEMPORAL Bilateral 8/1/2017    Procedure: BIOPSY ARTERY TEMPORAL;  Bilateral temporal artery Biopsy;  Surgeon: Jj Tello MD;  Location: [facility] OR     CATARACT IOL, RT/LT Right      COLONOSCOPY       ESOPHAGOSCOPY, GASTROSCOPY, DUODENOSCOPY (EGD), COMBINED  1/15/2014    Procedure: COMBINED ESOPHAGOSCOPY, GASTROSCOPY, DUODENOSCOPY (EGD), BIOPSY SINGLE OR MULTIPLE;;  Surgeon: Winston Nixon MD;  Location: [facility] OR     LASER YAG CAPSULOTOMY Right 04/09/2018    right eye     PHACOEMULSIFICATION CLEAR CORNEA WITH STANDARD INTRAOCULAR LENS IMPLANT Right 2/20/2017    Procedure: PHACOEMULSIFICATION CLEAR CORNEA WITH STANDARD INTRAOCULAR LENS IMPLANT;  Surgeon: Mateo Gray MD;  Location: [facility]     PHACOEMULSIFICATION CLEAR CORNEA WITH STANDARD INTRAOCULAR LENS IMPLANT Left 1/10/2022    Procedure: LEFT PHACOEMULSIFICATION, CATARACT, WITH INTRAOCULAR LENS IMPLANT;  Surgeon: Mateo Gray MD;  Location: [facility] OR     RETINAL REATTACHMENT Right      SURGICAL HISTORY OF -   8/2009    Both Eyelids-.     TONSILLECTOMY  as child       Family History   Problem Relation Age of Onset     Respiratory Mother         copd     LUNG DISEASE Mother      Allergies Brother      Cancer Maternal Grandmother      Heart Disease Paternal Grandmother      Cerebrovascular Disease Father      Cancer Father      Other Cancer Father      Glaucoma Maternal Aunt      Macular Degeneration No family hx of        Social History     Socioeconomic History     Marital status: Single     Spouse name: None     Number of children: 0     Years of education: 12     Highest education  level: None   Occupational History     Occupation: Retired 3/2012     Comment: [employer] ([employer])   Tobacco Use     Smoking status: Former Smoker     Packs/day: 2.00     Years: 15.00     Pack years: 30.00     Types: Cigarettes     Start date: 1968     Quit date: 1983     Years since quittin.5     Smokeless tobacco: Former User     Quit date: 1970     Tobacco comment: smoke free household.   Vaping Use     Vaping Use: Never used   Substance and Sexual Activity     Alcohol use: Not Currently     Comment: Quit in      Drug use: No     Sexual activity: Not Currently     Partners: Female     Comment: 2010  No girlfriend at this time.   Other Topics Concern     Parent/sibling w/ CABG, MI or angioplasty before 65F 55M? No         Allergies   Allergen Reactions     Azathioprine Shortness Of Breath and Other (See Comments)     Was hospitalized from it. Also had high fever, chills, and shortness of breath.     Ciprofloxacin Muscle Pain (Myalgia)     Muscle pain  Other reaction(s): Myalgia  Other reaction(s): Myalgia     Ropinirole      Leg pan  GI  Weakness; ? sycope  Other reaction(s): Leg Pain         Current Outpatient Medications:      albuterol (PROAIR HFA/PROVENTIL HFA/VENTOLIN HFA) 108 (90 Base) MCG/ACT inhaler, INHALE 2 PUFFS BY MOUTH EVERY 4 HOURS FOR SHORTNESS OF BREATH, COUGH, OR WITH EXERCISE, Disp: 6.7 g, Rfl: 1     amLODIPine (NORVASC) 10 MG tablet, TAKE 1 TABLET BY MOUTH EVERY DAY, Disp: 90 tablet, Rfl: 0     aspirin 81 MG tablet, Take 1 tablet by mouth daily., Disp: , Rfl:      atorvastatin (LIPITOR) 20 MG tablet, TAKE 1 TABLET BY MOUTH EVERY DAY, Disp: 90 tablet, Rfl: 0     blood glucose monitoring (NO BRAND SPECIFIED) meter device kit, Use to test blood sugar 1 times daily or as directed., Disp: 1 kit, Rfl: 0     ClonAZEPAM (KLONOPIN) 0.5 MG tablet, Take 0.5 mg by mouth Take 1 tablet by mouth daily at bedtime, Disp: , Rfl:      furosemide (LASIX) 20 MG tablet, TAKE 2  TABLETS (40 MG) IN THE MORNING AND 1 TABLET (20 MG) AT LEAST 6 HOURS LATER IN THE AFTERNOON., Disp: 180 tablet, Rfl: 0     latanoprost (XALATAN) 0.005 % ophthalmic solution, Place 1 drop into both eyes At Bedtime, Disp: 7.5 mL, Rfl: 3     lisinopril (ZESTRIL) 40 MG tablet, TAKE 1 TABLET BY MOUTH EVERY DAY, Disp: 90 tablet, Rfl: 2     metFORMIN (GLUCOPHAGE-XR) 500 MG 24 hr tablet, TAKE 1 TABLET BY MOUTH TWICE A DAY WITH MEALS, Disp: 180 tablet, Rfl: 1     OneTouch Delica Lancets 30G MISC, USE ONE DAILY AS DIRECTED, Disp: 100 each, Rfl: 2     ONETOUCH VERIO IQ test strip, USE TO TEST BLOOD SUGARS 1 TIMES DAILY OR AS DIRECTED, Disp: 100 strip, Rfl: 2     OXcarbazepine (TRILEPTAL) 300 MG tablet, TAKE 1 TABLET BY MOUTH TWICE A DAY, Disp: 60 tablet, Rfl: 1     pantoprazole (PROTONIX) 20 MG EC tablet, TAKE 1 TABLET BY MOUTH EVERY DAY, Disp: 90 tablet, Rfl: 2     predniSONE (DELTASONE) 5 MG tablet, TAKE 2 TABLETS BY MOUTH EVERY DAY, Disp: 60 tablet, Rfl: 1     pyridostigmine (MESTINON) 60 MG tablet, TAKE 1 TABLET BY MOUTH 3 TIMES A DAY, Disp: 180 tablet, Rfl: 0     sodium bicarbonate 325 MG tablet, TAKE 1 TABLET (325 MG) BY MOUTH 2 TIMES DAILY, Disp: , Rfl:      sodium bicarbonate 650 MG tablet, TAKE 1/2 TABLET BY MOUTH TWICE A DAY, Disp: 30 tablet, Rfl: 1     timolol maleate (TIMOPTIC) 0.5 % ophthalmic solution, Place 1 drop into both eyes 2 times daily, Disp: 5 mL, Rfl: 11     WIXELA INHUB 250-50 MCG/DOSE inhaler, TAKE 1 PUFF BY MOUTH TWICE A DAY, Disp: 60 each, Rfl: 2      Physical Exam:  /63   Pulse 59   Resp 20   SpO2 100%   GENERAL: Well developed, well nourished, alert, and in no apparent distress.  HEENT: Normocephalic, atraumatic. PERRL, EOMI. Oral mucosa is moist. No perioral cyanosis.  NECK: supple, no masses, no thyromegaly.  RESP:  Normal respiratory effort.  CTAB.  No rales, wheezes, rhonchi.  No cyanosis or clubbing.  CV: Normal S1, S2, regular rhythm, normal rate. No murmur.  No LE edema.   ABDOMEN:   Soft, non-tender, non-distended.   SKIN: warm and dry. No rash.  NEURO: AAOx3.  Normal gait.  Fluent speech.  PSYCH: mentation appears normal.       Results:  PFTs: Mild obstruction with stable lung functions  Most Recent [facility] Pulmonary Function Testing    FVC-Pred   Date Value Ref Range Status   06/29/2022 3.96 L      FVC-Pre   Date Value Ref Range Status   06/29/2022 3.14 L      FVC-%Pred-Pre   Date Value Ref Range Status   06/29/2022 79 %      FEV1-Pre   Date Value Ref Range Status   06/29/2022 2.05 L      FEV1-%Pred-Pre   Date Value Ref Range Status   06/29/2022 68 %      FEV1FVC-Pred   Date Value Ref Range Status   06/29/2022 76 %      FEV1FVC-Pre   Date Value Ref Range Status   06/29/2022 65 %      No results found for: 20029  FEFMax-Pred   Date Value Ref Range Status   06/29/2022 7.72 L/sec      FEFMax-Pre   Date Value Ref Range Status   06/29/2022 6.76 L/sec      FEFMax-%Pred-Pre   Date Value Ref Range Status   06/29/2022 87 %      ExpTime-Pre   Date Value Ref Range Status   06/29/2022 9.73 sec      FIFMax-Pre   Date Value Ref Range Status   06/29/2022 5.83 L/sec      FEV1FEV6-Pred   Date Value Ref Range Status   06/29/2022 77 %      FEV1FEV6-Pre   Date Value Ref Range Status   06/29/2022 68 %      No results found for: 20055  Imaging (personally reviewed in clinic today):  XRAY:  Chest 2 views:  Personally reviewed by me - no airspace, soft tissue, cardiac or bony abnormalities.      Assessment and Plan:   COPD (Group B)  Most recent flare likely triggered by a viral-like illness.  He is back to his baseline and has very minimal symptoms with a CAT score of 14.  PFTs showed mild obstruction and his chest x-ray she has no evidence of airway nor airspace disease.  He has done well with intermediate dose salmeterol/fluticasone and I will continue him on the current regimen.  I encouraged him to use his albuterol inhalers more frequently.  He is fairly active at baseline.  He does not qualify for lung cancer  screening    Questions and concerns were answered to the patient's satisfaction.  he was provided with my contact information should new questions or concerns arise in the interim.  he should return to clinic in 6 months   Up to date on vaccination  I spent a total of 60 minutes face to face with Rakan Nolasco during today's office visit. Over 50% of this time was spent counseling the patient and/or coordinating care regarding their pulmonary disease.    Servando Mejia MD  Pulmonary, Critical Care and Sleep Medicine  [facility]  Pager: [phone]        The above note was dictated using voice recognition software and may include typographical errors. Please contact the author for any clarifications.

## 2022-07-01 ENCOUNTER — LAB (OUTPATIENT)
Dept: LAB | Facility: CLINIC | Age: 77
End: 2022-07-01
Payer: COMMERCIAL

## 2022-07-01 DIAGNOSIS — Z13.220 SCREENING FOR HYPERLIPIDEMIA: ICD-10-CM

## 2022-07-01 DIAGNOSIS — E11.65 TYPE 2 DIABETES MELLITUS WITH HYPERGLYCEMIA, WITHOUT LONG-TERM CURRENT USE OF INSULIN (H): ICD-10-CM

## 2022-07-01 DIAGNOSIS — R06.09 DYSPNEA ON EXERTION: ICD-10-CM

## 2022-07-01 DIAGNOSIS — C61 MALIGNANT NEOPLASM OF PROSTATE (H): ICD-10-CM

## 2022-07-01 LAB
ALBUMIN SERPL-MCNC: 3.5 G/DL (ref 3.4–5)
ALP SERPL-CCNC: 42 U/L (ref 40–150)
ALT SERPL W P-5'-P-CCNC: 32 U/L (ref 0–70)
ANION GAP SERPL CALCULATED.3IONS-SCNC: 5 MMOL/L (ref 3–14)
AST SERPL W P-5'-P-CCNC: 16 U/L (ref 0–45)
BILIRUB SERPL-MCNC: 0.3 MG/DL (ref 0.2–1.3)
BUN SERPL-MCNC: 22 MG/DL (ref 7–30)
CALCIUM SERPL-MCNC: 9 MG/DL (ref 8.5–10.1)
CHLORIDE BLD-SCNC: 105 MMOL/L (ref 94–109)
CHOLEST SERPL-MCNC: 118 MG/DL
CO2 SERPL-SCNC: 29 MMOL/L (ref 20–32)
CREAT SERPL-MCNC: 1.11 MG/DL (ref 0.66–1.25)
DLCOUNC-%PRED-PRE: 93 %
DLCOUNC-PRE: 22.85 ML/MIN/MMHG
DLCOUNC-PRED: 24.52 ML/MIN/MMHG
ERV-%PRED-PRE: 28 %
ERV-PRE: 0.16 L
ERV-PRED: 0.55 L
EXPTIME-PRE: 9.73 SEC
FASTING STATUS PATIENT QL REPORTED: NO
FEF2575-%PRED-PRE: 50 %
FEF2575-PRE: 1.12 L/SEC
FEF2575-PRED: 2.22 L/SEC
FEFMAX-%PRED-PRE: 87 %
FEFMAX-PRE: 6.76 L/SEC
FEFMAX-PRED: 7.72 L/SEC
FEV1-%PRED-PRE: 68 %
FEV1-PRE: 2.05 L
FEV1FEV6-PRE: 68 %
FEV1FEV6-PRED: 77 %
FEV1FVC-PRE: 65 %
FEV1FVC-PRED: 76 %
FEV1SVC-PRE: 57 %
FEV1SVC-PRED: 66 %
FIFMAX-PRE: 5.83 L/SEC
FRCPLETH-%PRED-PRE: 104 %
FRCPLETH-PRE: 3.83 L
FRCPLETH-PRED: 3.68 L
FVC-%PRED-PRE: 79 %
FVC-PRE: 3.14 L
FVC-PRED: 3.96 L
GFR SERPL CREATININE-BSD FRML MDRD: 70 ML/MIN/1.73M2
GLUCOSE BLD-MCNC: 111 MG/DL (ref 70–99)
HBA1C MFR BLD: 6.9 % (ref 0–5.6)
HDLC SERPL-MCNC: 47 MG/DL
IC-%PRED-PRE: 87 %
IC-PRE: 3.45 L
IC-PRED: 3.94 L
LDLC SERPL CALC-MCNC: 32 MG/DL
MAGNESIUM SERPL-MCNC: 1.9 MG/DL (ref 1.6–2.3)
NONHDLC SERPL-MCNC: 71 MG/DL
NT-PROBNP SERPL-MCNC: 21 PG/ML (ref 0–900)
POTASSIUM BLD-SCNC: 3.8 MMOL/L (ref 3.4–5.3)
PROT SERPL-MCNC: 6.5 G/DL (ref 6.8–8.8)
PSA SERPL-MCNC: 0.11 UG/L (ref 0–4)
RVPLETH-%PRED-PRE: 136 %
RVPLETH-PRE: 3.67 L
RVPLETH-PRED: 2.69 L
SODIUM SERPL-SCNC: 139 MMOL/L (ref 133–144)
TLCPLETH-%PRED-PRE: 105 %
TLCPLETH-PRE: 7.27 L
TLCPLETH-PRED: 6.92 L
TRIGL SERPL-MCNC: 196 MG/DL
VA-%PRED-PRE: 104 %
VA-PRE: 6.41 L
VC-%PRED-PRE: 80 %
VC-PRE: 3.61 L
VC-PRED: 4.49 L

## 2022-07-01 PROCEDURE — 83735 ASSAY OF MAGNESIUM: CPT

## 2022-07-01 PROCEDURE — 84153 ASSAY OF PSA TOTAL: CPT

## 2022-07-01 PROCEDURE — 83036 HEMOGLOBIN GLYCOSYLATED A1C: CPT

## 2022-07-01 PROCEDURE — 83880 ASSAY OF NATRIURETIC PEPTIDE: CPT

## 2022-07-01 PROCEDURE — 36415 COLL VENOUS BLD VENIPUNCTURE: CPT

## 2022-07-01 PROCEDURE — 80053 COMPREHEN METABOLIC PANEL: CPT

## 2022-07-01 PROCEDURE — 80061 LIPID PANEL: CPT

## 2022-07-05 NOTE — PROGRESS NOTES
"  Assessment & Plan     Type 2 diabetes mellitus with hyperglycemia, without long-term current use of insulin (H)  Well controlled with medications without side effects. Continue current treatment plan without change.  - Albumin Random Urine Quantitative with Creat Ratio; Future  - FOOT EXAM  - OneTouch Delica Lancets 33G MISC; 1 each 3 times daily  - blood glucose (ONETOUCH VERIO IQ) test strip; Use to test blood sugar 3 times daily or as directed.    Diabetic polyneuropathy associated with type 2 diabetes mellitus (H)  Counseled patient to check feet and use lotion daily, continue to wear good supportive shoes, and let the clinic know right way if any sores develop.     Hypertension, unspecified type  The current medical regimen is effective;  continue present plan and medications.    Chronic obstructive pulmonary disease, unspecified COPD type (H)  The current medical regimen is effective;  continue present plan and medications.    High priority for 2019-nCoV vaccine  - COVID-19,PF,MODERNA (18+ Yrs BOOSTER .25mL)    BMI:   Estimated body mass index is 35.15 kg/m  as calculated from the following:    Height as of this encounter: 1.753 m (5' 9\").    Weight as of this encounter: 108 kg (238 lb).   Weight management plan: Discussed healthy diet and exercise guidelines  Blood sugar testing frequency justification:  Patient modifying lifestyle changes (diet, exercise) with blood sugars    Return in about 6 months (around 1/8/2023) for diabetes check.    JACK Allen CNP  [facility] THIAGO Bledsoe is a 76 year old accompanied by his none, presenting for the following health issues:  Diabetes, COPD, and Imm/Inj (COVID-19 VACCINE)      History of Present Illness       COPD:  He presents for follow up of COPD.  Overall, COPD symptoms are stable since last visit. He has same as usual fatigue or shortness of breath with exertion and no shortness of breath at rest.He rarely coughs and " "does have change in sputum. No recent fever. He can walk 2-5 blocks without stopping to rest. He can walk 2 flights of stairs without resting.The patient has had an ED, urgent care, or hospital admission because of COPD since the last visit. He states he has had 1 visit(s) to an ED, Urgent Care, or Hospital due to his COPD.    Diabetes:   He presents for follow up of diabetes.  He is checking home blood glucose four or more times daily. He checks blood glucose before and after meals.  Blood glucose is sometimes over 200 and never under 70. When his blood glucose is low, the patient is asymptomatic for confusion, blurred vision, lethargy and reports not feeling dizzy, shaky, or weak.  He has no concerns regarding his diabetes at this time.  He is having numbness in feet and blurry vision.         He eats 2-3 servings of fruits and vegetables daily.He consumes 1 sweetened beverage(s) daily.He exercises with enough effort to increase his heart rate 9 or less minutes per day.  He exercises with enough effort to increase his heart rate 3 or less days per week.   He is taking medications regularly.    Patient has been changing his diet and watching his blood sugars to lose weight (down 15 pounds) and improve his diabetes.      Review of Systems   Constitutional, HEENT, cardiovascular, pulmonary, GI, , musculoskeletal, neuro, skin, endocrine and psych systems are negative, except as otherwise noted.      Objective    /52   Pulse 73   Temp 98.4  F (36.9  C) (Oral)   Resp 14   Ht 1.753 m (5' 9\")   Wt 108 kg (238 lb)   SpO2 96%   BMI 35.15 kg/m    Body mass index is 35.15 kg/m .  Physical Exam   GENERAL: healthy, alert and no distress  RESP: lungs clear to auscultation - no rales, rhonchi or wheezes  CV: regular rate and rhythm, normal S1 S2, no S3 or S4, no murmur, click or rub, no peripheral edema and peripheral pulses strong  PSYCH: mentation appears normal, affect normal/bright  Diabetic foot exam: normal " DP and PT pulses, no trophic changes or ulcerative lesions, normal sensory exam and normal monofilament exam, except for findings noted on diabetic foot graphical image. Bilateral #4,5.           Labs I reviewed today:  Lab on 07/01/2022   Component Date Value Ref Range Status     PSA Tumor Marker 07/01/2022 0.11  0.00 - 4.00 ug/L Final     N Terminal Pro BNP Outpatient 07/01/2022 21  0 - 900 pg/mL Final    Reference range shown and results flagged as abnormal are for the outpatient, non acute settings. Establishing a baseline value for each individual patient is useful for follow-up.    Suggested inpatient cut points for confirming diagnosis of CHF in an acute setting are:  >450 pg/mL (age 18 to less than 50)  >900 pg/mL (age 50 to less than 75)  >1800 pg/mL (75 yrs and older)    An inpatient or emergency department NT-proPBNP <300 pg/mL effectively rules out acute CHF, with 99% negative predictive value.         Magnesium 07/01/2022 1.9  1.6 - 2.3 mg/dL Final     Sodium 07/01/2022 139  133 - 144 mmol/L Final     Potassium 07/01/2022 3.8  3.4 - 5.3 mmol/L Final     Chloride 07/01/2022 105  94 - 109 mmol/L Final     Carbon Dioxide (CO2) 07/01/2022 29  20 - 32 mmol/L Final     Anion Gap 07/01/2022 5  3 - 14 mmol/L Final     Urea Nitrogen 07/01/2022 22  7 - 30 mg/dL Final     Creatinine 07/01/2022 1.11  0.66 - 1.25 mg/dL Final     Calcium 07/01/2022 9.0  8.5 - 10.1 mg/dL Final     Glucose 07/01/2022 111 (A) 70 - 99 mg/dL Final     Alkaline Phosphatase 07/01/2022 42  40 - 150 U/L Final     AST 07/01/2022 16  0 - 45 U/L Final     ALT 07/01/2022 32  0 - 70 U/L Final     Protein Total 07/01/2022 6.5 (A) 6.8 - 8.8 g/dL Final     Albumin 07/01/2022 3.5  3.4 - 5.0 g/dL Final     Bilirubin Total 07/01/2022 0.3  0.2 - 1.3 mg/dL Final     GFR Estimate 07/01/2022 70  >60 mL/min/1.73m2 Final    Effective December 21, 2021 eGFRcr in adults is calculated using the 2021 CKD-EPI creatinine equation which includes age and gender  (Timmy et al., NE, DOI: 10.1056/XSGGjw3649899)     Hemoglobin A1C 07/01/2022 6.9 (A) 0.0 - 5.6 % Final    Normal <5.7%   Prediabetes 5.7-6.4%    Diabetes 6.5% or higher     Note: Adopted from ADA consensus guidelines.     Cholesterol 07/01/2022 118  <200 mg/dL Final     Triglycerides 07/01/2022 196 (A) <150 mg/dL Final     Direct Measure HDL 07/01/2022 47  >=40 mg/dL Final     LDL Cholesterol Calculated 07/01/2022 32  <=100 mg/dL Final     Non HDL Cholesterol 07/01/2022 71  <130 mg/dL Final     Patient Fasting > 8hrs? 07/01/2022 No   Final                 .  ..

## 2022-07-06 NOTE — RESULT ENCOUNTER NOTE
Results discussed directly with patient while patient was present. Any further details documented in the note.   Servando Mejia MD

## 2022-07-08 ENCOUNTER — OFFICE VISIT (OUTPATIENT)
Dept: FAMILY MEDICINE | Facility: CLINIC | Age: 77
End: 2022-07-08

## 2022-07-08 ENCOUNTER — OFFICE VISIT (OUTPATIENT)
Dept: UROLOGY | Facility: CLINIC | Age: 77
End: 2022-07-08
Payer: COMMERCIAL

## 2022-07-08 VITALS
SYSTOLIC BLOOD PRESSURE: 129 MMHG | TEMPERATURE: 97.7 F | OXYGEN SATURATION: 98 % | HEART RATE: 67 BPM | DIASTOLIC BLOOD PRESSURE: 70 MMHG

## 2022-07-08 VITALS
TEMPERATURE: 98.4 F | RESPIRATION RATE: 14 BRPM | SYSTOLIC BLOOD PRESSURE: 118 MMHG | OXYGEN SATURATION: 96 % | HEART RATE: 73 BPM | DIASTOLIC BLOOD PRESSURE: 52 MMHG | HEIGHT: 69 IN | BODY MASS INDEX: 35.25 KG/M2 | WEIGHT: 238 LBS

## 2022-07-08 DIAGNOSIS — E11.65 TYPE 2 DIABETES MELLITUS WITH HYPERGLYCEMIA, WITHOUT LONG-TERM CURRENT USE OF INSULIN (H): Primary | ICD-10-CM

## 2022-07-08 DIAGNOSIS — Z23 HIGH PRIORITY FOR 2019-NCOV VACCINE: ICD-10-CM

## 2022-07-08 DIAGNOSIS — R60.9 EDEMA, UNSPECIFIED TYPE: ICD-10-CM

## 2022-07-08 DIAGNOSIS — E11.42 DIABETIC POLYNEUROPATHY ASSOCIATED WITH TYPE 2 DIABETES MELLITUS (H): ICD-10-CM

## 2022-07-08 DIAGNOSIS — I10 HYPERTENSION, UNSPECIFIED TYPE: ICD-10-CM

## 2022-07-08 DIAGNOSIS — J44.9 CHRONIC OBSTRUCTIVE PULMONARY DISEASE, UNSPECIFIED COPD TYPE (H): ICD-10-CM

## 2022-07-08 DIAGNOSIS — C61 MALIGNANT NEOPLASM OF PROSTATE (H): Primary | ICD-10-CM

## 2022-07-08 LAB
CREAT UR-MCNC: 20 MG/DL
MICROALBUMIN UR-MCNC: <5 MG/L
MICROALBUMIN/CREAT UR: NORMAL MG/G{CREAT}

## 2022-07-08 PROCEDURE — 0064A COVID-19,PF,MODERNA (18+ YRS BOOSTER .25ML): CPT | Performed by: NURSE PRACTITIONER

## 2022-07-08 PROCEDURE — 99207 PR FOOT EXAM NO CHARGE: CPT | Mod: 25 | Performed by: NURSE PRACTITIONER

## 2022-07-08 PROCEDURE — 91306 COVID-19,PF,MODERNA (18+ YRS BOOSTER .25ML): CPT | Performed by: NURSE PRACTITIONER

## 2022-07-08 PROCEDURE — 99214 OFFICE O/P EST MOD 30 MIN: CPT | Mod: 25 | Performed by: NURSE PRACTITIONER

## 2022-07-08 PROCEDURE — 99213 OFFICE O/P EST LOW 20 MIN: CPT | Performed by: UROLOGY

## 2022-07-08 PROCEDURE — 82043 UR ALBUMIN QUANTITATIVE: CPT | Performed by: NURSE PRACTITIONER

## 2022-07-08 RX ORDER — SODIUM BICARBONATE 650 MG/1
TABLET ORAL
Qty: 30 TABLET | Refills: 1 | Status: SHIPPED | OUTPATIENT
Start: 2022-07-08 | End: 2022-08-01

## 2022-07-08 RX ORDER — BLOOD SUGAR DIAGNOSTIC
STRIP MISCELLANEOUS
Qty: 100 STRIP | Refills: 3 | Status: SHIPPED | OUTPATIENT
Start: 2022-07-08 | End: 2022-10-25

## 2022-07-08 RX ORDER — LANCETS 33 GAUGE
1 EACH MISCELLANEOUS 3 TIMES DAILY
Qty: 100 EACH | Refills: 4 | Status: SHIPPED | OUTPATIENT
Start: 2022-07-08 | End: 2022-12-07

## 2022-07-08 NOTE — TELEPHONE ENCOUNTER
Routing refill request to provider for review/approval because:  Drug not on the [facility] refill protocol     Requested Prescriptions   Pending Prescriptions Disp Refills    sodium bicarbonate 650 MG tablet [Pharmacy Med Name: SODIUM BICARB 650 MG TABLET] 30 tablet 1     Sig: TAKE 1/2 TABLET BY MOUTH TWICE A DAY        There is no refill protocol information for this order           Chelsey Tai RN  [facility]

## 2022-07-08 NOTE — PROGRESS NOTES
Chief Complaint   Patient presents with     RECHECK       Rakan ASHLEY Nolasco is a 76 year old male who presents today for follow up of   Chief Complaint   Patient presents with     RECHECK   Patient is a pleasant 76-year-old male with history of prostate cancer status post cryotherapy x2.  His recent PSA was 0.11.  He had mild urethral stricture status post dilation in the past which has improved his urinary flow.    Current Outpatient Medications   Medication Sig Dispense Refill     albuterol (PROAIR HFA/PROVENTIL HFA/VENTOLIN HFA) 108 (90 Base) MCG/ACT inhaler INHALE 2 PUFFS BY MOUTH EVERY 4 HOURS FOR SHORTNESS OF BREATH, COUGH, OR WITH EXERCISE 6.7 g 1     amLODIPine (NORVASC) 10 MG tablet TAKE 1 TABLET BY MOUTH EVERY DAY 90 tablet 0     aspirin 81 MG tablet Take 1 tablet by mouth daily.       atorvastatin (LIPITOR) 20 MG tablet TAKE 1 TABLET BY MOUTH EVERY DAY 90 tablet 0     blood glucose monitoring (NO BRAND SPECIFIED) meter device kit Use to test blood sugar 1 times daily or as directed. 1 kit 0     ClonAZEPAM (KLONOPIN) 0.5 MG tablet Take 0.5 mg by mouth Take 1 tablet by mouth daily at bedtime       furosemide (LASIX) 20 MG tablet TAKE 2 TABLETS (40 MG) IN THE MORNING AND 1 TABLET (20 MG) AT LEAST 6 HOURS LATER IN THE AFTERNOON. 180 tablet 0     latanoprost (XALATAN) 0.005 % ophthalmic solution Place 1 drop into both eyes At Bedtime 7.5 mL 3     lisinopril (ZESTRIL) 40 MG tablet TAKE 1 TABLET BY MOUTH EVERY DAY 90 tablet 2     metFORMIN (GLUCOPHAGE-XR) 500 MG 24 hr tablet TAKE 1 TABLET BY MOUTH TWICE A DAY WITH MEALS 180 tablet 1     OneTouch Delica Lancets 30G MISC USE ONE DAILY AS DIRECTED 100 each 2     ONETOUCH VERIO IQ test strip USE TO TEST BLOOD SUGARS 1 TIMES DAILY OR AS DIRECTED 100 strip 2     OXcarbazepine (TRILEPTAL) 300 MG tablet TAKE 1 TABLET BY MOUTH TWICE A  tablet 1     pantoprazole (PROTONIX) 20 MG EC tablet TAKE 1 TABLET BY MOUTH EVERY DAY 90 tablet 2     predniSONE (DELTASONE) 5 MG  tablet TAKE 2 TABLETS BY MOUTH EVERY DAY 60 tablet 1     pyridostigmine (MESTINON) 60 MG tablet TAKE 1 TABLET BY MOUTH 3 TIMES A  tablet 0     sodium bicarbonate 325 MG tablet TAKE 1 TABLET (325 MG) BY MOUTH 2 TIMES DAILY       sodium bicarbonate 650 MG tablet TAKE 1/2 TABLET BY MOUTH TWICE A DAY 30 tablet 1     timolol maleate (TIMOPTIC) 0.5 % ophthalmic solution Place 1 drop into both eyes 2 times daily 5 mL 11     WIXELA INHUB 250-50 MCG/DOSE inhaler TAKE 1 PUFF BY MOUTH TWICE A DAY 60 each 2     Allergies   Allergen Reactions     Azathioprine Shortness Of Breath and Other (See Comments)     Was hospitalized from it. Also had high fever, chills, and shortness of breath.     Ciprofloxacin Muscle Pain (Myalgia)     Muscle pain  Other reaction(s): Myalgia  Other reaction(s): Myalgia     Ropinirole      Leg pan  GI  Weakness; ? sycope  Other reaction(s): Leg Pain      Past Medical History:   Diagnosis Date     Arthritis          BMI 31.0-31.9,adult      Cancer (H) 01/10/2018    Prostate     COPD (chronic obstructive pulmonary disease) (H) 10/28/2020     Demand ischemia (H)      Diabetes (H) 01/10/2018     Diverticulosis     Colonoscopy 8/2008     Fatty liver     see US  5/2012     GERD (gastroesophageal reflux disease)      Glaucoma (increased eye pressure)      HTN (hypertension)      Hyperlipidemia LDL goal < 130     age, htn, fhx     Irritable bowel      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      Nonsenile cataract      Obstructive sleep apnea      Obstructive sleep apnea syndrome      ROSIE (obstructive sleep apnea) 01/2011    Using CPAP;      Prediabetes      Restless leg syndrome      Sleep apnea      Trigeminal neuralgia pain 01/04/2012     Past Surgical History:   Procedure Laterality Date     BIOPSY ARTERY TEMPORAL Bilateral 8/1/2017    Procedure: BIOPSY ARTERY TEMPORAL;  Bilateral temporal artery Biopsy;  Surgeon: Jj Tello MD;  Location:  OR      CATARACT IOL, RT/LT Right      COLONOSCOPY       ESOPHAGOSCOPY, GASTROSCOPY, DUODENOSCOPY (EGD), COMBINED  1/15/2014    Procedure: COMBINED ESOPHAGOSCOPY, GASTROSCOPY, DUODENOSCOPY (EGD), BIOPSY SINGLE OR MULTIPLE;;  Surgeon: Winston Nixon MD;  Location: [facility] OR     LASER YAG CAPSULOTOMY Right 2018    right eye     PHACOEMULSIFICATION CLEAR CORNEA WITH STANDARD INTRAOCULAR LENS IMPLANT Right 2017    Procedure: PHACOEMULSIFICATION CLEAR CORNEA WITH STANDARD INTRAOCULAR LENS IMPLANT;  Surgeon: Mateo Gray MD;  Location: [facility]     PHACOEMULSIFICATION CLEAR CORNEA WITH STANDARD INTRAOCULAR LENS IMPLANT Left 1/10/2022    Procedure: LEFT PHACOEMULSIFICATION, CATARACT, WITH INTRAOCULAR LENS IMPLANT;  Surgeon: Mateo Gray MD;  Location: [facility] OR     RETINAL REATTACHMENT Right      SURGICAL HISTORY OF -   2009    Both Eyelids-.     TONSILLECTOMY  as child     Family History   Problem Relation Age of Onset     Respiratory Mother         copd     LUNG DISEASE Mother      Allergies Brother      Cancer Maternal Grandmother      Heart Disease Paternal Grandmother      Cerebrovascular Disease Father      Cancer Father      Other Cancer Father      Glaucoma Maternal Aunt      Macular Degeneration No family hx of      Social History     Socioeconomic History     Marital status: Single     Spouse name: None     Number of children: 0     Years of education: 12     Highest education level: None   Occupational History     Occupation: Retired 3/2012     Comment: [employer]   Social Needs     Financial resource strain: None     Food insecurity     Worry: None     Inability: None     Transportation needs     Medical: None     Non-medical: None   Tobacco Use     Smoking status: Former Smoker     Packs/day: 2.00     Years: 15.00     Pack years: 30.00     Types: Cigarettes     Start date: 1968     Quit date: 1983     Years since quittin.0     Smokeless tobacco:  Former User     Quit date: 1/1/1970     Tobacco comment: smoke free household.   Substance and Sexual Activity     Alcohol use: Not Currently     Comment: Quit in 1986     Drug use: No     Sexual activity: Not Currently     Partners: Female     Comment: 4/2010  No girlfriend at this time.   Lifestyle     Physical activity     Days per week: None     Minutes per session: None     Stress: None   Relationships     Social connections     Talks on phone: None     Gets together: None     Attends Caodaism service: None     Active member of club or organization: None     Attends meetings of clubs or organizations: None     Relationship status: None     Intimate partner violence     Fear of current or ex partner: None     Emotionally abused: None     Physically abused: None     Forced sexual activity: None   Other Topics Concern     Parent/sibling w/ CABG, MI or angioplasty before 65F 55M? No   Social History Narrative     None       REVIEW OF SYSTEMS  =================  C: NEGATIVE for fever, chills, change in weight  I: NEGATIVE for worrisome rashes, moles or lesions  E/M: NEGATIVE for ear, mouth and throat problems  R: NEGATIVE for significant cough or SHORTNESS OF BREATH  CV:  NEGATIVE for chest pain, palpitations or peripheral edema  GI: NEGATIVE for nausea, abdominal pain, heartburn, or change in bowel habits  NEURO: NEGATIVE numbness/weakness  : see HPI  PSYCH: NEGATIVE depression/anxiety  LYmph: no new enlarged lymph nodes  Ortho: no new trauma/movements    Physical Exam:  /70 (BP Location: Left arm, Patient Position: Chair, Cuff Size: Adult Large)   Pulse 67   Temp 97.7  F (36.5  C)   SpO2 98%    Patient is pleasant, in no acute distress, good general condition.  Lung: no evidence of respiratory distress    Abdomen: Soft, nondistended, non tender. No masses. No rebound or guarding.   Exam: No CVA tenderness  Skin: Warm and dry.  No redness.  Psych: normal mood and affect  Neuro: alert and  oriented  Musculaskeletal: moving all extremities    Assessment/Plan:       (C61) Malignant neoplasm of prostate (H)  Comment: Status post cryotherapy x2.  Doing well.  Plan: PSA tumor marker        In one year

## 2022-07-12 DIAGNOSIS — G70.00 OCULAR MYASTHENIA (H): ICD-10-CM

## 2022-07-13 RX ORDER — PYRIDOSTIGMINE BROMIDE 60 MG/1
TABLET ORAL
Qty: 180 TABLET | Refills: 0 | Status: SHIPPED | OUTPATIENT
Start: 2022-07-13 | End: 2022-09-12

## 2022-07-15 DIAGNOSIS — J44.9 CHRONIC OBSTRUCTIVE PULMONARY DISEASE, UNSPECIFIED COPD TYPE (H): ICD-10-CM

## 2022-07-15 NOTE — TELEPHONE ENCOUNTER
"Requested Prescriptions   Pending Prescriptions Disp Refills     WIXELA INHUB 250-50 MCG/ACT inhaler [Pharmacy Med Name: WIXELA 250-50 INHUB]  2     Sig: INHALE 1 PUFF BY MOUTH TWICE A DAY       Long-Acting Beta Agonist Inhalers Protocol  Failed - 7/15/2022  2:06 AM        Failed - Order for Serevent, Striverdi, or Foradil and pt has steroid inhaler        Passed - Patient is age 12 or older        Passed - Recent (12 mo) or future (30 days) visit within the authorizing provider's specialty     Patient has had an office visit with the authorizing provider or a provider within the authorizing providers department within the previous 12 mos or has a future within next 30 days. See \"Patient Info\" tab in inbasket, or \"Choose Columns\" in Meds & Orders section of the refill encounter.              Passed - Medication is active on med list       Inhaled Steroids Protocol Passed - 7/15/2022  2:06 AM        Passed - Patient is age 12 or older        Passed - Recent (12 mo) or future (30 days) visit within the authorizing provider's specialty     Patient has had an office visit with the authorizing provider or a provider within the authorizing providers department within the previous 12 mos or has a future within next 30 days. See \"Patient Info\" tab in inbasket, or \"Choose Columns\" in Meds & Orders section of the refill encounter.              Passed - Medication is active on med list           Fabienne Smith RN  [facility]     "

## 2022-07-18 RX ORDER — FLUTICASONE PROPIONATE AND SALMETEROL 250; 50 UG/1; UG/1
POWDER RESPIRATORY (INHALATION)
Qty: 60 EACH | Refills: 2 | Status: SHIPPED | OUTPATIENT
Start: 2022-07-18 | End: 2022-10-25

## 2022-07-27 DIAGNOSIS — J44.9 CHRONIC OBSTRUCTIVE PULMONARY DISEASE, UNSPECIFIED COPD TYPE (H): ICD-10-CM

## 2022-07-27 DIAGNOSIS — I10 HTN, GOAL BELOW 140/90: ICD-10-CM

## 2022-07-27 DIAGNOSIS — E78.5 HYPERLIPIDEMIA LDL GOAL <130: ICD-10-CM

## 2022-07-27 DIAGNOSIS — R73.03 PREDIABETES: ICD-10-CM

## 2022-07-28 RX ORDER — AMLODIPINE BESYLATE 10 MG/1
TABLET ORAL
Qty: 90 TABLET | Refills: 0 | Status: SHIPPED | OUTPATIENT
Start: 2022-07-28 | End: 2022-11-01

## 2022-07-28 RX ORDER — ALBUTEROL SULFATE 90 UG/1
AEROSOL, METERED RESPIRATORY (INHALATION)
Qty: 6.7 G | Refills: 1 | Status: SHIPPED | OUTPATIENT
Start: 2022-07-28 | End: 2022-09-12

## 2022-07-28 RX ORDER — METFORMIN HCL 500 MG
TABLET, EXTENDED RELEASE 24 HR ORAL
Qty: 180 TABLET | Refills: 1 | Status: SHIPPED | OUTPATIENT
Start: 2022-07-28 | End: 2023-01-06

## 2022-07-28 RX ORDER — ATORVASTATIN CALCIUM 20 MG/1
TABLET, FILM COATED ORAL
Qty: 90 TABLET | Refills: 0 | Status: SHIPPED | OUTPATIENT
Start: 2022-07-28 | End: 2022-11-01

## 2022-07-28 NOTE — TELEPHONE ENCOUNTER
"Requested Prescriptions   Signed Prescriptions Disp Refills    atorvastatin (LIPITOR) 20 MG tablet 90 tablet 0     Sig: TAKE 1 TABLET BY MOUTH EVERY DAY       Statins Protocol Passed - 7/27/2022  2:53 PM        Passed - LDL on file in past 12 months     Recent Labs   Lab Test 07/01/22  0836   LDL 32             Passed - No abnormal creatine kinase in past 12 months     Recent Labs   Lab Test 07/09/20  1402                   Passed - Recent (12 mo) or future (30 days) visit within the authorizing provider's specialty     Patient has had an office visit with the authorizing provider or a provider within the authorizing providers department within the previous 12 mos or has a future within next 30 days. See \"Patient Info\" tab in inbasket, or \"Choose Columns\" in Meds & Orders section of the refill encounter.              Passed - Medication is active on med list        Passed - Patient is age 18 or older           Fabienne Smith RN  [facility]     "

## 2022-07-28 NOTE — TELEPHONE ENCOUNTER
"Requested Prescriptions   Signed Prescriptions Disp Refills    metFORMIN (GLUCOPHAGE XR) 500 MG 24 hr tablet 180 tablet 1     Sig: TAKE 1 TABLET BY MOUTH TWICE A DAY WITH MEALS       Biguanide Agents Passed - 7/27/2022  2:53 PM        Passed - Patient is age 10 or older        Passed - Patient has documented A1c within the specified period of time.     If HgbA1C is 8 or greater, it needs to be on file within the past 3 months.  If less than 8, must be on file within the past 6 months.     Recent Labs   Lab Test 07/01/22 0836   A1C 6.9*             Passed - Patient's CR is NOT>1.4 OR Patient's EGFR is NOT<45 within past 12 mos.     Recent Labs   Lab Test 07/01/22 0836 07/12/21  1259 05/04/21  1543 11/04/20  0000   GFR  --   --   --  60   GFRB  --   --   --  59   GFRESTIMATED 70   < > 72  --    GFRESTBLACK  --   --  84  --     < > = values in this interval not displayed.       Recent Labs   Lab Test 07/01/22 0836 05/04/21  1543 11/04/20  0000   CR 1.11   < >  --    CRPOC  --   --  1.2    < > = values in this interval not displayed.             Passed - Patient does NOT have a diagnosis of CHF.        Passed - Medication is active on med list        Passed - Recent (6 mo) or future (30 days) visit within the authorizing provider's specialty     Patient had office visit in the last 6 months or has a visit in the next 30 days with authorizing provider or within the authorizing provider's specialty.  See \"Patient Info\" tab in inbasket, or \"Choose Columns\" in Meds & Orders section of the refill encounter.              amLODIPine (NORVASC) 10 MG tablet 90 tablet 0     Sig: TAKE 1 TABLET BY MOUTH EVERY DAY       Calcium Channel Blockers Protocol  Passed - 7/27/2022  2:53 PM        Passed - Blood pressure under 140/90 in past 12 months     BP Readings from Last 3 Encounters:   07/08/22 118/52   07/08/22 129/70   06/29/22 108/63                 Passed - Recent (12 mo) or future (30 days) visit within the authorizing " "provider's specialty     Patient has had an office visit with the authorizing provider or a provider within the authorizing providers department within the previous 12 mos or has a future within next 30 days. See \"Patient Info\" tab in inbasket, or \"Choose Columns\" in Meds & Orders section of the refill encounter.              Passed - Medication is active on med list        Passed - Patient is age 18 or older        Passed - Normal serum creatinine on file in past 12 months     Recent Labs   Lab Test 07/01/22  0836 05/04/21  1543 11/04/20  0000   CR 1.11   < >  --    CRPOC  --   --  1.2    < > = values in this interval not displayed.       Ok to refill medication if creatinine is low             Fabienne Smith RN  [facility]     "

## 2022-07-28 NOTE — TELEPHONE ENCOUNTER
"Requested Prescriptions   Signed Prescriptions Disp Refills    albuterol (PROAIR HFA/PROVENTIL HFA/VENTOLIN HFA) 108 (90 Base) MCG/ACT inhaler 6.7 g 1     Sig: INHALE 2 PUFFS BY MOUTH EVERY 4 HOURS FOR SHORTNESS OF BREATH, COUGH, OR WITH EXERCISE       Asthma Maintenance Inhalers - Anticholinergics Passed - 7/27/2022  2:53 PM        Passed - Patient is age 12 years or older        Passed - Recent (12 mo) or future (30 days) visit within the authorizing provider's specialty     Patient has had an office visit with the authorizing provider or a provider within the authorizing providers department within the previous 12 mos or has a future within next 30 days. See \"Patient Info\" tab in inbasket, or \"Choose Columns\" in Meds & Orders section of the refill encounter.              Passed - Medication is active on med list       Short-Acting Beta Agonist Inhalers Protocol  Passed - 7/27/2022  2:53 PM        Passed - Patient is age 12 or older        Passed - Recent (12 mo) or future (30 days) visit within the authorizing provider's specialty     Patient has had an office visit with the authorizing provider or a provider within the authorizing providers department within the previous 12 mos or has a future within next 30 days. See \"Patient Info\" tab in inbasket, or \"Choose Columns\" in Meds & Orders section of the refill encounter.              Passed - Medication is active on med list           Fabienne Smith RN  [facility]     "

## 2022-07-31 DIAGNOSIS — R60.9 EDEMA, UNSPECIFIED TYPE: ICD-10-CM

## 2022-08-01 RX ORDER — SODIUM BICARBONATE 650 MG/1
TABLET ORAL
Qty: 30 TABLET | Refills: 1 | Status: SHIPPED | OUTPATIENT
Start: 2022-08-01 | End: 2022-12-26

## 2022-08-03 ENCOUNTER — OFFICE VISIT (OUTPATIENT)
Dept: FAMILY MEDICINE | Facility: CLINIC | Age: 77
End: 2022-08-03
Payer: COMMERCIAL

## 2022-08-03 VITALS
BODY MASS INDEX: 34.7 KG/M2 | DIASTOLIC BLOOD PRESSURE: 68 MMHG | HEART RATE: 78 BPM | TEMPERATURE: 96.7 F | WEIGHT: 235 LBS | OXYGEN SATURATION: 97 % | SYSTOLIC BLOOD PRESSURE: 130 MMHG

## 2022-08-03 DIAGNOSIS — M79.89 LEFT LEG SWELLING: Primary | ICD-10-CM

## 2022-08-03 DIAGNOSIS — C61 MALIGNANT NEOPLASM OF PROSTATE (H): ICD-10-CM

## 2022-08-03 DIAGNOSIS — E61.1 IRON DEFICIENCY: ICD-10-CM

## 2022-08-03 LAB
BASOPHILS # BLD AUTO: 0 10E3/UL (ref 0–0.2)
BASOPHILS NFR BLD AUTO: 0 %
EOSINOPHIL # BLD AUTO: 0 10E3/UL (ref 0–0.7)
EOSINOPHIL NFR BLD AUTO: 0 %
ERYTHROCYTE [DISTWIDTH] IN BLOOD BY AUTOMATED COUNT: 15.5 % (ref 10–15)
HCT VFR BLD AUTO: 36.9 % (ref 40–53)
HGB BLD-MCNC: 11.6 G/DL (ref 13.3–17.7)
LYMPHOCYTES # BLD AUTO: 1.4 10E3/UL (ref 0.8–5.3)
LYMPHOCYTES NFR BLD AUTO: 14 %
MCH RBC QN AUTO: 25.6 PG (ref 26.5–33)
MCHC RBC AUTO-ENTMCNC: 31.4 G/DL (ref 31.5–36.5)
MCV RBC AUTO: 81 FL (ref 78–100)
MONOCYTES # BLD AUTO: 0.9 10E3/UL (ref 0–1.3)
MONOCYTES NFR BLD AUTO: 9 %
NEUTROPHILS # BLD AUTO: 7.6 10E3/UL (ref 1.6–8.3)
NEUTROPHILS NFR BLD AUTO: 77 %
PLATELET # BLD AUTO: 340 10E3/UL (ref 150–450)
RBC # BLD AUTO: 4.54 10E6/UL (ref 4.4–5.9)
WBC # BLD AUTO: 9.9 10E3/UL (ref 4–11)

## 2022-08-03 PROCEDURE — 84153 ASSAY OF PSA TOTAL: CPT | Performed by: INTERNAL MEDICINE

## 2022-08-03 PROCEDURE — 85025 COMPLETE CBC W/AUTO DIFF WBC: CPT | Performed by: INTERNAL MEDICINE

## 2022-08-03 PROCEDURE — 36415 COLL VENOUS BLD VENIPUNCTURE: CPT | Performed by: INTERNAL MEDICINE

## 2022-08-03 PROCEDURE — 86140 C-REACTIVE PROTEIN: CPT | Performed by: INTERNAL MEDICINE

## 2022-08-03 PROCEDURE — 99213 OFFICE O/P EST LOW 20 MIN: CPT | Performed by: INTERNAL MEDICINE

## 2022-08-03 NOTE — PROGRESS NOTES
"  Assessment & Plan   Problem List Items Addressed This Visit    None     Visit Diagnoses     Left leg swelling    -  Primary    Relevant Orders    US Lower Extremity Venous Duplex Left    CBC with platelets and differential (Completed)    CRP, inflammation    Malignant neoplasm of prostate (H)                      Work on weight loss  Regular exercise    Return in about 6 months (around 2/3/2023) for follow up of condition, medication management.    Mayito Pearce MD  [facility] THIAGO Bledsoe is a 77 year old, presenting for the following health issues:  Mass (Lump on left calf)      Mass    History of Present Illness       Reason for visit:  Ankle and shin pain, bump swelling    He eats 2-3 servings of fruits and vegetables daily.He consumes 0 sweetened beverage(s) daily.He exercises with enough effort to increase his heart rate 9 or less minutes per day.  He exercises with enough effort to increase his heart rate 3 or less days per week.   He is taking medications regularly.   breathing has been good   Lost some weight and diabetes improving control  Is off amoxicillin last one was yesterday   16.75\"   No gout   Mostly in th ankle region                      Review of Systems   Constitutional, HEENT, cardiovascular, pulmonary, gi and gu systems are negative, except as otherwise noted.      Objective    /68 (BP Location: Right arm, Patient Position: Chair, Cuff Size: Adult Large)   Pulse 78   Temp (!) 96.7  F (35.9  C)   Wt 106.6 kg (235 lb)   SpO2 97%   BMI 34.70 kg/m    Body mass index is 34.7 kg/m .  Physical Exam   GENERAL: healthy, alert and no distress  EYES: Eyes grossly normal to inspection, PERRL and conjunctivae and sclerae normal  HENT: ear canals and TM's normal, nose and mouth without ulcers or lesions  NECK: no adenopathy, no asymmetry, masses, or scars and thyroid normal to palpation  RESP: lungs clear to auscultation - no rales, rhonchi or wheezes  CV: " regular rate and rhythm, normal S1 S2, no S3 or S4, no murmur, click or rub, no peripheral edema and peripheral pulses strong  ABDOMEN: soft, nontender, no hepatosplenomegaly, no masses and bowel sounds normal  MS: no gross musculoskeletal defects noted, no edema  SKIN: no suspicious lesions or rashes  NEURO: Normal strength and tone, mentation intact and speech normal  BACK: no CVA tenderness, no paralumbar tenderness  PSYCH: mentation appears normal, affect normal/bright  LYMPH: no cervical, supraclavicular, axillary, or inguinal adenopathy    Results for orders placed or performed in visit on 08/03/22   CBC with platelets and differential     Status: Abnormal   Result Value Ref Range    WBC Count 9.9 4.0 - 11.0 10e3/uL    RBC Count 4.54 4.40 - 5.90 10e6/uL    Hemoglobin 11.6 (L) 13.3 - 17.7 g/dL    Hematocrit 36.9 (L) 40.0 - 53.0 %    MCV 81 78 - 100 fL    MCH 25.6 (L) 26.5 - 33.0 pg    MCHC 31.4 (L) 31.5 - 36.5 g/dL    RDW 15.5 (H) 10.0 - 15.0 %    Platelet Count 340 150 - 450 10e3/uL    % Neutrophils 77 %    % Lymphocytes 14 %    % Monocytes 9 %    % Eosinophils 0 %    % Basophils 0 %    Absolute Neutrophils 7.6 1.6 - 8.3 10e3/uL    Absolute Lymphocytes 1.4 0.8 - 5.3 10e3/uL    Absolute Monocytes 0.9 0.0 - 1.3 10e3/uL    Absolute Eosinophils 0.0 0.0 - 0.7 10e3/uL    Absolute Basophils 0.0 0.0 - 0.2 10e3/uL   CBC with platelets and differential     Status: Abnormal    Narrative    The following orders were created for panel order CBC with platelets and differential.  Procedure                               Abnormality         Status                     ---------                               -----------         ------                     CBC with platelets and d...[[id number]]  Abnormal            Final result                 Please view results for these tests on the individual orders.                   .  ..

## 2022-08-04 ENCOUNTER — ANCILLARY PROCEDURE (OUTPATIENT)
Dept: ULTRASOUND IMAGING | Facility: CLINIC | Age: 77
End: 2022-08-04
Attending: INTERNAL MEDICINE
Payer: COMMERCIAL

## 2022-08-04 DIAGNOSIS — M79.89 LEFT LEG SWELLING: ICD-10-CM

## 2022-08-04 LAB
CRP SERPL-MCNC: <2.9 MG/L (ref 0–8)
PSA SERPL-MCNC: 0.15 UG/L (ref 0–4)

## 2022-08-04 PROCEDURE — 93971 EXTREMITY STUDY: CPT | Mod: TC | Performed by: RADIOLOGY

## 2022-08-06 RX ORDER — FERROUS SULFATE 325(65) MG
325 TABLET ORAL
Qty: 90 TABLET | Refills: 4 | Status: SHIPPED | OUTPATIENT
Start: 2022-08-06 | End: 2023-10-09

## 2022-08-08 DIAGNOSIS — G70.00 MYASTHENIA GRAVIS, ACHR ANTIBODY POSITIVE (H): ICD-10-CM

## 2022-08-08 RX ORDER — PREDNISONE 5 MG/1
TABLET ORAL
Qty: 60 TABLET | Refills: 1 | Status: SHIPPED | OUTPATIENT
Start: 2022-08-08 | End: 2022-10-07

## 2022-08-19 DIAGNOSIS — R60.0 PEDAL EDEMA: ICD-10-CM

## 2022-08-19 RX ORDER — FUROSEMIDE 20 MG
TABLET ORAL
Qty: 180 TABLET | Refills: 2 | Status: SHIPPED | OUTPATIENT
Start: 2022-08-19 | End: 2023-02-06

## 2022-08-19 NOTE — TELEPHONE ENCOUNTER
Prescription approved per [facility] Refill Protocol.    Soila Moreau, RN, BSN

## 2022-08-30 ENCOUNTER — OFFICE VISIT (OUTPATIENT)
Dept: OPHTHALMOLOGY | Facility: CLINIC | Age: 77
End: 2022-08-30
Payer: COMMERCIAL

## 2022-08-30 DIAGNOSIS — Z98.890 S/P BLEPHAROPLASTY: ICD-10-CM

## 2022-08-30 DIAGNOSIS — Z96.1 PSEUDOPHAKIA OF BOTH EYES: ICD-10-CM

## 2022-08-30 DIAGNOSIS — Z86.69 H/O RD (RETINAL DETACHMENT): ICD-10-CM

## 2022-08-30 DIAGNOSIS — H04.123 DRY EYES, BILATERAL: Primary | ICD-10-CM

## 2022-08-30 DIAGNOSIS — H40.053 BORDERLINE GLAUCOMA WITH OCULAR HYPERTENSION, BILATERAL: ICD-10-CM

## 2022-08-30 DIAGNOSIS — G70.00 MYASTHENIA GRAVIS, ACHR ANTIBODY POSITIVE (H): ICD-10-CM

## 2022-08-30 PROCEDURE — 92014 COMPRE OPH EXAM EST PT 1/>: CPT | Performed by: STUDENT IN AN ORGANIZED HEALTH CARE EDUCATION/TRAINING PROGRAM

## 2022-08-30 RX ORDER — TOBRAMYCIN AND DEXAMETHASONE 3; 1 MG/ML; MG/ML
1 SUSPENSION/ DROPS OPHTHALMIC 3 TIMES DAILY
Qty: 5 ML | Refills: 0 | Status: SHIPPED | OUTPATIENT
Start: 2022-08-30 | End: 2022-10-11

## 2022-08-30 ASSESSMENT — VISUAL ACUITY
OD_CC: 20/20
METHOD: SNELLEN - LINEAR
CORRECTION_TYPE: GLASSES
OS_CC: 20/25
OS_CC+: -1

## 2022-08-30 ASSESSMENT — EXTERNAL EXAM - LEFT EYE: OS_EXAM: NORMAL

## 2022-08-30 ASSESSMENT — CUP TO DISC RATIO
OS_RATIO: 0.25
OD_RATIO: 0.1

## 2022-08-30 ASSESSMENT — SLIT LAMP EXAM - LIDS
COMMENTS: 1+ DERMATOCHALASIS
COMMENTS: 1+ DERMATOCHALASIS

## 2022-08-30 ASSESSMENT — TONOMETRY
OS_IOP_MMHG: 18
IOP_METHOD: APPLANATION
OD_IOP_MMHG: 22

## 2022-08-30 ASSESSMENT — EXTERNAL EXAM - RIGHT EYE: OD_EXAM: NORMAL

## 2022-08-30 NOTE — PROGRESS NOTES
Current Eye Medications:  Timolol BID both eyes-tool drops this am. Latanoprost at bedtime both eyes. Art tears prn both eyes     Subjective: here for left eye pain has been twinging every once in awhile. Irritation and watering x one week. Has tried the tears for this, hasn't helped much. Has had slight headaches this last 7-10 days as well, no light sensitivity. Has had some blurred vision in the left eye. Actually having black floaters in the right eye x 2 days too. Had a wrinkle/hole left eye that he sees Kevin for every three months.       Objective:  See Ophthalmology Exam.       Assessment:  Rakan Nolasco is a 77 year old male who presents with:   Encounter Diagnoses   Name Primary?     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 2pm and 6pm each day)    Mateo Gray MD  [phone]

## 2022-08-30 NOTE — PATIENT INSTRUCTIONS
Continue Latanoprost (green top) at bedtime both eyes     Continue Timolol (yellow top) twice a day in both eyes     Use Tobradex drops three times a day in the left eye for 10 days    When you stop the Tobradex, use your artificial tears twice a day on a scheduled basis (e.g. 2pm and 6pm each day)    Mateo Gray MD  [phone]

## 2022-08-30 NOTE — LETTER
8/30/2022         RE: Rakan Nolasco  [address] [address]        Dear Colleague,    Thank you for referring your patient, Rakan [facility]olasco[facility] Please see a copy of my visit note below.     Current Eye Medications:  Timolol BID both eyes-tool drops this am. Latanoprost at bedtime both eyes. Art tears prn both eyes     Subjective: here for left eye pain has been twinging every once in awhile. Irritation and watering x one week. Has tried the tears for this, hasn't helped much. Has had slight headaches this last 7-10 days as well, no light sensitivity. Has had some blurred vision in the left eye. Actually having black floaters in the right eye x 2 days too. Had a wrinkle/hole left eye that he sees Kevin for every three months.       Objective:  See Ophthalmology Exam.       Assessment:  Rakan Nolasco is a 77 year old male who presents with:   Encounter Diagnoses   Name Primary?     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 2pm and 6pm each day)    Mateo Gray MD  [phone]          Again, thank you for allowing me to participate in the care of your patient.        Sincerely,        Mateo Gray MD

## 2022-09-10 DIAGNOSIS — G70.00 OCULAR MYASTHENIA (H): ICD-10-CM

## 2022-09-10 DIAGNOSIS — J44.9 CHRONIC OBSTRUCTIVE PULMONARY DISEASE, UNSPECIFIED COPD TYPE (H): ICD-10-CM

## 2022-09-12 ENCOUNTER — OFFICE VISIT (OUTPATIENT)
Dept: UROLOGY | Facility: CLINIC | Age: 77
End: 2022-09-12
Payer: COMMERCIAL

## 2022-09-12 VITALS
TEMPERATURE: 97.3 F | HEART RATE: 66 BPM | OXYGEN SATURATION: 97 % | DIASTOLIC BLOOD PRESSURE: 63 MMHG | SYSTOLIC BLOOD PRESSURE: 129 MMHG

## 2022-09-12 DIAGNOSIS — R31.0 GROSS HEMATURIA: Primary | ICD-10-CM

## 2022-09-12 DIAGNOSIS — N30.91 HEMORRHAGIC CYSTITIS: ICD-10-CM

## 2022-09-12 LAB
ALBUMIN UR-MCNC: NEGATIVE MG/DL
APPEARANCE UR: CLEAR
BILIRUB UR QL STRIP: NEGATIVE
COLOR UR AUTO: YELLOW
GLUCOSE UR STRIP-MCNC: NEGATIVE MG/DL
HGB UR QL STRIP: ABNORMAL
KETONES UR STRIP-MCNC: NEGATIVE MG/DL
LEUKOCYTE ESTERASE UR QL STRIP: NEGATIVE
NITRATE UR QL: NEGATIVE
PH UR STRIP: 7 [PH] (ref 5–7)
RBC #/AREA URNS AUTO: NORMAL /HPF
SP GR UR STRIP: 1.01 (ref 1–1.03)
UROBILINOGEN UR STRIP-ACNC: 0.2 E.U./DL
WBC #/AREA URNS AUTO: NORMAL /HPF

## 2022-09-12 PROCEDURE — 99213 OFFICE O/P EST LOW 20 MIN: CPT | Mod: 25 | Performed by: UROLOGY

## 2022-09-12 PROCEDURE — 52000 CYSTOURETHROSCOPY: CPT | Performed by: UROLOGY

## 2022-09-12 PROCEDURE — 81001 URINALYSIS AUTO W/SCOPE: CPT | Performed by: UROLOGY

## 2022-09-12 RX ORDER — CEPHALEXIN 500 MG/1
CAPSULE ORAL
COMMUNITY
Start: 2022-09-10 | End: 2022-10-11

## 2022-09-12 RX ORDER — PYRIDOSTIGMINE BROMIDE 60 MG/1
TABLET ORAL
Qty: 180 TABLET | Refills: 0 | Status: SHIPPED | OUTPATIENT
Start: 2022-09-12 | End: 2022-11-04

## 2022-09-12 RX ORDER — ALBUTEROL SULFATE 90 UG/1
AEROSOL, METERED RESPIRATORY (INHALATION)
Qty: 18 G | Refills: 1 | Status: SHIPPED | OUTPATIENT
Start: 2022-09-12 | End: 2023-01-09

## 2022-09-12 NOTE — PROGRESS NOTES
Chief Complaint   Patient presents with     RECHECK     Gross hematuria post ER visit       Rakan Nolasco is a 77 year old male who presents today for follow up of   Chief Complaint   Patient presents with     RECHECK     Gross hematuria post ER visit   Patient is a pleasant 77-year-old gentleman with history of prostate cancer status post cryotherapy in the past.  He was seen emergency room recently because of sudden onset of gross hematuria.  Patient had a CT scan done that did not show anything obvious.  He was placed on antibiotics.  His hematuria has since resolved.  He denies any flank pain.  He denies any irritative voiding symptoms.    Current Outpatient Medications   Medication Sig Dispense Refill     albuterol (PROAIR HFA/PROVENTIL HFA/VENTOLIN HFA) 108 (90 Base) MCG/ACT inhaler INHALE 2 PUFFS BY MOUTH EVERY 4 HOURS FOR SHORTNESS OF BREATH, COUGH, OR WITH EXERCISE 6.7 g 1     amLODIPine (NORVASC) 10 MG tablet TAKE 1 TABLET BY MOUTH EVERY DAY 90 tablet 0     aspirin 81 MG tablet Take 1 tablet by mouth daily.       atorvastatin (LIPITOR) 20 MG tablet TAKE 1 TABLET BY MOUTH EVERY DAY 90 tablet 0     blood glucose (ONETOUCH VERIO IQ) test strip Use to test blood sugar 3 times daily or as directed. 100 strip 3     blood glucose monitoring (NO BRAND SPECIFIED) meter device kit Use to test blood sugar 1 times daily or as directed. 1 kit 0     ClonAZEPAM (KLONOPIN) 0.5 MG tablet Take 0.5 mg by mouth Take 1 tablet by mouth daily at bedtime       ferrous sulfate (FEROSUL) 325 (65 Fe) MG tablet Take 1 tablet (325 mg) by mouth daily (with breakfast) 90 tablet 4     furosemide (LASIX) 20 MG tablet TAKE 2 TABLETS (40 MG) IN THE MORNING AND 1 TABLET (20 MG) AT LEAST 6 HOURS LATER IN THE AFTERNOON. 180 tablet 2     latanoprost (XALATAN) 0.005 % ophthalmic solution Place 1 drop into both eyes At Bedtime 7.5 mL 3     lisinopril (ZESTRIL) 40 MG tablet TAKE 1 TABLET BY MOUTH EVERY DAY 90 tablet 2     metFORMIN (GLUCOPHAGE XR)  500 MG 24 hr tablet TAKE 1 TABLET BY MOUTH TWICE A DAY WITH MEALS 180 tablet 1     OneTouch Delica Lancets 33G MISC 1 each 3 times daily 100 each 4     OXcarbazepine (TRILEPTAL) 300 MG tablet TAKE 1 TABLET BY MOUTH TWICE A  tablet 1     pantoprazole (PROTONIX) 20 MG EC tablet TAKE 1 TABLET BY MOUTH EVERY DAY 90 tablet 1     predniSONE (DELTASONE) 5 MG tablet TAKE 2 TABLETS BY MOUTH EVERY DAY 60 tablet 1     pyridostigmine (MESTINON) 60 MG tablet TAKE 1 TABLET BY MOUTH THREE TIMES A  tablet 0     sodium bicarbonate 325 MG tablet TAKE 1 TABLET (325 MG) BY MOUTH 2 TIMES DAILY       sodium bicarbonate 650 MG tablet TAKE 1/2 TABLET BY MOUTH TWICE A DAY 30 tablet 1     timolol maleate (TIMOPTIC) 0.5 % ophthalmic solution Place 1 drop into both eyes 2 times daily 5 mL 11     tobramycin-dexamethasone (TOBRADEX) 0.3-0.1 % ophthalmic suspension Place 1 drop Into the left eye 3 times daily 5 mL 0     WIXELA INHUB 250-50 MCG/ACT inhaler INHALE 1 PUFF BY MOUTH TWICE A DAY 60 each 2     Allergies   Allergen Reactions     Azathioprine Shortness Of Breath and Other (See Comments)     Was hospitalized from it. Also had high fever, chills, and shortness of breath.     Ciprofloxacin Muscle Pain (Myalgia)     Muscle pain  Other reaction(s): Myalgia  Other reaction(s): Myalgia     Ropinirole      Leg pan  GI  Weakness; ? sycope  Other reaction(s): Leg Pain      Past Medical History:   Diagnosis Date     Arthritis          BMI 31.0-31.9,adult      Cancer (H) 01/10/2018    Prostate     COPD (chronic obstructive pulmonary disease) (H) 10/28/2020     Demand ischemia (H)      Diabetes (H) 01/10/2018     Diverticulosis     Colonoscopy 8/2008     Fatty liver     see US  5/2012     GERD (gastroesophageal reflux disease)      Glaucoma (increased eye pressure)      HTN (hypertension)      Hyperlipidemia LDL goal < 130     age, htn, fhx     Irritable bowel      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       Nonsenile cataract      Obstructive sleep apnea      Obstructive sleep apnea syndrome      ROSIE (obstructive sleep apnea) 01/2011    Using CPAP;      Prediabetes      Restless leg syndrome      Sleep apnea      Trigeminal neuralgia pain 01/04/2012     Past Surgical History:   Procedure Laterality Date     BIOPSY ARTERY TEMPORAL Bilateral 8/1/2017    Procedure: BIOPSY ARTERY TEMPORAL;  Bilateral temporal artery Biopsy;  Surgeon: Jj Tello MD;  Location: [facility] OR     CATARACT IOL, RT/LT Right      COLONOSCOPY       ESOPHAGOSCOPY, GASTROSCOPY, DUODENOSCOPY (EGD), COMBINED  1/15/2014    Procedure: COMBINED ESOPHAGOSCOPY, GASTROSCOPY, DUODENOSCOPY (EGD), BIOPSY SINGLE OR MULTIPLE;;  Surgeon: Winston Nixon MD;  Location: [facility] OR     LASER YAG CAPSULOTOMY Right 04/09/2018    right eye     PHACOEMULSIFICATION CLEAR CORNEA WITH STANDARD INTRAOCULAR LENS IMPLANT Right 2/20/2017    Procedure: PHACOEMULSIFICATION CLEAR CORNEA WITH STANDARD INTRAOCULAR LENS IMPLANT;  Surgeon: Mateo Gray MD;  Location: [facility]     PHACOEMULSIFICATION CLEAR CORNEA WITH STANDARD INTRAOCULAR LENS IMPLANT Left 1/10/2022    Procedure: LEFT PHACOEMULSIFICATION, CATARACT, WITH INTRAOCULAR LENS IMPLANT;  Surgeon: Mateo Gray MD;  Location: [facility] OR     RETINAL REATTACHMENT Right      SURGICAL HISTORY OF -   8/2009    Both Eyelids-.     TONSILLECTOMY  as child     Family History   Problem Relation Age of Onset     Respiratory Mother         copd     LUNG DISEASE Mother      Allergies Brother      Cancer Maternal Grandmother      Heart Disease Paternal Grandmother      Cerebrovascular Disease Father      Cancer Father      Other Cancer Father      Glaucoma Maternal Aunt      Macular Degeneration No family hx of      Social History     Socioeconomic History     Marital status: Single     Spouse name: None     Number of children: 0     Years of education: 12     Highest education level: None   Occupational  History     Occupation: Retired 3/2012     Comment: [employer] [place] [employer] ([employer])   Tobacco Use     Smoking status: Former Smoker     Packs/day: 2.00     Years: 15.00     Pack years: 30.00     Types: Cigarettes     Start date: 1968     Quit date: 1983     Years since quittin.7     Smokeless tobacco: Former User     Quit date: 1970     Tobacco comment: smoke free household.   Vaping Use     Vaping Use: Never used   Substance and Sexual Activity     Alcohol use: Not Currently     Comment: Quit in      Drug use: No     Sexual activity: Not Currently     Partners: Female     Comment: 2010  No girlfriend at this time.   Other Topics Concern     Parent/sibling w/ CABG, MI or angioplasty before 65F 55M? No       REVIEW OF SYSTEMS  =================  C: NEGATIVE for fever, chills, change in weight  I: NEGATIVE for worrisome rashes, moles or lesions  E/M: NEGATIVE for ear, mouth and throat problems  R: NEGATIVE for significant cough or SHORTNESS OF BREATH  CV:  NEGATIVE for chest pain, palpitations or peripheral edema  GI: NEGATIVE for nausea, abdominal pain, heartburn, or change in bowel habits  NEURO: NEGATIVE numbness/weakness  : see HPI  PSYCH: NEGATIVE depression/anxiety  LYmph: no new enlarged lymph nodes  Ortho: no new trauma/movements    Physical Exam:  /63 (BP Location: Right arm, Patient Position: Chair, Cuff Size: Adult Large)   Pulse 66   Temp 97.3  F (36.3  C)   SpO2 97%    Patient is pleasant, in no acute distress, good general condition.  Lung: no evidence of respiratory distress    Abdomen: Soft, nondistended, non tender. No masses. No rebound or guarding.   Exam: No CVA tenderness.  Penis no discharge.  Testes no masses.  No scrotal skin lesion.  Skin: Warm and dry.  No redness.  Psych: normal mood and affect  Neuro: alert and oriented  Musculaskeletal: moving all extremities    S: Rakan Nolasco is a 77 year old male returns for hematuria.    Patient is draped and  prepped.  Flexible cystoscopy placed under direct vision.      The anterior urethra is normal   The prostatic urethra is normal.     The length is 1cm,  the coaptation is 1 cm.     In the bladder there is erythematous typical of hemorrhagic cystitis    Impression    1.  Circumferential thickening of the bladder with some stranding of the adjacent fat and changes of cystitis may be present, urinalysis would be of benefit.     1.  No stones or obstructive changes seen in the urinary tract.     2.  Cholelithiasis with no evidence for cholecystitis.    ER visit reviewed.      Assessment/Plan:  (R31.0) Gross hematuria  (primary encounter diagnosis)  Comment:    Plan: cysto done today             (N30.91) Hemorrhagic cystitis  Comment:    Plan: cont with abx.

## 2022-10-07 DIAGNOSIS — G70.00 MYASTHENIA GRAVIS, ACHR ANTIBODY POSITIVE (H): ICD-10-CM

## 2022-10-07 RX ORDER — PREDNISONE 5 MG/1
TABLET ORAL
Qty: 60 TABLET | Refills: 1 | Status: SHIPPED | OUTPATIENT
Start: 2022-10-07 | End: 2022-11-21

## 2022-10-11 ENCOUNTER — OFFICE VISIT (OUTPATIENT)
Dept: FAMILY MEDICINE | Facility: CLINIC | Age: 77
End: 2022-10-11
Payer: COMMERCIAL

## 2022-10-11 VITALS
HEART RATE: 62 BPM | BODY MASS INDEX: 33.74 KG/M2 | OXYGEN SATURATION: 98 % | SYSTOLIC BLOOD PRESSURE: 120 MMHG | TEMPERATURE: 97 F | DIASTOLIC BLOOD PRESSURE: 70 MMHG | WEIGHT: 228.5 LBS

## 2022-10-11 DIAGNOSIS — N39.0 URINARY TRACT INFECTION WITHOUT HEMATURIA, SITE UNSPECIFIED: Primary | ICD-10-CM

## 2022-10-11 DIAGNOSIS — R50.9 FEVER, UNSPECIFIED FEVER CAUSE: ICD-10-CM

## 2022-10-11 DIAGNOSIS — E11.65 TYPE 2 DIABETES MELLITUS WITH HYPERGLYCEMIA, WITHOUT LONG-TERM CURRENT USE OF INSULIN (H): ICD-10-CM

## 2022-10-11 DIAGNOSIS — Z23 ENCOUNTER FOR IMMUNIZATION: ICD-10-CM

## 2022-10-11 DIAGNOSIS — Z87.440 PERSONAL HISTORY OF URINARY TRACT INFECTION: ICD-10-CM

## 2022-10-11 LAB
ALBUMIN SERPL-MCNC: 3.9 G/DL (ref 3.4–5)
ALBUMIN UR-MCNC: NEGATIVE MG/DL
ALP SERPL-CCNC: 48 U/L (ref 40–150)
ALT SERPL W P-5'-P-CCNC: 27 U/L (ref 0–70)
ANION GAP SERPL CALCULATED.3IONS-SCNC: 2 MMOL/L (ref 3–14)
APPEARANCE UR: CLEAR
AST SERPL W P-5'-P-CCNC: 15 U/L (ref 0–45)
BASOPHILS # BLD AUTO: 0 10E3/UL (ref 0–0.2)
BASOPHILS NFR BLD AUTO: 0 %
BILIRUB SERPL-MCNC: 0.3 MG/DL (ref 0.2–1.3)
BILIRUB UR QL STRIP: NEGATIVE
BUN SERPL-MCNC: 19 MG/DL (ref 7–30)
CALCIUM SERPL-MCNC: 9.6 MG/DL (ref 8.5–10.1)
CHLORIDE BLD-SCNC: 99 MMOL/L (ref 94–109)
CO2 SERPL-SCNC: 33 MMOL/L (ref 20–32)
COLOR UR AUTO: YELLOW
CREAT SERPL-MCNC: 0.98 MG/DL (ref 0.66–1.25)
EOSINOPHIL # BLD AUTO: 0 10E3/UL (ref 0–0.7)
EOSINOPHIL NFR BLD AUTO: 0 %
ERYTHROCYTE [DISTWIDTH] IN BLOOD BY AUTOMATED COUNT: 18 % (ref 10–15)
GFR SERPL CREATININE-BSD FRML MDRD: 80 ML/MIN/1.73M2
GLUCOSE BLD-MCNC: 126 MG/DL (ref 70–99)
GLUCOSE UR STRIP-MCNC: NEGATIVE MG/DL
HCT VFR BLD AUTO: 39.5 % (ref 40–53)
HGB BLD-MCNC: 12.6 G/DL (ref 13.3–17.7)
HGB UR QL STRIP: ABNORMAL
KETONES UR STRIP-MCNC: NEGATIVE MG/DL
LEUKOCYTE ESTERASE UR QL STRIP: NEGATIVE
LYMPHOCYTES # BLD AUTO: 1.1 10E3/UL (ref 0.8–5.3)
LYMPHOCYTES NFR BLD AUTO: 10 %
MCH RBC QN AUTO: 26.6 PG (ref 26.5–33)
MCHC RBC AUTO-ENTMCNC: 31.9 G/DL (ref 31.5–36.5)
MCV RBC AUTO: 84 FL (ref 78–100)
MONOCYTES # BLD AUTO: 0.5 10E3/UL (ref 0–1.3)
MONOCYTES NFR BLD AUTO: 5 %
NEUTROPHILS # BLD AUTO: 8.6 10E3/UL (ref 1.6–8.3)
NEUTROPHILS NFR BLD AUTO: 85 %
NITRATE UR QL: NEGATIVE
PH UR STRIP: 7 [PH] (ref 5–7)
PLATELET # BLD AUTO: 419 10E3/UL (ref 150–450)
POTASSIUM BLD-SCNC: 4.1 MMOL/L (ref 3.4–5.3)
PROT SERPL-MCNC: 7 G/DL (ref 6.8–8.8)
RBC # BLD AUTO: 4.73 10E6/UL (ref 4.4–5.9)
RBC #/AREA URNS AUTO: NORMAL /HPF
SODIUM SERPL-SCNC: 134 MMOL/L (ref 133–144)
SP GR UR STRIP: 1.01 (ref 1–1.03)
UROBILINOGEN UR STRIP-ACNC: 0.2 E.U./DL
WBC # BLD AUTO: 10.1 10E3/UL (ref 4–11)
WBC #/AREA URNS AUTO: NORMAL /HPF

## 2022-10-11 PROCEDURE — 81001 URINALYSIS AUTO W/SCOPE: CPT | Performed by: FAMILY MEDICINE

## 2022-10-11 PROCEDURE — 36415 COLL VENOUS BLD VENIPUNCTURE: CPT | Performed by: FAMILY MEDICINE

## 2022-10-11 PROCEDURE — 80053 COMPREHEN METABOLIC PANEL: CPT | Performed by: FAMILY MEDICINE

## 2022-10-11 PROCEDURE — 85025 COMPLETE CBC W/AUTO DIFF WBC: CPT | Performed by: FAMILY MEDICINE

## 2022-10-11 PROCEDURE — 90662 IIV NO PRSV INCREASED AG IM: CPT | Performed by: FAMILY MEDICINE

## 2022-10-11 PROCEDURE — G0008 ADMIN INFLUENZA VIRUS VAC: HCPCS | Performed by: FAMILY MEDICINE

## 2022-10-11 PROCEDURE — 99213 OFFICE O/P EST LOW 20 MIN: CPT | Mod: 25 | Performed by: FAMILY MEDICINE

## 2022-10-11 ASSESSMENT — PAIN SCALES - GENERAL: PAINLEVEL: NO PAIN (0)

## 2022-10-11 NOTE — PROGRESS NOTES
Subjective   Rakan is a 77 year old  presenting for the following health issues:  Hospital F/U      [facility]       Hospital Follow-up Visit:    Hospital/Nursing Home/IP Rehab Facility: [facility]  Date of Admission: 09/20 and 09/24/2022  Date of Discharge: 09/21 and 09/26/2022  Reason(s) for Admission: High fever    Was your hospitalization related to COVID-19? No   Problems taking medications regularly:  None  Medication changes since discharge: None  Problems adhering to non-medication therapy:  None    Summary of hospitalization:  [facility] discharge summary reviewed  Diagnostic Tests/Treatments reviewed.  Follow up needed: none  Other Healthcare Providers Involved in Patient s Care:         None  Update since discharge: improved.          Post Medication Reconciliation Status:        Plan of care communicated with patient                Review of Systems   Reviewed summaries on care everywhere     Staying hydrate    Urinating fine now    No blood    No pain/ burning    No fever/ chills     Discharged Sept 26    Drove from [facility] [place] since Sept 30          Objective    /70 (BP Location: Right arm, Patient Position: Chair, Cuff Size: Adult Regular)   Pulse 62   Temp 97  F (36.1  C) (Temporal)   Wt 103.6 kg (228 lb 8 oz)   SpO2 98%   BMI 33.74 kg/m    Body mass index is 33.74 kg/m .  Physical Exam  Constitutional:       Appearance: He is well-developed and well-nourished.   HENT:      Head: Normocephalic and atraumatic.   Eyes:      Conjunctiva/sclera: Conjunctivae normal.   Neck:      Vascular: No carotid bruit.   Cardiovascular:      Rate and Rhythm: Normal rate and regular rhythm.      Pulses: Intact distal pulses.      Heart sounds: Normal heart sounds.   Pulmonary:      Effort: Pulmonary effort is normal. No respiratory distress.      Breath sounds: Normal breath sounds.   Musculoskeletal:         General: No edema.   Neurological:      Mental  Status: He is alert and oriented to person, place, and time.      Cranial Nerves: No cranial nerve deficit.   Psychiatric:         Mood and Affect: Mood and affect normal.         Speech: Speech normal.         Behavior: Behavior normal.      abd soft  Only trace edema  Radials symmetric         ASSESSMENT / PLAN:  (N39.0) Urinary tract infection without hematuria, site unspecified  (primary encounter diagnosis)  Comment: now resolved  Plan: check ua.  Stay well hydrated    (E11.65) Type 2 diabetes mellitus with hyperglycemia, without long-term current use of insulin (H)  Comment: metformin is main med for this  Plan: continue; sugars have been okay    (Z87.440) Personal history of urinary tract infection  Comment: do follow up labs.  Stay well hydrated.  Not on antibiotics.  Plan: UA with Microscopic reflex to Culture - lab         collect, CBC with Platelets & Differential,         Comprehensive metabolic panel, Urine         Microscopic             (Z23) Encounter for immunization  Comment: needs   Plan: INFLUENZA, QUAD, HD, PF, 65+ (FLUZONE HD),         SCREENING QUESTIONS FOR ADULT IMMUNIZATIONS        Given     (R50.9) Fever, unspecified fever cause  Comment: resolved   Plan: follow up prn symptoms       I reviewed the patient's medications, allergies, medical history, family history, and social history.    Roland Perez MD

## 2022-10-11 NOTE — PATIENT INSTRUCTIONS
Stay well hydrated    Continue current meds    We will send you lab results    Follow up as needed based on symptoms

## 2022-10-13 ENCOUNTER — OFFICE VISIT (OUTPATIENT)
Dept: NEUROLOGY | Facility: CLINIC | Age: 77
End: 2022-10-13
Payer: COMMERCIAL

## 2022-10-13 VITALS
HEART RATE: 56 BPM | BODY MASS INDEX: 33.67 KG/M2 | RESPIRATION RATE: 16 BRPM | WEIGHT: 228 LBS | OXYGEN SATURATION: 97 % | DIASTOLIC BLOOD PRESSURE: 76 MMHG | SYSTOLIC BLOOD PRESSURE: 121 MMHG

## 2022-10-13 DIAGNOSIS — G50.0 TRIGEMINAL NEURALGIA: ICD-10-CM

## 2022-10-13 DIAGNOSIS — G70.00 MYASTHENIA GRAVIS WITHOUT EXACERBATION (H): ICD-10-CM

## 2022-10-13 DIAGNOSIS — M81.0 AGE-RELATED OSTEOPOROSIS WITHOUT CURRENT PATHOLOGICAL FRACTURE: Primary | ICD-10-CM

## 2022-10-13 PROCEDURE — 99214 OFFICE O/P EST MOD 30 MIN: CPT | Performed by: PSYCHIATRY & NEUROLOGY

## 2022-10-13 ASSESSMENT — PAIN SCALES - GENERAL: PAINLEVEL: NO PAIN (0)

## 2022-10-13 NOTE — LETTER
10/13/2022       RE: Rakan Nolasco  [address] [facility] [address]         Dear Colleague,    Thank you for referring your patient, Rakan Nolasco, to the [facility] [place] at [facility]. Please see a copy of my visit note below.    Service Date: 10/13/2022    Telly Lucio MD  [facility]  [address] [place] [address]    RE:     Rakan Nolasco  MRN:  [id number]  :   1945    Dear Dr. Lucio:    I had the pleasure to see Mr. Nolasco in follow-up at the [facility] for his mild generalized myasthenia gravis, acetylcholine receptor antibody positive, with predominantly ocular symptoms with also dysphagia.  He is on prednisone 10 mg daily and Mestinon 1 tablet of 60 mg 3 times a day.  He tells me that he still has some weakness and fatigue overall during the day in arms and legs, which is diffuse.  He sometimes will have to use his arms to get up from the chair, but not consistently, and the arms themselves do not fatigue when he brushes his teeth or cuevas his hair.  The weakness got a  bit worse because he had a recent admission to the hospital around 2022 for hematuria and he developed sepsis with a fever up to 103.  It was not clear if the hematuria was from UTI or prostate cancer, but the sepsis was severe enough that it required prolonged antibiotic course and a long hospitalization.  He is recovering well from this now.    He reports no ptosis, diplopia, dysarthria, or difficulty chewing.  He does have stable dysphagia.  It may be actually a little better than earlier in the year.  Dysphagia is predominantly occurring with liquids and to a lesser extent with solids.  Coughing/choking episodes are rather rare, occurring once a week or less.  He had a video swallow study at my recommendation in 2022 that showed mild penetration with straw sips of thin  barium, but no aspiration.  He has moderate shortness of breath on exertion.  He also has a diagnosis of COPD for which he is following with Pulmonology.      His MG-ADL score was 3, with 1 point for dysphagia, 1 point for dyspnea on exertion and 1 point for leg fatigue.      He is having facial pain sometimes, which was previously labeled as trigeminal neuralgia. I am treating this with Trileptal 300 mg twice a day.  It is working fairly well for him.  He has an occasional episode of pain when he shaves himself in the morning, but that is not very frequent.    Mr. Nolasco is trying to lose weight to control his diabetes.  He has lost several pounds in the last year.  For his hypertension, he takes lisinopril, amlodipine and a diuretic, furosemide.  He complains of a tingling sensation in his whole body that occurs when he moves from the sitting to standing position and is associated with lightheadedness.  It is brief.  It does not occur in the supine, sitting position, or when he is walking, and it usually lasts a few seconds to minutes.  He did have the same feeling when he first started the antihypertensive medications a number of years ago.    Medications were reviewed and are as per Epic record.    /76   Pulse 56   Resp 16   Wt 103.4 kg (228 lb)   SpO2 97%   BMI 33.67 kg/m      On a focused exam, Mr. Nolasco has 5/5 strength in his deltoid, biceps, triceps, hand .  FDI is right 5, left 4.  His hip flexion is bilaterally 4.  Knee extension, knee flexion, foot dorsiflexion are 5.  He has no ptosis at rest, but after 1 minute of sustained upward gaze, he developed mild right eyelid ptosis of about 2 mm from the baseline.  It does not cover the pupil.  There is no weakness of orbicularis oculi or oris.  There is no obvious dysphonia or dysarthria.  His ductions and versions of the eyes are normal, but he reports diplopia after about 15 seconds of sustained upward gaze.  Neck flexion and extension  strength are full.    In summary, Mr. Nolasco has modest control of his myasthenia.  His MG-ADL is stable at 3 .  He is overall satisfied with his course and I do not want to increase his immunosuppression because this can be associated with severe side effects and increased risk of infection.  He will continue prednisone 10 mg daily.  He will monitor his sugars closely.  He had CBC and CMP ordered by Dr. Perez a couple of days ago.  White cell count was normal, hematocrit at the lower limits of normal, and platelets fine.  Comprehensive metabolic panel showed sugar of 126 mg/dL, anion gap low at 2, and slightly elevated carbon dioxide of 33, otherwise unremarkable.      He did have a bone density scan a year ago that was normal.  We will repeat it as he still needs to be on steroids. He can increase his pyridostigmine to 120 mg in the morning and noon as he feels necessary, since he reported somewhat increased fatigue and weakness lately.     The feeling of tingling and lightheadedness he gets when he stands up is likely orthostatic hypotension.  He is on a lot of BP meds and he may be lagging behind on fluids.  I asked him to drink 300-500 mL of water briskly before he gets up and check if this will help.      He will continue the Trileptal for his trigeminal neuralgia, which seems to be working well.      Follow up in 6 months or sooner if needed. Billing MDM level 4 (moderate) based on 1) Problems assessed: Two stable chronic illnesses (myasthenia gravis, trigeminal neuralgia) and 2) Risk: prescription drug management, see above.       D: 10/13/2022   T: 10/13/2022   MT: al    Name:     JR NOLASCO  MRN:      [id number]        Account:      [id number]   :      1945           Service Date: 10/13/2022       Document: [id number]          Again, thank you for allowing me to participate in the care of your patient.      Sincerely,    Arpan Harrison MD

## 2022-10-13 NOTE — PATIENT INSTRUCTIONS
Continue current dose of prednisone.  Mestinon (pyridostigmine): Consider increasing it to 2 pills in the morning and 2 at noon if the weakness/fatigue feeling is getting any worse    Facial pain-trigeminal neuralgia: continue the Trileptal    The tingling and lightheadedness you feel when you get up is likely due to low blood pressure or lagging behind in fluids. Please discuss this with your primary care doctor and drink half a plastic bottle of water before getting up- this may help    6 month follow up    I will order a repeat bone density scan to look for steroid induced osteoporosis

## 2022-10-13 NOTE — PROGRESS NOTES
Service Date: 10/13/2022    Telly Lucio MD  [facility]  [address] [place] [address]    RE:     Rakan Nolasco  MRN:  [id number]  :   1945    Dear Dr. Lucio:    I had the pleasure to see Mr. Nolasco in follow-up at the [facility] for his mild generalized myasthenia gravis, acetylcholine receptor antibody positive, with predominantly ocular symptoms with also dysphagia.  He is on prednisone 10 mg daily and Mestinon 1 tablet of 60 mg 3 times a day.  He tells me that he still has some weakness and fatigue overall during the day in arms and legs, which is diffuse.  He sometimes will have to use his arms to get up from the chair, but not consistently, and the arms themselves do not fatigue when he brushes his teeth or cuevas his hair.  The weakness got a  bit worse because he had a recent admission to the hospital around 2022 for hematuria and he developed sepsis with a fever up to 103.  It was not clear if the hematuria was from UTI or prostate cancer, but the sepsis was severe enough that it required prolonged antibiotic course and a long hospitalization.  He is recovering well from this now.    He reports no ptosis, diplopia, dysarthria, or difficulty chewing.  He does have stable dysphagia.  It may be actually a little better than earlier in the year.  Dysphagia is predominantly occurring with liquids and to a lesser extent with solids.  Coughing/choking episodes are rather rare, occurring once a week or less.  He had a video swallow study at my recommendation in 2022 that showed mild penetration with straw sips of thin barium, but no aspiration.  He has moderate shortness of breath on exertion.  He also has a diagnosis of COPD for which he is following with Pulmonology.      His MG-ADL score was 3, with 1 point for dysphagia, 1 point for dyspnea on exertion and 1 point for leg fatigue.      He is having facial pain sometimes, which was  previously labeled as trigeminal neuralgia. I am treating this with Trileptal 300 mg twice a day.  It is working fairly well for him.  He has an occasional episode of pain when he shaves himself in the morning, but that is not very frequent.    Mr. Nolasco is trying to lose weight to control his diabetes.  He has lost several pounds in the last year.  For his hypertension, he takes lisinopril, amlodipine and a diuretic, furosemide.  He complains of a tingling sensation in his whole body that occurs when he moves from the sitting to standing position and is associated with lightheadedness.  It is brief.  It does not occur in the supine, sitting position, or when he is walking, and it usually lasts a few seconds to minutes.  He did have the same feeling when he first started the antihypertensive medications a number of years ago.    Medications were reviewed and are as per [facility] record.    /76   Pulse 56   Resp 16   Wt 103.4 kg (228 lb)   SpO2 97%   BMI 33.67 kg/m      On a focused exam, Mr. Nolasco has 5/5 strength in his deltoid, biceps, triceps, hand .  FDI is right 5, left 4.  His hip flexion is bilaterally 4.  Knee extension, knee flexion, foot dorsiflexion are 5.  He has no ptosis at rest, but after 1 minute of sustained upward gaze, he developed mild right eyelid ptosis of about 2 mm from the baseline.  It does not cover the pupil.  There is no weakness of orbicularis oculi or oris.  There is no obvious dysphonia or dysarthria.  His ductions and versions of the eyes are normal, but he reports diplopia after about 15 seconds of sustained upward gaze.  Neck flexion and extension strength are full.    In summary, Mr. Nolasco has modest control of his myasthenia.  His MG-ADL is stable at 3 .  He is overall satisfied with his course and I do not want to increase his immunosuppression because this can be associated with severe side effects and increased risk of infection.  He will continue prednisone 10  mg daily.  He will monitor his sugars closely.  He had CBC and CMP ordered by Dr. Perez a couple of days ago.  White cell count was normal, hematocrit at the lower limits of normal, and platelets fine.  Comprehensive metabolic panel showed sugar of 126 mg/dL, anion gap low at 2, and slightly elevated carbon dioxide of 33, otherwise unremarkable.      He did have a bone density scan a year ago that was normal.  We will repeat it as he still needs to be on steroids. He can increase his pyridostigmine to 120 mg in the morning and noon as he feels necessary, since he reported somewhat increased fatigue and weakness lately.     The feeling of tingling and lightheadedness he gets when he stands up is likely orthostatic hypotension.  He is on a lot of BP meds and he may be lagging behind on fluids.  I asked him to drink 300-500 mL of water briskly before he gets up and check if this will help.      He will continue the Trileptal for his trigeminal neuralgia, which seems to be working well.      Follow up in 6 months or sooner if needed. Billing MDM level 4 (moderate) based on 1) Problems assessed: Two stable chronic illnesses (myasthenia gravis, trigeminal neuralgia) and 2) Risk: prescription drug management, see above.     Sincerely,          Arpan Harrison MD        D: 10/13/2022   T: 10/13/2022   MT: al    Name:     JR FRANCOIS  MRN:      [id number]        Account:      [id number]   :      1945           Service Date: 10/13/2022       Document: [id number]

## 2022-10-24 DIAGNOSIS — E11.65 TYPE 2 DIABETES MELLITUS WITH HYPERGLYCEMIA, WITHOUT LONG-TERM CURRENT USE OF INSULIN (H): ICD-10-CM

## 2022-10-25 DIAGNOSIS — J44.9 CHRONIC OBSTRUCTIVE PULMONARY DISEASE, UNSPECIFIED COPD TYPE (H): ICD-10-CM

## 2022-10-25 RX ORDER — BLOOD SUGAR DIAGNOSTIC
STRIP MISCELLANEOUS
Qty: 100 STRIP | Refills: 0 | Status: CANCELLED | OUTPATIENT
Start: 2022-10-25

## 2022-10-25 RX ORDER — BLOOD SUGAR DIAGNOSTIC
STRIP MISCELLANEOUS
Qty: 100 STRIP | Refills: 3 | Status: SHIPPED | OUTPATIENT
Start: 2022-10-25 | End: 2023-03-08

## 2022-10-25 NOTE — TELEPHONE ENCOUNTER
Spoke with patient. Patient stated that [employer] 250-50 MCG/ACT inhaler is no longer authorized and needs new script.     Esperanza Pederson RN  [facility]

## 2022-10-26 RX ORDER — FLUTICASONE PROPIONATE AND SALMETEROL 250; 50 UG/1; UG/1
1 POWDER RESPIRATORY (INHALATION) 2 TIMES DAILY
Qty: 60 EACH | Refills: 2 | Status: SHIPPED | OUTPATIENT
Start: 2022-10-26 | End: 2023-01-06

## 2022-11-01 ENCOUNTER — TRANSFERRED RECORDS (OUTPATIENT)
Dept: HEALTH INFORMATION MANAGEMENT | Facility: CLINIC | Age: 77
End: 2022-11-01

## 2022-11-03 DIAGNOSIS — G70.00 OCULAR MYASTHENIA (H): ICD-10-CM

## 2022-11-04 RX ORDER — PYRIDOSTIGMINE BROMIDE 60 MG/1
TABLET ORAL
Qty: 120 TABLET | Refills: 2 | Status: SHIPPED | OUTPATIENT
Start: 2022-11-04 | End: 2023-06-05

## 2022-11-08 ENCOUNTER — DOCUMENTATION ONLY (OUTPATIENT)
Dept: OPHTHALMOLOGY | Facility: CLINIC | Age: 77
End: 2022-11-08

## 2022-11-10 ENCOUNTER — DOCUMENTATION ONLY (OUTPATIENT)
Dept: OPHTHALMOLOGY | Facility: CLINIC | Age: 77
End: 2022-11-10

## 2022-11-15 ENCOUNTER — VIRTUAL VISIT (OUTPATIENT)
Dept: FAMILY MEDICINE | Facility: CLINIC | Age: 77
End: 2022-11-15
Payer: COMMERCIAL

## 2022-11-15 ENCOUNTER — TELEPHONE (OUTPATIENT)
Dept: FAMILY MEDICINE | Facility: CLINIC | Age: 77
End: 2022-11-15

## 2022-11-15 DIAGNOSIS — U07.1 INFECTION DUE TO 2019 NOVEL CORONAVIRUS: Primary | ICD-10-CM

## 2022-11-15 PROCEDURE — 99213 OFFICE O/P EST LOW 20 MIN: CPT | Mod: CS | Performed by: PHYSICIAN ASSISTANT

## 2022-11-15 NOTE — TELEPHONE ENCOUNTER
"Received call from patient. He states that he tested positive for COVID-19 today, 11/15/22 with a home test.    Headache, cough, runny nose, fevers. No red flag symptoms (patient aware of what to watch for).    Provided information below, including phone number to inquire about treatment options.    Instructions for Patients  Your COVID-19 test was positive. This means you have the virus. Please follow the \"How can I take care of myself\" and isolation guidelines in these instructions.    What treatments are available?  Over-the-counter medicines may help with your symptoms such as runny or stuffy nose, cough, chills, and fever. Talk to your care team about your options.     Some people are at high risk for severe illness (for example if you have a weak immune system, you're 65 or older, or you have certain medical problems). If your risk is high and your symptoms started in the last 5 to 7 days, we strongly recommend for you to get COVID treatment as soon as possible. Paxlovid, Molnupiravir and the monoclonal antibody treatments are proven safe and effective, make you feel better faster, and prevent hospitalization and death.       These guidelines are for isolating and quarantining before returning to work, school or .     For employers, schools and day cares: This is an official notice for this person s medical guidelines for returning in-person.     For health care sites: The CDC gives different isolation and quarantine guidelines for healthcare sites, please check with these sites before arriving.     How do I self-isolate?  You isolate when you have symptoms of COVID or a test shows you have COVID, even if you don t have symptoms.     If you DO have symptoms:  o Stay home and away from others  - For at least 5 days after your symptoms started, AND   - You are fever free for 24 hours (with no medicine that reduces fever), AND  - Your other symptoms are better.  o Wear a mask for 10 full days any time you " "are around others.    If you DON T have symptoms:  o Stay at home and away from others for at least 5 days after your positive test.  o Wear a mask for 10 full days any time you are around others.    You can schedule an appointment to discuss COVID treatment by requesting an appointment on [employer] by selecting \"schedule COVID-19 Treatment\" or by calling [employer] ([phone]).    What are the symptoms of COVID-19?  Symptoms can include fever, cough, shortness of breath, chills, headache, muscle pain sore throat, fatigue, runny or stuffy nose, and loss of taste and smell. Other less common symptoms include nausea, vomiting, or diarrhea (watery stools).    Know when to call 911. Emergency warning signs include:    Trouble breathing or shortness of breath    Pain or pressure in the chest that doesn't go away    Feeling confused like you haven't felt before, or not being able to wake up    Bluish-colored lips or face    How can I take care of myself?  1. Get lots of rest. Drink extra fluids (unless a doctor has told you not to).  2. Take Tylenol (acetaminophen) for fever or pain. If you have liver or kidney problems, ask your family doctor if it's okay to take Tylenol   Adults:   650 mg (two 325 mg pills or tablets) every 4 to 6 hours, or...   1,000 mg (two 500 mg pills or tablets) every 8 hours as needed.  Note: Don't take more than 3,000 mg in one day. Acetaminophen is found in many medicines (both prescribed and over the counter). Read all labels to be sure you don't take too much.  For children, check the Tylenol bottle for the right dose. The dose is based on the child's age or weight.  3. Take over the counter medicines for your symptoms as needed. Talk to your pharmacist.  4. If you have other health problems (like cancer, heart failure, an organ transplant, or severe kidney disease): Call your specialty clinic if you don't feel better in the next 2 days.    These guidelines are for isolating and " quarantining before returning to work, school or .     For employers, schools and day cares: This is an official notice for this person's medical guidelines for returning in-person.     For health care sites: The CDC gives different isolation and quarantine guidelines for healthcare sites, please check with these sites before arriving.     How do I self-isolate?  You isolate when you have symptoms of COVID or a test shows you have COVID, even if you don't have symptoms.     If you DO have symptoms:  o Stay home and away from others  - For at least 5 days after your symptoms started, AND   - You are fever free for 24 hours (with no medicine that reduces fever), AND  - Your other symptoms are better.  o Wear a mask for 10 full days any time you are around others.    If you DON'T have symptoms:  o Stay at home and away from others for at least 5 days after your positive test.  o Wear a mask for 10 full days any time you are around others.    How and when do I quarantine?  You quarantine when you may have been exposed to the virus and DON'T have symptoms.     Stay home and away from others.     You must quarantine for 5 days after your last contact with a person who has COVID.  o Note: If you are fully vaccinated, you don't need to quarantine. You should still follow the steps below.     Wear a mask for 10 full days any time you're around others.    Get tested at least 5 days after you were exposed, even if you don't have symptoms.     If you start to have symptoms, isolate right away and get tested.    Where can I get more information?    [facility] COVID-19 Resource Hub: [email or web address]     CDC Quarantine & Isolation: https://www.cdc.gov/coronavirus/2019-ncov/your-health/quarantine-isolation.html     CDC - What to Do If You're Sick: https://www.cdc.gov/coronavirus/2019-ncov/if-you-are-sick/index.html    [facility] clinical trials (COVID-19 research studies):  clinicalaffairs.[facility]/n-clinical-trials    [place] Department of Health COVID-19 Public Hotline: 1-837.754.6696    TANO Walsh RN  [facility]

## 2022-11-15 NOTE — PATIENT INSTRUCTIONS
Hold multivitamin and hold lipitor while taking your paxlovid.     Cut your amlodipine in half while taking paxlovid.     May restart normal regimens day after paxlovid completed.       COVID-19 Outpatient Treatments  Your care team can help you find the best treatments for COVID-19. Talk to a health care provider or refer to the FDA medicine fact sheets below.  Important: You CAN'T have molnupiravir or Paxlovid if you are starting the medicine more than 5 days after your symptoms have started.  Paxlovid: https://www.fda.gov/media/506688/download  Molnupiravir: https://www.fda.gov/media/173412/download  Monoclonal antibodies: https://combatcovid.hhs.gov/what-are-monoclonal-antibodies  Paxlovid (nimatrelvir and ritonavir)  How it works  Two medicines (nirmatrelvir and ritonavir) are taken together. They stop the virus from growing. Less amount of virus is easier for your body to fight.  Benefits  Lowers risk of a hospital stay or death from COVID-19.  How to take  Medicine comes in a daily container with both medicine tablets. Take by mouth twice daily (once in the morning, once at night) for 5 days.  The number of tablets to take varies by patient.  Don't chew or break capsules. Swallow whole.  When to take  Take as soon as possible after positive COVID-19 test result, and within 5 days of your first symptoms.  Who can take it  Patients must be 12 years or older, weigh at least 88 pounds, and have tested positive for COVID-19. This is the preferred treatment for pregnant patients.  Possible side effects  Can cause altered sense of taste, diarrhea (loose, watery stools), high blood pressure, muscle aches.  Medicine conflicts  Some medicines may conflict with Paxlovid and may cause serious side effects.  Tell your care team about all the medicines you take, including prescription and over-the-counter medicines, vitamins and herbal supplements.  Your provider will review your medicines to make sure that you can  safely take Paxlovid.  Cautions  Paxlovid is not advised for patients with severe kidney or liver disease. If you have kidney or liver problems, the dose may need to be changed.  If you are pregnant or breastfeeding, talk to your care team about your options.  If you are sexually active, use trusted birth control while taking Paxlovid.  Molnupiravir  How it works  Stops the virus from growing. Less amount of virus is easier for your body to fight.  Benefits  Lowers risk of a hospital stay or death from COVID-19.  How to take  Take 4 capsules by mouth every 12 hours (4 in the morning and 4 at night) for 5 days. Don't chew or break capsules. Swallow whole.  When to take  Take as soon as possible after positive COVID-19 test result, and within 5 days of your first symptoms.  Who can take it  Patients must be 18 years or older and have tested positive for COVID-19.  Possible side effects  Diarrhea (loose, watery stools), nausea (feeling sick to your stomach), dizziness, headaches.  Medicine conflicts  Right now, there are no known conflicts with other drugs. But tell your care team all medicines you take.  Cautions  This is not advised for patients who are pregnant.  Patients who could become pregnant should use trusted birth control until 4 days after their last dose.  Sexually active people of any gender should use trusted birth control for 3 months after their last dose.  Monoclonal antibodies  How it works  Monoclonal antibodies can detect pieces of the COVID virus and stop it from infecting your cells.  Benefits  Lowers risk of a hospital stay or death from COVID-19. Monoclonal antibodies are known to work well against the omicron variant.  How it is given to you  You will receive the treatment either by an infusion through your vein (IV) or shots.  When to take  Get as soon as possible after you test positive for COVID-19, and within 7 days of your first symptoms.  Who can take it  Patients must be 12 years or  older, weigh at least 88 pounds and have tested positive for COVID-19.  Possible side effects  Fever, chills, diarrhea (loose, watery stools), dizziness, itchiness and rash.  More serious side effects include: fever, difficulty breathing, low oxygen level in your blood, chills, tiredness, fast or slow heart rate, chest discomfort or pain, weakness, confusion, nausea, headache, shortness of breath, low or high blood pressure, wheezing, swelling of your lips, face, or throat, rash including hives, itching, muscle aches, dizziness, feeling faint and sweating.  If you receive an IV, you may have brief pain, bleeding and bruising of the skin, soreness, swelling and possible infection at the place where you get the IV needle.  Medicine conflicts  Please tell you care team other medicines you take so they can assess if there are any conflicts.  Cautions  Your doctor will talk with you about risks and benefits of this treatment and will help choose the best option for you.  For informational purposes only. Not to replace the advice of your health care provider.  Copyright   2022 [facility]. All rights reserved. Clinically reviewed by [employer] [id number] - 08/22.    Instructions for Patients      What are the symptoms of COVID-19?  Symptoms can include fever, cough, shortness of breath, chills, headache, muscle pain sore throat, fatigue, runny or stuffy nose, and loss of taste and smell. Other less common symptoms include nausea, vomiting, or diarrhea (watery stools).    Know when to call 911. Emergency warning signs include:  Trouble breathing or shortness of breath  Pain or pressure in the chest that doesn't go away  Feeling confused like you haven't felt before, or not being able to wake up  Bluish-colored lips or face    How can I take care of myself?  Get lots of rest. Drink extra fluids (unless a doctor has told you not to).  Take Tylenol (acetaminophen) for fever or pain. If you have liver  or kidney problems, ask your family doctor if it's okay to take Tylenol   Adults:   650 mg (two 325 mg pills or tablets) every 4 to 6 hours, or...   1,000 mg (two 500 mg pills or tablets) every 8 hours as needed.  Note: Don't take more than 3,000 mg in one day. Acetaminophen is found in many medicines (both prescribed and over the counter). Read all labels to be sure you don't take too much.  For children, check the Tylenol bottle for the right dose. The dose is based on the child's age or weight.  Take over the counter medicines for your symptoms as needed. Talk to your pharmacist.  If you have other health problems (like cancer, heart failure, an organ transplant, or severe kidney disease): Call your specialty clinic if you don't feel better in the next 2 days.    Where can I get more information?  [facility] COVID-19 Resource Hub: www.[facility].[email or web address]/covid19/   CDC Quarantine & Isolation: https://www.cdc.gov/coronavirus/2019-ncov/your-health/quarantine-isolation.html   CDC - What to Do If You're Sick: https://www.cdc.gov/coronavirus/2019-ncov/if-you-are-sick/index.html  [facility] clinical trials (COVID-19 research studies): clinicalaffairs.[facility]/umn-clinical-trials  [place] Department of Health COVID-19 Public Hotline: 1-702.795.2052

## 2022-11-15 NOTE — PROGRESS NOTES
Rakan is a 77 year old who is being evaluated via a billable video visit.      How would you like to obtain your AVS? MyChart  If the video visit is dropped, the invitation should be resent by: Text to cell phone: [phone]  Will anyone else be joining your video visit? No        Assessment & Plan     Infection due to 2019 novel coronavirus  Present. Risk factors. Symptoms within 5 days. paxlovid recommended. Patient agrees. Hold statin and multivitamin. Half norvasc. All while taking. Follow up if worsening symptoms or if no improvement in 1 week with pcp. Go to er for chest pains or severe shortness of breath.   - nirmatrelvir and ritonavir (PAXLOVID) therapy pack; Take 3 tablets by mouth 2 times daily for 5 days (Take 2 Nirmatrelvir tablets and 1 Ritonavir tablet twice daily for 5 days)  -Medication use and side effects discussed with the patient. Patient is in complete understanding and agreement with plan.       No follow-ups on file.   Follow-up Visit   Expected date:  Dec 15, 2022 (Approximate)      Follow Up Appointment Details:     Follow-up with whom?: PCP    Follow-Up for what?: Chronic Disease f/u    Chronic Disease f/u: Diabetes    How?: In Person or Virtual                    Michael Duncan PA-C  [facility]   Rakan is a 77 year old, presenting for the following health issues:  covid treatment      HPI       COVID-19 Symptom Review  How many days ago did these symptoms start? 5 days    Are any of the following symptoms significant for you?    New or worsening difficulty breathing? No    Worsening cough? No    Fever or chills? Yes, the highest temperature was 98.2 normally lower    Headache: YES    Sore throat: YES    Chest pain: YES- mild tightness    Diarrhea: No    Body aches? YES    What treatments has patient tried? dayquil and nyquil   Does patient live in a nursing home, group home, or shelter? No  Does patient have a way to get food/medications  during quarantined? Yes, I have a friend or family member who can help me.        Review of Systems   Constitutional, HEENT, cardiovascular, pulmonary, GI, , musculoskeletal, neuro, skin, endocrine and psych systems are negative, except as otherwise noted.      Objective           Vitals:  No vitals were obtained today due to virtual visit.    Physical Exam   GENERAL: alert and no distress  EYES: Eyes grossly normal to inspection.  No discharge or erythema, or obvious scleral/conjunctival abnormalities.  RESP: No audible wheeze, cough, or visible cyanosis.  No visible retractions or increased work of breathing.    SKIN: Visible skin clear. No significant rash, abnormal pigmentation or lesions.  NEURO: Cranial nerves grossly intact.  Mentation and speech appropriate for age.  PSYCH: Mentation appears normal, affect normal/bright, judgement and insight intact, normal speech and appearance well-groomed.            Video-Visit Details    Video Start Time: 11:20    Type of service:  Video Visit    Video End Time:11:39    Originating Location (pt. Location): Home    Distant Location (provider location):  On-site    Platform used for Video Visit: Yuriy

## 2022-11-16 NOTE — MR AVS SNAPSHOT
After Visit Summary   3/14/2018    Rakan Nolasco    MRN: [id number]           Patient Information     Date Of Birth          1945        Visit Information        Provider Department      3/14/2018 9:00 AM Telly Lucio MD [facility]        Today's Diagnoses     Preop general physical exam    -  1    Malignant neoplasm of prostate (H)        Myasthenia gravis, AChR antibody positive (H)        Hypertension goal BP (blood pressure) < 140/90          Care Instructions      Before Your Surgery      Call your surgeon if there is any change in your health. This includes signs of a cold or flu (such as a sore throat, runny nose, cough, rash or fever).    Do not smoke, drink alcohol or take over the counter medicine (unless your surgeon or primary care doctor tells you to) for the 24 hours before and after surgery.    If you take prescribed drugs: Follow your doctor s orders about which medicines to take and which to stop until after surgery.    Eating and drinking prior to surgery: follow the instructions from your surgeon    Take a shower or bath the night before surgery. Use the soap your surgeon gave you to gently clean your skin. If you do not have soap from your surgeon, use your regular soap. Do not shave or scrub the surgery site.  Wear clean pajamas and have clean sheets on your bed.       --Patient is to take all scheduled medications on the day of surgery EXCEPT for modifications listed below.    Diabetes Medication Use    -----Hold usual oral and non-insulin diabetic meds (e.g. Metformin, Actos, Glipizide) while NPO.       Anticoagulant or Antiplatelet Medication Use  ASPIRIN: Discontinue ASA 7-10 days prior to procedure to reduce bleeding risk.  It should be resumed post-operatively.        ACE Inhibitor or Angiotensin Receptor Blocker (ARB) Use (Lisinopril)  Ace inhibitor or Angiotensin Receptor Blocker (ARB) and should HOLD this medication for the 24 hours prior to  Renee Granado was seen and treated in our emergency department on 11/16/2022  Diagnosis:     Rhea Doctor    She may return on this date: 11/21/2022         If you have any questions or concerns, please don't hesitate to call        Jonah Kinsey PA-C    ______________________________           _______________          _______________  Hospital Representative                              Date                                Time surgery.      APPROVAL GIVEN to proceed with proposed procedure, without further diagnostic evaluation       Signed Electronically by: Telly Lucio MD    [facility]    If you have any questions regarding to your visit please contact your care team:       Team Purple:   Clinic Hours Telephone Number   Dr. Capri Porter   7am-7pm  Monday - Thursday   7am-5pm  Fridays  [phone]  (Appointment scheduling available 24/7)    Questions about your Visit?   Team Line:  [phone]   Urgent Care - [place] and [place] - 11am-9pm Monday-Friday Saturday-Sunday- 9am-5pm   [place] - 5pm-9pm Monday-Friday Saturday-Sunday- 9am-5pm  [phone] - [facility]  [phone] - [place]       What options do I have for visits at the clinic other than the traditional office visit?  To expand how we care for you, many of our providers are utilizing electronic visits (e-visits) and telephone visits, when medically appropriate, for interactions with their patients rather than a visit in the clinic.   We also offer nurse visits for many medical concerns. Just like any other service, we will bill your insurance company for this type of visit based on time spent on the phone with your provider. Not all insurance companies cover these visits. Please check with your medical insurance if this type of visit is covered. You will be responsible for any charges that are not paid by your insurance.      E-visits via Humanoid:  generally incur a $35.00 fee.  Telephone visits:  Time spent on the phone: *charged based on time that is spent on the phone in increments of 10 minutes. Estimated cost:   5-10 mins $30.00   11-20 mins. $59.00   21-30 mins. $85.00     Use Humanoid (secure email communication and access to your chart) to send your primary care provider a message or make an appointment. Ask someone on your Team how to sign up for  Astro.  For a Price Quote for your services, please call our Consumer Price Line at [phone].  As always, Thank you for trusting us with your health care needs!    Tess Cooper MA            Follow-ups after your visit        Your next 10 appointments already scheduled     May 29, 2018  8:30 AM CDT   [facility] Visit [facility] Mateo Gray MD   [facility]    [address] [facility] [address]   [phone]            Jun 04, 2018  9:50 AM CDT   (Arrive by 9:35 AM)   Return Myasthenia Gravis with Arpan Harrison MD   [facility] ([facility] [place])    [address] [place] [id number] [phone]            Aug 14, 2018  9:20 AM CDT   Office Visit with Telly Lucio MD   [facility]    [address] [place] [id number]   [phone]           Bring a current list of meds and any records pertaining to this visit. For Physicals, please bring immunization records and any forms needing to be filled out. Please arrive 10 minutes early to complete paperwork.              Who to contact     If you have questions or need follow up information about today's clinic visit or your schedule please contact [facility] directly at [phone].  Normal or non-critical lab and imaging results will be communicated to you by KAYAKhart, letter or phone within 4 business days after the clinic has received the results. If you do not hear from us within 7 days, please contact the clinic through KAYAKhart or phone. If you have a critical or abnormal lab result, we will notify you by phone as soon as possible.  Submit refill requests through Astro or call your pharmacy and they will forward the refill request to us. Please allow 3 business days for your refill to be completed.          Additional Information About Your Visit        KAYAKharNJVC Information      VisiKard gives you secure access to your electronic health record. If you see a primary care provider, you can also send messages to your care team and make appointments. If you have questions, please call your primary care clinic.  If you do not have a primary care provider, please call [phone] and they will assist you.        Care EveryWhere ID     This is your Care EveryWhere ID. This could be used by other organizations to access your [facility] medical records  [id number]        Your Vitals Were     Pulse Temperature Respirations Pulse Oximetry BMI (Body Mass Index)       64 96.2  F (35.7  C) (Oral) 16 98% 36.18 kg/m2        Blood Pressure from Last 3 Encounters:   03/14/18 124/64   02/20/18 122/84   02/05/18 146/79    Weight from Last 3 Encounters:   03/14/18 245 lb (111.1 kg)   02/20/18 246 lb (111.6 kg)   02/05/18 244 lb 12.8 oz (111 kg)              Today, you had the following     No orders found for display       Primary Care Provider Office Phone # Fax #    Telly Lucio [phone]       [address] [place] [id number]        Equal Access to Services     CRIS YEE AH: Hadii aad ku hadasho Soomaali, waaxda luqadaha, qaybta kaalmada adeegyada, gino daniel haycadenn mary blount. So [facility] [phone].    ATENCIÓN: Si habla español, tiene a coates disposición servicios gratuitos de asistencia lingüística. Llame al [phone].    We comply with applicable federal civil rights laws and [place] laws. We do not discriminate on the basis of race, color, national origin, age, disability, sex, sexual orientation, or gender identity.            Thank you!     Thank you for choosing [facility]  for your care. Our goal is always to provide you with excellent care. Hearing back from our patients is one way we can continue to improve our services. Please take a few minutes to complete the written survey that you may receive in the mail after your visit with  us. Thank you!             Your Updated Medication List - Protect others around you: Learn how to safely use, store and throw away your medicines at www.disposemymeds.org.          This list is accurate as of 3/14/18  9:32 AM.  Always use your most recent med list.                   Brand Name Dispense Instructions for use Diagnosis    aspirin 81 MG tablet      Take 1 tablet by mouth daily.        atorvastatin 20 MG tablet    LIPITOR    90 tablet    TAKE ONE TABLET BY MOUTH ONE TIME DAILY    Hyperlipidemia LDL goal <130       blood glucose lancets standard    no brand specified    50 each    Check blood sugar every morning    Type 2 diabetes mellitus with hyperglycemia, without long-term current use of insulin (H)       blood glucose monitoring meter device kit    no brand specified    1 kit    Use to test blood sugar 1 times daily or as directed.    Type 2 diabetes mellitus with hyperglycemia, without long-term current use of insulin (H)       blood glucose monitoring test strip    no brand specified    50 strip    laneUse to test blood sugars 1 times daily or as directed    Type 2 diabetes mellitus with hyperglycemia, without long-term current use of insulin (H)       CALCIUM 600/VITAMIN D PO      Take  by mouth. Take 1/2 tablet in the morning, 1/2 at night daily        clonazePAM 0.5 MG tablet    klonoPIN     Take 0.5 mg by mouth. Take 1 tablet by mouth daily at bedtime        latanoprost 0.005 % ophthalmic solution    XALATAN    7.5 mL    Place 1 drop into both eyes At Bedtime    Borderline glaucoma with ocular hypertension, bilateral       lisinopril 10 MG tablet    PRINIVIL/ZESTRIL    90 tablet    Take 1 tablet (10 mg) by mouth daily    Hypertension goal BP (blood pressure) < 140/90       metFORMIN 500 MG 24 hr tablet    GLUCOPHAGE-XR    180 tablet    Take 1 tablet (500 mg) by mouth 2 times daily (with meals)    Prediabetes       MULTI-DAY PO      daily        OXcarbazepine 150 MG tablet    TRILEPTAL    60  tablet    Take 1 tablet twice daily for 2 weeks. If pain persists, take 1 in the morning and 2 in the evening for 2 weeks, then call us.    Trigeminal neuralgia       pantoprazole 20 MG EC tablet    PROTONIX    90 tablet    TAKE ONE TABLET BY MOUTH ONE TIME DAILY 30-60 MINUTES BEFORE A MEAL    Gastroesophageal reflux disease without esophagitis       * predniSONE 20 MG tablet    DELTASONE          * predniSONE 10 MG tablet    DELTASONE    63 tablet    Week one: 30 mg the morning, week 2 and afterwards: 20 mg the morning    Ocular myasthenia (H)       pyridostigmine 60 MG tablet    MESTINON    120 tablet    Take 1 tablet (60 mg) by mouth 4 times daily    Ocular myasthenia (H), Long-term use of immunosuppressant medication       timolol 0.5 % ophthalmic solution    TIMOPTIC    1 Bottle    Place 1 drop into both eyes daily    Borderline glaucoma with ocular hypertension, bilateral       triamcinolone 0.1 % ointment    KENALOG    15 g    Apply sparingly to affected area three times daily for 14 days.    Dermatitis       * Notice:  This list has 2 medication(s) that are the same as other medications prescribed for you. Read the directions carefully, and ask your doctor or other care provider to review them with you.

## 2022-11-19 DIAGNOSIS — G50.0 TRIGEMINAL NEURALGIA: ICD-10-CM

## 2022-11-19 DIAGNOSIS — G70.00 MYASTHENIA GRAVIS, ACHR ANTIBODY POSITIVE (H): ICD-10-CM

## 2022-11-21 RX ORDER — OXCARBAZEPINE 300 MG/1
TABLET, FILM COATED ORAL
Qty: 180 TABLET | Refills: 1 | Status: SHIPPED | OUTPATIENT
Start: 2022-11-21 | End: 2023-06-12

## 2022-11-21 RX ORDER — PREDNISONE 5 MG/1
TABLET ORAL
Qty: 60 TABLET | Refills: 1 | Status: SHIPPED | OUTPATIENT
Start: 2022-11-21 | End: 2023-02-03

## 2022-11-25 ENCOUNTER — OFFICE VISIT (OUTPATIENT)
Dept: FAMILY MEDICINE | Facility: CLINIC | Age: 77
End: 2022-11-25
Payer: COMMERCIAL

## 2022-11-25 VITALS
OXYGEN SATURATION: 96 % | DIASTOLIC BLOOD PRESSURE: 64 MMHG | WEIGHT: 227 LBS | HEIGHT: 70 IN | SYSTOLIC BLOOD PRESSURE: 130 MMHG | BODY MASS INDEX: 32.5 KG/M2 | TEMPERATURE: 97.6 F | RESPIRATION RATE: 16 BRPM | HEART RATE: 63 BPM

## 2022-11-25 DIAGNOSIS — R26.81 UNSTEADY GAIT WHEN WALKING: ICD-10-CM

## 2022-11-25 DIAGNOSIS — E11.65 TYPE 2 DIABETES MELLITUS WITH HYPERGLYCEMIA, WITHOUT LONG-TERM CURRENT USE OF INSULIN (H): ICD-10-CM

## 2022-11-25 DIAGNOSIS — R42 DIZZINESS: Primary | ICD-10-CM

## 2022-11-25 LAB — HBA1C MFR BLD: 6.8 % (ref 0–5.6)

## 2022-11-25 PROCEDURE — 99213 OFFICE O/P EST LOW 20 MIN: CPT | Performed by: FAMILY MEDICINE

## 2022-11-25 PROCEDURE — 36415 COLL VENOUS BLD VENIPUNCTURE: CPT | Performed by: FAMILY MEDICINE

## 2022-11-25 PROCEDURE — 99207 PR FOOT EXAM NO CHARGE: CPT | Performed by: FAMILY MEDICINE

## 2022-11-25 PROCEDURE — 83036 HEMOGLOBIN GLYCOSYLATED A1C: CPT | Performed by: FAMILY MEDICINE

## 2022-11-25 ASSESSMENT — PAIN SCALES - GENERAL: PAINLEVEL: NO PAIN (0)

## 2022-11-25 NOTE — PROGRESS NOTES
Assessment & Plan     Dizziness    -  Has subsided at this time. Etiology not clear. Had EKG 2 months ago was normal, electrolytes recently done normal.    Stay well hydrated    Unsteady gait when walking    -  Could be related to neuropathy and possible medications. Encouraged to use cane as much as possible    Type 2 diabetes mellitus with hyperglycemia, without long-term current use of insulin (H)     Well controlled , will check A1c. Foot exam normal.  - HEMOGLOBIN A1C; Future  - FOOT EXAM  - HEMOGLOBIN A1C    Return in about 2 months (around 1/25/2023) for Routine Visit.    Telly Lucio MD  [facility] THIAGO Bledsoe is a 77 year old, presenting for the following health issues:  Dizziness  c/o dizziness,light headedness, trouble walking  Also had tingling sensation all over body; henry extremities  Still has chest pain (chronic), not related to any activity  Unsteady when walking; sometimes goes off to the side, getting better though.  Blood sugars been good  BP  BP Readings from Last 6 Encounters:   11/25/22 136/76   10/13/22 121/76   10/11/22 120/70   09/12/22 129/63   08/03/22 130/68   07/08/22 118/52     EKG 9/21/22   Ref Range & Units 2 mo ago   VENTRICULAR RATE EKG BPM 97    ATRIAL RATE BPM 97    P-R INTERVAL ms 140    QRS DURATION ms 110    Q-T INTERVAL ms 334    Q-T INTERVAL (CORRECTED) ms 424    P WAVE AXIS degrees 55    QRS AXIS degrees 2    T AXIS degrees 55    INTERPRETATION TEXT  Normal sinus rhythm   Incomplete right bundle branch block   Nonspecific T wave abnormality   Abnormal ECG   No previous ECGs available   Confirmed by EPI LANDRY ([id number]),  IVA GUTIÉRREZ ([id number]) on 9/21/2022          History of Present Illness       Hypertension: He presents for follow up of hypertension.  He does check blood pressure  regularly outside of the clinic. Outside blood pressures have been over 140/90. He does not follow a low salt diet.       Review of  "Systems   Constitutional, HEENT, cardiovascular, pulmonary, gi and gu systems are negative, except as otherwise noted.      Objective    /76 (BP Location: Right arm, Cuff Size: Adult Regular)   Pulse 63   Temp 97.6  F (36.4  C) (Oral)   Resp 16   Ht 1.77 m (5' 9.69\")   Wt 103 kg (227 lb)   SpO2 96%   BMI 32.87 kg/m    Body mass index is 32.87 kg/m .  Physical Exam   GENERAL: healthy, alert and no distress  RESP: lungs clear to auscultation - no rales, rhonchi or wheezes  CV: regular rate and rhythm, no murmur, click or rub, no peripheral edema  MS: no gross musculoskeletal defects noted, no edema  NEURO: Normal strength and tone, mentation intact and speech normal  Diabetic foot exam: normal DP and PT pulses, no trophic changes or ulcerative lesions and normal sensory exam    "

## 2022-11-27 DIAGNOSIS — G70.00 OCULAR MYASTHENIA (H): ICD-10-CM

## 2022-11-28 RX ORDER — PYRIDOSTIGMINE BROMIDE 60 MG/1
TABLET ORAL
Qty: 120 TABLET | Refills: 2 | OUTPATIENT
Start: 2022-11-28

## 2022-12-01 ENCOUNTER — OFFICE VISIT (OUTPATIENT)
Dept: OPHTHALMOLOGY | Facility: CLINIC | Age: 77
End: 2022-12-01
Payer: COMMERCIAL

## 2022-12-01 DIAGNOSIS — E11.9 TYPE 2 DIABETES MELLITUS WITHOUT COMPLICATION, WITHOUT LONG-TERM CURRENT USE OF INSULIN (H): Primary | ICD-10-CM

## 2022-12-01 DIAGNOSIS — Z86.69 H/O RD (RETINAL DETACHMENT): ICD-10-CM

## 2022-12-01 DIAGNOSIS — H52.4 MYOPIA OF BOTH EYES WITH REGULAR ASTIGMATISM AND PRESBYOPIA: ICD-10-CM

## 2022-12-01 DIAGNOSIS — Z96.1 PSEUDOPHAKIA OF BOTH EYES: ICD-10-CM

## 2022-12-01 DIAGNOSIS — H52.13 MYOPIA OF BOTH EYES WITH REGULAR ASTIGMATISM AND PRESBYOPIA: ICD-10-CM

## 2022-12-01 DIAGNOSIS — H40.053 BORDERLINE GLAUCOMA WITH OCULAR HYPERTENSION, BILATERAL: ICD-10-CM

## 2022-12-01 DIAGNOSIS — H52.223 MYOPIA OF BOTH EYES WITH REGULAR ASTIGMATISM AND PRESBYOPIA: ICD-10-CM

## 2022-12-01 DIAGNOSIS — Z98.890 S/P BLEPHAROPLASTY: ICD-10-CM

## 2022-12-01 DIAGNOSIS — G70.00 MYASTHENIA GRAVIS, ACHR ANTIBODY POSITIVE (H): ICD-10-CM

## 2022-12-01 DIAGNOSIS — H04.123 DRY EYES, BILATERAL: ICD-10-CM

## 2022-12-01 PROCEDURE — 92015 DETERMINE REFRACTIVE STATE: CPT | Performed by: STUDENT IN AN ORGANIZED HEALTH CARE EDUCATION/TRAINING PROGRAM

## 2022-12-01 PROCEDURE — 92014 COMPRE OPH EXAM EST PT 1/>: CPT | Performed by: STUDENT IN AN ORGANIZED HEALTH CARE EDUCATION/TRAINING PROGRAM

## 2022-12-01 RX ORDER — TIMOLOL MALEATE 5 MG/ML
1 SOLUTION/ DROPS OPHTHALMIC 2 TIMES DAILY
Qty: 5 ML | Refills: 11 | Status: SHIPPED | OUTPATIENT
Start: 2022-12-01 | End: 2023-02-01

## 2022-12-01 RX ORDER — LATANOPROST 50 UG/ML
1 SOLUTION/ DROPS OPHTHALMIC AT BEDTIME
Qty: 7.5 ML | Refills: 3 | Status: SHIPPED | OUTPATIENT
Start: 2022-12-01 | End: 2023-12-05

## 2022-12-01 ASSESSMENT — REFRACTION_MANIFEST
OD_SPHERE: -2.75
OS_ADD: +2.75
OS_AXIS: 174
OS_SPHERE: -1.75
OD_ADD: +2.75
OS_CYLINDER: +2.00
OD_AXIS: 130
OD_CYLINDER: +1.75

## 2022-12-01 ASSESSMENT — REFRACTION_WEARINGRX
OS_SPHERE: -2.00
OD_SPHERE: -2.75
OD_ADD: +3.25
OD_CYLINDER: +2.25
OS_AXIS: 017
SPECS_TYPE: BIFOCAL
OS_CYLINDER: +1.75
OS_ADD: +3.00
OD_AXIS: 133

## 2022-12-01 ASSESSMENT — CONF VISUAL FIELD
OS_INFERIOR_NASAL_RESTRICTION: 0
OS_NORMAL: 1
OS_SUPERIOR_NASAL_RESTRICTION: 0
METHOD: COUNTING FINGERS
OD_INFERIOR_NASAL_RESTRICTION: 0
OS_INFERIOR_TEMPORAL_RESTRICTION: 0
OD_SUPERIOR_NASAL_RESTRICTION: 0
OD_SUPERIOR_TEMPORAL_RESTRICTION: 0
OD_INFERIOR_TEMPORAL_RESTRICTION: 0
OD_NORMAL: 1
OS_SUPERIOR_TEMPORAL_RESTRICTION: 0

## 2022-12-01 ASSESSMENT — EXTERNAL EXAM - LEFT EYE: OS_EXAM: NORMAL

## 2022-12-01 ASSESSMENT — VISUAL ACUITY
METHOD: SNELLEN - LINEAR
OS_CC: 20/25
OS_CC+: -1
CORRECTION_TYPE: GLASSES
OD_CC: 20/20

## 2022-12-01 ASSESSMENT — CUP TO DISC RATIO
OD_RATIO: 0.1
OS_RATIO: 0.25

## 2022-12-01 ASSESSMENT — TONOMETRY
IOP_METHOD: APPLANATION
OS_IOP_MMHG: 19
OD_IOP_MMHG: 21

## 2022-12-01 ASSESSMENT — EXTERNAL EXAM - RIGHT EYE: OD_EXAM: NORMAL

## 2022-12-01 ASSESSMENT — SLIT LAMP EXAM - LIDS
COMMENTS: 1+ DERMATOCHALASIS
COMMENTS: 1+ DERMATOCHALASIS

## 2022-12-01 NOTE — PROGRESS NOTES
" Current Eye Medications:  Timolol twice a day both eyes, last took at 5:30 am. Latanoprost at bedtime both eyes, last took at 9 pm. Artificial tears as needed both eyes.     Subjective:  Complete eye exam. Vision is doing well distance and near both eyes. Hasn't noticed any change. Once in a great while gets quick sharp pain for years. Pain can happen in either eye.     Diabetic since about 2017. Blood sugar has been improving.   Lab Results   Component Value Date    A1C 6.8 11/25/2022    A1C 6.9 07/01/2022    A1C 7.1 04/01/2022    A1C 7.0 12/30/2021    A1C 6.8 10/12/2021    A1C 7.1 06/09/2021    A1C 7.5 12/30/2020    A1C 6.8 06/11/2020    A1C 5.6 02/14/2020    A1C 6.4 12/20/2019        Objective:  See Ophthalmology Exam.       Assessment:  Rakan Nolasco is a 77 year old male who presents with:   Encounter Diagnoses   Name Primary?     Type 2 diabetes mellitus without complication, without long-term current use of insulin (H) Yes     Borderline glaucoma with ocular hypertension, bilateral Intraocular pressure 21/19 today. Continue same medications.     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      Plan:  Continue Latanoprost (green top) at bedtime both eyes and Timolol (yellow top) twice a day in both eyes     Use artificial tears up to four times a day (like Refresh Optive, Systane Balance, TheraTears, or generic artificial tears are ok. Avoid \"get the red out\" drops).     Glasses prescription given - optional to update    Continue care with Dr. Garsia as he recommends    Keep blood sugars and blood pressure under good control.     Mateo Gray MD  [phone]    Patient Education  Diabetes weakens the blood vessels all over the body, including the eyes. Damage to " the blood vessels in the eyes can cause swelling or bleeding into part of the eye (called the retina). This is called diabetic retinopathy (KARY-tin-AH-puh-thee). If not treated, this disease can cause vision loss or blindness.   Symptoms may include blurred or distorted vision, but many people have no symptoms. It's important to see your eye doctor regularly to check for problems.   Early treatment and good control can help protect your vision. Here are the things you can do to help prevent vision loss:      1. Keep your blood sugar levels under tight control.      2. Bring high blood pressure under control.      3. No smoking.      4. Have yearly dilated eye exams.

## 2022-12-01 NOTE — LETTER
"    12/1/2022         RE: Rakan Nolasco  [address] [facility] [address]        Dear Colleague,    Thank you for referring your patient, Rakan Nolasco, to the [facility]. Please see a copy of my visit note below.     Current Eye Medications:  Timolol twice a day both eyes, last took at 5:30 am. Latanoprost at bedtime both eyes, last took at 9 pm. Artificial tears as needed both eyes.     Subjective:  Complete eye exam. Vision is doing well distance and near both eyes. Hasn't noticed any change. Once in a great while gets quick sharp pain for years. Pain can happen in either eye.     Diabetic since about 2017. Blood sugar has been improving.   Lab Results   Component Value Date    A1C 6.8 11/25/2022    A1C 6.9 07/01/2022    A1C 7.1 04/01/2022    A1C 7.0 12/30/2021    A1C 6.8 10/12/2021    A1C 7.1 06/09/2021    A1C 7.5 12/30/2020    A1C 6.8 06/11/2020    A1C 5.6 02/14/2020    A1C 6.4 12/20/2019        Objective:  See Ophthalmology Exam.       Assessment:  Rakan Nolasco is a 77 year old male who presents with:   Encounter Diagnoses   Name Primary?     Type 2 diabetes mellitus without complication, without long-term current use of insulin (H) Yes     Borderline glaucoma with ocular hypertension, bilateral Intraocular pressure 21/19 today. Continue same medications.     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      Plan:  Continue Latanoprost (green top) at bedtime both eyes and Timolol (yellow top) twice a day in both eyes     Use artificial tears up to four times a day (like Refresh Optive, Systane Balance, TheraTears, or generic artificial tears are ok. Avoid \"get the red out\" drops).     Glasses prescription given - optional to " update    Continue care with Dr. Garsia as he recommends    Keep blood sugars and blood pressure under good control.     Mateo Gray MD  [phone]    Patient Education  Diabetes weakens the blood vessels all over the body, including the eyes. Damage to the blood vessels in the eyes can cause swelling or bleeding into part of the eye (called the retina). This is called diabetic retinopathy (KARY-tin--puh-thee). If not treated, this disease can cause vision loss or blindness.   Symptoms may include blurred or distorted vision, but many people have no symptoms. It's important to see your eye doctor regularly to check for problems.   Early treatment and good control can help protect your vision. Here are the things you can do to help prevent vision loss:      1. Keep your blood sugar levels under tight control.      2. Bring high blood pressure under control.      3. No smoking.      4. Have yearly dilated eye exams.                Again, thank you for allowing me to participate in the care of your patient.        Sincerely,        Mateo Gray MD

## 2022-12-06 DIAGNOSIS — E11.65 TYPE 2 DIABETES MELLITUS WITH HYPERGLYCEMIA, WITHOUT LONG-TERM CURRENT USE OF INSULIN (H): ICD-10-CM

## 2022-12-07 RX ORDER — LANCETS 30 GAUGE
EACH MISCELLANEOUS
Qty: 100 EACH | Refills: 2 | Status: SHIPPED | OUTPATIENT
Start: 2022-12-07 | End: 2023-12-05

## 2022-12-22 ENCOUNTER — VIRTUAL VISIT (OUTPATIENT)
Dept: PHARMACY | Facility: CLINIC | Age: 77
End: 2022-12-22
Payer: COMMERCIAL

## 2022-12-22 ENCOUNTER — TELEPHONE (OUTPATIENT)
Dept: PHARMACY | Facility: CLINIC | Age: 77
End: 2022-12-22

## 2022-12-22 DIAGNOSIS — G70.00 MYASTHENIA GRAVIS (H): ICD-10-CM

## 2022-12-22 DIAGNOSIS — R60.9 EDEMA, UNSPECIFIED TYPE: ICD-10-CM

## 2022-12-22 DIAGNOSIS — J44.9 CHRONIC OBSTRUCTIVE PULMONARY DISEASE, UNSPECIFIED COPD TYPE (H): ICD-10-CM

## 2022-12-22 DIAGNOSIS — E78.5 HYPERLIPIDEMIA, UNSPECIFIED HYPERLIPIDEMIA TYPE: ICD-10-CM

## 2022-12-22 DIAGNOSIS — E11.65 TYPE 2 DIABETES MELLITUS WITH HYPERGLYCEMIA, WITHOUT LONG-TERM CURRENT USE OF INSULIN (H): Primary | ICD-10-CM

## 2022-12-22 DIAGNOSIS — I10 HYPERTENSION, UNSPECIFIED TYPE: ICD-10-CM

## 2022-12-22 DIAGNOSIS — G25.81 RESTLESS LEGS SYNDROME: ICD-10-CM

## 2022-12-22 DIAGNOSIS — H40.053 BORDERLINE GLAUCOMA OF BOTH EYES WITH OCULAR HYPERTENSION: ICD-10-CM

## 2022-12-22 DIAGNOSIS — E87.1 HYPONATREMIA: ICD-10-CM

## 2022-12-22 DIAGNOSIS — G50.0 TRIGEMINAL NEURALGIA PAIN: ICD-10-CM

## 2022-12-22 DIAGNOSIS — K21.9 GASTROESOPHAGEAL REFLUX DISEASE, UNSPECIFIED WHETHER ESOPHAGITIS PRESENT: ICD-10-CM

## 2022-12-22 PROCEDURE — 99607 MTMS BY PHARM ADDL 15 MIN: CPT | Performed by: PHARMACIST

## 2022-12-22 PROCEDURE — 99605 MTMS BY PHARM NP 15 MIN: CPT | Performed by: PHARMACIST

## 2022-12-22 NOTE — TELEPHONE ENCOUNTER
MTM referral from: Patient's insurance    MTM referral outreach attempt #1 on December 22, 2022 at 2:07 PM      Outcome: Spoke with patient - visit completed today.  See separate encounter documentation.    Ana Aleman PharmD, [facility]  Medication Therapy Management Provider  Pager: [phone]

## 2022-12-22 NOTE — PROGRESS NOTES
Medication Therapy Management (MTM) Encounter    ASSESSMENT:                            Medication Adherence/Access: No issues identified    Type 2 Diabetes: Patient is meeting A1c goal of < 7%. Self monitoring of blood glucose is at goal of fasting  mg/dL and post prandial < 180 mg/dL.  Aspirin is no longer recommended for primary prevention at his age per 2023 ADA standards of care.    Hypertension/Edema: Stable.    Hyperlipidemia: Stable.     Myasthenia Gravis:  Stable.    Trigeminal Neuralgia:  Stable.    Hyponatremia:  Stable.    GERD: Stable.     COPD: Stable     Borderline glaucoma with ocular hypertension, bilateral:  Stable per his report.    RLS: Stable, would prefer patient is not using benzodiazepine therapy due to risk of falls/confusion as he ages, but he's been unable to tolerate alternative therapy.    PLAN:                            Advised he can likely stop aspirin for primary prevention - he will talk with his primary care physician about this    Follow-up: Return if symptoms worsen or fail to improve.    SUBJECTIVE/OBJECTIVE:                          Rakan Nolasco is a 77 year old male called for an initial visit. He was referred to me from [facility], his insurance plan.      Reason for visit: Comprehensive medication review.    Allergies/ADRs: Reviewed in chart  Past Medical History: Reviewed in chart  Tobacco: He reports that he quit smoking about 40 years ago. His smoking use included cigarettes. He started smoking about 55 years ago. He has a 30.00 pack-year smoking history. He quit smokeless tobacco use about 53 years ago.  Alcohol: not currently using  Caffeine: 8-10 cups of coffee/day  Activity: No formal exercise regimen, tries to be active    Medication Adherence/Access: Patient uses pill box(es).  Patient takes medications 4 time(s) per day.  Per patient, misses medication 0 times per week, might miss a few times a year.    Type 2 Diabetes:  Currently taking metformin XR 1000mg daily.  Patient is not experiencing side effects.  Blood sugar monitoring: Several time(s) daily. Ranges (patient reported):   Fasting - Usually <110mg/dL  Post-Prandial - 160-170mg/dL  Symptoms of low blood sugar? none  Symptoms of high blood sugar? none  Eye exam: up to date  Foot exam: up to date  Aspirin: Taking 81mg daily for primary prevention and denies side effects   Statin: Yes: atorvastatin 20mg   ACEi/ARB: Yes: lisinopril 40mg  Urine Albumin:   Lab Results   Component Value Date    UMALCR  07/08/2022      Comment:      Unable to calculate, urine albumin or creatinine is outside the detectable limits.      Lab Results   Component Value Date    A1C 6.8 11/25/2022    A1C 6.9 07/01/2022    A1C 7.1 04/01/2022    A1C 7.0 12/30/2021    A1C 6.8 10/12/2021    A1C 7.1 06/09/2021    A1C 7.5 12/30/2020    A1C 6.8 06/11/2020    A1C 5.6 02/14/2020    A1C 6.4 12/20/2019     GFR Estimate   Date Value Ref Range Status   10/11/2022 80 >60 mL/min/1.73m2 Final     Comment:     Effective December 21, 2021 eGFRcr in adults is calculated using the 2021 CKD-EPI creatinine equation which includes age and gender (Timmy et al., NE, DOI: 10.1056/BZKQwq2681127)   05/04/2021 72 >60 mL/min/[1.73_m2] Final     Comment:     Non  GFR Calc  Starting 12/18/2018, serum creatinine based estimated GFR (eGFR) will be   calculated using the Chronic Kidney Disease Epidemiology Collaboration   (CKD-EPI) equation.       Hypertension/Edema: Current medications include amlodipine 10mg daily, furosemide 40mg every morning and 20mg in the afternoon and lisinopril 40mg daily.  Patient does self-monitor blood pressure. Home blood pressure monitoring in range of 130's systolic over 80's diastolic.  Patient reports no current medication side effects.  He does occasionally get lightheaded, acknowledges he may not drink enough water.  BP Readings from Last 3 Encounters:   11/25/22 130/64   10/13/22 121/76   10/11/22 120/70      Hyperlipidemia:  Current therapy includes atorvastatin 20mg daily.  Patient reports no significant myalgias or other side effects.  The ASCVD Risk score (Ho SPENCER, et al., 2019) failed to calculate for the following reasons:    The valid total cholesterol range is 130 to 320 mg/dL  Recent Labs   Lab Test 07/01/22  0836 06/09/21  1409 08/01/16  0705 09/30/15  0748   CHOL 118 157   < > 121   HDL 47 51   < > 36*   LDL 32 69   < > 57   TRIG 196* 187*   < > 140   CHOLHDLRATIO  --   --   --  3.4    < > = values in this interval not displayed.     Myasthenia Gravis:  Managed by neurology - Dr. Harrison.  Current medication regimen includes prednisone 10mg daily and pyridostigmine 60mg three times daily.  He reports his symptoms are fairly stable.  No side effects reported.  DEXA scans have been monitored closely and have been normal.    Trigeminal Neuralgia:  Also treated by Dr. Harrison. Rakan is taking oxcarbazepine 300mg twice daily.  He reports his symptoms are stable with this regimen, he's tried coming off without success.  No side effects reported.    Hyponatremia:  Rakan takes sodium bicarbonate 325mg twice daily due to experiencing dangerously low sodium levels in the past after starting furosemide.  He denies side effects.  Sodium   Date Value Ref Range Status   10/11/2022 134 133 - 144 mmol/L Final   06/29/2021 141 133 - 144 mmol/L Final      GERD: Current medications include: Protonix (pantoprazole) 20mg once daily. Patient reports no current symptoms.  Patient feels that current regimen is effective.  He has tried tapering off in the past without success.    COPD: Current medications: albuterol MDI as needed (uses rarely) and Wixela 250/50mcg twice daily. Patient rinses their mouth after using steroid inhaler.    Patient is not experiencing side effects.   Patient reports the following symptoms: none.  Patient does have an COPD Action Plan on file.   Has spirometry been completed: Yes     Borderline glaucoma with ocular  hypertension, bilateral:  Rakan follows closely with ophthalmology.  He uses latanoprost eye drops in both eyes daily and timolol eye drops in both eyes twice daily.  He reports IOP has been stable.  No side effects reported.    RLS:  Rakan takes clonazepam 0.5mg at bedtime and ferrous sulfate 325mg daily.  He had been unable to tolerate ropinirole.  He finds clonazepam effective, denies side effects.    Hemoglobin   Date Value Ref Range Status   10/11/2022 12.6 (L) 13.3 - 17.7 g/dL Final   10/26/2020 12.6 (L) 13.3 - 17.7 g/dL Final   ]   Ferritin   Date Value Ref Range Status   10/12/2021 26 26 - 388 ng/mL Final   01/14/2011 102 20 - 300 ng/mL Final     Iron   Date Value Ref Range Status   10/12/2021 64 35 - 180 ug/dL Final   12/20/2019 22 (L) 35 - 180 ug/dL Final     Iron Binding Cap   Date Value Ref Range Status   12/20/2019 386 240 - 430 ug/dL Final     Iron Binding Capacity   Date Value Ref Range Status   10/12/2021 285 240 - 430 ug/dL Final     Today's Vitals: There were no vitals taken for this visit.  ----------------    I spent 34 minutes with this patient today. I offer these suggestions for consideration by Dr. Lucio. A copy of the visit note was provided to the patient's provider(s).    A summary of these recommendations was sent via [employer].    Ana Aleman, PharmD, BCACP  Medication Therapy Management Provider  Pager: [phone]     Telemedicine Visit Details  Type of service:  Telephone visit  Start Time: 2:07 PM  End Time: 2:41 PM  Originating Location (pt. Location): Home      Distant Location (provider location):  Off-site  Provider has received verbal consent for a visit from the patient? Yes     Medication Therapy Recommendations  Type 2 diabetes mellitus with hyperglycemia, without long-term current use of insulin (H)    Current Medication: aspirin 81 MG tablet   Rationale: No medical indication at this time - Unnecessary medication therapy - Indication   Recommendation: Discontinue Medication    Status: Contact Provider - Awaiting Response

## 2022-12-22 NOTE — LETTER
"Recommended To-Do List      Prepared on: Dec 22, 2022       You can get the best results from your medications by completing the items on this \"To-Do List.\"      Bring your To-Do List when you go to your doctor. And, share it with your family or caregivers.    My To-Do List:  What we talked about: What I should do:   A medication that you may no longer need    Talk with Dr. Lucio about stopping aspirin (ASA)          What we talked about: What I should do:                       "

## 2022-12-22 NOTE — LETTER
_  Medication List        Prepared on: Dec 22, 2022     Bring your Medication List when you go to the doctor, hospital, or   emergency room. And, share it with your family or caregivers.     Note any changes to how you take your medications.  Cross out medications when you no longer use them.    Medication How I take it Why I use it Prescriber   albuterol (PROAIR HFA/PROVENTIL HFA/VENTOLIN HFA) 108 (90 Base) MCG/ACT inhaler INHALE 2 PUFFS BY MOUTH EVERY 4 HOURS FOR SHORTNESS OF BREATH, COUGH, OR WITH EXERCISE Chronic obstructive pulmonary disease, unspecified COPD type (H) Daija Richardson PA-C   amLODIPine (NORVASC) 10 MG tablet TAKE 1 TABLET BY MOUTH EVERY DAY HTN, goal below 140/90 Telly Lucio MD   aspirin 81 MG tablet Take 1 tablet by mouth daily. Type 2 diabetes Patient Reported   atorvastatin (LIPITOR) 20 MG tablet TAKE 1 TABLET BY MOUTH EVERY DAY Hyperlipidemia LDL Goal <130 Poonam Rocha PA-C   blood glucose (ONETOUCH VERIO IQ) test strip Use to test blood sugar 3 times daily or as directed. Type 2 diabetes mellitus with hyperglycemia, without long-term current use of insulin (H) Millie Cruz MD   blood glucose monitoring (NO BRAND SPECIFIED) meter device kit Use to test blood sugar 1 times daily or as directed. Type 2 diabetes mellitus with hyperglycemia, without long-term current use of insulin (H) Telly Lucio MD   ClonAZEPAM (KLONOPIN) 0.5 MG tablet Take 0.5 mg by mouth Take 1 tablet by mouth daily at bedtime Restless Leg Syndrome Patient Reported   ferrous sulfate (FEROSUL) 325 (65 Fe) MG tablet Take 1 tablet (325 mg) by mouth daily (with breakfast) Iron Deficiency Mayito Pearce MD   fluticasone-salmeterol (WIXELA INHUB) 250-50 MCG/ACT inhaler Inhale 1 puff into the lungs 2 times daily Chronic obstructive pulmonary disease, unspecified COPD type (H) Telly Lucio MD   furosemide (LASIX) 20 MG tablet TAKE 2 TABLETS (40 MG) IN THE MORNING AND 1 TABLET (20 MG) AT  LEAST 6 HOURS LATER IN THE AFTERNOON. Pedal Edema JACK Hopper CNP   Lancets (ONETOUCH DELICA PLUS [id number]) MISC USE ONE DAILY AS DIRECTED Type 2 diabetes mellitus with hyperglycemia, without long-term current use of insulin (H) Telly Lucio MD   latanoprost (XALATAN) 0.005 % ophthalmic solution Place 1 drop into both eyes At Bedtime Borderline glaucoma with ocular hypertension, bilateral Mateo Gray MD   lisinopril (ZESTRIL) 40 MG tablet Take 1 tablet (40 mg) by mouth daily Benign Essential Hypertension Telly Lucio MD   metFORMIN (GLUCOPHAGE XR) 500 MG 24 hr tablet TAKE 1 TABLET BY MOUTH TWICE A DAY WITH MEALS Prediabetes Telly Lucio MD   OXcarbazepine (TRILEPTAL) 300 MG tablet TAKE 1 TABLET BY MOUTH TWICE A DAY Trigeminal Neuralgia Arpan Harrison MD   pantoprazole (PROTONIX) 20 MG EC tablet TAKE 1 TABLET BY MOUTH EVERY DAY Gastroesophageal Reflux Disease without Esophagitis Telly Lucio MD   predniSONE (DELTASONE) 5 MG tablet TAKE 2 TABLETS BY MOUTH EVERY DAY Myasthenia gravis, AChR antibody positive (H) Arapn Harrison MD   pyridostigmine (MESTINON) 60 MG tablet TAKE UP TO 2 TABLETS (120 MG) IN THE MORNING AND AT NOON Ocular Myasthenia (H) Arpan Harrison MD   sodium bicarbonate 650 MG tablet TAKE 1/2 TABLET BY MOUTH TWICE A DAY Edema, unspecified type Telly Lucio MD   timolol maleate (TIMOPTIC) 0.5 % ophthalmic solution Place 1 drop into both eyes 2 times daily Borderline glaucoma with ocular hypertension, bilateral Mateo Gray MD         Add new medications, over-the-counter drugs, herbals, vitamins, or  minerals in the blank rows below.    Medication How I take it Why I use it Prescriber                          Allergies:      azathioprine; sulfamethoxazole-trimethoprim; ciprofloxacin; ropinirole        Side effects I have had:               Other Information:              My notes and  questions:

## 2022-12-22 NOTE — LETTER
December 23, 2022  Rakan Cottoeman  [address] [facility] [address]    Dear Mr. Nolasco, [facility]     Thank you for talking with me on Dec 22, 2022 about your health and medications. As a follow-up to our conversation, I have included two documents:      1. Your Recommended To-Do List has steps you should take to get the best results from your medications.  2. Your Medication List will help you keep track of your medications and how to take them.    If you want to talk about these documents, please call Ana Aleman PharmD at phone: [phone], Monday-Friday 8-4:30pm.    I look forward to working with you and your doctors to make sure your medications work well for you.    Sincerely,  Ana Aleman PharmD  [facility] Pharmacist, [facility]

## 2022-12-23 NOTE — PATIENT INSTRUCTIONS
"Recommendations from today's MTM visit:                                                    MTM (medication therapy management) is a service provided by a clinical pharmacist designed to help you get the most of out of your medicines.   Today we reviewed what your medicines are for, how to know if they are working, that your medicines are safe and how to make your medicine regimen as easy as possible.      Talk with Dr. Lucio about whether or not you need to continue taking aspirin 81mg daily    Follow-up: Return if symptoms worsen or fail to improve.    It was great speaking with you today.  I value your experience and would be very thankful for your time in providing feedback in our clinic survey. In the next few days, you may receive an email or text message from [employer] with a link to a survey related to your  clinical pharmacist.\"     To schedule another MTM appointment, please call the clinic directly or you may call the MTM scheduling line at [phone] or toll-free at [phone].     My Clinical Pharmacist's contact information:                                                      Please feel free to contact me with any questions or concerns you have.      Ana Aleman, PharmD, [facility]  Medication Therapy Management Provider  Pager: [phone]    "

## 2023-01-05 DIAGNOSIS — R73.03 PREDIABETES: ICD-10-CM

## 2023-01-05 DIAGNOSIS — J44.9 CHRONIC OBSTRUCTIVE PULMONARY DISEASE, UNSPECIFIED COPD TYPE (H): ICD-10-CM

## 2023-01-06 RX ORDER — METFORMIN HCL 500 MG
TABLET, EXTENDED RELEASE 24 HR ORAL
Qty: 180 TABLET | Refills: 1 | Status: SHIPPED | OUTPATIENT
Start: 2023-01-06 | End: 2023-06-26

## 2023-01-06 RX ORDER — FLUTICASONE PROPIONATE AND SALMETEROL 250; 50 UG/1; UG/1
POWDER RESPIRATORY (INHALATION)
Qty: 1 EACH | Refills: 2 | Status: SHIPPED | OUTPATIENT
Start: 2023-01-06 | End: 2023-01-09

## 2023-01-09 ENCOUNTER — OFFICE VISIT (OUTPATIENT)
Dept: PULMONOLOGY | Facility: CLINIC | Age: 78
End: 2023-01-09
Payer: COMMERCIAL

## 2023-01-09 ENCOUNTER — OFFICE VISIT (OUTPATIENT)
Dept: FAMILY MEDICINE | Facility: CLINIC | Age: 78
End: 2023-01-09
Payer: COMMERCIAL

## 2023-01-09 VITALS
HEIGHT: 69 IN | DIASTOLIC BLOOD PRESSURE: 59 MMHG | BODY MASS INDEX: 33.33 KG/M2 | RESPIRATION RATE: 16 BRPM | SYSTOLIC BLOOD PRESSURE: 103 MMHG | HEART RATE: 59 BPM | OXYGEN SATURATION: 96 % | WEIGHT: 225 LBS

## 2023-01-09 VITALS
WEIGHT: 225.38 LBS | RESPIRATION RATE: 16 BRPM | HEIGHT: 69 IN | SYSTOLIC BLOOD PRESSURE: 130 MMHG | TEMPERATURE: 97.6 F | HEART RATE: 69 BPM | OXYGEN SATURATION: 98 % | DIASTOLIC BLOOD PRESSURE: 73 MMHG | BODY MASS INDEX: 33.38 KG/M2

## 2023-01-09 DIAGNOSIS — J44.9 CHRONIC OBSTRUCTIVE PULMONARY DISEASE, UNSPECIFIED COPD TYPE (H): ICD-10-CM

## 2023-01-09 DIAGNOSIS — M31.6 TEMPORAL ARTERITIS (H): ICD-10-CM

## 2023-01-09 DIAGNOSIS — G70.00 MYASTHENIA GRAVIS (H): ICD-10-CM

## 2023-01-09 DIAGNOSIS — E66.01 MORBID OBESITY (H): ICD-10-CM

## 2023-01-09 DIAGNOSIS — Z00.00 ENCOUNTER FOR MEDICARE ANNUAL WELLNESS EXAM: Primary | ICD-10-CM

## 2023-01-09 DIAGNOSIS — I10 BENIGN ESSENTIAL HYPERTENSION: ICD-10-CM

## 2023-01-09 DIAGNOSIS — E11.65 TYPE 2 DIABETES MELLITUS WITH HYPERGLYCEMIA, WITHOUT LONG-TERM CURRENT USE OF INSULIN (H): ICD-10-CM

## 2023-01-09 DIAGNOSIS — C61 MALIGNANT NEOPLASM OF PROSTATE (H): ICD-10-CM

## 2023-01-09 DIAGNOSIS — E78.5 HYPERLIPIDEMIA LDL GOAL <130: ICD-10-CM

## 2023-01-09 DIAGNOSIS — I73.9 CLAUDICATION OF BOTH LOWER EXTREMITIES (H): ICD-10-CM

## 2023-01-09 DIAGNOSIS — I10 HTN, GOAL BELOW 140/90: ICD-10-CM

## 2023-01-09 PROCEDURE — 99215 OFFICE O/P EST HI 40 MIN: CPT | Performed by: INTERNAL MEDICINE

## 2023-01-09 PROCEDURE — G0439 PPPS, SUBSEQ VISIT: HCPCS | Performed by: FAMILY MEDICINE

## 2023-01-09 RX ORDER — AMLODIPINE BESYLATE 10 MG/1
10 TABLET ORAL DAILY
Qty: 90 TABLET | Refills: 3 | Status: SHIPPED | OUTPATIENT
Start: 2023-01-09 | End: 2024-01-04

## 2023-01-09 RX ORDER — ATORVASTATIN CALCIUM 20 MG/1
20 TABLET, FILM COATED ORAL DAILY
Qty: 90 TABLET | Refills: 3 | Status: SHIPPED | OUTPATIENT
Start: 2023-01-09 | End: 2024-02-08

## 2023-01-09 RX ORDER — ALBUTEROL SULFATE 90 UG/1
1 AEROSOL, METERED RESPIRATORY (INHALATION) EVERY 4 HOURS PRN
Qty: 18 G | Refills: 1 | Status: SHIPPED | OUTPATIENT
Start: 2023-01-09 | End: 2023-07-12

## 2023-01-09 RX ORDER — LISINOPRIL 40 MG/1
40 TABLET ORAL DAILY
Qty: 90 TABLET | Refills: 3 | Status: SHIPPED | OUTPATIENT
Start: 2023-01-09 | End: 2023-07-12

## 2023-01-09 RX ORDER — FLUTICASONE PROPIONATE AND SALMETEROL 250; 50 UG/1; UG/1
POWDER RESPIRATORY (INHALATION)
Qty: 3 EACH | Refills: 3 | Status: SHIPPED | OUTPATIENT
Start: 2023-01-09 | End: 2023-07-12

## 2023-01-09 ASSESSMENT — ENCOUNTER SYMPTOMS
FREQUENCY: 1
ARTHRALGIAS: 1
CONSTIPATION: 0
SORE THROAT: 0
PARESTHESIAS: 0
HEMATOCHEZIA: 0
DYSURIA: 0
PALPITATIONS: 0
HEARTBURN: 0
EYE PAIN: 0
CHILLS: 0
NERVOUS/ANXIOUS: 0
COUGH: 0
DIARRHEA: 0
SHORTNESS OF BREATH: 1
ABDOMINAL PAIN: 0
NAUSEA: 0
FEVER: 0
HEMATURIA: 0
MYALGIAS: 1
HEADACHES: 0
WEAKNESS: 1
JOINT SWELLING: 0
DIZZINESS: 1

## 2023-01-09 ASSESSMENT — PAIN SCALES - GENERAL
PAINLEVEL: NO PAIN (0)
PAINLEVEL: NO PAIN (0)

## 2023-01-09 ASSESSMENT — ACTIVITIES OF DAILY LIVING (ADL): CURRENT_FUNCTION: NO ASSISTANCE NEEDED

## 2023-01-09 NOTE — PROGRESS NOTES
Pulmonary Clinic Return Patient Visit  Reason for Visit: COPD  History of Present Illness  Rakan Nolasco is a 77-year-old male with a history of COPD who presents for follow up of same. I last saw him here in clinic in 6 /2022.  To briefly review, Rakan was diagnosed with COPD several years ago based on spirometric evaluation and symptoms.  His COPD has been well controlled on an intermediate dose fluticasone/salmeterol.  He also uses albuterol very sparingly.  He had a flare of COPD which was triggered by a viral flulike illness for which he was seen at the ER in 2020 and was treated with a course of antibiotics and steroids.  He is since been back to his baseline and has minimal shortness of breath mostly with exertion, infrequent wheezing and no evidence of chest tightness.  He also takes lasix and has no pedal swellings.  He did contract COVID 19 with mild symptoms about 6 weeks ago and did well with Paxlovid. He denies any fevers, no chest pain, no orthopnea and no PND.  He has a history of ROSIE and is well controlled on CPAP.  He is working on losing weight.  Former smoker, quit 1983, 30 pk-yrs. No family hx of lung cancer.  He was exposed to lots of fumes and he is currently retired now.  Review of Systems:  10 of 14 systems reviewed and are negative unless otherwise stated in HPI.    Past Medical History:   Diagnosis Date     Arthritis          BMI 31.0-31.9,adult      Cancer (H) 01/10/2018    Prostate     COPD (chronic obstructive pulmonary disease) (H) 10/28/2020     Demand ischemia (H)      Diabetes (H) 01/10/2018     Diverticulosis     Colonoscopy 8/2008     Fatty liver     see US  5/2012     GERD (gastroesophageal reflux disease)      Glaucoma (increased eye pressure)      HTN (hypertension)      Hyperlipidemia LDL goal < 130     age, htn, fhx     Irritable bowel      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      Nonsenile cataract      Obstructive sleep apnea      Obstructive  sleep apnea syndrome      ROSIE (obstructive sleep apnea) 01/2011    Using CPAP;      Prediabetes      Restless leg syndrome      Sleep apnea      Trigeminal neuralgia pain 01/04/2012       Past Surgical History:   Procedure Laterality Date     BIOPSY ARTERY TEMPORAL Bilateral 8/1/2017    Procedure: BIOPSY ARTERY TEMPORAL;  Bilateral temporal artery Biopsy;  Surgeon: Jj Tello MD;  Location: [facility]     CATARACT IOL, RT/LT Right      COLONOSCOPY       ESOPHAGOSCOPY, GASTROSCOPY, DUODENOSCOPY (EGD), COMBINED  1/15/2014    Procedure: COMBINED ESOPHAGOSCOPY, GASTROSCOPY, DUODENOSCOPY (EGD), BIOPSY SINGLE OR MULTIPLE;;  Surgeon: Winston Nixon MD;  Location: [facility]     LASER YAG CAPSULOTOMY Right 04/09/2018    right eye     PHACOEMULSIFICATION CLEAR CORNEA WITH STANDARD INTRAOCULAR LENS IMPLANT Right 2/20/2017    Procedure: PHACOEMULSIFICATION CLEAR CORNEA WITH STANDARD INTRAOCULAR LENS IMPLANT;  Surgeon: Mateo rGay MD;  [facility]     PHACOEMULSIFICATION CLEAR CORNEA WITH STANDARD INTRAOCULAR LENS IMPLANT Left 1/10/2022    Procedure: LEFT PHACOEMULSIFICATION, CATARACT, WITH INTRAOCULAR LENS IMPLANT;  Surgeon: Mateo Gray MD;  Location: [facility]     RETINAL REATTACHMENT Right      SURGICAL HISTORY OF -   8/2009    Both Eyelids-.     TONSILLECTOMY  as child       Family History   Problem Relation Age of Onset     Respiratory Mother         copd     LUNG DISEASE Mother      Allergies Brother      Cancer Maternal Grandmother      Heart Disease Paternal Grandmother      Cerebrovascular Disease Father      Cancer Father      Other Cancer Father      Glaucoma Maternal Aunt      Macular Degeneration No family hx of        Social History     Socioeconomic History     Marital status: Single     Spouse name: None     Number of children: 0     Years of education: 12     Highest education level: None   Occupational History     Occupation: Retired 3/2012     Comment: [employer]   Tobacco Use     Smoking status: Former Smoker     Packs/day: 2.00     Years: 15.00     Pack years: 30.00     Types: Cigarettes     Start date: 1968     Quit date: 1983     Years since quittin.5     Smokeless tobacco: Former User     Quit date: 1970     Tobacco comment: smoke free household.   Vaping Use     Vaping Use: Never used   Substance and Sexual Activity     Alcohol use: Not Currently     Comment: Quit in      Drug use: No     Sexual activity: Not Currently     Partners: Female     Comment: 2010  No girlfriend at this time.   Other Topics Concern     Parent/sibling w/ CABG, MI or angioplasty before 65F 55M? No         Allergies   Allergen Reactions     Azathioprine Shortness Of Breath and Other (See Comments)     Was hospitalized from it. Also had high fever, chills, and shortness of breath.     Sulfamethoxazole-Trimethoprim Cough, Itching and Rash     Generalized painful red rash on legs arms and abdomen, chest, back, cough and  fever  that required hospitalization.     Ciprofloxacin Muscle Pain (Myalgia)     Muscle pain  Other reaction(s): Myalgia  Other reaction(s): Myalgia     Ropinirole      Leg pan  GI  Weakness; ? sycope  Other reaction(s): Leg Pain         Current Outpatient Medications:      albuterol (PROAIR HFA/PROVENTIL HFA/VENTOLIN HFA) 108 (90 Base) MCG/ACT inhaler, INHALE 2 PUFFS BY MOUTH EVERY 4 HOURS FOR SHORTNESS OF BREATH, COUGH, OR WITH EXERCISE, Disp: 18 g, Rfl: 1     amLODIPine (NORVASC) 10 MG tablet, Take 1 tablet (10 mg) by mouth daily, Disp: 90 tablet, Rfl: 3     aspirin 81 MG tablet, Take 1 tablet by mouth daily., Disp: , Rfl:      atorvastatin (LIPITOR) 20 MG tablet, Take 1 tablet (20 mg) by mouth daily, Disp: 90 tablet, Rfl: 3     blood glucose (ONETOUCH VERIO IQ) test strip, Use to test blood sugar 3 times daily or as directed., Disp: 100 strip, Rfl: 3     blood glucose monitoring (NO BRAND SPECIFIED) meter device kit, Use to test  "blood sugar 1 times daily or as directed., Disp: 1 kit, Rfl: 0     ClonAZEPAM (KLONOPIN) 0.5 MG tablet, Take 0.5 mg by mouth Take 1 tablet by mouth daily at bedtime, Disp: , Rfl:      ferrous sulfate (FEROSUL) 325 (65 Fe) MG tablet, Take 1 tablet (325 mg) by mouth daily (with breakfast), Disp: 90 tablet, Rfl: 4     fluticasone-salmeterol (ADVAIR) 250-50 MCG/ACT inhaler, TAKE 1 PUFF BY MOUTH TWICE A DAY, Disp: 1 each, Rfl: 2     furosemide (LASIX) 20 MG tablet, TAKE 2 TABLETS (40 MG) IN THE MORNING AND 1 TABLET (20 MG) AT LEAST 6 HOURS LATER IN THE AFTERNOON., Disp: 180 tablet, Rfl: 2     Lancets (ONETOUCH DELICA PLUS [id number]) MISC, USE ONE DAILY AS DIRECTED, Disp: 100 each, Rfl: 2     latanoprost (XALATAN) 0.005 % ophthalmic solution, Place 1 drop into both eyes At Bedtime, Disp: 7.5 mL, Rfl: 3     lisinopril (ZESTRIL) 40 MG tablet, Take 1 tablet (40 mg) by mouth daily, Disp: 90 tablet, Rfl: 3     metFORMIN (GLUCOPHAGE XR) 500 MG 24 hr tablet, TAKE 1 TABLET BY MOUTH TWICE A DAY WITH MEALS, Disp: 180 tablet, Rfl: 1     OXcarbazepine (TRILEPTAL) 300 MG tablet, TAKE 1 TABLET BY MOUTH TWICE A DAY, Disp: 180 tablet, Rfl: 1     pantoprazole (PROTONIX) 20 MG EC tablet, TAKE 1 TABLET BY MOUTH EVERY DAY, Disp: 90 tablet, Rfl: 1     predniSONE (DELTASONE) 5 MG tablet, TAKE 2 TABLETS BY MOUTH EVERY DAY, Disp: 60 tablet, Rfl: 1     pyridostigmine (MESTINON) 60 MG tablet, TAKE UP TO 2 TABLETS (120 MG) IN THE MORNING AND AT NOON (Patient taking differently: Take 60 mg by mouth 3 times daily TAKE UP TO 2 TABLETS (120 MG) IN THE MORNING AND AT NOON), Disp: 120 tablet, Rfl: 2     sodium bicarbonate 650 MG tablet, TAKE 1/2 TABLET BY MOUTH TWICE A DAY, Disp: 30 tablet, Rfl: 1     timolol maleate (TIMOPTIC) 0.5 % ophthalmic solution, Place 1 drop into both eyes 2 times daily, Disp: 5 mL, Rfl: 11      Physical Exam:  /59   Pulse 59   Resp 16   Ht 1.753 m (5' 9.02\")   Wt 102.1 kg (225 lb)   SpO2 96%   BMI 33.21 kg/m  "   GENERAL: Well developed, well nourished, alert, and in no apparent distress.  HEENT: Normocephalic, atraumatic. PERRL, EOMI. Oral mucosa is moist. No perioral cyanosis.  NECK: supple, no masses, no thyromegaly.  RESP:  Normal respiratory effort.  CTAB.  No rales, wheezes, rhonchi.  No cyanosis or clubbing.  CV: Normal S1, S2, regular rhythm, normal rate. No murmur.  No LE edema.   ABDOMEN:  Soft, non-tender, non-distended.   SKIN: warm and dry. No rash.  NEURO: AAOx3.  Normal gait.  Fluent speech.  PSYCH: mentation appears normal.       Results:  PFTs: Mild obstruction with stable lung functions  Most Recent [facility] Pulmonary Function Testing    FVC-Pred   Date Value Ref Range Status   06/29/2022 3.96 L      FVC-Pre   Date Value Ref Range Status   06/29/2022 3.14 L      FVC-%Pred-Pre   Date Value Ref Range Status   06/29/2022 79 %      FEV1-Pre   Date Value Ref Range Status   06/29/2022 2.05 L      FEV1-%Pred-Pre   Date Value Ref Range Status   06/29/2022 68 %      FEV1FVC-Pred   Date Value Ref Range Status   06/29/2022 76 %      FEV1FVC-Pre   Date Value Ref Range Status   06/29/2022 65 %      No results found for: 20029  FEFMax-Pred   Date Value Ref Range Status   06/29/2022 7.72 L/sec      FEFMax-Pre   Date Value Ref Range Status   06/29/2022 6.76 L/sec      FEFMax-%Pred-Pre   Date Value Ref Range Status   06/29/2022 87 %      ExpTime-Pre   Date Value Ref Range Status   06/29/2022 9.73 sec      FIFMax-Pre   Date Value Ref Range Status   06/29/2022 5.83 L/sec      FEV1FEV6-Pred   Date Value Ref Range Status   06/29/2022 77 %      FEV1FEV6-Pre   Date Value Ref Range Status   06/29/2022 68 %      No results found for: 20055  Imaging (personally reviewed in clinic today):  XRAY:  Chest 2 views:  Personally reviewed by me - no airspace, soft tissue, cardiac or bony abnormalities.      Assessment and Plan:   COPD (Group B)  Excellent control with with a CAT score of 11 while on intermediate dose salmeterol/fluticasone  and I will continue him on the current regimen.  He doesn't require his rescue inhaler and there has been no AECOPDs since the last clinic visit.  He is fairly active at baseline.  He does not qualify for lung cancer screening    Questions and concerns were answered to the patient's satisfaction.  he was provided with my contact information should new questions or concerns arise in the interim.  he should return to clinic in 12 months   Up to date on vaccination  I spent a total of 40 minutes face to face with Rakan Nolsaco during today's office visit. Over 50% of this time was spent counseling the patient and/or coordinating care regarding their pulmonary disease.    Servando Mejia MD  Pulmonary, Critical Care and Sleep Medicine  [facility]  Pager: [phone]        The above note was dictated using voice recognition software and may include typographical errors. Please contact the author for any clarifications.

## 2023-01-09 NOTE — PROGRESS NOTES
"SUBJECTIVE:   Rakan is a 77 year old who presents for Preventive Visit    .Patient has been advised of split billing requirements and indicates understanding: Yes  Are you in the first 12 months of your Medicare coverage?  No    Healthy Habits:     In general, how would you rate your overall health?  Good    Frequency of exercise:  1 day/week    Duration of exercise:  Less than 15 minutes    Do you usually eat at least 4 servings of fruit and vegetables a day, include whole grains    & fiber and avoid regularly eating high fat or \"junk\" foods?  Yes    Taking medications regularly:  Yes    Barriers to taking medications:  None    Medication side effects:  Muscle aches and Lightheadedness    Ability to successfully perform activities of daily living:  No assistance needed    Home Safety:  No safety concerns identified    Hearing Impairment:  Difficulty following a conversation in a noisy restaurant or crowded room, feel that people are mumbling or not speaking clearly and need to ask people to speak up or repeat themselves    In the past 6 months, have you been bothered by leaking of urine? Yes    In general, how would you rate your overall mental or emotional health?  Good      PHQ-2 Total Score: 0    Additional concerns today:  Yes      Concerns:  Hearing loss: Has hearing aids     SOB/CP: on and off    Frequency/impotence: since prostate surgery    Arthralgia//Myalgias: Legs and back been bothering him more, sciatica henry if stands for long on and off. Had orthotics. Has Rt hip arthritis.    Dizziness/Weakness: on going    There are several components of CGA that should be evaluated, including   Functional capacity  Fall risk: good except for the neuropathy  Cognition: Good  Mood: good  Polypharmacy: follows with [facility]  Urinary incontinence: had prostate surgery, has urgency, wear pad incase  Sexual function: Been gone since prostate surgery in 2018  Living situation: Home with girlfriend, family: has nieces and " crow  Social support: good  Financial concerns: none  Goals of care/Advance care preferences  Have you ever done Advance Care Planning? (For example, a Health Directive, POLST, or a discussion with a medical provider or your loved ones about your wishes): Yes, advance care planning is on file.       Fall risk  Fallen 2 or more times in the past year?: No  Any fall with injury in the past year?: No    Cognitive Screening   1) Repeat 3 items (Leader, Season, Table)      2) Clock draw:   NORMAL  3) 3 item recall:   Recalls 3 objects  Results: NORMAL clock, 1-2 items recalled: COGNITIVE IMPAIRMENT LESS LIKELY    Mini-CogTM Copyright S Barry. Licensed by the author for use in [facility]; reprinted with permission (soob@[facility]). All rights reserved.      Do you have sleep apnea, excessive snoring or daytime drowsiness?: yes    Reviewed and updated as needed this visit by clinical staff   Tobacco  Allergies  Meds              Reviewed and updated as needed this visit by Provider                 Social History     Tobacco Use     Smoking status: Former     Packs/day: 2.00     Years: 15.00     Pack years: 30.00     Types: Cigarettes     Start date: 1968     Quit date: 1983     Years since quittin.0     Smokeless tobacco: Former     Quit date: 1970     Tobacco comments:     smoke free household.   Substance Use Topics     Alcohol use: Not Currently     Comment: Quit in      If you drink alcohol do you typically have >3 drinks per day or >7 drinks per week? No    Alcohol Use 2023   Prescreen: >3 drinks/day or >7 drinks/week? Not Applicable   Prescreen: >3 drinks/day or >7 drinks/week? -   No flowsheet data found.        PROBLEMS TO ADD ON...    Current providers sharing in care for this patient include:   Patient Care Team:  Telly Lucio MD as PCP - General (Family Practice)  Arpan Harrison MD as Assigned Neuroscience Provider  Jenn Keane APRN  CNP as Nurse Practitioner (Family Medicine)  Mateo Gray MD as Assigned Surgical Provider  Servando Mejia MD as Assigned Pulmonology Provider  Telly Lucio MD as Assigned PCP  Ana Aleman PharmD as Assigned [facility] Pharmacist    The following health maintenance items are reviewed in Epic and correct as of today:  Health Maintenance   Topic Date Due     COVID-19 Vaccine (5 - Booster for Moderna series) 09/02/2022     MEDICARE ANNUAL WELLNESS VISIT  01/04/2023     A1C  02/25/2023     BMP  04/11/2023     DTAP/TDAP/TD IMMUNIZATION (2 - Td or Tdap) 05/06/2023     LIPID  07/01/2023     MICROALBUMIN  07/08/2023     ANNUAL REVIEW OF HM ORDERS  07/08/2023     DIABETIC FOOT EXAM  11/25/2023     EYE EXAM  12/01/2023     FALL RISK ASSESSMENT  01/09/2024     ADVANCE CARE PLANNING  01/04/2027     COLORECTAL CANCER SCREENING  09/18/2028     SPIROMETRY  Completed     HEPATITIS C SCREENING  Completed     COPD ACTION PLAN  Completed     PHQ-2 (once per calendar year)  Completed     INFLUENZA VACCINE  Completed     Pneumococcal Vaccine: 65+ Years  Completed     ZOSTER IMMUNIZATION  Completed     AORTIC ANEURYSM SCREENING (SYSTEM ASSIGNED)  Completed     IPV IMMUNIZATION  Aged Out     MENINGITIS IMMUNIZATION  Aged Out     Patient Active Problem List   Diagnosis     GERD (gastroesophageal reflux disease)     Fatigue     HLD (hyperlipidemia)     Borderline glaucoma with ocular hypertension     Advanced directives, counseling/discussion     Trigeminal neuralgia pain     HTN (hypertension)     Health Care Home     Arthritis     Dry eyes     PVD (posterior vitreous detachment) OU     H/O RD (retinal detachment) s/p repair with PPV (AB)     Epiretinal membrane, bilateral     Pseudophakia of right eye     Myasthenia gravis (H)     Morbid obesity (H)     Right posterior capsular opacification     Demand ischemia (H)     Obstructive sleep apnea syndrome     Prostate cancer (H)     Restless legs syndrome     Temporal arteritis  (H)     Type 2 diabetes mellitus with hyperglycemia, without long-term current use of insulin (H)     Hyponatremia     COPD (chronic obstructive pulmonary disease) (H)     Combined form of age-related cataract, left eye     Atypical chest pain     Claudication of both lower extremities (H)     Past Surgical History:   Procedure Laterality Date     BIOPSY ARTERY TEMPORAL Bilateral 2017    Procedure: BIOPSY ARTERY TEMPORAL;  Bilateral temporal artery Biopsy;  Surgeon: Jj Tello MD;  Location: [facility]     CATARACT IOL, RT/LT Right      COLONOSCOPY       ESOPHAGOSCOPY, GASTROSCOPY, DUODENOSCOPY (EGD), COMBINED  1/15/2014    Procedure: COMBINED ESOPHAGOSCOPY, GASTROSCOPY, DUODENOSCOPY (EGD), BIOPSY SINGLE OR MULTIPLE;;  Surgeon: Winston Nixon MD;  Location: [facility]     LASER YAG CAPSULOTOMY Right 2018    right eye     PHACOEMULSIFICATION CLEAR CORNEA WITH STANDARD INTRAOCULAR LENS IMPLANT Right 2017    Procedure: PHACOEMULSIFICATION CLEAR CORNEA WITH STANDARD INTRAOCULAR LENS IMPLANT;  Surgeon: Mateo Gray MD;  Location: [facility]     PHACOEMULSIFICATION CLEAR CORNEA WITH STANDARD INTRAOCULAR LENS IMPLANT Left 1/10/2022    Procedure: LEFT PHACOEMULSIFICATION, CATARACT, WITH INTRAOCULAR LENS IMPLANT;  Surgeon: Mateo Gray MD;  Location: [facility]     RETINAL REATTACHMENT Right      SURGICAL HISTORY OF -   2009    Both Eyelids-.     TONSILLECTOMY  as child       Social History     Tobacco Use     Smoking status: Former     Packs/day: 2.00     Years: 15.00     Pack years: 30.00     Types: Cigarettes     Start date: 1968     Quit date: 1983     Years since quittin.0     Smokeless tobacco: Former     Quit date: 1970     Tobacco comments:     smoke free household.   Substance Use Topics     Alcohol use: Not Currently     Comment: Quit in      Family History   Problem Relation Age of Onset     Respiratory Mother         copd     LUNG DISEASE Mother       "Allergies Brother      Cancer Maternal Grandmother      Heart Disease Paternal Grandmother      Cerebrovascular Disease Father      Cancer Father      Other Cancer Father      Glaucoma Maternal Aunt      Macular Degeneration No family hx of          Review of Systems   Constitutional: Negative for chills and fever.   HENT: Positive for hearing loss. Negative for congestion, ear pain and sore throat.    Eyes: Negative for pain and visual disturbance.   Respiratory: Positive for shortness of breath. Negative for cough.    Cardiovascular: Positive for chest pain. Negative for palpitations and peripheral edema.   Gastrointestinal: Negative for abdominal pain, constipation, diarrhea, heartburn, hematochezia and nausea.   Genitourinary: Positive for frequency, impotence and urgency. Negative for dysuria, genital sores, hematuria and penile discharge.   Musculoskeletal: Positive for arthralgias and myalgias. Negative for joint swelling.   Skin: Negative for rash.   Neurological: Positive for dizziness and weakness. Negative for headaches and paresthesias.   Psychiatric/Behavioral: Negative for mood changes. The patient is not nervous/anxious.      OBJECTIVE:   /73 (BP Location: Right arm, Patient Position: Chair, Cuff Size: Adult Regular)   Pulse 69   Temp 97.6  F (36.4  C) (Temporal)   Resp 16   Ht 1.753 m (5' 9\")   Wt 102.2 kg (225 lb 6 oz)   SpO2 98%   BMI 33.28 kg/m   Estimated body mass index is 33.28 kg/m  as calculated from the following:    Height as of this encounter: 1.753 m (5' 9\").    Weight as of this encounter: 102.2 kg (225 lb 6 oz).  Physical Exam  GENERAL: healthy, alert and no distress  EYES: Eyes grossly normal to inspection, PERRL and conjunctivae and sclerae normal  HENT: ear canals and TM's normal, nose and mouth without ulcers or lesions  NECK: no adenopathy and thyroid normal to palpation  RESP: lungs clear to auscultation - no rales, rhonchi or wheezes  CV: regular rate and rhythm, " "normal S1 S2, no S3 or S4, no murmur, click or rub, no peripheral edema   ABDOMEN: soft, nontender, no hepatosplenomegaly, no masses and bowel sounds normal  MS: no gross musculoskeletal defects noted, unsteady gait  SKIN: no suspicious lesions or rashes  NEURO: Normal strength and tone, mentation intact and speech normal  PSYCH: mentation appears normal, affect normal/bright    Diagnostic Test Results:  Labs reviewed in Epic    ASSESSMENT / PLAN:   Rakan was seen today for wellness visit.    Diagnoses and all orders for this visit:    Encounter for Medicare annual wellness exam    Hyperlipidemia LDL goal <130  -     atorvastatin (LIPITOR) 20 MG tablet; Take 1 tablet (20 mg) by mouth daily    HTN, goal below 140/90  -     amLODIPine (NORVASC) 10 MG tablet; Take 1 tablet (10 mg) by mouth daily    Benign essential hypertension  -     lisinopril (ZESTRIL) 40 MG tablet; Take 1 tablet (40 mg) by mouth daily    Type 2 diabetes mellitus with hyperglycemia, without long-term current use of insulin (H)       -  Well controlled.    Claudication of both lower extremities (H)       -   Stable at this time.    Myasthenia gravis (H)       -  Following with ophthalmology.    Temporal arteritis (H)      -   In remission.    Chronic obstructive pulmonary disease, unspecified COPD type (H)       -  Stable on albuterol and Advair.    Malignant neoplasm of prostate (H)      -  Had surgery.      Patient has been advised of split billing requirements and indicates understanding: Yes      COUNSELING:  Reviewed preventive health counseling, as reflected in patient instructions       Regular exercise       Healthy diet/nutrition       Bladder control       Fall risk prevention       Immunizations    Declined: Covid-19 due to Other Recent Covid infection          Morbid obesity (H) BMI 33.28    Estimated body mass index is 33.28 kg/m  as calculated from the following:    Height as of this encounter: 1.753 m (5' 9\").    Weight as of this " encounter: 102.2 kg (225 lb 6 oz).   Weight management plan: Discussed healthy diet and exercise guidelines      He reports that he quit smoking about 40 years ago. His smoking use included cigarettes. He started smoking about 55 years ago. He has a 30.00 pack-year smoking history. He quit smokeless tobacco use about 53 years ago.      Appropriate preventive services were discussed with this patient, including applicable screening as appropriate for cardiovascular disease, diabetes, osteopenia/osteoporosis, and glaucoma.  As appropriate for age/gender, discussed screening for colorectal cancer, prostate cancer, breast cancer, and cervical cancer. Checklist reviewing preventive services available has been given to the patient.    Reviewed patients plan of care and provided an AVS. The Basic Care Plan (routine screening as documented in Health Maintenance) for Rakan meets the Care Plan requirement. This Care Plan has been established and reviewed with the Patient.    {Counseling Resources  [place] Preventive Services Task Force  Cholesterol Screening  Health diet/nutrition  Pooled Cohorts Equation Calculator  [employer]'s MyPlate  ASA Prophylaxis  Lung CA Screening  Osteoporosis prevention/bone health   {Prostate Cancer Screening  Consider for men 55-69 per guidance from USPSTF     Telly Lucio MD  [facility]    Identified Health Risks:    He is at risk for lack of exercise and has been provided with information to increase physical activity for the benefit of his well-being.  The patient was provided with written information regarding signs of hearing loss.  Information on urinary incontinence and treatment options given to patient.

## 2023-01-09 NOTE — PROGRESS NOTES
"Rakan Nolasco's goals for this visit include: Return  He requests these members of his care team be copied on today's visit information: PCP    PCP: Telly Lucio    Referring Provider:  No referring provider defined for this encounter.    /59   Pulse 59   Resp 16   Ht 1.753 m (5' 9.02\")   Wt 102.1 kg (225 lb)   SpO2 96%   BMI 33.21 kg/m      Do you need any medication refills at today's visit? N      Arun Lyn LPN  Pulmonary Medicine:  [facility]  Phone: [phone] Fax: [phone]      "

## 2023-01-09 NOTE — PATIENT INSTRUCTIONS
Patient Education   Personalized Prevention Plan  You are due for the preventive services outlined below.  Your care team is available to assist you in scheduling these services.  If you have already completed any of these items, please share that information with your care team to update in your medical record.  Health Maintenance Due   Topic Date Due     COVID-19 Vaccine (5 - Booster for Moderna series) 09/02/2022     Annual Wellness Visit  01/04/2023       Exercise for a Healthier Heart  You may wonder how you can improve the health of your heart. If you re thinking about exercise, you re on the right track. You don t need to become an athlete. But you do need a certain amount of brisk exercise to help strengthen your heart. If you have been diagnosed with a heart condition, your healthcare provider may advise exercise to help stabilize your condition. To help make exercise a habit, choose safe, fun activities.      Exercise with a friend. When activity is fun, you're more likely to stick with it.   Before you start  Check with your healthcare provider before starting an exercise program. This is especially important if you have not been active for a while. It's also important if you have a long-term (chronic) health problem such as heart disease, diabetes, or obesity. Or if you are at high risk for having these problems.   Why exercise?  Exercising regularly offers many healthy rewards. It can help you do all of the following:     Improve your blood cholesterol level to help prevent further heart trouble    Lower your blood pressure to help prevent a stroke or heart attack    Control diabetes, or reduce your risk of getting this disease    Improve your heart and lung function    Reach and stay at a healthy weight    Make your muscles stronger so you can stay active    Prevent falls and fractures by slowing the loss of bone mass (osteoporosis)    Manage stress better    Reduce your blood pressure    Improve  your sense of self and your body image  Exercise tips      Ease into your routine. Set small goals. Then build on them. If you are not sure what your activity level should be, talk with your healthcare provider first before starting an exercise routine.    Exercise on most days. Aim for a total of 150 minutes (2 hours and 30 minutes) or more of moderate-intensity aerobic activity each week. Or 75 minutes (1 hour and 15 minutes) or more of vigorous-intensity aerobic activity each week. Or try for a combination of both. Moderate activity means that you breathe heavier and your heart rate increases but you can still talk. Think about doing 40 minutes of moderate exercise, 3 to 4 times a week. For best results, activity should last for about 40 minutes to lower blood pressure and cholesterol. It's OK to work up to the 40-minute period over time. Examples of moderate-intensity activity are walking 1 mile in 15 minutes. Or doing 30 to 45 minutes of yard work.    Step up your daily activity level.  Along with your exercise program, try being more active the whole day. Walk instead of drive. Or park further away so that you take more steps each day. Do more household tasks or yard work. You may not be able to meet the advised mount of physical activity. But doing some moderate- or vigorous-intensity aerobic activity can help reduce your risk for heart disease. Your healthcare provider can help you figure out what is best for you.    Choose 1 or more activities you enjoy.  Walking is one of the easiest things you can do. You can also try swimming, riding a bike, dancing, or taking an exercise class.    When to call your healthcare provider  Call your healthcare provider if you have any of these:     Chest pain or feel dizzy or lightheaded    Burning, tightness, pressure, or heaviness in your chest, neck, shoulders, back, or arms    Abnormal shortness of breath    More joint or muscle pain    A very fast or irregular  heartbeat (palpitations)  [email or web address] last reviewed this educational content on 7/1/2019 2000-2021 [employer]. All rights reserved. This information is not intended as a substitute for professional medical care. Always follow your healthcare professional's instructions.          Signs of Hearing Loss      Hearing much better with one ear can be a sign of hearing loss.   Hearing loss is a problem shared by many people. In fact, it is one of the most common health problems, particularly as people age. Most people age 65 and older have some hearing loss. By age 80, almost everyone does. Hearing loss often occurs slowly over the years. So you may not realize your hearing has gotten worse.  Have your hearing checked  Call your healthcare provider if you:    Have to strain to hear normal conversation    Have to watch other people s faces very carefully to follow what they re saying    Need to ask people to repeat what they ve said    Often misunderstand what people are saying    Turn the volume of the television or radio up so high that others complain    Feel that people are mumbling when they re talking to you    Find that the effort to hear leaves you feeling tired and irritated    Notice, when using the phone, that you hear better with one ear than the other  [email or web address] last reviewed this educational content on 1/1/2020 2000-2021 [employer]. All rights reserved. This information is not intended as a substitute for professional medical care. Always follow your healthcare professional's instructions.          Urinary Incontinence (Male)    Urinary incontinence means not being able to control the release of urine from the bladder.   Causes  Common causes of urinary incontinence in men include:    Infection    Certain medicines    Aging    Poor pelvic muscle tone    Bladder spasms    Obesity    Trouble urinating and fully emptying the bladder (urinary retention)  Other things that can cause  incontinence are:     Nervous system diseases    Diabetes    Sleep apnea    Urinary tract infections    Prostate surgery    Pelvic injury  Constipation and smoking have also been identified as risk factors.   Symptoms    Urge incontinence (overactive bladder). This is a sudden urge to urinate. It occurs even though there may not be much urine in the bladder. The need to urinate often during the night is common. It's due to bladder spasms.    Stress incontinence. This is urine leakage that you can't control. It can occur with sneezing, coughing, and other actions that put stress on the bladder.    Treatment  Treatment depends on what is causing the condition. Bladder infections are treated with antibiotics. Urinary retention is treated with a bladder catheter.   Home care  Follow these guidelines when caring for yourself at home:    Don't have any foods and drinks that may irritate the bladder. This includes:  ? Chocolate  ? Alcohol  ? Caffeine  ? Carbonated drinks  ? Acidic fruits and juices    Limit fluids to 6 to 8 cups a day.    Lose weight if you are overweight. This will reduce your symptoms.    If advised, do regular pelvic muscle-strengthening exercises such as Kegel exercises.    If needed, wear absorbent pads to catch urine. Change the pads often. This is for good hygiene and to prevent skin and bladder infections.    Bathe daily for good hygiene.    If an antibiotic was prescribed to treat a bladder infection, take it until it's finished. Keep taking it even if you are feeling better. This is to make sure your infection has cleared.    If a catheter was left in place, keep bacteria from getting into the collection bag. Don't disconnect the catheter from the collection bag.    Use a leg band to secure the catheter drainage tube, so it does not pull on the catheter. Drain the collection bag when it becomes full. To do this, use the drain spout at the bottom of the bag. Don't disconnect the bag from the  catheter.    Don't pull on or try to remove a catheter. The catheter must be removed by a healthcare provider.    If you smoke, stop. Ask your provider for help if you can't do this on your own.  Follow-up care  Follow up with your healthcare provider, or as advised.  When to get medical advice  Call your healthcare provider right away if any of these occur:    Fever over 100.4 F (38 C), or as directed by your provider    Bladder pain or fullness    Belly swelling, nausea, or vomiting    Back pain    Weakness, dizziness, or fainting    If a catheter was left in place, return if:  ? The catheter falls out  ? The catheter stops draining for 6 hours  ? Your urine gets cloudy or smells bad  [employer] last reviewed this educational content on 1/1/2020 2000-2021 [employer]. All rights reserved. This information is not intended as a substitute for professional medical care. Always follow your healthcare professional's instructions.

## 2023-01-11 NOTE — PROGRESS NOTES
Patient seen in consultation for gallstones by Josh Diaz    HPI:  Patient is a 72 year old male  with complaints of RUQ abd pain  The patient noticed the symptoms about 11 days ago. Some RUQ discomfort started maybe 11/20  Ended up going to ER  Day before Thanksgiving  Pain area was from epigastric area around right side  Did end up getting diagnosed with Shingles and this is where the rash is  Pain fairly constant since it started  No association with eating, has tried low fat things  Had CT done at ER and they saw gallstones  Ibuprofen makes the episode better, takes it sometimes when needed to sleep  Patient has family history of gallbladder problems in 1 sibling    Review Of Systems    Skin: shingles as above  Ears/Nose/Throat: negative  Respiratory: No shortness of breath, dyspnea on exertion, cough, or hemoptysis  Cardiovascular: negative  Gastrointestinal: negative  Genitourinary: negative  Musculoskeletal: negative  Neurologic: myasthenia gravis  Hematologic/Lymphatic/Immunologic: negative  Endocrine: on prednisone now, gaining weight      Past Medical History:   Diagnosis Date     Arthritis          BMI 31.0-31.9,adult      Diverticulosis     Colonoscopy 8/2008     Fatty liver     see US  5/2012     GERD (gastroesophageal reflux disease)      Glaucoma (increased eye pressure)      HTN (hypertension)      Hyperlipidemia LDL goal < 130     age, htn, fhx     Irritable bowel      Nonsenile cataract      ROSIE (obstructive sleep apnea) 01/2011    Using CPAP;      Prediabetes      Restless leg syndrome      Trigeminal neuralgia pain 1/4/2012       Past Surgical History:   Procedure Laterality Date     BIOPSY ARTERY TEMPORAL Bilateral 8/1/2017    Procedure: BIOPSY ARTERY TEMPORAL;  Bilateral temporal artery Biopsy;  Surgeon: Jj Tello MD;  Location: [facility] OR     CATARACT IOL, RT/LT Right      COLONOSCOPY       ESOPHAGOSCOPY, GASTROSCOPY, DUODENOSCOPY (EGD), COMBINED  1/15/2014     Procedure: COMBINED ESOPHAGOSCOPY, GASTROSCOPY, DUODENOSCOPY (EGD), BIOPSY SINGLE OR MULTIPLE;;  Surgeon: Winston Nixon MD;  Location: [facility] OR     PHACOEMULSIFICATION CLEAR CORNEA WITH STANDARD INTRAOCULAR LENS IMPLANT Right 2/20/2017    Procedure: PHACOEMULSIFICATION CLEAR CORNEA WITH STANDARD INTRAOCULAR LENS IMPLANT;  Surgeon: Mateo Gray MD;  Location: [facility]     RETINAL REATTACHMENT Right      SURGICAL HISTORY OF -   8/2009    Both Eyelids-.     TONSILLECTOMY  as child       Social History     Social History     Marital status: Single     Spouse name: N/A     Number of children: 0     Years of education: 12     Occupational History     Retired 3/2012      [employer] ([employer])     Social History Main Topics     Smoking status: Former Smoker     Years: 15.00     Types: Cigarettes     Quit date: 1/1/1983     Smokeless tobacco: Former User      Comment: smoke free household.     Alcohol use No      Comment: Quit in 1986     Drug use: No     Sexual activity: Not Currently     Partners: Female      Comment: 4/2010  No girlfriend at this time.     Other Topics Concern     Not on file     Social History Narrative       Current Outpatient Prescriptions   Medication Sig Dispense Refill     pyridostigmine (MESTINON) 60 MG tablet Take 1 tablet three times a day for 1 week, then 1 tablet four times a day 120 tablet 1     metFORMIN (GLUCOPHAGE-XR) 500 MG 24 hr tablet Take 1 tablet (500 mg) by mouth 2 times daily (with meals) 60 tablet 2     pantoprazole (PROTONIX) 20 MG EC tablet TAKE ONE TABLET BY MOUTH ONE TIME DAILY 30-60 MINUTES BEFORE A MEAL 90 tablet 1     lisinopril (PRINIVIL/ZESTRIL) 10 MG tablet Take 1 tablet (10 mg) by mouth daily 90 tablet 3     predniSONE (DELTASONE) 10 MG tablet Week one: 30 mg the morning, week 2 and afterwards: 20 mg the morning 63 tablet 0     timolol (TIMOPTIC) 0.5 % ophthalmic solution Place 1 drop into both eyes daily 1 Bottle 11     atorvastatin  "(LIPITOR) 20 MG tablet TAKE ONE TABLET BY MOUTH ONE TIME DAILY 90 tablet 1     latanoprost (XALATAN) 0.005 % ophthalmic solution Place 1 drop into both eyes At Bedtime 7.5 mL 4     aspirin 81 MG tablet Take 1 tablet by mouth daily.       ClonAZEPAM (KLONOPIN) 0.5 MG tablet Take 0.5 mg by mouth. Take 1 tablet by mouth daily at bedtime       Calcium Carbonate-Vitamin D (CALCIUM 600/VITAMIN D PO) Take  by mouth. Take 1/2 tablet in the morning, 1/2 at night daily       MULTI-DAY OR daily         Medications and history reviewed    Physical exam:  Vitals: /75  Pulse 67  Ht 1.74 m (5' 8.5\")  Wt 109.4 kg (241 lb 3.2 oz)  BMI 36.14 kg/m2  BMI= Body mass index is 36.14 kg/(m^2).    Constitutional: healthy, alert and no distress  Head: Normocephalic. No masses, lesions, tenderness or abnormalities  Cardiovascular: negative, PMI normal. No lifts, heaves, or thrills. RRR. No murmurs, clicks gallops or rub  Respiratory: negative, Percussion normal. Good diaphragmatic excursion. Lungs clear  Gastrointestinal: Obese, Abdomen soft, non-tender. BS normal. No masses, organomegaly  : Deferred  Musculoskeletal: extremities normal- no gross deformities noted, gait normal and normal muscle tone  Skin: dermatomal rash across RUQ to back  Psychiatric: mentation appears normal and affect normal/bright  Hematologic/Lymphatic/Immunologic: Normal cervical lymph nodes  Patient able to get up on table without difficulty.    Labs show:  Results for JR FRANCOIS (MRN [id number]) as of 12/1/2017 07:44   Ref. Range 11/27/2017 09:53   ALT Latest Ref Range: 0 - 70 U/L 35   AST Latest Ref Range: 0 - 45 U/L 20   WBC Latest Ref Range: 4.0 - 11.0 10e9/L 14.1 (H)   Hemoglobin Latest Ref Range: 13.3 - 17.7 g/dL 16.0   Hematocrit Latest Ref Range: 40.0 - 53.0 % 46.8   Platelet Count Latest Ref Range: 150 - 450 10e9/L 260   RBC Count Latest Ref Range: 4.4 - 5.9 10e12/L 4.95   MCV Latest Ref Range: 78 - 100 fl 95   MCH Latest Ref Range: 26.5 - " 33.0 pg 32.3   MCHC Latest Ref Range: 31.5 - 36.5 g/dL 34.2   RDW Latest Ref Range: 10.0 - 15.0 % 12.7   Diff Method Unknown Automated Method   % Neutrophils Latest Units: % 85.3   % Lymphocytes Latest Units: % 8.2   % Monocytes Latest Units: % 5.1   % Eosinophils Latest Units: % 0.4   % Basophils Latest Units: % 0.3   % Immature Granulocytes Latest Units: % 0.7   Nucleated RBCs Latest Ref Range: 0 /100 0   Absolute Neutrophil Latest Ref Range: 1.6 - 8.3 10e9/L 12.0 (H)   Absolute Lymphocytes Latest Ref Range: 0.8 - 5.3 10e9/L 1.2   Absolute Monocytes Latest Ref Range: 0.0 - 1.3 10e9/L 0.7   Absolute Eosinophils Latest Ref Range: 0.0 - 0.7 10e9/L 0.1   Absolute Basophils Latest Ref Range: 0.0 - 0.2 10e9/L 0.0   Abs Immature Granulocytes Latest Ref Range: 0 - 0.4 10e9/L 0.1   Absolute Nucleated RBC Unknown 0.0       Imaging shows:  CT ABDOMEN AND PELVIS HEMATURIA WITHOUT AND WITH IV CONTRAST   11/14/2017 9:47 AM      HISTORY:  Microscopic hematuria. Elevated prostate specific antigen  (PSA). Benign prostatic hyperplasia with lower urinary tract symptoms,  symptom details unspecified.     TECHNIQUE:  CT abdomen and pelvis without and with 95 mL Isovue-370  IV. Radiation dose for this scan was reduced using automated exposure  control, adjustment of the mA and/or kV according to patient size, or  iterative reconstruction technique.      COMPARISON: Abdominal CT 10/30/2017.     FINDINGS:   Lung bases: Minimal dependent atelectasis. Lung bases are clear.     Kidneys: Noncontrast images show no renal, ureteral or bladder  calculi. No hydronephrosis or hydroureter. Postcontrast imaging shows  no enhancing renal masses. Contrast is seen in both ureters. No  filling defects are noted. Bladder is normal.     Abdomen: The spleen, adrenal glands, liver, and pancreas show no focal  abnormality. Layering hyperdense stones are seen in the gallbladder  without adjacent signs to suggest acute cholecystitis. Hepatic and  portal veins  are patent. No intrahepatic or extrahepatic biliary  dilatation. No intraperitoneal free air or free fluid.     Small and large bowel are normal in caliber without evidence of  obstruction. Sigmoid diverticulosis without evidence of  diverticulitis. The appendix is normal. No abdominal or pelvic  lymphadenopathy. No abdominal aortic aneurysm.     Bones: No suspicious bony lesions.         IMPRESSION:  1. No renal, ureteral or bladder calculi. No hydronephrosis or  hydroureter. No enhancing renal masses.  2. Diverticulosis without evidence of diverticulitis.  3. Cholelithiasis without evidence of cholecystitis.    Assessment:     ICD-10-CM    1. Herpes zoster without complication B02.9    2. RUQ abdominal pain R10.11    3. Calculus of gallbladder without cholecystitis without obstruction K80.20      Plan: I think this particular RUQ pain is due to the current shingles. Did go over typical bilary colic symptoms in case something is noticed when the shingles has gone or sometime in the future. Gallbladder pamphlet provided. Questions answered. Follow up with me as needed    Jj Tello MD     2-3 times/wk

## 2023-02-01 DIAGNOSIS — H40.053 BORDERLINE GLAUCOMA WITH OCULAR HYPERTENSION, BILATERAL: ICD-10-CM

## 2023-02-01 RX ORDER — TIMOLOL MALEATE 5 MG/ML
1 SOLUTION/ DROPS OPHTHALMIC 2 TIMES DAILY
Qty: 5 ML | Refills: 11 | Status: SHIPPED | OUTPATIENT
Start: 2023-02-01 | End: 2023-02-22

## 2023-02-01 RX ORDER — TIMOLOL MALEATE 5 MG/ML
1 SOLUTION/ DROPS OPHTHALMIC 2 TIMES DAILY
Qty: 5 ML | Refills: 11 | Status: SHIPPED | OUTPATIENT
Start: 2023-02-01 | End: 2023-02-01

## 2023-02-03 DIAGNOSIS — G70.00 MYASTHENIA GRAVIS, ACHR ANTIBODY POSITIVE (H): ICD-10-CM

## 2023-02-03 RX ORDER — PREDNISONE 5 MG/1
TABLET ORAL
Qty: 60 TABLET | Refills: 2 | Status: SHIPPED | OUTPATIENT
Start: 2023-02-03 | End: 2023-05-08

## 2023-02-04 DIAGNOSIS — R60.9 EDEMA, UNSPECIFIED TYPE: ICD-10-CM

## 2023-02-06 RX ORDER — SODIUM BICARBONATE 650 MG/1
TABLET ORAL
Qty: 30 TABLET | Refills: 0 | Status: SHIPPED | OUTPATIENT
Start: 2023-02-06 | End: 2023-02-20

## 2023-02-21 DIAGNOSIS — R60.9 EDEMA, UNSPECIFIED TYPE: ICD-10-CM

## 2023-02-21 DIAGNOSIS — H40.053 BORDERLINE GLAUCOMA WITH OCULAR HYPERTENSION, BILATERAL: ICD-10-CM

## 2023-02-21 RX ORDER — SODIUM BICARBONATE 650 MG/1
TABLET ORAL
Qty: 30 TABLET | Refills: 1 | Status: SHIPPED | OUTPATIENT
Start: 2023-02-21 | End: 2023-02-24

## 2023-02-22 ENCOUNTER — NURSE TRIAGE (OUTPATIENT)
Dept: NURSING | Facility: CLINIC | Age: 78
End: 2023-02-22
Payer: COMMERCIAL

## 2023-02-22 ENCOUNTER — OFFICE VISIT (OUTPATIENT)
Dept: FAMILY MEDICINE | Facility: CLINIC | Age: 78
End: 2023-02-22
Payer: COMMERCIAL

## 2023-02-22 ENCOUNTER — ANCILLARY PROCEDURE (OUTPATIENT)
Dept: GENERAL RADIOLOGY | Facility: CLINIC | Age: 78
End: 2023-02-22
Attending: STUDENT IN AN ORGANIZED HEALTH CARE EDUCATION/TRAINING PROGRAM
Payer: COMMERCIAL

## 2023-02-22 VITALS
RESPIRATION RATE: 14 BRPM | WEIGHT: 225 LBS | SYSTOLIC BLOOD PRESSURE: 121 MMHG | HEART RATE: 79 BPM | BODY MASS INDEX: 33.33 KG/M2 | TEMPERATURE: 98.4 F | OXYGEN SATURATION: 98 % | DIASTOLIC BLOOD PRESSURE: 72 MMHG | HEIGHT: 69 IN

## 2023-02-22 DIAGNOSIS — M10.072 ACUTE IDIOPATHIC GOUT OF LEFT ANKLE: Primary | ICD-10-CM

## 2023-02-22 DIAGNOSIS — R21 RASH AND NONSPECIFIC SKIN ERUPTION: Primary | ICD-10-CM

## 2023-02-22 DIAGNOSIS — M10.072 ACUTE IDIOPATHIC GOUT OF LEFT ANKLE: ICD-10-CM

## 2023-02-22 LAB
BASOPHILS # BLD AUTO: 0 10E3/UL (ref 0–0.2)
BASOPHILS NFR BLD AUTO: 0 %
CRP SERPL-MCNC: 11.6 MG/L (ref 0–8)
EOSINOPHIL # BLD AUTO: 0 10E3/UL (ref 0–0.7)
EOSINOPHIL NFR BLD AUTO: 0 %
ERYTHROCYTE [DISTWIDTH] IN BLOOD BY AUTOMATED COUNT: 14.5 % (ref 10–15)
ERYTHROCYTE [SEDIMENTATION RATE] IN BLOOD BY WESTERGREN METHOD: 13 MM/HR (ref 0–20)
HCT VFR BLD AUTO: 44.3 % (ref 40–53)
HGB BLD-MCNC: 15.2 G/DL (ref 13.3–17.7)
LYMPHOCYTES # BLD AUTO: 1.1 10E3/UL (ref 0.8–5.3)
LYMPHOCYTES NFR BLD AUTO: 8 %
MCH RBC QN AUTO: 31.1 PG (ref 26.5–33)
MCHC RBC AUTO-ENTMCNC: 34.3 G/DL (ref 31.5–36.5)
MCV RBC AUTO: 91 FL (ref 78–100)
MONOCYTES # BLD AUTO: 0.9 10E3/UL (ref 0–1.3)
MONOCYTES NFR BLD AUTO: 6 %
NEUTROPHILS # BLD AUTO: 12.4 10E3/UL (ref 1.6–8.3)
NEUTROPHILS NFR BLD AUTO: 86 %
PLATELET # BLD AUTO: 278 10E3/UL (ref 150–450)
RBC # BLD AUTO: 4.89 10E6/UL (ref 4.4–5.9)
URATE SERPL-MCNC: 5.6 MG/DL (ref 3.5–7.2)
WBC # BLD AUTO: 14.4 10E3/UL (ref 4–11)

## 2023-02-22 PROCEDURE — 85652 RBC SED RATE AUTOMATED: CPT | Performed by: STUDENT IN AN ORGANIZED HEALTH CARE EDUCATION/TRAINING PROGRAM

## 2023-02-22 PROCEDURE — 36415 COLL VENOUS BLD VENIPUNCTURE: CPT | Performed by: STUDENT IN AN ORGANIZED HEALTH CARE EDUCATION/TRAINING PROGRAM

## 2023-02-22 PROCEDURE — 84550 ASSAY OF BLOOD/URIC ACID: CPT | Performed by: STUDENT IN AN ORGANIZED HEALTH CARE EDUCATION/TRAINING PROGRAM

## 2023-02-22 PROCEDURE — 99214 OFFICE O/P EST MOD 30 MIN: CPT | Performed by: STUDENT IN AN ORGANIZED HEALTH CARE EDUCATION/TRAINING PROGRAM

## 2023-02-22 PROCEDURE — 85025 COMPLETE CBC W/AUTO DIFF WBC: CPT | Performed by: STUDENT IN AN ORGANIZED HEALTH CARE EDUCATION/TRAINING PROGRAM

## 2023-02-22 PROCEDURE — 73610 X-RAY EXAM OF ANKLE: CPT | Mod: TC | Performed by: RADIOLOGY

## 2023-02-22 PROCEDURE — 86140 C-REACTIVE PROTEIN: CPT | Performed by: STUDENT IN AN ORGANIZED HEALTH CARE EDUCATION/TRAINING PROGRAM

## 2023-02-22 RX ORDER — IBUPROFEN 800 MG/1
800 TABLET, FILM COATED ORAL 3 TIMES DAILY
Qty: 45 TABLET | Refills: 0 | Status: ON HOLD | OUTPATIENT
Start: 2023-02-22 | End: 2024-04-01

## 2023-02-22 RX ORDER — TIMOLOL MALEATE 5 MG/ML
1 SOLUTION/ DROPS OPHTHALMIC 2 TIMES DAILY
Qty: 5 ML | Refills: 11 | Status: SHIPPED | OUTPATIENT
Start: 2023-02-22 | End: 2023-12-05

## 2023-02-22 RX ORDER — CEPHALEXIN 500 MG/1
500 CAPSULE ORAL 4 TIMES DAILY
Qty: 20 CAPSULE | Refills: 0 | Status: SHIPPED | OUTPATIENT
Start: 2023-02-22 | End: 2023-06-21

## 2023-02-22 NOTE — TELEPHONE ENCOUNTER
Patient is calling and states that yesterday started hurting and today is swollen and red and very painful.  Left ankle.  Patient denies injury and fall.    Patient denies difficulty breathing and denies entire foot is cool or blue.  Denies fever.  Pain is severe.  Patient states that he will go to urgent care if there are no openings in clinic.      Reason for Disposition    SEVERE pain (e.g., excruciating, unable to walk) and not improved after 2 hours of pain medicine    Additional Information    Negative: Difficulty breathing    Negative: Entire foot is cool or blue in comparison to other side    Negative: Ankle pain and fever    Negative: Ankle redness and fever    Negative: Patient sounds very sick or weak to the triager    Protocols used: ANKLE SWELLING-A-OH

## 2023-02-22 NOTE — PROGRESS NOTES
Assessment & Plan     (M10.072) Acute idiopathic gout of left ankle  (primary encounter diagnosis)  Comment: Acute onset. Notable history of eating meat sticks last night and tomato juice which he has not eaten in a long time. No prior history of similar episode. Joint of left ankle is swollen, warm, and erythematous. Will rule out gouty attack at this time and obtain ESR, CRP, Uric acid and x-ray. Will also obtain CBC to rule out state of infection and if leukocytosis is present will prescribe antibiotic- keflex (as example). Will start patient with Ibuprofen and instructions given on prescription. Pt counseled on food that are triggers for flares and discussion had regarding consideration to start Allopurinol after flare subsides..patient verbalized he will think it over.   Plan: CBC with platelets and differential, CRP,         inflammation, ESR: Erythrocyte sedimentation         rate, Uric acid, XR Ankle Left G/E 3 Views,         ibuprofen (ADVIL/MOTRIN) 800 MG tablet        Pending                See Patient Instructions    No follow-ups on file.    OTONIEL DEAN MD  [facility] THIAGO Bledsoe is a 77 year old accompanied by his spouse, presenting for the following health issues:  Foot Pain      History of Present Illness       Reason for visit:  Swollen and red and painful ankle  Symptom onset:  1-3 days ago  Symptoms include:  Pain swelling redness  Symptom intensity:  Severe  Symptom progression:  Improving  Had these symptoms before:  No  What makes it worse:  Moveing  What makes it better:  Staying still    He eats 2-3 servings of fruits and vegetables daily.He consumes 1 sweetened beverage(s) daily. He exercises with enough effort to increase his heart rate 3 or less days per week.   He is taking medications regularly.          Musculoskeletal problem/pain  Onset/Duration: yesterday afternoon   Description  Location: left  foot   Joint Swelling: YES  Redness: YES  Pain:  "YES. Burning constant pain.   Warmth: YES  Intensity:  Currently 5/10 last night 8/10.   Accompanying signs and symptoms:   Fevers: No  Numbness/tingling/weakness: Patient has neuropathy.     Precipitating or alleviating factors:  Aggravating factors include: Pain while walking.   Therapies tried and outcome: Tylenol this morning           Review of Systems   CONSTITUTIONAL: NEGATIVE for fever, chills, change in weight  ENT/MOUTH: NEGATIVE for ear, mouth and throat problems  RESP: NEGATIVE for significant cough or SOB  CV: NEGATIVE for chest pain, palpitations or peripheral edema  MUSCULOSKELETAL: POSITIVE  for joint pain left ankle, joint swelling left ankle and joint warmth left ankle      Objective    /72   Pulse 79   Temp 98.4  F (36.9  C) (Oral)   Resp 14   Ht 1.753 m (5' 9\")   Wt 102.1 kg (225 lb)   SpO2 98%   BMI 33.23 kg/m    Body mass index is 33.23 kg/m .  Physical Exam   GENERAL: healthy, alert and no distress  EYES: Eyes grossly normal to inspection, PERRL and conjunctivae and sclerae normal  MS: LLE exam shows swelling which is notable to palpation on medial and lateral malleolus. Slight swelling located on dorsum of foot   SKIN: erythema - feet- lateral into plantar side of food erythema noted along with along medial malleolus                     "

## 2023-02-23 DIAGNOSIS — R60.9 EDEMA, UNSPECIFIED TYPE: ICD-10-CM

## 2023-02-24 RX ORDER — SODIUM BICARBONATE 650 MG/1
TABLET ORAL
Qty: 90 TABLET | Refills: 0 | Status: SHIPPED | OUTPATIENT
Start: 2023-02-24 | End: 2023-03-15

## 2023-03-01 DIAGNOSIS — K21.9 GASTROESOPHAGEAL REFLUX DISEASE WITHOUT ESOPHAGITIS: ICD-10-CM

## 2023-03-01 RX ORDER — PANTOPRAZOLE SODIUM 20 MG/1
TABLET, DELAYED RELEASE ORAL
Qty: 90 TABLET | Refills: 1 | Status: SHIPPED | OUTPATIENT
Start: 2023-03-01 | End: 2023-07-12

## 2023-03-15 DIAGNOSIS — R60.9 EDEMA, UNSPECIFIED TYPE: ICD-10-CM

## 2023-03-15 RX ORDER — SODIUM BICARBONATE 650 MG/1
TABLET ORAL
Qty: 30 TABLET | Refills: 2 | Status: SHIPPED | OUTPATIENT
Start: 2023-03-15 | End: 2023-04-17

## 2023-04-12 ENCOUNTER — ANCILLARY PROCEDURE (OUTPATIENT)
Dept: BONE DENSITY | Facility: CLINIC | Age: 78
End: 2023-04-12
Attending: PSYCHIATRY & NEUROLOGY
Payer: COMMERCIAL

## 2023-04-12 DIAGNOSIS — M81.0 AGE-RELATED OSTEOPOROSIS WITHOUT CURRENT PATHOLOGICAL FRACTURE: ICD-10-CM

## 2023-04-12 PROCEDURE — 77080 DXA BONE DENSITY AXIAL: CPT | Performed by: INTERNAL MEDICINE

## 2023-04-13 ENCOUNTER — LAB (OUTPATIENT)
Dept: LAB | Facility: CLINIC | Age: 78
End: 2023-04-13
Payer: COMMERCIAL

## 2023-04-13 ENCOUNTER — OFFICE VISIT (OUTPATIENT)
Dept: NEUROLOGY | Facility: CLINIC | Age: 78
End: 2023-04-13
Payer: COMMERCIAL

## 2023-04-13 VITALS
OXYGEN SATURATION: 99 % | HEART RATE: 53 BPM | BODY MASS INDEX: 33.23 KG/M2 | SYSTOLIC BLOOD PRESSURE: 130 MMHG | WEIGHT: 225 LBS | DIASTOLIC BLOOD PRESSURE: 72 MMHG | RESPIRATION RATE: 16 BRPM

## 2023-04-13 DIAGNOSIS — G50.0 TRIGEMINAL NEURALGIA: ICD-10-CM

## 2023-04-13 DIAGNOSIS — G70.00 MYASTHENIA GRAVIS WITHOUT EXACERBATION (H): ICD-10-CM

## 2023-04-13 DIAGNOSIS — E11.65 TYPE 2 DIABETES MELLITUS WITH HYPERGLYCEMIA, WITHOUT LONG-TERM CURRENT USE OF INSULIN (H): Primary | ICD-10-CM

## 2023-04-13 DIAGNOSIS — G70.00 MYASTHENIA GRAVIS WITHOUT EXACERBATION (H): Primary | ICD-10-CM

## 2023-04-13 LAB
ALBUMIN SERPL BCG-MCNC: 4.4 G/DL (ref 3.5–5.2)
ALP SERPL-CCNC: 55 U/L (ref 40–129)
ALT SERPL W P-5'-P-CCNC: 25 U/L (ref 10–50)
ANION GAP SERPL CALCULATED.3IONS-SCNC: 9 MMOL/L (ref 7–15)
AST SERPL W P-5'-P-CCNC: 24 U/L (ref 10–50)
BILIRUB SERPL-MCNC: 0.3 MG/DL
BUN SERPL-MCNC: 20.6 MG/DL (ref 8–23)
CALCIUM SERPL-MCNC: 10.2 MG/DL (ref 8.8–10.2)
CHLORIDE SERPL-SCNC: 101 MMOL/L (ref 98–107)
CREAT SERPL-MCNC: 1 MG/DL (ref 0.67–1.17)
DEPRECATED HCO3 PLAS-SCNC: 29 MMOL/L (ref 22–29)
GFR SERPL CREATININE-BSD FRML MDRD: 78 ML/MIN/1.73M2
GLUCOSE SERPL-MCNC: 149 MG/DL (ref 70–99)
HBA1C MFR BLD: 6.6 %
POTASSIUM SERPL-SCNC: 4.3 MMOL/L (ref 3.4–5.3)
PROT SERPL-MCNC: 7.2 G/DL (ref 6.4–8.3)
SODIUM SERPL-SCNC: 139 MMOL/L (ref 136–145)

## 2023-04-13 PROCEDURE — 99214 OFFICE O/P EST MOD 30 MIN: CPT | Performed by: PSYCHIATRY & NEUROLOGY

## 2023-04-13 PROCEDURE — 83036 HEMOGLOBIN GLYCOSYLATED A1C: CPT | Mod: 90 | Performed by: PATHOLOGY

## 2023-04-13 PROCEDURE — 99000 SPECIMEN HANDLING OFFICE-LAB: CPT | Performed by: PATHOLOGY

## 2023-04-13 PROCEDURE — 36415 COLL VENOUS BLD VENIPUNCTURE: CPT | Performed by: PATHOLOGY

## 2023-04-13 PROCEDURE — 80053 COMPREHEN METABOLIC PANEL: CPT | Performed by: PATHOLOGY

## 2023-04-13 ASSESSMENT — PAIN SCALES - GENERAL: PAINLEVEL: MILD PAIN (3)

## 2023-04-13 NOTE — PROGRESS NOTES
Telly Lucio MD  [facility], April 13, 2023    Dear Dr Lucio,    I had the pleasure to see Rakan today in follow-up at the [facility]/neuromuscular clinic. 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  He is on pyridostigmine 60 mg 3 times a day, and and prednisone 10 mg daily.  I have been hesitant to push the prednisone dose higher because of his diabetes.  It is acceptably, but not perfectly controlled with last A1c in November 2022 being 6.8%.  He previously had a reaction to azathioprine with fever in 2018 on the medication was stopped.  Clary also has facial pain consistent with trigeminal neuralgia, well controlled on Trileptal.    Compared to the last time I saw him, he tells me that he has occasional ptosis later during the day, similar to last time.  His dysphagia is fluctuating in severity; there were a few days last month that it was a bit worse.  He feels food gets stuck, and the problem is actually more common with liquids than solids.  Choking episodes however are rather rare.  He also reports mild slurring of his speech later during the day, and difficulty chewing hard food which happens once in a great while.  He has shortness of breath with exertion, which he attributes to his COPD diagnosis.  There is no dyspnea at rest.  In the last months, he has noted some arm fatigue on the right, when brushing his teeth or combing the hair, which does not require stopping the task, but it slows him a bit down.  He also feels that his leg muscles are weaker, and he uses his arms to get off of the chair most of the times.  He may have some diplopia later during the day, and his vision at distance is not as sharp as it used to be.    Medication reviewed and are as per epic record.    His MG ADL score is larger than last  time; today it is 9-see below.         MG Activities of Daily Living (MG-ADL) profile    Grade 0 1 2 3   Talking Normal Intermittent slurring/nasal speech Constant slurring/nasal speech, can be understood Difficult to understand   Chewing Normal Fatigue with solid food Fatigue with soft food G tube   Swallowing Normal Rare choking Frequent choking necessitating diet changes G tube   Breathing Normal SOB with exertion SOB at rest Ventilator dependent   Ability to brush teeth/comb hair Normal Extra effort, no rest periods needed Rest periods needed Cannot do one of these   Ability to rise from chair Normal Mild impairment, uses arms sometimes Moderate impairment, always uses arms Severe impairment, requires assistance   Double vision None Present, not daily Daily, not constant Constant   Ptosis None Present, but not daily Daily, not constant Constant       /72   Pulse 53   Resp 16   Wt 102.1 kg (225 lb)   SpO2 99%   BMI 33.23 kg/m      On a focused exam today, he is awake, alert, oriented x3 and able to provide a coherent history.  He has mild right eyelid ptosis at rest which gets slightly aggravated after 1 minute of sustained upward gaze but the lid does not cover the pupil.  There is no ptosis on the left.  There is mild weakness of orbicularis oculi bilaterally.  He has no diplopia with sustained upward gaze or left lateral gaze but he does get diplopia with horizontal gaze to the right almost instantly.  There is no weakness of orbicularis oris, uvula, jaw, palate or tongue.  Neck flexion and extension strength are full.  Strength is 5/5 for bilateral deltoid, biceps, triceps, hand .  Hip flexors are 4/5 bilaterally.    In summary, in my opinion Rakan's myasthenia is incompletely controlled and he reports quite a lot of symptoms indicating that the disease may be active.  The symptoms he reports are more than 6 months ago.  I offered him the option to start a nonsteroidal immunosuppressant drug like  CellCept.  He was concerned about the side effects and would like to wait.  He does not feel that his activities of daily living are more limited now compared to 6 months ago, and I do not think there is any MG crisis impending here.  For those reasons, I decided not to change his immunotherapy, per his request.  We will continue prednisone 10 mg daily.  He can increase the pyridostigmine again to 120 mg 3 times a day; this may help.  He had a bone density scan yesterday; the results are pending and I will contact him when they are available.  He should continue managing his diabetes with his primary care doctor.  I will check a comprehensive metabolic panel today, because he is chronically on Trileptal for facial pain (which works well), to rule out hyponatremia or liver toxicity.  He agrees with this plan.  Follow-up in 6 months as above.    Billing MDM level 4 (moderate) based on: #1 problems assessed: One chronic disorder with progression, exacerbation, or side effects of treatment (myasthenia gravis) and #2 risk: Prescription drug management.    Sincerely,    Arpan Harrison MD, FAAN

## 2023-04-13 NOTE — PATIENT INSTRUCTIONS
Let's check labs today (metabolic panel, to rule out low sodium related to Trileptal or abnormal liver function tests)  Follow-up in 6 months    You can increase your Mestinon to 120 mg 3 times a day    No change in the prednisone dose    If you notice that the difficulty swallowing, chewing, vision impairment, or leg weakness starts interfering more with your life activities, please contact me.  In that case we should consider starting the medication called CellCept that we discussed today.

## 2023-04-13 NOTE — LETTER
4/13/2023       RE: Rakan Nolasco  [address] [address]       Dear Colleague,    Thank you for referring your patient, Rakan Nolasco, to the [facility] [place] at [facility]. Please see a copy of my visit note below.    Telly Lucio MD  [facility], April 13, 2023    Dear Dr Lucio,    I had the pleasure to see Rakan today in follow-up at the [facility]/neuromuscular clinic. 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  He is on pyridostigmine 60 mg 3 times a day, and and prednisone 10 mg daily.  I have been hesitant to push the prednisone dose higher because of his diabetes.  It is acceptably, but not perfectly controlled with last A1c in November 2022 being 6.8%.  He previously had a reaction to azathioprine with fever in 2018 on the medication was stopped.  Clary also has facial pain consistent with trigeminal neuralgia, well controlled on Trileptal.    Compared to the last time I saw him, he tells me that he has occasional ptosis later during the day, similar to last time.  His dysphagia is fluctuating in severity; there were a few days last month that it was a bit worse.  He feels food gets stuck, and the problem is actually more common with liquids than solids.  Choking episodes however are rather rare.  He also reports mild slurring of his speech later during the day, and difficulty chewing hard food which happens once in a great while.  He has shortness of breath with exertion, which he attributes to his COPD diagnosis.  There is no dyspnea at rest.  In the last months, he has noted some arm fatigue on the right, when brushing his teeth or combing the hair, which does not require stopping the task, but it slows him a bit  down.  He also feels that his leg muscles are weaker, and he uses his arms to get off of the chair most of the times.  He may have some diplopia later during the day, and his vision at distance is not as sharp as it used to be.    Medication reviewed and are as per epic record.    His MG ADL score is larger than last time; today it is 9-see below.         MG Activities of Daily Living (MG-ADL) profile    Grade 0 1 2 3   Talking Normal Intermittent slurring/nasal speech Constant slurring/nasal speech, can be understood Difficult to understand   Chewing Normal Fatigue with solid food Fatigue with soft food G tube   Swallowing Normal Rare choking Frequent choking necessitating diet changes G tube   Breathing Normal SOB with exertion SOB at rest Ventilator dependent   Ability to brush teeth/comb hair Normal Extra effort, no rest periods needed Rest periods needed Cannot do one of these   Ability to rise from chair Normal Mild impairment, uses arms sometimes Moderate impairment, always uses arms Severe impairment, requires assistance   Double vision None Present, not daily Daily, not constant Constant   Ptosis None Present, but not daily Daily, not constant Constant       /72   Pulse 53   Resp 16   Wt 102.1 kg (225 lb)   SpO2 99%   BMI 33.23 kg/m      On a focused exam today, he is awake, alert, oriented x3 and able to provide a coherent history.  He has mild right eyelid ptosis at rest which gets slightly aggravated after 1 minute of sustained upward gaze but the lid does not cover the pupil.  There is no ptosis on the left.  There is mild weakness of orbicularis oculi bilaterally.  He has no diplopia with sustained upward gaze or left lateral gaze but he does get diplopia with horizontal gaze to the right almost instantly.  There is no weakness of orbicularis oris, uvula, jaw, palate or tongue.  Neck flexion and extension strength are full.  Strength is 5/5 for bilateral deltoid, biceps, triceps, hand .   Hip flexors are 4/5 bilaterally.    In summary, in my opinion Rakan's myasthenia is incompletely controlled and he reports quite a lot of symptoms indicating that the disease may be active.  The symptoms he reports are more than 6 months ago.  I offered him the option to start a nonsteroidal immunosuppressant drug like CellCept.  He was concerned about the side effects and would like to wait.  He does not feel that his activities of daily living are more limited now compared to 6 months ago, and I do not think there is any MG crisis impending here.  For those reasons, I decided not to change his immunotherapy, per his request.  We will continue prednisone 10 mg daily.  He can increase the pyridostigmine again to 120 mg 3 times a day; this may help.  He had a bone density scan yesterday; the results are pending and I will contact him when they are available.  He should continue managing his diabetes with his primary care doctor.  I will check a comprehensive metabolic panel today, because he is chronically on Trileptal for facial pain (which works well), to rule out hyponatremia or liver toxicity.  He agrees with this plan.  Follow-up in 6 months as above.    Billing MDM level 4 (moderate) based on: #1 problems assessed: One chronic disorder with progression, exacerbation, or side effects of treatment (myasthenia gravis) and #2 risk: Prescription drug management.          Again, thank you for allowing me to participate in the care of your patient.      Sincerely,    Arpan Harrison MD

## 2023-04-17 ENCOUNTER — OFFICE VISIT (OUTPATIENT)
Dept: FAMILY MEDICINE | Facility: CLINIC | Age: 78
End: 2023-04-17
Payer: COMMERCIAL

## 2023-04-17 VITALS
BODY MASS INDEX: 33.77 KG/M2 | WEIGHT: 228 LBS | SYSTOLIC BLOOD PRESSURE: 132 MMHG | TEMPERATURE: 97.8 F | DIASTOLIC BLOOD PRESSURE: 74 MMHG | HEIGHT: 69 IN | OXYGEN SATURATION: 98 % | RESPIRATION RATE: 16 BRPM | HEART RATE: 58 BPM

## 2023-04-17 DIAGNOSIS — I10 ESSENTIAL HYPERTENSION: ICD-10-CM

## 2023-04-17 DIAGNOSIS — N30.01 ACUTE CYSTITIS WITH HEMATURIA: ICD-10-CM

## 2023-04-17 DIAGNOSIS — E11.65 TYPE 2 DIABETES MELLITUS WITH HYPERGLYCEMIA, WITHOUT LONG-TERM CURRENT USE OF INSULIN (H): Primary | ICD-10-CM

## 2023-04-17 DIAGNOSIS — E66.01 MORBID OBESITY (H): ICD-10-CM

## 2023-04-17 DIAGNOSIS — M31.6 TEMPORAL ARTERITIS (H): ICD-10-CM

## 2023-04-17 PROCEDURE — 99214 OFFICE O/P EST MOD 30 MIN: CPT | Performed by: FAMILY MEDICINE

## 2023-04-17 RX ORDER — LANCETS 30 GAUGE
EACH MISCELLANEOUS
Qty: 100 EACH | Refills: 2 | Status: CANCELLED | OUTPATIENT
Start: 2023-04-17

## 2023-04-17 NOTE — PROGRESS NOTES
Assessment & Plan     Type 2 diabetes mellitus with hyperglycemia, without long-term current use of insulin (H)    Reviewed recent labs; A1c at goal. 6.6. Needs refill for lancents. Reviewed medications and will refill when due  - blood glucose (NO BRAND SPECIFIED) lancets standard; by In Vitro route 3 times daily for 90 days (do gauge 33)    Hip and Knee Pain    Possible arthritis, encouraged to do knee and hip stretches.    Essential hypertension    Reviewed recent labs, CMP normal    BP well controlled    Hyponatremia:     Recent Sodium levels normal, taking Lasix. Will refill Sodium supplement    Acute cystitis with hematuria    -   Completed antibiotics and symptoms resolved    Temporal arteritis (H)    -  Stable at this time on trileptal    Morbid obesity (H)    -  Counseled to make better food choices, exercise as tolerated, and lose weight.     Telly Lucio MD  [facility] THIAGO Bledsoe is a 77 year old, presenting for the following health issues:  Follow Up (Lancet refill needs 30 G)    Hematuria:   seen 4/5 uti with hematuriia: cefdinir 300 mg twice a day for 7 days    DM2:   A1c from 4/13/23 was 6.6 tolerating medications well    Hips and Knee Pain    Mostly Rt one; starts in hip/gluteal area    Legs Muscle Pain    If stands/walks for long;  Improve with resting. Taken Tylenol occasionally, worse with heavy , feels like getting weaker.    Weight:  Wt Readings from Last 4 Encounters:   04/17/23 103.4 kg (228 lb)   04/13/23 102.1 kg (225 lb)   02/22/23 102.1 kg (225 lb)   01/09/23 102.1 kg (225 lb)          View : No data to display.              History of Present Illness       Diabetes:   He presents for follow up of diabetes.  He is checking home blood glucose four or more times daily. He checks blood glucose after meals.  Blood glucose is sometimes over 200 and never under 70. When his blood glucose is low, the patient is asymptomatic for confusion, blurred vision,  "lethargy and reports not feeling dizzy, shaky, or weak.  He is concerned about frequent infections.  He is having numbness in feet, burning in feet and blurry vision.         He eats 2-3 servings of fruits and vegetables daily.He consumes 0 sweetened beverage(s) daily.He exercises with enough effort to increase his heart rate 9 or less minutes per day.  He exercises with enough effort to increase his heart rate 3 or less days per week.   He is taking medications regularly.       Review of Systems   Constitutional, HEENT, cardiovascular, pulmonary, gi and gu systems are negative, except as otherwise noted.      Objective    /74 (BP Location: Left arm, Patient Position: Sitting, Cuff Size: Adult Large)   Pulse 58   Temp 97.8  F (36.6  C) (Oral)   Resp 16   Ht 1.753 m (5' 9\")   Wt 103.4 kg (228 lb)   SpO2 98%   BMI 33.67 kg/m    Body mass index is 33.67 kg/m .  Physical Exam   GENERAL: healthy, alert and no distress  RESP: lungs clear to auscultation - no rales, rhonchi or wheezes  CV: regular rate and rhythm, no murmur, click or rub, no peripheral edema  MS: Rt hip: FROM, slight tenderness in upper gluteal region.  Diabetic foot exam: normal PT pulses, no trophic changes or ulcerative lesions, normal sensory exam and DP reduced bilateral      "

## 2023-05-08 DIAGNOSIS — G70.00 MYASTHENIA GRAVIS, ACHR ANTIBODY POSITIVE (H): ICD-10-CM

## 2023-05-08 RX ORDER — PREDNISONE 5 MG/1
TABLET ORAL
Qty: 60 TABLET | Refills: 2 | Status: SHIPPED | OUTPATIENT
Start: 2023-05-08 | End: 2023-08-14

## 2023-05-11 ENCOUNTER — OFFICE VISIT (OUTPATIENT)
Dept: PULMONOLOGY | Facility: CLINIC | Age: 78
End: 2023-05-11
Payer: COMMERCIAL

## 2023-05-11 VITALS
OXYGEN SATURATION: 96 % | SYSTOLIC BLOOD PRESSURE: 118 MMHG | WEIGHT: 227.5 LBS | HEART RATE: 63 BPM | DIASTOLIC BLOOD PRESSURE: 68 MMHG | BODY MASS INDEX: 33.6 KG/M2

## 2023-05-11 DIAGNOSIS — J43.2 CENTRILOBULAR EMPHYSEMA (H): Primary | ICD-10-CM

## 2023-05-11 PROCEDURE — 99215 OFFICE O/P EST HI 40 MIN: CPT | Performed by: INTERNAL MEDICINE

## 2023-05-11 NOTE — PROGRESS NOTES
Pulmonary Clinic Return Patient Visit  Reason for Consult: COPD  History of Present Illness  Rakan Nolasco, who is a 77-year-old male former smoker (quit 1983) with history of ROSIE on CPAP who presents for follow up of COPD. I last saw him in clinic 1/2023.   To briefly review, Rakan was diagnosed with COPD several years ago based on spirometric evaluation and symptoms.  His COPD has been well controlled on an intermediate dose fluticasone/salmeterol.  He also uses albuterol very sparingly.  He had a flare of COPD 6/2022 which was triggered by a viral flulike illness for which he was seen at the ER and was treated with a course of antibiotics and steroids. Had COVID ~12/2022 with mild symptoms, did well with Paxlovid.   Today, he is doing great. Back to baseline and has minimal need for his albuterol inhalers. He has minimal shortness of breath mostly with exertion, infrequent wheezing and no evidence of chest tightness. There has been no exacerbations since the last clinic visit. He does have baseline pedal swelling which has not worsened for which he takes Lasix.  He denies any fevers, no chest pain, no orthopnea and no PND.  He has a history of ROSIE and is well controlled on CPAP.    He is trying to lose weight by healthy living and has lost close to 10 pounds.   Former smoker, quit 1983, 30 pk-yrs. No family hx of lung cancer.  He was exposed to lots of fumes and he is currently retired now.    Review of Systems:  10 of 14 systems reviewed and are negative unless otherwise stated in HPI.    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      Nonsenile cataract      Obstructive sleep apnea      Obstructive sleep apnea syndrome      ROSIE (obstructive sleep apnea) 01/2011    Using CPAP;      Prediabetes      Restless leg syndrome      Sleep apnea      Trigeminal neuralgia pain 01/04/2012       Past Surgical History:   Procedure Laterality Date     BIOPSY ARTERY TEMPORAL Bilateral 8/1/2017    Procedure: BIOPSY ARTERY TEMPORAL;  Bilateral temporal artery Biopsy;  Surgeon: Jj Tello MD;  Location: [facility]     CATARACT IOL, RT/LT Right      COLONOSCOPY       ESOPHAGOSCOPY, GASTROSCOPY, DUODENOSCOPY (EGD), COMBINED  1/15/2014    Procedure: COMBINED ESOPHAGOSCOPY, GASTROSCOPY, DUODENOSCOPY (EGD), BIOPSY SINGLE OR MULTIPLE;;  Surgeon: Winston Nixon MD;  Location: [facility]     LASER YAG CAPSULOTOMY Right 04/09/2018    right eye     PHACOEMULSIFICATION CLEAR CORNEA WITH STANDARD INTRAOCULAR LENS IMPLANT Right 2/20/2017    Procedure: PHACOEMULSIFICATION CLEAR CORNEA WITH STANDARD INTRAOCULAR LENS IMPLANT;  Surgeon: Mateo Gray MD;  Location: [facility]     PHACOEMULSIFICATION CLEAR CORNEA WITH STANDARD INTRAOCULAR LENS IMPLANT Left 1/10/2022    Procedure: LEFT PHACOEMULSIFICATION, CATARACT, WITH INTRAOCULAR LENS IMPLANT;  Surgeon: Mateo Gray MD;  Location: [facility]     RETINAL REATTACHMENT Right      SURGICAL HISTORY OF -   8/2009    Both Eyelids-.     TONSILLECTOMY  as child       Family History   Problem Relation Age of Onset     Respiratory Mother         copd     LUNG DISEASE Mother      Allergies Brother      Cancer Maternal Grandmother      Heart Disease Paternal Grandmother      Cerebrovascular Disease Father      Cancer Father      Other Cancer Father      Glaucoma Maternal Aunt      Macular Degeneration No family hx of        Social History     Socioeconomic History      Marital status: Single     Spouse name: None     Number of children: 0     Years of education: 12     Highest education level: None   Occupational History     Occupation: Retired 3/2012     Comment: [employer]   Tobacco Use     Smoking status: Former Smoker     Packs/day: 2.00     Years: 15.00     Pack years: 30.00     Types: Cigarettes     Start date: 1968     Quit date: 1983     Years since quittin.5     Smokeless tobacco: Former User     Quit date: 1970     Tobacco comment: smoke free household.   Vaping Use     Vaping Use: Never used   Substance and Sexual Activity     Alcohol use: Not Currently     Comment: Quit in      Drug use: No     Sexual activity: Not Currently     Partners: Female     Comment: 2010  No girlfriend at this time.   Other Topics Concern     Parent/sibling w/ CABG, MI or angioplasty before 65F 55M? No         Allergies   Allergen Reactions     Azathioprine Shortness Of Breath and Other (See Comments)     Was hospitalized from it. Also had high fever, chills, and shortness of breath.     Sulfamethoxazole-Trimethoprim Cough, Itching and Rash     Generalized painful red rash on legs arms and abdomen, chest, back, cough and  fever  that required hospitalization.     Ciprofloxacin Muscle Pain (Myalgia)     Muscle pain  Other reaction(s): Myalgia  Other reaction(s): Myalgia     Ropinirole      Leg pan  GI  Weakness; ? sycope  Other reaction(s): Leg Pain         Current Outpatient Medications:      albuterol (PROAIR HFA/PROVENTIL HFA/VENTOLIN HFA) 108 (90 Base) MCG/ACT inhaler, Inhale 1 puff into the lungs every 4 hours as needed for shortness of breath, wheezing or cough, Disp: 18 g, Rfl: 1     amLODIPine (NORVASC) 10 MG tablet, Take 1 tablet (10 mg) by mouth daily, Disp: 90 tablet, Rfl: 3     aspirin 81 MG tablet, Take 1 tablet by mouth daily., Disp: , Rfl:      atorvastatin (LIPITOR) 20 MG tablet, Take 1 tablet (20 mg) by mouth daily, Disp: 90  tablet, Rfl: 3     blood glucose (NO BRAND SPECIFIED) lancets standard, by In Vitro route 3 times daily for 90 days (do gauge 33), Disp: 270 lancet., Rfl: 3     blood glucose (ONETOUCH VERIO IQ) test strip, Use to test blood sugar 3 times daily or as directed., Disp: 100 strip, Rfl: 1     blood glucose monitoring (NO BRAND SPECIFIED) meter device kit, Use to test blood sugar 1 times daily or as directed., Disp: 1 kit, Rfl: 0     cephALEXin (KEFLEX) 500 MG capsule, Take 1 capsule (500 mg) by mouth 4 times daily, Disp: 20 capsule, Rfl: 0     ClonAZEPAM (KLONOPIN) 0.5 MG tablet, Take 0.5 mg by mouth Take 1 tablet by mouth daily at bedtime, Disp: , Rfl:      ferrous sulfate (FEROSUL) 325 (65 Fe) MG tablet, Take 1 tablet (325 mg) by mouth daily (with breakfast), Disp: 90 tablet, Rfl: 4     fluticasone-salmeterol (ADVAIR) 250-50 MCG/ACT inhaler, TAKE 1 PUFF BY MOUTH TWICE A DAY Strength: 250-50 MCG/ACT, Disp: 3 each, Rfl: 3     furosemide (LASIX) 20 MG tablet, TAKE 2 TABLETS (40 MG) IN THE MORNING AND 1 TABLET (20 MG) AT LEAST 6 HOURS LATER IN THE AFTERNOON. Strength: 20 mg, Disp: 180 tablet, Rfl: 0     Lancets (ONETOUCH DELICA PLUS VRXAKQ24C) MISC, USE ONE DAILY AS DIRECTED, Disp: 100 each, Rfl: 2     latanoprost (XALATAN) 0.005 % ophthalmic solution, Place 1 drop into both eyes At Bedtime, Disp: 7.5 mL, Rfl: 3     lisinopril (ZESTRIL) 40 MG tablet, Take 1 tablet (40 mg) by mouth daily, Disp: 90 tablet, Rfl: 3     metFORMIN (GLUCOPHAGE XR) 500 MG 24 hr tablet, TAKE 1 TABLET BY MOUTH TWICE A DAY WITH MEALS, Disp: 180 tablet, Rfl: 1     OXcarbazepine (TRILEPTAL) 300 MG tablet, TAKE 1 TABLET BY MOUTH TWICE A DAY, Disp: 180 tablet, Rfl: 1     pantoprazole (PROTONIX) 20 MG EC tablet, TAKE 1 TABLET BY MOUTH EVERY DAY, Disp: 90 tablet, Rfl: 1     predniSONE (DELTASONE) 5 MG tablet, TAKE 2 TABLETS BY MOUTH EVERY DAY, Disp: 60 tablet, Rfl: 2     pyridostigmine (MESTINON) 60 MG tablet, TAKE UP TO 2 TABLETS (120 MG) IN THE MORNING  AND AT NOON (Patient taking differently: Take 60 mg by mouth 3 times daily TAKE UP TO 2 TABLETS (120 MG) IN THE MORNING AND AT NOON), Disp: 120 tablet, Rfl: 2     sodium bicarbonate 650 MG tablet, TAKE 1/2 TABLET BY MOUTH TWICE A DAY, Disp: 90 tablet, Rfl: 1     timolol maleate (TIMOPTIC) 0.5 % ophthalmic solution, Place 1 drop into both eyes 2 times daily, Disp: 5 mL, Rfl: 11     ibuprofen (ADVIL/MOTRIN) 800 MG tablet, Take 1 tablet (800 mg) by mouth 3 times daily (Patient not taking: Reported on 4/13/2023), Disp: 45 tablet, Rfl: 0      Physical Exam:  /68 (BP Location: Left arm, Patient Position: Sitting, Cuff Size: Adult Regular)   Pulse 63   Wt 103.2 kg (227 lb 8 oz)   SpO2 96%   BMI 33.60 kg/m    GENERAL: Well developed, well nourished, alert, and in no apparent distress.  HEENT: Normocephalic, atraumatic. PERRL, EOMI. Oral mucosa is moist. No perioral cyanosis.  NECK: supple, no masses, no thyromegaly.  RESP:  Normal respiratory effort.  CTAB.  No rales, wheezes, rhonchi.  No cyanosis or clubbing.  CV: Normal S1, S2, regular rhythm, normal rate. No murmur.  No LE edema.   ABDOMEN:  Soft, non-tender, non-distended.   SKIN: warm and dry. No rash.  NEURO: AAOx3.  Normal gait.  Fluent speech.  PSYCH: mentation appears normal.   Results:  PFTs: Mild obstruction with stable lung functions  Most Recent [facility] Pulmonary Function Testing    FVC-Pred   Date Value Ref Range Status   06/29/2022 3.96 L      FVC-Pre   Date Value Ref Range Status   06/29/2022 3.14 L      FVC-%Pred-Pre   Date Value Ref Range Status   06/29/2022 79 %      FEV1-Pre   Date Value Ref Range Status   06/29/2022 2.05 L      FEV1-%Pred-Pre   Date Value Ref Range Status   06/29/2022 68 %      FEV1FVC-Pred   Date Value Ref Range Status   06/29/2022 76 %      FEV1FVC-Pre   Date Value Ref Range Status   06/29/2022 65 %      No results found for: 20029  FEFMax-Pred   Date Value Ref Range Status   06/29/2022 7.72 L/sec      FEFMax-Pre   Date  Value Ref Range Status   06/29/2022 6.76 L/sec      FEFMax-%Pred-Pre   Date Value Ref Range Status   06/29/2022 87 %      ExpTime-Pre   Date Value Ref Range Status   06/29/2022 9.73 sec      FIFMax-Pre   Date Value Ref Range Status   06/29/2022 5.83 L/sec      FEV1FEV6-Pred   Date Value Ref Range Status   06/29/2022 77 %      FEV1FEV6-Pre   Date Value Ref Range Status   06/29/2022 68 %      No results found for: 20055  Imaging:  XRAY 6/16/2022:  Chest 2 views:  Personally reviewed by me - no airspace, soft tissue, cardiac or bony abnormalities.    Assessment and Plan:   COPD (Group B)  He has done well with intermediate dose salmeterol/fluticasone and I will continue him on the current regimen. CAT score is 13 today.  Continue albuterol inhaler as needed.  He is fairly active at baseline.  He does not qualify for lung cancer screening    Questions and concerns were answered to the patient's satisfaction.  he was provided with my contact information should new questions or concerns arise in the interim.  he should return to clinic in 12 months  Up to date on vaccination  I spent a total of 40 minutes face to face with Rakan Nolasco during today's office visit. Over 50% of this time was spent counseling the patient and/or coordinating care regarding their pulmonary disease.    Servando Mejia MD  Pulmonary, Critical Care and Sleep Medicine  [facility]  Pager: [phone]        The above note was dictated using voice recognition software and may include typographical errors. Please contact the author for any clarifications.

## 2023-05-11 NOTE — NURSING NOTE
"Rakan Nolasco's goals for this visit include:   Chief Complaint   Patient presents with     COPD     COPD follow-up       PCP: Telly Lucio    Referring Provider:  Servando Mejia MD  [address] [place] [id number]      Initial /68 (BP Location: Left arm, Patient Position: Sitting, Cuff Size: Adult Regular)   Pulse 63   Wt 103.2 kg (227 lb 8 oz)   SpO2 96%   BMI 33.60 kg/m   Estimated body mass index is 33.6 kg/m  as calculated from the following:    Height as of 4/17/23: 1.753 m (5' 9\").    Weight as of this encounter: 103.2 kg (227 lb 8 oz).    Medication Reconciliation: complete    Do you need any medication refills at today's visit? none    TREVON Angulo  Rheumatology/Infectious disease  [facility]   [phone]    "

## 2023-05-17 DIAGNOSIS — E11.65 TYPE 2 DIABETES MELLITUS WITH HYPERGLYCEMIA, WITHOUT LONG-TERM CURRENT USE OF INSULIN (H): ICD-10-CM

## 2023-05-17 RX ORDER — BLOOD SUGAR DIAGNOSTIC
STRIP MISCELLANEOUS
Qty: 100 STRIP | Refills: 1 | Status: SHIPPED | OUTPATIENT
Start: 2023-05-17 | End: 2024-02-16

## 2023-06-01 ENCOUNTER — OFFICE VISIT (OUTPATIENT)
Dept: OPHTHALMOLOGY | Facility: CLINIC | Age: 78
End: 2023-06-01
Payer: COMMERCIAL

## 2023-06-01 DIAGNOSIS — Z98.890 S/P BLEPHAROPLASTY: ICD-10-CM

## 2023-06-01 DIAGNOSIS — H04.123 DRY EYES, BILATERAL: ICD-10-CM

## 2023-06-01 DIAGNOSIS — H40.053 BORDERLINE GLAUCOMA WITH OCULAR HYPERTENSION, BILATERAL: Primary | ICD-10-CM

## 2023-06-01 DIAGNOSIS — Z96.1 PSEUDOPHAKIA OF BOTH EYES: ICD-10-CM

## 2023-06-01 DIAGNOSIS — Z86.69 H/O RD (RETINAL DETACHMENT): ICD-10-CM

## 2023-06-01 DIAGNOSIS — G70.00 MYASTHENIA GRAVIS, ACHR ANTIBODY POSITIVE (H): ICD-10-CM

## 2023-06-01 PROCEDURE — 92012 INTRM OPH EXAM EST PATIENT: CPT | Performed by: STUDENT IN AN ORGANIZED HEALTH CARE EDUCATION/TRAINING PROGRAM

## 2023-06-01 PROCEDURE — 92133 CPTRZD OPH DX IMG PST SGM ON: CPT | Performed by: STUDENT IN AN ORGANIZED HEALTH CARE EDUCATION/TRAINING PROGRAM

## 2023-06-01 PROCEDURE — 92083 EXTENDED VISUAL FIELD XM: CPT | Performed by: STUDENT IN AN ORGANIZED HEALTH CARE EDUCATION/TRAINING PROGRAM

## 2023-06-01 ASSESSMENT — VISUAL ACUITY
CORRECTION_TYPE: GLASSES
OD_CC+: -2
OS_CC: 20/30-1
METHOD: SNELLEN - LINEAR
OD_CC: 20/20
OS_CC+: +2

## 2023-06-01 ASSESSMENT — SLIT LAMP EXAM - LIDS
COMMENTS: 1+ DERMATOCHALASIS
COMMENTS: 1+ DERMATOCHALASIS

## 2023-06-01 ASSESSMENT — TONOMETRY
OD_IOP_MMHG: 18
OS_IOP_MMHG: 18
IOP_METHOD: APPLANATION

## 2023-06-01 ASSESSMENT — EXTERNAL EXAM - LEFT EYE: OS_EXAM: NORMAL

## 2023-06-01 ASSESSMENT — EXTERNAL EXAM - RIGHT EYE: OD_EXAM: NORMAL

## 2023-06-01 ASSESSMENT — CUP TO DISC RATIO
OS_RATIO: 0.25
OD_RATIO: 0.1

## 2023-06-01 NOTE — PATIENT INSTRUCTIONS
"Continue Latanoprost (green top) at bedtime both eyes     Continue Timolol (yellow top) twice a day in both eyes     Continue artificial tears up to four times a day as needed (Refresh Optive or Systane Balance. Avoid \"get the red out\" drops).     Mateo Gray MD  [phone]    "

## 2023-06-01 NOTE — LETTER
"    6/1/2023         RE: Rakan Nolasco  [address] [address]        Dear Colleague,    Thank you for referring your patient, Rakan Nolasco, to the [facility]. Please see a copy of my visit note below.     Current Eye Medications:  Timolol - both eyes BID - last dose: 6:30am today  Latanoprost - both eyes at bedtime - last dose: 10pm last night  Artificial tears - both eyes PRN - not often     Subjective: HVF, OCT and IOP check - compliant with daily use of drops. No vision changes. Gets intermittent headache and pain lasting a few seconds left eye>right eye a few times a week.     Objective:  See Ophthalmology Exam.      Assessment:  Rakan Nolasco is a 77 year old male who presents with:   Encounter Diagnoses   Name Primary?     Borderline glaucoma with ocular hypertension, bilateral Intraocular pressure 18/18 today. Stable OCT (left eye compared to 2021) and visual field. Continue same medications.    OCT optic nerve: avg retinal nerve fiber layer 84/77; borderline sup - stable right eye; thin S & borderline inf - stable compared to 2021.    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 Avoid \"get the red out\" drops).     Mateo Gray MD  [phone]          Again, thank you for allowing me to participate in the care of your patient.        Sincerely,        Mateo Gray MD    "

## 2023-06-01 NOTE — PROGRESS NOTES
" Current Eye Medications:  Timolol - both eyes BID - last dose: 6:30am today  Latanoprost - both eyes at bedtime - last dose: 10pm last night  Artificial tears - both eyes PRN - not often     Subjective: HVF, OCT and IOP check - compliant with daily use of drops. No vision changes. Gets intermittent headache and pain lasting a few seconds left eye>right eye a few times a week.     Objective:  See Ophthalmology Exam.      Assessment:  Rakan Nolasco is a 77 year old male who presents with:   Encounter Diagnoses   Name Primary?     Borderline glaucoma with ocular hypertension, bilateral Intraocular pressure 18/18 today. Stable OCT (left eye compared to 2021) and visual field. Continue same medications.    OCT optic nerve: avg retinal nerve fiber layer 84/77; borderline sup - stable right eye; thin S & borderline inf - stable compared to 2021.    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 Avoid \"get the red out\" drops).     Mateo Gray MD  [phone]      "

## 2023-06-05 ENCOUNTER — MYC MEDICAL ADVICE (OUTPATIENT)
Dept: NEUROLOGY | Facility: CLINIC | Age: 78
End: 2023-06-05

## 2023-06-05 DIAGNOSIS — G70.00 OCULAR MYASTHENIA (H): ICD-10-CM

## 2023-06-05 RX ORDER — PYRIDOSTIGMINE BROMIDE 60 MG/1
TABLET ORAL
Qty: 540 TABLET | Refills: 0 | Status: SHIPPED | OUTPATIENT
Start: 2023-06-05 | End: 2023-09-05

## 2023-06-11 DIAGNOSIS — G70.00 OCULAR MYASTHENIA (H): ICD-10-CM

## 2023-06-11 DIAGNOSIS — G50.0 TRIGEMINAL NEURALGIA: ICD-10-CM

## 2023-06-12 RX ORDER — OXCARBAZEPINE 300 MG/1
TABLET, FILM COATED ORAL
Qty: 180 TABLET | Refills: 1 | Status: SHIPPED | OUTPATIENT
Start: 2023-06-12 | End: 2023-10-25

## 2023-06-12 RX ORDER — PYRIDOSTIGMINE BROMIDE 60 MG/1
TABLET ORAL
Qty: 120 TABLET | Refills: 1 | OUTPATIENT
Start: 2023-06-12

## 2023-06-19 ENCOUNTER — TELEPHONE (OUTPATIENT)
Dept: FAMILY MEDICINE | Facility: CLINIC | Age: 78
End: 2023-06-19

## 2023-06-19 NOTE — TELEPHONE ENCOUNTER
Patient seen at [facility] on Sunday 6/18/23    Patient requesting to schedule an ED follow up visit - seen for bronchitis and pneumonia - suggested primary care follow up on 6/21/23.    No availability at  location; patient scheduled at  location for hospital f/u    Viktoria Castillo RN  [facility]

## 2023-06-21 ENCOUNTER — OFFICE VISIT (OUTPATIENT)
Dept: FAMILY MEDICINE | Facility: CLINIC | Age: 78
End: 2023-06-21
Payer: COMMERCIAL

## 2023-06-21 VITALS
OXYGEN SATURATION: 94 % | SYSTOLIC BLOOD PRESSURE: 100 MMHG | HEART RATE: 83 BPM | TEMPERATURE: 97.9 F | RESPIRATION RATE: 16 BRPM | WEIGHT: 223.4 LBS | DIASTOLIC BLOOD PRESSURE: 52 MMHG | HEIGHT: 69 IN | BODY MASS INDEX: 33.09 KG/M2

## 2023-06-21 DIAGNOSIS — J20.9 ACUTE BRONCHITIS, UNSPECIFIED ORGANISM: Primary | ICD-10-CM

## 2023-06-21 DIAGNOSIS — J43.9 PULMONARY EMPHYSEMA, UNSPECIFIED EMPHYSEMA TYPE (H): ICD-10-CM

## 2023-06-21 PROCEDURE — 99213 OFFICE O/P EST LOW 20 MIN: CPT | Performed by: FAMILY MEDICINE

## 2023-06-21 ASSESSMENT — PAIN SCALES - GENERAL: PAINLEVEL: NO PAIN (0)

## 2023-06-21 NOTE — PROGRESS NOTES
"  Assessment & Plan     (J20.9) Acute bronchitis, unspecified organism  (primary encounter diagnosis)  Comment: Reviewed recent ER visit, chest x-ray negative for acute infiltrate.  Seems consistent with a viral process although given risk factors, agree with antibiotic usage.  Clinically, slightly improved, certainly not worse.  Plan: Advised to complete antibiotics as prescribed, monitor for increasing symptoms.    (J43.9) Pulmonary emphysema, unspecified emphysema type (H)  Comment: Continue Advair.  Plan: monitor.        Patient Instructions      I think this is a chest cold plus smoke and COPD.        Finish the doxycyline (antibiotic)        May use albuterol (rescue) inhaler more often.        It will be about one week to recover.        Stay on the water pill (furosemide)            MED REC REQUIRED  Post Medication Reconciliation Status:           Stacey Fontana MD  [facility] FLOR Bledsoe is a 77 year old, presenting for the following health issues:  ER F/U        6/21/2023     8:28 AM   Additional Questions   Roomed by Maria Luz/MA   Accompanied by N/A         6/21/2023     8:28 AM   Patient Reported Additional Medications   Patient reports taking the following new medications N/A     HPI     ED/UC Followup:    Facility:[facility]   Date of visit: 06/18/2023  Reason for visit: Fever and cough  Current Status: Bronchitis, pneumonia and cough        Review of Systems   Constitutional, HEENT, cardiovascular, pulmonary, gi and gu systems are negative, except as otherwise noted.      Objective    Ht 1.753 m (5' 9.02\")   BMI 33.58 kg/m    Body mass index is 33.58 kg/m .  Physical Exam   GENERAL: Healthy, alert and no distress  EYES: Eyes grossly normal to inspection, conjunctivae and sclerae normal  RESP: Lungs clear to auscultation - no rales, rhonchi or wheezes  CV: Regular rate and rhythm, normal S1 S2, no murmur  MS: No gross musculoskeletal " defects noted, no edema  NEURO: Normal strength and tone, mentation intact and speech normal  PSYCH: Mentation appears normal, affect normal/bright       Stacey Fontana MD

## 2023-06-21 NOTE — PATIENT INSTRUCTIONS
I think this is a chest cold plus smoke and COPD.      Finish the doxycyline (antibiotic)      May use albuterol (rescue) inhaler more often.      It will be about one week to recover.      Stay on the water pill (furosemide)

## 2023-06-27 ENCOUNTER — DOCUMENTATION ONLY (OUTPATIENT)
Dept: LAB | Facility: CLINIC | Age: 78
End: 2023-06-27

## 2023-06-27 DIAGNOSIS — C61 MALIGNANT NEOPLASM OF PROSTATE (H): Primary | ICD-10-CM

## 2023-06-29 ENCOUNTER — TRANSFERRED RECORDS (OUTPATIENT)
Dept: HEALTH INFORMATION MANAGEMENT | Facility: CLINIC | Age: 78
End: 2023-06-29

## 2023-07-05 ENCOUNTER — LAB (OUTPATIENT)
Dept: LAB | Facility: CLINIC | Age: 78
End: 2023-07-05
Payer: COMMERCIAL

## 2023-07-05 DIAGNOSIS — R60.0 PEDAL EDEMA: ICD-10-CM

## 2023-07-05 DIAGNOSIS — E11.65 TYPE 2 DIABETES MELLITUS WITH HYPERGLYCEMIA, WITHOUT LONG-TERM CURRENT USE OF INSULIN (H): ICD-10-CM

## 2023-07-05 DIAGNOSIS — C61 MALIGNANT NEOPLASM OF PROSTATE (H): ICD-10-CM

## 2023-07-05 LAB
CHOLEST SERPL-MCNC: 143 MG/DL
CREAT UR-MCNC: 11.9 MG/DL
HBA1C MFR BLD: 6.5 % (ref 0–5.6)
HDLC SERPL-MCNC: 48 MG/DL
LDLC SERPL CALC-MCNC: 72 MG/DL
MICROALBUMIN UR-MCNC: <12 MG/L
MICROALBUMIN/CREAT UR: NORMAL MG/G{CREAT}
NONHDLC SERPL-MCNC: 95 MG/DL
PSA SERPL DL<=0.01 NG/ML-MCNC: 0.13 NG/ML (ref 0–6.5)
TRIGL SERPL-MCNC: 117 MG/DL

## 2023-07-05 PROCEDURE — 80061 LIPID PANEL: CPT

## 2023-07-05 PROCEDURE — 82043 UR ALBUMIN QUANTITATIVE: CPT

## 2023-07-05 PROCEDURE — 84153 ASSAY OF PSA TOTAL: CPT

## 2023-07-05 PROCEDURE — 36415 COLL VENOUS BLD VENIPUNCTURE: CPT

## 2023-07-05 PROCEDURE — 83036 HEMOGLOBIN GLYCOSYLATED A1C: CPT

## 2023-07-05 PROCEDURE — 82570 ASSAY OF URINE CREATININE: CPT

## 2023-07-05 RX ORDER — FUROSEMIDE 20 MG
TABLET ORAL
Qty: 180 TABLET | Refills: 0 | Status: SHIPPED | OUTPATIENT
Start: 2023-07-05 | End: 2023-08-28

## 2023-07-05 NOTE — TELEPHONE ENCOUNTER
Medication Question or Refill    Contacts       Type Contact Phone/Fax    07/05/2023 10:07 AM CDT Phone (Incoming) Rakan Nolasco (Self) [phone] (M)          What medication are you calling about (include dose and sig)?: furosemide (LASIX) 20 MG tablet    Preferred Pharmacy:     [id number] - [place], [place] - [address]  [address]  [place] [address]  Phone: [phone] Fax: [phone]      Controlled Substance Agreement on file:   CSA -- Patient Level:    CSA: None found at the patient level.       Who prescribed the medication?: Dr. Lucio    Do you need a refill? Yes    When did you use the medication last? ?    Patient offered an appointment? No    Do you have any questions or concerns?  No      Could we send this information to you in [facility] or would you prefer to receive a phone call?:   Patient would prefer a phone call   Okay to leave a detailed message?: Yes at Home number on file [phone] (home)

## 2023-07-08 ENCOUNTER — OFFICE VISIT (OUTPATIENT)
Dept: URGENT CARE | Facility: URGENT CARE | Age: 78
End: 2023-07-08
Payer: COMMERCIAL

## 2023-07-08 VITALS
HEART RATE: 78 BPM | BODY MASS INDEX: 33.33 KG/M2 | RESPIRATION RATE: 12 BRPM | TEMPERATURE: 97.9 F | DIASTOLIC BLOOD PRESSURE: 71 MMHG | SYSTOLIC BLOOD PRESSURE: 144 MMHG | WEIGHT: 225.8 LBS | OXYGEN SATURATION: 93 %

## 2023-07-08 DIAGNOSIS — E11.65 TYPE 2 DIABETES MELLITUS WITH HYPERGLYCEMIA, WITHOUT LONG-TERM CURRENT USE OF INSULIN (H): ICD-10-CM

## 2023-07-08 DIAGNOSIS — C61 PROSTATE CANCER (H): ICD-10-CM

## 2023-07-08 DIAGNOSIS — N30.01 ACUTE CYSTITIS WITH HEMATURIA: Primary | ICD-10-CM

## 2023-07-08 DIAGNOSIS — R30.0 BURNING WITH URINATION: ICD-10-CM

## 2023-07-08 LAB
ALBUMIN UR-MCNC: 30 MG/DL
APPEARANCE UR: ABNORMAL
BACTERIA #/AREA URNS HPF: ABNORMAL /HPF
BILIRUB UR QL STRIP: NEGATIVE
COLOR UR AUTO: ABNORMAL
GLUCOSE UR STRIP-MCNC: NEGATIVE MG/DL
HGB UR QL STRIP: ABNORMAL
KETONES UR STRIP-MCNC: ABNORMAL MG/DL
LEUKOCYTE ESTERASE UR QL STRIP: ABNORMAL
NITRATE UR QL: NEGATIVE
PH UR STRIP: 6 [PH] (ref 5–7)
RBC #/AREA URNS AUTO: >100 /HPF
SP GR UR STRIP: 1.02 (ref 1–1.03)
SQUAMOUS #/AREA URNS AUTO: ABNORMAL /LPF
TRANS CELLS #/AREA URNS HPF: ABNORMAL /HPF
UROBILINOGEN UR STRIP-ACNC: 0.2 E.U./DL
WBC #/AREA URNS AUTO: >100 /HPF
WBC CLUMPS #/AREA URNS HPF: PRESENT /HPF

## 2023-07-08 PROCEDURE — 99214 OFFICE O/P EST MOD 30 MIN: CPT | Performed by: PHYSICIAN ASSISTANT

## 2023-07-08 PROCEDURE — 87186 SC STD MICRODIL/AGAR DIL: CPT | Performed by: PHYSICIAN ASSISTANT

## 2023-07-08 PROCEDURE — 87086 URINE CULTURE/COLONY COUNT: CPT | Performed by: PHYSICIAN ASSISTANT

## 2023-07-08 PROCEDURE — 81001 URINALYSIS AUTO W/SCOPE: CPT | Performed by: PHYSICIAN ASSISTANT

## 2023-07-08 PROCEDURE — 87088 URINE BACTERIA CULTURE: CPT | Performed by: PHYSICIAN ASSISTANT

## 2023-07-08 RX ORDER — CEFDINIR 300 MG/1
300 CAPSULE ORAL 2 TIMES DAILY
Qty: 14 CAPSULE | Refills: 0 | Status: SHIPPED | OUTPATIENT
Start: 2023-07-08 | End: 2023-07-13

## 2023-07-08 ASSESSMENT — PAIN SCALES - GENERAL: PAINLEVEL: NO PAIN (0)

## 2023-07-08 NOTE — PROGRESS NOTES
Assessment & Plan     Acute cystitis with hematuria  - cefdinir (OMNICEF) 300 MG capsule; Take 1 capsule (300 mg) by mouth 2 times daily for 7 days    Burning with urination  - UA Macroscopic with reflex to Microscopic and Culture - Clinic Collect  - Urine Microscopic Exam  - Urine Culture    Type 2 diabetes mellitus with hyperglycemia, without long-term current use of insulin (H)  A1c well controlled. Continue current lifestyle modifications  and metformin.    Prostate cancer (H)  Follow up with urology next week as previously scheduled.      UA with large blood, large leukocytes. >100 WBC and RBC seen on microscopy. Start Omnicef twice daily for 7 days. Adjust plan as needed based on urine culture. Continue to drink plenty of fluids. Add cranberry pill daily for prevention. Appt already scheduled to follow up with urology in 3 days.    Return in about 3 days (around 7/11/2023) for visit with primary care provider if not improving.     Lexi Dominguez PA-C  [facility]    Subjective   Rakan Nolasco is a 77 year old who presents for the following health issues     Patient presents with:  UTI: Stinging constantly, had UTI previously     HPI    Rakan presents clinic today for evaluation of a possible urinary tract infection.  He states that he has some stinging and burning in his urethra that he noticed yesterday, possibly the day before.  The stinging is persistent, not just with urination.  He has been drinking plenty of fluids and attempt to stay hydrated.  No fever, lower abdominal pain, back pain. No penile discharge or significant blood in urine. He has had urinary tract infections in the past, one that developed to sepsis and he was hospitalized.  He has most recent urinary tract infection was 3 months ago.    Review of Systems   ROS negative except as stated above.      Objective    BP (!) 144/71   Pulse 78   Temp 97.9  F (36.6  C) (Tympanic)   Resp 12   Wt 102.4 kg (225 lb 12.8  oz)   SpO2 93%   BMI 33.33 kg/m    Physical Exam   GENERAL: healthy, alert and no distress  RESP: lungs clear to auscultation - no rales, rhonchi or wheezes  CV: regular rate and rhythm, normal S1 S2, no S3 or S4, no murmur, click or rub, no peripheral edema and peripheral pulses strong  ABDOMEN: soft, nontender, no hepatosplenomegaly, no masses and bowel sounds normal  BACK: no CVA tenderness, no paralumbar tenderness    Results for orders placed or performed in visit on 07/08/23   UA Macroscopic with reflex to Microscopic and Culture - Clinic Collect     Status: Abnormal    Specimen: Urine, Midstream   Result Value Ref Range    Color Urine Dark Yellow (A) Colorless, Straw, Light Yellow, Yellow    Appearance Urine Cloudy (A) Clear    Glucose Urine Negative Negative mg/dL    Bilirubin Urine Negative Negative    Ketones Urine Trace (A) Negative mg/dL    Specific Gravity Urine 1.020 1.003 - 1.035    Blood Urine Large (A) Negative    pH Urine 6.0 5.0 - 7.0    Protein Albumin Urine 30 (A) Negative mg/dL    Urobilinogen Urine 0.2 0.2, 1.0 E.U./dL    Nitrite Urine Negative Negative    Leukocyte Esterase Urine Large (A) Negative   Urine Microscopic Exam     Status: Abnormal   Result Value Ref Range    Bacteria Urine Few (A) None Seen /HPF    RBC Urine >100 (A) 0-2 /HPF /HPF    WBC Urine >100 (A) 0-5 /HPF /HPF    Squamous Epithelials Urine Few (A) None Seen /LPF    WBC Clumps Urine Present (A) None Seen /HPF    Transitional Epithelials Urine Few (A) None Seen /HPF

## 2023-07-08 NOTE — PATIENT INSTRUCTIONS
Take full course of antibiotics- omnicef twice daily for 7 days  Urine culture pending, we will call you only if culture shows resistance and change of antibiotic is required or if no growth to stop antibiotics and to follow-up with your primary care provider.  May use over the counter AZO pain relief or Uristat (phenazopyridine) for urinary burning but do not use for 24 hours before future urine tests.  Drink plenty of fluids. Limit caffeine and alcohol as these are bladder irritants.  May take tylenol or ibuprofen as needed for discomfort.   If you develop any vomiting, high fevers or lower back pain, these can be signs of a kidney infection and you should be seen in urgent care or in the ER.  Prevention of future infections by drinking cranberry juice, urination after intercourse and wiping from front to back after using the toilet.  Please follow up with primary care provider if symptoms return, if you're not improving, worsening or new symptoms or for any adverse reactions to medications.

## 2023-07-11 ENCOUNTER — OFFICE VISIT (OUTPATIENT)
Dept: UROLOGY | Facility: CLINIC | Age: 78
End: 2023-07-11
Payer: COMMERCIAL

## 2023-07-11 VITALS — SYSTOLIC BLOOD PRESSURE: 144 MMHG | OXYGEN SATURATION: 97 % | DIASTOLIC BLOOD PRESSURE: 70 MMHG | HEART RATE: 57 BPM

## 2023-07-11 DIAGNOSIS — N39.0 RECURRENT UTI: ICD-10-CM

## 2023-07-11 DIAGNOSIS — I10 HYPERTENSION, UNSPECIFIED TYPE: ICD-10-CM

## 2023-07-11 DIAGNOSIS — C61 MALIGNANT NEOPLASM OF PROSTATE (H): Primary | ICD-10-CM

## 2023-07-11 PROCEDURE — 99214 OFFICE O/P EST MOD 30 MIN: CPT | Performed by: UROLOGY

## 2023-07-11 NOTE — PROGRESS NOTES
Chief Complaint   Patient presents with     RECHECK       Rakan Nolasco is a 77 year old male who presents today for follow up of   Chief Complaint   Patient presents with     RECHECK   Patient is a pleasant 77-year-old male with history of prostate cancer status post cryotherapy in the past.  His recent PSA is 0.13 which is unchanged from last year.  his current  issue is  that he has been getting recurrent urine tract infections.  He was seen in September last year for it.  [place] feb[place].  Urine culture showed Serratia marcescens.  He had a UTI in April of this year and again recently.  He is currently on antibiotics.  Patient had a CT scan stone protocol recently showed bladder wall thickening otherwise unremarkable.  He has history of myasthenia gravis.    Current Outpatient Medications   Medication Sig Dispense Refill     albuterol (PROAIR HFA/PROVENTIL HFA/VENTOLIN HFA) 108 (90 Base) MCG/ACT inhaler Inhale 1 puff into the lungs every 4 hours as needed for shortness of breath, wheezing or cough 18 g 1     amLODIPine (NORVASC) 10 MG tablet Take 1 tablet (10 mg) by mouth daily 90 tablet 3     aspirin 81 MG tablet Take 1 tablet by mouth daily.       atorvastatin (LIPITOR) 20 MG tablet Take 1 tablet (20 mg) by mouth daily 90 tablet 3     blood glucose (NO BRAND SPECIFIED) lancets standard by In Vitro route 3 times daily for 90 days (do gauge 33) 270 lancet. 3     blood glucose (ONETOUCH VERIO IQ) test strip Use to test blood sugar 3 times daily or as directed. 100 strip 1     blood glucose monitoring (NO BRAND SPECIFIED) meter device kit Use to test blood sugar 1 times daily or as directed. 1 kit 0     cefdinir (OMNICEF) 300 MG capsule Take 1 capsule (300 mg) by mouth 2 times daily for 7 days 14 capsule 0     ClonAZEPAM (KLONOPIN) 0.5 MG tablet Take 0.5 mg by mouth Take 1 tablet by mouth daily at bedtime       ferrous sulfate (FEROSUL) 325 (65 Fe) MG tablet Take 1  tablet (325 mg) by mouth daily (with breakfast) 90 tablet 4     fluticasone-salmeterol (ADVAIR) 250-50 MCG/ACT inhaler TAKE 1 PUFF BY MOUTH TWICE A DAY Strength: 250-50 MCG/ACT 3 each 3     furosemide (LASIX) 20 MG tablet TAKE 2 TABLETS (40 MG) IN THE MORNING AND 1 TABLET (20 MG) AT LEAST 6 HOURS LATER IN THE AFTERNOON. Strength: 20 mg 180 tablet 0     ibuprofen (ADVIL/MOTRIN) 800 MG tablet Take 1 tablet (800 mg) by mouth 3 times daily 45 tablet 0     Lancets (ONETOUCH DELICA PLUS [id number]) MISC USE ONE DAILY AS DIRECTED 100 each 2     latanoprost (XALATAN) 0.005 % ophthalmic solution Place 1 drop into both eyes At Bedtime 7.5 mL 3     lisinopril (ZESTRIL) 40 MG tablet Take 1 tablet (40 mg) by mouth daily 90 tablet 3     metFORMIN (GLUCOPHAGE XR) 500 MG 24 hr tablet TAKE 1 TABLET BY MOUTH TWICE A DAY WITH MEALS 180 tablet 0     OXcarbazepine (TRILEPTAL) 300 MG tablet TAKE 1 TABLET BY MOUTH TWICE A  tablet 1     pantoprazole (PROTONIX) 20 MG EC tablet TAKE 1 TABLET BY MOUTH EVERY DAY 90 tablet 1     predniSONE (DELTASONE) 5 MG tablet TAKE 2 TABLETS BY MOUTH EVERY DAY 60 tablet 2     pyridostigmine (MESTINON) 60 MG tablet TAKE UP TO 2 TABLETS (120 MG) THREE TIMES A  tablet 0     sodium bicarbonate 650 MG tablet TAKE 1/2 TABLET BY MOUTH TWICE A DAY 90 tablet 1     timolol maleate (TIMOPTIC) 0.5 % ophthalmic solution Place 1 drop into both eyes 2 times daily 5 mL 11     Allergies   Allergen Reactions     Azathioprine Shortness Of Breath and Other (See Comments)     Was hospitalized from it. Also had high fever, chills, and shortness of breath.     Sulfamethoxazole-Trimethoprim Cough, Itching and Rash     Generalized painful red rash on legs arms and abdomen, chest, back, cough and  fever  that required hospitalization.     Ciprofloxacin Muscle Pain (Myalgia)     Muscle pain  Other reaction(s): Myalgia  Other reaction(s): Myalgia     Ropinirole      Leg pan  GI  Weakness; ? sycope  Other reaction(s): Leg  Pain      Past Medical History:   Diagnosis Date     Arthritis          BMI 31.0-31.9,adult      Cancer (H) 01/10/2018    Prostate     COPD (chronic obstructive pulmonary disease) (H) 10/28/2020     Demand ischemia (H)      Diabetes (H) 01/10/2018     Diverticulosis     Colonoscopy 8/2008     Fatty liver     see US  5/2012     GERD (gastroesophageal reflux disease)      Glaucoma (increased eye pressure)      HTN (hypertension)      Hyperlipidemia LDL goal < 130     age, htn, fhx     Irritable bowel      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      Nonsenile cataract      Obstructive sleep apnea      Obstructive sleep apnea syndrome      ROSIE (obstructive sleep apnea) 01/2011    Using CPAP;      Prediabetes      Restless leg syndrome      Sleep apnea      Trigeminal neuralgia pain 01/04/2012     Past Surgical History:   Procedure Laterality Date     BIOPSY ARTERY TEMPORAL Bilateral 8/1/2017    Procedure: BIOPSY ARTERY TEMPORAL;  Bilateral temporal artery Biopsy;  Surgeon: Jj Tello MD;  Location: [facility]     CATARACT IOL, RT/LT Right      COLONOSCOPY       ESOPHAGOSCOPY, GASTROSCOPY, DUODENOSCOPY (EGD), COMBINED  1/15/2014    Procedure: COMBINED ESOPHAGOSCOPY, GASTROSCOPY, DUODENOSCOPY (EGD), BIOPSY SINGLE OR MULTIPLE;;  Surgeon: Winston Nixon MD;  Location: [facility]     LASER YAG CAPSULOTOMY Right 04/09/2018    right eye     PHACOEMULSIFICATION CLEAR CORNEA WITH STANDARD INTRAOCULAR LENS IMPLANT Right 2/20/2017    Procedure: PHACOEMULSIFICATION CLEAR CORNEA WITH STANDARD INTRAOCULAR LENS IMPLANT;  Surgeon: Mateo Gray MD;  Location:  [facility]     PHACOEMULSIFICATION CLEAR CORNEA WITH STANDARD INTRAOCULAR LENS IMPLANT Left 1/10/2022    Procedure: LEFT PHACOEMULSIFICATION, CATARACT, WITH INTRAOCULAR LENS IMPLANT;  Surgeon: Mateo Gray MD;  Location: [facility]     RETINAL REATTACHMENT Right      SURGICAL HISTORY OF -   8/2009    Both Eyelids-.      TONSILLECTOMY  as child     Family History   Problem Relation Age of Onset     Respiratory Mother         copd     LUNG DISEASE Mother      Allergies Brother      Cancer Maternal Grandmother      Heart Disease Paternal Grandmother      Cerebrovascular Disease Father      Cancer Father      Other Cancer Father      Glaucoma Maternal Aunt      Macular Degeneration No family hx of      Social History     Socioeconomic History     Marital status: Single     Spouse name: None     Number of children: 0     Years of education: 12     Highest education level: None   Occupational History     Occupation: Retired 3/2012     Comment: [employer]   Tobacco Use     Smoking status: Former     Packs/day: 2.00     Years: 15.00     Pack years: 30.00     Types: Cigarettes     Start date: 1968     Quit date: 1983     Years since quittin.5     Smokeless tobacco: Former     Quit date: 1970     Tobacco comments:     smoke free household.   Vaping Use     Vaping Use: Never used   Substance and Sexual Activity     Alcohol use: Not Currently     Comment: Quit in      Drug use: No     Sexual activity: Not Currently     Partners: Female     Comment: 2010  No girlfriend at this time.   Other Topics Concern     Parent/sibling w/ CABG, MI or angioplasty before 65F 55M? No       REVIEW OF SYSTEMS  =================  C: NEGATIVE for fever, chills, change in weight  I: NEGATIVE for worrisome rashes, moles or lesions  E/M: NEGATIVE for ear, mouth and throat problems  R: NEGATIVE for significant cough or SHORTNESS OF BREATH  CV:  NEGATIVE for chest pain, palpitations or peripheral edema  GI: NEGATIVE for nausea, abdominal pain, heartburn, or change in bowel habits  NEURO: NEGATIVE numbness/weakness  : see HPI  PSYCH: NEGATIVE depression/anxiety  LYmph: no new enlarged lymph nodes  Ortho: no new trauma/movements    Physical Exam:  Blood pressure (!) 144/70, pulse 57, SpO2 97 %.    GENERAL: healthy, alert  and no distress  EYES: Eyes grossly normal to inspection, conjunctivae and sclerae normal  RESP: no audible wheeze, cough, or visible cyanosis.  No visible retractions or increased work of breathing.  Able to speak fully in complete sentences.  NEURO: Cranial nerves grossly intact, mentation intact and speech normal  PSYCH: mentation appears normal, affect normal/bright, judgement and insight intact, normal speech and appearance well-groomed    ER visits and records reviewed      Assessment/Plan:   (C61) Malignant neoplasm of prostate (H)  (primary encounter diagnosis)  Comment:  S/p cryotherapy.  psa stable  Plan: psa in one year    (N39.0) Recurrent UTI  Comment:    Plan: suspect recurrent prostatitis           Await urine culture results.    HTN: Rakan to follow up with Primary Care provider regarding elevated blood pressure.

## 2023-07-12 ENCOUNTER — NURSE TRIAGE (OUTPATIENT)
Dept: NURSING | Facility: CLINIC | Age: 78
End: 2023-07-12

## 2023-07-12 ENCOUNTER — OFFICE VISIT (OUTPATIENT)
Dept: FAMILY MEDICINE | Facility: CLINIC | Age: 78
End: 2023-07-12
Payer: COMMERCIAL

## 2023-07-12 ENCOUNTER — ANCILLARY PROCEDURE (OUTPATIENT)
Dept: GENERAL RADIOLOGY | Facility: CLINIC | Age: 78
End: 2023-07-12
Attending: FAMILY MEDICINE
Payer: COMMERCIAL

## 2023-07-12 VITALS
DIASTOLIC BLOOD PRESSURE: 62 MMHG | SYSTOLIC BLOOD PRESSURE: 124 MMHG | RESPIRATION RATE: 15 BRPM | BODY MASS INDEX: 33.95 KG/M2 | HEART RATE: 58 BPM | TEMPERATURE: 97.8 F | OXYGEN SATURATION: 98 % | WEIGHT: 230 LBS

## 2023-07-12 DIAGNOSIS — E11.69 TYPE 2 DIABETES MELLITUS WITH OTHER SPECIFIED COMPLICATION, WITHOUT LONG-TERM CURRENT USE OF INSULIN (H): ICD-10-CM

## 2023-07-12 DIAGNOSIS — K21.9 GASTROESOPHAGEAL REFLUX DISEASE WITHOUT ESOPHAGITIS: ICD-10-CM

## 2023-07-12 DIAGNOSIS — E78.5 HYPERLIPIDEMIA LDL GOAL <100: ICD-10-CM

## 2023-07-12 DIAGNOSIS — E61.1 IRON DEFICIENCY: ICD-10-CM

## 2023-07-12 DIAGNOSIS — J44.9 CHRONIC OBSTRUCTIVE PULMONARY DISEASE, UNSPECIFIED COPD TYPE (H): ICD-10-CM

## 2023-07-12 DIAGNOSIS — M54.30 SCIATICA WITHOUT BACK PAIN, UNSPECIFIED LATERALITY: ICD-10-CM

## 2023-07-12 DIAGNOSIS — M54.30 SCIATICA WITHOUT BACK PAIN, UNSPECIFIED LATERALITY: Primary | ICD-10-CM

## 2023-07-12 DIAGNOSIS — I10 BENIGN ESSENTIAL HYPERTENSION: ICD-10-CM

## 2023-07-12 PROCEDURE — 72100 X-RAY EXAM L-S SPINE 2/3 VWS: CPT | Mod: TC | Performed by: RADIOLOGY

## 2023-07-12 PROCEDURE — 99214 OFFICE O/P EST MOD 30 MIN: CPT | Performed by: FAMILY MEDICINE

## 2023-07-12 RX ORDER — PANTOPRAZOLE SODIUM 20 MG/1
20 TABLET, DELAYED RELEASE ORAL DAILY
Qty: 90 TABLET | Refills: 1 | Status: SHIPPED | OUTPATIENT
Start: 2023-07-12 | End: 2024-02-14

## 2023-07-12 RX ORDER — METFORMIN HCL 500 MG
500 TABLET, EXTENDED RELEASE 24 HR ORAL 2 TIMES DAILY WITH MEALS
Qty: 180 TABLET | Refills: 0 | Status: SHIPPED | OUTPATIENT
Start: 2023-07-12 | End: 2023-12-18

## 2023-07-12 RX ORDER — FLUTICASONE PROPIONATE AND SALMETEROL 250; 50 UG/1; UG/1
POWDER RESPIRATORY (INHALATION)
Qty: 3 EACH | Refills: 3 | Status: SHIPPED | OUTPATIENT
Start: 2023-07-12

## 2023-07-12 RX ORDER — LISINOPRIL 40 MG/1
40 TABLET ORAL DAILY
Qty: 90 TABLET | Refills: 3 | Status: SHIPPED | OUTPATIENT
Start: 2023-07-12 | End: 2024-08-01

## 2023-07-12 RX ORDER — ALBUTEROL SULFATE 90 UG/1
1 AEROSOL, METERED RESPIRATORY (INHALATION) EVERY 4 HOURS PRN
Qty: 18 G | Refills: 1 | Status: ON HOLD | OUTPATIENT
Start: 2023-07-12 | End: 2024-04-01

## 2023-07-12 NOTE — TELEPHONE ENCOUNTER
In on the 8th with UTI. They did a urine culture. Saw urologist yesterday and wanted to know those results.  I gave him the [facility] number to call and speak with their RNs about faxing those test results to his urologist. He also wondered why it wasn't showing up in his MyChart. I told him I'm not sure why it's not showing up but the results are there. He is on the antibiotic.  Sue Case RN  [facility]    Reason for Disposition    Information only question and nurse able to answer    Additional Information    Negative: Nursing judgment    Negative: Nursing judgment    Negative: Nursing judgment    Negative: Nursing judgment    Protocols used: INFORMATION ONLY CALL - NO TRIAGE-A-OH

## 2023-07-12 NOTE — PROGRESS NOTES
Assessment & Plan   Rakan Nolasco Jr. is a 77 y.o. male with hx of diabetes, m gravis, hypertension amish, rls, hx prostate ca, hpld, gerd, covid (11/15/22), recurrent cystitis here for routine follow-up    Sciatica without back pain, unspecified laterality: Rt > Lt  Patient been having ongoing pain in the back of the legs especially with prolonged standing or walking.  Had resting and exercise MARCO A a year ago were normal; which ruled out peripheral arterial disease. (Diagnosis of claudication removed from problem list, at the inquiry of the patient).  Will evaluate with a lumbar x-ray, might need an MRI subsequently  - XR Lumbar Spine 2/3 Views; Future    Type 2 diabetes mellitus with other specified complication, without long-term current use of insulin (H)  Reviewed recent lab results, A1c at goal.  Continue current treatment plan  - metFORMIN (GLUCOPHAGE XR) 500 MG 24 hr tablet; Take 1 tablet (500 mg) by mouth 2 times daily (with meals)  - PRIMARY CARE FOLLOW-UP SCHEDULING; Future    Hyperlipidemia LDL goal <100  Reviewed recent labs, cholesterol numbers looking good, tolerating statin  - PRIMARY CARE FOLLOW-UP SCHEDULING; Future    Benign essential hypertension  Blood pressure at goal, refill lisinopril  - lisinopril (ZESTRIL) 40 MG tablet; Take 1 tablet (40 mg) by mouth daily  - PRIMARY CARE FOLLOW-UP SCHEDULING; Future    Chronic obstructive pulmonary disease, unspecified COPD type (H)  Doing okay, had a recent flareup of bronchitis when he had poor air quality.  Refill inhalers  - fluticasone-salmeterol (ADVAIR) 250-50 MCG/ACT inhaler; TAKE 1 PUFF BY MOUTH TWICE A DAY Strength: 250-50 MCG/ACT  - albuterol (PROAIR HFA/PROVENTIL HFA/VENTOLIN HFA) 108 (90 Base) MCG/ACT inhaler; Inhale 1 puff into the lungs every 4 hours as needed for shortness of breath, wheezing or cough  - PRIMARY CARE FOLLOW-UP SCHEDULING; Future    Gastroesophageal reflux disease without esophagitis  - pantoprazole (PROTONIX) 20 MG EC  "tablet; Take 1 tablet (20 mg) by mouth daily    Iron deficiency    -   CBC from 3 weeks ago at the [facility] was normal     BMI:   Estimated body mass index is 33.95 kg/m  as calculated from the following:    Height as of 6/21/23: 1.753 m (5' 9.02\").    Weight as of this encounter: 104.3 kg (230 lb).   Weight management plan: Discussed healthy diet and exercise guidelines  Blood sugar testing frequency justification:  Patient modifying lifestyle changes (diet, exercise) with blood sugars  See Patient Instructions    Telly Lucio MD  [facility] THIAGO Bledsoe is a 77 year old, presenting for the following health issues:  Hypertension, Diabetes, and Lipids        7/12/2023     8:53 AM   Additional Questions   Roomed by mildred   Accompanied by self     History of Present Illness       Diabetes:   He presents for follow up of diabetes.  He is checking home blood glucose two times daily. He checks blood glucose before meals.  Blood glucose is sometimes over 200 and never under 70. When his blood glucose is low, the patient is asymptomatic for confusion, blurred vision, lethargy and reports not feeling dizzy, shaky, or weak.  He is concerned about frequent infections.  He is having burning in feet and blurry vision.         He eats 2-3 servings of fruits and vegetables daily.He consumes 0 sweetened beverage(s) daily. He exercises with enough effort to increase his heart rate 3 or less days per week.   He is taking medications regularly.       Diabetes Follow-up    How often are you checking your blood sugar? One time daily  What time of day are you checking your blood sugars (select all that apply)?  Before meals  Have you had any blood sugars above 200?  No  Have you had any blood sugars below 70?  No    What symptoms do you notice when your blood sugar is low?  None    What concerns do you have today about your diabetes? None     Do you have any of these symptoms? (Select all " that apply)  No numbness or tingling in feet.  No redness, sores or blisters on feet.  No complaints of excessive thirst.  No reports of blurry vision.  No significant changes to weight.    Hyperlipidemia Follow-Up      Are you regularly taking any medication or supplement to lower your cholesterol?   Yes- Atorvastatin    Are you having muscle aches or other side effects that you think could be caused by your cholesterol lowering medication?  No    Hypertension Follow-up      Do you check your blood pressure regularly outside of the clinic? No     Are you following a low salt diet? Yes    Are your blood pressures ever more than 140 on the top number (systolic) OR more   than 90 on the bottom number (diastolic), for example 140/90? No    BP Readings from Last 2 Encounters:   07/12/23 124/62   07/11/23 (!) 144/70     Hemoglobin A1C (%)   Date Value   07/05/2023 6.5 (H)   04/13/2023 6.6 (H)   06/09/2021 7.1 (H)   12/30/2020 7.5 (H)     LDL Cholesterol Calculated (mg/dL)   Date Value   07/05/2023 72   07/01/2022 32   06/09/2021 69   06/11/2020 35       COPD Follow-Up    Overall, how are your COPD symptoms since your last clinic visit?  Better    How much fatigue or shortness of breath do you have when you are walking?  None    How much shortness of breath do you have when you are resting?  None    How often do you cough? Rarely    Have you noticed any change in your sputum/phlegm?  No    Have you experienced a recent fever? No    Please describe how far you can walk without stopping to rest:  2-5 blocks    How many flights of stairs are you able to walk up without stopping?  3 or more  Have you had any Emergency Room Visits, Urgent Care Visits, or Hospital Admissions because of your COPD since your last office visit?  Yes      How many times have you gone to the ED, Urgent Care, or been hospitalized for COPD since your last clinic visit?  1    History   Smoking Status     Former     Packs/day: 2.00     Years: 15.00      Types: Cigarettes     Start date: 1/1/1968     Quit date: 1/1/1983   Smokeless Tobacco     Former     Quit date: 1/1/1970     Sciatica: Rt > Lt    Has a lot of pain in then back of the legs   This has been going on for a while; worse with standing and walking as well as at night   No numbness or tingling    Review of Systems   Constitutional, HEENT, cardiovascular, pulmonary, gi and gu systems are negative, except as otherwise noted.      Objective    /62   Pulse 58   Temp 97.8  F (36.6  C)   Resp 15   Wt 104.3 kg (230 lb)   SpO2 98%   BMI 33.95 kg/m    Body mass index is 33.95 kg/m .  Physical Exam   GENERAL: healthy, alert and no distress  RESP: lungs clear to auscultation - no rales, rhonchi or wheezes  CV: regular rate and rhythm, no murmur, click or rub, no peripheral edema   MS: no gross musculoskeletal defects noted, no edema  NEURO: Normal strength and tone, mentation intact and speech normal  PSYCH: mentation appears normal, affect normal/bright

## 2023-07-13 ENCOUNTER — TELEPHONE (OUTPATIENT)
Dept: UROLOGY | Facility: CLINIC | Age: 78
End: 2023-07-13

## 2023-07-13 DIAGNOSIS — N30.01 ACUTE CYSTITIS WITH HEMATURIA: ICD-10-CM

## 2023-07-13 LAB
BACTERIA UR CULT: ABNORMAL
BACTERIA UR CULT: ABNORMAL

## 2023-07-13 RX ORDER — CEFDINIR 300 MG/1
300 CAPSULE ORAL 2 TIMES DAILY
Qty: 56 CAPSULE | Refills: 0 | Status: SHIPPED | OUTPATIENT
Start: 2023-07-13 | End: 2023-09-15

## 2023-07-24 ENCOUNTER — NURSE TRIAGE (OUTPATIENT)
Dept: UROLOGY | Facility: CLINIC | Age: 78
End: 2023-07-24

## 2023-07-24 NOTE — TELEPHONE ENCOUNTER
Writer called and spoke with patient. He is to try benadryl and/or over the counter anti itch topical cream. Per Dr. Emery, due to patient's allergies, this is the best medication for the patient to be on. Pt is aware he is to resume taking the medication.    Milady HORN RN Urology 7/24/2023 9:37 AM

## 2023-08-12 DIAGNOSIS — G70.00 MYASTHENIA GRAVIS, ACHR ANTIBODY POSITIVE (H): ICD-10-CM

## 2023-08-14 ENCOUNTER — LAB (OUTPATIENT)
Dept: LAB | Facility: CLINIC | Age: 78
End: 2023-08-14
Payer: COMMERCIAL

## 2023-08-14 DIAGNOSIS — N30.01 ACUTE CYSTITIS WITH HEMATURIA: ICD-10-CM

## 2023-08-14 LAB
ALBUMIN UR-MCNC: NEGATIVE MG/DL
APPEARANCE UR: CLEAR
BILIRUB UR QL STRIP: NEGATIVE
COLOR UR AUTO: YELLOW
GLUCOSE UR STRIP-MCNC: NEGATIVE MG/DL
HGB UR QL STRIP: ABNORMAL
KETONES UR STRIP-MCNC: NEGATIVE MG/DL
LEUKOCYTE ESTERASE UR QL STRIP: NEGATIVE
NITRATE UR QL: NEGATIVE
PH UR STRIP: 5.5 [PH] (ref 5–7)
RBC #/AREA URNS AUTO: ABNORMAL /HPF
SP GR UR STRIP: 1.01 (ref 1–1.03)
SQUAMOUS #/AREA URNS AUTO: ABNORMAL /LPF
UROBILINOGEN UR STRIP-ACNC: 0.2 E.U./DL
WBC #/AREA URNS AUTO: ABNORMAL /HPF

## 2023-08-14 PROCEDURE — 81001 URINALYSIS AUTO W/SCOPE: CPT

## 2023-08-14 RX ORDER — PREDNISONE 5 MG/1
TABLET ORAL
Qty: 60 TABLET | Refills: 2 | Status: SHIPPED | OUTPATIENT
Start: 2023-08-14 | End: 2023-11-06

## 2023-08-21 ENCOUNTER — TELEPHONE (OUTPATIENT)
Dept: UROLOGY | Facility: CLINIC | Age: 78
End: 2023-08-21

## 2023-08-21 NOTE — TELEPHONE ENCOUNTER
8/21/2023, 1528- Pt called and left a VM inquiring on results of UA from 8/14/23. Also mentioned he is having stinging.     4:01 PM- Writer called and left VM back on negative results and that patient can take AZO if he is having stinging.     Milady HORN RN Urology 8/21/2023 4:02 PM

## 2023-08-28 DIAGNOSIS — R60.0 PEDAL EDEMA: ICD-10-CM

## 2023-08-28 RX ORDER — FUROSEMIDE 20 MG
TABLET ORAL
Qty: 180 TABLET | Refills: 0 | Status: SHIPPED | OUTPATIENT
Start: 2023-08-28 | End: 2023-11-10

## 2023-09-02 DIAGNOSIS — G70.00 OCULAR MYASTHENIA (H): ICD-10-CM

## 2023-09-05 RX ORDER — PYRIDOSTIGMINE BROMIDE 60 MG/1
TABLET ORAL
Qty: 540 TABLET | Refills: 0 | Status: SHIPPED | OUTPATIENT
Start: 2023-09-05 | End: 2023-09-21

## 2023-09-12 ENCOUNTER — TELEPHONE (OUTPATIENT)
Dept: OPHTHALMOLOGY | Facility: CLINIC | Age: 78
End: 2023-09-12

## 2023-09-12 ENCOUNTER — OFFICE VISIT (OUTPATIENT)
Dept: OPHTHALMOLOGY | Facility: CLINIC | Age: 78
End: 2023-09-12
Payer: COMMERCIAL

## 2023-09-12 DIAGNOSIS — H04.123 DRY EYES, BILATERAL: ICD-10-CM

## 2023-09-12 DIAGNOSIS — Z86.69 H/O RD (RETINAL DETACHMENT): ICD-10-CM

## 2023-09-12 DIAGNOSIS — Z98.890 S/P BLEPHAROPLASTY: ICD-10-CM

## 2023-09-12 DIAGNOSIS — Z96.1 PSEUDOPHAKIA OF BOTH EYES: ICD-10-CM

## 2023-09-12 DIAGNOSIS — G70.00 MYASTHENIA GRAVIS, ACHR ANTIBODY POSITIVE (H): ICD-10-CM

## 2023-09-12 DIAGNOSIS — H40.053 BORDERLINE GLAUCOMA WITH OCULAR HYPERTENSION, BILATERAL: Primary | ICD-10-CM

## 2023-09-12 PROCEDURE — 92012 INTRM OPH EXAM EST PATIENT: CPT | Performed by: STUDENT IN AN ORGANIZED HEALTH CARE EDUCATION/TRAINING PROGRAM

## 2023-09-12 RX ORDER — NEOMYCIN POLYMYXIN B SULFATES AND DEXAMETHASONE 3.5; 10000; 1 MG/ML; [USP'U]/ML; MG/ML
1 SUSPENSION/ DROPS OPHTHALMIC 3 TIMES DAILY
Qty: 5 ML | Refills: 0 | Status: SHIPPED | OUTPATIENT
Start: 2023-09-12 | End: 2024-03-05

## 2023-09-12 ASSESSMENT — CUP TO DISC RATIO
OS_RATIO: 0.25
OD_RATIO: 0.1

## 2023-09-12 ASSESSMENT — VISUAL ACUITY
METHOD: SNELLEN - LINEAR
OD_CC: 20/20
OS_CC: 20/25
OS_CC+: -3

## 2023-09-12 ASSESSMENT — SLIT LAMP EXAM - LIDS
COMMENTS: 1+ DERMATOCHALASIS
COMMENTS: 1+ DERMATOCHALASIS

## 2023-09-12 ASSESSMENT — TONOMETRY
IOP_METHOD: APPLANATION
OS_IOP_MMHG: 19
OD_IOP_MMHG: 21

## 2023-09-12 ASSESSMENT — EXTERNAL EXAM - RIGHT EYE: OD_EXAM: NORMAL

## 2023-09-12 ASSESSMENT — EXTERNAL EXAM - LEFT EYE: OS_EXAM: NORMAL

## 2023-09-12 NOTE — PATIENT INSTRUCTIONS
"Use Maxitrol three times a day in the left eye for one week    Continue Timolol (yellow top) twice a day in both eyes     Continue Latanoprost (green top) at bedtime both eyes     Use artificial tears 4 times per day (Refresh Optive or Systane Balance or generic ok, avoid \"get the red out\" drops)    Mateo Gray MD  [phone]   "

## 2023-09-12 NOTE — LETTER
"    9/12/2023         RE: Rakan Nolasco  [address] [address]        Dear Colleague,    Thank you for referring your patient, Rakan Nolasco, to the [facility]. Please see a copy of my visit note below.     Current Eye Medications:  Generic artificial tears as needed both eyes. Timolol twice a day both eyes, last took at 6 am. Latanoprost at bedtime both eyes, last took about 10 pm.      Subjective:  F.B. sensation started last night left eye. Dull pain last for couple minutes, got bad last night. Watering a lot and blood shot left eye. Vision is OK right eye. Vision comes and goes left eye.      Objective:  See Ophthalmology Exam.       Assessment:  Rakan Nolasco is a 78 year old male who presents with:   Encounter Diagnoses   Name Primary?     Borderline glaucoma with ocular hypertension, bilateral Intraocular pressure 21/19 today.      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        Sincerely,        Mateo Gray MD  "

## 2023-09-12 NOTE — TELEPHONE ENCOUNTER
[facility] Call Center    Phone Message    May a detailed message be left on voicemail: yes     Reason for Call: Symptoms or Concerns     If patient has red-flag symptoms, warm transfer to triage line    Current symptom or concern: itchy, bloodshot redness,watering,  blurry, left eye    Symptoms have been present for:  3-4 day(s)    Has patient previously been seen for this? No    By : n/a    Date: n/a    Are there any new or worsening symptoms? Yes: yesterday symptoms begin to worsen    Action Taken: Message routed to:  [facility] ([facility]): eye    Travel Screening: Not Applicable

## 2023-09-15 ENCOUNTER — TELEPHONE (OUTPATIENT)
Dept: UROLOGY | Facility: CLINIC | Age: 78
End: 2023-09-15

## 2023-09-15 DIAGNOSIS — N30.01 ACUTE CYSTITIS WITH HEMATURIA: ICD-10-CM

## 2023-09-15 RX ORDER — CEFDINIR 300 MG/1
300 CAPSULE ORAL 2 TIMES DAILY
Qty: 14 CAPSULE | Refills: 0 | Status: SHIPPED | OUTPATIENT
Start: 2023-09-15 | End: 2023-10-31

## 2023-09-15 NOTE — TELEPHONE ENCOUNTER
[facility] Call Center    Phone Message    May a detailed message be left on voicemail: yes     Reason for Call: Symptoms or Concerns     If patient has red-flag symptoms, warm transfer to triage line    Current symptom or concern: Passing blood with urine, stinging with/without urination    Symptoms have been present for: 3 hour(s)    Has patient previously been seen for this? Yes    By Josh Emery        Are there any new or worsening symptoms? Yes:     Patient states he is out in [place] experiencing above symptoms. States he knows this is a UTI due to the symptoms and he's seen provider for these previously. Wants to know if provider will send a script to below pharmacy. States the Cefdinir is the only thing he can take due to having allergic reactions or side effects. States provider knows this though. Please contact patient in regards to this message. Pharmacy below. Thank you       cefdinir (OMNICEF) 300 MG capsule     [facility] [id number] [place] [facility], [place] - [address] [facility]  P: [phone]  F: [phone]    Action Taken: Other: Urology    Travel Screening: Not Applicable

## 2023-09-21 DIAGNOSIS — G70.00 OCULAR MYASTHENIA (H): ICD-10-CM

## 2023-09-21 RX ORDER — PYRIDOSTIGMINE BROMIDE 60 MG/1
TABLET ORAL
Qty: 120 TABLET | Refills: 1 | Status: SHIPPED | OUTPATIENT
Start: 2023-09-21 | End: 2023-10-16

## 2023-10-08 DIAGNOSIS — E61.1 IRON DEFICIENCY: ICD-10-CM

## 2023-10-09 RX ORDER — FERROUS SULFATE 325(65) MG
325 TABLET ORAL
Qty: 90 TABLET | Refills: 1 | Status: SHIPPED | OUTPATIENT
Start: 2023-10-09 | End: 2024-04-16

## 2023-10-14 DIAGNOSIS — G70.00 OCULAR MYASTHENIA (H): ICD-10-CM

## 2023-10-16 RX ORDER — PYRIDOSTIGMINE BROMIDE 60 MG/1
TABLET ORAL
Qty: 540 TABLET | Refills: 0 | Status: SHIPPED | OUTPATIENT
Start: 2023-10-16 | End: 2024-04-01

## 2023-10-23 ENCOUNTER — OFFICE VISIT (OUTPATIENT)
Dept: NEUROLOGY | Facility: CLINIC | Age: 78
End: 2023-10-23
Payer: COMMERCIAL

## 2023-10-23 VITALS
DIASTOLIC BLOOD PRESSURE: 63 MMHG | OXYGEN SATURATION: 96 % | HEIGHT: 70 IN | HEART RATE: 59 BPM | BODY MASS INDEX: 32.5 KG/M2 | SYSTOLIC BLOOD PRESSURE: 112 MMHG | WEIGHT: 227 LBS

## 2023-10-23 DIAGNOSIS — G50.0 TRIGEMINAL NEURALGIA: ICD-10-CM

## 2023-10-23 DIAGNOSIS — G70.00 MYASTHENIA GRAVIS WITHOUT EXACERBATION (H): Primary | ICD-10-CM

## 2023-10-23 PROCEDURE — 99214 OFFICE O/P EST MOD 30 MIN: CPT | Performed by: PSYCHIATRY & NEUROLOGY

## 2023-10-23 ASSESSMENT — PAIN SCALES - GENERAL: PAINLEVEL: MODERATE PAIN (4)

## 2023-10-23 NOTE — LETTER
10/23/2023       RE: Rakan Nolasco  [address] [facility] [address]       Dear Colleague,    Thank you for referring your patient, Rakan Nolasco, to the [facility] NEUROLOGY [facility] [place] [facility]. Please see a copy of my visit note below.    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  He is currently on prednisone 10 mg a day.  He is also taking Mestinon 120 mg in the morning, 60 at noon, and 60 in the evening (during her last visit I asked him to take 120 mg 3 times a day, but he felt he did not need that much in the noon or evening).  In the last visit his MG ADL score was quite high, at 9, and I proposed him to start taking CellCept, but he declined, out of fear of increasing immunosuppression and risk of infection.  Compared to the last time I saw him, he feels that his left eyelid is becoming slightly droopy, but this still happens intermittently, only a few days per week, not every day, when he is fatigued.  He denies diplopia today.  His dysphagia is stable.  It has not increased in frequency, but he still feels that liquids more than solid food gets stuck or come back sometimes.  He denies choking.  He has not modified his food consistencies.  He denies today any dysarthria, dysphonia, or jaw fatigue when chewing.  He has stable dyspnea on exertion.  He no more reports fatigue of his arms when brushing his teeth or combing his hair.  As for his legs, he sometimes will use the arms of the chair to get up, but not consistently.    He also has facial pain consistent with trigeminal neuralgia.  He had a slight flare of the pain in the summer, and his Trileptal dose was increased to 2 in the morning and 1 at noon.  After this  "increase, he has been doing well with rare episodes of pain.    He had labs on August 2023. Basic metabolic panel was normal except for slightly elevated BUN at 21 mg per DL. His  last hemoglobin A1c was done in July 2023 and was 6.5 which is satisfactory.  He has known type 2 diabetes.  He had a urinalysis showing signs of a UTI on July 2023 with culture growing Serratia marcescens.  His last bone density scan was done in April 2023 and was normal.         MG Activities of Daily Living (MG-ADL) profile 4 (better than last visit)    Grade 0 1 2 3   Talking Normal Intermittent slurring/nasal speech Constant slurring/nasal speech, can be understood Difficult to understand   Chewing Normal Fatigue with solid food Fatigue with soft food G tube   Swallowing Normal Rare choking Frequent choking necessitating diet changes G tube   Breathing Normal SOB with exertion SOB at rest Ventilator dependent   Ability to brush teeth/comb hair Normal Extra effort, no rest periods needed Rest periods needed Cannot do one of these   Ability to rise from chair Normal Mild impairment, uses arms sometimes Moderate impairment, always uses arms Severe impairment, requires assistance   Double vision None Present, not daily Daily, not constant Constant   Ptosis None Present, but not daily Daily, not constant Constant     /63 (BP Location: Left arm, Patient Position: Left side, Cuff Size: Adult Regular)   Pulse 59   Ht 1.778 m (5' 10\")   Wt 103 kg (227 lb)   SpO2 96%   BMI 32.57 kg/m      EXAM: He has no ptosis after 30 seconds of sustained upward gaze.  There is no weakness of orbicularis oculi, or orbicularis oris.  There is no obvious ophthalmoplegia and he denies diplopia in any gaze direction.  Strength of neck flexion and extension is 5/5.  Strength of jaw opening and closure, uvula, palate, and tongue is normal.  Strength is 5/5 for bilateral deltoids, biceps, triceps, handgrip, hip flexors.      In summary, it is my " impression that Rakan's MG is stable. His MG ADL score is quite better than the last visit (now 4, previously 9), despite lack of patient perception of a dramatic change.  All the MG ADL scores in 2022 were between 3 and 4, which makes me believe that the score of 9 we obtained in the last visit in 4/2023 was an outlier, for reasons that are somewhat unclear for me.  He will continue his current prednisone dose of 10 mg a day.  His last hemoglobin A1c and basic metabolic panel were satisfactory.  At this point, I do not recommend adding nonsteroidal immunosuppression like CellCept, due to the risk of infection, and stability of MG symptoms.  If he noticed more left eyelid ptosis during the day, he can increase his pyridostigmine dose to 120 mg 3 times a day again.  He will continue the current oxcarbazepine dose for his trigeminal neuralgia, which is working well.  I will see him in follow-up in 6 months.  I asked him to contact me sooner if he has any worsening of his MG symptoms; in that case we should definitely add further medication to optimize the control of the disease.    Billing is MDM level 4 (moderate) based on: #1 problems assessed: Two stable chronic disorders (myasthenia gravis, and trigeminal neuralgia), and #2 risk: Prescription drug management.          Again, thank you for allowing me to participate in the care of your patient.      Sincerely,    Arpan Harrison MD

## 2023-10-23 NOTE — NURSING NOTE
Chief Complaint   Patient presents with    RECHECK    Myasthenia Gravis       Vitals were taken and medications were reconciled.        Larissa Guerin, Technician  8:48 AM  October 23, 2023

## 2023-10-23 NOTE — PATIENT INSTRUCTIONS
Continue the current prednisone dose  If the left eyelid feels more droopy then you can go back to taking Mestinon 120 mg three times a day (2 morning, 2 noon, 2 evening)    Follow up 6 months  If your swallowing problem becomes more frequent in the future, we should strongly consider adding a medication like Cellcept to control your myasthenia better. Because this medication can increase the risk of infections, and your myasthenia in my opinion is stable, we will not add this right now.        Bactrim Counseling:  I discussed with the patient the risks of sulfa antibiotics including but not limited to GI upset, allergic reaction, drug rash, diarrhea, dizziness, photosensitivity, and yeast infections.  Rarely, more serious reactions can occur including but not limited to aplastic anemia, agranulocytosis, methemoglobinemia, blood dyscrasias, liver or kidney failure, lung infiltrates or desquamative/blistering drug rashes.

## 2023-10-23 NOTE — PROGRESS NOTES
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  He is currently on prednisone 10 mg a day.  He is also taking Mestinon 120 mg in the morning, 60 at noon, and 60 in the evening (during her last visit I asked him to take 120 mg 3 times a day, but he felt he did not need that much in the noon or evening).  In the last visit his MG ADL score was quite high, at 9, and I proposed him to start taking CellCept, but he declined, out of fear of increasing immunosuppression and risk of infection.  Compared to the last time I saw him, he feels that his left eyelid is becoming slightly droopy, but this still happens intermittently, only a few days per week, not every day, when he is fatigued.  He denies diplopia today.  His dysphagia is stable.  It has not increased in frequency, but he still feels that liquids more than solid food gets stuck or come back sometimes.  He denies choking.  He has not modified his food consistencies.  He denies today any dysarthria, dysphonia, or jaw fatigue when chewing.  He has stable dyspnea on exertion.  He no more reports fatigue of his arms when brushing his teeth or combing his hair.  As for his legs, he sometimes will use the arms of the chair to get up, but not consistently.    He also has facial pain consistent with trigeminal neuralgia.  He had a slight flare of the pain in the summer, and his Trileptal dose was increased to 2 in the morning and 1 at noon.  After this increase, he has been doing well with rare episodes of pain.    He had labs on August 2023. Basic metabolic panel was normal except for slightly elevated BUN at 21 mg per DL. His  last hemoglobin A1c was done in July 2023 and was 6.5 which is satisfactory.  He has known type 2 diabetes.  He had a urinalysis showing signs of  "a UTI on July 2023 with culture growing Serratia marcescens.  His last bone density scan was done in April 2023 and was normal.         MG Activities of Daily Living (MG-ADL) profile 4 (better than last visit)    Grade 0 1 2 3   Talking Normal Intermittent slurring/nasal speech Constant slurring/nasal speech, can be understood Difficult to understand   Chewing Normal Fatigue with solid food Fatigue with soft food G tube   Swallowing Normal Rare choking Frequent choking necessitating diet changes G tube   Breathing Normal SOB with exertion SOB at rest Ventilator dependent   Ability to brush teeth/comb hair Normal Extra effort, no rest periods needed Rest periods needed Cannot do one of these   Ability to rise from chair Normal Mild impairment, uses arms sometimes Moderate impairment, always uses arms Severe impairment, requires assistance   Double vision None Present, not daily Daily, not constant Constant   Ptosis None Present, but not daily Daily, not constant Constant     /63 (BP Location: Left arm, Patient Position: Left side, Cuff Size: Adult Regular)   Pulse 59   Ht 1.778 m (5' 10\")   Wt 103 kg (227 lb)   SpO2 96%   BMI 32.57 kg/m      EXAM: He has no ptosis after 30 seconds of sustained upward gaze.  There is no weakness of orbicularis oculi, or orbicularis oris.  There is no obvious ophthalmoplegia and he denies diplopia in any gaze direction.  Strength of neck flexion and extension is 5/5.  Strength of jaw opening and closure, uvula, palate, and tongue is normal.  Strength is 5/5 for bilateral deltoids, biceps, triceps, handgrip, hip flexors.      In summary, it is my impression that Rakan's MG is stable. His MG ADL score is quite better than the last visit (now 4, previously 9), despite lack of patient perception of a dramatic change.  All the MG ADL scores in 2022 were between 3 and 4, which makes me believe that the score of 9 we obtained in the last visit in 4/2023 was an outlier, for reasons " that are somewhat unclear for me.  He will continue his current prednisone dose of 10 mg a day.  His last hemoglobin A1c and basic metabolic panel were satisfactory.  At this point, I do not recommend adding nonsteroidal immunosuppression like CellCept, due to the risk of infection, and stability of MG symptoms.  If he noticed more left eyelid ptosis during the day, he can increase his pyridostigmine dose to 120 mg 3 times a day again.  He will continue the current oxcarbazepine dose for his trigeminal neuralgia, which is working well.  I will see him in follow-up in 6 months.  I asked him to contact me sooner if he has any worsening of his MG symptoms; in that case we should definitely add further medication to optimize the control of the disease.    Billing is MDM level 4 (moderate) based on: #1 problems assessed: Two stable chronic disorders (myasthenia gravis, and trigeminal neuralgia), and #2 risk: Prescription drug management.    Sincerely,    Arpan Harrison MD, FAAN

## 2023-10-24 DIAGNOSIS — R60.9 EDEMA, UNSPECIFIED TYPE: ICD-10-CM

## 2023-10-25 DIAGNOSIS — G50.0 TRIGEMINAL NEURALGIA: ICD-10-CM

## 2023-10-25 RX ORDER — SODIUM BICARBONATE 650 MG/1
TABLET ORAL
Qty: 90 TABLET | Refills: 1 | Status: SHIPPED | OUTPATIENT
Start: 2023-10-25 | End: 2024-04-29

## 2023-10-25 RX ORDER — OXCARBAZEPINE 300 MG/1
TABLET, FILM COATED ORAL
Qty: 270 TABLET | Refills: 1 | Status: SHIPPED | OUTPATIENT
Start: 2023-10-25 | End: 2024-01-25

## 2023-10-31 ENCOUNTER — TELEPHONE (OUTPATIENT)
Dept: UROLOGY | Facility: CLINIC | Age: 78
End: 2023-10-31

## 2023-10-31 DIAGNOSIS — N30.01 ACUTE CYSTITIS WITH HEMATURIA: ICD-10-CM

## 2023-10-31 RX ORDER — CEFDINIR 300 MG/1
300 CAPSULE ORAL 2 TIMES DAILY
Qty: 14 CAPSULE | Refills: 0 | Status: SHIPPED | OUTPATIENT
Start: 2023-10-31 | End: 2023-11-10

## 2023-10-31 NOTE — TELEPHONE ENCOUNTER
Called and spoke to patient.   Patient has been experiencing burning wth urination for the last 2 days. Patient reports that this feels like a start of a urinary tract infection, that he has had in the past.   Patient is out of town and is not able to leave a urine sample.   Per Dr. Yuly alston to send abx.   Medication sent to preferred pharmacy.   Citlalli Flores RN

## 2023-11-05 DIAGNOSIS — G70.00 MYASTHENIA GRAVIS, ACHR ANTIBODY POSITIVE (H): ICD-10-CM

## 2023-11-06 RX ORDER — PREDNISONE 5 MG/1
TABLET ORAL
Qty: 60 TABLET | Refills: 2 | Status: SHIPPED | OUTPATIENT
Start: 2023-11-06 | End: 2024-01-29

## 2023-11-10 ENCOUNTER — OFFICE VISIT (OUTPATIENT)
Dept: FAMILY MEDICINE | Facility: CLINIC | Age: 78
End: 2023-11-10
Payer: COMMERCIAL

## 2023-11-10 VITALS
HEIGHT: 70 IN | WEIGHT: 233.8 LBS | OXYGEN SATURATION: 97 % | RESPIRATION RATE: 19 BRPM | SYSTOLIC BLOOD PRESSURE: 136 MMHG | HEART RATE: 58 BPM | DIASTOLIC BLOOD PRESSURE: 70 MMHG | BODY MASS INDEX: 33.47 KG/M2

## 2023-11-10 DIAGNOSIS — G89.29 CHRONIC BILATERAL LOW BACK PAIN, UNSPECIFIED WHETHER SCIATICA PRESENT: ICD-10-CM

## 2023-11-10 DIAGNOSIS — M54.50 CHRONIC BILATERAL LOW BACK PAIN, UNSPECIFIED WHETHER SCIATICA PRESENT: ICD-10-CM

## 2023-11-10 DIAGNOSIS — G70.00 MYASTHENIA GRAVIS WITHOUT EXACERBATION (H): ICD-10-CM

## 2023-11-10 DIAGNOSIS — E11.65 TYPE 2 DIABETES MELLITUS WITH HYPERGLYCEMIA, WITHOUT LONG-TERM CURRENT USE OF INSULIN (H): Primary | ICD-10-CM

## 2023-11-10 DIAGNOSIS — R07.9 RECURRENT CHEST PAIN: ICD-10-CM

## 2023-11-10 DIAGNOSIS — I10 HYPERTENSION GOAL BP (BLOOD PRESSURE) < 140/90: ICD-10-CM

## 2023-11-10 DIAGNOSIS — R60.0 PEDAL EDEMA: ICD-10-CM

## 2023-11-10 DIAGNOSIS — M17.0 PRIMARY OSTEOARTHRITIS OF BOTH KNEES: ICD-10-CM

## 2023-11-10 LAB
FOLATE SERPL-MCNC: 23.3 NG/ML (ref 4.6–34.8)
HBA1C MFR BLD: 6.3 % (ref 0–5.6)
VIT B12 SERPL-MCNC: 449 PG/ML (ref 232–1245)

## 2023-11-10 PROCEDURE — G0008 ADMIN INFLUENZA VIRUS VAC: HCPCS | Performed by: FAMILY MEDICINE

## 2023-11-10 PROCEDURE — 90480 ADMN SARSCOV2 VAC 1/ONLY CMP: CPT | Performed by: FAMILY MEDICINE

## 2023-11-10 PROCEDURE — 36415 COLL VENOUS BLD VENIPUNCTURE: CPT | Performed by: FAMILY MEDICINE

## 2023-11-10 PROCEDURE — 82746 ASSAY OF FOLIC ACID SERUM: CPT | Performed by: FAMILY MEDICINE

## 2023-11-10 PROCEDURE — 91320 SARSCV2 VAC 30MCG TRS-SUC IM: CPT | Performed by: FAMILY MEDICINE

## 2023-11-10 PROCEDURE — 90662 IIV NO PRSV INCREASED AG IM: CPT | Performed by: FAMILY MEDICINE

## 2023-11-10 PROCEDURE — 99214 OFFICE O/P EST MOD 30 MIN: CPT | Mod: 25 | Performed by: FAMILY MEDICINE

## 2023-11-10 PROCEDURE — 82607 VITAMIN B-12: CPT | Performed by: FAMILY MEDICINE

## 2023-11-10 PROCEDURE — 83036 HEMOGLOBIN GLYCOSYLATED A1C: CPT | Performed by: FAMILY MEDICINE

## 2023-11-10 RX ORDER — FUROSEMIDE 20 MG
TABLET ORAL
Qty: 180 TABLET | Refills: 1 | Status: SHIPPED | OUTPATIENT
Start: 2023-11-10 | End: 2024-03-26

## 2023-11-10 ASSESSMENT — PAIN SCALES - GENERAL: PAINLEVEL: NO PAIN (0)

## 2023-11-10 NOTE — PROGRESS NOTES
Assessment & Plan   Rakan Nolasco Jr. is a 77 y.o. male with hx of diabetes, m gravis, hypertension amish, rls, hx prostate ca, hpld, gerd, covid (11/15/22), recurrent cystitis here for routine follow up.    Type 2 diabetes mellitus with hyperglycemia, without long-term current use of insulin (H)  A1c is good. Wants to have CGM supplies so that he can check his blood sugar regularly and determine what he should be eating  - HEMOGLOBIN A1C; Future  - Continuous Blood Gluc  (FREESTYLE SARAHY 2 READER) VIJI; 1 each by Device route once for 1 dose Use to read blood sugars per 's instructions.  - Continuous Blood Gluc Sensor (FREESTYLE SARAHY 2 SENSOR) MISC; 1 each by Device route every 14 days Use 1 sensor every 14 days. Use to read blood sugars per 's instructions.  - Vitamin B12; Future  - Folate; Future  - HEMOGLOBIN A1C  - Vitamin B12  - Folate    Hypertension goal BP (blood pressure) < 140/90   BP is well controlled, lab work from [facility] reviewed, will do refills when due    Recurrent chest pain   -  Been evaluated for this and cause unlikely cardiac  consistent with reflux    Myasthenia gravis without exacerbation (H)   -  Stable, following with neurology, has some issues with swallowing but coping well    Pedal edema   Discussed avoiding a sedentary lifestyle, elevate legs when sitting and will refill furosemide  - furosemide (LASIX) 20 MG tablet; TAKE 2 TABLETS BY MOUTH IN THE MORNING AND 1 TABLET BY MOUTH AT LEAST 6 HOURS LATER IN THE AFTERNOON.    Chronic bilateral low back pain, unspecified whether sciatica present   -  on going, interfering with activities; exercise and walking as cannot stand for long    Primary osteoarthritis of both knees    Having hard time henry doing down stairs, recommend see ortho for next steps in treatment  - Orthopedic  Referral; Future  [id number]}     See Patient Instructions    Telly Lucio MD  [facility]  THIAGO Bledsoe is a 78 year old, presenting for the following health issues:  Recheck Medication    DM2:   Blood sugars been a little higher, checks daily.    HTN:     BP Readings from Last 3 Encounters:   11/10/23 136/70   10/23/23 112/63   07/12/23 124/62      Recurrent chest pain:   Was in ER 11/3/23; cardiac cause ruled out    Starts with pain in epigastrium then radiates up. On protonix, endscopy 2/2020 was normal except for diaphragmatic hiatus    Myasthenia Gravis:     Does affect his swallowing, following with neurology.    Knee Pain    Violeta going downstairs, squatting   Been diagnosed with osteoarthritis    Back Pain:   Pain radiates down the back of legs.   Also sits a lot on recliner watching TV   Xray: 7/2023:    Anterior spondylolisthesis at L5-S1 by 5 mm.    Moderate disc space narrowing at L4-L5 and  L5-S1 and mild disc space narrowing at all lumbar levels with mild to  moderate marginal osteophytes. Moderate facet arthropathy L4-L5 and  L5-S1.       11/10/2023     7:12 AM   Additional Questions   Roomed by Rachell diaz   Accompanied by self     History of Present Illness       Diabetes:   He presents for follow up of diabetes.  He is checking home blood glucose one time daily.   He checks blood glucose before meals.  Blood glucose is never over 200 and never under 70. He is aware of hypoglycemia symptoms including none.   He is concerned about frequent infections.   He is having numbness in feet, burning in feet and blurry vision.            He eats 2-3 servings of fruits and vegetables daily.He consumes 0 sweetened beverage(s) daily.He exercises with enough effort to increase his heart rate 9 or less minutes per day.  He exercises with enough effort to increase his heart rate 3 or less days per week.   He is taking medications regularly.       Review of Systems   Constitutional, HEENT, cardiovascular, pulmonary, gi and gu systems are negative, except as otherwise noted.      Objective    BP  "(!) 151/84   Pulse 58   Resp 19   Ht 1.775 m (5' 9.88\")   Wt 106.1 kg (233 lb 12.8 oz)   SpO2 97%   BMI 33.66 kg/m    Body mass index is 33.66 kg/m .  Physical Exam   GENERAL: healthy, alert and no distress  RESP: lungs clear to auscultation - no rales, rhonchi or wheezes  CV: regular rate and rhythm, normal S1 S2, no S3 or S4, no murmur, click or rub, mild peripheral edema   MS: no gross musculoskeletal defects noted, no edema    COMPREHENSIVE METABOLIC PANEL  Order: [id number]  Component  Ref Range & Units 7 d ago   Sodium  136 - 146 mmol/L 139   Potassium  3.5 - 5.1 mmol/L 3.9   Chloride  98 - 107 mmol/L 102   CO2  22 - 29 mmol/L 27   Anion Gap  10.0 - 20.0 mmol/L 13.9   Creatinine  0.72 - 1.25 mg/dL 1.17   Blood Urea Nitrogen  10.0 - 20.0 mg/dL 20.0   BUN/Creatinine Ratio  11.70 - 22.90 ratio 17.09   Calcium, Total  8.6 - 10.5 mg/dL 9.2   Glucose  70 - 100 mg/dL 140 High    eGFR  >=60 mL/min/1.73m(2) >60   Comment: Calculation based on the Chronic Kidney Disease Epidemiology Collaboration (CKD-EPI) equation refit without adjustment for race.  GFR reference range is not established in patients >70 years old.   Total Protein  6.0 - 8.0 g/dL 7.0   Albumin  3.4 - 4.8 g/dL 4.5   Globulin  2.0 - 3.5 g/dL 2.5   Albumin/Globulin Ratio  1.0 - 2.0 1.8   Aspartate Aminotransferase (AST)  5 - 41 U/L 29   Alanine Aminotransferase (ALT)  8 - 45 U/L 25   Alkaline Phosphatase  50 - 136 U/L 53   Bilirubin, Total  0.2 - 1.2 mg/dL 0.4   eCrCl (Rx) - Adults  mL/min 64.6       "

## 2023-11-15 NOTE — TELEPHONE ENCOUNTER
DIAGNOSIS: Primary osteoarthritis of both knees [M17.0]    APPOINTMENT DATE: 12/06/2023   NOTES STATUS DETAILS   OFFICE NOTE from referring provider Internal 11/10/2023 Dr Lucio [facility]    OFFICE NOTE from other specialist N/A    DISCHARGE SUMMARY from hospital N/A    DISCHARGE REPORT from the ER N/A    OPERATIVE REPORT N/A    EMG report N/A    MEDICATION LIST N/A    MRI N/A    DEXA (osteoporosis/bone health) N/A    CT SCAN N/A    XRAYS (IMAGES & REPORTS) N/A

## 2023-11-30 DIAGNOSIS — M25.562 BILATERAL KNEE PAIN: Primary | ICD-10-CM

## 2023-11-30 DIAGNOSIS — M25.561 BILATERAL KNEE PAIN: Primary | ICD-10-CM

## 2023-12-05 ENCOUNTER — OFFICE VISIT (OUTPATIENT)
Dept: OPHTHALMOLOGY | Facility: CLINIC | Age: 78
End: 2023-12-05
Payer: COMMERCIAL

## 2023-12-05 DIAGNOSIS — E11.65 TYPE 2 DIABETES MELLITUS WITH HYPERGLYCEMIA, WITHOUT LONG-TERM CURRENT USE OF INSULIN (H): ICD-10-CM

## 2023-12-05 DIAGNOSIS — G70.00 MYASTHENIA GRAVIS, ACHR ANTIBODY POSITIVE (H): ICD-10-CM

## 2023-12-05 DIAGNOSIS — Z98.890 S/P BLEPHAROPLASTY: ICD-10-CM

## 2023-12-05 DIAGNOSIS — H04.123 DRY EYES, BILATERAL: ICD-10-CM

## 2023-12-05 DIAGNOSIS — H52.13 MYOPIA OF BOTH EYES WITH REGULAR ASTIGMATISM AND PRESBYOPIA: ICD-10-CM

## 2023-12-05 DIAGNOSIS — H40.053 BORDERLINE GLAUCOMA WITH OCULAR HYPERTENSION, BILATERAL: ICD-10-CM

## 2023-12-05 DIAGNOSIS — H52.4 MYOPIA OF BOTH EYES WITH REGULAR ASTIGMATISM AND PRESBYOPIA: ICD-10-CM

## 2023-12-05 DIAGNOSIS — H52.223 MYOPIA OF BOTH EYES WITH REGULAR ASTIGMATISM AND PRESBYOPIA: ICD-10-CM

## 2023-12-05 DIAGNOSIS — Z86.69 H/O RD (RETINAL DETACHMENT): ICD-10-CM

## 2023-12-05 DIAGNOSIS — Z96.1 PSEUDOPHAKIA OF BOTH EYES: ICD-10-CM

## 2023-12-05 DIAGNOSIS — E11.9 TYPE 2 DIABETES MELLITUS WITHOUT COMPLICATION, WITHOUT LONG-TERM CURRENT USE OF INSULIN (H): Primary | ICD-10-CM

## 2023-12-05 PROCEDURE — 92014 COMPRE OPH EXAM EST PT 1/>: CPT | Performed by: STUDENT IN AN ORGANIZED HEALTH CARE EDUCATION/TRAINING PROGRAM

## 2023-12-05 PROCEDURE — 92015 DETERMINE REFRACTIVE STATE: CPT | Performed by: STUDENT IN AN ORGANIZED HEALTH CARE EDUCATION/TRAINING PROGRAM

## 2023-12-05 RX ORDER — TIMOLOL MALEATE 5 MG/ML
1 SOLUTION/ DROPS OPHTHALMIC 2 TIMES DAILY
Qty: 5 ML | Refills: 11 | Status: SHIPPED | OUTPATIENT
Start: 2023-12-05

## 2023-12-05 RX ORDER — LATANOPROST 50 UG/ML
1 SOLUTION/ DROPS OPHTHALMIC AT BEDTIME
Qty: 7.5 ML | Refills: 3 | Status: SHIPPED | OUTPATIENT
Start: 2023-12-05

## 2023-12-05 RX ORDER — LANCETS 30 GAUGE
EACH MISCELLANEOUS
Qty: 100 EACH | Refills: 1 | Status: SHIPPED | OUTPATIENT
Start: 2023-12-05 | End: 2024-07-12

## 2023-12-05 ASSESSMENT — EXTERNAL EXAM - RIGHT EYE: OD_EXAM: NORMAL

## 2023-12-05 ASSESSMENT — CUP TO DISC RATIO
OD_RATIO: 0.2
OS_RATIO: 0.25

## 2023-12-05 ASSESSMENT — VISUAL ACUITY
OS_CC: 20/30
OS_CC+: -2
OD_CC: 20/20
CORRECTION_TYPE: GLASSES
METHOD: SNELLEN - LINEAR
OD_CC+: -2

## 2023-12-05 ASSESSMENT — REFRACTION_MANIFEST
OS_SPHERE: -1.75
OD_CYLINDER: +1.50
OD_SPHERE: -2.50
OD_AXIS: 137
OS_ADD: +2.75
OD_ADD: +2.75
OS_CYLINDER: +2.00
OS_AXIS: 005

## 2023-12-05 ASSESSMENT — REFRACTION_WEARINGRX
OD_CYLINDER: +2.00
OD_SPHERE: -2.50
OD_ADD: +2.75
OS_CYLINDER: +2.00
OS_ADD: +2.75
OS_SPHERE: -2.00
OS_AXIS: 004
SPECS_TYPE: BIFOCAL
OD_AXIS: 130

## 2023-12-05 ASSESSMENT — TONOMETRY
OD_IOP_MMHG: 20
OS_IOP_MMHG: 19
IOP_METHOD: APPLANATION

## 2023-12-05 ASSESSMENT — SLIT LAMP EXAM - LIDS
COMMENTS: 1+ DERMATOCHALASIS
COMMENTS: 1+ DERMATOCHALASIS

## 2023-12-05 ASSESSMENT — CONF VISUAL FIELD
OS_INFERIOR_NASAL_RESTRICTION: 0
OS_INFERIOR_TEMPORAL_RESTRICTION: 0
OD_SUPERIOR_TEMPORAL_RESTRICTION: 0
OD_INFERIOR_NASAL_RESTRICTION: 0
OS_NORMAL: 1
OD_SUPERIOR_NASAL_RESTRICTION: 0
OD_NORMAL: 1
OS_SUPERIOR_NASAL_RESTRICTION: 0
OD_INFERIOR_TEMPORAL_RESTRICTION: 0
OS_SUPERIOR_TEMPORAL_RESTRICTION: 0

## 2023-12-05 ASSESSMENT — EXTERNAL EXAM - LEFT EYE: OS_EXAM: NORMAL

## 2023-12-05 NOTE — PROGRESS NOTES
" Current Eye Medications: Timolol (yellow top) twice a day in both eyes and Latanoprost at bedtime both eyes and artificial tears 4 times per day (Refresh Optive or Systane Balance or generic ok, avoid \"get the red out\" drops)      Subjective:  here for complete eye exam today. Feels that the glasses may need to be replaced, they are approx three years old.  Hemoglobin A1C   Date Value Ref Range Status   11/10/2023 6.3 (H) 0.0 - 5.6 % Final     Comment:     Normal <5.7%   Prediabetes 5.7-6.4%    Diabetes 6.5% or higher     Note: Adopted from ADA consensus guidelines.   06/09/2021 7.1 (H) 0 - 5.6 % Final     Comment:     Normal <5.7% Prediabetes 5.7-6.4%  Diabetes 6.5% or higher - adopted from ADA   consensus guidelines.         Objective:  See Ophthalmology Exam.       Assessment:  Rakan Nolasco is a 78 year old male who presents with:   Encounter Diagnoses   Name Primary?    Type 2 diabetes mellitus without complication, without long-term current use of insulin (H) Negative diabetic retinopathy     Borderline glaucoma with ocular hypertension, bilateral Intraocular pressure 20/19 today. Continue same medications.    Pseudophakia of both eyes s/p YAG OD     Myasthenia gravis, AChR antibody positive (H)     H/O RD (retinal detachment) OD s/p repair with PPV (AB)     S/P blepharoplasty     Dry eyes, bilateral     Myopia of both eyes with regular astigmatism and presbyopia      Stable eye exam.      Plan:  Continue Timolol (yellow top) twice a day in both eyes     Continue Latanoprost (green top) every evening both eyes     Continue artificial tears up to four times a day as needed (Refresh Optive, Systane Balance, or TheraTears. Avoid generic artificial tears or \"get the red out\" drops).      Glasses prescription given - optional to update    Keep blood sugars and blood pressure under good control.     Mateo Gray MD  [phone]     Patient Education  Diabetes weakens the blood vessels all over the body, " including the eyes. Damage to the blood vessels in the eyes can cause swelling or bleeding into part of the eye (called the retina). This is called diabetic retinopathy (KARY-tin-AH-puh-thee). If not treated, this disease can cause vision loss or blindness.   Symptoms may include blurred or distorted vision, but many people have no symptoms. It's important to see your eye doctor regularly to check for problems.   Early treatment and good control can help protect your vision. Here are the things you can do to help prevent vision loss:      1. Keep your blood sugar levels under tight control.      2. Bring high blood pressure under control.      3. No smoking.      4. Have yearly dilated eye exams.

## 2023-12-05 NOTE — LETTER
"    12/5/2023         RE: Rakan Nolasco  [address] [facility] [address]        Dear Colleague,    Thank you for referring your patient, Rakan Nolasco, to the [facility]. Please see a copy of my visit note below.     Current Eye Medications: Timolol (yellow top) twice a day in both eyes and Latanoprost at bedtime both eyes and artificial tears 4 times per day (Refresh Optive or Systane Balance or generic ok, avoid \"get the red out\" drops)      Subjective:  here for complete eye exam today. Feels that the glasses may need to be replaced, they are approx three years old.  Hemoglobin A1C   Date Value Ref Range Status   11/10/2023 6.3 (H) 0.0 - 5.6 % Final     Comment:     Normal <5.7%   Prediabetes 5.7-6.4%    Diabetes 6.5% or higher     Note: Adopted from ADA consensus guidelines.   06/09/2021 7.1 (H) 0 - 5.6 % Final     Comment:     Normal <5.7% Prediabetes 5.7-6.4%  Diabetes 6.5% or higher - adopted from ADA   consensus guidelines.         Objective:  See Ophthalmology Exam.       Assessment:  Rakan Nolasco is a 78 year old male who presents with:   Encounter Diagnoses   Name Primary?     Type 2 diabetes mellitus without complication, without long-term current use of insulin (H) Negative diabetic retinopathy      Borderline glaucoma with ocular hypertension, bilateral Intraocular pressure 20/19 today. Continue same medications.     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      Plan:  Continue Timolol (yellow top) twice a day in both eyes     Continue Latanoprost (green top) every evening both eyes     Continue artificial tears up to four times a day as needed (Refresh Optive, Systane Balance, or TheraTears. Avoid generic artificial tears " "or \"get the red out\" drops).      Glasses prescription given - optional to update    Keep blood sugars and blood pressure under good control.     Mateo Gray MD  [phone]     Patient Education  Diabetes weakens the blood vessels all over the body, including the eyes. Damage to the blood vessels in the eyes can cause swelling or bleeding into part of the eye (called the retina). This is called diabetic retinopathy ([facility]-tin--[facility]-thee). If not treated, this disease can cause vision loss or blindness.   Symptoms may include blurred or distorted vision, but many people have no symptoms. It's important to see your eye doctor regularly to check for problems.   Early treatment and good control can help protect your vision. Here are the things you can do to help prevent vision loss:      1. Keep your blood sugar levels under tight control.      2. Bring high blood pressure under control.      3. No smoking.      4. Have yearly dilated eye exams.          Again, thank you for allowing me to participate in the care of your patient.        Sincerely,        Mateo Gray MD  "

## 2023-12-05 NOTE — PATIENT INSTRUCTIONS
"Continue Timolol (yellow top) twice a day in both eyes     Continue Latanoprost (green top) every evening both eyes     Continue artificial tears up to four times a day as needed (Refresh Optive, Systane Balance, or TheraTears. Avoid generic artificial tears or \"get the red out\" drops).      Glasses prescription given - optional to update    Keep blood sugars and blood pressure under good control.     Mateo Gray MD  [phone]     Patient Education   Diabetes weakens the blood vessels all over the body, including the eyes. Damage to the blood vessels in the eyes can cause swelling or bleeding into part of the eye (called the retina). This is called diabetic retinopathy (KARY-tin--puh-thee). If not treated, this disease can cause vision loss or blindness.   Symptoms may include blurred or distorted vision, but many people have no symptoms. It's important to see your eye doctor regularly to check for problems.   Early treatment and good control can help protect your vision. Here are the things you can do to help prevent vision loss:      1. Keep your blood sugar levels under tight control.      2. Bring high blood pressure under control.      3. No smoking.      4. Have yearly dilated eye exams.      "

## 2023-12-06 ENCOUNTER — PRE VISIT (OUTPATIENT)
Dept: ORTHOPEDICS | Facility: CLINIC | Age: 78
End: 2023-12-06

## 2023-12-06 ENCOUNTER — OFFICE VISIT (OUTPATIENT)
Dept: ORTHOPEDICS | Facility: CLINIC | Age: 78
End: 2023-12-06
Attending: FAMILY MEDICINE
Payer: COMMERCIAL

## 2023-12-06 ENCOUNTER — ANCILLARY PROCEDURE (OUTPATIENT)
Dept: GENERAL RADIOLOGY | Facility: CLINIC | Age: 78
End: 2023-12-06
Attending: FAMILY MEDICINE
Payer: COMMERCIAL

## 2023-12-06 DIAGNOSIS — M25.561 BILATERAL KNEE PAIN: ICD-10-CM

## 2023-12-06 DIAGNOSIS — M17.0 PRIMARY OSTEOARTHRITIS OF BOTH KNEES: ICD-10-CM

## 2023-12-06 DIAGNOSIS — M25.562 BILATERAL KNEE PAIN: ICD-10-CM

## 2023-12-06 PROCEDURE — 73564 X-RAY EXAM KNEE 4 OR MORE: CPT | Mod: LT | Performed by: RADIOLOGY

## 2023-12-06 PROCEDURE — 99203 OFFICE O/P NEW LOW 30 MIN: CPT | Performed by: FAMILY MEDICINE

## 2023-12-06 ASSESSMENT — PAIN SCALES - GENERAL: PAINLEVEL: MODERATE PAIN (5)

## 2023-12-06 NOTE — LETTER
12/6/2023         RE: Rakan Nolasco  [address] [facility] [address]        Dear Colleague,    Thank you for referring your patient, Rakan Nolasco, to the [facility] [place]. Please see a copy of my visit note below.      [facility]  SPORTS MEDICINE CLINIC VISIT     Dec 6, 2023        ASSESSMENT & PLAN    78-year-old with bilateral knee pain due to osteoarthritis    Reviewed imaging and assessment with patient in detail  Discussed options for treatment including:  -use of compression knee sleeve with patellar cut out  -use of acetaminophen or topical diclofenac PRN  -discussed the benefit of physical therapy and regular exercise  -discussed indication for steroid injection.   -otherwise follow up prn      Walter Reveles MD  [facility] SPOR[facility] MEDICINE [facility]    -----  Chief Complaint   Patient presents with     Consult     Bilateral Knee Pain        SUBJECTIVE  Rakan Nolasco is a/an 78 year old male who is seen as a self referral for evaluation of Bilateral Knee pain.     The patient is seen with their wife.  The patient is Right handed    Onset: Chronic pain for a few years. Reports insidious onset without acute precipitating event.  Location of Pain: bilateral knees  Worsened by: Walking, standing for more than 30 mins. Stairs (down worse then up), driving  Better with: Rest, tylenol  Treatments tried: rest/activity avoidance, elevation, and Tylenol  Associated symptoms: numbness and tingling    Orthopedic/Surgical history: NO  Social History/Occupation: Retired      REVIEW OF SYSTEMS:  Do you have fever, chills, weight loss? No  Do you have any vision problems? Yes, Glaucoma   Do you have any chest pain or edema? Yes,   Do you have any shortness of breath or wheezing?  Yes, COPD and Sleep Apnea  Do you have stomach problems? No  Do you have any numbness or focal weakness? Yes, Intermittent numbness/tingling down R leg   Do you have  diabetes? Yes, Type 2  Do you have problems with bleeding or clotting? No  Do you have an rashes or other skin lesions? No    OBJECTIVE:  There were no vitals taken for this visit.     Patient is alert, No acute distress, pleasant and conversational.    Gait: nonantalgic. Normal heel toe gait.    Patient is able to perform two legged squat without difficulty.    bilateral knee:   Skin intact. No erythema or ecchymosis.  No effusion or soft tissue swelling.    AROM: Zero to approximately 135  without restriction or reported pain.    Palpation: No medial or lateral facet joint tenderness.  TTP in the medial joint lines bilaterally    Special Tests:  Negative bounce test, negative forced flexion and negative Butch's.  No ligamentous laxity or pain with valgus or varus stress.  Negative Lachman's, Anterior Drawer and Posterior Drawer     Full Isometric quad strength, extensor mechanism in place     Neurovascularly intact in the lower extremity    Hip and Ankle with full AROM and nontender      RADIOLOGY:    4 view x-rays of the bilateral knees are performed and reviewed independently demonstrate tricompartmental DJD that is most severe in the medial compartment left knee and moderate to severe on the right.  No acute findings.  See EMR for             Again, thank you for allowing me to participate in the care of your patient.        Sincerely,        Walter Reveles MD

## 2023-12-06 NOTE — PATIENT INSTRUCTIONS
Try using topical diclofenac on the knees 3-4 times daily as needed for pain.  (Voltaren gel)  May also use Tylenol 2-3 times daily as needed for pain  Contact our clinic if you would like to try a steroid injection for your knee or if you would like to talk to a surgeon to discuss knee replacement surgery.

## 2023-12-06 NOTE — PROGRESS NOTES
[facility]  SPORTS MEDICINE CLINIC VISIT     Dec 6, 2023        ASSESSMENT & PLAN    78-year-old with bilateral knee pain due to osteoarthritis    Reviewed imaging and assessment with patient in detail  Discussed options for treatment including:  -use of compression knee sleeve with patellar cut out  -use of acetaminophen or topical diclofenac PRN  -discussed the benefit of physical therapy and regular exercise  -discussed indication for steroid injection.   -otherwise follow up prn      Walter Reveles MD  [facility]IC[facility]IN[facility] [place]    -----  Chief Complaint   Patient presents with    Consult     Bilateral Knee Pain        SUBJECTIVE  Rakan Nolasco is a/an 78 year old male who is seen as a self referral for evaluation of Bilateral Knee pain.     The patient is seen with their wife.  The patient is Right handed    Onset: Chronic pain for a few years. Reports insidious onset without acute precipitating event.  Location of Pain: bilateral knees  Worsened by: Walking, standing for more than 30 mins. Stairs (down worse then up), driving  Better with: Rest, tylenol  Treatments tried: rest/activity avoidance, elevation, and Tylenol  Associated symptoms: numbness and tingling    Orthopedic/Surgical history: NO  Social History/Occupation: Retired      REVIEW OF SYSTEMS:  Do you have fever, chills, weight loss? No  Do you have any vision problems? Yes, Glaucoma   Do you have any chest pain or edema? Yes,   Do you have any shortness of breath or wheezing?  Yes, COPD and Sleep Apnea  Do you have stomach problems? No  Do you have any numbness or focal weakness? Yes, Intermittent numbness/tingling down R leg   Do you have diabetes? Yes, Type 2  Do you have problems with bleeding or clotting? No  Do you have an rashes or other skin lesions? No    OBJECTIVE:  There were no vitals taken for this visit.     Patient is alert, No acute distress, pleasant and conversational.    Gait: nonantalgic.  Normal heel toe gait.    Patient is able to perform two legged squat without difficulty.    bilateral knee:   Skin intact. No erythema or ecchymosis.  No effusion or soft tissue swelling.    AROM: Zero to approximately 135  without restriction or reported pain.    Palpation: No medial or lateral facet joint tenderness.  TTP in the medial joint lines bilaterally    Special Tests:  Negative bounce test, negative forced flexion and negative Butch's.  No ligamentous laxity or pain with valgus or varus stress.  Negative Lachman's, Anterior Drawer and Posterior Drawer     Full Isometric quad strength, extensor mechanism in place     Neurovascularly intact in the lower extremity    Hip and Ankle with full AROM and nontender      RADIOLOGY:    4 view x-rays of the bilateral knees are performed and reviewed independently demonstrate tricompartmental DJD that is most severe in the medial compartment left knee and moderate to severe on the right.  No acute findings.  See EMR for

## 2023-12-17 DIAGNOSIS — E11.69 TYPE 2 DIABETES MELLITUS WITH OTHER SPECIFIED COMPLICATION, WITHOUT LONG-TERM CURRENT USE OF INSULIN (H): ICD-10-CM

## 2023-12-18 ENCOUNTER — TRANSFERRED RECORDS (OUTPATIENT)
Dept: HEALTH INFORMATION MANAGEMENT | Facility: CLINIC | Age: 78
End: 2023-12-18

## 2023-12-18 RX ORDER — METFORMIN HCL 500 MG
500 TABLET, EXTENDED RELEASE 24 HR ORAL 2 TIMES DAILY WITH MEALS
Qty: 180 TABLET | Refills: 0 | Status: SHIPPED | OUTPATIENT
Start: 2023-12-18 | End: 2024-03-14

## 2023-12-26 ENCOUNTER — TRANSFERRED RECORDS (OUTPATIENT)
Dept: HEALTH INFORMATION MANAGEMENT | Facility: CLINIC | Age: 78
End: 2023-12-26

## 2024-01-04 DIAGNOSIS — I10 HTN, GOAL BELOW 140/90: ICD-10-CM

## 2024-01-04 RX ORDER — AMLODIPINE BESYLATE 10 MG/1
10 TABLET ORAL DAILY
Qty: 90 TABLET | Refills: 0 | Status: ON HOLD | OUTPATIENT
Start: 2024-01-04 | End: 2024-04-01

## 2024-01-25 ENCOUNTER — MYC MEDICAL ADVICE (OUTPATIENT)
Dept: NEUROLOGY | Facility: CLINIC | Age: 79
End: 2024-01-25
Payer: COMMERCIAL

## 2024-01-25 DIAGNOSIS — G50.0 TRIGEMINAL NEURALGIA: ICD-10-CM

## 2024-01-25 RX ORDER — OXCARBAZEPINE 300 MG/1
TABLET, FILM COATED ORAL
Qty: 360 TABLET | Refills: 0 | Status: SHIPPED | OUTPATIENT
Start: 2024-01-25 | End: 2024-04-16

## 2024-01-29 DIAGNOSIS — G70.00 MYASTHENIA GRAVIS, ACHR ANTIBODY POSITIVE (H): ICD-10-CM

## 2024-01-29 RX ORDER — PREDNISONE 5 MG/1
TABLET ORAL
Qty: 60 TABLET | Refills: 2 | Status: SHIPPED | OUTPATIENT
Start: 2024-01-29 | End: 2024-04-16

## 2024-02-05 ENCOUNTER — OFFICE VISIT (OUTPATIENT)
Dept: FAMILY MEDICINE | Facility: CLINIC | Age: 79
End: 2024-02-05
Attending: FAMILY MEDICINE
Payer: COMMERCIAL

## 2024-02-05 VITALS
SYSTOLIC BLOOD PRESSURE: 132 MMHG | HEART RATE: 72 BPM | OXYGEN SATURATION: 96 % | DIASTOLIC BLOOD PRESSURE: 68 MMHG | HEIGHT: 69 IN | WEIGHT: 231.2 LBS | RESPIRATION RATE: 17 BRPM | BODY MASS INDEX: 34.24 KG/M2

## 2024-02-05 DIAGNOSIS — J44.9 CHRONIC OBSTRUCTIVE PULMONARY DISEASE, UNSPECIFIED COPD TYPE (H): ICD-10-CM

## 2024-02-05 DIAGNOSIS — I10 HYPERTENSION, UNSPECIFIED TYPE: ICD-10-CM

## 2024-02-05 DIAGNOSIS — E11.65 TYPE 2 DIABETES MELLITUS WITH HYPERGLYCEMIA, WITHOUT LONG-TERM CURRENT USE OF INSULIN (H): ICD-10-CM

## 2024-02-05 DIAGNOSIS — M17.10 ARTHRITIS OF KNEE: ICD-10-CM

## 2024-02-05 DIAGNOSIS — I10 BENIGN ESSENTIAL HYPERTENSION: ICD-10-CM

## 2024-02-05 DIAGNOSIS — Z00.00 ENCOUNTER FOR WELLNESS EXAMINATION IN ADULT: Primary | ICD-10-CM

## 2024-02-05 DIAGNOSIS — E78.5 HYPERLIPIDEMIA LDL GOAL <100: ICD-10-CM

## 2024-02-05 DIAGNOSIS — E66.01 MORBID OBESITY (H): ICD-10-CM

## 2024-02-05 DIAGNOSIS — E11.69 TYPE 2 DIABETES MELLITUS WITH OTHER SPECIFIED COMPLICATION, WITHOUT LONG-TERM CURRENT USE OF INSULIN (H): ICD-10-CM

## 2024-02-05 DIAGNOSIS — M31.6 TEMPORAL ARTERITIS (H): ICD-10-CM

## 2024-02-05 DIAGNOSIS — G70.00 MYASTHENIA GRAVIS (H): ICD-10-CM

## 2024-02-05 DIAGNOSIS — C61 PROSTATE CANCER (H): ICD-10-CM

## 2024-02-05 PROCEDURE — 99397 PER PM REEVAL EST PAT 65+ YR: CPT | Performed by: FAMILY MEDICINE

## 2024-02-05 ASSESSMENT — ACTIVITIES OF DAILY LIVING (ADL): CURRENT_FUNCTION: NO ASSISTANCE NEEDED

## 2024-02-05 ASSESSMENT — PAIN SCALES - GENERAL: PAINLEVEL: MILD PAIN (2)

## 2024-02-05 NOTE — PROGRESS NOTES
"Preventive Care Visit  [facility] THIAGO Lucio MD, Family Medicine  Feb 5, 2024  {Provider  Link to SmartSet :984227}  {PROVIDER CHARTING PREFERENCE:873134}    Subjective   Rakan is a 78 year old, presenting for the following:  Physical        2/5/2024    11:07 AM   Additional Questions   Roomed by MOSHE CARBAJAL   Accompanied by self     {ROOMER if patient is in their first year of Medicare a vision screen is required click here to document the Vison screen and then refresh the note to pull in results  :798902}    Health Care Directive  Patient has a Health Care Directive on file  Advance care planning document is on file and is current.    Healthy Habits:     In general, how would you rate your overall health?  Good    Frequency of exercise:  None    Duration of exercise:  15-30 minutes    Do you usually eat at least 4 servings of fruit and vegetables a day, include whole grains    & fiber and avoid regularly eating high fat or \"junk\" foods?  Yes    Taking medications regularly:  Yes    Barriers to taking medications:  None    Medication side effects:  None    Ability to successfully perform activities of daily living:  No assistance needed    Home Safety:  No safety concerns identified    Hearing Impairment:  No hearing concerns    In the past 6 months, have you been bothered by leaking of urine?  No (Has hearing aids)    In general, how would you rate your overall mental or emotional health?  Excellent  History of Present Illness       Diabetes:   He presents for follow up of diabetes.  He is checking home blood glucose four or more times daily.   He checks blood glucose after meals, before and after meals and at bedtime.  Blood glucose is never over 200 and never under 70. He is aware of hypoglycemia symptoms including none.   He is concerned about frequent infections.   He is having blurry vision.           He is not taking prescribed medications regularly due to " None.    ***    {SUPERLIST (Optional):133680}  {additonal problems for provider to add (Optional):124117}      1/8/2023   General Health   How would you rate your overall physical health? Good         1/8/2023   Nutrition   At least 4 servings of fruits and vegetables/day Yes         1/8/2023   Exercise   Frequency of exercise: 1 day/week         2/2/2024   Social Factors   Worry food won't last until get money to buy more No   Food not last or not have enough money for food? No   Do you have housing?  Yes   Are you worried about losing your housing? No   Lack of transportation? No   Unable to get utilities (heat,electricity)? No         10/23/2023   Fall Risk   Fallen 2 or more times in the past year? No          1/8/2023   Activities of Daily Living- Home Safety   Needs help with the following daily activites NO assistance is needed   Safety concerns in the home None of the above         10/23/2018   Dental   Dentist two times every year? Yes         1/8/2023   Hearing Screening   Hearing concerns? Difficulty following a conversation in a noisy restaurant or crowded room    Feel that people are mumbling or not speaking clearly    Need to ask people to speak up or repeat themselves            No data to display                   No data to display                  Today's PHQ-2 Score:       2/4/2024     9:28 PM   PHQ-2 ( 1999 [employer])   Q1: Little interest or pleasure in doing things 0   Q2: Feeling down, depressed or hopeless 0   PHQ-2 Score 0   Q1: Little interest or pleasure in doing things Not at all   Q2: Feeling down, depressed or hopeless Not at all   PHQ-2 Score 0           1/8/2023   Substance Use   Alcohol more than 3/day or more than 7/wk Not Applicable     Social History     Tobacco Use    Smoking status: Former     Packs/day: 2.00     Years: 15.00     Additional pack years: 0.00     Total pack years: 30.00     Types: Cigarettes     Start date: 1/1/1968     Quit date: 1/1/1983     Years since quitting:  41.1    Smokeless tobacco: Former     Quit date: 1/1/1970    Tobacco comments:     smoke free household.   Vaping Use    Vaping Use: Never used   Substance Use Topics    Alcohol use: Not Currently     Comment: Quit in 1986    Drug use: No     {If there are gaps in the social history shown above, please follow the link to update and then refresh the note Link to Social and Substance History :908382}  The 10-year ASCVD risk score (Ho SPENCER, et al., 2019) is: 50.4%    Values used to calculate the score:      Age: 78 years      Sex: Male      Is Non- : No      Diabetic: Yes      Tobacco smoker: No      Systolic Blood Pressure: 136 mmHg      Is BP treated: Yes      HDL Cholesterol: 48 mg/dL      Total Cholesterol: 143 mg/dL    {Link to Fracture Risk Assessment Tool (Optional):472804}    {Use the storyboard to review patient history, after sections have been marked as reviewed, refresh note to capture documentation:604652}  { REQUIRED AWV use this link to review and update sexual activity history  after section has been marked as reviewed, refresh note to capture documentation:722736}  Reviewed and updated as needed this visit by Provider                    {HISTORY OPTIONS (Optional):029287}  Current providers sharing in care for this patient include:  Patient Care Team:  Telly Lucio MD as PCP - General (Family Practice)  Arpan Harrison MD as Assigned Neuroscience Provider  Jenn Keane APRN CNP as Nurse Practitioner (Family Medicine)  Mateo Gray MD as Assigned Surgical Provider  Servando Mejia MD as Assigned Pulmonology Provider  Telly Lucio MD as Assigned PCP  Ana Aleman, PharmD as Assigned MTM Pharmacist  Walter Reveles MD as Assigned Musculoskeletal Provider    The following health maintenance items are reviewed in Epic and correct as of today:  Health Maintenance   Topic Date Due    RSV VACCINE (Pregnancy & 60+) (1 -  "1-dose 60+ series) Never done    ANNUAL REVIEW OF HM ORDERS  07/08/2023    BMP  10/13/2023    MEDICARE ANNUAL WELLNESS VISIT  01/09/2024    A1C  02/10/2024    DIABETIC FOOT EXAM  04/17/2024    LIPID  07/05/2024    MICROALBUMIN  07/05/2024    FALL RISK ASSESSMENT  10/23/2024    EYE EXAM  12/05/2024    ADVANCE CARE PLANNING  01/11/2028    DTAP/TDAP/TD IMMUNIZATION (3 - Td or Tdap) 01/14/2034    SPIROMETRY  Completed    HEPATITIS C SCREENING  Completed    COPD ACTION PLAN  Completed    PHQ-2 (once per calendar year)  Completed    INFLUENZA VACCINE  Completed    Pneumococcal Vaccine: 65+ Years  Completed    ZOSTER IMMUNIZATION  Completed    COVID-19 Vaccine  Completed    IPV IMMUNIZATION  Aged Out    HPV IMMUNIZATION  Aged Out    MENINGITIS IMMUNIZATION  Aged Out    RSV MONOCLONAL ANTIBODY  Aged Out    COLORECTAL CANCER SCREENING  Discontinued     Review of Systems  {ROS Picklists (Optional):801693}     Objective    Exam  There were no vitals taken for this visit.   Estimated body mass index is 33.66 kg/m  as calculated from the following:    Height as of 11/10/23: 1.775 m (5' 9.88\").    Weight as of 11/10/23: 106.1 kg (233 lb 12.8 oz).    Physical Exam  {Exam Choices (Optional):671403}  {The Mini-Cog is incomplete, use link to complete and refresh note Link to Mini-Cog :653648}       No data to display              {A Mini-Cog total score of 0-2 suggests the possibility of dementia, score of 3-5 suggests no dementia:573165}       Signed Electronically by: Telly Lucio MD  {Email feedback regarding this note to [email or web address]   :312692}  "

## 2024-02-05 NOTE — PATIENT INSTRUCTIONS
"Learning About Being Physically Active  What is physical activity?     Being physically active means doing any kind of activity that gets your body moving.  The types of physical activity that can help you get fit and stay healthy include:  Aerobic or \"cardio\" activities. These make your heart beat faster and make you breathe harder, such as brisk walking, riding a bike, or running. They strengthen your heart and lungs and build up your endurance.  Strength training activities. These make your muscles work against, or \"resist,\" something. Examples include lifting weights or doing push-ups. These activities help tone and strengthen your muscles and bones.  Stretches. These let you move your joints and muscles through their full range of motion. Stretching helps you be more flexible.  Reaching a balance between these three types of physical activity is important because each one contributes to your overall fitness.  What are the benefits of being active?  Being active is one of the best things you can do for your health. It helps you to:  Feel stronger and have more energy to do all the things you like to do.  Focus better at school or work.  Feel, think, and sleep better.  Reach and stay at a healthy weight.  Lose fat and build lean muscle.  Lower your risk for serious health problems, including diabetes, heart attack, high blood pressure, and some cancers.  Keep your heart, lungs, bones, muscles, and joints strong and healthy.  How can you make being active part of your life?  Start slowly. Make it your long-term goal to get at least 30 minutes of exercise on most days of the week. Walking is a good choice. You also may want to do other activities, such as running, swimming, cycling, or playing tennis or team sports.  Pick activities that you like--ones that make your heart beat faster, your muscles stronger, and your muscles and joints more flexible. If you find more than one thing you like doing, do them all. You " "don't have to do the same thing every day.  Get your heart pumping every day. Any activity that makes your heart beat faster and keeps it at that rate for a while counts.  Here are some great ways to get your heart beating faster:  Go for a brisk walk, run, or hike.  Go for a swim or bike ride.  Take an online exercise class or dance.  Play a game of touch football, basketball, or soccer.  Play tennis, pickleball, or racquetball.  Climb stairs.  Even some household chores can be aerobic. Just do them at a faster pace. Raking or mowing the lawn, sweeping the garage, and vacuuming and cleaning your home all can help get your heart rate up.  Strengthen your muscles during the week. You don't have to lift heavy weights or grow big, bulky muscles to get stronger. Doing a few simple activities that make your muscles work against, or \"resist,\" something can help you get stronger. Aim for at least twice a week.  For example, you can:  Do push-ups or sit-ups, which use your own body weight as resistance.  Lift weights or dumbbells or use stretch bands at home or in a gym or community center.  Stretch your muscles often. Stretching will help you as you become more active. It can help you stay flexible and loosen tight muscles. It can also help improve your balance and posture and can be a great way to relax.  Be sure to stretch the muscles you'll be using when you work out. It's best to warm your muscles slightly before you stretch them. Walk or do some other light aerobic activity for a few minutes. Then start stretching.  When you stretch your muscles:  Do it slowly. Stretching is not about going fast or making sudden movements.  Don't push or bounce during a stretch.  Hold each stretch for at least 15 to 30 seconds, if you can. You should feel a stretch in the muscle, but not pain.  Breathe out as you do the stretch. Then breathe in as you hold the stretch. Don't hold your breath.  If you're worried about how more activity " "might affect your health, have a checkup before you start. Follow any special advice your doctor gives you for getting a smart start.  Where can you learn more?  Go to https://www.[employer].net/patiented  Enter W332 in the search box to learn more about \"Learning About Being Physically Active.\"  Current as of: June 6, 2023               Content Version: 13.8    0942-8874 [employer].   Care instructions adapted under license by your healthcare professional. If you have questions about a medical condition or this instruction, always ask your healthcare professional. [employer] disclaims any warranty or liability for your use of this information.      Hearing Loss: Care Instructions  Overview     Hearing loss is a sudden or slow decrease in how well you hear. It can range from slight to profound. Permanent hearing loss can occur with aging. It also can happen when you are exposed long-term to loud noise. Examples include listening to loud music, riding motorcycles, or being around other loud machines.  Hearing loss can affect your work and home life. It can make you feel lonely or depressed. You may feel that you have lost your independence. But hearing aids and other devices can help you hear better and feel connected to others.  Follow-up care is a key part of your treatment and safety. Be sure to make and go to all appointments, and call your doctor if you are having problems. It's also a good idea to know your test results and keep a list of the medicines you take.  How can you care for yourself at home?  Avoid loud noises whenever possible. This helps keep your hearing from getting worse.  Always wear hearing protection around loud noises.  Wear a hearing aid as directed.  A professional can help you pick a hearing aid that will work best for you.  You can also get hearing aids over the counter for mild to moderate hearing loss.  Have hearing tests as your doctor suggests. They can " "show whether your hearing has changed. Your hearing aid may need to be adjusted.  Use other devices as needed. These may include:  Telephone amplifiers and hearing aids that can connect to a television, stereo, radio, or microphone.  Devices that use lights or vibrations. These alert you to the doorbell, a ringing telephone, or a baby monitor.  Television closed-captioning. This shows the words at the bottom of the screen. Most new TVs can do this.  TTY (text telephone). This lets you type messages back and forth on the telephone instead of talking or listening. These devices are also called TDD. When messages are typed on the keyboard, they are sent over the phone line to a receiving TTY. The message is shown on a monitor.  Use text messaging, social media, and email if it is hard for you to communicate by telephone.  Try to learn a listening technique called speechreading. It is not lipreading. You pay attention to people's gestures, expressions, posture, and tone of voice. These clues can help you understand what a person is saying. Face the person you are talking to, and have them face you. Make sure the lighting is good. You need to see the other person's face clearly.  Think about counseling if you need help to adjust to your hearing loss.  When should you call for help?  Watch closely for changes in your health, and be sure to contact your doctor if:    You think your hearing is getting worse.     You have new symptoms, such as dizziness or nausea.   Where can you learn more?  Go to https://www.[employer].net/patiented  Enter R798 in the search box to learn more about \"Hearing Loss: Care Instructions.\"  Current as of: February 28, 2023               Content Version: 13.8    3283-6326 [employer].   Care instructions adapted under license by your healthcare professional. If you have questions about a medical condition or this instruction, always ask your healthcare professional. [employer] disclaims any warranty or liability for your use of this information.      Preventive Care Advice   This is general advice given by our system to help you stay healthy. However, your care team may have specific advice just for you. Please talk to your care team about your preventive care needs.  Nutrition  Eat 5 or more servings of fruits and vegetables each day.  Try wheat bread, brown rice and whole grain pasta (instead of white bread, rice, and pasta).  Get enough calcium and vitamin D. Check the label on foods and aim for 100% of the RDA (recommended daily allowance).  Lifestyle  Exercise at least 150 minutes each week  (30 minutes a day, 5 days a week).  Do muscle strengthening activities 2 days a week. These help control your weight and prevent disease.  No smoking.  Wear sunscreen to prevent skin cancer.  Have a dental exam and cleaning every 6 months.  Yearly exams  See your health care team every year to talk about:  Any changes in your health.  Any medicines your care team has prescribed.  Preventive care, family planning, and ways to prevent chronic diseases.  Shots (vaccines)   HPV shots (up to age 26), if you've never had them before.  Hepatitis B shots (up to age 59), if you've never had them before.  COVID-19 shot: Get this shot when it's due.  Flu shot: Get a flu shot every year.  Tetanus shot: Get a tetanus shot every 10 years.  Pneumococcal, hepatitis A, and RSV shots: Ask your care team if you need these based on your risk.  Shingles shot (for age 50 and up)  General health tests  Diabetes screening:  Starting at age 35, Get screened for diabetes at least every 3 years.  If you are younger than age 35, ask your care team if you should be screened for diabetes.  Cholesterol test: At age 39, start having a cholesterol test every 5 years, or more often if advised.  Bone density scan (DEXA): At age 50, ask your care team if you should have this scan for osteoporosis (brittle  bones).  Hepatitis C: Get tested at least once in your life.  STIs (sexually transmitted infections)  Before age 24: Ask your care team if you should be screened for STIs.  After age 24: Get screened for STIs if you're at risk. You are at risk for STIs (including HIV) if:  You are sexually active with more than one person.  You don't use condoms every time.  You or a partner was diagnosed with a sexually transmitted infection.  If you are at risk for HIV, ask about PrEP medicine to prevent HIV.  Get tested for HIV at least once in your life, whether you are at risk for HIV or not.  Cancer screening tests  Cervical cancer screening: If you have a cervix, begin getting regular cervical cancer screening tests starting at age 21.  Breast cancer scan (mammogram): If you've ever had breasts, begin having regular mammograms starting at age 40. This is a scan to check for breast cancer.  Colon cancer screening: It is important to start screening for colon cancer at age 45.  Have a colonoscopy test every 10 years (or more often if you're at risk) Or, ask your provider about stool tests like a FIT test every year or Cologuard test every 3 years.  To learn more about your testing options, visit:   https://www.[employer]/434394.pdf.  For help making a decision, visit:   https://bit.ly/pq48164.  Prostate cancer screening test: If you have a prostate, ask your care team if a prostate cancer screening test (PSA) at age 55 is right for you.  Lung cancer screening: If you are a current or former smoker ages 50 to 80, ask your care team if ongoing lung cancer screenings are right for you.  For informational purposes only. Not to replace the advice of your health care provider. Copyright   2023 [facility]. All rights reserved. Clinically reviewed by the [facility] Transitions Program. [employer] 525265 - REV 01/24.    Dear Rakan Nolasco,    I am sorry you are not feeling well. After reviewing your chart and based  "on the responses you provided, I recommend the following:    I think an in-clinic visit would be the best next step based on your symptoms. My  will contact you for a follow up appointment.      Thank you    Telly Lucio MD      Learning About Being Physically Active  What is physical activity?     Being physically active means doing any kind of activity that gets your body moving.  The types of physical activity that can help you get fit and stay healthy include:  Aerobic or \"cardio\" activities. These make your heart beat faster and make you breathe harder, such as brisk walking, riding a bike, or running. They strengthen your heart and lungs and build up your endurance.  Strength training activities. These make your muscles work against, or \"resist,\" something. Examples include lifting weights or doing push-ups. These activities help tone and strengthen your muscles and bones.  Stretches. These let you move your joints and muscles through their full range of motion. Stretching helps you be more flexible.  Reaching a balance between these three types of physical activity is important because each one contributes to your overall fitness.  What are the benefits of being active?  Being active is one of the best things you can do for your health. It helps you to:  Feel stronger and have more energy to do all the things you like to do.  Focus better at school or work.  Feel, think, and sleep better.  Reach and stay at a healthy weight.  Lose fat and build lean muscle.  Lower your risk for serious health problems, including diabetes, heart attack, high blood pressure, and some cancers.  Keep your heart, lungs, bones, muscles, and joints strong and healthy.  How can you make being active part of your life?  Start slowly. Make it your long-term goal to get at least 30 minutes of exercise on most days of the week. Walking is a good choice. You also may want to do other activities, such as running, " "swimming, cycling, or playing tennis or team sports.  Pick activities that you like--ones that make your heart beat faster, your muscles stronger, and your muscles and joints more flexible. If you find more than one thing you like doing, do them all. You don't have to do the same thing every day.  Get your heart pumping every day. Any activity that makes your heart beat faster and keeps it at that rate for a while counts.  Here are some great ways to get your heart beating faster:  Go for a brisk walk, run, or hike.  Go for a swim or bike ride.  Take an online exercise class or dance.  Play a game of touch football, basketball, or soccer.  Play tennis, pickleball, or racquetball.  Climb stairs.  Even some household chores can be aerobic. Just do them at a faster pace. Raking or mowing the lawn, sweeping the garage, and vacuuming and cleaning your home all can help get your heart rate up.  Strengthen your muscles during the week. You don't have to lift heavy weights or grow big, bulky muscles to get stronger. Doing a few simple activities that make your muscles work against, or \"resist,\" something can help you get stronger. Aim for at least twice a week.  For example, you can:  Do push-ups or sit-ups, which use your own body weight as resistance.  Lift weights or dumbbells or use stretch bands at home or in a gym or community center.  Stretch your muscles often. Stretching will help you as you become more active. It can help you stay flexible and loosen tight muscles. It can also help improve your balance and posture and can be a great way to relax.  Be sure to stretch the muscles you'll be using when you work out. It's best to warm your muscles slightly before you stretch them. Walk or do some other light aerobic activity for a few minutes. Then start stretching.  When you stretch your muscles:  Do it slowly. Stretching is not about going fast or making sudden movements.  Don't push or bounce during a stretch.  Hold " "each stretch for at least 15 to 30 seconds, if you can. You should feel a stretch in the muscle, but not pain.  Breathe out as you do the stretch. Then breathe in as you hold the stretch. Don't hold your breath.  If you're worried about how more activity might affect your health, have a checkup before you start. Follow any special advice your doctor gives you for getting a smart start.  Where can you learn more?  Go to https://[email or web address].adsquare/patiented  Enter W332 in the search box to learn more about \"Learning About Being Physically Active.\"  Current as of: June 6, 2023               Content Version: 13.8    5045-4831 [employer].   Care instructions adapted under license by your healthcare professional. If you have questions about a medical condition or this instruction, always ask your healthcare professional. [employer] disclaims any warranty or liability for your use of this information.      Hearing Loss: Care Instructions  Overview     Hearing loss is a sudden or slow decrease in how well you hear. It can range from slight to profound. Permanent hearing loss can occur with aging. It also can happen when you are exposed long-term to loud noise. Examples include listening to loud music, riding motorcycles, or being around other loud machines.  Hearing loss can affect your work and home life. It can make you feel lonely or depressed. You may feel that you have lost your independence. But hearing aids and other devices can help you hear better and feel connected to others.  Follow-up care is a key part of your treatment and safety. Be sure to make and go to all appointments, and call your doctor if you are having problems. It's also a good idea to know your test results and keep a list of the medicines you take.  How can you care for yourself at home?  Avoid loud noises whenever possible. This helps keep your hearing from getting worse.  Always wear hearing protection around loud " "noises.  Wear a hearing aid as directed.  A professional can help you pick a hearing aid that will work best for you.  You can also get hearing aids over the counter for mild to moderate hearing loss.  Have hearing tests as your doctor suggests. They can show whether your hearing has changed. Your hearing aid may need to be adjusted.  Use other devices as needed. These may include:  Telephone amplifiers and hearing aids that can connect to a television, stereo, radio, or microphone.  Devices that use lights or vibrations. These alert you to the doorbell, a ringing telephone, or a baby monitor.  Television closed-captioning. This shows the words at the bottom of the screen. Most new TVs can do this.  TTY (text telephone). This lets you type messages back and forth on the telephone instead of talking or listening. These devices are also called TDD. When messages are typed on the keyboard, they are sent over the phone line to a receiving TTY. The message is shown on a monitor.  Use text messaging, social media, and email if it is hard for you to communicate by telephone.  Try to learn a listening technique called speechreading. It is not lipreading. You pay attention to people's gestures, expressions, posture, and tone of voice. These clues can help you understand what a person is saying. Face the person you are talking to, and have them face you. Make sure the lighting is good. You need to see the other person's face clearly.  Think about counseling if you need help to adjust to your hearing loss.  When should you call for help?  Watch closely for changes in your health, and be sure to contact your doctor if:    You think your hearing is getting worse.     You have new symptoms, such as dizziness or nausea.   Where can you learn more?  Go to https://www.healthwise.net/patiented  Enter R798 in the search box to learn more about \"Hearing Loss: Care Instructions.\"  Current as of: February 28, 2023               Content " Version: 13.8    5787-7196 [employer].   Care instructions adapted under license by your healthcare professional. If you have questions about a medical condition or this instruction, always ask your healthcare professional. [employer] disclaims any warranty or liability for your use of this information.      Preventive Care Advice   This is general advice given by our system to help you stay healthy. However, your care team may have specific advice just for you. Please talk to your care team about your preventive care needs.  Nutrition  Eat 5 or more servings of fruits and vegetables each day.  Try wheat bread, brown rice and whole grain pasta (instead of white bread, rice, and pasta).  Get enough calcium and vitamin D. Check the label on foods and aim for 100% of the RDA (recommended daily allowance).  Lifestyle  Exercise at least 150 minutes each week  (30 minutes a day, 5 days a week).  Do muscle strengthening activities 2 days a week. These help control your weight and prevent disease.  No smoking.  Wear sunscreen to prevent skin cancer.  Have a dental exam and cleaning every 6 months.  Yearly exams  See your health care team every year to talk about:  Any changes in your health.  Any medicines your care team has prescribed.  Preventive care, family planning, and ways to prevent chronic diseases.  Shots (vaccines)   HPV shots (up to age 26), if you've never had them before.  Hepatitis B shots (up to age 59), if you've never had them before.  COVID-19 shot: Get this shot when it's due.  Flu shot: Get a flu shot every year.  Tetanus shot: Get a tetanus shot every 10 years.  Pneumococcal, hepatitis A, and RSV shots: Ask your care team if you need these based on your risk.  Shingles shot (for age 50 and up)  General health tests  Diabetes screening:  Starting at age 35, Get screened for diabetes at least every 3 years.  If you are younger than age 35, ask your care team if you should be  screened for diabetes.  Cholesterol test: At age 39, start having a cholesterol test every 5 years, or more often if advised.  Bone density scan (DEXA): At age 50, ask your care team if you should have this scan for osteoporosis (brittle bones).  Hepatitis C: Get tested at least once in your life.  STIs (sexually transmitted infections)  Before age 24: Ask your care team if you should be screened for STIs.  After age 24: Get screened for STIs if you're at risk. You are at risk for STIs (including HIV) if:  You are sexually active with more than one person.  You don't use condoms every time.  You or a partner was diagnosed with a sexually transmitted infection.  If you are at risk for HIV, ask about PrEP medicine to prevent HIV.  Get tested for HIV at least once in your life, whether you are at risk for HIV or not.  Cancer screening tests  Cervical cancer screening: If you have a cervix, begin getting regular cervical cancer screening tests starting at age 21.  Breast cancer scan (mammogram): If you've ever had breasts, begin having regular mammograms starting at age 40. This is a scan to check for breast cancer.  Colon cancer screening: It is important to start screening for colon cancer at age 45.  Have a colonoscopy test every 10 years (or more often if you're at risk) Or, ask your provider about stool tests like a FIT test every year or Cologuard test every 3 years.  To learn more about your testing options, visit:   https://[email or web address]/[email or web address].  For help making a decision, visit:   https://bit.ly/mn16620.  Prostate cancer screening test: If you have a prostate, ask your care team if a prostate cancer screening test (PSA) at age 55 is right for you.  Lung cancer screening: If you are a current or former smoker ages 50 to 80, ask your care team if ongoing lung cancer screenings are right for you.  For informational purposes only. Not to replace the advice of your health care provider. Copyright   2023  Health system. All rights reserved. Clinically reviewed by the [facility] Transitions Program. [employer] [id number] - REV 01/24.

## 2024-02-05 NOTE — PROGRESS NOTES
"Preventive Care Visit  [facility]  Telly Lucio MD, Family Medicine  Feb 5, 2024    Assessment & Plan     Encounter for wellness examination in adult    -Patient doing well, had blood work recently through [facility] which were reviewed and showed no concerns.  Has hearing aids that are working well    Type 2 diabetes mellitus with hyperglycemia, without long-term current use of insulin (H)  Type 2 diabetes mellitus with other specified complication, without long-term current use of insulin (H)  Last A1c from outside was 6.3 on November 10, 2023  - PRIMARY CARE FOLLOW-UP SCHEDULING    Benign essential hypertension/Hypertension, unspecified type  Blood pressure is well-controlled, will follow-up in 3 months  - PRIMARY CARE FOLLOW-UP SCHEDULING    Hyperlipidemia LDL goal <100  On atorvastatin 20 mg daily  - PRIMARY CARE FOLLOW-UP SCHEDULING    Chronic obstructive pulmonary disease, unspecified COPD type (H)  Stable on Advair  - PRIMARY CARE FOLLOW-UP SCHEDULING    Myasthenia gravis (H)    -   Following with neurology, is on the pyridostigmine 120 mg 3 times a day as well as low-dose prednisone    Temporal arteritis (H)     -   Resolved; also has trigeminal neuralgia seeing in neurology    Prostate cancer (H)    -  history of prostate cancer status post cryotherapy x2; being followed by urology, PSA has been stable    Arthritis of knee: Lt    -Flares up here and there, stable and stable at this time    BMI/Morbid obesity (H)  Estimated body mass index is 33.66 kg/m  as calculated from the following:    Height as of this encounter: 1.765 m (5' 9.49\").    Weight as of this encounter: 104.9 kg (231 lb 3.2 oz).   Weight management plan: Discussed healthy diet and exercise guidelines      See Patient Instructions    Subjective   Rakan is a 78 year old, presenting for the following:  Physical    RSV and TDAP at the [facility] pharmacy: 1/14/24 2/5/2024    11:07 AM   Additional Questions   Roomed by " MOSHE CARBAJAL   Accompanied by self     Health Care Directive  Patient has a Health Care Directive on file  Advance care planning document is on file and is current.    There are several components of CGA that should be evaluated, including:  Functional capacity: good  Fall risk: None; uses cane because of the knee.  Cognition: Good  Mood: No  Polypharmacy  Urinary incontinence: leaks a little from prostate  Sexual function  Living situation: Lives with his wife in their house  Social support  Financial concerns  Goals of care Advance care   preferences   Hearing: Has hearing aids working well.    History of Present Illness       Diabetes:   He presents for follow up of diabetes.  He is checking home blood glucose four or more times daily.   He checks blood glucose after meals, before and after meals and at bedtime.  Blood glucose is never over 200 and never under 70. He is aware of hypoglycemia symptoms including none.   He is concerned about frequent infections.   He is having blurry vision.                 1/8/2023   General Health   How would you rate your overall physical health? Good         1/8/2023   Nutrition   At least 4 servings of fruits and vegetables/day Yes         1/8/2023   Exercise   Frequency of exercise: 1 day/week         2/2/2024   Social Factors   Worry food won't last until get money to buy more No   Food not last or not have enough money for food? No   Do you have housing?  Yes   Are you worried about losing your housing? No   Lack of transportation? No   Unable to get utilities (heat,electricity)? No         10/23/2023   Fall Risk   Fallen 2 or more times in the past year? No          1/8/2023   Activities of Daily Living- Home Safety   Needs help with the following daily activites NO assistance is needed   Safety concerns in the home None of the above         10/23/2018   Dental   Dentist two times every year? Yes         1/8/2023   Hearing Screening   Hearing concerns? Difficulty following a  conversation in a noisy restaurant or crowded room    Feel that people are mumbling or not speaking clearly    Need to ask people to speak up or repeat themselves            No data to display                   No data to display                  Today's PHQ-2 Score:       2024     9:28 PM   PHQ-2 (  [employer])   Q1: Little interest or pleasure in doing things 0   Q2: Feeling down, depressed or hopeless 0   PHQ-2 Score 0   Q1: Little interest or pleasure in doing things Not at all   Q2: Feeling down, depressed or hopeless Not at all   PHQ-2 Score 0           2023   Substance Use   Alcohol more than 3/day or more than 7/wk Not Applicable     Social History     Tobacco Use    Smoking status: Former     Packs/day: 2.00     Years: 15.00     Additional pack years: 0.00     Total pack years: 30.00     Types: Cigarettes     Start date: 1968     Quit date: 1983     Years since quittin.1    Smokeless tobacco: Former     Quit date: 1970    Tobacco comments:     smoke free household.   Vaping Use    Vaping Use: Never used   Substance Use Topics    Alcohol use: Not Currently     Comment: Quit in     Drug use: No     The 10-year ASCVD risk score (Ho SPENCER, et al., 2019) is: 47.6%    Values used to calculate the score:      Age: 78 years      Sex: Male      Is Non- : No      Diabetic: Yes      Tobacco smoker: No      Systolic Blood Pressure: 130 mmHg      Is BP treated: Yes      HDL Cholesterol: 48 mg/dL      Total Cholesterol: 143 mg/dL      Reviewed and updated as needed this visit by Provider                    Patient Active Problem List   Diagnosis    GERD (gastroesophageal reflux disease)    Fatigue    HLD (hyperlipidemia)    Borderline glaucoma with ocular hypertension    Advanced directives, counseling/discussion    Trigeminal neuralgia pain    HTN (hypertension)    Health Care Home    Arthritis    Dry eyes    PVD (posterior vitreous detachment) OU    H/O RD (retinal  detachment) s/p repair with PPV (AB)    Epiretinal membrane, bilateral    Pseudophakia of right eye    Myasthenia gravis (H)    Morbid obesity (H)    Right posterior capsular opacification    Demand ischemia    Obstructive sleep apnea syndrome    Prostate cancer (H)    Restless legs syndrome    Temporal arteritis (H)    Type 2 diabetes mellitus with hyperglycemia, without long-term current use of insulin (H)    Hyponatremia    COPD (chronic obstructive pulmonary disease) (H)    Combined form of age-related cataract, left eye    Atypical chest pain     Past Surgical History:   Procedure Laterality Date    BIOPSY ARTERY TEMPORAL Bilateral 08/01/2017    Procedure: BIOPSY ARTERY TEMPORAL;  Bilateral temporal artery Biopsy;  Surgeon: Jj Tello MD;  Location: [facility] OR    CATARACT IOL, RT/LT Right     COLONOSCOPY      ESOPHAGOSCOPY, GASTROSCOPY, DUODENOSCOPY (EGD), COMBINED  01/15/2014    Procedure: COMBINED ESOPHAGOSCOPY, GASTROSCOPY, DUODENOSCOPY (EGD), BIOPSY SINGLE OR MULTIPLE;;  Surgeon: Winston Nixon MD;  Location: [facility] OR    LASER YAG CAPSULOTOMY Right 04/09/2018    right eye    PHACOEMULSIFICATION CLEAR CORNEA WITH STANDARD INTRAOCULAR LENS IMPLANT Right 02/20/2017    Procedure: PHACOEMULSIFICATION CLEAR CORNEA WITH STANDARD INTRAOCULAR LENS IMPLANT;  Surgeon: Mateo Gray MD;  Location: [facility]    PHACOEMULSIFICATION CLEAR CORNEA WITH STANDARD INTRAOCULAR LENS IMPLANT Left 01/10/2022    Procedure: LEFT PHACOEMULSIFICATION, CATARACT, WITH INTRAOCULAR LENS IMPLANT;  Surgeon: Mateo Gray MD;  Location: [facility] OR    NJ TRABECULOPLASTY BY LASER SURGERY      RETINAL REATTACHMENT Right     SURGICAL HISTORY OF -   08/2009    Both Eyelids-.    TONSILLECTOMY  as child       Social History     Tobacco Use    Smoking status: Former     Packs/day: 2.00     Years: 15.00     Additional pack years: 0.00     Total pack years: 30.00     Types: Cigarettes     Start date: 1/1/1968     Quit date:  1983     Years since quittin.1    Smokeless tobacco: Former     Quit date: 1970    Tobacco comments:     smoke free household.   Substance Use Topics    Alcohol use: Not Currently     Comment: Quit in      Family History   Problem Relation Age of Onset    Respiratory Mother         copd    LUNG DISEASE Mother     Allergies Brother     Cancer Maternal Grandmother     Heart Disease Paternal Grandmother     Cerebrovascular Disease Father     Cancer Father     Other Cancer Father     Glaucoma Maternal Aunt     Macular Degeneration No family hx of          Current providers sharing in care for this patient include:  Patient Care Team:  Telly Lucio MD as PCP - General (Family Practice)  Arpan Harrison MD as Assigned Neuroscience Provider  Jenn Keane APRN CNP as Nurse Practitioner (Family Medicine)  Mateo Gray MD as Assigned Surgical Provider  Servando Mejia MD as Assigned Pulmonology Provider  Telly Lucio MD as Assigned PCP  Ana Aleman, PharmD as Assigned MTM Pharmacist  Walter Reveles MD as Assigned Musculoskeletal Provider    The following health maintenance items are reviewed in Epic and correct as of today:  Health Maintenance   Topic Date Due    RSV VACCINE (Pregnancy & 60+) (1 - 1-dose 60+ series) Never done    ANNUAL REVIEW OF HM ORDERS  2023    BMP  10/13/2023    MEDICARE ANNUAL WELLNESS VISIT  2024    A1C  02/10/2024    DIABETIC FOOT EXAM  2024    LIPID  2024    MICROALBUMIN  2024    FALL RISK ASSESSMENT  10/23/2024    EYE EXAM  2024    ADVANCE CARE PLANNING  2029    DTAP/TDAP/TD IMMUNIZATION (3 - Td or Tdap) 2034    SPIROMETRY  Completed    HEPATITIS C SCREENING  Completed    COPD ACTION PLAN  Completed    PHQ-2 (once per calendar year)  Completed    INFLUENZA VACCINE  Completed    Pneumococcal Vaccine: 65+ Years  Completed    ZOSTER IMMUNIZATION  Completed    COVID-19 Vaccine  " Completed    IPV IMMUNIZATION  Aged Out    HPV IMMUNIZATION  Aged Out    MENINGITIS IMMUNIZATION  Aged Out    RSV MONOCLONAL ANTIBODY  Aged Out    COLORECTAL CANCER SCREENING  Discontinued     Review of Systems    Review of Systems  Constitutional, HEENT, cardiovascular, pulmonary, gi and gu systems are negative, except as otherwise noted.     Objective    Exam  /78 (BP Location: Left arm, Cuff Size: Adult Large)   Pulse 72   Resp 17   Ht 1.765 m (5' 9.49\")   Wt 104.9 kg (231 lb 3.2 oz)   SpO2 96%   BMI 33.66 kg/m     Estimated body mass index is 33.66 kg/m  as calculated from the following:    Height as of this encounter: 1.765 m (5' 9.49\").    Weight as of this encounter: 104.9 kg (231 lb 3.2 oz).    Physical Exam  GENERAL: alert and no distress  EYES: Eyes grossly normal to inspection, PERRL and conjunctivae and sclerae normal  HENT: ear canals and TM's normal, nose and mouth without ulcers or lesions  NECK: no adenopathy, no asymmetry, masses, or scars  RESP: lungs clear to auscultation - no rales, rhonchi or wheezes  CV: regular rate and rhythm, no murmur, click or rub, no peripheral edema  ABDOMEN: soft, nontender, no masses and bowel sounds normal  MS: no gross musculoskeletal defects noted, no edema  SKIN: no suspicious lesions or rashes  NEURO: Normal strength and tone, mentation intact and speech normal  PSYCH: mentation appears normal, affect normal/bright        2/5/2024   Mini Cog   Clock Draw Score 2 Normal   3 Item Recall 3 objects recalled   Mini Cog Total Score 5        Signed Electronically by: Telly Lucio MD    "

## 2024-02-08 DIAGNOSIS — E78.5 HYPERLIPIDEMIA LDL GOAL <130: ICD-10-CM

## 2024-02-08 RX ORDER — ATORVASTATIN CALCIUM 20 MG/1
20 TABLET, FILM COATED ORAL DAILY
Qty: 90 TABLET | Refills: 3 | Status: ON HOLD | OUTPATIENT
Start: 2024-02-08 | End: 2024-04-01

## 2024-02-11 ENCOUNTER — HEALTH MAINTENANCE LETTER (OUTPATIENT)
Age: 79
End: 2024-02-11

## 2024-02-14 DIAGNOSIS — K21.9 GASTROESOPHAGEAL REFLUX DISEASE WITHOUT ESOPHAGITIS: ICD-10-CM

## 2024-02-14 RX ORDER — PANTOPRAZOLE SODIUM 20 MG/1
20 TABLET, DELAYED RELEASE ORAL DAILY
Qty: 90 TABLET | Refills: 1 | Status: SHIPPED | OUTPATIENT
Start: 2024-02-14 | End: 2024-08-01

## 2024-02-16 DIAGNOSIS — E11.65 TYPE 2 DIABETES MELLITUS WITH HYPERGLYCEMIA, WITHOUT LONG-TERM CURRENT USE OF INSULIN (H): ICD-10-CM

## 2024-02-16 RX ORDER — BLOOD SUGAR DIAGNOSTIC
STRIP MISCELLANEOUS
Qty: 300 STRIP | Refills: 1 | Status: SHIPPED | OUTPATIENT
Start: 2024-02-16 | End: 2024-08-14

## 2024-02-17 ASSESSMENT — KOOS JR
KOOS JR SCORING: 39.63
STANDING UPRIGHT: SEVERE
HOW SEVERE IS YOUR KNEE STIFFNESS AFTER FIRST WAKING IN MORNING: MODERATE
TWISING OR PIVOTING ON KNEE: SEVERE
BENDING TO THE FLOOR TO PICK UP OBJECT: SEVERE
RISING FROM SITTING: SEVERE
STRAIGHTENING KNEE FULLY: MODERATE
GOING UP OR DOWN STAIRS: SEVERE

## 2024-02-20 ENCOUNTER — TELEPHONE (OUTPATIENT)
Dept: ORTHOPEDICS | Facility: CLINIC | Age: 79
End: 2024-02-20

## 2024-02-20 ENCOUNTER — OFFICE VISIT (OUTPATIENT)
Dept: ORTHOPEDICS | Facility: CLINIC | Age: 79
End: 2024-02-20
Payer: COMMERCIAL

## 2024-02-20 VITALS — WEIGHT: 228 LBS | HEIGHT: 71 IN | BODY MASS INDEX: 31.92 KG/M2

## 2024-02-20 DIAGNOSIS — M17.12 PRIMARY OSTEOARTHRITIS OF LEFT KNEE: Primary | ICD-10-CM

## 2024-02-20 DIAGNOSIS — M17.11 PRIMARY LOCALIZED OSTEOARTHRITIS OF RIGHT KNEE: ICD-10-CM

## 2024-02-20 PROCEDURE — 99204 OFFICE O/P NEW MOD 45 MIN: CPT | Performed by: ORTHOPAEDIC SURGERY

## 2024-02-20 ASSESSMENT — PAIN SCALES - GENERAL: PAINLEVEL: MODERATE PAIN (5)

## 2024-02-20 NOTE — PROGRESS NOTES
Assessment: This is a 78 year old with advanced bilateral knee osteoarthritis.  His symptoms now interfere with simple activities of daily living despite activity modification oral pain medication and use of a cane.  We discussed the treatment options including living with it and total knee.  We also discussed nonoperative modalities such as physical therapy and injection therapy.  He is not interested in pursuing not at operative modalities.  I do not believe that there is a significant chance of this being useful for him in the midterm do not believe that injections or physical therapy is mandatory.We discussed that given the level of symptoms and radiographic changes that further non-operative management in the form injection and/or physical therapist directed exercises is not mandatory. We discussed the risks and benefits of total knee arthroplasty at length and reviewed the proposed surgical plan.  The discussion included but was not limited to the following:  blood clots, blood clots to the lungs, infection, injury to blood vessels and nerves, stiffness, and continued pain. We discussed that as part of the routine part of surgical care a qualified assist may finish closing the wound after I have left the room. We discussed the post-operative recovery for the typical patient, return to driving, and return to normal activities.  Discussed his back and buttock symptoms, long standing, may be worse during the post-operative period. All the patient's questions were answered to the best of my ability.      Plan: Staged bilateral total knee arthroplasties.  He would like to start on the left side.    Chief Complaint: Consult (BL knee pain)      Physician:  No ref. provider found    HPI: Rakan Nolasco is a 78 year old male who presents today for evaluation of bilateral knee pain. He has had pain in his knees for 20 years and is not interested in conservative modalities such as injections. He would Like to discuss TKA  on the left today.    Symptom Profile  Location of symptoms:  Bilateral, L>R  Onset: Chronic, 20+ years  Trend: getting worse  Duration of symptoms:Chronic, 20+ years  Quality of symptoms: aching, sharp/stabbing with certain movements  Severity: 8/10  Alleviate: activity modification  Exacerbating: activities, walking, ADLs  Previous Treatments: Previous treatments include activity modification, daily OTC oral pain medication, diallo BARRY  39.63  PROMIS Mental: (P) 15  PROMIS Physical (P) 11  PROMIS TOTAL (P) 26  [facility]: 3    MEDICAL HISTORY:   Past Medical History:   Diagnosis Date    Arthritis         BMI 31.0-31.9,adult     Cancer (H) 01/10/2018    Prostate    COPD (chronic obstructive pulmonary disease) (H) 10/28/2020    Demand ischemia     Diabetes (H) 01/10/2018    Diverticulosis     Colonoscopy 8/2008    Fatty liver     see US  5/2012    GERD (gastroesophageal reflux disease)     Glaucoma (increased eye pressure)     HTN (hypertension)     Hyperlipidemia LDL goal < 130     age, htn, fhx    Irritable bowel     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     Nonsenile cataract     Obstructive sleep apnea     Obstructive sleep apnea syndrome     ROSIE (obstructive sleep apnea) 01/2011    Using CPAP;     Prediabetes     Restless leg syndrome     Retinal detachment     Sleep apnea     Trigeminal neuralgia pain 01/04/2012       Medications:     Current Outpatient Medications:     albuterol (PROAIR HFA/PROVENTIL HFA/VENTOLIN HFA) 108 (90 Base) MCG/ACT inhaler, Inhale 1 puff into the lungs every 4 hours as needed for shortness of breath, wheezing or cough, Disp: 18 g, Rfl: 1    amLODIPine (NORVASC) 10 MG tablet, TAKE 1 TABLET (10 MG) BY MOUTH DAILY., Disp: 90 tablet, Rfl: 0    ARTIFICIAL TEAR OP, Apply 1 drop to eye as needed, Disp: , Rfl:     aspirin 81 MG tablet, Take 1 tablet by mouth daily., Disp: , Rfl:     atorvastatin (LIPITOR) 20 MG tablet, TAKE 1 TABLET BY MOUTH EVERY DAY,  Disp: 90 tablet, Rfl: 3    blood glucose (ONETOUCH VERIO IQ) test strip, USE TO TEST BLOOD SUGAR 3 TIMES DAILY OR AS DIRECTED., Disp: 300 strip, Rfl: 1    blood glucose monitoring (NO BRAND SPECIFIED) meter device kit, Use to test blood sugar 1 times daily or as directed., Disp: 1 kit, Rfl: 0    ClonAZEPAM (KLONOPIN) 0.5 MG tablet, Take 0.5 mg by mouth Take 1 tablet by mouth daily at bedtime, Disp: , Rfl:     Continuous Blood Gluc Sensor (FREESTYLE SARAHY 2 SENSOR) [facility] 1 each by Device route every 14 days Use 1 sensor every 14 days. Use to read blood sugars per 's instructions., Disp: 2 each, Rfl: 5    ferrous sulfate (FEROSUL) 325 (65 Fe) MG tablet, TAKE 1 TABLET BY MOUTH EVERY DAY WITH BREAKFAST, Disp: 90 tablet, Rfl: 1    fluticasone-salmeterol (ADVAIR) 250-50 MCG/ACT inhaler, TAKE 1 PUFF BY MOUTH TWICE A DAY Strength: 250-50 MCG/ACT, Disp: 3 each, Rfl: 3    furosemide (LASIX) 20 MG tablet, TAKE 2 TABLETS BY MOUTH IN THE MORNING AND 1 TABLET BY MOUTH AT LEAST 6 HOURS LATER IN THE AFTERNOON., Disp: 180 tablet, Rfl: 1    ibuprofen (ADVIL/MOTRIN) 800 MG tablet, Take 1 tablet (800 mg) by mouth 3 times daily, Disp: 45 tablet, Rfl: 0    Lancets (ONETOUCH DELICA PLUS [id number]) MISC, USE ONCE DAILY AS DIRECTED, Disp: 100 each, Rfl: 1    latanoprost (XALATAN) 0.005 % ophthalmic solution, Place 1 drop into both eyes at bedtime, Disp: 7.5 mL, Rfl: 3    lisinopril (ZESTRIL) 40 MG tablet, Take 1 tablet (40 mg) by mouth daily, Disp: 90 tablet, Rfl: 3    metFORMIN (GLUCOPHAGE XR) 500 MG 24 hr tablet, TAKE 1 TABLET BY MOUTH TWICE A DAY WITH MEALS, Disp: 180 tablet, Rfl: 0    neomycin-polymixin-dexAMETHasone (MAXITROL) 0.1 % ophthalmic suspension, Place 1 drop Into the left eye 3 times daily (Patient not taking: Reported on 11/10/2023), Disp: 5 mL, Rfl: 0    OXcarbazepine (TRILEPTAL) 300 MG tablet, TAKE 2 TABLETS IN THE MORNING AND 2 TABLETS IN THE EVENING, Disp: 360 tablet, Rfl: 0    pantoprazole (PROTONIX) 20 MG  EC tablet, TAKE 1 TABLET BY MOUTH EVERY DAY, Disp: 90 tablet, Rfl: 1    predniSONE (DELTASONE) 5 MG tablet, TAKE 2 TABLETS BY MOUTH EVERY DAY, Disp: 60 tablet, Rfl: 2    pyRIDostigmine (MESTINON) 60 MG tablet, Take 2 tablets (120 mg) up to 3 times daily., Disp: 540 tablet, Rfl: 0    sodium bicarbonate 650 MG tablet, TAKE 1/2 TABLET BY MOUTH TWICE A DAY, Disp: 90 tablet, Rfl: 1    timolol maleate (TIMOPTIC) 0.5 % ophthalmic solution, Place 1 drop into both eyes 2 times daily, Disp: 5 mL, Rfl: 11    Allergies: Azathioprine, Sulfamethoxazole-trimethoprim, Ciprofloxacin, and Ropinirole    SURGICAL HISTORY:   Past Surgical History:   Procedure Laterality Date    BIOPSY ARTERY TEMPORAL Bilateral 08/01/2017    Procedure: BIOPSY ARTERY TEMPORAL;  Bilateral temporal artery Biopsy;  Surgeon: Jj Tello MD;  Location: [facility]    CATARACT IOL, RT/LT Right     COLONOSCOPY      ESOPHAGOSCOPY, GASTROSCOPY, DUODENOSCOPY (EGD), COMBINED  01/15/2014    Procedure: COMBINED ESOPHAGOSCOPY, GASTROSCOPY, DUODENOSCOPY (EGD), BIOPSY SINGLE OR MULTIPLE;;  Surgeon: Winston Nixon MD;  Location: [facility]    LASER YAG CAPSULOTOMY Right 04/09/2018    right eye    PHACOEMULSIFICATION CLEAR CORNEA WITH STANDARD INTRAOCULAR LENS IMPLANT Right 02/20/2017    Procedure: PHACOEMULSIFICATION CLEAR CORNEA WITH STANDARD INTRAOCULAR LENS IMPLANT;  Surgeon: Mateo Gray MD;  [facility]    PHACOEMULSIFICATION CLEAR CORNEA WITH STANDARD INTRAOCULAR LENS IMPLANT Left 01/10/2022    Procedure: LEFT PHACOEMULSIFICATION, CATARACT, WITH INTRAOCULAR LENS IMPLANT;  Surgeon: Mateo Gray MD;  Location: [facility] OR    UT TRABECULOPLASTY BY LASER SURGERY      RETINAL REATTACHMENT Right     SURGICAL HISTORY OF -   08/2009    Both Eyelids-.    TONSILLECTOMY  as child       FAMILY HISTORY:   Family History   Problem Relation Age of Onset    Respiratory Mother         copd    LUNG DISEASE Mother     Allergies Brother     Cancer Maternal  "Grandmother     Heart Disease Paternal Grandmother     Cerebrovascular Disease Father     Cancer Father     Other Cancer Father     Glaucoma Maternal Aunt     Macular Degeneration No family hx of        SOCIAL HISTORY:   Social History     Tobacco Use    Smoking status: Former     Packs/day: 2.00     Years: 15.00     Additional pack years: 0.00     Total pack years: 30.00     Types: Cigarettes     Start date: 1968     Quit date: 1983     Years since quittin.1    Smokeless tobacco: Former     Quit date: 1970    Tobacco comments:     smoke free household.   Substance Use Topics    Alcohol use: Not Currently     Comment: Quit in        REVIEW OF SYSTEMS:  The comprehensive review of systems from the intake form was reviewed with the patient.  No fever, weight change or fatigue. No dry eyes. No oral ulcers, sore throat or voice change. No palpitations, syncope, angina or edema.  No chest pain, excessive sleepiness, shortness of breath or hemoptysis.   No abdominal pain, nausea, vomiting, diarrhea or heartburn.  No skin rash. No focal weakness or numbness. No bleeding or lymphadenopathy. No rhinitis or hives.     Exam:  On physical examination the patient appears the stated age, is in no acute distress, affect is appropriate, and breathing is non-labored.  Vitals are documented in the EMR and have been reviewed:    Ht 1.791 m (5' 10.5\")   Wt 103.4 kg (228 lb)   BMI 32.25 kg/m    5' 10.5\"  Body mass index is 32.25 kg/m .    Right  Knee  Appearance: benign  Clinical alignment: neutral   Effusion: no   Tenderness to palpation: medial and lateral joint lines   Extension:3  Flexion: 110  Collateral ligaments: intact  Cruciate ligaments: grossly intact     Left Knee  Appearance: benign  Clinical alignment: neutral   Effusion:small   Tenderness to palpation:medial and lateral joint lines   Extension:5  Flexion: 105  Collateral ligaments: intact  Cruciate ligaments: grossly intact     Hip examination: " benign hip ROM with groin or anterior thigh symptoms.     Distally, the circulatory, motor, and sensation exam is intact with 5/5 EHL, gastroc-soleus, and tibialis anterior.    Sensation to light touch is intact.    Dorsalis pedis and posterior tibialis pulses are palpable.    There are no sores on the feet, no bruising, and no lymphedema.    X-rays:   Bilateral  Kellgren grade 4 knee osteoarthritis

## 2024-02-20 NOTE — TELEPHONE ENCOUNTER
Procedure: LTKA  Facility: [facility]  Length: 120 minutes  Anesthesia: Choice  Post-op appointments needed: 2 weeks provider only, 6 weeks with provider only.  Surgery packet/instructions given to patient?  Yes     Pre-Operative Teaching Flowsheet     Person(s) involved in teaching: Patient     Motivation Level:  Receptive (willing/able to accept information) and asks appropriate questions where applicable: Yes  Any cultural factors/Confucianist beliefs that may influence understanding or compliance? No     Patient demonstrates understanding of the following:  Pre-operative planning, including the necessary appointments and preparation needed prior to surgery: Yes  Which situations necessitate calling provider and whom to contact: Yes  Pain management techniques pre and post op: Yes  How, and when, to access community resources: Yes    Who will drive and stay/ with patient after surgery: Wife  Pre-op exam PAC  PT to be completed at .         Additional Teaching Concerns Addressed:   Post-operative living arrangements and necessary adaptations to living environment.     Instructional Materials Used/Given: Yes, pre-op packet given including forms for Your surgery day, preparing for surgery, showering before surgery, Stop light tool introduced, Opioid pain medication guideline, pre-op physical form, and map  Patient expressed understanding of all forms given, questions were answered and will review in more detail at home.     Time spent with patient: 20 minutes.

## 2024-02-20 NOTE — NURSING NOTE
"Rakan Nolasco's chief complaint for this visit includes:  Chief Complaint   Patient presents with    Consult     BL knee pain       Referring Provider:  No referring provider defined for this encounter.    Ht 1.791 m (5' 10.5\")   Wt 103.4 kg (228 lb)   BMI 32.25 kg/m    Moderate Pain (5)   Global Mental Health Score: (P) 15  Global Physical Health Score: (P) 11  PROMIS TOTAL - SUBSCORES: (P) 26  [facility]: 3  KOOS Jr.  39.63     Pain increases with: rising from seated position, Standing long term, generally hurt all the time.   Previous surgeries: No  Previous injections within last 6 months: NO  Treatments done: OTC meds,   Imaging completed: XR 12/3  Pain: 8/10  Concerns: Would like to know what a TKA looks like, not very interested in injections due to short term nature.      Tashi Davies, ATC            "

## 2024-02-20 NOTE — TELEPHONE ENCOUNTER
Date Scheduled: 4-1-24  Facility: Surgery Locations: [facility]  Surgeon: Dr. Hassan   Post-op appointment scheduled: 4/15 & 5/14   scheduled?: No  Surgery packet/instructions confirmed received?  Yes  Pre op physical/PAC appointment: 3/5 @ 8:00am in-person PAC  Special Considerations:     Sophie Goodrich  Surgery Scheduling Coordinator  Ph: [phone]

## 2024-02-20 NOTE — LETTER
2/20/2024         RE: Rakan Nolasco  [address] [address]        Dear Colleague,    Thank you for referring your patient, Rakan Nolasco, to the [facility]. Please see a copy of my visit note below.    Assessment: This is a 78 year old with advanced bilateral knee osteoarthritis.  His symptoms now interfere with simple activities of daily living despite activity modification oral pain medication and use of a cane.  We discussed the treatment options including living with it and total knee.  We also discussed nonoperative modalities such as physical therapy and injection therapy.  He is not interested in pursuing not at operative modalities.  I do not believe that there is a significant chance of this being useful for him in the midterm do not believe that injections or physical therapy is mandatory.We discussed that given the level of symptoms and radiographic changes that further non-operative management in the form injection and/or physical therapist directed exercises is not mandatory. We discussed the risks and benefits of total knee arthroplasty at length and reviewed the proposed surgical plan.  The discussion included but was not limited to the following:  blood clots, blood clots to the lungs, infection, injury to blood vessels and nerves, stiffness, and continued pain. We discussed that as part of the routine part of surgical care a qualified assist may finish closing the wound after I have left the room. We discussed the post-operative recovery for the typical patient, return to driving, and return to normal activities.  Discussed his back and buttock symptoms, long standing, may be worse during the post-operative period. All the patient's questions were answered to the best of my ability.      Plan: Staged bilateral total knee arthroplasties.  He would like to start on the left side.    Chief Complaint: Consult (BL knee pain)      Physician:  No ref.  provider found    HPI: Rakan Nolasco is a 78 year old male who presents today for evaluation of bilateral knee pain. He has had pain in his knees for 20 years and is not interested in conservative modalities such as injections. He would Like to discuss TKA on the left today.    Symptom Profile  Location of symptoms:  Bilateral, L>R  Onset: Chronic, 20+ years  Trend: getting worse  Duration of symptoms:Chronic, 20+ years  Quality of symptoms: aching, sharp/stabbing with certain movements  Severity: 8/10  Alleviate: activity modification  Exacerbating: activities, walking, ADLs  Previous Treatments: Previous treatments include activity modification, daily OTC oral pain medication, diallo BARRY Jr 39.63  PROMIS Mental: (P) 15  PROMIS Physical (P) 11  PROMIS TOTAL (P) 26  [facility]: 3    MEDICAL HISTORY:   Past Medical History:   Diagnosis Date     Arthritis          BMI 31.0-31.9,adult      Cancer (H) 01/10/2018    Prostate     COPD (chronic obstructive pulmonary disease) (H) 10/28/2020     Demand ischemia      Diabetes (H) 01/10/2018     Diverticulosis     Colonoscopy 8/2008     Fatty liver     see US  5/2012     GERD (gastroesophageal reflux disease)      Glaucoma (increased eye pressure)      HTN (hypertension)      Hyperlipidemia LDL goal < 130     age, htn, fhx     Irritable bowel      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      Nonsenile cataract      Obstructive sleep apnea      Obstructive sleep apnea syndrome      ROSIE (obstructive sleep apnea) 01/2011    Using CPAP;      Prediabetes      Restless leg syndrome      Retinal detachment      Sleep apnea      Trigeminal neuralgia pain 01/04/2012       Medications:     Current Outpatient Medications:      albuterol (PROAIR HFA/PROVENTIL HFA/VENTOLIN HFA) 108 (90 Base) MCG/ACT inhaler, Inhale 1 puff into the lungs every 4 hours as needed for shortness of breath, wheezing or cough, Disp: 18 g, Rfl: 1     amLODIPine (NORVASC) 10 MG  tablet, TAKE 1 TABLET (10 MG) BY MOUTH DAILY., Disp: 90 tablet, Rfl: 0     ARTIFICIAL TEAR OP, Apply 1 drop to eye as needed, Disp: , Rfl:      aspirin 81 MG tablet, Take 1 tablet by mouth daily., Disp: , Rfl:      atorvastatin (LIPITOR) 20 MG tablet, TAKE 1 TABLET BY MOUTH EVERY DAY, Disp: 90 tablet, Rfl: 3     blood glucose (ONETOUCH VERIO IQ) test strip, USE TO TEST BLOOD SUGAR 3 TIMES DAILY OR AS DIRECTED., Disp: 300 strip, Rfl: 1     blood glucose monitoring (NO BRAND SPECIFIED) meter device kit, Use to test blood sugar 1 times daily or as directed., Disp: 1 kit, Rfl: 0     ClonAZEPAM (KLONOPIN) 0.5 MG tablet, Take 0.5 mg by mouth Take 1 tablet by mouth daily at bedtime, Disp: , Rfl:      Continuous Blood Gluc Sensor (FREESTYLE SARAHY 2 SENSOR) [facility], 1 each by Device route every 14 days Use 1 sensor every 14 days. Use to read blood sugars per 's instructions., Disp: 2 each, Rfl: 5     ferrous sulfate (FEROSUL) 325 (65 Fe) MG tablet, TAKE 1 TABLET BY MOUTH EVERY DAY WITH BREAKFAST, Disp: 90 tablet, Rfl: 1     fluticasone-salmeterol (ADVAIR) 250-50 MCG/ACT inhaler, TAKE 1 PUFF BY MOUTH TWICE A DAY Strength: 250-50 MCG/ACT, Disp: 3 each, Rfl: 3     furosemide (LASIX) 20 MG tablet, TAKE 2 TABLETS BY MOUTH IN THE MORNING AND 1 TABLET BY MOUTH AT LEAST 6 HOURS LATER IN THE AFTERNOON., Disp: 180 tablet, Rfl: 1     ibuprofen (ADVIL/MOTRIN) 800 MG tablet, Take 1 tablet (800 mg) by mouth 3 times daily, Disp: 45 tablet, Rfl: 0     Lancets (ONETOUCH DELICA PLUS [id number]) MISC, USE ONCE DAILY AS DIRECTED, Disp: 100 each, Rfl: 1     latanoprost (XALATAN) 0.005 % ophthalmic solution, Place 1 drop into both eyes at bedtime, Disp: 7.5 mL, Rfl: 3     lisinopril (ZESTRIL) 40 MG tablet, Take 1 tablet (40 mg) by mouth daily, Disp: 90 tablet, Rfl: 3     metFORMIN (GLUCOPHAGE XR) 500 MG 24 hr tablet, TAKE 1 TABLET BY MOUTH TWICE A DAY WITH MEALS, Disp: 180 tablet, Rfl: 0     neomycin-polymixin-dexAMETHasone (MAXITROL) 0.1  % ophthalmic suspension, Place 1 drop Into the left eye 3 times daily (Patient not taking: Reported on 11/10/2023), Disp: 5 mL, Rfl: 0     OXcarbazepine (TRILEPTAL) 300 MG tablet, TAKE 2 TABLETS IN THE MORNING AND 2 TABLETS IN THE EVENING, Disp: 360 tablet, Rfl: 0     pantoprazole (PROTONIX) 20 MG EC tablet, TAKE 1 TABLET BY MOUTH EVERY DAY, Disp: 90 tablet, Rfl: 1     predniSONE (DELTASONE) 5 MG tablet, TAKE 2 TABLETS BY MOUTH EVERY DAY, Disp: 60 tablet, Rfl: 2     pyRIDostigmine (MESTINON) 60 MG tablet, Take 2 tablets (120 mg) up to 3 times daily., Disp: 540 tablet, Rfl: 0     sodium bicarbonate 650 MG tablet, TAKE 1/2 TABLET BY MOUTH TWICE A DAY, Disp: 90 tablet, Rfl: 1     timolol maleate (TIMOPTIC) 0.5 % ophthalmic solution, Place 1 drop into both eyes 2 times daily, Disp: 5 mL, Rfl: 11    Allergies: Azathioprine, Sulfamethoxazole-trimethoprim, Ciprofloxacin, and Ropinirole    SURGICAL HISTORY:   Past Surgical History:   Procedure Laterality Date     BIOPSY ARTERY TEMPORAL Bilateral 08/01/2017    Procedure: BIOPSY ARTERY TEMPORAL;  Bilateral temporal artery Biopsy;  Surgeon: Jj Tello MD;  Location: [facility] OR     CATARACT IOL, RT/LT Right      COLONOSCOPY       ESOPHAGOSCOPY, GASTROSCOPY, DUODENOSCOPY (EGD), COMBINED  01/15/2014    Procedure: COMBINED ESOPHAGOSCOPY, GASTROSCOPY, DUODENOSCOPY (EGD), BIOPSY SINGLE OR MULTIPLE;;  Surgeon: Winston Nixon MD;  Location: [facility] OR     LASER YAG CAPSULOTOMY Right 04/09/2018    right eye     PHACOEMULSIFICATION CLEAR CORNEA WITH STANDARD INTRAOCULAR LENS IMPLANT Right 02/20/2017    Procedure: PHACOEMULSIFICATION CLEAR CORNEA WITH STANDARD INTRAOCULAR LENS IMPLANT;  Surgeon: Mateo Gray MD;  Location:  [facility]     PHACOEMULSIFICATION CLEAR CORNEA WITH STANDARD INTRAOCULAR LENS IMPLANT Left 01/10/2022    Procedure: LEFT PHACOEMULSIFICATION, CATARACT, WITH INTRAOCULAR LENS IMPLANT;  Surgeon: Mateo Gray MD;  Location: [facility] OR     NV  "TRABECULOPLASTY BY LASER SURGERY       RETINAL REATTACHMENT Right      SURGICAL HISTORY OF -   2009    Both Eyelids-.     TONSILLECTOMY  as child       FAMILY HISTORY:   Family History   Problem Relation Age of Onset     Respiratory Mother         copd     LUNG DISEASE Mother      Allergies Brother      Cancer Maternal Grandmother      Heart Disease Paternal Grandmother      Cerebrovascular Disease Father      Cancer Father      Other Cancer Father      Glaucoma Maternal Aunt      Macular Degeneration No family hx of        SOCIAL HISTORY:   Social History     Tobacco Use     Smoking status: Former     Packs/day: 2.00     Years: 15.00     Additional pack years: 0.00     Total pack years: 30.00     Types: Cigarettes     Start date: 1968     Quit date: 1983     Years since quittin.1     Smokeless tobacco: Former     Quit date: 1970     Tobacco comments:     smoke free household.   Substance Use Topics     Alcohol use: Not Currently     Comment: Quit in        REVIEW OF SYSTEMS:  The comprehensive review of systems from the intake form was reviewed with the patient.  No fever, weight change or fatigue. No dry eyes. No oral ulcers, sore throat or voice change. No palpitations, syncope, angina or edema.  No chest pain, excessive sleepiness, shortness of breath or hemoptysis.   No abdominal pain, nausea, vomiting, diarrhea or heartburn.  No skin rash. No focal weakness or numbness. No bleeding or lymphadenopathy. No rhinitis or hives.     Exam:  On physical examination the patient appears the stated age, is in no acute distress, affect is appropriate, and breathing is non-labored.  Vitals are documented in the EMR and have been reviewed:    Ht 1.791 m (5' 10.5\")   Wt 103.4 kg (228 lb)   BMI 32.25 kg/m    5' 10.5\"  Body mass index is 32.25 kg/m .    Right  Knee  Appearance: benign  Clinical alignment: neutral   Effusion: no   Tenderness to palpation: medial and lateral joint lines "   Extension:3  Flexion: 110  Collateral ligaments: intact  Cruciate ligaments: grossly intact     Left Knee  Appearance: benign  Clinical alignment: neutral   Effusion:small   Tenderness to palpation:medial and lateral joint lines   Extension:5  Flexion: 105  Collateral ligaments: intact  Cruciate ligaments: grossly intact     Hip examination: benign hip ROM with groin or anterior thigh symptoms.     Distally, the circulatory, motor, and sensation exam is intact with 5/5 EHL, gastroc-soleus, and tibialis anterior.    Sensation to light touch is intact.    Dorsalis pedis and posterior tibialis pulses are palpable.    There are no sores on the feet, no bruising, and no lymphedema.    X-rays:   Bilateral  Kellgren grade 4 knee osteoarthritis       Again, thank you for allowing me to participate in the care of your patient.        Sincerely,        Dandre Hassan MD

## 2024-02-20 NOTE — TELEPHONE ENCOUNTER
FUTURE VISIT INFORMATION      SURGERY INFORMATION:  Date: 24  Location: [facility] or  Surgeon:  Dandre Hassan MD   Anesthesia Type:  choice  Procedure: ARTHROPLASTY, LEFT KNEE, TOTAL   Consult: ov 24    RECORDS REQUESTED FROM:       Primary Care Provider: Telly Lucio MD - [facility]    Pertinent Medical History: hypertension, copd, ROSIE    Most recent EKG+ Tracin20    Most recent PFT's: 22

## 2024-02-23 NOTE — TELEPHONE ENCOUNTER
Called patient and let him know that order for PT needs to be signed and then they will call him for setting up appointments.  He also wanted to add PAD to his problem list.  He states when he saw neurology before they diagnosed him with this but then when he went to go see his primary care provider they took it off of his problem list.  He does have pain in his lower legs and wearing any type of compression on his lower legs does increase the pain.  He did try light compression socks before but was not able to tolerate them due to the pain.  I let him know that I would review the chart notes to find a diagnosis of PAD and if this was noted in the records we could add it onto the problem list but that Dr. Hassan could not be the one to diagnose him with this.  Reviewed the notes from neurology and primary care and this was not noted in any of the notes for PAD.  Unable to update the problem list at this time.  Kenia Wright RN

## 2024-02-23 NOTE — TELEPHONE ENCOUNTER
Hello,  I'm with ortho con.  Pt is asking about scheduling PT which he was told would be before the surgery and after.  Pt is asking for Terese SUNG.  He has not been called about PT.  Please give him a call at .

## 2024-02-28 ASSESSMENT — ACTIVITIES OF DAILY LIVING (ADL)
LIMPING: THE SYMPTOM AFFECTS MY ACTIVITY MODERATELY
SWELLING: I HAVE THE SYMPTOM BUT IT DOES NOT AFFECT MY ACTIVITY
HOW_WOULD_YOU_RATE_THE_CURRENT_FUNCTION_OF_YOUR_KNEE_DURING_YOUR_USUAL_DAILY_ACTIVITIES_ON_A_SCALE_FROM_0_TO_100_WITH_100_BEING_YOUR_LEVEL_OF_KNEE_FUNCTION_PRIOR_TO_YOUR_INJURY_AND_0_BEING_THE_INABILITY_TO_PERFORM_ANY_OF_YOUR_USUAL_DAILY_ACTIVITIES?: 25
SQUAT: I AM UNABLE TO DO THE ACTIVITY
KNEEL ON THE FRONT OF YOUR KNEE: I AM UNABLE TO DO THE ACTIVITY
WALK: ACTIVITY IS FAIRLY DIFFICULT
HOW_WOULD_YOU_RATE_THE_OVERALL_FUNCTION_OF_YOUR_KNEE_DURING_YOUR_USUAL_DAILY_ACTIVITIES?: ABNORMAL
STIFFNESS: THE SYMPTOM AFFECTS MY ACTIVITY MODERATELY
RISE FROM A CHAIR: ACTIVITY IS SOMEWHAT DIFFICULT
GO UP STAIRS: ACTIVITY IS VERY DIFFICULT
GO DOWN STAIRS: I AM UNABLE TO DO THE ACTIVITY
STAND: ACTIVITY IS FAIRLY DIFFICULT
PAIN: THE SYMPTOM AFFECTS MY ACTIVITY SEVERELY
SIT WITH YOUR KNEE BENT: ACTIVITY IS NOT DIFFICULT
GIVING WAY, BUCKLING OR SHIFTING OF KNEE: THE SYMPTOM AFFECTS MY ACTIVITY MODERATELY
AS_A_RESULT_OF_YOUR_KNEE_INJURY,_HOW_WOULD_YOU_RATE_YOUR_CURRENT_LEVEL_OF_DAILY_ACTIVITY?: ABNORMAL
WEAKNESS: THE SYMPTOM AFFECTS MY ACTIVITY MODERATELY
PLEASE_INDICATE_YOR_PRIMARY_REASON_FOR_REFERRAL_TO_THERAPY:: KNEE

## 2024-02-29 ENCOUNTER — THERAPY VISIT (OUTPATIENT)
Dept: PHYSICAL THERAPY | Facility: CLINIC | Age: 79
End: 2024-02-29
Payer: COMMERCIAL

## 2024-02-29 DIAGNOSIS — M17.11 PRIMARY LOCALIZED OSTEOARTHRITIS OF RIGHT KNEE: ICD-10-CM

## 2024-02-29 DIAGNOSIS — M17.12 PRIMARY OSTEOARTHRITIS OF LEFT KNEE: ICD-10-CM

## 2024-02-29 DIAGNOSIS — M25.562 ACUTE PAIN OF LEFT KNEE: Primary | ICD-10-CM

## 2024-02-29 PROCEDURE — 97110 THERAPEUTIC EXERCISES: CPT | Mod: GP | Performed by: PHYSICAL THERAPIST

## 2024-02-29 PROCEDURE — 97161 PT EVAL LOW COMPLEX 20 MIN: CPT | Mod: GP | Performed by: PHYSICAL THERAPIST

## 2024-02-29 NOTE — PROGRESS NOTES
PHYSICAL THERAPY EVALUATION  Type of Visit: Evaluation    See electronic medical record for Abuse and Falls Screening details.    Subjective       Presenting condition or subjective complaint: Strengthen knee before surgery. Pt is scheduled for L TKA in early April. Was supposed to be having the R side done but irritated the L knee and now that one is worse. Has had on and off knee pain for several years and has been progressively getting worse. Has been using a single point cane for about 6 weeks since tweaking the knee. Seems to help with balance. Is afraid of it buckling.   Date of onset: 02/23/24    Relevant medical history: Arthritis; Cancer; Chest pain; COPD; Diabetes; Hearing problems; High blood pressure; Incontinence; Numbness or tingling in perianal area; Sleep disorder like apnea; Smoking; Vision problems   Dates & types of surgery:      Prior diagnostic imaging/testing results: X-ray     Prior therapy history for the same diagnosis, illness or injury: No      Prior Level of Function  Transfers:   Ambulation:   ADL:   IADL:     Living Environment  Social support: With a significant other or spouse   Type of home: House   Stairs to enter the home: Yes 2 Is there a railing: Yes   Ramp:     Stairs inside the home: No       Help at home: Self Cares (home health aide/personal care attendant, family, etc)  Equipment owned: Walker with wheels; Bath bench; Lift chair     Employment: No    Hobbies/Interests:      Patient goals for therapy: Do normal activitys    Pain assessment:      Objective   KNEE EVALUATION  PAIN: Pain Level at Rest: 0/10  Pain Level with Use: 6/10  Pain Location: Anterior knee  Pain Quality: Aching and once in a while stabbing  Pain Frequency: intermittent  Pain is Worst: daytime  Pain is Exacerbated By: standing, transitional activities if he's been sitting a while, stairs  Pain is Relieved By: rest and elastic knee sleeve  Pain Progression: Unchanged  INTEGUMENTARY (edema, incisions):    POSTURE:   GAIT:  Weightbearing Status: WBAT  Assistive Device(s): Cane (single end)  Gait Deviations: Antalgic  Base of support decreased  BALANCE/PROPRIOCEPTION:   WEIGHTBEARING ALIGNMENT:   NON-WEIGHTBEARING ALIGNMENT:   ROM:   (Degrees) Left AROM Left PROM  Right AROM Right PROM   Knee Flexion 132 +/- pain in posterior knee  132- pain    Knee Extension -3  -3 - pain    Pain:   End feel:     STRENGTH:   Pain: - none + mild ++ moderate +++ severe  Strength Scale: 0-5/5 Left Right   Knee Flexion 5 5   Knee Extension 5 5   Quad Set 5 5     FLEXIBILITY: WNL  SPECIAL TESTS:    Left Right   Apley's (Meniscus)     Butch's (Meniscus)     Gerardo's (ITB/TFL)     Patellar Apprehension Test Negative  Negative    Patella Tracking     Ligamentous Stability     Anterior Drawer (ACL)     Posterior Drawer (PCL)     Prone Dial Test at 30 Deg and 90 Deg (PCL/PLC)     Valgus Stress Testing at 0 Deg and 30 Deg Negative,   Negative,     Varus Stress Testing at 0 Deg and 30 Deg  Negative,   Negative,       FUNCTIONAL TESTS:   PALPATION: WNL  JOINT MOBILITY:  Decreased patellar mobility noted in medial/lateral directions    Assessment & Plan   CLINICAL IMPRESSIONS  Medical Diagnosis: L knee pain    Treatment Diagnosis: L knee pain   Impression/Assessment: Patient is a 78 year old male with L knee complaints.  The following significant findings have been identified: Pain, Decreased strength, Impaired gait, Impaired muscle performance, and Decreased activity tolerance. These impairments interfere with their ability to perform self care tasks, recreational activities, household chores, driving , household mobility, and community mobility as compared to previous level of function.     Clinical Decision Making (Complexity):  Clinical Presentation: Stable/Uncomplicated  Clinical Presentation Rationale: based on medical and personal factors listed in PT evaluation  Clinical Decision Making (Complexity): Low complexity    PLAN OF  CARE  Treatment Interventions:  Interventions: Manual Therapy, Neuromuscular Re-education, Therapeutic Activity, Therapeutic Exercise, Self-Care/Home Management    Long Term Goals            Frequency of Treatment: 1x/wk  Duration of Treatment: 4 weeks    Recommended Referrals to Other Professionals:   Education Assessment:        Risks and benefits of evaluation/treatment have been explained.   Patient/Family/caregiver agrees with Plan of Care.     Evaluation Time:             Signing Clinician: Paris Best PT      [facility]                                                                                   OUTPATIENT PHYSICAL THERAPY      PLAN OF TREATMENT FOR OUTPATIENT REHABILITATION   Patient's Last Name, First Name, Rakan Gil YOB: 1945   Provider's Name   [facility]   Medical Record No.  [id number]     Onset Date: 02/23/24  Start of Care Date: 02/29/24     Medical Diagnosis:  L knee pain      PT Treatment Diagnosis:  L knee pain Plan of Treatment  Frequency/Duration: 1x/wk/ 4 weeks    Certification date from 02/29/24 to 03/28/24         See note for plan of treatment details and functional goals     Paris Best PT                         I CERTIFY THE NEED FOR THESE SERVICES FURNISHED UNDER        THIS PLAN OF TREATMENT AND WHILE UNDER MY CARE     (Physician attestation of this document indicates review and certification of the therapy plan).              Referring Provider:  Crys Wu    Initial Assessment  See Epic Evaluation- Start of Care Date: 02/29/24

## 2024-03-05 ENCOUNTER — PRE VISIT (OUTPATIENT)
Dept: SURGERY | Facility: CLINIC | Age: 79
End: 2024-03-05

## 2024-03-05 ENCOUNTER — ANESTHESIA EVENT (OUTPATIENT)
Dept: SURGERY | Facility: CLINIC | Age: 79
End: 2024-03-05
Payer: COMMERCIAL

## 2024-03-05 ENCOUNTER — OFFICE VISIT (OUTPATIENT)
Dept: SURGERY | Facility: CLINIC | Age: 79
End: 2024-03-05
Payer: COMMERCIAL

## 2024-03-05 VITALS
RESPIRATION RATE: 16 BRPM | HEART RATE: 62 BPM | WEIGHT: 235.2 LBS | DIASTOLIC BLOOD PRESSURE: 72 MMHG | BODY MASS INDEX: 32.93 KG/M2 | OXYGEN SATURATION: 97 % | HEIGHT: 71 IN | SYSTOLIC BLOOD PRESSURE: 134 MMHG | TEMPERATURE: 97.9 F

## 2024-03-05 DIAGNOSIS — Z01.818 PRE-OP EVALUATION: Primary | ICD-10-CM

## 2024-03-05 PROCEDURE — 99204 OFFICE O/P NEW MOD 45 MIN: CPT | Performed by: PHYSICIAN ASSISTANT

## 2024-03-05 ASSESSMENT — PAIN SCALES - GENERAL: PAINLEVEL: NO PAIN (0)

## 2024-03-05 ASSESSMENT — LIFESTYLE VARIABLES: TOBACCO_USE: 0

## 2024-03-05 ASSESSMENT — ENCOUNTER SYMPTOMS: SEIZURES: 0

## 2024-03-05 ASSESSMENT — COPD QUESTIONNAIRES: COPD: 1

## 2024-03-05 NOTE — PATIENT INSTRUCTIONS
Preparing for Your Surgery      Name:  Rakan Nolasco   MRN:  [id number]   :  1945   Today's Date:  3/5/2024       Arriving for surgery:  Surgery date:  2024  Arrival time:  10:30 AM    Please come to:     Please come to:      [facility] [address]  [address] [place]  [address]  The Green Ramp for patients and visitors is located beneath the [facility]. The parking facility entrance is at the intersection of [address] [place] [address] and [address]. Patients and visitors who self-park will receive the reduced hospital parking rate (no ticket validation needed).  [employer] parking, located at the [facility] main entrance on [address] [place] [address], is available Monday - Friday from 7 am to 3:30 pm.  Discounted parking pass options can be purchased from  attendants during business hours.  -Check in at the security desk in the [facility] ([facility]) Lobby. They will direct you to the correct elevators.  -Proceed to the 3rd floor, check in at the Adult Surgery Waiting Lounge. [phone]  If you are in need of directions, a wheelchair or escort please stop at the Information Desk in the lobby.  Inform the information person that you are here for surgery; a wheelchair and escort to [address] will be provided.   An escort to the Adult Surgery Waiting Lounge will be provided.    What can I eat or drink?  -  You may eat and drink normally up to 8 hours prior to arrival time. (Until 2:30 AM)  -  You may have clear liquids until 2 hours prior to arrival time. (Until 8:30 AM)    Examples of clear liquids:  Water  Clear broth  Juices (apple, white grape, white cranberry  and cider) without pulp  Noncarbonated, powder based beverages  (lemonade and Feng-Aid)  Sodas (Sprite, 7-Up, ginger ale and seltzer)  Coffee or tea (without milk or cream)  Gatorade    -  No Alcohol or cannabis  products for at least 24 hours before surgery.     Which medicines can I take?    **Hold Aspirin for 7 days before surgery.   Hold Multivitamins for 7 days before surgery.  Hold Supplements for 7 days before surgery.  **Hold Ibuprofen (Advil, Motrin) for 3 day(s) before surgery--unless otherwise directed by surgeon.  Hold Naproxen (Aleve) for 4 days before surgery.    -  DO NOT take these medications the day of surgery:  Sodium bicarbonate  Metformin  Lasix  Iron    -  PLEASE TAKE these medications the day of surgery:  Albuterol if needed  Advair  Trileptal  Lipitor  Mestinon (pyridostigmine)  Amlodipine  Prednisone  Eye drops if needed   Protonix    How do I prepare myself?  - Please take 2 showers (one the night prior to surgery and one the morning of surgery) using Scrubcare or Hibiclens soap.    Use this soap only from the neck to your toes.     Leave the soap on your skin for one minute--then rinse thoroughly.      You may use your own shampoo and conditioner. No other hair products.   - Please remove all jewelry and body piercings.  - No lotions, deodorants or fragrance.  - No makeup or fingernail polish.   - Bring your ID and insurance card.    -If you use a CPAP machine, please bring the CPAP machine, tubing, and mask to hospital.    -If you have a Deep Brain Stimulator, Spinal Cord Stimulator, or any Neuro Stimulator device---you must bring the remote control to the hospital.      ALL PATIENTS GOING HOME THE SAME DAY OF SURGERY ARE REQUIRED TO HAVE A RESPONSIBLE ADULT TO DRIVE AND BE IN ATTENDANCE WITH THEM FOR 24 HOURS FOLLOWING SURGERY.    Covid testing policy as of 12/06/2022  Your surgeon will notify and schedule you for a COVID test if one is needed before surgery--please direct any questions or COVID symptoms to your surgeon      Questions or Concerns:    - For any questions regarding the day of surgery or your hospital stay, please contact the Pre Admission Nursing Office at [phone].       - If  you have health changes between today and your surgery, please call your surgeon.       - For questions after surgery, please call your surgeons office.           Current Visitor Guidelines    You may have 2 visitors in the pre op area.    Visiting hours: 8 a.m. to 8:30 p.m.    Patients confirmed or suspected to have symptoms of COVID 19 or flu:     No visitors allowed for adult patients.   Children (under age 18) can have 1 named visitor.     People who are sick or showing symptoms of COVID 19 or flu:    Are not allowed to visit patients--we can only make exceptions in special situations.       Please follow these guidelines for your visit:          Please maintain social distance          Masking is optional--however at times you may be asked to wear a mask for the safety of yourself and others     Clean your hands with alcohol hand . Do this when you arrive at and leave the building and patient room,    And again after you touch your mask or anything in the room.     Go directly to and from the room you are visiting.     Stay in the patient s room during your visit. Limit going to other places in the hospital as much as possible     Leave bags and jackets at home or in the car.     For everyone s health, please don t come and go during your visit. That includes for smoking   during your visit.

## 2024-03-05 NOTE — H&P
Pre-Operative H & P     CC:  Preoperative exam to assess for increased cardiopulmonary risk while undergoing surgery and anesthesia.    Date of Encounter: 3/5/2024  Primary Care Physician:  Telly Lucio     Reason for visit:   Encounter Diagnosis   Name Primary?    Pre-op evaluation Yes       HPI  Rakan Nolasco is a 78 year old male who presents for pre-operative H & P in preparation for  Procedure Information       Case: [id number] Date/Time: 04/01/24 1305    Procedure: ARTHROPLASTY, LEFT KNEE, TOTAL (Left: Knee)    Anesthesia type: Choice    Diagnosis: Primary osteoarthritis of left knee [M17.12]    Pre-op diagnosis: Primary osteoarthritis of left knee [M17.12]    Location:  OR 12 / [facility]    Providers: Dandre Hassan MD            Patient is being evaluated for comorbid conditions of COPD, diabetes, ROSIE, GERD, hypertension, dyslipidemia, RLS, myasthenia gravis    Mr. Nolasco has known bilateral knee OA. He was seen by Dr. Hassan for further evaluation. He is now scheduled for the above procedure.     History is obtained from the patient and chart review    Hx of abnormal bleeding or anti-platelet use: ASA 81 mg      Past Medical History  Past Medical History:   Diagnosis Date    Arthritis         BMI 31.0-31.9,adult     Cancer (H) 01/10/2018    Prostate    COPD (chronic obstructive pulmonary disease) (H) 10/28/2020    Demand ischemia     Diabetes (H) 01/10/2018    Diverticulosis     Colonoscopy 8/2008    Fatty liver     see US  5/2012    GERD (gastroesophageal reflux disease)     Glaucoma (increased eye pressure)     HTN (hypertension)     Hyperlipidemia LDL goal < 130     age, htn, fhx    Irritable bowel     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     Nonsenile cataract     Obstructive sleep apnea     Obstructive sleep apnea syndrome     ROSIE (obstructive sleep apnea) 01/2011    Using CPAP;     Prediabetes     Restless leg syndrome     Retinal detachment      Sleep apnea     Trigeminal neuralgia pain 01/04/2012       Past Surgical History  Past Surgical History:   Procedure Laterality Date    BIOPSY ARTERY TEMPORAL Bilateral 08/01/2017    Procedure: BIOPSY ARTERY TEMPORAL;  Bilateral temporal artery Biopsy;  Surgeon: Jj Tello MD;  Location: [facility]    CATARACT IOL, RT/LT Right     COLONOSCOPY      ESOPHAGOSCOPY, GASTROSCOPY, DUODENOSCOPY (EGD), COMBINED  01/15/2014    Procedure: COMBINED ESOPHAGOSCOPY, GASTROSCOPY, DUODENOSCOPY (EGD), BIOPSY SINGLE OR MULTIPLE;;  Surgeon: Winston Nixon MD;  Location: [facility]    LASER YAG CAPSULOTOMY Right 04/09/2018    right eye    PHACOEMULSIFICATION CLEAR CORNEA WITH STANDARD INTRAOCULAR LENS IMPLANT Right 02/20/2017    Procedure: PHACOEMULSIFICATION CLEAR CORNEA WITH STANDARD INTRAOCULAR LENS IMPLANT;  Surgeon: Mateo Gray MD;  Location: [facility]    PHACOEMULSIFICATION CLEAR CORNEA WITH STANDARD INTRAOCULAR LENS IMPLANT Left 01/10/2022    Procedure: LEFT PHACOEMULSIFICATION, CATARACT, WITH INTRAOCULAR LENS IMPLANT;  Surgeon: Mateo Gray MD;  Location: [facility]    WA TRABECULOPLASTY BY LASER SURGERY      RETINAL REATTACHMENT Right     SURGICAL HISTORY OF -   08/2009    Both Eyelids-.    TONSILLECTOMY  as child       Prior to Admission Medications  Current Outpatient Medications   Medication Sig Dispense Refill    albuterol (PROAIR HFA/PROVENTIL HFA/VENTOLIN HFA) 108 (90 Base) MCG/ACT inhaler Inhale 1 puff into the lungs every 4 hours as needed for shortness of breath, wheezing or cough 18 g 1    amLODIPine (NORVASC) 10 MG tablet TAKE 1 TABLET (10 MG) BY MOUTH DAILY. (Patient taking differently: Take 10 mg by mouth every morning) 90 tablet 0    ARTIFICIAL TEAR OP Apply 1 drop to eye as needed      aspirin 81 MG tablet Take 1 tablet by mouth every morning      atorvastatin (LIPITOR) 20 MG tablet TAKE 1 TABLET BY MOUTH EVERY DAY (Patient taking differently: Take 20 mg by mouth every morning) 90 tablet  3    ClonAZEPAM (KLONOPIN) 0.5 MG tablet Take 0.5 mg by mouth Take 1 tablet by mouth daily at bedtime      ferrous sulfate (FEROSUL) 325 (65 Fe) MG tablet TAKE 1 TABLET BY MOUTH EVERY DAY WITH BREAKFAST 90 tablet 1    fluticasone-salmeterol (ADVAIR) 250-50 MCG/ACT inhaler TAKE 1 PUFF BY MOUTH TWICE A DAY Strength: 250-50 MCG/ACT 3 each 3    furosemide (LASIX) 20 MG tablet TAKE 2 TABLETS BY MOUTH IN THE MORNING AND 1 TABLET BY MOUTH AT LEAST 6 HOURS LATER IN THE AFTERNOON. 180 tablet 1    ibuprofen (ADVIL/MOTRIN) 800 MG tablet Take 1 tablet (800 mg) by mouth 3 times daily (Patient taking differently: Take 800 mg by mouth 2 times daily as needed) 45 tablet 0    latanoprost (XALATAN) 0.005 % ophthalmic solution Place 1 drop into both eyes at bedtime 7.5 mL 3    lisinopril (ZESTRIL) 40 MG tablet Take 1 tablet (40 mg) by mouth daily (Patient taking differently: Take 40 mg by mouth every evening) 90 tablet 3    metFORMIN (GLUCOPHAGE XR) 500 MG 24 hr tablet TAKE 1 TABLET BY MOUTH TWICE A DAY WITH MEALS 180 tablet 0    OXcarbazepine (TRILEPTAL) 300 MG tablet TAKE 2 TABLETS IN THE MORNING AND 2 TABLETS IN THE EVENING 360 tablet 0    pantoprazole (PROTONIX) 20 MG EC tablet TAKE 1 TABLET BY MOUTH EVERY DAY (Patient taking differently: Take 20 mg by mouth every morning) 90 tablet 1    predniSONE (DELTASONE) 5 MG tablet TAKE 2 TABLETS BY MOUTH EVERY DAY (Patient taking differently: Take 10 mg by mouth every morning TAKE 2 TABLETS BY MOUTH EVERY DAY) 60 tablet 2    pyRIDostigmine (MESTINON) 60 MG tablet Take 2 tablets (120 mg) up to 3 times daily. (Patient taking differently: 2 tablets in am, 1 in afternoon, and 1 in evening) 540 tablet 0    sodium bicarbonate 650 MG tablet TAKE 1/2 TABLET BY MOUTH TWICE A DAY 90 tablet 1    timolol maleate (TIMOPTIC) 0.5 % ophthalmic solution Place 1 drop into both eyes 2 times daily 5 mL 11    blood glucose (ONETOUCH VERIO IQ) test strip USE TO TEST BLOOD SUGAR 3 TIMES DAILY OR AS DIRECTED. 300  strip 1    blood glucose monitoring (NO BRAND SPECIFIED) meter device kit Use to test blood sugar 1 times daily or as directed. 1 kit 0    Continuous Blood Gluc Sensor (FREESTYLE SARAHY 2 SENSOR) MISC 1 each by Device route every 14 days Use 1 sensor every 14 days. Use to read blood sugars per 's instructions. 2 each 5    Lancets (ONETOUCH DELICA PLUS [id number]) MISC USE ONCE DAILY AS DIRECTED 100 each 1    neomycin-polymixin-dexAMETHasone (MAXITROL) 0.1 % ophthalmic suspension Place 1 drop Into the left eye 3 times daily (Patient not taking: Reported on 11/10/2023) 5 mL 0       Allergies  Allergies   Allergen Reactions    Azathioprine Shortness Of Breath and Other (See Comments)     Was hospitalized from it. Also had high fever, chills, and shortness of breath.    Sulfamethoxazole-Trimethoprim Cough, Itching and Rash     Generalized painful red rash on legs arms and abdomen, chest, back, cough and  fever  that required hospitalization.    Ciprofloxacin Muscle Pain (Myalgia)     Muscle pain  Other reaction(s): Myalgia  Other reaction(s): Myalgia    Ropinirole      Leg pan  GI  Weakness; ? sycope  Other reaction(s): Leg Pain       Social History  Social History     Socioeconomic History    Marital status: Single     Spouse name: Not on file    Number of children: 0    Years of education: 12    Highest education level: Not on file   Occupational History    Occupation: Retired 3/2012     Comment: [employer] ([employer])   Tobacco Use    Smoking status: Former     Packs/day: 2.00     Years: 15.00     Additional pack years: 0.00     Total pack years: 30.00     Types: Cigarettes     Start date: 1968     Quit date: 1983     Years since quittin.2    Smokeless tobacco: Former     Quit date: 1970    Tobacco comments:     smoke free household.   Vaping Use    Vaping Use: Never used   Substance and Sexual Activity    Alcohol use: Not Currently     Comment: Quit in     Drug use: No     Sexual activity: Not Currently     Partners: Female     Comment: 4/2010  No girlfriend at this time.   Other Topics Concern    Parent/sibling w/ CABG, MI or angioplasty before 65F 55M? No   Social History Narrative    Not on file     Social Determinants of Health     Financial Resource Strain: Low Risk  (2/2/2024)    Financial Resource Strain     Within the past 12 months, have you or your family members you live with been unable to get utilities (heat, electricity) when it was really needed?: No   Food Insecurity: Low Risk  (2/2/2024)    Food Insecurity     Within the past 12 months, did you worry that your food would run out before you got money to buy more?: No     Within the past 12 months, did the food you bought just not last and you didn t have money to get more?: No   Transportation Needs: Low Risk  (2/2/2024)    Transportation Needs     Within the past 12 months, has lack of transportation kept you from medical appointments, getting your medicines, non-medical meetings or appointments, work, or from getting things that you need?: No   Physical Activity: Not on file   Stress: Not on file   Social Connections: Not on file   Interpersonal Safety: Not on file   Housing Stability: Low Risk  (2/2/2024)    Housing Stability     Do you have housing? : Yes     Are you worried about losing your housing?: No       Family History  Family History   Problem Relation Age of Onset    Respiratory Mother         copd    LUNG DISEASE Mother     Cerebrovascular Disease Father     Cancer Father     Other Cancer Father     Allergies Brother     Cancer Maternal Grandmother     Heart Disease Paternal Grandmother     Glaucoma Maternal Aunt     Macular Degeneration No family hx of     Anesthesia Reaction No family hx of     Deep Vein Thrombosis (DVT) No family hx of        Review of Systems  The complete review of systems is negative other than noted in the HPI or here.   Anesthesia Evaluation   Pt has had prior anesthetic.          ROS/MED HX  ENT/Pulmonary:     (+) sleep apnea, uses CPAP,                        COPD,           (-) tobacco use   Neurologic: Comment: RLS  Trigeminal neuralgia  Myasthenia gravis    (-) no seizures and no CVA   Cardiovascular:     (+) Dyslipidemia hypertension- -   -  - -   Taking blood thinners                              Previous cardiac testing   Echo: Date: 12/2020 Results:  FINDINGS    Left Ventricle Normal left ventricular size and ejection fraction. Mild left ventricular   hypertrophy. Visually estimated ejection    fraction is 55-60%.    Diastolic Function Normal diastolic filling pattern for age.    Right Ventricle Normal right ventricular size and function.    Left Atrium Left atrial size at the upper limits of normal.    Right Atrium Right atrial enlargement.    Aortic Valve Structurally normal trileaflet aortic valve. No aortic stenosis. No significant   aortic regurgitation.    Mitral Valve Structurally normal mitral valve. Mild mitral regurgitation.    Tricuspid Valve Structurally normal tricuspid valve. No significant tricuspid regurgitation.    Pulmonic Valve Pulmonic valve not well visualized, without significant stenosis or   regurgiation noted on doppler imaging.    Pericardium No significant pericardial effusion.    Aorta Measured aortic root diameter is normal in size.    Inferior Vena Cava Upper limit of normal ICV size.     Stress Test:  Date: 2018 Results:  FINAL CONCLUSIONS    NORMAL exercise stress perfusion imaging study.    The rest and stress perfusion images are normal.   The perfusion images were NORMAL without   evidence of ischemia or infarction.    Normal left ventricular size and systolic function with a calculated LVEF of >75%.    Normal left ventricular wall motion.   ECG Reviewed:  Date: 2/2024 Results:  Sinus rhythm with Premature atrial complexes   Low voltage QRS   Incomplete right bundle branch block   Borderline ECG     Cath:  Date: Results:      METS/Exercise  "Tolerance: 3 - Able to walk 1-2 blocks without stopping Comment: Using stairs, some BORGES. Using cane for ambulation- can walk 2 blocks before taking a break   Hematologic:  - neg hematologic  ROS  (-) history of blood clots and history of blood transfusion   Musculoskeletal:       GI/Hepatic: Comment: Difficulty swallowing     (+) GERD, Asymptomatic on medication,           liver disease (fatty liver),       Renal/Genitourinary:       Endo:     (+)  type II DM, Last HgA1c: 6.3, date: 11/2023, Not using insulin,       Chronic steroid usage for  Date most recently used: 5 mg BID. Obesity (BMI 32),       Psychiatric/Substance Use:       Infectious Disease:  - neg infectious disease ROS     Malignancy:       Other:            /72 (BP Location: Left arm, Patient Position: Sitting, Cuff Size: Adult Regular)   Pulse 62   Temp 97.9  F (36.6  C) (Oral)   Resp 16   Ht 1.791 m (5' 10.5\")   Wt 106.7 kg (235 lb 3.2 oz)   SpO2 97%   BMI 33.27 kg/m      Physical Exam   Constitutional: Awake, alert, cooperative, no apparent distress, and appears stated age. Ambulating with cane  Eyes: Pupils equal, round and reactive to light, extra ocular muscles intact, sclera clear, conjunctiva normal.  HENT: Normocephalic, oral pharynx with moist mucus membranes, good dentition.  Respiratory: Clear to auscultation bilaterally, no crackles or wheezing.  Cardiovascular: Regular rate and rhythm, normal S1 and S2, and no murmur noted.  Carotids no bruits. No edema. Palpable pulses to radial  arteries.   GI: Normal bowel sounds, soft, non-distended, non-tender  Genitourinary:  deferred  Skin: Warm and dry.    Musculoskeletal: Full ROM of neck. There is no redness, warmth, or swelling of the exposed joints. Gross motor strength is normal.    Neurologic: Awake, alert, oriented to name, place and time. Cranial nerves II-XII are grossly intact. \  Neuropsychiatric: Calm, cooperative. Normal affect.     Prior Labs/Diagnostic Studies   All " labs and imaging personally reviewed     EKG/ stress test - if available please see in ROS above   No results found.      Latest Ref Rng & Units 6/29/2022     2:57 PM   PFT   FVC L 3.14    FEV1 L 2.05    FVC% % 79    FEV1% % 68          The patient's records and results personally reviewed by this provider.     Outside records reviewed from: Care Everywhere    LAB/DIAGNOSTIC STUDIES TODAY:  none    Assessment    Rakan Nolasco is a 78 year old male seen as a PAC referral for risk assessment and optimization for anesthesia.    Plan/Recommendations  Pt will be optimized for the proposed procedure.  See below for details on the assessment, risk, and preoperative recommendations    NEUROLOGY  - No history of TIA, CVA or seizure  -trigeminal neuralgia- reports intermittent symptoms  -RLS  -myasthenia gravis. Some difficulty swallowing. Follows with neurology regularly, last seen 10/2023.  Stable. Will continue prednisone and pyridostigmine.    -Post Op delirium risk factors:  High co-morbid index    ENT  - No current airway concerns.  Will need to be reassessed day of surgery.  Mallampati: I  TM: > 3    CARDIAC  - No history of CAD, a fib  -hypertension controlled on lisinopril  -reports he can walk a couple blocks, ascend stairs . Some longstanding BORGES with stairs that he attributes to COPD. No change in symptoms since prior to his last cardiac testing.   - METS (Metabolic Equivalents)  Patient CANNOT perform 4 METS exercise without symptoms            Total Score: 1    Functional Capacity: Unable to complete 4 METS      RCRI-Very low risk: Class 1 0.4% complication rate            Total Score: 0        PULMONARY  -ROSIE using CPAP  - COPD  Well controlled  - Tobacco History    History   Smoking Status    Former    Packs/day: 2.00    Years: 15.00    Types: Cigarettes    Start date: 1/1/1968    Quit date: 1/1/1983   Smokeless Tobacco    Former    Quit date: 1/1/1970       GI  - GERD  Controlled on medications: Proton Pump  "Inhibitor  PONV Medium Risk  Total Score: 2           1 AN PONV: Patient is not a current smoker    1 AN PONV: Intended Post Op Opioids        /RENAL  - Baseline Creatinine WNL    ENDOCRINE    - BMI: Estimated body mass index is 33.27 kg/m  as calculated from the following:    Height as of this encounter: 1.791 m (5' 10.5\").    Weight as of this encounter: 106.7 kg (235 lb 3.2 oz).  Obesity (BMI >30)  - Diabetes  Hemoglobin A1C (%)   Date Value   11/10/2023 6.3 (H)   06/09/2021 7.1 (H)     Diabetes Mellitus, Type 2, non-insulin dependent.  Hold morning oral hypoglycemic medications. Recommend close monitoring of the patient's blood glucose levels throughout the perioperative period.  -continues on prednisone (10 mg) due to myasthenia gravis     HEME  VTE Low Risk 0.5%            Total Score: 3    VTE: Greater than 59 yrs old    VTE: Male      - Platelet disfunction second to Aspirin (Cecile, many others)    MSK  Patient is NOT Frail            Total Score: 0      -knee pain with the above procedure planned    Different anesthesia methods/types have been discussed with the patient, but they are aware that the final plan will be decided by the assigned anesthesia provider on the date of service.  Patient was discussed with Dr Dean    The patient is optimized for their procedure. AVS with information on surgery time/arrival time, meds and NPO status given by nursing staff. No further diagnostic testing indicated.      On the day of service:     Prep time: 16 minutes  Visit time: 19 minutes  Documentation time: 12 minutes  ------------------------------------------  Total time: 47 minutes      Maribell Austin PA-C  Preoperative Assessment Center  [facility]  [facility]  Phone: [phone]  Fax: [phone]    "

## 2024-03-06 ENCOUNTER — TELEPHONE (OUTPATIENT)
Dept: PHARMACY | Facility: OTHER | Age: 79
End: 2024-03-06
Payer: COMMERCIAL

## 2024-03-06 NOTE — TELEPHONE ENCOUNTER
Called patient to schedule MTM appointment - they were referred by their [facility] health insurance for a medication review.     Referral outreach attempt #1 on March 6, 2024      Outcome: patient opted out for this year. He doesn't feel he needs to talk about medications because he hasn't had any changes since last MTM visit. He will call in the future if he has any questions.     Tim Cm, PharmD, [facility]  Medication Therapy Management Provider

## 2024-03-14 DIAGNOSIS — E11.69 TYPE 2 DIABETES MELLITUS WITH OTHER SPECIFIED COMPLICATION, WITHOUT LONG-TERM CURRENT USE OF INSULIN (H): ICD-10-CM

## 2024-03-14 RX ORDER — METFORMIN HCL 500 MG
TABLET, EXTENDED RELEASE 24 HR ORAL
Qty: 180 TABLET | Refills: 0 | Status: SHIPPED | OUTPATIENT
Start: 2024-03-14 | End: 2024-06-14

## 2024-03-15 PROBLEM — N17.9 AKI (ACUTE KIDNEY INJURY) (H): Status: ACTIVE | Noted: 2022-09-21

## 2024-03-15 PROBLEM — L27.0 DRUG RASH: Status: ACTIVE | Noted: 2022-09-24

## 2024-03-15 PROBLEM — D64.9 ANEMIA: Status: ACTIVE | Noted: 2022-09-21

## 2024-03-15 PROBLEM — N39.0 UTI (URINARY TRACT INFECTION): Status: ACTIVE | Noted: 2022-09-21

## 2024-03-15 NOTE — PROGRESS NOTES
Ortho Navigator Note      Pre-op Date 3/5/24     Medical Clearance  Cleared     Labs WNR     COVID Test Date No longer indicated      Skin  Intact      Activity: Ambulates independently with occasional use of straight cane      Equipment Need Patient has a walker for discharge. Defer to PT/OT for recs.       Meds to Hold Held all supplements 14 days prior to surgery  -  DO NOT take these medications the day of surgery:  Sodium bicarbonate  Metformin  Lasix  Iron    * Medication recommendations are not intended to be exhaustive; they are limited to common medications that are potentially dangerous if incorrectly managed   NPO Instructions  Instructed to stop solids 8 hr prior to arrival and clears 2 hr prior to arrival.       Pre-op Joint Education Complete? Link sent to patient    Discharge Plan Patient has plan to discharge home on morning of POD 1.   Spouse Kenzie will arrive at hospital at 0800 to participate in therapy and discharge education. They will then transport patient home    /Transportation Kenzie will be  and transportation.  is physically able to care for patient.      Barriers to Discharge No barriers to discharge.      Additional Info/   Special Needs : Patient had no unanswered questions or concerns.           03/15/24 1245   Discharge Planning   Patient/Family Anticipates Transition to home with family   Concerns to be Addressed no discharge needs identified   Living Arrangements   People in Home spouse   Type of Residence Private Residence   Is your private residence a single family home or apartment? Single family home   Number of Stairs, Within Home, Primary three  (to get into home)   Stair Railings, Within Home, Primary none   Once home, are you able to live on one level? Yes   Which level? Main Level   Bathroom Shower/Tub Tub/Shower unit   Equipment Currently Used at Home cane, straight;tub bench   Support System   Support Systems Spouse/Significant Other   Do you have someone  available to stay with you one or two nights once you are home? Yes   Medical Clearance   Date of Physical 03/05/24   Clinic Name PAC   It is recommended that you call and check with any specialty providers before surgery to see if you need surgical clearance.  Do you see any specialty providers outside of your primary care provider? No   Blood   Known Bleeding Disorder or Coagulopathy No   Does the patient have any Hinduism/cultural preferences related to blood products? No   Education   Has the patient scheduled or completed pre-op total joint education, either in class or online, in the last 12 months? No   Patient attended total joint pre-op class/received pre-op teaching  online   Relationship/Living Environment   Name(s) of People in Home Kenzie (wife)

## 2024-03-21 ENCOUNTER — THERAPY VISIT (OUTPATIENT)
Dept: PHYSICAL THERAPY | Facility: CLINIC | Age: 79
End: 2024-03-21
Payer: COMMERCIAL

## 2024-03-21 DIAGNOSIS — M25.562 ACUTE PAIN OF LEFT KNEE: Primary | ICD-10-CM

## 2024-03-21 PROCEDURE — 97110 THERAPEUTIC EXERCISES: CPT | Mod: GP | Performed by: PHYSICAL THERAPIST

## 2024-03-25 DIAGNOSIS — R60.0 PEDAL EDEMA: ICD-10-CM

## 2024-03-26 RX ORDER — FUROSEMIDE 20 MG
TABLET ORAL
Qty: 270 TABLET | Refills: 1 | Status: SHIPPED | OUTPATIENT
Start: 2024-03-26 | End: 2024-09-27

## 2024-03-31 DIAGNOSIS — I10 HTN, GOAL BELOW 140/90: ICD-10-CM

## 2024-03-31 DIAGNOSIS — G70.00 OCULAR MYASTHENIA (H): ICD-10-CM

## 2024-04-01 ENCOUNTER — ANESTHESIA (OUTPATIENT)
Dept: SURGERY | Facility: CLINIC | Age: 79
End: 2024-04-01
Payer: COMMERCIAL

## 2024-04-01 ENCOUNTER — APPOINTMENT (OUTPATIENT)
Dept: GENERAL RADIOLOGY | Facility: CLINIC | Age: 79
End: 2024-04-01
Payer: COMMERCIAL

## 2024-04-01 ENCOUNTER — HOSPITAL ENCOUNTER (OUTPATIENT)
Facility: CLINIC | Age: 79
Discharge: HOME | End: 2024-04-02
Attending: ORTHOPAEDIC SURGERY | Admitting: ORTHOPAEDIC SURGERY
Payer: COMMERCIAL

## 2024-04-01 DIAGNOSIS — R21 RASH: ICD-10-CM

## 2024-04-01 DIAGNOSIS — Z96.652 S/P TOTAL KNEE ARTHROPLASTY, LEFT: Primary | ICD-10-CM

## 2024-04-01 DIAGNOSIS — M17.12 PRIMARY OSTEOARTHRITIS OF LEFT KNEE: ICD-10-CM

## 2024-04-01 LAB
GLUCOSE BLDC GLUCOMTR-MCNC: 146 MG/DL (ref 70–99)
GLUCOSE BLDC GLUCOMTR-MCNC: 169 MG/DL (ref 70–99)
GLUCOSE BLDC GLUCOMTR-MCNC: 173 MG/DL (ref 70–99)
GLUCOSE BLDC GLUCOMTR-MCNC: 174 MG/DL (ref 70–99)

## 2024-04-01 PROCEDURE — 272N000001 HC OR GENERAL SUPPLY STERILE: Performed by: ORTHOPAEDIC SURGERY

## 2024-04-01 PROCEDURE — 250N000013 HC RX MED GY IP 250 OP 250 PS 637

## 2024-04-01 PROCEDURE — 250N000011 HC RX IP 250 OP 636: Performed by: STUDENT IN AN ORGANIZED HEALTH CARE EDUCATION/TRAINING PROGRAM

## 2024-04-01 PROCEDURE — 250N000011 HC RX IP 250 OP 636

## 2024-04-01 PROCEDURE — 250N000011 HC RX IP 250 OP 636: Performed by: NURSE ANESTHETIST, CERTIFIED REGISTERED

## 2024-04-01 PROCEDURE — C1713 ANCHOR/SCREW BN/BN,TIS/BN: HCPCS | Performed by: ORTHOPAEDIC SURGERY

## 2024-04-01 PROCEDURE — C1776 JOINT DEVICE (IMPLANTABLE): HCPCS | Performed by: ORTHOPAEDIC SURGERY

## 2024-04-01 PROCEDURE — 250N000013 HC RX MED GY IP 250 OP 250 PS 637: Performed by: NURSE PRACTITIONER

## 2024-04-01 PROCEDURE — 99100 ANES PT EXTEME AGE<1 YR&>70: CPT | Performed by: STUDENT IN AN ORGANIZED HEALTH CARE EDUCATION/TRAINING PROGRAM

## 2024-04-01 PROCEDURE — 250N000009 HC RX 250: Performed by: STUDENT IN AN ORGANIZED HEALTH CARE EDUCATION/TRAINING PROGRAM

## 2024-04-01 PROCEDURE — 27447 TOTAL KNEE ARTHROPLASTY: CPT | Performed by: NURSE ANESTHETIST, CERTIFIED REGISTERED

## 2024-04-01 PROCEDURE — 258N000003 HC RX IP 258 OP 636: Performed by: ORTHOPAEDIC SURGERY

## 2024-04-01 PROCEDURE — 250N000009 HC RX 250: Performed by: NURSE ANESTHETIST, CERTIFIED REGISTERED

## 2024-04-01 PROCEDURE — 250N000013 HC RX MED GY IP 250 OP 250 PS 637: Performed by: ORTHOPAEDIC SURGERY

## 2024-04-01 PROCEDURE — 370N000017 HC ANESTHESIA TECHNICAL FEE, PER MIN: Performed by: ORTHOPAEDIC SURGERY

## 2024-04-01 PROCEDURE — 250N000009 HC RX 250: Performed by: ORTHOPAEDIC SURGERY

## 2024-04-01 PROCEDURE — 710N000011 HC RECOVERY PHASE 1, LEVEL 3, PER MIN: Performed by: ORTHOPAEDIC SURGERY

## 2024-04-01 PROCEDURE — 99100 ANES PT EXTEME AGE<1 YR&>70: CPT | Performed by: NURSE ANESTHETIST, CERTIFIED REGISTERED

## 2024-04-01 PROCEDURE — 82962 GLUCOSE BLOOD TEST: CPT

## 2024-04-01 PROCEDURE — 27447 TOTAL KNEE ARTHROPLASTY: CPT | Performed by: STUDENT IN AN ORGANIZED HEALTH CARE EDUCATION/TRAINING PROGRAM

## 2024-04-01 PROCEDURE — 250N000025 HC SEVOFLURANE, PER MIN: Performed by: ORTHOPAEDIC SURGERY

## 2024-04-01 PROCEDURE — 258N000003 HC RX IP 258 OP 636: Performed by: NURSE ANESTHETIST, CERTIFIED REGISTERED

## 2024-04-01 PROCEDURE — 99203 OFFICE O/P NEW LOW 30 MIN: CPT | Performed by: NURSE PRACTITIONER

## 2024-04-01 PROCEDURE — 250N000011 HC RX IP 250 OP 636: Performed by: ORTHOPAEDIC SURGERY

## 2024-04-01 PROCEDURE — 250N000011 HC RX IP 250 OP 636: Performed by: NURSE PRACTITIONER

## 2024-04-01 PROCEDURE — 999N000141 HC STATISTIC PRE-PROCEDURE NURSING ASSESSMENT: Performed by: ORTHOPAEDIC SURGERY

## 2024-04-01 PROCEDURE — 360N000077 HC SURGERY LEVEL 4, PER MIN: Performed by: ORTHOPAEDIC SURGERY

## 2024-04-01 PROCEDURE — 999N000065 XR KNEE PORT LEFT 1/2 VIEWS: Mod: LT

## 2024-04-01 PROCEDURE — 250N000013 HC RX MED GY IP 250 OP 250 PS 637: Performed by: STUDENT IN AN ORGANIZED HEALTH CARE EDUCATION/TRAINING PROGRAM

## 2024-04-01 DEVICE — GENESIS II NON-POROUS TIBIAL                                    BASEPLATE SIZE 5 LEFT
Type: IMPLANTABLE DEVICE | Site: KNEE | Status: FUNCTIONAL
Brand: GENESIS II

## 2024-04-01 DEVICE — BONE CEMENT SIMPLEX W/TOBRAMYCIN 6197-9-001: Type: IMPLANTABLE DEVICE | Site: KNEE | Status: FUNCTIONAL

## 2024-04-01 DEVICE — LEGION CR HIGH FLEX XLPE SZ 5-6 9MM
Type: IMPLANTABLE DEVICE | Site: KNEE | Status: FUNCTIONAL
Brand: LEGION

## 2024-04-01 DEVICE — GENESIS II NON POROUS CRUCIATE                                    RETAINING FEMORAL SIZE 6 LEFT
Type: IMPLANTABLE DEVICE | Site: KNEE | Status: FUNCTIONAL
Brand: GENESIS II

## 2024-04-01 RX ORDER — OXYCODONE HYDROCHLORIDE 5 MG/1
5 TABLET ORAL EVERY 4 HOURS PRN
Status: DISCONTINUED | OUTPATIENT
Start: 2024-04-01 | End: 2024-04-02 | Stop reason: HOSPADM

## 2024-04-01 RX ORDER — LISINOPRIL 40 MG/1
40 TABLET ORAL AT BEDTIME
Status: DISCONTINUED | OUTPATIENT
Start: 2024-04-02 | End: 2024-04-02 | Stop reason: HOSPADM

## 2024-04-01 RX ORDER — AMLODIPINE BESYLATE 10 MG/1
10 TABLET ORAL DAILY
Qty: 90 TABLET | Refills: 0 | Status: SHIPPED | OUTPATIENT
Start: 2024-04-01

## 2024-04-01 RX ORDER — DOXYCYCLINE 100 MG/1
100 CAPSULE ORAL 2 TIMES DAILY
Status: DISCONTINUED | OUTPATIENT
Start: 2024-04-02 | End: 2024-04-02 | Stop reason: HOSPADM

## 2024-04-01 RX ORDER — DEXAMETHASONE SODIUM PHOSPHATE 4 MG/ML
INJECTION, SOLUTION INTRA-ARTICULAR; INTRALESIONAL; INTRAMUSCULAR; INTRAVENOUS; SOFT TISSUE PRN
Status: DISCONTINUED | OUTPATIENT
Start: 2024-04-01 | End: 2024-04-01

## 2024-04-01 RX ORDER — NALOXONE HYDROCHLORIDE 0.4 MG/ML
0.4 INJECTION, SOLUTION INTRAMUSCULAR; INTRAVENOUS; SUBCUTANEOUS
Status: DISCONTINUED | OUTPATIENT
Start: 2024-04-01 | End: 2024-04-02 | Stop reason: HOSPADM

## 2024-04-01 RX ORDER — ALBUTEROL SULFATE 90 UG/1
2 AEROSOL, METERED RESPIRATORY (INHALATION) EVERY 4 HOURS PRN
Status: DISCONTINUED | OUTPATIENT
Start: 2024-04-01 | End: 2024-04-02 | Stop reason: HOSPADM

## 2024-04-01 RX ORDER — HYDROMORPHONE HYDROCHLORIDE 1 MG/ML
0.2 INJECTION, SOLUTION INTRAMUSCULAR; INTRAVENOUS; SUBCUTANEOUS
Status: DISCONTINUED | OUTPATIENT
Start: 2024-04-01 | End: 2024-04-02 | Stop reason: HOSPADM

## 2024-04-01 RX ORDER — ONDANSETRON 4 MG/1
4 TABLET, ORALLY DISINTEGRATING ORAL EVERY 30 MIN PRN
Status: DISCONTINUED | OUTPATIENT
Start: 2024-04-01 | End: 2024-04-01

## 2024-04-01 RX ORDER — HYDROMORPHONE HYDROCHLORIDE 1 MG/ML
0.2 INJECTION, SOLUTION INTRAMUSCULAR; INTRAVENOUS; SUBCUTANEOUS EVERY 5 MIN PRN
Status: DISCONTINUED | OUTPATIENT
Start: 2024-04-01 | End: 2024-04-01 | Stop reason: HOSPADM

## 2024-04-01 RX ORDER — PYRIDOSTIGMINE BROMIDE 60 MG/1
120 TABLET ORAL EVERY MORNING
Status: DISCONTINUED | OUTPATIENT
Start: 2024-04-02 | End: 2024-04-02 | Stop reason: HOSPADM

## 2024-04-01 RX ORDER — ALBUTEROL SULFATE 90 UG/1
2 AEROSOL, METERED RESPIRATORY (INHALATION) EVERY 4 HOURS PRN
COMMUNITY
End: 2024-06-17

## 2024-04-01 RX ORDER — ACETAMINOPHEN 325 MG/1
975 TABLET ORAL ONCE
Status: COMPLETED | OUTPATIENT
Start: 2024-04-01 | End: 2024-04-01

## 2024-04-01 RX ORDER — ONDANSETRON 2 MG/ML
4 INJECTION INTRAMUSCULAR; INTRAVENOUS EVERY 30 MIN PRN
Status: DISCONTINUED | OUTPATIENT
Start: 2024-04-01 | End: 2024-04-01

## 2024-04-01 RX ORDER — AMOXICILLIN 250 MG
1 CAPSULE ORAL 2 TIMES DAILY
Status: DISCONTINUED | OUTPATIENT
Start: 2024-04-01 | End: 2024-04-02 | Stop reason: HOSPADM

## 2024-04-01 RX ORDER — HYDROMORPHONE HYDROCHLORIDE 1 MG/ML
0.4 INJECTION, SOLUTION INTRAMUSCULAR; INTRAVENOUS; SUBCUTANEOUS EVERY 5 MIN PRN
Status: DISCONTINUED | OUTPATIENT
Start: 2024-04-01 | End: 2024-04-01 | Stop reason: HOSPADM

## 2024-04-01 RX ORDER — NALOXONE HYDROCHLORIDE 0.4 MG/ML
0.1 INJECTION, SOLUTION INTRAMUSCULAR; INTRAVENOUS; SUBCUTANEOUS
Status: DISCONTINUED | OUTPATIENT
Start: 2024-04-01 | End: 2024-04-01 | Stop reason: HOSPADM

## 2024-04-01 RX ORDER — LIDOCAINE 40 MG/G
CREAM TOPICAL
Status: DISCONTINUED | OUTPATIENT
Start: 2024-04-01 | End: 2024-04-02 | Stop reason: HOSPADM

## 2024-04-01 RX ORDER — PYRIDOSTIGMINE BROMIDE 60 MG/1
TABLET ORAL
Qty: 540 TABLET | Refills: 0 | Status: ON HOLD | OUTPATIENT
Start: 2024-04-01 | End: 2024-04-01

## 2024-04-01 RX ORDER — TRANEXAMIC ACID 650 MG/1
1950 TABLET ORAL ONCE
Status: COMPLETED | OUTPATIENT
Start: 2024-04-01 | End: 2024-04-01

## 2024-04-01 RX ORDER — ATORVASTATIN CALCIUM 20 MG/1
20 TABLET, FILM COATED ORAL AT BEDTIME
Status: DISCONTINUED | OUTPATIENT
Start: 2024-04-01 | End: 2024-04-02 | Stop reason: HOSPADM

## 2024-04-01 RX ORDER — DEXMEDETOMIDINE HYDROCHLORIDE 4 UG/ML
INJECTION, SOLUTION INTRAVENOUS
Status: COMPLETED | OUTPATIENT
Start: 2024-04-01 | End: 2024-04-01

## 2024-04-01 RX ORDER — CEFAZOLIN SODIUM 2 G/100ML
2 INJECTION, SOLUTION INTRAVENOUS EVERY 8 HOURS
Qty: 200 ML | Refills: 0 | Status: COMPLETED | OUTPATIENT
Start: 2024-04-01 | End: 2024-04-02

## 2024-04-01 RX ORDER — PROCHLORPERAZINE MALEATE 5 MG
5 TABLET ORAL EVERY 6 HOURS PRN
Status: DISCONTINUED | OUTPATIENT
Start: 2024-04-01 | End: 2024-04-02 | Stop reason: HOSPADM

## 2024-04-01 RX ORDER — NALOXONE HYDROCHLORIDE 0.4 MG/ML
0.2 INJECTION, SOLUTION INTRAMUSCULAR; INTRAVENOUS; SUBCUTANEOUS
Status: DISCONTINUED | OUTPATIENT
Start: 2024-04-01 | End: 2024-04-02 | Stop reason: HOSPADM

## 2024-04-01 RX ORDER — HYDROMORPHONE HYDROCHLORIDE 1 MG/ML
0.4 INJECTION, SOLUTION INTRAMUSCULAR; INTRAVENOUS; SUBCUTANEOUS
Status: DISCONTINUED | OUTPATIENT
Start: 2024-04-01 | End: 2024-04-02 | Stop reason: HOSPADM

## 2024-04-01 RX ORDER — SODIUM CHLORIDE, SODIUM LACTATE, POTASSIUM CHLORIDE, CALCIUM CHLORIDE 600; 310; 30; 20 MG/100ML; MG/100ML; MG/100ML; MG/100ML
INJECTION, SOLUTION INTRAVENOUS CONTINUOUS PRN
Status: DISCONTINUED | OUTPATIENT
Start: 2024-04-01 | End: 2024-04-01

## 2024-04-01 RX ORDER — OXYCODONE HYDROCHLORIDE 10 MG/1
10 TABLET ORAL EVERY 4 HOURS PRN
Status: DISCONTINUED | OUTPATIENT
Start: 2024-04-01 | End: 2024-04-02 | Stop reason: HOSPADM

## 2024-04-01 RX ORDER — FLUTICASONE FUROATE AND VILANTEROL 100; 25 UG/1; UG/1
1 POWDER RESPIRATORY (INHALATION) DAILY
Status: DISCONTINUED | OUTPATIENT
Start: 2024-04-01 | End: 2024-04-01

## 2024-04-01 RX ORDER — FENTANYL CITRATE 50 UG/ML
50 INJECTION, SOLUTION INTRAMUSCULAR; INTRAVENOUS EVERY 5 MIN PRN
Status: DISCONTINUED | OUTPATIENT
Start: 2024-04-01 | End: 2024-04-01 | Stop reason: HOSPADM

## 2024-04-01 RX ORDER — SODIUM BICARBONATE 325 MG/1
325 TABLET ORAL 2 TIMES DAILY
Status: DISCONTINUED | OUTPATIENT
Start: 2024-04-01 | End: 2024-04-02 | Stop reason: HOSPADM

## 2024-04-01 RX ORDER — POLYETHYLENE GLYCOL 3350 17 G/17G
17 POWDER, FOR SOLUTION ORAL DAILY
Status: DISCONTINUED | OUTPATIENT
Start: 2024-04-02 | End: 2024-04-02 | Stop reason: HOSPADM

## 2024-04-01 RX ORDER — FUROSEMIDE 20 MG
20 TABLET ORAL DAILY
Status: DISCONTINUED | OUTPATIENT
Start: 2024-04-02 | End: 2024-04-02 | Stop reason: HOSPADM

## 2024-04-01 RX ORDER — CEFAZOLIN SODIUM/WATER 2 G/20 ML
2 SYRINGE (ML) INTRAVENOUS
Status: COMPLETED | OUTPATIENT
Start: 2024-04-01 | End: 2024-04-01

## 2024-04-01 RX ORDER — OXYCODONE HYDROCHLORIDE 5 MG/1
5 TABLET ORAL
Status: COMPLETED | OUTPATIENT
Start: 2024-04-01 | End: 2024-04-01

## 2024-04-01 RX ORDER — ASPIRIN 81 MG/1
81 TABLET ORAL 2 TIMES DAILY
Status: DISCONTINUED | OUTPATIENT
Start: 2024-04-01 | End: 2024-04-02 | Stop reason: HOSPADM

## 2024-04-01 RX ORDER — LATANOPROST 50 UG/ML
1 SOLUTION/ DROPS OPHTHALMIC AT BEDTIME
Status: DISCONTINUED | OUTPATIENT
Start: 2024-04-01 | End: 2024-04-02 | Stop reason: HOSPADM

## 2024-04-01 RX ORDER — ONDANSETRON 4 MG/1
4 TABLET, ORALLY DISINTEGRATING ORAL EVERY 30 MIN PRN
Status: DISCONTINUED | OUTPATIENT
Start: 2024-04-01 | End: 2024-04-01 | Stop reason: HOSPADM

## 2024-04-01 RX ORDER — CEFAZOLIN SODIUM/WATER 2 G/20 ML
2 SYRINGE (ML) INTRAVENOUS SEE ADMIN INSTRUCTIONS
Status: DISCONTINUED | OUTPATIENT
Start: 2024-04-01 | End: 2024-04-01 | Stop reason: HOSPADM

## 2024-04-01 RX ORDER — SODIUM CHLORIDE, SODIUM LACTATE, POTASSIUM CHLORIDE, CALCIUM CHLORIDE 600; 310; 30; 20 MG/100ML; MG/100ML; MG/100ML; MG/100ML
INJECTION, SOLUTION INTRAVENOUS CONTINUOUS
Status: DISCONTINUED | OUTPATIENT
Start: 2024-04-01 | End: 2024-04-02 | Stop reason: HOSPADM

## 2024-04-01 RX ORDER — BISACODYL 10 MG
10 SUPPOSITORY, RECTAL RECTAL DAILY PRN
Status: DISCONTINUED | OUTPATIENT
Start: 2024-04-01 | End: 2024-04-02 | Stop reason: HOSPADM

## 2024-04-01 RX ORDER — ACETAMINOPHEN 325 MG/1
975 TABLET ORAL EVERY 8 HOURS
Qty: 27 TABLET | Refills: 0 | Status: DISCONTINUED | OUTPATIENT
Start: 2024-04-01 | End: 2024-04-02 | Stop reason: HOSPADM

## 2024-04-01 RX ORDER — LIDOCAINE HYDROCHLORIDE 20 MG/ML
INJECTION, SOLUTION INFILTRATION; PERINEURAL PRN
Status: DISCONTINUED | OUTPATIENT
Start: 2024-04-01 | End: 2024-04-01

## 2024-04-01 RX ORDER — PYRIDOSTIGMINE BROMIDE 60 MG/1
120 TABLET ORAL EVERY MORNING
COMMUNITY

## 2024-04-01 RX ORDER — FUROSEMIDE 40 MG
40 TABLET ORAL DAILY
Status: DISCONTINUED | OUTPATIENT
Start: 2024-04-02 | End: 2024-04-02 | Stop reason: HOSPADM

## 2024-04-01 RX ORDER — FENTANYL CITRATE 50 UG/ML
25 INJECTION, SOLUTION INTRAMUSCULAR; INTRAVENOUS EVERY 5 MIN PRN
Status: DISCONTINUED | OUTPATIENT
Start: 2024-04-01 | End: 2024-04-01 | Stop reason: HOSPADM

## 2024-04-01 RX ORDER — ONDANSETRON 2 MG/ML
4 INJECTION INTRAMUSCULAR; INTRAVENOUS EVERY 30 MIN PRN
Status: DISCONTINUED | OUTPATIENT
Start: 2024-04-01 | End: 2024-04-01 | Stop reason: HOSPADM

## 2024-04-01 RX ORDER — PANTOPRAZOLE SODIUM 20 MG/1
20 TABLET, DELAYED RELEASE ORAL
Status: DISCONTINUED | OUTPATIENT
Start: 2024-04-02 | End: 2024-04-02 | Stop reason: HOSPADM

## 2024-04-01 RX ORDER — DEXAMETHASONE SODIUM PHOSPHATE 10 MG/ML
INJECTION, SOLUTION INTRAMUSCULAR; INTRAVENOUS
Status: COMPLETED | OUTPATIENT
Start: 2024-04-01 | End: 2024-04-01

## 2024-04-01 RX ORDER — TIMOLOL MALEATE 5 MG/ML
1 SOLUTION/ DROPS OPHTHALMIC 2 TIMES DAILY
Status: DISCONTINUED | OUTPATIENT
Start: 2024-04-01 | End: 2024-04-02 | Stop reason: HOSPADM

## 2024-04-01 RX ORDER — FLUMAZENIL 0.1 MG/ML
0.2 INJECTION, SOLUTION INTRAVENOUS
Status: DISCONTINUED | OUTPATIENT
Start: 2024-04-01 | End: 2024-04-01 | Stop reason: HOSPADM

## 2024-04-01 RX ORDER — NALOXONE HYDROCHLORIDE 0.4 MG/ML
0.4 INJECTION, SOLUTION INTRAMUSCULAR; INTRAVENOUS; SUBCUTANEOUS
Status: DISCONTINUED | OUTPATIENT
Start: 2024-04-01 | End: 2024-04-01 | Stop reason: HOSPADM

## 2024-04-01 RX ORDER — FENTANYL CITRATE 50 UG/ML
INJECTION, SOLUTION INTRAMUSCULAR; INTRAVENOUS PRN
Status: DISCONTINUED | OUTPATIENT
Start: 2024-04-01 | End: 2024-04-01

## 2024-04-01 RX ORDER — CLONAZEPAM 0.5 MG/1
0.5 TABLET ORAL AT BEDTIME
Status: DISCONTINUED | OUTPATIENT
Start: 2024-04-01 | End: 2024-04-02 | Stop reason: HOSPADM

## 2024-04-01 RX ORDER — PYRIDOSTIGMINE BROMIDE 60 MG/1
120 TABLET ORAL 3 TIMES DAILY
Status: DISCONTINUED | OUTPATIENT
Start: 2024-04-01 | End: 2024-04-01

## 2024-04-01 RX ORDER — FLUTICASONE PROPIONATE AND SALMETEROL 250; 50 UG/1; UG/1
1 POWDER RESPIRATORY (INHALATION) EVERY 12 HOURS
Status: DISCONTINUED | OUTPATIENT
Start: 2024-04-01 | End: 2024-04-02 | Stop reason: HOSPADM

## 2024-04-01 RX ORDER — PREDNISONE 10 MG/1
10 TABLET ORAL EVERY MORNING
Status: DISCONTINUED | OUTPATIENT
Start: 2024-04-02 | End: 2024-04-02 | Stop reason: HOSPADM

## 2024-04-01 RX ORDER — ACETAMINOPHEN 325 MG/1
650 TABLET ORAL EVERY 4 HOURS PRN
Status: DISCONTINUED | OUTPATIENT
Start: 2024-04-04 | End: 2024-04-02 | Stop reason: HOSPADM

## 2024-04-01 RX ORDER — MAGNESIUM HYDROXIDE 1200 MG/15ML
LIQUID ORAL PRN
Status: DISCONTINUED | OUTPATIENT
Start: 2024-04-01 | End: 2024-04-01 | Stop reason: HOSPADM

## 2024-04-01 RX ORDER — PROPOFOL 10 MG/ML
INJECTION, EMULSION INTRAVENOUS PRN
Status: DISCONTINUED | OUTPATIENT
Start: 2024-04-01 | End: 2024-04-01

## 2024-04-01 RX ORDER — OXYCODONE HYDROCHLORIDE 10 MG/1
10 TABLET ORAL
Status: DISCONTINUED | OUTPATIENT
Start: 2024-04-01 | End: 2024-04-01 | Stop reason: HOSPADM

## 2024-04-01 RX ORDER — ONDANSETRON 4 MG/1
4 TABLET, ORALLY DISINTEGRATING ORAL EVERY 6 HOURS PRN
Status: DISCONTINUED | OUTPATIENT
Start: 2024-04-01 | End: 2024-04-02 | Stop reason: HOSPADM

## 2024-04-01 RX ORDER — ONDANSETRON 2 MG/ML
4 INJECTION INTRAMUSCULAR; INTRAVENOUS EVERY 6 HOURS PRN
Status: DISCONTINUED | OUTPATIENT
Start: 2024-04-01 | End: 2024-04-02 | Stop reason: HOSPADM

## 2024-04-01 RX ORDER — AMLODIPINE BESYLATE 10 MG/1
10 TABLET ORAL DAILY
Status: DISCONTINUED | OUTPATIENT
Start: 2024-04-02 | End: 2024-04-02 | Stop reason: HOSPADM

## 2024-04-01 RX ORDER — NALOXONE HYDROCHLORIDE 0.4 MG/ML
0.1 INJECTION, SOLUTION INTRAMUSCULAR; INTRAVENOUS; SUBCUTANEOUS
Status: DISCONTINUED | OUTPATIENT
Start: 2024-04-01 | End: 2024-04-01

## 2024-04-01 RX ORDER — NALOXONE HYDROCHLORIDE 0.4 MG/ML
0.2 INJECTION, SOLUTION INTRAMUSCULAR; INTRAVENOUS; SUBCUTANEOUS
Status: DISCONTINUED | OUTPATIENT
Start: 2024-04-01 | End: 2024-04-01 | Stop reason: HOSPADM

## 2024-04-01 RX ORDER — FENTANYL CITRATE 50 UG/ML
25-50 INJECTION, SOLUTION INTRAMUSCULAR; INTRAVENOUS
Status: DISCONTINUED | OUTPATIENT
Start: 2024-04-01 | End: 2024-04-01 | Stop reason: HOSPADM

## 2024-04-01 RX ORDER — OXCARBAZEPINE 600 MG/1
600 TABLET, FILM COATED ORAL 2 TIMES DAILY
Status: DISCONTINUED | OUTPATIENT
Start: 2024-04-01 | End: 2024-04-02 | Stop reason: HOSPADM

## 2024-04-01 RX ORDER — PYRIDOSTIGMINE BROMIDE 60 MG/1
60 TABLET ORAL 2 TIMES DAILY
COMMUNITY

## 2024-04-01 RX ORDER — BUPIVACAINE HYDROCHLORIDE 5 MG/ML
INJECTION, SOLUTION EPIDURAL; INTRACAUDAL
Status: COMPLETED | OUTPATIENT
Start: 2024-04-01 | End: 2024-04-01

## 2024-04-01 RX ORDER — ATORVASTATIN CALCIUM 20 MG/1
20 TABLET, FILM COATED ORAL AT BEDTIME
COMMUNITY

## 2024-04-01 RX ORDER — SODIUM CHLORIDE, SODIUM LACTATE, POTASSIUM CHLORIDE, CALCIUM CHLORIDE 600; 310; 30; 20 MG/100ML; MG/100ML; MG/100ML; MG/100ML
INJECTION, SOLUTION INTRAVENOUS CONTINUOUS
Status: DISCONTINUED | OUTPATIENT
Start: 2024-04-01 | End: 2024-04-01 | Stop reason: HOSPADM

## 2024-04-01 RX ORDER — PYRIDOSTIGMINE BROMIDE 60 MG/1
60 TABLET ORAL 2 TIMES DAILY
Status: DISCONTINUED | OUTPATIENT
Start: 2024-04-01 | End: 2024-04-02 | Stop reason: HOSPADM

## 2024-04-01 RX ADMIN — LIDOCAINE HYDROCHLORIDE 100 MG: 20 INJECTION, SOLUTION INFILTRATION; PERINEURAL at 13:41

## 2024-04-01 RX ADMIN — DEXAMETHASONE SODIUM PHOSPHATE 2 MG: 10 INJECTION, SOLUTION INTRAMUSCULAR; INTRAVENOUS at 12:52

## 2024-04-01 RX ADMIN — FENTANYL CITRATE 25 MCG: 50 INJECTION, SOLUTION INTRAMUSCULAR; INTRAVENOUS at 17:05

## 2024-04-01 RX ADMIN — PHENYLEPHRINE HYDROCHLORIDE 0.3 MCG/KG/MIN: 10 INJECTION INTRAVENOUS at 14:00

## 2024-04-01 RX ADMIN — FENTANYL CITRATE 25 MCG: 50 INJECTION INTRAMUSCULAR; INTRAVENOUS at 14:26

## 2024-04-01 RX ADMIN — FLUTICASONE PROPIONATE AND SALMETEROL 1 PUFF: 250; 50 POWDER RESPIRATORY (INHALATION) at 20:18

## 2024-04-01 RX ADMIN — TIMOLOL MALEATE 1 DROP: 5 SOLUTION/ DROPS OPHTHALMIC at 20:20

## 2024-04-01 RX ADMIN — PROPOFOL 200 MG: 10 INJECTION, EMULSION INTRAVENOUS at 13:41

## 2024-04-01 RX ADMIN — ASPIRIN 81 MG: 81 TABLET, COATED ORAL at 20:18

## 2024-04-01 RX ADMIN — MIDAZOLAM 0.5 MG: 1 INJECTION INTRAMUSCULAR; INTRAVENOUS at 12:42

## 2024-04-01 RX ADMIN — CLONAZEPAM 0.5 MG: 0.5 TABLET ORAL at 22:21

## 2024-04-01 RX ADMIN — HYDROMORPHONE HYDROCHLORIDE 0.2 MG: 1 INJECTION, SOLUTION INTRAMUSCULAR; INTRAVENOUS; SUBCUTANEOUS at 14:45

## 2024-04-01 RX ADMIN — DEXAMETHASONE SODIUM PHOSPHATE 8 MG: 10 INJECTION, SOLUTION INTRAMUSCULAR; INTRAVENOUS at 13:41

## 2024-04-01 RX ADMIN — FENTANYL CITRATE 50 MCG: 50 INJECTION INTRAMUSCULAR; INTRAVENOUS at 13:58

## 2024-04-01 RX ADMIN — PHENYLEPHRINE HYDROCHLORIDE 100 MCG: 10 INJECTION INTRAVENOUS at 13:41

## 2024-04-01 RX ADMIN — OXCARBAZEPINE 600 MG: 600 TABLET, FILM COATED ORAL at 20:19

## 2024-04-01 RX ADMIN — DEXMEDETOMIDINE 40 MCG: 100 INJECTION, SOLUTION, CONCENTRATE INTRAVENOUS at 12:52

## 2024-04-01 RX ADMIN — FENTANYL CITRATE 25 MCG: 50 INJECTION, SOLUTION INTRAMUSCULAR; INTRAVENOUS at 12:41

## 2024-04-01 RX ADMIN — BUPIVACAINE HYDROCHLORIDE 10 ML: 5 INJECTION, SOLUTION EPIDURAL; INTRACAUDAL at 12:52

## 2024-04-01 RX ADMIN — LATANOPROST 1 DROP: 50 SOLUTION/ DROPS OPHTHALMIC at 22:22

## 2024-04-01 RX ADMIN — ACETAMINOPHEN 975 MG: 325 TABLET, FILM COATED ORAL at 11:48

## 2024-04-01 RX ADMIN — ATORVASTATIN CALCIUM 20 MG: 20 TABLET, FILM COATED ORAL at 22:21

## 2024-04-01 RX ADMIN — SODIUM BICARBONATE 325 MG: 325 TABLET ORAL at 20:20

## 2024-04-01 RX ADMIN — OXYCODONE HYDROCHLORIDE 5 MG: 5 TABLET ORAL at 17:04

## 2024-04-01 RX ADMIN — ACETAMINOPHEN 975 MG: 325 TABLET, FILM COATED ORAL at 20:17

## 2024-04-01 RX ADMIN — SENNOSIDES AND DOCUSATE SODIUM 1 TABLET: 8.6; 5 TABLET ORAL at 20:18

## 2024-04-01 RX ADMIN — PROPOFOL 50 MG: 10 INJECTION, EMULSION INTRAVENOUS at 13:55

## 2024-04-01 RX ADMIN — PROPOFOL 50 MG: 10 INJECTION, EMULSION INTRAVENOUS at 14:55

## 2024-04-01 RX ADMIN — HYDROMORPHONE HYDROCHLORIDE 0.3 MG: 1 INJECTION, SOLUTION INTRAMUSCULAR; INTRAVENOUS; SUBCUTANEOUS at 14:49

## 2024-04-01 RX ADMIN — CEFAZOLIN SODIUM 2 G: 2 INJECTION, SOLUTION INTRAVENOUS at 22:32

## 2024-04-01 RX ADMIN — SODIUM CHLORIDE, POTASSIUM CHLORIDE, SODIUM LACTATE AND CALCIUM CHLORIDE: 600; 310; 30; 20 INJECTION, SOLUTION INTRAVENOUS at 13:33

## 2024-04-01 RX ADMIN — TRANEXAMIC ACID 1950 MG: 650 TABLET ORAL at 11:48

## 2024-04-01 RX ADMIN — PYRIDOSTIGMINE BROMIDE 60 MG: 60 TABLET ORAL at 20:18

## 2024-04-01 RX ADMIN — Medication 2 G: at 13:46

## 2024-04-01 RX ADMIN — FENTANYL CITRATE 25 MCG: 50 INJECTION INTRAMUSCULAR; INTRAVENOUS at 14:21

## 2024-04-01 ASSESSMENT — ENCOUNTER SYMPTOMS: SEIZURES: 0

## 2024-04-01 ASSESSMENT — ACTIVITIES OF DAILY LIVING (ADL)
ADLS_ACUITY_SCORE: 37
ADLS_ACUITY_SCORE: 39
ADLS_ACUITY_SCORE: 39
ADLS_ACUITY_SCORE: 30
ADLS_ACUITY_SCORE: 30
ADLS_ACUITY_SCORE: 39
ADLS_ACUITY_SCORE: 30
ADLS_ACUITY_SCORE: 29
ADLS_ACUITY_SCORE: 30
ADLS_ACUITY_SCORE: 30
ADLS_ACUITY_SCORE: 29
ADLS_ACUITY_SCORE: 30
ADLS_ACUITY_SCORE: 32
ADLS_ACUITY_SCORE: 29

## 2024-04-01 ASSESSMENT — LIFESTYLE VARIABLES: TOBACCO_USE: 0

## 2024-04-01 ASSESSMENT — COPD QUESTIONNAIRES: COPD: 1

## 2024-04-01 NOTE — BRIEF OP NOTE
Orthopaedic Surgery Brief Op-Note      Patient: Rakan Nolasco  : 1945  Date of Service: 2024 4:18 PM    Pre-operative Diagnosis: Primary osteoarthritis of left knee [M17.12]  Post-operative Diagnosis: same    Procedure(s) Performed: Procedure(s):  ARTHROPLASTY, LEFT KNEE, TOTAL    Staff: Dr. Hassan  Assistants:   Crys Wu, YAO Marroquin MD    Anesthesia: General  EBL: 25 cc  Tourniquet Time: 80 minutes at 250 mmHg    Implants:   Implant Name Type Inv. Item Serial No.  Lot No. LRB No. Used Action   BONE CEMENT SIMPLEX W/TOBRAMYCIN 6197-9-001 - [id number] Cement, Bone BONE CEMENT SIMPLEX W/TOBRAMYCIN 6197-9-001  [employer] [id number] Left 2 Implanted   IMP COMP FEMORAL SNN GEN II CR SZ 6 LT [id number] - [id number] Total Joint Component/Insert IMP COMP FEMORAL SNN GEN II CR SZ 6 LT [id number]  [employer] [id number] Left 1 Implanted   IMP BASEPLATE TIBIAL MISTI II SZ 5 LT TI [id number] - [id number] Total Joint Component/Insert IMP BASEPLATE TIBIAL MISTI II SZ 5 LT TI [id number]  [employer] [id number] Left 1 Implanted   IMP INSERT ARTICULAR S&N LGN CR XLPE SZ5-6 9MM [id number] - [id number] Total Joint Component/Insert IMP INSERT ARTICULAR S&N LGN CR XLPE SZ5-6 9MM [id number]  [employer] [id number] Left 1 Implanted     Drains: none  Intra-op Labs/Cxs/Specimens: None  Complications: No apparent complications during procedure  Findings: Please see dictated operative note for details    Disposition: Stable to PACU, then admit to Orthopaedics.    Post-Op Plan:  Assessment/Plan: Rakan Nolasco is a 78 year old male s/p Procedure(s):  ARTHROPLASTY, LEFT KNEE, TOTAL on 2024 with Dr. Hassan.    Activity: Up with assist and assistive devices as needed until independent. Knee ROM as tolerated.   Weight bearing status: WBAT    Antibiotics: Cefazolin x 24 hours , followed by PO doxycycline x 9 days   Diet: Begin with clear fluids and progress diet as tolerated. Bowel  regimen. Anti-emetics PRN.    DVT prophylaxis:  ASA 81 mg BID x 4 weeks   Elevation: Elevate heels off of bed on pillows, no pillows behind the knee at any time    Wound Care: Tegaderm and alginate x 2 weeks   Pain management: Orals PRN, IV for breakthrough only  X-rays: AP/Lat operative knee XR in PACU.  Physical Therapy: Mobilization, ROM, ADL's  Occupational Therapy: ADL's  Labs: Trend Hgb on PODs #1 & 2  Cultures: None  Consults: PT, OT. Hospitalist, appreciate assistance in caring for this patient throughout the perioperative period     Future Appointments   Date Time Provider Department Center   4/2/2024  8:15 AM Priscilla Fuentes, PT [facility]   4/2/2024  8:15 AM Estelita Machado OT [facility]   4/2/2024  2:15 PM Priscilla Fuentes, PT [facility]   4/4/2024 12:40 PM Khadar Altamirano, PT [facility]   4/9/2024  1:50 PM RiIbeth songhan, PT [facility]   4/11/2024 12:40 PM Khadar Altamirano, PT [facility]   4/15/2024  2:40 PM Josefa Adames PA-C [facility]   4/16/2024 12:30 PM Rietschel, [place], PT [facility]   4/18/2024 12:30 PM Rietschel, [place], PT [facility]   4/22/2024  9:20 AM Arpan Harrison MD [facility]   4/23/2024 12:30 PM Rietschel, [place], PT [facility]   4/25/2024 12:30 PM Rietschel, [place], PT [facility]   4/30/2024 12:30 PM Rietschel, [place], PT [facility]   5/2/2024 12:40 PM Khadar Altamirano, PT [facility]   5/7/2024  1:10 PM Paris Best, PT [facility]   5/9/2024 12:40 PM Khadar Altamirano, PT [facility]   5/14/2024 10:40 AM Dandre Hassan MD [facility]   5/15/2024 10:00 AM Servando Mejia MD [facility] [place]   6/7/2024 10:10 AM Mateo Gray MD [facility]       Disposition: Pending progress with therapies, pain control on orals, and medical stability, anticipate discharge to Home on POD #1-2.    I assisted with positioning, prepping and  draping, and closure.      Crys Wu PA-C 4/1/2024 4:18 PM  Orthopaedic Surgery     Please page me at [phone] with any questions/concerns during regular weekday hours before 5pm. If there is no response, if it is a weekend, or if it is during evening hours then please page the orthopaedic surgery resident on call.

## 2024-04-01 NOTE — ANESTHESIA CARE TRANSFER NOTE
Patient: Rakan Nolasco    Procedure: Procedure(s):  ARTHROPLASTY, LEFT KNEE, TOTAL       Diagnosis: Primary osteoarthritis of left knee [M17.12]  Diagnosis Additional Information: No value filed.    Anesthesia Type:   General     Note:    Oropharynx: oropharynx clear of all foreign objects  Level of Consciousness: awake  Oxygen Supplementation: face mask  Level of Supplemental Oxygen (L/min / FiO2): 8  Independent Airway: airway patency satisfactory and stable  Dentition: dentition unchanged  Vital Signs Stable: post-procedure vital signs reviewed and stable  Report to RN Given: handoff report given  Patient transferred to: PACU    Handoff Report: Identifed the Patient, Identified the Reponsible Provider, Reviewed the pertinent medical history, Discussed the surgical course, Reviewed Intra-OP anesthesia mangement and issues during anesthesia, Set expectations for post-procedure period and Allowed opportunity for questions and acknowledgement of understanding      Vitals:  Vitals Value Taken Time   \80    Temp 36.7    Pulse 80 04/01/24 1620   Resp 14 04/01/24 1620   SpO2 98 % 04/01/24 1620   Vitals shown include unfiled device data.    Electronically Signed By: JACK Parks CRNA  April 1, 2024  4:21 PM

## 2024-04-01 NOTE — ANESTHESIA PREPROCEDURE EVALUATION
Pre-Operative H & P     CC:  Preoperative exam to assess for increased cardiopulmonary risk while undergoing surgery and anesthesia.    Date of Encounter: 3/5/2024  Primary Care Physician:  Telly Lucio     Reason for visit:   No diagnosis found.      STANLEY Nolasco is a 78 year old male who presents for pre-operative H & P in preparation for  Procedure Information       Case: [id number] Date/Time: 04/01/24 1325    Procedure: ARTHROPLASTY, LEFT KNEE, TOTAL (Left: Knee)    Anesthesia type: Choice with Block    Diagnosis: Primary osteoarthritis of left knee [M17.12]    Pre-op diagnosis: Primary osteoarthritis of left knee [M17.12]    Location:  OR 12 / [facility]    Providers: Dandre Hassan MD            Patient is being evaluated for comorbid conditions of COPD, diabetes, ROSIE, GERD, hypertension, dyslipidemia, RLS, myasthenia gravis    Mr. Nolasco has known bilateral knee OA. He was seen by Dr. Hassan for further evaluation. He is now scheduled for the above procedure.     History is obtained from the patient and chart review    Hx of abnormal bleeding or anti-platelet use: ASA 81 mg      Past Medical History  Past Medical History:   Diagnosis Date    Arthritis         BMI 31.0-31.9,adult     Cancer (H) 01/10/2018    Prostate    COPD (chronic obstructive pulmonary disease) (H) 10/28/2020    Demand ischemia     Diabetes (H) 01/10/2018    Diverticulosis     Colonoscopy 8/2008    Fatty liver     see US  5/2012    GERD (gastroesophageal reflux disease)     Glaucoma (increased eye pressure)     HTN (hypertension)     Hyperlipidemia LDL goal < 130     age, htn, fhx    Irritable bowel     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     Nonsenile cataract     Obstructive sleep apnea     Obstructive sleep apnea syndrome     ROSIE (obstructive sleep apnea) 01/2011    Using CPAP;     Prediabetes     Restless leg syndrome     Retinal detachment     Sleep apnea     Trigeminal  neuralgia pain 01/04/2012       Past Surgical History  Past Surgical History:   Procedure Laterality Date    BIOPSY ARTERY TEMPORAL Bilateral 08/01/2017    Procedure: BIOPSY ARTERY TEMPORAL;  Bilateral temporal artery Biopsy;  Surgeon: Jj Tello MD;  Location: [facility]    CATARACT IOL, RT/LT Right     COLONOSCOPY      ESOPHAGOSCOPY, GASTROSCOPY, DUODENOSCOPY (EGD), COMBINED  01/15/2014    Procedure: COMBINED ESOPHAGOSCOPY, GASTROSCOPY, DUODENOSCOPY (EGD), BIOPSY SINGLE OR MULTIPLE;;  Surgeon: Winston Nixon MD;  Location: [facility]    LASER YAG CAPSULOTOMY Right 04/09/2018    right eye    PHACOEMULSIFICATION CLEAR CORNEA WITH STANDARD INTRAOCULAR LENS IMPLANT Right 02/20/2017    Procedure: PHACOEMULSIFICATION CLEAR CORNEA WITH STANDARD INTRAOCULAR LENS IMPLANT;  Surgeon: Mateo Gray MD;  Location: [facility]    PHACOEMULSIFICATION CLEAR CORNEA WITH STANDARD INTRAOCULAR LENS IMPLANT Left 01/10/2022    Procedure: LEFT PHACOEMULSIFICATION, CATARACT, WITH INTRAOCULAR LENS IMPLANT;  Surgeon: Mateo Gray MD;  Location: [facility]    MD TRABECULOPLASTY BY LASER SURGERY      RETINAL REATTACHMENT Right     SURGICAL HISTORY OF -   08/2009    Both Eyelids-.    TONSILLECTOMY  as child       Prior to Admission Medications  No current outpatient medications on file.       Allergies  Allergies   Allergen Reactions    Azathioprine Shortness Of Breath and Other (See Comments)     Was hospitalized from it. Also had high fever, chills, and shortness of breath.    Sulfamethoxazole-Trimethoprim Cough, Itching and Rash     Generalized painful red rash on legs arms and abdomen, chest, back, cough and  fever  that required hospitalization.    Ciprofloxacin Muscle Pain (Myalgia)     Muscle pain  Other reaction(s): Myalgia  Other reaction(s): Myalgia    Ropinirole      Leg pan  GI  Weakness; ? sycope  Other reaction(s): Leg Pain       Social History  Social History     Socioeconomic History    Marital status:  Single     Spouse name: Not on file    Number of children: 0    Years of education: 12    Highest education level: Not on file   Occupational History    Occupation: Retired 3/2012     Comment: [employer] ([employer])   Tobacco Use    Smoking status: Former     Packs/day: 2.00     Years: 15.00     Additional pack years: 0.00     Total pack years: 30.00     Types: Cigarettes     Start date: 1968     Quit date: 1983     Years since quittin.2    Smokeless tobacco: Former     Quit date: 1970    Tobacco comments:     smoke free household.   Vaping Use    Vaping Use: Never used   Substance and Sexual Activity    Alcohol use: Not Currently     Comment: Quit in     Drug use: No    Sexual activity: Not Currently     Partners: Female     Comment: 2010  No girlfriend at this time.   Other Topics Concern    Parent/sibling w/ CABG, MI or angioplasty before 65F 55M? No   Social History Narrative    Not on file     Social Determinants of Health     Financial Resource Strain: Low Risk  (2024)    Financial Resource Strain     Within the past 12 months, have you or your family members you live with been unable to get utilities (heat, electricity) when it was really needed?: No   Food Insecurity: Low Risk  (2024)    Food Insecurity     Within the past 12 months, did you worry that your food would run out before you got money to buy more?: No     Within the past 12 months, did the food you bought just not last and you didn t have money to get more?: No   Transportation Needs: Low Risk  (2024)    Transportation Needs     Within the past 12 months, has lack of transportation kept you from medical appointments, getting your medicines, non-medical meetings or appointments, work, or from getting things that you need?: No   Physical Activity: Not on file   Stress: Not on file   Social Connections: Not on file   Interpersonal Safety: Not on file   Housing Stability: Low Risk  (2024)    Housing  Stability     Do you have housing? : Yes     Are you worried about losing your housing?: No       Family History  Family History   Problem Relation Age of Onset    Respiratory Mother         copd    LUNG DISEASE Mother     Cerebrovascular Disease Father     Cancer Father     Other Cancer Father     Allergies Brother     Cancer Maternal Grandmother     Heart Disease Paternal Grandmother     Glaucoma Maternal Aunt     Macular Degeneration No family hx of     Anesthesia Reaction No family hx of     Deep Vein Thrombosis (DVT) No family hx of        Review of Systems  The complete review of systems is negative other than noted in the HPI or here.   Anesthesia Evaluation   Pt has had prior anesthetic.         ROS/MED HX  ENT/Pulmonary:     (+) sleep apnea, uses CPAP,                        COPD,           (-) tobacco use   Neurologic: Comment: RLS  Trigeminal neuralgia  Myasthenia gravis    (-) no seizures and no CVA   Cardiovascular:     (+) Dyslipidemia hypertension- -   -  - -   Taking blood thinners                              Previous cardiac testing   Echo: Date: 12/2020 Results:  FINDINGS    Left Ventricle Normal left ventricular size and ejection fraction. Mild left ventricular   hypertrophy. Visually estimated ejection    fraction is 55-60%.    Diastolic Function Normal diastolic filling pattern for age.    Right Ventricle Normal right ventricular size and function.    Left Atrium Left atrial size at the upper limits of normal.    Right Atrium Right atrial enlargement.    Aortic Valve Structurally normal trileaflet aortic valve. No aortic stenosis. No significant   aortic regurgitation.    Mitral Valve Structurally normal mitral valve. Mild mitral regurgitation.    Tricuspid Valve Structurally normal tricuspid valve. No significant tricuspid regurgitation.    Pulmonic Valve Pulmonic valve not well visualized, without significant stenosis or   regurgiation noted on doppler imaging.    Pericardium No significant  "pericardial effusion.    Aorta Measured aortic root diameter is normal in size.    Inferior Vena Cava Upper limit of normal ICV size.     Stress Test:  Date: 2018 Results:  FINAL CONCLUSIONS    NORMAL exercise stress perfusion imaging study.    The rest and stress perfusion images are normal.   The perfusion images were NORMAL without   evidence of ischemia or infarction.    Normal left ventricular size and systolic function with a calculated LVEF of >75%.    Normal left ventricular wall motion.   ECG Reviewed:  Date: 2/2024 Results:  Sinus rhythm with Premature atrial complexes   Low voltage QRS   Incomplete right bundle branch block   Borderline ECG     Cath:  Date: Results:      METS/Exercise Tolerance: 3 - Able to walk 1-2 blocks without stopping Comment: Using stairs, some BORGES. Using cane for ambulation- can walk 2 blocks before taking a break   Hematologic:  - neg hematologic  ROS  (-) history of blood clots and history of blood transfusion   Musculoskeletal:       GI/Hepatic: Comment: Difficulty swallowing     (+) GERD, Asymptomatic on medication,           liver disease (fatty liver),       Renal/Genitourinary:       Endo:     (+)  type II DM, Last HgA1c: 6.3, date: 11/2023, Not using insulin,       Chronic steroid usage for  Date most recently used: 5 mg BID. Obesity (BMI 32),       Psychiatric/Substance Use:       Infectious Disease:  - neg infectious disease ROS     Malignancy:       Other:            /68   Pulse 61   Temp 36.5  C (97.7  F) (Oral)   Resp 16   Ht 1.778 m (5' 10\")   Wt 102.5 kg (225 lb 15.5 oz)   SpO2 96%   BMI 32.42 kg/m      Physical Exam   Constitutional: Awake, alert, cooperative, no apparent distress, and appears stated age. Ambulating with cane  Eyes: Pupils equal, round and reactive to light, extra ocular muscles intact, sclera clear, conjunctiva normal.  HENT: Normocephalic, oral pharynx with moist mucus membranes, good dentition.  Respiratory: Clear to auscultation " bilaterally, no crackles or wheezing.  Cardiovascular: Regular rate and rhythm, normal S1 and S2, and no murmur noted.  Carotids no bruits. No edema. Palpable pulses to radial  arteries.   GI: Normal bowel sounds, soft, non-distended, non-tender  Genitourinary:  deferred  Skin: Warm and dry.    Musculoskeletal: Full ROM of neck. There is no redness, warmth, or swelling of the exposed joints. Gross motor strength is normal.    Neurologic: Awake, alert, oriented to name, place and time. Cranial nerves II-XII are grossly intact. \  Neuropsychiatric: Calm, cooperative. Normal affect.     Prior Labs/Diagnostic Studies   All labs and imaging personally reviewed     EKG/ stress test - if available please see in ROS above   No results found.      Latest Ref Rng & Units 6/29/2022     2:57 PM   PFT   FVC L 3.14    FEV1 L 2.05    FVC% % 79    FEV1% % 68          The patient's records and results personally reviewed by this provider.     Outside records reviewed from: Care Everywhere    LAB/DIAGNOSTIC STUDIES TODAY:  none    Assessment    Rakan Nolasco is a 78 year old male seen as a PAC referral for risk assessment and optimization for anesthesia.    Plan/Recommendations  Pt will be optimized for the proposed procedure.  See below for details on the assessment, risk, and preoperative recommendations    NEUROLOGY  - No history of TIA, CVA or seizure  -trigeminal neuralgia- reports intermittent symptoms  -RLS  -myasthenia gravis. Some difficulty swallowing. Follows with neurology regularly, last seen 10/2023.  Stable. Will continue prednisone and pyridostigmine.    -Post Op delirium risk factors:  High co-morbid index    ENT  - No current airway concerns.  Will need to be reassessed day of surgery.  Mallampati: I  TM: > 3    CARDIAC  - No history of CAD, a fib  -hypertension controlled on lisinopril  -reports he can walk a couple blocks, ascend stairs . Some longstanding BORGES with stairs that he attributes to COPD. No change in  "symptoms since prior to his last cardiac testing.   - METS (Metabolic Equivalents)  Patient CANNOT perform 4 METS exercise without symptoms            Total Score: 1    Functional Capacity: Unable to complete 4 METS      RCRI-Very low risk: Class 1 0.4% complication rate            Total Score: 0        PULMONARY  -ROSIE using CPAP  - COPD  Well controlled  - Tobacco History    History   Smoking Status    Former    Packs/day: 2.00    Years: 15.00    Types: Cigarettes    Start date: 1/1/1968    Quit date: 1/1/1983   Smokeless Tobacco    Former    Quit date: 1/1/1970       GI  - GERD  Controlled on medications: Proton Pump Inhibitor  PONV Medium Risk  Total Score: 2           1 AN PONV: Patient is not a current smoker    1 AN PONV: Intended Post Op Opioids        /RENAL  - Baseline Creatinine WNL    ENDOCRINE    - BMI: Estimated body mass index is 32.42 kg/m  as calculated from the following:    Height as of this encounter: 1.778 m (5' 10\").    Weight as of this encounter: 102.5 kg (225 lb 15.5 oz).  Obesity (BMI >30)  - Diabetes  Hemoglobin A1C (%)   Date Value   11/10/2023 6.3 (H)   06/09/2021 7.1 (H)     Diabetes Mellitus, Type 2, non-insulin dependent.  Hold morning oral hypoglycemic medications. Recommend close monitoring of the patient's blood glucose levels throughout the perioperative period.  -continues on prednisone (10 mg) due to myasthenia gravis     HEME  VTE Low Risk 0.5%            Total Score: 3    VTE: Greater than 59 yrs old    VTE: Male      - Platelet disfunction second to Aspirin (Cecile, many others)    MSK  Patient is NOT Frail            Total Score: 0      -knee pain with the above procedure planned    Different anesthesia methods/types have been discussed with the patient, but they are aware that the final plan will be decided by the assigned anesthesia provider on the date of service.  Patient was discussed with Dr Dean    The patient is optimized for their procedure. AVS with information " on surgery time/arrival time, meds and NPO status given by nursing staff. No further diagnostic testing indicated.      On the day of service:     Prep time: 16 minutes  Visit time: 19 minutes  Documentation time: 12 minutes  ------------------------------------------  Total time: 47 minutes      Maribell Austin PA-C  Preoperative Assessment Center  [facility]  Phone: [phone]  Fax: [phone]    Physical Exam    Airway        Mallampati: II   TM distance: > 3 FB   Neck ROM: full   Mouth opening: > 3 cm    Respiratory Devices and Support         Dental       (+) Modest Abnormalities - crowns, retainers, 1 or 2 missing teeth      Cardiovascular   cardiovascular exam normal          Pulmonary   pulmonary exam normal                Anesthesia Plan    ASA Status:  3    NPO Status:  NPO Appropriate    Anesthesia Type: General.     - Airway: LMA   Induction: Intravenous.   Maintenance: Balanced.   Techniques and Equipment:     - Lines/Monitors: 2nd IV     Consents    Anesthesia Plan(s) and associated risks, benefits, and realistic alternatives discussed. Questions answered and patient/representative(s) expressed understanding.     - Discussed: Risks, Benefits and Alternatives for BOTH SEDATION and the PROCEDURE were discussed     - Discussed with:  Patient      - Extended Intubation/Ventilatory Support Discussed: No.      - Patient is DNR/DNI Status: No     Use of blood products discussed: No .     Postoperative Care    Pain management: IV analgesics, Multi-modal analgesia.   PONV prophylaxis: Ondansetron (or other 5HT-3), Dexamethasone or Solumedrol     Comments:

## 2024-04-01 NOTE — ANESTHESIA PROCEDURE NOTES
Adductor canal Procedure Note    Pre-Procedure   Staff -        Anesthesiologist:  Jeremy Caraballo MD       Resident/Fellow: Arianna iRdley MD       Performed By: resident       Location: pre-op       Pre-Anesthestic Checklist: patient identified, IV checked, site marked, risks and benefits discussed, informed consent, monitors and equipment checked, pre-op evaluation, at physician/surgeon's request and post-op pain management  Timeout:       Correct Patient: Yes        Correct Procedure: Yes        Correct Site: Yes        Correct Position: Yes        Correct Laterality: Yes        Site Marked: Yes  Procedure Documentation  Procedure: Adductor canal       Diagnosis: POST OPERATIVE PAIN       Laterality: left       Patient Position: supine       Patient Prep/Sterile Barriers: sterile gloves, mask       Skin prep: Chloraprep       Needle Type: short bevel       Needle Gauge: 21.        Needle Length (millimeters): 110        Ultrasound guided       1. Ultrasound was used to identify targeted nerve, plexus, vascular marker, or fascial plane and place a needle adjacent to it in real-time.       2. Ultrasound was used to visualize the spread of anesthetic in close proximity to the above referenced structure.       3. A permanent image is entered into the patient's record.    Assessment/Narrative         The placement was negative for: blood aspirated, painful injection and site bleeding       Paresthesias: No.       Bolus given via needle..        Secured via.        Insertion/Infusion Method: Single Shot       Complications: none       Injection made incrementally with aspirations every 5 mL.    Medication(s) Administered   Bupivacaine 0.5% PF (Infiltration) - Infiltration   10 mL - 4/1/2024 12:52:00 PM  Dexmedetomidine 4 mcg/mL (Perineural) - Perineural   40 mcg - 4/1/2024 12:52:00 PM  Dexamethasone 10 mg/mL PF (Perineural) - Perineural   2 mg - 4/1/2024 12:52:00 PM    FOR [facility] ([facility]) ONLY:    "Pain Team Contact information: please page the Pain Team Via [facility]. Search \"Pain\". During daytime hours, please page the attending first. At night please page the resident first.      "

## 2024-04-01 NOTE — OR NURSING
PACU to Inpatient Nursing Handoff    Patient Rakan Nolasco is a 78 year old male who speaks English.   Procedure Procedure(s):  ARTHROPLASTY, LEFT KNEE, TOTAL   Surgeon(s) Primary: Dandre Hassan MD  Assisting: Crys Wu PA-C  Resident - Assisting: Osman Marroqiun MD     Allergies   Allergen Reactions    Azathioprine Shortness Of Breath and Other (See Comments)     Was hospitalized from it. Also had high fever, chills, and shortness of breath.    Sulfamethoxazole-Trimethoprim Cough, Itching and Rash     Generalized painful red rash on legs arms and abdomen, chest, back, cough and  fever  that required hospitalization.    Ciprofloxacin Muscle Pain (Myalgia)     Muscle pain  Other reaction(s): Myalgia  Other reaction(s): Myalgia    Ropinirole      Leg pan  GI  Weakness; ? sycope  Other reaction(s): Leg Pain       Isolation  [unfilled]     Past Medical History   has a past medical history of Arthritis, BMI 31.0-31.9,adult, Cancer (H) (01/10/2018), COPD (chronic obstructive pulmonary disease) (H) (10/28/2020), Demand ischemia, Diabetes (H) (01/10/2018), Diverticulosis, Fatty liver, GERD (gastroesophageal reflux disease), Glaucoma (increased eye pressure), HTN (hypertension), Hyperlipidemia LDL goal < 130, Irritable bowel, 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, Nonsenile cataract, Obstructive sleep apnea, Obstructive sleep apnea syndrome, ROSIE (obstructive sleep apnea) (01/2011), Prediabetes, Restless leg syndrome, Retinal detachment, Sleep apnea, and Trigeminal neuralgia pain (01/04/2012).    Anesthesia General with Block   Dermatome Level     Preop Meds acetaminophen (Tylenol) - time given: 975mg @ 1148   Nerve block Adductor canal.  Location:left. Med:bupivacaine. Time given: 1252   Intraop Meds Medications    Bupivacaine 0.5% PF (Infiltration) (mL)   Total volume: 10 mL  Date/Time Rate/Dose/Volume Action Route Admin User Audit    04/01/24 1252 10 mL Given Infiltration Khai  MD Arianna     Dexmedetomidine 4 mcg/mL (Perineural) (mcg)   Total dose: 40 mcg  Date/Time Rate/Dose/Volume Action Route Admin User Audit    04/01/24 1252 40 mcg Given Perineural Arianna Ridley MD     Dexamethasone 10 mg/mL PF (Perineural) (mg)   Total dose: 10 mg  Date/Time Rate/Dose/Volume Action Route Admin User Audit    04/01/24 1252 2 mg Given Perineural Arianna Ridley MD     1341 8 mg Given Perineural Mandel, JACK Nguyen CRNA     fentaNYL 50 mcg/mL (mcg)   Total dose: 100 mcg  Date/Time Rate/Dose/Volume Action Route Admin User Audit    04/01/24 1358 50 mcg Given Intravenous Mandel, JACK Nguyen CRNA     1421 25 mcg Given Intravenous Mandel, JACK Nguyen CRNA     1426 25 mcg Given Intravenous Mandel, JACK Nguyen CRNA     lidocaine 2% (mg)   Total dose: 100 mg  Date/Time Rate/Dose/Volume Action Route Admin User Audit    04/01/24 1341 100 mg Given Intravenous Mandel, JACK Nguyen CRNA     propofol 10 mg/mL (mg)   Total dose: 300 mg  Date/Time Rate/Dose/Volume Action Route Admin User Audit    04/01/24 1341 200 mg Given Intravenous Mandel, JACK Nguyen CRNA     1355 50 mg Given Intravenous Mandel, JACK Nguyen CRNA     1455 50 mg Given Intravenous Mandel, JACK Nguyen CRNA     phenylephrine (SHEELA-SYNEPHRINE) injection (mcg)   Total dose: 100 mcg  Date/Time Rate/Dose/Volume Action Route Admin User Audit    04/01/24 1341 100 mcg New Bag Intravenous Mandel, JACK Nguyen CRNA     ceFAZolin Sodium (ANCEF) injection 2 g (g)   Total dose: 2 g Dosing weight: 106.7  Date/Time Rate/Dose/Volume Action Route Admin User Audit    04/01/24 1346 2 g (over 5 min) Given Intravenous Mandel, JACK Nguyen CRNA edited    phenylephrine 0.1 mg/mL infusion (mcg/kg/min) (mcg/kg/min)   Total dose: 2.04 mg Dosing weight: 102.5  Date/Time Rate/Dose/Volume Action Route Admin User Audit    04/01/24 1400 0.3 mcg/kg/min - 18.45 mL/hr New Bag Intravenous Nita Mandel, APRN CRNA     1408 0.5 mcg/kg/min  "- 30.75 mL/hr Rate Change Intravenous Vianey Mandelashia ASHIA JACK CRNA     1418  Stopped Intravenous Vianey MandelJACK Collins CRNA     1437 0.2 mcg/kg/min - 12.3 mL/hr Restarted Intravenous Vianey Mandelashia ANG JACK CRNA     1446 0.1 mcg/kg/min - 6.15 mL/hr Rate Change Intravenous Martha MandelJACK Murphy CRNA     1519  Stopped Intravenous Nalini MartínezJACK smith CRNA     1537 0.1 mcg/kg/min - 6.15 mL/hr Restarted Intravenous TanyaNalini APRN CRNA     1542 0.3 mcg/kg/min - 18.45 mL/hr Rate Change Intravenous TanyaNalini ang APRN CRNA     1605  Stopped Intravenous TanyaNalini ang APRN CRNA     HYDROmorphone 1 mg/ml (mg)   Total dose: 0.5 mg  Date/Time Rate/Dose/Volume Action Route Admin User Audit    04/01/24 1445 0.2 mg Given Intravenous Martha MandelJACK Murphy CRNA     1449 0.3 mg Given Intravenous Martha MandelJACK Murphy CRNA     LR (mL)   Total volume: 900 mL    Date/Time Rate/Dose/Volume Action Route Admin User Audit    04/01/24 1333  New Bag Intravenous Martha MandelJACK Murphy CRNA     1502 800 mL Anesthesia Volume Adjustment Intravenous TanyaNalini ang APRN CRNA     1621 100 mL Anesthesia Volume Adjustment Intravenous Nalini Martínez APRN CRNA        Local Meds No   Antibiotics cefazolin (Ancef) - last given at 1346     Pain Patient Currently in Pain: no   PACU meds  fentanyl (Sublimaze): 25 mcg (total dose) last given at 1705   oxycodone (Roxicodone): 5 mg (total dose) last given at 1705    PCA / epidural No   Capnography     Telemetry ECG Rhythm: Normal sinus rhythm   Inpatient Telemetry Monitor Ordered? No        Labs Glucose Lab Results   Component Value Date     04/01/2024     10/11/2022     05/04/2021       Hgb Lab Results   Component Value Date    HGB 15.2 02/22/2023    HGB 12.6 10/26/2020       INR No results found for: \"INR\"   PACU Imaging Completed     Wound/Incision Incision/Surgical Site 08/01/17 Right Head (Active)   Number of days: 2435     "   Incision/Surgical Site 08/01/17 Left Head (Active)   Number of days: 2435       Incision/Surgical Site 01/10/22 Left Eye (Active)   Number of days: 812       Incision/Surgical Site 04/01/24 Anterior;Left Knee (Active)   Incision Assessment UTV 04/01/24 1615   Dressing Alginate;Transparent film (Opsite, Tegaderm) 04/01/24 1615   Closure Adhesive strip(s);Approximated;Liquid bandage;Sutures 04/01/24 1615   Drainage Description UTV 04/01/24 1615   Dressing Intervention Clean, dry, intact 04/01/24 1615   Number of days: 0      CMS        Equipment ice pack   Other LDA       IV Access Peripheral IV 04/01/24 Right;Posterior Hand (Active)   Site Assessment WDL 04/01/24 1615   Line Status Infusing 04/01/24 1615   Dressing Transparent 04/01/24 1615   Dressing Status clean;dry;intact 04/01/24 1615   Dressing Intervention New dressing  04/01/24 1200   Phlebitis Scale 0-->no symptoms 04/01/24 1615   Infiltration? no 04/01/24 1615   Number of days: 0      Blood Products Not applicable EBL 25 mL   Intake/Output Date 04/01/24 0700 - 04/02/24 0659   Shift 7428-1127 1785-5682 6770-0989 24 Hour Total   INTAKE   P.O.  200  200   I.V.  900  900   Shift Total(mL/kg)  1100(10.73)  1100(10.73)   OUTPUT   Blood  25  25   Shift Total(mL/kg)  25(0.24)  25(0.24)   Weight (kg) 102.5 102.5 102.5 102.5      Drains / [place]     Time of void PreOp Time of Void Prior to Procedure: 1100 (04/01/24 1142)    PostOp      Diapered? No   Bladder Scan Bladder Scan Volume (mL): 72 ml (04/01/24 1615)    mL (water) (04/01/24 1700)  tolerating sips and crackers     Vitals    B/P: 132/68  T: 97.8  F (36.6  C)    Temp src: Axillary  P:  Pulse: 70 (04/01/24 1715)          R: 12  O2:  SpO2: 98 %    O2 Device: Nasal cannula (04/01/24 1715)    Oxygen Delivery: 3 LPM (04/01/24 9156)         Family/support present significant other   Patient belongings     Patient transported on bed   DC meds/scripts (obs/outpt) Not applicable   Inpatient Pain Meds Released?  Yes       Special needs/considerations None   Tasks needing completion See orders       Cara Babcock, RN  VIRIDIANA

## 2024-04-01 NOTE — CONSULTS
"[facility]  Consult Note - Hospitalist Service  Date of Admission:  4/1/2024  Consult Requested by: Jazmin SAMAYOA  Reason for Consult: Medical Co-management    Assessment & Plan   Rakan Nolasco is a 78 year old man admitted on 4/1/2024. He has a history of COPD, DM, ROSIE, GERD, hypertension, hyperlipidemia, RLS and myasthenia gravis who is admitted following L TKA.    1) S/p L TKA -  - Orthopedics managing as primary team    2) History of COPD  - Continue home albuterol, fluticasone-salmeterol.  The patient has brought his own inhalers in with him    3) History of DM II  - Hold home metformin    4) History of hypertension  - Continue home lisinopril, amlodipine    5) History of GERD  - Continue home protonix    6) History of facial pain  - Continue home Trileptal    7) History of myasthenia gravis  - Continue home prednisone, pyridostigmine    Clinically Significant Risk Factors Present on Admission                # Drug Induced Platelet Defect: home medication list includes an antiplatelet medication   # Hypertension: Noted on problem list      # Obesity: Estimated body mass index is 32.42 kg/m  as calculated from the following:    Height as of this encounter: 1.778 m (5' 10\").    Weight as of this encounter: 102.5 kg (225 lb 15.5 oz).              JACK Fraser [facility]  Hospitalist Service  Securely message with [employer] (more info)  Text page via [facility] Paging/Directory   ______________________________________________________________________    Chief Complaint   S/p L TKA    History is obtained from the patient    History of Present Illness   Rakan Nolasco is a 78 year old man admitted on 4/1/2024. He has a history of COPD, DM, ROSIE, GERD, hypertension, hyperlipidemia, RLS and myasthenia gravis who is admitted following L TKA.    The patient denies any recent medical concerns.  His pain is well controlled post-operatively.    He confirms he is taking his medications " reliably, and brought in his own supply of eyedrops and inhalers.      Past Medical History    Past Medical History:   Diagnosis Date    Arthritis         BMI 31.0-31.9,adult     Cancer (H) 01/10/2018    Prostate    COPD (chronic obstructive pulmonary disease) (H) 10/28/2020    Demand ischemia     Diabetes (H) 01/10/2018    Diverticulosis     Colonoscopy 8/2008    Fatty liver     see US  5/2012    GERD (gastroesophageal reflux disease)     Glaucoma (increased eye pressure)     HTN (hypertension)     Hyperlipidemia LDL goal < 130     age, htn, fhx    Irritable bowel     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     Nonsenile cataract     Obstructive sleep apnea     Obstructive sleep apnea syndrome     ROSIE (obstructive sleep apnea) 01/2011    Using CPAP;     Prediabetes     Restless leg syndrome     Retinal detachment     Sleep apnea     Trigeminal neuralgia pain 01/04/2012       Past Surgical History   Past Surgical History:   Procedure Laterality Date    BIOPSY ARTERY TEMPORAL Bilateral 08/01/2017    Procedure: BIOPSY ARTERY TEMPORAL;  Bilateral temporal artery Biopsy;  Surgeon: Jj Tello MD;  Location: [facility] OR    CATARACT IOL, RT/LT Right     COLONOSCOPY      ESOPHAGOSCOPY, GASTROSCOPY, DUODENOSCOPY (EGD), COMBINED  01/15/2014    Procedure: COMBINED ESOPHAGOSCOPY, GASTROSCOPY, DUODENOSCOPY (EGD), BIOPSY SINGLE OR MULTIPLE;;  Surgeon: Winston Nixon MD;  Location: [facility] OR    LASER YAG CAPSULOTOMY Right 04/09/2018    right eye    PHACOEMULSIFICATION CLEAR CORNEA WITH STANDARD INTRAOCULAR LENS IMPLANT Right 02/20/2017    Procedure: PHACOEMULSIFICATION CLEAR CORNEA WITH STANDARD INTRAOCULAR LENS IMPLANT;  Surgeon: Mateo Gray MD;  Location:  [facility]    PHACOEMULSIFICATION CLEAR CORNEA WITH STANDARD INTRAOCULAR LENS IMPLANT Left 01/10/2022    Procedure: LEFT PHACOEMULSIFICATION, CATARACT, WITH INTRAOCULAR LENS IMPLANT;  Surgeon: Mateo Gray MD;   Location: [facility] OR    ME TRABECULOPLASTY BY LASER SURGERY      RETINAL REATTACHMENT Right     SURGICAL HISTORY OF -   08/2009    Both Eyelids-.    TONSILLECTOMY  as child       Medications   I have reviewed this patient's current medications          Physical Exam   Vital Signs: Temp: 98.1  F (36.7  C) Temp src: Oral BP: 120/62 Pulse: 70   Resp: 16 SpO2: 94 % O2 Device: Nasal cannula Oxygen Delivery: 3 LPM  Weight: 225 lbs 15.54 oz    Physical Exam   Constitutional:   Well nourished, well developed, resting comfortably   Cardiovascular: Regular rate and rhythm without murmurs or gallops  Pulmonary/Chest: Clear to auscultation bilaterally, with no wheezes or retractions. No respiratory distress.  GI: Soft with good bowel sounds.  Non-tender, non-distended, with no guarding, no rebound, no peritoneal signs.   Musculoskeletal:  No edema or clubbing   Skin: Skin is warm and dry. No rash noted.   Neurological: Alert and oriented to person, place, and time. Nonfocal exam  Psychiatric:  Normal mood and affect.      Medical Decision Making       20 MINUTES SPENT BY ME on the date of service doing chart review, history, exam, documentation & further activities per the note.      Data   Ortho note, anesthesia note reviewed.

## 2024-04-01 NOTE — ANESTHESIA PROCEDURE NOTES
Airway       Patient location during procedure: OR       Procedure Start/Stop Times: 4/1/2024 1:44 PM  Staff -        CRNA: Nita Mandel APRN CRNA       Other Anesthesia Staff: Marian Mandujano       Performed By: CRNA  Consent for Airway        Urgency: elective  Indications and Patient Condition       Indications for airway management: arthur-procedural       Induction type:intravenous       Mask difficulty assessment: 1 - vent by mask    Final Airway Details       Final airway type: supraglottic airway    Supraglottic Airway Details        Type: LMA       Brand: Air-Q       LMA size: 5    Post intubation assessment        Placement verified by: capnometry, equal breath sounds and chest rise        Number of attempts at approach: 1       Number of other approaches attempted: 0       Secured with: tape       Ease of procedure: easy       Dentition: Intact and Unchanged    Medication(s) Administered   Medication Administration Time: 4/1/2024 1:44 PM

## 2024-04-01 NOTE — OP NOTE
OPERATIVE REPORT    DATE OF SERVICE: 4/1/24    SURGEON: Dandre Hassan MD.    ASSISTANT(S):  Michael Marroquin MD and Crys Wu PA-C    PREOPERATIVE DIAGNOSIS:  Osteoarthritis    POSTOPERATIVE DIAGNOSIS:  Osteoarthritis    OPERATION PERFORMED:  left total knee arthroplasty    IMPLANTS:    Implant Name Type Inv. Item Serial No.  Lot No. LRB No. Used Action   BONE CEMENT SIMPLEX W/TOBRAMYCIN 6197-9-001 - [id number] [id number], Bone BONE CEMENT SIMPLEX W/TOBRAMYCIN 6197-9-001  [employer] [id number] Left 2 Implanted   IMP COMP FEMORAL SNN GEN II CR SZ 6 LT [id number] - [id number] Total Joint Component/Insert IMP COMP FEMORAL SNN GEN II CR SZ 6 LT [id number]  [employer] [id number] Left 1 Implanted   IMP BASEPLATE TIBIAL MISTI II SZ 5 LT TI [id number] - [id number] Total Joint Component/Insert IMP BASEPLATE TIBIAL MISTI II SZ 5 LT TI [id number]  [employer] [id number] Left 1 Implanted   IMP INSERT ARTICULAR S&N LGN CR XLPE SZ5-6 9MM [id number] - [id number] Total Joint Component/Insert IMP INSERT ARTICULAR S&N LGN CR XLPE SZ5-6 9MM [id number]  [employer] [id number] Left 1 Implanted       ANESTHETIC: General     OPERATIVE FINDINGS:  End stage arthrosis of the knee. Patella appropriate for patellar retention.     BLOOD LOSS:    TOURNIQUET TIME:    COMPLICATIONS:  None apparent    OPERATIVE INDICATIONS:  The patient has a long history of debilitating pain secondary to ostearthritis of the knee.  Despite comprehensive non-operative management these symptoms continued to interfere with activities of daily living.  After discussion of further treatment options including the risks and benefits that patient elected to proceed with a total knee.    DESCRIPTION OF THE PROCEDURE:  The patient was identified in the preoperative holding area.  The consent form including the risks and benefits were reviewed with the patient.  The operative limb was identified and marked.  The patient was brought back to the  operating room and placed supine on the operating table.  An anesthetic was induced by the anesthesia team.   The patient was prepped and draped in the normal standard fashion for a knee replacement.  A time-out was called.  Antibiotics were given.The tourniquet was inflated.  We utilized an approximately 15 cm curvilinear incision, centered on the patella ridge, and performed a standard parapatellar approach to the knee. There was normal appearing joint fluid seen upon arthrotomy. A modest medial release was performed. The patella was everted. Medial and lateral retractors were placed. The knee was brought into flexion. The AP axis of the knee was marked and an intra-medullary femoral guide tammie was placed as templated. The epicondylar axis was then marked. The anterior femoral cutting guide was placed and the epicondylar and AP axes were used to set the rotation of the jig. A stylus was then used to set the depth of the resection. Retractors were used to protect the skin and the anterior cut was made. The distal femoral cutting guide was then placed and pinned. The resection was set to remove the cartilage from the intra-condylar notch. The femoral sizing guide was then placed; the knee sized well to a 6. The four-in-one femoral cutting guide was then placed and pinned. The cuts were made. The trial component was well fit. Attention was then turned to the tibia. A PCL retractor was then placed and used to bring the tibia anterior. Medial and lateral retractors were placed. A tibial intra-medullary guide tammie was placed. The tibial cutting guide was then assembled on the guide tammie. The slope was set to nearly mirror the anatomic slope. The tibial cut was first checked with a two-and-nine guide and the cut was then made. A lamina  was then used to remove the necessary bone from the posterior condyles and then the remaining meniscus was removed. The tibia and was sized and it sized well to a 5. The trial  components were then assembled carri  9 mm trial poly was used for trialing. The knee came out into full extension and the knee had greater than 120 degrees of flexion. It was well balanced in the coronal plane with varus and valgus stress at 0 and 30 degrees of flexion. Happy with the reconstruction the tibial rotation was marked, the trial components were removed, and all cancellous surfaces were cleaned with a pulse lavage and then dried. The components were cemented into place, a trial poly placed, the knee brought into extension, and the cement was allowed to dry. With the cement dry the knee was lavaged. The tourniquet was let down and hemostasis was achieved. The knee was then brought into extension and the final poly was placed. It was seen to lock into place. The soft tissues were then injected with analgesic. The capsule was closed with interrupted Vicryl, the dermis with interrupted Vicryl, and skin with running monocryl, Dermabond and steri-strips.  At the end of the procedure the sponge and needle counts were correct times two.  The patient tolerated the procedure well and returned to the PAR extubated and stable.    POSTOPERATIVE PLAN:  1. Weight bearing as tolerated  2. DVT prophylaxis with ECASA  4. 24 hours of prophylactic antibiotics  5. Follow-up:  Wound clinic in 2 weeks and with Mo in clinic in 6 weeks for x-rays and a rehabilitation check.

## 2024-04-02 ENCOUNTER — APPOINTMENT (OUTPATIENT)
Dept: PHYSICAL THERAPY | Facility: CLINIC | Age: 79
End: 2024-04-02
Attending: ORTHOPAEDIC SURGERY
Payer: COMMERCIAL

## 2024-04-02 VITALS
WEIGHT: 225.97 LBS | TEMPERATURE: 97.9 F | DIASTOLIC BLOOD PRESSURE: 58 MMHG | BODY MASS INDEX: 32.35 KG/M2 | HEIGHT: 70 IN | OXYGEN SATURATION: 93 % | SYSTOLIC BLOOD PRESSURE: 130 MMHG | RESPIRATION RATE: 16 BRPM | HEART RATE: 76 BPM

## 2024-04-02 LAB
GLUCOSE BLDC GLUCOMTR-MCNC: 156 MG/DL (ref 70–99)
GLUCOSE BLDC GLUCOMTR-MCNC: 195 MG/DL (ref 70–99)
HGB BLD-MCNC: 12.3 G/DL (ref 13.3–17.7)
HOLD SPECIMEN: NORMAL

## 2024-04-02 PROCEDURE — 250N000011 HC RX IP 250 OP 636: Performed by: NURSE PRACTITIONER

## 2024-04-02 PROCEDURE — 85018 HEMOGLOBIN: CPT | Performed by: NURSE PRACTITIONER

## 2024-04-02 PROCEDURE — 97116 GAIT TRAINING THERAPY: CPT | Mod: GP

## 2024-04-02 PROCEDURE — 97161 PT EVAL LOW COMPLEX 20 MIN: CPT | Mod: GP

## 2024-04-02 PROCEDURE — 250N000013 HC RX MED GY IP 250 OP 250 PS 637: Performed by: ORTHOPAEDIC SURGERY

## 2024-04-02 PROCEDURE — 36415 COLL VENOUS BLD VENIPUNCTURE: CPT | Performed by: NURSE PRACTITIONER

## 2024-04-02 PROCEDURE — 999N000111 HC STATISTIC OT IP EVAL DEFER

## 2024-04-02 PROCEDURE — 99214 OFFICE O/P EST MOD 30 MIN: CPT | Performed by: INTERNAL MEDICINE

## 2024-04-02 PROCEDURE — 250N000013 HC RX MED GY IP 250 OP 250 PS 637: Performed by: NURSE PRACTITIONER

## 2024-04-02 PROCEDURE — 97110 THERAPEUTIC EXERCISES: CPT | Mod: GP

## 2024-04-02 PROCEDURE — 250N000012 HC RX MED GY IP 250 OP 636 PS 637: Performed by: NURSE PRACTITIONER

## 2024-04-02 PROCEDURE — 82962 GLUCOSE BLOOD TEST: CPT

## 2024-04-02 RX ORDER — AMOXICILLIN 250 MG
1 CAPSULE ORAL 2 TIMES DAILY
Qty: 60 TABLET | Refills: 0 | Status: SHIPPED | OUTPATIENT
Start: 2024-04-02

## 2024-04-02 RX ORDER — OXYCODONE HYDROCHLORIDE 5 MG/1
5-10 TABLET ORAL EVERY 4 HOURS PRN
Qty: 30 TABLET | Refills: 0 | Status: SHIPPED | OUTPATIENT
Start: 2024-04-02 | End: 2024-07-25

## 2024-04-02 RX ORDER — ALBUTEROL SULFATE 90 UG/1
2 AEROSOL, METERED RESPIRATORY (INHALATION) EVERY 4 HOURS PRN
Status: DISCONTINUED | OUTPATIENT
Start: 2024-04-02 | End: 2024-04-02

## 2024-04-02 RX ORDER — POLYETHYLENE GLYCOL 3350 17 G/17G
17 POWDER, FOR SOLUTION ORAL DAILY
Qty: 510 G | Refills: 0 | Status: SHIPPED | OUTPATIENT
Start: 2024-04-02

## 2024-04-02 RX ORDER — DOXYCYCLINE 100 MG/1
100 CAPSULE ORAL 2 TIMES DAILY
Qty: 18 CAPSULE | Refills: 0 | Status: SHIPPED | OUTPATIENT
Start: 2024-04-02 | End: 2024-04-11

## 2024-04-02 RX ORDER — ACETAMINOPHEN 325 MG/1
650 TABLET ORAL EVERY 4 HOURS PRN
Qty: 100 TABLET | Refills: 0 | Status: SHIPPED | OUTPATIENT
Start: 2024-04-04

## 2024-04-02 RX ORDER — ASPIRIN 81 MG/1
81 TABLET ORAL 2 TIMES DAILY
Qty: 60 TABLET | Refills: 0 | Status: SHIPPED | OUTPATIENT
Start: 2024-04-02 | End: 2024-05-02

## 2024-04-02 RX ADMIN — SENNOSIDES AND DOCUSATE SODIUM 1 TABLET: 8.6; 5 TABLET ORAL at 08:39

## 2024-04-02 RX ADMIN — PYRIDOSTIGMINE BROMIDE 60 MG: 60 TABLET ORAL at 11:57

## 2024-04-02 RX ADMIN — POLYETHYLENE GLYCOL 3350 17 G: 17 POWDER, FOR SOLUTION ORAL at 08:39

## 2024-04-02 RX ADMIN — PYRIDOSTIGMINE BROMIDE 120 MG: 60 TABLET ORAL at 08:39

## 2024-04-02 RX ADMIN — OXYCODONE HYDROCHLORIDE 10 MG: 10 TABLET ORAL at 08:40

## 2024-04-02 RX ADMIN — ACETAMINOPHEN 975 MG: 325 TABLET, FILM COATED ORAL at 11:57

## 2024-04-02 RX ADMIN — OXCARBAZEPINE 600 MG: 600 TABLET, FILM COATED ORAL at 08:39

## 2024-04-02 RX ADMIN — ASPIRIN 81 MG: 81 TABLET, COATED ORAL at 08:39

## 2024-04-02 RX ADMIN — TIMOLOL MALEATE 1 DROP: 5 SOLUTION/ DROPS OPHTHALMIC at 08:52

## 2024-04-02 RX ADMIN — SODIUM BICARBONATE 325 MG: 325 TABLET ORAL at 08:39

## 2024-04-02 RX ADMIN — DOXYCYCLINE HYCLATE 100 MG: 100 CAPSULE ORAL at 08:40

## 2024-04-02 RX ADMIN — PREDNISONE 10 MG: 10 TABLET ORAL at 08:39

## 2024-04-02 RX ADMIN — PANTOPRAZOLE SODIUM 20 MG: 20 TABLET, DELAYED RELEASE ORAL at 08:39

## 2024-04-02 RX ADMIN — AMLODIPINE BESYLATE 10 MG: 10 TABLET ORAL at 08:40

## 2024-04-02 RX ADMIN — FUROSEMIDE 40 MG: 40 TABLET ORAL at 11:57

## 2024-04-02 RX ADMIN — CEFAZOLIN SODIUM 2 G: 2 INJECTION, SOLUTION INTRAVENOUS at 06:02

## 2024-04-02 RX ADMIN — FLUTICASONE PROPIONATE AND SALMETEROL 1 PUFF: 250; 50 POWDER RESPIRATORY (INHALATION) at 08:52

## 2024-04-02 RX ADMIN — ACETAMINOPHEN 975 MG: 325 TABLET, FILM COATED ORAL at 04:11

## 2024-04-02 ASSESSMENT — ACTIVITIES OF DAILY LIVING (ADL)
ADLS_ACUITY_SCORE: 43

## 2024-04-02 NOTE — PLAN OF CARE
Occupational Therapy: Orders received. Chart reviewed and discussed with care team.? Occupational Therapy not indicated due to pt was mod I for toileting with PT, has all AE needed for ADL and assist as needed. No skilled IP OT needs identified. PT following for mobility.? Defer discharge recommendations to ortho.? Will complete orders.

## 2024-04-02 NOTE — PROGRESS NOTES
[facility]  OUTPATIENT PHYSICAL THERAPY EVALUATION  PLAN OF TREATMENT FOR OUTPATIENT REHABILITATION  (COMPLETE FOR INITIAL CLAIMS ONLY)  Patient's Last Name, First Name, M.I.  YOB: 1945  Rakan Nolasco                        Provider's Name  [facility] Medical Record No.  [id number]                             Onset Date:  04/01/24   Start of Care Date:      Type:     _X_PT   ___OT   ___SLP Medical Diagnosis:                 PT Diagnosis:  impaired functional mobility Visits from SOC:  1     See note for plan of treatment, functional goals and certification details    I CERTIFY THE NEED FOR THESE SERVICES FURNISHED UNDER        THIS PLAN OF TREATMENT AND WHILE UNDER MY CARE     (Physician co-signature of this document indicates review and certification of the therapy plan).                  04/02/24 0903   Appointment Info   Signing Clinician's Name / Credentials (PT) Priscilla Fuentes DPT       Present no   Language English   Living Environment   People in Home spouse   Current Living Arrangements house   Home Accessibility stairs to enter home   Number of Stairs, Main Entrance 3   Stair Railings, Main Entrance railings safe and in good condition;railing on right side (ascending)   Transportation Anticipated family or friend will provide   Self-Care   Usual Activity Tolerance good   Current Activity Tolerance moderate   Regular Exercise Yes   Activity/Exercise Type walking;strength training  (Was doing pre-hab and did his exercises daily)   Exercise Amount/Frequency daily   Equipment Currently Used at Home none  (owns cane, walker, shower bench, toilet riser)   Fall history within last six months no   General Information   Onset of Illness/Injury or Date of Surgery 04/01/24   Referring Physician Chandrakant Chow APRN CNP   Patient/Family Therapy Goals Statement (PT) Did not endorse   Pertinent History of Current  Problem (include personal factors and/or comorbidities that impact the POC) s/p L TKA   Existing Precautions/Restrictions fall   Weight-Bearing Status - LUE full weight-bearing   Weight-Bearing Status - RUE full weight-bearing   Weight-Bearing Status - LLE weight-bearing as tolerated   Weight-Bearing Status - RLE full weight-bearing   General Observations Supine in bed upon arrival, pleasant and agreeable   Cognition   Affect/Mental Status (Cognition) WNL   Orientation Status (Cognition) oriented x 3   Follows Commands (Cognition) WNL   Pain Assessment   Patient Currently in Pain Yes, see Vital Sign flowsheet   Integumentary/Edema   Integumentary/Edema Comments Patient with normal post-surgical swelling present   Posture    Posture Forward head position;Protracted shoulders;Kyphosis   Posture Comments Mild to moderate sitting EOB and standing   Range of Motion (ROM)   ROM Comment Did not formally assess, demonstrates heelslide on L to approximately 60 degrees   Strength (Manual Muscle Testing)   Strength Comments Did not formally assess, demonstrates independent SLR on L following surgery   Bed Mobility   Comment, (Bed Mobility) Supervision   Transfers   Comment, (Transfers) Supervision sit<>stand transfer with walker   Gait/Stairs (Locomotion)   Comment, (Gait/Stairs) Supervision level ambulation with walker   Balance   Balance Comments Independent sitting balance, supervision standing balance with UE support from walker   Sensory Examination   Sensory Perception WNL   Clinical Impression   Criteria for Skilled Therapeutic Intervention Yes, treatment indicated   PT Diagnosis (PT) impaired functional mobility   Influenced by the following impairments increased post-op pain, decreased L LE strength and ROM   Functional limitations due to impairments impaired bed mobility, transfers and ambulation   Clinical Presentation (PT Evaluation Complexity) stable   Clinical Presentation Rationale Per clinical judgment    Clinical Decision Making (Complexity) low complexity   Planned Therapy Interventions (PT) bed mobility training;gait training;home exercise program;stair training;strengthening;transfer training;progressive activity/exercise;risk factor education;home program guidelines   Risk & Benefits of therapy have been explained evaluation/treatment results reviewed;care plan/treatment goals reviewed;risks/benefits reviewed;current/potential barriers reviewed   PT Total Evaluation Time   PT Eval, Low Complexity Minutes (35358) 6   Physical Therapy Goals   PT Frequency One time eval and treatment only   PT Predicted Duration/Target Date for Goal Attainment 04/02/24   PT Goals Bed Mobility;Transfers;Gait;Stairs;PT Goal 1   PT: Bed Mobility Supervision/stand-by assist;Supine to/from sit;Goal Met   PT: Transfers Supervision/stand-by assist;Sit to/from stand;Bed to/from chair;Assistive device;Goal Met   PT: Gait Supervision/stand-by assist;Assistive device;100 feet;Goal Met   PT: Stairs Supervision/stand-by assist;Assistive device;3 stairs;Rail on left;Goal Met   PT: Goal 1 Independent with HEP per TKA protocol   Therapeutic Procedure/Exercise   Ther. Procedure: strength, endurance, ROM, flexibillity Minutes (28827) 10   Symptoms Noted During/After Treatment fatigue;increased pain   Treatment Detail/Skilled Intervention PT: Issued and completed supine exercises on L x 10 for strength and ROM including AP, QS, GS, HS, heelslides, SAQ, SLR. Good tolerance noted   Therapeutic Activity   Therapeutic Activities: dynamic activities to improve functional performance Minutes (69737) 4   Symptoms Noted During/After Treatment Fatigue;Increased pain   Treatment Detail/Skilled Intervention PT: Supine in bed upon arrival, agreeable to PT. Completes supine<>sit transfer with supervision. Sat EOB with good tolerance. Completes sit<>stand transfer supervision using walker.   Gait Training   Gait Training Minutes (02633) 10   Symptoms Noted  During/After Treatment (Gait Training) fatigue;increased pain   Treatment Detail/Skilled Intervention PT: Safe and steady throughout. Ambulates 2 x 50' with walker and supervision. Utilized w/c to get down to stairs. Educated and demonstrates safety and sequence on stairs. Completes 4 stairs with bilateral rails and 4 stairs with L rail and R SEC, educated where to have spouse stand to ensure safety. No further questions or concerns endorsed.   PT Discharge Planning   PT Plan Goals met   PT Discharge Recommendation (DC Rec)   (Defer to ortho)   PT Rationale for DC Rec For progression per TKA protocol, appointments already made   PT Brief overview of current status PT: Supervision for all mobility with use of walker   Total Session Time   Timed Code Treatment Minutes 24   Total Session Time (sum of timed and untimed services) 30

## 2024-04-02 NOTE — PLAN OF CARE
DISCHARGE SUMMARY    Pt discharging to: Home  Transportation: W/C  AVS given and discussed: Pt was given AVS and pt states understanding of content. Pt has no further questions.   Stoplight Tool given and discussed: Yes, no further questions.  Medications given: Yes, discussed. No further questions.   Belongings returned: Yes, ensured all belongings packed and sent with pt. No items in security.   Comments: Escorted safely to elevators. Pt left at 1530    Goal Outcome Evaluation:

## 2024-04-02 NOTE — PLAN OF CARE
Plan of Care Reviewed With: patient  Overall Patient Progress: improving  Outcome Evaluation: AOx4. [place]. CPAP O/N. Weaned to 1LPM O2 this AM. Mild pain reported, managed with ICE and sched Tylenol. Drsg + acewrap CDI. Tolerating activity and diet. AUOP, using urinal. Discharge to home pending progress with therapies.    For vital signs and complete assessments, please see documentation flowsheets.

## 2024-04-02 NOTE — PROGRESS NOTES
"Orthopaedic Surgery Progress Note     Subjective: No acute events overnight. Pain well controlled. Tolerating diet. Voiding spontaneously. +Flatus, no BM. Denies fever or chills, CP, SOB, numbness or tingling, motor dysfunction or weakness.     Objective: /67   Pulse 73   Temp 97.4  F (36.3  C) (Oral)   Resp 18   Ht 1.778 m (5' 10\")   Wt 102.5 kg (225 lb 15.5 oz)   SpO2 95%   BMI 32.42 kg/m      General: NAD, alert and oriented, cooperative with exam.   Cardio: RRR, extremities wwp.   Respiratory: Non-labored breathing.  MSK:   LLE: Toes wwp, DP 2+, bcr in all toes. Compartments soft and tolerates passive stretch of toes. +EHL/FHL/GSC/TA with 5/5 strength. SILT SP/DP/Sa/[place]/T. Dressing c/d/i.     Labs: hgb 12.3    Assessment and Plan:   Assessment/Plan: Rakan Nolasco is a 78 year old male s/p Procedure(s):  ARTHROPLASTY, LEFT KNEE, TOTAL on 4/1/2024 with Dr. Hassan.     Activity: Up with assist and assistive devices as needed until independent. Knee ROM as tolerated.   Weight bearing status: WBAT    Antibiotics: Cefazolin x 24 hours , followed by PO doxycycline x 9 days   Diet: Begin with clear fluids and progress diet as tolerated. Bowel regimen. Anti-emetics PRN.    DVT prophylaxis:  ASA 81 mg BID x 4 weeks   Elevation: Elevate heels off of bed on pillows, no pillows behind the knee at any time    Wound Care: Tegaderm and alginate x 2 weeks   Pain management: Orals PRN, IV for breakthrough only  X-rays: AP/Lat operative knee XR in PACU.  Physical Therapy: Mobilization, ROM, ADL's  Occupational Therapy: ADL's  Labs: Trend Hgb on PODs #1 & 2  Cultures: None  Consults: PT, OT. Hospitalist, appreciate assistance in caring for this patient throughout the perioperative period             Future Appointments   Date Time Provider Department Center   4/2/2024  8:15 AM Priscilla Fuentes, PT [facility] [place]   4/2/2024  8:15 AM Estelita Machado, OT [facility] [place]   4/2/2024  2:15 PM Priscilla Fuentes PT [facility] [place] "   4/4/2024 12:40 PM BloKhadar son, PT [facility]   4/9/2024  1:50 PM Rietschel, [place], PT [facility]   4/11/2024 12:40 PM Blohuy, Khadar, PT [facility]   4/15/2024  2:40 PM Josefa Adames PA-C [facility] [place]   4/16/2024 12:30 PM Rietschel, [place], PT [facility]   4/18/2024 12:30 PM Rietschel, [place], PT [facility]   4/22/2024  9:20 AM Arpan Harrison MD [facility]   4/23/2024 12:30 PM Rietschel, [place], PT [facility]   4/25/2024 12:30 PM Rietschel, [place], PT [facility]   4/30/2024 12:30 PM Rietschel, [place], PT [facility]   5/2/2024 12:40 PM BloKhadar son, PT [facility]   5/7/2024  1:10 PM Rietschel, [place], PT [facility]   5/9/2024 12:40 PM BloKhadar son, PT [facility]   5/14/2024 10:40 AM Dandre Hassan MD [facility] [place]   5/15/2024 10:00 AM Servando Mejia MD [facility] [place]   6/7/2024 10:10 AM Mateo Gray MD [facility]         Disposition: Pending progress with therapies, pain control on orals, and medical stability, anticipate discharge to Home on POD #1-2.    Osman Marroquin MD  Orthopedic Surgery Resident, PGY-4

## 2024-04-02 NOTE — PHARMACY-ADMISSION MEDICATION HISTORY
Pharmacist Admission Medication History    Admission medication history is complete. The information provided in this note is only as accurate as the sources available at the time of the update.    Information Source(s): Patient and CareEverywhere/SureScripts via in-person    Pertinent Information: patient knowledgeable about his home medications.    Changes made to PTA medication list:  Added: None  Deleted: ibuprofen PRN  Changed:   Albuterol 1 puff Q 4 hrs PRN -> 2 puffs Q 4 hrs PRN  Atorvastatin 20 mg QAM -> takes at bedtime   Pyridostigmine 160 mg TID -> currently takes 120 mg in AM, 60 mg midday, and 60 mg around 6 PM    Allergies reviewed with patient and updates made in EHR: yes    Medication History Completed By: Nicollette McMann, [facility] 4/1/2024 7:50 PM    PTA Med List   Medication Sig Note Last Dose    albuterol (PROAIR HFA/PROVENTIL HFA/VENTOLIN HFA) 108 (90 Base) MCG/ACT inhaler Inhale 2 puffs into the lungs every 4 hours as needed for shortness of breath, wheezing or cough  3/31/2024    amLODIPine (NORVASC) 10 MG tablet TAKE 1 TABLET (10 MG) BY MOUTH DAILY.  Past Week    ARTIFICIAL TEAR OP Apply 1 drop to eye as needed  Past Week    atorvastatin (LIPITOR) 20 MG tablet Take 20 mg by mouth at bedtime  3/31/2024    ClonAZEPAM (KLONOPIN) 0.5 MG tablet Take 0.5 mg by mouth Take 1 tablet by mouth daily at bedtime  3/31/2024 at 2000    ferrous sulfate (FEROSUL) 325 (65 Fe) MG tablet TAKE 1 TABLET BY MOUTH EVERY DAY WITH BREAKFAST  3/31/2024    fluticasone-salmeterol (ADVAIR) 250-50 MCG/ACT inhaler TAKE 1 PUFF BY MOUTH TWICE A DAY Strength: 250-50 MCG/ACT 3/15/2024: Bring on DOS  4/1/2024 at 0600    furosemide (LASIX) 20 MG tablet TAKE 2 TABLETS BY MOUTH IN THE MORNING AND 1 TABLET BY MOUTH AT LEAST 6 HOURS LATER IN THE AFTERNOON.  3/31/2024    latanoprost (XALATAN) 0.005 % ophthalmic solution Place 1 drop into both eyes at bedtime  3/31/2024 at 2000    lisinopril (ZESTRIL) 40 MG tablet Take 1 tablet (40 mg) by  mouth daily (Patient taking differently: Take 40 mg by mouth every evening)  3/31/2024 at 2000    metFORMIN (GLUCOPHAGE XR) 500 MG 24 hr tablet TAKE 1 TABLET BY MOUTH TWICE A DAY WITH FOOD  3/31/2024    OXcarbazepine (TRILEPTAL) 300 MG tablet TAKE 2 TABLETS IN THE MORNING AND 2 TABLETS IN THE EVENING  4/1/2024 at 0600    pantoprazole (PROTONIX) 20 MG EC tablet TAKE 1 TABLET BY MOUTH EVERY DAY (Patient taking differently: Take 20 mg by mouth every morning)  4/1/2024 at 0600    predniSONE (DELTASONE) 5 MG tablet TAKE 2 TABLETS BY MOUTH EVERY DAY (Patient taking differently: Take 10 mg by mouth every morning TAKE 2 TABLETS BY MOUTH EVERY DAY)  4/1/2024 at 0600    pyRIDostigmine (MESTINON) 60 MG tablet Take 120 mg by mouth every morning  4/1/2024    pyRIDostigmine (MESTINON) 60 MG tablet Take 60 mg by mouth 2 times daily Midday and 6 PM.  3/31/2024    timolol maleate (TIMOPTIC) 0.5 % ophthalmic solution Place 1 drop into both eyes 2 times daily  4/1/2024 at 0600    [DISCONTINUED] amLODIPine (NORVASC) 10 MG tablet TAKE 1 TABLET (10 MG) BY MOUTH DAILY. (Patient taking differently: Take 10 mg by mouth every morning)  4/1/2024 at 0600

## 2024-04-02 NOTE — PROGRESS NOTES
"[facility]    Medicine Progress Note - Hospitalist Service, GOLD TEAM 19    Date of Admission:  4/1/2024    Assessment & Plan   A: Patient is a 77 y/o man who has COPD, diabetes mellitus type II, obstructive sleep apnea, GERD, hypertension, hyperlipidemia, restless legs syndrome and myasthenia gravis. Patient underwent left total knee arthroplasty on 01-Apr-2024 for left knee osteoarthritis.     P:  1.) Left knee osteoarthritis s/p left total knee arthroplasty:  - Patient receiving post-operative care.  - Patient on scheduled acetaminophen.  - Patient on acetaminophen, IV hydromorphone and PO oxycodone as needed.    2.) COPD, compensated:  - Patient on advair inhaler.   - Patient on albuterol as needed.    3.) Diabetes mellitus type II:  - Patient usually on metformin but this is on hold for now.  - Metformin to be resumed as outpatient.    4.) Hypertension:  - Patient on lisinopril and norvasc. Monitoring.    5.) Obstructive sleep apnea:  - Patient using CPAP.    6.) GERD:  - Patient on protonix.    7.) History of facial pain:  - Patient on trileptal.    8.) Myasthenia gravis:  - Patient on pyridostigmine and prednisone.    9.) Hyperlipidemia:  - Atorvastatin to be resumed as outpatient.          Diet: Advance Diet as Tolerated: Regular Diet Adult  Discharge Instruction - Regular Diet Adult    [place] Catheter: Not present  Lines: None     Cardiac Monitoring: None  Code Status: Full Code      Clinically Significant Risk Factors Present on Admission                # Drug Induced Platelet Defect: home medication list includes an antiplatelet medication   # Hypertension: Noted on problem list      # Obesity: Estimated body mass index is 32.42 kg/m  as calculated from the following:    Height as of this encounter: 1.778 m (5' 10\").    Weight as of this encounter: 102.5 kg (225 lb 15.5 oz).              Disposition Plan      Expected Discharge Date: 04/02/2024              "       Blue Giang MD  Hospitalist Service, GOLD TEAM 19  [facility]  Securely message with [employer] (more info)  Text page via [employer] Paging/Directory   See signed in provider for up to date coverage information  ______________________________________________________________________    Interval History   Patient noted feeling well and noted pain controlled. Patient noted no fever and no chills. Patient noted mild nausea before eating but noted that this has resolved. Patient noted no vomiting,     Physical Exam   Vital Signs: Temp: 97.9  F (36.6  C) Temp src: Oral BP: 130/58 Pulse: 76   Resp: 16 SpO2: 93 % O2 Device: None (Room air) Oxygen Delivery: 1 LPM  Weight: 225 lbs 15.54 oz    General: Patient comfortable, NAD.  Heart: RRR, S1 S2 w/o murmurs.  Lungs: Breath sounds present. No crackles/wheezes heard.  Abdomen: Soft, nontender.    Medical Decision Making

## 2024-04-02 NOTE — PLAN OF CARE
Goal Outcome Evaluation:      Plan of Care Reviewed With: patient    Overall Patient Progress: improvingOverall Patient Progress: improving    Patient arrived to the unit at 1800 form PACU. A&O X4. VSS. Denies pain. On 3 LPM. Capno stable. L knee incisional dressing CDI. No drain. Pt. able to void with low PVR. Pt. Able to stood at bedside and tolerated well. Pt able to make needs known. Call light in reach. Will continue to monitor.

## 2024-04-02 NOTE — PLAN OF CARE
Physical Therapy Discharge Summary    Reason for therapy discharge:    All goals and outcomes met, no further needs identified.    Progress towards therapy goal(s). See goals on Care Plan in [facility] electronic health record for goal details.  Goals met    Therapy recommendation(s):    Defer to ortho

## 2024-04-02 NOTE — ANESTHESIA POSTPROCEDURE EVALUATION
Patient: Rakan Nolasco    Procedure: Procedure(s):  ARTHROPLASTY, LEFT KNEE, TOTAL       Anesthesia Type:  General    Note:  Disposition: Inpatient   Postop Pain Control: Uneventful            Sign Out: Well controlled pain   PONV: No   Neuro/Psych: Uneventful            Sign Out: Acceptable/Baseline neuro status   Airway/Respiratory: Uneventful            Sign Out: Acceptable/Baseline resp. status   CV/Hemodynamics: Uneventful            Sign Out: Acceptable CV status; No obvious hypovolemia; No obvious fluid overload   Other NRE: NONE   DID A NON-ROUTINE EVENT OCCUR? No           Last vitals:  Vitals Value Taken Time   /77 04/01/24 1745   Temp 37.1  C (98.8  F) 04/01/24 1715   Pulse 72 04/01/24 1746   Resp 6 04/01/24 1746   SpO2 96 % 04/01/24 1750   Vitals shown include unfiled device data.    Electronically Signed By: Yanelis Cavazos MD  April 2, 2024  6:21 AM

## 2024-04-03 NOTE — DISCHARGE SUMMARY
ORTHOPAEDIC SURGERY DISCHARGE SUMMARY     Date of Admission: 4/1/2024  Date of Discharge: 4/2/2024  3:01 PM  Disposition: Home  Staff Physician: No att. providers found  Primary Care Provider: Telly Lucio    DISCHARGE DIAGNOSIS:  Primary osteoarthritis of left knee [M17.12]    PROCEDURES: Procedure(s):  ARTHROPLASTY, LEFT KNEE, TOTAL on 4/1/2024    BRIEF HISTORY:  The patient has a long history of debilitating pain secondary to ostearthritis of the knee. Despite comprehensive non-operative management these symptoms continued to interfere with activities of daily living. After discussion of further treatment options including the risks and benefits that patient elected to proceed with a total knee.     HOSPITAL COURSE:    The patient was admitted following the above listed procedures for pain control and rehabilitation. Rakan Nolasco did well post-operatively. Medicine was consulted post operatively to aid in management of medical co-morbidities. The patient received routine nursing cares and at the time of discharge was medically stable. Vital signs were stable throughout admission. The patient is tolerating a regular diet and is voiding spontaneously. All PT/OT goals have been met for safe mobility. Pain is now controlled on oral medications which will be available on discharge. Stool softeners have been used while taking pain medications to help prevent constipation. Rakan Nolasco is deemed medically safe to discharge.     Antibiotics:  Ancef given periop and 24 hours postop. Plan for PO doxycycline due to rash  DVT prophylaxis:  ASA 81mg BID initiated after surgery and will be continued for 4 weeks.   PT Progress:  Has met PT/OT goals for safe mobility.    Pain Meds:  Weaned off all IV pain meds by discharge.  Inpatient Events: No significant events or complications.     PHYSICAL EXAM:    General: NAD, alert and oriented, cooperative with exam.   Cardio: RRR, extremities wwp.   Respiratory: Non-labored  breathing.  MSK:   LLE: Toes wwp, DP 2+, bcr in all toes. Compartments soft and tolerates passive stretch of toes. +EHL/FHL/GSC/TA with 5/5 strength. SILT SP/DP/Sa/Riggs/T. Dressing c/d/i.    FOLLOWUP:    Follow up as scheduled below    Future Appointments   Date Time Provider Department Center   4/4/2024 12:40 PM Khadar Altamirano, PT [facility]   4/9/2024  1:50 PM RiParis song, PT [facility]   4/11/2024 12:40 PM Khadar Altamirano, PT [facility]   4/15/2024  2:40 PM Josefa Adames PA-C [facility]   4/16/2024 12:30 PM RietscheIbeth rodashan, PT [facility]   4/18/2024 12:30 PM RietscheIbeth rodashan, PT [facility]   4/22/2024  9:20 AM Arpan Harrison MD [facility]   4/23/2024 12:30 PM Rietschel, [place], PT [facility]   4/25/2024 12:30 PM Rietschel, Paris, PT [facility]   4/30/2024 12:30 PM Rietscheclark, [place], PT [facility]   5/2/2024 12:40 PM Khadar Altamirano, PT [facility]   5/7/2024  1:10 PM Paris Best, PT [facility]   5/9/2024 12:40 PM Khadar Altamirano, PT [facility]   5/14/2024 10:40 AM Dandre Hassan MD [facility]   5/15/2024 10:00 AM Servando Mejia MD [facility]   6/7/2024 10:10 AM Mateo Gray MD [facility]       Orthopaedic Surgery appointments are at the [facility] [place] ([address] [place] [id number]). Call [phone] to schedule a follow-up appointment at this location with your provider.     PLANNED DISCHARGE ORDERS:      Discharge Medication List as of 4/2/2024  2:21 PM        START taking these medications    Details   acetaminophen (TYLENOL) 325 MG tablet Take 2 tablets (650 mg) by mouth every 4 hours as needed for other (For optimal non-opioid multimodal pain management to improve pain control.), Disp-100 tablet, R-0, E-Prescribe      aspirin 81 MG EC tablet Take 1 tablet (81 mg) by mouth 2 times daily for 30 days, Disp-60  tablet, R-0, E-Prescribe      doxycycline hyclate (VIBRAMYCIN) 100 MG capsule Take 1 capsule (100 mg) by mouth 2 times daily for 9 days, Disp-18 capsule, R-0, E-Prescribe      oxyCODONE (ROXICODONE) 5 MG tablet Take 1-2 tablets (5-10 mg) by mouth every 4 hours as needed for moderate pain, Disp-30 tablet, R-0, E-Prescribe      polyethylene glycol (MIRALAX) 17 GM/Dose powder Take 17 g by mouth daily, Disp-510 g, R-0, E-Prescribe      senna-docusate (SENOKOT-S/PERICOLACE) 8.6-50 MG tablet Take 1 tablet by mouth 2 times daily, Disp-60 tablet, R-0, E-Prescribe           CONTINUE these medications which have NOT CHANGED    Details   albuterol (PROAIR HFA/PROVENTIL HFA/VENTOLIN HFA) 108 (90 Base) MCG/ACT inhaler Inhale 2 puffs into the lungs every 4 hours as needed for shortness of breath, wheezing or cough, Historical      amLODIPine (NORVASC) 10 MG tablet TAKE 1 TABLET (10 MG) BY MOUTH DAILY., Disp-90 tablet, R-0, E-Prescribe      ARTIFICIAL TEAR OP Apply 1 drop to eye as needed, Historical      atorvastatin (LIPITOR) 20 MG tablet Take 20 mg by mouth at bedtime, Historical      blood glucose (ONETOUCH VERIO IQ) test strip USE TO TEST BLOOD SUGAR 3 TIMES DAILY OR AS DIRECTED., Disp-300 strip, R-1, E-Prescribe      blood glucose monitoring (NO BRAND SPECIFIED) meter device kit Use to test blood sugar 1 times daily or as directed.Disp-1 kit, [id number]      ClonAZEPAM (KLONOPIN) 0.5 MG tablet Take 0.5 mg by mouth Take 1 tablet by mouth daily at bedtime, Historical      Continuous Blood Gluc Sensor (FREESTYLE SARAHY 2 SENSOR) MISC 1 each by Device route every 14 days Use 1 sensor every 14 days. Use to read blood sugars per 's instructions., Disp-2 each, R-5, E-Prescribe      ferrous sulfate (FEROSUL) 325 (65 Fe) MG tablet TAKE 1 TABLET BY MOUTH EVERY DAY WITH BREAKFAST, Disp-90 tablet, R-1, E-Prescribe      fluticasone-salmeterol (ADVAIR) 250-50 MCG/ACT inhaler TAKE 1 PUFF BY MOUTH TWICE A DAY Strength: 250-50  MCG/ACT, Disp-3 each, R-3, E-Prescribe      furosemide (LASIX) 20 MG tablet TAKE 2 TABLETS BY MOUTH IN THE MORNING AND 1 TABLET BY MOUTH AT LEAST 6 HOURS LATER IN THE AFTERNOON., Disp-270 tablet, R-1, E-Prescribe      Lancets (ONETOUCH DELICA PLUS [id number]) MISC USE ONCE DAILY AS DIRECTED, Disp-100 each, R-1, E-Prescribe      latanoprost (XALATAN) 0.005 % ophthalmic solution Place 1 drop into both eyes at bedtime, Disp-7.5 mL, R-3, [id number] day supply ok. Please wait for patient to call for a fill. Thank you      lisinopril (ZESTRIL) 40 MG tablet Take 1 tablet (40 mg) by mouth daily, Disp-90 tablet, R-3, E-Prescribe      metFORMIN (GLUCOPHAGE XR) 500 MG 24 hr tablet TAKE 1 TABLET BY MOUTH TWICE A DAY WITH FOOD, Disp-180 tablet, R-0, E-Prescribe      OXcarbazepine (TRILEPTAL) 300 MG tablet TAKE 2 TABLETS IN THE MORNING AND 2 TABLETS IN THE EVENING, Disp-360 tablet, R-0, E-Prescribe      pantoprazole (PROTONIX) 20 MG EC tablet TAKE 1 TABLET BY MOUTH EVERY DAY, Disp-90 tablet, R-1, E-Prescribe      predniSONE (DELTASONE) 5 MG tablet TAKE 2 TABLETS BY MOUTH EVERY DAY, Disp-60 tablet, R-2, E-Prescribe      !! pyRIDostigmine (MESTINON) 60 MG tablet Take 120 mg by mouth every morning, Historical      !! pyRIDostigmine (MESTINON) 60 MG tablet Take 60 mg by mouth 2 times daily Midday and 6 PM., Historical      sodium bicarbonate 650 MG tablet TAKE 1/2 TABLET BY MOUTH TWICE A DAY, Disp-90 tablet, R-1, E-PrescribeDX Code Needed  .      timolol maleate (TIMOPTIC) 0.5 % ophthalmic solution Place 1 drop into both eyes 2 times daily, Disp-5 mL, R-11, [id number] day supply ok. Please wait for patient to call for a fill. Thank you       !! - Potential duplicate medications found. Please discuss with provider.        STOP taking these medications       aspirin 81 MG tablet Comments:   Reason for Stopping:                 Discharge Procedure Orders   Referral Order to Outpatient Physical Therapy   Standing Status: Future  "  Referral Priority: Routine Referral Type: Rehab Therapy Physical Therapy   Number of Visits Requested: 1     Reason for your hospital stay   Order Comments: Total knee arthroplasty with Dr. Hassan     When to call - Contact Surgeon Team   Order Comments: You may experience symptoms that require follow-up before your scheduled appointment. Refer to the \"Stoplight Tool\" for instructions on when to contact your Surgeon Team if you are concerned about pain control, blood clots, constipation, or if you are unable to urinate.     When to call - Reach out to Urgent Care   Order Comments: If you are not able to reach your Surgeon Team and you need immediate care, go to the Orthopedic Walk-in Clinic or Urgent Care at your Surgeon's office.  Do NOT go to the Emergency Room unless you have shortness of breath, chest pain, or other signs of a medical emergency.     When to call - Reasons to Call 911   Order Comments: Call 911 immediately if you experience sudden-onset chest pain, arm weakness/numbness, slurred speech, or shortness of breath     Discharge Instruction - Breathing exercises   Order Comments: Perform breathing exercises using your Incentive Spirometer 10 times per hour while awake for 2 weeks.     Symptoms - Fever Management   Order Comments: A low grade fever can be expected after surgery.  Use acetaminophen (TYLENOL) as needed for fever management.  Contact your Surgeon Team if you have a fever greater than 101.5 F, chills, and/or night sweats.     Symptoms - Constipation management   Order Comments: Constipation (hard, dry bowel movements) is expected after surgery due to the combination of being less active, the anesthetic, and the opioid pain medication.  You can do the following to help reduce constipation:  ~  FLUIDS:  Drink clear liquids (water or Gatorade), or juice (apple/prune).  ~  DIET:  Eat a fiber rich diet.    ~  ACTIVITY:  Get up and move around several times a day.  Increase your activity as you " are able.  MEDICATIONS:  Reduce the risk of constipation by starting medications before you are constipated.  You can take Miralax   (1 packet as directed) and/or a stool softener (Senokot 1-2 tablets 1-2 times a day).  If you already have constipation and these medications are not working, you can get magnesium citrate and use as directed.  If you continue to have constipation you can try an over the counter suppository or enema.  Call your Surgeon Team if it has been greater than 3 days since your last bowel movement.     Symptoms - Reduced Urine Output   Order Comments: Changes in the amount of fluids you drank before and after surgery may result in problems urinating.  It is important to stay well-hydrated after surgery and drink plenty of water. If it has been greater than 8 hours since you have urinated despite drinking plenty of water, call your Surgeon Team.     Activity - Exercises to prevent blood clots   Order Comments: Unless otherwise directed by your Surgeon team, perform the following exercises at least three times per day for the first four weeks after surgery to prevent blood clots in your legs: 1) Point and flex your feet (Ankle Pumps), 2) Move your ankle around in big circles, 3) Wiggle your toes, 4) Walk, even for short distances, several times a day, will help decrease the risk of blood clots.     Order Specific Question Answer Comments   Is discharge order? Yes      Comfort and Pain Management - Pain after Surgery   Order Comments: Pain after surgery is normal and expected.  You will have some amount of pain for several weeks after surgery.  Your pain will improve with time.  There are several things you can do to help reduce your pain including: rest, ice, elevation, and using pain medications as needed. Contact your Surgeon Team if you have pain that persists or worsens after surgery despite rest, ice, elevation, and taking your medication(s) as prescribed. Contact your Surgeon Team if you  "have new numbness, tingling, or weakness in your operative extremity.     Comfort and Pain Management - Swelling after Surgery   Order Comments: Swelling and/or bruising of the surgical extremity is common and may persist for several months after surgery. In addition to frequent icing and elevation, gentle compressive support with an ACE wrap or tubigrip may help with swelling. Apply compression regularly, removing at least twice daily to perform skin checks. Contact your Surgeon Team if your swelling increases and is NOT associated with an increase in your activity level, or if your swelling increases and is associated with redness and pain.     Comfort and Pain Management - LOWER Extremity Elevation   Order Comments: Swelling is expected for several months after surgery. This type of swelling is usually associated with gravity and activity, and can be improved with elevation.   The best way to do this is to get your \"toes above your nose\" by laying down and placing several pillows lengthwise under your calf and heel. When elevating your leg keep your knee completely straight. Perform this elevation as often as possible especially for the first two weeks after surgery.     Comfort and Pain Management - Cold therapy   Order Comments: Ice can be used to control swelling and discomfort after surgery. Place a thin towel over your operative site and apply the ice pack overtop. Leave ice pack in place for 20 minutes, then remove for 20 minutes. Repeat this 20 minutes on/20 minutes off routine as often as tolerated.     Medication Instructions - Acetaminophen (TYLENOL) Instructions   Order Comments: You were discharged with acetaminophen (TYLENOL) for pain management after surgery. Acetaminophen most effectively manages pain symptoms when it is taken on a schedule without missing doses (every four, six, or eight hours). Your Provider will prescribe a safe daily dose between 3000 - 4000 mg.  Do NOT exceed this daily dose. " Most patients use acetaminophen for pain control for the first four weeks after surgery.  You can wean from this medication as your pain decreases.     Medication Instructions - NSAID Instructions   Order Comments: You were discharged with an anti-inflammatory medication for pain management to use in combination with acetaminophen (TYLENOL) and the narcotic pain medication.  Take this medication exactly as directed.  You should only take one anti-inflammatory at a time.  Some common anti-inflammatories include: ibuprofen (ADVIL, MOTRIN), naproxen (ALEVE, NAPROSYN), celecoxib (CELEBREX), meloxicam (MOBIC), ketorolac (TORADOL).  Take this medication with food and water.     Medication Instructions - Opioids - Tapering Instructions   Order Comments: In the first three days following surgery, your symptoms may warrant use of the narcotic pain medication every four to six hours as prescribed. This is normal. As your pain symptoms improve, focus your efforts on decreasing (tapering) use of narcotic medications. The most successful tapering strategy is to first, decrease the number of tablets you take every 4-6 hours to the minimum prescribed. Then, increase the amount of time between doses.  For example:  First, taper to   or 1 tablet every 4-6 hours.  Then, taper to   or 1 tablet every 6-8 hours.  Then, taper to   or 1 tablet every 8-10 hours.  Then, taper to   or 1 tablet every 10-12 hours.  Then, taper to   or 1 tablet at bedtime.  The bedtime dose can help with comfort during sleep and is typically the last dose to be discontinued after surgery.     Follow Up Care   Order Comments: Follow-up with your Surgeon Team in 2 weeks for wound check.     Physical Therapy Instructions   Order Comments: Begin physical therapy within one week following surgery.     Activity - Total Knee Arthroplasty     Order Specific Question Answer Comments   Is discharge order? Yes      Return to Driving   Order Comments: Return to driving -  Driving is NOT permitted until directed by your provider. Under no circumstance are you permitted to drive while using narcotic pain medications.     Order Specific Question Answer Comments   Is discharge order? Yes      Return to athletic activities   Order Comments: Return to athletic activities- Activities such as swimming, bicycling, jogging, running, and stop-and-go sports should be avoided until permitted by your Provider.     Order Specific Question Answer Comments   Is discharge order? Yes      Return to School / Work   Order Comments: Return to School / Work: You may return to work/school when directed by your Provider.     Order Specific Question Answer Comments   Is discharge order? Yes      NO Precautions   Order Comments: No precautions directed by your Provider.  You may perform range of motion activities as tolerated.     Order Specific Question Answer Comments   Is discharge order? Yes      Weight bearing as tolerated   Order Comments: Weight bearing as tolerated on your operative extremity.     Order Specific Question Answer Comments   Is discharge order? Yes      Dressing / Wound Care - Wound   Order Comments: You have a clean dressing on your surgical wound. Dressing change instructions as follows: dressing will be removed at your follow-up appointment. Contact your Surgeon Team if you have increased redness, warmth around the surgical wound, and/or drainage from the surgical wound.     Dressing Wound Care - Shower with wound/dressing NOT covered   Order Comments: You do not need to cover your dressing or incision in the shower, you may allow water and soap to run over top of the surgical dressing or incision. You may shower 2 days after surgery.  You are strictly prohibited from soaking or submerging the surgical wound underwater.     Dressing / Wound Care - NO Tub Bathing   Order Comments: Tub bathing, swimming, or any other activities that will cause your incision to be submerged in water  should be avoided until allowed by your Surgeon.     Medication instructions -  Anticoagulation - aspirin   Order Comments: Take the aspirin as prescribed for a total of four weeks after surgery.  This is given to help minimize your risk of blood clot.     Medication Instructions - Opioid Instructions (Greater than or equal to 65 years)   Order Comments: You were discharged with an opioid medication (hydromorphone, oxycodone, hydrocodone, or tramadol). This medication should only be taken for breakthrough pain that is not controlled with acetaminophen (TYLENOL). If you rate your pain less than 3 you do not need this medication.  Pain rating 0-3:  You do not need this medication  Pain rating 4-6:  Take 1/2 tablet every 4-6 hours as needed  Pain rating 7-10:  Take 1 tablet every 4-6 hours as needed  Do not exceed 4 tablets per day     Crutches DME   Order Comments: DME Documentation: Describe the reason for need to support medical necessity: Impaired gait status post knee surgery. I, the undersigned, certify that the above prescribed supplies are medically necessary for this patient and is both reasonable and necessary in reference to accepted standards of medical practice in the treatment of this patient's condition and is not prescribed as a convenience.     Order Specific Question Answer Comments   Medical Equipment (DME) Supplier: [employer]    PATIENT INSTRUCTIONS: If you did not receive this ordered item today, please contact [employer] for availability ([place] Locations: [phone], [place]: [phone]).    Crutch Type: Standard    Crutches Add On: NA    Length of Need: Lifetime      Cane DME   Order Comments: DME Documentation: Describe the reason for need to support medical necessity: Impaired gait status post knee surgery. I, the undersigned, certify that the above prescribed supplies are medically necessary for this patient and is both reasonable and necessary in  reference to accepted standards of medical practice in the treatment of this patient's condition and is not prescribed as a convenience.     Order Specific Question Answer Comments   Medical Equipment (DME) Supplier: [employer]    PATIENT INSTRUCTIONS: If you did not receive this ordered item today, please contact [employer] for availability ([place] Locations: [phone], [place]: [phone]).    Cane Type: Single Tip    Reminder: Patient can typically get 1 every 5 years      Walker DME   Order Comments: DME Documentation: Describe the reason for need to support medical necessity: Impaired gait status post knee surgery. I, the undersigned, certify that the above prescribed supplies are medically necessary for this patient and is both reasonable and necessary in reference to accepted standards of medical practice in the treatment of this patient's condition and is not prescribed as a convenience.     Order Specific Question Answer Comments   Medical Equipment (DME) Supplier: [employer]    PATIENT INSTRUCTIONS: If you did not receive this ordered item today, please contact [employer] for availability ([place] Locations: [phone], [place]: [phone]).    Walker Type: Standard (2 Wheel)    Accessories: N/A      Discharge Instruction - Regular Diet Adult   Order Comments: Return to your pre-surgery diet unless instructed otherwise     Order Specific Question Answer Comments   Is discharge order? Yes        Osman Marroquin MD  Orthopedic Surgery Resident, PGY-4

## 2024-04-04 ENCOUNTER — THERAPY VISIT (OUTPATIENT)
Dept: PHYSICAL THERAPY | Facility: CLINIC | Age: 79
End: 2024-04-04
Payer: COMMERCIAL

## 2024-04-04 DIAGNOSIS — G89.29 CHRONIC PAIN OF LEFT KNEE: Primary | ICD-10-CM

## 2024-04-04 DIAGNOSIS — M17.12 PRIMARY OSTEOARTHRITIS OF LEFT KNEE: ICD-10-CM

## 2024-04-04 DIAGNOSIS — M25.562 CHRONIC PAIN OF LEFT KNEE: Primary | ICD-10-CM

## 2024-04-04 PROCEDURE — 97110 THERAPEUTIC EXERCISES: CPT | Mod: GP | Performed by: PHYSICAL THERAPIST

## 2024-04-04 PROCEDURE — 97161 PT EVAL LOW COMPLEX 20 MIN: CPT | Mod: GP | Performed by: PHYSICAL THERAPIST

## 2024-04-04 ASSESSMENT — ACTIVITIES OF DAILY LIVING (ADL)
SIT WITH YOUR KNEE BENT: ACTIVITY IS FAIRLY DIFFICULT
KNEE_ACTIVITY_OF_DAILY_LIVING_SCORE: 22.86
GIVING WAY, BUCKLING OR SHIFTING OF KNEE: THE SYMPTOM AFFECTS MY ACTIVITY SLIGHTLY
STIFFNESS: THE SYMPTOM AFFECTS MY ACTIVITY SLIGHTLY
GO UP STAIRS: I AM UNABLE TO DO THE ACTIVITY
HOW_WOULD_YOU_RATE_THE_OVERALL_FUNCTION_OF_YOUR_KNEE_DURING_YOUR_USUAL_DAILY_ACTIVITIES?: SEVERELY ABNORMAL
SWELLING: THE SYMPTOM AFFECTS MY ACTIVITY SLIGHTLY
STIFFNESS: THE SYMPTOM AFFECTS MY ACTIVITY SLIGHTLY
PLEASE_INDICATE_YOR_PRIMARY_REASON_FOR_REFERRAL_TO_THERAPY:: KNEE
KNEE_ACTIVITY_OF_DAILY_LIVING_SUM: 5
KNEEL ON THE FRONT OF YOUR KNEE: I AM UNABLE TO DO THE ACTIVITY
PAIN: THE SYMPTOM AFFECTS MY ACTIVITY MODERATELY
RISE FROM A CHAIR: ACTIVITY IS VERY DIFFICULT
PAIN: THE SYMPTOM AFFECTS MY ACTIVITY SEVERELY
GO DOWN STAIRS: I AM UNABLE TO DO THE ACTIVITY
STIFFNESS: THE SYMPTOM AFFECTS MY ACTIVITY SEVERELY
RAW_SCORE: 16
STAND: ACTIVITY IS VERY DIFFICULT
PAIN: THE SYMPTOM AFFECTS MY ACTIVITY MODERATELY
WEAKNESS: THE SYMPTOM AFFECTS MY ACTIVITY SEVERELY
SQUAT: I AM UNABLE TO DO THE ACTIVITY
WALK: ACTIVITY IS VERY DIFFICULT
AS_A_RESULT_OF_YOUR_KNEE_INJURY,_HOW_WOULD_YOU_RATE_YOUR_CURRENT_LEVEL_OF_DAILY_ACTIVITY?: SEVERELY ABNORMAL
KNEE_ACTIVITY_OF_DAILY_LIVING_SUM: 16
LIMPING: THE SYMPTOM AFFECTS MY ACTIVITY MODERATELY

## 2024-04-04 NOTE — PROGRESS NOTES
PHYSICAL THERAPY EVALUATION  Type of Visit: Evaluation    See electronic medical record for Abuse and Falls Screening details.    Subjective Surgery on 4/1/2024. Having trouble extending at knee. Has been elevating leg and using ice at home. 2 steps into the house, with a handrail. Having swelling in the leg and the foot. Having some difficulty doing some of the exercises, but able to do most of them on his own.       Presenting condition or subjective complaint: Left knee replacement  Date of onset: 04/01/24 (Date of surgery)    Relevant medical history: Arthritis; Cancer; Chest pain; COPD; Diabetes; Hearing problems; High blood pressure; Incontinence; Numbness or tingling in perianal area; Sleep disorder like apnea; Smoking; Vision problems   Dates & types of surgery: Knee 4-1-2024    Prior diagnostic imaging/testing results: X-ray     Prior therapy history for the same diagnosis, illness or injury: Yes 2 sessions before surgery    Prior Level of Function  Transfers: Independent, Assistive equipment  Ambulation: Independent, Assistive equipment  ADL: Independent, Assistive equipment    Living Environment  Social support: With a significant other or spouse   Type of home: 1 level   Stairs to enter the home: Yes 2 Is there a railing: Yes   Ramp:     Stairs inside the home: No       Help at home: Self Cares (home health aide/personal care attendant, family, etc)  Equipment owned: Straight Cane; Walker; Walker with wheels; Raised toilet seat; Bath bench; Lift chair     Employment: No    Hobbies/Interests:      Patient goals for therapy:       Objective   KNEE EVALUATION  PAIN: Pain Level at Rest: 2/10  Pain Level with Use: 10/10  INTEGUMENTARY (edema, incisions): Minimal to moderate swelling of knee, lower leg and foot. Moderate redness noted medial to surgical site  GAIT:  Weightbearing Status: WBAT  Assistive Device(s): Walker (front wheeled)  Gait Deviations: Stride length decreased, Decreased heel strike and toe  off.    ROM:   (Degrees) Left AROM Left PROM  Right AROM Right PROM   Knee Flexion  90  120   Knee Extension  5 degrees short of TKE  0     STRENGTH:   Pain: - none + mild ++ moderate +++ severe  Strength Scale: 0-5/5 Left Right   Knee Flexion <2/5 due to ROM deficits 5   Knee Extension <2/5 due to ROM deficits 5   Quad Set         Assessment & Plan   CLINICAL IMPRESSIONS  Medical Diagnosis: Primary osteoarthritis of left knee    Treatment Diagnosis: Chronic left knee pain (TKA)   Impression/Assessment: Patient is a 78 year old male with left knee complaints.  The following significant findings have been identified: Pain, Decreased ROM/flexibility, Decreased joint mobility, Decreased strength, Impaired balance, Impaired gait, and Decreased activity tolerance. These impairments interfere with their ability to perform self care tasks, recreational activities, household chores, household mobility, and community mobility as compared to previous level of function.     Clinical Decision Making (Complexity):  Clinical Presentation: Stable/Uncomplicated  Clinical Presentation Rationale: based on medical and personal factors listed in PT evaluation  Clinical Decision Making (Complexity): Low complexity    PLAN OF CARE  Treatment Interventions:  Interventions: Gait Training, Manual Therapy, Neuromuscular Re-education, Therapeutic Activity, Therapeutic Exercise, Self-Care/Home Management    Long Term Goals     PT Goal 1  Goal Identifier: Stairs  Goal Description: Pt will be able to ascend and descend 2 stairs with a reciprical gait pattern and a handrail assist safely.  Target Date: 05/23/24  PT Goal 2  Goal Identifier: Ambulation  Goal Description: Pt will be able to ambulate for 15 minutes without an assistive device in order to run errands with a minimal increase in pain 1-2/10  Target Date: 05/23/24      Frequency of Treatment: 2 times per week  Duration of Treatment: 7 weeks    Recommended Referrals to Other  Professionals:   Education Assessment:   Learner/Method: Patient;No Barriers to Learning    Risks and benefits of evaluation/treatment have been explained.   Patient/Family/caregiver agrees with Plan of Care.     Evaluation Time:     PT Eval, Low Complexity Minutes (73315): 15       Signing Clinician: [facility]                                                                                   OUTPATIENT PHYSICAL THERAPY      PLAN OF TREATMENT FOR OUTPATIENT REHABILITATION   Patient's Last Name, First Name, ODETTE Nolasco,Rakan  E YOB: 1945   Provider's Name   [facility]   Medical Record No.  [id number]     Onset Date: 04/01/24 (Date of surgery)  Start of Care Date: 04/04/24     Medical Diagnosis:  Primary osteoarthritis of left knee      PT Treatment Diagnosis:  Chronic left knee pain (TKA) Plan of Treatment  Frequency/Duration: 2 times per week/ 7 weeks    Certification date from 04/04/24 to 05/23/24         See note for plan of treatment details and functional goals     Khadar Altamirano PT                         I CERTIFY THE NEED FOR THESE SERVICES FURNISHED UNDER        THIS PLAN OF TREATMENT AND WHILE UNDER MY CARE     (Physician attestation of this document indicates review and certification of the therapy plan).              Referring Provider:  Crys Wu    Initial Assessment  See Epic Evaluation- Start of Care Date: 04/04/24

## 2024-04-09 ENCOUNTER — THERAPY VISIT (OUTPATIENT)
Dept: PHYSICAL THERAPY | Facility: CLINIC | Age: 79
End: 2024-04-09
Payer: COMMERCIAL

## 2024-04-09 DIAGNOSIS — G89.29 CHRONIC PAIN OF LEFT KNEE: Primary | ICD-10-CM

## 2024-04-09 DIAGNOSIS — M25.562 CHRONIC PAIN OF LEFT KNEE: Primary | ICD-10-CM

## 2024-04-09 PROCEDURE — 97530 THERAPEUTIC ACTIVITIES: CPT | Mod: GP

## 2024-04-09 PROCEDURE — 97110 THERAPEUTIC EXERCISES: CPT | Mod: GP

## 2024-04-09 PROCEDURE — 97116 GAIT TRAINING THERAPY: CPT | Mod: GP

## 2024-04-10 ENCOUNTER — TELEPHONE (OUTPATIENT)
Dept: ORTHOPEDICS | Facility: CLINIC | Age: 79
End: 2024-04-10

## 2024-04-10 NOTE — TELEPHONE ENCOUNTER
Called Pt to check in and see how his rash is doing. We were notified by his PT last week that he had a rash on his elbows that was starting on his buttocks as well. Pt said that his rash had gotten worse, but is now getting better. They were using bacitracin on the rash. They confirmed there were no open areas. I advised switching to topical benadryl and/or hydrocortisone cream. Pt just picked up topical benadryl and will try this. Suspect he is having a stress reaction or possibly due to the surgical soap. Pt reports that he has had this type of rash before and it resolved with time. He will     Deacon Dominguez, KESHIACC

## 2024-04-11 ENCOUNTER — THERAPY VISIT (OUTPATIENT)
Dept: PHYSICAL THERAPY | Facility: CLINIC | Age: 79
End: 2024-04-11
Payer: COMMERCIAL

## 2024-04-11 DIAGNOSIS — M25.562 CHRONIC PAIN OF LEFT KNEE: Primary | ICD-10-CM

## 2024-04-11 DIAGNOSIS — G89.29 CHRONIC PAIN OF LEFT KNEE: Primary | ICD-10-CM

## 2024-04-11 PROCEDURE — 97110 THERAPEUTIC EXERCISES: CPT | Mod: GP | Performed by: PHYSICAL THERAPIST

## 2024-04-14 DIAGNOSIS — E61.1 IRON DEFICIENCY: ICD-10-CM

## 2024-04-15 ENCOUNTER — OFFICE VISIT (OUTPATIENT)
Dept: ORTHOPEDICS | Facility: CLINIC | Age: 79
End: 2024-04-15
Payer: COMMERCIAL

## 2024-04-15 DIAGNOSIS — G70.00 MYASTHENIA GRAVIS, ACHR ANTIBODY POSITIVE (H): ICD-10-CM

## 2024-04-15 DIAGNOSIS — G50.0 TRIGEMINAL NEURALGIA: ICD-10-CM

## 2024-04-15 DIAGNOSIS — I10 HTN, GOAL BELOW 140/90: ICD-10-CM

## 2024-04-15 DIAGNOSIS — M17.12 PRIMARY OSTEOARTHRITIS OF LEFT KNEE: Primary | ICD-10-CM

## 2024-04-15 PROCEDURE — 99024 POSTOP FOLLOW-UP VISIT: CPT | Performed by: PHYSICIAN ASSISTANT

## 2024-04-15 NOTE — PROGRESS NOTES
Chief Complaint: wound check  DATE OF SERVICE: 4/1/24  SURGEON: Dandre Hassan MD.  POSTOPERATIVE DIAGNOSIS:  Osteoarthritis  OPERATION PERFORMED:  left total knee arthroplasty    HPI: Rakan is a 78-year-old man here for follow-up 2 weeks status post left total knee arthroplasty by Dr. Hassan.  Patient reports that overall he is doing well.  He has some stiffness and soreness.  He is taking Tylenol mostly and a half an oxycodone at night.  He is using ice regularly and the Ace wrap.  He is taking the aspirin as directed.  He has started physical therapy and is working on his home exercises.  No other concerns.    Physical Exam: Rakan is a 78-year-old man who is alert and oriented no apparent distress.  He has an antalgic gait with wheeled walker today.  His left knee dressings were removed.  Anterior knee incision is healing well with no erythema or drainage.  He has moderate swelling and resolving left thigh ecchymosis.  No calf tenderness.  He is neurovascular intact distally.  He is able to perform a straight leg raise.  Active range of motion is 5 to 80 degrees today.    Imaging: Immediate postop x-rays AP and lateral of the left knee were reviewed today.  This shows a left total knee arthroplasty in excellent alignment with no sign of fracture or lucency.    Impression: 78-year-old man doing well status post left total knee arthroplasty    Plan: Patient can shower and get the incisions wet.  No soaking in a tub or pool for 2 weeks.  OK to use vitamin E oil or cocoa butter on the incisions.  Cover incisions as needed for comfort.  Continue to ice and elevate for swelling control.  Continue to work with PT for range of motion and strengthening, as well as working on home exercises.  Follow-up in 1 month for check of progress.  Long-term post-op plan was reviewed below.  All questions were answered today.

## 2024-04-15 NOTE — LETTER
4/15/2024         RE: Rakan Nolasco  [address] [facility] [address]        Dear Colleague,    Thank you for referring your patient, Rakan Nolasco, to the [facility]. Please see a copy of my visit note below.    Chief Complaint: wound check  DATE OF SERVICE: 4/1/24  SURGEON: Dandre Hassan MD.  POSTOPERATIVE DIAGNOSIS:  Osteoarthritis  OPERATION PERFORMED:  left total knee arthroplasty    HPI: Rakan is a 78-year-old man here for follow-up 2 weeks status post left total knee arthroplasty by Dr. Hassan.  Patient reports that overall he is doing well.  He has some stiffness and soreness.  He is taking Tylenol mostly and a half an oxycodone at night.  He is using ice regularly and the Ace wrap.  He is taking the aspirin as directed.  He has started physical therapy and is working on his home exercises.  No other concerns.    Physical Exam: Rakan is a 78-year-old man who is alert and oriented no apparent distress.  He has an antalgic gait with wheeled walker today.  His left knee dressings were removed.  Anterior knee incision is healing well with no erythema or drainage.  He has moderate swelling and resolving left thigh ecchymosis.  No calf tenderness.  He is neurovascular intact distally.  He is able to perform a straight leg raise.  Active range of motion is 5 to 80 degrees today.    Imaging: Immediate postop x-rays AP and lateral of the left knee were reviewed today.  This shows a left total knee arthroplasty in excellent alignment with no sign of fracture or lucency.    Impression: 78-year-old man doing well status post left total knee arthroplasty    Plan: Patient can shower and get the incisions wet.  No soaking in a tub or pool for 2 weeks.  OK to use vitamin E oil or cocoa butter on the incisions.  Cover incisions as needed for comfort.  Continue to ice and elevate for swelling control.  Continue to work with PT for range of motion and strengthening, as well as  working on home exercises.  Follow-up in 1 month for check of progress.  Long-term post-op plan was reviewed below.  All questions were answered today.        Again, thank you for allowing me to participate in the care of your patient.        Sincerely,        Josefa Adames PA-C

## 2024-04-16 ENCOUNTER — THERAPY VISIT (OUTPATIENT)
Dept: PHYSICAL THERAPY | Facility: CLINIC | Age: 79
End: 2024-04-16
Payer: COMMERCIAL

## 2024-04-16 DIAGNOSIS — M25.562 CHRONIC PAIN OF LEFT KNEE: Primary | ICD-10-CM

## 2024-04-16 DIAGNOSIS — G89.29 CHRONIC PAIN OF LEFT KNEE: Primary | ICD-10-CM

## 2024-04-16 PROCEDURE — 97110 THERAPEUTIC EXERCISES: CPT | Mod: 59 | Performed by: PHYSICAL THERAPIST

## 2024-04-16 PROCEDURE — 97530 THERAPEUTIC ACTIVITIES: CPT | Mod: GP | Performed by: PHYSICAL THERAPIST

## 2024-04-16 RX ORDER — OXCARBAZEPINE 300 MG/1
TABLET, FILM COATED ORAL
Qty: 360 TABLET | Refills: 0 | Status: SHIPPED | OUTPATIENT
Start: 2024-04-16 | End: 2024-06-17

## 2024-04-16 RX ORDER — AMLODIPINE BESYLATE 10 MG/1
10 TABLET ORAL DAILY
Qty: 90 TABLET | Refills: 0 | OUTPATIENT
Start: 2024-04-16

## 2024-04-16 RX ORDER — FERROUS SULFATE 325(65) MG
325 TABLET ORAL
Qty: 90 TABLET | Refills: 1 | Status: SHIPPED | OUTPATIENT
Start: 2024-04-16

## 2024-04-16 RX ORDER — PREDNISONE 5 MG/1
TABLET ORAL
Qty: 60 TABLET | Refills: 2 | Status: SHIPPED | OUTPATIENT
Start: 2024-04-16 | End: 2024-08-02

## 2024-04-18 ENCOUNTER — THERAPY VISIT (OUTPATIENT)
Dept: PHYSICAL THERAPY | Facility: CLINIC | Age: 79
End: 2024-04-18
Payer: COMMERCIAL

## 2024-04-18 DIAGNOSIS — M25.562 CHRONIC PAIN OF LEFT KNEE: Primary | ICD-10-CM

## 2024-04-18 DIAGNOSIS — G89.29 CHRONIC PAIN OF LEFT KNEE: Primary | ICD-10-CM

## 2024-04-18 PROCEDURE — 97110 THERAPEUTIC EXERCISES: CPT | Mod: GP | Performed by: PHYSICAL THERAPIST

## 2024-04-18 PROCEDURE — 97530 THERAPEUTIC ACTIVITIES: CPT | Mod: GP | Performed by: PHYSICAL THERAPIST

## 2024-04-18 PROCEDURE — 97140 MANUAL THERAPY 1/> REGIONS: CPT | Mod: GP | Performed by: PHYSICAL THERAPIST

## 2024-04-19 ENCOUNTER — LAB (OUTPATIENT)
Dept: LAB | Facility: CLINIC | Age: 79
End: 2024-04-19
Payer: COMMERCIAL

## 2024-04-19 ENCOUNTER — TELEPHONE (OUTPATIENT)
Dept: UROLOGY | Facility: CLINIC | Age: 79
End: 2024-04-19

## 2024-04-19 DIAGNOSIS — N39.0 RECURRENT UTI: Primary | ICD-10-CM

## 2024-04-19 DIAGNOSIS — N39.0 RECURRENT UTI: ICD-10-CM

## 2024-04-19 LAB
ALBUMIN UR-MCNC: NEGATIVE MG/DL
APPEARANCE UR: CLEAR
BILIRUB UR QL STRIP: NEGATIVE
COLOR UR AUTO: YELLOW
GLUCOSE UR STRIP-MCNC: NEGATIVE MG/DL
HGB UR QL STRIP: ABNORMAL
KETONES UR STRIP-MCNC: NEGATIVE MG/DL
LEUKOCYTE ESTERASE UR QL STRIP: ABNORMAL
NITRATE UR QL: NEGATIVE
PH UR STRIP: 6.5 [PH] (ref 5–7)
RBC #/AREA URNS AUTO: NORMAL /HPF
SP GR UR STRIP: 1.01 (ref 1–1.03)
UROBILINOGEN UR STRIP-ACNC: 0.2 E.U./DL
WBC #/AREA URNS AUTO: NORMAL /HPF

## 2024-04-19 PROCEDURE — 81001 URINALYSIS AUTO W/SCOPE: CPT

## 2024-04-19 NOTE — TELEPHONE ENCOUNTER
Returned call and let pt know that his urine is not indicative of an infection, if s/s change or worsen to call back for a reevaluation.    TANO Paul RN 4/19/2024 2:19 PM

## 2024-04-19 NOTE — TELEPHONE ENCOUNTER
[facility] Call Center    Phone Message    May a detailed message be left on voicemail: yes     Reason for Call: Other: pt calling and sure he has a UTI and looking for lab orders, symptoms are Urine is really cloudy, pain when urinating, please call pt     Action Taken: Other: urology    Travel Screening: Not Applicable

## 2024-04-19 NOTE — TELEPHONE ENCOUNTER
"Returned call to pt to discuss his c/of uti s/s  Fever-97.6 @ time of call  Chills- neg  Nausea/vomiting-neg  Flank pain-neg  New or worsen leakage or loss of control-neg  Burning with urination-neg  Painful urination-pos  Increased urgency of urination-about the same- takes \"water pill\"  Increased frequency of urination- neg  Blood in the urine- neg  Clots in the urine- neg  Nocturia- pos usually doesn't get up  UA placed pt will come leave sample today.    TANO Paul RN 4/19/2024 10:00 AM    "

## 2024-04-22 ENCOUNTER — OFFICE VISIT (OUTPATIENT)
Dept: NEUROLOGY | Facility: CLINIC | Age: 79
End: 2024-04-22
Payer: COMMERCIAL

## 2024-04-22 VITALS
HEART RATE: 63 BPM | HEIGHT: 70 IN | BODY MASS INDEX: 31.57 KG/M2 | SYSTOLIC BLOOD PRESSURE: 120 MMHG | WEIGHT: 220.5 LBS | OXYGEN SATURATION: 98 % | DIASTOLIC BLOOD PRESSURE: 67 MMHG

## 2024-04-22 DIAGNOSIS — G50.0 TRIGEMINAL NEURALGIA: ICD-10-CM

## 2024-04-22 DIAGNOSIS — G70.00 MYASTHENIA GRAVIS WITHOUT EXACERBATION (H): ICD-10-CM

## 2024-04-22 DIAGNOSIS — M81.0 AGE-RELATED OSTEOPOROSIS WITHOUT CURRENT PATHOLOGICAL FRACTURE: Primary | ICD-10-CM

## 2024-04-22 PROCEDURE — G2211 COMPLEX E/M VISIT ADD ON: HCPCS | Performed by: PSYCHIATRY & NEUROLOGY

## 2024-04-22 PROCEDURE — 99214 OFFICE O/P EST MOD 30 MIN: CPT | Mod: 24 | Performed by: PSYCHIATRY & NEUROLOGY

## 2024-04-22 ASSESSMENT — PAIN SCALES - GENERAL: PAINLEVEL: MILD PAIN (3)

## 2024-04-22 NOTE — PATIENT INSTRUCTIONS
Ok to take an extra pill of pyridostigmine in the afternoon (therefore: 2 morning, 2 afternoon, 1 evening)  Please continue the same prednisone dose.    I will order another bone density scan to be done in the next months.    Follow-up in 6 months.

## 2024-04-22 NOTE — PROGRESS NOTES
Telly Lucio MD (PCP)  [place], April 22, 2024    Dear Dr Lucio,    I had the pleasure to see Mr. 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  He is currently on prednisone 10 mg daily and pyridostigmine 120 mg in the morning, 60 at noon, and 60 in the evening.  I offered him CellCept last year but he declined it, out of fear of additional immunosuppression and increased risk of infection.  He has type 2 diabetes.  His last hemoglobin A1c was 6.3% in November 2023.  His last bone density scan was a year ago and showed normal density.  He also has trigeminal neuralgia, which is stable on oxcarbazepine treatment, prescribed by me.    Compared to the last time I saw him, he underwent a left knee replacement in early 4/2024, which was uneventful. He is currently using a cane inside the house and a walker outside.  Denies recent falls.  Regarding the myasthenia symptoms, he is overall stable from the last visit.  He denies ptosis, diplopia, difficulty chewing, dysarthria, sialorrhea, dyspnea on exertion, arm fatigue.  He needs to use his arms to get out of the chair, but this is expected after knee surgery.  He reports mild worsening of his dysphagia.  He does have an occasional choking episode with liquids more than solids that will happen almost every day.  This does not necessitate diet changes.        MG Activities of Daily Living (MG-ADL) profile 3 (stable, 2 points for leg weakness may not be related to MG)    Grade 0 1 2 3   Talking Normal Intermittent slurring/nasal speech Constant slurring/nasal speech, can be understood Difficult to understand   Chewing Normal Fatigue with solid food Fatigue with soft food G tube   Swallowing Normal Rare choking Frequent choking necessitating diet changes G tube   Breathing Normal SOB with exertion SOB at rest Ventilator dependent   Ability to brush  "teeth/comb hair Normal Extra effort, no rest periods needed Rest periods needed Cannot do one of these   Ability to rise from chair Normal Mild impairment, uses arms sometimes Moderate impairment, always uses arms Severe impairment, requires assistance   Double vision None Present, not daily Daily, not constant Constant   Ptosis None Present, but not daily Daily, not constant Constant     /67 (BP Location: Right arm, Patient Position: Sitting, Cuff Size: Adult Regular)   Pulse 63   Ht 1.778 m (5' 10\")   Wt 100 kg (220 lb 8 oz)   SpO2 98%   BMI 31.64 kg/m      EXAM: No ptosis after 30 seconds of sustained upward gaze.  There is no weakness of orbicularis oculi or orbicularis oris.  There is no double vision in any gaze direction.  Ductions and versions of the eyes are normal.  There is no weakness of uvula, jaw, palate, or tongue.  No dysphonia or dysarthria.  He has neck flexion and extension strength are 5.  Strength in the upper extremities is 5/5 in proximal muscles on handgrip.  Lower extremities are 5 for right hip flexion 4+ for left after recent knee surgery.    In summary, it is my impression that Mr. Nolasco's MG is stable from the last visit.  I will allow him to take an extra pyridostigmine dose in the afternoon, given his report of mild worsening dysphagia.  However, this is not concerning enough overall to escalate his immunosuppression.  I would recommend continuing the current prednisone dose at 10 mg daily.  I will order repeat bone density DEXA scan, and his primary care doctor will continue monitoring his hemoglobin A1c and metabolic panel.  He will also continue the oxcarbazepine current dose for his trigeminal neuralgia.  Follow-up in 6 months, sooner if needed.    Sincerely,      Arpan Harrison MD, FAAN    The longitudinal plan of care for the diagnosis(es)/condition(s) as documented were addressed during this visit. Due to the added complexity in care, I will continue to " support Rakan in the subsequent management and with ongoing continuity of care: myasthenia gravis    Billing MDM level 4 (moderate) based on #1 problems assessed: Two stable chronic conditions (myasthenia gravis, trigeminal neuralgia), #2 risk: Prescription drug management.

## 2024-04-22 NOTE — LETTER
4/22/2024       RE: Rakan Nolasco  [address] [facility] [address]     Dear Colleague,    Thank you for referring your patient, Rakan Nolasco, to the [facility] [place] at [facility]. Please see a copy of my visit note below.    Telly Lucio MD (PCP)  [place], April 22, 2024    Dear Dr Lucio,    I had the pleasure to see Mr. 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  He is currently on prednisone 10 mg daily and pyridostigmine 120 mg in the morning, 60 at noon, and 60 in the evening.  I offered him CellCept last year but he declined it, out of fear of additional immunosuppression and increased risk of infection.  He has type 2 diabetes.  His last hemoglobin A1c was 6.3% in November 2023.  His last bone density scan was a year ago and showed normal density.  He also has trigeminal neuralgia, which is stable on oxcarbazepine treatment, prescribed by me.    Compared to the last time I saw him, he underwent a left knee replacement in early 4/2024, which was uneventful. He is currently using a cane inside the house and a walker outside.  Denies recent falls.  Regarding the myasthenia symptoms, he is overall stable from the last visit.  He denies ptosis, diplopia, difficulty chewing, dysarthria, sialorrhea, dyspnea on exertion, arm fatigue.  He needs to use his arms to get out of the chair, but this is expected after knee surgery.  He reports mild worsening of his dysphagia.  He does have an occasional choking episode with liquids more than solids that will happen almost every day.  This does not necessitate diet changes.        MG Activities of Daily Living (MG-ADL) profile 3 (stable, 2 points for leg weakness may not be related to MG)    Grade 0 1 2 3   Talking Normal Intermittent slurring/nasal speech  "Constant slurring/nasal speech, can be understood Difficult to understand   Chewing Normal Fatigue with solid food Fatigue with soft food G tube   Swallowing Normal Rare choking Frequent choking necessitating diet changes G tube   Breathing Normal SOB with exertion SOB at rest Ventilator dependent   Ability to brush teeth/comb hair Normal Extra effort, no rest periods needed Rest periods needed Cannot do one of these   Ability to rise from chair Normal Mild impairment, uses arms sometimes Moderate impairment, always uses arms Severe impairment, requires assistance   Double vision None Present, not daily Daily, not constant Constant   Ptosis None Present, but not daily Daily, not constant Constant     /67 (BP Location: Right arm, Patient Position: Sitting, Cuff Size: Adult Regular)   Pulse 63   Ht 1.778 m (5' 10\")   Wt 100 kg (220 lb 8 oz)   SpO2 98%   BMI 31.64 kg/m      EXAM: No ptosis after 30 seconds of sustained upward gaze.  There is no weakness of orbicularis oculi or orbicularis oris.  There is no double vision in any gaze direction.  Ductions and versions of the eyes are normal.  There is no weakness of uvula, jaw, palate, or tongue.  No dysphonia or dysarthria.  He has neck flexion and extension strength are 5.  Strength in the upper extremities is 5/5 in proximal muscles on handgrip.  Lower extremities are 5 for right hip flexion 4+ for left after recent knee surgery.    In summary, it is my impression that Mr. Nolasco's MG is stable from the last visit.  I will allow him to take an extra pyridostigmine dose in the afternoon, given his report of mild worsening dysphagia.  However, this is not concerning enough overall to escalate his immunosuppression.  I would recommend continuing the current prednisone dose at 10 mg daily.  I will order repeat bone density DEXA scan, and his primary care doctor will continue monitoring his hemoglobin A1c and metabolic panel.  He will also continue the " oxcarbazepine current dose for his trigeminal neuralgia.  Follow-up in 6 months, sooner if needed.      The longitudinal plan of care for the diagnosis(es)/condition(s) as documented were addressed during this visit. Due to the added complexity in care, I will continue to support Rakan in the subsequent management and with ongoing continuity of care: myasthenia gravis    Billing MDM level 4 (moderate) based on #1 problems assessed: Two stable chronic conditions (myasthenia gravis, trigeminal neuralgia), #2 risk: Prescription drug management.              Again, thank you for allowing me to participate in the care of your patient.      Sincerely,    Arpan Harrison MD

## 2024-04-22 NOTE — NURSING NOTE
Chief Complaint   Patient presents with    RECHECK    Myasthenia Gravis       Vitals were taken and medications were reconciled.    Larissa Guerin, Technician  8:44 AM  April 22, 2024

## 2024-04-23 ENCOUNTER — THERAPY VISIT (OUTPATIENT)
Dept: PHYSICAL THERAPY | Facility: CLINIC | Age: 79
End: 2024-04-23
Payer: COMMERCIAL

## 2024-04-23 DIAGNOSIS — M25.562 CHRONIC PAIN OF LEFT KNEE: Primary | ICD-10-CM

## 2024-04-23 DIAGNOSIS — G89.29 CHRONIC PAIN OF LEFT KNEE: Primary | ICD-10-CM

## 2024-04-23 PROCEDURE — 97140 MANUAL THERAPY 1/> REGIONS: CPT | Mod: GP | Performed by: PHYSICAL THERAPIST

## 2024-04-23 PROCEDURE — 97530 THERAPEUTIC ACTIVITIES: CPT | Mod: GP | Performed by: PHYSICAL THERAPIST

## 2024-04-23 PROCEDURE — 97110 THERAPEUTIC EXERCISES: CPT | Mod: GP | Performed by: PHYSICAL THERAPIST

## 2024-04-24 ENCOUNTER — OFFICE VISIT (OUTPATIENT)
Dept: FAMILY MEDICINE | Facility: CLINIC | Age: 79
End: 2024-04-24
Payer: COMMERCIAL

## 2024-04-24 VITALS
BODY MASS INDEX: 32.04 KG/M2 | OXYGEN SATURATION: 97 % | SYSTOLIC BLOOD PRESSURE: 118 MMHG | TEMPERATURE: 98 F | DIASTOLIC BLOOD PRESSURE: 66 MMHG | RESPIRATION RATE: 15 BRPM | HEIGHT: 70 IN | HEART RATE: 71 BPM | WEIGHT: 223.8 LBS

## 2024-04-24 DIAGNOSIS — S00.412A ABRASION OF LEFT EAR CANAL, INITIAL ENCOUNTER: Primary | ICD-10-CM

## 2024-04-24 PROCEDURE — 99212 OFFICE O/P EST SF 10 MIN: CPT | Performed by: NURSE PRACTITIONER

## 2024-04-24 RX ORDER — RESPIRATORY SYNCYTIAL VIRUS VACCINE 120MCG/0.5
0.5 KIT INTRAMUSCULAR ONCE
Qty: 1 EACH | Refills: 0 | Status: CANCELLED | OUTPATIENT
Start: 2024-04-24 | End: 2024-04-24

## 2024-04-24 ASSESSMENT — PAIN SCALES - GENERAL: PAINLEVEL: MILD PAIN (3)

## 2024-04-24 NOTE — PROGRESS NOTES
"  Assessment & Plan     Abrasion of left ear canal, initial encounter  Appears to be an abrasion in left ear canal for which I recommend he instils over the counter hydrogen peroxide drops twice daily for 5-10 minutes for about a week.  If the ear becomes irritated or itchy, may instil baby oil into ear.  Important to avoid use of q-tips or any other object into the ear to allow healing.  If any worsening of symptoms or failure to resolve within 1-2 weeks then follow up.                  Subjective   Rakan is a 78 year old, presenting for the following health issues:  Ear Problem (Left ear, some blood Monday )        4/24/2024    11:57 AM   Additional Questions   Roomed by Harini PACHECO     History of Present Illness       Reason for visit:  Ear pain and blood  Symptom onset:  1-3 days ago  Symptoms include:  Ear pain  Symptom intensity:  Moderate  Symptom progression:  Worsening  Had these symptoms before:  No    He eats 2-3 servings of fruits and vegetables daily.He consumes 0 sweetened beverage(s) daily.He exercises with enough effort to increase his heart rate 9 or less minutes per day.  He exercises with enough effort to increase his heart rate 3 or less days per week.   He is taking medications regularly.     Additional provider notes:    Feels like an earache in left ear, also with reduced hearing.  Two days ago noticed some blood from left ear on the sheet.  Has not been using q-tips but may have used his fingers to clean the canals.  Wears earring aids at baseline.    Of note he just had left knee replacement 4/1/24 and has been on twice daily Aspirin 81 mg since his surgery.          Objective    /66 (BP Location: Right arm, Patient Position: Sitting, Cuff Size: Adult Large)   Pulse 71   Temp 98  F (36.7  C) (Oral)   Resp 15   Ht 1.778 m (5' 10\")   Wt 101.5 kg (223 lb 12.8 oz)   SpO2 97%   BMI 32.11 kg/m    Body mass index is 32.11 kg/m .  Physical Exam   GENERAL: healthy, alert, no distress, and over " weight  HENT: right ear: normal: no effusions, no erythema, normal landmarks, small amoutn of ear wax  left ear: there is an abrasion left side of the canal with fresh blood, right side of the canal has dried dark blood, the TM was partially visualized and was normal without erythema, oropharynx clear, and oral mucous membranes moist  PSYCH: mentation appears normal, affect normal/bright          Signed Electronically by: Diane Chamberlain NP

## 2024-04-24 NOTE — PATIENT INSTRUCTIONS
Hydrogen peroxide twice daily for 5-10 minutes for a week.  Baby oil after if needed if ears get itchy.

## 2024-04-25 ENCOUNTER — THERAPY VISIT (OUTPATIENT)
Dept: PHYSICAL THERAPY | Facility: CLINIC | Age: 79
End: 2024-04-25
Payer: COMMERCIAL

## 2024-04-25 DIAGNOSIS — G89.29 CHRONIC PAIN OF LEFT KNEE: Primary | ICD-10-CM

## 2024-04-25 DIAGNOSIS — M25.562 CHRONIC PAIN OF LEFT KNEE: Primary | ICD-10-CM

## 2024-04-25 PROCEDURE — 97140 MANUAL THERAPY 1/> REGIONS: CPT | Mod: GP | Performed by: PHYSICAL THERAPIST

## 2024-04-25 PROCEDURE — 97112 NEUROMUSCULAR REEDUCATION: CPT | Mod: GP | Performed by: PHYSICAL THERAPIST

## 2024-04-25 PROCEDURE — 97110 THERAPEUTIC EXERCISES: CPT | Mod: GP | Performed by: PHYSICAL THERAPIST

## 2024-04-27 DIAGNOSIS — R60.9 EDEMA, UNSPECIFIED TYPE: ICD-10-CM

## 2024-04-28 DIAGNOSIS — E11.65 TYPE 2 DIABETES MELLITUS WITH HYPERGLYCEMIA, WITHOUT LONG-TERM CURRENT USE OF INSULIN (H): ICD-10-CM

## 2024-04-29 RX ORDER — SODIUM BICARBONATE 650 MG/1
TABLET ORAL
Qty: 90 TABLET | Refills: 1 | Status: SHIPPED | OUTPATIENT
Start: 2024-04-29 | End: 2024-07-24

## 2024-04-30 ENCOUNTER — THERAPY VISIT (OUTPATIENT)
Dept: PHYSICAL THERAPY | Facility: CLINIC | Age: 79
End: 2024-04-30
Payer: COMMERCIAL

## 2024-04-30 DIAGNOSIS — G89.29 CHRONIC PAIN OF LEFT KNEE: Primary | ICD-10-CM

## 2024-04-30 DIAGNOSIS — M25.562 CHRONIC PAIN OF LEFT KNEE: Primary | ICD-10-CM

## 2024-04-30 PROCEDURE — 97110 THERAPEUTIC EXERCISES: CPT | Mod: GP | Performed by: PHYSICAL THERAPIST

## 2024-04-30 PROCEDURE — 97530 THERAPEUTIC ACTIVITIES: CPT | Mod: GP | Performed by: PHYSICAL THERAPIST

## 2024-05-01 DIAGNOSIS — Z96.652 STATUS POST LEFT KNEE REPLACEMENT: Primary | ICD-10-CM

## 2024-05-02 ENCOUNTER — THERAPY VISIT (OUTPATIENT)
Dept: PHYSICAL THERAPY | Facility: CLINIC | Age: 79
End: 2024-05-02
Payer: COMMERCIAL

## 2024-05-02 DIAGNOSIS — G89.29 CHRONIC PAIN OF LEFT KNEE: Primary | ICD-10-CM

## 2024-05-02 DIAGNOSIS — M25.562 CHRONIC PAIN OF LEFT KNEE: Primary | ICD-10-CM

## 2024-05-02 PROCEDURE — 97110 THERAPEUTIC EXERCISES: CPT | Mod: GP | Performed by: PHYSICAL THERAPIST

## 2024-05-03 ENCOUNTER — OFFICE VISIT (OUTPATIENT)
Dept: FAMILY MEDICINE | Facility: CLINIC | Age: 79
End: 2024-05-03
Payer: COMMERCIAL

## 2024-05-03 VITALS
HEART RATE: 60 BPM | HEIGHT: 70 IN | DIASTOLIC BLOOD PRESSURE: 66 MMHG | WEIGHT: 221 LBS | BODY MASS INDEX: 31.64 KG/M2 | SYSTOLIC BLOOD PRESSURE: 105 MMHG | OXYGEN SATURATION: 95 % | RESPIRATION RATE: 17 BRPM | TEMPERATURE: 97.5 F

## 2024-05-03 DIAGNOSIS — C61 PROSTATE CANCER (H): ICD-10-CM

## 2024-05-03 DIAGNOSIS — H92.02 LEFT EAR PAIN: Primary | ICD-10-CM

## 2024-05-03 DIAGNOSIS — M31.6 TEMPORAL ARTERITIS (H): ICD-10-CM

## 2024-05-03 DIAGNOSIS — Z96.652 STATUS POST LEFT KNEE REPLACEMENT: ICD-10-CM

## 2024-05-03 DIAGNOSIS — E11.69 TYPE 2 DIABETES MELLITUS WITH OTHER SPECIFIED COMPLICATION, WITHOUT LONG-TERM CURRENT USE OF INSULIN (H): ICD-10-CM

## 2024-05-03 DIAGNOSIS — Z79.899 MEDICATION MANAGEMENT: ICD-10-CM

## 2024-05-03 DIAGNOSIS — E11.65 TYPE 2 DIABETES MELLITUS WITH HYPERGLYCEMIA, WITHOUT LONG-TERM CURRENT USE OF INSULIN (H): ICD-10-CM

## 2024-05-03 DIAGNOSIS — E66.01 MORBID OBESITY (H): ICD-10-CM

## 2024-05-03 DIAGNOSIS — I10 PRIMARY HYPERTENSION: ICD-10-CM

## 2024-05-03 PROCEDURE — 99213 OFFICE O/P EST LOW 20 MIN: CPT | Performed by: FAMILY MEDICINE

## 2024-05-03 RX ORDER — RESPIRATORY SYNCYTIAL VIRUS VACCINE 120MCG/0.5
0.5 KIT INTRAMUSCULAR ONCE
Qty: 1 EACH | Refills: 0 | Status: CANCELLED | OUTPATIENT
Start: 2024-05-03 | End: 2024-05-03

## 2024-05-03 NOTE — PROGRESS NOTES
Assessment & Plan     Left ear pain: follow up   Shows non specific irritation left ear; TM, been flushing with peroxide will stop. If pain recurs or has drainage will do an antibiotic ear drop    Status post left knee replacement    -  coming along well; doing PT    Primary hypertension    _ well controlled    Type 2 diabetes mellitus with hyperglycemia, without long-term current use of insulin (H)  Type 2 diabetes mellitus with other specified complication, without long-term current use of insulin (H)   Well controlled    Temporal arteritis (H)    - Stable    Morbid obesity (H)   Losing weight    - Counseled to continue making better food choices, exercise as tolerated, and lose weight.    Prostate cancer (H)    s/p prostatectomy, followed by urology.    See Patient Instructions    Subjective   Rakan is a 78 year old, presenting for the following health issues:  Pain (ear)      5/3/2024     8:48 AM   Additional Questions   Roomed by Suzy   Accompanied by wife     Pain     Seen in  4/24/2024:    Abrasion of left ear canal, initial encounter  Appears to be an abrasion in left ear canal for which I recommend he instils over the counter hydrogen peroxide drops twice daily for 5-10 minutes for about a week.  If the ear becomes irritated or itchy, may instil baby oil into ear.  Important to avoid use of q-tips or any other object into the ear to allow healing.  If any worsening of symptoms or failure to resolve within 1-2 weeks then follow up.    L ear pain follow up and knee replacement    No pain now and pain improved, been seeing little pieces coming out.    Weight:     Wt Readings from Last 4 Encounters:   05/03/24 100.2 kg (221 lb)   04/24/24 101.5 kg (223 lb 12.8 oz)   04/22/24 100 kg (220 lb 8 oz)   04/01/24 102.5 kg (225 lb 15.5 oz)      DM2:   Blood sugars around mid 90's    HTN:  BP Readings from Last 3 Encounters:   05/03/24 105/66   04/24/24 118/66   04/22/24 120/67        Review of  "Systems  Constitutional, cardiovascular, pulmonary, gi and gu systems are negative, except as otherwise noted.      Objective    /66   Pulse 60   Temp 97.5  F (36.4  C) (Oral)   Resp 17   Ht 1.778 m (5' 10\")   Wt 100.2 kg (221 lb)   SpO2 95%   BMI 31.71 kg/m    Body mass index is 31.71 kg/m .  Physical Exam   GENERAL: alert and no distress  HENT: Lt ear canals/TM's: erythema without drainage  NECK: no adenopathy, no asymmetry, masses, or scars  RESP: lungs clear to auscultation - no rales, rhonchi or wheezes  CV: regular rate and rhythm, no murmur, click or rub, no peripheral edema  ABDOMEN: soft, nontender, no masses and bowel sounds normal  MS: no gross musculoskeletal defects noted, no edema      Signed Electronically by: Telly Lucio MD    "

## 2024-05-06 ENCOUNTER — TELEPHONE (OUTPATIENT)
Dept: ORTHOPEDICS | Facility: CLINIC | Age: 79
End: 2024-05-06
Payer: COMMERCIAL

## 2024-05-06 DIAGNOSIS — Z96.652 STATUS POST LEFT KNEE REPLACEMENT: Primary | ICD-10-CM

## 2024-05-06 RX ORDER — AMOXICILLIN 500 MG/1
CAPSULE ORAL
Qty: 4 CAPSULE | Refills: 0 | Status: SHIPPED | OUTPATIENT
Start: 2024-05-06

## 2024-05-06 NOTE — TELEPHONE ENCOUNTER
Called and spoke with patient regarding upcoming dental visit. Pt stated they chipped a tooth/filling and were going in to have it taken care of right away. Instructed the Pt to take the medication 1hr prior to their visit. Pt had no further questions.     [facility] in [place].    No

## 2024-05-06 NOTE — TELEPHONE ENCOUNTER
[facility] Call Center    Phone Message    May a detailed message be left on voicemail: yes     Reason for Call: Other: Patient calling as he is having dental work done and needs an antibiotic called in.    Pharmacy: [facility] [id number] [place] [facility], [place] - [address]   Patient also wants to know how long he needs to be on antibiotic before he has the dental work done.      Action Taken: Other: te    Travel Screening: Not Applicable

## 2024-05-07 ENCOUNTER — THERAPY VISIT (OUTPATIENT)
Dept: PHYSICAL THERAPY | Facility: CLINIC | Age: 79
End: 2024-05-07
Payer: COMMERCIAL

## 2024-05-07 DIAGNOSIS — M25.562 CHRONIC PAIN OF LEFT KNEE: Primary | ICD-10-CM

## 2024-05-07 DIAGNOSIS — M25.562 ACUTE PAIN OF LEFT KNEE: ICD-10-CM

## 2024-05-07 DIAGNOSIS — G89.29 CHRONIC PAIN OF LEFT KNEE: Primary | ICD-10-CM

## 2024-05-07 DIAGNOSIS — M17.12 OSTEOARTHRITIS OF LEFT KNEE, UNSPECIFIED OSTEOARTHRITIS TYPE: ICD-10-CM

## 2024-05-07 PROCEDURE — 97110 THERAPEUTIC EXERCISES: CPT | Mod: GP | Performed by: PHYSICAL THERAPIST

## 2024-05-07 PROCEDURE — 97530 THERAPEUTIC ACTIVITIES: CPT | Mod: GP | Performed by: PHYSICAL THERAPIST

## 2024-05-07 ASSESSMENT — KOOS JR
GOING UP OR DOWN STAIRS: SEVERE
RISING FROM SITTING: MODERATE
STRAIGHTENING KNEE FULLY: MILD
KOOS JR SCORING: 57.14
HOW SEVERE IS YOUR KNEE STIFFNESS AFTER FIRST WAKING IN MORNING: MILD
STANDING UPRIGHT: MILD
BENDING TO THE FLOOR TO PICK UP OBJECT: MODERATE
TWISING OR PIVOTING ON KNEE: MODERATE

## 2024-05-09 ENCOUNTER — THERAPY VISIT (OUTPATIENT)
Dept: PHYSICAL THERAPY | Facility: CLINIC | Age: 79
End: 2024-05-09
Payer: COMMERCIAL

## 2024-05-09 DIAGNOSIS — G89.29 CHRONIC PAIN OF LEFT KNEE: Primary | ICD-10-CM

## 2024-05-09 DIAGNOSIS — M25.562 CHRONIC PAIN OF LEFT KNEE: Primary | ICD-10-CM

## 2024-05-09 PROCEDURE — 97110 THERAPEUTIC EXERCISES: CPT | Mod: GP | Performed by: PHYSICAL THERAPIST

## 2024-05-09 NOTE — PROGRESS NOTES
"    PLAN  Continue therapy per current plan of care.    Beginning/End Dates of Progress Note Reporting Period:  04/04/24 to 05/09/2024    Referring Provider:  Crys Wu       05/09/24 0500   Appointment Info   Signing clinician's name / credentials Massimo Altamirano, PT, DPT   Total/Authorized Visits 14 (POC) (E&T)   Visits Used 11   Medical Diagnosis Primary osteoarthritis of left knee   PT Tx Diagnosis Chronic left knee pain  (TKA)   Other pertinent information NEXT: Balance / Gait / Increase step height   Quick Adds Certification   Progress Note/Certification   Start of Care Date 04/04/24   Onset of illness/injury or Date of Surgery 04/01/24  (Date of surgery)   Therapy Frequency 2 times per week   Predicted Duration 7 weeks   Certification date from 04/04/24   Certification date to 05/23/24   Progress Note Due Date 05/14/24  (Sees Dr. Hassan)   Progress Note Completed Date 04/04/24   GOALS   PT Goals 2   PT Goal 1   Goal Identifier Stairs   Goal Description Pt will be able to ascend and descend 2 stairs with a reciprical gait pattern and a handrail assist safely.   Goal Progress Not met. Has not attempted.   Target Date 05/23/24   PT Goal 2   Goal Identifier Ambulation   Goal Description Pt will be able to ambulate for 15 minutes without an assistive device in order to run errands with a minimal increase in pain 1-2/10   Goal Progress Partially met. Still using single point cane.   Target Date 05/23/24   Subjective Report   Subjective Report Pt states that overall his knee is doing well but it is \"giving out\" on him occasionally. Has not had any falls because of this, but it does happen a couple of times per day. Seems to happen when taking bigger steps.   Objective Measures   Objective Measures Objective Measure 1   Objective Measure 1   Objective Measure PROM   Details 0-116   Objective Measure 2   Objective Measure Strength   Details Knee extension - 4/5 on left and 5/5 on right / Knee flexion - 4/5 on left " and 5/5 on right, Hip abduction - 4/5 B, Hip adduction - 5/5 B   Objective Measure 3   Objective Measure Gait   Details Uses SPC without loss of balance noted. SPC needed when getting out of NuStep for stability. Pt reaches for equipement in PT gym if not using cane.   Treatment Interventions (PT)   Interventions Therapeutic Procedure/Exercise   Therapeutic Procedure/Exercise   Therapeutic Procedures: strength, endurance, ROM, flexibility minutes (23417) 39   Ther Proc 1 AAROM for flexion with uninvolved LE assist x10   Ther Proc 2 Standing marching x15 B   Ther Proc 3 PROM for knee flexion x4 mins   Ther Proc 4 HS curls in standing x15 with 4# and x20 with 5#   Ther Proc 5 Step ups x15 and x10 on 6 inch step   Ther Proc 7 SAQ in sitting x15 with 5 second holds   Ther Proc 9 NuStep x5 mins with seat at 7-6, level 1 for ROM   Skilled Intervention ROM and strengthening exercise education to improve function   Patient Response/Progress Improved ROM   Education   Learner/Method Patient;No Barriers to Learning   Plan   Home program PTRx (handout)   Comments   Comments Pt is doing well overall and his ROM and strength continue to improve. Pt's ROM is very good (0-116) however his strength and balance could use some improvement. Pt has not met functional goals currently and still needs an assistive device for ambulation and is unable to climb stairs with a reciprical gait pattern. Pt would benefit from additional skilled physical therapy to improve upon these defiicts and meet his functional goals.   Total Session Time   Timed Code Treatment Minutes 39   Total Treatment Time (sum of timed and untimed services) 39

## 2024-05-14 ENCOUNTER — ANCILLARY PROCEDURE (OUTPATIENT)
Dept: GENERAL RADIOLOGY | Facility: CLINIC | Age: 79
End: 2024-05-14
Attending: ORTHOPAEDIC SURGERY
Payer: COMMERCIAL

## 2024-05-14 ENCOUNTER — OFFICE VISIT (OUTPATIENT)
Dept: ORTHOPEDICS | Facility: CLINIC | Age: 79
End: 2024-05-14
Payer: COMMERCIAL

## 2024-05-14 DIAGNOSIS — Z96.652 STATUS POST LEFT KNEE REPLACEMENT: Primary | ICD-10-CM

## 2024-05-14 DIAGNOSIS — Z96.652 STATUS POST LEFT KNEE REPLACEMENT: ICD-10-CM

## 2024-05-14 PROCEDURE — 73562 X-RAY EXAM OF KNEE 3: CPT | Mod: LT | Performed by: RADIOLOGY

## 2024-05-14 PROCEDURE — 99024 POSTOP FOLLOW-UP VISIT: CPT | Performed by: ORTHOPAEDIC SURGERY

## 2024-05-14 NOTE — PROGRESS NOTES
Chief Complaint: Surgical Followup (6wk s/p LTKA )         HPI: Rakan Nolasco returns today in follow-up for his left knee.  Reports he is doing well.  He is using over-the-counter pain medication.  He is no longer using a cane.  He is advancing well in physical therapy with range of motion was measured to 0-1 16.  With how he is doing so far  Continues to have pain on the right side with known advanced arthritis.  Has questions about injection.    Pain medication: Over-the-counter  Assist device: No  Physical therapy: Yes  Returned to work: Not applicable      Medications and allergies are documented in the EMR and have been reviewed.      Current Outpatient Medications:     acetaminophen (TYLENOL) 325 MG tablet, Take 2 tablets (650 mg) by mouth every 4 hours as needed for other (For optimal non-opioid multimodal pain management to improve pain control.), Disp: 100 tablet, Rfl: 0    albuterol (PROAIR HFA/PROVENTIL HFA/VENTOLIN HFA) 108 (90 Base) MCG/ACT inhaler, Inhale 2 puffs into the lungs every 4 hours as needed for shortness of breath, wheezing or cough, Disp: , Rfl:     amLODIPine (NORVASC) 10 MG tablet, TAKE 1 TABLET (10 MG) BY MOUTH DAILY., Disp: 90 tablet, Rfl: 0    amoxicillin (AMOXIL) 500 MG capsule, Take 4 caps (2000mg) 30-60 minutes before dental procedure, Disp: 4 capsule, Rfl: 0    ARTIFICIAL TEAR OP, Apply 1 drop to eye as needed, Disp: , Rfl:     atorvastatin (LIPITOR) 20 MG tablet, Take 20 mg by mouth at bedtime, Disp: , Rfl:     blood glucose (ONETOUCH VERIO IQ) test strip, USE TO TEST BLOOD SUGAR 3 TIMES DAILY OR AS DIRECTED., Disp: 300 strip, Rfl: 1    blood glucose monitoring (NO BRAND SPECIFIED) meter device kit, Use to test blood sugar 1 times daily or as directed., Disp: 1 kit, Rfl: 0    ClonAZEPAM (KLONOPIN) 0.5 MG tablet, Take 0.5 mg by mouth Take 1 tablet by mouth daily at bedtime, Disp: , Rfl:     Continuous Glucose Sensor (FREESTYLE SARAHY 2 SENSOR) MISC, USE 1 SENSOR EVERY 14 DAYS. USE  TO READ BLOOD SUGARS PER 'S INSTRUCTIONS., Disp: 2 each, Rfl: 5    ferrous sulfate (FEROSUL) 325 (65 Fe) MG tablet, TAKE 1 TABLET BY MOUTH EVERY DAY WITH BREAKFAST, Disp: 90 tablet, Rfl: 1    fluticasone-salmeterol (ADVAIR) 250-50 MCG/ACT inhaler, TAKE 1 PUFF BY MOUTH TWICE A DAY Strength: 250-50 MCG/ACT, Disp: 3 each, Rfl: 3    furosemide (LASIX) 20 MG tablet, TAKE 2 TABLETS BY MOUTH IN THE MORNING AND 1 TABLET BY MOUTH AT LEAST 6 HOURS LATER IN THE AFTERNOON., Disp: 270 tablet, Rfl: 1    Lancets (ONETOUCH DELICA PLUS [id number]) MISC, USE ONCE DAILY AS DIRECTED, Disp: 100 each, Rfl: 1    latanoprost (XALATAN) 0.005 % ophthalmic solution, Place 1 drop into both eyes at bedtime, Disp: 7.5 mL, Rfl: 3    lisinopril (ZESTRIL) 40 MG tablet, Take 1 tablet (40 mg) by mouth daily (Patient taking differently: Take 40 mg by mouth every evening), Disp: 90 tablet, Rfl: 3    metFORMIN (GLUCOPHAGE XR) 500 MG 24 hr tablet, TAKE 1 TABLET BY MOUTH TWICE A DAY WITH FOOD, Disp: 180 tablet, Rfl: 0    OXcarbazepine (TRILEPTAL) 300 MG tablet, TAKE 2 TABLETS IN THE MORNING AND 2 TABLETS IN THE EVENING, Disp: 360 tablet, Rfl: 0    oxyCODONE (ROXICODONE) 5 MG tablet, Take 1-2 tablets (5-10 mg) by mouth every 4 hours as needed for moderate pain, Disp: 30 tablet, Rfl: 0    pantoprazole (PROTONIX) 20 MG EC tablet, TAKE 1 TABLET BY MOUTH EVERY DAY (Patient taking differently: Take 20 mg by mouth every morning), Disp: 90 tablet, Rfl: 1    polyethylene glycol (MIRALAX) 17 GM/Dose powder, Take 17 g by mouth daily, Disp: 510 g, Rfl: 0    predniSONE (DELTASONE) 5 MG tablet, TAKE 2 TABLETS BY MOUTH EVERY DAY, Disp: 60 tablet, Rfl: 2    pyRIDostigmine (MESTINON) 60 MG tablet, Take 120 mg by mouth every morning, Disp: , Rfl:     pyRIDostigmine (MESTINON) 60 MG tablet, Take 60 mg by mouth 2 times daily Midday and 6 PM., Disp: , Rfl:     senna-docusate (SENOKOT-S/PERICOLACE) 8.6-50 MG tablet, Take 1 tablet by mouth 2 times daily, Disp: 60  tablet, Rfl: 0    sodium bicarbonate 650 MG tablet, TAKE 1/2 TABLET TWICE A DAY BY MOUTH, Disp: 90 tablet, Rfl: 1    timolol maleate (TIMOPTIC) 0.5 % ophthalmic solution, Place 1 drop into both eyes 2 times daily, Disp: 5 mL, Rfl: 11    Allergies: Azathioprine, Sulfamethoxazole-trimethoprim, Ciprofloxacin, and Ropinirole    Current Status:  KOOS Jr: 57.14  UCLA:  Global Mental Health Score - (P) 15  Global Physical Health Score - (P) 13  PROMIS TOTAL - SUBSCORES - (P) 28    Physical Exam:  On physical examination the patient appears the stated age, is in no acute distress, affect is appropriate, and breathing is non-labored.    There were no vitals taken for this visit.  There is no height or weight on file to calculate BMI.    Rises from chair: Easily  Gait: Normal  Appearance: benign  Incision healed and benign  Clinical alignment: Neutral  Effusion: No  Tenderness to palpation: Minimal  Extension: 0  Flexion: 115  Patellar tracking: Normal  Collateral ligaments: intact  No mid flexion instability    Distally, the circulatory, motor, and sensation exam is intact with 5/5 EHL, gastroc-soleus, and tibialis anterior.  Sensation to light touch is intact.  Dorsalis pedis and posterior tibialis pulses are palpable.  There are no sores on the feet, no bruising, and no lymphedema.    We reviewed the radiographs together. These show the normal progression for a total knee arthroplasty without evidence of loosening or subsidence.     Assessment: Doing well 6 weeks status post total knee.  We discussed activities on total knee.  We discussed that he does not need to go to physical therapy if he does not feel he is benefiting from it anymore.  We discussed that it is fine to go forward with the injection of his contralateral side when he likes.  He can I can do this or this can be done sports medicine, whichever is more convenient for him.  Discussed typical follow-up    Plan: Continue with rehab program.  Follow-up in 6  weeks.  Sooner if issues.  No ref. provider found

## 2024-05-14 NOTE — NURSING NOTE
Rakan Nolasco's chief complaint for this visit includes:  Chief Complaint   Patient presents with    Surgical Followup     6wk s/p LTKA        Referring Provider:  No referring provider defined for this encounter.    There were no vitals taken for this visit.  Data Unavailable   Global Mental Health Score: (P) 15  Global Physical Health Score: (P) 13  PROMIS TOTAL - SUBSCORES: (P) 28  UCLA: 5  KOOS Jr.      57.14       Pain increases with: Did some yard work yesterday and feeling it   Previous surgeries: LTKA 4/1/24  Previous injections within last 6 months: NO  Treatments done: PT - going well.   Imaging completed: XR today  Pain: 2/10  Concerns: Doing well, just monitoring his activity level.     Tashi Davies, ATC

## 2024-05-14 NOTE — LETTER
5/14/2024         RE: Rakan Nolasco  [address] [place] [address]        Dear Colleague,    Thank you for referring your patient, Rakan Nolasco, to the [facility]. Please see a copy of my visit note below.    Chief Complaint: Surgical Followup (6wk s/p LTKA )         HPI: Rakan Nolasco returns today in follow-up for his left knee.  Reports he is doing well.  He is using over-the-counter pain medication.  He is no longer using a cane.  He is advancing well in physical therapy with range of motion was measured to 0-1 16.  With how he is doing so far  Continues to have pain on the right side with known advanced arthritis.  Has questions about injection.    Pain medication: Over-the-counter  Assist device: No  Physical therapy: Yes  Returned to work: Not applicable      Medications and allergies are documented in the EMR and have been reviewed.      Current Outpatient Medications:      acetaminophen (TYLENOL) 325 MG tablet, Take 2 tablets (650 mg) by mouth every 4 hours as needed for other (For optimal non-opioid multimodal pain management to improve pain control.), Disp: 100 tablet, Rfl: 0     albuterol (PROAIR HFA/PROVENTIL HFA/VENTOLIN HFA) 108 (90 Base) MCG/ACT inhaler, Inhale 2 puffs into the lungs every 4 hours as needed for shortness of breath, wheezing or cough, Disp: , Rfl:      amLODIPine (NORVASC) 10 MG tablet, TAKE 1 TABLET (10 MG) BY MOUTH DAILY., Disp: 90 tablet, Rfl: 0     amoxicillin (AMOXIL) 500 MG capsule, Take 4 caps (2000mg) 30-60 minutes before dental procedure, Disp: 4 capsule, Rfl: 0     ARTIFICIAL TEAR OP, Apply 1 drop to eye as needed, Disp: , Rfl:      atorvastatin (LIPITOR) 20 MG tablet, Take 20 mg by mouth at bedtime, Disp: , Rfl:      blood glucose (ONETOUCH VERIO IQ) test strip, USE TO TEST BLOOD SUGAR 3 TIMES DAILY OR AS DIRECTED., Disp: 300 strip, Rfl: 1     blood glucose monitoring (NO BRAND SPECIFIED) meter device kit, Use to test blood  sugar 1 times daily or as directed., Disp: 1 kit, Rfl: 0     ClonAZEPAM (KLONOPIN) 0.5 MG tablet, Take 0.5 mg by mouth Take 1 tablet by mouth daily at bedtime, Disp: , Rfl:      Continuous Glucose Sensor (FREESTYLE SARAHY 2 SENSOR) MISC, USE 1 SENSOR EVERY 14 DAYS. USE TO READ BLOOD SUGARS PER 'S INSTRUCTIONS., Disp: 2 each, Rfl: 5     ferrous sulfate (FEROSUL) 325 (65 Fe) MG tablet, TAKE 1 TABLET BY MOUTH EVERY DAY WITH BREAKFAST, Disp: 90 tablet, Rfl: 1     fluticasone-salmeterol (ADVAIR) 250-50 MCG/ACT inhaler, TAKE 1 PUFF BY MOUTH TWICE A DAY Strength: 250-50 MCG/ACT, Disp: 3 each, Rfl: 3     furosemide (LASIX) 20 MG tablet, TAKE 2 TABLETS BY MOUTH IN THE MORNING AND 1 TABLET BY MOUTH AT LEAST 6 HOURS LATER IN THE AFTERNOON., Disp: 270 tablet, Rfl: 1     Lancets (ONETOUCH DELICA PLUS [id number]) MISC, USE ONCE DAILY AS DIRECTED, Disp: 100 each, Rfl: 1     latanoprost (XALATAN) 0.005 % ophthalmic solution, Place 1 drop into both eyes at bedtime, Disp: 7.5 mL, Rfl: 3     lisinopril (ZESTRIL) 40 MG tablet, Take 1 tablet (40 mg) by mouth daily (Patient taking differently: Take 40 mg by mouth every evening), Disp: 90 tablet, Rfl: 3     metFORMIN (GLUCOPHAGE XR) 500 MG 24 hr tablet, TAKE 1 TABLET BY MOUTH TWICE A DAY WITH FOOD, Disp: 180 tablet, Rfl: 0     OXcarbazepine (TRILEPTAL) 300 MG tablet, TAKE 2 TABLETS IN THE MORNING AND 2 TABLETS IN THE EVENING, Disp: 360 tablet, Rfl: 0     oxyCODONE (ROXICODONE) 5 MG tablet, Take 1-2 tablets (5-10 mg) by mouth every 4 hours as needed for moderate pain, Disp: 30 tablet, Rfl: 0     pantoprazole (PROTONIX) 20 MG EC tablet, TAKE 1 TABLET BY MOUTH EVERY DAY (Patient taking differently: Take 20 mg by mouth every morning), Disp: 90 tablet, Rfl: 1     polyethylene glycol (MIRALAX) 17 GM/Dose powder, Take 17 g by mouth daily, Disp: 510 g, Rfl: 0     predniSONE (DELTASONE) 5 MG tablet, TAKE 2 TABLETS BY MOUTH EVERY DAY, Disp: 60 tablet, Rfl: 2     pyRIDostigmine (MESTINON)  60 MG tablet, Take 120 mg by mouth every morning, Disp: , Rfl:      pyRIDostigmine (MESTINON) 60 MG tablet, Take 60 mg by mouth 2 times daily Midday and 6 PM., Disp: , Rfl:      senna-docusate (SENOKOT-S/PERICOLACE) 8.6-50 MG tablet, Take 1 tablet by mouth 2 times daily, Disp: 60 tablet, Rfl: 0     sodium bicarbonate 650 MG tablet, TAKE 1/2 TABLET TWICE A DAY BY MOUTH, Disp: 90 tablet, Rfl: 1     timolol maleate (TIMOPTIC) 0.5 % ophthalmic solution, Place 1 drop into both eyes 2 times daily, Disp: 5 mL, Rfl: 11    Allergies: Azathioprine, Sulfamethoxazole-trimethoprim, Ciprofloxacin, and Ropinirole    Current Status:  KOOS Jr: 57.14  UCLA:  Global Mental Health Score - (P) 15  Global Physical Health Score - (P) 13  PROMIS TOTAL - SUBSCORES - (P) 28    Physical Exam:  On physical examination the patient appears the stated age, is in no acute distress, affect is appropriate, and breathing is non-labored.    There were no vitals taken for this visit.  There is no height or weight on file to calculate BMI.    Rises from chair: Easily  Gait: Normal  Appearance: benign  Incision healed and benign  Clinical alignment: Neutral  Effusion: No  Tenderness to palpation: Minimal  Extension: 0  Flexion: 115  Patellar tracking: Normal  Collateral ligaments: intact  No mid flexion instability    Distally, the circulatory, motor, and sensation exam is intact with 5/5 EHL, gastroc-soleus, and tibialis anterior.  Sensation to light touch is intact.  Dorsalis pedis and posterior tibialis pulses are palpable.  There are no sores on the feet, no bruising, and no lymphedema.    We reviewed the radiographs together. These show the normal progression for a total knee arthroplasty without evidence of loosening or subsidence.     Assessment: Doing well 6 weeks status post total knee.  We discussed activities on total knee.  We discussed that he does not need to go to physical therapy if he does not feel he is benefiting from it anymore.  We  discussed that it is fine to go forward with the injection of his contralateral side when he likes.  He can I can do this or this can be done sports medicine, whichever is more convenient for him.  Discussed typical follow-up    Plan: Continue with rehab program.  Follow-up in 6 weeks.  Sooner if issues.  No ref. provider found      Again, thank you for allowing me to participate in the care of your patient.        Sincerely,        Dandre Hassan MD

## 2024-05-15 ENCOUNTER — OFFICE VISIT (OUTPATIENT)
Dept: PULMONOLOGY | Facility: CLINIC | Age: 79
End: 2024-05-15
Payer: COMMERCIAL

## 2024-05-15 VITALS
BODY MASS INDEX: 31.62 KG/M2 | OXYGEN SATURATION: 97 % | DIASTOLIC BLOOD PRESSURE: 67 MMHG | HEART RATE: 71 BPM | WEIGHT: 220.4 LBS | SYSTOLIC BLOOD PRESSURE: 115 MMHG

## 2024-05-15 DIAGNOSIS — J43.2 CENTRILOBULAR EMPHYSEMA (H): Primary | ICD-10-CM

## 2024-05-15 PROCEDURE — 99215 OFFICE O/P EST HI 40 MIN: CPT | Performed by: INTERNAL MEDICINE

## 2024-05-15 NOTE — NURSING NOTE
Rakan Nolasco's goals for this visit include:   Chief Complaint   Patient presents with    Follow Up    COPD       He requests these members of his care team be copied on today's visit information: yes     PCP: Telly Lucio    Referring Provider:  No referring provider defined for this encounter.    /67 (BP Location: Left arm, Patient Position: Chair, Cuff Size: Adult Regular)   Pulse 71   Wt 100 kg (220 lb 6.4 oz)   SpO2 97%   BMI 31.62 kg/m      Do you need any medication refills at today's visit? No       TREVON Kuo   Neph/Pulm [facility]

## 2024-05-15 NOTE — PROGRESS NOTES
Pulmonary Clinic Return Patient Visit  Reason for Consult: COPD  History of Present Illness  Rakan Nolasco, who is a 78-year-old male former smoker (quit 1983) with history of ROSIE on CPAP who presents for follow up of COPD. I last saw him in clinic 5/2023.   To briefly review, Rakan was diagnosed with COPD several years ago based on spirometric evaluation and symptoms.  His COPD has been well controlled on an intermediate dose fluticasone/salmeterol.  He also uses albuterol very sparingly.  He had a flare of COPD 6/2022 which was triggered by a viral flulike illness for which he was seen at the ER and was treated with a course of antibiotics and steroids. Had COVID ~12/2022 with mild symptoms, did well with Paxlovid. He has not had a flare since that time.   He did have left knee replacement and he is recuperating and now walks with the help of a cane.   Today, he is doing great. He has minimal shortness of breath mostly with exertion, infrequent wheezing and no evidence of chest tightness. He does have baseline pedal swelling which has not worsened for which he takes Lasix.  He denies any fevers, no chest pain, no orthopnea and no PND.  He has a history of ROSIE and is well controlled on CPAP.    He is trying to lose weight by healthy living and has lost close to 10 pounds.   Former smoker, quit 1983, 30 pk-yrs. No family hx of lung cancer.  He was exposed to lots of fumes and he is currently retired now.    Review of Systems:  10 of 14 systems reviewed and are negative unless otherwise stated in HPI.    Past Medical History:   Diagnosis Date    Arthritis         BMI 31.0-31.9,adult     Cancer (H) 01/10/2018    Prostate    COPD (chronic obstructive pulmonary disease) (H) 10/28/2020    Demand ischemia (H)     Diabetes (H) 01/10/2018    Diverticulosis     Colonoscopy 8/2008    Fatty liver     see US  5/2012    GERD (gastroesophageal reflux disease)     Glaucoma (increased eye pressure)     HTN (hypertension)      Hyperlipidemia LDL goal < 130     age, htn, fhx    Irritable bowel     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     Nonsenile cataract     Obstructive sleep apnea     Obstructive sleep apnea syndrome     ROSIE (obstructive sleep apnea) 01/2011    Using CPAP;     Prediabetes     Restless leg syndrome     Retinal detachment     Sleep apnea     Trigeminal neuralgia pain 01/04/2012       Past Surgical History:   Procedure Laterality Date    ARTHROPLASTY KNEE Left 4/1/2024    Procedure: ARTHROPLASTY, LEFT KNEE, TOTAL;  Surgeon: Dandre Hassan MD;  Location: [facility]    BIOPSY ARTERY TEMPORAL Bilateral 08/01/2017    Procedure: BIOPSY ARTERY TEMPORAL;  Bilateral temporal artery Biopsy;  Surgeon: Jj Tello MD;  Location: [facility]    CATARACT IOL, RT/LT Right     COLONOSCOPY      ESOPHAGOSCOPY, GASTROSCOPY, DUODENOSCOPY (EGD), COMBINED  01/15/2014    Procedure: COMBINED ESOPHAGOSCOPY, GASTROSCOPY, DUODENOSCOPY (EGD), BIOPSY SINGLE OR MULTIPLE;;  Surgeon: Winston Nixon MD;  Location: [facility]    LASER YAG CAPSULOTOMY Right 04/09/2018    right eye    PHACOEMULSIFICATION CLEAR CORNEA WITH STANDARD INTRAOCULAR LENS IMPLANT Right 02/20/2017    Procedure: PHACOEMULSIFICATION CLEAR CORNEA WITH STANDARD INTRAOCULAR LENS IMPLANT;  Surgeon: Mateo Gray MD;  Location: [facility]    PHACOEMULSIFICATION CLEAR CORNEA WITH STANDARD INTRAOCULAR LENS IMPLANT Left 01/10/2022    Procedure: LEFT PHACOEMULSIFICATION, CATARACT, WITH INTRAOCULAR LENS IMPLANT;  Surgeon: Mateo Gray MD;  Location: [facility]    GA TRABECULOPLASTY BY LASER SURGERY      RETINAL REATTACHMENT Right     SURGICAL HISTORY OF -   08/2009    Both Eyelids-.    TONSILLECTOMY  as child       Family History   Problem Relation Age of Onset    Respiratory Mother         copd    LUNG DISEASE Mother     Cerebrovascular Disease Father     Cancer Father     Other Cancer Father     Allergies Brother     Cancer Maternal  Grandmother     Heart Disease Paternal Grandmother     Glaucoma Maternal Aunt     Macular Degeneration No family hx of     Anesthesia Reaction No family hx of     Deep Vein Thrombosis (DVT) No family hx of        Social History     Socioeconomic History    Marital status: Single     Spouse name: None    Number of children: 0    Years of education: 12    Highest education level: None   Occupational History    Occupation: Retired 3/2012     Comment: [employer] ([employer])   Tobacco Use    Smoking status: Former Smoker     Packs/day: 2.00     Years: 15.00     Pack years: 30.00     Types: Cigarettes     Start date: 1968     Quit date: 1983     Years since quittin.5    Smokeless tobacco: Former User     Quit date: 1970    Tobacco comment: smoke free household.   Vaping Use    Vaping Use: Never used   Substance and Sexual Activity    Alcohol use: Not Currently     Comment: Quit in     Drug use: No    Sexual activity: Not Currently     Partners: Female     Comment: 2010  No girlfriend at this time.   Other Topics Concern    Parent/sibling w/ CABG, MI or angioplasty before 65F 55M? No         Allergies   Allergen Reactions    Azathioprine Shortness Of Breath and Other (See Comments)     Was hospitalized from it. Also had high fever, chills, and shortness of breath.    Sulfamethoxazole-Trimethoprim Cough, Itching and Rash     Generalized painful red rash on legs arms and abdomen, chest, back, cough and  fever  that required hospitalization.    Ciprofloxacin Muscle Pain (Myalgia)     Muscle pain  Other reaction(s): Myalgia  Other reaction(s): Myalgia    Ropinirole      Leg pan  GI  Weakness; ? sycope  Other reaction(s): Leg Pain         Current Outpatient Medications:     acetaminophen (TYLENOL) 325 MG tablet, Take 2 tablets (650 mg) by mouth every 4 hours as needed for other (For optimal non-opioid multimodal pain management to improve pain control.), Disp: 100 tablet, Rfl: 0    albuterol  (PROAIR HFA/PROVENTIL HFA/VENTOLIN HFA) 108 (90 Base) MCG/ACT inhaler, Inhale 2 puffs into the lungs every 4 hours as needed for shortness of breath, wheezing or cough, Disp: , Rfl:     amLODIPine (NORVASC) 10 MG tablet, TAKE 1 TABLET (10 MG) BY MOUTH DAILY., Disp: 90 tablet, Rfl: 0    amoxicillin (AMOXIL) 500 MG capsule, Take 4 caps (2000mg) 30-60 minutes before dental procedure, Disp: 4 capsule, Rfl: 0    ARTIFICIAL TEAR OP, Apply 1 drop to eye as needed, Disp: , Rfl:     atorvastatin (LIPITOR) 20 MG tablet, Take 20 mg by mouth at bedtime, Disp: , Rfl:     blood glucose (ONETOUCH VERIO IQ) test strip, USE TO TEST BLOOD SUGAR 3 TIMES DAILY OR AS DIRECTED., Disp: 300 strip, Rfl: 1    blood glucose monitoring (NO BRAND SPECIFIED) meter device kit, Use to test blood sugar 1 times daily or as directed., Disp: 1 kit, Rfl: 0    ClonAZEPAM (KLONOPIN) 0.5 MG tablet, Take 0.5 mg by mouth Take 1 tablet by mouth daily at bedtime, Disp: , Rfl:     Continuous Glucose Sensor (FREESTYLE SARAHY 2 SENSOR) MISC, USE 1 SENSOR EVERY 14 DAYS. USE TO READ BLOOD SUGARS PER 'S INSTRUCTIONS., Disp: 2 each, Rfl: 5    ferrous sulfate (FEROSUL) 325 (65 Fe) MG tablet, TAKE 1 TABLET BY MOUTH EVERY DAY WITH BREAKFAST, Disp: 90 tablet, Rfl: 1    fluticasone-salmeterol (ADVAIR) 250-50 MCG/ACT inhaler, TAKE 1 PUFF BY MOUTH TWICE A DAY Strength: 250-50 MCG/ACT, Disp: 3 each, Rfl: 3    furosemide (LASIX) 20 MG tablet, TAKE 2 TABLETS BY MOUTH IN THE MORNING AND 1 TABLET BY MOUTH AT LEAST 6 HOURS LATER IN THE AFTERNOON., Disp: 270 tablet, Rfl: 1    Lancets (ONETOUCH DELICA PLUS PHWRHK80H) MISC, USE ONCE DAILY AS DIRECTED, Disp: 100 each, Rfl: 1    latanoprost (XALATAN) 0.005 % ophthalmic solution, Place 1 drop into both eyes at bedtime, Disp: 7.5 mL, Rfl: 3    lisinopril (ZESTRIL) 40 MG tablet, Take 1 tablet (40 mg) by mouth daily (Patient taking differently: Take 40 mg by mouth every evening), Disp: 90 tablet, Rfl: 3    metFORMIN  (GLUCOPHAGE XR) 500 MG 24 hr tablet, TAKE 1 TABLET BY MOUTH TWICE A DAY WITH FOOD, Disp: 180 tablet, Rfl: 0    OXcarbazepine (TRILEPTAL) 300 MG tablet, TAKE 2 TABLETS IN THE MORNING AND 2 TABLETS IN THE EVENING, Disp: 360 tablet, Rfl: 0    oxyCODONE (ROXICODONE) 5 MG tablet, Take 1-2 tablets (5-10 mg) by mouth every 4 hours as needed for moderate pain, Disp: 30 tablet, Rfl: 0    pantoprazole (PROTONIX) 20 MG EC tablet, TAKE 1 TABLET BY MOUTH EVERY DAY (Patient taking differently: Take 20 mg by mouth every morning), Disp: 90 tablet, Rfl: 1    polyethylene glycol (MIRALAX) 17 GM/Dose powder, Take 17 g by mouth daily, Disp: 510 g, Rfl: 0    predniSONE (DELTASONE) 5 MG tablet, TAKE 2 TABLETS BY MOUTH EVERY DAY, Disp: 60 tablet, Rfl: 2    pyRIDostigmine (MESTINON) 60 MG tablet, Take 120 mg by mouth every morning, Disp: , Rfl:     pyRIDostigmine (MESTINON) 60 MG tablet, Take 60 mg by mouth 2 times daily Midday and 6 PM., Disp: , Rfl:     senna-docusate (SENOKOT-S/PERICOLACE) 8.6-50 MG tablet, Take 1 tablet by mouth 2 times daily, Disp: 60 tablet, Rfl: 0    sodium bicarbonate 650 MG tablet, TAKE 1/2 TABLET TWICE A DAY BY MOUTH, Disp: 90 tablet, Rfl: 1    timolol maleate (TIMOPTIC) 0.5 % ophthalmic solution, Place 1 drop into both eyes 2 times daily, Disp: 5 mL, Rfl: 11      Physical Exam:  /67 (BP Location: Left arm, Patient Position: Chair, Cuff Size: Adult Regular)   Pulse 71   Wt 100 kg (220 lb 6.4 oz)   SpO2 97%   BMI 31.62 kg/m    GENERAL: Well developed, well nourished, alert, and in no apparent distress.  HEENT: Normocephalic, atraumatic. PERRL, EOMI. Oral mucosa is moist. No perioral cyanosis.  NECK: supple, no masses, no thyromegaly.  RESP:  Normal respiratory effort.  CTAB.  No rales, wheezes, rhonchi.  No cyanosis or clubbing.  CV: Normal S1, S2, regular rhythm, normal rate. No murmur.  No LE edema.   ABDOMEN:  Soft, non-tender, non-distended.   SKIN: warm and dry. No rash.  NEURO: AAOx3.  Normal gait.  " Fluent speech.  PSYCH: mentation appears normal.   Results:  PFTs: Mild obstruction with stable lung functions  Most Recent [facility] Pulmonary Function Testing    FVC-Pred   Date Value Ref Range Status   06/29/2022 3.96 L      FVC-Pre   Date Value Ref Range Status   06/29/2022 3.14 L      FVC-%Pred-Pre   Date Value Ref Range Status   06/29/2022 79 %      FEV1-Pre   Date Value Ref Range Status   06/29/2022 2.05 L      FEV1-%Pred-Pre   Date Value Ref Range Status   06/29/2022 68 %      FEV1FVC-Pred   Date Value Ref Range Status   06/29/2022 76 %      FEV1FVC-Pre   Date Value Ref Range Status   06/29/2022 65 %      No results found for: \"20029\"  FEFMax-Pred   Date Value Ref Range Status   06/29/2022 7.72 L/sec      FEFMax-Pre   Date Value Ref Range Status   06/29/2022 6.76 L/sec      FEFMax-%Pred-Pre   Date Value Ref Range Status   06/29/2022 87 %      ExpTime-Pre   Date Value Ref Range Status   06/29/2022 9.73 sec      FIFMax-Pre   Date Value Ref Range Status   06/29/2022 5.83 L/sec      FEV1FEV6-Pred   Date Value Ref Range Status   06/29/2022 77 %      FEV1FEV6-Pre   Date Value Ref Range Status   06/29/2022 68 %      No results found for: \"20055\"  Imaging:  XRAY 6/16/2022:  Chest 2 views:  Personally reviewed by me - no airspace, soft tissue, cardiac or bony abnormalities.    Assessment and Plan:   COPD (Group B)  He has done well with intermediate dose salmeterol/fluticasone and I will continue him on the current regimen. CAT score is 10 today.  Continue albuterol inhaler as needed.  He is fairly active at baseline.  He does not qualify for lung cancer screening    Questions and concerns were answered to the patient's satisfaction.  he was provided with my contact information should new questions or concerns arise in the interim.  he should return to clinic in 12 months  Up to date on vaccination    I spent 40 minutes on the date of the encounter doing chart review, history and exam, documentation and further " coordination as noted above exclusive of time interpreting PFT, Chest Xray, CT Chest.     Servando Mejia MD  Pulmonary, Critical Care and Sleep Medicine  [facility]  Pager: [phone]        The above note was dictated using voice recognition software and may include typographical errors. Please contact the author for any clarifications.

## 2024-05-16 ENCOUNTER — THERAPY VISIT (OUTPATIENT)
Dept: PHYSICAL THERAPY | Facility: CLINIC | Age: 79
End: 2024-05-16
Payer: COMMERCIAL

## 2024-05-16 DIAGNOSIS — G89.29 CHRONIC PAIN OF LEFT KNEE: Primary | ICD-10-CM

## 2024-05-16 DIAGNOSIS — M25.562 CHRONIC PAIN OF LEFT KNEE: Primary | ICD-10-CM

## 2024-05-16 PROCEDURE — 97110 THERAPEUTIC EXERCISES: CPT | Mod: GP | Performed by: PHYSICAL THERAPIST

## 2024-05-23 ENCOUNTER — TELEPHONE (OUTPATIENT)
Dept: ORTHOPEDICS | Facility: CLINIC | Age: 79
End: 2024-05-23
Payer: COMMERCIAL

## 2024-05-23 ENCOUNTER — NURSE TRIAGE (OUTPATIENT)
Dept: FAMILY MEDICINE | Facility: CLINIC | Age: 79
End: 2024-05-23
Payer: COMMERCIAL

## 2024-05-23 NOTE — TELEPHONE ENCOUNTER
Called and spoke with patient who had also contacted his Primary team. They instructed the Pt to go the ER immediately for an Ultrasound to rule out DVT. Patient will be going in now, we will check for updates.

## 2024-05-23 NOTE — TELEPHONE ENCOUNTER
"Pt had Knee surgery on  April 1st today he is complaining of pain in Rt calf and Rt foot.   Pt complaining of red hard lump on back of calf. Swelling below the knee. Pt has COPD and is short of breath like normal.   No dizziness . Light headedness, but has pain. Advised per protocol to go into the ED for DVT prophylaxis and rule out.   Thanks,   Markus Klein RN  [facility]       Reason for Disposition   Difficulty breathing at rest    Additional Information   Negative: Sounds like a life-threatening emergency to the triager   Negative: Chest pain   Negative: Followed an insect bite and has localized swelling (e.g., small area of puffy or swollen skin)   Negative: Followed a knee injury   Negative: Ankle or foot injury   Negative: Pregnant with leg swelling or edema    Answer Assessment - Initial Assessment Questions  1. ONSET: \"When did the swelling start?\" (e.g., minutes, hours, days)    3 days ago   2. LOCATION: \"What part of the leg is swollen?\"  \"Are both legs swollen or just one leg?\"     Left leg below knee.  3. SEVERITY: \"How bad is the swelling?\" (e.g., localized; mild, moderate, severe)  inch (6 mm) deep, rest and elevation only partially reduce swelling.    - SEVERE edema: Swelling extends above knee, facial or hand swelling present.        4. REDNESS: \"Does the swelling look red or infected?\"     Pick the   5. PAIN: \"Is the swelling painful to touch?\" If Yes, ask: \"How painful is it?\"   (Scale 1-10; mild, moderate or severe)      5-6   6. FEVER: \"Do you have a fever?\" If Yes, ask: \"What is it, how was it measured, and when did it start?\"       No   7. CAUSE: \"What do you think is causing the leg swelling?\"      No   8. MEDICAL HISTORY: \"Do you have a history of blood clots (e.g., DVT), cancer, heart failure, kidney disease, or liver failure?\"      Prostate CA,   9. RECURRENT SYMPTOM: \"Have you had leg swelling before?\" If Yes, ask: \"When was the last time?\" \"What happened " "that time?\"      No   10. OTHER SYMPTOMS: \"Do you have any other symptoms?\" (e.g., chest pain, difficulty breathing)        No, COPD   11. PREGNANCY: \"Is there any chance you are pregnant?\" \"When was your last menstrual period?\"        No    Protocols used: Leg Swelling and Edema-A-OH    "

## 2024-05-23 NOTE — TELEPHONE ENCOUNTER
Other: Patient calling back after ER visit today. He was told to schedule a follow up  appointment with Dr. Hassan next week. No availability. Scheduled next opening on June 4th. Please call him back as he is hoping to be seen asap.      Could we send this information to you in [employer] or would you prefer to receive a phone call?:   Patient would prefer a phone call   Okay to leave a detailed message?: Yes at Cell number on file:    Telephone Information:   Mobile [phone]

## 2024-05-23 NOTE — TELEPHONE ENCOUNTER
Other: Patient is calling in because his calf and foot is swollen and warm on leg that had knee surgery with Dr Hassan 4/1/24. Patient would like call to discuss possible blood clot. No issues with incision site- no puss or discharge, no redness, no fever.      Could we send this information to you in [employer] or would you prefer to receive a phone call?:   Patient would prefer a phone call   Okay to leave a detailed message?: Yes at Home number on file [phone] (home)

## 2024-05-24 NOTE — TELEPHONE ENCOUNTER
Per chart review, pt went in to ED yesterday (05/23/24) and is currently admitted at [facility].    Gaviota Dave RN

## 2024-05-24 NOTE — TELEPHONE ENCOUNTER
Provider Response to 2nd Level Triage Request    I have reviewed the RN documentation. My recommendation is:  Agree with plan

## 2024-05-24 NOTE — TELEPHONE ENCOUNTER
Back went back to the ER last night and was admitted at [facility]. Notified Dr. Hassan and updating patients chart.   Kenia Wright RN

## 2024-05-28 ENCOUNTER — TELEPHONE (OUTPATIENT)
Dept: FAMILY MEDICINE | Facility: CLINIC | Age: 79
End: 2024-05-28
Payer: COMMERCIAL

## 2024-05-28 NOTE — TELEPHONE ENCOUNTER
Pt states needs er follow up with primary this week please advise  [phone] may leave a message

## 2024-05-30 NOTE — TELEPHONE ENCOUNTER
5/31/2024 1:30 PM (Arrive by 1:15 PM) Telly Lucio MD [facility]     Patient is scheduled with the provider on Friday 5/31/24     Annette Varela,

## 2024-05-30 NOTE — PROGRESS NOTES
Rakan Nolasco  is a(n) 78 y.o. with a history of left TKA April, htn, sleep apnea, myasthenia gravis in remission , copd , anemia who as admitted 5/23/2024 for left left hematoma after he presented to the ed for evaluation of left calf pain. Pain started suddenly 4 days before admission, there was no trauma but he is on baby aspirin. Knee surgery was done in April, walking and healing well. No pain in knee; CTA was done showing a 15.5 cm hematoma in L gastrocnemius muscle and a moderate complex knee joint effusion. Hgb stable on admission, surgery consulted and recommended holding ASA but no intervention; ortho also evaluated pt due to the effusion and felt pt was stable to DC with follow up June 4th. Due to hyponatremia his lasix (for LE edema) was briefly held, this dose was reduced until his follow up appt. Finally he was given Rx of augmentin for left OM   Specific recommendations to be addressed at the follow up visit:   Left leg hematoma  Knee effusion post L TKA  - hold ASA  - Gen Surg and ortho with no intervention, follow up as outpt   Medication changes: hold ASA, reduce lasix to 20 mg daily   Follow up labs/imaging: sodium Hgb   Other specialty follow-up not included in DC orders: ortho   STOP taking these medicines     aspirin 81 mg enteric coated tablet  Commonly known as: ECOTRIN

## 2024-05-31 ENCOUNTER — OFFICE VISIT (OUTPATIENT)
Dept: FAMILY MEDICINE | Facility: CLINIC | Age: 79
End: 2024-05-31
Payer: COMMERCIAL

## 2024-05-31 VITALS
OXYGEN SATURATION: 98 % | BODY MASS INDEX: 32.85 KG/M2 | TEMPERATURE: 97.8 F | HEART RATE: 70 BPM | DIASTOLIC BLOOD PRESSURE: 75 MMHG | WEIGHT: 221.8 LBS | SYSTOLIC BLOOD PRESSURE: 135 MMHG | RESPIRATION RATE: 18 BRPM | HEIGHT: 69 IN

## 2024-05-31 DIAGNOSIS — E66.01 MORBID OBESITY (H): ICD-10-CM

## 2024-05-31 DIAGNOSIS — M31.6 TEMPORAL ARTERITIS (H): ICD-10-CM

## 2024-05-31 DIAGNOSIS — S80.12XD HEMATOMA OF LEFT LOWER EXTREMITY, SUBSEQUENT ENCOUNTER: ICD-10-CM

## 2024-05-31 DIAGNOSIS — C61 PROSTATE CANCER (H): ICD-10-CM

## 2024-05-31 DIAGNOSIS — H90.72 MIXED CONDUCTIVE AND SENSORINEURAL HEARING LOSS OF LEFT EAR WITH UNRESTRICTED HEARING OF RIGHT EAR: ICD-10-CM

## 2024-05-31 DIAGNOSIS — H73.892 ERYTHEMA OF TYMPANIC MEMBRANE OF LEFT EAR: ICD-10-CM

## 2024-05-31 DIAGNOSIS — Z09 HOSPITAL DISCHARGE FOLLOW-UP: Primary | ICD-10-CM

## 2024-05-31 DIAGNOSIS — I10 PRIMARY HYPERTENSION: ICD-10-CM

## 2024-05-31 DIAGNOSIS — E11.69 TYPE 2 DIABETES MELLITUS WITH OTHER SPECIFIED COMPLICATION, WITHOUT LONG-TERM CURRENT USE OF INSULIN (H): ICD-10-CM

## 2024-05-31 PROCEDURE — 99213 OFFICE O/P EST LOW 20 MIN: CPT | Performed by: FAMILY MEDICINE

## 2024-05-31 ASSESSMENT — KOOS JR
GOING UP OR DOWN STAIRS: MODERATE
BENDING TO THE FLOOR TO PICK UP OBJECT: MILD
TWISING OR PIVOTING ON KNEE: MODERATE
KOOS JR SCORING: 65.99
HOW SEVERE IS YOUR KNEE STIFFNESS AFTER FIRST WAKING IN MORNING: MODERATE
RISING FROM SITTING: MILD

## 2024-05-31 ASSESSMENT — PAIN SCALES - GENERAL: PAINLEVEL: MILD PAIN (2)

## 2024-05-31 NOTE — TELEPHONE ENCOUNTER
Patient has an appointment on 6/4/24 with Dr. Hassan to eval his leg. He has seen his PCP on 5/31/24 as recommended by hospital follow up. Hospital course reviewed by Dr. Hassan and patient was seen with Ortho. No further intervention needed at this time.  Kenia Wright RN     06:22

## 2024-05-31 NOTE — PROGRESS NOTES
Assessment & Plan   Rakan Nolasco Jr. is a 78 y.o. male with hx of diabetes, m gravis, hypertension amish, rls, hx prostate ca, hpld, gerd, covid (11/15/22), s/p knee repl, recurrent cystitis here for hospital discharge follow up.    Hospital discharge follow-up:  Found to have spontaneous hematoma in the left calf, ASA was stopped and also on ACE wrap. Feeling better though still has some pain with walking.    Hematoma of left lower extremity, subsequent encounter   Concerned about blood clot;  discussed difference between hematoma and DVT and reassured patient that this should resolve spontaneously in a few weeks.    Mixed conductive and sensorineural hearing loss of left ear with unrestricted hearing of right ear   Treated for ear infection at ER visit; still cannot hear from ear. Exam shows erythema with bumps on lt TM, possible cholesteatoma. Discussed evaluation by ENT.  - Adult ENT  Referral    Erythema of tympanic membrane of left ear  - Adult ENT  Referral    Primary hypertension   Well controlled    Type 2 diabetes mellitus with other specified complication, without long-term current use of insulin (H)   Well controlled, A1c from 11/23 was 6.3    Temporal arteritis (H)    -  Resolved    Morbid obesity (H)   - Counseled to make better food choices, exercise as tolerated, and lose weight.       Prostate cancer (H)    -  prostate cancer s/p cryotherapy on 3/18     MED REC REQUIRED  Post Medication Reconciliation Status: patient was not discharged from an inpatient facility or TCU    See Patient Instructions    Subjective   Rakan is a 78 year old, presenting for the following health issues:  Hospital F/U        5/31/2024     1:25 PM   Additional Questions   Roomed by MOSHE CARBAJAL   Accompanied by partner   Recheck ear, hematoma  HPI   Hospital Follow-up Visit:  Hospital/Nursing Home/IP Rehab Facility:  [facility]   Date of Admission: 5/23/2024  Date of Discharge: 5/25/2024  Reason(s)  "for Admission: Left leg cellulitis (Primary Dx);  Leg hematoma, left, initial encounter;  Left otitis media with effusion;  Bilateral lower extremity edema  Discharge Disposition: Home Self Care   Was the patient in the ICU or did the patient experience delirium during hospitalization?  No  Do you have any other stressors you would like to discuss with your provider? No    Problems taking medications regularly:  None  Medication changes since discharge: None  Problems adhering to non-medication therapy:  None    Summary of hospitalization:     Rakan Nolasco Jr. is a(n) 78 y.o. with a history of left TKA April, htn, sleep apnea, myasthenia gravis in remission , copd , anemia see in ER for Lt calf pain. Pain started suddenly 4 days before, there was no trauma but he is on baby aspirin. Knee surgery was done in April, walking and healing well. No pain in knee; CTA was done showing a 15.5 cm hematoma in L gastrocnemius muscle and a moderate complex knee joint effusion. Surgery and Ortho consulted and recommended holding ASA but no intervention  Finally he was given Rx of augmentin for left OM     Diagnostic Tests/Treatments reviewed.  Follow up needed: none  Other Healthcare Providers Involved in Patient s Care:         None  Update since discharge: improved.       Review of Systems  Constitutional, HEENT, cardiovascular, pulmonary, gi and gu systems are negative, except as otherwise noted.      Objective    /75 (BP Location: Left arm, Cuff Size: Adult Regular)   Pulse 70   Temp 97.8  F (36.6  C) (Temporal)   Resp 18   Ht 1.76 m (5' 9.29\")   Wt 100.6 kg (221 lb 12.8 oz)   SpO2 98%   BMI 32.48 kg/m    Body mass index is 32.48 kg/m .  Physical Exam   GENERAL: alert and no distress  HENT:  Lt TM; erythema with fleshy bumpy texture  RESP: lungs clear to auscultation - no rales, rhonchi or wheezes  CV: regular rate and rhythm, no murmur, click or rub, no peripheral edema  MS: LLE: in ace wrap  PSYCH: mentation " appears normal, affect normal/bright    Signed Electronically by: Telly Lucio MD

## 2024-06-01 ENCOUNTER — TRANSFERRED RECORDS (OUTPATIENT)
Dept: HEALTH INFORMATION MANAGEMENT | Facility: CLINIC | Age: 79
End: 2024-06-01

## 2024-06-01 PROBLEM — C61 PROSTATE CANCER (H): Status: ACTIVE | Noted: 2018-01-12

## 2024-06-04 ENCOUNTER — OFFICE VISIT (OUTPATIENT)
Dept: ORTHOPEDICS | Facility: CLINIC | Age: 79
End: 2024-06-04
Payer: COMMERCIAL

## 2024-06-04 DIAGNOSIS — Z96.652 STATUS POST LEFT KNEE REPLACEMENT: Primary | ICD-10-CM

## 2024-06-04 PROCEDURE — 99024 POSTOP FOLLOW-UP VISIT: CPT | Performed by: ORTHOPAEDIC SURGERY

## 2024-06-04 ASSESSMENT — ENCOUNTER SYMPTOMS
CONSTITUTIONAL NEGATIVE: 1
GASTROINTESTINAL NEGATIVE: 1
EYES NEGATIVE: 1
RESPIRATORY NEGATIVE: 1

## 2024-06-04 ASSESSMENT — PAIN SCALES - GENERAL: PAINLEVEL: MODERATE PAIN (4)

## 2024-06-04 NOTE — LETTER
"6/4/2024      Rakan Nolasco  [address] [facility] [address]      Dear Colleague,    Thank you for referring your patient, Rakan Nolasco, to the [facility]. Please see a copy of my visit note below.    Chief Complaint: RECHECK (L leg hematoma. )         HPI: Rakan Nolasco returns today in follow-up for his left knee.  Since his last visit he was admitted to the hospital with a hematoma and his ipsilateral calf.  This was worked up with an angiogram which did not show an active bleed.  He was worked up for DVT which was negative..  He has been unable to elevate his leg because he had there was question of a hemoglobin drop of 0.7 g though this coincided with getting hydrated.  He was discharged with the instructions to elevate his leg to bring down the swelling after discharge but he was unable to do so because in the interim he had a retinal detachment and had to keep his head low.  Despite this he reports he is feeling well.  He has \"no pain in the knee.\"  And the swelling in his calf has come down somewhat.  He reports in the short periods of elevation that he is allowed himself the swelling has decreased.      Medications and allergies are documented in the EMR and have been reviewed.      Current Outpatient Medications:      acetaminophen (TYLENOL) 325 MG tablet, Take 2 tablets (650 mg) by mouth every 4 hours as needed for other (For optimal non-opioid multimodal pain management to improve pain control.), Disp: 100 tablet, Rfl: 0     albuterol (PROAIR HFA/PROVENTIL HFA/VENTOLIN HFA) 108 (90 Base) MCG/ACT inhaler, Inhale 2 puffs into the lungs every 4 hours as needed for shortness of breath, wheezing or cough, Disp: , Rfl:      amLODIPine (NORVASC) 10 MG tablet, TAKE 1 TABLET (10 MG) BY MOUTH DAILY., Disp: 90 tablet, Rfl: 0     amoxicillin (AMOXIL) 500 MG capsule, Take 4 caps (2000mg) 30-60 minutes before dental procedure (Patient not taking: Reported on 5/31/2024), Disp: " 4 capsule, Rfl: 0     ARTIFICIAL TEAR OP, Apply 1 drop to eye as needed, Disp: , Rfl:      atorvastatin (LIPITOR) 20 MG tablet, Take 20 mg by mouth at bedtime, Disp: , Rfl:      blood glucose (ONETOUCH VERIO IQ) test strip, USE TO TEST BLOOD SUGAR 3 TIMES DAILY OR AS DIRECTED., Disp: 300 strip, Rfl: 1     blood glucose monitoring (NO BRAND SPECIFIED) meter device kit, Use to test blood sugar 1 times daily or as directed., Disp: 1 kit, Rfl: 0     ClonAZEPAM (KLONOPIN) 0.5 MG tablet, Take 0.5 mg by mouth Take 1 tablet by mouth daily at bedtime, Disp: , Rfl:      Continuous Glucose Sensor (FREESTYLE SARAHY 2 SENSOR) MISC, USE 1 SENSOR EVERY 14 DAYS. USE TO READ BLOOD SUGARS PER 'S INSTRUCTIONS., Disp: 2 each, Rfl: 5     ferrous sulfate (FEROSUL) 325 (65 Fe) MG tablet, TAKE 1 TABLET BY MOUTH EVERY DAY WITH BREAKFAST, Disp: 90 tablet, Rfl: 1     fluticasone-salmeterol (ADVAIR) 250-50 MCG/ACT inhaler, TAKE 1 PUFF BY MOUTH TWICE A DAY Strength: 250-50 MCG/ACT, Disp: 3 each, Rfl: 3     furosemide (LASIX) 20 MG tablet, TAKE 2 TABLETS BY MOUTH IN THE MORNING AND 1 TABLET BY MOUTH AT LEAST 6 HOURS LATER IN THE AFTERNOON., Disp: 270 tablet, Rfl: 1     Lancets (ONETOUCH DELICA PLUS [id number]) MISC, USE ONCE DAILY AS DIRECTED, Disp: 100 each, Rfl: 1     latanoprost (XALATAN) 0.005 % ophthalmic solution, Place 1 drop into both eyes at bedtime, Disp: 7.5 mL, Rfl: 3     lisinopril (ZESTRIL) 40 MG tablet, Take 1 tablet (40 mg) by mouth daily (Patient taking differently: Take 40 mg by mouth every evening), Disp: 90 tablet, Rfl: 3     metFORMIN (GLUCOPHAGE XR) 500 MG 24 hr tablet, TAKE 1 TABLET BY MOUTH TWICE A DAY WITH FOOD, Disp: 180 tablet, Rfl: 0     OXcarbazepine (TRILEPTAL) 300 MG tablet, TAKE 2 TABLETS IN THE MORNING AND 2 TABLETS IN THE EVENING, Disp: 360 tablet, Rfl: 0     oxyCODONE (ROXICODONE) 5 MG tablet, Take 1-2 tablets (5-10 mg) by mouth every 4 hours as needed for moderate pain, Disp: 30 tablet, Rfl: 0      pantoprazole (PROTONIX) 20 MG EC tablet, TAKE 1 TABLET BY MOUTH EVERY DAY (Patient taking differently: Take 20 mg by mouth every morning), Disp: 90 tablet, Rfl: 1     polyethylene glycol (MIRALAX) 17 GM/Dose powder, Take 17 g by mouth daily, Disp: 510 g, Rfl: 0     predniSONE (DELTASONE) 5 MG tablet, TAKE 2 TABLETS BY MOUTH EVERY DAY, Disp: 60 tablet, Rfl: 2     pyRIDostigmine (MESTINON) 60 MG tablet, Take 120 mg by mouth every morning, Disp: , Rfl:      pyRIDostigmine (MESTINON) 60 MG tablet, Take 60 mg by mouth 2 times daily Midday and 6 PM., Disp: , Rfl:      senna-docusate (SENOKOT-S/PERICOLACE) 8.6-50 MG tablet, Take 1 tablet by mouth 2 times daily (Patient not taking: Reported on 5/31/2024), Disp: 60 tablet, Rfl: 0     sodium bicarbonate 650 MG tablet, TAKE 1/2 TABLET TWICE A DAY BY MOUTH, Disp: 90 tablet, Rfl: 1     timolol maleate (TIMOPTIC) 0.5 % ophthalmic solution, Place 1 drop into both eyes 2 times daily, Disp: 5 mL, Rfl: 11    Allergies: Azathioprine, Sulfamethoxazole-trimethoprim, Ciprofloxacin, and Ropinirole    Current Status:  JORGE Jr:  UCLA:  Global Mental Health Score -    Global Physical Health Score -    PROMIS TOTAL - SUBSCORES -      Physical Exam:  On physical examination the patient appears the stated age, is in no acute distress, affect is appropriate, and breathing is non-labored.    There were no vitals taken for this visit.  There is no height or weight on file to calculate BMI.    Appearance: The knee is benign in appearance there is no effusion whatsoever within the knee.  The calf has larger when compared to the contralateral side though it is not tense and firm.  There is some bruising on the posterior calf which be consistent with bleed.  Range of motion of the knee remains good with 0 110 degrees and it does seem to be a little bit limited by his calf swelling.  He walks about the room with a nearly normal gait.  It does not appear antalgic.  I reviewed the patient's images and  there is no interval change.  These are from 515.    Assessment and plan: Postoperative calf hematoma.  We reviewed his medications and there report of one of his medications being risk for decreased platelet aggregation.  This in combination with his aspirin/antiplatelet therapy could have been a risk for his hematoma.  There is no evidence that this is ongoing or that it is not resolving and as he is no longer on his aspirin I think it is reasonable to simply observe this.  I will see him back in 6 weeks.  He is going to be able to elevate his leg and week.  The hope is that this will decrease his swelling.  He is can come back sooner for any issues.  Plan:   Telly Lucio      Again, thank you for allowing me to participate in the care of your patient.        Sincerely,        Dandre Hassan MD

## 2024-06-04 NOTE — NURSING NOTE
Rakan Nolasco's chief complaint for this visit includes:  Chief Complaint   Patient presents with    RECHECK     L leg hematoma.        Referring Provider:  Telly Lucio MD  [address] [facility] [place] [address]    There were no vitals taken for this visit.  Moderate Pain (4)   Global Mental Health Score:    Global Physical Health Score:    PROMIS TOTAL - SUBSCORES:    UCLA: 3  KOOS Jr.      Pain increases with: Very painful in calf muscle, has been unable to do HEP due to tshi  Previous surgeries: LTKA   Concerns: Large hematoma on L calf that sent Pt to ER twice, spent 2 days in hospital dealing with pain, neg for DVT.     Tashi Davies, ATC

## 2024-06-04 NOTE — PROGRESS NOTES
"Chief Complaint   Patient presents with    Consult     Left ear draining      PCP: Telly Lucio     Referring Provider: Telly Lucio    /62   Temp 97  F (36.1  C) (Temporal)   Ht 1.76 m (5' 9.29\")   Wt 100.5 kg (221 lb 8 oz)   BMI 32.44 kg/m      ENT Problem List:  Patient Active Problem List   Diagnosis    GERD (gastroesophageal reflux disease)    Fatigue    HLD (hyperlipidemia)    Borderline glaucoma with ocular hypertension    Advanced directives, counseling/discussion    Trigeminal neuralgia pain    HTN (hypertension)    Health Care Home    Arthritis    Dry eyes    PVD (posterior vitreous detachment) OU    H/O RD (retinal detachment) s/p repair with PPV (AB)    Epiretinal membrane, bilateral    Pseudophakia of right eye    Myasthenia gravis (H)    Morbid obesity (H)    Right posterior capsular opacification    Demand ischemia (H)    Obstructive sleep apnea syndrome    Prostate cancer (H)    Restless legs syndrome    Temporal arteritis (H)    Type 2 diabetes mellitus with other specified complication, without long-term current use of insulin (H)    Hyponatremia    COPD (chronic obstructive pulmonary disease) (H)    Combined form of age-related cataract, left eye    Atypical chest pain    Acute pain of left knee    PRINCE (acute kidney injury) (H24)    Anemia    Drug rash    UTI (urinary tract infection)    Osteoarthritis of left knee, unspecified osteoarthritis type    Chronic pain of left knee      Current Medications:  Current Outpatient Medications   Medication Sig Dispense Refill    acetaminophen (TYLENOL) 325 MG tablet Take 2 tablets (650 mg) by mouth every 4 hours as needed for other (For optimal non-opioid multimodal pain management to improve pain control.) 100 tablet 0    albuterol (PROAIR HFA/PROVENTIL HFA/VENTOLIN HFA) 108 (90 Base) MCG/ACT inhaler Inhale 2 puffs into the lungs every 4 hours as needed for shortness of breath, wheezing or cough      amLODIPine (NORVASC) 10 MG " tablet TAKE 1 TABLET (10 MG) BY MOUTH DAILY. 90 tablet 0    ARTIFICIAL TEAR OP Apply 1 drop to eye as needed      atorvastatin (LIPITOR) 20 MG tablet Take 20 mg by mouth at bedtime      blood glucose (ONETOUCH VERIO IQ) test strip USE TO TEST BLOOD SUGAR 3 TIMES DAILY OR AS DIRECTED. 300 strip 1    blood glucose monitoring (NO BRAND SPECIFIED) meter device kit Use to test blood sugar 1 times daily or as directed. 1 kit 0    ClonAZEPAM (KLONOPIN) 0.5 MG tablet Take 0.5 mg by mouth Take 1 tablet by mouth daily at bedtime      Continuous Glucose Sensor (FREESTYLE SARAHY 2 SENSOR) MISC USE 1 SENSOR EVERY 14 DAYS. USE TO READ BLOOD SUGARS PER 'S INSTRUCTIONS. 2 each 5    ferrous sulfate (FEROSUL) 325 (65 Fe) MG tablet TAKE 1 TABLET BY MOUTH EVERY DAY WITH BREAKFAST 90 tablet 1    fluticasone-salmeterol (ADVAIR) 250-50 MCG/ACT inhaler TAKE 1 PUFF BY MOUTH TWICE A DAY Strength: 250-50 MCG/ACT 3 each 3    furosemide (LASIX) 20 MG tablet TAKE 2 TABLETS BY MOUTH IN THE MORNING AND 1 TABLET BY MOUTH AT LEAST 6 HOURS LATER IN THE AFTERNOON. 270 tablet 1    Lancets (ONETOUCH DELICA PLUS [id number]) MISC USE ONCE DAILY AS DIRECTED 100 each 1    latanoprost (XALATAN) 0.005 % ophthalmic solution Place 1 drop into both eyes at bedtime 7.5 mL 3    lisinopril (ZESTRIL) 40 MG tablet Take 1 tablet (40 mg) by mouth daily 90 tablet 3    metFORMIN (GLUCOPHAGE XR) 500 MG 24 hr tablet TAKE 1 TABLET BY MOUTH TWICE A DAY WITH FOOD 180 tablet 0    OXcarbazepine (TRILEPTAL) 300 MG tablet TAKE 2 TABLETS IN THE MORNING AND 2 TABLETS IN THE EVENING 360 tablet 0    oxyCODONE (ROXICODONE) 5 MG tablet Take 1-2 tablets (5-10 mg) by mouth every 4 hours as needed for moderate pain 30 tablet 0    pantoprazole (PROTONIX) 20 MG EC tablet TAKE 1 TABLET BY MOUTH EVERY DAY (Patient taking differently: Take 20 mg by mouth every morning) 90 tablet 1    polyethylene glycol (MIRALAX) 17 GM/Dose powder Take 17 g by mouth daily 510 g 0    predniSONE  (DELTASONE) 5 MG tablet TAKE 2 TABLETS BY MOUTH EVERY DAY 60 tablet 2    pyRIDostigmine (MESTINON) 60 MG tablet Take 120 mg by mouth every morning      pyRIDostigmine (MESTINON) 60 MG tablet Take 60 mg by mouth 2 times daily Midday and 6 PM.      sodium bicarbonate 650 MG tablet TAKE 1/2 TABLET TWICE A DAY BY MOUTH 90 tablet 1    timolol maleate (TIMOPTIC) 0.5 % ophthalmic solution Place 1 drop into both eyes 2 times daily 5 mL 11    amoxicillin (AMOXIL) 500 MG capsule Take 4 caps (2000mg) 30-60 minutes before dental procedure (Patient not taking: Reported on 5/31/2024) 4 capsule 0    senna-docusate (SENOKOT-S/PERICOLACE) 8.6-50 MG tablet Take 1 tablet by mouth 2 times daily (Patient not taking: Reported on 6/6/2024) 60 tablet 0     No current facility-administered medications for this visit.     MRI OF THE BRAIN WITHOUT AND WITH CONTRAST;  MRI OF THE SKULL BASE WITHOUT AND WITH CONTRAST November 4, 2020    HISTORY: Left trigeminal neuralgia. Cranial nerve protocol (FIESTA) and evaluation for compression of TN by vascular loop. Left-sided trigeminal neuralgia.      COMPARISON: Brain MR 2/10/2014.      TECHNIQUE: Axial diffusion-weighted with ADC map, T2-weighted, turboFLAIR and T1-weighted images of the brain and axial T1-weighted and coronal T2-weighted with fat saturation images centered on the basal cisterns were obtained without intravenous contrast. Following intravenous administration of IV gadolinium (10 mL Gadavist), axial turboFLAIR images of the brain and axial T1-weighted with fat saturation and coronal T1-weighted images, centered on the skull base and basal cisterns, were obtained.     FINDINGS: There is moderate diffuse cerebral volume loss. There are a few tiny scattered focal areas of abnormal T2 signal hyperintensity in the cerebral white matter bilaterally that are consistent with sequela of chronic small vessel ischemic disease. The ventricles and basal cisterns are within normal limits in  configuration given the degree of cerebral volume loss.  There is no midline shift. There are no extra-axial fluid collections. There is no evidence for stroke or acute intracranial hemorrhage. There is no abnormal contrast enhancement in the brain or its coverings.     The cisternal segments of the 5th cranial nerves bilaterally are unremarkable with no evidence for intrinsic abnormality or extrinsic compression. Meckel's caves bilaterally are within normal limits. The bilateral aspects of the cavernous sinus are unremarkable. There is no evidence for abnormality of the infratemporal fossa or orbits on either side.     There is no sinusitis or mastoiditis.                                                                    IMPRESSION:   1. No evidence for intrinsic abnormality or extrinsic compression of any portion of the 5th cranial nerve on the left.  2. Diffuse cerebral volume loss and cerebral white matter changes consistent with sequela of chronic small vessel ischemic disease.     CT HEAD W/O CONTRAST 07/08/21     INDICATION: Headache high blood pressure   COMPARISON: 02/05/2019   TECHNIQUE: Routine CT Head without IV contrast. Multiplanar reformats. Dose reduction techniques were used.     FINDINGS:   INTRACRANIAL CONTENTS: No intracranial hemorrhage, extraaxial collection, or mass effect.  No CT evidence of acute infarct. Mild presumed chronic small vessel ischemic changes. Mild generalized volume loss. No hydrocephalus.     VISUALIZED ORBITS/SINUSES/MASTOIDS: No intraorbital abnormality. No paranasal sinus mucosal disease. Scattered fluid/membrane thickening in the left mastoid air cells. No apparent mass in the posterior nasopharynx or skull base.     BONES/SOFT TISSUES: No acute abnormality.     IMPRESSION:   1. No CT evidence of acute intracranial hemorrhage, mass or recent infarct.   2.  Mild volume loss and presumed chronic small vessel ischemic changes.     XR VIDEO SWALLOW WITH SLP OR OT 5/3/2022      INDICATION: Difficulty swallowing.  COMPARISON: None.     TECHNIQUE: Routine swallow study with speech pathology using multiple barium thicknesses.     FINDINGS:   FLUOROSCOPIC TIME: .7 minutes  NUMBER OF IMAGES: 7 cine series     Swallow study with Speech Pathology using multiple barium thicknesses.      Mild penetration with consecutive straw sips of thin barium. No penetration or aspiration with cup sips of thin barium.     No penetration or aspiration with pudding or solid consistencies.     Normal epiglottic inversion. No vallecular or piriform sinus stasis.                                                                   IMPRESSION:  1.  Mild penetration with straw sips of thin barium. No aspiration.    CT PULMONARY EMBOLI WITH IV CONTRAST 11/03/23     INDICATION:   Pulmonary embolism (PE) suspected, low to intermediate prob, positive D-dimer     TECHNIQUE:   Sequential axial images are obtained from the thoracic inlet through the adrenal glands with intravenous contrast. Coronal and sagittal reconstructions obtained.     While obtaining CT images, dose reduction techniques were utilized including:     Automated exposure control, adjustment of mA and/or kV according to patient size, and/or use of iterative reconstruction technique     CONTRAST:   [id number] 75 ml     RADIATION DOSE:   303 DLP (mGy cm)     COMPARISON:   X-ray of the chest June 21, 2022     FINDINGS:   No pulmonary embolism present to the level of the segmental pulmonary arteries.     The main pulmonary artery is normal in caliber measuring 2.0 cm.     No right heart strain or right ventricular thrombus.     No concerning cervical, supraclavicular, axillary, mediastinal, or hilar adenopathy.     The main airways are patent.  No pneumothorax pleural effusion or focal pulmonary consolidation.  No pulmonary mass or nodule meeting size criteria for dedicated follow-up imaging.  There is subsegmental atelectasis in the lung bases and lingula.  "    There is cholelithiasis without signs of cholecystitis.  The gallbladder is decompressed.  The remaining partially visualized upper abdominal organs show no acute abnormality.     There is thoracic degenerative spondylosis with no aggressive osseous abnormality.     IMPRESSION:   1. No pulmonary embolism or evidence of right heart strain.   2. No pneumothorax or focal pulmonary consolidation.   3. Cholelithiasis with no signs of cholecystitis.     XR CHEST PA AND LAT 2/2/24    INDICATION:   Short of breath     COMPARISON:   06/21/2022     FINDINGS:   PA and lateral views the chest are obtained.     The lungs are clear.     The heart, pleura, mediastinum, and pulmonary vasculature are within normal limits.     The bony structures are unremarkable.     IMPRESSION:   No acute disease.     HPI  Pleasant 78 year old male with his wife presents today as a(n) new patient for left ear otorrhea and hearing loss that onset on April 15th. He reports that a little bit of blood comes out of his left ear. His wife reports that he saw a provider in April who flushed his ear out with peroxide. He reports that his left ear feels \"stuffy\".  He has a hx of a tonsillectomy, and states that he can breathe well through his nose.    He denies tinnitus, dizziness/vertigo, balance issues, weakness, numbness, tingling.     Review of Systems   Constitutional: Negative.    HENT:  Positive for ear discharge, ear pain and hearing loss. Negative for tinnitus.    Eyes: Negative.    Respiratory: Negative.     Gastrointestinal: Negative.    Musculoskeletal:  Negative for falls.   Skin: Negative.    Neurological:  Negative for dizziness, tingling and weakness.   Endo/Heme/Allergies: Negative.        Physical Exam  Vitals and nursing note reviewed.   Constitutional:       Appearance: Normal appearance.   HENT:      Head: Normocephalic and atraumatic.      Jaw: There is normal jaw occlusion.      Right Ear: Hearing, tympanic membrane and ear canal " normal.      Left Ear: Tympanic membrane and ear canal normal. Decreased hearing noted.      Ears:        Comments: Left ear: Secretions suction. Infections present-   Left posterior upper quadrant bulging with hyperemic TM. Granulation tissues removed and sent to pathology.      Nose: No mucosal edema, congestion or rhinorrhea.      Right Nostril: No occlusion.      Left Nostril: No occlusion.      Right Turbinates: Not enlarged or swollen.      Left Turbinates: Not enlarged or swollen.      Right Sinus: No maxillary sinus tenderness or frontal sinus tenderness.      Left Sinus: No maxillary sinus tenderness or frontal sinus tenderness.      Mouth/Throat:      Mouth: Mucous membranes are moist.      Pharynx: Oropharynx is clear. Uvula midline.   Eyes:      Extraocular Movements: Extraocular movements intact.      Pupils: Pupils are equal, round, and reactive to light.   Neurological:      Mental Status: He is alert.       A/P  Imaging was independently reviewed and discussed in detail with the patient. This pleasant patient has left otitis externa. There are no sinus infections present. A culture was taken today from the left ear for further investigation. The following were prescribed today:  amoxicillin-clavulanate (AUGMENTIN) 875-125 MG tablet, take 1 tablet by mouth 2 times daily for 14 days  ciprofloxacin-dexAMETHasone (CIPRODEX) 0.3-0.1 % otic suspension, place 4 drops Into the left ear 2 times daily  For left ear pain, he is recommended to take Ibuprofen or Tylenol prn. If the problem continues, a CT scan for further investigation in the future is discussed. Questions and concerns were addressed.    Follow up in clinic in 1 month.    Scribe/Staff:    Scribe Disclosure:   I, Elisabeth Sandoval, am serving as a scribe; to document services personally performed by Shayna Delatorre MD based on data collection and the provider's statements to me.     Provider Disclosure:  I agree with above History, Review of  Systems, Physical exam and Plan.  I have reviewed the content of the documentation and have edited it as needed. I have personally performed the services documented here and the documentation accurately represents those services and the decisions I have made.      Electronically signed by:  Shayna Delatorre MD

## 2024-06-04 NOTE — PROGRESS NOTES
"Chief Complaint: RECHECK (L leg hematoma. )         HPI: Rakan Nolasco returns today in follow-up for his left knee.  Since his last visit he was admitted to the hospital with a hematoma and his ipsilateral calf.  This was worked up with an angiogram which did not show an active bleed.  He was worked up for DVT which was negative..  He has been unable to elevate his leg because he had there was question of a hemoglobin drop of 0.7 g though this coincided with getting hydrated.  He was discharged with the instructions to elevate his leg to bring down the swelling after discharge but he was unable to do so because in the interim he had a retinal detachment and had to keep his head low.  Despite this he reports he is feeling well.  He has \"no pain in the knee.\"  And the swelling in his calf has come down somewhat.  He reports in the short periods of elevation that he is allowed himself the swelling has decreased.      Medications and allergies are documented in the EMR and have been reviewed.      Current Outpatient Medications:     acetaminophen (TYLENOL) 325 MG tablet, Take 2 tablets (650 mg) by mouth every 4 hours as needed for other (For optimal non-opioid multimodal pain management to improve pain control.), Disp: 100 tablet, Rfl: 0    albuterol (PROAIR HFA/PROVENTIL HFA/VENTOLIN HFA) 108 (90 Base) MCG/ACT inhaler, Inhale 2 puffs into the lungs every 4 hours as needed for shortness of breath, wheezing or cough, Disp: , Rfl:     amLODIPine (NORVASC) 10 MG tablet, TAKE 1 TABLET (10 MG) BY MOUTH DAILY., Disp: 90 tablet, Rfl: 0    amoxicillin (AMOXIL) 500 MG capsule, Take 4 caps (2000mg) 30-60 minutes before dental procedure (Patient not taking: Reported on 5/31/2024), Disp: 4 capsule, Rfl: 0    ARTIFICIAL TEAR OP, Apply 1 drop to eye as needed, Disp: , Rfl:     atorvastatin (LIPITOR) 20 MG tablet, Take 20 mg by mouth at bedtime, Disp: , Rfl:     blood glucose (ONETOUCH VERIO IQ) test strip, USE TO TEST BLOOD SUGAR 3 " TIMES DAILY OR AS DIRECTED., Disp: 300 strip, Rfl: 1    blood glucose monitoring (NO BRAND SPECIFIED) meter device kit, Use to test blood sugar 1 times daily or as directed., Disp: 1 kit, Rfl: 0    ClonAZEPAM (KLONOPIN) 0.5 MG tablet, Take 0.5 mg by mouth Take 1 tablet by mouth daily at bedtime, Disp: , Rfl:     Continuous Glucose Sensor (FREESTYLE SARAHY 2 SENSOR) MISC, USE 1 SENSOR EVERY 14 DAYS. USE TO READ BLOOD SUGARS PER 'S INSTRUCTIONS., Disp: 2 each, Rfl: 5    ferrous sulfate (FEROSUL) 325 (65 Fe) MG tablet, TAKE 1 TABLET BY MOUTH EVERY DAY WITH BREAKFAST, Disp: 90 tablet, Rfl: 1    fluticasone-salmeterol (ADVAIR) 250-50 MCG/ACT inhaler, TAKE 1 PUFF BY MOUTH TWICE A DAY Strength: 250-50 MCG/ACT, Disp: 3 each, Rfl: 3    furosemide (LASIX) 20 MG tablet, TAKE 2 TABLETS BY MOUTH IN THE MORNING AND 1 TABLET BY MOUTH AT LEAST 6 HOURS LATER IN THE AFTERNOON., Disp: 270 tablet, Rfl: 1    Lancets (ONETOUCH DELICA PLUS [id number]) MISC, USE ONCE DAILY AS DIRECTED, Disp: 100 each, Rfl: 1    latanoprost (XALATAN) 0.005 % ophthalmic solution, Place 1 drop into both eyes at bedtime, Disp: 7.5 mL, Rfl: 3    lisinopril (ZESTRIL) 40 MG tablet, Take 1 tablet (40 mg) by mouth daily (Patient taking differently: Take 40 mg by mouth every evening), Disp: 90 tablet, Rfl: 3    metFORMIN (GLUCOPHAGE XR) 500 MG 24 hr tablet, TAKE 1 TABLET BY MOUTH TWICE A DAY WITH FOOD, Disp: 180 tablet, Rfl: 0    OXcarbazepine (TRILEPTAL) 300 MG tablet, TAKE 2 TABLETS IN THE MORNING AND 2 TABLETS IN THE EVENING, Disp: 360 tablet, Rfl: 0    oxyCODONE (ROXICODONE) 5 MG tablet, Take 1-2 tablets (5-10 mg) by mouth every 4 hours as needed for moderate pain, Disp: 30 tablet, Rfl: 0    pantoprazole (PROTONIX) 20 MG EC tablet, TAKE 1 TABLET BY MOUTH EVERY DAY (Patient taking differently: Take 20 mg by mouth every morning), Disp: 90 tablet, Rfl: 1    polyethylene glycol (MIRALAX) 17 GM/Dose powder, Take 17 g by mouth daily, Disp: 510 g, Rfl: 0     predniSONE (DELTASONE) 5 MG tablet, TAKE 2 TABLETS BY MOUTH EVERY DAY, Disp: 60 tablet, Rfl: 2    pyRIDostigmine (MESTINON) 60 MG tablet, Take 120 mg by mouth every morning, Disp: , Rfl:     pyRIDostigmine (MESTINON) 60 MG tablet, Take 60 mg by mouth 2 times daily Midday and 6 PM., Disp: , Rfl:     senna-docusate (SENOKOT-S/PERICOLACE) 8.6-50 MG tablet, Take 1 tablet by mouth 2 times daily (Patient not taking: Reported on 5/31/2024), Disp: 60 tablet, Rfl: 0    sodium bicarbonate 650 MG tablet, TAKE 1/2 TABLET TWICE A DAY BY MOUTH, Disp: 90 tablet, Rfl: 1    timolol maleate (TIMOPTIC) 0.5 % ophthalmic solution, Place 1 drop into both eyes 2 times daily, Disp: 5 mL, Rfl: 11    Allergies: Azathioprine, Sulfamethoxazole-trimethoprim, Ciprofloxacin, and Ropinirole    Current Status:  JORGE Jr:  [facility]:  Global Mental Health Score -    Global Physical Health Score -    PROMIS TOTAL - SUBSCORES -      Physical Exam:  On physical examination the patient appears the stated age, is in no acute distress, affect is appropriate, and breathing is non-labored.    There were no vitals taken for this visit.  There is no height or weight on file to calculate BMI.    Appearance: The knee is benign in appearance there is no effusion whatsoever within the knee.  The calf has larger when compared to the contralateral side though it is not tense and firm.  There is some bruising on the posterior calf which be consistent with bleed.  Range of motion of the knee remains good with 0 110 degrees and it does seem to be a little bit limited by his calf swelling.  He walks about the room with a nearly normal gait.  It does not appear antalgic.  I reviewed the patient's images and there is no interval change.  These are from 515.    Assessment and plan: Postoperative calf hematoma.  We reviewed his medications and there report of one of his medications being risk for decreased platelet aggregation.  This in combination with his aspirin/antiplatelet  therapy could have been a risk for his hematoma.  There is no evidence that this is ongoing or that it is not resolving and as he is no longer on his aspirin I think it is reasonable to simply observe this.  I will see him back in 6 weeks.  He is going to be able to elevate his leg and week.  The hope is that this will decrease his swelling.  He is can come back sooner for any issues.  Plan:   Telly Lucio

## 2024-06-06 ENCOUNTER — OFFICE VISIT (OUTPATIENT)
Dept: AUDIOLOGY | Facility: OTHER | Age: 79
End: 2024-06-06
Attending: FAMILY MEDICINE
Payer: COMMERCIAL

## 2024-06-06 ENCOUNTER — OFFICE VISIT (OUTPATIENT)
Dept: OTOLARYNGOLOGY | Facility: OTHER | Age: 79
End: 2024-06-06
Attending: FAMILY MEDICINE
Payer: COMMERCIAL

## 2024-06-06 VITALS
HEIGHT: 69 IN | TEMPERATURE: 97 F | DIASTOLIC BLOOD PRESSURE: 62 MMHG | WEIGHT: 221.5 LBS | SYSTOLIC BLOOD PRESSURE: 132 MMHG | BODY MASS INDEX: 32.81 KG/M2

## 2024-06-06 DIAGNOSIS — H73.892 ERYTHEMA OF TYMPANIC MEMBRANE OF LEFT EAR: ICD-10-CM

## 2024-06-06 DIAGNOSIS — H90.72 MIXED CONDUCTIVE AND SENSORINEURAL HEARING LOSS OF LEFT EAR WITH UNRESTRICTED HEARING OF RIGHT EAR: ICD-10-CM

## 2024-06-06 DIAGNOSIS — H66.22 CHRONIC ATTICOANTRAL SUPPURATIVE OTITIS MEDIA OF LEFT EAR: Primary | ICD-10-CM

## 2024-06-06 DIAGNOSIS — H92.12 OTORRHEA, LEFT: Primary | ICD-10-CM

## 2024-06-06 PROCEDURE — 88304 TISSUE EXAM BY PATHOLOGIST: CPT | Performed by: PATHOLOGY

## 2024-06-06 PROCEDURE — 88311 DECALCIFY TISSUE: CPT | Performed by: PATHOLOGY

## 2024-06-06 PROCEDURE — 99203 OFFICE O/P NEW LOW 30 MIN: CPT | Performed by: OTOLARYNGOLOGY

## 2024-06-06 PROCEDURE — 99207 PR NO CHARGE LOS: CPT | Performed by: AUDIOLOGIST

## 2024-06-06 RX ORDER — CIPROFLOXACIN AND DEXAMETHASONE 3; 1 MG/ML; MG/ML
4 SUSPENSION/ DROPS AURICULAR (OTIC) 2 TIMES DAILY
Qty: 7.5 ML | Refills: 0 | Status: SHIPPED | OUTPATIENT
Start: 2024-06-06 | End: 2024-07-10

## 2024-06-06 ASSESSMENT — ENCOUNTER SYMPTOMS
TINGLING: 0
DIZZINESS: 0
FALLS: 0
WEAKNESS: 0

## 2024-06-06 NOTE — LETTER
"6/6/2024      Rakan Nolasco  [address] [address]      Dear Colleague,    Thank you for referring your patient, Rakan Nolasco, to the [facility]. Please see a copy of my visit note below.    Chief Complaint   Patient presents with     Consult     Left ear draining      PCP: Telly Lucio     Referring Provider: Telly Lucio    /62   Temp 97  F (36.1  C) (Temporal)   Ht 1.76 m (5' 9.29\")   Wt 100.5 kg (221 lb 8 oz)   BMI 32.44 kg/m      ENT Problem List:  Patient Active Problem List   Diagnosis     GERD (gastroesophageal reflux disease)     Fatigue     HLD (hyperlipidemia)     Borderline glaucoma with ocular hypertension     Advanced directives, counseling/discussion     Trigeminal neuralgia pain     HTN (hypertension)     Health Care Home     Arthritis     Dry eyes     PVD (posterior vitreous detachment) OU     H/O RD (retinal detachment) s/p repair with PPV (AB)     Epiretinal membrane, bilateral     Pseudophakia of right eye     Myasthenia gravis (H)     Morbid obesity (H)     Right posterior capsular opacification     Demand ischemia (H)     Obstructive sleep apnea syndrome     Prostate cancer (H)     Restless legs syndrome     Temporal arteritis (H)     Type 2 diabetes mellitus with other specified complication, without long-term current use of insulin (H)     Hyponatremia     COPD (chronic obstructive pulmonary disease) (H)     Combined form of age-related cataract, left eye     Atypical chest pain     Acute pain of left knee     PRINCE (acute kidney injury) (H24)     Anemia     Drug rash     UTI (urinary tract infection)     Osteoarthritis of left knee, unspecified osteoarthritis type     Chronic pain of left knee      Current Medications:  Current Outpatient Medications   Medication Sig Dispense Refill     acetaminophen (TYLENOL) 325 MG tablet Take 2 tablets (650 mg) by mouth every 4 hours as needed for other (For optimal non-opioid " multimodal pain management to improve pain control.) 100 tablet 0     albuterol (PROAIR HFA/PROVENTIL HFA/VENTOLIN HFA) 108 (90 Base) MCG/ACT inhaler Inhale 2 puffs into the lungs every 4 hours as needed for shortness of breath, wheezing or cough       amLODIPine (NORVASC) 10 MG tablet TAKE 1 TABLET (10 MG) BY MOUTH DAILY. 90 tablet 0     ARTIFICIAL TEAR OP Apply 1 drop to eye as needed       atorvastatin (LIPITOR) 20 MG tablet Take 20 mg by mouth at bedtime       blood glucose (ONETOUCH VERIO IQ) test strip USE TO TEST BLOOD SUGAR 3 TIMES DAILY OR AS DIRECTED. 300 strip 1     blood glucose monitoring (NO BRAND SPECIFIED) meter device kit Use to test blood sugar 1 times daily or as directed. 1 kit 0     ClonAZEPAM (KLONOPIN) 0.5 MG tablet Take 0.5 mg by mouth Take 1 tablet by mouth daily at bedtime       Continuous Glucose Sensor (FREESTYLE SARAHY 2 SENSOR) MISC USE 1 SENSOR EVERY 14 DAYS. USE TO READ BLOOD SUGARS PER 'S INSTRUCTIONS. 2 each 5     ferrous sulfate (FEROSUL) 325 (65 Fe) MG tablet TAKE 1 TABLET BY MOUTH EVERY DAY WITH BREAKFAST 90 tablet 1     fluticasone-salmeterol (ADVAIR) 250-50 MCG/ACT inhaler TAKE 1 PUFF BY MOUTH TWICE A DAY Strength: 250-50 MCG/ACT 3 each 3     furosemide (LASIX) 20 MG tablet TAKE 2 TABLETS BY MOUTH IN THE MORNING AND 1 TABLET BY MOUTH AT LEAST 6 HOURS LATER IN THE AFTERNOON. 270 tablet 1     Lancets (ONETOUCH DELICA PLUS [id number]) MISC USE ONCE DAILY AS DIRECTED 100 each 1     latanoprost (XALATAN) 0.005 % ophthalmic solution Place 1 drop into both eyes at bedtime 7.5 mL 3     lisinopril (ZESTRIL) 40 MG tablet Take 1 tablet (40 mg) by mouth daily 90 tablet 3     metFORMIN (GLUCOPHAGE XR) 500 MG 24 hr tablet TAKE 1 TABLET BY MOUTH TWICE A DAY WITH FOOD 180 tablet 0     OXcarbazepine (TRILEPTAL) 300 MG tablet TAKE 2 TABLETS IN THE MORNING AND 2 TABLETS IN THE EVENING 360 tablet 0     oxyCODONE (ROXICODONE) 5 MG tablet Take 1-2 tablets (5-10 mg) by mouth every 4 hours  as needed for moderate pain 30 tablet 0     pantoprazole (PROTONIX) 20 MG EC tablet TAKE 1 TABLET BY MOUTH EVERY DAY (Patient taking differently: Take 20 mg by mouth every morning) 90 tablet 1     polyethylene glycol (MIRALAX) 17 GM/Dose powder Take 17 g by mouth daily 510 g 0     predniSONE (DELTASONE) 5 MG tablet TAKE 2 TABLETS BY MOUTH EVERY DAY 60 tablet 2     pyRIDostigmine (MESTINON) 60 MG tablet Take 120 mg by mouth every morning       pyRIDostigmine (MESTINON) 60 MG tablet Take 60 mg by mouth 2 times daily Midday and 6 PM.       sodium bicarbonate 650 MG tablet TAKE 1/2 TABLET TWICE A DAY BY MOUTH 90 tablet 1     timolol maleate (TIMOPTIC) 0.5 % ophthalmic solution Place 1 drop into both eyes 2 times daily 5 mL 11     amoxicillin (AMOXIL) 500 MG capsule Take 4 caps (2000mg) 30-60 minutes before dental procedure (Patient not taking: Reported on 5/31/2024) 4 capsule 0     senna-docusate (SENOKOT-S/PERICOLACE) 8.6-50 MG tablet Take 1 tablet by mouth 2 times daily (Patient not taking: Reported on 6/6/2024) 60 tablet 0     No current facility-administered medications for this visit.     MRI OF THE BRAIN WITHOUT AND WITH CONTRAST;  MRI OF THE SKULL BASE WITHOUT AND WITH CONTRAST November 4, 2020    HISTORY: Left trigeminal neuralgia. Cranial nerve protocol (FIESTA) and evaluation for compression of TN by vascular loop. Left-sided trigeminal neuralgia.      COMPARISON: Brain MR 2/10/2014.      TECHNIQUE: Axial diffusion-weighted with ADC map, T2-weighted, turboFLAIR and T1-weighted images of the brain and axial T1-weighted and coronal T2-weighted with fat saturation images centered on the basal cisterns were obtained without intravenous contrast. Following intravenous administration of IV gadolinium (10 mL Gadavist), axial turboFLAIR images of the brain and axial T1-weighted with fat saturation and coronal T1-weighted images, centered on the skull base and basal cisterns, were obtained.     FINDINGS: There is  moderate diffuse cerebral volume loss. There are a few tiny scattered focal areas of abnormal T2 signal hyperintensity in the cerebral white matter bilaterally that are consistent with sequela of chronic small vessel ischemic disease. The ventricles and basal cisterns are within normal limits in configuration given the degree of cerebral volume loss.  There is no midline shift. There are no extra-axial fluid collections. There is no evidence for stroke or acute intracranial hemorrhage. There is no abnormal contrast enhancement in the brain or its coverings.     The cisternal segments of the 5th cranial nerves bilaterally are unremarkable with no evidence for intrinsic abnormality or extrinsic compression. Meckel's caves bilaterally are within normal limits. The bilateral aspects of the cavernous sinus are unremarkable. There is no evidence for abnormality of the infratemporal fossa or orbits on either side.     There is no sinusitis or mastoiditis.                                                                    IMPRESSION:   1. No evidence for intrinsic abnormality or extrinsic compression of any portion of the 5th cranial nerve on the left.  2. Diffuse cerebral volume loss and cerebral white matter changes consistent with sequela of chronic small vessel ischemic disease.     CT HEAD W/O CONTRAST 07/08/21     INDICATION: Headache high blood pressure   COMPARISON: 02/05/2019   TECHNIQUE: Routine CT Head without IV contrast. Multiplanar reformats. Dose reduction techniques were used.     FINDINGS:   INTRACRANIAL CONTENTS: No intracranial hemorrhage, extraaxial collection, or mass effect.  No CT evidence of acute infarct. Mild presumed chronic small vessel ischemic changes. Mild generalized volume loss. No hydrocephalus.     VISUALIZED ORBITS/SINUSES/MASTOIDS: No intraorbital abnormality. No paranasal sinus mucosal disease. Scattered fluid/membrane thickening in the left mastoid air cells. No apparent mass in the  posterior nasopharynx or skull base.     BONES/SOFT TISSUES: No acute abnormality.     IMPRESSION:   1. No CT evidence of acute intracranial hemorrhage, mass or recent infarct.   2.  Mild volume loss and presumed chronic small vessel ischemic changes.     XR VIDEO SWALLOW WITH SLP OR OT 5/3/2022     INDICATION: Difficulty swallowing.  COMPARISON: None.     TECHNIQUE: Routine swallow study with speech pathology using multiple barium thicknesses.     FINDINGS:   FLUOROSCOPIC TIME: .7 minutes  NUMBER OF IMAGES: 7 cine series     Swallow study with Speech Pathology using multiple barium thicknesses.      Mild penetration with consecutive straw sips of thin barium. No penetration or aspiration with cup sips of thin barium.     No penetration or aspiration with pudding or solid consistencies.     Normal epiglottic inversion. No vallecular or piriform sinus stasis.                                                                   IMPRESSION:  1.  Mild penetration with straw sips of thin barium. No aspiration.    CT PULMONARY EMBOLI WITH IV CONTRAST 11/03/23     INDICATION:   Pulmonary embolism (PE) suspected, low to intermediate prob, positive D-dimer     TECHNIQUE:   Sequential axial images are obtained from the thoracic inlet through the adrenal glands with intravenous contrast. Coronal and sagittal reconstructions obtained.     While obtaining CT images, dose reduction techniques were utilized including:     Automated exposure control, adjustment of mA and/or kV according to patient size, and/or use of iterative reconstruction technique     CONTRAST:   [id number] 75 ml     RADIATION DOSE:   303 DLP (mGy cm)     COMPARISON:   X-ray of the chest June 21, 2022     FINDINGS:   No pulmonary embolism present to the level of the segmental pulmonary arteries.     The main pulmonary artery is normal in caliber measuring 2.0 cm.     No right heart strain or right ventricular thrombus.     No concerning cervical,  "supraclavicular, axillary, mediastinal, or hilar adenopathy.     The main airways are patent.  No pneumothorax pleural effusion or focal pulmonary consolidation.  No pulmonary mass or nodule meeting size criteria for dedicated follow-up imaging.  There is subsegmental atelectasis in the lung bases and lingula.     There is cholelithiasis without signs of cholecystitis.  The gallbladder is decompressed.  The remaining partially visualized upper abdominal organs show no acute abnormality.     There is thoracic degenerative spondylosis with no aggressive osseous abnormality.     IMPRESSION:   1. No pulmonary embolism or evidence of right heart strain.   2. No pneumothorax or focal pulmonary consolidation.   3. Cholelithiasis with no signs of cholecystitis.     XR CHEST PA AND LAT 2/2/24    INDICATION:   Short of breath     COMPARISON:   06/21/2022     FINDINGS:   PA and lateral views the chest are obtained.     The lungs are clear.     The heart, pleura, mediastinum, and pulmonary vasculature are within normal limits.     The bony structures are unremarkable.     IMPRESSION:   No acute disease.     HPI  Pleasant 78 year old male with his wife presents today as a(n) new patient for left ear otorrhea and hearing loss that onset on April 15th. He reports that a little bit of blood comes out of his left ear. His wife reports that he saw a provider in April who flushed his ear out with peroxide. He reports that his left ear feels \"stuffy\".  He has a hx of a tonsillectomy, and states that he can breathe well through his nose.    He denies tinnitus, dizziness/vertigo, balance issues, weakness, numbness, tingling.     Review of Systems   Constitutional: Negative.    HENT:  Positive for ear discharge, ear pain and hearing loss. Negative for tinnitus.    Eyes: Negative.    Respiratory: Negative.     Gastrointestinal: Negative.    Musculoskeletal:  Negative for falls.   Skin: Negative.    Neurological:  Negative for dizziness, " tingling and weakness.   Endo/Heme/Allergies: Negative.        Physical Exam  Vitals and nursing note reviewed.   Constitutional:       Appearance: Normal appearance.   HENT:      Head: Normocephalic and atraumatic.      Jaw: There is normal jaw occlusion.      Right Ear: Hearing, tympanic membrane and ear canal normal.      Left Ear: Tympanic membrane and ear canal normal. Decreased hearing noted.      Ears:        Comments: Left ear: Secretions suction. Infections present-   Left posterior upper quadrant bulging with hyperemic TM. Granulation tissues removed and sent to pathology.      Nose: No mucosal edema, congestion or rhinorrhea.      Right Nostril: No occlusion.      Left Nostril: No occlusion.      Right Turbinates: Not enlarged or swollen.      Left Turbinates: Not enlarged or swollen.      Right Sinus: No maxillary sinus tenderness or frontal sinus tenderness.      Left Sinus: No maxillary sinus tenderness or frontal sinus tenderness.      Mouth/Throat:      Mouth: Mucous membranes are moist.      Pharynx: Oropharynx is clear. Uvula midline.   Eyes:      Extraocular Movements: Extraocular movements intact.      Pupils: Pupils are equal, round, and reactive to light.   Neurological:      Mental Status: He is alert.       A/P  Imaging was independently reviewed and discussed in detail with the patient. This pleasant patient has left otitis externa. There are no sinus infections present. A culture was taken today from the left ear for further investigation. The following were prescribed today:  amoxicillin-clavulanate (AUGMENTIN) 875-125 MG tablet, take 1 tablet by mouth 2 times daily for 14 days  ciprofloxacin-dexAMETHasone (CIPRODEX) 0.3-0.1 % otic suspension, place 4 drops Into the left ear 2 times daily  For left ear pain, he is recommended to take Ibuprofen or Tylenol prn. If the problem continues, a CT scan for further investigation in the future is discussed. Questions and concerns were  addressed.    Follow up in clinic in 1 month.    Scribe/Staff:    Scribe Disclosure:   I, Elisabeth Sandoval, am serving as a scribe; to document services personally performed by Shayna Delatorre MD based on data collection and the provider's statements to me.     Provider Disclosure:  I agree with above History, Review of Systems, Physical exam and Plan.  I have reviewed the content of the documentation and have edited it as needed. I have personally performed the services documented here and the documentation accurately represents those services and the decisions I have made.      Electronically signed by:  Shayna Delatorre MD         Again, thank you for allowing me to participate in the care of your patient.        Sincerely,        Shayna Delatorre MD

## 2024-06-06 NOTE — PROGRESS NOTES
6/6:  Rakan Nolasco Jr. is a 78 y.o. male presents to the emergency department with right arm pain. Workup initiated in triage with a troponin of 36. This is increased from patient's prior 2 years ago of 13. EKG with right bundle branch block but no evidence of acute ischemic changes. He has had a right bundle branch block for several years and therefore this is not acutely changed making right heart strain unlikely.    Right upper extremity ultrasound was negative for DVT.   BMP unremarkable aside from sodium of 129 but this is unlikely to be causing any symptoms.   Repeat troponin 36 which is stable from prior.   Patient has negative delta troponin this is more likely a chronic myocardial injury rather than anything acute given recent onset of symptoms this morning.    Patient has had no symptoms since meeting him here in the emergency department and any ongoing emergent cardiothoracic pathology such as PE/dissection is highly unlikely and further imaging not felt to be indicated at this time. ACS is also unlikely given patient has had no symptoms for hours. Discussed with patient observation in the hospital for cardiology consultation since he technically does have a new positive troponin versus discharge home with primary care follow-up knowing the small risk that we could miss something and he prefers to discharge home. Patient discharged home with careful return to ED precautions.      Hyponatremia: Oxycarbazepine, lasix

## 2024-06-06 NOTE — PROGRESS NOTES
AUDIOLOGY REPORT:    Patient was referred from ENT by Dr. Delatorre for audiology evaluation. The patient reports that he has been having drainage from the left ear, sometimes with blood, for around two months. The left ear sometimes feels plugged, and he has had a significant decrease in hearing in the left ear. He reports bilateral hearing loss, worse in the left ear than the right, and he wears hearing aids. The patient reports occasional tinnitus, but wearing hearing aids has helped. 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 He reports a history of ear infections as a child, but had not had ear problems since then and has not had ear surgery. The patient was accompanied to the appointment by his wife.    Testing:    Otoscopy:   Otoscopic exam indicates debris and inflammation in the left ear. The right ear is clear.     Patient was returned to ENT for follow up. No testing was completed today.    Darcy Funes, CCC-A  Licensed Audiologist #[id number]  6/6/2024

## 2024-06-07 ENCOUNTER — OFFICE VISIT (OUTPATIENT)
Dept: FAMILY MEDICINE | Facility: CLINIC | Age: 79
End: 2024-06-07
Payer: COMMERCIAL

## 2024-06-07 VITALS
SYSTOLIC BLOOD PRESSURE: 120 MMHG | WEIGHT: 221 LBS | HEART RATE: 73 BPM | TEMPERATURE: 97.2 F | DIASTOLIC BLOOD PRESSURE: 70 MMHG | BODY MASS INDEX: 32.36 KG/M2 | OXYGEN SATURATION: 96 % | RESPIRATION RATE: 19 BRPM

## 2024-06-07 DIAGNOSIS — C61 PROSTATE CANCER (H): ICD-10-CM

## 2024-06-07 DIAGNOSIS — M31.6 TEMPORAL ARTERITIS (H): ICD-10-CM

## 2024-06-07 DIAGNOSIS — Z09 FOLLOW-UP EXAM AFTER TREATMENT: Primary | ICD-10-CM

## 2024-06-07 DIAGNOSIS — I45.10 RBBB (RIGHT BUNDLE BRANCH BLOCK): ICD-10-CM

## 2024-06-07 DIAGNOSIS — E11.69 TYPE 2 DIABETES MELLITUS WITH OTHER SPECIFIED COMPLICATION, WITHOUT LONG-TERM CURRENT USE OF INSULIN (H): ICD-10-CM

## 2024-06-07 DIAGNOSIS — E66.01 MORBID OBESITY (H): ICD-10-CM

## 2024-06-07 DIAGNOSIS — I10 PRIMARY HYPERTENSION: ICD-10-CM

## 2024-06-07 DIAGNOSIS — M79.621 PAIN OF RIGHT UPPER ARM: ICD-10-CM

## 2024-06-07 DIAGNOSIS — E87.1 HYPONATREMIA: ICD-10-CM

## 2024-06-07 LAB — HBA1C MFR BLD: 5.9 % (ref 0–5.6)

## 2024-06-07 PROCEDURE — 36415 COLL VENOUS BLD VENIPUNCTURE: CPT | Performed by: FAMILY MEDICINE

## 2024-06-07 PROCEDURE — 83036 HEMOGLOBIN GLYCOSYLATED A1C: CPT | Performed by: FAMILY MEDICINE

## 2024-06-07 PROCEDURE — 84295 ASSAY OF SERUM SODIUM: CPT | Performed by: FAMILY MEDICINE

## 2024-06-07 PROCEDURE — 99214 OFFICE O/P EST MOD 30 MIN: CPT | Performed by: FAMILY MEDICINE

## 2024-06-07 PROCEDURE — 84153 ASSAY OF PSA TOTAL: CPT | Performed by: FAMILY MEDICINE

## 2024-06-07 PROCEDURE — 82043 UR ALBUMIN QUANTITATIVE: CPT | Performed by: FAMILY MEDICINE

## 2024-06-07 PROCEDURE — 82570 ASSAY OF URINE CREATININE: CPT | Performed by: FAMILY MEDICINE

## 2024-06-07 RX ORDER — TOBRAMYCIN 3 MG/ML
SOLUTION/ DROPS OPHTHALMIC
COMMUNITY
Start: 2024-06-01 | End: 2024-07-25

## 2024-06-07 RX ORDER — PREDNISOLONE ACETATE 10 MG/ML
SUSPENSION/ DROPS OPHTHALMIC
COMMUNITY
Start: 2024-06-01

## 2024-06-07 RX ORDER — DOXYCYCLINE HYCLATE 100 MG
1 TABLET ORAL
COMMUNITY
Start: 2023-06-18 | End: 2024-07-25

## 2024-06-07 ASSESSMENT — PAIN SCALES - GENERAL: PAINLEVEL: NO PAIN (0)

## 2024-06-07 NOTE — PROGRESS NOTES
Assessment & Plan     Follow-up exam after treatment   Patient was seen in ER for arm and chest pain; was concerned about cardiac cause but extensive work up was negative    Pain of right upper arm   -  Was an episode that resolved spontaneously    Hyponatremia   Could be related to henry Lasix (already dose cut down) and Oxycarbazepine he is taking for temopral arteritis. He takes a low dose and when feels symptoms are reappearing increases dose. Will recheck Sodium, wants to consider holding Oxycarbazepine intermittentlt    - Sodium  - Sodium    Prostate cancer (H)    - PSA, tumor marker  - PSA, tumor marker    RBBB (right bundle branch block)   Stable, been on going, was incomplete but now complete. Has not been evaluated by cardiology would like to  - Adult Cardiology Eval  Referral    Primary hypertension  - Adult Cardiology Eval  Referral    Type 2 diabetes mellitus with other specified complication, without long-term current use of insulin (H)    Well controlled  - HEMOGLOBIN A1C  - Albumin Random Urine Quantitative with Creat Ratio  - Adult Cardiology Eval  Referral  - HEMOGLOBIN A1C  - Albumin Random Urine Quantitative with Creat Ratio    Temporal arteritis (H)   -  Stable    Morbid obesity (H)   - Counseled to make better food choices, exercise as tolerated, and lose weight.     MED REC REQUIRED{  Post Medication Reconciliation Status: patient was not discharged from an inpatient facility or TCU    See Patient Instructions    Subjective   Rakan is a 78 year old, presenting for the following health issues:  Hospital F/U        6/7/2024    11:22 AM   Additional Questions   Roomed by MOSHE CARBAJAL   Accompanied by partner     HPI   ED/UC Followup:  Facility:  [facility]   Date of visit: 6/6/2024  Reason for visit: Hospital follow up  Current Status: Arm Pain  6/6:  Rakan Nolasco JrSnow is a 78 y.o. male presents to the emergency department with right arm pain. Workup initiated in triage  with a troponin of 36. This is increased from patient's prior 2 years ago of 13. EKG with right bundle branch block but no evidence of acute ischemic changes. He has had a right bundle branch block for several years and therefore this is not acutely changed making right heart strain unlikely.    Right upper extremity ultrasound was negative for DVT.   BMP unremarkable aside from sodium of 129 but this is unlikely to be causing any symptoms.   Repeat troponin 36 which is stable from prior.   Patient has negative delta troponin this is more likely a chronic myocardial injury rather than anything acute given recent onset of symptoms this morning.    Patient has had no symptoms since meeting him here in the emergency department and any ongoing emergent cardiothoracic pathology such as PE/dissection is highly unlikely and further imaging not felt to be indicated at this time. ACS is also unlikely given patient has had no symptoms for hours. Discussed with patient observation in the hospital for cardiology consultation since he technically does have a new positive troponin versus discharge home with primary care follow-up knowing the small risk that we could miss something and he prefers to discharge home.       Review of Systems  Constitutional, HEENT, cardiovascular, pulmonary, gi and gu systems are negative, except as otherwise noted.      Objective    /70 (BP Location: Left arm)   Pulse 73   Temp 97.2  F (36.2  C) (Temporal)   Resp 19   Wt 100.2 kg (221 lb)   SpO2 96%   BMI 32.36 kg/m    Body mass index is 32.36 kg/m .  Physical Exam   GENERAL: alert and no distress  RESP: lungs clear to auscultation - no rales, rhonchi or wheezes  CV: regular rate and rhythm,  no murmur, click or rub, no peripheral edema  MS: no gross musculoskeletal defects noted, no edema  NEURO: Normal strength and tone, mentation intact and speech normal  PSYCH: mentation appears normal, affect normal/bright    Signed Electronically  by: Telly Lucio MD

## 2024-06-08 LAB
CREAT UR-MCNC: 154 MG/DL
MICROALBUMIN UR-MCNC: 53.1 MG/L
MICROALBUMIN/CREAT UR: 34.48 MG/G CR (ref 0–17)
PSA SERPL DL<=0.01 NG/ML-MCNC: 0.15 NG/ML (ref 0–6.5)
SODIUM SERPL-SCNC: 128 MMOL/L (ref 135–145)

## 2024-06-12 LAB
PATH REPORT.COMMENTS IMP SPEC: NORMAL
PATH REPORT.COMMENTS IMP SPEC: NORMAL
PATH REPORT.FINAL DX SPEC: NORMAL
PATH REPORT.GROSS SPEC: NORMAL
PATH REPORT.MICROSCOPIC SPEC OTHER STN: NORMAL
PATH REPORT.MICROSCOPIC SPEC OTHER STN: NORMAL
PATH REPORT.RELEVANT HX SPEC: NORMAL
PHOTO IMAGE: NORMAL

## 2024-06-14 ENCOUNTER — LAB (OUTPATIENT)
Dept: LAB | Facility: CLINIC | Age: 79
End: 2024-06-14
Payer: COMMERCIAL

## 2024-06-14 ENCOUNTER — TELEPHONE (OUTPATIENT)
Dept: OTOLARYNGOLOGY | Facility: CLINIC | Age: 79
End: 2024-06-14

## 2024-06-14 DIAGNOSIS — E87.1 HYPONATREMIA: ICD-10-CM

## 2024-06-14 DIAGNOSIS — E11.69 TYPE 2 DIABETES MELLITUS WITH OTHER SPECIFIED COMPLICATION, WITHOUT LONG-TERM CURRENT USE OF INSULIN (H): ICD-10-CM

## 2024-06-14 PROCEDURE — 84295 ASSAY OF SERUM SODIUM: CPT

## 2024-06-14 PROCEDURE — 36415 COLL VENOUS BLD VENIPUNCTURE: CPT

## 2024-06-14 RX ORDER — METFORMIN HCL 500 MG
TABLET, EXTENDED RELEASE 24 HR ORAL
Qty: 180 TABLET | Refills: 3 | Status: SHIPPED | OUTPATIENT
Start: 2024-06-14

## 2024-06-14 NOTE — TELEPHONE ENCOUNTER
Phone call to pt with pathology results showing cholesteatoma. Explained that Dr Delatorre will reevaluate the ear at pt's next visit in July, and order further imaging as needed. Pt advised to continue ear drops and antibiotics as presribed.  Pt verbalized understanding of plan.  Elvira Li RN

## 2024-06-14 NOTE — TELEPHONE ENCOUNTER
----- Message from Shayna Delatorre MD sent at 6/14/2024 10:32 AM CDT -----  Cholesteatoma is observed. F/up in a month for a possible consideration of a Temporal bone CT.  Karl  ----- Message -----  From: Lab, Background User  Sent: 6/12/2024   2:22 PM CDT  To: Shayna Delatorre MD

## 2024-06-15 DIAGNOSIS — J44.9 CHRONIC OBSTRUCTIVE PULMONARY DISEASE, UNSPECIFIED (H): ICD-10-CM

## 2024-06-15 DIAGNOSIS — G50.0 TRIGEMINAL NEURALGIA: ICD-10-CM

## 2024-06-15 LAB — SODIUM SERPL-SCNC: 131 MMOL/L (ref 135–145)

## 2024-06-17 DIAGNOSIS — J43.2 CENTRILOBULAR EMPHYSEMA (H): Primary | ICD-10-CM

## 2024-06-17 DIAGNOSIS — E87.1 HYPONATREMIA: Primary | ICD-10-CM

## 2024-06-17 DIAGNOSIS — K21.9 GASTROESOPHAGEAL REFLUX DISEASE WITHOUT ESOPHAGITIS: ICD-10-CM

## 2024-06-17 PROBLEM — N17.9 AKI (ACUTE KIDNEY INJURY) (H): Status: RESOLVED | Noted: 2022-09-21 | Resolved: 2024-06-17

## 2024-06-17 PROBLEM — N39.0 UTI (URINARY TRACT INFECTION): Status: RESOLVED | Noted: 2022-09-21 | Resolved: 2024-06-17

## 2024-06-17 PROBLEM — R07.89 ATYPICAL CHEST PAIN: Status: RESOLVED | Noted: 2020-01-07 | Resolved: 2024-06-17

## 2024-06-17 PROBLEM — L27.0 DRUG RASH: Status: RESOLVED | Noted: 2022-09-24 | Resolved: 2024-06-17

## 2024-06-17 PROBLEM — I24.89 DEMAND ISCHEMIA (H): Status: RESOLVED | Noted: 2018-01-12 | Resolved: 2024-06-17

## 2024-06-17 PROBLEM — M25.562 ACUTE PAIN OF LEFT KNEE: Status: RESOLVED | Noted: 2024-02-29 | Resolved: 2024-06-17

## 2024-06-17 RX ORDER — FLUTICASONE PROPIONATE AND SALMETEROL 250; 50 UG/1; UG/1
1 POWDER RESPIRATORY (INHALATION) EVERY 12 HOURS
Qty: 3 EACH | Refills: 3 | Status: SHIPPED | OUTPATIENT
Start: 2024-06-17

## 2024-06-17 RX ORDER — ALBUTEROL SULFATE 90 UG/1
AEROSOL, METERED RESPIRATORY (INHALATION)
Qty: 18 G | Refills: 11 | Status: SHIPPED | OUTPATIENT
Start: 2024-06-17

## 2024-06-17 RX ORDER — PANTOPRAZOLE SODIUM 20 MG/1
20 TABLET, DELAYED RELEASE ORAL DAILY
Qty: 90 TABLET | Refills: 1 | OUTPATIENT
Start: 2024-06-17

## 2024-06-17 RX ORDER — OXCARBAZEPINE 300 MG/1
TABLET, FILM COATED ORAL
Qty: 360 TABLET | Refills: 0 | Status: SHIPPED | OUTPATIENT
Start: 2024-06-17 | End: 2024-10-02

## 2024-06-17 NOTE — RESULT ENCOUNTER NOTE
Rakan,    Your sodium is improving. I recommend another lab check by lab only appointment in 1 month.     Genoveva Oshea MD

## 2024-06-29 DIAGNOSIS — G70.00 OCULAR MYASTHENIA (H): ICD-10-CM

## 2024-07-01 RX ORDER — PYRIDOSTIGMINE BROMIDE 60 MG/1
TABLET ORAL
Qty: 450 TABLET | Refills: 0 | Status: SHIPPED | OUTPATIENT
Start: 2024-07-01 | End: 2024-09-27

## 2024-07-01 ASSESSMENT — KOOS JR
HOW SEVERE IS YOUR KNEE STIFFNESS AFTER FIRST WAKING IN MORNING: MILD
STANDING UPRIGHT: MILD
TWISING OR PIVOTING ON KNEE: MILD
GOING UP OR DOWN STAIRS: MILD
BENDING TO THE FLOOR TO PICK UP OBJECT: MILD
KOOS JR SCORING: 70.7
RISING FROM SITTING: MILD

## 2024-07-02 ENCOUNTER — VIRTUAL VISIT (OUTPATIENT)
Dept: ORTHOPEDICS | Facility: CLINIC | Age: 79
End: 2024-07-02
Payer: COMMERCIAL

## 2024-07-02 DIAGNOSIS — Z96.652 STATUS POST LEFT KNEE REPLACEMENT: Primary | ICD-10-CM

## 2024-07-02 PROCEDURE — 99024 POSTOP FOLLOW-UP VISIT: CPT | Mod: 93 | Performed by: ORTHOPAEDIC SURGERY

## 2024-07-02 NOTE — LETTER
"7/2/2024      Rakan Nolasco  [address] [facility] [address]      Dear Colleague,    Thank you for referring your patient, Rakan Nolasco, to the [facility]. Please see a copy of my visit note below.        .Rakan is a 78 year old who is being evaluated via a billable telephone visit.    What phone number would you like to be contacted at? [phone]  How would you like to obtain your AVS? MyChart  Originating Location (pt. Location): Home    Distant Location (provider location):  On-site  Phone call duration: 12 minutes    I spoke with patient on the phone this afternoon.  He is 12 weeks out from his total knee.  He reports he is doing well.  He is using no pain medication.  The knee feels \"great.\"  He has had consistent improvement in the hematoma that he had in his calf.  This is decreasing in size, symptom, and how bothersome it is for him.  He continues to ice it and is wrapping it.  Happy with how he is doing.    Patient had questions about activities going forward on his total knee.  He had questions about follow-up.    Assessment this is a 78-year-old male who is 12 weeks out from right total knee.  He had a postoperative hematoma into his calf which is resolving.  His activities are good.  We discussed increasing activities on total knee.  We discussed typical follow-up.  All the patient's questions were answered to the best my ability.    Plan: Follow-up in 1 year.  Sooner if issues.        Again, thank you for allowing me to participate in the care of your patient.        Sincerely,        Dandre Hassan MD  "

## 2024-07-02 NOTE — PROGRESS NOTES
"I spoke with patient on the phone this afternoon.  He is 12 weeks out from his total knee.  He reports he is doing well.  He is using no pain medication.  The knee feels \"great.\"  He has had consistent improvement in the hematoma that he had in his calf.  This is decreasing in size, symptom, and how bothersome it is for him.  He continues to ice it and is wrapping it.  Happy with how he is doing.    Patient had questions about activities going forward on his total knee.  He had questions about follow-up.    Assessment this is a 78-year-old male who is 12 weeks out from right total knee.  He had a postoperative hematoma into his calf which is resolving.  His activities are good.  We discussed increasing activities on total knee.  We discussed typical follow-up.  All the patient's questions were answered to the best my ability.    Plan: Follow-up in 1 year.  Sooner if issues.      "

## 2024-07-02 NOTE — NURSING NOTE
Rakan Nolasco's chief complaint for this visit includes:  Chief Complaint   Patient presents with    RECHECK     3 months S/p left total knee arthroplasty DOS: 4/1/24       Referring Provider:  Referred Self, MD  No address on file    There were no vitals taken for this visit.  Data Unavailable   Global Mental Health Score:    Global Physical Health Score:    PROMIS TOTAL - SUBSCORES:    [facility]: 8  KOOS Jr.  70.7    Pain increases with: twist it wrong sometimes  Previous surgeries: S/p left total knee arthroplasty DOS: 4/1/24  Imaging completed: xr 5/14/24  Pain: 1/10  Concerns: Has a hematoma in the calf    Salena Marshall, ATC

## 2024-07-02 NOTE — PROGRESS NOTES
.Rakan is a 78 year old who is being evaluated via a billable telephone visit.    What phone number would you like to be contacted at? [phone]  How would you like to obtain your AVS? MyChart  Originating Location (pt. Location): Home    Distant Location (provider location):  On-site  Phone call duration: 12 minutes

## 2024-07-03 ENCOUNTER — NURSE TRIAGE (OUTPATIENT)
Dept: NURSING | Facility: CLINIC | Age: 79
End: 2024-07-03

## 2024-07-03 ENCOUNTER — LAB (OUTPATIENT)
Dept: LAB | Facility: CLINIC | Age: 79
End: 2024-07-03
Payer: COMMERCIAL

## 2024-07-03 DIAGNOSIS — R30.0 DYSURIA: Primary | ICD-10-CM

## 2024-07-03 DIAGNOSIS — E87.1 HYPONATREMIA: ICD-10-CM

## 2024-07-03 DIAGNOSIS — R30.0 DYSURIA: ICD-10-CM

## 2024-07-03 LAB
ALBUMIN UR-MCNC: NEGATIVE MG/DL
ANION GAP SERPL CALCULATED.3IONS-SCNC: 8 MMOL/L (ref 7–15)
APPEARANCE UR: CLEAR
BACTERIA #/AREA URNS HPF: ABNORMAL /HPF
BILIRUB UR QL STRIP: NEGATIVE
BUN SERPL-MCNC: 13.1 MG/DL (ref 8–23)
CALCIUM SERPL-MCNC: 9.8 MG/DL (ref 8.8–10.2)
CHLORIDE SERPL-SCNC: 100 MMOL/L (ref 98–107)
COLOR UR AUTO: YELLOW
CREAT SERPL-MCNC: 0.98 MG/DL (ref 0.67–1.17)
DEPRECATED HCO3 PLAS-SCNC: 32 MMOL/L (ref 22–29)
EGFRCR SERPLBLD CKD-EPI 2021: 80 ML/MIN/1.73M2
GLUCOSE SERPL-MCNC: 124 MG/DL (ref 70–99)
GLUCOSE UR STRIP-MCNC: NEGATIVE MG/DL
HGB UR QL STRIP: ABNORMAL
KETONES UR STRIP-MCNC: NEGATIVE MG/DL
LEUKOCYTE ESTERASE UR QL STRIP: ABNORMAL
NITRATE UR QL: NEGATIVE
PH UR STRIP: 7 [PH] (ref 5–7)
POTASSIUM SERPL-SCNC: 4 MMOL/L (ref 3.4–5.3)
RBC #/AREA URNS AUTO: ABNORMAL /HPF
SODIUM SERPL-SCNC: 140 MMOL/L (ref 135–145)
SP GR UR STRIP: 1.02 (ref 1–1.03)
SQUAMOUS #/AREA URNS AUTO: ABNORMAL /LPF
UROBILINOGEN UR STRIP-ACNC: 0.2 E.U./DL
WBC #/AREA URNS AUTO: ABNORMAL /HPF

## 2024-07-03 PROCEDURE — 81001 URINALYSIS AUTO W/SCOPE: CPT

## 2024-07-03 PROCEDURE — 36415 COLL VENOUS BLD VENIPUNCTURE: CPT

## 2024-07-03 PROCEDURE — 87086 URINE CULTURE/COLONY COUNT: CPT

## 2024-07-03 PROCEDURE — 80048 BASIC METABOLIC PNL TOTAL CA: CPT

## 2024-07-03 RX ORDER — CEPHALEXIN 500 MG/1
500 CAPSULE ORAL 3 TIMES DAILY
Qty: 21 CAPSULE | Refills: 0 | Status: SHIPPED | OUTPATIENT
Start: 2024-07-03 | End: 2024-07-10

## 2024-07-03 NOTE — TELEPHONE ENCOUNTER
Based on symptoms of Frequency and Burning/ pain with urination UA with reflex to microscopic and culture was ordered per UTI protocol. We will follow up with patient once the results are available.    Writer called and spoke with pt. Lab appt made for this AM.    Milady HORN RN Urology 7/3/2024 8:28 AM

## 2024-07-03 NOTE — TELEPHONE ENCOUNTER
"Pt calling w/ pain on urination, cloudy urine.   ? For appt  Cloudy urine started yesterday, pain w/ urination started this AM. Also pain at urethral/penis site when not urinating.  Pain level # 5 (1-10).  See note    High priority note to [facility].    Pt of Dr. Emery    Pt # [phone]          [facility] [id number] [facility] [employer] - [facility], [place] - [address]     Reason for Disposition   All other males with painful urination, or patient wants to be seen    Additional Information   Negative: Unable to urinate (or only a few drops) and bladder feels very full   Negative: Swollen scrotum   Negative: Pain in scrotum or testicle that persists > 1 hour   Negative: Fever > 100.4 F (38.0 C)   Negative: Vomiting   Negative: Patient sounds very sick or weak to the triager   Negative: Shock suspected (e.g., cold/pale/clammy skin, too weak to stand, low BP, rapid pulse)   Negative: Sounds like a life-threatening emergency to the triager   Negative: SEVERE pain with urination   Negative: Side (flank) or lower back pain present    Answer Assessment - Initial Assessment Questions  1. SEVERITY: \"How bad is the pain?\"  (e.g., Scale 1-10; mild, moderate, or severe)    - MILD (1-3): Complains slightly about urination hurting.    - MODERATE (4-7): Interferes with normal activities.      - SEVERE (8-10): Excruciating, unwilling or unable to urinate because of the pain.         With urination and Also pain at urethral when not urinating     Stinging when urination, urine cloudy     Frequency in urination- not new for him  Every 1/2 in the AM, by afternoon varies 1-3 hours    Cloudy yesterday, pain this AM    Denies any back/kidney pain  Feels he empty bladder completely    2. FREQUENCY: \"How many times have you had painful urination today?\"       When urinating    3. PATTERN: \"Is pain present every time you urinate or just sometimes?\"       4. ONSET: \"When did the painful urination start?\"       Pain this AM    5. FEVER: \"Do you have a " "fever?\" If Yes, ask: \"What is your temperature, how was it measured, and when did it start?\"        6. PAST UTI: \"Have you had a urine infection before?\" If Yes, ask: \"When was the last time?\" and \"What happened that time?\"    history  7. CAUSE: \"What do you think is causing the painful urination?\"       Unsure, ? UTI  8. OTHER SYMPTOMS: \"Do you have any other symptoms?\" (e.g., flank pain, penis discharge, scrotal pain, blood in urine)        No blood or clots in urine  Is diabetic- states BS are w/n normal range.    Protocols used: Urination Pain - Male-A-OH    "

## 2024-07-04 LAB — BACTERIA UR CULT: NO GROWTH

## 2024-07-09 ASSESSMENT — ENCOUNTER SYMPTOMS
CONSTITUTIONAL NEGATIVE: 1
RESPIRATORY NEGATIVE: 1
GASTROINTESTINAL NEGATIVE: 1
EYES NEGATIVE: 1

## 2024-07-09 NOTE — PROGRESS NOTES
Chief Complaint   Patient presents with    Follow Up     Supp OM left ear. Was on Keflex and Ciprodex with minimal to no relief. Still has muffled hearing, not much concern of pain.       PCP: Telly Lucio     Referring Provider: No ref. provider found    There were no vitals taken for this visit.    ENT Problem List:  Patient Active Problem List   Diagnosis    GERD (gastroesophageal reflux disease)    HLD (hyperlipidemia)    Borderline glaucoma with ocular hypertension    Trigeminal neuralgia pain    HTN (hypertension)    Arthritis    Dry eyes    PVD (posterior vitreous detachment) OU    H/O RD (retinal detachment) s/p repair with PPV (AB)    Epiretinal membrane, bilateral    Pseudophakia of right eye    Myasthenia gravis (H)    Morbid obesity (H)    Right posterior capsular opacification    Obstructive sleep apnea syndrome    Prostate cancer (H)    Restless legs syndrome    Temporal arteritis (H)    Type 2 diabetes mellitus with other specified complication, without long-term current use of insulin (H)    Hyponatremia    COPD (chronic obstructive pulmonary disease) (H)    Combined form of age-related cataract, left eye    Anemia    Osteoarthritis of left knee, unspecified osteoarthritis type    Chronic pain of left knee      Current Medications:  Current Outpatient Medications   Medication Sig Dispense Refill    acetaminophen (TYLENOL) 325 MG tablet Take 2 tablets (650 mg) by mouth every 4 hours as needed for other (For optimal non-opioid multimodal pain management to improve pain control.) 100 tablet 0    albuterol (PROAIR HFA/PROVENTIL HFA/VENTOLIN HFA) 108 (90 Base) MCG/ACT inhaler INHALE 1 PUFF INTO THE LUNGS EVERY 4 HOURS AS NEEDED FOR SHORTNESS OF BREATH, WHEEZING OR COUGH 18 g 11    amLODIPine (NORVASC) 10 MG tablet TAKE 1 TABLET (10 MG) BY MOUTH DAILY. 90 tablet 0    amoxicillin (AMOXIL) 500 MG capsule Take 4 caps (2000mg) 30-60 minutes before dental procedure 4 capsule 0    ARTIFICIAL TEAR OP  Apply 1 drop to eye as needed      atorvastatin (LIPITOR) 20 MG tablet Take 20 mg by mouth at bedtime      blood glucose (ONETOUCH VERIO IQ) test strip USE TO TEST BLOOD SUGAR 3 TIMES DAILY OR AS DIRECTED. 300 strip 1    blood glucose monitoring (NO BRAND SPECIFIED) meter device kit Use to test blood sugar 1 times daily or as directed. 1 kit 0    ClonAZEPAM (KLONOPIN) 0.5 MG tablet Take 0.5 mg by mouth Take 1 tablet by mouth daily at bedtime      Continuous Glucose Sensor (FREESTYLE SARAHY 2 SENSOR) MISC USE 1 SENSOR EVERY 14 DAYS. USE TO READ BLOOD SUGARS PER 'S INSTRUCTIONS. 2 each 5    doxycycline hyclate (VIBRA-TABS) 100 MG tablet Take 1 tablet by mouth 2 times daily      ferrous sulfate (FEROSUL) 325 (65 Fe) MG tablet TAKE 1 TABLET BY MOUTH EVERY DAY WITH BREAKFAST 90 tablet 1    fluticasone-salmeterol (ADVAIR) 250-50 MCG/ACT inhaler TAKE 1 PUFF BY MOUTH TWICE A DAY Strength: 250-50 MCG/ACT 3 each 3    fluticasone-salmeterol (WIXELA INHUB) 250-50 MCG/ACT inhaler Inhale 1 puff into the lungs every 12 hours 3 each 3    furosemide (LASIX) 20 MG tablet TAKE 2 TABLETS BY MOUTH IN THE MORNING AND 1 TABLET BY MOUTH AT LEAST 6 HOURS LATER IN THE AFTERNOON. 270 tablet 1    Lancets (ONETOUCH DELICA PLUS [id number]) MISC USE ONCE DAILY AS DIRECTED 100 each 1    latanoprost (XALATAN) 0.005 % ophthalmic solution Place 1 drop into both eyes at bedtime 7.5 mL 3    lisinopril (ZESTRIL) 40 MG tablet Take 1 tablet (40 mg) by mouth daily 90 tablet 3    metFORMIN (GLUCOPHAGE XR) 500 MG 24 hr tablet TAKE 1 TABLET BY MOUTH TWICE A DAY WITH FOOD 180 tablet 3    OXcarbazepine (TRILEPTAL) 300 MG tablet TAKE 2 TABLETS IN THE MORNING AND 2 TABLETS IN THE EVENING 360 tablet 0    oxyCODONE (ROXICODONE) 5 MG tablet Take 1-2 tablets (5-10 mg) by mouth every 4 hours as needed for moderate pain 30 tablet 0    pantoprazole (PROTONIX) 20 MG EC tablet TAKE 1 TABLET BY MOUTH EVERY DAY (Patient taking differently: Take 20 mg by mouth  every morning) 90 tablet 1    polyethylene glycol (MIRALAX) 17 GM/Dose powder Take 17 g by mouth daily 510 g 0    prednisoLONE acetate (PRED FORTE) 1 % ophthalmic suspension INSTILL 1 DROP LEFT EYE 4 TIMES A DAY      predniSONE (DELTASONE) 5 MG tablet TAKE 2 TABLETS BY MOUTH EVERY DAY 60 tablet 2    pyRIDostigmine (MESTINON) 60 MG tablet Take 2 tablets (120 mg) in the morning and afternoon and 1 tablet in the evening per 4/22/2024 appt. 450 tablet 0    pyRIDostigmine (MESTINON) 60 MG tablet Take 120 mg by mouth every morning      pyRIDostigmine (MESTINON) 60 MG tablet Take 60 mg by mouth 2 times daily Midday and 6 PM.      senna-docusate (SENOKOT-S/PERICOLACE) 8.6-50 MG tablet Take 1 tablet by mouth 2 times daily 60 tablet 0    sodium bicarbonate 650 MG tablet TAKE 1/2 TABLET TWICE A DAY BY MOUTH 90 tablet 1    timolol maleate (TIMOPTIC) 0.5 % ophthalmic solution Place 1 drop into both eyes 2 times daily 5 mL 11    tobramycin (TOBREX) 0.3 % ophthalmic solution INSTILL 1 DROP LEFT EYE 4 TIMES A DAY       No current facility-administered medications for this visit.     MRI OF THE BRAIN WITHOUT AND WITH CONTRAST;  MRI OF THE SKULL BASE WITHOUT AND WITH CONTRAST November 4, 2020     HISTORY: Left trigeminal neuralgia. Cranial nerve protocol (FIESTA) and evaluation for compression of TN by vascular loop. Left-sided trigeminal neuralgia.      COMPARISON: Brain MR 2/10/2014.      TECHNIQUE: Axial diffusion-weighted with ADC map, T2-weighted, turboFLAIR and T1-weighted images of the brain and axial T1-weighted and coronal T2-weighted with fat saturation images centered on the basal cisterns were obtained without intravenous contrast. Following intravenous administration of IV gadolinium (10 mL Gadavist), axial turboFLAIR images of the brain and axial T1-weighted with fat saturation and coronal T1-weighted images, centered on the skull base and basal cisterns, were obtained.     FINDINGS: There is moderate diffuse cerebral  volume loss. There are a few tiny scattered focal areas of abnormal T2 signal hyperintensity in the cerebral white matter bilaterally that are consistent with sequela of chronic small vessel ischemic disease. The ventricles and basal cisterns are within normal limits in configuration given the degree of cerebral volume loss.  There is no midline shift. There are no extra-axial fluid collections. There is no evidence for stroke or acute intracranial hemorrhage. There is no abnormal contrast enhancement in the brain or its coverings.     The cisternal segments of the 5th cranial nerves bilaterally are unremarkable with no evidence for intrinsic abnormality or extrinsic compression. Meckel's caves bilaterally are within normal limits. The bilateral aspects of the cavernous sinus are unremarkable. There is no evidence for abnormality of the infratemporal fossa or orbits on either side.     There is no sinusitis or mastoiditis.                                                                    IMPRESSION:   1. No evidence for intrinsic abnormality or extrinsic compression of any portion of the 5th cranial nerve on the left.  2. Diffuse cerebral volume loss and cerebral white matter changes consistent with sequela of chronic small vessel ischemic disease.     CT HEAD BRAIN WO 7/8/2021    INDICATION: Headache high blood pressure   COMPARISON: 02/05/2019   TECHNIQUE: Routine CT Head without IV contrast. Multiplanar reformats. Dose reduction techniques were used.     FINDINGS:   INTRACRANIAL CONTENTS: No intracranial hemorrhage, extraaxial collection, or mass effect.  No CT evidence of acute infarct. Mild presumed chronic small vessel ischemic changes. Mild generalized volume loss. No hydrocephalus.     VISUALIZED ORBITS/SINUSES/MASTOIDS: No intraorbital abnormality. No paranasal sinus mucosal disease. Scattered fluid/membrane thickening in the left mastoid air cells. No apparent mass in the posterior nasopharynx or skull  "base.     BONES/SOFT TISSUES: No acute abnormality.     IMPRESSION:   1. No CT evidence of acute intracranial hemorrhage, mass or recent infarct.   2.  Mild volume loss and presumed chronic small vessel ischemic changes.     HPI  Pleasant 78 year old male presents today as a(n) established patient for chronic atticoantral suppurative otitis media of left ear. He was last seen on 6/6/24. He was on Keflex and Ciprodex. He reports some improvement in muffled hearing. He states that he had some pieces come out of his left ear, and every once in a while he has had otalgia and wetness in this ear.   He reports hearing loss and tinnitus that onset \"a long time ago\". He reports that he had a lot of earaches as a child. He states that he wears hearing aids.    He denies tinnitus, dizziness/vertigo.     Review of Systems   Constitutional: Negative.    HENT:  Positive for ear discharge, ear pain and hearing loss. Negative for tinnitus.    Eyes: Negative.    Respiratory: Negative.     Gastrointestinal: Negative.    Skin: Negative.    Neurological:  Negative for dizziness.   Endo/Heme/Allergies: Negative.        Physical Exam  Vitals and nursing note reviewed.   Constitutional:       Appearance: Normal appearance.   HENT:      Head: Normocephalic and atraumatic.      Jaw: There is normal jaw occlusion.      Right Ear: Hearing, tympanic membrane and ear canal normal. There is impacted cerumen.      Left Ear: Tympanic membrane and ear canal normal. Decreased hearing noted. Drainage present. There is impacted cerumen.      Ears:      Comments: Left ear: secretions suctioned, polypoid tissue mckenna-inferiorly located.     Nose: No mucosal edema, congestion or rhinorrhea.      Right Nostril: No occlusion.      Left Nostril: No occlusion.      Right Turbinates: Not enlarged or swollen.      Left Turbinates: Not enlarged or swollen.      Right Sinus: No maxillary sinus tenderness or frontal sinus tenderness.      Left Sinus: No " maxillary sinus tenderness or frontal sinus tenderness.      Mouth/Throat:      Mouth: Mucous membranes are moist.      Pharynx: Oropharynx is clear. Uvula midline.   Eyes:      Extraocular Movements: Extraocular movements intact.      Pupils: Pupils are equal, round, and reactive to light.   Neurological:      Mental Status: He is alert.       Ear wax removal: The patient was seen in the room. An informed consent was obtained from the patient. His ears were evaluated under the microscope. Right ear canal was blocked 70% with ear wax that was suctioned and removed with a curette and alligator. The left ear canal was blocked 70% with ear wax that was suctioned with a 7 tip. He tolerated the procedure well and left the room no complications.     A/P  Imaging, MRI, and pathology results were independently reviewed and discussed in detail with the patient.This pleasant patient has chronic atticoantral suppurative otitis media of left ear. A biopsy was performed and came back as cholesteatoma. A CT temporal bones wo contrast is sent for further investigation. Questions and concerns were addressed.    Follow up in clinic pending imaging results.    Scribe/Staff:    Scribe Disclosure:   I, Elisabeth Sandoval, am serving as a scribe; to document services personally performed by Shayna Delatorre MD based on data collection and the provider's statements to me.     Provider Disclosure:  I agree with above History, Review of Systems, Physical exam and Plan.  I have reviewed the content of the documentation and have edited it as needed. I have personally performed the services documented here and the documentation accurately represents those services and the decisions I have made.      Electronically signed by:  Shayna Delatorre MD

## 2024-07-10 ENCOUNTER — OFFICE VISIT (OUTPATIENT)
Dept: OTOLARYNGOLOGY | Facility: CLINIC | Age: 79
End: 2024-07-10
Payer: COMMERCIAL

## 2024-07-10 DIAGNOSIS — H66.12 CHRONIC TUBOTYMPANIC SUPPURATIVE OTITIS MEDIA OF LEFT EAR: Primary | ICD-10-CM

## 2024-07-10 PROCEDURE — 69210 REMOVE IMPACTED EAR WAX UNI: CPT | Performed by: OTOLARYNGOLOGY

## 2024-07-10 PROCEDURE — 99213 OFFICE O/P EST LOW 20 MIN: CPT | Mod: 25 | Performed by: OTOLARYNGOLOGY

## 2024-07-10 ASSESSMENT — ENCOUNTER SYMPTOMS: DIZZINESS: 0

## 2024-07-10 NOTE — NURSING NOTE
Rakan Nolasco's chief complaint for this visit includes:  Chief Complaint   Patient presents with    Follow Up     Supp OM left ear. Was on Keflex and Ciprodex with minimal to no relief. Still has muffled hearing, not much concern of pain.      PCP: Telly Lucio    Referring Provider:  Referred Self, MD  No address on file    There were no vitals taken for this visit.      Dany Prieto, EMT

## 2024-07-10 NOTE — LETTER
7/10/2024      Rakan Nolasco  [address] [facility] [address]      Dear Colleague,    Thank you for referring your patient, Rakan Nolasco, to the [facility]. Please see a copy of my visit note below.    Chief Complaint   Patient presents with     Follow Up     Supp OM left ear. Was on Keflex and Ciprodex with minimal to no relief. Still has muffled hearing, not much concern of pain.       PCP: Telly Lucio     Referring Provider: No ref. provider found    There were no vitals taken for this visit.    ENT Problem List:  Patient Active Problem List   Diagnosis     GERD (gastroesophageal reflux disease)     HLD (hyperlipidemia)     Borderline glaucoma with ocular hypertension     Trigeminal neuralgia pain     HTN (hypertension)     Arthritis     Dry eyes     PVD (posterior vitreous detachment) OU     H/O RD (retinal detachment) s/p repair with PPV (AB)     Epiretinal membrane, bilateral     Pseudophakia of right eye     Myasthenia gravis (H)     Morbid obesity (H)     Right posterior capsular opacification     Obstructive sleep apnea syndrome     Prostate cancer (H)     Restless legs syndrome     Temporal arteritis (H)     Type 2 diabetes mellitus with other specified complication, without long-term current use of insulin (H)     Hyponatremia     COPD (chronic obstructive pulmonary disease) (H)     Combined form of age-related cataract, left eye     Anemia     Osteoarthritis of left knee, unspecified osteoarthritis type     Chronic pain of left knee      Current Medications:  Current Outpatient Medications   Medication Sig Dispense Refill     acetaminophen (TYLENOL) 325 MG tablet Take 2 tablets (650 mg) by mouth every 4 hours as needed for other (For optimal non-opioid multimodal pain management to improve pain control.) 100 tablet 0     albuterol (PROAIR HFA/PROVENTIL HFA/VENTOLIN HFA) 108 (90 Base) MCG/ACT inhaler INHALE 1 PUFF INTO THE LUNGS EVERY 4 HOURS AS NEEDED  FOR SHORTNESS OF BREATH, WHEEZING OR COUGH 18 g 11     amLODIPine (NORVASC) 10 MG tablet TAKE 1 TABLET (10 MG) BY MOUTH DAILY. 90 tablet 0     amoxicillin (AMOXIL) 500 MG capsule Take 4 caps (2000mg) 30-60 minutes before dental procedure 4 capsule 0     ARTIFICIAL TEAR OP Apply 1 drop to eye as needed       atorvastatin (LIPITOR) 20 MG tablet Take 20 mg by mouth at bedtime       blood glucose (ONETOUCH VERIO IQ) test strip USE TO TEST BLOOD SUGAR 3 TIMES DAILY OR AS DIRECTED. 300 strip 1     blood glucose monitoring (NO BRAND SPECIFIED) meter device kit Use to test blood sugar 1 times daily or as directed. 1 kit 0     ClonAZEPAM (KLONOPIN) 0.5 MG tablet Take 0.5 mg by mouth Take 1 tablet by mouth daily at bedtime       Continuous Glucose Sensor (FREESTYLE SARAHY 2 SENSOR) MISC USE 1 SENSOR EVERY 14 DAYS. USE TO READ BLOOD SUGARS PER 'S INSTRUCTIONS. 2 each 5     doxycycline hyclate (VIBRA-TABS) 100 MG tablet Take 1 tablet by mouth 2 times daily       ferrous sulfate (FEROSUL) 325 (65 Fe) MG tablet TAKE 1 TABLET BY MOUTH EVERY DAY WITH BREAKFAST 90 tablet 1     fluticasone-salmeterol (ADVAIR) 250-50 MCG/ACT inhaler TAKE 1 PUFF BY MOUTH TWICE A DAY Strength: 250-50 MCG/ACT 3 each 3     fluticasone-salmeterol (WIXELA INHUB) 250-50 MCG/ACT inhaler Inhale 1 puff into the lungs every 12 hours 3 each 3     furosemide (LASIX) 20 MG tablet TAKE 2 TABLETS BY MOUTH IN THE MORNING AND 1 TABLET BY MOUTH AT LEAST 6 HOURS LATER IN THE AFTERNOON. 270 tablet 1     Lancets (ONETOUCH DELICA PLUS GFLOHD23T) MISC USE ONCE DAILY AS DIRECTED 100 each 1     latanoprost (XALATAN) 0.005 % ophthalmic solution Place 1 drop into both eyes at bedtime 7.5 mL 3     lisinopril (ZESTRIL) 40 MG tablet Take 1 tablet (40 mg) by mouth daily 90 tablet 3     metFORMIN (GLUCOPHAGE XR) 500 MG 24 hr tablet TAKE 1 TABLET BY MOUTH TWICE A DAY WITH FOOD 180 tablet 3     OXcarbazepine (TRILEPTAL) 300 MG tablet TAKE 2 TABLETS IN THE MORNING AND 2  TABLETS IN THE EVENING 360 tablet 0     oxyCODONE (ROXICODONE) 5 MG tablet Take 1-2 tablets (5-10 mg) by mouth every 4 hours as needed for moderate pain 30 tablet 0     pantoprazole (PROTONIX) 20 MG EC tablet TAKE 1 TABLET BY MOUTH EVERY DAY (Patient taking differently: Take 20 mg by mouth every morning) 90 tablet 1     polyethylene glycol (MIRALAX) 17 GM/Dose powder Take 17 g by mouth daily 510 g 0     prednisoLONE acetate (PRED FORTE) 1 % ophthalmic suspension INSTILL 1 DROP LEFT EYE 4 TIMES A DAY       predniSONE (DELTASONE) 5 MG tablet TAKE 2 TABLETS BY MOUTH EVERY DAY 60 tablet 2     pyRIDostigmine (MESTINON) 60 MG tablet Take 2 tablets (120 mg) in the morning and afternoon and 1 tablet in the evening per 4/22/2024 appt. 450 tablet 0     pyRIDostigmine (MESTINON) 60 MG tablet Take 120 mg by mouth every morning       pyRIDostigmine (MESTINON) 60 MG tablet Take 60 mg by mouth 2 times daily Midday and 6 PM.       senna-docusate (SENOKOT-S/PERICOLACE) 8.6-50 MG tablet Take 1 tablet by mouth 2 times daily 60 tablet 0     sodium bicarbonate 650 MG tablet TAKE 1/2 TABLET TWICE A DAY BY MOUTH 90 tablet 1     timolol maleate (TIMOPTIC) 0.5 % ophthalmic solution Place 1 drop into both eyes 2 times daily 5 mL 11     tobramycin (TOBREX) 0.3 % ophthalmic solution INSTILL 1 DROP LEFT EYE 4 TIMES A DAY       No current facility-administered medications for this visit.     MRI OF THE BRAIN WITHOUT AND WITH CONTRAST;  MRI OF THE SKULL BASE WITHOUT AND WITH CONTRAST November 4, 2020     HISTORY: Left trigeminal neuralgia. Cranial nerve protocol (FIESTA) and evaluation for compression of TN by vascular loop. Left-sided trigeminal neuralgia.      COMPARISON: Brain MR 2/10/2014.      TECHNIQUE: Axial diffusion-weighted with ADC map, T2-weighted, turboFLAIR and T1-weighted images of the brain and axial T1-weighted and coronal T2-weighted with fat saturation images centered on the basal cisterns were obtained without intravenous  contrast. Following intravenous administration of IV gadolinium (10 mL Gadavist), axial turboFLAIR images of the brain and axial T1-weighted with fat saturation and coronal T1-weighted images, centered on the skull base and basal cisterns, were obtained.     FINDINGS: There is moderate diffuse cerebral volume loss. There are a few tiny scattered focal areas of abnormal T2 signal hyperintensity in the cerebral white matter bilaterally that are consistent with sequela of chronic small vessel ischemic disease. The ventricles and basal cisterns are within normal limits in configuration given the degree of cerebral volume loss.  There is no midline shift. There are no extra-axial fluid collections. There is no evidence for stroke or acute intracranial hemorrhage. There is no abnormal contrast enhancement in the brain or its coverings.     The cisternal segments of the 5th cranial nerves bilaterally are unremarkable with no evidence for intrinsic abnormality or extrinsic compression. Meckel's caves bilaterally are within normal limits. The bilateral aspects of the cavernous sinus are unremarkable. There is no evidence for abnormality of the infratemporal fossa or orbits on either side.     There is no sinusitis or mastoiditis.                                                                    IMPRESSION:   1. No evidence for intrinsic abnormality or extrinsic compression of any portion of the 5th cranial nerve on the left.  2. Diffuse cerebral volume loss and cerebral white matter changes consistent with sequela of chronic small vessel ischemic disease.     CT HEAD BRAIN WO 7/8/2021    INDICATION: Headache high blood pressure   COMPARISON: 02/05/2019   TECHNIQUE: Routine CT Head without IV contrast. Multiplanar reformats. Dose reduction techniques were used.     FINDINGS:   INTRACRANIAL CONTENTS: No intracranial hemorrhage, extraaxial collection, or mass effect.  No CT evidence of acute infarct. Mild presumed chronic  "small vessel ischemic changes. Mild generalized volume loss. No hydrocephalus.     VISUALIZED ORBITS/SINUSES/MASTOIDS: No intraorbital abnormality. No paranasal sinus mucosal disease. Scattered fluid/membrane thickening in the left mastoid air cells. No apparent mass in the posterior nasopharynx or skull base.     BONES/SOFT TISSUES: No acute abnormality.     IMPRESSION:   1. No CT evidence of acute intracranial hemorrhage, mass or recent infarct.   2.  Mild volume loss and presumed chronic small vessel ischemic changes.     HPI  Pleasant 78 year old male presents today as a(n) established patient for chronic atticoantral suppurative otitis media of left ear. He was last seen on 6/6/24. He was on Keflex and Ciprodex. He reports some improvement in muffled hearing. He states that he had some pieces come out of his left ear, and every once in a while he has had otalgia and wetness in this ear.   He reports hearing loss and tinnitus that onset \"a long time ago\". He reports that he had a lot of earaches as a child. He states that he wears hearing aids.    He denies tinnitus, dizziness/vertigo.     Review of Systems   Constitutional: Negative.    HENT:  Positive for ear discharge, ear pain and hearing loss. Negative for tinnitus.    Eyes: Negative.    Respiratory: Negative.     Gastrointestinal: Negative.    Skin: Negative.    Neurological:  Negative for dizziness.   Endo/Heme/Allergies: Negative.        Physical Exam  Vitals and nursing note reviewed.   Constitutional:       Appearance: Normal appearance.   HENT:      Head: Normocephalic and atraumatic.      Jaw: There is normal jaw occlusion.      Right Ear: Hearing, tympanic membrane and ear canal normal. There is impacted cerumen.      Left Ear: Tympanic membrane and ear canal normal. Decreased hearing noted. Drainage present. There is impacted cerumen.      Ears:      Comments: Left ear: secretions suctioned, polypoid tissue mckenna-inferiorly located.     Nose: No " mucosal edema, congestion or rhinorrhea.      Right Nostril: No occlusion.      Left Nostril: No occlusion.      Right Turbinates: Not enlarged or swollen.      Left Turbinates: Not enlarged or swollen.      Right Sinus: No maxillary sinus tenderness or frontal sinus tenderness.      Left Sinus: No maxillary sinus tenderness or frontal sinus tenderness.      Mouth/Throat:      Mouth: Mucous membranes are moist.      Pharynx: Oropharynx is clear. Uvula midline.   Eyes:      Extraocular Movements: Extraocular movements intact.      Pupils: Pupils are equal, round, and reactive to light.   Neurological:      Mental Status: He is alert.       Ear wax removal: The patient was seen in the room. An informed consent was obtained from the patient. His ears were evaluated under the microscope. Right ear canal was blocked 70% with ear wax that was suctioned and removed with a curette and alligator. The left ear canal was blocked 70% with ear wax that was suctioned with a 7 tip. He tolerated the procedure well and left the room no complications.     A/P  Imaging, MRI, and pathology results were independently reviewed and discussed in detail with the patient.This pleasant patient has chronic atticoantral suppurative otitis media of left ear. A biopsy was performed and came back as cholesteatoma. A CT temporal bones wo contrast is sent for further investigation. Questions and concerns were addressed.    Follow up in clinic pending imaging results.    Scribe/Staff:    Scribe Disclosure:   I, Elisabeth Sandoval, am serving as a scribe; to document services personally performed by Shayna Delatorre MD based on data collection and the provider's statements to me.     Provider Disclosure:  I agree with above History, Review of Systems, Physical exam and Plan.  I have reviewed the content of the documentation and have edited it as needed. I have personally performed the services documented here and the documentation accurately represents  those services and the decisions I have made.      Electronically signed by:  Shayna Delatorre MD         Again, thank you for allowing me to participate in the care of your patient.        Sincerely,        Shayna Delatorre MD

## 2024-07-11 ENCOUNTER — TRANSFERRED RECORDS (OUTPATIENT)
Dept: HEALTH INFORMATION MANAGEMENT | Facility: CLINIC | Age: 79
End: 2024-07-11

## 2024-07-12 DIAGNOSIS — E11.65 TYPE 2 DIABETES MELLITUS WITH HYPERGLYCEMIA, WITHOUT LONG-TERM CURRENT USE OF INSULIN (H): ICD-10-CM

## 2024-07-12 RX ORDER — LANCETS 30 GAUGE
EACH MISCELLANEOUS
Qty: 100 EACH | Refills: 1 | Status: SHIPPED | OUTPATIENT
Start: 2024-07-12

## 2024-07-18 ENCOUNTER — ANCILLARY PROCEDURE (OUTPATIENT)
Dept: CT IMAGING | Facility: CLINIC | Age: 79
End: 2024-07-18
Attending: OTOLARYNGOLOGY
Payer: COMMERCIAL

## 2024-07-18 DIAGNOSIS — H66.12 CHRONIC TUBOTYMPANIC SUPPURATIVE OTITIS MEDIA OF LEFT EAR: ICD-10-CM

## 2024-07-18 PROCEDURE — 70480 CT ORBIT/EAR/FOSSA W/O DYE: CPT | Mod: TC | Performed by: RADIOLOGY

## 2024-07-19 ENCOUNTER — TELEPHONE (OUTPATIENT)
Dept: OTOLARYNGOLOGY | Facility: CLINIC | Age: 79
End: 2024-07-19
Payer: COMMERCIAL

## 2024-07-19 NOTE — TELEPHONE ENCOUNTER
----- Message from Shayna Delatorre sent at 7/19/2024  7:45 AM CDT -----  Ct showed opacities in the middle ear, ossicular erosion, possible cholesteatoma and mastoiditis of the left ear. F/up with a hearing test to talk about medical and surgical options.  Thanks,  Karl  ----- Message -----  From: Leobardo, Radiant Ib  Sent: 7/18/2024  11:47 AM CDT  To: Shayna Delatorre MD

## 2024-07-19 NOTE — TELEPHONE ENCOUNTER
Phone call to pt, discussed recent CT results showing possible cholesteatoma.  Discussed Dr Delatorre's recommendation to return to clinic for audiogram and appointment to discuss treatment options.  Pt verbalized understanding of information, states he will call back to schedule appointments. Elvira Li RN

## 2024-07-22 ENCOUNTER — OFFICE VISIT (OUTPATIENT)
Dept: CARDIOLOGY | Facility: CLINIC | Age: 79
End: 2024-07-22
Attending: FAMILY MEDICINE
Payer: COMMERCIAL

## 2024-07-22 ENCOUNTER — MYC MEDICAL ADVICE (OUTPATIENT)
Dept: FAMILY MEDICINE | Facility: CLINIC | Age: 79
End: 2024-07-22

## 2024-07-22 VITALS
SYSTOLIC BLOOD PRESSURE: 143 MMHG | HEART RATE: 64 BPM | WEIGHT: 226 LBS | DIASTOLIC BLOOD PRESSURE: 70 MMHG | BODY MASS INDEX: 33.1 KG/M2 | OXYGEN SATURATION: 95 %

## 2024-07-22 DIAGNOSIS — I10 PRIMARY HYPERTENSION: ICD-10-CM

## 2024-07-22 DIAGNOSIS — I45.10 RBBB (RIGHT BUNDLE BRANCH BLOCK): ICD-10-CM

## 2024-07-22 DIAGNOSIS — R07.9 CHEST PAIN, UNSPECIFIED TYPE: Primary | ICD-10-CM

## 2024-07-22 DIAGNOSIS — E11.69 TYPE 2 DIABETES MELLITUS WITH OTHER SPECIFIED COMPLICATION, WITHOUT LONG-TERM CURRENT USE OF INSULIN (H): ICD-10-CM

## 2024-07-22 PROCEDURE — 99204 OFFICE O/P NEW MOD 45 MIN: CPT | Performed by: INTERNAL MEDICINE

## 2024-07-22 PROCEDURE — 93000 ELECTROCARDIOGRAM COMPLETE: CPT | Performed by: INTERNAL MEDICINE

## 2024-07-22 NOTE — PATIENT INSTRUCTIONS
"Thank you for coming to the [facility] [place]; please note the following instructions:    1. Lexiscan nuclear stress test to rule out coronary artery blockage    2  follow-up with Magnolia Castillo NP for the results    Information for Lexiscan Test     Test can take up to 4 hours.  Nothing to eat 3 hours prior.  Water is permitted.  No caffiene 12 hours prior.  Wear comfortable clothing.     Cardiac nuclear imaging measures the flow of blood in your heart at rest and then during exercise. The images are compared to determine whether there are any blockages in the arteries, changes in blood flow or oxygen supply from resting to the stressed state, areas of scar tissue, or if there has been a prior heart attack. It also measures how well the heart muscle squeezes and pumps. The test is also sometimes called a \"perfusion scan\" or a \"SPECT MPI\" (single photon emission computed tomography myocardial perfusion imaging). For the scan, a small amount of radioactive material called \"tracer\" is delivered into the bloodstream. A special camera then scans the tracer in the blood as it flows through the heart muscle. Areas of the heart that have good blood flow absorb the tracer. Areas that are not getting enough blood will not absorb the tracer. This can be a sign of a blocked artery, vessel narrowing, or any area of the heart not receiving blood, perhaps as a result of damage from a heart attack. The tracer leaves your body within hours. This test can be done in a hospital or test center.     During your test  Here is what to expect during your test:              You may be asked to change into a hospital gown from the waist up.              You will be attached to EKG, which monitors your heart rhythm, and blood pressure monitors. An IV (intravenous) line will be started in your arm.              At some point, scanning pictures will be taken while you rest. This may be done before you exercise or you may " be asked to return for resting scans later that day or the next.              You will exercise on a treadmill or stationary bike for a few minutes. This increases the rate of blood flow to your heart muscle.              Speak up when you feel that you cannot exercise for even 1 more minute. At this point, the tracer is given to you through the IV, as this is considered the point of maximum stress.               If you cannot exercise by using a treadmill or bicycle, special medicines can be used to artificially increase heart rate while you are resting. This is done to put your heart under stress.               After you have received the tracer, you will be positioned on the scanning bed.              You must lie very still for up to 30 minutes. During this time, a scanning camera will be taking pictures of your heart. The images will show where blood flows through your heart muscle.  After your test  Before going home, ask when you may eat. Also, find out when to resume taking any medicines you were told to skip before the test. If you need to return for resting scans, follow any instructions. Most people can go back to their normal routine as soon as all parts of the test are finished. Drink plenty of water to help flush the tracer from your body.  Let the technologist know  Inform the technologist about the following:              What medicines you take              If you have diabetes, knee or hip problems, arthritis, asthma, or chronic lung disease              Recent chest pain since your last appointment              Inability to participate in exercise               If you have had a stroke or have vascular disease of the leg              If you are pregnant, think you might be, or are nursing  Report any symptoms  Be sure to tell the healthcare provider if you feel any of the following during the test:              Chest, arm, or jaw discomfort              Severe shortness of breath               Dizziness or lightheadedness              Feelings of panic              Inability to participate in exercise               Palpitations              Leg cramps or pain               If you have any questions regarding your visit, please contact your care team:     CARDIOLOGY  TELEPHONE NUMBER   Rosalie LAM, Registered Nurse  Preeti HASTINGS, Registered Nurse  Sherry SAWANT, Registered Medical Assistant  Daija PACHECO, Certified Medical Assistant  Silvia CORDOVA, Clinic Assistant [phone] (select option 1)    *After hours: [phone]   For Scheduling Appts:     [phone] (select option 1)    *After hours: [phone]   For the Device Clinic (Pacemakers and ICD's)  Kaci SHIPMAN Registered Nurse   During business hours: [phone]    *After business hours:  [phone] (select option 4)      Normal test result notifications will be released via [employer] or mailed within 7 business days.  All other test results, will be communicated via telephone once reviewed by your cardiologist.    If you need a medication refill, please contact your pharmacy.  Please allow 3 business days for your refill to be completed.    As always, thank you for trusting us with your health care needs!

## 2024-07-22 NOTE — PROGRESS NOTES
HPI: Purpose of visit: I was requested to consult on right bundle branch block    Rakan ASHLEY Nolasco Jr. is a 78 y.o. male with past medical history of COPD and type II diabetes mellitus who presented to the emergency department on June 6, 2024 via private car for evaluation of arm pain. Patient reported that he awoke at midnight  and had chest pain and right arm pain throughout his entire arm and hand.  He also had diaphoresis.  The episode lasted for few hours and spontaneously resolved.  Patient was discharged from the emergency department.  During the evaluation patient was noted to have right bundle branch block on a twelve-lead electrocardiogram.  Of note, the high sensitive troponin T was elevated at 36 ng/L.    Since discharge from hospital, patient has been having intermittent episodes of nonspecific chest discomfort.  Patient did not report symptoms of exertional dyspnea, palpitations, irregular heartbeat sensation, frequent lightheadedness, presyncope or syncope.      PAST MEDICAL HISTORY:  Past Medical History:   Diagnosis Date    Arthritis         BMI 31.0-31.9,adult     Cancer (H) 01/10/2018    Prostate    COPD (chronic obstructive pulmonary disease) (H) 10/28/2020    Demand ischemia (H)     Diabetes (H) 01/10/2018    Diverticulosis     Colonoscopy 8/2008    Fatty liver     see US  5/2012    GERD (gastroesophageal reflux disease)     Glaucoma (increased eye pressure)     HTN (hypertension)     Hyperlipidemia LDL goal < 130     age, htn, fhx    Irritable bowel     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     Nonsenile cataract     Obstructive sleep apnea     Obstructive sleep apnea syndrome     ROSIE (obstructive sleep apnea) 01/2011    Using CPAP;     Prediabetes     Restless leg syndrome     Retinal detachment     Sleep apnea     Trigeminal neuralgia pain 01/04/2012       CURRENT MEDICATIONS:  Current Outpatient Medications   Medication Sig Dispense Refill     acetaminophen (TYLENOL) 325 MG tablet Take 2 tablets (650 mg) by mouth every 4 hours as needed for other (For optimal non-opioid multimodal pain management to improve pain control.) 100 tablet 0    albuterol (PROAIR HFA/PROVENTIL HFA/VENTOLIN HFA) 108 (90 Base) MCG/ACT inhaler INHALE 1 PUFF INTO THE LUNGS EVERY 4 HOURS AS NEEDED FOR SHORTNESS OF BREATH, WHEEZING OR COUGH 18 g 11    amLODIPine (NORVASC) 10 MG tablet TAKE 1 TABLET (10 MG) BY MOUTH DAILY. 90 tablet 0    amoxicillin (AMOXIL) 500 MG capsule Take 4 caps (2000mg) 30-60 minutes before dental procedure 4 capsule 0    ARTIFICIAL TEAR OP Apply 1 drop to eye as needed      atorvastatin (LIPITOR) 20 MG tablet Take 20 mg by mouth at bedtime      blood glucose (ONETOUCH VERIO IQ) test strip USE TO TEST BLOOD SUGAR 3 TIMES DAILY OR AS DIRECTED. 300 strip 1    blood glucose monitoring (NO BRAND SPECIFIED) meter device kit Use to test blood sugar 1 times daily or as directed. 1 kit 0    ClonAZEPAM (KLONOPIN) 0.5 MG tablet Take 0.5 mg by mouth Take 1 tablet by mouth daily at bedtime      Continuous Glucose Sensor (FREESTYLE SARAHY 2 SENSOR) MISC USE 1 SENSOR EVERY 14 DAYS. USE TO READ BLOOD SUGARS PER 'S INSTRUCTIONS. 2 each 5    doxycycline hyclate (VIBRA-TABS) 100 MG tablet Take 1 tablet by mouth 2 times daily      ferrous sulfate (FEROSUL) 325 (65 Fe) MG tablet TAKE 1 TABLET BY MOUTH EVERY DAY WITH BREAKFAST 90 tablet 1    fluticasone-salmeterol (ADVAIR) 250-50 MCG/ACT inhaler TAKE 1 PUFF BY MOUTH TWICE A DAY Strength: 250-50 MCG/ACT 3 each 3    fluticasone-salmeterol (WIXELA INHUB) 250-50 MCG/ACT inhaler Inhale 1 puff into the lungs every 12 hours 3 each 3    furosemide (LASIX) 20 MG tablet TAKE 2 TABLETS BY MOUTH IN THE MORNING AND 1 TABLET BY MOUTH AT LEAST 6 HOURS LATER IN THE AFTERNOON. 270 tablet 1    Lancets (ONETOUCH DELICA PLUS [id number]) MISC USE ONCE DAILY AS DIRECTED 100 each 1    latanoprost (XALATAN) 0.005 % ophthalmic solution Place 1 drop  into both eyes at bedtime 7.5 mL 3    lisinopril (ZESTRIL) 40 MG tablet Take 1 tablet (40 mg) by mouth daily 90 tablet 3    metFORMIN (GLUCOPHAGE XR) 500 MG 24 hr tablet TAKE 1 TABLET BY MOUTH TWICE A DAY WITH FOOD 180 tablet 3    OXcarbazepine (TRILEPTAL) 300 MG tablet TAKE 2 TABLETS IN THE MORNING AND 2 TABLETS IN THE EVENING 360 tablet 0    oxyCODONE (ROXICODONE) 5 MG tablet Take 1-2 tablets (5-10 mg) by mouth every 4 hours as needed for moderate pain 30 tablet 0    pantoprazole (PROTONIX) 20 MG EC tablet TAKE 1 TABLET BY MOUTH EVERY DAY (Patient taking differently: Take 20 mg by mouth every morning) 90 tablet 1    polyethylene glycol (MIRALAX) 17 GM/Dose powder Take 17 g by mouth daily 510 g 0    prednisoLONE acetate (PRED FORTE) 1 % ophthalmic suspension INSTILL 1 DROP LEFT EYE 4 TIMES A DAY      predniSONE (DELTASONE) 5 MG tablet TAKE 2 TABLETS BY MOUTH EVERY DAY 60 tablet 2    pyRIDostigmine (MESTINON) 60 MG tablet Take 2 tablets (120 mg) in the morning and afternoon and 1 tablet in the evening per 4/22/2024 appt. 450 tablet 0    pyRIDostigmine (MESTINON) 60 MG tablet Take 120 mg by mouth every morning      pyRIDostigmine (MESTINON) 60 MG tablet Take 60 mg by mouth 2 times daily Midday and 6 PM.      senna-docusate (SENOKOT-S/PERICOLACE) 8.6-50 MG tablet Take 1 tablet by mouth 2 times daily 60 tablet 0    sodium bicarbonate 650 MG tablet TAKE 1/2 TABLET TWICE A DAY BY MOUTH 90 tablet 1    timolol maleate (TIMOPTIC) 0.5 % ophthalmic solution Place 1 drop into both eyes 2 times daily 5 mL 11    tobramycin (TOBREX) 0.3 % ophthalmic solution INSTILL 1 DROP LEFT EYE 4 TIMES A DAY         PAST SURGICAL HISTORY:  Past Surgical History:   Procedure Laterality Date    ARTHROPLASTY KNEE Left 4/1/2024    Procedure: ARTHROPLASTY, LEFT KNEE, TOTAL;  Surgeon: Dandre Hassan MD;  Location: [facility]    BIOPSY ARTERY TEMPORAL Bilateral 08/01/2017    Procedure: BIOPSY ARTERY TEMPORAL;  Bilateral temporal artery Biopsy;   Surgeon: Jj Tello MD;  Location: [facility]    CATARACT IOL, RT/LT Right     COLONOSCOPY      ESOPHAGOSCOPY, GASTROSCOPY, DUODENOSCOPY (EGD), COMBINED  01/15/2014    Procedure: COMBINED ESOPHAGOSCOPY, GASTROSCOPY, DUODENOSCOPY (EGD), BIOPSY SINGLE OR MULTIPLE;;  Surgeon: Winston Nixon MD;  Location: [facility]    LASER YAG CAPSULOTOMY Right 2018    right eye    PHACOEMULSIFICATION CLEAR CORNEA WITH STANDARD INTRAOCULAR LENS IMPLANT Right 2017    Procedure: PHACOEMULSIFICATION CLEAR CORNEA WITH STANDARD INTRAOCULAR LENS IMPLANT;  Surgeon: Mateo Gray MD;  Location: [facility]    PHACOEMULSIFICATION CLEAR CORNEA WITH STANDARD INTRAOCULAR LENS IMPLANT Left 01/10/2022    Procedure: LEFT PHACOEMULSIFICATION, CATARACT, WITH INTRAOCULAR LENS IMPLANT;  Surgeon: Mateo Gray MD;  Location: [facility]    WY TRABECULOPLASTY BY LASER SURGERY      RETINAL REATTACHMENT Right     SURGICAL HISTORY OF -   2009    Both Eyelids-.    TONSILLECTOMY  as child       ALLERGIES:     Allergies   Allergen Reactions    Azathioprine Shortness Of Breath and Other (See Comments)     Was hospitalized from it. Also had high fever, chills, and shortness of breath.    Sulfamethoxazole-Trimethoprim Cough, Itching and Rash     Generalized painful red rash on legs arms and abdomen, chest, back, cough and  fever  that required hospitalization.    Ciprofloxacin Muscle Pain (Myalgia)     Muscle pain  Other reaction(s): Myalgia  Other reaction(s): Myalgia    Ropinirole      Leg pan  GI  Weakness; ? sycope  Other reaction(s): Leg Pain       FAMILY HISTORY:  - Premature coronary artery disease  - Atrial fibrillation  - Sudden cardiac death     SOCIAL HISTORY:  Social History     Tobacco Use    Smoking status: Former     Current packs/day: 0.00     Average packs/day: 2.0 packs/day for 15.0 years (30.0 ttl pk-yrs)     Types: Cigarettes     Start date: 1968     Quit date: 1983     Years since quittin.5     Smokeless tobacco: Former     Quit date: 1/1/1970    Tobacco comments:     smoke free household.   Vaping Use    Vaping status: Never Used   Substance Use Topics    Alcohol use: Not Currently     Comment: Quit in 1986    Drug use: No       ROS:   Constitutional: No fever, chills, or sweats. Weight stable.   ENT: No visual disturbance, ear ache, epistaxis, sore throat.   Cardiovascular: As per HPI.   Respiratory: No cough, hemoptysis.    GI: No nausea, vomiting, hematemesis, melena, or hematochezia.   : No hematuria.   Integument: Negative.   Psychiatric: Negative.   Hematologic:  Easy bruising, no easy bleeding.  Neuro: Negative.   Endocrinology: No significant heat or cold intolerance   Musculoskeletal: No myalgia.    Exam:  BP (!) 162/79 (BP Location: Left arm, Patient Position: Sitting, Cuff Size: Adult Regular)   Pulse 68   Wt 102.5 kg (226 lb)   SpO2 95%   BMI 33.10 kg/m    GENERAL APPEARANCE: healthy, alert and no distress  HEENT: no icterus, no xanthelasmas, normal pupil size and reaction, normal palate, mucosa moist, no central cyanosis  NECK: no adenopathy, no asymmetry, masses, or scars, thyroid normal to palpation and no bruits, JVP not elevated  RESPIRATORY: lungs clear to auscultation - no rales, rhonchi or wheezes, no use of accessory muscles, no retractions, respirations are unlabored, normal respiratory rate  CARDIOVASCULAR: regular rhythm, normal S1 with physiologic split S2, no S3 or S4 and no murmur, click or rub, precordium quiet with normal PMI.  ABDOMEN: soft, non tender, without hepatosplenomegaly, no masses palpable, bowel sounds normal, aorta not enlarged by palpation, no abdominal bruits  EXTREMITIES: peripheral pulses normal, no edema, no bruits  NEURO: alert and oriented to person/place/time, normal speech, gait and affect  VASC: Radial, femoral, dorsalis pedis and posterior tibialis pulses are normal in volumes and symmetric bilaterally. No bruits are heard.  SKIN: no ecchymoses,  "no rashes    Labs:  CBC RESULTS:   Lab Results   Component Value Date    WBC 14.4 (H) 02/22/2023    WBC 12.2 (H) 10/26/2020    RBC 4.89 02/22/2023    RBC 4.23 (L) 10/26/2020    HGB 12.3 (L) 04/02/2024    HGB 12.6 (L) 10/26/2020    HCT 44.3 02/22/2023    HCT 38.5 (L) 10/26/2020    MCV 91 02/22/2023    MCV 91 10/26/2020    MCH 31.1 02/22/2023    MCH 29.8 10/26/2020    MCHC 34.3 02/22/2023    MCHC 32.7 10/26/2020    RDW 14.5 02/22/2023    RDW 13.4 10/26/2020     02/22/2023     (H) 10/26/2020       BMP RESULTS:  Lab Results   Component Value Date     07/03/2024     06/29/2021    POTASSIUM 4.0 07/03/2024    POTASSIUM 4.1 10/11/2022    POTASSIUM 4.6 06/09/2021    CHLORIDE 100 07/03/2024    CHLORIDE 99 10/11/2022    CHLORIDE 106 05/04/2021    CO2 32 (H) 07/03/2024    CO2 33 (H) 10/11/2022    CO2 29 05/04/2021    ANIONGAP 8 07/03/2024    ANIONGAP 2 (L) 10/11/2022    ANIONGAP 3 05/04/2021     (H) 07/03/2024     (H) 04/02/2024     (H) 10/11/2022     (H) 05/04/2021    BUN 13.1 07/03/2024    BUN 19 10/11/2022    BUN 17 05/04/2021    CR 0.98 07/03/2024    CR 1.02 05/04/2021    GFRESTIMATED 80 07/03/2024    GFRESTIMATED 72 05/04/2021    GFRESTBLACK 84 05/04/2021    ROSY 9.8 07/03/2024    ROSY 8.9 05/04/2021        INR RESULTS:  No results found for: \"INR\"    Procedures:      Assessment and Plan:     Chest pain for evaluation    I discussed extensively with patient and his wife the next steps.  Given patient's history of diabetes and the elevated troponin T, I recommended a Lexiscan stress test to evaluate for myocardial ischemia from coronary artery disease.  I reassured patient and his wife that the right bundle branch block does not require any specific treatment.  I will see patient again by video discussed the results of the Lexiscan test.  All questions and concerns were addressed and patient and his wife were happy with the plan.      CC  Patient Care Team:  Telly Lucio " MD Derian as PCP - General (Family Practice)  Arpan Harrison MD as Assigned Neuroscience Provider  Mateo Gray MD as Assigned Surgical Provider  Servando Mejia MD as Assigned Pulmonology Provider  Telly Knott MD as Assigned PCP  No Ref-Primary, Physician  Dandre Hassan MD as Assigned Musculoskeletal Provider  Macey Sherman AuD as Audiologist (Audiology)  Shayna Delatorre MD as MD (Otolaryngology)  Claudia Leiva MD as MD (Cardiovascular Disease)  TELLY KNOTT

## 2024-07-22 NOTE — TELEPHONE ENCOUNTER
Form started for APPLICATION FOR DISABILITY PARKING CERTIFICATE Form    Will place at the provider's desk.     Annette Varela,

## 2024-07-22 NOTE — NURSING NOTE
"Chief Complaint   Patient presents with    New Patient     Reason for visit: New EP cardiology for RBBB (right bundle branch block), Primary hypertension, Type 2 diabetes mellitus with other specified complication, without long-term current use of insulin       Initial BP (!) 162/79 (BP Location: Left arm, Patient Position: Sitting, Cuff Size: Adult Regular)   Pulse 68   Wt 102.5 kg (226 lb)   SpO2 95%   BMI 33.10 kg/m   Estimated body mass index is 33.1 kg/m  as calculated from the following:    Height as of 6/6/24: 1.76 m (5' 9.29\").    Weight as of this encounter: 102.5 kg (226 lb)..  BP completed using cuff size: regular    CANELO Chacon      "

## 2024-07-22 NOTE — LETTER
7/22/2024      RE: Rakan Nolasco  [address] [facility] [address]       Dear Colleague,    Thank you for the opportunity to participate in the care of your patient, Rakan Nolasco, at the [facility]. Please see a copy of my visit note below.    HPI: Purpose of visit: I was requested to consult on right bundle branch block    Rakan Nolasco Jr. is a 78 y.o. male with past medical history of COPD and type II diabetes mellitus who presented to the emergency department on June 6, 2024 via private car for evaluation of arm pain. Patient reported that he awoke at midnight  and had chest pain and right arm pain throughout his entire arm and hand.  He also had diaphoresis.  The episode lasted for few hours and spontaneously resolved.  Patient was discharged from the emergency department.  During the evaluation patient was noted to have right bundle branch block on a twelve-lead electrocardiogram.  Of note, the high sensitive troponin T was elevated at 36 ng/L.    Since discharge from hospital, patient has been having intermittent episodes of nonspecific chest discomfort.  Patient did not report symptoms of exertional dyspnea, palpitations, irregular heartbeat sensation, frequent lightheadedness, presyncope or syncope.      PAST MEDICAL HISTORY:  Past Medical History:   Diagnosis Date     Arthritis          BMI 31.0-31.9,adult      Cancer (H) 01/10/2018    Prostate     COPD (chronic obstructive pulmonary disease) (H) 10/28/2020     Demand ischemia (H)      Diabetes (H) 01/10/2018     Diverticulosis     Colonoscopy 8/2008     Fatty liver     see US  5/2012     GERD (gastroesophageal reflux disease)      Glaucoma (increased eye pressure)      HTN (hypertension)      Hyperlipidemia LDL goal < 130     age, htn, fhx     Irritable bowel      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      Nonsenile  cataract      Obstructive sleep apnea      Obstructive sleep apnea syndrome      ROSIE (obstructive sleep apnea) 01/2011    Using CPAP;      Prediabetes      Restless leg syndrome      Retinal detachment      Sleep apnea      Trigeminal neuralgia pain 01/04/2012       CURRENT MEDICATIONS:  Current Outpatient Medications   Medication Sig Dispense Refill     acetaminophen (TYLENOL) 325 MG tablet Take 2 tablets (650 mg) by mouth every 4 hours as needed for other (For optimal non-opioid multimodal pain management to improve pain control.) 100 tablet 0     albuterol (PROAIR HFA/PROVENTIL HFA/VENTOLIN HFA) 108 (90 Base) MCG/ACT inhaler INHALE 1 PUFF INTO THE LUNGS EVERY 4 HOURS AS NEEDED FOR SHORTNESS OF BREATH, WHEEZING OR COUGH 18 g 11     amLODIPine (NORVASC) 10 MG tablet TAKE 1 TABLET (10 MG) BY MOUTH DAILY. 90 tablet 0     amoxicillin (AMOXIL) 500 MG capsule Take 4 caps (2000mg) 30-60 minutes before dental procedure 4 capsule 0     ARTIFICIAL TEAR OP Apply 1 drop to eye as needed       atorvastatin (LIPITOR) 20 MG tablet Take 20 mg by mouth at bedtime       blood glucose (ONETOUCH VERIO IQ) test strip USE TO TEST BLOOD SUGAR 3 TIMES DAILY OR AS DIRECTED. 300 strip 1     blood glucose monitoring (NO BRAND SPECIFIED) meter device kit Use to test blood sugar 1 times daily or as directed. 1 kit 0     ClonAZEPAM (KLONOPIN) 0.5 MG tablet Take 0.5 mg by mouth Take 1 tablet by mouth daily at bedtime       Continuous Glucose Sensor (FREESTYLE SARAHY 2 SENSOR) MISC USE 1 SENSOR EVERY 14 DAYS. USE TO READ BLOOD SUGARS PER 'S INSTRUCTIONS. 2 each 5     doxycycline hyclate (VIBRA-TABS) 100 MG tablet Take 1 tablet by mouth 2 times daily       ferrous sulfate (FEROSUL) 325 (65 Fe) MG tablet TAKE 1 TABLET BY MOUTH EVERY DAY WITH BREAKFAST 90 tablet 1     fluticasone-salmeterol (ADVAIR) 250-50 MCG/ACT inhaler TAKE 1 PUFF BY MOUTH TWICE A DAY Strength: 250-50 MCG/ACT 3 each 3     fluticasone-salmeterol (WIXELA  INHUB) 250-50 MCG/ACT inhaler Inhale 1 puff into the lungs every 12 hours 3 each 3     furosemide (LASIX) 20 MG tablet TAKE 2 TABLETS BY MOUTH IN THE MORNING AND 1 TABLET BY MOUTH AT LEAST 6 HOURS LATER IN THE AFTERNOON. 270 tablet 1     Lancets (ONETOUCH DELICA PLUS [id number]) MISC USE ONCE DAILY AS DIRECTED 100 each 1     latanoprost (XALATAN) 0.005 % ophthalmic solution Place 1 drop into both eyes at bedtime 7.5 mL 3     lisinopril (ZESTRIL) 40 MG tablet Take 1 tablet (40 mg) by mouth daily 90 tablet 3     metFORMIN (GLUCOPHAGE XR) 500 MG 24 hr tablet TAKE 1 TABLET BY MOUTH TWICE A DAY WITH FOOD 180 tablet 3     OXcarbazepine (TRILEPTAL) 300 MG tablet TAKE 2 TABLETS IN THE MORNING AND 2 TABLETS IN THE EVENING 360 tablet 0     oxyCODONE (ROXICODONE) 5 MG tablet Take 1-2 tablets (5-10 mg) by mouth every 4 hours as needed for moderate pain 30 tablet 0     pantoprazole (PROTONIX) 20 MG EC tablet TAKE 1 TABLET BY MOUTH EVERY DAY (Patient taking differently: Take 20 mg by mouth every morning) 90 tablet 1     polyethylene glycol (MIRALAX) 17 GM/Dose powder Take 17 g by mouth daily 510 g 0     prednisoLONE acetate (PRED FORTE) 1 % ophthalmic suspension INSTILL 1 DROP LEFT EYE 4 TIMES A DAY       predniSONE (DELTASONE) 5 MG tablet TAKE 2 TABLETS BY MOUTH EVERY DAY 60 tablet 2     pyRIDostigmine (MESTINON) 60 MG tablet Take 2 tablets (120 mg) in the morning and afternoon and 1 tablet in the evening per 4/22/2024 appt. 450 tablet 0     pyRIDostigmine (MESTINON) 60 MG tablet Take 120 mg by mouth every morning       pyRIDostigmine (MESTINON) 60 MG tablet Take 60 mg by mouth 2 times daily Midday and 6 PM.       senna-docusate (SENOKOT-S/PERICOLACE) 8.6-50 MG tablet Take 1 tablet by mouth 2 times daily 60 tablet 0     sodium bicarbonate 650 MG tablet TAKE 1/2 TABLET TWICE A DAY BY MOUTH 90 tablet 1     timolol maleate (TIMOPTIC) 0.5 % ophthalmic solution Place 1 drop into both eyes 2 times daily 5 mL 11     tobramycin (TOBREX)  0.3 % ophthalmic solution INSTILL 1 DROP LEFT EYE 4 TIMES A DAY         PAST SURGICAL HISTORY:  Past Surgical History:   Procedure Laterality Date     ARTHROPLASTY KNEE Left 4/1/2024    Procedure: ARTHROPLASTY, LEFT KNEE, TOTAL;  Surgeon: Dandre Hassan MD;  Location: [facility] OR     BIOPSY ARTERY TEMPORAL Bilateral 08/01/2017    Procedure: BIOPSY ARTERY TEMPORAL;  Bilateral temporal artery Biopsy;  Surgeon: Jj Tello MD;  Location: [facility] OR     CATARACT IOL, RT/LT Right      COLONOSCOPY       ESOPHAGOSCOPY, GASTROSCOPY, DUODENOSCOPY (EGD), COMBINED  01/15/2014    Procedure: COMBINED ESOPHAGOSCOPY, GASTROSCOPY, DUODENOSCOPY (EGD), BIOPSY SINGLE OR MULTIPLE;;  Surgeon: Winston Nixon MD;  Location: [facility] OR     LASER YAG CAPSULOTOMY Right 04/09/2018    right eye     PHACOEMULSIFICATION CLEAR CORNEA WITH STANDARD INTRAOCULAR LENS IMPLANT Right 02/20/2017    Procedure: PHACOEMULSIFICATION CLEAR CORNEA WITH STANDARD INTRAOCULAR LENS IMPLANT;  Surgeon: Mateo Gray MD;  Location: [facility]     PHACOEMULSIFICATION CLEAR CORNEA WITH STANDARD INTRAOCULAR LENS IMPLANT Left 01/10/2022    Procedure: LEFT PHACOEMULSIFICATION, CATARACT, WITH INTRAOCULAR LENS IMPLANT;  Surgeon: Mateo Gray MD;  Location: [facility] OR     AZ TRABECULOPLASTY BY LASER SURGERY       RETINAL REATTACHMENT Right      SURGICAL HISTORY OF -   08/2009    Both Eyelids-.     TONSILLECTOMY  as child       ALLERGIES:     Allergies   Allergen Reactions     Azathioprine Shortness Of Breath and Other (See Comments)     Was hospitalized from it. Also had high fever, chills, and shortness of breath.     Sulfamethoxazole-Trimethoprim Cough, Itching and Rash     Generalized painful red rash on legs arms and abdomen, chest, back, cough and  fever  that required hospitalization.     Ciprofloxacin Muscle Pain (Myalgia)     Muscle pain  Other reaction(s): Myalgia  Other reaction(s): Myalgia     Ropinirole      Leg pan  GI  Weakness; ?  sycope  Other reaction(s): Leg Pain       FAMILY HISTORY:  - Premature coronary artery disease  - Atrial fibrillation  - Sudden cardiac death     SOCIAL HISTORY:  Social History     Tobacco Use     Smoking status: Former     Current packs/day: 0.00     Average packs/day: 2.0 packs/day for 15.0 years (30.0 ttl pk-yrs)     Types: Cigarettes     Start date: 1968     Quit date: 1983     Years since quittin.5     Smokeless tobacco: Former     Quit date: 1970     Tobacco comments:     smoke free household.   Vaping Use     Vaping status: Never Used   Substance Use Topics     Alcohol use: Not Currently     Comment: Quit in      Drug use: No       ROS:   Constitutional: No fever, chills, or sweats. Weight stable.   ENT: No visual disturbance, ear ache, epistaxis, sore throat.   Cardiovascular: As per HPI.   Respiratory: No cough, hemoptysis.    GI: No nausea, vomiting, hematemesis, melena, or hematochezia.   : No hematuria.   Integument: Negative.   Psychiatric: Negative.   Hematologic:  Easy bruising, no easy bleeding.  Neuro: Negative.   Endocrinology: No significant heat or cold intolerance   Musculoskeletal: No myalgia.    Exam:  BP (!) 162/79 (BP Location: Left arm, Patient Position: Sitting, Cuff Size: Adult Regular)   Pulse 68   Wt 102.5 kg (226 lb)   SpO2 95%   BMI 33.10 kg/m    GENERAL APPEARANCE: healthy, alert and no distress  HEENT: no icterus, no xanthelasmas, normal pupil size and reaction, normal palate, mucosa moist, no central cyanosis  NECK: no adenopathy, no asymmetry, masses, or scars, thyroid normal to palpation and no bruits, JVP not elevated  RESPIRATORY: lungs clear to auscultation - no rales, rhonchi or wheezes, no use of accessory muscles, no retractions, respirations are unlabored, normal respiratory rate  CARDIOVASCULAR: regular rhythm, normal S1 with physiologic split S2, no S3 or S4 and no murmur, click or rub, precordium quiet with normal PMI.  ABDOMEN: soft, non  "tender, without hepatosplenomegaly, no masses palpable, bowel sounds normal, aorta not enlarged by palpation, no abdominal bruits  EXTREMITIES: peripheral pulses normal, no edema, no bruits  NEURO: alert and oriented to person/place/time, normal speech, gait and affect  VASC: Radial, femoral, dorsalis pedis and posterior tibialis pulses are normal in volumes and symmetric bilaterally. No bruits are heard.  SKIN: no ecchymoses, no rashes    Labs:  CBC RESULTS:   Lab Results   Component Value Date    WBC 14.4 (H) 02/22/2023    WBC 12.2 (H) 10/26/2020    RBC 4.89 02/22/2023    RBC 4.23 (L) 10/26/2020    HGB 12.3 (L) 04/02/2024    HGB 12.6 (L) 10/26/2020    HCT 44.3 02/22/2023    HCT 38.5 (L) 10/26/2020    MCV 91 02/22/2023    MCV 91 10/26/2020    MCH 31.1 02/22/2023    MCH 29.8 10/26/2020    MCHC 34.3 02/22/2023    MCHC 32.7 10/26/2020    RDW 14.5 02/22/2023    RDW 13.4 10/26/2020     02/22/2023     (H) 10/26/2020       BMP RESULTS:  Lab Results   Component Value Date     07/03/2024     06/29/2021    POTASSIUM 4.0 07/03/2024    POTASSIUM 4.1 10/11/2022    POTASSIUM 4.6 06/09/2021    CHLORIDE 100 07/03/2024    CHLORIDE 99 10/11/2022    CHLORIDE 106 05/04/2021    CO2 32 (H) 07/03/2024    CO2 33 (H) 10/11/2022    CO2 29 05/04/2021    ANIONGAP 8 07/03/2024    ANIONGAP 2 (L) 10/11/2022    ANIONGAP 3 05/04/2021     (H) 07/03/2024     (H) 04/02/2024     (H) 10/11/2022     (H) 05/04/2021    BUN 13.1 07/03/2024    BUN 19 10/11/2022    BUN 17 05/04/2021    CR 0.98 07/03/2024    CR 1.02 05/04/2021    GFRESTIMATED 80 07/03/2024    GFRESTIMATED 72 05/04/2021    GFRESTBLACK 84 05/04/2021    ROSY 9.8 07/03/2024    ROSY 8.9 05/04/2021        INR RESULTS:  No results found for: \"INR\"    Procedures:      Assessment and Plan:     Chest pain for evaluation    I discussed extensively with patient and his wife the next steps.  Given patient's history of diabetes and the elevated troponin " T, I recommended a Lexiscan stress test to evaluate for myocardial ischemia from coronary artery disease.  I reassured patient and his wife that the right bundle branch block does not require any specific treatment.  I will see patient again by video discussed the results of the Lexiscan test.  All questions and concerns were addressed and patient and his wife were happy with the plan.      CC  Patient Care Team:  Telly Knott MD as PCP - General (Family Practice)  Arpan Harrison MD as Assigned Neuroscience Provider  Mateo Gray MD as Assigned Surgical Provider  Servando Mejia MD as Assigned Pulmonology Provider  Telly Knott MD as Assigned PCP  No Ref-Primary, Physician  Dandre Hassan MD as Assigned Musculoskeletal Provider  Macey Sherman AuD as Audiologist (Audiology)  Shayna Delatorre MD as MD (Otolaryngology)  Claudia Leiva MD as MD (Cardiovascular Disease)  TELLY KNOTT        Please do not hesitate to contact me if you have any questions/concerns.     Sincerely,     Claudia Leiva MD

## 2024-07-23 LAB
ATRIAL RATE - MUSE: 61 BPM
DIASTOLIC BLOOD PRESSURE - MUSE: NORMAL MMHG
INTERPRETATION ECG - MUSE: NORMAL
P AXIS - MUSE: 49 DEGREES
PR INTERVAL - MUSE: 142 MS
QRS DURATION - MUSE: 112 MS
QT - MUSE: 424 MS
QTC - MUSE: 426 MS
R AXIS - MUSE: -10 DEGREES
SYSTOLIC BLOOD PRESSURE - MUSE: NORMAL MMHG
T AXIS - MUSE: 41 DEGREES
VENTRICULAR RATE- MUSE: 61 BPM

## 2024-07-24 ENCOUNTER — OFFICE VISIT (OUTPATIENT)
Dept: FAMILY MEDICINE | Facility: CLINIC | Age: 79
End: 2024-07-24
Payer: COMMERCIAL

## 2024-07-24 VITALS
WEIGHT: 224.2 LBS | RESPIRATION RATE: 16 BRPM | SYSTOLIC BLOOD PRESSURE: 121 MMHG | TEMPERATURE: 97 F | DIASTOLIC BLOOD PRESSURE: 70 MMHG | HEIGHT: 70 IN | HEART RATE: 60 BPM | OXYGEN SATURATION: 96 % | BODY MASS INDEX: 32.1 KG/M2

## 2024-07-24 DIAGNOSIS — M79.604 LEG PAIN, BILATERAL: ICD-10-CM

## 2024-07-24 DIAGNOSIS — Z79.899 MEDICATION MANAGEMENT: ICD-10-CM

## 2024-07-24 DIAGNOSIS — G89.29 CHRONIC BILATERAL LOW BACK PAIN WITH BILATERAL SCIATICA: Primary | ICD-10-CM

## 2024-07-24 DIAGNOSIS — M54.42 CHRONIC BILATERAL LOW BACK PAIN WITH BILATERAL SCIATICA: Primary | ICD-10-CM

## 2024-07-24 DIAGNOSIS — R60.9 EDEMA, UNSPECIFIED TYPE: ICD-10-CM

## 2024-07-24 DIAGNOSIS — E66.01 MORBID OBESITY (H): ICD-10-CM

## 2024-07-24 DIAGNOSIS — E11.69 TYPE 2 DIABETES MELLITUS WITH OTHER SPECIFIED COMPLICATION, WITHOUT LONG-TERM CURRENT USE OF INSULIN (H): ICD-10-CM

## 2024-07-24 DIAGNOSIS — M54.41 CHRONIC BILATERAL LOW BACK PAIN WITH BILATERAL SCIATICA: Primary | ICD-10-CM

## 2024-07-24 DIAGNOSIS — M54.16 LUMBAR RADICULAR SYNDROME: ICD-10-CM

## 2024-07-24 DIAGNOSIS — M79.605 LEG PAIN, BILATERAL: ICD-10-CM

## 2024-07-24 PROCEDURE — 99214 OFFICE O/P EST MOD 30 MIN: CPT | Performed by: FAMILY MEDICINE

## 2024-07-24 RX ORDER — SODIUM BICARBONATE 650 MG/1
650 TABLET ORAL 2 TIMES DAILY
Qty: 180 TABLET | Refills: 1 | Status: SHIPPED | OUTPATIENT
Start: 2024-07-24

## 2024-07-24 ASSESSMENT — PAIN SCALES - GENERAL: PAINLEVEL: NO PAIN (0)

## 2024-07-24 NOTE — PROGRESS NOTES
Assessment & Plan     Chronic bilateral low back pain with bilateral sciatica   Reviewed chart and previous imaging. Paradoxically the pain is severe but on and off, will try physical therapy  - Physical Therapy  Referral    Lumbar radicular syndrome   - Physical Therapy  Referral    Leg pain, bilateral   Possibly radicular pain  - Physical Therapy  Referral    Type 2 diabetes mellitus with other specified complication, without long-term current use of insulin (H)     Well controlled    Medication management   -  Reviewed medications, no new c oncerns    Morbid obesity (H)   -  Counseled to make better food choices, exercise as tolerated, and lose weight.     Edema, unspecified type   Been having issues with hyponatremia, continue sodium at 1 tab twice daily  - sodium bicarbonate 650 MG tablet  Dispense: 180 tablet; Refill: 1    See Patient Instructions    Subjective   Rakan is a 79 year old, presenting for the following health issues:  Leg/Knee and Back Pain     Leg Pain  Pain in the legs; henry with standing  Both legs; more of back of the legs.  Worse: with standing for long, sometimes can be active all day long without problem  Sitting with pressure behind the thighs when sitting on chair  None when laying down    Back Pain   Lower back usually the same time:  LUMBAR SPINE 2/3 VIEWS 7/12/2023 9:42 AM      COMPARISON: None.     HISTORY: Sciatica without back pain, unspecified laterality.     FINDINGS: There are 5 lumbar type vertebral bodies.   Anterior spondylolisthesis at L5-S1 by 5 mm.   Otherwise normal alignment.   Normal vertebral body heights.   Moderate disc space narrowing at L4-L5 and L5-S1 and mild disc space narrowing at all lumbar levels with mild to moderate marginal osteophytes.   Moderate facet arthropathy L4-L5 and L5-S1.  The visualized sacrum and pelvis are unremarkable.   Calcified plaque scattered within the abdominal aorta.    PAD:   Study at the Point Clear      Narrative & Impression   EXAM: RESTING AND POSTEXERCISE ANKLE-BRACHIAL INDICES (ABIs)  [facility]   DATE/TIME: 2022 4:16 PM     INDICATION:  Claudication of both lower extremities (H)  COMPARISON: None.     FINDINGS:   RIGHT                                                 Brachial: 164  Ankle (PT): 170 Index: 1.04    Ankle (DP): 175 Index: 1.07   Digit: 111 Index: 0.68        LEFT  Brachial: 146  Ankle (PT): 176 Index: 1.07  Ankle (DP): 159 Index: 0.97  Digit: 121 Index: 0.74     The patient was exercised on a treadmill at 2 mph at a 12% incline for 5 minutes total. No symptoms reported.     Resting and immediate postexercise ABIs are 1.07 and 1.00 on the right.     Resting and immediate postexercise ABIs are 1.07 and 1.00 on the left.     WAVEFORMS: The dorsalis pedis and posterior tibial arteries are multiphasic bilaterally.                                                                      IMPRESSION:  1.  RIGHT LOWER EXTREMITY: MARCO A at rest is normal with a normal response to exercise. These findings would not be consistent with symptoms of arterial claudication.  2.  LEFT LOWER EXTREMITY: MARCO A at rest is normal with a normal response to exercise. These findings would not be consistent with symptoms of arterial claudication.     Leg Swellin/24/2024    10:50 AM   Additional Questions   Roomed by Rylan CARBAJAL   Accompanied by self     History of Present Illness       Reason for visit:  PAin in back down through legs    He eats 2-3 servings of fruits and vegetables daily.He consumes 0 sweetened beverage(s) daily.He exercises with enough effort to increase his heart rate 9 or less minutes per day.  He exercises with enough effort to increase his heart rate 3 or less days per week. He is missing 2 dose(s) of medications per week.  He is not taking prescribed medications regularly due to remembering to take.         Review of Systems  Constitutional, HEENT, cardiovascular,  "pulmonary, gi and gu systems are negative, except as otherwise noted.      Objective    /70 (BP Location: Left arm, Cuff Size: Adult Large)   Pulse 60   Temp 97  F (36.1  C) (Temporal)   Resp 16   Ht 1.77 m (5' 9.69\")   Wt 101.7 kg (224 lb 3.2 oz)   SpO2 96%   BMI 32.46 kg/m    Body mass index is 32.46 kg/m .  Physical Exam   GENERAL: alert and no distress  RESP: lungs clear to auscultation - no rales, rhonchi or wheezes  CV: regular rate and rhythm, no murmur, click or rub, no peripheral edema  MS: no gross musculoskeletal defects noted, no edema  NEURO: Normal strength and tone, mentation intact and speech normal  Comprehensive back pain exam:  No tenderness, Range of motion not limited by pain, Lower extremity strength functional and equal on both sides, Lower extremity reflexes within normal limits bilaterally, Lower extremity sensation normal and equal on both sides, and Straight leg raise negative bilaterally        Signed Electronically by: Telly Lucio MD     "

## 2024-07-26 ENCOUNTER — HOSPITAL ENCOUNTER (EMERGENCY)
Dept: NUCLEAR MEDICINE | Facility: CLINIC | Age: 79
Discharge: HOME | End: 2024-07-26
Attending: INTERNAL MEDICINE
Payer: COMMERCIAL

## 2024-07-26 ENCOUNTER — HOSPITAL ENCOUNTER (OUTPATIENT)
Dept: CARDIOLOGY | Facility: CLINIC | Age: 79
Discharge: HOME | End: 2024-07-26
Attending: INTERNAL MEDICINE
Payer: COMMERCIAL

## 2024-07-26 ENCOUNTER — HOSPITAL ENCOUNTER (OUTPATIENT)
Dept: NUCLEAR MEDICINE | Facility: CLINIC | Age: 79
Setting detail: NUCLEAR MEDICINE
Discharge: HOME | End: 2024-07-26
Attending: INTERNAL MEDICINE
Payer: COMMERCIAL

## 2024-07-26 ENCOUNTER — ANCILLARY ORDERS (OUTPATIENT)
Dept: CARDIOLOGY | Facility: CLINIC | Age: 79
End: 2024-07-26

## 2024-07-26 DIAGNOSIS — I10 PRIMARY HYPERTENSION: ICD-10-CM

## 2024-07-26 DIAGNOSIS — I45.10 RBBB (RIGHT BUNDLE BRANCH BLOCK): Primary | ICD-10-CM

## 2024-07-26 DIAGNOSIS — E11.69 TYPE 2 DIABETES MELLITUS WITH OTHER SPECIFIED COMPLICATION, WITHOUT LONG-TERM CURRENT USE OF INSULIN (H): ICD-10-CM

## 2024-07-26 DIAGNOSIS — I45.10 RBBB (RIGHT BUNDLE BRANCH BLOCK): ICD-10-CM

## 2024-07-26 DIAGNOSIS — R07.9 CHEST PAIN, UNSPECIFIED TYPE: ICD-10-CM

## 2024-07-26 LAB
CV STRESS MAX HR HE: 96
RATE PRESSURE PRODUCT: NORMAL
STRESS ECHO BASELINE DIASTOLIC HE: 65
STRESS ECHO BASELINE HR: 63 BPM
STRESS ECHO BASELINE SYSTOLIC BP: 140
STRESS ECHO CALCULATED PERCENT HR: 67 %
STRESS ECHO LAST STRESS DIASTOLIC BP: 56
STRESS ECHO LAST STRESS SYSTOLIC BP: 143
STRESS ECHO TARGET HR: 143

## 2024-07-26 PROCEDURE — A9502 TC99M TETROFOSMIN: HCPCS | Performed by: INTERNAL MEDICINE

## 2024-07-26 PROCEDURE — 250N000011 HC RX IP 250 OP 636: Performed by: INTERNAL MEDICINE

## 2024-07-26 PROCEDURE — 93018 CV STRESS TEST I&R ONLY: CPT | Performed by: INTERNAL MEDICINE

## 2024-07-26 PROCEDURE — 93017 CV STRESS TEST TRACING ONLY: CPT

## 2024-07-26 PROCEDURE — 343N000001 HC RX 343: Performed by: INTERNAL MEDICINE

## 2024-07-26 PROCEDURE — 93016 CV STRESS TEST SUPVJ ONLY: CPT | Performed by: INTERNAL MEDICINE

## 2024-07-26 PROCEDURE — 78452 HT MUSCLE IMAGE SPECT MULT: CPT | Mod: 26 | Performed by: RADIOLOGY

## 2024-07-26 PROCEDURE — 78452 HT MUSCLE IMAGE SPECT MULT: CPT

## 2024-07-26 RX ORDER — CAFFEINE 200 MG
200 TABLET ORAL
Status: ACTIVE | OUTPATIENT
Start: 2024-07-26 | End: 2024-07-26

## 2024-07-26 RX ORDER — AMINOPHYLLINE 25 MG/ML
50-100 INJECTION, SOLUTION INTRAVENOUS
Status: DISCONTINUED | OUTPATIENT
Start: 2024-07-26 | End: 2024-07-27 | Stop reason: HOSPADM

## 2024-07-26 RX ORDER — CAFFEINE CITRATE 20 MG/ML
60 SOLUTION INTRAVENOUS
Status: ACTIVE | OUTPATIENT
Start: 2024-07-26 | End: 2024-07-26

## 2024-07-26 RX ORDER — LIDOCAINE 40 MG/G
CREAM TOPICAL
Status: DISCONTINUED | OUTPATIENT
Start: 2024-07-26 | End: 2024-07-27 | Stop reason: HOSPADM

## 2024-07-26 RX ORDER — REGADENOSON 0.08 MG/ML
0.4 INJECTION, SOLUTION INTRAVENOUS ONCE
Status: COMPLETED | OUTPATIENT
Start: 2024-07-26 | End: 2024-07-26

## 2024-07-26 RX ORDER — ALBUTEROL SULFATE 90 UG/1
2 AEROSOL, METERED RESPIRATORY (INHALATION) EVERY 5 MIN PRN
Status: DISCONTINUED | OUTPATIENT
Start: 2024-07-26 | End: 2024-07-27 | Stop reason: HOSPADM

## 2024-07-26 RX ADMIN — TETROFOSMIN 38.5 MILLICURIE: 1.38 INJECTION, POWDER, LYOPHILIZED, FOR SOLUTION INTRAVENOUS at 10:08

## 2024-07-26 RX ADMIN — TETROFOSMIN 11.2 MILLICURIE: 1.38 INJECTION, POWDER, LYOPHILIZED, FOR SOLUTION INTRAVENOUS at 08:54

## 2024-07-26 RX ADMIN — REGADENOSON 0.4 MG: 0.08 INJECTION, SOLUTION INTRAVENOUS at 10:04

## 2024-07-26 NOTE — PROGRESS NOTES
Pt here for Lexiscan nuclear stress test.  Medication and side effects reviewed with patient. Lung sounds clear to auscultation bilaterally.  Denied caffeine use.  Patient tolerated Lexiscan dose without any adverse reactions.  Monitored post injection and then taken back to nuclear medicine for follow up imaging.

## 2024-07-30 ENCOUNTER — THERAPY VISIT (OUTPATIENT)
Dept: PHYSICAL THERAPY | Facility: CLINIC | Age: 79
End: 2024-07-30
Payer: COMMERCIAL

## 2024-07-30 DIAGNOSIS — M54.50 LUMBAR SPINE PAIN: Primary | ICD-10-CM

## 2024-07-30 PROCEDURE — 97110 THERAPEUTIC EXERCISES: CPT | Mod: GP | Performed by: PHYSICAL THERAPIST

## 2024-07-30 PROCEDURE — 97161 PT EVAL LOW COMPLEX 20 MIN: CPT | Mod: GP | Performed by: PHYSICAL THERAPIST

## 2024-07-30 NOTE — PROGRESS NOTES
PHYSICAL THERAPY EVALUATION  Type of Visit: Evaluation             Subjective Has had pain in both legs and the back for a few years. No injury. Standing will increase the pain in the legs and then the back. Sitting will make pain less. Pain is in entire leg down to the ankles. Has not tried ice, heat or exercises. No strength issues in the legs. Had TKA April 1, 2024. Then had a hematoma from a blood clot in the left lower leg. Went to hospital and was admitted for 1.5 days.       Presenting condition or subjective complaint: Lower back pain going down legs  Date of onset: 07/24/24 (Date of order)    Relevant medical history:     Dates & types of surgery: Knee replacement. 4/24. Eye surgery 6/24    Prior diagnostic imaging/testing results: X-ray     Prior therapy history for the same diagnosis, illness or injury: No      Prior Level of Function  Transfers: Independent  Ambulation: Independent  ADL: Independent    Living Environment  Social support: With a significant other or spouse   Type of home: House; Basement   Stairs to enter the home: Yes 2 Is there a railing: Yes     Ramp:     Stairs inside the home:         Help at home: Home management tasks (cooking, cleaning); Home and Yard maintenance tasks; Assist for driving and community activities  Equipment owned: Straight Cane; Walker; Walker with wheels; Grab bars; Raised toilet seat; Bath bench; Lift chair     Employment: No    Hobbies/Interests:      Patient goals for therapy: Stand and walk without pain     Objective   LUMBAR SPINE EVALUATION  PAIN: Pain Level at Rest: 0/10  Pain Level with Use: 8/10  GAIT:   Weightbearing Status: WBAT  Assistive Device(s): None  Gait Deviations: Reduced heel strike and heel strike  NON-WEIGHTBEARING ALIGNMENT: ASIS equal heights B / Medial malleoli equal heights B   ROM:   (Degrees) Left AROM Left PROM  Right AROM Right PROM   Hip Flexion       Hip Extension       Hip Abduction       Hip Adduction       Hip Internal Rotation        Hip External Rotation       Knee Flexion       Knee Extension       Lumbar Side glide Not restricted Not restricted   Lumbar Flexion Mildly restricted / Repeated motions - Mild increase in symptoms in left knee   Lumbar Extension Mildly restricted / Repeated motions - No change in symptoms     STRENGTH: Hip extension - 4-/5, Hip abduction - 4-/5   MYOTOMES:    Left Right   T12-L3 (Hip Flexion) 5 5   L2-4 (Quads)  5 5   L4 (Ankle DF) 5 5   L5 (Great Toe Ext) 5 5   S1 (Toe Raise) 5 5     DTR S:    Left Right   C5 (Biceps)     C6 (Brachioradialis)     C7 (Triceps)     L4 (Quad) 1 1   S1 (Achilles)       DERMATOMES: WNL  NEURAL TENSION:    Left Right   SLR Negative  Negative    SLR with DF Negative  Negative    Femoral Nerve     Slump     Jatin (Lumbar)     Jatin (Thoracic)     Jatin (Cervical)     Median     Ulnar     Radial        FLEXIBILITY: Decreased piriformis L, Decreased hamstrings L, Decreased piriformis R, Decreased hamstrings R  LUMBAR/HIP Special Tests:    Left Right   DESTINEE Negative  Negative    FADIR/Labrum/JULIOCESAR Negative  Negative    Femoral Nerve     Gerardo's     Piriformis     Quadrant Testing     SLR     Slump     Stork with Extension     Winston             PELVIS/SI SPECIAL TESTS:    Left Right Additional Notes   ASLR      Gaenslen's Test      Pelvic Compression Negative Negative    Pelvic Gapping Negative Negative    Sacral Thrust Negative Negative    Thigh Thrust Negative Negative      PALPATION:   + Tenderness At Location Left Right   Quadratus Lumborum     Erector Spinae + +   Piriformis  - -   PSIS - -   ASIS - -   Iliac Crest - -   Glut Medius     Greater Trochanter     Ischial Tuberosity     Hamstrings     Hip Flexors     Vertebral  + +     SPINAL SEGMENTAL CONCLUSIONS: Hypomobility of L3-L5    Assessment & Plan   CLINICAL IMPRESSIONS  Medical Diagnosis: Chronic bilateral low back pain with bilateral sciatica / Lumbar radicular syndrome    Treatment Diagnosis: Lumbar spine pain    Impression/Assessment: Patient is a 79 year old male with lumbar spine and bilateral lower extremity complaints.  The following significant findings have been identified: Pain, Decreased ROM/flexibility, Decreased joint mobility, Decreased strength, and Decreased activity tolerance. These impairments interfere with their ability to perform self care tasks, recreational activities, household chores, household mobility, and community mobility as compared to previous level of function.     Clinical Decision Making (Complexity):  Clinical Presentation: Stable/Uncomplicated  Clinical Presentation Rationale: based on medical and personal factors listed in PT evaluation  Clinical Decision Making (Complexity): Low complexity    PLAN OF CARE  Treatment Interventions:  Modalities: Cryotherapy, Hot Pack  Interventions: Manual Therapy, Neuromuscular Re-education, Therapeutic Activity, Therapeutic Exercise, Self-Care/Home Management    Long Term Goals     PT Goal 1  Goal Identifier: Standing  Goal Description: Pt will be able to stand for 20 minutes in order to make a meal without having to sit due to back pain  Rationale: to maximize safety and independence with performance of ADLs and functional tasks;to maximize safety and independence within the home;to maximize safety and independence within the community  Target Date: 09/24/24  PT Goal 2  Goal Identifier: Ambulation  Goal Description: Pt will be able to ambulate for 30 minutes in order to run errands without having to sit due to pain.  Rationale: to maximize safety and independence with performance of ADLs and functional tasks;to maximize safety and independence within the home;to maximize safety and independence within the community  Target Date: 09/24/24      Frequency of Treatment: 1 time per week  Duration of Treatment: 8 weeks    Recommended Referrals to Other Professionals:   Education Assessment:   Learner/Method: Patient;No Barriers to Learning    Risks and  benefits of evaluation/treatment have been explained.   Patient/Family/caregiver agrees with Plan of Care.     Evaluation Time:     PT Eval, Low Complexity Minutes (11480): 21       Signing Clinician: OMA Lowry [facility]                                                                                   OUTPATIENT PHYSICAL THERAPY      PLAN OF TREATMENT FOR OUTPATIENT REHABILITATION   Patient's Last Name, First Name, Rakan Gil YOB: 1945   Provider's Name   [facility]   Medical Record No.  [id number]     Onset Date: 07/24/24 (Date of order)  Start of Care Date: 07/30/24     Medical Diagnosis:  Chronic bilateral low back pain with bilateral sciatica / Lumbar radicular syndrome      PT Treatment Diagnosis:  Lumbar spine pain Plan of Treatment  Frequency/Duration: 1 time per week/ 8 weeks    Certification date from 07/30/24 to 09/24/24         See note for plan of treatment details and functional goals     Khadar Altamirano, PT                         I CERTIFY THE NEED FOR THESE SERVICES FURNISHED UNDER        THIS PLAN OF TREATMENT AND WHILE UNDER MY CARE     (Physician attestation of this document indicates review and certification of the therapy plan).              Referring Provider:  Telly Lucio    Initial Assessment  See Epic Evaluation- Start of Care Date: 07/30/24

## 2024-07-31 DIAGNOSIS — G70.00 MYASTHENIA GRAVIS, ACHR ANTIBODY POSITIVE (H): ICD-10-CM

## 2024-07-31 DIAGNOSIS — I10 BENIGN ESSENTIAL HYPERTENSION: ICD-10-CM

## 2024-07-31 DIAGNOSIS — K21.9 GASTROESOPHAGEAL REFLUX DISEASE WITHOUT ESOPHAGITIS: ICD-10-CM

## 2024-08-01 RX ORDER — LISINOPRIL 40 MG/1
40 TABLET ORAL DAILY
Qty: 90 TABLET | Refills: 0 | Status: SHIPPED | OUTPATIENT
Start: 2024-08-01

## 2024-08-01 RX ORDER — PANTOPRAZOLE SODIUM 20 MG/1
20 TABLET, DELAYED RELEASE ORAL DAILY
Qty: 90 TABLET | Refills: 0 | Status: SHIPPED | OUTPATIENT
Start: 2024-08-01

## 2024-08-02 RX ORDER — PREDNISONE 5 MG/1
TABLET ORAL
Qty: 60 TABLET | Refills: 2 | Status: SHIPPED | OUTPATIENT
Start: 2024-08-02

## 2024-08-05 ENCOUNTER — TELEPHONE (OUTPATIENT)
Dept: OPHTHALMOLOGY | Facility: CLINIC | Age: 79
End: 2024-08-05

## 2024-08-05 NOTE — TELEPHONE ENCOUNTER
Health Call Center    Phone Message    May a detailed message be left on voicemail: yes     Reason for Call: Other: pt advised he is unable to see anything in his mychart pertaining to Dr Gray, past appts and is unable to make future ones. Pt is unable to reach out to communicate with Dr Myers team also. Pt was given the My Chart support line but also requested a message be sent to Dr Myers team Pt requests a call back to discuss please Thank you     Action Taken: Message routed to:  [facility] eye    Travel Screening: Not Applicable     Date of Service:

## 2024-08-06 ENCOUNTER — OFFICE VISIT (OUTPATIENT)
Dept: CARDIOLOGY | Facility: CLINIC | Age: 79
End: 2024-08-06
Payer: COMMERCIAL

## 2024-08-06 VITALS
DIASTOLIC BLOOD PRESSURE: 70 MMHG | BODY MASS INDEX: 32.75 KG/M2 | SYSTOLIC BLOOD PRESSURE: 133 MMHG | OXYGEN SATURATION: 95 % | HEART RATE: 67 BPM | WEIGHT: 226.2 LBS

## 2024-08-06 DIAGNOSIS — R07.9 CHEST PAIN, UNSPECIFIED TYPE: Primary | ICD-10-CM

## 2024-08-06 PROCEDURE — 99213 OFFICE O/P EST LOW 20 MIN: CPT

## 2024-08-06 NOTE — LETTER
"8/6/2024      RE: Rakan Nolasco  [address] [facility] [address] [address]       Dear Colleague,    Thank you for the opportunity to participate in the care of your patient, Rakan Nolasco, at the [facility]. Please see a copy of my visit note below.             General Cardiology [facility] - [place]      HPI: Mr. Rakan Nolasco is a 79 year old male presenting to cardiology clinic today for follow-up s/p stress test. PMH significant for COPD, T2DM, HTN, HLD.    Prior clinic visit on 7/22/24 with Dr. Leiva for follow-up of ER visit. Patient had an episode of right arm pain, that was radiated into chest and hand. Noted to have a RBBB on EKG and was discharged. Was referred for Lexiscan, which was completed on 7/26/24. Testing revealed no acute findings on CT images, no evidence of ischemia or infarct, normal LV function, and EKG in sinus rhythm.    Today in clinic, he denies any current chest pain, palpitations, dizziness, lower extremity edema, shortness of breath, BORGES, or PND. He does report that he feels a left-sided \"spasm\" in his chest muscle that only occurs when he lays a certain way. He also endorses short of breath on occasion, but states he has a long history of COPD that he feels is currently very stable.    Current Cardiac Medications:  Amlodipine 10 mg daily  Atorvastatin 20 mg daily  Lasix 40 mg in AM, 20 mg in PM  Lisinopril 40 mg daily      PAST MEDICAL HISTORY:  Past Medical History:   Diagnosis Date     Arthritis          BMI 31.0-31.9,adult      Cancer (H) 01/10/2018    Prostate     COPD (chronic obstructive pulmonary disease) (H) 10/28/2020     Demand ischemia (H)      Diabetes (H) 01/10/2018     Diverticulosis     Colonoscopy 8/2008     Fatty liver     see US  5/2012     GERD (gastroesophageal reflux disease)      Glaucoma (increased eye pressure)      HTN (hypertension)      Hyperlipidemia LDL goal < " 130     age, htn, fhx     Irritable bowel      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      Nonsenile cataract      Obstructive sleep apnea      Obstructive sleep apnea syndrome      ROSIE (obstructive sleep apnea) 01/2011    Using CPAP;      Prediabetes      Restless leg syndrome      Retinal detachment      Sleep apnea      Trigeminal neuralgia pain 01/04/2012       CURRENT MEDICATIONS:  Current Outpatient Medications   Medication Sig Dispense Refill     acetaminophen (TYLENOL) 325 MG tablet Take 2 tablets (650 mg) by mouth every 4 hours as needed for other (For optimal non-opioid multimodal pain management to improve pain control.) (Patient not taking: Reported on 8/7/2024) 100 tablet 0     albuterol (PROAIR HFA/PROVENTIL HFA/VENTOLIN HFA) 108 (90 Base) MCG/ACT inhaler INHALE 1 PUFF INTO THE LUNGS EVERY 4 HOURS AS NEEDED FOR SHORTNESS OF BREATH, WHEEZING OR COUGH 18 g 11     amLODIPine (NORVASC) 10 MG tablet TAKE 1 TABLET (10 MG) BY MOUTH DAILY. 90 tablet 0     amoxicillin (AMOXIL) 500 MG capsule Take 4 caps (2000mg) 30-60 minutes before dental procedure (Patient not taking: Reported on 8/7/2024) 4 capsule 0     ARTIFICIAL TEAR OP Apply 1 drop to eye as needed       atorvastatin (LIPITOR) 20 MG tablet Take 20 mg by mouth at bedtime       blood glucose (ONETOUCH VERIO IQ) test strip USE TO TEST BLOOD SUGAR 3 TIMES DAILY OR AS DIRECTED. 300 strip 1     blood glucose monitoring (NO BRAND SPECIFIED) meter device kit Use to test blood sugar 1 times daily or as directed. 1 kit 0     ClonAZEPAM (KLONOPIN) 0.5 MG tablet Take 0.5 mg by mouth Take 1 tablet by mouth daily at bedtime       Continuous Glucose Sensor (FREESTYLE SARAHY 2 SENSOR) MISC USE 1 SENSOR EVERY 14 DAYS. USE TO READ BLOOD SUGARS PER 'S INSTRUCTIONS. 2 each 5     ferrous sulfate (FEROSUL) 325 (65 Fe) MG tablet TAKE 1 TABLET BY MOUTH EVERY DAY WITH BREAKFAST 90 tablet 1     fluticasone-salmeterol (ADVAIR) 250-50  MCG/ACT inhaler TAKE 1 PUFF BY MOUTH TWICE A DAY Strength: 250-50 MCG/ACT 3 each 3     fluticasone-salmeterol (WIXELA INHUB) 250-50 MCG/ACT inhaler Inhale 1 puff into the lungs every 12 hours 3 each 3     furosemide (LASIX) 20 MG tablet TAKE 2 TABLETS BY MOUTH IN THE MORNING AND 1 TABLET BY MOUTH AT LEAST 6 HOURS LATER IN THE AFTERNOON. 270 tablet 1     Lancets (ONETOUCH DELICA PLUS [id number]) MISC USE ONCE DAILY AS DIRECTED 100 each 1     latanoprost (XALATAN) 0.005 % ophthalmic solution Place 1 drop into both eyes at bedtime 7.5 mL 3     lisinopril (ZESTRIL) 40 MG tablet TAKE 1 TABLET BY MOUTH EVERY DAY 90 tablet 0     metFORMIN (GLUCOPHAGE XR) 500 MG 24 hr tablet TAKE 1 TABLET BY MOUTH TWICE A DAY WITH FOOD 180 tablet 3     OXcarbazepine (TRILEPTAL) 300 MG tablet TAKE 2 TABLETS IN THE MORNING AND 2 TABLETS IN THE EVENING 360 tablet 0     pantoprazole (PROTONIX) 20 MG EC tablet TAKE 1 TABLET BY MOUTH EVERY DAY 90 tablet 0     polyethylene glycol (MIRALAX) 17 GM/Dose powder Take 17 g by mouth daily (Patient not taking: Reported on 8/7/2024) 510 g 0     prednisoLONE acetate (PRED FORTE) 1 % ophthalmic suspension INSTILL 1 DROP LEFT EYE 4 TIMES A DAY (Patient not taking: Reported on 8/7/2024)       predniSONE (DELTASONE) 5 MG tablet TAKE 2 TABLETS BY MOUTH EVERY DAY 60 tablet 2     pyRIDostigmine (MESTINON) 60 MG tablet Take 2 tablets (120 mg) in the morning and afternoon and 1 tablet in the evening per 4/22/2024 appt. 450 tablet 0     pyRIDostigmine (MESTINON) 60 MG tablet Take 120 mg by mouth every morning (Patient not taking: Reported on 8/7/2024)       pyRIDostigmine (MESTINON) 60 MG tablet Take 60 mg by mouth 2 times daily Midday and 6 PM. (Patient not taking: Reported on 8/7/2024)       senna-docusate (SENOKOT-S/PERICOLACE) 8.6-50 MG tablet Take 1 tablet by mouth 2 times daily (Patient not taking: Reported on 8/7/2024) 60 tablet 0     sodium bicarbonate 650 MG tablet Take 1 tablet (650 mg) by mouth 2 times  daily 180 tablet 1     timolol maleate (TIMOPTIC) 0.5 % ophthalmic solution Place 1 drop into both eyes 2 times daily 5 mL 11     nirmatrelvir and ritonavir (PAXLOVID) 300 mg/100 mg therapy pack Take 3 tablets by mouth 2 times daily for 5 days 30 tablet 0       PAST SURGICAL HISTORY:  Past Surgical History:   Procedure Laterality Date     ARTHROPLASTY KNEE Left 4/1/2024    Procedure: ARTHROPLASTY, LEFT KNEE, TOTAL;  Surgeon: Dandre Hassan MD;  Location: [facility]     BIOPSY ARTERY TEMPORAL Bilateral 08/01/2017    Procedure: BIOPSY ARTERY TEMPORAL;  Bilateral temporal artery Biopsy;  Surgeon: Jj Tello MD;  Location: [facility] OR     CATARACT IOL, RT/LT Right      COLONOSCOPY       ESOPHAGOSCOPY, GASTROSCOPY, DUODENOSCOPY (EGD), COMBINED  01/15/2014    Procedure: COMBINED ESOPHAGOSCOPY, GASTROSCOPY, DUODENOSCOPY (EGD), BIOPSY SINGLE OR MULTIPLE;;  Surgeon: Winston Nixon MD;  Location: [facility] OR     LASER YAG CAPSULOTOMY Right 04/09/2018    right eye     PHACOEMULSIFICATION CLEAR CORNEA WITH STANDARD INTRAOCULAR LENS IMPLANT Right 02/20/2017    Procedure: PHACOEMULSIFICATION CLEAR CORNEA WITH STANDARD INTRAOCULAR LENS IMPLANT;  Surgeon: Mateo Gray MD;  Location: [facility]     PHACOEMULSIFICATION CLEAR CORNEA WITH STANDARD INTRAOCULAR LENS IMPLANT Left 01/10/2022    Procedure: LEFT PHACOEMULSIFICATION, CATARACT, WITH INTRAOCULAR LENS IMPLANT;  Surgeon: Mateo Gray MD;  Location: [facility] OR     NV TRABECULOPLASTY BY LASER SURGERY       RETINAL REATTACHMENT Right      SURGICAL HISTORY OF -   08/2009    Both Eyelids-.     TONSILLECTOMY  as child       ALLERGIES:  Allergies   Allergen Reactions     Azathioprine Shortness Of Breath and Other (See Comments)     Was hospitalized from it. Also had high fever, chills, and shortness of breath.     Sulfamethoxazole-Trimethoprim Cough, Itching and Rash     Generalized painful red rash on legs arms and abdomen, chest, back, cough and  fever  that  required hospitalization.     Ciprofloxacin Muscle Pain (Myalgia)     Muscle pain  Other reaction(s): Myalgia  Other reaction(s): Myalgia     Ropinirole      Leg pan  GI  Weakness; ? sycope  Other reaction(s): Leg Pain       FAMILY HISTORY:  Family History   Problem Relation Age of Onset     Respiratory Mother         copd     LUNG DISEASE Mother      Cerebrovascular Disease Father      Cancer Father      Other Cancer Father      Allergies Brother      Cancer Maternal Grandmother      Heart Disease Paternal Grandmother      Glaucoma Maternal Aunt      Macular Degeneration No family hx of      Anesthesia Reaction No family hx of      Deep Vein Thrombosis (DVT) No family hx of        SOCIAL HISTORY:  Social History     Tobacco Use     Smoking status: Former     Current packs/day: 0.00     Average packs/day: 2.0 packs/day for 15.0 years (30.0 ttl pk-yrs)     Types: Cigarettes     Start date: 1968     Quit date: 1983     Years since quittin.6     Passive exposure: Past     Smokeless tobacco: Former     Quit date: 1970     Tobacco comments:     smoke free household.   Vaping Use     Vaping status: Never Used   Substance Use Topics     Alcohol use: Not Currently     Comment: Quit in      Drug use: No       ROS:   Constitutional: No fever, chills, or sweats.  Cardiovascular: As per HPI.       Exam:  /70 (BP Location: Right arm, Patient Position: Sitting, Cuff Size: Adult Regular)   Pulse 67   Wt 102.6 kg (226 lb 3.2 oz)   SpO2 95%   BMI 32.75 kg/m    GENERAL: alert and no distress  HEENT: no icterus, no central cyanosis  LYMPH/NECK: no adenopathy, no asymmetry  RESPIRATORY: lungs clear to auscultation - no rales, rhonchi or wheezes, no use of accessory muscles, respirations are unlabored, normal respiratory rate  CARDIOVASCULAR: RRR, +S1S2, no murmur, rub, or gallop.   GI: soft, non tender  EXTREMITIES: peripheral pulses normal, no peripheral edema  NEURO: alert, normal speech and  "affect      Labs:  Lab Results   Component Value Date    WBC 14.4 (H) 02/22/2023    WBC 12.2 (H) 10/26/2020    RBC 4.89 02/22/2023    RBC 4.23 (L) 10/26/2020    HGB 12.3 (L) 04/02/2024    HGB 12.6 (L) 10/26/2020    HCT 44.3 02/22/2023    HCT 38.5 (L) 10/26/2020    MCV 91 02/22/2023    MCV 91 10/26/2020    MCH 31.1 02/22/2023    MCH 29.8 10/26/2020    MCHC 34.3 02/22/2023    MCHC 32.7 10/26/2020    RDW 14.5 02/22/2023    RDW 13.4 10/26/2020     02/22/2023     (H) 10/26/2020       Lab Results   Component Value Date     07/03/2024     06/29/2021    POTASSIUM 4.0 07/03/2024    POTASSIUM 4.1 10/11/2022    POTASSIUM 4.6 06/09/2021    CHLORIDE 100 07/03/2024    CHLORIDE 99 10/11/2022    CHLORIDE 106 05/04/2021    CO2 32 (H) 07/03/2024    CO2 33 (H) 10/11/2022    CO2 29 05/04/2021    ANIONGAP 8 07/03/2024    ANIONGAP 2 (L) 10/11/2022    ANIONGAP 3 05/04/2021     (H) 07/03/2024     (H) 04/02/2024     (H) 10/11/2022     (H) 05/04/2021    BUN 13.1 07/03/2024    BUN 19 10/11/2022    BUN 17 05/04/2021    CR 0.98 07/03/2024    CR 1.02 05/04/2021    GFRESTIMATED 80 07/03/2024    GFRESTIMATED 72 05/04/2021    GFRESTBLACK 84 05/04/2021    ROSY 9.8 07/03/2024    ROSY 8.9 05/04/2021        No results found for: \"INR\"    Lab Results   Component Value Date    NTBNP 21 07/01/2022    NTBNP 894 (H) 10/26/2020       Lab Results   Component Value Date    CHOL 143 07/05/2023    CHOL 157 06/09/2021     Lab Results   Component Value Date    HDL 48 07/05/2023    HDL 51 06/09/2021     Lab Results   Component Value Date    LDL 72 07/05/2023    LDL 69 06/09/2021     Lab Results   Component Value Date    TRIG 117 07/05/2023    TRIG 187 06/09/2021     Lab Results   Component Value Date    CHOLHDLRATIO 3.4 09/30/2015       Diagnostics    NM Lexiscan 7/26/24     The nuclear stress test is negative for inducible myocardial ischemia or infarction.     Left ventricular function is normal.     There is no " prior study for comparison.     ECG Summary    ECG Baseline electrocardiogram demonstrates sinus rhythm.   The stress electrocardiogram is negative for inducible ischemic EKG changes.  There were no arrhythmias during stress.   Technical Comments    Cardiac Protocol A pharmacologic stress test was performed following a supine Lexiscan protocol using 0.4 mg of intravenous regadenoson administered over 10 seconds under the supervision of Dr. Hsu. The patient reported dyspnea and flushing during the stress test.   Isotope Administration Nuclear imaging was accomplished using a one day protocol with 38.5 mCi of technetium tetrofosmin injected at the completion of Lexiscan infusion on 7/26/2024 and 11.2 mCi of technetium tetrofosmin at rest on 7/26/2024.   Stress Measurements    Baseline Vitals   Baseline HR 63 bpm         Baseline Systolic          Baseline Diastolic BP 65         Peak Stress Vitals   Max HR 96         Last Stress Systolic          Last Stress Diastolic BP 56         Exercise Data   Target          Max Predicted HR 67 %            Nuclear Perfusion    Stress Summed Score: 0 Percent Abnormal: 0.00%        The left ventricular perfusion is normal.         Resting Summed Score: 0 Percent Abnormal: 0.00%        The left ventricular perfusion is normal.               Wall Score    Wall Motion    Score Index: 1.00         The left ventricular wall motion is normal.                 1. No acute abnormality on the CT images of the lower chest and upper abdomen.       Assessment and Plan:   Mr. Nolasco is a 79 year old male with a PMH of COPD, T2DM, HTN, HLD.    # Chest Pain, Non-cardiac  Patient reports occasional pain on his left chest that is positional and only occurs when he is laying down a specific way. Previous reports of right arm/hand pain and chest pain with subsequent Lexiscan, which revealed no acute findings on CT images, no evidence of ischemia or infarct, normal LV function,  and EKG in sinus rhythm.    # HTN  On amlodipine, lisinopril, lasix.    # HLD  On atorvastatin.    # T2DM  On metformin.    # COPD  Reports occasional SOB, but symptoms have been otherwise stable for many years. Has albuterol.    Follows with his primary care team for all chronic conditions.    Follow up in cardiology clinic as needed.      JACK Tejada, ROBERTP-C  [facility]iology  Securely message with [employer]   Text page via [phone] Paging/Directory          Chart review time: 10 minutes  Visit time: 12 minutes  Chart completion/documentation: 5 minutes  Total time spent: 27 minutes       Please do not hesitate to contact me if you have any questions/concerns.     Sincerely,     JACK Tejada CNP

## 2024-08-06 NOTE — NURSING NOTE
"Chief Complaint   Patient presents with    Follow Up     Reason for visit: Return General Cardiology for S/P Testing       Initial /70 (BP Location: Right arm, Patient Position: Sitting, Cuff Size: Adult Regular)   Pulse 67   Wt 102.6 kg (226 lb 3.2 oz)   SpO2 95%   BMI 32.75 kg/m   Estimated body mass index is 32.75 kg/m  as calculated from the following:    Height as of 7/24/24: 1.77 m (5' 9.69\").    Weight as of this encounter: 102.6 kg (226 lb 3.2 oz)..  BP completed using cuff size: regular    CANELO Chacon    "

## 2024-08-06 NOTE — PROGRESS NOTES
"         [facility] - [place]      HPI: Mr. Rakan Nolasco is a 79 year old male presenting to cardiology clinic today for follow-up s/p stress test. PMH significant for COPD, T2DM, HTN, HLD.    Prior clinic visit on 7/22/24 with Dr. Leiva for follow-up of ER visit. Patient had an episode of right arm pain, that was radiated into chest and hand. Noted to have a RBBB on EKG and was discharged. Was referred for Lexiscan, which was completed on 7/26/24. Testing revealed no acute findings on CT images, no evidence of ischemia or infarct, normal LV function, and EKG in sinus rhythm.    Today in clinic, he denies any current chest pain, palpitations, dizziness, lower extremity edema, shortness of breath, BORGES, or PND. He does report that he feels a left-sided \"spasm\" in his chest muscle that only occurs when he lays a certain way. He also endorses short of breath on occasion, but states he has a long history of COPD that he feels is currently very stable.    Current Cardiac Medications:  Amlodipine 10 mg daily  Atorvastatin 20 mg daily  Lasix 40 mg in AM, 20 mg in PM  Lisinopril 40 mg daily      PAST MEDICAL HISTORY:  Past Medical History:   Diagnosis Date    Arthritis         BMI 31.0-31.9,adult     Cancer (H) 01/10/2018    Prostate    COPD (chronic obstructive pulmonary disease) (H) 10/28/2020    Demand ischemia (H)     Diabetes (H) 01/10/2018    Diverticulosis     Colonoscopy 8/2008    Fatty liver     see US  5/2012    GERD (gastroesophageal reflux disease)     Glaucoma (increased eye pressure)     HTN (hypertension)     Hyperlipidemia LDL goal < 130     age, htn, fhx    Irritable bowel     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     Nonsenile cataract     Obstructive sleep apnea     Obstructive sleep apnea syndrome     ROSIE (obstructive sleep apnea) 01/2011    Using CPAP;     Prediabetes     Restless leg syndrome     Retinal detachment     Sleep apnea     Trigeminal " neuralgia pain 01/04/2012       CURRENT MEDICATIONS:  Current Outpatient Medications   Medication Sig Dispense Refill    acetaminophen (TYLENOL) 325 MG tablet Take 2 tablets (650 mg) by mouth every 4 hours as needed for other (For optimal non-opioid multimodal pain management to improve pain control.) (Patient not taking: Reported on 8/7/2024) 100 tablet 0    albuterol (PROAIR HFA/PROVENTIL HFA/VENTOLIN HFA) 108 (90 Base) MCG/ACT inhaler INHALE 1 PUFF INTO THE LUNGS EVERY 4 HOURS AS NEEDED FOR SHORTNESS OF BREATH, WHEEZING OR COUGH 18 g 11    amLODIPine (NORVASC) 10 MG tablet TAKE 1 TABLET (10 MG) BY MOUTH DAILY. 90 tablet 0    amoxicillin (AMOXIL) 500 MG capsule Take 4 caps (2000mg) 30-60 minutes before dental procedure (Patient not taking: Reported on 8/7/2024) 4 capsule 0    ARTIFICIAL TEAR OP Apply 1 drop to eye as needed      atorvastatin (LIPITOR) 20 MG tablet Take 20 mg by mouth at bedtime      blood glucose (ONETOUCH VERIO IQ) test strip USE TO TEST BLOOD SUGAR 3 TIMES DAILY OR AS DIRECTED. 300 strip 1    blood glucose monitoring (NO BRAND SPECIFIED) meter device kit Use to test blood sugar 1 times daily or as directed. 1 kit 0    ClonAZEPAM (KLONOPIN) 0.5 MG tablet Take 0.5 mg by mouth Take 1 tablet by mouth daily at bedtime      Continuous Glucose Sensor (FREESTYLE SARAHY 2 SENSOR) MISC USE 1 SENSOR EVERY 14 DAYS. USE TO READ BLOOD SUGARS PER 'S INSTRUCTIONS. 2 each 5    ferrous sulfate (FEROSUL) 325 (65 Fe) MG tablet TAKE 1 TABLET BY MOUTH EVERY DAY WITH BREAKFAST 90 tablet 1    fluticasone-salmeterol (ADVAIR) 250-50 MCG/ACT inhaler TAKE 1 PUFF BY MOUTH TWICE A DAY Strength: 250-50 MCG/ACT 3 each 3    fluticasone-salmeterol (WIXELA INHUB) 250-50 MCG/ACT inhaler Inhale 1 puff into the lungs every 12 hours 3 each 3    furosemide (LASIX) 20 MG tablet TAKE 2 TABLETS BY MOUTH IN THE MORNING AND 1 TABLET BY MOUTH AT LEAST 6 HOURS LATER IN THE AFTERNOON. 270 tablet 1    Lancets (ONETOUCH DELICA PLUS  [id number]) MISC USE ONCE DAILY AS DIRECTED 100 each 1    latanoprost (XALATAN) 0.005 % ophthalmic solution Place 1 drop into both eyes at bedtime 7.5 mL 3    lisinopril (ZESTRIL) 40 MG tablet TAKE 1 TABLET BY MOUTH EVERY DAY 90 tablet 0    metFORMIN (GLUCOPHAGE XR) 500 MG 24 hr tablet TAKE 1 TABLET BY MOUTH TWICE A DAY WITH FOOD 180 tablet 3    OXcarbazepine (TRILEPTAL) 300 MG tablet TAKE 2 TABLETS IN THE MORNING AND 2 TABLETS IN THE EVENING 360 tablet 0    pantoprazole (PROTONIX) 20 MG EC tablet TAKE 1 TABLET BY MOUTH EVERY DAY 90 tablet 0    polyethylene glycol (MIRALAX) 17 GM/Dose powder Take 17 g by mouth daily (Patient not taking: Reported on 8/7/2024) 510 g 0    prednisoLONE acetate (PRED FORTE) 1 % ophthalmic suspension INSTILL 1 DROP LEFT EYE 4 TIMES A DAY (Patient not taking: Reported on 8/7/2024)      predniSONE (DELTASONE) 5 MG tablet TAKE 2 TABLETS BY MOUTH EVERY DAY 60 tablet 2    pyRIDostigmine (MESTINON) 60 MG tablet Take 2 tablets (120 mg) in the morning and afternoon and 1 tablet in the evening per 4/22/2024 appt. 450 tablet 0    pyRIDostigmine (MESTINON) 60 MG tablet Take 120 mg by mouth every morning (Patient not taking: Reported on 8/7/2024)      pyRIDostigmine (MESTINON) 60 MG tablet Take 60 mg by mouth 2 times daily Midday and 6 PM. (Patient not taking: Reported on 8/7/2024)      senna-docusate (SENOKOT-S/PERICOLACE) 8.6-50 MG tablet Take 1 tablet by mouth 2 times daily (Patient not taking: Reported on 8/7/2024) 60 tablet 0    sodium bicarbonate 650 MG tablet Take 1 tablet (650 mg) by mouth 2 times daily 180 tablet 1    timolol maleate (TIMOPTIC) 0.5 % ophthalmic solution Place 1 drop into both eyes 2 times daily 5 mL 11    nirmatrelvir and ritonavir (PAXLOVID) 300 mg/100 mg therapy pack Take 3 tablets by mouth 2 times daily for 5 days 30 tablet 0       PAST SURGICAL HISTORY:  Past Surgical History:   Procedure Laterality Date    ARTHROPLASTY KNEE Left 4/1/2024    Procedure: ARTHROPLASTY, LEFT  KNEE, TOTAL;  Surgeon: Dandre Hassan MD;  Location: [facility]    BIOPSY ARTERY TEMPORAL Bilateral 08/01/2017    Procedure: BIOPSY ARTERY TEMPORAL;  Bilateral temporal artery Biopsy;  Surgeon: Jj Tello MD;  Location: [facility]    CATARACT IOL, RT/LT Right     COLONOSCOPY      ESOPHAGOSCOPY, GASTROSCOPY, DUODENOSCOPY (EGD), COMBINED  01/15/2014    Procedure: COMBINED ESOPHAGOSCOPY, GASTROSCOPY, DUODENOSCOPY (EGD), BIOPSY SINGLE OR MULTIPLE;;  Surgeon: Winston Nixon MD;  Location: [facility]    LASER YAG CAPSULOTOMY Right 04/09/2018    right eye    PHACOEMULSIFICATION CLEAR CORNEA WITH STANDARD INTRAOCULAR LENS IMPLANT Right 02/20/2017    Procedure: PHACOEMULSIFICATION CLEAR CORNEA WITH STANDARD INTRAOCULAR LENS IMPLANT;  Surgeon: Mateo Gray MD;  Location: [facility]    PHACOEMULSIFICATION CLEAR CORNEA WITH STANDARD INTRAOCULAR LENS IMPLANT Left 01/10/2022    Procedure: LEFT PHACOEMULSIFICATION, CATARACT, WITH INTRAOCULAR LENS IMPLANT;  Surgeon: Mateo Gray MD;  Location: [facility]    NC TRABECULOPLASTY BY LASER SURGERY      RETINAL REATTACHMENT Right     SURGICAL HISTORY OF -   08/2009    Both Eyelids-.    TONSILLECTOMY  as child       ALLERGIES:  Allergies   Allergen Reactions    Azathioprine Shortness Of Breath and Other (See Comments)     Was hospitalized from it. Also had high fever, chills, and shortness of breath.    Sulfamethoxazole-Trimethoprim Cough, Itching and Rash     Generalized painful red rash on legs arms and abdomen, chest, back, cough and  fever  that required hospitalization.    Ciprofloxacin Muscle Pain (Myalgia)     Muscle pain  Other reaction(s): Myalgia  Other reaction(s): Myalgia    Ropinirole      Leg pan  GI  Weakness; ? sycope  Other reaction(s): Leg Pain       FAMILY HISTORY:  Family History   Problem Relation Age of Onset    Respiratory Mother         copd    LUNG DISEASE Mother     Cerebrovascular Disease Father     Cancer Father     Other Cancer Father      Allergies Brother     Cancer Maternal Grandmother     Heart Disease Paternal Grandmother     Glaucoma Maternal Aunt     Macular Degeneration No family hx of     Anesthesia Reaction No family hx of     Deep Vein Thrombosis (DVT) No family hx of        SOCIAL HISTORY:  Social History     Tobacco Use    Smoking status: Former     Current packs/day: 0.00     Average packs/day: 2.0 packs/day for 15.0 years (30.0 ttl pk-yrs)     Types: Cigarettes     Start date: 1968     Quit date: 1983     Years since quittin.6     Passive exposure: Past    Smokeless tobacco: Former     Quit date: 1970    Tobacco comments:     smoke free household.   Vaping Use    Vaping status: Never Used   Substance Use Topics    Alcohol use: Not Currently     Comment: Quit in     Drug use: No       ROS:   Constitutional: No fever, chills, or sweats.  Cardiovascular: As per HPI.       Exam:  /70 (BP Location: Right arm, Patient Position: Sitting, Cuff Size: Adult Regular)   Pulse 67   Wt 102.6 kg (226 lb 3.2 oz)   SpO2 95%   BMI 32.75 kg/m    GENERAL: alert and no distress  HEENT: no icterus, no central cyanosis  LYMPH/NECK: no adenopathy, no asymmetry  RESPIRATORY: lungs clear to auscultation - no rales, rhonchi or wheezes, no use of accessory muscles, respirations are unlabored, normal respiratory rate  CARDIOVASCULAR: RRR, +S1S2, no murmur, rub, or gallop.   GI: soft, non tender  EXTREMITIES: peripheral pulses normal, no peripheral edema  NEURO: alert, normal speech and affect      Labs:  Lab Results   Component Value Date    WBC 14.4 (H) 2023    WBC 12.2 (H) 10/26/2020    RBC 4.89 2023    RBC 4.23 (L) 10/26/2020    HGB 12.3 (L) 2024    HGB 12.6 (L) 10/26/2020    HCT 44.3 2023    HCT 38.5 (L) 10/26/2020    MCV 91 2023    MCV 91 10/26/2020    MCH 31.1 2023    MCH 29.8 10/26/2020    MCHC 34.3 2023    MCHC 32.7 10/26/2020    RDW 14.5 2023    RDW 13.4 10/26/2020      "02/22/2023     (H) 10/26/2020       Lab Results   Component Value Date     07/03/2024     06/29/2021    POTASSIUM 4.0 07/03/2024    POTASSIUM 4.1 10/11/2022    POTASSIUM 4.6 06/09/2021    CHLORIDE 100 07/03/2024    CHLORIDE 99 10/11/2022    CHLORIDE 106 05/04/2021    CO2 32 (H) 07/03/2024    CO2 33 (H) 10/11/2022    CO2 29 05/04/2021    ANIONGAP 8 07/03/2024    ANIONGAP 2 (L) 10/11/2022    ANIONGAP 3 05/04/2021     (H) 07/03/2024     (H) 04/02/2024     (H) 10/11/2022     (H) 05/04/2021    BUN 13.1 07/03/2024    BUN 19 10/11/2022    BUN 17 05/04/2021    CR 0.98 07/03/2024    CR 1.02 05/04/2021    GFRESTIMATED 80 07/03/2024    GFRESTIMATED 72 05/04/2021    GFRESTBLACK 84 05/04/2021    ROSY 9.8 07/03/2024    ROSY 8.9 05/04/2021        No results found for: \"INR\"    Lab Results   Component Value Date    NTBNP 21 07/01/2022    NTBNP 894 (H) 10/26/2020       Lab Results   Component Value Date    CHOL 143 07/05/2023    CHOL 157 06/09/2021     Lab Results   Component Value Date    HDL 48 07/05/2023    HDL 51 06/09/2021     Lab Results   Component Value Date    LDL 72 07/05/2023    LDL 69 06/09/2021     Lab Results   Component Value Date    TRIG 117 07/05/2023    TRIG 187 06/09/2021     Lab Results   Component Value Date    CHOLHDLRATIO 3.4 09/30/2015       Diagnostics    NM Lexiscan 7/26/24    The nuclear stress test is negative for inducible myocardial ischemia or infarction.    Left ventricular function is normal.    There is no prior study for comparison.     ECG Summary    ECG Baseline electrocardiogram demonstrates sinus rhythm.   The stress electrocardiogram is negative for inducible ischemic EKG changes.  There were no arrhythmias during stress.   Technical Comments    Cardiac Protocol A pharmacologic stress test was performed following a supine Lexiscan protocol using 0.4 mg of intravenous regadenoson administered over 10 seconds under the supervision of Dr. Hsu. The " patient reported dyspnea and flushing during the stress test.   Isotope Administration Nuclear imaging was accomplished using a one day protocol with 38.5 mCi of technetium tetrofosmin injected at the completion of Lexiscan infusion on 7/26/2024 and 11.2 mCi of technetium tetrofosmin at rest on 7/26/2024.   Stress Measurements    Baseline Vitals   Baseline HR 63 bpm         Baseline Systolic          Baseline Diastolic BP 65         Peak Stress Vitals   Max HR 96         Last Stress Systolic          Last Stress Diastolic BP 56         Exercise Data   Target          Max Predicted HR 67 %            Nuclear Perfusion    Stress Summed Score: 0 Percent Abnormal: 0.00%        The left ventricular perfusion is normal.         Resting Summed Score: 0 Percent Abnormal: 0.00%        The left ventricular perfusion is normal.               Wall Score    Wall Motion    Score Index: 1.00         The left ventricular wall motion is normal.                 1. No acute abnormality on the CT images of the lower chest and upper abdomen.       Assessment and Plan:   Mr. Nolasco is a 79 year old male with a PMH of COPD, T2DM, HTN, HLD.    # Chest Pain, Non-cardiac  Patient reports occasional pain on his left chest that is positional and only occurs when he is laying down a specific way. Previous reports of right arm/hand pain and chest pain with subsequent Lexiscan, which revealed no acute findings on CT images, no evidence of ischemia or infarct, normal LV function, and EKG in sinus rhythm.    # HTN  On amlodipine, lisinopril, lasix.    # HLD  On atorvastatin.    # T2DM  On metformin.    # COPD  Reports occasional SOB, but symptoms have been otherwise stable for many years. Has albuterol.    Follows with his primary care team for all chronic conditions.    Follow up in cardiology clinic as needed.      JACK Tejada, FNP-C  [facility] General Cardiology  Securely message with [employer]   Text page via [employer]  Paging/Directory          Chart review time: 10 minutes  Visit time: 12 minutes  Chart completion/documentation: 5 minutes  Total time spent: 27 minutes

## 2024-08-06 NOTE — PATIENT INSTRUCTIONS
Thank you for coming to the [facility] at [place]; please note the following instructions:    1. Follow up as needed      If you have any questions regarding your visit, please contact your care team:     CARDIOLOGY  TELEPHONE NUMBER   Rosalie HEARDSnow, Registered Nurse  Preeti HASTINGS, Registered Nurse  Sherry SAWANT, Registered Medical Assistant  Daija PACHECO, Certified Medical Assistant  Silvia CORDOVA, Clinic Assistant [phone] (select option 1)    *After hours: [phone]   For Scheduling Appts:     [phone] (select option 1)    *After hours: [phone]   For the Device Clinic (Pacemakers and ICD's)  Kaci SHIPMAN, Registered Nurse   During business hours: [phone]    *After business hours:  [phone] (select option 4)      Normal test result notifications will be released via [email or web address] or mailed within 7 business days.  All other test results, will be communicated via telephone once reviewed by your cardiologist.    If you need a medication refill, please contact your pharmacy.  Please allow 3 business days for your refill to be completed.    As always, thank you for trusting us with your health care needs!

## 2024-08-07 ENCOUNTER — TELEPHONE (OUTPATIENT)
Dept: FAMILY MEDICINE | Facility: CLINIC | Age: 79
End: 2024-08-07

## 2024-08-07 ENCOUNTER — VIRTUAL VISIT (OUTPATIENT)
Dept: FAMILY MEDICINE | Facility: CLINIC | Age: 79
End: 2024-08-07
Payer: COMMERCIAL

## 2024-08-07 DIAGNOSIS — E78.5 HYPERLIPIDEMIA, UNSPECIFIED HYPERLIPIDEMIA TYPE: ICD-10-CM

## 2024-08-07 DIAGNOSIS — J44.9 CHRONIC OBSTRUCTIVE PULMONARY DISEASE, UNSPECIFIED COPD TYPE (H): ICD-10-CM

## 2024-08-07 DIAGNOSIS — I10 HYPERTENSION, UNSPECIFIED TYPE: ICD-10-CM

## 2024-08-07 DIAGNOSIS — G25.81 RESTLESS LEGS SYNDROME: ICD-10-CM

## 2024-08-07 DIAGNOSIS — U07.1 INFECTION DUE TO 2019 NOVEL CORONAVIRUS: Primary | ICD-10-CM

## 2024-08-07 PROCEDURE — 99442 PR PHYSICIAN TELEPHONE EVALUATION 11-20 MIN: CPT | Mod: 93 | Performed by: FAMILY MEDICINE

## 2024-08-07 NOTE — TELEPHONE ENCOUNTER
COVID Positive/Requesting COVID treatment    Patient is positive for COVID and requesting treatment options.    Date of positive COVID test (PCR or at home)? Today August 7, 24 at home test  Current COVID symptoms: cough, shortness of breath or difficulty breathing, fatigue, muscle or body aches, headache, new loss of taste or smell, sore throat, congestion or runny nose, and nausea or vomiting  Date COVID symptoms began: August 6,24    Message should be routed to clinic RN pool. Best phone number to use for call back: [phone]

## 2024-08-07 NOTE — TELEPHONE ENCOUNTER
RN COVID TREATMENT VISIT  08/07/24      The patient has been triaged and does not require a higher level of care.    Rakan Nolasco  79 year old  Current weight? 226 lbs    Has the patient been seen by a primary care provider at an [facility] or [email or web address] Clinic within the past two years? Yes.   Have you been in close proximity to/do you have a known exposure to a person with a confirmed case of influenza? No.     General treatment eligibility:  Date of positive COVID test (PCR or at home)?  8/7    Are you or have you been hospitalized for this COVID-19 infection? No.   Have you received monoclonal antibodies or antiviral treatment for COVID-19 since this positive test? No.   Do you have any of the following conditions that place you at risk of being very sick from COVID-19?   - Age 50 years or older  - Chronic lung diseases such as asthma, bronchiectasis, COPD, interstitial lung disease, pulmonary embolism, pulmonary hypertension   - Diabetes mellitus, type 1 and type 2  - Overweight or Obesity (BMI >85th percentile or BMI 25 or higher)  Yes, patient has at least one high risk condition as noted above.     Current COVID symptoms:   - cough  - shortness of breath or difficulty breathing  - muscle or body aches  - headache  - new loss of taste or smell  - sore throat  - congestion or runny nose  - nausea or vomiting  Yes. Patient has at least one symptom as selected.     How many days since symptoms started? 5 days or less. Established patient, 12 years or older weighing at least 88.2 lbs, who has symptoms that started in the past 5 days, has not been hospitalized nor received treatment already, and is at risk for being very sick from COVID-19.     Treatment eligibility by RN:  Are you currently pregnant or nursing? No  Do you have a clinically significant hypersensitivity to nirmatrelvir or ritonavir, or toxic epidermal necrolysis (TEN) or Etienne-Siddharth Syndrome? No  Do you have a history of  hepatitis, any hepatic impairment on the Problem List (such as Child-Emanuel Class C, cirrhosis, fatty liver disease, alcoholic liver disease), or was the last liver lab (hepatic panel, ALT, AST, ALK Phos, bilirubin) elevated in the past 6 months? No  Do you have any history of severe renal impairment (eGFR < 30mL/min)? No    Is patient eligible to continue? Yes, patient meets all eligibility requirements for the RN COVID treatment (as denoted by all no responses above).     Current Outpatient Medications   Medication Sig Dispense Refill    acetaminophen (TYLENOL) 325 MG tablet Take 2 tablets (650 mg) by mouth every 4 hours as needed for other (For optimal non-opioid multimodal pain management to improve pain control.) 100 tablet 0    albuterol (PROAIR HFA/PROVENTIL HFA/VENTOLIN HFA) 108 (90 Base) MCG/ACT inhaler INHALE 1 PUFF INTO THE LUNGS EVERY 4 HOURS AS NEEDED FOR SHORTNESS OF BREATH, WHEEZING OR COUGH 18 g 11    amLODIPine (NORVASC) 10 MG tablet TAKE 1 TABLET (10 MG) BY MOUTH DAILY. 90 tablet 0    amoxicillin (AMOXIL) 500 MG capsule Take 4 caps (2000mg) 30-60 minutes before dental procedure 4 capsule 0    ARTIFICIAL TEAR OP Apply 1 drop to eye as needed      atorvastatin (LIPITOR) 20 MG tablet Take 20 mg by mouth at bedtime      blood glucose (ONETOUCH VERIO IQ) test strip USE TO TEST BLOOD SUGAR 3 TIMES DAILY OR AS DIRECTED. 300 strip 1    blood glucose monitoring (NO BRAND SPECIFIED) meter device kit Use to test blood sugar 1 times daily or as directed. 1 kit 0    ClonAZEPAM (KLONOPIN) 0.5 MG tablet Take 0.5 mg by mouth Take 1 tablet by mouth daily at bedtime      Continuous Glucose Sensor (FREESTYLE SARAHY 2 SENSOR) MISC USE 1 SENSOR EVERY 14 DAYS. USE TO READ BLOOD SUGARS PER 'S INSTRUCTIONS. 2 each 5    ferrous sulfate (FEROSUL) 325 (65 Fe) MG tablet TAKE 1 TABLET BY MOUTH EVERY DAY WITH BREAKFAST 90 tablet 1    fluticasone-salmeterol (ADVAIR) 250-50 MCG/ACT inhaler TAKE 1 PUFF BY MOUTH TWICE A  DAY Strength: 250-50 MCG/ACT 3 each 3    fluticasone-salmeterol (WIXELA INHUB) 250-50 MCG/ACT inhaler Inhale 1 puff into the lungs every 12 hours 3 each 3    furosemide (LASIX) 20 MG tablet TAKE 2 TABLETS BY MOUTH IN THE MORNING AND 1 TABLET BY MOUTH AT LEAST 6 HOURS LATER IN THE AFTERNOON. 270 tablet 1    Lancets (ONETOUCH DELICA PLUS [id number]) MISC USE ONCE DAILY AS DIRECTED 100 each 1    latanoprost (XALATAN) 0.005 % ophthalmic solution Place 1 drop into both eyes at bedtime 7.5 mL 3    lisinopril (ZESTRIL) 40 MG tablet TAKE 1 TABLET BY MOUTH EVERY DAY 90 tablet 0    metFORMIN (GLUCOPHAGE XR) 500 MG 24 hr tablet TAKE 1 TABLET BY MOUTH TWICE A DAY WITH FOOD 180 tablet 3    OXcarbazepine (TRILEPTAL) 300 MG tablet TAKE 2 TABLETS IN THE MORNING AND 2 TABLETS IN THE EVENING 360 tablet 0    pantoprazole (PROTONIX) 20 MG EC tablet TAKE 1 TABLET BY MOUTH EVERY DAY 90 tablet 0    polyethylene glycol (MIRALAX) 17 GM/Dose powder Take 17 g by mouth daily 510 g 0    prednisoLONE acetate (PRED FORTE) 1 % ophthalmic suspension INSTILL 1 DROP LEFT EYE 4 TIMES A DAY      predniSONE (DELTASONE) 5 MG tablet TAKE 2 TABLETS BY MOUTH EVERY DAY 60 tablet 2    pyRIDostigmine (MESTINON) 60 MG tablet Take 2 tablets (120 mg) in the morning and afternoon and 1 tablet in the evening per 4/22/2024 appt. 450 tablet 0    pyRIDostigmine (MESTINON) 60 MG tablet Take 120 mg by mouth every morning      pyRIDostigmine (MESTINON) 60 MG tablet Take 60 mg by mouth 2 times daily Midday and 6 PM.      senna-docusate (SENOKOT-S/PERICOLACE) 8.6-50 MG tablet Take 1 tablet by mouth 2 times daily 60 tablet 0    sodium bicarbonate 650 MG tablet Take 1 tablet (650 mg) by mouth 2 times daily 180 tablet 1    timolol maleate (TIMOPTIC) 0.5 % ophthalmic solution Place 1 drop into both eyes 2 times daily 5 mL 11       Medications from List 1 of the standing order (on medications that exclude the use of Paxlovid) that patient is taking: NONE. Is patient taking St.  Salazar's Wort? No  Is patient taking Kasson's Wort or any meds from List 1? No.   Medications from List 2 of the standing order (on meds that provider needs to adjust) that patient is taking: amlodipine (Norvasc), explained a provider visit is necessary to discuss medication adjustments while taking Paxlovid. Is patient on any of the meds from List 2? Yes. Patient will be scheduled or transferred to a  at the end of this call.   Esperanza Snyder RN

## 2024-08-07 NOTE — PROGRESS NOTES
No chief complaint on file.     PCP: Telly Lucio     Referring Provider: No ref. provider found    There were no vitals taken for this visit.    ENT Problem List:  Patient Active Problem List   Diagnosis    GERD (gastroesophageal reflux disease)    HLD (hyperlipidemia)    Borderline glaucoma with ocular hypertension    Trigeminal neuralgia pain    HTN (hypertension)    Arthritis    Dry eyes    PVD (posterior vitreous detachment) OU    H/O RD (retinal detachment) s/p repair with PPV (AB)    Epiretinal membrane, bilateral    Pseudophakia of right eye    Myasthenia gravis (H)    Morbid obesity (H)    Right posterior capsular opacification    Obstructive sleep apnea syndrome    Prostate cancer (H)    Restless legs syndrome    Temporal arteritis (H)    Type 2 diabetes mellitus with other specified complication, without long-term current use of insulin (H)    Hyponatremia    COPD (chronic obstructive pulmonary disease) (H)    Combined form of age-related cataract, left eye    Anemia    Osteoarthritis of left knee, unspecified osteoarthritis type    Chronic pain of left knee    Lumbar spine pain      Current Medications:  Current Outpatient Medications   Medication Sig Dispense Refill    acetaminophen (TYLENOL) 325 MG tablet Take 2 tablets (650 mg) by mouth every 4 hours as needed for other (For optimal non-opioid multimodal pain management to improve pain control.) (Patient not taking: Reported on 8/7/2024) 100 tablet 0    albuterol (PROAIR HFA/PROVENTIL HFA/VENTOLIN HFA) 108 (90 Base) MCG/ACT inhaler INHALE 1 PUFF INTO THE LUNGS EVERY 4 HOURS AS NEEDED FOR SHORTNESS OF BREATH, WHEEZING OR COUGH 18 g 11    amLODIPine (NORVASC) 10 MG tablet TAKE 1 TABLET (10 MG) BY MOUTH DAILY. 90 tablet 0    amoxicillin (AMOXIL) 500 MG capsule Take 4 caps (2000mg) 30-60 minutes before dental procedure (Patient not taking: Reported on 8/7/2024) 4 capsule 0    ARTIFICIAL TEAR OP Apply 1 drop to eye as needed      atorvastatin  (LIPITOR) 20 MG tablet Take 20 mg by mouth at bedtime      blood glucose (ONETOUCH VERIO IQ) test strip USE TO TEST BLOOD SUGAR 3 TIMES DAILY OR AS DIRECTED. 300 strip 1    blood glucose monitoring (NO BRAND SPECIFIED) meter device kit Use to test blood sugar 1 times daily or as directed. 1 kit 0    ClonAZEPAM (KLONOPIN) 0.5 MG tablet Take 0.5 mg by mouth Take 1 tablet by mouth daily at bedtime      Continuous Glucose Sensor (FREESTYLE SARAHY 2 SENSOR) MISC USE 1 SENSOR EVERY 14 DAYS. USE TO READ BLOOD SUGARS PER 'S INSTRUCTIONS. 2 each 5    ferrous sulfate (FEROSUL) 325 (65 Fe) MG tablet TAKE 1 TABLET BY MOUTH EVERY DAY WITH BREAKFAST 90 tablet 1    fluticasone-salmeterol (ADVAIR) 250-50 MCG/ACT inhaler TAKE 1 PUFF BY MOUTH TWICE A DAY Strength: 250-50 MCG/ACT 3 each 3    fluticasone-salmeterol (WIXELA INHUB) 250-50 MCG/ACT inhaler Inhale 1 puff into the lungs every 12 hours 3 each 3    furosemide (LASIX) 20 MG tablet TAKE 2 TABLETS BY MOUTH IN THE MORNING AND 1 TABLET BY MOUTH AT LEAST 6 HOURS LATER IN THE AFTERNOON. 270 tablet 1    Lancets (ONETOUCH DELICA PLUS [id number]) MISC USE ONCE DAILY AS DIRECTED 100 each 1    latanoprost (XALATAN) 0.005 % ophthalmic solution Place 1 drop into both eyes at bedtime 7.5 mL 3    lisinopril (ZESTRIL) 40 MG tablet TAKE 1 TABLET BY MOUTH EVERY DAY 90 tablet 0    metFORMIN (GLUCOPHAGE XR) 500 MG 24 hr tablet TAKE 1 TABLET BY MOUTH TWICE A DAY WITH FOOD 180 tablet 3    OXcarbazepine (TRILEPTAL) 300 MG tablet TAKE 2 TABLETS IN THE MORNING AND 2 TABLETS IN THE EVENING 360 tablet 0    pantoprazole (PROTONIX) 20 MG EC tablet TAKE 1 TABLET BY MOUTH EVERY DAY 90 tablet 0    polyethylene glycol (MIRALAX) 17 GM/Dose powder Take 17 g by mouth daily (Patient not taking: Reported on 8/7/2024) 510 g 0    prednisoLONE acetate (PRED FORTE) 1 % ophthalmic suspension INSTILL 1 DROP LEFT EYE 4 TIMES A DAY (Patient not taking: Reported on 8/7/2024)      predniSONE (DELTASONE) 5 MG tablet  TAKE 2 TABLETS BY MOUTH EVERY DAY 60 tablet 2    pyRIDostigmine (MESTINON) 60 MG tablet Take 2 tablets (120 mg) in the morning and afternoon and 1 tablet in the evening per 4/22/2024 appt. 450 tablet 0    pyRIDostigmine (MESTINON) 60 MG tablet Take 120 mg by mouth every morning (Patient not taking: Reported on 8/7/2024)      pyRIDostigmine (MESTINON) 60 MG tablet Take 60 mg by mouth 2 times daily Midday and 6 PM. (Patient not taking: Reported on 8/7/2024)      senna-docusate (SENOKOT-S/PERICOLACE) 8.6-50 MG tablet Take 1 tablet by mouth 2 times daily (Patient not taking: Reported on 8/7/2024) 60 tablet 0    sodium bicarbonate 650 MG tablet Take 1 tablet (650 mg) by mouth 2 times daily 180 tablet 1    timolol maleate (TIMOPTIC) 0.5 % ophthalmic solution Place 1 drop into both eyes 2 times daily 5 mL 11     No current facility-administered medications for this visit.     CT TEMPORAL W/O CONTRAST 7/18/2024   Provided History: Chronic tubotympanic suppurative otitis media of left ear     Comparison: MRI 11/4/2020      Technique: Using multidetector thin collimation helical acquisition technique, axial and coronal thin section CT images were reconstructed through both temporal bones. Additional reconstructions performed in the planes of Poschl and Stenver were also obtained. Images were reviewed in a bone window.     Findings:   Right temporal bone: The mastoid air cells are clear. There is no debris in the external auditory canal. The tympanic membrane is intact. There is no fluid or soft tissue thickening in the right middle ear cavity. The bony ossicles are intact and in normal alignment. The epitympanum is clear. The bony scutum is sharp. The internal auditory canal and the labyrinthine structures are within normal limits. The facial nerve canal appears normal along its course.     Left temporal bone: The mastoid air cells are opacified. There is a moderate amount of debris in the external auditory canal. There is  soft tissue density extending from the medial aspect of the left external auditory canal into the middle ear cavity. The tympanic membrane is visualized inferiorly but not well seen superiorly.. There is a significant amount of fluid or soft tissue thickening in the left middle ear cavity. The bony ossicles are intact but there does appear to be some erosive changes particularly at the malleoincudal junction. Fluid in the superior middle ear cavity. There is some apparent dehiscence of the tegmen tympani best appreciated on image 31 of series 10 The bony scutum is sharp. The internal auditory canal and the labyrinthine structures are within normal limits. The facial nerve canal appears normal along its course.                                                                    Impression: Soft tissue density extending from the left external auditory canal into the left middle ear cavity. Erosive changes in the ossicular chain and scutal blunting/erosion. While findings could reflect sequela related to an infectious process, overall findings do also raise concern for possible cholesteatoma in this region.     MRI OF THE BRAIN WITHOUT AND WITH CONTRAST;  MRI OF THE SKULL BASE WITHOUT AND WITH CONTRAST 11/04/2020  HISTORY: Left trigeminal neuralgia. Cranial nerve protocol (FIESTA) and evaluation for compression of TN by vascular loop. Left-sided trigeminal neuralgia.      COMPARISON: Brain MR 2/10/2014.      TECHNIQUE: Axial diffusion-weighted with ADC map, T2-weighted, turboFLAIR and T1-weighted images of the brain and axial T1-weighted and coronal T2-weighted with fat saturation images centered on the basal cisterns were obtained without intravenous contrast. Following intravenous administration of IV gadolinium (10 mL Gadavist), axial turboFLAIR images of the brain and axial T1-weighted with fat saturation and coronal T1-weighted images, centered on the skull base and basal cisterns, were obtained.     FINDINGS:  There is moderate diffuse cerebral volume loss. There are a few tiny scattered focal areas of abnormal T2 signal hyperintensity in the cerebral white matter bilaterally that are consistent with sequela of chronic small vessel ischemic disease. The ventricles and basal cisterns are within normal limits in configuration given the degree of cerebral volume loss.  There is no midline shift. There are no extra-axial fluid collections. There is no evidence for stroke or acute intracranial hemorrhage. There is no abnormal contrast enhancement in the brain or its coverings.     The cisternal segments of the 5th cranial nerves bilaterally are unremarkable with no evidence for intrinsic abnormality or extrinsic compression. Meckel's caves bilaterally are within normal limits. The bilateral aspects of the cavernous sinus are unremarkable. There is no evidence for abnormality of the infratemporal fossa or orbits on either side.     There is no sinusitis or mastoiditis.                                                                    IMPRESSION:   No evidence for intrinsic abnormality or extrinsic compression of any portion of the 5th cranial nerve on the left.  Diffuse cerebral volume loss and cerebral white matter changes consistent with sequela of chronic small vessel ischemic disease.      CT HEAD BRAIN WO 7/8/2021  INDICATION: Headache high blood pressure   COMPARISON: 02/05/2019   TECHNIQUE: Routine CT Head without IV contrast. Multiplanar reformats. Dose reduction techniques were used.     FINDINGS:   INTRACRANIAL CONTENTS: No intracranial hemorrhage, extraaxial collection, or mass effect.  No CT evidence of acute infarct. Mild presumed chronic small vessel ischemic changes. Mild generalized volume loss. No hydrocephalus.     VISUALIZED ORBITS/SINUSES/MASTOIDS: No intraorbital abnormality. No paranasal sinus mucosal disease. Scattered fluid/membrane thickening in the left mastoid air cells. No apparent mass in the  posterior nasopharynx or skull base.     BONES/SOFT TISSUES: No acute abnormality.     IMPRESSION:   1. No CT evidence of acute intracranial hemorrhage, mass or recent infarct.   2.  Mild volume loss and presumed chronic small vessel ischemic changes.     HPI  Pleasant 79 year old male presents today as an established patient for imaging review regarding possible left-sided cholesteatoma. His previous visit was on 07/10/2024, where there was polypoid tissue located mckenna-inferiorly in his left ear and bilateral cerumen impaction. A biopsy was performed and came back as cholesteatoma. He is accompanied today with his wife. He has a prior history of having OME on the left side. He reports having decreased hearing in the left compared to the right and aural fullness. He wears hearing aids bilaterally. His wife says that whenever she looks into his ear, there appears to be dried blood. He feels that there is more drainage at night, and it has been going on for the past few months, around April. He describes having dizziness and light-headedness that occurred while laying down. The room was spinning, but he insists that it was not positional.     Review of Systems   Constitutional: Negative.    HENT:  Positive for ear discharge, ear pain and hearing loss.    Eyes: Negative.    Respiratory: Negative.     Cardiovascular: Negative.    Gastrointestinal: Negative.    Skin: Negative.    Neurological:  Positive for dizziness.   Endo/Heme/Allergies: Negative.  Negative for environmental allergies.   All other systems reviewed and are negative.      Physical Exam  Vitals and nursing note reviewed.   Constitutional:       Appearance: Normal appearance.   HENT:      Head: Normocephalic and atraumatic.      Right Ear: Tympanic membrane, ear canal and external ear normal. Decreased hearing noted. There is no impacted cerumen.      Left Ear: Ear canal and external ear normal. Decreased hearing noted. Drainage and tenderness present.  There is impacted cerumen.      Nose: Nose normal.      Mouth/Throat:      Mouth: Mucous membranes are moist.   Eyes:      Extraocular Movements: Extraocular movements intact.      Pupils: Pupils are equal, round, and reactive to light.   Neurological:      Mental Status: He is alert.   Psychiatric:         Behavior: Behavior is cooperative.     + cerumen was cleared from the left ear  + granulation tissue in the attic portion of the left TM    Ear wax removal: The patient was seen in the room. An informed consent was obtained from the patient. His ears were evaluated under the microscope. The left ear canal was blocked 60% with ear wax that was suctioned with a 7 tip. There were signs of infection in the left ear, and there was granulation tissue in the attic portion of the tympanic membrane. He tolerated the procedure well, but experienced some pain. He was able to leave the room with no complications.     AUDIOGRAM 08/14/2024:  Results: WNL sloping to severe SNHL right. Mild to profound mixed loss left. 100% word rec bilaterally. Right tymp negative pressure. Flat tymp left.   Right: Speech reception threshold is 15 dB with 100% word recognition. Tympanogram C type.  Left: Speech reception threshold is 55 dB with 100% word recognition. Tympanogram B type.    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 The audiogram and imaging were independently reviewed and discussed in detail with the patient. His audiogram demonstrated asymmetrical hearing, with the right side being better than the left. Findings are suggesting pathologies of the middle ear. This aligns with the symptoms that he describes today. Physical examination shown signs of infection in the left ear and granulation tissue in the attic portion of the tympanic membrane , and this was cleaned in-clinic. His imaging has shown opacities in the left external auditory canal that  extends into the left middle ear cavity that has caused erosion to the ossicular chain and scutum.      It has been explained that cholesteatoma is formed by skin of the tympanic membrane being caught and collected in a retraction of the tympanic membarane. Once the skin gets trapped in there, it develops a skin cyst that keeps growing and erodes the bones in the ear. It has been explained that leaving it to grow will increase the risk of ear bone erosion, and this will continue to negatively impact his hearing. Surgical intervention is recommended. The primary goal of surgical intervention is not to restore hearing, but to prevent meningitis, encephalitis, or other infections that can negatively affect the brain.     The risks and benefits of a mastoidectomy were discussed. The risks include but are not limited to: Worsened hearing which may require further surgery, profound and irreversible hearing loss, dizziness, damage to the taste nerve, damage to the facial nerve, requiring further surgery and infection. It has been explained that there is a second revision surgery that may be required to ensure that no residual cholesteatoma was left behind. Postoperative restrictions were discussed. Because scheduling is out for a few months, they have decided to proceed with scheduling for the surgery. He has been prescribed Ciprodex drops, and has been instructed to place four drops into the left ear two times daily for 10 days.     Follow up in clinic for post-operative visits following surgery.    Scribe/Staff:    Scribe Disclosure:   I, Tessy López, am serving as a scribe; to document services personally performed by Shayna Delatorre MD based on data collection and the provider's statements to me.     Provider Disclosure:  I agree with above History, Review of Systems, Physical exam and Plan.  I have reviewed the content of the documentation and have edited it as needed. I have personally performed the  services documented here and the documentation accurately represents those services and the decisions I have made.      Electronically signed by:  Shayna Delatorre MD

## 2024-08-07 NOTE — PROGRESS NOTES
Rakan is a 79 year old who is being evaluated via a billable telephone visit.    What phone number would you like to be contacted at? [phone]  How would you like to obtain your AVS? Brandy  Originating Location (pt. Location): Home    Distant Location (provider location):  On-site    Assessment & Plan       ICD-10-CM    1. Infection due to 2019 novel coronavirus  U07.1 nirmatrelvir and ritonavir (PAXLOVID) 300 mg/100 mg therapy pack      2. Hyperlipidemia, unspecified hyperlipidemia type  E78.5       3. Hypertension, unspecified type  I10       4. Restless legs syndrome  G25.81       5. Chronic obstructive pulmonary disease, unspecified COPD type (H)  J44.9         He is an appropriate candidate for treatment of Paxlovid, so I will go ahead and prescribe that medication for him and standard dosing  I did do a medication review and advised him to hold his atorvastatin and clonazepam during his Paxlovid treatment and to resume these medicines 3 days after completion of Paxlovid therapy  I advised him to cut his amlodipine and dose in half while on the Paxlovid  We will allow him to continue his same Wixela and other baseline medicines given his underlying COPD and other comorbidities    If new, worsening or persistent symptoms, the patient is to call or return for a recheck.        Subjective   Rakan is a 79 year old, presenting for the following health issues:  Covid Concern      8/7/2024     5:13 PM   Additional Questions   Roomed by cam         8/7/2024     5:13 PM   Patient Reported Additional Medications   Patient reports taking the following new medications none     History of Present Illness       Reason for visit:  Covid  Symptom onset:  1-3 days ago  Symptoms include:  Running nose headach bodyach  Symptom intensity:  Moderate  Symptom progression:  Worsening  Had these symptoms before:  Yes  Has tried/received treatment for these symptoms:  Yes  Previous treatment was successful:  Yes  Prior treatment  description:  Paxlovid  What makes it worse:  No  What makes it better:  No    He eats 4 or more servings of fruits and vegetables daily.He consumes 0 sweetened beverage(s) daily.He exercises with enough effort to increase his heart rate 9 or less minutes per day.  He exercises with enough effort to increase his heart rate 3 or less days per week. He is missing 1 dose(s) of medications per week.  He is not taking prescribed medications regularly due to remembering to take.         COVID-19 Symptom Review  How many days ago did these symptoms start? 2 days, tested pos for covid today    Are any of the following symptoms significant for you?  New or worsening difficulty breathing? Yes  Please describe what kind of difficulty you are having breathing:Mild dyspnea (able to do ADLs without difficulty, mild shortness of breath with activities such as climbing one or two flights of stairs or walking briskly)  Worsening cough? Yes, I am coughing up mucus.  Fever or chills? No  Headache: YES  Sore throat: YES  Chest pain: No  Diarrhea: No  Body aches? YES    What treatments has patient tried? Antihistamines  and dayquil   Does patient live in a nursing home, group home, or shelter? No  Does patient have a way to get food/medications during quarantined? Yes, I have a friend or family member who can help me.        He started developing a runny nose and other symptoms yesterday.  He has had a cough and some sore throat.  No known fever.  He took a home COVID test today which was positive.  He estimates that he has had COVID 3 times previously.  He did take Paxlovid 1 time previously and did fine with that.  He is interested in taking that medicine again.    He does have a history of COPD, but is not a smoker.  He has diabetes and various other conditions as listed below.    Patient Active Problem List   Diagnosis    GERD (gastroesophageal reflux disease)    HLD (hyperlipidemia)    Borderline glaucoma with ocular hypertension     Trigeminal neuralgia pain    HTN (hypertension)    Arthritis    Dry eyes    PVD (posterior vitreous detachment) OU    H/O RD (retinal detachment) s/p repair with PPV (AB)    Epiretinal membrane, bilateral    Pseudophakia of right eye    Myasthenia gravis (H)    Morbid obesity (H)    Right posterior capsular opacification    Obstructive sleep apnea syndrome    Prostate cancer (H)    Restless legs syndrome    Temporal arteritis (H)    Type 2 diabetes mellitus with other specified complication, without long-term current use of insulin (H)    Hyponatremia    COPD (chronic obstructive pulmonary disease) (H)    Combined form of age-related cataract, left eye    Anemia    Osteoarthritis of left knee, unspecified osteoarthritis type    Chronic pain of left knee    Lumbar spine pain     Current Outpatient Medications   Medication Sig Dispense Refill    albuterol (PROAIR HFA/PROVENTIL HFA/VENTOLIN HFA) 108 (90 Base) MCG/ACT inhaler INHALE 1 PUFF INTO THE LUNGS EVERY 4 HOURS AS NEEDED FOR SHORTNESS OF BREATH, WHEEZING OR COUGH 18 g 11    amLODIPine (NORVASC) 10 MG tablet TAKE 1 TABLET (10 MG) BY MOUTH DAILY. 90 tablet 0    ARTIFICIAL TEAR OP Apply 1 drop to eye as needed      atorvastatin (LIPITOR) 20 MG tablet Take 20 mg by mouth at bedtime      blood glucose (ONETOUCH VERIO IQ) test strip USE TO TEST BLOOD SUGAR 3 TIMES DAILY OR AS DIRECTED. 300 strip 1    blood glucose monitoring (NO BRAND SPECIFIED) meter device kit Use to test blood sugar 1 times daily or as directed. 1 kit 0    ClonAZEPAM (KLONOPIN) 0.5 MG tablet Take 0.5 mg by mouth Take 1 tablet by mouth daily at bedtime      Continuous Glucose Sensor (FREESTYLE SARAHY 2 SENSOR) MISC USE 1 SENSOR EVERY 14 DAYS. USE TO READ BLOOD SUGARS PER 'S INSTRUCTIONS. 2 each 5    ferrous sulfate (FEROSUL) 325 (65 Fe) MG tablet TAKE 1 TABLET BY MOUTH EVERY DAY WITH BREAKFAST 90 tablet 1    fluticasone-salmeterol (ADVAIR) 250-50 MCG/ACT inhaler TAKE 1 PUFF BY MOUTH  TWICE A DAY Strength: 250-50 MCG/ACT 3 each 3    fluticasone-salmeterol (WIXELA INHUB) 250-50 MCG/ACT inhaler Inhale 1 puff into the lungs every 12 hours 3 each 3    furosemide (LASIX) 20 MG tablet TAKE 2 TABLETS BY MOUTH IN THE MORNING AND 1 TABLET BY MOUTH AT LEAST 6 HOURS LATER IN THE AFTERNOON. 270 tablet 1    Lancets (ONETOUCH DELICA PLUS OYRQCV21B) MISC USE ONCE DAILY AS DIRECTED 100 each 1    latanoprost (XALATAN) 0.005 % ophthalmic solution Place 1 drop into both eyes at bedtime 7.5 mL 3    lisinopril (ZESTRIL) 40 MG tablet TAKE 1 TABLET BY MOUTH EVERY DAY 90 tablet 0    metFORMIN (GLUCOPHAGE XR) 500 MG 24 hr tablet TAKE 1 TABLET BY MOUTH TWICE A DAY WITH FOOD 180 tablet 3       0    OXcarbazepine (TRILEPTAL) 300 MG tablet TAKE 2 TABLETS IN THE MORNING AND 2 TABLETS IN THE EVENING 360 tablet 0    pantoprazole (PROTONIX) 20 MG EC tablet TAKE 1 TABLET BY MOUTH EVERY DAY 90 tablet 0    predniSONE (DELTASONE) 5 MG tablet TAKE 2 TABLETS BY MOUTH EVERY DAY 60 tablet 2    pyRIDostigmine (MESTINON) 60 MG tablet Take 2 tablets (120 mg) in the morning and afternoon and 1 tablet in the evening per 4/22/2024 appt. 450 tablet 0    sodium bicarbonate 650 MG tablet Take 1 tablet (650 mg) by mouth 2 times daily 180 tablet 1    timolol maleate (TIMOPTIC) 0.5 % ophthalmic solution Place 1 drop into both eyes 2 times daily 5 mL 11    acetaminophen (TYLENOL) 325 MG tablet Take 2 tablets (650 mg) by mouth every 4 hours as needed for other (For optimal non-opioid multimodal pain management to improve pain control.) (Patient not taking: Reported on 8/7/2024) 100 tablet 0    amoxicillin (AMOXIL) 500 MG capsule Take 4 caps (2000mg) 30-60 minutes before dental procedure (Patient not taking: Reported on 8/7/2024) 4 capsule 0    polyethylene glycol (MIRALAX) 17 GM/Dose powder Take 17 g by mouth daily (Patient not taking: Reported on 8/7/2024) 510 g 0    prednisoLONE acetate (PRED FORTE) 1 % ophthalmic suspension INSTILL 1 DROP LEFT EYE  4 TIMES A DAY (Patient not taking: Reported on 8/7/2024)      pyRIDostigmine (MESTINON) 60 MG tablet Take 120 mg by mouth every morning (Patient not taking: Reported on 8/7/2024)      pyRIDostigmine (MESTINON) 60 MG tablet Take 60 mg by mouth 2 times daily Midday and 6 PM. (Patient not taking: Reported on 8/7/2024)      senna-docusate (SENOKOT-S/PERICOLACE) 8.6-50 MG tablet Take 1 tablet by mouth 2 times daily (Patient not taking: Reported on 8/7/2024) 60 tablet 0     No current facility-administered medications for this visit.           Review of Systems  Noncontributory.      Objective           Vitals:  No vitals were obtained today due to virtual visit.    Physical Exam   General: Alert and no distress //Respiratory: No audible wheeze, cough, or shortness of breath // Psychiatric:  Appropriate affect, tone, and pace of words      He had a BMP done a month ago with a normal BUN and creatinine.      Phone call duration: 12 minutes  Signed Electronically by: Harman Norman MD

## 2024-08-14 ENCOUNTER — OFFICE VISIT (OUTPATIENT)
Dept: OTOLARYNGOLOGY | Facility: CLINIC | Age: 79
End: 2024-08-14
Payer: COMMERCIAL

## 2024-08-14 ENCOUNTER — OFFICE VISIT (OUTPATIENT)
Dept: AUDIOLOGY | Facility: CLINIC | Age: 79
End: 2024-08-14
Payer: COMMERCIAL

## 2024-08-14 DIAGNOSIS — H90.A32 MIXED CONDUCTIVE AND SENSORINEURAL HEARING LOSS OF LEFT EAR WITH RESTRICTED HEARING OF RIGHT EAR: Primary | ICD-10-CM

## 2024-08-14 DIAGNOSIS — H66.22 CHRONIC ATTICOANTRAL SUPPURATIVE OTITIS MEDIA OF LEFT EAR: ICD-10-CM

## 2024-08-14 DIAGNOSIS — E11.65 TYPE 2 DIABETES MELLITUS WITH HYPERGLYCEMIA, WITHOUT LONG-TERM CURRENT USE OF INSULIN (H): ICD-10-CM

## 2024-08-14 DIAGNOSIS — H66.12 CHRONIC TUBOTYMPANIC SUPPURATIVE OTITIS MEDIA OF LEFT EAR: Primary | ICD-10-CM

## 2024-08-14 DIAGNOSIS — H71.92 CHOLESTEATOMA, LEFT: ICD-10-CM

## 2024-08-14 PROCEDURE — 88304 TISSUE EXAM BY PATHOLOGIST: CPT | Performed by: STUDENT IN AN ORGANIZED HEALTH CARE EDUCATION/TRAINING PROGRAM

## 2024-08-14 PROCEDURE — 92557 COMPREHENSIVE HEARING TEST: CPT | Performed by: AUDIOLOGIST

## 2024-08-14 PROCEDURE — 99213 OFFICE O/P EST LOW 20 MIN: CPT | Performed by: OTOLARYNGOLOGY

## 2024-08-14 PROCEDURE — 92567 TYMPANOMETRY: CPT | Performed by: AUDIOLOGIST

## 2024-08-14 RX ORDER — BLOOD SUGAR DIAGNOSTIC
STRIP MISCELLANEOUS
Qty: 300 STRIP | Refills: 1 | Status: SHIPPED | OUTPATIENT
Start: 2024-08-14

## 2024-08-14 RX ORDER — CIPROFLOXACIN AND DEXAMETHASONE 3; 1 MG/ML; MG/ML
4 SUSPENSION/ DROPS AURICULAR (OTIC) 2 TIMES DAILY
Qty: 7.5 ML | Refills: 1 | Status: SHIPPED | OUTPATIENT
Start: 2024-08-14

## 2024-08-14 ASSESSMENT — ENCOUNTER SYMPTOMS
DIZZINESS: 1
GASTROINTESTINAL NEGATIVE: 1
EYES NEGATIVE: 1
CONSTITUTIONAL NEGATIVE: 1
CARDIOVASCULAR NEGATIVE: 1
RESPIRATORY NEGATIVE: 1

## 2024-08-14 NOTE — NURSING NOTE
Rakan Nolasco's chief complaint for this visit includes:  Chief Complaint   Patient presents with    Follow Up     Left ear recheck, decreased hearing and increased buildup/fullness in the ear. Wife states that when she looks in the ear with a flashlight, she thinks there is dried blood in the ear. No worsening pain. Had CT on 7/18/24, possible cholesteatoma.     PCP: Telly Lucio    Referring Provider:  Referred Self, MD  No address on file    There were no vitals taken for this visit.        Dany Prieto, EMT  August 14, 2024

## 2024-08-14 NOTE — LETTER
8/14/2024      Rakan Nolasco  [address] [facility] [address]      Dear Colleague,    Thank you for referring your patient, Rakan Nolasco, to the [facility]. Please see a copy of my visit note below.    No chief complaint on file.     PCP: Telly Lucio     Referring Provider: No ref. provider found    There were no vitals taken for this visit.    ENT Problem List:  Patient Active Problem List   Diagnosis     GERD (gastroesophageal reflux disease)     HLD (hyperlipidemia)     Borderline glaucoma with ocular hypertension     Trigeminal neuralgia pain     HTN (hypertension)     Arthritis     Dry eyes     PVD (posterior vitreous detachment) OU     H/O RD (retinal detachment) s/p repair with PPV (AB)     Epiretinal membrane, bilateral     Pseudophakia of right eye     Myasthenia gravis (H)     Morbid obesity (H)     Right posterior capsular opacification     Obstructive sleep apnea syndrome     Prostate cancer (H)     Restless legs syndrome     Temporal arteritis (H)     Type 2 diabetes mellitus with other specified complication, without long-term current use of insulin (H)     Hyponatremia     COPD (chronic obstructive pulmonary disease) (H)     Combined form of age-related cataract, left eye     Anemia     Osteoarthritis of left knee, unspecified osteoarthritis type     Chronic pain of left knee     Lumbar spine pain      Current Medications:  Current Outpatient Medications   Medication Sig Dispense Refill     acetaminophen (TYLENOL) 325 MG tablet Take 2 tablets (650 mg) by mouth every 4 hours as needed for other (For optimal non-opioid multimodal pain management to improve pain control.) (Patient not taking: Reported on 8/7/2024) 100 tablet 0     albuterol (PROAIR HFA/PROVENTIL HFA/VENTOLIN HFA) 108 (90 Base) MCG/ACT inhaler INHALE 1 PUFF INTO THE LUNGS EVERY 4 HOURS AS NEEDED FOR SHORTNESS OF BREATH, WHEEZING OR COUGH 18 g 11     amLODIPine (NORVASC) 10 MG tablet  TAKE 1 TABLET (10 MG) BY MOUTH DAILY. 90 tablet 0     amoxicillin (AMOXIL) 500 MG capsule Take 4 caps (2000mg) 30-60 minutes before dental procedure (Patient not taking: Reported on 8/7/2024) 4 capsule 0     ARTIFICIAL TEAR OP Apply 1 drop to eye as needed       atorvastatin (LIPITOR) 20 MG tablet Take 20 mg by mouth at bedtime       blood glucose (ONETOUCH VERIO IQ) test strip USE TO TEST BLOOD SUGAR 3 TIMES DAILY OR AS DIRECTED. 300 strip 1     blood glucose monitoring (NO BRAND SPECIFIED) meter device kit Use to test blood sugar 1 times daily or as directed. 1 kit 0     ClonAZEPAM (KLONOPIN) 0.5 MG tablet Take 0.5 mg by mouth Take 1 tablet by mouth daily at bedtime       Continuous Glucose Sensor (FREESTYLE SARAHY 2 SENSOR) MISC USE 1 SENSOR EVERY 14 DAYS. USE TO READ BLOOD SUGARS PER 'S INSTRUCTIONS. 2 each 5     ferrous sulfate (FEROSUL) 325 (65 Fe) MG tablet TAKE 1 TABLET BY MOUTH EVERY DAY WITH BREAKFAST 90 tablet 1     fluticasone-salmeterol (ADVAIR) 250-50 MCG/ACT inhaler TAKE 1 PUFF BY MOUTH TWICE A DAY Strength: 250-50 MCG/ACT 3 each 3     fluticasone-salmeterol (WIXELA INHUB) 250-50 MCG/ACT inhaler Inhale 1 puff into the lungs every 12 hours 3 each 3     furosemide (LASIX) 20 MG tablet TAKE 2 TABLETS BY MOUTH IN THE MORNING AND 1 TABLET BY MOUTH AT LEAST 6 HOURS LATER IN THE AFTERNOON. 270 tablet 1     Lancets (ONETOUCH DELICA PLUS [id number]) MISC USE ONCE DAILY AS DIRECTED 100 each 1     latanoprost (XALATAN) 0.005 % ophthalmic solution Place 1 drop into both eyes at bedtime 7.5 mL 3     lisinopril (ZESTRIL) 40 MG tablet TAKE 1 TABLET BY MOUTH EVERY DAY 90 tablet 0     metFORMIN (GLUCOPHAGE XR) 500 MG 24 hr tablet TAKE 1 TABLET BY MOUTH TWICE A DAY WITH FOOD 180 tablet 3     OXcarbazepine (TRILEPTAL) 300 MG tablet TAKE 2 TABLETS IN THE MORNING AND 2 TABLETS IN THE EVENING 360 tablet 0     pantoprazole (PROTONIX) 20 MG EC tablet TAKE 1 TABLET BY MOUTH EVERY DAY 90 tablet 0     polyethylene  glycol (MIRALAX) 17 GM/Dose powder Take 17 g by mouth daily (Patient not taking: Reported on 8/7/2024) 510 g 0     prednisoLONE acetate (PRED FORTE) 1 % ophthalmic suspension INSTILL 1 DROP LEFT EYE 4 TIMES A DAY (Patient not taking: Reported on 8/7/2024)       predniSONE (DELTASONE) 5 MG tablet TAKE 2 TABLETS BY MOUTH EVERY DAY 60 tablet 2     pyRIDostigmine (MESTINON) 60 MG tablet Take 2 tablets (120 mg) in the morning and afternoon and 1 tablet in the evening per 4/22/2024 appt. 450 tablet 0     pyRIDostigmine (MESTINON) 60 MG tablet Take 120 mg by mouth every morning (Patient not taking: Reported on 8/7/2024)       pyRIDostigmine (MESTINON) 60 MG tablet Take 60 mg by mouth 2 times daily Midday and 6 PM. (Patient not taking: Reported on 8/7/2024)       senna-docusate (SENOKOT-S/PERICOLACE) 8.6-50 MG tablet Take 1 tablet by mouth 2 times daily (Patient not taking: Reported on 8/7/2024) 60 tablet 0     sodium bicarbonate 650 MG tablet Take 1 tablet (650 mg) by mouth 2 times daily 180 tablet 1     timolol maleate (TIMOPTIC) 0.5 % ophthalmic solution Place 1 drop into both eyes 2 times daily 5 mL 11     No current facility-administered medications for this visit.     CT TEMPORAL W/O CONTRAST 7/18/2024   Provided History: Chronic tubotympanic suppurative otitis media of left ear     Comparison: MRI 11/4/2020      Technique: Using multidetector thin collimation helical acquisition technique, axial and coronal thin section CT images were reconstructed through both temporal bones. Additional reconstructions performed in the planes of Poschl and Stenver were also obtained. Images were reviewed in a bone window.     Findings:   Right temporal bone: The mastoid air cells are clear. There is no debris in the external auditory canal. The tympanic membrane is intact. There is no fluid or soft tissue thickening in the right middle ear cavity. The bony ossicles are intact and in normal alignment. The epitympanum is clear. The  bony scutum is sharp. The internal auditory canal and the labyrinthine structures are within normal limits. The facial nerve canal appears normal along its course.     Left temporal bone: The mastoid air cells are opacified. There is a moderate amount of debris in the external auditory canal. There is soft tissue density extending from the medial aspect of the left external auditory canal into the middle ear cavity. The tympanic membrane is visualized inferiorly but not well seen superiorly.. There is a significant amount of fluid or soft tissue thickening in the left middle ear cavity. The bony ossicles are intact but there does appear to be some erosive changes particularly at the malleoincudal junction. Fluid in the superior middle ear cavity. There is some apparent dehiscence of the tegmen tympani best appreciated on image 31 of series 10 The bony scutum is sharp. The internal auditory canal and the labyrinthine structures are within normal limits. The facial nerve canal appears normal along its course.                                                                    Impression: Soft tissue density extending from the left external auditory canal into the left middle ear cavity. Erosive changes in the ossicular chain and scutal blunting/erosion. While findings could reflect sequela related to an infectious process, overall findings do also raise concern for possible cholesteatoma in this region.     MRI OF THE BRAIN WITHOUT AND WITH CONTRAST;  MRI OF THE SKULL BASE WITHOUT AND WITH CONTRAST 11/04/2020  HISTORY: Left trigeminal neuralgia. Cranial nerve protocol (FIESTA) and evaluation for compression of TN by vascular loop. Left-sided trigeminal neuralgia.      COMPARISON: Brain MR 2/10/2014.      TECHNIQUE: Axial diffusion-weighted with ADC map, T2-weighted, turboFLAIR and T1-weighted images of the brain and axial T1-weighted and coronal T2-weighted with fat saturation images centered on the basal cisterns were  obtained without intravenous contrast. Following intravenous administration of IV gadolinium (10 mL Gadavist), axial turboFLAIR images of the brain and axial T1-weighted with fat saturation and coronal T1-weighted images, centered on the skull base and basal cisterns, were obtained.     FINDINGS: There is moderate diffuse cerebral volume loss. There are a few tiny scattered focal areas of abnormal T2 signal hyperintensity in the cerebral white matter bilaterally that are consistent with sequela of chronic small vessel ischemic disease. The ventricles and basal cisterns are within normal limits in configuration given the degree of cerebral volume loss.  There is no midline shift. There are no extra-axial fluid collections. There is no evidence for stroke or acute intracranial hemorrhage. There is no abnormal contrast enhancement in the brain or its coverings.     The cisternal segments of the 5th cranial nerves bilaterally are unremarkable with no evidence for intrinsic abnormality or extrinsic compression. Meckel's caves bilaterally are within normal limits. The bilateral aspects of the cavernous sinus are unremarkable. There is no evidence for abnormality of the infratemporal fossa or orbits on either side.     There is no sinusitis or mastoiditis.                                                                    IMPRESSION:   No evidence for intrinsic abnormality or extrinsic compression of any portion of the 5th cranial nerve on the left.  Diffuse cerebral volume loss and cerebral white matter changes consistent with sequela of chronic small vessel ischemic disease.      CT HEAD BRAIN WO 7/8/2021  INDICATION: Headache high blood pressure   COMPARISON: 02/05/2019   TECHNIQUE: Routine CT Head without IV contrast. Multiplanar reformats. Dose reduction techniques were used.     FINDINGS:   INTRACRANIAL CONTENTS: No intracranial hemorrhage, extraaxial collection, or mass effect.  No CT evidence of acute infarct. Mild  presumed chronic small vessel ischemic changes. Mild generalized volume loss. No hydrocephalus.     VISUALIZED ORBITS/SINUSES/MASTOIDS: No intraorbital abnormality. No paranasal sinus mucosal disease. Scattered fluid/membrane thickening in the left mastoid air cells. No apparent mass in the posterior nasopharynx or skull base.     BONES/SOFT TISSUES: No acute abnormality.     IMPRESSION:   1. No CT evidence of acute intracranial hemorrhage, mass or recent infarct.   2.  Mild volume loss and presumed chronic small vessel ischemic changes.     HPI  Pleasant 79 year old male presents today as an established patient for imaging review regarding possible left-sided cholesteatoma. His previous visit was on 07/10/2024, where there was polypoid tissue located mckenna-inferiorly in his left ear and bilateral cerumen impaction. A biopsy was performed and came back as cholesteatoma. He is accompanied today with his wife. He has a prior history of having OME on the left side. He reports having decreased hearing in the left compared to the right and aural fullness. He wears hearing aids bilaterally. His wife says that whenever she looks into his ear, there appears to be dried blood. He feels that there is more drainage at night, and it has been going on for the past few months, around April. He describes having dizziness and light-headedness that occurred while laying down. The room was spinning, but he insists that it was not positional.     Review of Systems   Constitutional: Negative.    HENT:  Positive for ear discharge, ear pain and hearing loss.    Eyes: Negative.    Respiratory: Negative.     Cardiovascular: Negative.    Gastrointestinal: Negative.    Skin: Negative.    Neurological:  Positive for dizziness.   Endo/Heme/Allergies: Negative.  Negative for environmental allergies.   All other systems reviewed and are negative.      Physical Exam  Vitals and nursing note reviewed.   Constitutional:       Appearance: Normal  appearance.   HENT:      Head: Normocephalic and atraumatic.      Right Ear: Tympanic membrane, ear canal and external ear normal. Decreased hearing noted. There is no impacted cerumen.      Left Ear: Ear canal and external ear normal. Decreased hearing noted. Drainage and tenderness present. There is impacted cerumen.      Nose: Nose normal.      Mouth/Throat:      Mouth: Mucous membranes are moist.   Eyes:      Extraocular Movements: Extraocular movements intact.      Pupils: Pupils are equal, round, and reactive to light.   Neurological:      Mental Status: He is alert.   Psychiatric:         Behavior: Behavior is cooperative.     + cerumen was cleared from the left ear  + granulation tissue in the attic portion of the left TM    Ear wax removal: The patient was seen in the room. An informed consent was obtained from the patient. His ears were evaluated under the microscope. The left ear canal was blocked 60% with ear wax that was suctioned with a 7 tip. There were signs of infection in the left ear, and there was granulation tissue in the attic portion of the tympanic membrane. He tolerated the procedure well, but experienced some pain. He was able to leave the room with no complications.     AUDIOGRAM 08/14/2024:  Results: WNL sloping to severe SNHL right. Mild to profound mixed loss left. 100% word rec bilaterally. Right tymp negative pressure. Flat tymp left.   Right: Speech reception threshold is 15 dB with 100% word recognition. Tympanogram C type.  Left: Speech reception threshold is 55 dB with 100% word recognition. Tympanogram B type.    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 The audiogram and imaging were independently reviewed and discussed in detail with the patient. His audiogram demonstrated asymmetrical hearing, with the right side being better than the left. Findings are suggesting pathologies of the middle  ear. This aligns with the symptoms that he describes today. Physical examination shown signs of infection in the left ear and granulation tissue in the attic portion of the tympanic membrane , and this was cleaned in-clinic. His imaging has shown opacities in the left external auditory canal that extends into the left middle ear cavity that has caused erosion to the ossicular chain and scutum.      It has been explained that cholesteatoma is formed by skin of the tympanic membrane being caught and collected in a retraction of the tympanic membarane. Once the skin gets trapped in there, it develops a skin cyst that keeps growing and erodes the bones in the ear. It has been explained that leaving it to grow will increase the risk of ear bone erosion, and this will continue to negatively impact his hearing. Surgical intervention is recommended. The primary goal of surgical intervention is not to restore hearing, but to prevent meningitis, encephalitis, or other infections that can negatively affect the brain.     The risks and benefits of a mastoidectomy were discussed. The risks include but are not limited to: Worsened hearing which may require further surgery, profound and irreversible hearing loss, dizziness, damage to the taste nerve, damage to the facial nerve, requiring further surgery and infection. It has been explained that there is a second revision surgery that may be required to ensure that no residual cholesteatoma was left behind. Postoperative restrictions were discussed. Because scheduling is out for a few months, they have decided to proceed with scheduling for the surgery. He has been prescribed Ciprodex drops, and has been instructed to place four drops into the left ear two times daily for 10 days.     Follow up in clinic for post-operative visits following surgery.    Scribe/Staff:    Scribe Disclosure:   Tessy PRINCE, am serving as a scribe; to document services personally performed by  Shayna Delatorre MD based on data collection and the provider's statements to me.     Provider Disclosure:  I agree with above History, Review of Systems, Physical exam and Plan.  I have reviewed the content of the documentation and have edited it as needed. I have personally performed the services documented here and the documentation accurately represents those services and the decisions I have made.      Electronically signed by:  Shayna Delatorre MD         Again, thank you for allowing me to participate in the care of your patient.        Sincerely,        Shayna Delatorre MD

## 2024-08-14 NOTE — PROGRESS NOTES
AUDIOLOGY REPORT    SUMMARY: Audiology visit completed. See audiogram for results.    RECOMMENDATIONS: Follow-up with ENT.    Darcy Bell  Doctor of Audiology  [place] License # [id number]

## 2024-08-16 ENCOUNTER — TELEPHONE (OUTPATIENT)
Dept: OTOLARYNGOLOGY | Facility: CLINIC | Age: 79
End: 2024-08-16

## 2024-08-16 ENCOUNTER — OFFICE VISIT (OUTPATIENT)
Dept: OPHTHALMOLOGY | Facility: CLINIC | Age: 79
End: 2024-08-16
Payer: COMMERCIAL

## 2024-08-16 DIAGNOSIS — H40.053 BORDERLINE GLAUCOMA WITH OCULAR HYPERTENSION, BILATERAL: Primary | ICD-10-CM

## 2024-08-16 DIAGNOSIS — Z96.1 PSEUDOPHAKIA OF BOTH EYES: ICD-10-CM

## 2024-08-16 DIAGNOSIS — Z86.69 H/O RD (RETINAL DETACHMENT): ICD-10-CM

## 2024-08-16 DIAGNOSIS — G70.00 MYASTHENIA GRAVIS, ACHR ANTIBODY POSITIVE (H): ICD-10-CM

## 2024-08-16 PROCEDURE — 92012 INTRM OPH EXAM EST PATIENT: CPT | Performed by: STUDENT IN AN ORGANIZED HEALTH CARE EDUCATION/TRAINING PROGRAM

## 2024-08-16 PROCEDURE — 92083 EXTENDED VISUAL FIELD XM: CPT | Performed by: STUDENT IN AN ORGANIZED HEALTH CARE EDUCATION/TRAINING PROGRAM

## 2024-08-16 PROCEDURE — 92133 CPTRZD OPH DX IMG PST SGM ON: CPT | Performed by: STUDENT IN AN ORGANIZED HEALTH CARE EDUCATION/TRAINING PROGRAM

## 2024-08-16 ASSESSMENT — SLIT LAMP EXAM - LIDS
COMMENTS: 1+ DERMATOCHALASIS
COMMENTS: 1+ DERMATOCHALASIS

## 2024-08-16 ASSESSMENT — VISUAL ACUITY
OS_PH_CC: 20/50
OS_CC+: +2
OD_CC+: -1
CORRECTION_TYPE: GLASSES
METHOD: SNELLEN - LINEAR
OD_CC: 20/25
OS_CC: 20/70

## 2024-08-16 ASSESSMENT — EXTERNAL EXAM - LEFT EYE: OS_EXAM: NORMAL

## 2024-08-16 ASSESSMENT — TONOMETRY
OS_IOP_MMHG: 17
IOP_METHOD: APPLANATION
OD_IOP_MMHG: 16

## 2024-08-16 ASSESSMENT — EXTERNAL EXAM - RIGHT EYE: OD_EXAM: NORMAL

## 2024-08-16 NOTE — PATIENT INSTRUCTIONS
"Continue Latanoprost (green top) every evening both eyes     Continue Timolol (yellow top) twice a day in both eyes     Use artificial tears up to four times a day (Refresh Optive, Systane Balance, or TheraTears. Avoid generic artificial tears or \"get the red out\" drops).     Mateo Gray MD  [phone]   "

## 2024-08-16 NOTE — PROGRESS NOTES
" Current Eye Medications:  Timolol - both eyes BID - last dose: 8am today   Latanoprost - both eyes qhs - last dose: 10pm yesterday     Subjective: HVF, OCT and IOP - admits that he sometimes forgets to use PM dose of Timolol but is mostly consistent with AM timolol and PM Latanoprost.  States that vision left eye can fluctuate but attributes that to retinal issues. No vision concerns right eye.     Objective:  See Ophthalmology Exam.       Assessment:  Rakan Nolasco is a 79 year old male who presents with:   Encounter Diagnoses   Name Primary?    Borderline glaucoma with ocular hypertension, bilateral Intraocular pressure 16/17 today. Continue same medications.    OCT optic nerve: avg retinal nerve fiber layer 82/90; thin sup and stable right eye; within normal limits left eye     Sifuentes visual field (HVF) 24-2: borderline (some sup loss) right eye; within normal limits left eye     Pseudophakia of both eyes s/p YAG OD     Myasthenia gravis, AChR antibody positive (H)     H/O RD (retinal detachment) OU s/p repair with PPV (AB)        Plan:  Continue Latanoprost (green top) every evening both eyes     Continue Timolol (yellow top) twice a day in both eyes     Use artificial tears up to four times a day (Refresh Optive, Systane Balance, or TheraTears. Avoid generic artificial tears or \"get the red out\" drops).     Mateo Gray MD  [phone]     "

## 2024-08-16 NOTE — TELEPHONE ENCOUNTER
Patient called in requesting to schedule surgery for MASTOIDECTOMY (Left) with Dr. Delatorre. Let him know Dr. Delatorre doesn't typically operate at the [facility] and I will need to coordinate a time and date with him before scheduling. Patient said he would like next available and didn't have anything specific in mind    Kody Wolf  8/16/2024 at 11:02 AM

## 2024-08-16 NOTE — LETTER
"8/16/2024      Rakan Nolasco  [address] [facility] [address]      Dear Colleague,    Thank you for referring your patient, Rakan Nolasco, [facility]. Please see a copy of my visit note below.     Current Eye Medications:  Timolol - both eyes BID - last dose: 8am today   Latanoprost - both eyes qhs - last dose: 10pm yesterday     Subjective: HVF, OCT and IOP - admits that he sometimes forgets to use PM dose of Timolol but is mostly consistent with AM timolol and PM Latanoprost.  States that vision left eye can fluctuate but attributes that to retinal issues. No vision concerns right eye.     Objective:  See Ophthalmology Exam.       Assessment:  Rakan Nolasco is a 79 year old male who presents with:   Encounter Diagnoses   Name Primary?     Borderline glaucoma with ocular hypertension, bilateral Intraocular pressure 16/17 today. Continue same medications.    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 Avoid generic artificial tears or \"get the red out\" drops).     Mateo Gray MD  [phone]       Again, thank you for allowing me to participate in the care of your patient.        Sincerely,        Mateo Gray MD  "

## 2024-08-20 LAB
PATH REPORT.COMMENTS IMP SPEC: NORMAL
PATH REPORT.COMMENTS IMP SPEC: NORMAL
PATH REPORT.FINAL DX SPEC: NORMAL
PATH REPORT.GROSS SPEC: NORMAL
PATH REPORT.MICROSCOPIC SPEC OTHER STN: NORMAL
PATH REPORT.RELEVANT HX SPEC: NORMAL
PHOTO IMAGE: NORMAL

## 2024-08-23 NOTE — TELEPHONE ENCOUNTER
Patients wife called in to check on if we have a date for his surgery yet. Let her know we are still working on getting OR time and will let her know once we have a date    Kody Wolf  8/23/2024 at 8:35 AM

## 2024-08-26 ENCOUNTER — THERAPY VISIT (OUTPATIENT)
Dept: PHYSICAL THERAPY | Facility: CLINIC | Age: 79
End: 2024-08-26
Attending: FAMILY MEDICINE
Payer: COMMERCIAL

## 2024-08-26 DIAGNOSIS — M54.50 LUMBAR SPINE PAIN: Primary | ICD-10-CM

## 2024-08-26 PROCEDURE — 97140 MANUAL THERAPY 1/> REGIONS: CPT | Mod: GP | Performed by: PHYSICAL THERAPIST

## 2024-08-26 PROCEDURE — 97110 THERAPEUTIC EXERCISES: CPT | Mod: GP | Performed by: PHYSICAL THERAPIST

## 2024-08-26 NOTE — TELEPHONE ENCOUNTER
Scheduled surgery with Dr. Delatorre on 10/21/2024    Spoke with: Patient    Surgery is located at [facility] [facility]    Patient will be seen for their H&P by their PCP Telly Lucio MD within 30 days of surgery - Confirmed PCP on file is up to date     Does patient need a consult before upcoming surgery? No    Anesthesia type: General    Requested Imaging required for surgery: No    Patient needs scheduled for post op, unsure when    Patient will receive their surgery packet & surgical soap via mail per their preference - Confirmed address on file is up to date    Patient was not provided a start time for surgery & is aware they will receive this information 2-3 days before surgery    Additional comments:   Patient wanted to know if Dr. Barajas wants him to be on ear drops for longer than 10 days as there is 1 refill     Patient was instructed to call back with any further questions or concerns.     Maryse Quinones on 8/26/2024 at 3:45 PM

## 2024-08-29 NOTE — TELEPHONE ENCOUNTER
Called patient and offered to move surgery to 10/14. Patient declined and requested to keep surgery on the date he is on    Kody Wolf  8/29/2024 at 11:23 AM

## 2024-09-03 ENCOUNTER — THERAPY VISIT (OUTPATIENT)
Dept: PHYSICAL THERAPY | Facility: CLINIC | Age: 79
End: 2024-09-03
Attending: FAMILY MEDICINE
Payer: COMMERCIAL

## 2024-09-03 DIAGNOSIS — M54.50 LUMBAR SPINE PAIN: Primary | ICD-10-CM

## 2024-09-03 PROCEDURE — 97140 MANUAL THERAPY 1/> REGIONS: CPT | Mod: GP | Performed by: PHYSICAL THERAPIST

## 2024-09-03 PROCEDURE — 97110 THERAPEUTIC EXERCISES: CPT | Mod: GP | Performed by: PHYSICAL THERAPIST

## 2024-09-13 ENCOUNTER — THERAPY VISIT (OUTPATIENT)
Dept: PHYSICAL THERAPY | Facility: CLINIC | Age: 79
End: 2024-09-13
Payer: COMMERCIAL

## 2024-09-13 DIAGNOSIS — M54.50 LUMBAR SPINE PAIN: Primary | ICD-10-CM

## 2024-09-13 PROCEDURE — 97140 MANUAL THERAPY 1/> REGIONS: CPT | Mod: GP | Performed by: PHYSICAL THERAPIST

## 2024-09-13 PROCEDURE — 97110 THERAPEUTIC EXERCISES: CPT | Mod: GP | Performed by: PHYSICAL THERAPIST

## 2024-09-27 DIAGNOSIS — R60.0 PEDAL EDEMA: ICD-10-CM

## 2024-09-27 DIAGNOSIS — G70.00 OCULAR MYASTHENIA (H): ICD-10-CM

## 2024-09-27 RX ORDER — PYRIDOSTIGMINE BROMIDE 60 MG/1
TABLET ORAL
Qty: 450 TABLET | Refills: 0 | Status: SHIPPED | OUTPATIENT
Start: 2024-09-27

## 2024-09-27 RX ORDER — FUROSEMIDE 20 MG
TABLET ORAL
Qty: 270 TABLET | Refills: 0 | Status: SHIPPED | OUTPATIENT
Start: 2024-09-27

## 2024-09-27 NOTE — TELEPHONE ENCOUNTER
fluticasone-salmeterol (ADVAIR) 250-50 MCG/ACT inhaler TAKE 1 PUFF BY MOUTH TWICE A DAY Strength: 250-50 MCG/ACT 3 each 3 ordered 07/12/2023       Last Office Visit: 05/15/2024      Arun Lyn LPN  Pulmonary Medicine:  [facility]  Phone: [phone] Fax: [phone]    
27-Sep-2024 17:13

## 2024-10-01 ENCOUNTER — OFFICE VISIT (OUTPATIENT)
Dept: FAMILY MEDICINE | Facility: CLINIC | Age: 79
End: 2024-10-01
Payer: COMMERCIAL

## 2024-10-01 VITALS
WEIGHT: 231.6 LBS | OXYGEN SATURATION: 97 % | BODY MASS INDEX: 34.3 KG/M2 | HEART RATE: 59 BPM | TEMPERATURE: 97.3 F | SYSTOLIC BLOOD PRESSURE: 138 MMHG | RESPIRATION RATE: 18 BRPM | HEIGHT: 69 IN | DIASTOLIC BLOOD PRESSURE: 82 MMHG

## 2024-10-01 DIAGNOSIS — Z01.818 PREOP GENERAL PHYSICAL EXAM: Primary | ICD-10-CM

## 2024-10-01 DIAGNOSIS — G70.00 MYASTHENIA GRAVIS (H): ICD-10-CM

## 2024-10-01 DIAGNOSIS — J44.9 CHRONIC OBSTRUCTIVE PULMONARY DISEASE, UNSPECIFIED COPD TYPE (H): ICD-10-CM

## 2024-10-01 DIAGNOSIS — E78.5 HYPERLIPIDEMIA LDL GOAL <100: ICD-10-CM

## 2024-10-01 DIAGNOSIS — E11.69 TYPE 2 DIABETES MELLITUS WITH OTHER SPECIFIED COMPLICATION, WITHOUT LONG-TERM CURRENT USE OF INSULIN (H): ICD-10-CM

## 2024-10-01 DIAGNOSIS — G47.33 OSA (OBSTRUCTIVE SLEEP APNEA): ICD-10-CM

## 2024-10-01 DIAGNOSIS — H66.12 CHRONIC TUBOTYMPANIC SUPPURATIVE OTITIS MEDIA OF LEFT EAR: ICD-10-CM

## 2024-10-01 DIAGNOSIS — I10 HTN, GOAL BELOW 140/90: ICD-10-CM

## 2024-10-01 LAB
ANION GAP SERPL CALCULATED.3IONS-SCNC: 9 MMOL/L (ref 7–15)
BUN SERPL-MCNC: 15.3 MG/DL (ref 8–23)
CALCIUM SERPL-MCNC: 9.3 MG/DL (ref 8.8–10.4)
CHLORIDE SERPL-SCNC: 99 MMOL/L (ref 98–107)
CHOLEST SERPL-MCNC: 155 MG/DL
CREAT SERPL-MCNC: 0.89 MG/DL (ref 0.67–1.17)
EGFRCR SERPLBLD CKD-EPI 2021: 88 ML/MIN/1.73M2
EST. AVERAGE GLUCOSE BLD GHB EST-MCNC: 131 MG/DL
FASTING STATUS PATIENT QL REPORTED: ABNORMAL
FASTING STATUS PATIENT QL REPORTED: ABNORMAL
GLUCOSE SERPL-MCNC: 122 MG/DL (ref 70–99)
HBA1C MFR BLD: 6.2 % (ref 0–5.6)
HCO3 SERPL-SCNC: 27 MMOL/L (ref 22–29)
HDLC SERPL-MCNC: 57 MG/DL
LDLC SERPL CALC-MCNC: 58 MG/DL
NONHDLC SERPL-MCNC: 98 MG/DL
POTASSIUM SERPL-SCNC: 4.5 MMOL/L (ref 3.4–5.3)
SODIUM SERPL-SCNC: 135 MMOL/L (ref 135–145)
TRIGL SERPL-MCNC: 199 MG/DL

## 2024-10-01 PROCEDURE — 80048 BASIC METABOLIC PNL TOTAL CA: CPT | Performed by: FAMILY MEDICINE

## 2024-10-01 PROCEDURE — 91320 SARSCV2 VAC 30MCG TRS-SUC IM: CPT | Performed by: FAMILY MEDICINE

## 2024-10-01 PROCEDURE — 83036 HEMOGLOBIN GLYCOSYLATED A1C: CPT | Performed by: FAMILY MEDICINE

## 2024-10-01 PROCEDURE — 90480 ADMN SARSCOV2 VAC 1/ONLY CMP: CPT | Performed by: FAMILY MEDICINE

## 2024-10-01 PROCEDURE — 99214 OFFICE O/P EST MOD 30 MIN: CPT | Mod: 25 | Performed by: FAMILY MEDICINE

## 2024-10-01 PROCEDURE — 90662 IIV NO PRSV INCREASED AG IM: CPT | Performed by: FAMILY MEDICINE

## 2024-10-01 PROCEDURE — 36415 COLL VENOUS BLD VENIPUNCTURE: CPT | Performed by: FAMILY MEDICINE

## 2024-10-01 PROCEDURE — G0008 ADMIN INFLUENZA VIRUS VAC: HCPCS | Performed by: FAMILY MEDICINE

## 2024-10-01 PROCEDURE — 80061 LIPID PANEL: CPT | Performed by: FAMILY MEDICINE

## 2024-10-01 ASSESSMENT — PAIN SCALES - GENERAL: PAINLEVEL: NO PAIN (0)

## 2024-10-01 NOTE — PROGRESS NOTES
Preoperative Evaluation  [address]  [place] [address]  Phone: [phone]  Primary Provider: Telly Lucio MD  Pre-op Performing Provider: Telly Lucio MD  Oct 1, 2024     10/1: preop: mastoidectomy: chronic tubotympanic suppurative OM lt ear, stress 7/24  Meds: Lisinopril, amlodipine, iron, protonix, prednisone 10 daily, trileptal, pyridostigmine, wixella, lasix, metformin, lipitor,       9/30/2024   Surgical Information   What procedure is being done? Ear surgery   Facility or Hospital where procedure/surgery will be performed: [facility]   Who is doing the procedure / surgery? Dr. Shayna Elias   Date of surgery / procedure: October 21 2024   Time of surgery / procedure: TBD   Where do you plan to recover after surgery? at home with family        Fax number for surgical facility: Note does not need to be faxed, will be available electronically in Epic.    Assessment & Plan     The proposed surgical procedure is considered LOW risk.    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     -  planned for mastoidectomy    Type 2 diabetes mellitus with other specified complication, without long-term current use of insulin (H)    Well controlled  - Lipid panel reflex to direct LDL Non-fasting  - HEMOGLOBIN A1C  - Lipid panel reflex to direct LDL Non-fasting  - HEMOGLOBIN A1C    HTN, goal below 140/90    -  well controlled    Myasthenia gravis (H)   - On Pyridostigmine    Hyperlipidemia LDL goal <100   - taking statin    ROSIE (obstructive sleep apnea)   - using CPAP    Chronic obstructive pulmonary disease, unspecified COPD type (H)     - doing Wixella and albuterol as needed      Risks and Recommendations  The  patient has the following additional risks and recommendations for perioperative complications:    Obstructive Sleep Apnea:     CPAP: Bring to hospital on the day of surgery.    Antiplatelet or Anticoagulation Medication Instructions   - Patient is on no antiplatelet or anticoagulation medications.    Additional Medication Instructions   - ACE/ARB (Lisinopril): DO NOT TAKE on day of surgery (minimum 11 hours for general anesthesia).     - Calcium Channel Blockers (Amlodipine): May be continued on the day of surgery.     - Diuretics (lasix): DO NOT TAKE on the day of surgery.     - Statins(Atorvastatin): Continue taking on the day of surgery.      - metformin: DO NOT TAKE day of surgery.     - Herbal medications and vitamins: DO NOT TAKE 14 days prior to surgery.     - Antiepileptics (Oxcarbazepine): Continue without modification.     - Benzodiazepines (Klonopin): Continue without modification.     - LABA, inhaled corticosteroid, long-acting anticholinergics: Continue without modification.     - rescue Inhaler: Continue PRN. Bring to hospital on the day of surgery.     - Other: Pyridostigmine: May be continued on the day of surgery.    Recommendation  Approval given to proceed with proposed procedure, without further diagnostic evaluation.    Subjective   Rakan is a 79 year old, presenting for the following:  No chief complaint on file.      HPI related to upcoming procedure: planned for mastoidectomy for chronic tubotympanic suppurative otitis media of the lt ear.        9/30/2024   Pre-Op Questionnaire   Have you ever had a heart attack or stroke? No   Have you ever had surgery on your heart or blood vessels, such as a stent placement, a coronary artery bypass, or surgery on an artery in your head, neck, heart, or legs? No   Do you have chest pain with activity? No   Do you have a history of heart failure? No   Do you currently have a cold, bronchitis or symptoms of other infection? No   Do you have a cough,  shortness of breath, or wheezing? No   Do you or anyone in your family have previous history of blood clots? No   Do you or does anyone in your family have a serious bleeding problem such as prolonged bleeding following surgeries or cuts? No   Have you ever had problems with anemia or been told to take iron pills? (!) YES    Have you had any abnormal blood loss such as black, tarry or bloody stools? No   Have you ever had a blood transfusion? No   Are you willing to have a blood transfusion if it is medically needed before, during, or after your surgery? Yes   Have you or any of your relatives ever had problems with anesthesia? No   Do you have sleep apnea, excessive snoring or daytime drowsiness? (!) YES   Do you have a CPAP machine? Yes   Do you have any artifical heart valves or other implanted medical devices like a pacemaker, defibrillator, or continuous glucose monitor? No   Do you have artificial joints? (!) YES   Are you allergic to latex? No        Health Care Directive  Patient has a Health Care Directive on file      Preoperative Review of    reviewed - no record of controlled substances prescribed.      Status of Chronic Conditions:  ANEMIA - Patient has a past history of mild anemia, which has not been symptomatic.    COPD - Patient has a longstanding history of moderate-severe COPD . Patient has been doing well overall noting NO SYMPTOMS and continues on medication regimen consisting of Wixella and albuterol as needed without adverse reactions or side effects.    DIABETES - Patient has a longstanding history of DiabetesType Type II . Patient is being treated with oral agents and denies significant side effects. Control has been good. Complicating factors include but are not limited to: hypertension and hyperlipidemia.     HYPERLIPIDEMIA - Patient has a long history of significant Hyperlipidemia requiring medication for treatment with recent good control. Patient reports no problems or side  effects with the medication.     HYPERTENSION - Patient has longstanding history of HTN , currently denies any symptoms referable to elevated blood pressure. Specifically denies chest pain, palpitations, dyspnea, orthopnea, PND or peripheral edema. Blood pressure readings have been in normal range. Current medication regimen is as listed below. Patient denies any side effects of medication.     SLEEP PROBLEM - Patient has ROSIE and is on CPAP.       Patient Active Problem List    Diagnosis Date Noted    Lumbar spine pain 07/30/2024     Priority: Medium    Chronic pain of left knee 04/04/2024     Priority: Medium    Osteoarthritis of left knee, unspecified osteoarthritis type 04/01/2024     Priority: Medium    Anemia 09/21/2022     Priority: Medium    Combined form of age-related cataract, left eye 12/15/2021     Priority: Medium     Added automatically from request for surgery [id number]      COPD (chronic obstructive pulmonary disease) (H) 10/28/2020     Priority: Medium    Hyponatremia 10/01/2020     Priority: Medium    Temporal arteritis (H) 02/20/2019     Priority: Medium    Type 2 diabetes mellitus with other specified complication, without long-term current use of insulin (H) 02/20/2019     Priority: Medium    Right posterior capsular opacification 04/09/2018     Priority: Medium    Morbid obesity (H) 02/20/2018     Priority: Medium    Prostate cancer (H) 01/12/2018     Priority: Medium     prostate cancer s/p cryotherapy on 3/18       Myasthenia gravis (H) 10/16/2017     Priority: Medium    Pseudophakia of right eye 02/21/2017     Priority: Medium    Obstructive sleep apnea syndrome 01/25/2017     Priority: Medium    Restless legs syndrome 10/12/2016     Priority: Medium    H/O RD (retinal detachment) s/p repair with PPV (AB) 05/26/2016     Priority: Medium    Epiretinal membrane, bilateral 05/26/2016     Priority: Medium    PVD (posterior vitreous detachment) OU 06/17/2014     Priority: Medium    Dry eyes  01/21/2014     Priority: Medium    Arthritis      Priority: Medium    HTN (hypertension) 07/19/2012     Priority: Medium    Trigeminal neuralgia pain 01/04/2012     Priority: Medium    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     Priority: Medium     Target IOP: 21  Peak IOP: 31  GDX: abnormal both eyes  OCT: nl  HVF: nl  Pachymetry:  C/D:  0.2/0.35  SLT:          HLD (hyperlipidemia) 10/31/2010     Priority: Medium    GERD (gastroesophageal reflux disease)      Priority: Medium      Past Medical History:   Diagnosis Date    Arthritis         BMI 31.0-31.9,adult     Cancer (H) 01/10/2018    Prostate    COPD (chronic obstructive pulmonary disease) (H) 10/28/2020    Demand ischemia (H)     Diabetes (H) 01/10/2018    Diverticulosis     Colonoscopy 8/2008    Fatty liver     see US  5/2012    GERD (gastroesophageal reflux disease)     Glaucoma (increased eye pressure)     HTN (hypertension)     Hyperlipidemia LDL goal < 130     age, htn, fhx    Irritable bowel     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     Nonsenile cataract     Obstructive sleep apnea     Obstructive sleep apnea syndrome     ROSIE (obstructive sleep apnea) 01/2011    Using CPAP;     Prediabetes     Restless leg syndrome     Retinal detachment     Sleep apnea     Trigeminal neuralgia pain 01/04/2012     Past Surgical History:   Procedure Laterality Date    ARTHROPLASTY KNEE Left 4/1/2024    Procedure: ARTHROPLASTY, LEFT KNEE, TOTAL;  Surgeon: Dandre Hassan MD;  Location: [facility]    BIOPSY ARTERY TEMPORAL Bilateral 08/01/2017    Procedure: BIOPSY ARTERY TEMPORAL;  Bilateral temporal artery Biopsy;  Surgeon: Jj Tello MD;  Location: [facility]    CATARACT IOL, RT/LT Right     COLONOSCOPY      ESOPHAGOSCOPY, GASTROSCOPY, DUODENOSCOPY (EGD), COMBINED  01/15/2014    Procedure: COMBINED ESOPHAGOSCOPY, GASTROSCOPY, DUODENOSCOPY (EGD), BIOPSY SINGLE OR MULTIPLE;;  Surgeon: Winston Nixon,  MD;  Location: [facility]    LASER YAG CAPSULOTOMY Right 04/09/2018    right eye    PHACOEMULSIFICATION CLEAR CORNEA WITH STANDARD INTRAOCULAR LENS IMPLANT Right 02/20/2017    Procedure: PHACOEMULSIFICATION CLEAR CORNEA WITH STANDARD INTRAOCULAR LENS IMPLANT;  Surgeon: Mateo Gray MD;  Location: [facility]    PHACOEMULSIFICATION CLEAR CORNEA WITH STANDARD INTRAOCULAR LENS IMPLANT Left 01/10/2022    Procedure: LEFT PHACOEMULSIFICATION, CATARACT, WITH INTRAOCULAR LENS IMPLANT;  Surgeon: Mateo Gray MD;  Location: [facility]    AL TRABECULOPLASTY BY LASER SURGERY      RETINAL REATTACHMENT Right     SURGICAL HISTORY OF -   08/2009    Both Eyelids-.    TONSILLECTOMY  as child     Current Outpatient Medications   Medication Sig Dispense Refill    acetaminophen (TYLENOL) 325 MG tablet Take 2 tablets (650 mg) by mouth every 4 hours as needed for other (For optimal non-opioid multimodal pain management to improve pain control.) 100 tablet 0    albuterol (PROAIR HFA/PROVENTIL HFA/VENTOLIN HFA) 108 (90 Base) MCG/ACT inhaler INHALE 1 PUFF INTO THE LUNGS EVERY 4 HOURS AS NEEDED FOR SHORTNESS OF BREATH, WHEEZING OR COUGH 18 g 11    amLODIPine (NORVASC) 10 MG tablet TAKE 1 TABLET (10 MG) BY MOUTH DAILY. 90 tablet 0    amoxicillin (AMOXIL) 500 MG capsule Take 4 caps (2000mg) 30-60 minutes before dental procedure 4 capsule 1    amoxicillin (AMOXIL) 500 MG capsule Take 4 caps (2000mg) 30-60 minutes before dental procedure 4 capsule 0    ARTIFICIAL TEAR OP Apply 1 drop to eye as needed      atorvastatin (LIPITOR) 20 MG tablet Take 20 mg by mouth at bedtime      blood glucose (ONETOUCH VERIO IQ) test strip Use to test blood sugar 3 times daily or as directed. 300 strip 1    blood glucose monitoring (NO BRAND SPECIFIED) meter device kit Use to test blood sugar 1 times daily or as directed. 1 kit 0    ClonAZEPAM (KLONOPIN) 0.5 MG tablet Take 0.5 mg by mouth Take 1 tablet by mouth daily at bedtime      Continuous Glucose Sensor  (FREESTYLE SARAHY 2 SENSOR) MISC USE 1 SENSOR EVERY 14 DAYS. USE TO READ BLOOD SUGARS PER 'S INSTRUCTIONS. 2 each 5    ferrous sulfate (FEROSUL) 325 (65 Fe) MG tablet TAKE 1 TABLET BY MOUTH EVERY DAY WITH BREAKFAST 90 tablet 1    fluticasone-salmeterol (ADVAIR) 250-50 MCG/ACT inhaler TAKE 1 PUFF BY MOUTH TWICE A DAY Strength: 250-50 MCG/ACT 3 each 3    fluticasone-salmeterol (WIXELA INHUB) 250-50 MCG/ACT inhaler Inhale 1 puff into the lungs every 12 hours 3 each 3    furosemide (LASIX) 20 MG tablet TAKE 2 TABLETS BY MOUTH IN THE MORNING AND 1 TABLET BY MOUTH AT LEAST 6 HOURS LATER IN THE AFTERNOON. 270 tablet 0    Lancets (ONETOUCH DELICA PLUS [id number]) MISC USE ONCE DAILY AS DIRECTED 100 each 1    latanoprost (XALATAN) 0.005 % ophthalmic solution Place 1 drop into both eyes at bedtime 7.5 mL 3    lisinopril (ZESTRIL) 40 MG tablet TAKE 1 TABLET BY MOUTH EVERY DAY 90 tablet 0    metFORMIN (GLUCOPHAGE XR) 500 MG 24 hr tablet TAKE 1 TABLET BY MOUTH TWICE A DAY WITH FOOD 180 tablet 3    OXcarbazepine (TRILEPTAL) 300 MG tablet TAKE 2 TABLETS IN THE MORNING AND 2 TABLETS IN THE EVENING 360 tablet 0    pantoprazole (PROTONIX) 20 MG EC tablet TAKE 1 TABLET BY MOUTH EVERY DAY 90 tablet 0    prednisoLONE acetate (PRED FORTE) 1 % ophthalmic suspension INSTILL 1 DROP LEFT EYE 4 TIMES A DAY      predniSONE (DELTASONE) 5 MG tablet TAKE 2 TABLETS BY MOUTH EVERY DAY 60 tablet 2    pyRIDostigmine (MESTINON) 60 MG tablet TAKE 2 TABLETS (120 MG) IN THE MORNING AND AFTERNOON AND 1 TABLET IN THE EVENING PER 4/22/2024 APPT. 450 tablet 0    pyRIDostigmine (MESTINON) 60 MG tablet Take 120 mg by mouth every morning      pyRIDostigmine (MESTINON) 60 MG tablet Take 60 mg by mouth 2 times daily Midday and 6 PM.      sodium bicarbonate 650 MG tablet Take 1 tablet (650 mg) by mouth 2 times daily 180 tablet 1    timolol maleate (TIMOPTIC) 0.5 % ophthalmic solution Place 1 drop into both eyes 2 times daily 5 mL 11     ciprofloxacin-dexAMETHasone (CIPRODEX) 0.3-0.1 % otic suspension Place 4 drops Into the left ear 2 times daily (Patient not taking: Reported on 10/1/2024) 7.5 mL 1    polyethylene glycol (MIRALAX) 17 GM/Dose powder Take 17 g by mouth daily (Patient not taking: Reported on 10/1/2024) 510 g 0    senna-docusate (SENOKOT-S/PERICOLACE) 8.6-50 MG tablet Take 1 tablet by mouth 2 times daily (Patient not taking: Reported on 10/1/2024) 60 tablet 0       Allergies   Allergen Reactions    Azathioprine Shortness Of Breath and Other (See Comments)     Was hospitalized from it. Also had high fever, chills, and shortness of breath.    Sulfamethoxazole-Trimethoprim Cough, Itching and Rash     Generalized painful red rash on legs arms and abdomen, chest, back, cough and  fever  that required hospitalization.    Ciprofloxacin Muscle Pain (Myalgia)     Muscle pain  Other reaction(s): Myalgia  Other reaction(s): Myalgia    Ropinirole      Leg pan  GI  Weakness; ? sycope  Other reaction(s): Leg Pain        Social History     Tobacco Use    Smoking status: Former     Current packs/day: 0.00     Average packs/day: 2.0 packs/day for 15.0 years (30.0 ttl pk-yrs)     Types: Cigarettes     Start date: 1968     Quit date: 1983     Years since quittin.7     Passive exposure: Past    Smokeless tobacco: Former     Quit date: 1970    Tobacco comments:     smoke free household.   Substance Use Topics    Alcohol use: Not Currently     Comment: Quit in      Family History   Problem Relation Age of Onset    Respiratory Mother         copd    LUNG DISEASE Mother     Cerebrovascular Disease Father     Cancer Father     Other Cancer Father     Allergies Brother     Cancer Maternal Grandmother     Heart Disease Paternal Grandmother     Glaucoma Maternal Aunt     Macular Degeneration No family hx of     Anesthesia Reaction No family hx of     Deep Vein Thrombosis (DVT) No family hx of      History   Drug Use No        Review of  "Systems  Constitutional, neuro, endocrine, pulmonary, cardiac, gastrointestinal, genitourinary, musculoskeletal, integument and psychiatric systems are negative, except as otherwise noted.    Objective    BP (!) 158/79   Pulse 59   Temp 97.3  F (36.3  C) (Temporal)   Resp 18   Ht 1.76 m (5' 9.29\")   Wt 105.1 kg (231 lb 9.6 oz)   SpO2 97%   BMI 33.91 kg/m     Estimated body mass index is 33.91 kg/m  as calculated from the following:    Height as of this encounter: 1.76 m (5' 9.29\").    Weight as of this encounter: 105.1 kg (231 lb 9.6 oz).  Physical Exam  GENERAL: alert and no distress  EYES: Eyes grossly normal to inspection, PERRL and conjunctivae and sclerae normal  HENT: Nose and mouth without ulcers or lesions  NECK: no adenopathy, no asymmetry, masses, or scars  RESP: lungs clear to auscultation - no rales, rhonchi or wheezes  CV: regular rate and rhythm, no murmur, click or rub, no peripheral edema  ABDOMEN: soft, nontender, no masses and bowel sounds normal  MS: no gross musculoskeletal defects noted, no edema  SKIN: no suspicious lesions or rashes  NEURO: Normal strength and tone, mentation intact and speech normal  PSYCH: mentation appears normal, affect normal/bright    Recent Labs   Lab Test 07/03/24  0858 06/14/24  1509 06/07/24  1201 06/07/24  1201 04/02/24  0544 11/10/23  0824   HGB  --   --   --   --  12.3*  --     131*   < > 128*  --   --    POTASSIUM 4.0  --   --   --   --   --    CR 0.98  --   --   --   --   --    A1C  --   --   --  5.9*  --  6.3*    < > = values in this interval not displayed.        Diagnostics  Labs pending at this time.  Results will be reviewed when available.   No EKG this visit, completed in the last 90 days.  Results for orders placed or performed in visit on 10/01/24   HEMOGLOBIN A1C     Status: Abnormal   Result Value Ref Range    Estimated Average Glucose 131 (H) <117 mg/dL    Hemoglobin A1C 6.2 (H) 0.0 - 5.6 %       Revised Cardiac Risk Index (RCRI)  The " patient has the following serious cardiovascular risks for perioperative complications:   - No serious cardiac risks = 0 points     RCRI Interpretation: 0 points: Class I (very low risk - 0.4% complication rate)         Signed Electronically by: Telly Lucio MD  A copy of this evaluation report is provided to the requesting physician.

## 2024-10-01 NOTE — PATIENT INSTRUCTIONS
Pre Op Recommendations:      Risks and Recommendations  The patient has the following additional risks and recommendations for perioperative complications:    Obstructive Sleep Apnea:     CPAP: Bring to hospital on the day of surgery.    Antiplatelet or Anticoagulation Medication Instructions   - Patient is on no antiplatelet or anticoagulation medications.    Additional Medication Instructions   - ACE/ARB (Lisinopril): DO NOT TAKE on day of surgery (minimum 11 hours for general anesthesia).     - Calcium Channel Blockers (Amlodipine): May be continued on the day of surgery.     - Diuretics (lasix): DO NOT TAKE on the day of surgery.     - Statins(Atorvastatin): Continue taking on the day of surgery.      - metformin: DO NOT TAKE day of surgery.     - Herbal medications and vitamins: DO NOT TAKE 14 days prior to surgery.     - Antiepileptics (Oxcarbazepine): Continue without modification.     - Benzodiazepines (Klonopin): Continue without modification.     - LABA, inhaled corticosteroid, long-acting anticholinergics: Continue without modification.     - rescue Inhaler: Continue PRN. Bring to hospital on the day of surgery.     - Other: Pyridostigmine: May be continued on the day of surgery.    Recommendation  Approval given to proceed with proposed procedure, without further diagnostic evaluation.

## 2024-10-02 ENCOUNTER — MYC MEDICAL ADVICE (OUTPATIENT)
Dept: NEUROLOGY | Facility: CLINIC | Age: 79
End: 2024-10-02
Payer: COMMERCIAL

## 2024-10-02 DIAGNOSIS — G50.0 TRIGEMINAL NEURALGIA: ICD-10-CM

## 2024-10-02 RX ORDER — OXCARBAZEPINE 300 MG/1
TABLET, FILM COATED ORAL
Qty: 180 TABLET | Refills: 11 | Status: SHIPPED | OUTPATIENT
Start: 2024-10-02 | End: 2024-11-04

## 2024-10-09 ENCOUNTER — THERAPY VISIT (OUTPATIENT)
Dept: PHYSICAL THERAPY | Facility: CLINIC | Age: 79
End: 2024-10-09
Payer: COMMERCIAL

## 2024-10-09 DIAGNOSIS — M54.50 LUMBAR SPINE PAIN: Primary | ICD-10-CM

## 2024-10-09 PROCEDURE — 97110 THERAPEUTIC EXERCISES: CPT | Mod: GP | Performed by: PHYSICAL THERAPIST

## 2024-10-09 PROCEDURE — 97112 NEUROMUSCULAR REEDUCATION: CPT | Mod: GP | Performed by: PHYSICAL THERAPIST

## 2024-10-09 NOTE — PROGRESS NOTES
[facility]                                                                                   OUTPATIENT PHYSICAL THERAPY    PLAN OF TREATMENT FOR OUTPATIENT REHABILITATION   Patient's Last Name, First Name, Rakan Gil YOB: 1945   Provider's Name   [facility]   Medical Record No.  [id number]     Onset Date: 07/24/24 (Date of order)  Start of Care Date: 07/30/24     Medical Diagnosis:  Chronic bilateral low back pain with bilateral sciatica / Lumbar radicular syndrome      PT Treatment Diagnosis:  Lumbar spine pain Plan of Treatment  Frequency/Duration: 1 time per week/ 8 weeks    Certification date from 09/25/24 to 11/20/24         See note for plan of treatment details and functional goals     Khadar Altamirano PT                         I CERTIFY THE NEED FOR THESE SERVICES FURNISHED UNDER        THIS PLAN OF TREATMENT AND WHILE UNDER MY CARE     (Physician attestation of this document indicates review and certification of the therapy plan).              Referring Provider:  Telly Lucio    Initial Assessment  See Epic Evaluation- Start of Care Date: 07/30/24            PLAN  1 time per week for 8 weeks    Beginning/End Dates of Progress Note Reporting Period:  07/30/24 to 10/09/2024    Referring Provider:  Telly Lucio       10/09/24 0500   Appointment Info   Signing clinician's name / credentials Massimo Altamirano PT, DPT   Total/Authorized Visits 13 (POC) (E&T)   Visits Used 5   Medical Diagnosis Chronic bilateral low back pain with bilateral sciatica / Lumbar radicular syndrome   PT Tx Diagnosis Lumbar spine pain   Other pertinent information NEXT: Standing pelvic tilts? / Flexion   Progress Note/Certification   Start of Care Date 07/30/24   Onset of illness/injury or Date of Surgery 07/24/24  (Date of order)   Therapy Frequency 1 time per week   Predicted Duration 8 weeks   Certification date from 09/25/24    Certification date to 11/20/24   Progress Note Due Date 11/20/24   Progress Note Completed Date 07/30/24   GOALS   PT Goals 2   PT Goal 1   Goal Identifier Standing   Goal Description Pt will be able to stand for 20 minutes in order to make a meal without having to sit due to back pain   Rationale to maximize safety and independence with performance of ADLs and functional tasks;to maximize safety and independence within the home;to maximize safety and independence within the community   Goal Progress Not met   Target Date 09/24/24   PT Goal 2   Goal Identifier Ambulation   Goal Description Pt will be able to ambulate for 30 minutes in order to run errands without having to sit due to pain.   Rationale to maximize safety and independence with performance of ADLs and functional tasks;to maximize safety and independence within the home;to maximize safety and independence within the community   Goal Progress Not met   Target Date 09/24/24   Subjective Report   Subjective Report Getting pain in the legs still, in the ankles. Not a lot of back pain.   Objective Measures   Objective Measures Objective Measure 1;Objective Measure 2   Objective Measure 1   Objective Measure Observation   Details Tactile and verbal cueing required for correct technique of pelvic tilts   Objective Measure 2   Objective Measure Lumbar myotomes   Details 5/5 B all   Treatment Interventions (PT)   Interventions Therapeutic Procedure/Exercise;Manual Therapy;Neuromuscular Re-education   Therapeutic Procedure/Exercise   Therapeutic Procedures: strength, endurance, ROM, flexibility minutes (31896) 30   Therapeutic Procedures Ther Proc 2;Ther Proc 3;Ther Proc 4;Ther Proc 5;Ther Proc 6;Ther Proc 7;Ther Proc 8   Ther Proc 1 NuStep x7 mins at level 6-4, seat at 10   Ther Proc 3 Seated hamstring stretch 3x30 seconds B   Ther Proc 4 Sit to stand x15 with upper extremity assist   Ther Proc 5 Pelvic tilts in supine x15 with 5 second holds   Ther Proc 6 LE  nerve glide x15 on right   Skilled Intervention Stretching and strengthening exercise education to improve function   Patient Response/Progress No increase in discomfort noted   Ther Proc 5 - Details Cuing to keep hips down on table   Neuromuscular Re-education   Neuromuscular re-ed of mvmt, balance, coord, kinesthetic sense, posture, proprioception minutes (76971) 10   Neuromuscular Re-education Neuro Re-ed 2;Neuro Re-ed 3   Neuro Re-ed 1 Semi tandem stance on floor x3 mins   Skilled Intervention Balance exercise education with core engagement   Patient Response/Progress No increase in discomfort noted   Neuro Re-ed 2 Standing marching x15 B   Neuro Re-ed 3 Narrow base of support on foam x3 mins   Education   Learner/Method Patient;No Barriers to Learning   Plan   Home program PTRx (handout)   Comments   Comments Pt states that his back pain has been less but he continues to get LE pain. Pt notes this with prolonged standing. Pt has met functional goals currently and would benefit from continued skilled physical therapy to address these deficits.   Total Session Time   Timed Code Treatment Minutes 40   Total Treatment Time (sum of timed and untimed services) 40

## 2024-10-11 DIAGNOSIS — E11.65 TYPE 2 DIABETES MELLITUS WITH HYPERGLYCEMIA, WITHOUT LONG-TERM CURRENT USE OF INSULIN (H): ICD-10-CM

## 2024-10-16 DIAGNOSIS — E61.1 IRON DEFICIENCY: ICD-10-CM

## 2024-10-16 RX ORDER — FERROUS SULFATE 325(65) MG
325 TABLET ORAL
Qty: 90 TABLET | Refills: 0 | Status: SHIPPED | OUTPATIENT
Start: 2024-10-16

## 2024-10-18 ENCOUNTER — ANESTHESIA EVENT (OUTPATIENT)
Dept: SURGERY | Facility: CLINIC | Age: 79
End: 2024-10-18
Payer: COMMERCIAL

## 2024-10-21 ENCOUNTER — ANESTHESIA (OUTPATIENT)
Dept: SURGERY | Facility: CLINIC | Age: 79
End: 2024-10-21
Payer: COMMERCIAL

## 2024-10-21 ENCOUNTER — HOSPITAL ENCOUNTER (OUTPATIENT)
Facility: CLINIC | Age: 79
Discharge: HOME | End: 2024-10-21
Attending: OTOLARYNGOLOGY | Admitting: OTOLARYNGOLOGY
Payer: COMMERCIAL

## 2024-10-21 VITALS
OXYGEN SATURATION: 94 % | SYSTOLIC BLOOD PRESSURE: 143 MMHG | HEART RATE: 77 BPM | BODY MASS INDEX: 32.76 KG/M2 | DIASTOLIC BLOOD PRESSURE: 85 MMHG | RESPIRATION RATE: 16 BRPM | HEIGHT: 70 IN | TEMPERATURE: 97.6 F | WEIGHT: 228.84 LBS

## 2024-10-21 DIAGNOSIS — Z98.890: Primary | ICD-10-CM

## 2024-10-21 LAB
GLUCOSE BLDC GLUCOMTR-MCNC: 113 MG/DL (ref 70–99)
GLUCOSE BLDC GLUCOMTR-MCNC: 161 MG/DL (ref 70–99)

## 2024-10-21 PROCEDURE — 82962 GLUCOSE BLOOD TEST: CPT

## 2024-10-21 PROCEDURE — 69501 MASTOIDECTOMY: CPT | Performed by: REGISTERED NURSE

## 2024-10-21 PROCEDURE — 250N000011 HC RX IP 250 OP 636: Performed by: NURSE ANESTHETIST, CERTIFIED REGISTERED

## 2024-10-21 PROCEDURE — 272N000001 HC OR GENERAL SUPPLY STERILE: Performed by: OTOLARYNGOLOGY

## 2024-10-21 PROCEDURE — 99100 ANES PT EXTEME AGE<1 YR&>70: CPT | Performed by: REGISTERED NURSE

## 2024-10-21 PROCEDURE — 999N000141 HC STATISTIC PRE-PROCEDURE NURSING ASSESSMENT: Performed by: OTOLARYNGOLOGY

## 2024-10-21 PROCEDURE — 88304 TISSUE EXAM BY PATHOLOGIST: CPT | Mod: TC | Performed by: OTOLARYNGOLOGY

## 2024-10-21 PROCEDURE — 250N000011 HC RX IP 250 OP 636: Performed by: ANESTHESIOLOGY

## 2024-10-21 PROCEDURE — 99100 ANES PT EXTEME AGE<1 YR&>70: CPT | Performed by: ANESTHESIOLOGY

## 2024-10-21 PROCEDURE — 258N000003 HC RX IP 258 OP 636: Performed by: OTOLARYNGOLOGY

## 2024-10-21 PROCEDURE — 258N000003 HC RX IP 258 OP 636: Performed by: NURSE ANESTHETIST, CERTIFIED REGISTERED

## 2024-10-21 PROCEDURE — 88304 TISSUE EXAM BY PATHOLOGIST: CPT | Mod: 26 | Performed by: STUDENT IN AN ORGANIZED HEALTH CARE EDUCATION/TRAINING PROGRAM

## 2024-10-21 PROCEDURE — 250N000013 HC RX MED GY IP 250 OP 250 PS 637: Performed by: STUDENT IN AN ORGANIZED HEALTH CARE EDUCATION/TRAINING PROGRAM

## 2024-10-21 PROCEDURE — 258N000003 HC RX IP 258 OP 636: Performed by: REGISTERED NURSE

## 2024-10-21 PROCEDURE — 710N000010 HC RECOVERY PHASE 1, LEVEL 2, PER MIN: Performed by: OTOLARYNGOLOGY

## 2024-10-21 PROCEDURE — 250N000009 HC RX 250: Performed by: NURSE ANESTHETIST, CERTIFIED REGISTERED

## 2024-10-21 PROCEDURE — 250N000009 HC RX 250: Performed by: OTOLARYNGOLOGY

## 2024-10-21 PROCEDURE — 360N000077 HC SURGERY LEVEL 4, PER MIN: Performed by: OTOLARYNGOLOGY

## 2024-10-21 PROCEDURE — 250N000013 HC RX MED GY IP 250 OP 250 PS 637: Performed by: ANESTHESIOLOGY

## 2024-10-21 PROCEDURE — 250N000025 HC SEVOFLURANE, PER MIN: Performed by: OTOLARYNGOLOGY

## 2024-10-21 PROCEDURE — 69501 MASTOIDECTOMY: CPT | Performed by: ANESTHESIOLOGY

## 2024-10-21 PROCEDURE — 710N000012 HC RECOVERY PHASE 2, PER MINUTE: Performed by: OTOLARYNGOLOGY

## 2024-10-21 PROCEDURE — 370N000017 HC ANESTHESIA TECHNICAL FEE, PER MIN: Performed by: OTOLARYNGOLOGY

## 2024-10-21 PROCEDURE — 250N000011 HC RX IP 250 OP 636: Performed by: OTOLARYNGOLOGY

## 2024-10-21 RX ORDER — LIDOCAINE HYDROCHLORIDE AND EPINEPHRINE 10; 10 MG/ML; UG/ML
INJECTION, SOLUTION INFILTRATION; PERINEURAL PRN
Status: DISCONTINUED | OUTPATIENT
Start: 2024-10-21 | End: 2024-10-21 | Stop reason: HOSPADM

## 2024-10-21 RX ORDER — ONDANSETRON 2 MG/ML
INJECTION INTRAMUSCULAR; INTRAVENOUS PRN
Status: DISCONTINUED | OUTPATIENT
Start: 2024-10-21 | End: 2024-10-21

## 2024-10-21 RX ORDER — OXYCODONE HYDROCHLORIDE 5 MG/1
5 TABLET ORAL
Status: COMPLETED | OUTPATIENT
Start: 2024-10-21 | End: 2024-10-21

## 2024-10-21 RX ORDER — SODIUM CHLORIDE, SODIUM LACTATE, POTASSIUM CHLORIDE, CALCIUM CHLORIDE 600; 310; 30; 20 MG/100ML; MG/100ML; MG/100ML; MG/100ML
INJECTION, SOLUTION INTRAVENOUS CONTINUOUS PRN
Status: DISCONTINUED | OUTPATIENT
Start: 2024-10-21 | End: 2024-10-21

## 2024-10-21 RX ORDER — ONDANSETRON 4 MG/1
4 TABLET, ORALLY DISINTEGRATING ORAL EVERY 30 MIN PRN
Status: DISCONTINUED | OUTPATIENT
Start: 2024-10-21 | End: 2024-10-21 | Stop reason: HOSPADM

## 2024-10-21 RX ORDER — DEXAMETHASONE SODIUM PHOSPHATE 4 MG/ML
4 INJECTION, SOLUTION INTRA-ARTICULAR; INTRALESIONAL; INTRAMUSCULAR; INTRAVENOUS; SOFT TISSUE
Status: DISCONTINUED | OUTPATIENT
Start: 2024-10-21 | End: 2024-10-21 | Stop reason: HOSPADM

## 2024-10-21 RX ORDER — ONDANSETRON 2 MG/ML
4 INJECTION INTRAMUSCULAR; INTRAVENOUS EVERY 30 MIN PRN
Status: DISCONTINUED | OUTPATIENT
Start: 2024-10-21 | End: 2024-10-21 | Stop reason: HOSPADM

## 2024-10-21 RX ORDER — OXYCODONE HYDROCHLORIDE 10 MG/1
10 TABLET ORAL
Status: DISCONTINUED | OUTPATIENT
Start: 2024-10-21 | End: 2024-10-21 | Stop reason: HOSPADM

## 2024-10-21 RX ORDER — OXYCODONE HYDROCHLORIDE 5 MG/1
5 TABLET ORAL EVERY 6 HOURS PRN
Qty: 12 TABLET | Refills: 0 | Status: SHIPPED | OUTPATIENT
Start: 2024-10-21 | End: 2024-10-24

## 2024-10-21 RX ORDER — SODIUM CHLORIDE, SODIUM LACTATE, POTASSIUM CHLORIDE, CALCIUM CHLORIDE 600; 310; 30; 20 MG/100ML; MG/100ML; MG/100ML; MG/100ML
INJECTION, SOLUTION INTRAVENOUS CONTINUOUS
Status: DISCONTINUED | OUTPATIENT
Start: 2024-10-21 | End: 2024-10-21 | Stop reason: HOSPADM

## 2024-10-21 RX ORDER — CEFAZOLIN SODIUM/WATER 2 G/20 ML
SYRINGE (ML) INTRAVENOUS PRN
Status: DISCONTINUED | OUTPATIENT
Start: 2024-10-21 | End: 2024-10-21

## 2024-10-21 RX ORDER — FENTANYL CITRATE 50 UG/ML
INJECTION, SOLUTION INTRAMUSCULAR; INTRAVENOUS PRN
Status: DISCONTINUED | OUTPATIENT
Start: 2024-10-21 | End: 2024-10-21

## 2024-10-21 RX ORDER — NALOXONE HYDROCHLORIDE 0.4 MG/ML
0.1 INJECTION, SOLUTION INTRAMUSCULAR; INTRAVENOUS; SUBCUTANEOUS
Status: DISCONTINUED | OUTPATIENT
Start: 2024-10-21 | End: 2024-10-21 | Stop reason: HOSPADM

## 2024-10-21 RX ORDER — FENTANYL CITRATE 50 UG/ML
50 INJECTION, SOLUTION INTRAMUSCULAR; INTRAVENOUS EVERY 5 MIN PRN
Status: DISCONTINUED | OUTPATIENT
Start: 2024-10-21 | End: 2024-10-21 | Stop reason: HOSPADM

## 2024-10-21 RX ORDER — PROPOFOL 10 MG/ML
INJECTION, EMULSION INTRAVENOUS PRN
Status: DISCONTINUED | OUTPATIENT
Start: 2024-10-21 | End: 2024-10-21

## 2024-10-21 RX ORDER — EPHEDRINE SULFATE 50 MG/ML
INJECTION, SOLUTION INTRAMUSCULAR; INTRAVENOUS; SUBCUTANEOUS PRN
Status: DISCONTINUED | OUTPATIENT
Start: 2024-10-21 | End: 2024-10-21

## 2024-10-21 RX ORDER — HYDROMORPHONE HCL IN WATER/PF 6 MG/30 ML
0.4 PATIENT CONTROLLED ANALGESIA SYRINGE INTRAVENOUS EVERY 5 MIN PRN
Status: DISCONTINUED | OUTPATIENT
Start: 2024-10-21 | End: 2024-10-21 | Stop reason: HOSPADM

## 2024-10-21 RX ORDER — DEXAMETHASONE SODIUM PHOSPHATE 4 MG/ML
INJECTION, SOLUTION INTRA-ARTICULAR; INTRALESIONAL; INTRAMUSCULAR; INTRAVENOUS; SOFT TISSUE PRN
Status: DISCONTINUED | OUTPATIENT
Start: 2024-10-21 | End: 2024-10-21

## 2024-10-21 RX ORDER — FENTANYL CITRATE 50 UG/ML
25 INJECTION, SOLUTION INTRAMUSCULAR; INTRAVENOUS EVERY 5 MIN PRN
Status: DISCONTINUED | OUTPATIENT
Start: 2024-10-21 | End: 2024-10-21 | Stop reason: HOSPADM

## 2024-10-21 RX ORDER — LIDOCAINE HYDROCHLORIDE 20 MG/ML
INJECTION, SOLUTION INFILTRATION; PERINEURAL PRN
Status: DISCONTINUED | OUTPATIENT
Start: 2024-10-21 | End: 2024-10-21

## 2024-10-21 RX ORDER — HYDROMORPHONE HCL IN WATER/PF 6 MG/30 ML
0.2 PATIENT CONTROLLED ANALGESIA SYRINGE INTRAVENOUS EVERY 5 MIN PRN
Status: DISCONTINUED | OUTPATIENT
Start: 2024-10-21 | End: 2024-10-21 | Stop reason: HOSPADM

## 2024-10-21 RX ORDER — GLYCOPYRROLATE 0.2 MG/ML
INJECTION, SOLUTION INTRAMUSCULAR; INTRAVENOUS PRN
Status: DISCONTINUED | OUTPATIENT
Start: 2024-10-21 | End: 2024-10-21

## 2024-10-21 RX ORDER — KETOROLAC TROMETHAMINE 30 MG/ML
15 INJECTION, SOLUTION INTRAMUSCULAR; INTRAVENOUS
Status: DISCONTINUED | OUTPATIENT
Start: 2024-10-21 | End: 2024-10-21 | Stop reason: HOSPADM

## 2024-10-21 RX ADMIN — GLYCOPYRROLATE 0.2 MG: 0.2 INJECTION, SOLUTION INTRAMUSCULAR; INTRAVENOUS at 14:30

## 2024-10-21 RX ADMIN — SUCCINYLCHOLINE CHLORIDE 100 MG: 20 INJECTION, SOLUTION INTRAMUSCULAR; INTRAVENOUS; PARENTERAL at 14:13

## 2024-10-21 RX ADMIN — PHENYLEPHRINE HYDROCHLORIDE 150 MCG: 10 INJECTION INTRAVENOUS at 14:29

## 2024-10-21 RX ADMIN — Medication 2 G: at 14:18

## 2024-10-21 RX ADMIN — EPHEDRINE SULFATE 5 MG: 5 INJECTION INTRAVENOUS at 15:59

## 2024-10-21 RX ADMIN — REMIFENTANIL HYDROCHLORIDE 0.15 MCG/KG/MIN: 1 INJECTION, POWDER, LYOPHILIZED, FOR SOLUTION INTRAVENOUS at 14:20

## 2024-10-21 RX ADMIN — EPHEDRINE SULFATE 5 MG: 5 INJECTION INTRAVENOUS at 15:21

## 2024-10-21 RX ADMIN — GLYCOPYRROLATE 0.1 MG: 0.2 INJECTION, SOLUTION INTRAMUSCULAR; INTRAVENOUS at 14:36

## 2024-10-21 RX ADMIN — ONDANSETRON 4 MG: 2 INJECTION INTRAMUSCULAR; INTRAVENOUS at 17:33

## 2024-10-21 RX ADMIN — OXYCODONE HYDROCHLORIDE 5 MG: 5 TABLET ORAL at 19:56

## 2024-10-21 RX ADMIN — PHENYLEPHRINE HYDROCHLORIDE 100 MCG: 10 INJECTION INTRAVENOUS at 14:40

## 2024-10-21 RX ADMIN — EPHEDRINE SULFATE 5 MG: 5 INJECTION INTRAVENOUS at 15:35

## 2024-10-21 RX ADMIN — Medication 1 LOZENGE: at 19:59

## 2024-10-21 RX ADMIN — FENTANYL CITRATE 25 MCG: 50 INJECTION, SOLUTION INTRAMUSCULAR; INTRAVENOUS at 19:10

## 2024-10-21 RX ADMIN — EPHEDRINE SULFATE 10 MG: 5 INJECTION INTRAVENOUS at 14:41

## 2024-10-21 RX ADMIN — PROPOFOL 150 MG: 10 INJECTION, EMULSION INTRAVENOUS at 14:13

## 2024-10-21 RX ADMIN — DEXAMETHASONE SODIUM PHOSPHATE 10 MG: 4 INJECTION, SOLUTION INTRA-ARTICULAR; INTRALESIONAL; INTRAMUSCULAR; INTRAVENOUS; SOFT TISSUE at 14:20

## 2024-10-21 RX ADMIN — PHENYLEPHRINE HYDROCHLORIDE 100 MCG: 10 INJECTION INTRAVENOUS at 16:24

## 2024-10-21 RX ADMIN — SODIUM CHLORIDE, POTASSIUM CHLORIDE, SODIUM LACTATE AND CALCIUM CHLORIDE: 600; 310; 30; 20 INJECTION, SOLUTION INTRAVENOUS at 14:08

## 2024-10-21 RX ADMIN — PHENYLEPHRINE HYDROCHLORIDE 200 MCG: 10 INJECTION INTRAVENOUS at 14:33

## 2024-10-21 RX ADMIN — PHENYLEPHRINE HYDROCHLORIDE 200 MCG: 10 INJECTION INTRAVENOUS at 14:36

## 2024-10-21 RX ADMIN — FENTANYL CITRATE 100 MCG: 50 INJECTION INTRAMUSCULAR; INTRAVENOUS at 14:05

## 2024-10-21 RX ADMIN — LIDOCAINE HYDROCHLORIDE 100 MG: 20 INJECTION, SOLUTION INFILTRATION; PERINEURAL at 14:13

## 2024-10-21 RX ADMIN — SODIUM CHLORIDE 0.1 MCG/KG/MIN: 9 INJECTION, SOLUTION INTRAVENOUS at 17:27

## 2024-10-21 RX ADMIN — PHENYLEPHRINE HYDROCHLORIDE 150 MCG: 10 INJECTION INTRAVENOUS at 14:18

## 2024-10-21 RX ADMIN — PHENYLEPHRINE HYDROCHLORIDE 200 MCG: 10 INJECTION INTRAVENOUS at 14:26

## 2024-10-21 RX ADMIN — PHENYLEPHRINE HYDROCHLORIDE 0.5 MCG/KG/MIN: 10 INJECTION INTRAVENOUS at 14:43

## 2024-10-21 RX ADMIN — FENTANYL CITRATE 25 MCG: 50 INJECTION, SOLUTION INTRAMUSCULAR; INTRAVENOUS at 18:53

## 2024-10-21 RX ADMIN — FENTANYL CITRATE 25 MCG: 50 INJECTION, SOLUTION INTRAMUSCULAR; INTRAVENOUS at 18:37

## 2024-10-21 ASSESSMENT — ACTIVITIES OF DAILY LIVING (ADL)
ADLS_ACUITY_SCORE: 28
ADLS_ACUITY_SCORE: 26

## 2024-10-21 ASSESSMENT — COPD QUESTIONNAIRES
COPD: 1
CAT_SEVERITY: MILD

## 2024-10-21 NOTE — PROVIDER NOTIFICATION
ENT resident notified pt Rakan Nolasco. If team is planning on sending pt home with RX's, they have to be e-prescribed to [facility] now. i tried calling into the OR to tell Dr. Delatorre, but they were not there. Thanks. Zaid. Charge [phone]

## 2024-10-21 NOTE — ANESTHESIA CARE TRANSFER NOTE
Patient: Rakan Nolasco    Procedure: Procedure(s):  OPEN CAVITY OF LEFT MASTOIDECTOMY, MEATOPLASTY       Diagnosis: Chronic atticoantral suppurative otitis media of left ear [H66.22]  Cholesteatoma, left [H71.92]  Diagnosis Additional Information: No value filed.    Anesthesia Type:   General     Note:    Oropharynx: oropharynx clear of all foreign objects and spontaneously breathing  Level of Consciousness: drowsy  Oxygen Supplementation: face mask  Level of Supplemental Oxygen (L/min / FiO2): 6  Independent Airway: airway patency satisfactory and stable  Dentition: dentition unchanged  Vital Signs Stable: post-procedure vital signs reviewed and stable  Report to RN Given: handoff report given  Patient transferred to: PACU    Handoff Report: Identifed the Patient, Identified the Reponsible Provider, Reviewed the pertinent medical history, Discussed the surgical course, Reviewed Intra-OP anesthesia mangement and issues during anesthesia, Set expectations for post-procedure period and Allowed opportunity for questions and acknowledgement of understanding      Vitals:  Vitals Value Taken Time   /89 10/21/24 1821   Temp     Pulse 88 10/21/24 1824   Resp     SpO2 98 % 10/21/24 1824   Vitals shown include unfiled device data.    Electronically Signed By: JACK Jacobsen CRNA  October 21, 2024  6:24 PM

## 2024-10-21 NOTE — DISCHARGE SUMMARY
Physician Discharge Summary     Patient ID:  Rakan Nolasco  [id number]  79 year old  1945    Admit date: 10/21/2024    Discharge date and time: 10/21/2024     Admitting Physician: Shayna Delatorre MD     Discharge Physician: Nandini RUIZ    Admission Diagnoses: Chronic atticoantral suppurative otitis media of left ear [H66.22]  Cholesteatoma, left [H71.92]    Discharge Diagnoses: s/p mastoidectomy    Admission Condition: good    Discharged Condition: good    Indication for Admission:     Hospital Course:     Consults: none    Significant Diagnostic Studies:     Treatments: surgery:     Discharge Exam:      Disposition: home    Patient Instructions:   Current Discharge Medication List        START taking these medications    Details   amoxicillin-clavulanate (AUGMENTIN) 875-125 MG tablet Take 1 tablet by mouth 2 times daily for 10 days.  Qty: 20 tablet, Refills: 0    Associated Diagnoses: S/P middle ear reconstruction      oxyCODONE (ROXICODONE) 5 MG tablet Take 1 tablet (5 mg) by mouth every 6 hours as needed for pain.  Qty: 12 tablet, Refills: 0    Associated Diagnoses: S/P middle ear reconstruction           CONTINUE these medications which have NOT CHANGED    Details   acetaminophen (TYLENOL) 325 MG tablet Take 2 tablets (650 mg) by mouth every 4 hours as needed for other (For optimal non-opioid multimodal pain management to improve pain control.)  Qty: 100 tablet, Refills: 0    Associated Diagnoses: S/P total knee arthroplasty, left      albuterol (PROAIR HFA/PROVENTIL HFA/VENTOLIN HFA) 108 (90 Base) MCG/ACT inhaler INHALE 1 PUFF INTO THE LUNGS EVERY 4 HOURS AS NEEDED FOR SHORTNESS OF BREATH, WHEEZING OR COUGH  Qty: 18 g, Refills: 11    Comments: Pharmacy may dispense brand covered by insurance (Proair, or proventil or ventolin or generic albuterol inhaler)  Associated Diagnoses: Chronic obstructive pulmonary disease, unspecified (H)      amLODIPine (NORVASC) 10 MG tablet TAKE 1 TABLET (10 MG) BY MOUTH  DAILY.  Qty: 90 tablet, Refills: 0    Associated Diagnoses: HTN, goal below 140/90      ARTIFICIAL TEAR OP Apply 1 drop to eye as needed      atorvastatin (LIPITOR) 20 MG tablet Take 20 mg by mouth at bedtime      blood glucose (ONETOUCH VERIO IQ) test strip Use to test blood sugar 3 times daily or as directed.  Qty: 300 strip, Refills: 1    Associated Diagnoses: Type 2 diabetes mellitus with hyperglycemia, without long-term current use of insulin (H)      blood glucose monitoring (NO BRAND SPECIFIED) meter device kit Use to test blood sugar 1 times daily or as directed.  Qty: 1 kit, Refills: 0    Associated Diagnoses: Type 2 diabetes mellitus with hyperglycemia, without long-term current use of insulin (H)      ClonAZEPAM (KLONOPIN) 0.5 MG tablet Take 0.5 mg by mouth Take 1 tablet by mouth daily at bedtime      Continuous Glucose Sensor (FREESTYLE SARAHY 2 SENSOR) MISC USE 1 SENSOR EVERY 14 DAYS. USE TO READ BLOOD SUGARS PER 'S INSTRUCTIONS.  Qty: 6 each, Refills: 5    Associated Diagnoses: Type 2 diabetes mellitus with hyperglycemia, without long-term current use of insulin (H)      ferrous sulfate (FEROSUL) 325 (65 Fe) MG tablet TAKE 1 TABLET BY MOUTH EVERY DAY WITH BREAKFAST  Qty: 90 tablet, Refills: 0    Associated Diagnoses: Iron deficiency      !! fluticasone-salmeterol (ADVAIR) 250-50 MCG/ACT inhaler TAKE 1 PUFF BY MOUTH TWICE A DAY Strength: 250-50 MCG/ACT  Qty: 3 each, Refills: 3    Associated Diagnoses: Chronic obstructive pulmonary disease, unspecified COPD type (H)      !! fluticasone-salmeterol (WIXELA INHUB) 250-50 MCG/ACT inhaler Inhale 1 puff into the lungs every 12 hours  Qty: 3 each, Refills: 3    Comments: 3 inhalers with 3 refills. 90 day supply. To replace fluticasone-salmeterol (ADVAIR) 250-50 MCG/ACT inhaler.  Associated Diagnoses: Centrilobular emphysema (H)      furosemide (LASIX) 20 MG tablet TAKE 2 TABLETS BY MOUTH IN THE MORNING AND 1 TABLET BY MOUTH AT LEAST 6 HOURS LATER IN  THE AFTERNOON.  Qty: 270 tablet, Refills: 0    Associated Diagnoses: Pedal edema      Lancets (ONETOUCH DELICA PLUS [id number]) MISC USE ONCE DAILY AS DIRECTED  Qty: 100 each, Refills: 1    Comments: DX Code Needed  PT LOOKING FOR REFILLS.  Associated Diagnoses: Type 2 diabetes mellitus with hyperglycemia, without long-term current use of insulin (H)      latanoprost (XALATAN) 0.005 % ophthalmic solution Place 1 drop into both eyes at bedtime  Qty: 7.5 mL, Refills: 3    Comments: 90 day supply ok. Please wait for patient to call for a fill. Thank you  Associated Diagnoses: Borderline glaucoma with ocular hypertension, bilateral      lisinopril (ZESTRIL) 40 MG tablet TAKE 1 TABLET BY MOUTH EVERY DAY  Qty: 90 tablet, Refills: 0    Associated Diagnoses: Benign essential hypertension      metFORMIN (GLUCOPHAGE XR) 500 MG 24 hr tablet TAKE 1 TABLET BY MOUTH TWICE A DAY WITH FOOD  Qty: 180 tablet, Refills: 3    Associated Diagnoses: Type 2 diabetes mellitus with other specified complication, without long-term current use of insulin (H)      OXcarbazepine (TRILEPTAL) 300 MG tablet TAKE 3 TABLETS IN THE MORNING AND 3 TABLETS IN THE EVENING  Qty: 180 tablet, Refills: 11    Associated Diagnoses: Trigeminal neuralgia      pantoprazole (PROTONIX) 20 MG EC tablet TAKE 1 TABLET BY MOUTH EVERY DAY  Qty: 90 tablet, Refills: 0    Associated Diagnoses: Gastroesophageal reflux disease without esophagitis      predniSONE (DELTASONE) 5 MG tablet TAKE 2 TABLETS BY MOUTH EVERY DAY  Qty: 60 tablet, Refills: 2    Associated Diagnoses: Myasthenia gravis, AChR antibody positive (H)      !! pyRIDostigmine (MESTINON) 60 MG tablet TAKE 2 TABLETS (120 MG) IN THE MORNING AND AFTERNOON AND 1 TABLET IN THE EVENING PER 4/22/2024 APPT.  Qty: 450 tablet, Refills: 0    Associated Diagnoses: Ocular myasthenia (H)      !! pyRIDostigmine (MESTINON) 60 MG tablet Take 120 mg by mouth every morning      !! pyRIDostigmine (MESTINON) 60 MG tablet Take 60 mg by  mouth 2 times daily Midday and 6 PM.      sodium bicarbonate 650 MG tablet Take 1 tablet (650 mg) by mouth 2 times daily  Qty: 180 tablet, Refills: 1    Associated Diagnoses: Edema, unspecified type      timolol maleate (TIMOPTIC) 0.5 % ophthalmic solution Place 1 drop into both eyes 2 times daily  Qty: 5 mL, Refills: 11    Comments: 90 day supply ok. Please wait for patient to call for a fill. Thank you  Associated Diagnoses: Borderline glaucoma with ocular hypertension, bilateral       !! - Potential duplicate medications found. Please discuss with provider.        Activity: no lifting, Driving, or Strenuous exercise for 2 weeks  Diet: regular diet  Wound Care: keep wound clean and dry    Follow-up with  in 10 days.    Signed:  Shayna Delatorre MD cell: [phone]  10/21/2024  6:35 PM    Precautions  1. Keep your head elevated with several pillows when lying down for 2 weeks following  surgery.  2. Do not blow your nose until your doctor has told you that your ear is healed. Any  accumulated secretions in the nose may be drawn back into the throat and spit out if  desired. The nose may be gently dabbed with tissue paper. This is particularly  important if you catch a cold. You should follow this precaution for 4 weeks following  surgery.  3. Do not  pop  your ears by holding your nose and blowing air through the Eustachian  tube into the ear.  4. Sneeze with your mouth open.  5. Do not allow water to enter your ear until advised by your doctor. The outer cap may  be removed 48 hours after surgery. You may shower 48 hours after the operation.  When showering, place a small clean piece of cotton dipped in Vaseline in your outer  ear opening to keep water out. If you have packing in your ear, this may be placed  directly over the packing. Remove the cotton after showering is complete.  6. After you have removed the outer cap, clean the incision with hydrogen peroxide twice  a day until it has healed. Apply a  small amount of Bacitracin or Neosporin to the  incision after cleansing.   7. Follow-up with Dr. Barajas in a week after surgery unless directed otherwise.   8. Avoid bending or heavy lifting (over 20 pounds) for three weeks following surgery.  9. You may experience popping, clicking or other sounds in the ear. Your ear may also  feel full. Occasional sharp shooting pains are not unusual. At times, it may feel as if  there is liquid in your ear.  10. Driving is permitted when you no longer experience dizziness or fatigue and you are no  longer taking pain medication. Avoid flying for 3-4 weeks.    Dizziness  Dizziness may occur following surgery. Swelling in the inner ear usually causes this. You  may not notice the dizziness until 2-3 days following surgery. This is when the swelling is the  greatest. If you are not bleeding, do not have gastric ulcers, and are not allergic to ibuprofen  (Motrin, Advil, Nuprin) you may take 2- 200 mg tablets every 4-6 hours. This will help the  swelling as well as ease any pain. Avoid sudden movements; stand up slowly.  Hearing  Do not be alarmed if you do not notice improvement in your hearing immediately following  surgery. Packing was placed in your ear canal and on the inside of your ear during the  operation. The packing in the ear canal will be removed when you see your doctor for your  post-operative appointment. The packing on the inside dissolves on its own. If reconstructive  surgery was done to improve your hearing, we will test your hearing about 3 months after  surgery. Maximum improvement may require 4-6 months.  Drainage or discharge  A bloody or watery discharge is expected during the healing process. Call Dr. Campos s office  for a yellow or green discharge or excessive discharge. Discharge with foul odor should also  be reported.  Pain  You will be given a prescription for a pain medication, which may be taken for the first several  days after surgery. Mild,  intermittent ear pain is not unusual during the first 2 weeks after  surgery. Pain above or in front of the ear is common when chewing. If you have persistent ear  pain not relieved by a regular dose of Tylenol or Advil after the first several days, call your  doctor s office.  Taste disturbance and dry mouth  Taste disturbance and dry mouth are common for a few weeks following surgery. In some  patients, this can last a few months.

## 2024-10-21 NOTE — OP NOTE
Name: Rakan Nolasco   MR: MRN:   [id number]   : 1945   DOS: 10/21/2024      Surgeon: Shayna Delatorre MD    Preoperative diagnosis: 1. Chronic otitis media with cholesteatoma, Left         2. Conductive hearing loss         3. Persistent draining ear         4. Dehiscence of tegmen mastoid         5. Dehiscence of peripheral facial nerve         6. Dehiscence of tegmen tympani      Postoperative diagnosis: same  Procedures performed:  Open cavity canal wall down mastoidectomy  Meatoplasty    Anesthesia: General Endotracheal anesthesia  EBL: 20cc.  Complications: None    Procedure in detail. The patient was identified in the preoperative area and after obtaining informed consent, he was transferred to the operating theater and placed on the operating table in supine position. He was then endotracheally intubated by anesthesia without any complications. The bed was turned. He was then prepped and draped appropriately. The ear canal was examined.  An ear canal injection was performed using 1% lidocaine 1:100,000 with epinephrine.  A canal incision was performed.  Next, a postauricular incision was made through skin and subcutaneous tissue and elevated forward in a loose areolar plane. A Musculo-periosteal incision was made to the mastoid cortex and elevated forward in a subperiosteal plane until the ear canal was encountered.  A self-retaining retractor was then placed.  The ear canal skin was elevated.  This granted good exposure of the tympanic membrane.  The tympano-meatal flap was elevated, and the middle ear space was entered.  The chorda tympani nerve was identified.  The ossicular chain was inspected and was found to be eroded.  Cholesteatoma was seen filling the mesotympanum, epitympanum, and aditus ad antrum.  The diseased portion of the tympanic membrane was excised.  Next, a cortical mastoidectomy with antrotomy was performed using a combination of cutting and denisha drill burrs and continuous  irrigation.  The tegmen mastoideum and the posterior ear canal were thinned without violation.  The lateral semicircular canal and the incus were identified.  Facial nerve at the middle ear level was dehiscent. Next, cholesteatoma was systematically removed.  The dura which was dehiscent was identified, the sigmoid sinus is identified by drilling from the mastoid tip area to the posterior superior aspect of the tegmen. Anita's septum, was removed. The lateral semicircular canal was identified along the medial surface of the aditus ad antrum.   A complete open cavity mastoidectomy including removal of the posterior and superior osseous external ear canal was performed. After all visible cholesteatoma was removed, we turned our attention towards reconstruction.  The middle ear space was filled with Gelfoam.    A meatoplasty was performed to decrease the risk of developing moisture in the cavity and to facilitate future debridements. This entailed removal of a varying amount of the conchal cartilage, postauricular periosteum, and adjacent skin of the membranous ear canal. Packing the meatoplasty with cotton impregnated with antibiotic ointment to facilitate favorable healing was performed.  Additional Gelfoam was placed lateral to the reconstruction.  The vascular strip was laid flat in the ear canal.  The postauricular incision was then closed in anatomical layers.  A mastoid dressing was placed after completion of the procedure. This marked the end of the procedure.  The patient was awakened, extubated, and transferred to the PACU in stable condition. All surgical pauses were observed.  Standard operating room protocol and universal precautions were utilized throughout the procedure.  The patient was turned back to anesthesia and extubated in the operating room without any complications. He was transferred to the post anesthesia care unit in stable condition.

## 2024-10-21 NOTE — ANESTHESIA PROCEDURE NOTES
Airway       Patient location during procedure: OR       Procedure Start/Stop Times: 10/21/2024 2:13 PM and 10/21/2024 2:17 PM  Staff -        CRNA: Clement Andrade APRN CRNA       Performed By: CRNA  Consent for Airway        Urgency: elective  Indications and Patient Condition       Indications for airway management: arthur-procedural       Induction type:intravenous       Mask difficulty assessment: 1 - vent by mask    Final Airway Details       Final airway type: endotracheal airway       Successful airway: ETT - single  Endotracheal Airway Details        ETT size (mm): 7.5       Cuffed: yes       Successful intubation technique: direct laryngoscopy       DL Blade Type: Trotter 2       Grade View of Cords: 1       Adjucts: stylet       Position: Right       Measured from: gums/teeth       Secured at (cm): 23       Bite block used: Soft    Post intubation assessment        Placement verified by: capnometry, equal breath sounds and chest rise        Number of attempts at approach: 1       Number of other approaches attempted: 0       Secured with: pink tape       Ease of procedure: easy       Dentition: Intact    Medication(s) Administered   Medication Administration Time: 10/21/2024 2:13 PM

## 2024-10-21 NOTE — ANESTHESIA PREPROCEDURE EVALUATION
Anesthesia Pre-Procedure Evaluation    Patient: Rakan Nolasco   MRN: [id number] : 1945        Procedure : Procedure(s):  MASTOIDECTOMY          Past Medical History:   Diagnosis Date    Arthritis         BMI 31.0-31.9,adult     Cancer (H) 01/10/2018    Prostate    COPD (chronic obstructive pulmonary disease) (H) 10/28/2020    Demand ischemia (H)     Diabetes (H) 01/10/2018    Diverticulosis     Colonoscopy 2008    Fatty liver     see US  2012    GERD (gastroesophageal reflux disease)     Glaucoma (increased eye pressure)     HTN (hypertension)     Hyperlipidemia LDL goal < 130     age, htn, fhx    Irritable bowel     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     Nonsenile cataract     Obstructive sleep apnea     Obstructive sleep apnea syndrome     ROSIE (obstructive sleep apnea) 2011    Using CPAP;     Prediabetes     Restless leg syndrome     Retinal detachment     Sleep apnea     Trigeminal neuralgia pain 2012      Past Surgical History:   Procedure Laterality Date    ARTHROPLASTY KNEE Left 2024    Procedure: ARTHROPLASTY, LEFT KNEE, TOTAL;  Surgeon: Dandre Hassan MD;  Location: [facility]    BIOPSY ARTERY TEMPORAL Bilateral 2017    Procedure: BIOPSY ARTERY TEMPORAL;  Bilateral temporal artery Biopsy;  Surgeon: Jj Tello MD;  Location: [facility]    CATARACT IOL, RT/LT Right     COLONOSCOPY      ESOPHAGOSCOPY, GASTROSCOPY, DUODENOSCOPY (EGD), COMBINED  01/15/2014    Procedure: COMBINED ESOPHAGOSCOPY, GASTROSCOPY, DUODENOSCOPY (EGD), BIOPSY SINGLE OR MULTIPLE;;  Surgeon: Winston Nixon MD;  Location: [facility]    LASER YAG CAPSULOTOMY Right 2018    right eye    PHACOEMULSIFICATION CLEAR CORNEA WITH STANDARD INTRAOCULAR LENS IMPLANT Right 2017    Procedure: PHACOEMULSIFICATION CLEAR CORNEA WITH STANDARD INTRAOCULAR LENS IMPLANT;  Surgeon: Mateo Gray MD;  Location: [facility]    PHACOEMULSIFICATION CLEAR CORNEA WITH  STANDARD INTRAOCULAR LENS IMPLANT Left 01/10/2022    Procedure: LEFT PHACOEMULSIFICATION, CATARACT, WITH INTRAOCULAR LENS IMPLANT;  Surgeon: Mateo Gray MD;  Location: [facility] OR    ID TRABECULOPLASTY BY LASER SURGERY      RETINAL REATTACHMENT Right     SURGICAL HISTORY OF -   2009    Both Eyelids-.    TONSILLECTOMY  as child      Allergies   Allergen Reactions    Azathioprine Shortness Of Breath and Other (See Comments)     Was hospitalized from it. Also had high fever, chills, and shortness of breath.    Sulfamethoxazole-Trimethoprim Cough, Itching and Rash     Generalized painful red rash on legs arms and abdomen, chest, back, cough and  fever  that required hospitalization.    Ciprofloxacin Muscle Pain (Myalgia)     Muscle pain  Other reaction(s): Myalgia  Other reaction(s): Myalgia    Ropinirole      Leg pan  GI  Weakness; ? sycope  Other reaction(s): Leg Pain      Social History     Tobacco Use    Smoking status: Former     Current packs/day: 0.00     Average packs/day: 2.0 packs/day for 15.0 years (30.0 ttl pk-yrs)     Types: Cigarettes     Start date: 1968     Quit date: 1983     Years since quittin.8     Passive exposure: Past    Smokeless tobacco: Former     Quit date: 1970    Tobacco comments:     smoke free household.   Substance Use Topics    Alcohol use: Not Currently     Comment: Quit in       Wt Readings from Last 1 Encounters:   10/21/24 103.8 kg (228 lb 13.4 oz)        Anesthesia Evaluation   Pt has had prior anesthetic.     No history of anesthetic complications       ROS/MED HX  ENT/Pulmonary:     (+) sleep apnea, uses CPAP,                       mild,  COPD,              Neurologic:       Cardiovascular:     (+)  hypertension- -   -  - -                                      METS/Exercise Tolerance:     Hematologic:       Musculoskeletal:       GI/Hepatic:     (+) GERD, Asymptomatic on medication,           liver disease,       Renal/Genitourinary:       Endo:    "  (+)  type II DM,             Obesity,       Psychiatric/Substance Use:       Infectious Disease:       Malignancy:   (+) Malignancy,     Other:            Physical Exam    Airway  airway exam normal           Respiratory Devices and Support         Dental       (+) Modest Abnormalities - crowns, retainers, 1 or 2 missing teeth      Cardiovascular   cardiovascular exam normal          Pulmonary   pulmonary exam normal                OUTSIDE LABS:  CBC:   Lab Results   Component Value Date    WBC 14.4 (H) 02/22/2023    WBC 10.1 10/11/2022    HGB 12.3 (L) 04/02/2024    HGB 15.2 02/22/2023    HCT 44.3 02/22/2023    HCT 39.5 (L) 10/11/2022     02/22/2023     10/11/2022     BMP:   Lab Results   Component Value Date     10/01/2024     07/03/2024    POTASSIUM 4.5 10/01/2024    POTASSIUM 4.0 07/03/2024    CHLORIDE 99 10/01/2024    CHLORIDE 100 07/03/2024    CO2 27 10/01/2024    CO2 32 (H) 07/03/2024    BUN 15.3 10/01/2024    BUN 13.1 07/03/2024    CR 0.89 10/01/2024    CR 0.98 07/03/2024     (H) 10/21/2024     (H) 10/01/2024     COAGS: No results found for: \"PTT\", \"INR\", \"FIBR\"  POC: No results found for: \"BGM\", \"HCG\", \"HCGS\"  HEPATIC:   Lab Results   Component Value Date    ALBUMIN 4.4 04/13/2023    PROTTOTAL 7.2 04/13/2023    ALT 25 04/13/2023    AST 24 04/13/2023    ALKPHOS 55 04/13/2023    BILITOTAL 0.3 04/13/2023     OTHER:   Lab Results   Component Value Date    A1C 6.2 (H) 10/01/2024    ROSY 9.3 10/01/2024    PHOS 3.0 07/09/2020    MAG 1.9 07/01/2022    TSH 3.93 12/20/2019    CRP 11.6 (H) 02/22/2023    SED 13 02/22/2023       Anesthesia Plan    ASA Status:  3    NPO Status:  NPO Appropriate    Anesthesia Type: General.     - Airway: ETT   Induction: Intravenous.   Maintenance: Balanced.        Consents    Anesthesia Plan(s) and associated risks, benefits, and realistic alternatives discussed. Questions answered and patient/representative(s) expressed understanding.     - " "Discussed: Risks, Benefits and Alternatives for BOTH SEDATION and the PROCEDURE were discussed     - Discussed with:  Patient            Postoperative Care    Pain management: Oral pain medications, Multi-modal analgesia.   PONV prophylaxis: Ondansetron (or other 5HT-3), Dexamethasone or Solumedrol     Comments:               Kelvin Larsen MD, MD    I have reviewed the pertinent notes and labs in the chart from the past 30 days and (re)examined the patient.  Any updates or changes from those notes are reflected in this note.               # Hypertension: Noted on problem list         # Obesity: Estimated body mass index is 32.83 kg/m  as calculated from the following:    Height as of this encounter: 1.778 m (5' 10\").    Weight as of this encounter: 103.8 kg (228 lb 13.4 oz).             "

## 2024-10-22 DIAGNOSIS — H66.22 CHRONIC ATTICOANTRAL SUPPURATIVE OTITIS MEDIA OF LEFT EAR: Primary | ICD-10-CM

## 2024-10-22 DIAGNOSIS — H70.92 MASTOIDITIS OF LEFT SIDE: ICD-10-CM

## 2024-10-22 DIAGNOSIS — H71.92 CHOLESTEATOMA, LEFT: ICD-10-CM

## 2024-10-22 NOTE — ANESTHESIA POSTPROCEDURE EVALUATION
Patient: Rakan Nolasco    Procedure: Procedure(s):  OPEN CAVITY OF LEFT MASTOIDECTOMY, MEATOPLASTY       Anesthesia Type:  General    Note:  Disposition: Outpatient   Postop Pain Control: Uneventful            Sign Out: Well controlled pain   PONV: No   Neuro/Psych: Uneventful            Sign Out: Acceptable/Baseline neuro status   Airway/Respiratory: Uneventful            Sign Out: Acceptable/Baseline resp. status   CV/Hemodynamics: Uneventful            Sign Out: Acceptable CV status; No obvious hypovolemia; No obvious fluid overload   Other NRE: NONE   DID A NON-ROUTINE EVENT OCCUR? No           Last vitals:  Vitals Value Taken Time   /82 10/21/24 2000   Temp 36.4  C (97.6  F) 10/21/24 2000   Pulse 80 10/21/24 2006   Resp 21 10/21/24 2006   SpO2 91 % 10/21/24 2006   Vitals shown include unfiled device data.    Electronically Signed By: Josh Zavala MD  October 21, 2024  8:17 PM

## 2024-10-22 NOTE — DISCHARGE INSTRUCTIONS
Activity: no lifting, Driving, or Strenuous exercise for 2 weeks  Diet: regular diet  Wound Care: keep wound clean and dry     Follow-up with  in 10 days.     Signed:  Shayna Delatorre MD cell: [phone]  10/21/2024  6:35 PM     Precautions  1. Keep your head elevated with several pillows when lying down for 2 weeks following  surgery.  2. Do not blow your nose until your doctor has told you that your ear is healed. Any  accumulated secretions in the nose may be drawn back into the throat and spit out if  desired. The nose may be gently dabbed with tissue paper. This is particularly  important if you catch a cold. You should follow this precaution for 4 weeks following  surgery.  3. Do not  pop  your ears by holding your nose and blowing air through the Eustachian  tube into the ear.  4. Sneeze with your mouth open.  5. Do not allow water to enter your ear until advised by your doctor. The outer cap may  be removed 48 hours after surgery. You may shower 48 hours after the operation.  When showering, place a small clean piece of cotton dipped in Vaseline in your outer  ear opening to keep water out. If you have packing in your ear, this may be placed  directly over the packing. Remove the cotton after showering is complete.  6. After you have removed the outer cap, clean the incision with hydrogen peroxide twice  a day until it has healed. Apply a small amount of Bacitracin or Neosporin to the  incision after cleansing.   7. Follow-up with Dr. Barajas in a week after surgery unless directed otherwise.   8. Avoid bending or heavy lifting (over 20 pounds) for three weeks following surgery.  9. You may experience popping, clicking or other sounds in the ear. Your ear may also  feel full. Occasional sharp shooting pains are not unusual. At times, it may feel as if  there is liquid in your ear.  10. Driving is permitted when you no longer experience dizziness or fatigue and you are no  longer taking pain medication.  Avoid flying for 3-4 weeks.     Dizziness  Dizziness may occur following surgery. Swelling in the inner ear usually causes this. You  may not notice the dizziness until 2-3 days following surgery. This is when the swelling is the  greatest. If you are not bleeding, do not have gastric ulcers, and are not allergic to ibuprofen  (Motrin, Advil, Nuprin) you may take 2- 200 mg tablets every 4-6 hours. This will help the  swelling as well as ease any pain. Avoid sudden movements; stand up slowly.  Hearing  Do not be alarmed if you do not notice improvement in your hearing immediately following  surgery. Packing was placed in your ear canal and on the inside of your ear during the  operation. The packing in the ear canal will be removed when you see your doctor for your  post-operative appointment. The packing on the inside dissolves on its own. If reconstructive  surgery was done to improve your hearing, we will test your hearing about 3 months after  surgery. Maximum improvement may require 4-6 months.  Drainage or discharge  A bloody or watery discharge is expected during the healing process. Call Dr. Campos s office  for a yellow or green discharge or excessive discharge. Discharge with foul odor should also  be reported.  Pain  You will be given a prescription for a pain medication, which may be taken for the first several  days after surgery. Mild, intermittent ear pain is not unusual during the first 2 weeks after  surgery. Pain above or in front of the ear is common when chewing. If you have persistent ear  pain not relieved by a regular dose of Tylenol or Advil after the first several days, call your  doctor s office.  Taste disturbance and dry mouth  Taste disturbance and dry mouth are common for a few weeks following surgery. In some  patients, this can last a few months.      Contacting your Doctor -   To contact a doctor, call Dr. Delatorre or:  [phone] and ask for the resident on call for  ENT-Otolaryngology (answered 24 hours a day)   Emergency Department:  [place]: [phone]  [facility]: [phone] 911 if you are in need of immediate or emergent help

## 2024-10-23 ENCOUNTER — HOSPITAL ENCOUNTER (OUTPATIENT)
Facility: CLINIC | Age: 79
End: 2024-10-23
Attending: OTOLARYNGOLOGY | Admitting: OTOLARYNGOLOGY
Payer: COMMERCIAL

## 2024-10-23 ENCOUNTER — TELEPHONE (OUTPATIENT)
Dept: OTOLARYNGOLOGY | Facility: CLINIC | Age: 79
End: 2024-10-23
Payer: COMMERCIAL

## 2024-10-23 PROBLEM — H66.22 CHRONIC ATTICOANTRAL SUPPURATIVE OTITIS MEDIA OF LEFT EAR: Status: ACTIVE | Noted: 2024-10-22

## 2024-10-23 PROBLEM — H71.92 CHOLESTEATOMA, LEFT: Status: ACTIVE | Noted: 2024-10-22

## 2024-10-23 PROBLEM — H70.92 MASTOIDITIS OF LEFT SIDE: Status: ACTIVE | Noted: 2024-10-22

## 2024-10-23 NOTE — TELEPHONE ENCOUNTER
Patients wife called in to schedule Rakan' next surgery with Dr. Delatorre. She said it needs to be 2 weeks after 10/21 surgery. Let her know I am working on getting OR time on 11/4 but will give her a callback once its confirmed    Kody Wolf  10/23/2024 at 11:12 AM

## 2024-10-28 NOTE — TELEPHONE ENCOUNTER
Patient called in requesting to schedule surgery with Dr. Delatorre. Let him know I am working on time at [facility] on 11/4 but have not gotten approval yet. I will give him a call back once everything is worked out    [facility]  10/28/2024 at 8:22 AM

## 2024-10-30 DIAGNOSIS — I10 BENIGN ESSENTIAL HYPERTENSION: ICD-10-CM

## 2024-10-30 DIAGNOSIS — K21.9 GASTROESOPHAGEAL REFLUX DISEASE WITHOUT ESOPHAGITIS: ICD-10-CM

## 2024-10-30 RX ORDER — PANTOPRAZOLE SODIUM 20 MG/1
20 TABLET, DELAYED RELEASE ORAL DAILY
Qty: 90 TABLET | Refills: 2 | Status: SHIPPED | OUTPATIENT
Start: 2024-10-30

## 2024-10-30 RX ORDER — LISINOPRIL 40 MG/1
40 TABLET ORAL DAILY
Qty: 90 TABLET | Refills: 0 | Status: SHIPPED | OUTPATIENT
Start: 2024-10-30

## 2024-11-04 DIAGNOSIS — G50.0 TRIGEMINAL NEURALGIA: ICD-10-CM

## 2024-11-04 DIAGNOSIS — G70.00 MYASTHENIA GRAVIS, ACHR ANTIBODY POSITIVE (H): ICD-10-CM

## 2024-11-04 RX ORDER — OXCARBAZEPINE 300 MG/1
TABLET, FILM COATED ORAL
Qty: 360 TABLET | Refills: 1 | Status: SHIPPED | OUTPATIENT
Start: 2024-11-04

## 2024-11-04 RX ORDER — PREDNISONE 5 MG/1
TABLET ORAL
Qty: 60 TABLET | Refills: 2 | Status: SHIPPED | OUTPATIENT
Start: 2024-11-04

## 2024-11-06 ENCOUNTER — ANESTHESIA EVENT (OUTPATIENT)
Dept: SURGERY | Facility: CLINIC | Age: 79
End: 2024-11-06
Payer: COMMERCIAL

## 2024-11-06 ENCOUNTER — OFFICE VISIT (OUTPATIENT)
Dept: FAMILY MEDICINE | Facility: CLINIC | Age: 79
End: 2024-11-06
Payer: COMMERCIAL

## 2024-11-06 VITALS
TEMPERATURE: 97.8 F | SYSTOLIC BLOOD PRESSURE: 137 MMHG | BODY MASS INDEX: 32.64 KG/M2 | WEIGHT: 228 LBS | RESPIRATION RATE: 16 BRPM | DIASTOLIC BLOOD PRESSURE: 75 MMHG | OXYGEN SATURATION: 97 % | HEIGHT: 70 IN | HEART RATE: 84 BPM

## 2024-11-06 DIAGNOSIS — Z79.899 MEDICATION MANAGEMENT: ICD-10-CM

## 2024-11-06 DIAGNOSIS — J44.9 CHRONIC OBSTRUCTIVE PULMONARY DISEASE, UNSPECIFIED COPD TYPE (H): ICD-10-CM

## 2024-11-06 DIAGNOSIS — K21.9 GASTROESOPHAGEAL REFLUX DISEASE, UNSPECIFIED WHETHER ESOPHAGITIS PRESENT: ICD-10-CM

## 2024-11-06 DIAGNOSIS — G50.0 TRIGEMINAL NEURALGIA PAIN: ICD-10-CM

## 2024-11-06 DIAGNOSIS — G47.33 OSA (OBSTRUCTIVE SLEEP APNEA): ICD-10-CM

## 2024-11-06 DIAGNOSIS — H66.12 CHRONIC TUBOTYMPANIC SUPPURATIVE OTITIS MEDIA OF LEFT EAR: ICD-10-CM

## 2024-11-06 DIAGNOSIS — I10 HYPERTENSION, UNSPECIFIED TYPE: ICD-10-CM

## 2024-11-06 DIAGNOSIS — Z01.818 PREOP GENERAL PHYSICAL EXAM: Primary | ICD-10-CM

## 2024-11-06 DIAGNOSIS — G70.00 MYASTHENIA GRAVIS (H): ICD-10-CM

## 2024-11-06 PROCEDURE — 99214 OFFICE O/P EST MOD 30 MIN: CPT | Performed by: FAMILY MEDICINE

## 2024-11-06 ASSESSMENT — COPD QUESTIONNAIRES
COPD: 1
CAT_SEVERITY: MILD

## 2024-11-06 NOTE — PROGRESS NOTES
Preoperative Evaluation  [address]  [place] [address]  Phone: [phone]  Primary Provider: Telly Lucio MD  Pre-op Performing Provider: ALESSIA POLANCO DO  Nov 6, 2024             11/3/2024   Surgical Information   What procedure is being done? Ear surgery    Facility or Hospital where procedure/surgery will be performed: [facility]    Who is doing the procedure / surgery? Dr. Delatorre    Date of surgery / procedure: 11/7/24    Time of surgery / procedure: AM    Where do you plan to recover after surgery? at home with family        Patient-reported     Fax number for surgical facility: Note does not need to be faxed, will be available electronically in Epic.    Assessment & Plan     The proposed surgical procedure is considered LOW risk.    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    Additional Medication Instructions  Take all scheduled medications on the day of surgery EXCEPT for modifications listed below:   - ACE/ARB: DO NOT TAKE on day of surgery (minimum 11 hours for general anesthesia).   - Diuretics: DO NOT TAKE on the day of surgery.   - metformin: DO NOT TAKE day of surgery.   - Take usual dose on day of surgery    Recommendation  Approval given to proceed with proposed procedure, without further  diagnostic evaluation.    Subjective   Rakan is a 79 year old, presenting for the following:  Pre-Op Exam          11/6/2024     1:32 PM   Additional Questions   Roomed by CORONA Wesley related to upcoming procedure: reconstruction of TM left, recently had a favorable anesthesia event on 10/21/24 without complication        11/3/2024   Pre-Op Questionnaire   Have you ever had a heart attack or stroke? No    Have you ever had surgery on your heart or blood vessels, such as a stent placement, a coronary artery bypass, or surgery on an artery in your head, neck, heart, or legs? No    Do you have chest pain with activity? No    Do you have a history of heart failure? No    Do you currently have a cold, bronchitis or symptoms of other infection? No    Do you have a cough, shortness of breath, or wheezing? No    Do you or anyone in your family have previous history of blood clots? No    Do you or does anyone in your family have a serious bleeding problem such as prolonged bleeding following surgeries or cuts? No    Have you ever had problems with anemia or been told to take iron pills? (!) YES     Have you had any abnormal blood loss such as black, tarry or bloody stools? No    Have you ever had a blood transfusion? No    Are you willing to have a blood transfusion if it is medically needed before, during, or after your surgery? Yes    Have you or any of your relatives ever had problems with anesthesia? No    Do you have sleep apnea, excessive snoring or daytime drowsiness? (!) YES    Do you have a CPAP machine? Yes    Do you have any artifical heart valves or other implanted medical devices like a pacemaker, defibrillator, or continuous glucose monitor? No    Do you have artificial joints? (!) YES    Are you allergic to latex? No        Patient-reported     Health Care Directive  Patient has a Health Care Directive on file      Preoperative Review of    reviewed - controlled substances reflected in  medication list.          Patient Active Problem List    Diagnosis Date Noted    Chronic atticoantral suppurative otitis media of left ear 10/22/2024     Priority: Medium    Cholesteatoma, left 10/22/2024     Priority: Medium    Mastoiditis of left side 10/22/2024     Priority: Medium    Lumbar spine pain 07/30/2024     Priority: Medium    Chronic pain of left knee 04/04/2024     Priority: Medium    Osteoarthritis of left knee, unspecified osteoarthritis type 04/01/2024     Priority: Medium    Anemia 09/21/2022     Priority: Medium    Combined form of age-related cataract, left eye 12/15/2021     Priority: Medium     Added automatically from request for surgery [id number]      COPD (chronic obstructive pulmonary disease) (H) 10/28/2020     Priority: Medium    Hyponatremia 10/01/2020     Priority: Medium    Temporal arteritis (H) 02/20/2019     Priority: Medium    Type 2 diabetes mellitus with other specified complication, without long-term current use of insulin (H) 02/20/2019     Priority: Medium    Right posterior capsular opacification 04/09/2018     Priority: Medium    Morbid obesity (H) 02/20/2018     Priority: Medium    Prostate cancer (H) 01/12/2018     Priority: Medium     prostate cancer s/p cryotherapy on 3/18       Myasthenia gravis (H) 10/16/2017     Priority: Medium    Pseudophakia of right eye 02/21/2017     Priority: Medium    Obstructive sleep apnea syndrome 01/25/2017     Priority: Medium    Restless legs syndrome 10/12/2016     Priority: Medium    H/O RD (retinal detachment) s/p repair with PPV (AB) 05/26/2016     Priority: Medium    Epiretinal membrane, bilateral 05/26/2016     Priority: Medium    PVD (posterior vitreous detachment) OU 06/17/2014     Priority: Medium    Dry eyes 01/21/2014     Priority: Medium    Arthritis      Priority: Medium    HTN (hypertension) 07/19/2012     Priority: Medium    Trigeminal neuralgia pain 01/04/2012     Priority: Medium    Borderline glaucoma with ocular  hypertension 12/06/2010     Priority: Medium     Target IOP: 21  Peak IOP: 31  GDX: abnormal both eyes  OCT: nl  HVF: nl  Pachymetry:  C/D:  0.2/0.35  SLT:          HLD (hyperlipidemia) 10/31/2010     Priority: Medium    GERD (gastroesophageal reflux disease)      Priority: Medium      Past Medical History:   Diagnosis Date    Arthritis         BMI 31.0-31.9,adult     Cancer (H) 01/10/2018    Prostate    COPD (chronic obstructive pulmonary disease) (H) 10/28/2020    Demand ischemia (H)     Diabetes (H) 01/10/2018    Diverticulosis     Colonoscopy 8/2008    Fatty liver     see US  5/2012    GERD (gastroesophageal reflux disease)     Glaucoma (increased eye pressure)     HTN (hypertension)     Hyperlipidemia LDL goal < 130     age, htn, fhx    Irritable bowel     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     Nonsenile cataract     Obstructive sleep apnea     Obstructive sleep apnea syndrome     ROSIE (obstructive sleep apnea) 01/2011    Using CPAP;     Prediabetes     Restless leg syndrome     Retinal detachment     Sleep apnea     Trigeminal neuralgia pain 01/04/2012     Past Surgical History:   Procedure Laterality Date    ARTHROPLASTY KNEE Left 4/1/2024    Procedure: ARTHROPLASTY, LEFT KNEE, TOTAL;  Surgeon: Dandre Hassan MD;  Location: [facility]    BIOPSY ARTERY TEMPORAL Bilateral 08/01/2017    Procedure: BIOPSY ARTERY TEMPORAL;  Bilateral temporal artery Biopsy;  Surgeon: Jj Tello MD;  Location: [facility]    CATARACT IOL, RT/LT Right     COLONOSCOPY      ESOPHAGOSCOPY, GASTROSCOPY, DUODENOSCOPY (EGD), COMBINED  01/15/2014    Procedure: COMBINED ESOPHAGOSCOPY, GASTROSCOPY, DUODENOSCOPY (EGD), BIOPSY SINGLE OR MULTIPLE;;  Surgeon: Winston Nixon MD;  Location: [facility]    LASER YAG CAPSULOTOMY Right 04/09/2018    right eye    MASTOIDECTOMY Left 10/21/2024    Procedure: OPEN CAVITY OF LEFT MASTOIDECTOMY, MEATOPLASTY;  Surgeon: Shayna Delatorre MD;  Location:  [facility]    PHACOEMULSIFICATION CLEAR CORNEA WITH STANDARD INTRAOCULAR LENS IMPLANT Right 02/20/2017    Procedure: PHACOEMULSIFICATION CLEAR CORNEA WITH STANDARD INTRAOCULAR LENS IMPLANT;  Surgeon: Mateo Gray MD;  Location: [facility]    PHACOEMULSIFICATION CLEAR CORNEA WITH STANDARD INTRAOCULAR LENS IMPLANT Left 01/10/2022    Procedure: LEFT PHACOEMULSIFICATION, CATARACT, WITH INTRAOCULAR LENS IMPLANT;  Surgeon: Mateo Gray MD;  Location: [facility]    VT TRABECULOPLASTY BY LASER SURGERY      RETINAL REATTACHMENT Right     SURGICAL HISTORY OF -   08/2009    Both Eyelids-.    TONSILLECTOMY  as child     Current Outpatient Medications   Medication Sig Dispense Refill    acetaminophen (TYLENOL) 325 MG tablet Take 2 tablets (650 mg) by mouth every 4 hours as needed for other (For optimal non-opioid multimodal pain management to improve pain control.) 100 tablet 0    albuterol (PROAIR HFA/PROVENTIL HFA/VENTOLIN HFA) 108 (90 Base) MCG/ACT inhaler INHALE 1 PUFF INTO THE LUNGS EVERY 4 HOURS AS NEEDED FOR SHORTNESS OF BREATH, WHEEZING OR COUGH 18 g 11    amLODIPine (NORVASC) 10 MG tablet TAKE 1 TABLET (10 MG) BY MOUTH DAILY. 90 tablet 0    ARTIFICIAL TEAR OP Apply 1 drop to eye as needed      atorvastatin (LIPITOR) 20 MG tablet Take 20 mg by mouth at bedtime      blood glucose (ONETOUCH VERIO IQ) test strip Use to test blood sugar 3 times daily or as directed. 300 strip 1    blood glucose monitoring (NO BRAND SPECIFIED) meter device kit Use to test blood sugar 1 times daily or as directed. 1 kit 0    ClonAZEPAM (KLONOPIN) 0.5 MG tablet Take 0.5 mg by mouth Take 1 tablet by mouth daily at bedtime      Continuous Glucose Sensor (FREESTYLE SARAHY 2 SENSOR) MISC USE 1 SENSOR EVERY 14 DAYS. USE TO READ BLOOD SUGARS PER 'S INSTRUCTIONS. 6 each 5    ferrous sulfate (FEROSUL) 325 (65 Fe) MG tablet TAKE 1 TABLET BY MOUTH EVERY DAY WITH BREAKFAST 90 tablet 0    fluticasone-salmeterol (ADVAIR) 250-50 MCG/ACT  inhaler TAKE 1 PUFF BY MOUTH TWICE A DAY Strength: 250-50 MCG/ACT 3 each 3    fluticasone-salmeterol (WIXELA INHUB) 250-50 MCG/ACT inhaler Inhale 1 puff into the lungs every 12 hours 3 each 3    furosemide (LASIX) 20 MG tablet TAKE 2 TABLETS BY MOUTH IN THE MORNING AND 1 TABLET BY MOUTH AT LEAST 6 HOURS LATER IN THE AFTERNOON. (Patient taking differently: Take 20 mg by mouth daily.) 270 tablet 0    Lancets (ONETOUCH DELICA PLUS [id number]) MISC USE ONCE DAILY AS DIRECTED 100 each 1    latanoprost (XALATAN) 0.005 % ophthalmic solution Place 1 drop into both eyes at bedtime 7.5 mL 3    lisinopril (ZESTRIL) 40 MG tablet TAKE 1 TABLET BY MOUTH EVERY DAY 90 tablet 0    metFORMIN (GLUCOPHAGE XR) 500 MG 24 hr tablet TAKE 1 TABLET BY MOUTH TWICE A DAY WITH FOOD 180 tablet 3    OXcarbazepine (TRILEPTAL) 300 MG tablet TAKE 2 TABLETS IN THE MORNING AND 2 TABLETS IN THE EVENING (Patient taking differently: Take 900 mg by mouth 2 times daily. TAKE  3 TABLETS IN THE MORNING AND 3 TABLETS IN THE EVENING) 360 tablet 1    pantoprazole (PROTONIX) 20 MG EC tablet TAKE 1 TABLET BY MOUTH EVERY DAY 90 tablet 2    predniSONE (DELTASONE) 5 MG tablet TAKE 2 TABLETS BY MOUTH EVERY DAY 60 tablet 2    pyRIDostigmine (MESTINON) 60 MG tablet TAKE 2 TABLETS (120 MG) IN THE MORNING AND AFTERNOON AND 1 TABLET IN THE EVENING PER 4/22/2024 APPT. 450 tablet 0    pyRIDostigmine (MESTINON) 60 MG tablet Take 120 mg by mouth every morning      pyRIDostigmine (MESTINON) 60 MG tablet Take 60 mg by mouth 2 times daily Midday and 6 PM.      sodium bicarbonate 650 MG tablet Take 1 tablet (650 mg) by mouth 2 times daily 180 tablet 1    timolol maleate (TIMOPTIC) 0.5 % ophthalmic solution Place 1 drop into both eyes 2 times daily 5 mL 11       Allergies   Allergen Reactions    Azathioprine Shortness Of Breath and Other (See Comments)     Was hospitalized from it. Also had high fever, chills, and shortness of breath.    Sulfamethoxazole-Trimethoprim Cough, Itching  "and Rash     Generalized painful red rash on legs arms and abdomen, chest, back, cough and  fever  that required hospitalization.    Ciprofloxacin Muscle Pain (Myalgia)     Muscle pain  Other reaction(s): Myalgia  Other reaction(s): Myalgia    Ropinirole      Leg pan  GI  Weakness; ? sycope  Other reaction(s): Leg Pain        Social History     Tobacco Use    Smoking status: Former     Current packs/day: 0.00     Average packs/day: 2.0 packs/day for 15.0 years (30.0 ttl pk-yrs)     Types: Cigarettes     Start date: 1968     Quit date: 1983     Years since quittin.8     Passive exposure: Past    Smokeless tobacco: Former     Quit date: 1970    Tobacco comments:     smoke free household.   Substance Use Topics    Alcohol use: Not Currently     Comment: Quit in        History   Drug Use No             Review of Systems  Constitutional, HEENT, cardiovascular, pulmonary, gi and gu systems are negative, except as otherwise noted.    Objective    /75 (BP Location: Right arm, Patient Position: Chair, Cuff Size: Adult Large)   Pulse 84   Temp 97.8  F (36.6  C) (Temporal)   Resp 16   Ht 1.778 m (5' 10\")   Wt 103.4 kg (228 lb)   SpO2 97%   BMI 32.71 kg/m     Estimated body mass index is 32.71 kg/m  as calculated from the following:    Height as of this encounter: 1.778 m (5' 10\").    Weight as of this encounter: 103.4 kg (228 lb).  Physical Exam  GENERAL: alert and no distress  NECK: no adenopathy, no asymmetry, masses, or scars  RESP: lungs clear to auscultation - no rales, rhonchi or wheezes  CV: regular rate and rhythm, normal S1 S2, no S3 or S4, no murmur, click or rub, no peripheral edema  ABDOMEN: soft, nontender, no hepatosplenomegaly, no masses and bowel sounds normal  MS: no gross musculoskeletal defects noted, no edema    Recent Labs   Lab Test 10/01/24  1008 24  0858 24  1509 24  1201 24  0544   HGB  --   --   --   --  12.3*    140   < > 128*  --  "   POTASSIUM 4.5 4.0  --   --   --    CR 0.89 0.98  --   --   --    A1C 6.2*  --   --  5.9*  --     < > = values in this interval not displayed.        Diagnostics  No labs were ordered during this visit.   No EKG this visit, completed in the last 90 days. And normal stress test recently.    Revised Cardiac Risk Index (RCRI)  The patient has the following serious cardiovascular risks for perioperative complications:   - No serious cardiac risks = 0 points     RCRI Interpretation: 0 points: Class I (very low risk - 0.4% complication rate)         Signed Electronically by: ALESSIA POLANCO DO  A copy of this evaluation report is provided to the requesting physician.

## 2024-11-06 NOTE — PATIENT INSTRUCTIONS
How to Take Your Medication Before Surgery  Preoperative Medication Instructions   Antiplatelet or Anticoagulation Medication Instructions   - Patient is on no antiplatelet or anticoagulation medications.    Additional Medication Instructions  Take all scheduled medications on the day of surgery EXCEPT for modifications listed below:   - ACE/ARB: DO NOT TAKE on day of surgery (minimum 11 hours for general anesthesia).   - Diuretics: DO NOT TAKE on the day of surgery.   - metformin: DO NOT TAKE day of surgery.   - Take usual dose on day of surgery       Patient Education   Preparing for Your Surgery  For Adults  Getting started  In most cases, a nurse will call to review your health history and instructions. They will give you an arrival time based on your scheduled surgery time. Please be ready to share:  Your doctor's clinic name and phone number  Your medical, surgical, and anesthesia history  A list of allergies and sensitivities  A list of medicines, including herbal treatments and over-the-counter drugs  Whether the patient has a legal guardian (ask how to send us the papers in advance)  Note: You may not receive a call if you were seen at our PAC (Preoperative Assessment Center).  Please tell us if you're pregnant--or if there's any chance you might be pregnant. Some surgeries may injure a fetus (unborn baby), so they require a pregnancy test. Surgeries that are safe for a fetus don't always need a test, and you can choose whether to have one.   Preparing for surgery  Within 10 to 30 days of surgery: Have a pre-op exam (sometimes called an H&P, or History and Physical). This can be done at a clinic or pre-operative center.  If you're having a , you may not need this exam. Talk to your care team.  At your pre-op exam, talk to your care team about all medicines you take. (This includes CBD oil and any drugs, such as THC, marijuana, and other forms of cannabis.) If you need to stop any medicine before  surgery, ask when to start taking it again.  This is for your safety. Many medicines and drugs can make you bleed too much during surgery. Some change how well surgery (anesthesia) drugs work.  Call your insurance company to let them know you're having surgery. (If you don't have insurance, call [phone].)  Call your clinic if there's any change in your health. This includes a scrape or scratch near the surgery site, or any signs of a cold (sore throat, runny nose, cough, rash, fever).  Eating and drinking guidelines  For your safety: Unless your surgeon tells you otherwise, follow the guidelines below.  Eat and drink as normal until 8 hours before you arrive for surgery. After that, no food or milk. You can spit out gum when you arrive.  Drink clear liquids until 2 hours before you arrive. These are liquids you can see through, like water, Gatorade, and Propel Water. They also include plain black coffee and tea (no cream or milk).  No alcohol for 24 hours before you arrive. The night before surgery, stop any drinks that contain THC.  If your care team tells you to take medicine on the morning of surgery, it's okay to take it with a sip of water. No other medicines or drugs are allowed (including CBD oil)--follow your care team's instructions.  If you have questions the day of surgery, call your hospital or surgery center.   Preventing infection  Shower or bathe the night before and the morning of surgery. Follow the instructions your clinic gave you. (If no instructions, use regular soap.)  Don't shave or clip hair near your surgery site. We'll remove the hair if needed.  Don't smoke or vape the morning of surgery. No chewing tobacco for 6 hours before you arrive. A nicotine patch is okay. You may spit out nicotine gum when you arrive.  For some surgeries, the surgeon will tell you to fully quit smoking and nicotine.  We will make every effort to keep you safe from infection. We will:  Clean our hands often  with soap and water (or an alcohol-based hand rub).  Clean the skin at your surgery site with a special soap that kills germs.  Give you a special gown to keep you warm. (Cold raises the risk of infection.)  Wear hair covers, masks, gowns, and gloves during surgery.  Give antibiotic medicine, if prescribed. Not all surgeries need this medicine.  What to bring on the day of surgery  Photo ID and insurance card  Copy of your health care directive, if you have one  Glasses and hearing aids (bring cases)  You can't wear contacts during surgery  Inhaler and eye drops, if you use them (tell us about these when you arrive)  CPAP machine or breathing device, if you use them  A few personal items, if spending the night  If you have . . .  A pacemaker, ICD (cardiac defibrillator), or other implant: Bring the ID card.  An implanted stimulator: Bring the remote control.  A legal guardian: Bring a copy of the certified (court-stamped) guardianship papers.  Please remove any jewelry, including body piercings. Leave jewelry and other valuables at home.  If you're going home the day of surgery  You must have a responsible adult drive you home. They should stay with you overnight as well.  If you don't have someone to stay with you, and you aren't safe to go home alone, we may keep you overnight. Insurance often won't pay for this.  After surgery  If it's hard to control your pain or you need more pain medicine, please call your surgeon's office.  Questions?   If you have any questions for your care team, list them here:   ____________________________________________________________________________________________________________________________________________________________________________________________________________________________________________________________  For informational purposes only. Not to replace the advice of your health care provider. Copyright   2003, 2019 [facility]. All rights reserved.  Clinically reviewed by Sree Stevenson MD. [employer] [id number] - REV 08/24.

## 2024-11-07 ENCOUNTER — HOSPITAL ENCOUNTER (OUTPATIENT)
Facility: CLINIC | Age: 79
Discharge: HOME | End: 2024-11-07
Attending: OTOLARYNGOLOGY | Admitting: OTOLARYNGOLOGY
Payer: COMMERCIAL

## 2024-11-07 ENCOUNTER — ANESTHESIA (OUTPATIENT)
Dept: SURGERY | Facility: CLINIC | Age: 79
End: 2024-11-07
Payer: COMMERCIAL

## 2024-11-07 VITALS
HEIGHT: 70 IN | HEART RATE: 69 BPM | TEMPERATURE: 97.4 F | DIASTOLIC BLOOD PRESSURE: 69 MMHG | BODY MASS INDEX: 32.57 KG/M2 | OXYGEN SATURATION: 92 % | RESPIRATION RATE: 18 BRPM | SYSTOLIC BLOOD PRESSURE: 125 MMHG | WEIGHT: 227.51 LBS

## 2024-11-07 LAB
GLUCOSE BLDC GLUCOMTR-MCNC: 113 MG/DL (ref 70–99)
GLUCOSE BLDC GLUCOMTR-MCNC: 151 MG/DL (ref 70–99)

## 2024-11-07 PROCEDURE — 250N000009 HC RX 250: Performed by: OTOLARYNGOLOGY

## 2024-11-07 PROCEDURE — 82962 GLUCOSE BLOOD TEST: CPT

## 2024-11-07 PROCEDURE — 370N000017 HC ANESTHESIA TECHNICAL FEE, PER MIN: Performed by: OTOLARYNGOLOGY

## 2024-11-07 PROCEDURE — 15220 FTH/GFT FR S/A/L 20 SQ CM/<: CPT | Performed by: REGISTERED NURSE

## 2024-11-07 PROCEDURE — 258N000003 HC RX IP 258 OP 636: Performed by: REGISTERED NURSE

## 2024-11-07 PROCEDURE — 15220 FTH/GFT FR S/A/L 20 SQ CM/<: CPT | Performed by: ANESTHESIOLOGY

## 2024-11-07 PROCEDURE — 250N000011 HC RX IP 250 OP 636: Performed by: REGISTERED NURSE

## 2024-11-07 PROCEDURE — 710N000010 HC RECOVERY PHASE 1, LEVEL 2, PER MIN: Performed by: OTOLARYNGOLOGY

## 2024-11-07 PROCEDURE — 272N000001 HC OR GENERAL SUPPLY STERILE: Performed by: OTOLARYNGOLOGY

## 2024-11-07 PROCEDURE — 250N000011 HC RX IP 250 OP 636: Performed by: OTOLARYNGOLOGY

## 2024-11-07 PROCEDURE — 710N000012 HC RECOVERY PHASE 2, PER MINUTE: Performed by: OTOLARYNGOLOGY

## 2024-11-07 PROCEDURE — 250N000009 HC RX 250: Performed by: REGISTERED NURSE

## 2024-11-07 PROCEDURE — 250N000013 HC RX MED GY IP 250 OP 250 PS 637: Performed by: ANESTHESIOLOGY

## 2024-11-07 PROCEDURE — 99100 ANES PT EXTEME AGE<1 YR&>70: CPT | Performed by: REGISTERED NURSE

## 2024-11-07 PROCEDURE — 15120 SPLT AGRFT F/S/N/H/F/G/M 1ST: CPT | Mod: 58 | Performed by: OTOLARYNGOLOGY

## 2024-11-07 PROCEDURE — 250N000011 HC RX IP 250 OP 636

## 2024-11-07 PROCEDURE — 99100 ANES PT EXTEME AGE<1 YR&>70: CPT | Performed by: ANESTHESIOLOGY

## 2024-11-07 PROCEDURE — 999N000141 HC STATISTIC PRE-PROCEDURE NURSING ASSESSMENT: Performed by: OTOLARYNGOLOGY

## 2024-11-07 PROCEDURE — 250N000025 HC SEVOFLURANE, PER MIN: Performed by: OTOLARYNGOLOGY

## 2024-11-07 PROCEDURE — 250N000011 HC RX IP 250 OP 636: Performed by: ANESTHESIOLOGY

## 2024-11-07 PROCEDURE — 360N000076 HC SURGERY LEVEL 3, PER MIN: Performed by: OTOLARYNGOLOGY

## 2024-11-07 RX ORDER — LIDOCAINE HYDROCHLORIDE 20 MG/ML
INJECTION, SOLUTION INFILTRATION; PERINEURAL PRN
Status: DISCONTINUED | OUTPATIENT
Start: 2024-11-07 | End: 2024-11-07

## 2024-11-07 RX ORDER — FENTANYL CITRATE 50 UG/ML
INJECTION, SOLUTION INTRAMUSCULAR; INTRAVENOUS PRN
Status: DISCONTINUED | OUTPATIENT
Start: 2024-11-07 | End: 2024-11-07

## 2024-11-07 RX ORDER — OXYCODONE HYDROCHLORIDE 10 MG/1
10 TABLET ORAL
Status: DISCONTINUED | OUTPATIENT
Start: 2024-11-07 | End: 2024-11-07 | Stop reason: HOSPADM

## 2024-11-07 RX ORDER — EPHEDRINE SULFATE 50 MG/ML
INJECTION, SOLUTION INTRAMUSCULAR; INTRAVENOUS; SUBCUTANEOUS PRN
Status: DISCONTINUED | OUTPATIENT
Start: 2024-11-07 | End: 2024-11-07

## 2024-11-07 RX ORDER — HYDROMORPHONE HCL IN WATER/PF 6 MG/30 ML
0.2 PATIENT CONTROLLED ANALGESIA SYRINGE INTRAVENOUS EVERY 5 MIN PRN
Status: DISCONTINUED | OUTPATIENT
Start: 2024-11-07 | End: 2024-11-07 | Stop reason: HOSPADM

## 2024-11-07 RX ORDER — ONDANSETRON 4 MG/1
4 TABLET, ORALLY DISINTEGRATING ORAL EVERY 30 MIN PRN
Status: DISCONTINUED | OUTPATIENT
Start: 2024-11-07 | End: 2024-11-07 | Stop reason: HOSPADM

## 2024-11-07 RX ORDER — SODIUM CHLORIDE, SODIUM LACTATE, POTASSIUM CHLORIDE, CALCIUM CHLORIDE 600; 310; 30; 20 MG/100ML; MG/100ML; MG/100ML; MG/100ML
INJECTION, SOLUTION INTRAVENOUS CONTINUOUS PRN
Status: DISCONTINUED | OUTPATIENT
Start: 2024-11-07 | End: 2024-11-07

## 2024-11-07 RX ORDER — MEPERIDINE HYDROCHLORIDE 25 MG/ML
12.5 INJECTION INTRAMUSCULAR; INTRAVENOUS; SUBCUTANEOUS EVERY 5 MIN PRN
Status: DISCONTINUED | OUTPATIENT
Start: 2024-11-07 | End: 2024-11-07 | Stop reason: HOSPADM

## 2024-11-07 RX ORDER — CEFAZOLIN SODIUM/WATER 2 G/20 ML
2 SYRINGE (ML) INTRAVENOUS
Status: COMPLETED | OUTPATIENT
Start: 2024-11-07 | End: 2024-11-07

## 2024-11-07 RX ORDER — LABETALOL HYDROCHLORIDE 5 MG/ML
10 INJECTION, SOLUTION INTRAVENOUS
Status: DISCONTINUED | OUTPATIENT
Start: 2024-11-07 | End: 2024-11-07 | Stop reason: HOSPADM

## 2024-11-07 RX ORDER — HYDROMORPHONE HCL IN WATER/PF 6 MG/30 ML
0.4 PATIENT CONTROLLED ANALGESIA SYRINGE INTRAVENOUS EVERY 5 MIN PRN
Status: DISCONTINUED | OUTPATIENT
Start: 2024-11-07 | End: 2024-11-07 | Stop reason: HOSPADM

## 2024-11-07 RX ORDER — ONDANSETRON 2 MG/ML
INJECTION INTRAMUSCULAR; INTRAVENOUS PRN
Status: DISCONTINUED | OUTPATIENT
Start: 2024-11-07 | End: 2024-11-07

## 2024-11-07 RX ORDER — EPINEPHRINE 1 MG/ML
INJECTION, SOLUTION, CONCENTRATE INTRAVENOUS PRN
Status: DISCONTINUED | OUTPATIENT
Start: 2024-11-07 | End: 2024-11-07 | Stop reason: HOSPADM

## 2024-11-07 RX ORDER — NALOXONE HYDROCHLORIDE 0.4 MG/ML
0.1 INJECTION, SOLUTION INTRAMUSCULAR; INTRAVENOUS; SUBCUTANEOUS
Status: DISCONTINUED | OUTPATIENT
Start: 2024-11-07 | End: 2024-11-07 | Stop reason: HOSPADM

## 2024-11-07 RX ORDER — FENTANYL CITRATE 50 UG/ML
25 INJECTION, SOLUTION INTRAMUSCULAR; INTRAVENOUS
Status: DISCONTINUED | OUTPATIENT
Start: 2024-11-07 | End: 2024-11-07 | Stop reason: HOSPADM

## 2024-11-07 RX ORDER — DEXAMETHASONE SODIUM PHOSPHATE 4 MG/ML
4 INJECTION, SOLUTION INTRA-ARTICULAR; INTRALESIONAL; INTRAMUSCULAR; INTRAVENOUS; SOFT TISSUE
Status: DISCONTINUED | OUTPATIENT
Start: 2024-11-07 | End: 2024-11-07 | Stop reason: HOSPADM

## 2024-11-07 RX ORDER — DEXAMETHASONE SODIUM PHOSPHATE 4 MG/ML
INJECTION, SOLUTION INTRA-ARTICULAR; INTRALESIONAL; INTRAMUSCULAR; INTRAVENOUS; SOFT TISSUE PRN
Status: DISCONTINUED | OUTPATIENT
Start: 2024-11-07 | End: 2024-11-07

## 2024-11-07 RX ORDER — PROPOFOL 10 MG/ML
INJECTION, EMULSION INTRAVENOUS PRN
Status: DISCONTINUED | OUTPATIENT
Start: 2024-11-07 | End: 2024-11-07

## 2024-11-07 RX ORDER — FENTANYL CITRATE 50 UG/ML
25 INJECTION, SOLUTION INTRAMUSCULAR; INTRAVENOUS EVERY 5 MIN PRN
Status: DISCONTINUED | OUTPATIENT
Start: 2024-11-07 | End: 2024-11-07 | Stop reason: HOSPADM

## 2024-11-07 RX ORDER — ONDANSETRON 2 MG/ML
4 INJECTION INTRAMUSCULAR; INTRAVENOUS EVERY 30 MIN PRN
Status: DISCONTINUED | OUTPATIENT
Start: 2024-11-07 | End: 2024-11-07 | Stop reason: HOSPADM

## 2024-11-07 RX ORDER — MINERAL OIL
OIL (ML) MISCELLANEOUS PRN
Status: DISCONTINUED | OUTPATIENT
Start: 2024-11-07 | End: 2024-11-07 | Stop reason: HOSPADM

## 2024-11-07 RX ORDER — LIDOCAINE HYDROCHLORIDE AND EPINEPHRINE 10; 10 MG/ML; UG/ML
INJECTION, SOLUTION INFILTRATION; PERINEURAL PRN
Status: DISCONTINUED | OUTPATIENT
Start: 2024-11-07 | End: 2024-11-07 | Stop reason: HOSPADM

## 2024-11-07 RX ORDER — HYDROXYZINE HYDROCHLORIDE 10 MG/1
10 TABLET, FILM COATED ORAL EVERY 6 HOURS PRN
Status: DISCONTINUED | OUTPATIENT
Start: 2024-11-07 | End: 2024-11-07 | Stop reason: HOSPADM

## 2024-11-07 RX ORDER — OXYCODONE HYDROCHLORIDE 5 MG/1
5 TABLET ORAL
Status: COMPLETED | OUTPATIENT
Start: 2024-11-07 | End: 2024-11-07

## 2024-11-07 RX ORDER — CEFAZOLIN SODIUM/WATER 2 G/20 ML
2 SYRINGE (ML) INTRAVENOUS SEE ADMIN INSTRUCTIONS
Status: DISCONTINUED | OUTPATIENT
Start: 2024-11-07 | End: 2024-11-07 | Stop reason: HOSPADM

## 2024-11-07 RX ORDER — SODIUM CHLORIDE, SODIUM LACTATE, POTASSIUM CHLORIDE, CALCIUM CHLORIDE 600; 310; 30; 20 MG/100ML; MG/100ML; MG/100ML; MG/100ML
INJECTION, SOLUTION INTRAVENOUS CONTINUOUS
Status: DISCONTINUED | OUTPATIENT
Start: 2024-11-07 | End: 2024-11-07

## 2024-11-07 RX ORDER — FENTANYL CITRATE 50 UG/ML
50 INJECTION, SOLUTION INTRAMUSCULAR; INTRAVENOUS EVERY 5 MIN PRN
Status: DISCONTINUED | OUTPATIENT
Start: 2024-11-07 | End: 2024-11-07 | Stop reason: HOSPADM

## 2024-11-07 RX ORDER — DEXAMETHASONE SODIUM PHOSPHATE 10 MG/ML
10 INJECTION, SOLUTION INTRAMUSCULAR; INTRAVENOUS ONCE
Status: COMPLETED | OUTPATIENT
Start: 2024-11-07 | End: 2024-11-07

## 2024-11-07 RX ADMIN — Medication 10 MG: at 09:57

## 2024-11-07 RX ADMIN — PROPOFOL 200 MG: 10 INJECTION, EMULSION INTRAVENOUS at 08:28

## 2024-11-07 RX ADMIN — Medication 2 G: at 08:32

## 2024-11-07 RX ADMIN — Medication 10 MG: at 09:23

## 2024-11-07 RX ADMIN — LIDOCAINE HYDROCHLORIDE 100 MG: 20 INJECTION, SOLUTION INFILTRATION; PERINEURAL at 08:27

## 2024-11-07 RX ADMIN — EPHEDRINE SULFATE 5 MG: 5 INJECTION INTRAVENOUS at 09:40

## 2024-11-07 RX ADMIN — EPHEDRINE SULFATE 10 MG: 5 INJECTION INTRAVENOUS at 08:58

## 2024-11-07 RX ADMIN — DEXAMETHASONE SODIUM PHOSPHATE 10 MG: 10 INJECTION, SOLUTION INTRAMUSCULAR; INTRAVENOUS at 08:40

## 2024-11-07 RX ADMIN — Medication 50 MG: at 08:29

## 2024-11-07 RX ADMIN — PHENYLEPHRINE HYDROCHLORIDE 100 MCG: 10 INJECTION INTRAVENOUS at 08:45

## 2024-11-07 RX ADMIN — SODIUM CHLORIDE, POTASSIUM CHLORIDE, SODIUM LACTATE AND CALCIUM CHLORIDE: 600; 310; 30; 20 INJECTION, SOLUTION INTRAVENOUS at 08:17

## 2024-11-07 RX ADMIN — ONDANSETRON 4 MG: 2 INJECTION INTRAMUSCULAR; INTRAVENOUS at 11:04

## 2024-11-07 RX ADMIN — FENTANYL CITRATE 100 MCG: 50 INJECTION INTRAMUSCULAR; INTRAVENOUS at 08:28

## 2024-11-07 RX ADMIN — EPHEDRINE SULFATE 5 MG: 5 INJECTION INTRAVENOUS at 09:49

## 2024-11-07 RX ADMIN — ONDANSETRON 4 MG: 2 INJECTION INTRAMUSCULAR; INTRAVENOUS at 10:05

## 2024-11-07 RX ADMIN — OXYCODONE HYDROCHLORIDE 5 MG: 5 TABLET ORAL at 11:19

## 2024-11-07 RX ADMIN — EPHEDRINE SULFATE 5 MG: 5 INJECTION INTRAVENOUS at 10:05

## 2024-11-07 RX ADMIN — HYDROMORPHONE HYDROCHLORIDE 0.5 MG: 1 INJECTION, SOLUTION INTRAMUSCULAR; INTRAVENOUS; SUBCUTANEOUS at 09:24

## 2024-11-07 RX ADMIN — Medication 200 MG: at 10:13

## 2024-11-07 ASSESSMENT — ACTIVITIES OF DAILY LIVING (ADL)
ADLS_ACUITY_SCORE: 0

## 2024-11-07 NOTE — ANESTHESIA POSTPROCEDURE EVALUATION
Patient: Rakan Nolasco    Procedure: Procedure(s):  THRESCH GRAFTING, LEFT ARM SPLIT GRAFTING TO LEFT EAR GRANULATION TISSUE REMOVAL, POST AURICULAR SUTURES REMOVAL       Anesthesia Type:  General    Note:  Disposition: Outpatient   Postop Pain Control: Uneventful            Sign Out: Well controlled pain   PONV: No   Neuro/Psych: Uneventful            Sign Out: Acceptable/Baseline neuro status   Airway/Respiratory: Uneventful            Sign Out: Acceptable/Baseline resp. status   CV/Hemodynamics: Uneventful            Sign Out: Acceptable CV status; No obvious hypovolemia; No obvious fluid overload   Other NRE: NONE   DID A NON-ROUTINE EVENT OCCUR? No           Last vitals:  Vitals Value Taken Time   /86 11/07/24 1100   Temp 36.6  C (97.9  F) 11/07/24 1030   Pulse 67 11/07/24 1115   Resp 14 11/07/24 1115   SpO2 99 % 11/07/24 1115   Vitals shown include unfiled device data.    Electronically Signed By: Zachariah Lu MD  November 7, 2024  11:16 AM

## 2024-11-07 NOTE — OP NOTE
Name: Rakan Nolasco   MR: [id number]   : 1945   DOS: 2024      Surgeon: Shayna Delatorre MD    Preoperative diagnosis: 1. Left Status Post open cavity mastoidectomy        2. Chronic otitis media        3. Status post cholesteatoma removal    Postoperative diagnosis: 1. Left Status Post open cavity mastoidectomy        2. Chronic otitis media        3. Status post cholesteatoma removal    Procedure: Left Thiersch Grafting with removal of granulation tissue and scars    Anesthesia: General Endotracheal anesthesia  EBL:  5 cc.  Complications: None  Specimen: None    Indication for Surgery: This is a 79 year old gentleman with a history of chronic otitis media and cholesteatoma who underwent a left open cavity mastoidectomy several weeks ago. He returned to the OR today for Thiersch grafting of the right external auditory canal to help healing process.    Findings:  Granulation tissue over incision sites.    Procedure in detail. The patient was identified in the preoperative area and after obtaining informed consent; he was transferred to the operating theater and placed on the operating table in supine position. He was then endotracheally intubated by anesthesia without any complications. The bed was turned. He was then prepped and draped appropriately. Using microscope, and an edaural speculum, the right external canal and mastoid cavity were visualized and packing of the canal was completely removed. Epinephrine soaked gauze strips were then placed into the canal for vasoconstriction and replaced several times until adequate hemostatis was obtained. Gauze strips was then left in the canal while harvesting of the Thiersch graft was performed. The left arm which had been previously scrubbed and draped before the Thiersch grafting was then infiltrated with Lidocaine 1% epinephrine 1: 100.000 approximately 5 cc in the medial aspect of the arm. The harvest area was prepped with mineral oil and 1.5 x 4  cm split thickness skin graft was obtained from this area using dermatome on a thickness of 0.20 mm. These grafts were placed keratin side down then cut into individual skin graft units. A pressure dressing was then placed over the harvest site of the arm. Next, the external auditory canal was once again visualized and all granulation tissue was removed after removal of the previously placed packing. The graft units were then placed keratin side up over the cavity completely covering any exposed bone. The postauricular incision site was clean. The patient was then awakened and extubated. He was taken to the post anesthesia care unit in good condition. Facial nerve found to be intact.

## 2024-11-07 NOTE — ANESTHESIA PREPROCEDURE EVALUATION
Anesthesia Pre-Procedure Evaluation    Patient: Rakan Nolasco   MRN: [id number] : 1945        Procedure : Procedure(s):  TRANSFER, GRAFT, SKIN, FULL-THICKNESS, FROM ONE HEAD REGION TO ANOTHER HEAD REGION          Past Medical History:   Diagnosis Date     Arthritis          BMI 31.0-31.9,adult      Cancer (H) 01/10/2018    Prostate     COPD (chronic obstructive pulmonary disease) (H) 10/28/2020     Demand ischemia (H)      Diabetes (H) 01/10/2018     Diverticulosis     Colonoscopy 2008     Fatty liver     see US  2012     GERD (gastroesophageal reflux disease)      Glaucoma (increased eye pressure)      HTN (hypertension)      Hyperlipidemia LDL goal < 130     age, htn, fhx     Irritable bowel      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      Nonsenile cataract      Obstructive sleep apnea      Obstructive sleep apnea syndrome      ROSIE (obstructive sleep apnea) 2011    Using CPAP;      Prediabetes      Restless leg syndrome      Retinal detachment      Sleep apnea      Trigeminal neuralgia pain 2012      Past Surgical History:   Procedure Laterality Date     ARTHROPLASTY KNEE Left 2024    Procedure: ARTHROPLASTY, LEFT KNEE, TOTAL;  Surgeon: Dandre Hassan MD;  Location: [facility]     BIOPSY ARTERY TEMPORAL Bilateral 2017    Procedure: BIOPSY ARTERY TEMPORAL;  Bilateral temporal artery Biopsy;  Surgeon: Jj Tello MD;  Location: [facility]     CATARACT IOL, RT/LT Right      COLONOSCOPY       ESOPHAGOSCOPY, GASTROSCOPY, DUODENOSCOPY (EGD), COMBINED  01/15/2014    Procedure: COMBINED ESOPHAGOSCOPY, GASTROSCOPY, DUODENOSCOPY (EGD), BIOPSY SINGLE OR MULTIPLE;;  Surgeon: Winston Nixon MD;  Location: [facility]     LASER YAG CAPSULOTOMY Right 2018    right eye     MASTOIDECTOMY Left 10/21/2024    Procedure: OPEN CAVITY OF LEFT MASTOIDECTOMY, MEATOPLASTY;  Surgeon: Shayna Delatorre MD;  Location: [facility]     PHACOEMULSIFICATION  CLEAR CORNEA WITH STANDARD INTRAOCULAR LENS IMPLANT Right 2017    Procedure: PHACOEMULSIFICATION CLEAR CORNEA WITH STANDARD INTRAOCULAR LENS IMPLANT;  Surgeon: Mateo Gray MD;  Location:  [facility]     PHACOEMULSIFICATION CLEAR CORNEA WITH STANDARD INTRAOCULAR LENS IMPLANT Left 01/10/2022    Procedure: LEFT PHACOEMULSIFICATION, CATARACT, WITH INTRAOCULAR LENS IMPLANT;  Surgeon: Mateo Gray MD;  Location: [facility]     HI TRABECULOPLASTY BY LASER SURGERY       RETINAL REATTACHMENT Right      SURGICAL HISTORY OF -   2009    Both Eyelids-.     TONSILLECTOMY  as child      Allergies   Allergen Reactions     Azathioprine Shortness Of Breath and Other (See Comments)     Was hospitalized from it. Also had high fever, chills, and shortness of breath.     Sulfamethoxazole-Trimethoprim Cough, Itching and Rash     Generalized painful red rash on legs arms and abdomen, chest, back, cough and  fever  that required hospitalization.     Ciprofloxacin Muscle Pain (Myalgia)     Muscle pain  Other reaction(s): Myalgia  Other reaction(s): Myalgia     Ropinirole      Leg pan  GI  Weakness; ? sycope  Other reaction(s): Leg Pain      Social History     Tobacco Use     Smoking status: Former     Current packs/day: 0.00     Average packs/day: 2.0 packs/day for 15.0 years (30.0 ttl pk-yrs)     Types: Cigarettes     Start date: 1968     Quit date: 1983     Years since quittin.8     Passive exposure: Past     Smokeless tobacco: Former     Quit date: 1970     Tobacco comments:     smoke free household.   Substance Use Topics     Alcohol use: Not Currently     Comment: Quit in       Wt Readings from Last 1 Encounters:   24 103.4 kg (228 lb)        Anesthesia Evaluation   Pt has had prior anesthetic. Type: General.    No history of anesthetic complications       ROS/MED HX  ENT/Pulmonary: Comment:  Chronic atticoantral suppurative otitis media of left ear [H66.22]       Cholesteatoma, left  "[H71.92]       Mastoiditis of left side [H70.92       (+) sleep apnea, uses CPAP,                       mild,  COPD,              Neurologic:       Cardiovascular:     (+)  hypertension- -   -  - -                                      METS/Exercise Tolerance:     Hematologic:       Musculoskeletal:       GI/Hepatic:     (+) GERD, Asymptomatic on medication,           liver disease,       Renal/Genitourinary:       Endo:     (+)  type II DM,             Obesity,       Psychiatric/Substance Use:       Infectious Disease:       Malignancy:   (+) Malignancy,     Other:            Physical Exam    Airway        Mallampati: II   TM distance: > 3 FB   Neck ROM: full   Mouth opening: > 3 cm    Respiratory Devices and Support         Dental       (+) Modest Abnormalities - crowns, retainers, 1 or 2 missing teeth      Cardiovascular   cardiovascular exam normal          Pulmonary   pulmonary exam normal            OUTSIDE LABS:  CBC:   Lab Results   Component Value Date    WBC 14.4 (H) 02/22/2023    WBC 10.1 10/11/2022    HGB 12.3 (L) 04/02/2024    HGB 15.2 02/22/2023    HCT 44.3 02/22/2023    HCT 39.5 (L) 10/11/2022     02/22/2023     10/11/2022     BMP:   Lab Results   Component Value Date     10/01/2024     07/03/2024    POTASSIUM 4.5 10/01/2024    POTASSIUM 4.0 07/03/2024    CHLORIDE 99 10/01/2024    CHLORIDE 100 07/03/2024    CO2 27 10/01/2024    CO2 32 (H) 07/03/2024    BUN 15.3 10/01/2024    BUN 13.1 07/03/2024    CR 0.89 10/01/2024    CR 0.98 07/03/2024     (H) 10/21/2024     (H) 10/21/2024     COAGS: No results found for: \"PTT\", \"INR\", \"FIBR\"  POC: No results found for: \"BGM\", \"HCG\", \"HCGS\"  HEPATIC:   Lab Results   Component Value Date    ALBUMIN 4.4 04/13/2023    PROTTOTAL 7.2 04/13/2023    ALT 25 04/13/2023    AST 24 04/13/2023    ALKPHOS 55 04/13/2023    BILITOTAL 0.3 04/13/2023     OTHER:   Lab Results   Component Value Date    A1C 6.2 (H) 10/01/2024    ROSY 9.3 10/01/2024 " "   PHOS 3.0 07/09/2020    MAG 1.9 07/01/2022    TSH 3.93 12/20/2019    CRP 11.6 (H) 02/22/2023    SED 13 02/22/2023       Anesthesia Plan    ASA Status:  3    NPO Status:  NPO Appropriate    Anesthesia Type: General.     - Airway: ETT   Induction: Intravenous.   Maintenance: Balanced.   Techniques and Equipment:     - Airway: Video-Laryngoscope       Consents    Anesthesia Plan(s) and associated risks, benefits, and realistic alternatives discussed. Questions answered and patient/representative(s) expressed understanding.     - Discussed:     - Discussed with:  Patient      - Extended Intubation/Ventilatory Support Discussed: No.      - Patient is DNR/DNI Status: No     Use of blood products discussed: No .     Postoperative Care    Pain management: IV analgesics, Oral pain medications, Multi-modal analgesia.   PONV prophylaxis: Ondansetron (or other 5HT-3), Background Propofol Infusion     Comments:               Ana Mcgovern MD    I have reviewed the pertinent notes and labs in the chart from the past 30 days and (re)examined the patient.  Any updates or changes from those notes are reflected in this note.               # Hypertension: Noted on problem list           # Obesity: Estimated body mass index is 32.71 kg/m  as calculated from the following:    Height as of 11/6/24: 1.778 m (5' 10\").    Weight as of 11/6/24: 103.4 kg (228 lb).             "

## 2024-11-07 NOTE — ANESTHESIA CARE TRANSFER NOTE
Patient: Rakan Nolasco    Procedure: Procedure(s):  THRESCH GRAFTING, LEFT ARM SPLIT GRAFTING TO LEFT EAR GRANULATION TISSUE REMOVAL, POST AURICULAR SUTURES REMOVAL       Diagnosis: Chronic atticoantral suppurative otitis media of left ear [H66.22]  Cholesteatoma, left [H71.92]  Mastoiditis of left side [H70.92]  Diagnosis Additional Information: No value filed.    Anesthesia Type:   General     Note:    Oropharynx: oropharynx clear of all foreign objects and spontaneously breathing  Level of Consciousness: drowsy  Oxygen Supplementation: face mask  Level of Supplemental Oxygen (L/min / FiO2): 6  Independent Airway: airway patency satisfactory and stable  Dentition: dentition unchanged  Vital Signs Stable: post-procedure vital signs reviewed and stable  Report to RN Given: handoff report given  Patient transferred to: PACU    Handoff Report: Identifed the Patient, Identified the Reponsible Provider, Reviewed the pertinent medical history, Discussed the surgical course, Reviewed Intra-OP anesthesia mangement and issues during anesthesia, Set expectations for post-procedure period and Allowed opportunity for questions and acknowledgement of understanding      Vitals:  Vitals Value Taken Time   /74 11/07/24 1030   Temp     Pulse 74 11/07/24 1033   Resp 15 11/07/24 1033   SpO2 98 % 11/07/24 1033   Vitals shown include unfiled device data.    Electronically Signed By: JACK Mesa CRNA  November 7, 2024  10:33 AM

## 2024-11-07 NOTE — DISCHARGE SUMMARY
Physician Discharge Summary     Patient ID:  aRkan Nolasco  [id number]  79 year old  1945    Admit date: 11/7/2024    Discharge date and time: 11/7/2024     Admitting Physician: Shayna Delatorre MD     Discharge Physician: Shayna Delatorre MD    Admission Diagnoses: Chronic atticoantral suppurative otitis media of left ear [H66.22]  Cholesteatoma, left [H71.92]  Mastoiditis of left side [H70.92]    Discharge Diagnoses: Left chronic otitis media    Admission Condition: good    Discharged Condition: good    Indication for Admission: Thiersch grafting of the open cavity    Hospital Course:     Consults: none    Significant Diagnostic Studies: None    Treatments: surgery:     Discharge Exam:  GENERAL APPEARANCE: healthy, alert and no distress  EYES: Eyes grossly normal to inspection, PERRL and conjunctivae and sclerae normal  HENT: ear canals and TM's normal, nose and mouth without ulcers or lesions, oropharynx clear and oral mucous membranes moist  NECK: no adenopathy, no asymmetry, masses, or scars and thyroid normal to palpation  RESP: lungs clear to auscultation - no rales, rhonchi or wheezes  CV: regular rates and rhythm, normal S1 S2, no S3 or S4, no murmur, click or rub, no peripheral edema and peripheral pulses strong  ABDOMEN: soft, nontender, no hepatosplenomegaly, no masses and bowel sounds normal   (male): normal male genitalia without lesions or urethral discharge, no hernia  RECTAL: normal sphincter tone, no rectal masses, prostate of normal size, smooth, nontender without nodules or masses  MS: no musculoskeletal defects are noted and gait is age appropriate without ataxia  SKIN: no suspicious lesions or rashes  NEURO: Normal strength and tone, sensory exam grossly normal, mentation intact and speech normal  PSYCH: mentation appears normal and affect normal/bright    Disposition: home    Patient Instructions:   Current Discharge Medication List        CONTINUE these medications which have  NOT CHANGED    Details   acetaminophen (TYLENOL) 325 MG tablet Take 2 tablets (650 mg) by mouth every 4 hours as needed for other (For optimal non-opioid multimodal pain management to improve pain control.)  Qty: 100 tablet, Refills: 0    Associated Diagnoses: S/P total knee arthroplasty, left      albuterol (PROAIR HFA/PROVENTIL HFA/VENTOLIN HFA) 108 (90 Base) MCG/ACT inhaler INHALE 1 PUFF INTO THE LUNGS EVERY 4 HOURS AS NEEDED FOR SHORTNESS OF BREATH, WHEEZING OR COUGH  Qty: 18 g, Refills: 11    Comments: Pharmacy may dispense brand covered by insurance (Proair, or proventil or ventolin or generic albuterol inhaler)  Associated Diagnoses: Chronic obstructive pulmonary disease, unspecified (H)      amLODIPine (NORVASC) 10 MG tablet TAKE 1 TABLET (10 MG) BY MOUTH DAILY.  Qty: 90 tablet, Refills: 0    Associated Diagnoses: HTN, goal below 140/90      ARTIFICIAL TEAR OP Apply 1 drop to eye as needed      atorvastatin (LIPITOR) 20 MG tablet Take 20 mg by mouth at bedtime      blood glucose (ONETOUCH VERIO IQ) test strip Use to test blood sugar 3 times daily or as directed.  Qty: 300 strip, Refills: 1    Associated Diagnoses: Type 2 diabetes mellitus with hyperglycemia, without long-term current use of insulin (H)      blood glucose monitoring (NO BRAND SPECIFIED) meter device kit Use to test blood sugar 1 times daily or as directed.  Qty: 1 kit, Refills: 0    Associated Diagnoses: Type 2 diabetes mellitus with hyperglycemia, without long-term current use of insulin (H)      ClonAZEPAM (KLONOPIN) 0.5 MG tablet Take 0.5 mg by mouth Take 1 tablet by mouth daily at bedtime      Continuous Glucose Sensor (FREESTYLE SARAHY 2 SENSOR) MISC USE 1 SENSOR EVERY 14 DAYS. USE TO READ BLOOD SUGARS PER 'S INSTRUCTIONS.  Qty: 6 each, Refills: 5    Associated Diagnoses: Type 2 diabetes mellitus with hyperglycemia, without long-term current use of insulin (H)      ferrous sulfate (FEROSUL) 325 (65 Fe) MG tablet TAKE 1 TABLET  BY MOUTH EVERY DAY WITH BREAKFAST  Qty: 90 tablet, Refills: 0    Associated Diagnoses: Iron deficiency      !! fluticasone-salmeterol (ADVAIR) 250-50 MCG/ACT inhaler TAKE 1 PUFF BY MOUTH TWICE A DAY Strength: 250-50 MCG/ACT  Qty: 3 each, Refills: 3    Associated Diagnoses: Chronic obstructive pulmonary disease, unspecified COPD type (H)      !! fluticasone-salmeterol (WIXELA INHUB) 250-50 MCG/ACT inhaler Inhale 1 puff into the lungs every 12 hours  Qty: 3 each, Refills: 3    Comments: 3 inhalers with 3 refills. 90 day supply. To replace fluticasone-salmeterol (ADVAIR) 250-50 MCG/ACT inhaler.  Associated Diagnoses: Centrilobular emphysema (H)      furosemide (LASIX) 20 MG tablet TAKE 2 TABLETS BY MOUTH IN THE MORNING AND 1 TABLET BY MOUTH AT LEAST 6 HOURS LATER IN THE AFTERNOON.  Qty: 270 tablet, Refills: 0    Associated Diagnoses: Pedal edema      Lancets (ONETOUCH DELICA PLUS [id number]) MISC USE ONCE DAILY AS DIRECTED  Qty: 100 each, Refills: 1    Comments: DX Code Needed  PT LOOKING FOR REFILLS.  Associated Diagnoses: Type 2 diabetes mellitus with hyperglycemia, without long-term current use of insulin (H)      latanoprost (XALATAN) 0.005 % ophthalmic solution Place 1 drop into both eyes at bedtime  Qty: 7.5 mL, Refills: 3    Comments: 90 day supply ok. Please wait for patient to call for a fill. Thank you  Associated Diagnoses: Borderline glaucoma with ocular hypertension, bilateral      lisinopril (ZESTRIL) 40 MG tablet TAKE 1 TABLET BY MOUTH EVERY DAY  Qty: 90 tablet, Refills: 0    Associated Diagnoses: Benign essential hypertension      metFORMIN (GLUCOPHAGE XR) 500 MG 24 hr tablet TAKE 1 TABLET BY MOUTH TWICE A DAY WITH FOOD  Qty: 180 tablet, Refills: 3    Associated Diagnoses: Type 2 diabetes mellitus with other specified complication, without long-term current use of insulin (H)      OXcarbazepine (TRILEPTAL) 300 MG tablet TAKE 2 TABLETS IN THE MORNING AND 2 TABLETS IN THE EVENING  Qty: 360 tablet, Refills:  1    Associated Diagnoses: Trigeminal neuralgia      pantoprazole (PROTONIX) 20 MG [facility] tablet TAKE 1 TABLET BY MOUTH EVERY DAY  Qty: 90 tablet, Refills: 2    Associated Diagnoses: Gastroesophageal reflux disease without esophagitis      predniSONE (DELTASONE) 5 MG tablet TAKE 2 TABLETS BY MOUTH EVERY DAY  Qty: 60 tablet, Refills: 2    Associated Diagnoses: Myasthenia gravis, AChR antibody positive (H)      !! pyRIDostigmine (MESTINON) 60 MG tablet TAKE 2 TABLETS (120 MG) IN THE MORNING AND AFTERNOON AND 1 TABLET IN THE EVENING PER 4/22/2024 APPT.  Qty: 450 tablet, Refills: 0    Associated Diagnoses: Ocular myasthenia (H)      !! pyRIDostigmine (MESTINON) 60 MG tablet Take 120 mg by mouth every morning      !! pyRIDostigmine (MESTINON) 60 MG tablet Take 60 mg by mouth 2 times daily Midday and 6 PM.      sodium bicarbonate 650 MG tablet Take 1 tablet (650 mg) by mouth 2 times daily  Qty: 180 tablet, Refills: 1    Associated Diagnoses: Edema, unspecified type      timolol maleate (TIMOPTIC) 0.5 % ophthalmic solution Place 1 drop into both eyes 2 times daily  Qty: 5 mL, Refills: 11    Comments: 90 day supply ok. Please wait for patient to call for a fill. Thank you  Associated Diagnoses: Borderline glaucoma with ocular hypertension, bilateral       !! - Potential duplicate medications found. Please discuss with provider.        Activity: no lifting, Driving, or Strenuous exercise for 2 weeks  Diet: regular diet  Wound Care: keep wound clean and dry    Follow-up with  in 10 days.    Signed:  Shayna Delatorre MD  11/7/2024  11:12 AM

## 2024-11-07 NOTE — ANESTHESIA PROCEDURE NOTES
Airway       Patient location during procedure: OR       Procedure Start/Stop Times: 11/7/2024 8:31 AM  Staff -        CRNA: Jad Chen APRN CRNA       Performed By: CRNA  Consent for Airway        Urgency: elective  Indications and Patient Condition       Indications for airway management: arhtur-procedural       Induction type:intravenous       Mask difficulty assessment: 2 - vent by mask + OA or adjuvant +/- NMBA    Final Airway Details       Final airway type: endotracheal airway       Successful airway: ETT - single and Oral  Endotracheal Airway Details        ETT size (mm): 7.5       Cuffed: yes       Successful intubation technique: direct laryngoscopy       DL Blade Type: MAC 3       Grade View of Cords: 1       Adjucts: stylet       Position: Right       Measured from: lips       Secured at (cm): 23       Bite block used: None    Post intubation assessment        Placement verified by: capnometry, equal breath sounds and chest rise        Number of attempts at approach: 1       Number of other approaches attempted: 0       Secured with: commercial tube hallman (Tube Tamer)       Ease of procedure: easy       Dentition: Intact and Unchanged    Medication(s) Administered   Medication Administration Time: 11/7/2024 8:31 AM

## 2024-11-07 NOTE — OR NURSING
"Paged MD for discharge order, Per MD there was already a discharge order placed. Spoke to Dr. Delatorre on the phone and he stated that it was OK to discharge patient and he saw patient post operatively. /64   Pulse 69   Temp 97.9  F (36.6  C) (Oral)   Resp 16   Ht 1.778 m (5' 10\")   Wt 103.2 kg (227 lb 8.2 oz)   SpO2 92%   BMI 32.65 kg/m       "

## 2024-11-07 NOTE — DISCHARGE INSTRUCTIONS
Contacting your Doctor -   To contact a doctor, call [facility] at [phone] or:  [phone] and ask for the resident on call for Otolaryngology (answered 24 hours a day)   Emergency Department:  [facility]: [phone]  [facility]: [phone] if you are in need of immediate or emergent help

## 2024-11-07 NOTE — OR NURSING
1100 Complaining of mild dizziness, v/s stable, informed PACU resident MD, no further order was made. Seen by Shayna Parrish MD 4 mg zofran iv to give now and cotton ball at left ear to remove by patient tomorrow per Dr. Corral.

## 2024-11-13 ASSESSMENT — ACTIVITIES OF DAILY LIVING (ADL)
BREATHING: NORMAL
TALKING: NORMAL
IMPAIRMENT OF ABILITY TO BRUSH TEETH/COMB HAIR: NORMAL
SWALLOWING: RARE CHOKING
MYC_MG-ADL-TOTAL_SCORE: 3
CHEWING: GASTRIC TUBE
IMPAIRMENT OF ABILITY TO RISE FROM CHAIR: MODERATE IMPAIRMENT, ALWAYS USES ARMS

## 2024-11-15 ENCOUNTER — OFFICE VISIT (OUTPATIENT)
Dept: OTOLARYNGOLOGY | Facility: CLINIC | Age: 79
End: 2024-11-15
Payer: COMMERCIAL

## 2024-11-15 DIAGNOSIS — H70.92 MASTOIDITIS OF LEFT SIDE: ICD-10-CM

## 2024-11-15 DIAGNOSIS — H71.92 CHOLESTEATOMA, LEFT: Primary | ICD-10-CM

## 2024-11-15 ASSESSMENT — ENCOUNTER SYMPTOMS
RESPIRATORY NEGATIVE: 1
EYES NEGATIVE: 1
GASTROINTESTINAL NEGATIVE: 1
CONSTITUTIONAL NEGATIVE: 1

## 2024-11-15 NOTE — LETTER
11/15/2024      Rakan Nolasco  [address] [facility] [address]      Dear Colleague,    Thank you for referring your patient, Rakan Nolasco, to the [facility]. Please see a copy of my visit note below.    Chief Complaint   Patient presents with    Surgical Followup     1 week S/P Thiersch grafting, left arm split grafting to left ear granulation tissue removal, post auricular sutures removal DOS: 11/7/24.      PCP: Telly Lucio     Referring Provider: No ref. provider found    There were no vitals taken for this visit.    ENT Problem List:  Patient Active Problem List   Diagnosis    GERD (gastroesophageal reflux disease)    HLD (hyperlipidemia)    Borderline glaucoma with ocular hypertension    Trigeminal neuralgia pain    HTN (hypertension)    Arthritis    Dry eyes    PVD (posterior vitreous detachment) OU    H/O RD (retinal detachment) s/p repair with PPV (AB)    Epiretinal membrane, bilateral    Pseudophakia of right eye    Myasthenia gravis (H)    Morbid obesity (H)    Right posterior capsular opacification    Obstructive sleep apnea syndrome    Prostate cancer (H)    Restless legs syndrome    Temporal arteritis (H)    Type 2 diabetes mellitus with other specified complication, without long-term current use of insulin (H)    Hyponatremia    COPD (chronic obstructive pulmonary disease) (H)    Combined form of age-related cataract, left eye    Anemia    Osteoarthritis of left knee, unspecified osteoarthritis type    Chronic pain of left knee    Lumbar spine pain    Chronic atticoantral suppurative otitis media of left ear    Cholesteatoma, left    Mastoiditis of left side      Current Medications:  Current Outpatient Medications   Medication Sig Dispense Refill    acetaminophen (TYLENOL) 325 MG tablet Take 2 tablets (650 mg) by mouth every 4 hours as needed for other (For optimal non-opioid multimodal pain management to improve pain control.) 100 tablet 0     albuterol (PROAIR HFA/PROVENTIL HFA/VENTOLIN HFA) 108 (90 Base) MCG/ACT inhaler INHALE 1 PUFF INTO THE LUNGS EVERY 4 HOURS AS NEEDED FOR SHORTNESS OF BREATH, WHEEZING OR COUGH 18 g 11    amLODIPine (NORVASC) 10 MG tablet TAKE 1 TABLET (10 MG) BY MOUTH DAILY. 90 tablet 0    ARTIFICIAL TEAR OP Apply 1 drop to eye as needed      atorvastatin (LIPITOR) 20 MG tablet Take 20 mg by mouth at bedtime      blood glucose (ONETOUCH VERIO IQ) test strip Use to test blood sugar 3 times daily or as directed. 300 strip 1    blood glucose monitoring (NO BRAND SPECIFIED) meter device kit Use to test blood sugar 1 times daily or as directed. 1 kit 0    ClonAZEPAM (KLONOPIN) 0.5 MG tablet Take 0.5 mg by mouth Take 1 tablet by mouth daily at bedtime      Continuous Glucose Sensor (FREESTYLE SARAHY 2 SENSOR) MISC USE 1 SENSOR EVERY 14 DAYS. USE TO READ BLOOD SUGARS PER 'S INSTRUCTIONS. 6 each 5    ferrous sulfate (FEROSUL) 325 (65 Fe) MG tablet TAKE 1 TABLET BY MOUTH EVERY DAY WITH BREAKFAST 90 tablet 0    fluticasone-salmeterol (ADVAIR) 250-50 MCG/ACT inhaler TAKE 1 PUFF BY MOUTH TWICE A DAY Strength: 250-50 MCG/ACT 3 each 3    fluticasone-salmeterol (WIXELA INHUB) 250-50 MCG/ACT inhaler Inhale 1 puff into the lungs every 12 hours 3 each 3    furosemide (LASIX) 20 MG tablet TAKE 2 TABLETS BY MOUTH IN THE MORNING AND 1 TABLET BY MOUTH AT LEAST 6 HOURS LATER IN THE AFTERNOON. (Patient taking differently: Take 20 mg by mouth daily.) 270 tablet 0    Lancets (ONETOUCH DELICA PLUS [id number]) MISC USE ONCE DAILY AS DIRECTED 100 each 1    latanoprost (XALATAN) 0.005 % ophthalmic solution Place 1 drop into both eyes at bedtime 7.5 mL 3    lisinopril (ZESTRIL) 40 MG tablet TAKE 1 TABLET BY MOUTH EVERY DAY 90 tablet 0    metFORMIN (GLUCOPHAGE XR) 500 MG 24 hr tablet TAKE 1 TABLET BY MOUTH TWICE A DAY WITH FOOD 180 tablet 3    OXcarbazepine (TRILEPTAL) 300 MG tablet TAKE 2 TABLETS IN THE MORNING AND 2 TABLETS IN THE EVENING (Patient  taking differently: Take 900 mg by mouth 2 times daily. TAKE  3 TABLETS IN THE MORNING AND 3 TABLETS IN THE EVENING) 360 tablet 1    pantoprazole (PROTONIX) 20 MG EC tablet TAKE 1 TABLET BY MOUTH EVERY DAY 90 tablet 2    predniSONE (DELTASONE) 5 MG tablet TAKE 2 TABLETS BY MOUTH EVERY DAY 60 tablet 2    pyRIDostigmine (MESTINON) 60 MG tablet TAKE 2 TABLETS (120 MG) IN THE MORNING AND AFTERNOON AND 1 TABLET IN THE EVENING PER 4/22/2024 APPT. 450 tablet 0    pyRIDostigmine (MESTINON) 60 MG tablet Take 120 mg by mouth every morning      pyRIDostigmine (MESTINON) 60 MG tablet Take 60 mg by mouth 2 times daily Midday and 6 PM.      sodium bicarbonate 650 MG tablet Take 1 tablet (650 mg) by mouth 2 times daily 180 tablet 1    timolol maleate (TIMOPTIC) 0.5 % ophthalmic solution Place 1 drop into both eyes 2 times daily 5 mL 11     No current facility-administered medications for this visit.     HPI  Pleasant 79 year old male presents today as a(n) established patient for 1 week S/P Thiersch grafting, left arm split grafting to left ear granulation tissue removal, post auricular sutures removal DOS: 11/7/24. He reports that he occasionally gets right otorrhea at night, but otherwise he has no more bleeding. His wife states that his arm is irritated where the skin graft was taken. He states that his hearing is slightly muffled due to inflammation from the surgery.    Review of Systems   Constitutional: Negative.    HENT:  Positive for hearing loss.    Eyes: Negative.    Respiratory: Negative.     Gastrointestinal: Negative.    Skin: Negative.    Endo/Heme/Allergies: Negative.      Physical Exam  Vitals and nursing note reviewed.   Constitutional:       Appearance: Normal appearance.   HENT:      Head: Normocephalic and atraumatic.      Jaw: There is normal jaw occlusion.      Right Ear: Hearing, tympanic membrane and ear canal normal.      Left Ear: Hearing, tympanic membrane and ear canal normal.      Ears:      Comments:  Left ear was examined under the microscope     Nose: No mucosal edema, congestion or rhinorrhea.      Right Nostril: No occlusion.      Left Nostril: No occlusion.      Right Turbinates: Not enlarged or swollen.      Left Turbinates: Not enlarged or swollen.      Right Sinus: No maxillary sinus tenderness or frontal sinus tenderness.      Left Sinus: No maxillary sinus tenderness or frontal sinus tenderness.      Mouth/Throat:      Mouth: Mucous membranes are moist.      Pharynx: Oropharynx is clear. Uvula midline.   Eyes:      Extraocular Movements: Extraocular movements intact.      Pupils: Pupils are equal, round, and reactive to light.   Neurological:      Mental Status: He is alert.     A/P  This pleasant patient is 1 week S/P Thiersch grafting, left arm split grafting to left ear granulation tissue removal, post auricular sutures removal DOS: 11/7/24.He is overall healing well and there are no ear infections present. For left otorrhea, he is advised to plug up the ear with a piece of cotton prn. He is advised to not put his hearing aid in his left ear for now. Questions and concerns were addressed.    Follow up in clinic in 1 month.    Scribe/Staff:    Scribe Disclosure:   I, Elisabeth Sandoval, am serving as a scribe; to document services personally performed by Shayna Delatorre MD based on data collection and the provider's statements to me.     Provider Disclosure:  I agree with above History, Review of Systems, Physical exam and Plan.  I have reviewed the content of the documentation and have edited it as needed. I have personally performed the services documented here and the documentation accurately represents those services and the decisions I have made.      Electronically signed by:  Shayna Delatorre MD         Again, thank you for allowing me to participate in the care of your patient.        Sincerely,    Shayna Delatorre MD

## 2024-11-15 NOTE — PROGRESS NOTES
Chief Complaint   Patient presents with    Surgical Followup     1 week S/P Thiersch grafting, left arm split grafting to left ear granulation tissue removal, post auricular sutures removal DOS: 11/7/24.      PCP: Telly Lucio     Referring Provider: No ref. provider found    There were no vitals taken for this visit.    ENT Problem List:  Patient Active Problem List   Diagnosis    GERD (gastroesophageal reflux disease)    HLD (hyperlipidemia)    Borderline glaucoma with ocular hypertension    Trigeminal neuralgia pain    HTN (hypertension)    Arthritis    Dry eyes    PVD (posterior vitreous detachment) OU    H/O RD (retinal detachment) s/p repair with PPV (AB)    Epiretinal membrane, bilateral    Pseudophakia of right eye    Myasthenia gravis (H)    Morbid obesity (H)    Right posterior capsular opacification    Obstructive sleep apnea syndrome    Prostate cancer (H)    Restless legs syndrome    Temporal arteritis (H)    Type 2 diabetes mellitus with other specified complication, without long-term current use of insulin (H)    Hyponatremia    COPD (chronic obstructive pulmonary disease) (H)    Combined form of age-related cataract, left eye    Anemia    Osteoarthritis of left knee, unspecified osteoarthritis type    Chronic pain of left knee    Lumbar spine pain    Chronic atticoantral suppurative otitis media of left ear    Cholesteatoma, left    Mastoiditis of left side      Current Medications:  Current Outpatient Medications   Medication Sig Dispense Refill    acetaminophen (TYLENOL) 325 MG tablet Take 2 tablets (650 mg) by mouth every 4 hours as needed for other (For optimal non-opioid multimodal pain management to improve pain control.) 100 tablet 0    albuterol (PROAIR HFA/PROVENTIL HFA/VENTOLIN HFA) 108 (90 Base) MCG/ACT inhaler INHALE 1 PUFF INTO THE LUNGS EVERY 4 HOURS AS NEEDED FOR SHORTNESS OF BREATH, WHEEZING OR COUGH 18 g 11    amLODIPine (NORVASC) 10 MG tablet TAKE 1 TABLET (10 MG) BY MOUTH  DAILY. 90 tablet 0    ARTIFICIAL TEAR OP Apply 1 drop to eye as needed      atorvastatin (LIPITOR) 20 MG tablet Take 20 mg by mouth at bedtime      blood glucose (ONETOUCH VERIO IQ) test strip Use to test blood sugar 3 times daily or as directed. 300 strip 1    blood glucose monitoring (NO BRAND SPECIFIED) meter device kit Use to test blood sugar 1 times daily or as directed. 1 kit 0    ClonAZEPAM (KLONOPIN) 0.5 MG tablet Take 0.5 mg by mouth Take 1 tablet by mouth daily at bedtime      Continuous Glucose Sensor (FREESTYLE SARAHY 2 SENSOR) MISC USE 1 SENSOR EVERY 14 DAYS. USE TO READ BLOOD SUGARS PER 'S INSTRUCTIONS. 6 each 5    ferrous sulfate (FEROSUL) 325 (65 Fe) MG tablet TAKE 1 TABLET BY MOUTH EVERY DAY WITH BREAKFAST 90 tablet 0    fluticasone-salmeterol (ADVAIR) 250-50 MCG/ACT inhaler TAKE 1 PUFF BY MOUTH TWICE A DAY Strength: 250-50 MCG/ACT 3 each 3    fluticasone-salmeterol (WIXELA INHUB) 250-50 MCG/ACT inhaler Inhale 1 puff into the lungs every 12 hours 3 each 3    furosemide (LASIX) 20 MG tablet TAKE 2 TABLETS BY MOUTH IN THE MORNING AND 1 TABLET BY MOUTH AT LEAST 6 HOURS LATER IN THE AFTERNOON. (Patient taking differently: Take 20 mg by mouth daily.) 270 tablet 0    Lancets (ONETOUCH DELICA PLUS [id number]) MISC USE ONCE DAILY AS DIRECTED 100 each 1    latanoprost (XALATAN) 0.005 % ophthalmic solution Place 1 drop into both eyes at bedtime 7.5 mL 3    lisinopril (ZESTRIL) 40 MG tablet TAKE 1 TABLET BY MOUTH EVERY DAY 90 tablet 0    metFORMIN (GLUCOPHAGE XR) 500 MG 24 hr tablet TAKE 1 TABLET BY MOUTH TWICE A DAY WITH FOOD 180 tablet 3    OXcarbazepine (TRILEPTAL) 300 MG tablet TAKE 2 TABLETS IN THE MORNING AND 2 TABLETS IN THE EVENING (Patient taking differently: Take 900 mg by mouth 2 times daily. TAKE  3 TABLETS IN THE MORNING AND 3 TABLETS IN THE EVENING) 360 tablet 1    pantoprazole (PROTONIX) 20 MG EC tablet TAKE 1 TABLET BY MOUTH EVERY DAY 90 tablet 2    predniSONE (DELTASONE) 5 MG tablet  TAKE 2 TABLETS BY MOUTH EVERY DAY 60 tablet 2    pyRIDostigmine (MESTINON) 60 MG tablet TAKE 2 TABLETS (120 MG) IN THE MORNING AND AFTERNOON AND 1 TABLET IN THE EVENING PER 4/22/2024 APPT. 450 tablet 0    pyRIDostigmine (MESTINON) 60 MG tablet Take 120 mg by mouth every morning      pyRIDostigmine (MESTINON) 60 MG tablet Take 60 mg by mouth 2 times daily Midday and 6 PM.      sodium bicarbonate 650 MG tablet Take 1 tablet (650 mg) by mouth 2 times daily 180 tablet 1    timolol maleate (TIMOPTIC) 0.5 % ophthalmic solution Place 1 drop into both eyes 2 times daily 5 mL 11     No current facility-administered medications for this visit.     HPI  Pleasant 79 year old male presents today as a(n) established patient for 1 week S/P Thiersch grafting, left arm split grafting to left ear granulation tissue removal, post auricular sutures removal DOS: 11/7/24. He reports that he occasionally gets right otorrhea at night, but otherwise he has no more bleeding. His wife states that his arm is irritated where the skin graft was taken. He states that his hearing is slightly muffled due to inflammation from the surgery.    Review of Systems   Constitutional: Negative.    HENT:  Positive for hearing loss.    Eyes: Negative.    Respiratory: Negative.     Gastrointestinal: Negative.    Skin: Negative.    Endo/Heme/Allergies: Negative.        Physical Exam  Vitals and nursing note reviewed.   Constitutional:       Appearance: Normal appearance.   HENT:      Head: Normocephalic and atraumatic.      Jaw: There is normal jaw occlusion.      Right Ear: Hearing, tympanic membrane and ear canal normal.      Left Ear: Hearing, tympanic membrane and ear canal normal.      Ears:      Comments: Left ear was examined under the microscope     Nose: No mucosal edema, congestion or rhinorrhea.      Right Nostril: No occlusion.      Left Nostril: No occlusion.      Right Turbinates: Not enlarged or swollen.      Left Turbinates: Not enlarged or  swollen.      Right Sinus: No maxillary sinus tenderness or frontal sinus tenderness.      Left Sinus: No maxillary sinus tenderness or frontal sinus tenderness.      Mouth/Throat:      Mouth: Mucous membranes are moist.      Pharynx: Oropharynx is clear. Uvula midline.   Eyes:      Extraocular Movements: Extraocular movements intact.      Pupils: Pupils are equal, round, and reactive to light.   Neurological:      Mental Status: He is alert.       A/P  This pleasant patient is 1 week S/P Thiersch grafting, left arm split grafting to left ear granulation tissue removal, post auricular sutures removal DOS: 11/7/24.He is overall healing well and there are no ear infections present. For left otorrhea, he is advised to plug up the ear with a piece of cotton prn. He is advised to not put his hearing aid in his left ear for now. Questions and concerns were addressed.    Follow up in clinic in 1 month.    Scribe/Staff:    Scribe Disclosure:   I, Elisabeth Sandoval, am serving as a scribe; to document services personally performed by Shayna Delatorre MD based on data collection and the provider's statements to me.     Provider Disclosure:  I agree with above History, Review of Systems, Physical exam and Plan.  I have reviewed the content of the documentation and have edited it as needed. I have personally performed the services documented here and the documentation accurately represents those services and the decisions I have made.      Electronically signed by:  Shayna Delatorre MD

## 2024-11-15 NOTE — NURSING NOTE
Rakan Nolasco's chief complaint for this visit includes:  Chief Complaint   Patient presents with    Surgical Followup     1 week S/P Thiersch grafting, left arm split grafting to left ear granulation tissue removal, post auricular sutures removal DOS: 11/7/24.  Some discharge on pillow in the morning. No more bleeding.      PCP: Telly Lucio    Referring Provider:  Referred Self, MD  No address on file    There were no vitals taken for this visit.

## 2024-11-18 ENCOUNTER — TELEPHONE (OUTPATIENT)
Dept: OTOLARYNGOLOGY | Facility: CLINIC | Age: 79
End: 2024-11-18

## 2024-11-18 ENCOUNTER — OFFICE VISIT (OUTPATIENT)
Dept: NEUROLOGY | Facility: CLINIC | Age: 79
End: 2024-11-18
Payer: COMMERCIAL

## 2024-11-18 VITALS
SYSTOLIC BLOOD PRESSURE: 157 MMHG | DIASTOLIC BLOOD PRESSURE: 69 MMHG | HEIGHT: 69 IN | BODY MASS INDEX: 34.58 KG/M2 | HEART RATE: 61 BPM | WEIGHT: 233.5 LBS | OXYGEN SATURATION: 97 %

## 2024-11-18 DIAGNOSIS — G50.0 TRIGEMINAL NEURALGIA: ICD-10-CM

## 2024-11-18 DIAGNOSIS — G70.00 MYASTHENIA GRAVIS WITHOUT EXACERBATION (H): Primary | ICD-10-CM

## 2024-11-18 ASSESSMENT — PAIN SCALES - GENERAL: PAINLEVEL_OUTOF10: NO PAIN (0)

## 2024-11-18 NOTE — TELEPHONE ENCOUNTER
"Phone call received from pt stating his left ear (surgical graft site) is now draining a clear yellow to orange fluid and smells like \"stinky feet\".   Will review with Dr Delatorre and advise.  Elvira Li RN  "

## 2024-11-18 NOTE — NURSING NOTE
Chief Complaint   Patient presents with    RECHECK    Myasthenia Gravis     Vitals were taken and medications were reconciled.    David Burger, EMT  2:59 PM

## 2024-11-18 NOTE — PATIENT INSTRUCTIONS
Follow-up in 1 year.    Please schedule bone density scan for osteoporosis, anytime after April 12, 2025.    Continue your current medications for myasthenia.    Please ask your primary care doctor to check the following labs twice a year: Hemoglobin A1c, basic metabolic panel, CBC.

## 2024-11-18 NOTE — PROGRESS NOTES
Telly Lucio MD (PCP)  [place], November 18, 2024    Dear Dr. Lucio,    I had the pleasure to see Mr. Nolasco in follow-up at the [facility]/neuromuscular clinic for his acetylcholine receptor antibody positive, mild generalized myasthenia, predominantly with ocular symptoms.  He has no thymoma.  He is currently on prednisone 10 mg daily, and pyridostigmine 300 mg daily (120 in the morning, 120 in the afternoon, and 60 in the evening).  His symptoms are stable from last year.  He did have a left retinal detachment presenting with sudden partial visual loss on the left eye in June 2024, for which he underwent emergent surgery.  He continues to experience blurry vision from the left eye.  It is not double vision.  The blurry vision persists when he covers the right eye.  It disappears when he covers the left eye.  He denies ptosis, difficulty chewing, dysarthria.  He does have stable mild dysphagia occurring infrequently, only couple times a week, with solids or liquids, more the latter.  He reports dyspnea on exertion which is chronic due to his underlying COPD diagnosis.  He denies any upper extremity proximal muscle fatigue when brushing his teeth or combing his hair.  He does need to use his arms all the times to get up from a chair, and he attributes this to complications after previous left knee surgery, when he developed a hematoma.     In terms of labs he had a basic metabolic panel on October 1, 2024 that showed a glucose of 122 mg per DL but was otherwise normal.  His hemoglobin A1c was 6.2%.  In June 2024 it was 5.9%.  His last bone density scan was done in April 2023.  It was normal.    Rakan also has trigeminal neuralgia, that appears well-controlled on his current Trileptal dose of 900 mg twice daily.  He is tolerating the drug well.         MG Activities of Daily Living (MG-ADL) profile score 4 (previously 3)    Grade 0 1 2 3   Talking Normal Intermittent slurring/nasal  speech Constant slurring/nasal speech, can be understood Difficult to understand   Chewing Normal Fatigue with solid food Fatigue with soft food G tube   Swallowing Normal Rare choking Frequent choking necessitating diet changes G tube   Breathing Normal SOB with exertion SOB at rest Ventilator dependent   Ability to brush teeth/comb hair Normal Extra effort, no rest periods needed Rest periods needed Cannot do one of these   Ability to rise from chair Normal Mild impairment, uses arms sometimes Moderate impairment, always uses arms Severe impairment, requires assistance   Double vision None Present, not daily Daily, not constant Constant   Ptosis None Present, but not daily Daily, not constant Constant       Current Outpatient Medications   Medication Sig Dispense Refill    acetaminophen (TYLENOL) 325 MG tablet Take 2 tablets (650 mg) by mouth every 4 hours as needed for other (For optimal non-opioid multimodal pain management to improve pain control.) 100 tablet 0    albuterol (PROAIR HFA/PROVENTIL HFA/VENTOLIN HFA) 108 (90 Base) MCG/ACT inhaler INHALE 1 PUFF INTO THE LUNGS EVERY 4 HOURS AS NEEDED FOR SHORTNESS OF BREATH, WHEEZING OR COUGH 18 g 11    amLODIPine (NORVASC) 10 MG tablet TAKE 1 TABLET (10 MG) BY MOUTH DAILY. 90 tablet 0    ARTIFICIAL TEAR OP Apply 1 drop to eye as needed      atorvastatin (LIPITOR) 20 MG tablet Take 20 mg by mouth at bedtime      blood glucose (ONETOUCH VERIO IQ) test strip Use to test blood sugar 3 times daily or as directed. 300 strip 1    blood glucose monitoring (NO BRAND SPECIFIED) meter device kit Use to test blood sugar 1 times daily or as directed. 1 kit 0    ClonAZEPAM (KLONOPIN) 0.5 MG tablet Take 0.5 mg by mouth Take 1 tablet by mouth daily at bedtime      Continuous Glucose Sensor (FREESTYLE SARAHY 2 SENSOR) MISC USE 1 SENSOR EVERY 14 DAYS. USE TO READ BLOOD SUGARS PER 'S INSTRUCTIONS. 6 each 5    ferrous sulfate (FEROSUL) 325 (65 Fe) MG tablet TAKE 1 TABLET BY  MOUTH EVERY DAY WITH BREAKFAST 90 tablet 0    fluticasone-salmeterol (ADVAIR) 250-50 MCG/ACT inhaler TAKE 1 PUFF BY MOUTH TWICE A DAY Strength: 250-50 MCG/ACT 3 each 3    fluticasone-salmeterol (WIXELA INHUB) 250-50 MCG/ACT inhaler Inhale 1 puff into the lungs every 12 hours 3 each 3    furosemide (LASIX) 20 MG tablet TAKE 2 TABLETS BY MOUTH IN THE MORNING AND 1 TABLET BY MOUTH AT LEAST 6 HOURS LATER IN THE AFTERNOON. (Patient taking differently: Take 20 mg by mouth daily.) 270 tablet 0    Lancets (ONETOUCH DELICA PLUS [id number]) MISC USE ONCE DAILY AS DIRECTED 100 each 1    latanoprost (XALATAN) 0.005 % ophthalmic solution Place 1 drop into both eyes at bedtime 7.5 mL 3    lisinopril (ZESTRIL) 40 MG tablet TAKE 1 TABLET BY MOUTH EVERY DAY 90 tablet 0    metFORMIN (GLUCOPHAGE XR) 500 MG 24 hr tablet TAKE 1 TABLET BY MOUTH TWICE A DAY WITH FOOD 180 tablet 3    OXcarbazepine (TRILEPTAL) 300 MG tablet TAKE 2 TABLETS IN THE MORNING AND 2 TABLETS IN THE EVENING (Patient taking differently: Take 900 mg by mouth 2 times daily. TAKE  3 TABLETS IN THE MORNING AND 3 TABLETS IN THE EVENING) 360 tablet 1    pantoprazole (PROTONIX) 20 MG EC tablet TAKE 1 TABLET BY MOUTH EVERY DAY 90 tablet 2    predniSONE (DELTASONE) 5 MG tablet TAKE 2 TABLETS BY MOUTH EVERY DAY 60 tablet 2    pyRIDostigmine (MESTINON) 60 MG tablet TAKE 2 TABLETS (120 MG) IN THE MORNING AND AFTERNOON AND 1 TABLET IN THE EVENING PER 4/22/2024 APPT. 450 tablet 0    pyRIDostigmine (MESTINON) 60 MG tablet Take 120 mg by mouth every morning      pyRIDostigmine (MESTINON) 60 MG tablet Take 60 mg by mouth 2 times daily Midday and 6 PM.      sodium bicarbonate 650 MG tablet Take 1 tablet (650 mg) by mouth 2 times daily 180 tablet 1    timolol maleate (TIMOPTIC) 0.5 % ophthalmic solution Place 1 drop into both eyes 2 times daily 5 mL 11     No current facility-administered medications for this visit.       BP (!) 157/69 (BP Location: Right arm, Patient Position: Sitting,  "Cuff Size: Adult Regular)   Pulse 61   Ht 1.76 m (5' 9.29\")   Wt 105.9 kg (233 lb 8 oz)   SpO2 97%   BMI 34.19 kg/m      EXAM was not repeated due to time constraints.    In summary, Rakan has stable myasthenia gravis.  We will continue his current medications without dose changes.  I am asking his primary care provider to order hemoglobin A1c, basic metabolic panel, and CBC every 6 months.  The reason for the CBC is that he is also taking oxcarbazepine for trigeminal neuralgia, and this drug occasionally can cause leukopenia.    I do not think that the blurry vision he is currently experiencing is from the myasthenia, as it persists even when he covers fully the right eye.  He will see ophthalmology for this concern in the next few days.    He should get a repeat bone density scan for osteoporosis screening.  He tells me that his insurance will allow only one scan every 2 years.  Therefore, the next DXA can be done anytime after April 12, 2025.    Follow-up in the neuromuscular clinic in 1 year, sooner if needed.      Sincerely,    Arpan Harrison MD, FAAN    The longitudinal plan of care for the diagnosis(es)/condition(s) as documented were addressed during this visit. Due to the added complexity in care, I will continue to support Rakan in the subsequent management and with ongoing continuity of care.    Billing MDM level 4 (moderate) based on 1) Problems assessed: 2 stable chronic disorders (MG, trigeminal neuralgia) and 2)Risk: prescription drug management, see above.    "

## 2024-11-18 NOTE — LETTER
11/18/2024       RE: Rakan Nolasco  [address] [facility] [address]     Dear Colleague,    Thank you for referring your patient, Rakan Nolasco, to the [facility] NEUROLOGY [facility] [place] at [facility]. Please see a copy of my visit note below.    Telly Lucio MD (PCP)  [place], November 18, 2024    Dear Dr. Lucio,    I had the pleasure to see Mr. Nolasco in follow-up at the [facility]/neuromuscular clinic for his acetylcholine receptor antibody positive, mild generalized myasthenia, predominantly with ocular symptoms.  He has no thymoma.  He is currently on prednisone 10 mg daily, and pyridostigmine 300 mg daily (120 in the morning, 120 in the afternoon, and 60 in the evening).  His symptoms are stable from last year.  He did have a left retinal detachment presenting with sudden partial visual loss on the left eye in June 2024, for which he underwent emergent surgery.  He continues to experience blurry vision from the left eye.  It is not double vision.  The blurry vision persists when he covers the right eye.  It disappears when he covers the left eye.  He denies ptosis, difficulty chewing, dysarthria.  He does have stable mild dysphagia occurring infrequently, only couple times a week, with solids or liquids, more the latter.  He reports dyspnea on exertion which is chronic due to his underlying COPD diagnosis.  He denies any upper extremity proximal muscle fatigue when brushing his teeth or combing his hair.  He does need to use his arms all the times to get up from a chair, and he attributes this to complications after previous left knee surgery, when he developed a hematoma.     In terms of labs he had a basic metabolic panel on October 1, 2024 that showed a glucose of 122 mg per DL but was otherwise normal.  His hemoglobin A1c was 6.2%.  In June 2024 it was 5.9%.  His last bone density scan was done in  April 2023.  It was normal.    Rakan also has trigeminal neuralgia, that appears well-controlled on his current Trileptal dose of 900 mg twice daily.  He is tolerating the drug well.         MG Activities of Daily Living (MG-ADL) profile score 4 (previously 3)    Grade 0 1 2 3   Talking Normal Intermittent slurring/nasal speech Constant slurring/nasal speech, can be understood Difficult to understand   Chewing Normal Fatigue with solid food Fatigue with soft food G tube   Swallowing Normal Rare choking Frequent choking necessitating diet changes G tube   Breathing Normal SOB with exertion SOB at rest Ventilator dependent   Ability to brush teeth/comb hair Normal Extra effort, no rest periods needed Rest periods needed Cannot do one of these   Ability to rise from chair Normal Mild impairment, uses arms sometimes Moderate impairment, always uses arms Severe impairment, requires assistance   Double vision None Present, not daily Daily, not constant Constant   Ptosis None Present, but not daily Daily, not constant Constant       Current Outpatient Medications   Medication Sig Dispense Refill     acetaminophen (TYLENOL) 325 MG tablet Take 2 tablets (650 mg) by mouth every 4 hours as needed for other (For optimal non-opioid multimodal pain management to improve pain control.) 100 tablet 0     albuterol (PROAIR HFA/PROVENTIL HFA/VENTOLIN HFA) 108 (90 Base) MCG/ACT inhaler INHALE 1 PUFF INTO THE LUNGS EVERY 4 HOURS AS NEEDED FOR SHORTNESS OF BREATH, WHEEZING OR COUGH 18 g 11     amLODIPine (NORVASC) 10 MG tablet TAKE 1 TABLET (10 MG) BY MOUTH DAILY. 90 tablet 0     ARTIFICIAL TEAR OP Apply 1 drop to eye as needed       atorvastatin (LIPITOR) 20 MG tablet Take 20 mg by mouth at bedtime       blood glucose (ONETOUCH VERIO IQ) test strip Use to test blood sugar 3 times daily or as directed. 300 strip 1     blood glucose monitoring (NO BRAND SPECIFIED) meter device kit Use to test blood sugar 1 times daily or as directed. 1 kit  0     ClonAZEPAM (KLONOPIN) 0.5 MG tablet Take 0.5 mg by mouth Take 1 tablet by mouth daily at bedtime       Continuous Glucose Sensor (FREESTYLE SARAHY 2 SENSOR) MISC USE 1 SENSOR EVERY 14 DAYS. USE TO READ BLOOD SUGARS PER 'S INSTRUCTIONS. 6 each 5     ferrous sulfate (FEROSUL) 325 (65 Fe) MG tablet TAKE 1 TABLET BY MOUTH EVERY DAY WITH BREAKFAST 90 tablet 0     fluticasone-salmeterol (ADVAIR) 250-50 MCG/ACT inhaler TAKE 1 PUFF BY MOUTH TWICE A DAY Strength: 250-50 MCG/ACT 3 each 3     fluticasone-salmeterol (WIXELA INHUB) 250-50 MCG/ACT inhaler Inhale 1 puff into the lungs every 12 hours 3 each 3     furosemide (LASIX) 20 MG tablet TAKE 2 TABLETS BY MOUTH IN THE MORNING AND 1 TABLET BY MOUTH AT LEAST 6 HOURS LATER IN THE AFTERNOON. (Patient taking differently: Take 20 mg by mouth daily.) 270 tablet 0     Lancets (ONETOUCH DELICA PLUS [id number]) MISC USE ONCE DAILY AS DIRECTED 100 each 1     latanoprost (XALATAN) 0.005 % ophthalmic solution Place 1 drop into both eyes at bedtime 7.5 mL 3     lisinopril (ZESTRIL) 40 MG tablet TAKE 1 TABLET BY MOUTH EVERY DAY 90 tablet 0     metFORMIN (GLUCOPHAGE XR) 500 MG 24 hr tablet TAKE 1 TABLET BY MOUTH TWICE A DAY WITH FOOD 180 tablet 3     OXcarbazepine (TRILEPTAL) 300 MG tablet TAKE 2 TABLETS IN THE MORNING AND 2 TABLETS IN THE EVENING (Patient taking differently: Take 900 mg by mouth 2 times daily. TAKE  3 TABLETS IN THE MORNING AND 3 TABLETS IN THE EVENING) 360 tablet 1     pantoprazole (PROTONIX) 20 MG EC tablet TAKE 1 TABLET BY MOUTH EVERY DAY 90 tablet 2     predniSONE (DELTASONE) 5 MG tablet TAKE 2 TABLETS BY MOUTH EVERY DAY 60 tablet 2     pyRIDostigmine (MESTINON) 60 MG tablet TAKE 2 TABLETS (120 MG) IN THE MORNING AND AFTERNOON AND 1 TABLET IN THE EVENING PER 4/22/2024 APPT. 450 tablet 0     pyRIDostigmine (MESTINON) 60 MG tablet Take 120 mg by mouth every morning       pyRIDostigmine (MESTINON) 60 MG tablet Take 60 mg by mouth 2 times daily Midday and 6  "PM.       sodium bicarbonate 650 MG tablet Take 1 tablet (650 mg) by mouth 2 times daily 180 tablet 1     timolol maleate (TIMOPTIC) 0.5 % ophthalmic solution Place 1 drop into both eyes 2 times daily 5 mL 11     No current facility-administered medications for this visit.       BP (!) 157/69 (BP Location: Right arm, Patient Position: Sitting, Cuff Size: Adult Regular)   Pulse 61   Ht 1.76 m (5' 9.29\")   Wt 105.9 kg (233 lb 8 oz)   SpO2 97%   BMI 34.19 kg/m      EXAM was not repeated due to time constraints.    In summary, Rakan has stable myasthenia gravis.  We will continue his current medications without dose changes.  I am asking his primary care provider to order hemoglobin A1c, basic metabolic panel, and CBC every 6 months.  The reason for the CBC is that he is also taking oxcarbazepine for trigeminal neuralgia, and this drug occasionally can cause leukopenia.    I do not think that the blurry vision he is currently experiencing is from the myasthenia, as it persists even when he covers fully the right eye.  He will see ophthalmology for this concern in the next few days.    He should get a repeat bone density scan for osteoporosis screening.  He tells me that his insurance will allow only one scan every 2 years.  Therefore, the next DXA can be done anytime after April 12, 2025.    Follow-up in the neuromuscular clinic in 1 year, sooner if needed.      Sincerely,    Arpan Harrison MD, FAAN    The longitudinal plan of care for the diagnosis(es)/condition(s) as documented were addressed during this visit. Due to the added complexity in care, I will continue to support Rakan in the subsequent management and with ongoing continuity of care.    Billing MDM level 4 (moderate) based on 1) Problems assessed: 2 stable chronic disorders (MG, trigeminal neuralgia) and 2)Risk: prescription drug management, see above.      Again, thank you for allowing me to participate in the care of your patient.  "     Sincerely,    Arpan Harrison MD

## 2024-11-19 DIAGNOSIS — H70.92 MASTOIDITIS OF LEFT SIDE: Primary | ICD-10-CM

## 2024-11-19 RX ORDER — CIPROFLOXACIN AND DEXAMETHASONE 3; 1 MG/ML; MG/ML
6 SUSPENSION/ DROPS AURICULAR (OTIC) 2 TIMES DAILY
Qty: 7.5 ML | Refills: 1 | Status: SHIPPED | OUTPATIENT
Start: 2024-11-19

## 2024-11-19 NOTE — TELEPHONE ENCOUNTER
Phone call to pt, explained prescription for ciprodex drops have been sent to pt pharmacy, use in the left ear for 2 weeks. Pt verbalized understanding of plan, states he will call back clinic if symptoms do not improve or if questions arise.  Elvira Li RN

## 2024-11-21 ENCOUNTER — TRANSFERRED RECORDS (OUTPATIENT)
Dept: HEALTH INFORMATION MANAGEMENT | Facility: CLINIC | Age: 79
End: 2024-11-21

## 2024-12-09 ENCOUNTER — TELEPHONE (OUTPATIENT)
Dept: OTOLARYNGOLOGY | Facility: CLINIC | Age: 79
End: 2024-12-09
Payer: COMMERCIAL

## 2024-12-09 DIAGNOSIS — H70.92 MASTOIDITIS OF LEFT SIDE: ICD-10-CM

## 2024-12-09 RX ORDER — CIPROFLOXACIN AND DEXAMETHASONE 3; 1 MG/ML; MG/ML
6 SUSPENSION/ DROPS AURICULAR (OTIC) 2 TIMES DAILY
Qty: 7.5 ML | Refills: 1 | Status: SHIPPED | OUTPATIENT
Start: 2024-12-09

## 2024-12-09 NOTE — TELEPHONE ENCOUNTER
Phone call received from pt stating the drainage from his ear had mostly resolved when using ciprodex drops. This weekend he noticed the drainage has a small amount of blood and pus, along with some ear itching.  Discussed restarting ciprodex and calling clinic if symptoms do not resolve or if additional questions arise. Pt has appointment with Dr Delatorre on 1/15/25. Elvira Li RN

## 2024-12-12 PROBLEM — M54.50 LUMBAR SPINE PAIN: Status: RESOLVED | Noted: 2024-07-30 | Resolved: 2024-12-12

## 2024-12-25 DIAGNOSIS — R60.0 PEDAL EDEMA: ICD-10-CM

## 2024-12-25 DIAGNOSIS — G70.00 OCULAR MYASTHENIA (H): ICD-10-CM

## 2024-12-26 RX ORDER — PYRIDOSTIGMINE BROMIDE 60 MG/1
TABLET ORAL
Qty: 450 TABLET | Refills: 0 | Status: SHIPPED | OUTPATIENT
Start: 2024-12-26

## 2024-12-26 RX ORDER — FUROSEMIDE 20 MG/1
TABLET ORAL
Qty: 270 TABLET | Refills: 0 | Status: SHIPPED | OUTPATIENT
Start: 2024-12-26

## 2025-01-05 ENCOUNTER — HEALTH MAINTENANCE LETTER (OUTPATIENT)
Age: 80
End: 2025-01-05

## 2025-01-06 ENCOUNTER — PATIENT OUTREACH (OUTPATIENT)
Dept: CARE COORDINATION | Facility: CLINIC | Age: 80
End: 2025-01-06
Payer: COMMERCIAL

## 2025-01-06 DIAGNOSIS — H40.053 BORDERLINE GLAUCOMA WITH OCULAR HYPERTENSION, BILATERAL: ICD-10-CM

## 2025-01-06 RX ORDER — LATANOPROST 50 UG/ML
1 SOLUTION/ DROPS OPHTHALMIC AT BEDTIME
Qty: 7.5 ML | Refills: 3 | Status: SHIPPED | OUTPATIENT
Start: 2025-01-06

## 2025-01-06 NOTE — CONFIDENTIAL NOTE
Received refill request for Latanoprost from [facility]. Patient last saw Dr. Gray 08/16/2024.

## 2025-01-14 DIAGNOSIS — E11.65 TYPE 2 DIABETES MELLITUS WITH HYPERGLYCEMIA, WITHOUT LONG-TERM CURRENT USE OF INSULIN (H): ICD-10-CM

## 2025-01-14 DIAGNOSIS — E61.1 IRON DEFICIENCY: ICD-10-CM

## 2025-01-14 RX ORDER — FERROUS SULFATE 325(65) MG
325 TABLET ORAL
Qty: 90 TABLET | Refills: 0 | Status: SHIPPED | OUTPATIENT
Start: 2025-01-14

## 2025-01-14 RX ORDER — LANCETS 30 GAUGE
EACH MISCELLANEOUS
Qty: 100 EACH | Refills: 1 | Status: SHIPPED | OUTPATIENT
Start: 2025-01-14

## 2025-01-15 ENCOUNTER — OFFICE VISIT (OUTPATIENT)
Dept: OTOLARYNGOLOGY | Facility: CLINIC | Age: 80
End: 2025-01-15
Payer: COMMERCIAL

## 2025-01-15 ENCOUNTER — TELEPHONE (OUTPATIENT)
Dept: UROLOGY | Facility: CLINIC | Age: 80
End: 2025-01-15

## 2025-01-15 ENCOUNTER — OFFICE VISIT (OUTPATIENT)
Dept: AUDIOLOGY | Facility: CLINIC | Age: 80
End: 2025-01-15
Payer: COMMERCIAL

## 2025-01-15 DIAGNOSIS — H71.92 CHOLESTEATOMA, LEFT: ICD-10-CM

## 2025-01-15 DIAGNOSIS — C61 MALIGNANT NEOPLASM OF PROSTATE (H): Primary | ICD-10-CM

## 2025-01-15 DIAGNOSIS — H90.A32 MIXED CONDUCTIVE AND SENSORINEURAL HEARING LOSS OF LEFT EAR WITH RESTRICTED HEARING OF RIGHT EAR: Primary | ICD-10-CM

## 2025-01-15 DIAGNOSIS — H66.22 CHRONIC ATTICOANTRAL SUPPURATIVE OTITIS MEDIA OF LEFT EAR: ICD-10-CM

## 2025-01-15 DIAGNOSIS — H61.23 BILATERAL IMPACTED CERUMEN: Primary | ICD-10-CM

## 2025-01-15 DIAGNOSIS — H70.92 MASTOIDITIS OF LEFT SIDE: ICD-10-CM

## 2025-01-15 ASSESSMENT — ENCOUNTER SYMPTOMS
GASTROINTESTINAL NEGATIVE: 1
RESPIRATORY NEGATIVE: 1
CONSTITUTIONAL NEGATIVE: 1
EYES NEGATIVE: 1

## 2025-01-15 NOTE — TELEPHONE ENCOUNTER
Per chart review of last MD note, patient is overdue for PSA and follow up.  Appointment scheduled.   PSA lab entered.  Josefa Winter CMA/

## 2025-01-15 NOTE — LETTER
1/15/2025      Rakan Nolasco  [address] [facility] [address]      Dear Colleague,    Thank you for referring your patient, Rakan Nolasco, to the [facility]. Please see a copy of my visit note below.    Chief Complaint   Patient presents with     Surgical Followup     10 week S/P THRESCH GRAFTING, LEFT ARM SPLIT GRAFTING TO LEFT EAR GRANULATION TISSUE REMOVAL, POST AURICULAR SUTURES REMOVAL DOS 11-7-24     PCP: Telly Lucio     Referring Provider: Referred Self    There were no vitals taken for this visit.    ENT Problem List:  Patient Active Problem List   Diagnosis     GERD (gastroesophageal reflux disease)     HLD (hyperlipidemia)     Borderline glaucoma with ocular hypertension     Trigeminal neuralgia pain     HTN (hypertension)     Arthritis     Dry eyes     PVD (posterior vitreous detachment) OU     H/O RD (retinal detachment) s/p repair with PPV (AB)     Epiretinal membrane, bilateral     Pseudophakia of right eye     Myasthenia gravis (H)     Morbid obesity (H)     Right posterior capsular opacification     Obstructive sleep apnea syndrome     Prostate cancer (H)     Restless legs syndrome     Temporal arteritis (H)     Type 2 diabetes mellitus with other specified complication, without long-term current use of insulin (H)     Hyponatremia     COPD (chronic obstructive pulmonary disease) (H)     Combined form of age-related cataract, left eye     Anemia     Osteoarthritis of left knee, unspecified osteoarthritis type     Chronic pain of left knee     Chronic atticoantral suppurative otitis media of left ear     Cholesteatoma, left     Mastoiditis of left side      Current Medications:  Current Outpatient Medications   Medication Sig Dispense Refill     acetaminophen (TYLENOL) 325 MG tablet Take 2 tablets (650 mg) by mouth every 4 hours as needed for other (For optimal non-opioid multimodal pain management to improve pain control.) 100 tablet 0     albuterol  (PROAIR HFA/PROVENTIL HFA/VENTOLIN HFA) 108 (90 Base) MCG/ACT inhaler INHALE 1 PUFF INTO THE LUNGS EVERY 4 HOURS AS NEEDED FOR SHORTNESS OF BREATH, WHEEZING OR COUGH 18 g 11     amLODIPine (NORVASC) 10 MG tablet TAKE 1 TABLET (10 MG) BY MOUTH DAILY. 90 tablet 1     ARTIFICIAL TEAR OP Apply 1 drop to eye as needed       atorvastatin (LIPITOR) 20 MG tablet Take 20 mg by mouth at bedtime       blood glucose (ONETOUCH VERIO IQ) test strip Use to test blood sugar 3 times daily or as directed. 300 strip 1     blood glucose monitoring (NO BRAND SPECIFIED) meter device kit Use to test blood sugar 1 times daily or as directed. 1 kit 0     ClonAZEPAM (KLONOPIN) 0.5 MG tablet Take 0.5 mg by mouth Take 1 tablet by mouth daily at bedtime       Continuous Glucose Sensor (FREESTYLE SARAHY 2 SENSOR) MISC USE 1 SENSOR EVERY 14 DAYS. USE TO READ BLOOD SUGARS PER 'S INSTRUCTIONS. 6 each 5     ferrous sulfate (FEROSUL) 325 (65 Fe) MG tablet TAKE 1 TABLET BY MOUTH EVERY DAY WITH BREAKFAST 90 tablet 0     fluticasone-salmeterol (ADVAIR) 250-50 MCG/ACT inhaler TAKE 1 PUFF BY MOUTH TWICE A DAY Strength: 250-50 MCG/ACT 3 each 3     fluticasone-salmeterol (WIXELA INHUB) 250-50 MCG/ACT inhaler Inhale 1 puff into the lungs every 12 hours 3 each 3     furosemide (LASIX) 20 MG tablet TAKE 2 TABLETS BY MOUTH IN THE MORNING AND 1 TABLET BY MOUTH AT LEAST 6 HOURS LATER IN THE AFTERNOON. 270 tablet 0     Lancets (ONETOUCH DELICA PLUS [id number]) MISC USE ONCE DAILY AS DIRECTED 100 each 1     latanoprost (XALATAN) 0.005 % ophthalmic solution Place 1 drop into both eyes at bedtime. 7.5 mL 3     lisinopril (ZESTRIL) 40 MG tablet TAKE 1 TABLET BY MOUTH EVERY DAY 90 tablet 0     metFORMIN (GLUCOPHAGE XR) 500 MG 24 hr tablet TAKE 1 TABLET BY MOUTH TWICE A DAY WITH FOOD 180 tablet 3     pantoprazole (PROTONIX) 20 MG EC tablet TAKE 1 TABLET BY MOUTH EVERY DAY 90 tablet 2     predniSONE (DELTASONE) 5 MG tablet TAKE 2 TABLETS BY MOUTH EVERY DAY 60  tablet 2     pyRIDostigmine (MESTINON) 60 MG tablet TAKE 2 TABLETS (120 MG) IN THE MORNING AND AFTERNOON AND 1 TABLET IN THE EVENING PER 4/22/2024 APPT. 450 tablet 0     pyRIDostigmine (MESTINON) 60 MG tablet Take 120 mg by mouth every morning       pyRIDostigmine (MESTINON) 60 MG tablet Take 60 mg by mouth 2 times daily Midday and 6 PM.       sodium bicarbonate 650 MG tablet TAKE 1 TABLET (650 MG) BY MOUTH 2 TIMES DAILY 180 tablet 1     timolol maleate (TIMOPTIC) 0.5 % ophthalmic solution Place 1 drop into both eyes 2 times daily 5 mL 11     OXcarbazepine (TRILEPTAL) 300 MG tablet Take 3 tablets (900 mg) by mouth 2 times daily. TAKE  3 TABLETS IN THE MORNING AND 3 TABLETS IN THE EVENING 180 tablet 2     No current facility-administered medications for this visit.     HPI  Pleasant 79 year old male presents today as a(n) established patient for his 10-week post-operative appointment from 11/7/24 left Thiersch grafting with removal of granulation tissue and scars after 10/21/24 left open cavity canal wall down mastoidectomy and meatoplasty. Hearing is unchanged after 2nd surgery, and he would like to know when he can return to using his hearing aids. He denies any recent ear discharge/drainage or trouble breathing through his nose.    Review of Systems   Constitutional: Negative.    HENT: Negative.     Eyes: Negative.    Respiratory: Negative.     Gastrointestinal: Negative.    Skin: Negative.    Endo/Heme/Allergies: Negative.        Physical Exam  Vitals and nursing note reviewed.   Constitutional:       Appearance: Normal appearance.   HENT:      Head: Normocephalic and atraumatic.      Right Ear: Tympanic membrane, ear canal and external ear normal. There is impacted cerumen.      Left Ear: Tympanic membrane, ear canal and external ear normal. There is no impacted cerumen.      Nose: Nose normal. No septal deviation or rhinorrhea.      Right Nostril: No occlusion.      Left Nostril: No occlusion.      Right  Turbinates: Not enlarged or swollen.      Left Turbinates: Not enlarged or swollen.      Mouth/Throat:      Mouth: Mucous membranes are moist.      Pharynx: Oropharynx is clear. Uvula midline.   Eyes:      Extraocular Movements: Extraocular movements intact.      Conjunctiva/sclera: Conjunctivae normal.      Pupils: Pupils are equal, round, and reactive to light.   Neurological:      Mental Status: He is alert.     Left ear cavity is completely healed. tympanic membrane fascia is intact. No other concerning pathologies.    Ear wax removal:  The patient was seen in the room. An informed consent was obtained from the patient. His ears were evaluated under the microscope. Right ear canal was blocked 60% with ear wax that was suctioned and removed with a curette and an alligator. He tolerated the procedure well and left the room no complications.     AUDIOGRAM: The patient underwent an audiogram today.  Right: Speech reception threshold is 15 dB with 100% word recognition.   Left: Speech reception threshold is 45 dB with 100% word recognition.      A/P  On exam, the patient is healing very well with no signs of infection, masses, or other concerning pathologies. Audiogram was independently reviewed and discussed in detail with the patient. His hearing has improved in every single frequency after surgery. He can resume using his hearing aids.    Follow up in clinic in 4 months    Scribe/Staff:    Scribe Disclosure:   I, SAMM CASTILLO, am serving as a scribe; to document services personally performed by Sahyna Delatorre MD based on data collection and the provider's statements to me.     Provider Disclosure:  I agree with above History, Review of Systems, Physical exam and Plan.  I have reviewed the content of the documentation and have edited it as needed. I have personally performed the services documented here and the documentation accurately represents those services and the decisions I have made.       Electronically signed by:  Shayna Delatorre MD          Again, thank you for allowing me to participate in the care of your patient.        Sincerely,        Shayna Delatorre MD    Electronically signed

## 2025-01-15 NOTE — TELEPHONE ENCOUNTER
[facility] Call Center    Phone Message    May a detailed message be left on voicemail: yes     Reason for Call: Other: pt calling and wondering if he needs to do a PSA or a appt , please reach out to pt   writer tried to book him but no go    Action Taken: Other: urology    Travel Screening: Not Applicable     Date of Service:

## 2025-01-15 NOTE — NURSING NOTE
Rakan Nolasco's chief complaint for this visit includes:  Chief Complaint   Patient presents with    Surgical Followup     10 week S/P THRESCH GRAFTING, LEFT ARM SPLIT GRAFTING TO LEFT EAR GRANULATION TISSUE REMOVAL, POST AURICULAR SUTURES REMOVAL DOS 11-7-24     PCP: Telly Lucio    Referring Provider:  Referred Self, MD  No address on file    There were no vitals taken for this visit.

## 2025-01-15 NOTE — PROGRESS NOTES
AUDIOLOGY REPORT    SUMMARY: Audiology visit completed. See audiogram for results.    RECOMMENDATIONS: Follow-up with ENT.    Darcy Bell  Doctor of Audiology  [place] License # [id number]

## 2025-01-15 NOTE — PROGRESS NOTES
Chief Complaint   Patient presents with    Surgical Followup     10 week S/P THRESCH GRAFTING, LEFT ARM SPLIT GRAFTING TO LEFT EAR GRANULATION TISSUE REMOVAL, POST AURICULAR SUTURES REMOVAL DOS 11-7-24     PCP: Telly Lucio     Referring Provider: Referred Self    There were no vitals taken for this visit.    ENT Problem List:  Patient Active Problem List   Diagnosis    GERD (gastroesophageal reflux disease)    HLD (hyperlipidemia)    Borderline glaucoma with ocular hypertension    Trigeminal neuralgia pain    HTN (hypertension)    Arthritis    Dry eyes    PVD (posterior vitreous detachment) OU    H/O RD (retinal detachment) s/p repair with PPV (AB)    Epiretinal membrane, bilateral    Pseudophakia of right eye    Myasthenia gravis (H)    Morbid obesity (H)    Right posterior capsular opacification    Obstructive sleep apnea syndrome    Prostate cancer (H)    Restless legs syndrome    Temporal arteritis (H)    Type 2 diabetes mellitus with other specified complication, without long-term current use of insulin (H)    Hyponatremia    COPD (chronic obstructive pulmonary disease) (H)    Combined form of age-related cataract, left eye    Anemia    Osteoarthritis of left knee, unspecified osteoarthritis type    Chronic pain of left knee    Chronic atticoantral suppurative otitis media of left ear    Cholesteatoma, left    Mastoiditis of left side      Current Medications:  Current Outpatient Medications   Medication Sig Dispense Refill    acetaminophen (TYLENOL) 325 MG tablet Take 2 tablets (650 mg) by mouth every 4 hours as needed for other (For optimal non-opioid multimodal pain management to improve pain control.) 100 tablet 0    albuterol (PROAIR HFA/PROVENTIL HFA/VENTOLIN HFA) 108 (90 Base) MCG/ACT inhaler INHALE 1 PUFF INTO THE LUNGS EVERY 4 HOURS AS NEEDED FOR SHORTNESS OF BREATH, WHEEZING OR COUGH 18 g 11    amLODIPine (NORVASC) 10 MG tablet TAKE 1 TABLET (10 MG) BY MOUTH DAILY. 90 tablet 1    ARTIFICIAL  TEAR OP Apply 1 drop to eye as needed      atorvastatin (LIPITOR) 20 MG tablet Take 20 mg by mouth at bedtime      blood glucose (ONETOUCH VERIO IQ) test strip Use to test blood sugar 3 times daily or as directed. 300 strip 1    blood glucose monitoring (NO BRAND SPECIFIED) meter device kit Use to test blood sugar 1 times daily or as directed. 1 kit 0    ClonAZEPAM (KLONOPIN) 0.5 MG tablet Take 0.5 mg by mouth Take 1 tablet by mouth daily at bedtime      Continuous Glucose Sensor (FREESTYLE SARAHY 2 SENSOR) MISC USE 1 SENSOR EVERY 14 DAYS. USE TO READ BLOOD SUGARS PER 'S INSTRUCTIONS. 6 each 5    ferrous sulfate (FEROSUL) 325 (65 Fe) MG tablet TAKE 1 TABLET BY MOUTH EVERY DAY WITH BREAKFAST 90 tablet 0    fluticasone-salmeterol (ADVAIR) 250-50 MCG/ACT inhaler TAKE 1 PUFF BY MOUTH TWICE A DAY Strength: 250-50 MCG/ACT 3 each 3    fluticasone-salmeterol (WIXELA INHUB) 250-50 MCG/ACT inhaler Inhale 1 puff into the lungs every 12 hours 3 each 3    furosemide (LASIX) 20 MG tablet TAKE 2 TABLETS BY MOUTH IN THE MORNING AND 1 TABLET BY MOUTH AT LEAST 6 HOURS LATER IN THE AFTERNOON. 270 tablet 0    Lancets (ONETOUCH DELICA PLUS [id number]) MISC USE ONCE DAILY AS DIRECTED 100 each 1    latanoprost (XALATAN) 0.005 % ophthalmic solution Place 1 drop into both eyes at bedtime. 7.5 mL 3    lisinopril (ZESTRIL) 40 MG tablet TAKE 1 TABLET BY MOUTH EVERY DAY 90 tablet 0    metFORMIN (GLUCOPHAGE XR) 500 MG 24 hr tablet TAKE 1 TABLET BY MOUTH TWICE A DAY WITH FOOD 180 tablet 3    pantoprazole (PROTONIX) 20 MG EC tablet TAKE 1 TABLET BY MOUTH EVERY DAY 90 tablet 2    predniSONE (DELTASONE) 5 MG tablet TAKE 2 TABLETS BY MOUTH EVERY DAY 60 tablet 2    pyRIDostigmine (MESTINON) 60 MG tablet TAKE 2 TABLETS (120 MG) IN THE MORNING AND AFTERNOON AND 1 TABLET IN THE EVENING PER 4/22/2024 APPT. 450 tablet 0    pyRIDostigmine (MESTINON) 60 MG tablet Take 120 mg by mouth every morning      pyRIDostigmine (MESTINON) 60 MG tablet Take 60  mg by mouth 2 times daily Midday and 6 PM.      sodium bicarbonate 650 MG tablet TAKE 1 TABLET (650 MG) BY MOUTH 2 TIMES DAILY 180 tablet 1    timolol maleate (TIMOPTIC) 0.5 % ophthalmic solution Place 1 drop into both eyes 2 times daily 5 mL 11    OXcarbazepine (TRILEPTAL) 300 MG tablet Take 3 tablets (900 mg) by mouth 2 times daily. TAKE  3 TABLETS IN THE MORNING AND 3 TABLETS IN THE EVENING 180 tablet 2     No current facility-administered medications for this visit.     HPI  Pleasant 79 year old male presents today as a(n) established patient for his 10-week post-operative appointment from 11/7/24 left Thiersch grafting with removal of granulation tissue and scars after 10/21/24 left open cavity canal wall down mastoidectomy and meatoplasty. Hearing is unchanged after 2nd surgery, and he would like to know when he can return to using his hearing aids. He denies any recent ear discharge/drainage or trouble breathing through his nose.    Review of Systems   Constitutional: Negative.    HENT: Negative.     Eyes: Negative.    Respiratory: Negative.     Gastrointestinal: Negative.    Skin: Negative.    Endo/Heme/Allergies: Negative.        Physical Exam  Vitals and nursing note reviewed.   Constitutional:       Appearance: Normal appearance.   HENT:      Head: Normocephalic and atraumatic.      Right Ear: Tympanic membrane, ear canal and external ear normal. There is impacted cerumen.      Left Ear: Tympanic membrane, ear canal and external ear normal. There is no impacted cerumen.      Nose: Nose normal. No septal deviation or rhinorrhea.      Right Nostril: No occlusion.      Left Nostril: No occlusion.      Right Turbinates: Not enlarged or swollen.      Left Turbinates: Not enlarged or swollen.      Mouth/Throat:      Mouth: Mucous membranes are moist.      Pharynx: Oropharynx is clear. Uvula midline.   Eyes:      Extraocular Movements: Extraocular movements intact.      Conjunctiva/sclera: Conjunctivae normal.       Pupils: Pupils are equal, round, and reactive to light.   Neurological:      Mental Status: He is alert.     Left ear cavity is completely healed. tympanic membrane fascia is intact. No other concerning pathologies.    Ear wax removal:  The patient was seen in the room. An informed consent was obtained from the patient. His ears were evaluated under the microscope. Right ear canal was blocked 60% with ear wax that was suctioned and removed with a curette and an alligator. He tolerated the procedure well and left the room no complications.     AUDIOGRAM: The patient underwent an audiogram today.  Right: Speech reception threshold is 15 dB with 100% word recognition.   Left: Speech reception threshold is 45 dB with 100% word recognition.      A/P  On exam, the patient is healing very well with no signs of infection, masses, or other concerning pathologies. Audiogram was independently reviewed and discussed in detail with the patient. His hearing has improved in every single frequency after surgery. He can resume using his hearing aids.    Follow up in clinic in 4 months    Scribe/Staff:    Scribe Disclosure:   I, SAMM CASTILLO, am serving as a scribe; to document services personally performed by Shayna Delatorre MD based on data collection and the provider's statements to me.     Provider Disclosure:  I agree with above History, Review of Systems, Physical exam and Plan.  I have reviewed the content of the documentation and have edited it as needed. I have personally performed the services documented here and the documentation accurately represents those services and the decisions I have made.      Electronically signed by:  Shayna Delatorre MD

## 2025-01-21 ENCOUNTER — TELEPHONE (OUTPATIENT)
Dept: PULMONOLOGY | Facility: CLINIC | Age: 80
End: 2025-01-21

## 2025-01-21 DIAGNOSIS — J43.2 CENTRILOBULAR EMPHYSEMA (H): Primary | ICD-10-CM

## 2025-01-21 NOTE — TELEPHONE ENCOUNTER
Prior Authorization Retail Medication Request    Medication/Dose: albuterol (PROAIR HFA/PROVENTIL HFA/VENTOLIN HFA) 108 (90 Base) MCG/ACT inhaler   Diagnosis and ICD code (if different than what is on RX):  COPD   New/renewal/insurance change PA/secondary ins. PA:  Previously Tried and Failed:    Rationale:      Insurance   Primary:   Insurance ID:      Secondary (if applicable):  Insurance ID:      Pharmacy Information (if different than what is on RX)  Name:    Phone:    Fax:    Clinic Information  Preferred routing pool for dept communication:     Michael Baum RN on 1/21/2025 at 1:06 PM

## 2025-01-21 NOTE — TELEPHONE ENCOUNTER
[facility] Call Center    Phone Message    May a detailed message be left on voicemail: yes     Reason for Call: Other: Pt received a letter from [facility] that they will no longer cover  albuterol (PROAIR HFA/PROVENTIL HFA/VENTOLIN HFA) 108 (90 Base) MCG/ACT inhaler. Pt would need a coverage determination or an appeal for a prior authorization. Please pt back at ph: [phone].     Action Taken: Message routed to:  Adult Clinics: Pulmonology p [id number]    Travel Screening: Not Applicable     Date of Service: 1/21

## 2025-01-24 NOTE — TELEPHONE ENCOUNTER
Retail Pharmacy Prior Authorization Team   Phone: [phone]    PA Initiation    Medication: ALBUTEROL SULFATE 108 (90 BASE) MCG/ACT IN AEPB  Insurance Company: [employer] - Phone [phone] Fax [phone]  Pharmacy Filling the Rx: [id number] [place] [facility] - [place], [place] - [address]  Filling Pharmacy Phone: [phone]  Filling Pharmacy Fax:    Start Date: 1/24/2025    [id number]

## 2025-01-27 NOTE — TELEPHONE ENCOUNTER
Called [facility] in [place]. [facility], it would cost 7$ out of pocket. Called and discussed this with patient and he is agreeable to the price and will  the medication  Michael Baum RN on 1/27/2025 at 11:33 AM

## 2025-01-27 NOTE — TELEPHONE ENCOUNTER
Patient calling back to check on the letter from [employer] that they will no longer cover  albuterol (PROAIR HFA/PROVENTIL HFA/VENTOLIN HFA) 108 (90 Base) MCG/ACT inhaler. Patient said [employer] says the Ventolin could be an alternative if needed. Please advise patient.    Pharmacy: [facility] [id number] [place] [facility] [address], [place] [address]

## 2025-01-28 NOTE — TELEPHONE ENCOUNTER
PRIOR AUTHORIZATION DENIED    Medication: ALBUTEROL SULFATE 108 (90 BASE) MCG/ACT IN AEPB  Insurance Company: [employer] - Phone [phone] Fax [phone]  Denial Date: 1/26/2025  Denial Reason(s):     Per previous note, patient will fill the preferred alternative Ventolin HFA.

## 2025-01-29 DIAGNOSIS — I10 BENIGN ESSENTIAL HYPERTENSION: ICD-10-CM

## 2025-01-29 DIAGNOSIS — E78.5 HYPERLIPIDEMIA, UNSPECIFIED: ICD-10-CM

## 2025-01-29 RX ORDER — LISINOPRIL 40 MG/1
40 TABLET ORAL DAILY
Qty: 90 TABLET | Refills: 1 | Status: SHIPPED | OUTPATIENT
Start: 2025-01-29

## 2025-01-29 RX ORDER — ATORVASTATIN CALCIUM 20 MG/1
20 TABLET, FILM COATED ORAL DAILY
Qty: 90 TABLET | Refills: 3 | Status: SHIPPED | OUTPATIENT
Start: 2025-01-29

## 2025-01-31 ENCOUNTER — OFFICE VISIT (OUTPATIENT)
Dept: FAMILY MEDICINE | Facility: CLINIC | Age: 80
End: 2025-01-31
Payer: COMMERCIAL

## 2025-01-31 VITALS
HEIGHT: 69 IN | BODY MASS INDEX: 34.18 KG/M2 | OXYGEN SATURATION: 98 % | TEMPERATURE: 97.2 F | SYSTOLIC BLOOD PRESSURE: 127 MMHG | DIASTOLIC BLOOD PRESSURE: 72 MMHG | WEIGHT: 230.8 LBS | RESPIRATION RATE: 18 BRPM | HEART RATE: 64 BPM

## 2025-01-31 DIAGNOSIS — M31.6 TEMPORAL ARTERITIS (H): ICD-10-CM

## 2025-01-31 DIAGNOSIS — J44.1 COPD EXACERBATION (H): ICD-10-CM

## 2025-01-31 DIAGNOSIS — Z79.899 MEDICATION MANAGEMENT: ICD-10-CM

## 2025-01-31 DIAGNOSIS — E11.69 TYPE 2 DIABETES MELLITUS WITH OTHER SPECIFIED COMPLICATION, WITHOUT LONG-TERM CURRENT USE OF INSULIN (H): Primary | ICD-10-CM

## 2025-01-31 DIAGNOSIS — E66.01 MORBID OBESITY (H): ICD-10-CM

## 2025-01-31 DIAGNOSIS — C61 MALIGNANT NEOPLASM OF PROSTATE (H): ICD-10-CM

## 2025-01-31 PROCEDURE — 99214 OFFICE O/P EST MOD 30 MIN: CPT | Performed by: FAMILY MEDICINE

## 2025-01-31 PROCEDURE — 99207 PR FOOT EXAM NO CHARGE: CPT | Performed by: FAMILY MEDICINE

## 2025-01-31 ASSESSMENT — PAIN SCALES - GENERAL: PAINLEVEL_OUTOF10: NO PAIN (0)

## 2025-01-31 NOTE — PROGRESS NOTES
"  Assessment & Plan     Type 2 diabetes mellitus with other specified complication, without long-term current use of insulin (H)   Well controlled on Metformin 500 mg twice daily  - FOOT EXAM  - Basic metabolic panel  (Ca, Cl, CO2, Creat, Gluc, K, Na, BUN)    COPD exacerbation (H)   - On Wixella, well controlled    Temporal arteritis (H)    -  On Trileptal    Morbid obesity (H)   Counseled to make better food choices, exercise as tolerated, and lose weight.     Malignant neoplasm of prostate (H)   - status post cryotherapy in the past, surveillance by urology with PSA      BMI  Estimated body mass index is 33.61 kg/m  as calculated from the following:    Height as of this encounter: 1.765 m (5' 9.49\").    Weight as of this encounter: 104.7 kg (230 lb 12.8 oz).   Weight management plan: Discussed healthy diet and exercise guidelines      See Patient Instructions    Subjective   Rakan is a 79 year old, presenting for the following health issues:  Diabetes Mellitus        1/31/2025    12:45 PM   Additional Questions   Roomed by bam mckeon   Accompanied by self     History of Present Illness       Diabetes:   He presents for follow up of diabetes.   He is checking home blood glucose with a continuous glucose monitor.   He checks blood glucose before and after meals.  Blood glucose is sometimes over 200 and never under 70. He is aware of hypoglycemia symptoms including shakiness and weakness.   He is concerned about frequent infections.   He is having numbness in feet, burning in feet, redness, sores, or blisters on feet and blurry vision.            He eats 2-3 servings of fruits and vegetables daily.He consumes 1 sweetened beverage(s) daily.He exercises with enough effort to increase his heart rate 9 or less minutes per day.  He exercises with enough effort to increase his heart rate 3 or less days per week.   He is taking medications regularly.     Diabetes Follow-up  On Metformin 500 mg twice daily  Last A1c was 6.2 on " "1/13/25  Prebreakfast usually:     How often are you checking your blood sugar? A few times a week  What time of day are you checking your blood sugars (select all that apply)?  Before meals  Have you had any blood sugars above 200?  No  Have you had any blood sugars below 70?  No  What symptoms do you notice when your blood sugar is low?  None  What concerns do you have today about your diabetes? None   Do you have any of these symptoms? (Select all that apply)  Numbness in feet      BP Readings from Last 2 Encounters:   01/31/25 127/72   11/18/24 (!) 157/69     Hemoglobin A1C (%)   Date Value   01/31/2025 6.2 (H)   10/01/2024 6.2 (H)   06/09/2021 7.1 (H)   12/30/2020 7.5 (H)     LDL Cholesterol Calculated (mg/dL)   Date Value   10/01/2024 58   07/05/2023 72   06/09/2021 69   06/11/2020 35     COPD Follow-Up   On Advair/Wixella 250-50   Overall, how are your COPD symptoms since your last clinic visit?  No change  How much fatigue or shortness of breath do you have when you are walking?  None  How much shortness of breath do you have when you are resting?  None  How often do you cough? Rarely  Have you noticed any change in your sputum/phlegm?  No  Have you experienced a recent fever? No  Please describe how far you can walk without stopping to rest:  2-5 blocks  How many flights of stairs are you able to walk up without stopping?  3 or more  Have you had any Emergency Room Visits, Urgent Care Visits, or Hospital Admissions because of your COPD since your last office visit?  No    History   Smoking Status    Former    Types: Cigarettes   Smokeless Tobacco    Former    Quit date: 1/1/1970     No results found for: \"FEV1\", \"ZZF6CPO\"  Temporal arteritis;    Has no pain     Review of Systems  Constitutional, HEENT, cardiovascular, pulmonary, gi and gu systems are negative, except as otherwise noted.      Objective    /72   Pulse 64   Temp 97.2  F (36.2  C) (Temporal)   Resp 18   Ht 1.765 m (5' 9.49\")   " Wt 104.7 kg (230 lb 12.8 oz)   SpO2 98%   BMI 33.61 kg/m    Body mass index is 33.61 kg/m .  Physical Exam   GENERAL: alert and no distress  RESP: lungs clear to auscultation - no rales, rhonchi or wheezes  CV: regular rate and rhythm, no murmur, click or rub, no peripheral edema  ABDOMEN: soft, nontender, no masses and bowel sounds normal  MS: no gross musculoskeletal defects noted, no edema  NEURO: Normal strength and tone, mentation intact and speech normal      Signed Electronically by: Telly Lucio MD

## 2025-02-04 ENCOUNTER — TELEPHONE (OUTPATIENT)
Dept: PHARMACY | Facility: OTHER | Age: 80
End: 2025-02-04

## 2025-02-04 ENCOUNTER — OFFICE VISIT (OUTPATIENT)
Dept: UROLOGY | Facility: CLINIC | Age: 80
End: 2025-02-04
Payer: COMMERCIAL

## 2025-02-04 ENCOUNTER — OFFICE VISIT (OUTPATIENT)
Dept: FAMILY MEDICINE | Facility: CLINIC | Age: 80
End: 2025-02-04
Payer: COMMERCIAL

## 2025-02-04 VITALS
WEIGHT: 225 LBS | DIASTOLIC BLOOD PRESSURE: 64 MMHG | HEART RATE: 70 BPM | SYSTOLIC BLOOD PRESSURE: 138 MMHG | BODY MASS INDEX: 32.76 KG/M2 | OXYGEN SATURATION: 98 %

## 2025-02-04 VITALS
RESPIRATION RATE: 18 BRPM | BODY MASS INDEX: 33.47 KG/M2 | WEIGHT: 226 LBS | SYSTOLIC BLOOD PRESSURE: 111 MMHG | HEART RATE: 63 BPM | HEIGHT: 69 IN | OXYGEN SATURATION: 98 % | TEMPERATURE: 98.4 F | DIASTOLIC BLOOD PRESSURE: 65 MMHG

## 2025-02-04 DIAGNOSIS — C61 MALIGNANT NEOPLASM OF PROSTATE (H): Primary | ICD-10-CM

## 2025-02-04 DIAGNOSIS — N40.1 BENIGN PROSTATIC HYPERPLASIA WITH LOWER URINARY TRACT SYMPTOMS, SYMPTOM DETAILS UNSPECIFIED: ICD-10-CM

## 2025-02-04 DIAGNOSIS — N39.0 RECURRENT UTI: ICD-10-CM

## 2025-02-04 DIAGNOSIS — L98.9 SORE ON LEG: ICD-10-CM

## 2025-02-04 DIAGNOSIS — E11.69 TYPE 2 DIABETES MELLITUS WITH OTHER SPECIFIED COMPLICATION, WITHOUT LONG-TERM CURRENT USE OF INSULIN (H): Primary | ICD-10-CM

## 2025-02-04 PROCEDURE — 99213 OFFICE O/P EST LOW 20 MIN: CPT | Mod: 25 | Performed by: UROLOGY

## 2025-02-04 PROCEDURE — 99213 OFFICE O/P EST LOW 20 MIN: CPT | Performed by: PHYSICIAN ASSISTANT

## 2025-02-04 PROCEDURE — 51798 US URINE CAPACITY MEASURE: CPT | Performed by: UROLOGY

## 2025-02-04 RX ORDER — DOXYCYCLINE 100 MG/1
100 CAPSULE ORAL 2 TIMES DAILY
Qty: 10 CAPSULE | Refills: 0 | Status: SHIPPED | OUTPATIENT
Start: 2025-02-04 | End: 2025-02-09

## 2025-02-04 NOTE — PROGRESS NOTES
Some frequency (on Fusosemide)  Weaker stream  Bladder scan performed 48ml detected in bladder. Josefa Winter, CMA

## 2025-02-04 NOTE — PROGRESS NOTES
"  Assessment & Plan     Type 2 diabetes mellitus with other specified complication, without long-term current use of insulin (H)  Increases his risk of infection   - doxycycline hyclate (VIBRAMYCIN) 100 MG capsule; Take 1 capsule (100 mg) by mouth 2 times daily for 5 days.    Sore on leg  Does not appear infected yet.  Will give him antibiotics and have him monitor for 1-2 more days. If redness spreads start antibiotics .  Follow up as needed  - doxycycline hyclate (VIBRAMYCIN) 100 MG capsule; Take 1 capsule (100 mg) by mouth 2 times daily for 5 days.                Subjective   Rakan is a 79 year old, presenting for the following health issues:  Patient Request (Check sores on leg )        2/4/2025    12:19 PM   Additional Questions   Roomed by Suzy   Accompanied by self     History of Present Illness       Diabetes:   He presents for follow up of diabetes.   He is checking home blood glucose with a continuous glucose monitor.   He checks blood glucose before and after meals.  Blood glucose is sometimes over 200 and never under 70. He is aware of hypoglycemia symptoms including shakiness and weakness.   He is concerned about frequent infections.   He is having numbness in feet, burning in feet, redness, sores, or blisters on feet and blurry vision.            He eats 2-3 servings of fruits and vegetables daily.He consumes 1 sweetened beverage(s) daily.He exercises with enough effort to increase his heart rate 9 or less minutes per day.  He exercises with enough effort to increase his heart rate 3 or less days per week.   He is taking medications regularly.         Check sores on R keg x 4 days. No pain   Getting worse  Might have hit them on something.    Not painful.  No fevers.  No drainage.                      Objective    /65   Pulse 63   Temp 98.4  F (36.9  C) (Temporal)   Resp 18   Ht 1.76 m (5' 9.29\")   Wt 102.5 kg (226 lb)   SpO2 98%   BMI 33.10 kg/m    Body mass index is 33.1 kg/m .  Physical " Exam  Constitutional:       General: He is not in acute distress.     Appearance: He is obese.   Cardiovascular:      Rate and Rhythm: Normal rate and regular rhythm.   Pulmonary:      Effort: Pulmonary effort is normal.      Breath sounds: Normal breath sounds.   Skin:     Comments: See picture    Neurological:      Mental Status: He is alert.         Media Information      Document Information    Other: Photograph   Right leg   02/04/2025 12:47 PM   Attached To:   Office Visit on 2/4/25 with Katia Tejeda PA-C   Source Information    Katia Tejeda PA-C   Family Practice   Document History                      Signed Electronically by: Katia Tejeda PA-C

## 2025-02-04 NOTE — PROGRESS NOTES
Chief Complaint   Patient presents with    RECHECK       Rakan Nolasco is a 79 year old male who presents today for follow up of   Chief Complaint   Patient presents with    RECHECK   Patient is a pleasant 79-year-old male with history of prostate cancer status post cryotherapy in the past.  His recent PSA is 0.25 which is stable from last year.  He has mild LUTS.  Symptoms are not bothersome.  He has no issue with UTI lately.    Current Outpatient Medications   Medication Sig Dispense Refill    acetaminophen (TYLENOL) 325 MG tablet Take 2 tablets (650 mg) by mouth every 4 hours as needed for other (For optimal non-opioid multimodal pain management to improve pain control.) 100 tablet 0    albuterol (PROAIR HFA/PROVENTIL HFA/VENTOLIN HFA) 108 (90 Base) MCG/ACT inhaler INHALE 1 PUFF INTO THE LUNGS EVERY 4 HOURS AS NEEDED FOR SHORTNESS OF BREATH, WHEEZING OR COUGH 18 g 11    amLODIPine (NORVASC) 10 MG tablet TAKE 1 TABLET (10 MG) BY MOUTH DAILY. 90 tablet 1    ARTIFICIAL TEAR OP Apply 1 drop to eye as needed      atorvastatin (LIPITOR) 20 MG tablet TAKE 1 TABLET BY MOUTH EVERY DAY 90 tablet 3    blood glucose (ONETOUCH VERIO IQ) test strip Use to test blood sugar 3 times daily or as directed. 300 strip 1    blood glucose monitoring (NO BRAND SPECIFIED) meter device kit Use to test blood sugar 1 times daily or as directed. 1 kit 0    ClonAZEPAM (KLONOPIN) 0.5 MG tablet Take 0.5 mg by mouth Take 1 tablet by mouth daily at bedtime      Continuous Glucose Sensor (FREESTYLE SARAHY 2 SENSOR) MISC USE 1 SENSOR EVERY 14 DAYS. USE TO READ BLOOD SUGARS PER 'S INSTRUCTIONS. 6 each 5    ferrous sulfate (FEROSUL) 325 (65 Fe) MG tablet TAKE 1 TABLET BY MOUTH EVERY DAY WITH BREAKFAST 90 tablet 0    fluticasone-salmeterol (ADVAIR) 250-50 MCG/ACT inhaler TAKE 1 PUFF BY MOUTH TWICE A DAY Strength: 250-50 MCG/ACT 3 each 3    fluticasone-salmeterol (WIXELA INHUB) 250-50 MCG/ACT inhaler Inhale 1 puff into the lungs every 12  hours 3 each 3    furosemide (LASIX) 20 MG tablet TAKE 2 TABLETS BY MOUTH IN THE MORNING AND 1 TABLET BY MOUTH AT LEAST 6 HOURS LATER IN THE AFTERNOON. 270 tablet 0    Lancets (ONETOUCH DELICA PLUS [id number]) MISC USE ONCE DAILY AS DIRECTED 100 each 1    latanoprost (XALATAN) 0.005 % ophthalmic solution Place 1 drop into both eyes at bedtime. 7.5 mL 3    lisinopril (ZESTRIL) 40 MG tablet TAKE 1 TABLET BY MOUTH EVERY DAY 90 tablet 1    metFORMIN (GLUCOPHAGE XR) 500 MG 24 hr tablet TAKE 1 TABLET BY MOUTH TWICE A DAY WITH FOOD 180 tablet 3    OXcarbazepine (TRILEPTAL) 300 MG tablet Take 3 tablets (900 mg) by mouth 2 times daily. TAKE  3 TABLETS IN THE MORNING AND 3 TABLETS IN THE EVENING 180 tablet 2    pantoprazole (PROTONIX) 20 MG EC tablet TAKE 1 TABLET BY MOUTH EVERY DAY 90 tablet 2    predniSONE (DELTASONE) 5 MG tablet TAKE 2 TABLETS BY MOUTH EVERY DAY 60 tablet 2    pyRIDostigmine (MESTINON) 60 MG tablet TAKE 2 TABLETS (120 MG) IN THE MORNING AND AFTERNOON AND 1 TABLET IN THE EVENING PER 4/22/2024 APPT. 450 tablet 0    pyRIDostigmine (MESTINON) 60 MG tablet Take 120 mg by mouth every morning      pyRIDostigmine (MESTINON) 60 MG tablet Take 60 mg by mouth 2 times daily Midday and 6 PM.      sodium bicarbonate 650 MG tablet TAKE 1 TABLET (650 MG) BY MOUTH 2 TIMES DAILY 180 tablet 1    timolol maleate (TIMOPTIC) 0.5 % ophthalmic solution Place 1 drop into both eyes 2 times daily 5 mL 11     Allergies   Allergen Reactions    Azathioprine Shortness Of Breath and Other (See Comments)     Was hospitalized from it. Also had high fever, chills, and shortness of breath.    Sulfamethoxazole-Trimethoprim Cough, Itching and Rash     Generalized painful red rash on legs arms and abdomen, chest, back, cough and  fever  that required hospitalization.    Ciprofloxacin Muscle Pain (Myalgia)     Muscle pain  Other reaction(s): Myalgia  Other reaction(s): Myalgia    Ropinirole      Leg pan  GI  Weakness; ? sycope  Other reaction(s):  Leg Pain      Past Medical History:   Diagnosis Date    Arthritis         BMI 31.0-31.9,adult     Cancer (H) 01/10/2018    Prostate    COPD (chronic obstructive pulmonary disease) (H) 10/28/2020    Demand ischemia (H)     Diabetes (H) 01/10/2018    Diverticulosis     Colonoscopy 8/2008    Fatty liver     see US  5/2012    GERD (gastroesophageal reflux disease)     Glaucoma (increased eye pressure)     HTN (hypertension)     Hyperlipidemia LDL goal < 130     age, htn, fhx    Irritable bowel     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     Nonsenile cataract     Obstructive sleep apnea     Obstructive sleep apnea syndrome     ROSIE (obstructive sleep apnea) 01/2011    Using CPAP;     Prediabetes     Restless leg syndrome     Retinal detachment     Sleep apnea     Trigeminal neuralgia pain 01/04/2012     Past Surgical History:   Procedure Laterality Date    ARTHROPLASTY KNEE Left 4/1/2024    Procedure: ARTHROPLASTY, LEFT KNEE, TOTAL;  Surgeon: Dandre Hassan MD;  Location: [facility]    BIOPSY ARTERY TEMPORAL Bilateral 08/01/2017    Procedure: BIOPSY ARTERY TEMPORAL;  Bilateral temporal artery Biopsy;  Surgeon: Jj Tello MD;  Location: [facility]    CATARACT IOL, RT/LT Right     COLONOSCOPY      ESOPHAGOSCOPY, GASTROSCOPY, DUODENOSCOPY (EGD), COMBINED  01/15/2014    Procedure: COMBINED ESOPHAGOSCOPY, GASTROSCOPY, DUODENOSCOPY (EGD), BIOPSY SINGLE OR MULTIPLE;;  Surgeon: Winston Nixon MD;  Location: [facility]    GRAFT SKIN SPLIT THICKNESS FROM HEAD TO HEAD Left 11/7/2024    Procedure: THRESCH GRAFTING, LEFT ARM SPLIT GRAFTING TO LEFT EAR GRANULATION TISSUE REMOVAL, POST AURICULAR SUTURES REMOVAL;  Surgeon: Shayna Delatorre MD;  Location: [facility] OR    LASER YAG CAPSULOTOMY Right 04/09/2018    right eye    MASTOIDECTOMY Left 10/21/2024    Procedure: OPEN CAVITY OF LEFT MASTOIDECTOMY, MEATOPLASTY;  Surgeon: Shayna Delatorre MD;  Location: [facility] OR    PHACOEMULSIFICATION  CLEAR CORNEA WITH STANDARD INTRAOCULAR LENS IMPLANT Right 2017    Procedure: PHACOEMULSIFICATION CLEAR CORNEA WITH STANDARD INTRAOCULAR LENS IMPLANT;  Surgeon: Mateo Gray MD;  Location:  [facility]    PHACOEMULSIFICATION CLEAR CORNEA WITH STANDARD INTRAOCULAR LENS IMPLANT Left 01/10/2022    Procedure: LEFT PHACOEMULSIFICATION, CATARACT, WITH INTRAOCULAR LENS IMPLANT;  Surgeon: Mateo Gray MD;  Location: [facility]    PA TRABECULOPLASTY BY LASER SURGERY      RETINAL REATTACHMENT Right     SURGICAL HISTORY OF -   2009    Both Eyelids-.    TONSILLECTOMY  as child     Family History   Problem Relation Age of Onset    Respiratory Mother         copd    LUNG DISEASE Mother     Cerebrovascular Disease Father     Cancer Father     Other Cancer Father     Allergies Brother     Cancer Maternal Grandmother     Heart Disease Paternal Grandmother     Glaucoma Maternal Aunt     Macular Degeneration No family hx of     Anesthesia Reaction No family hx of     Deep Vein Thrombosis (DVT) No family hx of      Social History     Socioeconomic History    Marital status: Single     Spouse name: None    Number of children: 0    Years of education: 12    Highest education level: None   Occupational History    Occupation: Retired 3/2012     Comment: [employer]   Tobacco Use    Smoking status: Former     Packs/day: 2.00     Years: 15.00     Pack years: 30.00     Types: Cigarettes     Start date: 1968     Quit date: 1983     Years since quittin.5    Smokeless tobacco: Former     Quit date: 1970    Tobacco comments:     smoke free household.   Vaping Use    Vaping Use: Never used   Substance and Sexual Activity    Alcohol use: Not Currently     Comment: Quit in     Drug use: No    Sexual activity: Not Currently     Partners: Female     Comment: 2010  No girlfriend at this time.   Other Topics Concern    Parent/sibling w/ CABG, MI or angioplasty before 65F 55M? No        REVIEW OF SYSTEMS  =================  C: NEGATIVE for fever, chills, change in weight  I: NEGATIVE for worrisome rashes, moles or lesions  E/M: NEGATIVE for ear, mouth and throat problems  R: NEGATIVE for significant cough or SHORTNESS OF BREATH  CV:  NEGATIVE for chest pain, palpitations or peripheral edema  GI: NEGATIVE for nausea, abdominal pain, heartburn, or change in bowel habits  NEURO: NEGATIVE numbness/weakness  : see HPI  PSYCH: NEGATIVE depression/anxiety  LYmph: no new enlarged lymph nodes  Ortho: no new trauma/movements    Physical Exam:  Blood pressure 138/64, pulse 70, weight 102.1 kg (225 lb), SpO2 98%.    GENERAL: healthy, alert and no distress  EYES: Eyes grossly normal to inspection, conjunctivae and sclerae normal  RESP: no audible wheeze, cough, or visible cyanosis.  No visible retractions or increased work of breathing.  Able to speak fully in complete sentences.  NEURO: Cranial nerves grossly intact, mentation intact and speech normal  PSYCH: mentation appears normal, affect normal/bright, judgement and insight intact, normal speech and appearance well-groomed    ER visits and records reviewed      Assessment/Plan:   (C61) Malignant neoplasm of prostate (H)  (primary encounter diagnosis)  Comment:  S/p cryotherapy.  psa stable  Plan: psa in one year    BPH:  PVR 50 ml.  Patient does not want any meds    H/o UTI:  no issues lately.

## 2025-02-04 NOTE — PATIENT INSTRUCTIONS
Watch the area for now.  If the redness spreads start the antibiotics . If you start the antibiotics stop the iron for the 5 days Patient Education

## 2025-02-04 NOTE — TELEPHONE ENCOUNTER
UNIQUE Recruitment: [facility] insurance     Referral outreach attempt #1 on February 4, 2025      Outcome: left voicemail- Call back number [phone] and Kamegot message sent    UNIQUE Cochran

## 2025-02-10 DIAGNOSIS — G70.00 MYASTHENIA GRAVIS, ACHR ANTIBODY POSITIVE (H): ICD-10-CM

## 2025-02-10 DIAGNOSIS — E11.65 TYPE 2 DIABETES MELLITUS WITH HYPERGLYCEMIA, WITHOUT LONG-TERM CURRENT USE OF INSULIN (H): ICD-10-CM

## 2025-02-10 RX ORDER — BLOOD SUGAR DIAGNOSTIC
STRIP MISCELLANEOUS
Qty: 300 STRIP | Refills: 1 | Status: SHIPPED | OUTPATIENT
Start: 2025-02-10

## 2025-02-10 RX ORDER — PREDNISONE 5 MG/1
TABLET ORAL
Qty: 60 TABLET | Refills: 2 | Status: SHIPPED | OUTPATIENT
Start: 2025-02-10

## 2025-02-11 DIAGNOSIS — H40.053 BORDERLINE GLAUCOMA WITH OCULAR HYPERTENSION, BILATERAL: ICD-10-CM

## 2025-02-11 RX ORDER — TIMOLOL MALEATE 5 MG/ML
1 SOLUTION/ DROPS OPHTHALMIC 2 TIMES DAILY
Qty: 5 ML | Refills: 11 | Status: SHIPPED | OUTPATIENT
Start: 2025-02-11

## 2025-02-11 NOTE — TELEPHONE ENCOUNTER
Refilling drops Per Dr. Gray's last visit note 8/16/24: Continue Latanoprost (green top) every evening both eyes      Continue Timolol (yellow top) twice a day in both eyes     Patient has follow up 02/18/25

## 2025-02-17 SDOH — HEALTH STABILITY: PHYSICAL HEALTH: ON AVERAGE, HOW MANY MINUTES DO YOU ENGAGE IN EXERCISE AT THIS LEVEL?: 10 MIN

## 2025-02-17 SDOH — HEALTH STABILITY: PHYSICAL HEALTH: ON AVERAGE, HOW MANY DAYS PER WEEK DO YOU ENGAGE IN MODERATE TO STRENUOUS EXERCISE (LIKE A BRISK WALK)?: 1 DAY

## 2025-02-17 ASSESSMENT — SOCIAL DETERMINANTS OF HEALTH (SDOH): HOW OFTEN DO YOU GET TOGETHER WITH FRIENDS OR RELATIVES?: ONCE A WEEK

## 2025-02-18 ENCOUNTER — OFFICE VISIT (OUTPATIENT)
Dept: OPHTHALMOLOGY | Facility: CLINIC | Age: 80
End: 2025-02-18
Payer: COMMERCIAL

## 2025-02-18 ENCOUNTER — OFFICE VISIT (OUTPATIENT)
Dept: FAMILY MEDICINE | Facility: CLINIC | Age: 80
End: 2025-02-18
Attending: FAMILY MEDICINE
Payer: COMMERCIAL

## 2025-02-18 VITALS
RESPIRATION RATE: 16 BRPM | WEIGHT: 233 LBS | OXYGEN SATURATION: 96 % | HEIGHT: 70 IN | BODY MASS INDEX: 33.36 KG/M2 | HEART RATE: 64 BPM | DIASTOLIC BLOOD PRESSURE: 62 MMHG | SYSTOLIC BLOOD PRESSURE: 134 MMHG | TEMPERATURE: 97.9 F

## 2025-02-18 DIAGNOSIS — H26.492 LEFT POSTERIOR CAPSULAR OPACIFICATION: ICD-10-CM

## 2025-02-18 DIAGNOSIS — H52.4 MYOPIA OF BOTH EYES WITH REGULAR ASTIGMATISM AND PRESBYOPIA: ICD-10-CM

## 2025-02-18 DIAGNOSIS — H04.123 DRY EYES, BILATERAL: ICD-10-CM

## 2025-02-18 DIAGNOSIS — Z01.00 EXAMINATION OF EYES AND VISION: ICD-10-CM

## 2025-02-18 DIAGNOSIS — H40.053 BORDERLINE GLAUCOMA WITH OCULAR HYPERTENSION, BILATERAL: ICD-10-CM

## 2025-02-18 DIAGNOSIS — R60.0 PEDAL EDEMA: ICD-10-CM

## 2025-02-18 DIAGNOSIS — E11.9 TYPE 2 DIABETES MELLITUS WITHOUT COMPLICATION, WITHOUT LONG-TERM CURRENT USE OF INSULIN (H): Primary | ICD-10-CM

## 2025-02-18 DIAGNOSIS — E66.01 MORBID OBESITY (H): ICD-10-CM

## 2025-02-18 DIAGNOSIS — Z00.00 ENCOUNTER FOR MEDICARE ANNUAL WELLNESS EXAM: Primary | ICD-10-CM

## 2025-02-18 DIAGNOSIS — Z98.890 S/P BLEPHAROPLASTY: ICD-10-CM

## 2025-02-18 DIAGNOSIS — H52.223 MYOPIA OF BOTH EYES WITH REGULAR ASTIGMATISM AND PRESBYOPIA: ICD-10-CM

## 2025-02-18 DIAGNOSIS — H52.13 MYOPIA OF BOTH EYES WITH REGULAR ASTIGMATISM AND PRESBYOPIA: ICD-10-CM

## 2025-02-18 DIAGNOSIS — M54.16 LUMBAR RADICULOPATHY: ICD-10-CM

## 2025-02-18 DIAGNOSIS — Z86.69 H/O RD (RETINAL DETACHMENT): ICD-10-CM

## 2025-02-18 DIAGNOSIS — Z96.1 PSEUDOPHAKIA OF BOTH EYES: ICD-10-CM

## 2025-02-18 DIAGNOSIS — J44.9 CHRONIC OBSTRUCTIVE PULMONARY DISEASE, UNSPECIFIED COPD TYPE (H): ICD-10-CM

## 2025-02-18 DIAGNOSIS — G70.00 MYASTHENIA GRAVIS, ACHR ANTIBODY POSITIVE (H): ICD-10-CM

## 2025-02-18 DIAGNOSIS — E11.69 TYPE 2 DIABETES MELLITUS WITH OTHER SPECIFIED COMPLICATION, WITHOUT LONG-TERM CURRENT USE OF INSULIN (H): ICD-10-CM

## 2025-02-18 PROCEDURE — 92015 DETERMINE REFRACTIVE STATE: CPT | Performed by: STUDENT IN AN ORGANIZED HEALTH CARE EDUCATION/TRAINING PROGRAM

## 2025-02-18 PROCEDURE — G0439 PPPS, SUBSEQ VISIT: HCPCS | Performed by: FAMILY MEDICINE

## 2025-02-18 PROCEDURE — 92014 COMPRE OPH EXAM EST PT 1/>: CPT | Performed by: STUDENT IN AN ORGANIZED HEALTH CARE EDUCATION/TRAINING PROGRAM

## 2025-02-18 ASSESSMENT — REFRACTION_MANIFEST
OS_ADD: +2.75
OS_AXIS: 011
OD_CYLINDER: +1.50
OD_ADD: +2.75
OD_SPHERE: -2.25
OS_SPHERE: -2.50
OS_CYLINDER: +2.00
OD_AXIS: 140

## 2025-02-18 ASSESSMENT — REFRACTION_WEARINGRX
OS_CYLINDER: +2.00
OS_AXIS: 002
OS_SPHERE: -1.75
OD_SPHERE: -2.25
OS_ADD: +2.50
OD_CYLINDER: +2.00
SPECS_TYPE: BIFOCAL
OD_AXIS: 130
OD_ADD: +2.50

## 2025-02-18 ASSESSMENT — VISUAL ACUITY
OD_CC: 20/25
CORRECTION_TYPE: GLASSES
METHOD: SNELLEN - LINEAR
OS_CC: 20/40

## 2025-02-18 ASSESSMENT — CONF VISUAL FIELD
OD_INFERIOR_TEMPORAL_RESTRICTION: 0
OS_SUPERIOR_TEMPORAL_RESTRICTION: 0
OD_SUPERIOR_TEMPORAL_RESTRICTION: 0
OS_SUPERIOR_NASAL_RESTRICTION: 0
OD_NORMAL: 1
OD_SUPERIOR_NASAL_RESTRICTION: 0
OD_INFERIOR_NASAL_RESTRICTION: 0
OS_NORMAL: 1
METHOD: COUNTING FINGERS
OS_INFERIOR_NASAL_RESTRICTION: 0
OS_INFERIOR_TEMPORAL_RESTRICTION: 0

## 2025-02-18 ASSESSMENT — EXTERNAL EXAM - RIGHT EYE: OD_EXAM: NORMAL

## 2025-02-18 ASSESSMENT — CUP TO DISC RATIO
OD_RATIO: 0.2
OS_RATIO: 0.25

## 2025-02-18 ASSESSMENT — TONOMETRY
OS_IOP_MMHG: 16
IOP_METHOD: APPLANATION
OD_IOP_MMHG: 15

## 2025-02-18 ASSESSMENT — SLIT LAMP EXAM - LIDS
COMMENTS: 1+ DERMATOCHALASIS
COMMENTS: 1+ DERMATOCHALASIS

## 2025-02-18 ASSESSMENT — EXTERNAL EXAM - LEFT EYE: OS_EXAM: NORMAL

## 2025-02-18 NOTE — LETTER
2/18/2025      Rakan Nolasco  [address] [facility] [address]      Dear Colleague,    Thank you for referring your patient, Rakan Nolasco, to the [facility]. Please see a copy of my visit note below.     Current Eye Medications:  timolol - both eyes BID - last dose: 7am today  Latanoprost - both eyes QHS- last dose: 930pm last night  Artificial tears - both eyes PRN      Subjective:  diabetic, dilated exam - vision left eye is not clear following RD repair OS 6/2024 - (clear to have an updated refraction and YAG cap per exam notes from Dr. Brown 11/21/24). Right eye vision is stable and does not have any concerns. Good with using IOP drops daily.      Next appt with Dr. Brown on 3/20/25.     Has had been intermittent tenderness right eye x 10 days. Also noticing that timolol freire with use right eye only x 10 days. Patient recently had a cold (sneezing, runny nose, no fever, no sore throat) last week and tearing and redness both eyes that has improved since.  No double vision.    Type II DM - oral med controlled.   Lab Results   Component Value Date    A1C 6.2 01/31/2025    A1C 6.2 10/01/2024    A1C 5.9 06/07/2024    A1C 6.3 11/10/2023    A1C 6.5 07/05/2023    A1C 7.1 06/09/2021    A1C 7.5 12/30/2020    A1C 6.8 06/11/2020    A1C 5.6 02/14/2020    A1C 6.4 12/20/2019        Objective:  See Ophthalmology Exam.       Assessment:  Rakan Nolasco is a 79 year old male who presents with:   Encounter Diagnoses   Name Primary?     Type 2 diabetes mellitus without complication, without long-term current use of insulin (H) Negative diabetic retinopathy      Examination of eyes and vision      Borderline glaucoma with ocular hypertension, bilateral Intraocular pressure 15/16 today. Continue same medications.       Left posterior capsular opacification  Recommend YAG cap left eye. Cleared by Dr. Brown.      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     Continue Latanoprost (green top) every evening both eyes     Continue Timolol (yellow top) twice a day in both eyes     Continue artificial tears up to four times a day as needed (Refresh Plus or Refresh Optive or TheraTears. Avoid generic artificial tears or \"get the red out\" drops).      Keep blood sugars and blood pressure under good control.     Mateo Gray MD  [phone]     Patient Education  Diabetes weakens the blood vessels all over the body, including the eyes. Damage to the blood vessels in the eyes can cause swelling or bleeding into part of the eye (called the retina). This is called diabetic retinopathy (KARY-tin--pu-thee). If not treated, this disease can cause vision loss or blindness.   Symptoms may include blurred or distorted vision, but many people have no symptoms. It's important to see your eye doctor regularly to check for problems.   Early treatment and good control can help protect your vision. Here are the things you can do to help prevent vision loss:      1. Keep your blood sugar levels under tight control.      2. Bring high blood pressure under control.      3. No smoking.      4. Have yearly dilated eye exams.          Again, thank you for allowing me to participate in the care of your patient.        Sincerely,        Mateo Gray MD    Electronically signed" Less than or equal to 18 breaths

## 2025-02-18 NOTE — PROGRESS NOTES
Preventive Care Visit  [facility]  Telly Lucio MD, Family Medicine  Feb 18, 2025      Assessment & Plan     Encounter for Medicare annual wellness exam    - In fairly good health, screenings/shots and blood work up to date    Lumbar radiculopathy; with leg pain with standing/walking for long   - causing pain in the legs if stands or walks for long, takes regular rests    Chronic obstructive pulmonary disease, unspecified COPD type (H)   - Flares up occasionally henry with cold weather, albuterol does help    Type 2 diabetes mellitus with other specified complication, without long-term current use of insulin (H)   Well controlled    Morbid obesity (H)   Weight stable    Pedal edema   - Resolving, takes a diuretic       Patient has been advised of split billing requirements and indicates understanding: Yes        Counseling  Appropriate preventive services were addressed with this patient via screening, questionnaire, or discussion as appropriate for fall prevention, nutrition, physical activity, Tobacco-use cessation, social engagement, weight loss and cognition.  Checklist reviewing preventive services available has been given to the patient.  Reviewed patient's diet, addressing concerns and/or questions.   He is at risk for lack of exercise and has been provided with information to increase physical activity for the benefit of his well-being.   Discussed possible causes of fatigue. The patient was provided with written information regarding signs of hearing loss.   Information on urinary incontinence and treatment options given to patient.       See Patient Instructions    Subjective   Rakan is a 79 year old, presenting for the following:  Physical        2/18/2025     1:17 PM   Additional Questions   Roomed by Janice AHUJA CMA   Accompanied by Self         2/18/2025     1:17 PM   Patient Reported Additional Medications   Patient reports taking the following new medications None        HPI    Concerns:  COPD: acts up when cold/on and off: rescue inhaler helps  Legs Pains: are a problem when standing or walking due to sciatica. Has Handicap Permit exp July 25; will do renew then.       Functional capacity: good  Fall risk: None; uses cane because of leg pains from sciatica  Cognition: Good  Mood: Normal  Polypharmacy: Yes  Urinary incontinence: leaks a little from prostate;   Living situation: Lives with his wife in their house  Social support/Financial concerns: None  Goals of care Advance care/preferences: Has a healthcare directive  Hearing: Has hearing aids working well. Ear canal feels large and hearing aid feeling loose      Health Care Directive  Patient has a Health Care Directive on file  Advance care planning document is on file but is outdated.  Patient encouraged to update.      2/17/2025   General Health   How would you rate your overall physical health? (!) FAIR: keeps getting one thing after another    Feel stress (tense, anxious, or unable to sleep) Not at all         2/17/2025   Nutrition   Diet: Low salt    Diabetic       Multiple values from one day are sorted in reverse-chronological order         2/17/2025   Exercise   Days per week of moderate/strenous exercise 1 day: goes to store and walks   Average minutes spent exercising at this level 10 min   (!) EXERCISE CONCERN      2/17/2025   Social Factors   Frequency of gathering with friends or relatives Once a week   Worry food won't last until get money to buy more No   Food not last or not have enough money for food? No   Do you have housing? (Housing is defined as stable permanent housing and does not include staying ouside in a car, in a tent, in an abandoned building, in an overnight shelter, or couch-surfing.) Yes   Are you worried about losing your housing? No   Lack of transportation? No   Unable to get utilities (heat,electricity)? No         2/18/2025   Fall Risk   Gait Speed Test (Document in seconds) 4.96   Gait  Speed Test Interpretation Less than or equal to 5.00 seconds - PASS          2025   Activities of Daily Living- Home Safety   Needs help with the following daily activites None of the above   Safety concerns in the home None of the above         2025   Dental   Dentist two times every year? Yes         2025   Hearing Screening   Hearing concerns? (!) I FEEL THAT PEOPLE ARE MUMBLING OR NOT SPEAKING CLEARLY.    (!) I NEED TO ASK PEOPLE TO SPEAK UP OR REPEAT THEMSELVES.    (!) IT'S HARD TO FOLLOW A CONVERSATION IN A NOISY RESTAURANT OR CROWDED ROOM.    (!) TROUBLE UNDERSTANDING SOFT OR WHISPERED SPEECH.       Multiple values from one day are sorted in reverse-chronological order         2025   Driving Risk Screening   Patient/family members have concerns about driving No         2025   General Alertness/Fatigue Screening   Have you been more tired than usual lately? (!) YES         2025   Urinary Incontinence Screening   Bothered by leaking urine in past 6 months Yes            Today's PHQ-2 Score:       2025    10:38 AM   PHQ-2 (  [employer])   Q1: Little interest or pleasure in doing things 0   Q2: Feeling down, depressed or hopeless 0   PHQ-2 Score 0    Q1: Little interest or pleasure in doing things Not at all   Q2: Feeling down, depressed or hopeless Not at all   PHQ-2 Score 0       Patient-reported           2025   Substance Use   Alcohol more than 3/day or more than 7/wk Not Applicable   Do you have a current opioid prescription? No   How severe/bad is pain from 1 to 10? 1/10   Do you use any other substances recreationally? No     Social History     Tobacco Use    Smoking status: Former     Current packs/day: 0.00     Average packs/day: 2.0 packs/day for 15.0 years (30.0 ttl pk-yrs)     Types: Cigarettes     Start date: 1968     Quit date: 1983     Years since quittin.1     Passive exposure: Past    Smokeless tobacco: Former     Quit date: 1970    Tobacco  comments:     smoke free household.   Vaping Use    Vaping status: Never Used   Substance Use Topics    Alcohol use: Not Currently     Comment: Quit in     Drug use: No       ASCVD Risk   The 10-year ASCVD risk score (Ho SPENCER, et al., 2019) is: 57.6%    Values used to calculate the score:      Age: 79 years      Sex: Male      Is Non- : No      Diabetic: Yes      Tobacco smoker: No      Systolic Blood Pressure: 148 mmHg      Is BP treated: Yes      HDL Cholesterol: 57 mg/dL      Total Cholesterol: 155 mg/dL    Fracture Risk Assessment Tool  Link to Frax Calculator  Use the information below to complete the Frax calculator  : 1945  Sex: male  Weight (kg): 105.7 kg (actual weight)  Height (cm): 177.3 cm  Previous Fragility Fracture:  No  History of parent with fractured hip:  No  Current Smoking:  No  Patient has been on glucocorticoids for more than 3 months (5mg/day or more): No  Rheumatoid Arthritis on Problem List:  No  Secondary Osteoporosis on Problem List:  No  Consumes 3 or more units of alcohol per day: No  Femoral Neck BMD (g/cm2)    Reviewed and updated as needed this visit by Provider                    Patient Active Problem List   Diagnosis    GERD (gastroesophageal reflux disease)    HLD (hyperlipidemia)    Borderline glaucoma with ocular hypertension    Trigeminal neuralgia pain    HTN (hypertension)    Arthritis    Dry eyes    PVD (posterior vitreous detachment) OU    H/O RD (retinal detachment) s/p repair with PPV (AB)    Epiretinal membrane, bilateral    Pseudophakia of right eye    Myasthenia gravis (H)    Morbid obesity (H)    Right posterior capsular opacification    Obstructive sleep apnea syndrome    Prostate cancer (H)    Restless legs syndrome    Temporal arteritis (H)    Type 2 diabetes mellitus with other specified complication, without long-term current use of insulin (H)    Hyponatremia    Combined form of age-related cataract, left eye     Anemia    Osteoarthritis of left knee, unspecified osteoarthritis type    Chronic pain of left knee    Chronic atticoantral suppurative otitis media of left ear    Cholesteatoma, left    Mastoiditis of left side     Past Surgical History:   Procedure Laterality Date    ARTHROPLASTY KNEE Left 4/1/2024    Procedure: ARTHROPLASTY, LEFT KNEE, TOTAL;  Surgeon: Dandre Hassan MD;  Location: [facility]    BIOPSY ARTERY TEMPORAL Bilateral 08/01/2017    Procedure: BIOPSY ARTERY TEMPORAL;  Bilateral temporal artery Biopsy;  Surgeon: Jj Tello MD;  Location: [facility]    CATARACT IOL, RT/LT Right     COLONOSCOPY      ESOPHAGOSCOPY, GASTROSCOPY, DUODENOSCOPY (EGD), COMBINED  01/15/2014    Procedure: COMBINED ESOPHAGOSCOPY, GASTROSCOPY, DUODENOSCOPY (EGD), BIOPSY SINGLE OR MULTIPLE;;  Surgeon: Winston Nixon MD;  Location: [facility]    GRAFT SKIN SPLIT THICKNESS FROM HEAD TO HEAD Left 11/7/2024    Procedure: THRESCH GRAFTING, LEFT ARM SPLIT GRAFTING TO LEFT EAR GRANULATION TISSUE REMOVAL, POST AURICULAR SUTURES REMOVAL;  Surgeon: Shayna Delatorre MD;  Location: [facility] OR    LASER YAG CAPSULOTOMY Right 04/09/2018    right eye    MASTOIDECTOMY Left 10/21/2024    Procedure: OPEN CAVITY OF LEFT MASTOIDECTOMY, MEATOPLASTY;  Surgeon: Shayna Delatorre MD;  Location: [facility] OR    PHACOEMULSIFICATION CLEAR CORNEA WITH STANDARD INTRAOCULAR LENS IMPLANT Right 02/20/2017    Procedure: PHACOEMULSIFICATION CLEAR CORNEA WITH STANDARD INTRAOCULAR LENS IMPLANT;  Surgeon: Mateo Gray MD;  Location: [facility]    PHACOEMULSIFICATION CLEAR CORNEA WITH STANDARD INTRAOCULAR LENS IMPLANT Left 01/10/2022    Procedure: LEFT PHACOEMULSIFICATION, CATARACT, WITH INTRAOCULAR LENS IMPLANT;  Surgeon: Mateo Gray MD;  Location: [facility]    MD TRABECULOPLASTY BY LASER SURGERY      RETINAL REATTACHMENT Right     SURGICAL HISTORY OF -   08/2009    Both Eyelids-.    TONSILLECTOMY  as child       Social History     Tobacco Use     Smoking status: Former     Current packs/day: 0.00     Average packs/day: 2.0 packs/day for 15.0 years (30.0 ttl pk-yrs)     Types: Cigarettes     Start date: 1968     Quit date: 1983     Years since quittin.1     Passive exposure: Past    Smokeless tobacco: Former     Quit date: 1970    Tobacco comments:     smoke free household.   Substance Use Topics    Alcohol use: Not Currently     Comment: Quit in      Family History   Problem Relation Age of Onset    Respiratory Mother         copd    LUNG DISEASE Mother     Cerebrovascular Disease Father     Cancer Father     Other Cancer Father     Allergies Brother     Cancer Maternal Grandmother     Heart Disease Paternal Grandmother     Glaucoma Maternal Aunt     Macular Degeneration No family hx of     Anesthesia Reaction No family hx of     Deep Vein Thrombosis (DVT) No family hx of          Current providers sharing in care for this patient include:  Patient Care Team:  Telly Lucio MD as PCP - General (Family Practice)  Arpan Harrison MD as Assigned Neuroscience Provider  Servando Mejia MD as Assigned Pulmonology Provider  Telly Lucio MD as Assigned PCP  No Ref-Primary, Physician  Dandre Hassan MD as Assigned Musculoskeletal Provider  Macey Sherman AuD as Audiologist (Audiology)  Shayna Delatorre MD as MD (Otolaryngology)  Claudia Leiva MD as MD (Cardiovascular Disease)  Magnolia Castillo APRN CNP as Assigned Heart and Vascular Provider  Shayna Delatorre MD as Assigned Surgical Provider    The following health maintenance items are reviewed in Epic and correct as of today:  Health Maintenance   Topic Date Due    Medicare Annual MTM Pharmacist Visit (once per calendar year)  2025    MEDICARE ANNUAL WELLNESS VISIT  2025    COVID-19 Vaccine ( season) 2025    A1C  2025    MICROALBUMIN  2025    BMP  2025    LIPID  10/01/2025    DIABETIC FOOT EXAM   "01/31/2026    ANNUAL REVIEW OF HM ORDERS  02/04/2026    EYE EXAM  02/18/2026    FALL RISK ASSESSMENT  02/18/2026    ADVANCE CARE PLANNING  02/18/2030    DTAP/TDAP/TD IMMUNIZATION (3 - Td or Tdap) 01/14/2034    SPIROMETRY  Completed    HEPATITIS C SCREENING  Completed    COPD ACTION PLAN  Completed    PHQ-2 (once per calendar year)  Completed    INFLUENZA VACCINE  Completed    Pneumococcal Vaccine: 50+ Years  Completed    ZOSTER IMMUNIZATION  Completed    RSV VACCINE  Completed    HPV IMMUNIZATION  Aged Out    MENINGITIS IMMUNIZATION  Aged Out    COLORECTAL CANCER SCREENING  Discontinued         Review of Systems  Constitutional, neuro, ENT, endocrine, pulmonary, cardiac, gastrointestinal, genitourinary, integument and psychiatric systems are negative, except as otherwise noted.     Objective    Exam  BP (!) 148/71 (BP Location: Right arm, Patient Position: Sitting, Cuff Size: Adult Regular)   Pulse 64   Temp 97.9  F (36.6  C) (Oral)   Resp 16   Ht 1.773 m (5' 9.8\")   Wt 105.7 kg (233 lb)   SpO2 96%   BMI 33.62 kg/m     Estimated body mass index is 33.62 kg/m  as calculated from the following:    Height as of this encounter: 1.773 m (5' 9.8\").    Weight as of this encounter: 105.7 kg (233 lb).    Physical Exam  GENERAL: alert and no distress  EYES: Eyes grossly normal to inspection, conjunctivae and sclerae normal  HENT: ear canals and TM's normal, nose and mouth without ulcers or lesions  NECK: no adenopathy, no asymmetry, masses, or scars  RESP: lungs clear to auscultation - no rales, rhonchi or wheezes  CV: regular rate and rhythm, no murmur, click or rub, no peripheral edema  ABDOMEN: soft, nontender, obese, no masses and bowel sounds normal  MS: no gross musculoskeletal defects noted, no edema  SKIN: no suspicious lesions or rashes  NEURO: Normal strength and tone, mentation intact and speech normal  PSYCH: mentation appears normal, affect normal/bright        2/18/2025   Mini Cog   Clock Draw Score 2 " Normal   3 Item Recall 3 objects recalled   Mini Cog Total Score 5       Signed Electronically by: Telly Lucio MD

## 2025-02-18 NOTE — PROGRESS NOTES
Current Eye Medications:  timolol - both eyes BID - last dose: 7am today  Latanoprost - both eyes QHS- last dose: 930pm last night  Artificial tears - both eyes PRN      Subjective:  diabetic, dilated exam - vision left eye is not clear following RD repair OS 6/2024 - (clear to have an updated refraction and YAG cap per exam notes from Dr. Brown 11/21/24). Right eye vision is stable and does not have any concerns. Good with using IOP drops daily.      Next appt with Dr. Brown on 3/20/25.     Has had been intermittent tenderness right eye x 10 days. Also noticing that timolol freire with use right eye only x 10 days. Patient recently had a cold (sneezing, runny nose, no fever, no sore throat) last week and tearing and redness both eyes that has improved since.  No double vision.    Type II DM - oral med controlled.   Lab Results   Component Value Date    A1C 6.2 01/31/2025    A1C 6.2 10/01/2024    A1C 5.9 06/07/2024    A1C 6.3 11/10/2023    A1C 6.5 07/05/2023    A1C 7.1 06/09/2021    A1C 7.5 12/30/2020    A1C 6.8 06/11/2020    A1C 5.6 02/14/2020    A1C 6.4 12/20/2019        Objective:  See Ophthalmology Exam.       Assessment:  Rakan Nolasco is a 79 year old male who presents with:   Encounter Diagnoses   Name Primary?    Type 2 diabetes mellitus without complication, without long-term current use of insulin (H) Negative diabetic retinopathy     Examination of eyes and vision     Borderline glaucoma with ocular hypertension, bilateral Intraocular pressure 15/16 today. Continue same medications.      Left posterior capsular opacification  Recommend YAG cap left eye. Cleared by Dr. Brown.     Pseudophakia of both eyes s/p YAG OD     Myasthenia gravis, AChR antibody positive (H)     H/O RD (retinal detachment) OU s/p repair with PPV (AB)     S/P blepharoplasty     Dry eyes, bilateral     Myopia of both eyes with regular astigmatism and presbyopia        Plan:  Recommend YAG capsulotomy (laser to help clear the  "vision) in the left eye at your convenience.     Continue Latanoprost (green top) every evening both eyes     Continue Timolol (yellow top) twice a day in both eyes     Continue artificial tears up to four times a day as needed (Refresh Plus or Refresh Optive or TheraTears. Avoid generic artificial tears or \"get the red out\" drops).      Keep blood sugars and blood pressure under good control.     Mateo Gray MD  [phone]     Patient Education  Diabetes weakens the blood vessels all over the body, including the eyes. Damage to the blood vessels in the eyes can cause swelling or bleeding into part of the eye (called the retina). This is called diabetic retinopathy (KARY-tin-AH-puh-thee). If not treated, this disease can cause vision loss or blindness.   Symptoms may include blurred or distorted vision, but many people have no symptoms. It's important to see your eye doctor regularly to check for problems.   Early treatment and good control can help protect your vision. Here are the things you can do to help prevent vision loss:      1. Keep your blood sugar levels under tight control.      2. Bring high blood pressure under control.      3. No smoking.      4. Have yearly dilated eye exams.        "

## 2025-02-18 NOTE — PATIENT INSTRUCTIONS
"Recommend YAG capsulotomy (laser to help clear the vision) in the left eye at your convenience.     Continue Latanoprost (green top) every evening both eyes     Continue Timolol (yellow top) twice a day in both eyes     Continue artificial tears up to four times a day as needed (Refresh Plus or Refresh Optive or TheraTears. Avoid generic artificial tears or \"get the red out\" drops).      Keep blood sugars and blood pressure under good control.     Mateo Gray MD  [phone]     Patient Education   Diabetes weakens the blood vessels all over the body, including the eyes. Damage to the blood vessels in the eyes can cause swelling or bleeding into part of the eye (called the retina). This is called diabetic retinopathy (KARY-tin--pu-thee). If not treated, this disease can cause vision loss or blindness.   Symptoms may include blurred or distorted vision, but many people have no symptoms. It's important to see your eye doctor regularly to check for problems.   Early treatment and good control can help protect your vision. Here are the things you can do to help prevent vision loss:      1. Keep your blood sugar levels under tight control.      2. Bring high blood pressure under control.      3. No smoking.      4. Have yearly dilated eye exams.      "

## 2025-02-18 NOTE — PATIENT INSTRUCTIONS
Patient Education   Preventive Care Advice   This is general advice given by our system to help you stay healthy. However, your care team may have specific advice just for you. Please talk to your care team about your preventive care needs.  Nutrition  Eat 5 or more servings of fruits and vegetables each day.  Try wheat bread, brown rice and whole grain pasta (instead of white bread, rice, and pasta).  Get enough calcium and vitamin D. Check the label on foods and aim for 100% of the RDA (recommended daily allowance).  Lifestyle  Exercise at least 150 minutes each week  (30 minutes a day, 5 days a week).  Do muscle strengthening activities 2 days a week. These help control your weight and prevent disease.  No smoking.  Wear sunscreen to prevent skin cancer.  Have a dental exam and cleaning every 6 months.  Yearly exams  See your health care team every year to talk about:  Any changes in your health.  Any medicines your care team has prescribed.  Preventive care, family planning, and ways to prevent chronic diseases.  Shots (vaccines)   HPV shots (up to age 26), if you've never had them before.  Hepatitis B shots (up to age 59), if you've never had them before.  COVID-19 shot: Get this shot when it's due.  Flu shot: Get a flu shot every year.  Tetanus shot: Get a tetanus shot every 10 years.  Pneumococcal, hepatitis A, and RSV shots: Ask your care team if you need these based on your risk.  Shingles shot (for age 50 and up)  General health tests  Diabetes screening:  Starting at age 35, Get screened for diabetes at least every 3 years.  If you are younger than age 35, ask your care team if you should be screened for diabetes.  Cholesterol test: At age 39, start having a cholesterol test every 5 years, or more often if advised.  Bone density scan (DEXA): At age 50, ask your care team if you should have this scan for osteoporosis (brittle bones).  Hepatitis C: Get tested at least once in your life.  STIs (sexually  transmitted infections)  Before age 24: Ask your care team if you should be screened for STIs.  After age 24: Get screened for STIs if you're at risk. You are at risk for STIs (including HIV) if:  You are sexually active with more than one person.  You don't use condoms every time.  You or a partner was diagnosed with a sexually transmitted infection.  If you are at risk for HIV, ask about PrEP medicine to prevent HIV.  Get tested for HIV at least once in your life, whether you are at risk for HIV or not.  Cancer screening tests  Cervical cancer screening: If you have a cervix, begin getting regular cervical cancer screening tests starting at age 21.  Breast cancer scan (mammogram): If you've ever had breasts, begin having regular mammograms starting at age 40. This is a scan to check for breast cancer.  Colon cancer screening: It is important to start screening for colon cancer at age 45.  Have a colonoscopy test every 10 years (or more often if you're at risk) Or, ask your provider about stool tests like a FIT test every year or Cologuard test every 3 years.  To learn more about your testing options, visit:   .  For help making a decision, visit:   https://bit.ly/ql91098.  Prostate cancer screening test: If you have a prostate, ask your care team if a prostate cancer screening test (PSA) at age 55 is right for you.  Lung cancer screening: If you are a current or former smoker ages 50 to 80, ask your care team if ongoing lung cancer screenings are right for you.  For informational purposes only. Not to replace the advice of your health care provider. Copyright   2023 [facility]. All rights reserved. Clinically reviewed by the [facility] Transitions Program. [employer] [id number] - REV 01/24.  Preventing Falls: Care Instructions  Injuries and health problems such as trouble walking or poor eyesight can increase your risk of falling. So can some medicines. But there are things you can do to help  "prevent falls. You can exercise to get stronger. You can also arrange your home to make it safer.    Talk to your doctor about the medicines you take. Ask if any of them increase the risk of falls and whether they can be changed or stopped.   Try to exercise regularly. It can help improve your strength and balance. This can help lower your risk of falling.         Practice fall safety and prevention.   Wear low-heeled shoes that fit well and give your feet good support. Talk to your doctor if you have foot problems that make this hard.  Carry a cellphone or wear a medical alert device that you can use to call for help.  Use stepladders instead of chairs to reach high objects. Don't climb if you're at risk for falls. Ask for help, if needed.  Wear the correct eyeglasses, if you need them.        Make your home safer.   Remove rugs, cords, clutter, and furniture from walkways.  Keep your house well lit. Use night-lights in hallways and bathrooms.  Install and use sturdy handrails on stairways.  Wear nonskid footwear, even inside. Don't walk barefoot or in socks without shoes.        Be safe outside.   Use handrails, curb cuts, and ramps whenever possible.  Keep your hands free by using a shoulder bag or backpack.  Try to walk in well-lit areas. Watch out for uneven ground, changes in pavement, and debris.  Be careful in the winter. Walk on the grass or gravel when sidewalks are slippery. Use de-icer on steps and walkways. Add non-slip devices to shoes.    Put grab bars and nonskid mats in your shower or tub and near the toilet. Try to use a shower chair or bath bench when bathing.   Get into a tub or shower by putting in your weaker leg first. Get out with your strong side first. Have a phone or medical alert device in the bathroom with you.   Where can you learn more?  Go to https://www.Comecerwise.net/patiented  Enter G117 in the search box to learn more about \"Preventing Falls: Care Instructions.\"  Current as of: " July 31, 2024  Content Version: 14.3    2024 [employer].   Care instructions adapted under license by your healthcare professional. If you have questions about a medical condition or this instruction, always ask your healthcare professional. [employer] disclaims any warranty or liability for your use of this information.    Hearing Loss: Care Instructions  Overview     Hearing loss is a sudden or slow decrease in how well you hear. It can range from slight to profound. Permanent hearing loss can occur with aging. It also can happen when you are exposed long-term to loud noise. Examples include listening to loud music, riding motorcycles, or being around other loud machines.  Hearing loss can affect your work and home life. It can make you feel lonely or depressed. You may feel that you have lost your independence. But hearing aids and other devices can help you hear better and feel connected to others.  Follow-up care is a key part of your treatment and safety. Be sure to make and go to all appointments, and call your doctor if you are having problems. It's also a good idea to know your test results and keep a list of the medicines you take.  How can you care for yourself at home?  Avoid loud noises whenever possible. This helps keep your hearing from getting worse.  Always wear hearing protection around loud noises.  Wear a hearing aid as directed.  A professional can help you pick a hearing aid that will work best for you.  You can also get hearing aids over the counter for mild to moderate hearing loss.  Have hearing tests as your doctor suggests. They can show whether your hearing has changed. Your hearing aid may need to be adjusted.  Use other devices as needed. These may include:  Telephone amplifiers and hearing aids that can connect to a television, stereo, radio, or microphone.  Devices that use lights or vibrations. These alert you to the doorbell, a ringing telephone, or a baby  "monitor.  Television closed-captioning. This shows the words at the bottom of the screen. Most new TVs can do this.  TTY (text telephone). This lets you type messages back and forth on the telephone instead of talking or listening. These devices are also called TDD. When messages are typed on the keyboard, they are sent over the phone line to a receiving TTY. The message is shown on a monitor.  Use text messaging, social media, and email if it is hard for you to communicate by telephone.  Try to learn a listening technique called speechreading. It is not lipreading. You pay attention to people's gestures, expressions, posture, and tone of voice. These clues can help you understand what a person is saying. Face the person you are talking to, and have them face you. Make sure the lighting is good. You need to see the other person's face clearly.  Think about counseling if you need help to adjust to your hearing loss.  When should you call for help?  Watch closely for changes in your health, and be sure to contact your doctor if:    You think your hearing is getting worse.     You have new symptoms, such as dizziness or nausea.   Where can you learn more?  Go to https://www.Sand 9.net/patiented  Enter R798 in the search box to learn more about \"Hearing Loss: Care Instructions.\"  Current as of: September 27, 2023  Content Version: 14.3    2024 [employer].   Care instructions adapted under license by your healthcare professional. If you have questions about a medical condition or this instruction, always ask your healthcare professional. [employer] disclaims any warranty or liability for your use of this information.    Learning About Sleeping Well  What does sleeping well mean?     Sleeping well means getting enough sleep to feel good and stay healthy. How much sleep is enough varies among people.  The number of hours you sleep and how you feel when you wake up are both important. If you do " not feel refreshed, you probably need more sleep. Another sign of not getting enough sleep is feeling tired during the day.  Experts recommend that adults get at least 7 or more hours of sleep per day. Children and older adults need more sleep.  Why is getting enough sleep important?  Getting enough quality sleep is a basic part of good health. When your sleep suffers, your physical health, mood, and your thoughts can suffer too. You may find yourself feeling more grumpy or stressed. Not getting enough sleep also can lead to serious problems, including injury, accidents, anxiety, and depression.  What might cause poor sleeping?  Many things can cause sleep problems, including:  Changes to your sleep schedule.  Stress. Stress can be caused by fear about a single event, such as giving a speech. Or you may have ongoing stress, such as worry about work or school.  Depression, anxiety, and other mental or emotional conditions.  Changes in your sleep habits or surroundings. This includes changes that happen where you sleep, such as noise, light, or sleeping in a different bed. It also includes changes in your sleep pattern, such as having jet lag or working a late shift.  Health problems, such as pain, breathing problems, and restless legs syndrome.  Lack of regular exercise.  Using alcohol, nicotine, or caffeine before bed.  How can you help yourself?  Here are some tips that may help you sleep more soundly and wake up feeling more refreshed.  Your sleeping area   Use your bedroom only for sleeping and sex. A bit of light reading may help you fall asleep. But if it doesn't, do your reading elsewhere in the house. Try not to use your TV, computer, smartphone, or tablet while you are in bed.  Be sure your bed is big enough to stretch out comfortably, especially if you have a sleep partner.  Keep your bedroom quiet, dark, and cool. Use curtains, blinds, or a sleep mask to block out light. To block out noise, use earplugs,  "soothing music, or a \"white noise\" machine.  Your evening and bedtime routine   Create a relaxing bedtime routine. You might want to take a warm shower or bath, or listen to soothing music.  Go to bed at the same time every night. And get up at the same time every morning, even if you feel tired.  What to avoid   Limit caffeine (coffee, tea, caffeinated sodas) during the day, and don't have any for at least 6 hours before bedtime.  Avoid drinking alcohol before bedtime. Alcohol can cause you to wake up more often during the night.  Try not to smoke or use tobacco, especially in the evening. Nicotine can keep you awake.  Limit naps during the day, especially close to bedtime.  Avoid lying in bed awake for too long. If you can't fall asleep or if you wake up in the middle of the night and can't get back to sleep within about 20 minutes, get out of bed and go to another room until you feel sleepy.  Avoid taking medicine right before bed that may keep you awake or make you feel hyper or energized. Your doctor can tell you if your medicine may do this and if you can take it earlier in the day.  If you can't sleep   Imagine yourself in a peaceful, pleasant scene. Focus on the details and feelings of being in a place that is relaxing.  Get up and do a quiet or boring activity until you feel sleepy.  Avoid drinking any liquids before going to bed to help prevent waking up often to use the bathroom.  Where can you learn more?  Go to https://www.Quotations Book.net/patiented  Enter J942 in the search box to learn more about \"Learning About Sleeping Well.\"  Current as of: July 31, 2024  Content Version: 14.3    2024 [email or web address].   Care instructions adapted under license by your healthcare professional. If you have questions about a medical condition or this instruction, always ask your healthcare professional. [email or web address] disclaims any warranty or liability for your use of this information.    Bladder " Training: Care Instructions  Your Care Instructions     Bladder training is used to treat urge incontinence and stress incontinence. Urge incontinence means that the need to urinate comes on so fast that you can't get to a toilet in time. Stress incontinence means that you leak urine because of pressure on your bladder. For example, it may happen when you laugh, cough, or lift something heavy.  Bladder training can increase how long you can wait before you have to urinate. It can also help your bladder hold more urine. And it can give you better control over the urge to urinate.  It is important to remember that bladder training takes a few weeks to a few months to make a difference. You may not see results right away, but don't give up.  Follow-up care is a key part of your treatment and safety. Be sure to make and go to all appointments, and call your doctor if you are having problems. It's also a good idea to know your test results and keep a list of the medicines you take.  How can you care for yourself at home?  Work with your doctor to come up with a bladder training program that is right for you. You may use one or more of the following methods.  Delayed urination  In the beginning, try to keep from urinating for 5 minutes after you first feel the need to go.  While you wait, take deep, slow breaths to relax. Kegel exercises can also help you delay the need to go to the bathroom.  After some practice, when you can easily wait 5 minutes to urinate, try to wait 10 minutes before you urinate.  Slowly increase the waiting period until you are able to control when you have to urinate.  Scheduled urination  Empty your bladder when you first wake up in the morning.  Schedule times throughout the day when you will urinate.  Start by going to the bathroom every hour, even if you don't need to go.  Slowly increase the time between trips to the bathroom.  When you have found a schedule that works well for you, keep  "doing it.  If you wake up during the night and have to urinate, do it. Apply your schedule to waking hours only.  Kegel exercises  These tighten and strengthen pelvic muscles, which can help you control the flow of urine. (If doing these exercises causes pain, stop doing them and talk with your doctor.) To do Kegel exercises:  Squeeze your muscles as if you were trying not to pass gas. Or squeeze your muscles as if you were stopping the flow of urine. Your belly, legs, and buttocks shouldn't move.  Hold the squeeze for 3 seconds, then relax for 5 to 10 seconds.  Start with 3 seconds, then add 1 second each week until you are able to squeeze for 10 seconds.  Repeat the exercise 10 times a session. Do 3 to 8 sessions a day.  When should you call for help?  Watch closely for changes in your health, and be sure to contact your doctor if:    Your incontinence is getting worse.     You do not get better as expected.   Where can you learn more?  Go to https://www.Streem.net/patiented  Enter V684 in the search box to learn more about \"Bladder Training: Care Instructions.\"  Current as of: April 30, 2024  Content Version: 14.3    2024 [employer].   Care instructions adapted under license by your healthcare professional. If you have questions about a medical condition or this instruction, always ask your healthcare professional. [employer] disclaims any warranty or liability for your use of this information.       "

## 2025-02-19 RX ORDER — FUROSEMIDE 20 MG/1
TABLET ORAL
Qty: 180 TABLET | Refills: 0 | Status: SHIPPED | OUTPATIENT
Start: 2025-02-19

## 2025-03-02 DIAGNOSIS — I10 HTN, GOAL BELOW 140/90: ICD-10-CM

## 2025-03-02 DIAGNOSIS — E61.1 IRON DEFICIENCY: ICD-10-CM

## 2025-03-02 DIAGNOSIS — G50.0 TRIGEMINAL NEURALGIA: ICD-10-CM

## 2025-03-02 RX ORDER — AMLODIPINE BESYLATE 10 MG/1
10 TABLET ORAL DAILY
Qty: 90 TABLET | Refills: 1 | Status: SHIPPED | OUTPATIENT
Start: 2025-03-02

## 2025-03-02 RX ORDER — FERROUS SULFATE 325(65) MG
325 TABLET ORAL
Qty: 90 TABLET | Refills: 1 | Status: SHIPPED | OUTPATIENT
Start: 2025-03-02

## 2025-03-03 RX ORDER — OXCARBAZEPINE 300 MG/1
TABLET, FILM COATED ORAL
Qty: 200 TABLET | Refills: 5 | OUTPATIENT
Start: 2025-03-03

## 2025-03-10 ENCOUNTER — OFFICE VISIT (OUTPATIENT)
Dept: FAMILY MEDICINE | Facility: CLINIC | Age: 80
End: 2025-03-10
Payer: COMMERCIAL

## 2025-03-10 VITALS
RESPIRATION RATE: 20 BRPM | OXYGEN SATURATION: 98 % | TEMPERATURE: 97.7 F | DIASTOLIC BLOOD PRESSURE: 66 MMHG | BODY MASS INDEX: 33.24 KG/M2 | HEART RATE: 86 BPM | HEIGHT: 70 IN | SYSTOLIC BLOOD PRESSURE: 137 MMHG | WEIGHT: 232.2 LBS

## 2025-03-10 DIAGNOSIS — B96.89 ACUTE BACTERIAL RHINOSINUSITIS: Primary | ICD-10-CM

## 2025-03-10 DIAGNOSIS — J01.90 ACUTE BACTERIAL RHINOSINUSITIS: Primary | ICD-10-CM

## 2025-03-10 PROCEDURE — 3075F SYST BP GE 130 - 139MM HG: CPT

## 2025-03-10 PROCEDURE — 99214 OFFICE O/P EST MOD 30 MIN: CPT

## 2025-03-10 PROCEDURE — 3078F DIAST BP <80 MM HG: CPT

## 2025-03-10 PROCEDURE — G2211 COMPLEX E/M VISIT ADD ON: HCPCS

## 2025-03-10 RX ORDER — CEFDINIR 300 MG/1
300 CAPSULE ORAL 2 TIMES DAILY
Qty: 10 CAPSULE | Refills: 0 | Status: SHIPPED | OUTPATIENT
Start: 2025-03-10 | End: 2025-03-15

## 2025-03-10 RX ORDER — CETIRIZINE HYDROCHLORIDE 10 MG/1
10 TABLET ORAL DAILY
Qty: 30 TABLET | Refills: 0 | Status: SHIPPED | OUTPATIENT
Start: 2025-03-10

## 2025-03-10 NOTE — PROGRESS NOTES
Assessment & Plan     Acute bacterial rhinosinusitis  - cefdinir (OMNICEF) 300 MG capsule; Take 1 capsule (300 mg) by mouth 2 times daily for 5 days.  - cetirizine (ZYRTEC) 10 MG tablet; Take 1 tablet (10 mg) by mouth daily.    Assessment & Plan  Sinusitis  Persistent symptoms for 4 weeks including runny nose, sneezing, fatigue, headache, and congestion. No sinus tenderness on examination, but erythema noted in the oral cavity. No fever or breathing difficulties reported.  -Start antibiotic therapy to treat potential bacterial sinus infection.    Diarrhea  Two episodes of diarrhea reported in the context of a month-long illness. No abdominal pain reported.  -Monitor symptoms; if diarrhea persists or worsens, seek medical attention.    Follow-up  If symptoms do not improve within a week or worsen at any time, return for further evaluation.    I am utilizing AI dictation through [employer] to document the patient's history and physical examination. Please note that while the AI is designed to assist in capturing detailed information, there may be errors in the dictation. I will review and edit the content for accuracy before finalizing.          Subjective   Rakan is a 79 year old, presenting for the following health issues:  Sick (Ongoing symptoms for 4 weeks. AT HOME COVID TEST - YESTERDAY /Watery eyes, sneezing, runny nose, tired, headache, hest congestion, cough as of late, plugged nose, tongue coating )    History of Present Illness  The patient is a 79 year old who presents with persistent upper respiratory symptoms for four weeks.    He has been experiencing upper respiratory symptoms for four weeks, initially resembling allergies with a runny nose and frequent sneezing. The symptoms have been fluctuating, with periods of improvement followed by worsening. He describes a persistent runny nose, fatigue requiring two naps a day, and occasional mild headaches. Recently, he experienced congestion with a mild cough and  "noted difficulty using his CPAP at night due to nasal obstruction. He finds some relief by sitting in a recliner and using Nyquil. + sinus pressure no pain.    He reports difficulty using his CPAP at night due to nasal obstruction, which has been exacerbated by his current symptoms. Denies - SOB, CP, dizz, NV, ear pain/muffling    He reports a couple of instances of diarrhea, describing his stools as generally loose but noting two episodes of diarrhea recently. No abdominal pain.    He took a COVID test at home yesterday, which was negative. No known sick exposures.               3/10/2025     2:30 PM   Additional Questions   Roomed by susan     History of Present Illness       Headaches:   Since the patient's last clinic visit, headaches are: no change  The patient is getting headaches:  Once a day  He is able to do normal daily activities when he has a migraine.  The patient is taking the following rescue/relief medications:  Other   Patient states \"I get some relief\" from the rescue/relief medications.   The patient is taking the following medications to prevent migraines:  No medications to prevent migraines  In the past 4 weeks, the patient has gone to an Urgent Care or Emergency Room 0 times times due to headaches.    Reason for visit:  Cold  Symptom onset:  3-4 weeks ago  Symptom intensity:  Moderate  Symptom progression:  Staying the same  Had these symptoms before:  Yes  Has tried/received treatment for these symptoms:  No  What makes it worse:  No  What makes it better:  Cold medicine He is missing 1 dose(s) of medications per week.  He is not taking prescribed medications regularly due to remembering to take.                      Objective    /66   Pulse 86   Temp 97.7  F (36.5  C) (Temporal)   Resp 20   Ht 1.778 m (5' 10\")   Wt 105.3 kg (232 lb 3.2 oz)   SpO2 98%   BMI 33.32 kg/m    Body mass index is 33.32 kg/m .  Physical Exam   GENERAL: alert and no distress  NECK: no adenopathy, no " asymmetry, masses, or scars  HEENT: Clear effusion to TM bilat. Erythema and clear discharge to oropharynx. No tonsillar exudate noted  RESP: lungs clear to auscultation - no rales, rhonchi or wheezes  CV: regular rate and rhythm, normal S1 S2, no S3 or S4, no murmur, click or rub, no peripheral edema  ABDOMEN: soft, nontender, no hepatosplenomegaly, no masses and bowel sounds normal  MS: no gross musculoskeletal defects noted, no edema            Signed Electronically by: JACK Carey CNP  The longitudinal plan of care for the diagnosis(es)/condition(s) as documented were addressed during this visit. Due to the added complexity in care, I will continue to support Rakan in the subsequent management and with ongoing continuity of care.

## 2025-03-18 DIAGNOSIS — K21.9 GASTROESOPHAGEAL REFLUX DISEASE WITHOUT ESOPHAGITIS: ICD-10-CM

## 2025-03-18 RX ORDER — PANTOPRAZOLE SODIUM 20 MG/1
20 TABLET, DELAYED RELEASE ORAL DAILY
Qty: 90 TABLET | Refills: 2 | Status: SHIPPED | OUTPATIENT
Start: 2025-03-18

## 2025-03-24 DIAGNOSIS — G50.0 TRIGEMINAL NEURALGIA: ICD-10-CM

## 2025-03-25 PROBLEM — G89.29 CHRONIC PAIN OF LEFT KNEE: Status: RESOLVED | Noted: 2024-04-04 | Resolved: 2025-03-25

## 2025-03-25 PROBLEM — M25.562 CHRONIC PAIN OF LEFT KNEE: Status: RESOLVED | Noted: 2024-04-04 | Resolved: 2025-03-25

## 2025-03-25 RX ORDER — OXCARBAZEPINE 300 MG/1
TABLET, FILM COATED ORAL
Qty: 180 TABLET | Refills: 2 | Status: SHIPPED | OUTPATIENT
Start: 2025-03-25

## 2025-03-27 ENCOUNTER — TELEPHONE (OUTPATIENT)
Dept: ORTHOPEDICS | Facility: CLINIC | Age: 80
End: 2025-03-27
Payer: COMMERCIAL

## 2025-03-27 NOTE — TELEPHONE ENCOUNTER
Other: Pt wants to know how long he should be taking pre medication before dental treatment      [facility] this [facility] or would you prefer to receive a phone call?:   Patient would prefer a phone call   Okay to leave a detailed message?: Yes at Cell number on file:    Telephone Information:   Mobile [phone]

## 2025-03-27 NOTE — TELEPHONE ENCOUNTER
Called and spoke with Pt to inform him that we suggest using Abx for 2 years following his date of surgery.

## 2025-03-30 ENCOUNTER — HEALTH MAINTENANCE LETTER (OUTPATIENT)
Age: 80
End: 2025-03-30

## 2025-03-31 DIAGNOSIS — G70.00 OCULAR MYASTHENIA (H): ICD-10-CM

## 2025-03-31 RX ORDER — PYRIDOSTIGMINE BROMIDE 60 MG/1
TABLET ORAL
Qty: 450 TABLET | Refills: 0 | Status: SHIPPED | OUTPATIENT
Start: 2025-03-31

## 2025-04-01 DIAGNOSIS — J01.90 ACUTE BACTERIAL RHINOSINUSITIS: ICD-10-CM

## 2025-04-01 DIAGNOSIS — B96.89 ACUTE BACTERIAL RHINOSINUSITIS: ICD-10-CM

## 2025-04-01 RX ORDER — CETIRIZINE HYDROCHLORIDE 10 MG/1
10 TABLET ORAL DAILY
Qty: 90 TABLET | Refills: 1 | Status: SHIPPED | OUTPATIENT
Start: 2025-04-01

## 2025-04-11 ENCOUNTER — TRANSFERRED RECORDS (OUTPATIENT)
Dept: HEALTH INFORMATION MANAGEMENT | Facility: CLINIC | Age: 80
End: 2025-04-11

## 2025-04-23 ENCOUNTER — VIRTUAL VISIT (OUTPATIENT)
Dept: PHARMACY | Facility: CLINIC | Age: 80
End: 2025-04-23
Payer: COMMERCIAL

## 2025-04-23 DIAGNOSIS — E87.1 HYPONATREMIA: ICD-10-CM

## 2025-04-23 DIAGNOSIS — T78.40XA ALLERGY, INITIAL ENCOUNTER: ICD-10-CM

## 2025-04-23 DIAGNOSIS — E11.69 TYPE 2 DIABETES MELLITUS WITH OTHER SPECIFIED COMPLICATION, WITHOUT LONG-TERM CURRENT USE OF INSULIN (H): ICD-10-CM

## 2025-04-23 DIAGNOSIS — H40.053 BORDERLINE GLAUCOMA OF BOTH EYES WITH OCULAR HYPERTENSION: ICD-10-CM

## 2025-04-23 DIAGNOSIS — I10 HYPERTENSION, UNSPECIFIED TYPE: ICD-10-CM

## 2025-04-23 DIAGNOSIS — G50.0 TRIGEMINAL NEURALGIA PAIN: ICD-10-CM

## 2025-04-23 DIAGNOSIS — R60.0 BILATERAL LOWER EXTREMITY EDEMA: ICD-10-CM

## 2025-04-23 DIAGNOSIS — G25.81 RESTLESS LEGS SYNDROME: ICD-10-CM

## 2025-04-23 DIAGNOSIS — G70.00 MYASTHENIA GRAVIS (H): ICD-10-CM

## 2025-04-23 DIAGNOSIS — J44.9 COPD (CHRONIC OBSTRUCTIVE PULMONARY DISEASE) (H): ICD-10-CM

## 2025-04-23 DIAGNOSIS — K21.9 GASTROESOPHAGEAL REFLUX DISEASE, UNSPECIFIED WHETHER ESOPHAGITIS PRESENT: ICD-10-CM

## 2025-04-23 DIAGNOSIS — E61.1 IRON DEFICIENCY: ICD-10-CM

## 2025-04-23 DIAGNOSIS — E78.5 HYPERLIPIDEMIA, UNSPECIFIED HYPERLIPIDEMIA TYPE: Primary | ICD-10-CM

## 2025-04-23 PROCEDURE — 99605 MTMS BY PHARM NP 15 MIN: CPT | Mod: 93 | Performed by: PHARMACIST

## 2025-04-23 NOTE — LETTER
April 23, 2025  Rakan Cottoeman  [address] [id number]    Dear Mr. Nolasco, [facility] [place]STINA     Thank you for talking with me on Apr 23, 2025 about your health and medications. As a follow-up to our conversation, I have included two documents:      Your Recommended To-Do List has steps you should take to get the best results from your medications.  Your Medication List will help you keep track of your medications and how to take them.    If you want to talk about these documents, please call Sima King RPH at phone: [phone], Monday-Friday 8-4:30pm.    I look forward to working with you and your doctors to make sure your medications work well for you.    Sincerely,  Sima King RPH  [facility] Pharmacist, [facility]

## 2025-04-23 NOTE — LETTER
_  Medication List        Prepared on: Apr 23, 2025     Bring your Medication List when you go to the doctor, hospital, or   emergency room. And, share it with your family or caregivers.     Note any changes to how you take your medications.  Cross out medications when you no longer use them.    Medication How I take it Why I use it Prescriber   albuterol (PROAIR HFA/PROVENTIL HFA/VENTOLIN HFA) 108 (90 Base) MCG/ACT inhaler INHALE 1 PUFF INTO THE LUNGS EVERY 4 HOURS AS NEEDED FOR SHORTNESS OF BREATH, WHEEZING OR COUGH Chronic obstructive pulmonary disease, unspecified (H) Genoveva Oshea MD   amLODIPine (NORVASC) 10 MG tablet TAKE 1 TABLET (10 MG) BY MOUTH DAILY. HTN, goal below 140/90 Telly Lucio MD   ARTIFICIAL TEAR OP Apply 1 drop to eye as needed  General Health   Patient Reported   atorvastatin (LIPITOR) 20 MG tablet TAKE 1 TABLET BY MOUTH EVERY DAY Hyperlipidemia, unspecified Telly Lucio MD   blood glucose monitoring (NO BRAND SPECIFIED) meter device kit Use to test blood sugar 1 times daily or as directed. Type 2 diabetes mellitus with hyperglycemia, without long-term current use of insulin (H) Telly Lucio MD   cetirizine (ZYRTEC) 10 MG tablet TAKE 1 TABLET (10 MG) BY MOUTH DAILY. Acute bacterial rhinosinusitis Telly Lucio MD   ClonAZEPAM (KLONOPIN) 0.5 MG tablet Take 0.5 mg by mouth Take 1 tablet by mouth daily at bedtime  Restless legs Patient Reported   Continuous Glucose Sensor (FREESTYLE SARAHY 2 SENSOR) MISC USE 1 SENSOR EVERY 14 DAYS. USE TO READ BLOOD SUGARS PER 'S INSTRUCTIONS. Type 2 diabetes mellitus with hyperglycemia, without long-term current use of insulin (H) Telly Lucio MD   ferrous sulfate (FEROSUL) 325 (65 Fe) MG tablet TAKE 1 TABLET BY MOUTH EVERY DAY WITH BREAKFAST Iron Deficiency Telly Lucio MD   fluticasone-salmeterol (ADVAIR) 250-50 MCG/ACT inhaler TAKE 1 PUFF BY MOUTH TWICE A DAY Strength: 250-50 MCG/ACT  Chronic obstructive pulmonary disease, unspecified COPD type (H) Telly Lucio MD   furosemide (LASIX) 20 MG tablet TAKE 2 TABLETS BY MOUTH IN THE MORNING AND 1 TABLET BY MOUTH AT LEAST 6 HOURS LATER IN THE AFTERNOON. Pedal Edema Telly Lucio MD   Lancets (ONETOUCH DELICA PLUS [id number]) MISC USE ONCE DAILY AS DIRECTED Type 2 diabetes mellitus with hyperglycemia, without long-term current use of insulin (H) Telly Lucio MD   latanoprost (XALATAN) 0.005 % ophthalmic solution Place 1 drop into both eyes at bedtime. Borderline glaucoma with ocular hypertension, bilateral Chrystia JN Gray MD   lisinopril (ZESTRIL) 40 MG tablet TAKE 1 TABLET BY MOUTH EVERY DAY Benign Essential Hypertension Telly Lucio MD   metFORMIN (GLUCOPHAGE XR) 500 MG 24 hr tablet TAKE 1 TABLET BY MOUTH TWICE A DAY WITH FOOD Type 2 diabetes mellitus with other specified complication, without long-term current use of insulin (H) Genoveva Oshea MD   [facility] VERIO IQ test strip USE TO TEST BLOOD SUGAR 3 TIMES DAILY OR AS DIRECTED. Type 2 diabetes mellitus with hyperglycemia, without long-term current use of insulin (H) Telly Lucio MD   OXcarbazepine (TRILEPTAL) 300 MG tablet TAKE 3 TABLETS IN THE MORNING AND 3 TABLETS IN THE EVENING Trigeminal Neuralgia Arpan Harrison MD   pantoprazole (PROTONIX) 20 MG EC tablet TAKE 1 TABLET BY MOUTH EVERY DAY Gastroesophageal Reflux Disease without Esophagitis Telly Lucio MD   predniSONE (DELTASONE) 5 MG tablet TAKE 2 TABLETS BY MOUTH EVERY DAY Myasthenia gravis, AChR antibody positive (H) Arpan Harrison MD   pyRIDostigmine (MESTINON) 60 MG tablet TAKE 2 TABLETS (120 MG) IN THE MORNING AND AFTERNOON AND 1 TABLET IN THE EVENING PER 4/22/2024 APPT. Ocular Myasthenia (H) Harman Lloyd MD   sodium bicarbonate 650 MG tablet TAKE 1 TABLET (650 MG) BY MOUTH 2 TIMES DAILY Edema, unspecified type Telly Lucio MD   timolol  maleate (TIMOPTIC) 0.5 % ophthalmic solution Place 1 drop into both eyes 2 times daily. Borderline glaucoma with ocular hypertension, bilateral Chrystia C MD Marina         Add new medications, over-the-counter drugs, herbals, vitamins, or  minerals in the blank rows below.    Medication How I take it Why I use it Prescriber                                      Allergies:      - Azathioprine - Shortness Of Breath, Other (See Comments)  - Sulfamethoxazole-trimethoprim - Cough, Itching, Rash  - Ciprofloxacin - Muscle Pain (Myalgia)  - Ropinirole        Side effects I have had:      Not on File        Other Information:              My notes and questions:

## 2025-04-23 NOTE — LETTER
"Recommended To-Do List      Prepared on: Apr 23, 2025       You can get the best results from your medications by completing the items on this \"To-Do List.\"      Bring your To-Do List when you go to your doctor. And, share it with your family or caregivers.    My To-Do List:  What we talked about: What I should do:    What my medicines are for, how to know if my medicines are working, made sure my medicines are safe for me and reviewed how to take my medicines.      Take my medicines every day                "

## 2025-04-23 NOTE — PATIENT INSTRUCTIONS
"Recommendations from today's MTM visit:                                                    Today we reviewed what your medicines are for, how to know if they are working, that your medicines are safe and how to make your medicine regimen as easy as possible.      Due for an iron study to assess iron levels. This could be done at your next visit with Dr. Lucio.   Continue taking your medications as you have been prescribed.     Follow-up: Return in about 1 year (around 4/23/2026) for Medication Therapy Management.    It was great speaking with you today.  I value your experience and would be very thankful for your time in providing feedback in our clinic survey. In the next few days, you may receive an email or text message from [employer] with a link to a survey related to your  clinical pharmacist.\"     To schedule another MTM appointment, please call the clinic directly or you may call the MTM scheduling line at [phone] or toll-free at [phone].     My Clinical Pharmacist's contact information:                                                      Please feel free to contact me with any questions or concerns you have.      [address] [facility] resident  Emperatriz King, PharmD  Medication Therapy Management Pharmacist    "

## 2025-04-23 NOTE — PROGRESS NOTES
Medication Therapy Management (MTM) Encounter    ASSESSMENT:                            Medication Adherence/Access: No issues identified.    Type 2 Diabetes   At goal, A1c < 7%. No other concerns at this time.     Hypertension   Continue current anti-hypertensive medications. Will continue to monitor blood pressure, can aim for < 130/80 mg/dL in the future.     Hyperlipidemia   At goal, with LDL < 100.     Myasthenia gravis / Trigeminal Neuralgia Pain  Stable. Assured patient sweating was a side effect of the medication and not an effect of holding the Pyridostigmine.    Restless Leg  Stable. Education on risks of benzodiazepines provided, could consider dose reduction in future.     Allergy   Stable.     COPD   Patient is stable on current inhaler therapies.       Gluacoma  Continue annual eye exams to assess condition.     GERD    Stable.      Iron Deficiency.   Not at goal of ferritin > 70. Patient is due for an updated iron study. This can be done at next clinic visit.     Bilateral Lower Extremity Edema  Stable.    Hyponatremia   Stable. Sodium is within normal limits.       PLAN:                            Due for an iron (ferritin) study to assess iron levels. This could be done at your next visit with Dr. Lucio.   Continue taking your medications as you have been prescribed.     Follow-up: Return in about 1 year (around 4/23/2026) for Medication Therapy Management.    SUBJECTIVE/OBJECTIVE:                          Rakan Nolasco is a 79 year old male called for a follow-up visit. Patient saw provider prior to our visit today.      Reason for visit: Annual medicare MTM visit. Know what class of medications     Allergies/ADRs: Reviewed in chart  Past Medical History: Reviewed in chart  Tobacco: No  Alcohol: none    Medication Adherence/Access: no issues reported.  Patient uses pill box to organize. Has been helpful, 4 rows per box and fills 4 boxes at a time.   Rarely misses dose, about 1 or 2 dose per  "month    Diabetes   Type 2 Diabetes  Metformin XR 500mg - one tablet with breakfast and one tablet with supper    Patient monitors blood sugars at home. Has CGM (Freestyle Zina 2) and fingerstick.  CGM readings - 128, 106, 120, 93, 115, 158, lowest was 88  Patient has not experienced any low sugars   Patient is not experiencing side effects.  Patient has annual eye exams, last eye exam in February 2025     Hypertension   Amlodipine 10mg once daily   Lisinopril 40mg one tablet at night    Checks blood pressure at home, but not lately. Patient has blood pressure checked in clinic and has been \"good\".  Patient reports no lightheadedness or dizziness.      Hyperlipidemia   Atorvastatin 20mg daily in the evening    Patient reports no significant myalgias or other side effects.    Myasthenia gravis / Trigeminal Neuralgia Pain  Pyridostigmine 60mg - two tablets in the morning, two tablets afternoon, two tablets at night  Oxcarbazepine 300mg - two tablets in the morning and two tablets at night  Prednisone 5mg - two tablets in the morning with breakfast    Patient reports no side effects. Patient states oxcarbazepine and prednisone has been helping with his nerve pain.   He did experience sweating when he held his medications the day before for a colonoscopy, wonders if holding Pyridostigmine would have contributed.    Restless Leg  Clonazepam 0.5mg daily at bed time     Patient states this has been effective for his restless leg and no longer has night-time awakenings due to this.   Patient currently does not experience next day drowsiness. No balance issues.     Allergy   Cetirizine 10 mg once daily as needed    Patient reports no current medication side effects.    Patient feels that current therapy is effective. He only takes it when needed for allergy symptoms.      COPD   Albuterol HFA as needed  Fluticasone-Salmeterol 250-50 mg/act - one puff into the lungs twice daily    Patient rinses their mouth after using " "steroid inhaler.    Patient reports no current medication side effects.    Patient reports the following symptoms: Shortness of breath upon physical exertion. No shortness of breath at rest. No exacerbations  Patient does use albuterol inhaler prior to activities, which has helped relive his shortness of breath.   Blood eosinophils > 300 cells/ L?: No       Gluacoma  Latanoprost 0.005% solution - one drop in both eyes at night  Timolol 0.5% solution - one drop in both eyes twice daily    Patient unable to determine if this is working for him as he has blurry vision in general.   Eye exam 2/18/25 ~ \"Borderline glaucoma with ocular hypertension, bilateral Intraocular pressure 15/16 today.\"     GERD    Pantoprazole 20 mg once daily with breakfast    Patient feels that current regimen is effective.  Patient has tried to stop medication but reflux returned and restarted pantoprazole.   Patient reported no side effects.      Iron deficiency  Ferrous sulfate 325mg - one tablet daily, 2 hours after morning medications and breakfast    Patient not experiencing any constipation.    Bilateral Lower Extremity Edema  Furosemide 20 mg tablet - one tablet every morning and one tablet 6 hours later     No dizziness and lightheadedness. Patient states this has been helping with his lower leg swelling.     Hyponatremia   Sodium bicarbonate 650 mg - one-half tablet (325 mg) twice daily  No reported issues at this time.      ----------------    I spent 59 minutes with this patient today. All changes were made via collaborative practice agreement with Telly Lucio MD. A copy of the visit note was provided to the patient's provider(s).    A summary of these recommendations was sent via [employer].    [address] [facility] Resident  Emperatriz King, AlondraD  Medication Therapy Management Pharmacist      Telemedicine Visit Details  The patient's medications can be safely assessed via a telemedicine encounter.  Type of service:  Telephone " visit  Originating Location (pt. Location): Home    Distant Location (provider location):  Off-site  Start Time:  10:04 AM  End Time:  11:03 AM     Medication Therapy Recommendations  No medication therapy recommendations to display

## 2025-05-05 ENCOUNTER — OFFICE VISIT (OUTPATIENT)
Dept: FAMILY MEDICINE | Facility: CLINIC | Age: 80
End: 2025-05-05
Payer: COMMERCIAL

## 2025-05-05 VITALS
HEIGHT: 70 IN | TEMPERATURE: 98.6 F | SYSTOLIC BLOOD PRESSURE: 116 MMHG | BODY MASS INDEX: 32.93 KG/M2 | HEART RATE: 78 BPM | WEIGHT: 230 LBS | RESPIRATION RATE: 17 BRPM | OXYGEN SATURATION: 95 % | DIASTOLIC BLOOD PRESSURE: 78 MMHG

## 2025-05-05 DIAGNOSIS — I10 ESSENTIAL HYPERTENSION: ICD-10-CM

## 2025-05-05 DIAGNOSIS — E66.01 MORBID OBESITY (H): ICD-10-CM

## 2025-05-05 DIAGNOSIS — E78.5 HYPERLIPIDEMIA LDL GOAL <100: ICD-10-CM

## 2025-05-05 DIAGNOSIS — Z01.818 PREOP GENERAL PHYSICAL EXAM: Primary | ICD-10-CM

## 2025-05-05 DIAGNOSIS — H35.9 RETINA DISORDER: ICD-10-CM

## 2025-05-05 DIAGNOSIS — E11.69 TYPE 2 DIABETES MELLITUS WITH OTHER SPECIFIED COMPLICATION, WITHOUT LONG-TERM CURRENT USE OF INSULIN (H): ICD-10-CM

## 2025-05-05 DIAGNOSIS — G70.00 MYASTHENIA GRAVIS (H): ICD-10-CM

## 2025-05-05 PROCEDURE — 3074F SYST BP LT 130 MM HG: CPT | Performed by: FAMILY MEDICINE

## 2025-05-05 PROCEDURE — 3078F DIAST BP <80 MM HG: CPT | Performed by: FAMILY MEDICINE

## 2025-05-05 PROCEDURE — 99214 OFFICE O/P EST MOD 30 MIN: CPT | Performed by: FAMILY MEDICINE

## 2025-05-05 NOTE — PATIENT INSTRUCTIONS
How to Take Your Medication Before Surgery  Preoperative Medication Instructions         - No identified additional risk factors other than previously addressed    Antiplatelet or Anticoagulation Medication Instructions   - We reviewed the medication list and the patient is not on an antiplatelet or anticoagulation medications.    Additional Medication Instructions  Take all scheduled medications on the day of surgery EXCEPT for modifications listed below:   - ACE/ARB/ARNI (lisinopril, enalapril, losartan, valsartan, olmesartan, sacubritril/valsartan) : Continue without modification (e.g., MAC anesthesia, neurosurgery, spine surgery, heart failure, or labile hypertension with risk of hypertension).   - Calcium Channel Blockers (amlodipine, diltiazem, felodipine): May be continued on the day of surgery.   - Diuretics (furosemide, hydrochlorothiazide, chlorothalidone): DO NOT TAKE on the day of surgery.   - Statins (atorvastatin, simvastatin, pravastatin) : Continue taking on the day of surgery.    - metformin: DO NOT TAKE day of surgery.   - Antiepileptics: Continue without modification.   - LABA, inhaled corticosteroid, long-acting anticholinergics: Continue without modification.   - rescue Inhaler: Continue PRN. Bring to hospital on the day of surgery.   - Take usual dose on day of surgery    Recommendation  Approval given to proceed with proposed procedure, without further diagnostic evaluation.    Patient Education   Preparing for Your Surgery  For Adults  Getting started  In most cases, a nurse will call to review your health history and instructions. They will give you an arrival time based on your scheduled surgery time. Please be ready to share:  Your doctor's clinic name and phone number  Your medical, surgical, and anesthesia history  A list of allergies and sensitivities  A list of medicines, including herbal treatments and over-the-counter drugs  Whether the patient has a legal guardian (ask how to send  us the papers in advance)  Note: You may not receive a call if you were seen at our PAC (Preoperative Assessment Center).  Please tell us if you're pregnant--or if there's any chance you might be pregnant. Some surgeries may injure a fetus (unborn baby), so they require a pregnancy test. Surgeries that are safe for a fetus don't always need a test, and you can choose whether to have one.   Preparing for surgery  Within 10 to 30 days of surgery: Have a pre-op exam (sometimes called an H&P, or History and Physical). This can be done at a clinic or pre-operative center.  If you're having a , you may not need this exam. Talk to your care team.  At your pre-op exam, talk to your care team about all medicines you take. (This includes CBD oil and any drugs, such as THC, marijuana, and other forms of cannabis.) If you need to stop any medicine before surgery, ask when to start taking it again.  This is for your safety. Many medicines and drugs can make you bleed too much during surgery. Some change how well surgery (anesthesia) drugs work.  Call your insurance company to let them know you're having surgery. (If you don't have insurance, call [phone].)  Call your clinic if there's any change in your health. This includes a scrape or scratch near the surgery site, or any signs of a cold (sore throat, runny nose, cough, rash, fever).  Eating and drinking guidelines  For your safety: Unless your surgeon tells you otherwise, follow the guidelines below.  Eat and drink as normal until 8 hours before you arrive for surgery. After that, no food or milk. You can spit out gum when you arrive.  Drink clear liquids until 2 hours before you arrive. These are liquids you can see through, like water, Gatorade, and Propel Water. They also include plain black coffee and tea (no cream or milk).  No alcohol for 24 hours before you arrive. The night before surgery, stop any drinks that contain THC.  If your care team tells you to  take medicine on the morning of surgery, it's okay to take it with a sip of water. No other medicines or drugs are allowed (including CBD oil)--follow your care team's instructions.  If you have questions the day of surgery, call your hospital or surgery center.   Preventing infection  Shower or bathe the night before and the morning of surgery. Follow the instructions your clinic gave you. (If no instructions, use regular soap.)  Don't shave or clip hair near your surgery site. We'll remove the hair if needed.  Don't smoke or vape the morning of surgery. No chewing tobacco for 6 hours before you arrive. A nicotine patch is okay. You may spit out nicotine gum when you arrive.  For some surgeries, the surgeon will tell you to fully quit smoking and nicotine.  We will make every effort to keep you safe from infection. We will:  Clean our hands often with soap and water (or an alcohol-based hand rub).  Clean the skin at your surgery site with a special soap that kills germs.  Give you a special gown to keep you warm. (Cold raises the risk of infection.)  Wear hair covers, masks, gowns, and gloves during surgery.  Give antibiotic medicine, if prescribed. Not all surgeries need this medicine.  What to bring on the day of surgery  Photo ID and insurance card  Copy of your health care directive, if you have one  Glasses and hearing aids (bring cases)  You can't wear contacts during surgery  Inhaler and eye drops, if you use them (tell us about these when you arrive)  CPAP machine or breathing device, if you use them  A few personal items, if spending the night  If you have . . .  A pacemaker, ICD (cardiac defibrillator), or other implant: Bring the ID card.  An implanted stimulator: Bring the remote control.  A legal guardian: Bring a copy of the certified (court-stamped) guardianship papers.  Please remove any jewelry, including body piercings. Leave jewelry and other valuables at home.  If you're going home the day of  surgery  You must have a responsible adult drive you home. They should stay with you overnight as well.  If you don't have someone to stay with you, and you aren't safe to go home alone, we may keep you overnight. Insurance often won't pay for this.  After surgery  If it's hard to control your pain or you need more pain medicine, please call your surgeon's office.  Questions?   If you have any questions for your care team, list them here:   ____________________________________________________________________________________________________________________________________________________________________________________________________________________________________________________________  For informational purposes only. Not to replace the advice of your health care provider. Copyright   2003, 2019 [employer]. All rights reserved. Clinically reviewed by Sree Stevenson MD. [employer] [id number] - REV 08/24.

## 2025-05-05 NOTE — PROGRESS NOTES
Preoperative Evaluation  [facility]  [address] [place] [address]  [place] [address]  Phone: [phone]  Primary Provider: Telly Lucio MD  Pre-op Performing Provider: Telly Lucio MD  May 5, 2025         5/2/2025   Surgical Information   What procedure is being done? Left eye surgery   Facility or Hospital where [facility]   Who is doing the procedure / surgery? Dr. Brown   Date of surgery / procedure: May 20 2025   Time of surgery / procedure: 11:23am   Where do you plan to recover after surgery? at home with family     Fax number for surgical facility: [phone]     Assessment & Plan     The proposed surgical procedure is considered LOW risk.    Preop general physical exam   - perioperative medication management discussed    Retina disorder    - planned for retina surgery    Type 2 diabetes mellitus with other specified complication, without long-term current use of insulin (H)     Well controlled on Metformin      Hypertension    - well controlled on medications    Hyperlipidemia LDL goal <100    - well controlled on Atorvastatin    Myasthenia gravis (H)    - following with neurology, on Pyridostigmine       - No identified additional risk factors other than previously addressed    Antiplatelet or Anticoagulation Medication Instructions   - We reviewed the medication list and the patient is not on an antiplatelet or anticoagulation medications.    Additional Medication Instructions  Take all scheduled medications on the day of surgery EXCEPT for modifications listed below:   - ACE/ARB/ARNI (lisinopril, enalapril, losartan, valsartan, olmesartan, sacubritril/valsartan) : Continue without modification (e.g., MAC anesthesia, neurosurgery, spine surgery, heart failure, or labile hypertension with risk of hypertension).   - Calcium Channel Blockers (amlodipine, diltiazem, felodipine): May be continued on the day of  surgery.   - Diuretics (furosemide, hydrochlorothiazide, chlorothalidone): DO NOT TAKE on the day of surgery.   - Statins (atorvastatin, simvastatin, pravastatin) : Continue taking on the day of surgery.    - metformin: DO NOT TAKE day of surgery.   - Antiepileptics: Continue without modification.   - LABA, inhaled corticosteroid, long-acting anticholinergics: Continue without modification.   - rescue Inhaler: Continue PRN. Bring to hospital on the day of surgery.   - Take usual dose on day of surgery    Recommendation  Approval given to proceed with proposed procedure, without further diagnostic evaluation.    Follow-up       Subjective   Rakan is a 79 year old, presenting for the following:  Pre-Op Exam          5/5/2025     1:25 PM   Additional Questions   Roomed by Suzy   Accompanied by wife         5/5/2025     1:25 PM   Patient Reported Additional Medications   Patient reports taking the following new medications Doxycycline hyclate 100 mg daily     HPI:         5/2/2025   Pre-Op Questionnaire   Have you ever had a heart attack or stroke? No   Have you ever had surgery on your heart or blood vessels, such as a stent placement, a coronary artery bypass, or surgery on an artery in your head, neck, heart, or legs? No   Do you have chest pain with activity? No   Do you have a history of heart failure? No   Do you currently have a cold, bronchitis or symptoms of other infection? No   Do you have a cough, shortness of breath, or wheezing? No   Do you or anyone in your family have previous history of blood clots? No   Do you or does anyone in your family have a serious bleeding problem such as prolonged bleeding following surgeries or cuts? No   Have you ever had problems with anemia or been told to take iron pills? (!) YES    Have you had any abnormal blood loss such as black, tarry or bloody stools? No   Have you ever had a blood transfusion? No   Are you willing to have a blood transfusion if it is medically needed  before, during, or after your surgery? Yes   Have you or any of your relatives ever had problems with anesthesia? No   Do you have sleep apnea, excessive snoring or daytime drowsiness? (!) YES   Do you have a CPAP machine? Yes   Do you have any artifical heart valves or other implanted medical devices like a pacemaker, defibrillator, or continuous glucose monitor? No   Do you have artificial joints? (!) YES   Are you allergic to latex? No     Advance Care Planning    Document on file is a Health Care Directive or POLST.    Preoperative Review of    reviewed - no record of controlled substances prescribed.      Status of Chronic Conditions:  See problem list for active medical problems.  Problems all longstanding and stable, except as noted/documented.  See ROS for pertinent symptoms related to these conditions.    Patient Active Problem List    Diagnosis Date Noted    Chronic atticoantral suppurative otitis media of left ear 10/22/2024     Priority: Medium    Cholesteatoma, left 10/22/2024     Priority: Medium    Mastoiditis of left side 10/22/2024     Priority: Medium    Osteoarthritis of left knee, unspecified osteoarthritis type 04/01/2024     Priority: Medium    Anemia 09/21/2022     Priority: Medium    Combined form of age-related cataract, left eye 12/15/2021     Priority: Medium     Added automatically from request for surgery [id number]      Hyponatremia 10/01/2020     Priority: Medium    Temporal arteritis (H) 02/20/2019     Priority: Medium    Type 2 diabetes mellitus with other specified complication, without long-term current use of insulin (H) 02/20/2019     Priority: Medium    Right posterior capsular opacification 04/09/2018     Priority: Medium    Morbid obesity (H) 02/20/2018     Priority: Medium    Prostate cancer (H) 01/12/2018     Priority: Medium     prostate cancer s/p cryotherapy on 3/18       Myasthenia gravis (H) 10/16/2017     Priority: Medium    Pseudophakia of right eye 02/21/2017      Priority: Medium    Obstructive sleep apnea syndrome 01/25/2017     Priority: Medium    Restless legs syndrome 10/12/2016     Priority: Medium    H/O RD (retinal detachment) s/p repair with PPV (AB) 05/26/2016     Priority: Medium    Epiretinal membrane, bilateral 05/26/2016     Priority: Medium    PVD (posterior vitreous detachment) OU 06/17/2014     Priority: Medium    Dry eyes 01/21/2014     Priority: Medium    Arthritis      Priority: Medium    HTN (hypertension) 07/19/2012     Priority: Medium    Trigeminal neuralgia pain 01/04/2012     Priority: Medium    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     Priority: Medium     Target IOP: 21  Peak IOP: 31  GDX: abnormal both eyes  OCT: nl  HVF: nl  Pachymetry:  C/D:  0.2/0.35  SLT:          HLD (hyperlipidemia) 10/31/2010     Priority: Medium    GERD (gastroesophageal reflux disease)      Priority: Medium      Past Medical History:   Diagnosis Date    Arthritis         BMI 31.0-31.9,adult     Cancer (H) 01/10/2018    Prostate    COPD (chronic obstructive pulmonary disease) (H) 10/28/2020    Demand ischemia (H)     Diabetes (H) 01/10/2018    Diverticulosis     Colonoscopy 8/2008    Fatty liver     see US  5/2012    GERD (gastroesophageal reflux disease)     Glaucoma (increased eye pressure)     HTN (hypertension)     Hyperlipidemia LDL goal < 130     age, htn, fhx    Irritable bowel     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     Nonsenile cataract     Obstructive sleep apnea     Obstructive sleep apnea syndrome     ROSIE (obstructive sleep apnea) 01/2011    Using CPAP;     Prediabetes     Restless leg syndrome     Retinal detachment     Sleep apnea     Trigeminal neuralgia pain 01/04/2012     Past Surgical History:   Procedure Laterality Date    ARTHROPLASTY KNEE Left 4/1/2024    Procedure: ARTHROPLASTY, LEFT KNEE, TOTAL;  Surgeon: Dandre Hassan MD;  Location: [facility]    BIOPSY ARTERY TEMPORAL Bilateral  08/01/2017    Procedure: BIOPSY ARTERY TEMPORAL;  Bilateral temporal artery Biopsy;  Surgeon: Jj Tello MD;  Location: [facility]    CATARACT IOL, RT/LT Right     COLONOSCOPY      ESOPHAGOSCOPY, GASTROSCOPY, DUODENOSCOPY (EGD), COMBINED  01/15/2014    Procedure: COMBINED ESOPHAGOSCOPY, GASTROSCOPY, DUODENOSCOPY (EGD), BIOPSY SINGLE OR MULTIPLE;;  Surgeon: Winston Nixon MD;  Location: [facility]    GRAFT SKIN SPLIT THICKNESS FROM HEAD TO HEAD Left 11/7/2024    Procedure: THRESCH GRAFTING, LEFT ARM SPLIT GRAFTING TO LEFT EAR GRANULATION TISSUE REMOVAL, POST AURICULAR SUTURES REMOVAL;  Surgeon: Shayna Delatorre MD;  Location: [facility] OR    LASER YAG CAPSULOTOMY Right 04/09/2018    right eye    MASTOIDECTOMY Left 10/21/2024    Procedure: OPEN CAVITY OF LEFT MASTOIDECTOMY, MEATOPLASTY;  Surgeon: Shayna Delatorre MD;  Location: [facility] OR    PHACOEMULSIFICATION CLEAR CORNEA WITH STANDARD INTRAOCULAR LENS IMPLANT Right 02/20/2017    Procedure: PHACOEMULSIFICATION CLEAR CORNEA WITH STANDARD INTRAOCULAR LENS IMPLANT;  Surgeon: Mateo Gray MD;  Location: [facility]    PHACOEMULSIFICATION CLEAR CORNEA WITH STANDARD INTRAOCULAR LENS IMPLANT Left 01/10/2022    Procedure: LEFT PHACOEMULSIFICATION, CATARACT, WITH INTRAOCULAR LENS IMPLANT;  Surgeon: Mateo Gray MD;  Location: [facility]    TN TRABECULOPLASTY BY LASER SURGERY      RETINAL REATTACHMENT Right     SURGICAL HISTORY OF -   08/2009    Both Eyelids-.    TONSILLECTOMY  as child     Current Outpatient Medications   Medication Sig Dispense Refill    albuterol (PROAIR HFA/PROVENTIL HFA/VENTOLIN HFA) 108 (90 Base) MCG/ACT inhaler INHALE 1 PUFF INTO THE LUNGS EVERY 4 HOURS AS NEEDED FOR SHORTNESS OF BREATH, WHEEZING OR COUGH 18 g 11    amLODIPine (NORVASC) 10 MG tablet TAKE 1 TABLET (10 MG) BY MOUTH DAILY. 90 tablet 1    ARTIFICIAL TEAR OP Apply 1 drop to eye as needed      atorvastatin (LIPITOR) 20 MG tablet TAKE 1 TABLET BY MOUTH EVERY DAY 90 tablet  3    blood glucose monitoring (NO BRAND SPECIFIED) meter device kit Use to test blood sugar 1 times daily or as directed. 1 kit 0    cetirizine (ZYRTEC) 10 MG tablet TAKE 1 TABLET (10 MG) BY MOUTH DAILY. 90 tablet 1    ClonAZEPAM (KLONOPIN) 0.5 MG tablet Take 0.5 mg by mouth Take 1 tablet by mouth daily at bedtime      Continuous Glucose Sensor (FREESTYLE SARAHY 2 SENSOR) MISC USE 1 SENSOR EVERY 14 DAYS. USE TO READ BLOOD SUGARS PER 'S INSTRUCTIONS. 6 each 5    ferrous sulfate (FEROSUL) 325 (65 Fe) MG tablet TAKE 1 TABLET BY MOUTH EVERY DAY WITH BREAKFAST 90 tablet 1    fluticasone-salmeterol (ADVAIR) 250-50 MCG/ACT inhaler TAKE 1 PUFF BY MOUTH TWICE A DAY Strength: 250-50 MCG/ACT 3 each 3    furosemide (LASIX) 20 MG tablet TAKE 2 TABLETS BY MOUTH IN THE MORNING AND 1 TABLET BY MOUTH AT LEAST 6 HOURS LATER IN THE AFTERNOON. 270 tablet 1    Lancets (ONETOUCH DELICA PLUS [id number]) MISC USE ONCE DAILY AS DIRECTED 100 each 1    latanoprost (XALATAN) 0.005 % ophthalmic solution Place 1 drop into both eyes at bedtime. 7.5 mL 3    lisinopril (ZESTRIL) 40 MG tablet TAKE 1 TABLET BY MOUTH EVERY DAY 90 tablet 1    metFORMIN (GLUCOPHAGE XR) 500 MG 24 hr tablet TAKE 1 TABLET BY MOUTH TWICE A DAY WITH FOOD 180 tablet 3    ONETOUCH VERIO IQ test strip USE TO TEST BLOOD SUGAR 3 TIMES DAILY OR AS DIRECTED. 300 strip 1    OXcarbazepine (TRILEPTAL) 300 MG tablet TAKE 3 TABLETS IN THE MORNING AND 3 TABLETS IN THE EVENING 180 tablet 2    pantoprazole (PROTONIX) 20 MG EC tablet TAKE 1 TABLET BY MOUTH EVERY DAY 90 tablet 2    predniSONE (DELTASONE) 5 MG tablet TAKE 2 TABLETS BY MOUTH EVERY DAY 60 tablet 2    pyRIDostigmine (MESTINON) 60 MG tablet TAKE 2 TABLETS (120 MG) IN THE MORNING AND AFTERNOON AND 1 TABLET IN THE EVENING PER 4/22/2024 APPT. 450 tablet 0    sodium bicarbonate 650 MG tablet TAKE 1 TABLET (650 MG) BY MOUTH 2 TIMES DAILY 180 tablet 1    timolol maleate (TIMOPTIC) 0.5 % ophthalmic solution Place 1 drop into  "both eyes 2 times daily. 5 mL 11       Allergies   Allergen Reactions    Azathioprine Shortness Of Breath and Other (See Comments)     Was hospitalized from it. Also had high fever, chills, and shortness of breath.    Sulfamethoxazole-Trimethoprim Cough, Itching and Rash     Generalized painful red rash on legs arms and abdomen, chest, back, cough and  fever  that required hospitalization.    Ciprofloxacin Muscle Pain (Myalgia)     Muscle pain  Other reaction(s): Myalgia  Other reaction(s): Myalgia    Ropinirole      Leg pan  GI  Weakness; ? sycope  Other reaction(s): Leg Pain        Social History     Tobacco Use    Smoking status: Former     Current packs/day: 0.00     Average packs/day: 2.0 packs/day for 15.0 years (30.0 ttl pk-yrs)     Types: Cigarettes     Start date: 1968     Quit date: 1983     Years since quittin.3     Passive exposure: Past    Smokeless tobacco: Former     Quit date: 1970    Tobacco comments:     smoke free household.   Substance Use Topics    Alcohol use: Not Currently     Comment: Quit in      Family History   Problem Relation Age of Onset    Respiratory Mother         copd    LUNG DISEASE Mother     Cerebrovascular Disease Father     Cancer Father     Other Cancer Father     Allergies Brother     Cancer Maternal Grandmother     Heart Disease Paternal Grandmother     Glaucoma Maternal Aunt     Macular Degeneration No family hx of     Anesthesia Reaction No family hx of     Deep Vein Thrombosis (DVT) No family hx of      History   Drug Use No         Review of Systems  Constitutional, neuro, ENT, endocrine, pulmonary, cardiac, gastrointestinal, genitourinary, musculoskeletal, integument and psychiatric systems are negative, except as otherwise noted.    Objective    /78   Pulse 78   Temp 98.6  F (37  C) (Temporal)   Resp 17   Ht 1.778 m (5' 10\")   Wt 104.3 kg (230 lb)   SpO2 95%   BMI 33.00 kg/m     Estimated body mass index is 33 kg/m  as calculated from " "the following:    Height as of this encounter: 1.778 m (5' 10\").    Weight as of this encounter: 104.3 kg (230 lb).  Physical Exam  GENERAL: alert and no distress  EYES: Eyes grossly normal to inspection, conjunctivae and sclerae normal  HENT: ear canals and TM's normal, nose and mouth without ulcers or lesions  NECK: no adenopathy, no asymmetry, masses, or scars  RESP: lungs clear to auscultation - no rales, rhonchi or wheezes  CV: regular rate and rhythm,  no murmur, click or rub, no peripheral edema  ABDOMEN: soft, nontender, obese, no masses and bowel sounds normal  MS: no gross musculoskeletal defects noted, no edema  SKIN: no suspicious lesions or rashes  NEURO: Normal strength and tone, mentation intact and speech normal  PSYCH: mentation appears normal, affect normal/bright    Recent Labs   Lab Test 01/31/25  1046 10/01/24  1008   HGB 15.3  --      --      137 135   POTASSIUM 4.3  4.3 4.5   CR 0.98  0.97 0.89   A1C 6.2* 6.2*        Diagnostics  No labs were ordered during this visit.   No EKG required for low risk surgery (cataract, skin procedure, breast biopsy, etc).    Revised Cardiac Risk Index (RCRI)  The patient has the following serious cardiovascular risks for perioperative complications:   - No serious cardiac risks = 0 points     RCRI Interpretation: 0 points: Class I (very low risk - 0.4% complication rate)         Signed Electronically by: Telly Lucio MD  A copy of this evaluation report is provided to the requesting physician.      "

## 2025-05-07 ENCOUNTER — OFFICE VISIT (OUTPATIENT)
Dept: PULMONOLOGY | Facility: CLINIC | Age: 80
End: 2025-05-07
Attending: INTERNAL MEDICINE
Payer: COMMERCIAL

## 2025-05-07 VITALS
OXYGEN SATURATION: 97 % | WEIGHT: 230 LBS | DIASTOLIC BLOOD PRESSURE: 69 MMHG | BODY MASS INDEX: 33 KG/M2 | SYSTOLIC BLOOD PRESSURE: 133 MMHG | HEART RATE: 61 BPM

## 2025-05-07 DIAGNOSIS — J43.2 CENTRILOBULAR EMPHYSEMA (H): Primary | ICD-10-CM

## 2025-05-07 PROCEDURE — G2211 COMPLEX E/M VISIT ADD ON: HCPCS | Performed by: INTERNAL MEDICINE

## 2025-05-07 PROCEDURE — 3075F SYST BP GE 130 - 139MM HG: CPT | Performed by: INTERNAL MEDICINE

## 2025-05-07 PROCEDURE — 99215 OFFICE O/P EST HI 40 MIN: CPT | Performed by: INTERNAL MEDICINE

## 2025-05-07 PROCEDURE — 3078F DIAST BP <80 MM HG: CPT | Performed by: INTERNAL MEDICINE

## 2025-05-07 NOTE — PROGRESS NOTES
Pulmonary Clinic Return Patient Visit  Reason for Consult: COPD  History of Present Illness  Rakan Nolasco, who is a 78-year-old male former smoker (quit 1983) with history of ROSIE on CPAP who presents for follow up of COPD. I last saw him in clinic 5/2024.   To briefly review, Rakan was diagnosed with COPD several years ago based on spirometric evaluation and symptoms.  His COPD has been well controlled on an intermediate dose fluticasone/salmeterol.  He also uses albuterol very sparingly.  He had a flare of COPD 6/2022 which was triggered by a viral flulike illness for which he was seen at the ER and was treated with a course of antibiotics and steroids. Had COVID ~12/2022 with mild symptoms, did well with Paxlovid. He has not had a flare since that time.   He did have left knee replacement and has fully recuperated and he has an eye surgery coming in the next few weeks.   Today, he continues to be doing great. He has minimal shortness of breath mostly with exertion and is lightly worse but very responsive to albuterol, infrequent wheezing and no evidence of chest tightness. He does have baseline pedal swelling which has not worsened for which he takes Lasix.  He denies any fevers, no chest pain, no orthopnea and no PND.  He has a history of ROSIE and is well controlled on CPAP.    He is trying to lose weight by healthy living and has lost close to 10 pounds.   Former smoker, quit 1983, 30 pk-yrs. No family hx of lung cancer.  He was exposed to lots of fumes and he is currently retired now.    Review of Systems:  10 of 14 systems reviewed and are negative unless otherwise stated in HPI.    Past Medical History:   Diagnosis Date    Arthritis         BMI 31.0-31.9,adult     Cancer (H) 01/10/2018    Prostate    COPD (chronic obstructive pulmonary disease) (H) 10/28/2020    Demand ischemia (H)     Diabetes (H) 01/10/2018    Diverticulosis     Colonoscopy 8/2008    Fatty liver     see US  5/2012    GERD  (gastroesophageal reflux disease)     Glaucoma (increased eye pressure)     HTN (hypertension)     Hyperlipidemia LDL goal < 130     age, htn, fhx    Irritable bowel     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     Nonsenile cataract     Obstructive sleep apnea     Obstructive sleep apnea syndrome     ROSIE (obstructive sleep apnea) 01/2011    Using CPAP;     Prediabetes     Restless leg syndrome     Retinal detachment     Sleep apnea     Trigeminal neuralgia pain 01/04/2012       Past Surgical History:   Procedure Laterality Date    ARTHROPLASTY KNEE Left 4/1/2024    Procedure: ARTHROPLASTY, LEFT KNEE, TOTAL;  Surgeon: Dandre Hassan MD;  Location: [facility]    BIOPSY ARTERY TEMPORAL Bilateral 08/01/2017    Procedure: BIOPSY ARTERY TEMPORAL;  Bilateral temporal artery Biopsy;  Surgeon: Jj Tello MD;  Location: [facility]    CATARACT IOL, RT/LT Right     COLONOSCOPY      ESOPHAGOSCOPY, GASTROSCOPY, DUODENOSCOPY (EGD), COMBINED  01/15/2014    Procedure: COMBINED ESOPHAGOSCOPY, GASTROSCOPY, DUODENOSCOPY (EGD), BIOPSY SINGLE OR MULTIPLE;;  Surgeon: Winston Nixon MD;  Location: [facility]    GRAFT SKIN SPLIT THICKNESS FROM HEAD TO HEAD Left 11/7/2024    Procedure: THRESCH GRAFTING, LEFT ARM SPLIT GRAFTING TO LEFT EAR GRANULATION TISSUE REMOVAL, POST AURICULAR SUTURES REMOVAL;  Surgeon: Shayna Delatorre MD;  Location: [facility]    LASER YAG CAPSULOTOMY Right 04/09/2018    right eye    MASTOIDECTOMY Left 10/21/2024    Procedure: OPEN CAVITY OF LEFT MASTOIDECTOMY, MEATOPLASTY;  Surgeon: Shayna Delatorre MD;  Location: [facility]    PHACOEMULSIFICATION CLEAR CORNEA WITH STANDARD INTRAOCULAR LENS IMPLANT Right 02/20/2017    Procedure: PHACOEMULSIFICATION CLEAR CORNEA WITH STANDARD INTRAOCULAR LENS IMPLANT;  Surgeon: Mateo Gray MD;  Location: [facility]    PHACOEMULSIFICATION CLEAR CORNEA WITH STANDARD INTRAOCULAR LENS IMPLANT Left 01/10/2022    Procedure: LEFT  PHACOEMULSIFICATION, CATARACT, WITH INTRAOCULAR LENS IMPLANT;  Surgeon: Mateo Gray MD;  Location: [facility]    MI TRABECULOPLASTY BY LASER SURGERY      RETINAL REATTACHMENT Right     SURGICAL HISTORY OF -   2009    Both Eyelids-.    TONSILLECTOMY  as child       Family History   Problem Relation Age of Onset    Respiratory Mother         copd    LUNG DISEASE Mother     Cerebrovascular Disease Father     Cancer Father     Other Cancer Father     Allergies Brother     Cancer Maternal Grandmother     Heart Disease Paternal Grandmother     Glaucoma Maternal Aunt     Macular Degeneration No family hx of     Anesthesia Reaction No family hx of     Deep Vein Thrombosis (DVT) No family hx of        Social History     Socioeconomic History    Marital status: Single     Spouse name: None    Number of children: 0    Years of education: 12    Highest education level: None   Occupational History    Occupation: Retired 3/2012     Comment: [employer]   Tobacco Use    Smoking status: Former Smoker     Packs/day: 2.00     Years: 15.00     Pack years: 30.00     Types: Cigarettes     Start date: 1968     Quit date: 1983     Years since quittin.5    Smokeless tobacco: Former User     Quit date: 1970    Tobacco comment: smoke free household.   Vaping Use    Vaping Use: Never used   Substance and Sexual Activity    Alcohol use: Not Currently     Comment: Quit in     Drug use: No    Sexual activity: Not Currently     Partners: Female     Comment: 2010  No girlfriend at this time.   Other Topics Concern    Parent/sibling w/ CABG, MI or angioplasty before 65F 55M? No         Allergies   Allergen Reactions    Azathioprine Shortness Of Breath and Other (See Comments)     Was hospitalized from it. Also had high fever, chills, and shortness of breath.    Sulfamethoxazole-Trimethoprim Cough, Itching and Rash     Generalized painful red rash on legs arms and abdomen, chest, back,  cough and  fever  that required hospitalization.    Ciprofloxacin Muscle Pain (Myalgia)     Muscle pain  Other reaction(s): Myalgia  Other reaction(s): Myalgia    Ropinirole      Leg pan  GI  Weakness; ? sycope  Other reaction(s): Leg Pain         Current Outpatient Medications:     albuterol (PROAIR HFA/PROVENTIL HFA/VENTOLIN HFA) 108 (90 Base) MCG/ACT inhaler, INHALE 1 PUFF INTO THE LUNGS EVERY 4 HOURS AS NEEDED FOR SHORTNESS OF BREATH, WHEEZING OR COUGH, Disp: 18 g, Rfl: 11    amLODIPine (NORVASC) 10 MG tablet, TAKE 1 TABLET (10 MG) BY MOUTH DAILY., Disp: 90 tablet, Rfl: 1    ARTIFICIAL TEAR OP, Apply 1 drop to eye as needed, Disp: , Rfl:     atorvastatin (LIPITOR) 20 MG tablet, TAKE 1 TABLET BY MOUTH EVERY DAY, Disp: 90 tablet, Rfl: 3    blood glucose monitoring (NO BRAND SPECIFIED) meter device kit, Use to test blood sugar 1 times daily or as directed., Disp: 1 kit, Rfl: 0    cetirizine (ZYRTEC) 10 MG tablet, TAKE 1 TABLET (10 MG) BY MOUTH DAILY., Disp: 90 tablet, Rfl: 1    ClonAZEPAM (KLONOPIN) 0.5 MG tablet, Take 0.5 mg by mouth Take 1 tablet by mouth daily at bedtime, Disp: , Rfl:     Continuous Glucose Sensor (FREESTYLE SARAHY 2 SENSOR) MISC, USE 1 SENSOR EVERY 14 DAYS. USE TO READ BLOOD SUGARS PER 'S INSTRUCTIONS., Disp: 6 each, Rfl: 5    ferrous sulfate (FEROSUL) 325 (65 Fe) MG tablet, TAKE 1 TABLET BY MOUTH EVERY DAY WITH BREAKFAST, Disp: 90 tablet, Rfl: 1    fluticasone-salmeterol (ADVAIR) 250-50 MCG/ACT inhaler, TAKE 1 PUFF BY MOUTH TWICE A DAY Strength: 250-50 MCG/ACT, Disp: 3 each, Rfl: 3    furosemide (LASIX) 20 MG tablet, TAKE 2 TABLETS BY MOUTH IN THE MORNING AND 1 TABLET BY MOUTH AT LEAST 6 HOURS LATER IN THE AFTERNOON., Disp: 270 tablet, Rfl: 1    Lancets (ONETOUCH DELICA PLUS OEMTMH90Z) MISC, USE ONCE DAILY AS DIRECTED, Disp: 100 each, Rfl: 1    latanoprost (XALATAN) 0.005 % ophthalmic solution, Place 1 drop into both eyes at bedtime., Disp: 7.5 mL, Rfl: 3    lisinopril (ZESTRIL) 40  MG tablet, TAKE 1 TABLET BY MOUTH EVERY DAY, Disp: 90 tablet, Rfl: 1    metFORMIN (GLUCOPHAGE XR) 500 MG 24 hr tablet, TAKE 1 TABLET BY MOUTH TWICE A DAY WITH FOOD, Disp: 180 tablet, Rfl: 3    ONETOUCH VERIO IQ test strip, USE TO TEST BLOOD SUGAR 3 TIMES DAILY OR AS DIRECTED., Disp: 300 strip, Rfl: 1    OXcarbazepine (TRILEPTAL) 300 MG tablet, TAKE 3 TABLETS IN THE MORNING AND 3 TABLETS IN THE EVENING, Disp: 180 tablet, Rfl: 2    pantoprazole (PROTONIX) 20 MG EC tablet, TAKE 1 TABLET BY MOUTH EVERY DAY, Disp: 90 tablet, Rfl: 2    predniSONE (DELTASONE) 5 MG tablet, TAKE 2 TABLETS BY MOUTH EVERY DAY, Disp: 60 tablet, Rfl: 2    pyRIDostigmine (MESTINON) 60 MG tablet, TAKE 2 TABLETS (120 MG) IN THE MORNING AND AFTERNOON AND 1 TABLET IN THE EVENING PER 4/22/2024 APPT., Disp: 450 tablet, Rfl: 0    sodium bicarbonate 650 MG tablet, TAKE 1 TABLET (650 MG) BY MOUTH 2 TIMES DAILY, Disp: 180 tablet, Rfl: 1    timolol maleate (TIMOPTIC) 0.5 % ophthalmic solution, Place 1 drop into both eyes 2 times daily., Disp: 5 mL, Rfl: 11      Physical Exam:  /69 (BP Location: Right arm, Patient Position: Chair, Cuff Size: Adult Regular)   Pulse 61   Wt 104.3 kg (230 lb)   SpO2 97%   BMI 33.00 kg/m    GENERAL: Well developed, well nourished, alert, and in no apparent distress.  HEENT: Normocephalic, atraumatic. PERRL, EOMI. Oral mucosa is moist. No perioral cyanosis.  NECK: supple, no masses, no thyromegaly.  RESP:  Normal respiratory effort.  CTAB.  No rales, wheezes, rhonchi.  No cyanosis or clubbing.  CV: Normal S1, S2, regular rhythm, normal rate. No murmur.  No LE edema.   ABDOMEN:  Soft, non-tender, non-distended.   SKIN: warm and dry. No rash.  NEURO: AAOx3.  Normal gait.  Fluent speech.  PSYCH: mentation appears normal.   Results:  PFTs: Mild obstruction with stable lung functions  Most Recent [facility] Pulmonary Function Testing    FVC-Pred   Date Value Ref Range Status   06/29/2022 3.96 L      FVC-Pre   Date Value Ref  "Range Status   06/29/2022 3.14 L      FVC-%Pred-Pre   Date Value Ref Range Status   06/29/2022 79 %      FEV1-Pre   Date Value Ref Range Status   06/29/2022 2.05 L      FEV1-%Pred-Pre   Date Value Ref Range Status   06/29/2022 68 %      FEV1FVC-Pred   Date Value Ref Range Status   06/29/2022 76 %      FEV1FVC-Pre   Date Value Ref Range Status   06/29/2022 65 %      No results found for: \"20029\"  FEFMax-Pred   Date Value Ref Range Status   06/29/2022 7.72 L/sec      FEFMax-Pre   Date Value Ref Range Status   06/29/2022 6.76 L/sec      FEFMax-%Pred-Pre   Date Value Ref Range Status   06/29/2022 87 %      ExpTime-Pre   Date Value Ref Range Status   06/29/2022 9.73 sec      FIFMax-Pre   Date Value Ref Range Status   06/29/2022 5.83 L/sec      FEV1FEV6-Pred   Date Value Ref Range Status   06/29/2022 77 %      FEV1FEV6-Pre   Date Value Ref Range Status   06/29/2022 68 %      No results found for: \"20055\"  Imaging:  XRAY 6/16/2022:  Chest 2 views:  Personally reviewed by me - no airspace, soft tissue, cardiac or bony abnormalities.    Assessment and Plan:   COPD (Group B)  He has done well with intermediate dose salmeterol/fluticasone and I will continue him on the current regimen. CAT score is 15 today which is slightly worse.  Continue albuterol inhaler as needed and I advised him to use albuterol more often. He is hesitant to escalate to a LABA/LAMA or LAMA/LABA/ICS  He is fairly active at baseline.  He does not qualify for lung cancer screening    Questions and concerns were answered to the patient's satisfaction.  he was provided with my contact information should new questions or concerns arise in the interim.  he should return to clinic in 12 months  Up to date on vaccination    I spent 40 minutes on the date of the encounter doing chart review, history and exam, documentation and further coordination as noted above exclusive of time interpreting PFT, Chest Xray, CT Chest.     Servando Mejia MD  Pulmonary, Critical " Care and Sleep Medicine  [facility]  Pager: [phone]        The above note was dictated using voice recognition software and may include typographical errors. Please contact the author for any clarifications.

## 2025-05-11 ENCOUNTER — HEALTH MAINTENANCE LETTER (OUTPATIENT)
Age: 80
End: 2025-05-11

## 2025-05-20 ENCOUNTER — LAB (OUTPATIENT)
Dept: LAB | Facility: CLINIC | Age: 80
End: 2025-05-20
Payer: COMMERCIAL

## 2025-05-20 ENCOUNTER — TELEPHONE (OUTPATIENT)
Dept: UROLOGY | Facility: CLINIC | Age: 80
End: 2025-05-20

## 2025-05-20 ENCOUNTER — RESULTS FOLLOW-UP (OUTPATIENT)
Dept: FAMILY MEDICINE | Facility: CLINIC | Age: 80
End: 2025-05-20

## 2025-05-20 DIAGNOSIS — E11.69 TYPE 2 DIABETES MELLITUS WITH OTHER SPECIFIED COMPLICATION, WITHOUT LONG-TERM CURRENT USE OF INSULIN (H): Primary | ICD-10-CM

## 2025-05-20 DIAGNOSIS — R30.0 DYSURIA: ICD-10-CM

## 2025-05-20 DIAGNOSIS — R30.0 DYSURIA: Primary | ICD-10-CM

## 2025-05-20 LAB
ALBUMIN UR-MCNC: NEGATIVE MG/DL
APPEARANCE UR: CLEAR
BACTERIA #/AREA URNS HPF: ABNORMAL /HPF
BILIRUB UR QL STRIP: NEGATIVE
COLOR UR AUTO: YELLOW
CREAT UR-MCNC: 80.1 MG/DL
EST. AVERAGE GLUCOSE BLD GHB EST-MCNC: 128 MG/DL
GLUCOSE UR STRIP-MCNC: NEGATIVE MG/DL
HBA1C MFR BLD: 6.1 % (ref 0–5.6)
HGB UR QL STRIP: ABNORMAL
KETONES UR STRIP-MCNC: NEGATIVE MG/DL
LEUKOCYTE ESTERASE UR QL STRIP: ABNORMAL
MICROALBUMIN UR-MCNC: 16.2 MG/L
MICROALBUMIN/CREAT UR: 20.22 MG/G CR (ref 0–17)
NITRATE UR QL: NEGATIVE
PH UR STRIP: 7 [PH] (ref 5–7)
RBC #/AREA URNS AUTO: ABNORMAL /HPF
SP GR UR STRIP: 1.01 (ref 1–1.03)
UROBILINOGEN UR STRIP-ACNC: 0.2 E.U./DL
WBC #/AREA URNS AUTO: ABNORMAL /HPF

## 2025-05-20 PROCEDURE — 81001 URINALYSIS AUTO W/SCOPE: CPT

## 2025-05-20 PROCEDURE — 83036 HEMOGLOBIN GLYCOSYLATED A1C: CPT

## 2025-05-20 PROCEDURE — 36415 COLL VENOUS BLD VENIPUNCTURE: CPT

## 2025-05-20 PROCEDURE — 82043 UR ALBUMIN QUANTITATIVE: CPT

## 2025-05-20 PROCEDURE — 82570 ASSAY OF URINE CREATININE: CPT

## 2025-05-20 NOTE — CONFIDENTIAL NOTE
Based on symptoms of Burning/ pain with urination UA with reflex to microscopic and culture was ordered per UTI protocol. We will follow up with patient once the results are available.    Milady HORN RN Urology 5/20/2025 7:54 AM

## 2025-05-20 NOTE — TELEPHONE ENCOUNTER
Patient calling. Patient reports that he believes he may have a UTI due to urinary symptoms of buring sensation and light pink urine stream.  Patient reports that he has an eye surgery scheduled this afternoon and is concerned about going ahead with the procedure if he has an infection.   Patient added to [facility] lab schedule today at 9:00am.  Routing to Dr. Emery to confirm OK for urine test.  Josefa Winter, CMA

## 2025-05-21 ENCOUNTER — TELEPHONE (OUTPATIENT)
Dept: UROLOGY | Facility: CLINIC | Age: 80
End: 2025-05-21
Payer: COMMERCIAL

## 2025-05-21 DIAGNOSIS — R31.9 HEMATURIA: Primary | ICD-10-CM

## 2025-05-21 RX ORDER — CEPHALEXIN 500 MG/1
500 CAPSULE ORAL 3 TIMES DAILY
Qty: 9 CAPSULE | Refills: 0 | Status: SHIPPED | OUTPATIENT
Start: 2025-05-21 | End: 2025-05-24

## 2025-05-21 NOTE — CONFIDENTIAL NOTE
Writer called and spoke with patient.    Pt reports large clots come from penis with every urination. 1/4 inch diameter.  Pt is on water pills, he urinates every 20 minutes, He urinates 2oz every time.  Pt is not on blood thinners.  Bright red blood.   Stinging pain with urination.   Hx: Prostate cancer with cryotherapy    1100 spot on hold on Friday.   Routing to MD to determine if anything is needed prior, imaging or labs.    Milady HORN RN   [facility], Urology   5/21/2025 9:30 AM

## 2025-05-21 NOTE — CONFIDENTIAL NOTE
Orders entered.  Keflex  CT Urogram  UA.UC and cytology to be done at clinic with cysto.     Writer called and spoke with pt and his wife. Due to MD being out next week, would like to get pt in for cysto, UA/UC, and Cytology on Friday.      Signed Prescriptions:                        Disp   Refills    cephALEXin (KEFLEX) 500 MG capsule         9 caps*0        Sig: Take 1 capsule (500 mg) by mouth 3 times daily for 3           days.  Authorizing Provider: SHARAN THURMAN  Ordering User: EPI GONZALEZ RN   [facility], Urology   5/21/2025 11:39 AM

## 2025-05-21 NOTE — TELEPHONE ENCOUNTER
Patient calling office. He reports that he now has blood clots in his urine.  Patient advised that RN will call him back to discuss new symptom.  Josefa Winter, CMA

## 2025-05-29 ENCOUNTER — OFFICE VISIT (OUTPATIENT)
Dept: INTERNAL MEDICINE | Facility: CLINIC | Age: 80
End: 2025-05-29
Payer: COMMERCIAL

## 2025-05-29 VITALS
DIASTOLIC BLOOD PRESSURE: 84 MMHG | BODY MASS INDEX: 33.36 KG/M2 | RESPIRATION RATE: 18 BRPM | OXYGEN SATURATION: 97 % | TEMPERATURE: 98 F | SYSTOLIC BLOOD PRESSURE: 126 MMHG | WEIGHT: 233 LBS | HEART RATE: 63 BPM | HEIGHT: 70 IN

## 2025-05-29 DIAGNOSIS — R42 DIZZINESS: ICD-10-CM

## 2025-05-29 DIAGNOSIS — M79.10 MUSCLE PAIN: ICD-10-CM

## 2025-05-29 DIAGNOSIS — G70.00 MYASTHENIA GRAVIS WITHOUT EXACERBATION (H): ICD-10-CM

## 2025-05-29 DIAGNOSIS — R11.0 NAUSEA: ICD-10-CM

## 2025-05-29 DIAGNOSIS — G50.0 TRIGEMINAL NEURALGIA: ICD-10-CM

## 2025-05-29 DIAGNOSIS — G70.00 MYASTHENIA GRAVIS, ACHR ANTIBODY POSITIVE (H): ICD-10-CM

## 2025-05-29 DIAGNOSIS — Z13.29 SCREENING FOR ENDOCRINE DISORDER: ICD-10-CM

## 2025-05-29 DIAGNOSIS — G89.29 CHRONIC NONINTRACTABLE HEADACHE, UNSPECIFIED HEADACHE TYPE: Primary | ICD-10-CM

## 2025-05-29 DIAGNOSIS — H53.8 BLURRED VISION: ICD-10-CM

## 2025-05-29 DIAGNOSIS — D50.9 IRON DEFICIENCY ANEMIA, UNSPECIFIED IRON DEFICIENCY ANEMIA TYPE: ICD-10-CM

## 2025-05-29 DIAGNOSIS — E66.01 MORBID OBESITY (H): ICD-10-CM

## 2025-05-29 DIAGNOSIS — R51.9 CHRONIC NONINTRACTABLE HEADACHE, UNSPECIFIED HEADACHE TYPE: Primary | ICD-10-CM

## 2025-05-29 LAB
BASOPHILS # BLD AUTO: 0 10E3/UL (ref 0–0.2)
BASOPHILS NFR BLD AUTO: 0 %
EOSINOPHIL # BLD AUTO: 0 10E3/UL (ref 0–0.7)
EOSINOPHIL NFR BLD AUTO: 0 %
ERYTHROCYTE [DISTWIDTH] IN BLOOD BY AUTOMATED COUNT: 11.8 % (ref 10–15)
HCT VFR BLD AUTO: 43.9 % (ref 40–53)
HGB BLD-MCNC: 15.3 G/DL (ref 13.3–17.7)
IMM GRANULOCYTES # BLD: 0.1 10E3/UL
IMM GRANULOCYTES NFR BLD: 1 %
LYMPHOCYTES # BLD AUTO: 1 10E3/UL (ref 0.8–5.3)
LYMPHOCYTES NFR BLD AUTO: 8 %
MCH RBC QN AUTO: 31.3 PG (ref 26.5–33)
MCHC RBC AUTO-ENTMCNC: 34.9 G/DL (ref 31.5–36.5)
MCV RBC AUTO: 90 FL (ref 78–100)
MONOCYTES # BLD AUTO: 0.8 10E3/UL (ref 0–1.3)
MONOCYTES NFR BLD AUTO: 6 %
NEUTROPHILS # BLD AUTO: 11.2 10E3/UL (ref 1.6–8.3)
NEUTROPHILS NFR BLD AUTO: 85 %
PLATELET # BLD AUTO: 275 10E3/UL (ref 150–450)
RBC # BLD AUTO: 4.89 10E6/UL (ref 4.4–5.9)
WBC # BLD AUTO: 13.1 10E3/UL (ref 4–11)

## 2025-05-29 RX ORDER — PREDNISONE 5 MG/1
10 TABLET ORAL
Qty: 60 TABLET | Refills: 2 | Status: SHIPPED | OUTPATIENT
Start: 2025-05-29

## 2025-05-29 ASSESSMENT — ENCOUNTER SYMPTOMS: NAUSEA: 1

## 2025-05-29 NOTE — PROGRESS NOTES
Assessment & Plan     (R51.9,  G89.29) Chronic nonintractable headache, unspecified headache type  (primary encounter diagnosis)  Comment: Patient seen in clinic today for complaints of headache pain that occurs behind his eye on the left side. Patient has complex medical history that includes myasthenia gravis, history of thiersch grafting with removal of granulation and scar tissue to left ear follows with ENT. Patient also has history of trigeminal neuralgia and is taking oxcarbazepine for symptoms management. Does follow with neurology for that issues. Patient has been increasing his dose of oxcarbazepine to help manage his trigeminal issues and noted an increase in headaches,nausea, dizziness and blurred vision over the last month. Discussed that medication increase could be causing side effects and could be cause of new complaints. Cranial nerves assessed and no issues noted. Discussed labs important to visit. Discussed need for patient to follow up with neurology due to new symptoms and patient history. Patient acknowledged and agreed to plan of care.   Plan: Follow up with neurology.     (H53.8) Blurred vision  Comment:Chronic, unstable.  Patient states he has had an increase in blurred vision over the last month.  Patient has been increasing his dose of oxcarbazepine to help manage his trigeminal issues and noted an increase in headaches,nausea, dizziness and blurred vision over the last month. Discussed that medication increase could be causing side effects and could be cause of new complaints. Discussed need for patient to follow up with neurology due to new symptoms and patient history. Patient acknowledged and agreed to plan of care.   Plan: Follow up with neurology.     (R11.0) Nausea  Comment: Patient states he has started to have nausea.   Patient has been increasing his dose of oxcarbazepine to help manage his trigeminal issues and noted an increase in headaches,nausea, dizziness and blurred  vision over the last month. Discussed that medication increase could be causing side effects and could be cause of new complaints. Discussed need for patient to follow up with neurology due to new symptoms and patient history.    Plan: Follow up with neurology.     (R42) Dizziness  Comment: Chronic, unstable. Patient states he has started to have issues with increased dizziness.  Patient has been increasing his dose of oxcarbazepine to help manage his trigeminal issues and noted an increase in headaches,nausea, dizziness and blurred vision over the last month. Discussed that medication increase could be causing side effects and could be cause of new complaints. Discussed need for patient to follow up with neurology due to new symptoms and patient history.  Patient acknowledged and agreed to plan of care.   Plan: Follow up with neurology.     (M79.10) Muscle pain  Comment: Patient states he has been having issues with muscle pain in thighs that started over the last month. Discussed checking labs to   Plan: Magnesium, Vitamin B12, Vitamin D Deficiency        Pending     (G70.00) Myasthenia gravis without exacerbation (H)  Comment: Chronic, currently stable per patient noted no decrease in muscle strength, noted no drooping of eyelids, denies issues with chewing, swallowing or breathing.    Plan: Continue to monitor     (D50.9) Iron deficiency anemia, unspecified iron deficiency anemia type  Comment: Chronic. Discussed need for labs due to symptoms. Patient acknowledged and agreed to plan of care.   Plan: CBC with platelets and differential,         Comprehensive metabolic panel (BMP + Alb, Alk         Phos, ALT, AST, Total. Bili, TP), Iron and iron        binding capacity, Ferritin        Pending     (Z68.33) Body mass index (BMI) 33.0-33.9, adult  Comment: Discussed need for vitamin D lab.   Plan: Vitamin D Deficiency        Pending     (Z13.29) Screening for endocrine disorder  Comment: Discussed screening thyroid  "  Plan: TSH with free T4 reflex        Pending     (E66.01) Morbid obesity (H)  Comment: Chronic, stable.   Plan: No new concerns noted on exam        Subjective   Rakan is a 79 year old, presenting for the following health issues:  headaches, Nausea, and blurry vision    Nausea  Associated symptoms include nausea.   History of Present Illness       Headaches:   Since the patient's last clinic visit, headaches are: worsened  The patient is getting headaches:  Daily  He is able to do normal daily activities when he has a migraine.  The patient is taking the following rescue/relief medications:  No rescue/relief medications      The patient is taking the following medications to prevent migraines:  No medications to prevent migraines  In the past 4 weeks, the patient has gone to an Urgent Care or Emergency Room 3 or more times times due to headaches. He is missing 1 dose(s) of medications per week.  He is not taking prescribed medications regularly due to remembering to take.                  Review of Systems  Constitutional, HEENT, cardiovascular, pulmonary, gi and gu systems are negative, except as otherwise noted.      Objective    /84 (BP Location: Right arm, Patient Position: Sitting, Cuff Size: Adult Large)   Pulse 63   Temp 98  F (36.7  C) (Temporal)   Resp 18   Ht 1.778 m (5' 10\")   Wt 105.7 kg (233 lb)   SpO2 97%   BMI 33.43 kg/m    Body mass index is 33.43 kg/m .  Physical Exam  Constitutional:       Appearance: Normal appearance.   HENT:      Head: Normocephalic.      Nose: Nose normal. No congestion or rhinorrhea.   Eyes:      General:         Right eye: No discharge.         Left eye: No discharge.      Pupils: Pupils are equal, round, and reactive to light.   Cardiovascular:      Rate and Rhythm: Normal rate and regular rhythm.      Pulses: Normal pulses.      Heart sounds: Normal heart sounds. No murmur heard.     No friction rub.   Pulmonary:      Effort: Pulmonary effort is normal. No " respiratory distress.      Breath sounds: Normal breath sounds. No stridor. No wheezing or rhonchi.   Abdominal:      General: Bowel sounds are normal. There is no distension.      Palpations: There is no mass.      Tenderness: There is no abdominal tenderness. There is no guarding or rebound.      Hernia: No hernia is present.   Musculoskeletal:         General: No swelling, tenderness, deformity or signs of injury. Normal range of motion.      Cervical back: Normal range of motion. No rigidity or tenderness.      Right lower leg: No edema.      Left lower leg: No edema.   Skin:     General: Skin is warm and dry.      Coloration: Skin is not jaundiced or pale.      Findings: No bruising, erythema, lesion or rash.   Neurological:      General: No focal deficit present.      Mental Status: He is alert and oriented to person, place, and time.      Cranial Nerves: No cranial nerve deficit.      Sensory: No sensory deficit.      Motor: No weakness.      Coordination: Coordination normal.      Gait: Gait normal.   Psychiatric:         Mood and Affect: Mood normal.         Behavior: Behavior normal.         Thought Content: Thought content normal.         Judgment: Judgment normal.            Results for orders placed or performed in visit on 05/29/25   Creatinine POCT     Status: Normal   Result Value Ref Range    Creatinine POCT 1.2 0.7 - 1.2 mg/dL    GFR, ESTIMATED POCT >60 >60 mL/min/1.73m2    Narrative    Reference intervals for this test were updated on 03/10/2025 to standardize reference intervals for creatinine measured by different instruments. There may be differences in the flagging of prior results with similar values performed with this method. Those prior results can be interpreted in the context of the updated reference intervals.   Results for orders placed or performed in visit on 05/29/25   CBC with platelets and differential     Status: Abnormal   Result Value Ref Range    WBC Count 13.1 (H) 4.0 - 11.0  10e3/uL    RBC Count 4.89 4.40 - 5.90 10e6/uL    Hemoglobin 15.3 13.3 - 17.7 g/dL    Hematocrit 43.9 40.0 - 53.0 %    MCV 90 78 - 100 fL    MCH 31.3 26.5 - 33.0 pg    MCHC 34.9 31.5 - 36.5 g/dL    RDW 11.8 10.0 - 15.0 %    Platelet Count 275 150 - 450 10e3/uL    % Neutrophils 85 %    % Lymphocytes 8 %    % Monocytes 6 %    % Eosinophils 0 %    % Basophils 0 %    % Immature Granulocytes 1 %    Absolute Neutrophils 11.2 (H) 1.6 - 8.3 10e3/uL    Absolute Lymphocytes 1.0 0.8 - 5.3 10e3/uL    Absolute Monocytes 0.8 0.0 - 1.3 10e3/uL    Absolute Eosinophils 0.0 0.0 - 0.7 10e3/uL    Absolute Basophils 0.0 0.0 - 0.2 10e3/uL    Absolute Immature Granulocytes 0.1 <=0.4 10e3/uL   CBC with platelets and differential     Status: Abnormal    Narrative    The following orders were created for panel order CBC with platelets and differential.  Procedure                               Abnormality         Status                     ---------                               -----------         ------                     CBC with platelets and ...[[id number]]  Abnormal            Final result                 Please view results for these tests on the individual orders.         I spent a total of 60 minutes on the day of the visit.   Time spent by me today doing chart review, history and exam, documentation and further activities per the note    Signed Electronically by: JACK Tabor CNP

## 2025-05-30 ENCOUNTER — RESULTS FOLLOW-UP (OUTPATIENT)
Dept: FAMILY MEDICINE | Facility: CLINIC | Age: 80
End: 2025-05-30

## 2025-05-30 DIAGNOSIS — R79.89 LOW SERUM CHLORIDE: ICD-10-CM

## 2025-05-30 DIAGNOSIS — E87.1 HYPONATREMIA: Primary | ICD-10-CM

## 2025-06-02 ENCOUNTER — OFFICE VISIT (OUTPATIENT)
Dept: FAMILY MEDICINE | Facility: CLINIC | Age: 80
End: 2025-06-02
Payer: COMMERCIAL

## 2025-06-02 ENCOUNTER — RESULTS FOLLOW-UP (OUTPATIENT)
Dept: RHEUMATOLOGY | Facility: CLINIC | Age: 80
End: 2025-06-02

## 2025-06-02 VITALS
RESPIRATION RATE: 18 BRPM | SYSTOLIC BLOOD PRESSURE: 136 MMHG | BODY MASS INDEX: 33.5 KG/M2 | TEMPERATURE: 98 F | OXYGEN SATURATION: 99 % | HEART RATE: 59 BPM | WEIGHT: 234 LBS | HEIGHT: 70 IN | DIASTOLIC BLOOD PRESSURE: 71 MMHG

## 2025-06-02 DIAGNOSIS — R51.9 HEADACHE DISORDER: ICD-10-CM

## 2025-06-02 DIAGNOSIS — M79.602 LEFT ARM PAIN: Primary | ICD-10-CM

## 2025-06-02 DIAGNOSIS — R53.83 OTHER FATIGUE: ICD-10-CM

## 2025-06-02 DIAGNOSIS — E87.1 HYPONATREMIA: ICD-10-CM

## 2025-06-02 DIAGNOSIS — S00.561D: ICD-10-CM
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
## 2025-06-02 PROCEDURE — 99214 OFFICE O/P EST MOD 30 MIN: CPT | Performed by: FAMILY MEDICINE

## 2025-06-02 PROCEDURE — 3078F DIAST BP <80 MM HG: CPT | Performed by: FAMILY MEDICINE

## 2025-06-02 PROCEDURE — 3075F SYST BP GE 130 - 139MM HG: CPT | Performed by: FAMILY MEDICINE

## 2025-06-02 NOTE — PROGRESS NOTES
Assessment & Plan     Left arm pain     Patient was seen in the ER for left arm numbness; workup for heart attack and stroke were negative.    Other fatigue    -Possibly related to hyponatremia, improving after sodium supplementation.    Headache disorder   -Could have been related to hyponatremia, eyestrain or regular headache, had imaging of the brain which was normal    Tick bite of lip, subsequent encounter    - Completed treatment with antibiotic, does not like you to have Lyme disease    Hyponatremia    -Been ongoing, was well-controlled on sodium supplementation.  Recently found to be very low possibly following treatment with antibiotic is also taking two medications that might cause hyponatremia that is Lasix and oxcarbazepine.  Currently on sodium tablets 3 times daily and recheck sodium in 1 week.  Will discuss Lasix after getting the results; though would like to continue because helps with the leg swelling      MED REC REQUIRED  Post Medication Reconciliation Status: patient was not discharged from an inpatient facility or TCU    Follow-up       Subjective   Rakan is a 79 year old, presenting for the following health issues:  Hospital F/U  Patient was in the ER for left arm numbness; workup with CT angio head and neck was negative for stroke.  Cardiac evaluation was also negative for MI.  Overall feeling better now    Headaches:  Still has headaches; on and off mostly frontal.   HA location been changing was in the back of the hea  Feels like has pressurein the frontal area.  Has no headache at this time.    Fatigue/Hyponatremia  Has been seen prior to ER visit found to have hyponatremia, in the ER was given IV sodium replacement.  Had also been given sodium tablets taking 3 times a day and due for sodium recheck in a week.  No at the visit that new 1 yesterday they just indicated because there is few the new 1 here that the vehicle so that is 1 year others okay    Hx tick bite:  Had tick bite on the  "right side of the lip; was given antibiotic    History of Present Illness       Headaches:   Since the patient's last clinic visit, headaches are: worsened  The patient is getting headaches:  Daily  He is able to do normal daily activities when he has a migraine.  The patient is taking the following rescue/relief medications:  No rescue/relief medications      The patient is taking the following medications to prevent migraines:  No medications to prevent migraines  In the past 4 weeks, the patient has gone to an Urgent Care or Emergency Room 3 or more times times due to headaches. He is missing 1 dose(s) of medications per week.  He is not taking prescribed medications regularly due to remembering to take.        Review of Systems  Constitutional, HEENT, cardiovascular, pulmonary, gi and gu systems are negative, except as otherwise noted.      Objective    /71   Pulse 59   Temp 98  F (36.7  C) (Temporal)   Resp 18   Ht 1.778 m (5' 10\")   Wt 106.1 kg (234 lb)   SpO2 99%   BMI 33.58 kg/m    Body mass index is 33.58 kg/m .  Physical Exam   GENERAL: alert and no distress  RESP: lungs clear to auscultation - no rales, rhonchi or wheezes  CV: regular rate and rhythm, no murmur, click or rub, no peripheral edema  ABDOMEN: soft, nontender, no hepatosplenomegaly, no masses and bowel sounds normal  MS: no gross musculoskeletal defects noted, no edema  NEURO: Normal strength and tone, mentation intact and speech normal    Signed Electronically by: Telly Lucio MD    "

## 2025-06-03 DIAGNOSIS — E11.69 TYPE 2 DIABETES MELLITUS WITH OTHER SPECIFIED COMPLICATION, WITHOUT LONG-TERM CURRENT USE OF INSULIN (H): ICD-10-CM

## 2025-06-03 RX ORDER — METFORMIN HYDROCHLORIDE 500 MG/1
TABLET, EXTENDED RELEASE ORAL
Qty: 180 TABLET | Refills: 0 | Status: SHIPPED | OUTPATIENT
Start: 2025-06-03

## 2025-06-09 ENCOUNTER — LAB (OUTPATIENT)
Dept: LAB | Facility: CLINIC | Age: 80
End: 2025-06-09
Payer: COMMERCIAL

## 2025-06-09 DIAGNOSIS — R79.89 LOW SERUM CHLORIDE: ICD-10-CM

## 2025-06-09 DIAGNOSIS — E87.1 HYPONATREMIA: ICD-10-CM

## 2025-06-09 LAB
ANION GAP SERPL CALCULATED.3IONS-SCNC: 11 MMOL/L (ref 7–15)
BUN SERPL-MCNC: 15.6 MG/DL (ref 8–23)
CALCIUM SERPL-MCNC: 9.7 MG/DL (ref 8.8–10.4)
CHLORIDE SERPL-SCNC: 95 MMOL/L (ref 98–107)
CREAT SERPL-MCNC: 1.04 MG/DL (ref 0.67–1.17)
EGFRCR SERPLBLD CKD-EPI 2021: 74 ML/MIN/1.73M2
GLUCOSE SERPL-MCNC: 132 MG/DL (ref 70–99)
HCO3 SERPL-SCNC: 26 MMOL/L (ref 22–29)
POTASSIUM SERPL-SCNC: 4.3 MMOL/L (ref 3.4–5.3)
SODIUM SERPL-SCNC: 132 MMOL/L (ref 135–145)

## 2025-06-09 PROCEDURE — 80048 BASIC METABOLIC PNL TOTAL CA: CPT

## 2025-06-09 PROCEDURE — 36415 COLL VENOUS BLD VENIPUNCTURE: CPT

## 2025-06-10 ENCOUNTER — RESULTS FOLLOW-UP (OUTPATIENT)
Dept: INTERNAL MEDICINE | Facility: CLINIC | Age: 80
End: 2025-06-10

## 2025-06-10 DIAGNOSIS — E87.1 HYPONATREMIA: Primary | ICD-10-CM

## 2025-06-18 DIAGNOSIS — G50.0 TRIGEMINAL NEURALGIA: ICD-10-CM

## 2025-06-18 RX ORDER — OXCARBAZEPINE 300 MG/1
900 TABLET, FILM COATED ORAL 2 TIMES DAILY
Qty: 540 TABLET | Refills: 0 | Status: SHIPPED | OUTPATIENT
Start: 2025-06-18

## 2025-06-24 ENCOUNTER — LAB (OUTPATIENT)
Dept: LAB | Facility: CLINIC | Age: 80
End: 2025-06-24
Payer: COMMERCIAL

## 2025-06-24 ENCOUNTER — RESULTS FOLLOW-UP (OUTPATIENT)
Dept: FAMILY MEDICINE | Facility: CLINIC | Age: 80
End: 2025-06-24

## 2025-06-24 DIAGNOSIS — E87.1 HYPONATREMIA: ICD-10-CM

## 2025-06-24 DIAGNOSIS — E87.1 HYPONATREMIA: Primary | ICD-10-CM

## 2025-06-24 LAB — SODIUM SERPL-SCNC: 137 MMOL/L (ref 135–145)

## 2025-06-24 PROCEDURE — 36415 COLL VENOUS BLD VENIPUNCTURE: CPT

## 2025-06-24 PROCEDURE — 84295 ASSAY OF SERUM SODIUM: CPT

## 2025-06-26 NOTE — TELEPHONE ENCOUNTER
Order placed for sodium recheck patient had been on antibiotic and saw reduction of sodium levels. Patient started taking 3 tablets and noted improvement in level now in normal range but is no longer on antibiotic. Discussed going back to 2 tablets and rechecking in 1 month to see if patient can remain on just two tablet.

## 2025-07-01 DIAGNOSIS — R60.0 PEDAL EDEMA: ICD-10-CM

## 2025-07-01 RX ORDER — FUROSEMIDE 20 MG/1
TABLET ORAL
Qty: 270 TABLET | Refills: 0 | Status: SHIPPED | OUTPATIENT
Start: 2025-07-01

## 2025-07-01 RX ORDER — FUROSEMIDE 20 MG/1
TABLET ORAL
Qty: 180 TABLET | OUTPATIENT
Start: 2025-07-01

## 2025-07-05 DIAGNOSIS — G70.00 OCULAR MYASTHENIA (H): ICD-10-CM

## 2025-07-07 ENCOUNTER — OFFICE VISIT (OUTPATIENT)
Dept: FAMILY MEDICINE | Facility: CLINIC | Age: 80
End: 2025-07-07
Payer: COMMERCIAL

## 2025-07-07 VITALS
HEIGHT: 70 IN | RESPIRATION RATE: 16 BRPM | TEMPERATURE: 97.4 F | BODY MASS INDEX: 34.22 KG/M2 | WEIGHT: 239 LBS | HEART RATE: 62 BPM | DIASTOLIC BLOOD PRESSURE: 74 MMHG | OXYGEN SATURATION: 97 % | SYSTOLIC BLOOD PRESSURE: 138 MMHG

## 2025-07-07 DIAGNOSIS — Z01.818 PREOP GENERAL PHYSICAL EXAM: Primary | ICD-10-CM

## 2025-07-07 DIAGNOSIS — G70.00 MYASTHENIA GRAVIS WITHOUT EXACERBATION (H): ICD-10-CM

## 2025-07-07 DIAGNOSIS — M25.572 CHRONIC PAIN OF BOTH ANKLES: ICD-10-CM

## 2025-07-07 DIAGNOSIS — H35.9 RETINA DISORDER, LEFT: ICD-10-CM

## 2025-07-07 DIAGNOSIS — I10 HYPERTENSION GOAL BP (BLOOD PRESSURE) < 140/90: ICD-10-CM

## 2025-07-07 DIAGNOSIS — G89.29 CHRONIC PAIN OF BOTH ANKLES: ICD-10-CM

## 2025-07-07 DIAGNOSIS — Z99.89 DOES MOBILIZE USING CANE: ICD-10-CM

## 2025-07-07 DIAGNOSIS — E78.5 HYPERLIPIDEMIA LDL GOAL <100: ICD-10-CM

## 2025-07-07 DIAGNOSIS — M17.0 PRIMARY OSTEOARTHRITIS OF BOTH KNEES: ICD-10-CM

## 2025-07-07 DIAGNOSIS — E11.9 TYPE 2 DIABETES MELLITUS WITHOUT COMPLICATION, WITHOUT LONG-TERM CURRENT USE OF INSULIN (H): ICD-10-CM

## 2025-07-07 DIAGNOSIS — M25.571 CHRONIC PAIN OF BOTH ANKLES: ICD-10-CM

## 2025-07-07 PROCEDURE — 91320 SARSCV2 VAC 30MCG TRS-SUC IM: CPT | Performed by: FAMILY MEDICINE

## 2025-07-07 PROCEDURE — 99214 OFFICE O/P EST MOD 30 MIN: CPT | Mod: 25 | Performed by: FAMILY MEDICINE

## 2025-07-07 PROCEDURE — 3075F SYST BP GE 130 - 139MM HG: CPT | Performed by: FAMILY MEDICINE

## 2025-07-07 PROCEDURE — 90480 ADMN SARSCOV2 VAC 1/ONLY CMP: CPT | Performed by: FAMILY MEDICINE

## 2025-07-07 PROCEDURE — 3078F DIAST BP <80 MM HG: CPT | Performed by: FAMILY MEDICINE

## 2025-07-07 RX ORDER — PYRIDOSTIGMINE BROMIDE 60 MG/1
TABLET ORAL
Qty: 450 TABLET | Refills: 0 | Status: SHIPPED | OUTPATIENT
Start: 2025-07-07

## 2025-07-07 NOTE — PROGRESS NOTES
Preoperative Evaluation  [facility]  [address] [place] [address]  [place] [address]  Phone: [phone]  Primary Provider: Telly Lucio MD  Pre-op Performing Provider: Telly Lucio MD  Jul 7, 2025     The proposed surgical procedure is considered LOW risk.     Preop general physical exam   - perioperative medication management discussed     Retina disorder    - planned for retina surgery     Type 2 diabetes mellitus with other specified complication, without long-term current use of insulin (H)     Well controlled on Metformin      Hypertension    - well controlled on medications     Hyperlipidemia LDL goal <100    - well controlled on Atorvastatin     Myasthenia gravis (H)    - following with neurology, on Pyridostigmine         - No identified additional risk factors other than previously addressed     Antiplatelet or Anticoagulation Medication Instructions   - We reviewed the medication list and the patient is not on an antiplatelet or anticoagulation medications.     Additional Medication Instructions  Take all scheduled medications on the day of surgery EXCEPT for modifications listed below:   - ACE/ARB/ARNI (lisinopril, enalapril, losartan, valsartan, olmesartan, sacubritril/valsartan) : Continue without modification (e.g., MAC anesthesia, neurosurgery, spine surgery, heart failure, or labile hypertension with risk of hypertension).   - Calcium Channel Blockers (amlodipine, diltiazem, felodipine): To be continued on the day of surgery.   - Diuretics (furosemide, hydrochlorothiazide, chlorothalidone): DO NOT TAKE on the day of surgery.   - Statins (atorvastatin, simvastatin, pravastatin) : Continue taking on the day of surgery.    - metformin: DO NOT TAKE day of surgery.   - Antiepileptics: Continue without modification.   - LABA, inhaled corticosteroid, long-acting anticholinergics: Continue without modification.   - rescue Inhaler: Continue PRN. Bring to hospital on the  day of surgery.   - Rest of medications: Take usual dose on day of surgery     Recommendation  Approval given to proceed with proposed procedure, without further diagnostic evaluation.     Follow-up             7/4/2025   Surgical Information   What procedure is being done? Left eye Retina    Facility or Hospital where procedure/surgery will be performed[facility]   Who is doing the procedure / surgery? Dr. Brown   Date of surgery / procedure: 07/15 2025   Time of surgery / procedure: 10am   Where do you plan to recover after surgery? at home with family     Fax number for surgical facility: [phone]    Stacie Bledsoe is a 79 year old, presenting for the following:  Pre-Op Exam        HPI: Patient planned for left eye retinal procedure         7/4/2025   Pre-Op Questionnaire   Have you ever had a heart attack or stroke? No   Have you ever had surgery on your heart or blood vessels, such as a stent placement, a coronary artery bypass, or surgery on an artery in your head, neck, heart, or legs? No   Do you have chest pain with activity? No   Do you have a history of heart failure? No   Do you currently have a cold, bronchitis or symptoms of other infection? No   Do you have a cough, shortness of breath, or wheezing? (!) YES    Do you or anyone in your family have previous history of blood clots? No   Do you or does anyone in your family have a serious bleeding problem such as prolonged bleeding following surgeries or cuts? No   Have you ever had problems with anemia or been told to take iron pills? (!) YES    Have you had any abnormal blood loss such as black, tarry or bloody stools? No   Have you ever had a blood transfusion? No   Are you willing to have a blood transfusion if it is medically needed before, during, or after your surgery? Yes   Have you or any of your relatives ever had problems with anesthesia? No   Do you have sleep apnea, excessive snoring or daytime drowsiness? (!) YES    Do you have a CPAP machine? Yes   Do you have any artifical heart valves or other implanted medical devices like a pacemaker, defibrillator, or continuous glucose monitor? No   Do you have artificial joints? (!) YES   Are you allergic to latex? No     Advance Care Planning    Discussed advance care planning with patient; however, patient declined at this time.    Preoperative Review of    reviewed - no record of controlled substances prescribed.      Status of Chronic Conditions:  See problem list for active medical problems.  Problems all longstanding and stable.      Patient Active Problem List    Diagnosis Date Noted    Chronic atticoantral suppurative otitis media of left ear 10/22/2024     Priority: Medium    Cholesteatoma, left 10/22/2024     Priority: Medium    Mastoiditis of left side 10/22/2024     Priority: Medium    Osteoarthritis of left knee, unspecified osteoarthritis type 04/01/2024     Priority: Medium    Anemia 09/21/2022     Priority: Medium    Combined form of age-related cataract, left eye 12/15/2021     Priority: Medium     Added automatically from request for surgery [id number]      Hyponatremia 10/01/2020     Priority: Medium    Temporal arteritis (H) 02/20/2019     Priority: Medium    Type 2 diabetes mellitus with other specified complication, without long-term current use of insulin (H) 02/20/2019     Priority: Medium    Right posterior capsular opacification 04/09/2018     Priority: Medium    Morbid obesity (H) 02/20/2018     Priority: Medium    Prostate cancer (H) 01/12/2018     Priority: Medium     prostate cancer s/p cryotherapy on 3/18       Myasthenia gravis (H) 10/16/2017     Priority: Medium    Pseudophakia of right eye 02/21/2017     Priority: Medium    Obstructive sleep apnea syndrome 01/25/2017     Priority: Medium    Restless legs syndrome 10/12/2016     Priority: Medium    H/O RD (retinal detachment) s/p repair with PPV (AB) 05/26/2016     Priority: Medium    Epiretinal  membrane, bilateral 05/26/2016     Priority: Medium    PVD (posterior vitreous detachment) OU 06/17/2014     Priority: Medium    Dry eyes 01/21/2014     Priority: Medium    Arthritis      Priority: Medium    HTN (hypertension) 07/19/2012     Priority: Medium    Trigeminal neuralgia pain 01/04/2012     Priority: Medium    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     Priority: Medium     Target IOP: 21  Peak IOP: 31  GDX: abnormal both eyes  OCT: nl  HVF: nl  Pachymetry:  C/D:  0.2/0.35  SLT:          HLD (hyperlipidemia) 10/31/2010     Priority: Medium    GERD (gastroesophageal reflux disease)      Priority: Medium      Past Medical History:   Diagnosis Date    Arthritis         BMI 31.0-31.9,adult     Cancer (H) 01/10/2018    Prostate    COPD (chronic obstructive pulmonary disease) (H) 10/28/2020    Demand ischemia (H)     Diabetes (H) 01/10/2018    Diverticulosis     Colonoscopy 8/2008    Fatty liver     see US  5/2012    GERD (gastroesophageal reflux disease)     Glaucoma (increased eye pressure)     HTN (hypertension)     Hyperlipidemia LDL goal < 130     age, htn, fhx    Irritable bowel     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     Nonsenile cataract     Obstructive sleep apnea     Obstructive sleep apnea syndrome     ROSIE (obstructive sleep apnea) 01/2011    Using CPAP;     Prediabetes     Restless leg syndrome     Retinal detachment     Sleep apnea     Trigeminal neuralgia pain 01/04/2012     Past Surgical History:   Procedure Laterality Date    ARTHROPLASTY KNEE Left 4/1/2024    Procedure: ARTHROPLASTY, LEFT KNEE, TOTAL;  Surgeon: Dandre Hassan MD;  Location: [facility]    BIOPSY ARTERY TEMPORAL Bilateral 08/01/2017    Procedure: BIOPSY ARTERY TEMPORAL;  Bilateral temporal artery Biopsy;  Surgeon: Jj Tello MD;  Location: [facility]    CATARACT IOL, RT/LT Right     COLONOSCOPY      ESOPHAGOSCOPY, GASTROSCOPY, DUODENOSCOPY (EGD), COMBINED   01/15/2014    Procedure: COMBINED ESOPHAGOSCOPY, GASTROSCOPY, DUODENOSCOPY (EGD), BIOPSY SINGLE OR MULTIPLE;;  Surgeon: Winston Nixon MD;  Location: [facility]    GRAFT SKIN SPLIT THICKNESS FROM HEAD TO HEAD Left 11/7/2024    Procedure: THRESCH GRAFTING, LEFT ARM SPLIT GRAFTING TO LEFT EAR GRANULATION TISSUE REMOVAL, POST AURICULAR SUTURES REMOVAL;  Surgeon: Shayna Delatorre MD;  Location: [facility] OR    LASER YAG CAPSULOTOMY Right 04/09/2018    right eye    MASTOIDECTOMY Left 10/21/2024    Procedure: OPEN CAVITY OF LEFT MASTOIDECTOMY, MEATOPLASTY;  Surgeon: Shayna Delatorre MD;  Location: [facility]    PHACOEMULSIFICATION CLEAR CORNEA WITH STANDARD INTRAOCULAR LENS IMPLANT Right 02/20/2017    Procedure: PHACOEMULSIFICATION CLEAR CORNEA WITH STANDARD INTRAOCULAR LENS IMPLANT;  Surgeon: Mateo Gray MD;  Location: [facility]    PHACOEMULSIFICATION CLEAR CORNEA WITH STANDARD INTRAOCULAR LENS IMPLANT Left 01/10/2022    Procedure: LEFT PHACOEMULSIFICATION, CATARACT, WITH INTRAOCULAR LENS IMPLANT;  Surgeon: Mateo Gray MD;  Location: [facility]    VT TRABECULOPLASTY BY LASER SURGERY      RETINAL REATTACHMENT Right     SURGICAL HISTORY OF -   08/2009    Both Eyelids-.    TONSILLECTOMY  as child     Current Outpatient Medications   Medication Sig Dispense Refill    albuterol (PROAIR HFA/PROVENTIL HFA/VENTOLIN HFA) 108 (90 Base) MCG/ACT inhaler INHALE 1 PUFF INTO THE LUNGS EVERY 4 HOURS AS NEEDED FOR SHORTNESS OF BREATH, WHEEZING OR COUGH 18 g 11    amLODIPine (NORVASC) 10 MG tablet TAKE 1 TABLET (10 MG) BY MOUTH DAILY. 90 tablet 1    ARTIFICIAL TEAR OP Apply 1 drop to eye as needed      atorvastatin (LIPITOR) 20 MG tablet TAKE 1 TABLET BY MOUTH EVERY DAY 90 tablet 3    blood glucose monitoring (NO BRAND SPECIFIED) meter device kit Use to test blood sugar 1 times daily or as directed. 1 kit 0    cetirizine (ZYRTEC) 10 MG tablet TAKE 1 TABLET (10 MG) BY MOUTH DAILY. 90 tablet 1    ClonAZEPAM (KLONOPIN) 0.5  MG tablet Take 0.5 mg by mouth Take 1 tablet by mouth daily at bedtime      Continuous Glucose Sensor (FREESTYLE SARAHY 2 SENSOR) MISC USE 1 SENSOR EVERY 14 DAYS. USE TO READ BLOOD SUGARS PER 'S INSTRUCTIONS. 6 each 5    ferrous sulfate (FEROSUL) 325 (65 Fe) MG tablet TAKE 1 TABLET BY MOUTH EVERY DAY WITH BREAKFAST 90 tablet 1    fluticasone-salmeterol (ADVAIR) 250-50 MCG/ACT inhaler TAKE 1 PUFF BY MOUTH TWICE A DAY Strength: 250-50 MCG/ACT 3 each 3    furosemide (LASIX) 20 MG tablet TAKE 2 TABLETS BY MOUTH IN THE MORNING AND 1 TABLET BY MOUTH AT LEAST 6 HOURS LATER IN THE AFTERNOON. 270 tablet 0    Lancets (ONETOUCH DELICA PLUS [id number]) MISC USE ONCE DAILY AS DIRECTED 100 each 1    latanoprost (XALATAN) 0.005 % ophthalmic solution Place 1 drop into both eyes at bedtime. 7.5 mL 3    lisinopril (ZESTRIL) 40 MG tablet TAKE 1 TABLET BY MOUTH EVERY DAY 90 tablet 1    metFORMIN (GLUCOPHAGE XR) 500 MG 24 hr tablet TAKE 1 TABLET BY MOUTH TWICE A DAY WITH FOOD 180 tablet 0    ONETOUCH VERIO IQ test strip USE TO TEST BLOOD SUGAR 3 TIMES DAILY OR AS DIRECTED. 300 strip 1    OXcarbazepine (TRILEPTAL) 300 MG tablet Take 3 tablets (900 mg) by mouth 2 times daily. 540 tablet 0    pantoprazole (PROTONIX) 20 MG EC tablet TAKE 1 TABLET BY MOUTH EVERY DAY 90 tablet 2    predniSONE (DELTASONE) 5 MG tablet TAKE 2 TABLETS BY MOUTH EVERY DAY 60 tablet 2    pyRIDostigmine (MESTINON) 60 MG tablet TAKE 2 TABLETS (120 MG) IN THE MORNING AND AFTERNOON AND 1 TABLET IN THE EVENING PER 4/22/2024 APPT. 450 tablet 0    sodium bicarbonate 650 MG tablet TAKE 1 TABLET (650 MG) BY MOUTH 2 TIMES DAILY 180 tablet 1    timolol maleate (TIMOPTIC) 0.5 % ophthalmic solution Place 1 drop into both eyes 2 times daily. 5 mL 11       Allergies   Allergen Reactions    Azathioprine Shortness Of Breath and Other (See Comments)     Was hospitalized from it. Also had high fever, chills, and shortness of breath.    Sulfamethoxazole-Trimethoprim Cough,  "Itching and Rash     Generalized painful red rash on legs arms and abdomen, chest, back, cough and  fever  that required hospitalization.    Ciprofloxacin Muscle Pain (Myalgia)     Muscle pain  Other reaction(s): Myalgia  Other reaction(s): Myalgia    Ropinirole      Leg pan  GI  Weakness; ? sycope  Other reaction(s): Leg Pain        Social History     Tobacco Use    Smoking status: Former     Current packs/day: 0.00     Average packs/day: 2.0 packs/day for 15.0 years (30.0 ttl pk-yrs)     Types: Cigarettes     Start date: 1968     Quit date: 1983     Years since quittin.5     Passive exposure: Past    Smokeless tobacco: Former     Quit date: 1970    Tobacco comments:     smoke free household.   Substance Use Topics    Alcohol use: Not Currently     Comment: Quit in      Family History   Problem Relation Age of Onset    Respiratory Mother         copd    LUNG DISEASE Mother     Cerebrovascular Disease Father     Cancer Father     Other Cancer Father     Allergies Brother     Cancer Maternal Grandmother     Heart Disease Paternal Grandmother     Glaucoma Maternal Aunt     Macular Degeneration No family hx of     Anesthesia Reaction No family hx of     Deep Vein Thrombosis (DVT) No family hx of      History   Drug Use No         Review of Systems  Constitutional, neuro, ENT, endocrine, pulmonary, cardiac, gastrointestinal, genitourinary, musculoskeletal, integument and psychiatric systems are negative, except as otherwise noted.    Objective    There were no vitals taken for this visit.   Estimated body mass index is 33.58 kg/m  as calculated from the following:    Height as of 25: 1.778 m (5' 10\").    Weight as of 25: 106.1 kg (234 lb).  Physical Exam  GENERAL: alert and no distress  EYES: Eyes grossly normal to inspection  RESP: lungs clear to auscultation - no rales, rhonchi or wheezes  CV: regular rate and rhythm, no murmur, click or rub, mild peripheral edema  ABDOMEN: soft, " nontender, obese, no masses and bowel sounds normal  MS: no gross musculoskeletal defects noted, mild edema  SKIN: no suspicious lesions or rashes  NEURO: Normal strength and tone, mentation intact and speech normal  PSYCH: mentation appears normal, affect normal/bright    Recent Labs   Lab Test 06/24/25  0949 06/09/25  1255 05/29/25  1440 05/29/25  1204 05/20/25  0902 01/31/25  1046   HGB  --   --   --  15.3  --  15.3   PLT  --   --   --  275  --  264    132*  --  126*  --  137  137   POTASSIUM  --  4.3  --  4.7  --  4.3  4.3   CR  --  1.04 1.2 0.85  --  0.98  0.97   A1C  --   --   --   --  6.1* 6.2*        Diagnostics  No labs were ordered during this visit.   No EKG required for low risk surgery (cataract, skin procedure, breast biopsy, etc).    Component  Ref Range & Units 7/22/24  3:09 PM     Systolic Blood Pressure  mmHg     Diastolic Blood Pressure  mmHg     Ventricular Rate  BPM 61    Atrial Rate  BPM 61    IN Interval  ms 142    QRS Duration  ms 112    QT  ms 424    QTc  ms 426    P Axis  degrees 49    R AXIS  degrees -10    T Axis  degrees 41    Interpretation ECG Sinus rhythm  Incomplete right bundle branch block  Borderline ECG  No previous ECGs available     Revised Cardiac Risk Index (RCRI)  The patient has the following serious cardiovascular risks for perioperative complications:   - No serious cardiac risks = 0 points     RCRI Interpretation: 0 points: Class I (very low risk - 0.4% complication rate)         Signed Electronically by: Telly Lucio MD  A copy of this evaluation report is provided to the requesting physician.

## 2025-07-07 NOTE — PATIENT INSTRUCTIONS
How to Take Your Medication Before Surgery  Preoperative Medication Instructions    - No identified additional risk factors other than previously addressed     Antiplatelet or Anticoagulation Medication Instructions   - We reviewed the medication list and the patient is not on an antiplatelet or anticoagulation medications.     Additional Medication Instructions  Take all scheduled medications on the day of surgery EXCEPT for modifications listed below:   - ACE/ARB/ARNI (lisinopril, enalapril, losartan, valsartan, olmesartan, sacubritril/valsartan) : Continue without modification (e.g., MAC anesthesia, neurosurgery, spine surgery, heart failure, or labile hypertension with risk of hypertension).   - Calcium Channel Blockers (amlodipine, diltiazem, felodipine): To be continued on the day of surgery.   - Diuretics (furosemide, hydrochlorothiazide, chlorothalidone): DO NOT TAKE on the day of surgery.   - Statins (atorvastatin, simvastatin, pravastatin) : Continue taking on the day of surgery.    - metformin: DO NOT TAKE day of surgery.   - Antiepileptics: Continue without modification.   - LABA, inhaled corticosteroid, long-acting anticholinergics: Continue without modification.   - rescue Inhaler: Continue PRN. Bring to hospital on the day of surgery.   - Rest of medications: Take usual dose on day of surgery     Recommendation  Approval given to proceed with proposed procedure, without further diagnostic evaluation.       Patient Education   Preparing for Your Surgery  For Adults  Getting started  In most cases, a nurse will call to review your health history and instructions. They will give you an arrival time based on your scheduled surgery time. Please be ready to share:  Your doctor's clinic name and phone number  Your medical, surgical, and anesthesia history  A list of allergies and sensitivities  A list of medicines, including herbal treatments and over-the-counter drugs  Whether the patient has a legal  guardian (ask how to send us the papers in advance)  Note: You may not receive a call if you were seen at our PAC (Preoperative Assessment Center).  Please tell us if you're pregnant--or if there's any chance you might be pregnant. Some surgeries may injure a fetus (unborn baby), so they require a pregnancy test. Surgeries that are safe for a fetus don't always need a test, and you can choose whether to have one.   Preparing for surgery  Within 10 to 30 days of surgery: Have a pre-op exam (sometimes called an H&P, or History and Physical). This can be done at a clinic or pre-operative center.  If you're having a , you may not need this exam. Talk to your care team.  At your pre-op exam, talk to your care team about all medicines you take. (This includes CBD oil and any drugs, such as THC, marijuana, and other forms of cannabis.) If you need to stop any medicine before surgery, ask when to start taking it again.  This is for your safety. Many medicines and drugs can make you bleed too much during surgery. Some change how well surgery (anesthesia) drugs work.  Call your insurance company to let them know you're having surgery. (If you don't have insurance, call [phone].)  Call your clinic if there's any change in your health. This includes a scrape or scratch near the surgery site, or any signs of a cold (sore throat, runny nose, cough, rash, fever).  Eating and drinking guidelines  For your safety: Unless your surgeon tells you otherwise, follow the guidelines below.  Eat and drink as normal until 8 hours before you arrive for surgery. After that, no food or milk. You can spit out gum when you arrive.  Drink clear liquids until 2 hours before you arrive. These are liquids you can see through, like water, Gatorade, and Propel Water. They also include plain black coffee and tea (no cream or milk).  No alcohol for 24 hours before you arrive. The night before surgery, stop any drinks that contain THC.  If  your care team tells you to take medicine on the morning of surgery, it's okay to take it with a sip of water. No other medicines or drugs are allowed (including CBD oil)--follow your care team's instructions.  If you have questions the day of surgery, call your hospital or surgery center.   Preventing infection  Shower or bathe the night before and the morning of surgery. Follow the instructions your clinic gave you. (If no instructions, use regular soap.)  Don't shave or clip hair near your surgery site. We'll remove the hair if needed.  Don't smoke or vape the morning of surgery. No chewing tobacco for 6 hours before you arrive. A nicotine patch is okay. You may spit out nicotine gum when you arrive.  For some surgeries, the surgeon will tell you to fully quit smoking and nicotine.  We will make every effort to keep you safe from infection. We will:  Clean our hands often with soap and water (or an alcohol-based hand rub).  Clean the skin at your surgery site with a special soap that kills germs.  Give you a special gown to keep you warm. (Cold raises the risk of infection.)  Wear hair covers, masks, gowns, and gloves during surgery.  Give antibiotic medicine, if prescribed. Not all surgeries need this medicine.  What to bring on the day of surgery  Photo ID and insurance card  Copy of your health care directive, if you have one  Glasses and hearing aids (bring cases)  You can't wear contacts during surgery  Inhaler and eye drops, if you use them (tell us about these when you arrive)  CPAP machine or breathing device, if you use them  A few personal items, if spending the night  If you have . . .  A pacemaker, ICD (cardiac defibrillator), or other implant: Bring the ID card.  An implanted stimulator: Bring the remote control.  A legal guardian: Bring a copy of the certified (court-stamped) guardianship papers.  Please remove any jewelry, including body piercings. Leave jewelry and other valuables at home.  If  you're going home the day of surgery  You must have a support person drive you home. They should stay with you overnight, and they may need to help with your self-care.  If you don't have a support person, please tells us as soon as possible. We can help.  After surgery  If it's hard to control your pain or you need more pain medicine, please call your surgeon's office.  Questions?   If you have any questions for your care team, list them here:   ____________________________________________________________________________________________________________________________________________________________________________________________________________________________________________________________  For informational purposes only. Not to replace the advice of your health care provider. Copyright   2003, 2019 [employer]. All rights reserved. Clinically reviewed by Sree Stevenson MD. [employer] 171224 - REV 02/25.

## 2025-07-10 DIAGNOSIS — E11.65 TYPE 2 DIABETES MELLITUS WITH HYPERGLYCEMIA, WITHOUT LONG-TERM CURRENT USE OF INSULIN (H): ICD-10-CM

## 2025-07-10 RX ORDER — LANCETS 30 GAUGE
EACH MISCELLANEOUS
Qty: 100 EACH | Refills: 1 | Status: SHIPPED | OUTPATIENT
Start: 2025-07-10

## 2025-07-14 DIAGNOSIS — J44.9 CHRONIC OBSTRUCTIVE PULMONARY DISEASE, UNSPECIFIED COPD TYPE (H): ICD-10-CM

## 2025-07-14 RX ORDER — FLUTICASONE PROPIONATE AND SALMETEROL 250; 50 UG/1; UG/1
1 POWDER RESPIRATORY (INHALATION) EVERY 12 HOURS
Qty: 180 EACH | Refills: 3 | Status: SHIPPED | OUTPATIENT
Start: 2025-07-14

## 2025-07-14 NOTE — TELEPHONE ENCOUNTER
Disp Refills Start End LAURA   fluticasone-salmeterol (ADVAIR) 250-50 MCG/ACT inhaler 3 each 3 7/12/2023 -- No   Sig: TAKE 1 PUFF BY MOUTH TWICE A DAY Strength: 250-50 MCG/ACT     Last Office Visit: 05/07/2025  Future Office visit: 05/06/2026  Next 5 appointments (look out 90 days)      Jul 25, 2025 9:30 AM  Lab visit with [facility]  [facility] ([facility] [place] [facility]) [address] [place] [id number] [phone]             Routing refill request to provider for review/approval because:     Inhaled Steroids Protocol Failed        Arun Lyn LPN  Pulmonary Medicine:  [facility] [place]  Phone: [phone] Fax: [phone]

## 2025-07-24 DIAGNOSIS — I10 BENIGN ESSENTIAL HYPERTENSION: ICD-10-CM

## 2025-07-24 RX ORDER — LISINOPRIL 40 MG/1
40 TABLET ORAL DAILY
Qty: 90 TABLET | Refills: 1 | Status: SHIPPED | OUTPATIENT
Start: 2025-07-24

## 2025-08-03 ENCOUNTER — MYC MEDICAL ADVICE (OUTPATIENT)
Dept: INTERNAL MEDICINE | Facility: CLINIC | Age: 80
End: 2025-08-03
Payer: COMMERCIAL

## 2025-08-03 DIAGNOSIS — R60.9 EDEMA, UNSPECIFIED TYPE: ICD-10-CM

## 2025-08-04 RX ORDER — SODIUM BICARBONATE 650 MG/1
650 TABLET ORAL 3 TIMES DAILY
Qty: 270 TABLET | Refills: 1 | Status: SHIPPED | OUTPATIENT
Start: 2025-08-04

## 2025-08-23 DIAGNOSIS — G70.00 MYASTHENIA GRAVIS, ACHR ANTIBODY POSITIVE (H): ICD-10-CM

## 2025-08-24 ENCOUNTER — HEALTH MAINTENANCE LETTER (OUTPATIENT)
Age: 80
End: 2025-08-24

## 2025-08-25 RX ORDER — PREDNISONE 5 MG/1
10 TABLET ORAL DAILY
Qty: 60 TABLET | Refills: 1 | Status: SHIPPED | OUTPATIENT
Start: 2025-08-25

## 2025-08-31 DIAGNOSIS — I10 HTN, GOAL BELOW 140/90: ICD-10-CM

## 2025-08-31 DIAGNOSIS — B96.89 ACUTE BACTERIAL RHINOSINUSITIS: ICD-10-CM

## 2025-08-31 DIAGNOSIS — G70.00 OCULAR MYASTHENIA (H): ICD-10-CM

## 2025-08-31 DIAGNOSIS — J01.90 ACUTE BACTERIAL RHINOSINUSITIS: ICD-10-CM

## 2025-08-31 DIAGNOSIS — G50.0 TRIGEMINAL NEURALGIA: ICD-10-CM

## 2025-09-02 RX ORDER — CETIRIZINE HYDROCHLORIDE 10 MG/1
10 TABLET ORAL DAILY
Qty: 90 TABLET | Refills: 1 | Status: SHIPPED | OUTPATIENT
Start: 2025-09-02

## 2025-09-02 RX ORDER — AMLODIPINE BESYLATE 10 MG/1
10 TABLET ORAL DAILY
Qty: 90 TABLET | Refills: 1 | Status: SHIPPED | OUTPATIENT
Start: 2025-09-02

## 2025-09-02 RX ORDER — PYRIDOSTIGMINE BROMIDE 60 MG/1
TABLET ORAL
Qty: 450 TABLET | Refills: 0 | Status: SHIPPED | OUTPATIENT
Start: 2025-09-02

## 2025-09-02 RX ORDER — OXCARBAZEPINE 300 MG/1
900 TABLET, FILM COATED ORAL 2 TIMES DAILY
Qty: 540 TABLET | Refills: 0 | Status: SHIPPED | OUTPATIENT
Start: 2025-09-02

## (undated) DEVICE — LINEN TOWEL PACK X5 5464

## (undated) DEVICE — SPONGE COTTON BALL 1/4" W/STRING 30-00

## (undated) DEVICE — DRAPE MICROSCOPE LEICA 54X120" 09-MK653

## (undated) DEVICE — DRAPE POUCH IRR 1016

## (undated) DEVICE — SOL NACL 0.9% IRRIG 1000ML BOTTLE 2F7124

## (undated) DEVICE — SU SILK 3-0 TIE 24" SA74H

## (undated) DEVICE — GLOVE PROTEXIS W/NEU-THERA 7.5  2D73TE75

## (undated) DEVICE — PREP SKIN SCRUB TRAY 4461A

## (undated) DEVICE — GLOVE PROTEXIS W/NEU-THERA 7.0  2D73TE70

## (undated) DEVICE — BASIN SET MAJOR

## (undated) DEVICE — ESU GROUND PAD ADULT W/CORD E7507

## (undated) DEVICE — EYE SOL BSS 500ML

## (undated) DEVICE — NDL ANGIOCATH 14GA 1.25" 4048

## (undated) DEVICE — DRSG STERI STRIP 1/2X4" R1547

## (undated) DEVICE — BUR STRK ROUND 2.0X30MM 2 FLUTE THRD PRECISION 5540-009-320

## (undated) DEVICE — BLADE DERMATOME ZIMMER  00-8800-000-10

## (undated) DEVICE — SUCTION MANIFOLD NEPTUNE 2 SYS 4 PORT 0702-020-000

## (undated) DEVICE — BONE CEMENT MIXEVAC HI VAC W/CARTRIDGE 0306-563-000

## (undated) DEVICE — DRSG TELFA 3X8" 1238

## (undated) DEVICE — EYE SPONGE SPEAR WECK CEL 0008685

## (undated) DEVICE — SU PROLENE 4-0 RB-1DA 36" 8557H

## (undated) DEVICE — BUR STRK ROUND DIAMOND 3.0MM COARSE EXT 5540-013-330

## (undated) DEVICE — EYE PACK BVI READYPAK KIT #1

## (undated) DEVICE — BNDG ELASTIC 6" DBL LENGTH UNSTERILE 6611-16

## (undated) DEVICE — SU VICRYL 2-0 CT-1 27" UND J259H

## (undated) DEVICE — SUCTION IRR SYSTEM W/O TIP INTERPULSE HANDPIECE 0210-100-000

## (undated) DEVICE — BUR STRK ROUND 3.0X30MM 2 FLUTE THRD PRECISION 5540-010-330

## (undated) DEVICE — DRAPE SHEET REV FOLD 3/4 9349

## (undated) DEVICE — PACK NEURO MINOR UMMC SNE32MNMU4

## (undated) DEVICE — DRSG BIATAIN ALGINATE 4X4" 3710

## (undated) DEVICE — NDL COUNTER 40CT  31142311

## (undated) DEVICE — BONE CLEANING TIP INTERPULSE  0210-010-000

## (undated) DEVICE — EYE SHIELD PLASTIC

## (undated) DEVICE — EYE TIP IRRIGATION & ASPIRATION POLYMER 35D BENT 8065751511

## (undated) DEVICE — ESU ELEC NDL 1" COATED/INSULATED E1465

## (undated) DEVICE — GLOVE PROTEXIS BLUE W/NEU-THERA 7.5  2D73EB75

## (undated) DEVICE — Device

## (undated) DEVICE — SPONGE COTTON BALL 1/2" W/STRING 30-02

## (undated) DEVICE — BLADE KNIFE BEAVER 3.0MM NEEDLE 375910

## (undated) DEVICE — DRAPE STOCKINETTE IMPERVIOUS 10" 21048

## (undated) DEVICE — PREP POVIDONE-IODINE 7.5% SCRUB 4OZ BOTTLE MDS093945

## (undated) DEVICE — SOL WATER IRRIG 1000ML BOTTLE 07139-09

## (undated) DEVICE — SPONGE LAP 18X18" X8435

## (undated) DEVICE — BLADE KNIFE BEAVER MINI BEAVER6400

## (undated) DEVICE — BLADE KNIFE SURG 20 371120

## (undated) DEVICE — PREP POVIDONE IODINE SWABS X3

## (undated) DEVICE — BLADE KNIFE BEAVER TYMPANOPLASTY 2.5MM W/60D DOWN 377200

## (undated) DEVICE — GOWN IMPERVIOUS SPECIALTY XLG/XLONG 32474

## (undated) DEVICE — HOOD SURG T7PLUS PEEL AWAY FACE SHIELD STRL LF 0416-801-100

## (undated) DEVICE — PREP POVIDONE-IODINE 10% SOLUTION 4OZ BOTTLE MDS093944

## (undated) DEVICE — LINEN TOWEL PACK X6 WHITE 5487

## (undated) DEVICE — EYE KNIFE SLIT XSTAR VISITEC 2.4MM 45DEG BEVEL UP 373724

## (undated) DEVICE — GLOVE PROTEXIS MICRO 6.0  2D73PM60

## (undated) DEVICE — SPONGE SURGIFOAM 100 1974

## (undated) DEVICE — DRAIN PENROSE 1/4"X18" LATEX  30416-025

## (undated) DEVICE — DRSG GLASSCOCK ADULT S-1000

## (undated) DEVICE — DRAPE POUCH INSTRUMENT 1018

## (undated) DEVICE — SU DERMABOND ADVANCED .7ML DNX12

## (undated) DEVICE — PACK CATARACT CUSTOM SO DALE SEY32CTFCX

## (undated) DEVICE — SOL WATER IRRIG 1000ML BOTTLE 2F7114

## (undated) DEVICE — SU MONOCRYL 3-0 PS-1 27" Y936H

## (undated) DEVICE — SYR 10ML LL W/O NDL 302995

## (undated) DEVICE — SU ETHILON 10-0 TG140-6 12" 9003G

## (undated) DEVICE — BLADE SAW SAGITTAL STRK 18X90X1.27MM HD SYS 6 6118-127-090

## (undated) DEVICE — STRAP UNIVERSAL POSITIONING 2-PIECE 4X47X76" 91-287

## (undated) DEVICE — LINEN BACK PACK 5440

## (undated) DEVICE — NDL 27GA 1.25" 305136

## (undated) DEVICE — BUR STRK ROUND DIAMOND 2.0MM FINE 5540-011-020

## (undated) DEVICE — PREP CHLORAPREP 26ML TINTED HI-LITE ORANGE 930815

## (undated) DEVICE — GLOVE BIOGEL PI MICRO INDICATOR UNDERGLOVE SZ 8.0 48980

## (undated) DEVICE — ESU ELEC BLADE 2.75" COATED/INSULATED E1455

## (undated) DEVICE — PREP CHLORAPREP 26ML TINTED ORANGE  260815

## (undated) DEVICE — TOURNIQUET CUFF 30" REPRO BLUE 60-7070-105

## (undated) DEVICE — SU VICRYL 0 CT-1 36" J946H

## (undated) DEVICE — STRAP KNEE/BODY 31143004

## (undated) DEVICE — SOL NACL 0.9% IRRIG 3000ML BAG 2B7477

## (undated) DEVICE — DECANTER VIAL 2006S

## (undated) DEVICE — PACK MINOR SBA15MIFSE

## (undated) DEVICE — EYE PACK CUSTOM ANTERIOR 30DEG TIP CENTURION PPK6682-04

## (undated) DEVICE — DRAPE U SPLIT 74X120" 29440

## (undated) DEVICE — ESU PENCIL W/SMOKE EVAC NEPTUNE STRYKER 0703-046-000

## (undated) DEVICE — DRSG TEGADERM 4X10" 1627

## (undated) DEVICE — SYR 01ML 27GA 0.5" NDL TBC 309623

## (undated) DEVICE — DRSG COTTON BALL 6PK LCB62

## (undated) DEVICE — ADH LIQUID MASTISOL TOPICAL VIAL 2-3ML 0523-48

## (undated) DEVICE — DRAPE SHEET HALF 40X60" 9358

## (undated) DEVICE — TUBING SUCTION MEDI-VAC SOFT 3/16"X20' N520A

## (undated) DEVICE — GLOVE PROTEXIS MICRO 7.0  2D73PM70

## (undated) DEVICE — DRSG TEGADERM 4X4 3/4" 1626W

## (undated) DEVICE — GLOVE BIOGEL PI SZ 7.5 40875

## (undated) DEVICE — SU CHROMIC 4-0 FS-2 27" 635H

## (undated) DEVICE — GLOVE BIOGEL PI MICRO SZ 7.0 48570

## (undated) DEVICE — TUBING STRYKER IRRIGATION CASSETTE 5400-050-001

## (undated) DEVICE — SU VICRYL 4-0 P-3 18" UND  J494H

## (undated) DEVICE — INSTRUMENT WIPE VISIWIPE 581047

## (undated) DEVICE — BUR STRK ROUND 6.0MM MULTI-FLUTE 4H S2 PIDRIVE 5540-010-060

## (undated) DEVICE — DRAPE SPLIT EENT 76X124" 3X28" 9447

## (undated) DEVICE — TONGUE DEPRESSOR STERILE 6023

## (undated) DEVICE — EYE PACK CUSTOM CATARACT AS12127-01

## (undated) RX ORDER — ALBUTEROL SULFATE 0.83 MG/ML
SOLUTION RESPIRATORY (INHALATION)
Status: DISPENSED
Start: 2021-07-13

## (undated) RX ORDER — ACETAMINOPHEN 325 MG/1
TABLET ORAL
Status: DISPENSED
Start: 2024-04-01

## (undated) RX ORDER — CIPROFLOXACIN AND DEXAMETHASONE 3; 1 MG/ML; MG/ML
SUSPENSION/ DROPS AURICULAR (OTIC)
Status: DISPENSED
Start: 2024-10-21

## (undated) RX ORDER — OXYCODONE HYDROCHLORIDE 5 MG/1
TABLET ORAL
Status: DISPENSED
Start: 2024-10-21

## (undated) RX ORDER — DEXAMETHASONE SODIUM PHOSPHATE 4 MG/ML
INJECTION, SOLUTION INTRA-ARTICULAR; INTRALESIONAL; INTRAMUSCULAR; INTRAVENOUS; SOFT TISSUE
Status: DISPENSED
Start: 2024-11-07

## (undated) RX ORDER — PROPOFOL 10 MG/ML
INJECTION, EMULSION INTRAVENOUS
Status: DISPENSED
Start: 2024-11-07

## (undated) RX ORDER — FENTANYL CITRATE 50 UG/ML
INJECTION, SOLUTION INTRAMUSCULAR; INTRAVENOUS
Status: DISPENSED
Start: 2024-04-01

## (undated) RX ORDER — FENTANYL CITRATE 50 UG/ML
INJECTION, SOLUTION INTRAMUSCULAR; INTRAVENOUS
Status: DISPENSED
Start: 2022-01-10

## (undated) RX ORDER — TRANEXAMIC ACID 650 MG/1
TABLET ORAL
Status: DISPENSED
Start: 2024-04-01

## (undated) RX ORDER — OXYCODONE HYDROCHLORIDE 5 MG/1
TABLET ORAL
Status: DISPENSED
Start: 2024-04-01

## (undated) RX ORDER — DEXAMETHASONE SODIUM PHOSPHATE 4 MG/ML
INJECTION, SOLUTION INTRA-ARTICULAR; INTRALESIONAL; INTRAMUSCULAR; INTRAVENOUS; SOFT TISSUE
Status: DISPENSED
Start: 2024-10-21

## (undated) RX ORDER — ACETAMINOPHEN 325 MG/1
TABLET ORAL
Status: DISPENSED
Start: 2022-01-10

## (undated) RX ORDER — FENTANYL CITRATE 50 UG/ML
INJECTION, SOLUTION INTRAMUSCULAR; INTRAVENOUS
Status: DISPENSED
Start: 2024-11-07

## (undated) RX ORDER — EPHEDRINE SULFATE 50 MG/ML
INJECTION, SOLUTION INTRAMUSCULAR; INTRAVENOUS; SUBCUTANEOUS
Status: DISPENSED
Start: 2024-11-07

## (undated) RX ORDER — FENTANYL CITRATE-0.9 % NACL/PF 10 MCG/ML
PLASTIC BAG, INJECTION (ML) INTRAVENOUS
Status: DISPENSED
Start: 2024-10-21

## (undated) RX ORDER — LIDOCAINE HYDROCHLORIDE AND EPINEPHRINE 10; 10 MG/ML; UG/ML
INJECTION, SOLUTION INFILTRATION; PERINEURAL
Status: DISPENSED
Start: 2024-11-07

## (undated) RX ORDER — CEFAZOLIN SODIUM 1 G/3ML
INJECTION, POWDER, FOR SOLUTION INTRAMUSCULAR; INTRAVENOUS
Status: DISPENSED
Start: 2024-10-21

## (undated) RX ORDER — ESMOLOL HYDROCHLORIDE 10 MG/ML
INJECTION INTRAVENOUS
Status: DISPENSED
Start: 2024-11-07

## (undated) RX ORDER — ONDANSETRON 2 MG/ML
INJECTION INTRAMUSCULAR; INTRAVENOUS
Status: DISPENSED
Start: 2024-11-07

## (undated) RX ORDER — PROPOFOL 10 MG/ML
INJECTION, EMULSION INTRAVENOUS
Status: DISPENSED
Start: 2024-04-01

## (undated) RX ORDER — HYDROMORPHONE HYDROCHLORIDE 1 MG/ML
INJECTION, SOLUTION INTRAMUSCULAR; INTRAVENOUS; SUBCUTANEOUS
Status: DISPENSED
Start: 2024-11-07

## (undated) RX ORDER — FENTANYL CITRATE 50 UG/ML
INJECTION, SOLUTION INTRAMUSCULAR; INTRAVENOUS
Status: DISPENSED
Start: 2024-10-21

## (undated) RX ORDER — EPINEPHRINE 1 MG/ML
INJECTION, SOLUTION INTRAMUSCULAR; SUBCUTANEOUS
Status: DISPENSED
Start: 2024-10-21

## (undated) RX ORDER — CEFAZOLIN SODIUM/WATER 2 G/20 ML
SYRINGE (ML) INTRAVENOUS
Status: DISPENSED
Start: 2024-04-01

## (undated) RX ORDER — MINERAL OIL
OIL (ML) MISCELLANEOUS
Status: DISPENSED
Start: 2024-11-07

## (undated) RX ORDER — LIDOCAINE HYDROCHLORIDE AND EPINEPHRINE 10; 10 MG/ML; UG/ML
INJECTION, SOLUTION INFILTRATION; PERINEURAL
Status: DISPENSED
Start: 2024-10-21

## (undated) RX ORDER — EPINEPHRINE NASAL SOLUTION 1 MG/ML
SOLUTION NASAL
Status: DISPENSED
Start: 2024-10-21

## (undated) RX ORDER — REGADENOSON 0.08 MG/ML
INJECTION, SOLUTION INTRAVENOUS
Status: DISPENSED
Start: 2024-07-26

## (undated) RX ORDER — ONDANSETRON 2 MG/ML
INJECTION INTRAMUSCULAR; INTRAVENOUS
Status: DISPENSED
Start: 2024-04-01

## (undated) RX ORDER — OXYCODONE HYDROCHLORIDE 5 MG/1
TABLET ORAL
Status: DISPENSED
Start: 2024-11-07

## (undated) RX ORDER — EPINEPHRINE NASAL SOLUTION 1 MG/ML
SOLUTION NASAL
Status: DISPENSED
Start: 2024-11-07

## (undated) RX ORDER — FENTANYL CITRATE-0.9 % NACL/PF 10 MCG/ML
PLASTIC BAG, INJECTION (ML) INTRAVENOUS
Status: DISPENSED
Start: 2024-04-01

## (undated) RX ORDER — HYDROMORPHONE HYDROCHLORIDE 1 MG/ML
INJECTION, SOLUTION INTRAMUSCULAR; INTRAVENOUS; SUBCUTANEOUS
Status: DISPENSED
Start: 2024-04-01

## (undated) RX ORDER — FENTANYL CITRATE 0.05 MG/ML
INJECTION, SOLUTION INTRAMUSCULAR; INTRAVENOUS
Status: DISPENSED
Start: 2024-04-01

## (undated) RX ORDER — OFLOXACIN 3 MG/ML
SOLUTION AURICULAR (OTIC)
Status: DISPENSED
Start: 2024-10-21

## (undated) RX ORDER — CEFAZOLIN SODIUM/WATER 2 G/20 ML
SYRINGE (ML) INTRAVENOUS
Status: DISPENSED
Start: 2024-11-07

## (undated) RX ORDER — ONDANSETRON 2 MG/ML
INJECTION INTRAMUSCULAR; INTRAVENOUS
Status: DISPENSED
Start: 2017-02-20

## (undated) RX ORDER — FENTANYL CITRATE-0.9 % NACL/PF 10 MCG/ML
PLASTIC BAG, INJECTION (ML) INTRAVENOUS
Status: DISPENSED
Start: 2024-11-07

## (undated) RX ORDER — DEXAMETHASONE SODIUM PHOSPHATE 4 MG/ML
INJECTION, SOLUTION INTRA-ARTICULAR; INTRALESIONAL; INTRAMUSCULAR; INTRAVENOUS; SOFT TISSUE
Status: DISPENSED
Start: 2024-04-01

## (undated) RX ORDER — KETOROLAC TROMETHAMINE 30 MG/ML
INJECTION, SOLUTION INTRAMUSCULAR; INTRAVENOUS
Status: DISPENSED
Start: 2024-04-01

## (undated) RX ORDER — KETOROLAC TROMETHAMINE 30 MG/ML
INJECTION, SOLUTION INTRAMUSCULAR; INTRAVENOUS
Status: DISPENSED
Start: 2024-10-21

## (undated) RX ORDER — PROPOFOL 10 MG/ML
INJECTION, EMULSION INTRAVENOUS
Status: DISPENSED
Start: 2024-10-21

## (undated) RX ORDER — EPHEDRINE SULFATE 50 MG/ML
INJECTION, SOLUTION INTRAMUSCULAR; INTRAVENOUS; SUBCUTANEOUS
Status: DISPENSED
Start: 2024-10-21

## (undated) RX ORDER — CEFAZOLIN SODIUM/WATER 2 G/20 ML
SYRINGE (ML) INTRAVENOUS
Status: DISPENSED
Start: 2024-10-21